# Patient Record
Sex: MALE | Race: WHITE | NOT HISPANIC OR LATINO | URBAN - METROPOLITAN AREA
[De-identification: names, ages, dates, MRNs, and addresses within clinical notes are randomized per-mention and may not be internally consistent; named-entity substitution may affect disease eponyms.]

---

## 2018-07-16 ENCOUNTER — EMERGENCY (EMERGENCY)
Facility: HOSPITAL | Age: 73
LOS: 1 days | Discharge: HOME | End: 2018-07-16
Attending: EMERGENCY MEDICINE
Payer: MEDICARE

## 2018-07-16 VITALS
OXYGEN SATURATION: 95 % | HEIGHT: 75 IN | TEMPERATURE: 98 F | DIASTOLIC BLOOD PRESSURE: 84 MMHG | HEART RATE: 88 BPM | SYSTOLIC BLOOD PRESSURE: 144 MMHG | RESPIRATION RATE: 18 BRPM | WEIGHT: 270.07 LBS

## 2018-07-16 VITALS
HEART RATE: 87 BPM | TEMPERATURE: 99 F | RESPIRATION RATE: 18 BRPM | SYSTOLIC BLOOD PRESSURE: 124 MMHG | DIASTOLIC BLOOD PRESSURE: 72 MMHG | OXYGEN SATURATION: 97 %

## 2018-07-16 DIAGNOSIS — Z98.89 OTHER SPECIFIED POSTPROCEDURAL STATES: Chronic | ICD-10-CM

## 2018-07-16 DIAGNOSIS — C49.9 MALIGNANT NEOPLASM OF CONNECTIVE AND SOFT TISSUE, UNSPECIFIED: Chronic | ICD-10-CM

## 2018-07-16 LAB
ANION GAP SERPL CALC-SCNC: 14 MMOL/L — SIGNIFICANT CHANGE UP (ref 5–17)
APPEARANCE UR: CLEAR — SIGNIFICANT CHANGE UP
APTT BLD: 37.2 SEC — SIGNIFICANT CHANGE UP (ref 27.5–37.4)
BILIRUB UR-MCNC: NEGATIVE — SIGNIFICANT CHANGE UP
BUN SERPL-MCNC: 24 MG/DL — HIGH (ref 8–20)
CALCIUM SERPL-MCNC: 9 MG/DL — SIGNIFICANT CHANGE UP (ref 8.6–10.2)
CHLORIDE SERPL-SCNC: 100 MMOL/L — SIGNIFICANT CHANGE UP (ref 98–107)
CO2 SERPL-SCNC: 25 MMOL/L — SIGNIFICANT CHANGE UP (ref 22–29)
COLOR SPEC: YELLOW — SIGNIFICANT CHANGE UP
CREAT SERPL-MCNC: 0.83 MG/DL — SIGNIFICANT CHANGE UP (ref 0.5–1.3)
DIFF PNL FLD: ABNORMAL
EPI CELLS # UR: SIGNIFICANT CHANGE UP
GLUCOSE SERPL-MCNC: 178 MG/DL — HIGH (ref 70–115)
GLUCOSE UR QL: 250 MG/DL
HCT VFR BLD CALC: 37.2 % — LOW (ref 42–52)
HGB BLD-MCNC: 10.9 G/DL — LOW (ref 14–18)
INR BLD: 2.45 RATIO — HIGH (ref 0.88–1.16)
KETONES UR-MCNC: NEGATIVE — SIGNIFICANT CHANGE UP
LEUKOCYTE ESTERASE UR-ACNC: NEGATIVE — SIGNIFICANT CHANGE UP
MCHC RBC-ENTMCNC: 19.9 PG — LOW (ref 27–31)
MCHC RBC-ENTMCNC: 29.3 G/DL — LOW (ref 32–36)
MCV RBC AUTO: 68 FL — LOW (ref 80–94)
NITRITE UR-MCNC: NEGATIVE — SIGNIFICANT CHANGE UP
PH UR: 6 — SIGNIFICANT CHANGE UP (ref 5–8)
PLATELET # BLD AUTO: 228 K/UL — SIGNIFICANT CHANGE UP (ref 150–400)
POTASSIUM SERPL-MCNC: 4 MMOL/L — SIGNIFICANT CHANGE UP (ref 3.5–5.3)
POTASSIUM SERPL-SCNC: 4 MMOL/L — SIGNIFICANT CHANGE UP (ref 3.5–5.3)
PROT UR-MCNC: 30 MG/DL
PROTHROM AB SERPL-ACNC: 27.5 SEC — HIGH (ref 9.8–12.7)
RBC # BLD: 5.47 M/UL — SIGNIFICANT CHANGE UP (ref 4.6–6.2)
RBC # FLD: 17.8 % — HIGH (ref 11–15.6)
RBC CASTS # UR COMP ASSIST: ABNORMAL /HPF (ref 0–4)
SODIUM SERPL-SCNC: 139 MMOL/L — SIGNIFICANT CHANGE UP (ref 135–145)
SP GR SPEC: 1.01 — SIGNIFICANT CHANGE UP (ref 1.01–1.02)
UROBILINOGEN FLD QL: NEGATIVE MG/DL — SIGNIFICANT CHANGE UP
WBC # BLD: 10.8 K/UL — SIGNIFICANT CHANGE UP (ref 4.8–10.8)
WBC # FLD AUTO: 10.8 K/UL — SIGNIFICANT CHANGE UP (ref 4.8–10.8)
WBC UR QL: SIGNIFICANT CHANGE UP

## 2018-07-16 PROCEDURE — 99284 EMERGENCY DEPT VISIT MOD MDM: CPT

## 2018-07-16 PROCEDURE — 99283 EMERGENCY DEPT VISIT LOW MDM: CPT

## 2018-07-16 PROCEDURE — 80048 BASIC METABOLIC PNL TOTAL CA: CPT

## 2018-07-16 PROCEDURE — 85610 PROTHROMBIN TIME: CPT

## 2018-07-16 PROCEDURE — 85730 THROMBOPLASTIN TIME PARTIAL: CPT

## 2018-07-16 PROCEDURE — 36415 COLL VENOUS BLD VENIPUNCTURE: CPT

## 2018-07-16 PROCEDURE — 85027 COMPLETE CBC AUTOMATED: CPT

## 2018-07-16 PROCEDURE — 81001 URINALYSIS AUTO W/SCOPE: CPT

## 2018-07-16 PROCEDURE — 87086 URINE CULTURE/COLONY COUNT: CPT

## 2018-07-16 NOTE — ED PROVIDER NOTE - GENITOURINARY, MLM
Falous in uncircumcised, normal color. Scrotum sack enlarged, no testicular tenderness to palpation. No cord tenderness CINDY, no lesions or rashes.

## 2018-07-16 NOTE — ED PROVIDER NOTE - NEUROLOGICAL, MLM
Alert and oriented, no focal deficits, no motor or sensory deficits. Alert and oriented, no focal deficits, no motor or sensory deficits. No peripheral paresthesia.

## 2018-07-16 NOTE — ED PROVIDER NOTE - OBJECTIVE STATEMENT
73 y/o M pt with hx of Afib, DM, DVT, HLD, HTN, and prostate disease presents to ED c/o urinary retention, lower/suprapubic abdominal pain, and back pain. Pt reports that his last normal urination was yesterday during the day, he states that he did pass dribbles during the day and night. His suprapubic pain, lower abdominal pain has been growing in severity. Pt has had similar episodes of urinary retention, from his Lozide for BPH. Pt is currently taking Coumadin, and other meds. Pt states that he has chronic blood in his urine, and has realized that his penis is f abnormal color for about a year now. He states that he wants his kidney function checked. Denies numbness, and tingling. No further complaints at this time.

## 2018-07-16 NOTE — ED PROVIDER NOTE - PMH
Afib    Anxiety    Anxiety    BPH (benign prostatic hyperplasia)    DM (diabetes mellitus)    DVT (deep venous thrombosis)  left leg  Gallbladder stone without cholecystitis or obstruction    Gastroesophageal reflux disease    HLD (hyperlipidemia)    Hyperlipidemia    Hypertension    Prostate disease

## 2018-07-16 NOTE — ED PROVIDER NOTE - CONSTITUTIONAL, MLM
normal... Anxious demeanor, well nourished, awake, alert, oriented to person, place, time/situation and in no apparent distress. Elderly.

## 2018-07-17 LAB
CULTURE RESULTS: NO GROWTH — SIGNIFICANT CHANGE UP
SPECIMEN SOURCE: SIGNIFICANT CHANGE UP

## 2018-07-22 ENCOUNTER — EMERGENCY (EMERGENCY)
Facility: HOSPITAL | Age: 73
LOS: 1 days | Discharge: HOME | End: 2018-07-22
Attending: EMERGENCY MEDICINE
Payer: MEDICARE

## 2018-07-22 VITALS
RESPIRATION RATE: 18 BRPM | TEMPERATURE: 98 F | SYSTOLIC BLOOD PRESSURE: 124 MMHG | HEART RATE: 86 BPM | OXYGEN SATURATION: 95 % | DIASTOLIC BLOOD PRESSURE: 80 MMHG

## 2018-07-22 VITALS
TEMPERATURE: 98 F | OXYGEN SATURATION: 95 % | SYSTOLIC BLOOD PRESSURE: 154 MMHG | WEIGHT: 315 LBS | DIASTOLIC BLOOD PRESSURE: 92 MMHG | HEART RATE: 90 BPM | RESPIRATION RATE: 18 BRPM | HEIGHT: 74 IN

## 2018-07-22 DIAGNOSIS — Z98.89 OTHER SPECIFIED POSTPROCEDURAL STATES: Chronic | ICD-10-CM

## 2018-07-22 DIAGNOSIS — C49.9 MALIGNANT NEOPLASM OF CONNECTIVE AND SOFT TISSUE, UNSPECIFIED: Chronic | ICD-10-CM

## 2018-07-22 PROCEDURE — 99283 EMERGENCY DEPT VISIT LOW MDM: CPT

## 2018-07-22 PROCEDURE — 99282 EMERGENCY DEPT VISIT SF MDM: CPT

## 2018-07-22 PROCEDURE — 99284 EMERGENCY DEPT VISIT MOD MDM: CPT

## 2018-07-22 NOTE — ED PROVIDER NOTE - CHPI ED SYMPTOMS NEG
no nausea/no fever/no abdominal distension/no dysuria/no vomiting/no burning urination/no chills/no hematuria

## 2018-07-22 NOTE — ED PROVIDER NOTE - OBJECTIVE STATEMENT
73 y/o M pt with hx of Afib, anxiety, BPH, DM, HTN, HLD, and DVT presents to ED c/o suprapubic abdominal pain, penile pain and urinary retention that began this evening. Pt was seen 6 days ago in the ED for urinary retention and a Franz catheter was placed. Pt presented this morning for urinary retention and after his catheter was flushed, he felt relief and as discharged. Pt states approximately 200 cc's of urine have collected in the bag over the last 6-7 hours. Pt states he has an appointment with his urologist in the morning. Denies fever, chills, CP, SOB, nausea and vomiting. No further complaints at this time.   Urology: Dr. Mccollum

## 2018-07-22 NOTE — ED ADULT NURSE NOTE - OBJECTIVE STATEMENT
Patient A&Ox4, golden cath in place, draining clear pinkish urine. Patient stated felt urinary retention, was in ED early today for same concerns. Stated has had golden in place x1.5 weeks. Patient concerned golden is not draining despite urine in golden bag.

## 2018-07-22 NOTE — ED ADULT TRIAGE NOTE - CHIEF COMPLAINT QUOTE
patient biba states that he has urinary retention and pain/ burning to his penis, patient was seen earlier today for the same, he states that he is having no urinary output at this time

## 2018-07-22 NOTE — ED PROVIDER NOTE - ATTENDING CONTRIBUTION TO CARE
I, Cristian Castillo, performed a face to face bedside interview with this patient regarding history of present illness, review of symptoms and relevant past medical, social and family history.  I completed an independent physical examination. I have communicated the patient’s plan of care and disposition with the ACP.  72 year old male with PMh BPH with recent golden insertion presents for golden not draining. States he has had 500 cc over 12 hours in leg bag  Gen: NAD, well appearing  CV: RRR  Pul: CTA b/l  Abd: Soft, non-distended, non-tender  Neuro: no focal deficits  Catheter flushed and now drains well, no discomfort, well appearing, stable for dc

## 2018-07-22 NOTE — ED PROVIDER NOTE - OBJECTIVE STATEMENT
71 yo M, PmHx: BPH, afib, DM, HTN, GERD, anxiety, presents to ED complaining of decreased catheter output. PT states he thinks there is less urinae coming out of golden catheter, with associated urge to urinate. PT states golden placed in ED 4 days ago without complications. PT states he empty bag 12 hrs ago and currently has 500 cc in Ubag. PT has apt with Dr. Blood tomorrow. PT denies fever, chills, genital pain, abdominal pain/ distention, gross hematuria, and any other acute symptoms at this time.

## 2018-07-22 NOTE — ED PROVIDER NOTE - PROGRESS NOTE DETAILS
PT catheter flushed, catheter flowing without difficulty. Franz bag has 600+ cc. PT denies abdominal pain and distension. PT verbalized understanding of diagnosis and importance of follow up at PMD. PT educated on importance of follow up and when to return to the ED.

## 2018-07-22 NOTE — ED PROVIDER NOTE - MEDICAL DECISION MAKING DETAILS
urinary catheter problem, will obtain bladder scan, flush catheter, reassure PT of appropriate urinary outpt, follow up with Urologist on Monday urinary catheter problem, flush catheter, reassure PT of appropriate urinary outpt, follow up with Urologist on Monday

## 2018-07-22 NOTE — ED PROVIDER NOTE - MEDICAL DECISION MAKING DETAILS
Franz replaced, Sx resolved. no fever or leukocytosis and pt is well appearing. Pt currently on cipro and advised to continue. Pt has f/u with Uro today at [address]. Stable for dc

## 2018-07-23 VITALS
RESPIRATION RATE: 18 BRPM | DIASTOLIC BLOOD PRESSURE: 78 MMHG | OXYGEN SATURATION: 94 % | HEART RATE: 84 BPM | SYSTOLIC BLOOD PRESSURE: 146 MMHG | TEMPERATURE: 98 F

## 2018-07-23 LAB
ANION GAP SERPL CALC-SCNC: 11 MMOL/L — SIGNIFICANT CHANGE UP (ref 5–17)
ANISOCYTOSIS BLD QL: SLIGHT — SIGNIFICANT CHANGE UP
APPEARANCE UR: CLEAR — SIGNIFICANT CHANGE UP
BACTERIA # UR AUTO: ABNORMAL
BASOPHILS # BLD AUTO: 0 K/UL — SIGNIFICANT CHANGE UP (ref 0–0.2)
BASOPHILS NFR BLD AUTO: 0.3 % — SIGNIFICANT CHANGE UP (ref 0–2)
BILIRUB UR-MCNC: NEGATIVE — SIGNIFICANT CHANGE UP
BUN SERPL-MCNC: 16 MG/DL — SIGNIFICANT CHANGE UP (ref 8–20)
BURR CELLS BLD QL SMEAR: PRESENT — SIGNIFICANT CHANGE UP
CALCIUM SERPL-MCNC: 8.9 MG/DL — SIGNIFICANT CHANGE UP (ref 8.6–10.2)
CHLORIDE SERPL-SCNC: 102 MMOL/L — SIGNIFICANT CHANGE UP (ref 98–107)
CO2 SERPL-SCNC: 25 MMOL/L — SIGNIFICANT CHANGE UP (ref 22–29)
COLOR SPEC: YELLOW — SIGNIFICANT CHANGE UP
CREAT SERPL-MCNC: 0.65 MG/DL — SIGNIFICANT CHANGE UP (ref 0.5–1.3)
DIFF PNL FLD: ABNORMAL
EOSINOPHIL # BLD AUTO: 0.4 K/UL — SIGNIFICANT CHANGE UP (ref 0–0.5)
EOSINOPHIL NFR BLD AUTO: 6.1 % — HIGH (ref 0–5)
EPI CELLS # UR: NEGATIVE — SIGNIFICANT CHANGE UP
GLUCOSE SERPL-MCNC: 249 MG/DL — HIGH (ref 70–115)
GLUCOSE UR QL: NEGATIVE MG/DL — SIGNIFICANT CHANGE UP
HCT VFR BLD CALC: 38.1 % — LOW (ref 42–52)
HGB BLD-MCNC: 11.3 G/DL — LOW (ref 14–18)
HYPOCHROMIA BLD QL: SLIGHT — SIGNIFICANT CHANGE UP
KETONES UR-MCNC: NEGATIVE — SIGNIFICANT CHANGE UP
LEUKOCYTE ESTERASE UR-ACNC: ABNORMAL
LYMPHOCYTES # BLD AUTO: 0.9 K/UL — LOW (ref 1–4.8)
LYMPHOCYTES # BLD AUTO: 13.2 % — LOW (ref 20–55)
MCHC RBC-ENTMCNC: 20.2 PG — LOW (ref 27–31)
MCHC RBC-ENTMCNC: 29.7 G/DL — LOW (ref 32–36)
MCV RBC AUTO: 68.2 FL — LOW (ref 80–94)
MICROCYTES BLD QL: SLIGHT — SIGNIFICANT CHANGE UP
MONOCYTES # BLD AUTO: 0.8 K/UL — SIGNIFICANT CHANGE UP (ref 0–0.8)
MONOCYTES NFR BLD AUTO: 12.4 % — HIGH (ref 3–10)
NEUTROPHILS # BLD AUTO: 4.6 K/UL — SIGNIFICANT CHANGE UP (ref 1.8–8)
NEUTROPHILS NFR BLD AUTO: 67.9 % — SIGNIFICANT CHANGE UP (ref 37–73)
NITRITE UR-MCNC: NEGATIVE — SIGNIFICANT CHANGE UP
OVALOCYTES BLD QL SMEAR: SLIGHT — SIGNIFICANT CHANGE UP
PH UR: 6 — SIGNIFICANT CHANGE UP (ref 5–8)
PLAT MORPH BLD: NORMAL — SIGNIFICANT CHANGE UP
PLATELET # BLD AUTO: 278 K/UL — SIGNIFICANT CHANGE UP (ref 150–400)
POIKILOCYTOSIS BLD QL AUTO: SLIGHT — SIGNIFICANT CHANGE UP
POTASSIUM SERPL-MCNC: 4.2 MMOL/L — SIGNIFICANT CHANGE UP (ref 3.5–5.3)
POTASSIUM SERPL-SCNC: 4.2 MMOL/L — SIGNIFICANT CHANGE UP (ref 3.5–5.3)
PROT UR-MCNC: 100 MG/DL
RBC # BLD: 5.59 M/UL — SIGNIFICANT CHANGE UP (ref 4.6–6.2)
RBC # FLD: 18.1 % — HIGH (ref 11–15.6)
RBC BLD AUTO: ABNORMAL
RBC CASTS # UR COMP ASSIST: >50 /HPF (ref 0–4)
SODIUM SERPL-SCNC: 138 MMOL/L — SIGNIFICANT CHANGE UP (ref 135–145)
SP GR SPEC: 1.01 — SIGNIFICANT CHANGE UP (ref 1.01–1.02)
UROBILINOGEN FLD QL: NEGATIVE MG/DL — SIGNIFICANT CHANGE UP
WBC # BLD: 6.8 K/UL — SIGNIFICANT CHANGE UP (ref 4.8–10.8)
WBC # FLD AUTO: 6.8 K/UL — SIGNIFICANT CHANGE UP (ref 4.8–10.8)
WBC UR QL: ABNORMAL

## 2018-07-23 PROCEDURE — 99283 EMERGENCY DEPT VISIT LOW MDM: CPT | Mod: 25

## 2018-07-23 PROCEDURE — 85027 COMPLETE CBC AUTOMATED: CPT

## 2018-07-23 PROCEDURE — 87186 SC STD MICRODIL/AGAR DIL: CPT

## 2018-07-23 PROCEDURE — 36415 COLL VENOUS BLD VENIPUNCTURE: CPT

## 2018-07-23 PROCEDURE — 51702 INSERT TEMP BLADDER CATH: CPT

## 2018-07-23 PROCEDURE — 80048 BASIC METABOLIC PNL TOTAL CA: CPT

## 2018-07-23 PROCEDURE — 81001 URINALYSIS AUTO W/SCOPE: CPT

## 2018-07-23 PROCEDURE — 87086 URINE CULTURE/COLONY COUNT: CPT

## 2018-07-23 PROCEDURE — 82962 GLUCOSE BLOOD TEST: CPT

## 2018-07-23 NOTE — ED ADULT NURSE REASSESSMENT NOTE - NS ED NURSE REASSESS COMMENT FT1
Golden cath removed as ordered & new one placed. New golden patent, draining clear yellow urine. Patient reports positive relief of Sx. Dr. Castillo notified of outcome.

## 2018-07-25 LAB
-  AMPICILLIN: SIGNIFICANT CHANGE UP
-  LEVOFLOXACIN: SIGNIFICANT CHANGE UP
-  NITROFURANTOIN: SIGNIFICANT CHANGE UP
-  TETRACYCLINE: SIGNIFICANT CHANGE UP
-  VANCOMYCIN: SIGNIFICANT CHANGE UP
CULTURE RESULTS: SIGNIFICANT CHANGE UP
METHOD TYPE: SIGNIFICANT CHANGE UP
ORGANISM # SPEC MICROSCOPIC CNT: SIGNIFICANT CHANGE UP
ORGANISM # SPEC MICROSCOPIC CNT: SIGNIFICANT CHANGE UP
SPECIMEN SOURCE: SIGNIFICANT CHANGE UP

## 2018-07-30 ENCOUNTER — EMERGENCY (EMERGENCY)
Facility: HOSPITAL | Age: 73
LOS: 1 days | Discharge: HOME | End: 2018-07-30
Attending: EMERGENCY MEDICINE
Payer: MEDICARE

## 2018-07-30 VITALS
RESPIRATION RATE: 18 BRPM | OXYGEN SATURATION: 98 % | TEMPERATURE: 98 F | WEIGHT: 315 LBS | HEART RATE: 86 BPM | HEIGHT: 74 IN | DIASTOLIC BLOOD PRESSURE: 78 MMHG | SYSTOLIC BLOOD PRESSURE: 156 MMHG

## 2018-07-30 DIAGNOSIS — C49.9 MALIGNANT NEOPLASM OF CONNECTIVE AND SOFT TISSUE, UNSPECIFIED: Chronic | ICD-10-CM

## 2018-07-30 DIAGNOSIS — Z98.89 OTHER SPECIFIED POSTPROCEDURAL STATES: Chronic | ICD-10-CM

## 2018-07-30 PROCEDURE — 99283 EMERGENCY DEPT VISIT LOW MDM: CPT | Mod: 25

## 2018-07-30 PROCEDURE — 99283 EMERGENCY DEPT VISIT LOW MDM: CPT

## 2018-07-30 PROCEDURE — 51702 INSERT TEMP BLADDER CATH: CPT

## 2018-07-30 NOTE — ED ADULT NURSE REASSESSMENT NOTE - NS ED NURSE REASSESS COMMENT FT1
Pt BIBA from urology office in obvious pain stating "I'm in agony". Pt requesting golden due to urinary retention, pt had golden removed this morning at urologist office at 0830 and been unable to void. Dr Doss made aware and verbal order received to place golden stat. Using sterile technique and following hospital protocol golden was placed and pt emptied approx 1100mL clear yellow urine into golden bag and sterile sample collected. Pt endorsed to primary RN Laquita for follow up and continuity of care.

## 2018-07-30 NOTE — ED PROVIDER NOTE - PROGRESS NOTE DETAILS
I called Dr. Mccollum's office at patient's request to find out when his f/u appt would be as he was concerned that they would not want to see him in the office after today.  Office moved up appointment o August 30th at 815 AM .

## 2018-07-30 NOTE — ED ADULT TRIAGE NOTE - CHIEF COMPLAINT QUOTE
pt BIBA to have golden placement, states he refused to have golden placed at drs office because they wouldn't give him a big bag.

## 2018-07-30 NOTE — ED PROVIDER NOTE - MEDICAL DECISION MAKING DETAILS
pt here for golden replacement as he refused replacement at urologist's office due to lack of gravity bag.  Plan to replace golden and d/c home

## 2018-07-30 NOTE — ED PROVIDER NOTE - OBJECTIVE STATEMENT
Pt is a 71 y/o M, with PMHx of BPH, afib, HLD, and HTN, presenting to the ED with c/o urinary retention. Pt had his golden cath removed this AM at [facility]. He was advised to return to the clinic at 1400 today if he was unable to void at home. However, he returned to the clinic at 1230 stating he was having lower abd pain and was unable to void. Staff at urology clinic who called to the ED reports they offered pt the option to replace his golden cath with a leg bag but he did not want it, preferring a gravity bag. Pt called EMS from the waiting room of the clinic to be brought to the ED. NO other complaints at this time,.   [facility]- urology Pt is a 73 y/o M, with PMHx of BPH, afib, HLD, and HTN, presenting to the ED with c/o urinary retention. Pt had his golden cath removed this AM at [facility]. He was advised to return to the clinic at 1400 today if he was unable to void at home. However, he returned to the clinic at 1230 stating he was having lower abd pain and was unable to void. Staff at urology clinic who called to the ED (Pat) reports they offered pt the option to replace his golden cath with a leg bag but he did not want it, preferring a gravity bag. Pt called EMS from the waiting room of the clinic to be brought to the ED. NO other complaints at this time,.   [facility]- urology

## 2019-11-30 ENCOUNTER — EMERGENCY (EMERGENCY)
Facility: HOSPITAL | Age: 74
LOS: 1 days | Discharge: HOME | End: 2019-11-30
Attending: EMERGENCY MEDICINE
Payer: MEDICARE

## 2019-11-30 VITALS
TEMPERATURE: 98 F | RESPIRATION RATE: 16 BRPM | SYSTOLIC BLOOD PRESSURE: 138 MMHG | HEART RATE: 89 BPM | DIASTOLIC BLOOD PRESSURE: 80 MMHG | HEIGHT: 70 IN | WEIGHT: 300.05 LBS | OXYGEN SATURATION: 99 %

## 2019-11-30 DIAGNOSIS — Z98.89 OTHER SPECIFIED POSTPROCEDURAL STATES: Chronic | ICD-10-CM

## 2019-11-30 DIAGNOSIS — C49.9 MALIGNANT NEOPLASM OF CONNECTIVE AND SOFT TISSUE, UNSPECIFIED: Chronic | ICD-10-CM

## 2019-11-30 LAB
ALBUMIN SERPL ELPH-MCNC: 4 G/DL — SIGNIFICANT CHANGE UP (ref 3.3–5.2)
ALP SERPL-CCNC: 86 U/L — SIGNIFICANT CHANGE UP (ref 40–120)
ALT FLD-CCNC: 9 U/L — SIGNIFICANT CHANGE UP
ANION GAP SERPL CALC-SCNC: 12 MMOL/L — SIGNIFICANT CHANGE UP (ref 5–17)
APTT BLD: 33.8 SEC — SIGNIFICANT CHANGE UP (ref 27.5–36.3)
AST SERPL-CCNC: 11 U/L — SIGNIFICANT CHANGE UP
BILIRUB SERPL-MCNC: 0.4 MG/DL — SIGNIFICANT CHANGE UP (ref 0.4–2)
BUN SERPL-MCNC: 15 MG/DL — SIGNIFICANT CHANGE UP (ref 8–20)
CALCIUM SERPL-MCNC: 8.5 MG/DL — LOW (ref 8.6–10.2)
CHLORIDE SERPL-SCNC: 103 MMOL/L — SIGNIFICANT CHANGE UP (ref 98–107)
CO2 SERPL-SCNC: 25 MMOL/L — SIGNIFICANT CHANGE UP (ref 22–29)
CREAT SERPL-MCNC: 0.6 MG/DL — SIGNIFICANT CHANGE UP (ref 0.5–1.3)
GLUCOSE SERPL-MCNC: 138 MG/DL — HIGH (ref 70–115)
HCT VFR BLD CALC: 32.2 % — LOW (ref 39–50)
HGB BLD-MCNC: 8.4 G/DL — LOW (ref 13–17)
INR BLD: 2.6 RATIO — HIGH (ref 0.88–1.16)
MCHC RBC-ENTMCNC: 16.4 PG — LOW (ref 27–34)
MCHC RBC-ENTMCNC: 26.1 GM/DL — LOW (ref 32–36)
MCV RBC AUTO: 62.9 FL — LOW (ref 80–100)
PLATELET # BLD AUTO: 273 K/UL — SIGNIFICANT CHANGE UP (ref 150–400)
POTASSIUM SERPL-MCNC: 4.1 MMOL/L — SIGNIFICANT CHANGE UP (ref 3.5–5.3)
POTASSIUM SERPL-SCNC: 4.1 MMOL/L — SIGNIFICANT CHANGE UP (ref 3.5–5.3)
PROT SERPL-MCNC: 7.1 G/DL — SIGNIFICANT CHANGE UP (ref 6.6–8.7)
PROTHROM AB SERPL-ACNC: 30.8 SEC — HIGH (ref 10–12.9)
RBC # BLD: 5.12 M/UL — SIGNIFICANT CHANGE UP (ref 4.2–5.8)
RBC # FLD: 19 % — HIGH (ref 10.3–14.5)
SODIUM SERPL-SCNC: 140 MMOL/L — SIGNIFICANT CHANGE UP (ref 135–145)
WBC # BLD: 6.26 K/UL — SIGNIFICANT CHANGE UP (ref 3.8–10.5)
WBC # FLD AUTO: 6.26 K/UL — SIGNIFICANT CHANGE UP (ref 3.8–10.5)

## 2019-11-30 PROCEDURE — 80053 COMPREHEN METABOLIC PANEL: CPT

## 2019-11-30 PROCEDURE — 85610 PROTHROMBIN TIME: CPT

## 2019-11-30 PROCEDURE — 36415 COLL VENOUS BLD VENIPUNCTURE: CPT

## 2019-11-30 PROCEDURE — 85027 COMPLETE CBC AUTOMATED: CPT

## 2019-11-30 PROCEDURE — 99283 EMERGENCY DEPT VISIT LOW MDM: CPT

## 2019-11-30 PROCEDURE — 85730 THROMBOPLASTIN TIME PARTIAL: CPT

## 2019-11-30 PROCEDURE — 99284 EMERGENCY DEPT VISIT MOD MDM: CPT

## 2019-11-30 NOTE — ED PROVIDER NOTE - ATTENDING CONTRIBUTION TO CARE
75 y/o M with PMH A fib (on Coumadin), DM, HLD, TURP, anemia, presents for anemia that is asymptomatic, outpatient labs found to be 8.8 earlier this week and 8.7 in Sept. He is asymptomatic from anemia, has been extensively worked up in May of this year with colonoscopy. He denies dark or tarry stools, blood in urine. He is satting well on room air, empty rectal vault, therefore cannot check for occult stool - patient has been extensively worked up as outpatient with GI, Hgb is not significantly changed since Sept, no indication for transfusion at this time. I recommended outpatient follow up to hematology and discussed indications to return to the ED, patient verbalizes understanding and is comfortable with discharge.

## 2019-11-30 NOTE — ED PROVIDER NOTE - RESPIRATORY [+], MLM
HPI:  Scottie Chilel is a 64 year old male who presents today  to follow up on his htn and lipids    Discussed goals of therapy and rationale.  He agrees with plan to achieve those with lifestyle and medication    Reviewed latest results and planned f/u    No cp garcia palps dizziness    Allergy and  Meds reviewed today.      PHYSICAL EXAM   height is 5' 10\" (1.778 m) and weight is 117 kg. His blood pressure is 138/74 and his pulse is 68.  body mass index is 37.02 kg/m².    GENERAL:  Alert oriented well groomed patient in no apparent distress  NECK:  carotid  pulses 2+ equal bilaterally, without bruits  supple and nontender, without significant adenopathy,or thyromegaly  LUNG:  CTA anteriorly and posteriorly, without rales, rhonchi or wheezing  Normal chest wall excursion  COR:  Regular rate and rhythm, normal S1 and S2 without murmurs, gallops or rubs  EXT:  without erythema, edema or cords bilaterally    IMPRESSION/PLAN:  Essential hypertension, benign  - amlodipine-valsartan (EXFORGE) 5-320 MG per tablet; Take 1 tablet by mouth daily. New dose  Dispense: 90 tablet; Refill: 3  - BASIC METABOLIC PANEL; Future    Hyperlipidemia, unspecified hyperlipidemia type  - atorvastatin (LIPITOR) 10 MG tablet; Take 1 tablet by mouth daily. Keep on file  Dispense: 90 tablet; Refill: 3  - LIPID PANEL WITH REFLEX; Future      Patient Instructions   Make sure no potassium in either supplement    Nurse visit on bp end of January   Call sooner as needed    Start lipitor when you know exforge dose is agreeing with you     Labs in June and see me           Return in about 6 weeks (around 1/24/2018) for nurse visit  on bp and 6 months with me.    SHORTNESS OF BREATH

## 2019-11-30 NOTE — ED ADULT TRIAGE NOTE - CHIEF COMPLAINT QUOTE
Pt had routine blood work done the other day and [facility] his Hg was low. Pt asymptomatic, denies any dark stools.

## 2019-11-30 NOTE — ED PROVIDER NOTE - CONSTITUTIONAL, MLM
normal... Well appearing, awake, alert, oriented to person, place, time/situation and in no apparent distress. Ambulates with walker.

## 2019-11-30 NOTE — ED PROVIDER NOTE - OBJECTIVE STATEMENT
Howie Rubio is a 75 y/o male with a pmh of Atrial fibrillation on coumadin, diabetes on metformin, Hyperlipidemia, schwannoma s/p surgery 1992, and radiation in 1998 presenting to the ED for abnormal lab. He had two routine bloodwork performed on 9/23 and 4 days ago which revealed Hg of 8.75 and 8.86, respectively. He states he has been having some shortness of breath (at rest and with activity) for the past month, and feels lightheaded for the past year. He states his lightheadedness is brief with positional changes, and he also feels likes the room is spinning when he lays down on his right side. He denies being worked up for Vertigo.     He stated he had an anemic episode when he was 17, he became pale, extremely weak and short of breath, requiring transfusion of 2 units of blood. He states he was worked up, but the doctors were not able to find a cause for his anemia. He has been asymptomatic since.      He admits having chronic diarrhea for a year and takes Imodium.  He denies any trauma, dark stools, hematuria, weakness, numbness/tingling, fevers, chills, chest pain, abdominal pain, bruising, ecchymosis, or constipation.   He denies any bleeding or anemia disorders in his family.  He states he had a colonoscopy performed in May of this year, with several polyps removed, but no signs of GI bleed. His diarrhea was worked up outpatient.    PCP: Dr. Ang Howie Rubio is a 75 y/o male with a pmh of Atrial fibrillation on coumadin, diabetes on metformin, Hyperlipidemia, schwannoma s/p surgery 1992, and radiation in 1998 presenting to the ED for abnormal lab. He had two routine bloodwork performed on 9/23 and 4 days ago which revealed Hg of 8.75 and 8.86, respectively. He states he has been having some intermittent shortness of breath (at rest and with activity,) for the past month, and feels lightheaded for the past year. He states his lightheadedness is brief during positional changes, and he also feels likes the room is spinning when he lays down on his right side. He denies being worked up for vertigo.     He stated he had an anemic episode when he was 17, he became pale, extremely weak and short of breath, requiring transfusion of 2 units of blood. He states he was worked up, but the doctors were not able to find a cause for his anemia. He has been asymptomatic since.      He admits having chronic diarrhea for a year and takes Imodium.  He denies any trauma, dark stools, hematuria, weakness, numbness/tingling, fevers, chills, chest pain, abdominal pain, bruising, ecchymosis, or constipation.   He denies any bleeding or anemia disorders in his family.  He states he had a colonoscopy performed in May of this year, with several polyps removed, but no signs of GI bleed. His diarrhea was worked up outpatient.    PCP: Dr. Ang

## 2019-11-30 NOTE — ED PROVIDER NOTE - CARE PROVIDER_API CALL
Ligia Bella)  Medical Oncology  [facility], [address] [place] [address]  Phone: [phone]  Fax: [phone]  Follow Up Time:

## 2019-11-30 NOTE — ED ADULT NURSE NOTE - OBJECTIVE STATEMENT
pt came in for low hgb results and was sent in by md, denied any s/s of gib he denied chest pain breaths are even and unlabored skin is warm and dry

## 2019-11-30 NOTE — ED ADULT NURSE NOTE - CHIEF COMPLAINT QUOTE
Pt had routine blood work done the other day and PMD called [facility] to let him know that his Hg was low. Pt asymptomatic, denies any dark stools.

## 2019-11-30 NOTE — ED PROVIDER NOTE - CLINICAL SUMMARY MEDICAL DECISION MAKING FREE TEXT BOX
75 y/o M with PMH A fib, DM, HLD, TURP, anemia, presents sent in by PMD for anemia found on routine blood work by his PMD, in Sept 8.7, this week 8.8. He has no symptoms of lightheadedness while standing (occasionally he does, but lasts seconds), denies SOB, dark or bloody stool or urine. He has been extensively worked up by GI earlier this year with no signs of bleeding. He refuse to take iron due to explosive diarrhea. He has one transfusion at 16 y/o. He is asymptomatic on exam, no stool in rectal vault, Hgb 8.4, which is not significantly different than his labs over the past 3 months, and has been extensively worked up as outpatient GI. I recommended follow up to hematology, information given to patient. Results printed and patient can follow up with PMD. I discussed indications to return and patient verbalizes understanding and is comfortable with discharge at this time.

## 2020-01-19 NOTE — ED ADULT TRIAGE NOTE - NS ED NURSE AMBULANCES
Pharmacist Renal Dose Adjustment Note    Ana Bullard is a 57 y.o. male being treated with the medication tamiflu    Patient Data:    Vital Signs (Most Recent):  Temp: (!) 103.4 °F (39.7 °C) (01/19/20 0013)  Pulse: (!) 120 (01/19/20 0021)  Resp: (!) 25 (01/18/20 2340)  BP: (!) 102/58 (01/19/20 0021)  SpO2: 98 % (01/19/20 0021)   Vital Signs (72h Range):  Temp:  [103.4 °F (39.7 °C)]   Pulse:  [120-127]   Resp:  [25-34]   BP: ()/(49-58)   SpO2:  [92 %-98 %]      Recent Labs   Lab 01/18/20 2351   CREATININE 1.8*     Serum creatinine: 1.8 mg/dL (H) 01/18/20 2351  Estimated creatinine clearance: 58.5 mL/min (A)    Medication:oseltamivir 75 mg bid will be changed to 30 mg bid for 5 days per crcl between 30-60 ml/min (58.5 ml/min)    Pharmacist's Name: Flo Garcia  Pharmacist's Extension: [phone]     [facility]

## 2020-01-24 ENCOUNTER — INPATIENT (INPATIENT)
Facility: HOSPITAL | Age: 75
LOS: 4 days | Discharge: HOME | DRG: 194 | End: 2020-01-29
Attending: HOSPITALIST | Admitting: INTERNAL MEDICINE
Payer: MEDICARE

## 2020-01-24 VITALS — TEMPERATURE: 101 F

## 2020-01-24 DIAGNOSIS — Z98.89 OTHER SPECIFIED POSTPROCEDURAL STATES: Chronic | ICD-10-CM

## 2020-01-24 DIAGNOSIS — I48.91 UNSPECIFIED ATRIAL FIBRILLATION: ICD-10-CM

## 2020-01-24 DIAGNOSIS — Z79.01 LONG TERM (CURRENT) USE OF ANTICOAGULANTS: ICD-10-CM

## 2020-01-24 DIAGNOSIS — C49.9 MALIGNANT NEOPLASM OF CONNECTIVE AND SOFT TISSUE, UNSPECIFIED: Chronic | ICD-10-CM

## 2020-01-24 DIAGNOSIS — D50.9 IRON DEFICIENCY ANEMIA, UNSPECIFIED: ICD-10-CM

## 2020-01-24 DIAGNOSIS — J11.1 INFLUENZA DUE TO UNIDENTIFIED INFLUENZA VIRUS WITH OTHER RESPIRATORY MANIFESTATIONS: ICD-10-CM

## 2020-01-24 LAB
ALBUMIN SERPL ELPH-MCNC: 3.9 G/DL — SIGNIFICANT CHANGE UP (ref 3.3–5.2)
ALP SERPL-CCNC: 93 U/L — SIGNIFICANT CHANGE UP (ref 40–120)
ALT FLD-CCNC: 17 U/L — SIGNIFICANT CHANGE UP
ANION GAP SERPL CALC-SCNC: 13 MMOL/L — SIGNIFICANT CHANGE UP (ref 5–17)
ANISOCYTOSIS BLD QL: SIGNIFICANT CHANGE UP
APPEARANCE UR: CLEAR — SIGNIFICANT CHANGE UP
APTT BLD: 40.5 SEC — HIGH (ref 27.5–36.3)
AST SERPL-CCNC: 67 U/L — HIGH
BACTERIA # UR AUTO: ABNORMAL
BASOPHILS # BLD AUTO: 0.07 K/UL — SIGNIFICANT CHANGE UP (ref 0–0.2)
BASOPHILS NFR BLD AUTO: 0.9 % — SIGNIFICANT CHANGE UP (ref 0–2)
BILIRUB SERPL-MCNC: 0.4 MG/DL — SIGNIFICANT CHANGE UP (ref 0.4–2)
BILIRUB UR-MCNC: NEGATIVE — SIGNIFICANT CHANGE UP
BUN SERPL-MCNC: 12 MG/DL — SIGNIFICANT CHANGE UP (ref 8–20)
CALCIUM SERPL-MCNC: 8.7 MG/DL — SIGNIFICANT CHANGE UP (ref 8.6–10.2)
CHLORIDE SERPL-SCNC: 98 MMOL/L — SIGNIFICANT CHANGE UP (ref 98–107)
CO2 SERPL-SCNC: 24 MMOL/L — SIGNIFICANT CHANGE UP (ref 22–29)
COLOR SPEC: YELLOW — SIGNIFICANT CHANGE UP
CREAT SERPL-MCNC: 0.61 MG/DL — SIGNIFICANT CHANGE UP (ref 0.5–1.3)
DIFF PNL FLD: ABNORMAL
ELLIPTOCYTES BLD QL SMEAR: SLIGHT — SIGNIFICANT CHANGE UP
EOSINOPHIL # BLD AUTO: 0.36 K/UL — SIGNIFICANT CHANGE UP (ref 0–0.5)
EOSINOPHIL NFR BLD AUTO: 4.3 % — SIGNIFICANT CHANGE UP (ref 0–6)
EPI CELLS # UR: SIGNIFICANT CHANGE UP
FLU A RESULT: DETECTED
FLU A RESULT: DETECTED
FLUAV AG NPH QL: DETECTED
FLUBV AG NPH QL: SIGNIFICANT CHANGE UP
GIANT PLATELETS BLD QL SMEAR: PRESENT — SIGNIFICANT CHANGE UP
GLUCOSE BLDC GLUCOMTR-MCNC: 130 MG/DL — HIGH (ref 70–99)
GLUCOSE BLDC GLUCOMTR-MCNC: 131 MG/DL — HIGH (ref 70–99)
GLUCOSE SERPL-MCNC: 159 MG/DL — HIGH (ref 70–99)
GLUCOSE UR QL: NEGATIVE MG/DL — SIGNIFICANT CHANGE UP
HCT VFR BLD CALC: 35.5 % — LOW (ref 39–50)
HGB BLD-MCNC: 9.6 G/DL — LOW (ref 13–17)
INR BLD: 3.07 RATIO — HIGH (ref 0.88–1.16)
KETONES UR-MCNC: NEGATIVE — SIGNIFICANT CHANGE UP
LACTATE BLDV-MCNC: 2.7 MMOL/L — HIGH (ref 0.5–2)
LEUKOCYTE ESTERASE UR-ACNC: NEGATIVE — SIGNIFICANT CHANGE UP
LYMPHOCYTES # BLD AUTO: 0.43 K/UL — LOW (ref 1–3.3)
LYMPHOCYTES # BLD AUTO: 5.2 % — LOW (ref 13–44)
MANUAL SMEAR VERIFICATION: SIGNIFICANT CHANGE UP
MCHC RBC-ENTMCNC: 18.2 PG — LOW (ref 27–34)
MCHC RBC-ENTMCNC: 27 GM/DL — LOW (ref 32–36)
MCV RBC AUTO: 67.4 FL — LOW (ref 80–100)
MICROCYTES BLD QL: SIGNIFICANT CHANGE UP
MONOCYTES # BLD AUTO: 0.29 K/UL — SIGNIFICANT CHANGE UP (ref 0–0.9)
MONOCYTES NFR BLD AUTO: 3.5 % — SIGNIFICANT CHANGE UP (ref 2–14)
NEUTROPHILS # BLD AUTO: 7.04 K/UL — SIGNIFICANT CHANGE UP (ref 1.8–7.4)
NEUTROPHILS NFR BLD AUTO: 85.2 % — HIGH (ref 43–77)
NITRITE UR-MCNC: NEGATIVE — SIGNIFICANT CHANGE UP
NT-PROBNP SERPL-SCNC: 401 PG/ML — HIGH (ref 0–300)
OVALOCYTES BLD QL SMEAR: SLIGHT — SIGNIFICANT CHANGE UP
PH UR: 8 — SIGNIFICANT CHANGE UP (ref 5–8)
PLAT MORPH BLD: NORMAL — SIGNIFICANT CHANGE UP
PLATELET # BLD AUTO: 245 K/UL — SIGNIFICANT CHANGE UP (ref 150–400)
POIKILOCYTOSIS BLD QL AUTO: SIGNIFICANT CHANGE UP
POTASSIUM SERPL-MCNC: 5.1 MMOL/L — SIGNIFICANT CHANGE UP (ref 3.5–5.3)
POTASSIUM SERPL-SCNC: 5.1 MMOL/L — SIGNIFICANT CHANGE UP (ref 3.5–5.3)
PROT SERPL-MCNC: 7.1 G/DL — SIGNIFICANT CHANGE UP (ref 6.6–8.7)
PROT UR-MCNC: 30 MG/DL
PROTHROM AB SERPL-ACNC: 36.5 SEC — HIGH (ref 10–12.9)
RBC # BLD: 5.27 M/UL — SIGNIFICANT CHANGE UP (ref 4.2–5.8)
RBC # FLD: 24.2 % — HIGH (ref 10.3–14.5)
RBC BLD AUTO: ABNORMAL
RBC CASTS # UR COMP ASSIST: ABNORMAL /HPF (ref 0–4)
RSV RESULT: SIGNIFICANT CHANGE UP
RSV RNA RESP QL NAA+PROBE: SIGNIFICANT CHANGE UP
SODIUM SERPL-SCNC: 135 MMOL/L — SIGNIFICANT CHANGE UP (ref 135–145)
SP GR SPEC: 1.01 — SIGNIFICANT CHANGE UP (ref 1.01–1.02)
UROBILINOGEN FLD QL: NEGATIVE MG/DL — SIGNIFICANT CHANGE UP
VARIANT LYMPHS # BLD: 0.9 % — SIGNIFICANT CHANGE UP (ref 0–6)
WBC # BLD: 8.26 K/UL — SIGNIFICANT CHANGE UP (ref 3.8–10.5)
WBC # FLD AUTO: 8.26 K/UL — SIGNIFICANT CHANGE UP (ref 3.8–10.5)
WBC UR QL: SIGNIFICANT CHANGE UP

## 2020-01-24 PROCEDURE — 99222 1ST HOSP IP/OBS MODERATE 55: CPT

## 2020-01-24 PROCEDURE — 99233 SBSQ HOSP IP/OBS HIGH 50: CPT

## 2020-01-24 PROCEDURE — 99223 1ST HOSP IP/OBS HIGH 75: CPT

## 2020-01-24 PROCEDURE — 71045 X-RAY EXAM CHEST 1 VIEW: CPT | Mod: 26

## 2020-01-24 PROCEDURE — 99285 EMERGENCY DEPT VISIT HI MDM: CPT

## 2020-01-24 PROCEDURE — 93010 ELECTROCARDIOGRAM REPORT: CPT

## 2020-01-24 PROCEDURE — 71250 CT THORAX DX C-: CPT | Mod: 26

## 2020-01-24 RX ORDER — DEXTROSE 50 % IN WATER 50 %
15 SYRINGE (ML) INTRAVENOUS ONCE
Refills: 0 | Status: DISCONTINUED | OUTPATIENT
Start: 2020-01-24 | End: 2020-01-29

## 2020-01-24 RX ORDER — METOPROLOL TARTRATE 50 MG
5 TABLET ORAL ONCE
Refills: 0 | Status: COMPLETED | OUTPATIENT
Start: 2020-01-24 | End: 2020-01-24

## 2020-01-24 RX ORDER — ACETAMINOPHEN 500 MG
650 TABLET ORAL ONCE
Refills: 0 | Status: COMPLETED | OUTPATIENT
Start: 2020-01-24 | End: 2020-01-24

## 2020-01-24 RX ORDER — SODIUM CHLORIDE 9 MG/ML
1000 INJECTION, SOLUTION INTRAVENOUS
Refills: 0 | Status: DISCONTINUED | OUTPATIENT
Start: 2020-01-24 | End: 2020-01-29

## 2020-01-24 RX ORDER — GLUCAGON INJECTION, SOLUTION 0.5 MG/.1ML
1 INJECTION, SOLUTION SUBCUTANEOUS ONCE
Refills: 0 | Status: DISCONTINUED | OUTPATIENT
Start: 2020-01-24 | End: 2020-01-29

## 2020-01-24 RX ORDER — DEXTROSE 50 % IN WATER 50 %
12.5 SYRINGE (ML) INTRAVENOUS ONCE
Refills: 0 | Status: DISCONTINUED | OUTPATIENT
Start: 2020-01-24 | End: 2020-01-29

## 2020-01-24 RX ORDER — ALPRAZOLAM 0.25 MG
0.25 TABLET ORAL
Refills: 0 | Status: DISCONTINUED | OUTPATIENT
Start: 2020-01-24 | End: 2020-01-29

## 2020-01-24 RX ORDER — SODIUM CHLORIDE 9 MG/ML
1000 INJECTION INTRAMUSCULAR; INTRAVENOUS; SUBCUTANEOUS
Refills: 0 | Status: DISCONTINUED | OUTPATIENT
Start: 2020-01-24 | End: 2020-01-29

## 2020-01-24 RX ORDER — IPRATROPIUM/ALBUTEROL SULFATE 18-103MCG
3 AEROSOL WITH ADAPTER (GRAM) INHALATION EVERY 6 HOURS
Refills: 0 | Status: DISCONTINUED | OUTPATIENT
Start: 2020-01-24 | End: 2020-01-29

## 2020-01-24 RX ORDER — OXYCODONE HYDROCHLORIDE 5 MG/1
5 TABLET ORAL ONCE
Refills: 0 | Status: DISCONTINUED | OUTPATIENT
Start: 2020-01-24 | End: 2020-01-24

## 2020-01-24 RX ORDER — INSULIN LISPRO 100/ML
VIAL (ML) SUBCUTANEOUS
Refills: 0 | Status: DISCONTINUED | OUTPATIENT
Start: 2020-01-24 | End: 2020-01-29

## 2020-01-24 RX ORDER — IPRATROPIUM/ALBUTEROL SULFATE 18-103MCG
3 AEROSOL WITH ADAPTER (GRAM) INHALATION ONCE
Refills: 0 | Status: COMPLETED | OUTPATIENT
Start: 2020-01-24 | End: 2020-01-24

## 2020-01-24 RX ORDER — METOPROLOL TARTRATE 50 MG
75 TABLET ORAL DAILY
Refills: 0 | Status: DISCONTINUED | OUTPATIENT
Start: 2020-01-24 | End: 2020-01-29

## 2020-01-24 RX ORDER — PANTOPRAZOLE SODIUM 20 MG/1
40 TABLET, DELAYED RELEASE ORAL
Refills: 0 | Status: DISCONTINUED | OUTPATIENT
Start: 2020-01-24 | End: 2020-01-29

## 2020-01-24 RX ORDER — VANCOMYCIN HCL 1 G
1000 VIAL (EA) INTRAVENOUS ONCE
Refills: 0 | Status: COMPLETED | OUTPATIENT
Start: 2020-01-24 | End: 2020-01-24

## 2020-01-24 RX ORDER — FINASTERIDE 5 MG/1
5 TABLET, FILM COATED ORAL DAILY
Refills: 0 | Status: DISCONTINUED | OUTPATIENT
Start: 2020-01-24 | End: 2020-01-29

## 2020-01-24 RX ORDER — ATORVASTATIN CALCIUM 80 MG/1
20 TABLET, FILM COATED ORAL AT BEDTIME
Refills: 0 | Status: DISCONTINUED | OUTPATIENT
Start: 2020-01-24 | End: 2020-01-29

## 2020-01-24 RX ORDER — SODIUM CHLORIDE 9 MG/ML
3500 INJECTION INTRAMUSCULAR; INTRAVENOUS; SUBCUTANEOUS ONCE
Refills: 0 | Status: COMPLETED | OUTPATIENT
Start: 2020-01-24 | End: 2020-01-24

## 2020-01-24 RX ORDER — ACETAMINOPHEN 500 MG
650 TABLET ORAL EVERY 6 HOURS
Refills: 0 | Status: DISCONTINUED | OUTPATIENT
Start: 2020-01-24 | End: 2020-01-29

## 2020-01-24 RX ADMIN — Medication 75 MILLIGRAM(S): at 05:41

## 2020-01-24 RX ADMIN — Medication 75 MILLIGRAM(S): at 19:29

## 2020-01-24 RX ADMIN — Medication 0.25 MILLIGRAM(S): at 17:13

## 2020-01-24 RX ADMIN — Medication 5 MILLIGRAM(S): at 11:59

## 2020-01-24 RX ADMIN — SODIUM CHLORIDE 125 MILLILITER(S): 9 INJECTION INTRAMUSCULAR; INTRAVENOUS; SUBCUTANEOUS at 02:50

## 2020-01-24 RX ADMIN — Medication 0: at 13:26

## 2020-01-24 RX ADMIN — SODIUM CHLORIDE 125 MILLILITER(S): 9 INJECTION INTRAMUSCULAR; INTRAVENOUS; SUBCUTANEOUS at 10:14

## 2020-01-24 RX ADMIN — Medication 0.25 MILLIGRAM(S): at 22:44

## 2020-01-24 RX ADMIN — FINASTERIDE 5 MILLIGRAM(S): 5 TABLET, FILM COATED ORAL at 15:59

## 2020-01-24 RX ADMIN — Medication 250 MILLIGRAM(S): at 02:40

## 2020-01-24 RX ADMIN — Medication 650 MILLIGRAM(S): at 22:15

## 2020-01-24 RX ADMIN — Medication 3 MILLILITER(S): at 21:05

## 2020-01-24 RX ADMIN — Medication 3 MILLILITER(S): at 02:50

## 2020-01-24 RX ADMIN — SODIUM CHLORIDE 125 MILLILITER(S): 9 INJECTION INTRAMUSCULAR; INTRAVENOUS; SUBCUTANEOUS at 22:44

## 2020-01-24 RX ADMIN — ATORVASTATIN CALCIUM 20 MILLIGRAM(S): 80 TABLET, FILM COATED ORAL at 22:46

## 2020-01-24 RX ADMIN — Medication 3 MILLILITER(S): at 15:59

## 2020-01-24 RX ADMIN — Medication 650 MILLIGRAM(S): at 10:42

## 2020-01-24 RX ADMIN — Medication 1000 MILLIGRAM(S): at 03:40

## 2020-01-24 RX ADMIN — Medication 650 MILLIGRAM(S): at 22:44

## 2020-01-24 RX ADMIN — Medication 650 MILLIGRAM(S): at 13:12

## 2020-01-24 RX ADMIN — Medication 650 MILLIGRAM(S): at 17:11

## 2020-01-24 RX ADMIN — Medication 3 MILLILITER(S): at 05:42

## 2020-01-24 RX ADMIN — OXYCODONE HYDROCHLORIDE 5 MILLIGRAM(S): 5 TABLET ORAL at 23:58

## 2020-01-24 NOTE — ED PROVIDER NOTE - OBJECTIVE STATEMENT
74 year old male with past medical history significant for Atrial fibrillation; on coumadin, diabetes; on metformin, Hyperlipidemia, schwannoma s/p surgery 1992, and radiation in 1998, spinal surgery 7 years ago presenting to the ED c/o worsening shortness of breath for the past few days.   Pt was seen by his GI doctor recently and was informed that he was anemic, he began to take iron pill; he has not had the chance to follow-up with a hematologist. 3 days ago he began to experience significant nasal congestion and had a productive cough with white "standard mucus colored" phlegm. His shortness of breath has continued to worsen, and pt feels "bloated" in his upper abdomen at this time. Difficulty breathing is aggravated by laying flat. He has had constant diarrhea since his spinal surgery in 2013.  Pt had his flu vaccine this year. Pt with history of endoscopy, colonoscopy (last in May 2019), and stress tests. Denies nausea, vomiting, dysuria, hematuria, chest pain, confusion. Took first 2 of a Z-pack at 1700 today.

## 2020-01-24 NOTE — ED ADULT NURSE REASSESSMENT NOTE - NS ED NURSE REASSESS COMMENT FT1
pt reports his "head is booming"; reports feeling dizzy, and states he feels like he's "going down".  pt very anxious; constantly stating he needs to get to a room with a bathroom pt reports his "head is booming"; reports feeling dizzy, and states he feels like he's "going down".  pt very anxious; constantly stating he needs to get to a room with a bathroom; pt provided bedside commode but does not want to use it

## 2020-01-24 NOTE — CONSULT NOTE ADULT - SUBJECTIVE AND OBJECTIVE BOX
PULMONARY CONSULT NOTE      ANIA CRISTOBAL  MRN-[id number]    Patient is a 74y old  Male who presents with a chief complaint of cough, sob (2020 11:02)      HISTORY OF PRESENT ILLNESS:    74 year old male with past medical history significant for Atrial fibrillation on coumadin, diabetes on metformin, Hyperlipidemia, schwannoma s/p surgery , and radiation in , spinal surgery 7 years ago, h/o DVT presenting to the ED c/o worsening shortness of breath for the past few days. Pt states that he saw his pmd Dr. Ang and was started on z-pack. Pt states that his cough and congestion got worse and came to the er. In the ed found to be flu + and in rapid afib. Pt states that he is feeling mildly better but still sob. He denies any n/v/d/ha/cp shantanu bowel and bladder. He reports being a former smoker of up to 2 ppd x 15 years but quit in . He reports having inhaler therapy in the past but is not maintained on BD therapy chronically. He continues to c/o cough and SOB but is somewhat better.      MEDICATIONS  (STANDING):  atorvastatin 20 milliGRAM(s) Oral at bedtime  dextrose 5%. 1000 milliLiter(s) (50 mL/Hr) IV Continuous <Continuous>  dextrose 50% Injectable 12.5 Gram(s) IV Push once  finasteride 5 milliGRAM(s) Oral daily  insulin lispro (HumaLOG) corrective regimen sliding scale   SubCutaneous three times a day before meals  metoprolol succinate ER 75 milliGRAM(s) Oral daily  oseltamivir 75 milliGRAM(s) Oral two times a day  pantoprazole    Tablet 40 milliGRAM(s) Oral before breakfast  sodium chloride 0.9%. 1000 milliLiter(s) (125 mL/Hr) IV Continuous <Continuous>      MEDICATIONS  (PRN):  acetaminophen   Tablet .. 650 milliGRAM(s) Oral every 6 hours PRN Temp greater or equal to 38C (100.4F), Mild Pain (1 - 3)  ALPRAZolam 0.25 milliGRAM(s) Oral two times a day PRN anxiety  dextrose 40% Gel 15 Gram(s) Oral once PRN Blood Glucose LESS THAN 70 milliGRAM(s)/deciliter  glucagon  Injectable 1 milliGRAM(s) IntraMuscular once PRN Glucose LESS THAN 70 milligrams/deciliter      Allergies    Bactrim (Rash)  Ceftin (Hives)  penicillin (Anaphylaxis)  sulfa drugs (Unknown)    Intolerances        PAST MEDICAL & SURGICAL HISTORY:  HLD (hyperlipidemia)  Anxiety  Afib  DVT (deep venous thrombosis): left leg  DM (diabetes mellitus)  BPH (benign prostatic hyperplasia)  Gallbladder stone without cholecystitis or obstruction  Gastroesophageal reflux disease  Prostate disease  Hyperlipidemia  Hypertension  Anxiety  S/P laminectomy  H/O arthroscopy of knee  Malignant schwannoma      FAMILY HISTORY:  No pertinent family history in first degree relatives      SOCIAL HISTORY  Smoking History: as above    REVIEW OF SYSTEMS:    CONSTITUTIONAL:  No fevers, chills, sweats    HEENT:  Eyes:  No diplopia or blurred vision. ENT:  No earache, sore throat or runny nose.    CARDIOVASCULAR:  No pressure, squeezing, tightness, or heaviness about the chest; no palpitations.    RESPIRATORY:  Per HPI    GASTROINTESTINAL:  No abdominal pain, nausea, vomiting or diarrhea.    GENITOURINARY:  No dysuria, frequency or urgency.    NEUROLOGIC:  No paresthesias, fasciculations, seizures or weakness.    PSYCHIATRIC:  No disorder of thought or mood.    Vital Signs Last 24 Hrs  T(C): 37.7 (2020 11:32), Max: 38.2 (2020 02:07)  T(F): 99.8 (2020 11:32), Max: 100.8 (2020 02:07)  HR: 115 (2020 11:32) (95 - 115)  BP: 115/76 (2020 11:32) (115/76 - 159/80)  BP(mean): --  RR: 24 (2020 11:32) (24 - 25)  SpO2: 95% (2020 11:32) (94% - 95%)    PHYSICAL EXAMINATION:    GENERAL: The patient is a well-developed, well-nourished obese male in no apparent distress.     HEENT: Head is normocephalic and atraumatic. Extraocular muscles are intact. Mucous membranes are moist.     NECK: Supple.     LUNGS: Clear to auscultation with mild wheezing and rhonchi. Respirations unlabored    HEART: Regular rate and rhythm without murmur.    ABDOMEN: Soft, nontender, and nondistended.  No hepatosplenomegaly is noted.    EXTREMITIES: Without any cyanosis, clubbing, rash, lesions or edema.    NEUROLOGIC: Grossly intact.      LABS:                        9.6    8.26  )-----------( 245      ( 2020 02:37 )             35.5         135  |  98  |  12.0  ----------------------------<  159<H>  5.1   |  24.0  |  0.61    Ca    8.7      2020 02:37    TPro  7.1  /  Alb  3.9  /  TBili  0.4  /  DBili  x   /  AST  67<H>  /  ALT  17  /  AlkPhos  93      PT/INR - ( 2020 02:37 )   PT: 36.5 sec;   INR: 3.07 ratio         PTT - ( 2020 02:37 )  PTT:40.5 sec  Urinalysis Basic - ( 2020 05:37 )    Color: Yellow / Appearance: Clear / S.010 / pH: x  Gluc: x / Ketone: Negative  / Bili: Negative / Urobili: Negative mg/dL   Blood: x / Protein: 30 mg/dL / Nitrite: Negative   Leuk Esterase: Negative / RBC: 11-25 /HPF / WBC 0-2   Sq Epi: x / Non Sq Epi: Occasional / Bacteria: Occasional      Serum Pro-Brain Natriuretic Peptide: 401 pg/mL (20 @ 03:52)    MICROBIOLOGY:    FLU A B RSV Detection by PCR (20 @ 02:59)    Flu A Result: Detected: TYPE:(C=Critical, N=Notification, A=Abnormal) C  TESTS: _FLU B  DATE/TIME CALLED: _20 04:18  CALLED TO: _BESSY TELLO  READ BACK (2 Patient Identifiers)(Y/N): Y_  READ BACK VALUES (Y/N): _Y  CALLED BY: _NESTOR HIGGINBOTHAM  The Flu A B RSV assay is a Real-Time PCR test for the qualitative  detection and differentiation of Influenza A, Influenza B, and  Respiratory Syncytial Virus on nasopharyngeal swabs. The results should  be interpreted in the context of all clinical and laboratory findings.    Flu B Result: NotDetec    RSV Result: NotDete        RADIOLOGY & ADDITIONAL STUDIES:    CXR 20 without acute infiltrate but cardiomegaly  Chest CT 20 L pleural thickening vs small effusion but without consolidation  Await radiology interpretations

## 2020-01-24 NOTE — CONSULT NOTE ADULT - ASSESSMENT
74 year old gentleman with atrial fibrillation, on coumadin, admitted with flu complicated by rapid afib w/ RVR.  He was found to be anemic, Hgb 9.6 g/dL.    1)Microcytic anemia - Per review of EMR, anemia dates back to 2016, he has had low MCV since that time  MCV today is 68, please send for iron studies - iron, TIBC, ferritin  Need to consider GI losses as he is on long term coumadin    2)Influenza 74 year old gentleman with atrial fibrillation, on coumadin, admitted with flu complicated by rapid afib w/ RVR.  He was found to be anemic, Hgb 9.6 g/dL.    1)Microcytic anemia - Per review of EMR, anemia dates back to 2016, he has had low MCV since that time  MCV today is 68, please send for iron studies - iron, TIBC, ferritin  Need to consider GI losses as he is on long term coumadin  If Iron deficient -> can give venofer 200 mg every other day x 3 doses  Follow up with me as outpatient    2)Influenza - on tamiflu    3)Afib with RVR - cardiology following

## 2020-01-24 NOTE — ED ADULT NURSE REASSESSMENT NOTE - NS ED NURSE REASSESS COMMENT FT1
Patient educated on droplet precautions for influenza. Verbalized understanding. Medication administered as per MD orders.

## 2020-01-24 NOTE — CONSULT NOTE ADULT - ASSESSMENT
75y/o  Male with a h/o Atrial fibrillation on coumadin, diabetes on metformin, Hyperlipidemia, schwannoma s/p surgery 1992, and radiation in 1998, spinal surgery 7 years ago, h/o DVT. Patient comes to the ER with few days of shortness of breath for the past few days. He reports he saw his PMD Dr. Ang and was started on a z-pack. Symptoms did not improve so he came to the ER. He denies sick contacts. In the ER patient was febrile to 100.8F found to be flu + and in rapid afib.  Started on Tamiflu.      Influenza A  Rapid A fib      - Blood cultures pending  - RVP + Influenza A  - Ct Chest with no pneumonia   - Continue Tamiflu 75mg PO q 12hours x 5 days  - Trend Fever  - Trend Leukocytosis      Will Follow

## 2020-01-24 NOTE — ED ADULT NURSE REASSESSMENT NOTE - NS ED NURSE REASSESS COMMENT FT1
pt sitting at edge of bed, tachypneic and tachycardic with new onset fever and severe head pain radiating to forehead. MD orders noted and initiated, PA called for eval, will come to bedside.

## 2020-01-24 NOTE — ED PROVIDER NOTE - NS ED ROS FT
no weight change, no fever or chills  (+) recent change in medications  no rash, no bruises  no visual changes no eye discharge  (+) cough, cold, and congestion,   (+) sob, no chest pain  follows with cardiology  history of stress test,    (+) upper abdominal discomfort, no n/v/d  history of endoscopy and colonoscopy  no hematuria, no change in urinary habits  no joint pain, no deformity  no headache, no paresthesia

## 2020-01-24 NOTE — ED PROVIDER NOTE - CARE PLAN
Principal Discharge DX:	Atrial fibrillation with RVR  Secondary Diagnosis:	Hypoxia  Secondary Diagnosis:	Influenza

## 2020-01-24 NOTE — CONSULT NOTE ADULT - SUBJECTIVE AND OBJECTIVE BOX
[facility]  INFECTIOUS DISEASES AND INTERNAL MEDICINE at [place]  =======================================================  Son Piña MD  Diplomates American Board of Internal Medicine and Infectious Diseases  Tel: [phone]      Fax: [phone]  =======================================================      N-[id number]  ANIA CRISTOBAL     CC: Patient is a 74y old  Male who presents with a chief complaint of cough, sob (2020 12:08)    73y/o  Male with a h/o Atrial fibrillation on coumadin, diabetes on metformin, Hyperlipidemia, schwannoma s/p surgery , and radiation in , spinal surgery 7 years ago, h/o DVT. Patient comes to the ER with few days of shortness of breath for the past few days. He reports he saw his PMD Dr. Ang and was started on a z-pack. Symptoms did not improve so he came to the ER. He denies sick contacts. In the ER patient was febrile to 100.8F found to be flu + and in rapid afib.  Started on Tamiflu. ID input requested.       Past Medical & Surgical Hx:  HLD (hyperlipidemia)  Anxiety  Afib  DVT (deep venous thrombosis): left leg  DM (diabetes mellitus)  BPH (benign prostatic hyperplasia)  Gallbladder stone without cholecystitis or obstruction  Gastroesophageal reflux disease  Prostate disease  Hyperlipidemia  Hypertension  Anxiety  S/P laminectomy  H/O arthroscopy of knee  Malignant schwannoma      Social Hx:  Denies smoking, ETOH or drug use       FAMILY HISTORY:  CAD - Mother       Allergies  Bactrim (Rash)  Ceftin (Hives)  penicillin (Anaphylaxis)  sulfa drugs (Unknown)      Antibiotics:  oseltamivir 75 milliGRAM(s) Oral two times a day       REVIEW OF SYSTEMS:  CONSTITUTIONAL:  + Fever + chills  HEENT:  No diplopia or blurred vision.  No earache, sore throat or runny nose.  CARDIOVASCULAR:  No pressure, squeezing, strangling, tightness, heaviness or aching about the chest, neck, axilla or epigastrium.  RESPIRATORY:  No cough, shortness of breath  GASTROINTESTINAL:  No nausea, vomiting or diarrhea.  GENITOURINARY:  No dysuria, frequency or urgency.   MUSCULOSKELETAL:  no joint aches, no muscle pain  SKIN:  No change in skin, hair or nails.  NEUROLOGIC:  No Headaches, seizures   PSYCHIATRIC:  No disorder of thought or mood.  ENDOCRINE:  No heat or cold intolerance  HEMATOLOGICAL:  No easy bruising or bleeding.       Physical Exam:  Vital Signs Last 24 Hrs  T(C): 37.7 (2020 11:32), Max: 38.2 (2020 02:07)  T(F): 99.8 (2020 11:32), Max: 100.8 (2020 02:07)  HR: 115 (2020 11:32) (95 - 115)  BP: 115/76 (2020 11:32) (115/76 - 159/80)  RR: 24 (2020 11:32) (24 - 25)  SpO2: 95% (2020 11:32) (94% - 95%)  Height (cm): 187.96 ( @ 02:15)  Weight (kg): 113.4 ( @ 02:15)  BMI (kg/m2): 32.1 ( @ 02:15)  BSA (m2): 2.39 ( @ 02:15)      GEN: NAD, pleasant  HEENT: normocephalic and atraumatic. EOMI. PERRL.  Anicteric  NECK: Supple.   LUNGS: Decreased Basal BS B/L  HEART: Regular rate and rhythm  ABDOMEN: Soft, nontender, and nondistended.  Positive bowel sounds.    : No CVA tenderness  EXTREMITIES: Without any edema.  MSK: No joint swelling  NEUROLOGIC:  No Focal Deficits  PSYCHIATRIC: Appropriate affect .  SKIN: No Rash      Labs:      135  |  98  |  12.0  ----------------------------<  159<H>  5.1   |  24.0  |  0.61    Ca    8.7      2020 02:37    TPro  7.1  /  Alb  3.9  /  TBili  0.4  /  DBili  x   /  AST  67<H>  /  ALT  17  /  AlkPhos  93                            9.6    8.26  )-----------( 245      ( 2020 02:37 )             35.5       PT/INR - ( 2020 02:37 )   PT: 36.5 sec;   INR: 3.07 ratio         PTT - ( 2020 02:37 )  PTT:40.5 sec  Urinalysis Basic - ( 2020 05:37 )    Color: Yellow / Appearance: Clear / S.010 / pH: x  Gluc: x / Ketone: Negative  / Bili: Negative / Urobili: Negative mg/dL   Blood: x / Protein: 30 mg/dL / Nitrite: Negative   Leuk Esterase: Negative / RBC: 11-25 /HPF / WBC 0-2   Sq Epi: x / Non Sq Epi: Occasional / Bacteria: Occasional      LIVER FUNCTIONS - ( 2020 02:37 )  Alb: 3.9 g/dL / Pro: 7.1 g/dL / ALK PHOS: 93 U/L / ALT: 17 U/L / AST: 67 U/L / GGT: x             < from: CT Chest No Cont (20 @ 08:58) >  EXAM:  CT CHEST                          PROCEDURE DATE:  2020      INTERPRETATION:  CLINICAL INFORMATION: Cough, influenza, possible infiltrate    COMPARISON: 2016.    PROCEDURE:   CT of the Chest was performed without intravenouscontrast.  Sagittal and coronal reformats were performed.  MIP reformats were performed.    FINDINGS:    LUNGS AND AIRWAYS: Patent central airways.  6 mm nodule in the right middle lobe.    PLEURA: No pleural effusion.    MEDIASTINUM AND MOLLY: Few calcified mediastinal lymph nodes.    VESSELS: Within normal limits.    HEART: Heart size is normal. No pericardial effusion. Coronary artery calcifications.    CHEST WALL AND LOWER NECK: Within normal limits.    VISUALIZED UPPER ABDOMEN: Gallbladder ismildly distended.    BONES: Multilevel degenerative changes of the spine.    IMPRESSION:     No focal consolidation or pleural effusion.    There is a 6 mm nodule in the right middle lobe. 6-12 month follow-up chest CT is recommended.    < end of copied text >

## 2020-01-24 NOTE — CHART NOTE - NSCHARTNOTEFT_GEN_A_CORE
Called to evaluate pt c/o headache, bitemporal with radiation to back of head.  No visual defects, no N/V but states pain is 10/10.  No alleviating or exacerbating factors.  Pt is admitted for flu.  HPI:  74 year old male with past medical history significant for Atrial fibrillation; on coumadin, diabetes; on metformin, Hyperlipidemia, schwannoma s/p surgery 1992, and radiation in 1998, spinal surgery 7 years ago, h/o DVT presenting to the ED c/o worsening shortness of breath for the past few days. Pt states that he saw his pmd Dr. Ang and was started on z-pack. Pt states that his cough and congestion got worse and came to the er. In the ed ffound to be flu + and in rapid afib. Pt states that he is feeling mildly better but still sob. He denies any n/v/d/ha/cp shantanu bowel and bladder. 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 650 milliGRAM(s) Oral every 6 hours PRN  albuterol/ipratropium for Nebulization 3 milliLiter(s) Nebulizer every 6 hours  ALPRAZolam 0.25 milliGRAM(s) Oral two times a day PRN  atorvastatin 20 milliGRAM(s) Oral at bedtime  dextrose 40% Gel 15 Gram(s) Oral once PRN  dextrose 5%. 1000 milliLiter(s) IV Continuous <Continuous>  dextrose 50% Injectable 12.5 Gram(s) IV Push once  finasteride 5 milliGRAM(s) Oral daily  glucagon  Injectable 1 milliGRAM(s) IntraMuscular once PRN  insulin lispro (HumaLOG) corrective regimen sliding scale   SubCutaneous three times a day before meals  metoprolol succinate ER 75 milliGRAM(s) Oral daily  oseltamivir 75 milliGRAM(s) Oral two times a day  oxyCODONE    IR 5 milliGRAM(s) Oral once  pantoprazole    Tablet 40 milliGRAM(s) Oral before breakfast  sodium chloride 0.9%. 1000 milliLiter(s) IV Continuous <Continuous>    On examination:  Vital Signs Last 24 Hrs  T(C): 38.2 (24 Jan 2020 22:44), Max: 38.2 (24 Jan 2020 02:07)  T(F): 100.7 (24 Jan 2020 22:44), Max: 100.8 (24 Jan 2020 02:07)  HR: 127 (24 Jan 2020 22:44) (95 - 129)  BP: 153/74 (24 Jan 2020 22:44) (115/76 - 163/82)  BP(mean): --  RR: 26 (24 Jan 2020 22:44) (22 - 26)  SpO2: 94% (24 Jan 2020 22:44) (91% - 95%)    Height (cm): 187.96 (01-24 @ 02:15)  Weight (kg): 113.4 (01-24 @ 02:15)  BMI (kg/m2): 32.1 (01-24 @ 02:15)  BSA (m2): 2.39 (01-24 @ 02:15)    Pt is alert, oriented x3 in NAD but extremely anxious; no facies or facial asymmetries noted  Eyes- reactive bilaterally to light  Lungs- clear bilaterally without wheezes/rales/rhonchi  Heart- s1s2 heard, irregularly irregular without appreciable murmur  Extremities full ROM without cyanosis, clubbing or edema  Skin- warm to touch, turgor good;     Impression: 1- febrile illness                    2- headache                    3- anxiety  Plan: 1- tylenol has been given by RN for elevated temperature, will add Oxycontin IR 5mg p.o. once          2- pt has received already xanax for anxiety          3- d/w pt and RN plan of care, all questions answered          4- observe, re-evaluate as necessary

## 2020-01-24 NOTE — ED PROVIDER NOTE - CLINICAL SUMMARY MEDICAL DECISION MAKING FREE TEXT BOX
74 year old M pt obese with history of back surgery and anemia presenting with cough and congestion; found to have fever in ED, took Z-Pack;  plan to do sepsis labs, CXR, Duoneb, and will reassess.

## 2020-01-24 NOTE — ED PROVIDER NOTE - PHYSICAL EXAMINATION
Constitutional : Appears uncomfortable, talking in short sentences, coughing with hoarse voice  Head :NC AT , no swelling, short neck  Eyes :eomi, no swelling  Mouth :mm moist,  Neck : supple, trachea in midline  Chest :Charlie air entry, symm chest expansion, no distress, rear wheezing  Heart :S1 S2 distant  Abdomen :abd soft, obese, non tender  Musc/Skel :ext with bilateral trace edema, no deformity, no spine tenderness, distal pulses present,  Neuro  :AAO 3 no focal deficits

## 2020-01-24 NOTE — CONSULT NOTE ADULT - ASSESSMENT
Flu +  Bronchitis  SOB/cough due to above  Former smoker  ? COPD/asthma - no PFTs available  Obese  ? ALLIE  Anxiety  AF  Mild hypoxia    Rec:    Drug nebs for wheeze - ? asthma or COPD  Consider steroids if wheeze persists  Complete Tamiflu  Anxiolytics  Rate control of AF  Optimize cardiac function  A/c for AF per cardio/medicine  On PPI for GI prophylaxis  Weight loss  Outpt PFTs  PSG as outpt  Wean O2  Pulm f/u after d/c  Call for issues over w/e

## 2020-01-24 NOTE — ED ADULT NURSE REASSESSMENT NOTE - NS ED NURSE REASSESS COMMENT FT1
pt remains in isolation + Flu; pt educated on keeping mask in place.  pt assisted to bathroom via wheelchair multiple times; pt normally uses a cane/walker.  pt reports headache.  awaiting admission orders

## 2020-01-24 NOTE — CONSULT NOTE ADULT - SUBJECTIVE AND OBJECTIVE BOX
REASON FOR CONSULTATION:     HPI:  74 year old male with past medical history significant for Atrial fibrillation; on coumadin, diabetes; on metformin, Hyperlipidemia, schwannoma s/p surgery , and radiation in , spinal surgery 7 years ago, h/o DVT presenting to the ED c/o worsening shortness of breath for the past few days. Pt states that he saw his pmd Dr. Ang and was started on z-pack. Pt states that his cough and congestion got worse and came to the er. In the ed ffound to be flu + and in rapid afib. Pt states that he is feeling mildly better but still sob. He denies any n/v/d/ha/cp shantanu bowel and bladder. (2020 10:04)      REVIEW OF SYSTEMS:  Constitutional, Eyes, ENT, Cardiovascular, Respiratory, Gastrointestinal, Genitourinary, Musculoskeletal, Integumentary, Neurological, Psychiatric, Endocrine, Heme/Lymph, and Allergic/Immunologic review of systems are otherwise negative except as noted in the HPI.    PAST MEDICAL & SURGICAL HISTORY:  HLD (hyperlipidemia)  Anxiety  Afib  DVT (deep venous thrombosis): left leg  DM (diabetes mellitus)  BPH (benign prostatic hyperplasia)  Gallbladder stone without cholecystitis or obstruction  Gastroesophageal reflux disease  Prostate disease  Hyperlipidemia  Hypertension  Anxiety  S/P laminectomy  H/O arthroscopy of knee  Malignant schwannoma      FAMILY HISTORY:  No pertinent family history in first degree relatives      SOCIAL HISTORY:    Allergies    Bactrim (Rash)  Ceftin (Hives)  penicillin (Anaphylaxis)  sulfa drugs (Unknown)    Intolerances        MEDICATIONS  (STANDING):  atorvastatin 20 milliGRAM(s) Oral at bedtime  dextrose 5%. 1000 milliLiter(s) (50 mL/Hr) IV Continuous <Continuous>  dextrose 50% Injectable 12.5 Gram(s) IV Push once  finasteride 5 milliGRAM(s) Oral daily  insulin lispro (HumaLOG) corrective regimen sliding scale   SubCutaneous three times a day before meals  metoprolol succinate ER 75 milliGRAM(s) Oral daily  metoprolol tartrate Injectable 5 milliGRAM(s) IV Push once  oseltamivir 75 milliGRAM(s) Oral two times a day  pantoprazole    Tablet 40 milliGRAM(s) Oral before breakfast  sodium chloride 0.9%. 1000 milliLiter(s) (125 mL/Hr) IV Continuous <Continuous>    MEDICATIONS  (PRN):  acetaminophen   Tablet .. 650 milliGRAM(s) Oral every 6 hours PRN Temp greater or equal to 38C (100.4F), Mild Pain (1 - 3)  ALPRAZolam 0.25 milliGRAM(s) Oral two times a day PRN anxiety  dextrose 40% Gel 15 Gram(s) Oral once PRN Blood Glucose LESS THAN 70 milliGRAM(s)/deciliter  glucagon  Injectable 1 milliGRAM(s) IntraMuscular once PRN Glucose LESS THAN 70 milligrams/deciliter      Vital Signs Last 24 Hrs  T(C): 36.9 (2020 09:09), Max: 38.2 (2020 02:07)  T(F): 98.5 (2020 09:09), Max: 100.8 (2020 02:07)  HR: 109 (2020 09:09) (95 - 109)  BP: 159/80 (2020 09:09) (148/88 - 159/80)  BP(mean): --  RR: 25 (2020 09:09) (25 - 25)  SpO2: 94% (2020 09:09) (94% - 95%)    PHYSICAL EXAM:    GENERAL: NAD, well-groomed, well-developed  HEAD:  Atraumatic, Normocephalic  EYES: EOMI, PERRLA, conjunctiva and sclera clear  ENMT: No tonsillar erythema, exudates, or enlargement;   NECK: Supple, No JVD, Normal thyroid  NERVOUS SYSTEM:  Alert & Oriented X3, Good concentration;  CHEST/LUNG: Clear to auscultation bilaterally; No rales, rhonchi, wheezing, or rubs  HEART: Regular rate and rhythm; No murmurs, rubs, or gallops  ABDOMEN: Soft, Nontender, Nondistended; Bowel sounds present  EXTREMITIES:  2+ Peripheral Pulses, No clubbing, cyanosis, or edema  LYMPH: No lymphadenopathy noted  SKIN: No rashes or lesions      LABS:                        9.6    8.26  )-----------( 245      ( 2020 02:37 )             35.5     -    135  |  98  |  12.0  ----------------------------<  159<H>  5.1   |  24.0  |  0.61    Ca    8.7      2020 02:37    TPro  7.1  /  Alb  3.9  /  TBili  0.4  /  DBili  x   /  AST  67<H>  /  ALT  17  /  AlkPhos  93  01-24    PT/INR - ( 2020 02:37 )   PT: 36.5 sec;   INR: 3.07 ratio         PTT - ( 2020 02:37 )  PTT:40.5 sec  Urinalysis Basic - ( 2020 05:37 )    Color: Yellow / Appearance: Clear / S.010 / pH: x  Gluc: x / Ketone: Negative  / Bili: Negative / Urobili: Negative mg/dL   Blood: x / Protein: 30 mg/dL / Nitrite: Negative   Leuk Esterase: Negative / RBC: 11-25 /HPF / WBC 0-2   Sq Epi: x / Non Sq Epi: Occasional / Bacteria: Occasional          RADIOLOGY & ADDITIONAL STUDIES:    < from: CT Chest No Cont (16 @ 21:29) >  IMPRESSION: No evidence of intrathoracic mass or pneumonia.    Few small calcified mediastinal and right hilar lymph nodes, suggestive   of prior granulomatous infection.    < end of copied text > REASON FOR CONSULTATION:     HPI:  74 year old male with past medical history significant for Atrial fibrillation; on coumadin, diabetes; on metformin, Hyperlipidemia, schwannoma s/p surgery , and radiation in , spinal surgery 7 years ago, h/o DVT presenting to the ED c/o worsening shortness of breath for the past few days. Pt states that he saw his pmd Dr. Ang and was started on z-pack. Pt states that his cough and congestion got worse and came to the er. In the ed ffound to be flu + and in rapid afib. Pt states that he is feeling mildly better but still sob. He denies any n/v/d/ha/cp shantanu bowel and bladder. (2020 10:04)      REVIEW OF SYSTEMS:  Constitutional, Eyes, ENT, Cardiovascular, Respiratory, Gastrointestinal, Genitourinary, Musculoskeletal, Integumentary, Neurological, Psychiatric, Endocrine, Heme/Lymph, and Allergic/Immunologic review of systems are otherwise negative except as noted in the HPI.    PAST MEDICAL & SURGICAL HISTORY:  HLD (hyperlipidemia)  Anxiety  Afib  DVT (deep venous thrombosis): left leg  DM (diabetes mellitus)  BPH (benign prostatic hyperplasia)  Gallbladder stone without cholecystitis or obstruction  Gastroesophageal reflux disease  Prostate disease  Hyperlipidemia  Hypertension  Anxiety  S/P laminectomy  H/O arthroscopy of knee  Malignant schwannoma      FAMILY HISTORY:  No history of cancer      SOCIAL HISTORY:  former smoker    Allergies    Bactrim (Rash)  Ceftin (Hives)  penicillin (Anaphylaxis)  sulfa drugs (Unknown)    Intolerances        MEDICATIONS  (STANDING):  atorvastatin 20 milliGRAM(s) Oral at bedtime  dextrose 5%. 1000 milliLiter(s) (50 mL/Hr) IV Continuous <Continuous>  dextrose 50% Injectable 12.5 Gram(s) IV Push once  finasteride 5 milliGRAM(s) Oral daily  insulin lispro (HumaLOG) corrective regimen sliding scale   SubCutaneous three times a day before meals  metoprolol succinate ER 75 milliGRAM(s) Oral daily  metoprolol tartrate Injectable 5 milliGRAM(s) IV Push once  oseltamivir 75 milliGRAM(s) Oral two times a day  pantoprazole    Tablet 40 milliGRAM(s) Oral before breakfast  sodium chloride 0.9%. 1000 milliLiter(s) (125 mL/Hr) IV Continuous <Continuous>    MEDICATIONS  (PRN):  acetaminophen   Tablet .. 650 milliGRAM(s) Oral every 6 hours PRN Temp greater or equal to 38C (100.4F), Mild Pain (1 - 3)  ALPRAZolam 0.25 milliGRAM(s) Oral two times a day PRN anxiety  dextrose 40% Gel 15 Gram(s) Oral once PRN Blood Glucose LESS THAN 70 milliGRAM(s)/deciliter  glucagon  Injectable 1 milliGRAM(s) IntraMuscular once PRN Glucose LESS THAN 70 milligrams/deciliter      Vital Signs Last 24 Hrs  T(C): 36.9 (2020 09:09), Max: 38.2 (2020 02:07)  T(F): 98.5 (2020 09:09), Max: 100.8 (2020 02:07)  HR: 109 (2020 09:09) (95 - 109)  BP: 159/80 (2020 09:09) (148/88 - 159/80)  BP(mean): --  RR: 25 (2020 09:09) (25 - 25)  SpO2: 94% (2020 09:09) (94% - 95%)    PHYSICAL EXAM:    GENERAL: mild distress due to SOB  HEAD:  Atraumatic, Normocephalic  EYES: EOMI, PERRLA, conjunctiva and sclera clear  ENMT: No tonsillar erythema, exudates, or enlargement;   NECK: Supple,   NERVOUS SYSTEM:  Alert & Oriented X3,   CHEST/LUNG: coarse breath sounds throughout  HEART: S1/S2 tachycardia  ABDOMEN: Soft, obese  EXTREMITIES:  trace edema  LYMPH: No lymphadenopathy noted  SKIN: No rashes or lesions      LABS:                        9.6    8.26  )-----------( 245      ( 2020 02:37 )             35.5     -24    135  |  98  |  12.0  ----------------------------<  159<H>  5.1   |  24.0  |  0.61    Ca    8.7      2020 02:37    TPro  7.1  /  Alb  3.9  /  TBili  0.4  /  DBili  x   /  AST  67<H>  /  ALT  17  /  AlkPhos  93  -24    PT/INR - ( 2020 02:37 )   PT: 36.5 sec;   INR: 3.07 ratio         PTT - ( 2020 02:37 )  PTT:40.5 sec  Urinalysis Basic - ( 2020 05:37 )    Color: Yellow / Appearance: Clear / S.010 / pH: x  Gluc: x / Ketone: Negative  / Bili: Negative / Urobili: Negative mg/dL   Blood: x / Protein: 30 mg/dL / Nitrite: Negative   Leuk Esterase: Negative / RBC: 11-25 /HPF / WBC 0-2   Sq Epi: x / Non Sq Epi: Occasional / Bacteria: Occasional          RADIOLOGY & ADDITIONAL STUDIES:    < from: CT Chest No Cont (16 @ 21:29) >  IMPRESSION: No evidence of intrathoracic mass or pneumonia.    Few small calcified mediastinal and right hilar lymph nodes, suggestive   of prior granulomatous infection.    < end of copied text >

## 2020-01-24 NOTE — H&P ADULT - NSICDXPASTMEDICALHX_GEN_ALL_CORE_FT
PAST MEDICAL HISTORY:  Afib     Anxiety     Anxiety     BPH (benign prostatic hyperplasia)     DM (diabetes mellitus)     DVT (deep venous thrombosis) left leg    Gallbladder stone without cholecystitis or obstruction     Gastroesophageal reflux disease     HLD (hyperlipidemia)     Hyperlipidemia     Hypertension     Prostate disease

## 2020-01-24 NOTE — ED ADULT TRIAGE NOTE - CHIEF COMPLAINT QUOTE
Pt A&Ox4 states "I have been having worsening shortness of breath, I recently was told I have anemia."  BIBA c/o SOB. Patient has history of A.fib, and anemia now having worsening SOB is tachypneic and hot to touch.

## 2020-01-24 NOTE — H&P ADULT - HISTORY OF PRESENT ILLNESS
74 year old male with past medical history significant for Atrial fibrillation; on coumadin, diabetes; on metformin, Hyperlipidemia, schwannoma s/p surgery 1992, and radiation in 1998, spinal surgery 7 years ago, h/o DVT presenting to the ED c/o worsening shortness of breath for the past few days. Pt states that he saw his pmd Dr. Ang and was started on z-pack. Pt states that his cough and congestion got worse and came to the er. In the ed ffound to be flu + and in rapid afib. Pt states that he is feeling mildly better but still sob. He denies any n/v/d/ha/cp shantanu bowel and bladder.

## 2020-01-24 NOTE — H&P ADULT - ASSESSMENT
73 y/o male with h/o afib, htn, hyperlipidemia, dm, h/o dvt presents to [facility] er c/o cough and sob found to be flu + and in rapid afib. Pt will need acute inpatient hospitalization for stabilization    1. FLu A+  c/w oseltamvir   ivf  check culture  id eval appreciated     2. AFIB with RVR  exacerbated by flu  c/w toprol xr  inr supra tx   hold coumadin today  recheck inr in am    3. BPH  c/w finasteride  alfusolin nf at hospital   attempted to order equivalent  which is flomax and patient has   sulf allergy    4. DM  ada diet  insulin sliding scale

## 2020-01-24 NOTE — ED ADULT NURSE REASSESSMENT NOTE - NS ED NURSE REASSESS COMMENT FT1
Received patient from critical care. Patient A&Ox3, states c/o difficulty breathing. Patient states he started to have shortness of breath with difficulty breathing, especially laying flat. Patient placed on cardiac monitor. Respirations even, spontaneous, and unlabored. SPO2 95% RA. Awaiting lab results. Plan of care explained. Verbalized understanding.

## 2020-01-24 NOTE — CONSULT NOTE ADULT - SUBJECTIVE AND OBJECTIVE BOX
[facility], [facility]                                                    [address], [address], [place] [address]                                                         PHONE: [phone]    FAX: [phone] [address], [address], [place] [address]                                                 PHONE: [phone]    FAX: [phone]  *******************************************************************************  cc: SOB    HPI: 74 M who presents with cough and not feeling well with bronchitic symptoms. Pt with fever. Pt did receive the flu shot. Flu positive in the ER. Hx of CAF. No hx of ischemic heart disease. No CP. + SOB. +weakness. No PND or orthopnea. No syncope. Hx of HTN, DM T2 x 7 years, former smoker. Nuclear stress  no ischemia Normal LV function. No hx of cardiac interventions      INTERPRETATION OF TELEMETRY (personally reviewed): -120    PAST MEDICAL & SURGICAL HISTORY:  HLD (hyperlipidemia)  Anxiety  Afib  DVT (deep venous thrombosis): left leg  DM (diabetes mellitus)  BPH (benign prostatic hyperplasia)  Gallbladder stone without cholecystitis or obstruction  Gastroesophageal reflux disease  Prostate disease  Hyperlipidemia  Hypertension  Anxiety  S/P laminectomy  H/O arthroscopy of knee  Malignant schwannoma      Bactrim (Rash)  Ceftin (Hives)  penicillin (Anaphylaxis)  sulfa drugs (Unknown)      MEDICATIONS  (STANDING):  metoprolol tartrate Injectable 5 milliGRAM(s) IV Push once  oseltamivir 75 milliGRAM(s) Oral two times a day  sodium chloride 0.9%. 1000 milliLiter(s) (125 mL/Hr) IV Continuous <Continuous>    MEDICATIONS  (PRN):      Vital Signs Last 24 Hrs  T(C): 36.9 (2020 09:09), Max: 38.2 (2020 02:07)  T(F): 98.5 (2020 09:09), Max: 100.8 (2020 02:07)  HR: 109 (2020 09:09) (95 - 109)  BP: 159/80 (2020 09:09) (148/88 - 159/80)  BP(mean): --  RR: 25 (2020 09:09) (25 - 25)  SpO2: 94% (2020 09:09) (94% - 95%)    I&O's Detail    I&O's Summary          PHYSICAL EXAM:  General: Appears well developed, well nourished, no acute distress. not in acute pain. obese  HEAD: normal cephalic. Atraumatic  PUPILS: equal and reactive to light  EARS: normal hearing  NECK: supple. no JVD or HJR. no carotid bruits. no visible lymphadenopathy  NOSE: no gross abnormalities  CHEST: symmetric chest wall expansion  CARDIOVASCULAR: MIld increased rate. irregular irregular rhythm. Normal S1 and S2, no S3/S4,  no murmur, rub, or gallop  LUNGS: Normal effort. Normal respiratory rate. Breath sounds are clear to auscultation bilaterally. No respiratory distress. No stridor.  no rales, rhonchi or wheeze. no decreased Breath sounds  ABDOMEN: Soft, nontender, non-distended, positive bowel sounds, no mass or bruit. no abdominal tenderness. No rebound. no ascites  EXTREMITIES: No clubbing, cyanosis, minimal LE edema. normal range of motion  PULSES:  distal pulses WNL  SKIN: Warm and dry with normal turgor. no visible rash or cyanosis   NEURO: Alert & oriented x 3, grossly intact with no focal weakness  PSYCH: normal mood and affect. Grossly normal insight and judgement exhibited    FAMILY HISTORY:  Father with a weak heart,  at 9. Mother  of PNA. No other family hx of ischemic heart disease      SOCIAL HISTORY:  former smoking- quit . No ETOH/No IVDA    REVIEW OF SYSTEMS:  Constitutional: no fever, chills or malaise. No weight loss  Head: no trauma  Eyes: no visual deficit. No double vision  Ears: no hearing deficit or ringing in the ears  Nose: no nose bleeds or smell changes or congestion  Throat: no difficult swallowing or painful swallowing  Neck: supple. No lymphadenopathy or swelling  Respiratory: + SOB, no wheeze, asthma, COPD. + cough. No blood in the sputum  Cardiovascular: no CP, palpitations, irregular heart beats. No edema. No PND. No orthopnea. No skin/temperature or color changes  Gastrointestinal: no abdominal pain. No constipation. No diarrhea. No melena. No nausea. No vomiting. No bloating  Genitourinary: no frequency or urgency. No hematuria  Lymphatics: no grossly swollen lymph nodes  Musculoskeletal: no limitation of range of motion. Normal strength. No pain  Integumentary: no visible rash. No itching  Neurologic: no HA. No TIA or stroke symptoms. No seizure. No hx of epilepsy. No tingling or numbness. No weakness. No dizziness  Psychiatric: denied. Reports appropriate mood.        LABS:                        9.6    8.26  )-----------( 245      ( 2020 02:37 )             35.5         135  |  98  |  12.0  ----------------------------<  159<H>  5.1   |  24.0  |  0.61    Ca    8.7      2020 02:37    TPro  7.1  /  Alb  3.9  /  TBili  0.4  /  DBili  x   /  AST  67<H>  /  ALT  17  /  AlkPhos  93          PT/INR - ( 2020 02:37 )   PT: 36.5 sec;   INR: 3.07 ratio         PTT - ( 2020 02:37 )  PTT:40.5 sec  Serum Pro-Brain Natriuretic Peptide: 401 pg/mL ( @ 03:52)  serum  Lipids:         RADIOLOGY & ADDITIONAL STUDIES:    ECG: -110. RBBB LAHB PVC        ASSESSMENT AND PLAN:  In summary, ANIA CRISTOBAL is a 74y Male with past medical history significant for presents with cough and not feeling well with bronchitic symptoms. Pt with fever. Pt did receive the flu shot. Flu positive in the ER. Hx of CAF. No hx of ischemic heart disease. No CP. + SOB. +weakness. No PND or orthopnea. No syncope. Hx of HTN, DM T2 x 7 years, former smoker. Nuclear stress  no ischemia Normal LV function. No hx of cardiac interventions    - INfluenza +. Management as per medical. tamiflu    - CAF with mild increased VR. Likely precipitated by fever, influenza and component of dehydration due to poor po intake. Would maintain outpt cardiac medications. Hydration    - HTN.  BP mildly increased. Will increase metoprolol to 75mg daily for both BP and HR control    - HL. maintain outpt statin dosing    - DM. Glycemic control as per medical    - Anemia. Pt reports outpt preston without specific finding. PRESTON as per medical    - elevated chadsvasc score Maintain INR 2-3 range as per medical    - Telemetry monitoring personally reviewed by me    - ECG personally reviewed by me    - Echocardiogram imaging ordered    - radiologic imaging reviewed    - Laboratory data reviewed.    - I personally spoke with Dr Parra of medical    - I have personally reviewed all obtainable prior records and data    - Please call over the weekend if any new CV issues should arise      Laura Elias MD

## 2020-01-24 NOTE — ED ADULT NURSE REASSESSMENT NOTE - NS ED NURSE REASSESS COMMENT FT1
pt alert oriented remains on monitor. pt c/o headache/neck pain, Lizbeth PA aware, per PA will come down assess pt, pt remains on monitor. vitals updated.

## 2020-01-24 NOTE — ED ADULT NURSE NOTE - OBJECTIVE STATEMENT
pt with cough non productive with raspy voice and noted excessive mucus production in nose. pt with abd pain also and diarrhea. pt states he is having difficulty laying down.

## 2020-01-24 NOTE — ED ADULT NURSE NOTE - NSIMPLEMENTINTERV_GEN_ALL_ED
Implemented All Fall with Harm Risk Interventions:  [place] to call system. Call bell, personal items and telephone within reach. Instruct patient to call for assistance. Room bathroom lighting operational. Non-slip footwear when patient is off stretcher. Physically safe environment: no spills, clutter or unnecessary equipment. Stretcher in lowest position, wheels locked, appropriate side rails in place. Provide visual cue, wrist band, yellow gown, etc. Monitor gait and stability. Monitor for mental status changes and reorient to person, place, and time. Review medications for side effects contributing to fall risk. Reinforce activity limits and safety measures with patient and family. Provide visual clues: red socks.

## 2020-01-25 LAB
ALBUMIN SERPL ELPH-MCNC: 4.2 G/DL — SIGNIFICANT CHANGE UP (ref 3.3–5.2)
ALP SERPL-CCNC: 98 U/L — SIGNIFICANT CHANGE UP (ref 40–120)
ALT FLD-CCNC: 12 U/L — SIGNIFICANT CHANGE UP
ANION GAP SERPL CALC-SCNC: 13 MMOL/L — SIGNIFICANT CHANGE UP (ref 5–17)
APTT BLD: 32.9 SEC — SIGNIFICANT CHANGE UP (ref 27.5–36.3)
AST SERPL-CCNC: 21 U/L — SIGNIFICANT CHANGE UP
BILIRUB SERPL-MCNC: 0.5 MG/DL — SIGNIFICANT CHANGE UP (ref 0.4–2)
BUN SERPL-MCNC: 10 MG/DL — SIGNIFICANT CHANGE UP (ref 8–20)
CALCIUM SERPL-MCNC: 8.3 MG/DL — LOW (ref 8.6–10.2)
CHLORIDE SERPL-SCNC: 97 MMOL/L — LOW (ref 98–107)
CHOLEST SERPL-MCNC: 104 MG/DL — LOW (ref 110–199)
CO2 SERPL-SCNC: 25 MMOL/L — SIGNIFICANT CHANGE UP (ref 22–29)
CREAT SERPL-MCNC: 0.74 MG/DL — SIGNIFICANT CHANGE UP (ref 0.5–1.3)
GLUCOSE BLDC GLUCOMTR-MCNC: 137 MG/DL — HIGH (ref 70–99)
GLUCOSE BLDC GLUCOMTR-MCNC: 151 MG/DL — HIGH (ref 70–99)
GLUCOSE BLDC GLUCOMTR-MCNC: 159 MG/DL — HIGH (ref 70–99)
GLUCOSE BLDC GLUCOMTR-MCNC: 170 MG/DL — HIGH (ref 70–99)
GLUCOSE SERPL-MCNC: 148 MG/DL — HIGH (ref 70–99)
HBA1C BLD-MCNC: 7.3 % — HIGH (ref 4–5.6)
HDLC SERPL-MCNC: 43 MG/DL — SIGNIFICANT CHANGE UP
INR BLD: 1.58 RATIO — HIGH (ref 0.88–1.16)
LACTATE SERPL-SCNC: 1.5 MMOL/L — SIGNIFICANT CHANGE UP (ref 0.5–2)
LACTATE SERPL-SCNC: 1.7 MMOL/L — SIGNIFICANT CHANGE UP (ref 0.5–2)
LIPID PNL WITH DIRECT LDL SERPL: 41 MG/DL — SIGNIFICANT CHANGE UP
POTASSIUM SERPL-MCNC: 3.6 MMOL/L — SIGNIFICANT CHANGE UP (ref 3.5–5.3)
POTASSIUM SERPL-SCNC: 3.6 MMOL/L — SIGNIFICANT CHANGE UP (ref 3.5–5.3)
PROT SERPL-MCNC: 7.1 G/DL — SIGNIFICANT CHANGE UP (ref 6.6–8.7)
PROTHROM AB SERPL-ACNC: 18.4 SEC — HIGH (ref 10–12.9)
SODIUM SERPL-SCNC: 135 MMOL/L — SIGNIFICANT CHANGE UP (ref 135–145)
TOTAL CHOLESTEROL/HDL RATIO MEASUREMENT: 2 RATIO — LOW (ref 3.4–9.6)
TRIGL SERPL-MCNC: 99 MG/DL — SIGNIFICANT CHANGE UP (ref 10–200)

## 2020-01-25 PROCEDURE — 99232 SBSQ HOSP IP/OBS MODERATE 35: CPT

## 2020-01-25 RX ORDER — OXYCODONE AND ACETAMINOPHEN 5; 325 MG/1; MG/1
1 TABLET ORAL EVERY 6 HOURS
Refills: 0 | Status: DISCONTINUED | OUTPATIENT
Start: 2020-01-25 | End: 2020-01-28

## 2020-01-25 RX ORDER — WARFARIN SODIUM 2.5 MG/1
10 TABLET ORAL ONCE
Refills: 0 | Status: COMPLETED | OUTPATIENT
Start: 2020-01-25 | End: 2020-01-25

## 2020-01-25 RX ADMIN — FINASTERIDE 5 MILLIGRAM(S): 5 TABLET, FILM COATED ORAL at 12:13

## 2020-01-25 RX ADMIN — Medication 0.25 MILLIGRAM(S): at 12:14

## 2020-01-25 RX ADMIN — OXYCODONE AND ACETAMINOPHEN 1 TABLET(S): 5; 325 TABLET ORAL at 21:29

## 2020-01-25 RX ADMIN — PANTOPRAZOLE SODIUM 40 MILLIGRAM(S): 20 TABLET, DELAYED RELEASE ORAL at 05:36

## 2020-01-25 RX ADMIN — Medication 650 MILLIGRAM(S): at 17:43

## 2020-01-25 RX ADMIN — Medication 2: at 08:19

## 2020-01-25 RX ADMIN — ATORVASTATIN CALCIUM 20 MILLIGRAM(S): 80 TABLET, FILM COATED ORAL at 21:29

## 2020-01-25 RX ADMIN — Medication 75 MILLIGRAM(S): at 17:46

## 2020-01-25 RX ADMIN — OXYCODONE AND ACETAMINOPHEN 1 TABLET(S): 5; 325 TABLET ORAL at 22:29

## 2020-01-25 RX ADMIN — Medication 0.25 MILLIGRAM(S): at 21:51

## 2020-01-25 RX ADMIN — OXYCODONE HYDROCHLORIDE 5 MILLIGRAM(S): 5 TABLET ORAL at 01:00

## 2020-01-25 RX ADMIN — Medication 75 MILLIGRAM(S): at 05:36

## 2020-01-25 RX ADMIN — Medication 2: at 17:46

## 2020-01-25 RX ADMIN — Medication 3 MILLILITER(S): at 16:01

## 2020-01-25 RX ADMIN — Medication 3 MILLILITER(S): at 09:55

## 2020-01-25 RX ADMIN — WARFARIN SODIUM 10 MILLIGRAM(S): 2.5 TABLET ORAL at 21:29

## 2020-01-25 RX ADMIN — Medication 3 MILLILITER(S): at 21:09

## 2020-01-25 RX ADMIN — Medication 3 MILLILITER(S): at 03:35

## 2020-01-25 RX ADMIN — Medication 650 MILLIGRAM(S): at 12:13

## 2020-01-25 NOTE — PROGRESS NOTE ADULT - ASSESSMENT
73 y/o M PMH afib on coumadin, HTN, HLD, DM, hx of DVT presents with cough, SOB. In ER, found to be Flu+ and in rapid afib.     Flu A  -cont tamiflu x5 days  -IVF  -Febrile last night 100.7  -f/u blood cx  -no PNA on CT chest  -ID recs appreciated    Rapid afib  -likely exacerbated by fever, flu  -resume coumadin, f/u INR  -cardio recs appreciated    SOB  -likely 2/2 Flu with ?asthma or COPD  -Pulm recs appreciated  -cont nebs  -outpt PFTs  -steroids if wheezing continues    HTN  -metoprolol increased to 75mg daily as per cardio    DM  -cont ISS  -HbA1c 7.3    Anemia  -microcytic  -f/u Iron studies  -f/u FOBT  -heme/onc recs appreciated

## 2020-01-25 NOTE — PROGRESS NOTE ADULT - ASSESSMENT
75y/o  Male with a h/o Atrial fibrillation on coumadin, diabetes on metformin, Hyperlipidemia, schwannoma s/p surgery 1992, and radiation in 1998, spinal surgery 7 years ago, h/o DVT. Patient comes to the ER with few days of shortness of breath for the past few days. He reports he saw his PMD Dr. Ang and was started on a z-pack. Symptoms did not improve so he came to the ER. He denies sick contacts. In the ER patient was febrile to 100.8F found to be flu + and in rapid afib.  Started on Tamiflu.      Influenza A  Rapid A fib  Former smoker      - Blood cultures pending  - RVP + Influenza A  - Ct Chest with no pneumonia   - Continue Tamiflu 75mg PO q 12hours x 5 days  - Pulmonary following, probably has underlying COPD  - Trend Fever  - Trend Leukocytosis      Will Follow

## 2020-01-25 NOTE — PROGRESS NOTE ADULT - SUBJECTIVE AND OBJECTIVE BOX
ANIA CRISTOBAL    [id number]    74y      Male    CC: SOB    INTERVAL HPI/OVERNIGHT EVENTS: Pt seen and examined. C/o dry cough, reports some improvement in SOB. Denies CP, abd pain, n/v.     REVIEW OF SYSTEMS:    CONSTITUTIONAL: No fever, weight loss, or fatigue  RESPIRATORY: No hemoptysis  CARDIOVASCULAR: No chest pain, palpitations  GASTROINTESTINAL: No abdominal or epigastric pain. No nausea, vomiting  NEUROLOGICAL: No memory loss, loss of strength.    Vital Signs Last 24 Hrs  T(C): 37.6 (2020 12:04), Max: 38.2 (2020 22:44)  T(F): 99.6 (2020 12:04), Max: 100.7 (2020 22:44)  HR: 107 (2020 12:04) (88 - 127)  BP: 128/65 (2020 12:04) (128/65 - 163/82)  BP(mean): --  RR: 20 (2020 12:04) (20 - 26)  SpO2: 94% (2020 12:04) (93% - 96%)    PHYSICAL EXAM:    GENERAL: NAD  HEENT: PERRL, +EOMI  NECK: soft, supple  CHEST/LUNG: scattered expiratory wheeze, scattered rhonchi, unlabored respirations on room air  HEART: S1S2+, tachycardic  ABDOMEN: Soft, Nontender, Nondistended; Bowel sounds present  SKIN: warm, dry, no rash noted  NEURO: AAOX3, no focal deficits    LABS:                        9.6    8.26  )-----------( 245      ( 2020 02:37 )             35.5     01-    135  |  97<L>  |  10.0  ----------------------------<  148<H>  3.6   |  25.0  |  0.74    Ca    8.3<L>      2020 09:19    TPro  7.1  /  Alb  4.2  /  TBili  0.5  /  DBili  x   /  AST  21  /  ALT  12  /  AlkPhos  98  01-25    PT/INR - ( 2020 09:19 )   PT: 18.4 sec;   INR: 1.58 ratio         PTT - ( 2020 09:19 )  PTT:32.9 sec  Urinalysis Basic - ( 2020 05:37 )    Color: Yellow / Appearance: Clear / S.010 / pH: x  Gluc: x / Ketone: Negative  / Bili: Negative / Urobili: Negative mg/dL   Blood: x / Protein: 30 mg/dL / Nitrite: Negative   Leuk Esterase: Negative / RBC: 11-25 /HPF / WBC 0-2   Sq Epi: x / Non Sq Epi: Occasional / Bacteria: Occasional          MEDICATIONS  (STANDING):  albuterol/ipratropium for Nebulization 3 milliLiter(s) Nebulizer every 6 hours  atorvastatin 20 milliGRAM(s) Oral at bedtime  dextrose 5%. 1000 milliLiter(s) (50 mL/Hr) IV Continuous <Continuous>  dextrose 50% Injectable 12.5 Gram(s) IV Push once  finasteride 5 milliGRAM(s) Oral daily  insulin lispro (HumaLOG) corrective regimen sliding scale   SubCutaneous three times a day before meals  metoprolol succinate ER 75 milliGRAM(s) Oral daily  oseltamivir 75 milliGRAM(s) Oral two times a day  pantoprazole    Tablet 40 milliGRAM(s) Oral before breakfast  sodium chloride 0.9%. 1000 milliLiter(s) (125 mL/Hr) IV Continuous <Continuous>    MEDICATIONS  (PRN):  acetaminophen   Tablet .. 650 milliGRAM(s) Oral every 6 hours PRN Temp greater or equal to 38C (100.4F), Mild Pain (1 - 3)  ALPRAZolam 0.25 milliGRAM(s) Oral two times a day PRN anxiety  dextrose 40% Gel 15 Gram(s) Oral once PRN Blood Glucose LESS THAN 70 milliGRAM(s)/deciliter  glucagon  Injectable 1 milliGRAM(s) IntraMuscular once PRN Glucose LESS THAN 70 milligrams/deciliter      RADIOLOGY & ADDITIONAL TESTS:

## 2020-01-25 NOTE — PROGRESS NOTE ADULT - SUBJECTIVE AND OBJECTIVE BOX
[facility]  INFECTIOUS DISEASES AND INTERNAL MEDICINE at [place]  =======================================================  Son Piña MD  Diplomates American Board of Internal Medicine and Infectious Diseases  Tel: [phone]      Fax: [phone]  =======================================================    ANIA CRISTOBAL [id number]    Follow up: Influenza A    No fever or chills    Has myalgias     Allergies:  Bactrim (Rash)  Ceftin (Hives)  penicillin (Anaphylaxis)  sulfa drugs (Unknown)      Antibiotics:  oseltamivir 75 milliGRAM(s) Oral two times a day        REVIEW OF SYSTEMS:  CONSTITUTIONAL:  No Fever or chills  HEENT:   No diplopia or blurred vision.  No earache, sore throat or runny nose.  CARDIOVASCULAR:  No pressure, squeezing, strangling, tightness, heaviness or aching about the chest, neck, axilla or epigastrium.  RESPIRATORY:  No cough, shortness of breath  GASTROINTESTINAL:  No nausea, vomiting or diarrhea.  GENITOURINARY:  No dysuria, frequency or urgency.   MUSCULOSKELETAL:  no joint aches, + muscle pain  SKIN:  No change in skin, hair or nails.  NEUROLOGIC:  No Headaches, seizures   PSYCHIATRIC:  No disorder of thought or mood.  ENDOCRINE:  No heat or cold intolerance  HEMATOLOGICAL:  No easy bruising or bleeding.       Physical Exam:  Vital Signs Last 24 Hrs  T(C): 37.6 (2020 12:04), Max: 38.2 (2020 22:44)  T(F): 99.6 (2020 12:04), Max: 100.7 (2020 22:44)  HR: 107 (2020 12:04) (88 - 127)  BP: 128/65 (2020 12:04) (128/65 - 163/82)  RR: 20 (2020 12:04) (20 - 26)  SpO2: 94% (2020 12:04) (93% - 96%)      GEN: NAD, pleasant  HEENT: normocephalic and atraumatic. EOMI. PERRL.  Anicteric  NECK: Supple.   LUNGS: Decreased Basal BS B/L  HEART: Regular rate and rhythm  ABDOMEN: Soft, nontender, and nondistended.  Positive bowel sounds.    : No CVA tenderness  EXTREMITIES: Without any edema.  MSK: No joint swelling  NEUROLOGIC:  No Focal Deficits  PSYCHIATRIC: Appropriate affect .  SKIN: No Rash      Labs:      135  |  97<L>  |  10.0  ----------------------------<  148<H>  3.6   |  25.0  |  0.74    Ca    8.3<L>      2020 09:19    TPro  7.1  /  Alb  4.2  /  TBili  0.5  /  DBili  x   /  AST  21  /  ALT  12  /  AlkPhos  98                            9.6    8.26  )-----------( 245      ( 2020 02:37 )             35.5       PT/INR - ( 2020 09:19 )   PT: 18.4 sec;   INR: 1.58 ratio         PTT - ( 2020 09:19 )  PTT:32.9 sec  Urinalysis Basic - ( 2020 05:37 )    Color: Yellow / Appearance: Clear / S.010 / pH: x  Gluc: x / Ketone: Negative  / Bili: Negative / Urobili: Negative mg/dL   Blood: x / Protein: 30 mg/dL / Nitrite: Negative   Leuk Esterase: Negative / RBC: 11-25 /HPF / WBC 0-2   Sq Epi: x / Non Sq Epi: Occasional / Bacteria: Occasional      LIVER FUNCTIONS - ( 2020 09:19 )  Alb: 4.2 g/dL / Pro: 7.1 g/dL / ALK PHOS: 98 U/L / ALT: 12 U/L / AST: 21 U/L / GGT: x             RECENT CULTURES:   @ 08:21 .Urine     50,000 - 99,000 CFU/mL Gram Negative Rods

## 2020-01-26 DIAGNOSIS — E11.9 TYPE 2 DIABETES MELLITUS WITHOUT COMPLICATIONS: ICD-10-CM

## 2020-01-26 DIAGNOSIS — I48.91 UNSPECIFIED ATRIAL FIBRILLATION: ICD-10-CM

## 2020-01-26 LAB
-  AMIKACIN: SIGNIFICANT CHANGE UP
-  AMPICILLIN/SULBACTAM: SIGNIFICANT CHANGE UP
-  AMPICILLIN: SIGNIFICANT CHANGE UP
-  AZTREONAM: SIGNIFICANT CHANGE UP
-  CEFAZOLIN: SIGNIFICANT CHANGE UP
-  CEFEPIME: SIGNIFICANT CHANGE UP
-  CEFOXITIN: SIGNIFICANT CHANGE UP
-  CEFTRIAXONE: SIGNIFICANT CHANGE UP
-  CIPROFLOXACIN: SIGNIFICANT CHANGE UP
-  GENTAMICIN: SIGNIFICANT CHANGE UP
-  IMIPENEM: SIGNIFICANT CHANGE UP
-  LEVOFLOXACIN: SIGNIFICANT CHANGE UP
-  MEROPENEM: SIGNIFICANT CHANGE UP
-  NITROFURANTOIN: SIGNIFICANT CHANGE UP
-  PIPERACILLIN/TAZOBACTAM: SIGNIFICANT CHANGE UP
-  TIGECYCLINE: SIGNIFICANT CHANGE UP
-  TOBRAMYCIN: SIGNIFICANT CHANGE UP
-  TRIMETHOPRIM/SULFAMETHOXAZOLE: SIGNIFICANT CHANGE UP
ANION GAP SERPL CALC-SCNC: 14 MMOL/L — SIGNIFICANT CHANGE UP (ref 5–17)
APTT BLD: 33.1 SEC — SIGNIFICANT CHANGE UP (ref 27.5–36.3)
BUN SERPL-MCNC: 15 MG/DL — SIGNIFICANT CHANGE UP (ref 8–20)
CALCIUM SERPL-MCNC: 8.1 MG/DL — LOW (ref 8.6–10.2)
CHLORIDE SERPL-SCNC: 100 MMOL/L — SIGNIFICANT CHANGE UP (ref 98–107)
CO2 SERPL-SCNC: 23 MMOL/L — SIGNIFICANT CHANGE UP (ref 22–29)
CREAT SERPL-MCNC: 0.62 MG/DL — SIGNIFICANT CHANGE UP (ref 0.5–1.3)
CULTURE RESULTS: SIGNIFICANT CHANGE UP
FERRITIN SERPL-MCNC: 29 NG/ML — LOW (ref 30–400)
GLUCOSE BLDC GLUCOMTR-MCNC: 135 MG/DL — HIGH (ref 70–99)
GLUCOSE BLDC GLUCOMTR-MCNC: 141 MG/DL — HIGH (ref 70–99)
GLUCOSE BLDC GLUCOMTR-MCNC: 148 MG/DL — HIGH (ref 70–99)
GLUCOSE BLDC GLUCOMTR-MCNC: 155 MG/DL — HIGH (ref 70–99)
GLUCOSE SERPL-MCNC: 146 MG/DL — HIGH (ref 70–99)
HCT VFR BLD CALC: 34.1 % — LOW (ref 39–50)
HCV AB S/CO SERPL IA: 0.14 S/CO — SIGNIFICANT CHANGE UP (ref 0–0.99)
HCV AB SERPL-IMP: SIGNIFICANT CHANGE UP
HGB BLD-MCNC: 8.8 G/DL — LOW (ref 13–17)
INR BLD: 1.44 RATIO — HIGH (ref 0.88–1.16)
IRON SATN MFR SERPL: 16 UG/DL — LOW (ref 59–158)
IRON SATN MFR SERPL: 4 % — LOW (ref 16–55)
MAGNESIUM SERPL-MCNC: 1.9 MG/DL — SIGNIFICANT CHANGE UP (ref 1.6–2.6)
MCHC RBC-ENTMCNC: 17.8 PG — LOW (ref 27–34)
MCHC RBC-ENTMCNC: 25.8 GM/DL — LOW (ref 32–36)
MCV RBC AUTO: 68.9 FL — LOW (ref 80–100)
METHOD TYPE: SIGNIFICANT CHANGE UP
ORGANISM # SPEC MICROSCOPIC CNT: SIGNIFICANT CHANGE UP
ORGANISM # SPEC MICROSCOPIC CNT: SIGNIFICANT CHANGE UP
PLATELET # BLD AUTO: 201 K/UL — SIGNIFICANT CHANGE UP (ref 150–400)
POTASSIUM SERPL-MCNC: 3.5 MMOL/L — SIGNIFICANT CHANGE UP (ref 3.5–5.3)
POTASSIUM SERPL-SCNC: 3.5 MMOL/L — SIGNIFICANT CHANGE UP (ref 3.5–5.3)
PROTHROM AB SERPL-ACNC: 16.7 SEC — HIGH (ref 10–12.9)
RBC # BLD: 4.95 M/UL — SIGNIFICANT CHANGE UP (ref 4.2–5.8)
RBC # FLD: 24.7 % — HIGH (ref 10.3–14.5)
SODIUM SERPL-SCNC: 137 MMOL/L — SIGNIFICANT CHANGE UP (ref 135–145)
SPECIMEN SOURCE: SIGNIFICANT CHANGE UP
TIBC SERPL-MCNC: 403 UG/DL — SIGNIFICANT CHANGE UP (ref 220–430)
TRANSFERRIN SERPL-MCNC: 282 MG/DL — SIGNIFICANT CHANGE UP (ref 180–329)
WBC # BLD: 6.22 K/UL — SIGNIFICANT CHANGE UP (ref 3.8–10.5)
WBC # FLD AUTO: 6.22 K/UL — SIGNIFICANT CHANGE UP (ref 3.8–10.5)

## 2020-01-26 PROCEDURE — 99232 SBSQ HOSP IP/OBS MODERATE 35: CPT

## 2020-01-26 RX ORDER — CIPROFLOXACIN LACTATE 400MG/40ML
500 VIAL (ML) INTRAVENOUS EVERY 12 HOURS
Refills: 0 | Status: DISCONTINUED | OUTPATIENT
Start: 2020-01-26 | End: 2020-01-27

## 2020-01-26 RX ORDER — ALPRAZOLAM 0.25 MG
0.25 TABLET ORAL ONCE
Refills: 0 | Status: DISCONTINUED | OUTPATIENT
Start: 2020-01-26 | End: 2020-01-26

## 2020-01-26 RX ORDER — WARFARIN SODIUM 2.5 MG/1
10 TABLET ORAL ONCE
Refills: 0 | Status: COMPLETED | OUTPATIENT
Start: 2020-01-26 | End: 2020-01-26

## 2020-01-26 RX ORDER — LOPERAMIDE HCL 2 MG
2 TABLET ORAL THREE TIMES A DAY
Refills: 0 | Status: DISCONTINUED | OUTPATIENT
Start: 2020-01-26 | End: 2020-01-29

## 2020-01-26 RX ORDER — IRON SUCROSE 20 MG/ML
200 INJECTION, SOLUTION INTRAVENOUS
Refills: 0 | Status: DISCONTINUED | OUTPATIENT
Start: 2020-01-26 | End: 2020-01-29

## 2020-01-26 RX ORDER — SACCHAROMYCES BOULARDII 250 MG
250 POWDER IN PACKET (EA) ORAL
Refills: 0 | Status: DISCONTINUED | OUTPATIENT
Start: 2020-01-26 | End: 2020-01-28

## 2020-01-26 RX ADMIN — FINASTERIDE 5 MILLIGRAM(S): 5 TABLET, FILM COATED ORAL at 12:47

## 2020-01-26 RX ADMIN — Medication 0.25 MILLIGRAM(S): at 22:59

## 2020-01-26 RX ADMIN — Medication 3 MILLILITER(S): at 07:43

## 2020-01-26 RX ADMIN — PANTOPRAZOLE SODIUM 40 MILLIGRAM(S): 20 TABLET, DELAYED RELEASE ORAL at 05:47

## 2020-01-26 RX ADMIN — Medication 0.25 MILLIGRAM(S): at 08:32

## 2020-01-26 RX ADMIN — Medication 0.25 MILLIGRAM(S): at 16:32

## 2020-01-26 RX ADMIN — Medication 3 MILLILITER(S): at 20:45

## 2020-01-26 RX ADMIN — Medication 500 MILLIGRAM(S): at 18:39

## 2020-01-26 RX ADMIN — Medication 250 MILLIGRAM(S): at 18:39

## 2020-01-26 RX ADMIN — Medication 75 MILLIGRAM(S): at 05:47

## 2020-01-26 RX ADMIN — Medication 2 MILLIGRAM(S): at 16:04

## 2020-01-26 RX ADMIN — Medication 75 MILLIGRAM(S): at 18:39

## 2020-01-26 RX ADMIN — Medication 3 MILLILITER(S): at 03:29

## 2020-01-26 RX ADMIN — WARFARIN SODIUM 10 MILLIGRAM(S): 2.5 TABLET ORAL at 21:28

## 2020-01-26 RX ADMIN — OXYCODONE AND ACETAMINOPHEN 1 TABLET(S): 5; 325 TABLET ORAL at 21:27

## 2020-01-26 RX ADMIN — Medication 3 MILLILITER(S): at 14:10

## 2020-01-26 RX ADMIN — IRON SUCROSE 110 MILLIGRAM(S): 20 INJECTION, SOLUTION INTRAVENOUS at 22:50

## 2020-01-26 RX ADMIN — ATORVASTATIN CALCIUM 20 MILLIGRAM(S): 80 TABLET, FILM COATED ORAL at 21:27

## 2020-01-26 NOTE — PROGRESS NOTE ADULT - ASSESSMENT
Flu +  Bronchitis  SOB/cough due to above  Former smoker  ? COPD/asthma - no PFTs available  Obese  ? ALLIE  Anxiety  AF    Definitely feeling better but has episodes of cough and dyspnea  Continue Rx for Flu  Continue bronchodilator regimen

## 2020-01-26 NOTE — PROGRESS NOTE ADULT - ASSESSMENT
73 y/o M PMH afib on coumadin, HTN, HLD, DM, hx of DVT presents with cough, SOB. In ER, found to be Flu+ and in rapid afib.     Flu A  -cont tamiflu x5 days  -IVF: pt refusing IVF   -blood cx neg to date  -no PNA on CT chest  -ID recs appreciated    Rapid afib  -likely exacerbated by fever, flu  -resume coumadin, f/u INR  -cardio recs appreciated    UTI  -urine cx pos for Klebsiella pneumonia, sensitive to cipro  -start abx  SOB  -likely 2/2 Flu with ?asthma or COPD  -Pulm recs appreciated  -cont nebs  -outpt PFTs  -steroids if wheezing continues    HTN  -cont metoprolol increased to 75mg daily as per cardio    DM  -cont ISS  -HbA1c 7.3    Anemia  -microcytic  -f/u FOBT  -heme/onc recs appreciated  -Venofer 200mg every other day x3 doses

## 2020-01-26 NOTE — PROGRESS NOTE ADULT - SUBJECTIVE AND OBJECTIVE BOX
ANIA CRISTOBAL    [id number]    74y      Male    CC: SOB    INTERVAL HPI/OVERNIGHT EVENTS: Pt seen and examined. continues to have cough, intermittent dyspnea. cough mostly non-productive. Denies CP. c/o diarrhea, which he reports he is following with GI for, had recent stool studies, colonoscopy. Overall feeling improvement.     REVIEW OF SYSTEMS:    CONSTITUTIONAL: No  weight loss, or fatigue  RESPIRATORY: No hemoptysis;   CARDIOVASCULAR: No chest pain, palpitations  GASTROINTESTINAL: No abdominal or epigastric pain. No nausea, vomiting  NEUROLOGICAL: No headaches, memory loss, loss of strength.    Vital Signs Last 24 Hrs  T(C): 37.3 (26 Jan 2020 11:39), Max: 38.3 (26 Jan 2020 10:08)  T(F): 99.2 (26 Jan 2020 11:39), Max: 101 (26 Jan 2020 10:08)  HR: 90 (26 Jan 2020 14:10) (83 - 97)  BP: 112/75 (26 Jan 2020 11:39) (109/67 - 126/73)  BP(mean): --  RR: 18 (26 Jan 2020 11:39) (18 - 18)  SpO2: 94% (26 Jan 2020 14:10) (93% - 95%)    PHYSICAL EXAM:    GENERAL: NAD  HEENT: PERRL, +EOMI  NECK: soft, supple  CHEST/LUNG: clear to auscultation with occasional expiratory wheeze, non-labored respirations on room air  HEART: S1S2+, Regular rate and rhythm; No murmurs, rubs, or gallops  ABDOMEN: Soft, Nontender, Nondistended; Bowel sounds present  SKIN: warm, dry  NEURO: AAOX3, no focal deficits    LABS:                        8.8    6.22  )-----------( 201      ( 26 Jan 2020 07:44 )             34.1     01-26    137  |  100  |  15.0  ----------------------------<  146<H>  3.5   |  23.0  |  0.62    Ca    8.1<L>      26 Jan 2020 07:44  Mg     1.9     01-26    TPro  7.1  /  Alb  4.2  /  TBili  0.5  /  DBili  x   /  AST  21  /  ALT  12  /  AlkPhos  98  01-25    PT/INR - ( 26 Jan 2020 07:44 )   PT: 16.7 sec;   INR: 1.44 ratio         PTT - ( 26 Jan 2020 07:44 )  PTT:33.1 sec        MEDICATIONS  (STANDING):  albuterol/ipratropium for Nebulization 3 milliLiter(s) Nebulizer every 6 hours  atorvastatin 20 milliGRAM(s) Oral at bedtime  dextrose 5%. 1000 milliLiter(s) (50 mL/Hr) IV Continuous <Continuous>  dextrose 50% Injectable 12.5 Gram(s) IV Push once  finasteride 5 milliGRAM(s) Oral daily  insulin lispro (HumaLOG) corrective regimen sliding scale   SubCutaneous three times a day before meals  metoprolol succinate ER 75 milliGRAM(s) Oral daily  oseltamivir 75 milliGRAM(s) Oral two times a day  pantoprazole    Tablet 40 milliGRAM(s) Oral before breakfast  sodium chloride 0.9%. 1000 milliLiter(s) (125 mL/Hr) IV Continuous <Continuous>  warfarin 10 milliGRAM(s) Oral once    MEDICATIONS  (PRN):  acetaminophen   Tablet .. 650 milliGRAM(s) Oral every 6 hours PRN Temp greater or equal to 38C (100.4F), Mild Pain (1 - 3)  ALPRAZolam 0.25 milliGRAM(s) Oral two times a day PRN anxiety  dextrose 40% Gel 15 Gram(s) Oral once PRN Blood Glucose LESS THAN 70 milliGRAM(s)/deciliter  glucagon  Injectable 1 milliGRAM(s) IntraMuscular once PRN Glucose LESS THAN 70 milligrams/deciliter  loperamide 2 milliGRAM(s) Oral three times a day PRN Diarrhea  oxycodone    5 mG/acetaminophen 325 mG 1 Tablet(s) Oral every 6 hours PRN Moderate Pain (4 - 6)      RADIOLOGY & ADDITIONAL TESTS:

## 2020-01-27 LAB
ANION GAP SERPL CALC-SCNC: 14 MMOL/L — SIGNIFICANT CHANGE UP (ref 5–17)
BUN SERPL-MCNC: 15 MG/DL — SIGNIFICANT CHANGE UP (ref 8–20)
CALCIUM SERPL-MCNC: 8.2 MG/DL — LOW (ref 8.6–10.2)
CHLORIDE SERPL-SCNC: 100 MMOL/L — SIGNIFICANT CHANGE UP (ref 98–107)
CO2 SERPL-SCNC: 25 MMOL/L — SIGNIFICANT CHANGE UP (ref 22–29)
CREAT SERPL-MCNC: 0.56 MG/DL — SIGNIFICANT CHANGE UP (ref 0.5–1.3)
GLUCOSE BLDC GLUCOMTR-MCNC: 128 MG/DL — HIGH (ref 70–99)
GLUCOSE BLDC GLUCOMTR-MCNC: 156 MG/DL — HIGH (ref 70–99)
GLUCOSE BLDC GLUCOMTR-MCNC: 159 MG/DL — HIGH (ref 70–99)
GLUCOSE SERPL-MCNC: 133 MG/DL — HIGH (ref 70–99)
HCT VFR BLD CALC: 33.6 % — LOW (ref 39–50)
HGB BLD-MCNC: 8.7 G/DL — LOW (ref 13–17)
INR BLD: 1.56 RATIO — HIGH (ref 0.88–1.16)
MAGNESIUM SERPL-MCNC: 1.9 MG/DL — SIGNIFICANT CHANGE UP (ref 1.6–2.6)
MCHC RBC-ENTMCNC: 17.7 PG — LOW (ref 27–34)
MCHC RBC-ENTMCNC: 25.9 GM/DL — LOW (ref 32–36)
MCV RBC AUTO: 68.3 FL — LOW (ref 80–100)
PLATELET # BLD AUTO: 219 K/UL — SIGNIFICANT CHANGE UP (ref 150–400)
POTASSIUM SERPL-MCNC: 3.4 MMOL/L — LOW (ref 3.5–5.3)
POTASSIUM SERPL-SCNC: 3.4 MMOL/L — LOW (ref 3.5–5.3)
PROTHROM AB SERPL-ACNC: 18.2 SEC — HIGH (ref 10–12.9)
RBC # BLD: 4.92 M/UL — SIGNIFICANT CHANGE UP (ref 4.2–5.8)
RBC # FLD: 24.3 % — HIGH (ref 10.3–14.5)
SODIUM SERPL-SCNC: 139 MMOL/L — SIGNIFICANT CHANGE UP (ref 135–145)
WBC # BLD: 3.86 K/UL — SIGNIFICANT CHANGE UP (ref 3.8–10.5)
WBC # FLD AUTO: 3.86 K/UL — SIGNIFICANT CHANGE UP (ref 3.8–10.5)

## 2020-01-27 PROCEDURE — 99232 SBSQ HOSP IP/OBS MODERATE 35: CPT

## 2020-01-27 PROCEDURE — 99233 SBSQ HOSP IP/OBS HIGH 50: CPT

## 2020-01-27 RX ORDER — WARFARIN SODIUM 2.5 MG/1
10 TABLET ORAL ONCE
Refills: 0 | Status: COMPLETED | OUTPATIENT
Start: 2020-01-27 | End: 2020-01-27

## 2020-01-27 RX ORDER — POTASSIUM CHLORIDE 20 MEQ
20 PACKET (EA) ORAL ONCE
Refills: 0 | Status: COMPLETED | OUTPATIENT
Start: 2020-01-27 | End: 2020-01-27

## 2020-01-27 RX ADMIN — Medication 75 MILLIGRAM(S): at 05:34

## 2020-01-27 RX ADMIN — Medication 0.25 MILLIGRAM(S): at 23:04

## 2020-01-27 RX ADMIN — Medication 2 MILLIGRAM(S): at 03:12

## 2020-01-27 RX ADMIN — Medication 3 MILLILITER(S): at 02:27

## 2020-01-27 RX ADMIN — Medication 3 MILLILITER(S): at 21:09

## 2020-01-27 RX ADMIN — Medication 2: at 16:52

## 2020-01-27 RX ADMIN — Medication 250 MILLIGRAM(S): at 16:51

## 2020-01-27 RX ADMIN — ATORVASTATIN CALCIUM 20 MILLIGRAM(S): 80 TABLET, FILM COATED ORAL at 23:04

## 2020-01-27 RX ADMIN — Medication 3 MILLILITER(S): at 15:44

## 2020-01-27 RX ADMIN — Medication 250 MILLIGRAM(S): at 05:34

## 2020-01-27 RX ADMIN — Medication 75 MILLIGRAM(S): at 16:51

## 2020-01-27 RX ADMIN — Medication 0.25 MILLIGRAM(S): at 16:51

## 2020-01-27 RX ADMIN — Medication 20 MILLIEQUIVALENT(S): at 13:38

## 2020-01-27 RX ADMIN — SODIUM CHLORIDE 125 MILLILITER(S): 9 INJECTION INTRAMUSCULAR; INTRAVENOUS; SUBCUTANEOUS at 08:48

## 2020-01-27 RX ADMIN — WARFARIN SODIUM 10 MILLIGRAM(S): 2.5 TABLET ORAL at 23:04

## 2020-01-27 RX ADMIN — FINASTERIDE 5 MILLIGRAM(S): 5 TABLET, FILM COATED ORAL at 13:38

## 2020-01-27 RX ADMIN — Medication 500 MILLIGRAM(S): at 05:34

## 2020-01-27 RX ADMIN — PANTOPRAZOLE SODIUM 40 MILLIGRAM(S): 20 TABLET, DELAYED RELEASE ORAL at 08:48

## 2020-01-27 NOTE — PROGRESS NOTE ADULT - SUBJECTIVE AND OBJECTIVE BOX
ANIA CRISTOBAL    [id number]    74y      Male    CC: SOB    INTERVAL HPI/OVERNIGHT EVENTS: Pt seen and examined. c/o cough, intermittently sob. afebrile    REVIEW OF SYSTEMS:    CONSTITUTIONAL: No fever, weight loss  RESPIRATORY: No hemoptysis  CARDIOVASCULAR: No chest pain, palpitations  GASTROINTESTINAL: No abdominal or epigastric pain. No nausea, vomiting  NEUROLOGICAL: No headaches, memory loss    Vital Signs Last 24 Hrs  T(C): 37.2 (27 Jan 2020 16:15), Max: 37.2 (27 Jan 2020 11:47)  T(F): 98.9 (27 Jan 2020 16:15), Max: 99 (27 Jan 2020 11:47)  HR: 81 (27 Jan 2020 16:15) (80 - 89)  BP: 126/77 (27 Jan 2020 16:15) (108/64 - 146/81)  BP(mean): --  RR: 42 (27 Jan 2020 16:15) (18 - 42)  SpO2: 94% (27 Jan 2020 16:15) (93% - 99%)    PHYSICAL EXAM:    GENERAL: NAD  HEENT: PERRL, +EOMI  NECK: soft, supple  CHEST/LUNG: b/l wheeze, non-labored respirations on room air  HEART: S1S2+, Regular rate and rhythm; No murmurs, rubs, or gallops  ABDOMEN: Soft, Nontender, Nondistended; Bowel sounds present  SKIN: warm, dry  NEURO: AAOX3, no focal deficits    LABS:                        8.7    3.86  )-----------( 219      ( 27 Jan 2020 07:00 )             33.6     01-27    139  |  100  |  15.0  ----------------------------<  133<H>  3.4<L>   |  25.0  |  0.56    Ca    8.2<L>      27 Jan 2020 07:00  Mg     1.9     01-27      PT/INR - ( 27 Jan 2020 07:00 )   PT: 18.2 sec;   INR: 1.56 ratio         PTT - ( 26 Jan 2020 07:44 )  PTT:33.1 sec        MEDICATIONS  (STANDING):  albuterol/ipratropium for Nebulization 3 milliLiter(s) Nebulizer every 6 hours  atorvastatin 20 milliGRAM(s) Oral at bedtime  dextrose 5%. 1000 milliLiter(s) (50 mL/Hr) IV Continuous <Continuous>  dextrose 50% Injectable 12.5 Gram(s) IV Push once  finasteride 5 milliGRAM(s) Oral daily  insulin lispro (HumaLOG) corrective regimen sliding scale   SubCutaneous three times a day before meals  iron sucrose IVPB 200 milliGRAM(s) IV Intermittent every 48 hours  metoprolol succinate ER 75 milliGRAM(s) Oral daily  oseltamivir 75 milliGRAM(s) Oral two times a day  pantoprazole    Tablet 40 milliGRAM(s) Oral before breakfast  predniSONE   Tablet 60 milliGRAM(s) Oral daily  saccharomyces boulardii 250 milliGRAM(s) Oral two times a day  sodium chloride 0.9%. 1000 milliLiter(s) (125 mL/Hr) IV Continuous <Continuous>  warfarin 10 milliGRAM(s) Oral once    MEDICATIONS  (PRN):  acetaminophen   Tablet .. 650 milliGRAM(s) Oral every 6 hours PRN Temp greater or equal to 38C (100.4F), Mild Pain (1 - 3)  ALPRAZolam 0.25 milliGRAM(s) Oral two times a day PRN anxiety  dextrose 40% Gel 15 Gram(s) Oral once PRN Blood Glucose LESS THAN 70 milliGRAM(s)/deciliter  glucagon  Injectable 1 milliGRAM(s) IntraMuscular once PRN Glucose LESS THAN 70 milligrams/deciliter  loperamide 2 milliGRAM(s) Oral three times a day PRN Diarrhea  oxycodone    5 mG/acetaminophen 325 mG 1 Tablet(s) Oral every 6 hours PRN Moderate Pain (4 - 6)      RADIOLOGY & ADDITIONAL TESTS:

## 2020-01-27 NOTE — PROGRESS NOTE ADULT - ASSESSMENT
73 y/o M PMH afib on coumadin, HTN, HLD, DM, hx of DVT presents with cough, SOB. In ER, found to be Flu+ and in rapid afib.     Flu A  -cont tamiflu x5 days  -IVF  -blood cx neg to date  -no PNA on CT chest  -ID recs appreciated    Rapid afib  -likely exacerbated by fever, flu  -resume coumadin, f/u INR  -cardio recs appreciated    UTI  -urine cx pos for Klebsiella pneumonia, UA neg  -stop abx as per ID    SOB  -likely 2/2 Flu with ?asthma or COPD  -Pulm recs appreciated  -cont nebs  -outpt PFTs  -start prednisone    HTN  -cont metoprolol increased to 75mg daily as per cardio    DM  -cont ISS  -HbA1c 7.3    Anemia  -microcytic  -f/u FOBT  -heme/onc recs appreciated  -Venofer 200mg every other day x3 doses

## 2020-01-27 NOTE — PROGRESS NOTE ADULT - SUBJECTIVE AND OBJECTIVE BOX
[facility]  INFECTIOUS DISEASES AND INTERNAL MEDICINE at [place]  =======================================================  Son Piña MD  Diplomates American Board of Internal Medicine and Infectious Diseases  Tel: [phone]      Fax: [phone]  =======================================================    ANIA CRISTOBAL [id number]    Follow up: Influenza A    No fever or chills    Has myalgias     Allergies:  Bactrim (Rash)  Ceftin (Hives)  penicillin (Anaphylaxis)  sulfa drugs (Unknown)      Antibiotics:  oseltamivir 75 milliGRAM(s) Oral two times a day        REVIEW OF SYSTEMS:  CONSTITUTIONAL:  No Fever or chills  HEENT:   No diplopia or blurred vision.  No earache, sore throat or runny nose.  CARDIOVASCULAR:  No pressure, squeezing, strangling, tightness, heaviness or aching about the chest, neck, axilla or epigastrium.  RESPIRATORY:  No cough, shortness of breath  GASTROINTESTINAL:  No nausea, vomiting or diarrhea.  GENITOURINARY:  No dysuria, frequency or urgency.   MUSCULOSKELETAL:  no joint aches, + muscle pain  SKIN:  No change in skin, hair or nails.  NEUROLOGIC:  No Headaches, seizures   PSYCHIATRIC:  No disorder of thought or mood.  ENDOCRINE:  No heat or cold intolerance  HEMATOLOGICAL:  No easy bruising or bleeding.       Physical Exam:  Vital Signs Last 24 Hrs  T(C): 37.2 (27 Jan 2020 11:47), Max: 37.2 (27 Jan 2020 11:47)  T(F): 99 (27 Jan 2020 11:47), Max: 99 (27 Jan 2020 11:47)  HR: 82 (27 Jan 2020 11:47) (80 - 90)  BP: 108/64 (27 Jan 2020 11:47) (108/64 - 146/81)  RR: 18 (27 Jan 2020 11:47) (18 - 18)  SpO2: 97% (27 Jan 2020 11:47) (93% - 99%)      GEN: NAD, pleasant  HEENT: normocephalic and atraumatic. EOMI. PERRL.  Anicteric  NECK: Supple.   LUNGS: Decreased Basal BS B/L  HEART: Regular rate and rhythm  ABDOMEN: Soft, nontender, and nondistended.  Positive bowel sounds.    : No CVA tenderness  EXTREMITIES: Without any edema.  MSK: No joint swelling  NEUROLOGIC:  No Focal Deficits  PSYCHIATRIC: Appropriate affect .  SKIN: No Rash      Labs:  01-27    139  |  100  |  15.0  ----------------------------<  133<H>  3.4<L>   |  25.0  |  0.56    Ca    8.2<L>      27 Jan 2020 07:00  Mg     1.9     01-27               8.7    3.86  )-----------( 219      ( 27 Jan 2020 07:00 )             33.6       PT/INR - ( 27 Jan 2020 07:00 )   PT: 18.2 sec;   INR: 1.56 ratio         PTT - ( 26 Jan 2020 07:44 )  PTT:33.1 sec        RECENT CULTURES:  01-24 @ 08:21 .Urine Klebsiella pneumoniae    50,000 - 99,000 CFU/mL Klebsiella pneumoniae      01-24 @ 03:55 .Blood     No growth at 48 hours

## 2020-01-27 NOTE — PROGRESS NOTE ADULT - ASSESSMENT
-Influenza A infection  -Bronchitis  -Lung nodule  -Wheezing  -Obesity  -Possible ALLIE  -Afib    RECC:  Would start prednisone; follow FS glucose levels. Continue tamiflu. Nebs. O2. Will need f/u ct chest as outpt in 6 months. Outpt pulm f/u with PFTs. Outpt PSG. Weight loss. Will follow.

## 2020-01-27 NOTE — PROGRESS NOTE ADULT - ASSESSMENT
75y/o  Male with a h/o Atrial fibrillation on coumadin, diabetes on metformin, Hyperlipidemia, schwannoma s/p surgery 1992, and radiation in 1998, spinal surgery 7 years ago, h/o DVT. Patient comes to the ER with few days of shortness of breath for the past few days. He reports he saw his PMD Dr. Ang and was started on a z-pack. Symptoms did not improve so he came to the ER. He denies sick contacts. In the ER patient was febrile to 100.8F found to be flu + and in rapid afib.  Started on Tamiflu.      Influenza A  Rapid A fib  Former smoker  Urine culture with klebsiella     - Blood cultures no growth   - RVP + Influenza A  - Ct Chest with no pneumonia   - Continue Tamiflu 75mg PO q 12hours x 5 days  - Pulmonary following, probably has underlying COPD  - Urine culture with Klebsiella   - UA with no UTI  - D/C Cipro  - Trend Fever  - Trend Leukocytosis      Will sign off. Please call PRN.

## 2020-01-28 LAB
ANION GAP SERPL CALC-SCNC: 11 MMOL/L — SIGNIFICANT CHANGE UP (ref 5–17)
BUN SERPL-MCNC: 13 MG/DL — SIGNIFICANT CHANGE UP (ref 8–20)
CALCIUM SERPL-MCNC: 8.5 MG/DL — LOW (ref 8.6–10.2)
CHLORIDE SERPL-SCNC: 102 MMOL/L — SIGNIFICANT CHANGE UP (ref 98–107)
CO2 SERPL-SCNC: 27 MMOL/L — SIGNIFICANT CHANGE UP (ref 22–29)
CREAT SERPL-MCNC: 0.65 MG/DL — SIGNIFICANT CHANGE UP (ref 0.5–1.3)
GLUCOSE BLDC GLUCOMTR-MCNC: 165 MG/DL — HIGH (ref 70–99)
GLUCOSE BLDC GLUCOMTR-MCNC: 173 MG/DL — HIGH (ref 70–99)
GLUCOSE BLDC GLUCOMTR-MCNC: 175 MG/DL — HIGH (ref 70–99)
GLUCOSE BLDC GLUCOMTR-MCNC: 217 MG/DL — HIGH (ref 70–99)
GLUCOSE SERPL-MCNC: 140 MG/DL — HIGH (ref 70–99)
HCT VFR BLD CALC: 35.3 % — LOW (ref 39–50)
HGB BLD-MCNC: 9.3 G/DL — LOW (ref 13–17)
INR BLD: 1.72 RATIO — HIGH (ref 0.88–1.16)
MAGNESIUM SERPL-MCNC: 2 MG/DL — SIGNIFICANT CHANGE UP (ref 1.6–2.6)
MCHC RBC-ENTMCNC: 18.3 PG — LOW (ref 27–34)
MCHC RBC-ENTMCNC: 26.3 GM/DL — LOW (ref 32–36)
MCV RBC AUTO: 69.4 FL — LOW (ref 80–100)
PLATELET # BLD AUTO: 245 K/UL — SIGNIFICANT CHANGE UP (ref 150–400)
POTASSIUM SERPL-MCNC: 4.3 MMOL/L — SIGNIFICANT CHANGE UP (ref 3.5–5.3)
POTASSIUM SERPL-SCNC: 4.3 MMOL/L — SIGNIFICANT CHANGE UP (ref 3.5–5.3)
PROTHROM AB SERPL-ACNC: 20.1 SEC — HIGH (ref 10–12.9)
RBC # BLD: 5.09 M/UL — SIGNIFICANT CHANGE UP (ref 4.2–5.8)
RBC # FLD: 24.3 % — HIGH (ref 10.3–14.5)
SODIUM SERPL-SCNC: 140 MMOL/L — SIGNIFICANT CHANGE UP (ref 135–145)
WBC # BLD: 4.24 K/UL — SIGNIFICANT CHANGE UP (ref 3.8–10.5)
WBC # FLD AUTO: 4.24 K/UL — SIGNIFICANT CHANGE UP (ref 3.8–10.5)

## 2020-01-28 PROCEDURE — 99232 SBSQ HOSP IP/OBS MODERATE 35: CPT

## 2020-01-28 PROCEDURE — 93306 TTE W/DOPPLER COMPLETE: CPT | Mod: 26

## 2020-01-28 RX ORDER — WARFARIN SODIUM 2.5 MG/1
10 TABLET ORAL ONCE
Refills: 0 | Status: COMPLETED | OUTPATIENT
Start: 2020-01-28 | End: 2020-01-28

## 2020-01-28 RX ORDER — OXYCODONE AND ACETAMINOPHEN 5; 325 MG/1; MG/1
1 TABLET ORAL ONCE
Refills: 0 | Status: DISCONTINUED | OUTPATIENT
Start: 2020-01-28 | End: 2020-01-28

## 2020-01-28 RX ADMIN — Medication 3 MILLILITER(S): at 22:14

## 2020-01-28 RX ADMIN — OXYCODONE AND ACETAMINOPHEN 1 TABLET(S): 5; 325 TABLET ORAL at 00:00

## 2020-01-28 RX ADMIN — Medication 2: at 16:55

## 2020-01-28 RX ADMIN — FINASTERIDE 5 MILLIGRAM(S): 5 TABLET, FILM COATED ORAL at 11:33

## 2020-01-28 RX ADMIN — Medication 0.25 MILLIGRAM(S): at 11:10

## 2020-01-28 RX ADMIN — IRON SUCROSE 110 MILLIGRAM(S): 20 INJECTION, SOLUTION INTRAVENOUS at 21:56

## 2020-01-28 RX ADMIN — Medication 2: at 08:00

## 2020-01-28 RX ADMIN — WARFARIN SODIUM 10 MILLIGRAM(S): 2.5 TABLET ORAL at 21:56

## 2020-01-28 RX ADMIN — Medication 0.25 MILLIGRAM(S): at 21:55

## 2020-01-28 RX ADMIN — Medication 75 MILLIGRAM(S): at 17:11

## 2020-01-28 RX ADMIN — Medication 3 MILLILITER(S): at 15:06

## 2020-01-28 RX ADMIN — ATORVASTATIN CALCIUM 20 MILLIGRAM(S): 80 TABLET, FILM COATED ORAL at 21:56

## 2020-01-28 RX ADMIN — Medication 2 MILLIGRAM(S): at 16:50

## 2020-01-28 RX ADMIN — Medication 3 MILLILITER(S): at 03:49

## 2020-01-28 RX ADMIN — PANTOPRAZOLE SODIUM 40 MILLIGRAM(S): 20 TABLET, DELAYED RELEASE ORAL at 05:12

## 2020-01-28 RX ADMIN — Medication 4: at 11:40

## 2020-01-28 RX ADMIN — OXYCODONE AND ACETAMINOPHEN 1 TABLET(S): 5; 325 TABLET ORAL at 21:55

## 2020-01-28 RX ADMIN — Medication 60 MILLIGRAM(S): at 05:12

## 2020-01-28 RX ADMIN — Medication 250 MILLIGRAM(S): at 05:11

## 2020-01-28 RX ADMIN — Medication 3 MILLILITER(S): at 08:57

## 2020-01-28 RX ADMIN — Medication 75 MILLIGRAM(S): at 05:12

## 2020-01-28 RX ADMIN — Medication 2 MILLIGRAM(S): at 11:33

## 2020-01-28 RX ADMIN — Medication 75 MILLIGRAM(S): at 05:11

## 2020-01-28 RX ADMIN — OXYCODONE AND ACETAMINOPHEN 1 TABLET(S): 5; 325 TABLET ORAL at 01:00

## 2020-01-28 NOTE — PROGRESS NOTE ADULT - ASSESSMENT
73 y/o M PMH afib on coumadin, HTN, HLD, DM, hx of DVT presents with cough, SOB. In ER, found to be Flu+ and in rapid afib.     Flu A  -cont tamiflu x5 days  -IVF  -blood cx neg to date  -no PNA on CT chest  -ID recs appreciated    Rapid afib  -likely exacerbated by fever, flu  -resume coumadin, f/u INR  -cardio recs appreciated    UTI  -urine cx pos for Klebsiella pneumonia, UA neg  -stop abx as per ID    SOB  -likely 2/2 Flu with ?asthma or COPD  -Pulm recs appreciated  -cont nebs  -outpt PFTs  -start prednisone    HTN  -cont metoprolol increased to 75mg daily as per cardio    DM  -cont ISS  -HbA1c 7.3    Anemia  -microcytic  -f/u FOBT  -heme/onc recs appreciated  -Venofer 200mg every other day x3 doses    Dispo: home likely 24hr

## 2020-01-28 NOTE — PROGRESS NOTE ADULT - SUBJECTIVE AND OBJECTIVE BOX
ANIA CRISTOBAL    [id number]    74y      Male    CC: SOB    INTERVAL HPI/OVERNIGHT EVENTS: Pt seen and examined. Feeling better than yesterday.     REVIEW OF SYSTEMS:    CONSTITUTIONAL: No fever, weight loss, or fatigue  RESPIRATORY: No wheezing, hemoptysis; No shortness of breath  CARDIOVASCULAR: No chest pain, palpitations  GASTROINTESTINAL: No abdominal or epigastric pain. No nausea, vomiting  NEUROLOGICAL: No headaches, memory loss, loss of strength.    Vital Signs Last 24 Hrs  T(C): 36.7 (28 Jan 2020 17:30), Max: 36.9 (28 Jan 2020 00:54)  T(F): 98.1 (28 Jan 2020 17:30), Max: 98.4 (28 Jan 2020 00:54)  HR: 86 (28 Jan 2020 17:30) (79 - 88)  BP: 163/92 (28 Jan 2020 17:30) (127/85 - 163/92)  BP(mean): --  RR: 18 (28 Jan 2020 17:30) (18 - 78)  SpO2: 98% (28 Jan 2020 17:30) (93% - 98%)    PHYSICAL EXAM:    GENERAL: NAD  HEENT: PERRL, +EOMI  NECK: soft, supple  CHEST/LUNG: improved aeration  HEART: S1S2+, Regular rate and rhythm; No murmurs, rubs, or gallops  ABDOMEN: Soft, Nontender, Nondistended; Bowel sounds present  SKIN: No rashes or lesions  NEURO: AAOX3, non-focal    LABS:                        9.3    4.24  )-----------( 245      ( 28 Jan 2020 06:44 )             35.3     01-28    140  |  102  |  13.0  ----------------------------<  140<H>  4.3   |  27.0  |  0.65    Ca    8.5<L>      28 Jan 2020 06:44  Mg     2.0     01-28      PT/INR - ( 28 Jan 2020 06:44 )   PT: 20.1 sec;   INR: 1.72 ratio           MEDICATIONS  (STANDING):  albuterol/ipratropium for Nebulization 3 milliLiter(s) Nebulizer every 6 hours  atorvastatin 20 milliGRAM(s) Oral at bedtime  dextrose 5%. 1000 milliLiter(s) (50 mL/Hr) IV Continuous <Continuous>  dextrose 50% Injectable 12.5 Gram(s) IV Push once  finasteride 5 milliGRAM(s) Oral daily  insulin lispro (HumaLOG) corrective regimen sliding scale   SubCutaneous three times a day before meals  iron sucrose IVPB 200 milliGRAM(s) IV Intermittent every 48 hours  metoprolol succinate ER 75 milliGRAM(s) Oral daily  pantoprazole    Tablet 40 milliGRAM(s) Oral before breakfast  predniSONE   Tablet 60 milliGRAM(s) Oral daily  sodium chloride 0.9%. 1000 milliLiter(s) (125 mL/Hr) IV Continuous <Continuous>  warfarin 10 milliGRAM(s) Oral once    MEDICATIONS  (PRN):  acetaminophen   Tablet .. 650 milliGRAM(s) Oral every 6 hours PRN Temp greater or equal to 38C (100.4F), Mild Pain (1 - 3)  ALPRAZolam 0.25 milliGRAM(s) Oral two times a day PRN anxiety  dextrose 40% Gel 15 Gram(s) Oral once PRN Blood Glucose LESS THAN 70 milliGRAM(s)/deciliter  glucagon  Injectable 1 milliGRAM(s) IntraMuscular once PRN Glucose LESS THAN 70 milligrams/deciliter  loperamide 2 milliGRAM(s) Oral three times a day PRN Diarrhea      RADIOLOGY & ADDITIONAL TESTS:

## 2020-01-29 ENCOUNTER — TRANSCRIPTION ENCOUNTER (OUTPATIENT)
Age: 75
End: 2020-01-29

## 2020-01-29 VITALS
DIASTOLIC BLOOD PRESSURE: 74 MMHG | OXYGEN SATURATION: 96 % | RESPIRATION RATE: 18 BRPM | TEMPERATURE: 98 F | HEART RATE: 87 BPM | SYSTOLIC BLOOD PRESSURE: 155 MMHG

## 2020-01-29 LAB
ANION GAP SERPL CALC-SCNC: 12 MMOL/L — SIGNIFICANT CHANGE UP (ref 5–17)
BUN SERPL-MCNC: 16 MG/DL — SIGNIFICANT CHANGE UP (ref 8–20)
CALCIUM SERPL-MCNC: 8.8 MG/DL — SIGNIFICANT CHANGE UP (ref 8.6–10.2)
CHLORIDE SERPL-SCNC: 102 MMOL/L — SIGNIFICANT CHANGE UP (ref 98–107)
CO2 SERPL-SCNC: 28 MMOL/L — SIGNIFICANT CHANGE UP (ref 22–29)
CREAT SERPL-MCNC: 0.68 MG/DL — SIGNIFICANT CHANGE UP (ref 0.5–1.3)
CULTURE RESULTS: SIGNIFICANT CHANGE UP
CULTURE RESULTS: SIGNIFICANT CHANGE UP
GLUCOSE BLDC GLUCOMTR-MCNC: 190 MG/DL — HIGH (ref 70–99)
GLUCOSE BLDC GLUCOMTR-MCNC: 263 MG/DL — HIGH (ref 70–99)
GLUCOSE SERPL-MCNC: 149 MG/DL — HIGH (ref 70–99)
HCT VFR BLD CALC: 36.3 % — LOW (ref 39–50)
HGB BLD-MCNC: 9.6 G/DL — LOW (ref 13–17)
INR BLD: 2.18 RATIO — HIGH (ref 0.88–1.16)
MAGNESIUM SERPL-MCNC: 2 MG/DL — SIGNIFICANT CHANGE UP (ref 1.6–2.6)
MCHC RBC-ENTMCNC: 18.4 PG — LOW (ref 27–34)
MCHC RBC-ENTMCNC: 26.4 GM/DL — LOW (ref 32–36)
MCV RBC AUTO: 69.5 FL — LOW (ref 80–100)
PLATELET # BLD AUTO: 277 K/UL — SIGNIFICANT CHANGE UP (ref 150–400)
POTASSIUM SERPL-MCNC: 3.8 MMOL/L — SIGNIFICANT CHANGE UP (ref 3.5–5.3)
POTASSIUM SERPL-SCNC: 3.8 MMOL/L — SIGNIFICANT CHANGE UP (ref 3.5–5.3)
PROTHROM AB SERPL-ACNC: 25.7 SEC — HIGH (ref 10–12.9)
RBC # BLD: 5.22 M/UL — SIGNIFICANT CHANGE UP (ref 4.2–5.8)
RBC # FLD: 24.7 % — HIGH (ref 10.3–14.5)
SODIUM SERPL-SCNC: 142 MMOL/L — SIGNIFICANT CHANGE UP (ref 135–145)
SPECIMEN SOURCE: SIGNIFICANT CHANGE UP
SPECIMEN SOURCE: SIGNIFICANT CHANGE UP
WBC # BLD: 4.5 K/UL — SIGNIFICANT CHANGE UP (ref 3.8–10.5)
WBC # FLD AUTO: 4.5 K/UL — SIGNIFICANT CHANGE UP (ref 3.8–10.5)

## 2020-01-29 PROCEDURE — 80048 BASIC METABOLIC PNL TOTAL CA: CPT

## 2020-01-29 PROCEDURE — 99285 EMERGENCY DEPT VISIT HI MDM: CPT | Mod: 25

## 2020-01-29 PROCEDURE — 83735 ASSAY OF MAGNESIUM: CPT

## 2020-01-29 PROCEDURE — 36415 COLL VENOUS BLD VENIPUNCTURE: CPT

## 2020-01-29 PROCEDURE — 81001 URINALYSIS AUTO W/SCOPE: CPT

## 2020-01-29 PROCEDURE — 83036 HEMOGLOBIN GLYCOSYLATED A1C: CPT

## 2020-01-29 PROCEDURE — 99239 HOSP IP/OBS DSCHRG MGMT >30: CPT

## 2020-01-29 PROCEDURE — 82728 ASSAY OF FERRITIN: CPT

## 2020-01-29 PROCEDURE — 87186 SC STD MICRODIL/AGAR DIL: CPT

## 2020-01-29 PROCEDURE — 94640 AIRWAY INHALATION TREATMENT: CPT

## 2020-01-29 PROCEDURE — 87086 URINE CULTURE/COLONY COUNT: CPT

## 2020-01-29 PROCEDURE — 83880 ASSAY OF NATRIURETIC PEPTIDE: CPT

## 2020-01-29 PROCEDURE — 87040 BLOOD CULTURE FOR BACTERIA: CPT

## 2020-01-29 PROCEDURE — 85027 COMPLETE CBC AUTOMATED: CPT

## 2020-01-29 PROCEDURE — 86803 HEPATITIS C AB TEST: CPT

## 2020-01-29 PROCEDURE — 94760 N-INVAS EAR/PLS OXIMETRY 1: CPT

## 2020-01-29 PROCEDURE — 80053 COMPREHEN METABOLIC PANEL: CPT

## 2020-01-29 PROCEDURE — 87631 RESP VIRUS 3-5 TARGETS: CPT

## 2020-01-29 PROCEDURE — 83540 ASSAY OF IRON: CPT

## 2020-01-29 PROCEDURE — 83550 IRON BINDING TEST: CPT

## 2020-01-29 PROCEDURE — 85610 PROTHROMBIN TIME: CPT

## 2020-01-29 PROCEDURE — 80061 LIPID PANEL: CPT

## 2020-01-29 PROCEDURE — 85730 THROMBOPLASTIN TIME PARTIAL: CPT

## 2020-01-29 PROCEDURE — 83605 ASSAY OF LACTIC ACID: CPT

## 2020-01-29 PROCEDURE — 93005 ELECTROCARDIOGRAM TRACING: CPT

## 2020-01-29 PROCEDURE — 71045 X-RAY EXAM CHEST 1 VIEW: CPT

## 2020-01-29 PROCEDURE — 84466 ASSAY OF TRANSFERRIN: CPT

## 2020-01-29 PROCEDURE — 82962 GLUCOSE BLOOD TEST: CPT

## 2020-01-29 PROCEDURE — 93306 TTE W/DOPPLER COMPLETE: CPT

## 2020-01-29 PROCEDURE — 96365 THER/PROPH/DIAG IV INF INIT: CPT

## 2020-01-29 PROCEDURE — 71250 CT THORAX DX C-: CPT

## 2020-01-29 RX ORDER — METOPROLOL TARTRATE 50 MG
3 TABLET ORAL
Qty: 90 | Refills: 0
Start: 2020-01-29 | End: 2020-02-27

## 2020-01-29 RX ORDER — ALPRAZOLAM 0.25 MG
1 TABLET ORAL
Qty: 0 | Refills: 0 | DISCHARGE
Start: 2020-01-29

## 2020-01-29 RX ORDER — METOPROLOL TARTRATE 50 MG
3 TABLET ORAL
Qty: 0 | Refills: 0 | DISCHARGE
Start: 2020-01-29

## 2020-01-29 RX ORDER — WARFARIN SODIUM 2.5 MG/1
1 TABLET ORAL
Qty: 0 | Refills: 0 | DISCHARGE
Start: 2020-01-29

## 2020-01-29 RX ORDER — FINASTERIDE 5 MG/1
1 TABLET, FILM COATED ORAL
Qty: 0 | Refills: 0 | DISCHARGE
Start: 2020-01-29

## 2020-01-29 RX ORDER — FINASTERIDE 5 MG/1
1 TABLET, FILM COATED ORAL
Qty: 0 | Refills: 0 | DISCHARGE

## 2020-01-29 RX ADMIN — Medication 3 MILLILITER(S): at 04:20

## 2020-01-29 RX ADMIN — Medication 6: at 12:35

## 2020-01-29 RX ADMIN — Medication 0.25 MILLIGRAM(S): at 09:09

## 2020-01-29 RX ADMIN — Medication 60 MILLIGRAM(S): at 06:16

## 2020-01-29 RX ADMIN — Medication 2 MILLIGRAM(S): at 11:23

## 2020-01-29 RX ADMIN — PANTOPRAZOLE SODIUM 40 MILLIGRAM(S): 20 TABLET, DELAYED RELEASE ORAL at 09:10

## 2020-01-29 RX ADMIN — Medication 2: at 09:10

## 2020-01-29 RX ADMIN — Medication 75 MILLIGRAM(S): at 06:16

## 2020-01-29 RX ADMIN — FINASTERIDE 5 MILLIGRAM(S): 5 TABLET, FILM COATED ORAL at 09:10

## 2020-01-29 RX ADMIN — Medication 3 MILLILITER(S): at 08:31

## 2020-01-29 NOTE — DISCHARGE NOTE PROVIDER - CARE PROVIDERS DIRECT ADDRESSES
,DirectAddress_Unknown,[email or web address],DirectAddress_Unknown,[email or web address]

## 2020-01-29 NOTE — DISCHARGE NOTE PROVIDER - PROVIDER TOKENS
PROVIDER:[TOKEN:[[id number]],FOLLOWUP:[1 week]],PROVIDER:[TOKEN:[[id number]]],PROVIDER:[TOKEN:[[id number]]],PROVIDER:[TOKEN:[[id number]]]

## 2020-01-29 NOTE — DISCHARGE NOTE PROVIDER - NSDCFUADDAPPT_GEN_ALL_CORE_FT
Please follow up with primary care doctor within 1 week of discharge. Please bring all discharge paperwork with you to appointment.   Please follow up with pulmonology, cardiology, hematology as discussed.

## 2020-01-29 NOTE — DISCHARGE NOTE PROVIDER - NSDCCPCAREPLAN_GEN_ALL_CORE_FT
PRINCIPAL DISCHARGE DIAGNOSIS  Diagnosis: Atrial fibrillation with RVR  Assessment and Plan of Treatment:       SECONDARY DISCHARGE DIAGNOSES  Diagnosis: Influenza  Assessment and Plan of Treatment:     Diagnosis: Hypoxia  Assessment and Plan of Treatment:

## 2020-01-29 NOTE — DISCHARGE NOTE PROVIDER - NSDCFUADDINST_GEN_ALL_CORE_FT
TTE Summary:   1. Endocardial visualization was enhanced with intravenous echo contrast.   2. There is mild concentric left ventricular hypertrophy.   3. Left ventricular ejection fraction, by visual estimation, 45-50%. Grade II diastolic dysfunction.   4. The right ventricular size is mildly enlarged. RV systolic function is normal.   5. Mild aortic regurgitation.   6. Based on body surface area, moderate to severe aortic valve stenosis.   7. There is no evidence of pericardial effusion.    CT chest: IMPRESSION:   No focal consolidation or pleural effusion.  There is a 6 mm nodule in the right middle lobe. 6-12 month follow-up chest CT is recommended.

## 2020-01-29 NOTE — DISCHARGE NOTE NURSING/CASE MANAGEMENT/SOCIAL WORK - PATIENT PORTAL LINK FT
You can access the FollowMyHealth Patient Portal offered by [facility] by registering at the following website: [email or web address]. By joining [employer]’s FollowMyHealth portal, you will also be able to view your health information using other applications (apps) compatible with our system.

## 2020-01-29 NOTE — DISCHARGE NOTE NURSING/CASE MANAGEMENT/SOCIAL WORK - NSDCFUADDAPPT_GEN_ALL_CORE_FT
Please follow up with primary care doctor within 1 week of discharge. Please bring all discharge paperwork with you to appointment.   Please follow up with pulmonology, cardiology, hematology as discussed. Please follow up with primary care doctor within 1 week of discharge. Please bring all discharge paperwork with you to appointment.   Please follow up with pulmonology, cardiology, hematology as discussed.    [facility] LAB DRAWS TO RESUME AS PER PRIOR TO ADMISSION, WEEKLY ON MONDAYS.

## 2020-01-29 NOTE — DISCHARGE NOTE PROVIDER - HOSPITAL COURSE
74 year old male with past medical history significant for Atrial fibrillation; on coumadin, diabetes; on metformin, Hyperlipidemia, schwannoma s/p surgery 1992, and radiation in 1998, spinal surgery 7 years ago, h/o DVT presenting to the ED c/o worsening shortness of breath for the past few days. Pt states that he saw his pmd Dr. Ang and was started on z-pack. Pt states that his cough and congestion got worse and came to the er. In the ed found to be flu + and in rapid afib. Seen by cardiology, pulmonology and ID. Metoprolol increased to 75mg daily for BP and HR control. Completed course of tamiflu. Blood cx negative. Urine cx with [id number] CFU Klebsiella pneumoniae. As per ID, neg UA, no abx needed. CT chest without pneumonia.         Flu A    -cont tamiflu x5 days    -IVF    -blood cx neg to date    -no PNA on CT chest    -ID recs appreciated        Rapid afib    -likely exacerbated by fever, flu    -resume coumadin, f/u INR    -cardio recs appreciated        UTI    -urine cx pos for Klebsiella pneumonia, UA neg    -no abx needed as per ID        SOB    -likely 2/2 Flu with ?asthma or COPD    -Pulm recs appreciated    -cont nebs    -outpt PFTs    -prednisone taper        HTN    -cont metoprolol increased to 75mg daily as per cardio        DM    -cont ISS    -HbA1c 7.3        Anemia    -microcytic    -f/u FOBT    -heme/onc recs appreciated    -received 2 doses Venofer, to f/u heme/onc outpt        Vital Signs Last 24 Hrs    T(C): 36.8 (29 Jan 2020 06:12), Max: 36.8 (29 Jan 2020 06:12)    T(F): 98.2 (29 Jan 2020 06:12), Max: 98.2 (29 Jan 2020 06:12)    HR: 82 (29 Jan 2020 06:12) (80 - 86)    BP: 152/71 (29 Jan 2020 06:12) (152/71 - 163/92)    BP(mean): --    RR: 18 (29 Jan 2020 06:12) (18 - 18)    SpO2: 98% (29 Jan 2020 06:12) (92% - 98%)        GENERAL: NAD    HEENT: PERRL, +EOMI    NECK: soft, supple    CHEST/LUNG: improved aeration    HEART: S1S2+, Regular rate and rhythm; No murmurs, rubs, or gallops    ABDOMEN: Soft, Nontender, Nondistended; Bowel sounds present    SKIN: No rashes or lesions    NEURO: AAOX3, non-focal        35minutes spent on discharge 74 year old male with past medical history significant for Atrial fibrillation; on coumadin, diabetes; on metformin, Hyperlipidemia, schwannoma s/p surgery 1992, and radiation in 1998, spinal surgery 7 years ago, h/o DVT presenting to the ED c/o worsening shortness of breath for the past few days. Pt states that he saw his pmd Dr. Ang and was started on z-pack. Pt states that his cough and congestion got worse and came to the er. In the ed found to be flu + and in rapid afib. Seen by cardiology, pulmonology and ID. Metoprolol increased to 75mg daily for BP and HR control. Completed course of tamiflu. Blood cx negative. Urine cx with [id number] CFU Klebsiella pneumoniae. As per ID, neg UA, no abx needed. CT chest without pneumonia. Incidental RML 6mm nodule seen on CT. Discussed with pt, need for f/u within 6-12 months.         Flu A    -cont tamiflu x5 days    -IVF    -blood cx neg to date    -no PNA on CT chest    -ID recs appreciated        Rapid afib    -likely exacerbated by fever, flu    -resume coumadin, f/u INR    -cardio recs appreciated        UTI    -urine cx pos for Klebsiella pneumonia, UA neg    -no abx needed as per ID        SOB    -likely 2/2 Flu with ?asthma or COPD    -Pulm recs appreciated    -cont nebs    -outpt PFTs    -prednisone taper        HTN    -cont metoprolol 75mg daily as per cardio        DM    -cont ISS    -HbA1c 7.3        Anemia    -microcytic    -f/u FOBT    -heme/onc recs appreciated    -received 2 doses Venofer, to f/u heme/onc outpt        Vital Signs Last 24 Hrs    T(C): 36.8 (29 Jan 2020 06:12), Max: 36.8 (29 Jan 2020 06:12)    T(F): 98.2 (29 Jan 2020 06:12), Max: 98.2 (29 Jan 2020 06:12)    HR: 82 (29 Jan 2020 06:12) (80 - 86)    BP: 152/71 (29 Jan 2020 06:12) (152/71 - 163/92)    BP(mean): --    RR: 18 (29 Jan 2020 06:12) (18 - 18)    SpO2: 98% (29 Jan 2020 06:12) (92% - 98%)        GENERAL: NAD    HEENT: PERRL, +EOMI    NECK: soft, supple    CHEST/LUNG: improved aeration b/l    HEART: S1S2+, Regular rate and rhythm; No murmurs, rubs, or gallops    ABDOMEN: Soft, Nontender, Nondistended; Bowel sounds present    SKIN: No rashes or lesions    NEURO: AAOX3, non-focal        35minutes spent on discharge

## 2020-01-29 NOTE — DISCHARGE NOTE PROVIDER - NSDCMRMEDTOKEN_GEN_ALL_CORE_FT
alfuzosin 10 mg oral tablet, extended release: 1 tab(s) orally once a day  ALPRAZolam 0.25 mg oral tablet: 1 tab(s) orally 2 times a day, As needed, anxiety  Coumadin 10 mg oral tablet: 1 tab(s) orally once a day  Crestor 5 mg oral tablet: 1 tab(s) orally once a day  finasteride 5 mg oral tablet: 1 tab(s) orally once a day  fluticasone 50 mcg inhalation powder: 1 puff(s) inhaled 2 times a day, As Needed  Lasix:   metFORMIN 500 mg oral tablet:  orally   metoprolol succinate 25 mg oral tablet, extended release: 3 tab(s) orally once a day  NexIUM 40 mg oral delayed release capsule: 1 cap(s) orally once a day  predniSONE 10 mg oral tablet: 5 tab(s) oral - once a day x 3 days  then 4 tabs daily x3 days  then 3 tabs x3 days  then 2 tabs x3 days  then 1 tab x3 days alfuzosin 10 mg oral tablet, extended release: 1 tab(s) orally once a day  ALPRAZolam 0.25 mg oral tablet: 1 tab(s) orally 2 times a day, As needed, anxiety  Coumadin 10 mg oral tablet: 1 tab(s) orally once a day  Crestor 5 mg oral tablet: 1 tab(s) orally once a day  finasteride 5 mg oral tablet: 1 tab(s) orally once a day  fluticasone 50 mcg inhalation powder: 1 puff(s) inhaled 2 times a day, As Needed  guaiFENesin 1200 mg oral tablet, extended release: 1 tab(s) orally every 12 hours, As needed, Cough  Lasix:   metFORMIN 500 mg oral tablet:  orally   metoprolol succinate 25 mg oral tablet, extended release: 3 tab(s) orally once a day  NexIUM 40 mg oral delayed release capsule: 1 cap(s) orally once a day  predniSONE 10 mg oral tablet: 5 tab(s) oral - once a day x 3 days  then 4 tabs daily x3 days  then 3 tabs x3 days  then 2 tabs x3 days  then 1 tab x3 days

## 2020-01-29 NOTE — DISCHARGE NOTE PROVIDER - CARE PROVIDER_API CALL
Brian Ang)  Internal Medicine  [address] [place] [address]  Phone: [phone]  Fax: [phone]  Follow Up Time: 1 week    Vish Wilde)  Critical Care Medicine; HospicePalliative Medicine; Pulmonary Disease; Sleep Medicine  [address], [address]  [place], [place] [id number]  Phone: [phone]  Fax: [phone]  Follow Up Time:     Laura Elias)  Cardiovascular Disease; Internal Medicine  [address], [address]  [place] [address]  Phone: [phone]  Fax: [phone]  Follow Up Time:     Ba Zapien)  HematologyOncology; HospicePalliative Medicine; Internal Medicine  [address] [place] [address]  Phone: [phone]  Fax: [phone]  Follow Up Time:

## 2020-01-30 ENCOUNTER — EMERGENCY (EMERGENCY)
Facility: HOSPITAL | Age: 75
LOS: 1 days | Discharge: HOME | End: 2020-01-30
Attending: EMERGENCY MEDICINE
Payer: MEDICARE

## 2020-01-30 VITALS
RESPIRATION RATE: 18 BRPM | OXYGEN SATURATION: 95 % | DIASTOLIC BLOOD PRESSURE: 79 MMHG | SYSTOLIC BLOOD PRESSURE: 131 MMHG | TEMPERATURE: 99 F | HEART RATE: 98 BPM

## 2020-01-30 VITALS
OXYGEN SATURATION: 92 % | HEART RATE: 79 BPM | WEIGHT: 300.05 LBS | DIASTOLIC BLOOD PRESSURE: 65 MMHG | TEMPERATURE: 99 F | HEIGHT: 74 IN | RESPIRATION RATE: 18 BRPM | SYSTOLIC BLOOD PRESSURE: 112 MMHG

## 2020-01-30 DIAGNOSIS — Z98.89 OTHER SPECIFIED POSTPROCEDURAL STATES: Chronic | ICD-10-CM

## 2020-01-30 DIAGNOSIS — C49.9 MALIGNANT NEOPLASM OF CONNECTIVE AND SOFT TISSUE, UNSPECIFIED: Chronic | ICD-10-CM

## 2020-01-30 LAB
ALBUMIN SERPL ELPH-MCNC: 4.1 G/DL — SIGNIFICANT CHANGE UP (ref 3.3–5.2)
ALP SERPL-CCNC: 118 U/L — SIGNIFICANT CHANGE UP (ref 40–120)
ALT FLD-CCNC: 20 U/L — SIGNIFICANT CHANGE UP
ANION GAP SERPL CALC-SCNC: 13 MMOL/L — SIGNIFICANT CHANGE UP (ref 5–17)
AST SERPL-CCNC: 19 U/L — SIGNIFICANT CHANGE UP
BASOPHILS # BLD AUTO: 0.01 K/UL — SIGNIFICANT CHANGE UP (ref 0–0.2)
BASOPHILS NFR BLD AUTO: 0.2 % — SIGNIFICANT CHANGE UP (ref 0–2)
BILIRUB SERPL-MCNC: 0.5 MG/DL — SIGNIFICANT CHANGE UP (ref 0.4–2)
BUN SERPL-MCNC: 16 MG/DL — SIGNIFICANT CHANGE UP (ref 8–20)
CALCIUM SERPL-MCNC: 9.2 MG/DL — SIGNIFICANT CHANGE UP (ref 8.6–10.2)
CHLORIDE SERPL-SCNC: 100 MMOL/L — SIGNIFICANT CHANGE UP (ref 98–107)
CO2 SERPL-SCNC: 25 MMOL/L — SIGNIFICANT CHANGE UP (ref 22–29)
CREAT SERPL-MCNC: 0.57 MG/DL — SIGNIFICANT CHANGE UP (ref 0.5–1.3)
EOSINOPHIL # BLD AUTO: 0 K/UL — SIGNIFICANT CHANGE UP (ref 0–0.5)
EOSINOPHIL NFR BLD AUTO: 0 % — SIGNIFICANT CHANGE UP (ref 0–6)
GLUCOSE SERPL-MCNC: 207 MG/DL — HIGH (ref 70–99)
HCT VFR BLD CALC: 36.6 % — LOW (ref 39–50)
HGB BLD-MCNC: 9.7 G/DL — LOW (ref 13–17)
IMM GRANULOCYTES NFR BLD AUTO: 0.6 % — SIGNIFICANT CHANGE UP (ref 0–1.5)
LYMPHOCYTES # BLD AUTO: 0.41 K/UL — LOW (ref 1–3.3)
LYMPHOCYTES # BLD AUTO: 8.3 % — LOW (ref 13–44)
MCHC RBC-ENTMCNC: 18.3 PG — LOW (ref 27–34)
MCHC RBC-ENTMCNC: 26.5 GM/DL — LOW (ref 32–36)
MCV RBC AUTO: 69.1 FL — LOW (ref 80–100)
MONOCYTES # BLD AUTO: 0.12 K/UL — SIGNIFICANT CHANGE UP (ref 0–0.9)
MONOCYTES NFR BLD AUTO: 2.4 % — SIGNIFICANT CHANGE UP (ref 2–14)
NEUTROPHILS # BLD AUTO: 4.37 K/UL — SIGNIFICANT CHANGE UP (ref 1.8–7.4)
NEUTROPHILS NFR BLD AUTO: 88.5 % — HIGH (ref 43–77)
NT-PROBNP SERPL-SCNC: 843 PG/ML — HIGH (ref 0–300)
PLATELET # BLD AUTO: 304 K/UL — SIGNIFICANT CHANGE UP (ref 150–400)
POTASSIUM SERPL-MCNC: 4.1 MMOL/L — SIGNIFICANT CHANGE UP (ref 3.5–5.3)
POTASSIUM SERPL-SCNC: 4.1 MMOL/L — SIGNIFICANT CHANGE UP (ref 3.5–5.3)
PROT SERPL-MCNC: 7.3 G/DL — SIGNIFICANT CHANGE UP (ref 6.6–8.7)
RBC # BLD: 5.3 M/UL — SIGNIFICANT CHANGE UP (ref 4.2–5.8)
RBC # FLD: 25.1 % — HIGH (ref 10.3–14.5)
SODIUM SERPL-SCNC: 138 MMOL/L — SIGNIFICANT CHANGE UP (ref 135–145)
TROPONIN T SERPL-MCNC: <0.01 NG/ML — SIGNIFICANT CHANGE UP (ref 0–0.06)
WBC # BLD: 4.94 K/UL — SIGNIFICANT CHANGE UP (ref 3.8–10.5)
WBC # FLD AUTO: 4.94 K/UL — SIGNIFICANT CHANGE UP (ref 3.8–10.5)

## 2020-01-30 PROCEDURE — 84484 ASSAY OF TROPONIN QUANT: CPT

## 2020-01-30 PROCEDURE — 36415 COLL VENOUS BLD VENIPUNCTURE: CPT

## 2020-01-30 PROCEDURE — 80053 COMPREHEN METABOLIC PANEL: CPT

## 2020-01-30 PROCEDURE — 85027 COMPLETE CBC AUTOMATED: CPT

## 2020-01-30 PROCEDURE — 83880 ASSAY OF NATRIURETIC PEPTIDE: CPT

## 2020-01-30 PROCEDURE — 99284 EMERGENCY DEPT VISIT MOD MDM: CPT

## 2020-01-30 PROCEDURE — 71046 X-RAY EXAM CHEST 2 VIEWS: CPT | Mod: 26

## 2020-01-30 PROCEDURE — 99284 EMERGENCY DEPT VISIT MOD MDM: CPT | Mod: 25

## 2020-01-30 PROCEDURE — 71046 X-RAY EXAM CHEST 2 VIEWS: CPT

## 2020-01-30 PROCEDURE — 93010 ELECTROCARDIOGRAM REPORT: CPT

## 2020-01-30 PROCEDURE — 96374 THER/PROPH/DIAG INJ IV PUSH: CPT

## 2020-01-30 PROCEDURE — 93005 ELECTROCARDIOGRAM TRACING: CPT

## 2020-01-30 RX ORDER — FUROSEMIDE 40 MG
1 TABLET ORAL
Qty: 14 | Refills: 0
Start: 2020-01-30 | End: 2020-02-12

## 2020-01-30 RX ORDER — FUROSEMIDE 40 MG
40 TABLET ORAL ONCE
Refills: 0 | Status: COMPLETED | OUTPATIENT
Start: 2020-01-30 | End: 2020-01-30

## 2020-01-30 RX ADMIN — Medication 40 MILLIGRAM(S): at 15:56

## 2020-01-30 NOTE — ED PROVIDER NOTE - PATIENT PORTAL LINK FT
You can access the FollowMyHealth Patient Portal offered by [facility] by registering at the following website: [email or web address]. By joining [employer]’s FollowMyHealth portal, you will also be able to view your health information using other applications (apps) compatible with our system.

## 2020-01-30 NOTE — ED PROVIDER NOTE - NSFOLLOWUPINSTRUCTIONS_ED_ALL_ED_FT
- Follow up with your doctor within 2-3 days.  - Follow up with [facility], call in the morning to make an appointment.   - Bring results with you to the appointment.   - Take Lasix 20mg once per day.   - Return to the ED for any new or worsening symptoms.     Congestive Heart Failure (CHF)    Congestive heart failure is a chronic condition in which the heart has trouble pumping blood. In some cases of heart failure, fluid may back up into your lungs or you may have swelling (edema) in your lower legs. There are many causes of heart failure including high blood pressure, coronary artery disease, abnormal heart valves, heart muscle disease, lung disease, diabetes, etc. Symptoms include shortness of breath with activity or when lying flat, cough, swelling of the legs, fatigue, or increased urination during the night.     Treatment is aimed at managing the symptoms of heart failure and may include lifestyle changes, medications, or surgical procedures. Take medicines only as directed by your health care provider and do not stop unless instructed to do so. Eat heart-healthy foods with low or no trans/saturated fats, cholesterol and salt. Weigh yourself every day for early recognition of fluid accumulation.    SEEK IMMEDIATE MEDICAL CARE IF YOU HAVE ANY OF THE FOLLOWING SYMPTOMS: shortness of breath, change in mental status, chest pain, lightheadedness/dizziness/fainting, or worsening of symptoms including not being able to conduct normal physical activity.

## 2020-01-30 NOTE — ED ADULT NURSE NOTE - OBJECTIVE STATEMENT
pt A&Ox4, pt was discharged yesterday from [facility] went home went to bed laid down and started to feel "bubbling coming up and felt uncomfortable and a little SOB" today pt went to clinic and was sent to ED, pt has bilat pedal edema, resp even and unlabored no distress noted, abd obese soft NTND, pt denies fever/chills, chest pain, ha, dizziness, abd pain n/v

## 2020-01-30 NOTE — ED PROVIDER NOTE - CARE PROVIDER_API CALL
Laura Elias)  Cardiovascular Disease; Internal Medicine  [address], [address]  [place] [address]  Phone: [phone]  Fax: [phone]  Follow Up Time: 4-6 Days

## 2020-01-30 NOTE — ED ADULT NURSE NOTE - NS ED PATIENT SAFETY CONCERN
Therapeutic Phlebotomy, Care After  Refer to this sheet in the next few weeks. These instructions provide you with information about caring for yourself after your procedure. Your health care provider may also give you more specific instructions. Your treatment has been planned according to current medical practices, but problems sometimes occur. Call your health care provider if you have any problems or questions after your procedure.  WHAT TO EXPECT AFTER THE PROCEDURE  After your procedure, it is common to have:  · Light-headedness or dizziness. You may feel faint.  · Nausea.  · Tiredness.  HOME CARE INSTRUCTIONS  Activities  · Return to your normal activities as directed by your health care provider. Most people can go back to their normal activities right away.  · Avoid strenuous physical activity and heavy lifting or pulling for about 5 hours after the procedure. Do not lift anything that is heavier than 10 lb (4.5 kg).  · Athletes should avoid strenuous exercise for at least 12 hours.  · Change positions slowly for the remainder of the day. This will help to prevent light-headedness or fainting.  · If you feel light-headed, lie down until the feeling goes away.  Eating and Drinking  · Be sure to eat well-balanced meals for the next 24 hours.  · Drink enough fluid to keep your urine clear or pale yellow.  · Avoid drinking alcohol on the day that you had the procedure.  Care of the Needle Insertion Site  · Keep your bandage dry. You can remove the bandage after about 5 hours or as directed by your health care provider.  · If you have bleeding from the needle insertion site, elevate your arm and press firmly on the site until the bleeding stops.  · If you have bruising at the site, apply ice to the area:    Put ice in a plastic bag.    Place a towel between your skin and the bag.    Leave the ice on for 20 minutes, 2-3 times a day for the first 24 hours.  · If the swelling does not go away after 24 hours, apply  a warm, moist washcloth to the area for 20 minutes, 2-3 times a day.  General Instructions  · Avoid smoking for at least 30 minutes after the procedure.  · Keep all follow-up visits as directed by your health care provider. It is important to continue with further therapeutic phlebotomy treatments as directed.  SEEK MEDICAL CARE IF:  · You have redness, swelling, or pain at the needle insertion site.  · You have fluid, blood, or pus coming from the needle insertion site.  · You feel light-headed, dizzy, or nauseated, and the feeling does not go away.  · You notice new bruising at the needle insertion site.  · You feel weaker than normal.  · You have a fever or chills.  SEEK IMMEDIATE MEDICAL CARE IF:  · You have severe nausea or vomiting.  · You have chest pain.  · You have trouble breathing.     This information is not intended to replace advice given to you by your health care provider. Make sure you discuss any questions you have with your health care provider.     Document Released: 05/21/2012 Document Revised: 05/03/2016 Document Reviewed: 12/14/2015  [employer] Interactive Patient Education ©2017 [employer]     No

## 2020-01-30 NOTE — ED PROVIDER NOTE - PHYSICAL EXAMINATION
Gen: Well appearing in NAD  Head: NC/AT  Neck: trachea midline  Resp:  CTAB  CV: RRR  GI: soft, NTND  Ext: Bilateral 2+ pitting edema to LE. no calf ttp.   Neuro:  A&O appears non focal  Skin:  Warm and dry as visualized  Psych:  Normal affect and mood

## 2020-01-30 NOTE — ED PROVIDER NOTE - PROGRESS NOTE DETAILS
Butch MENDEZ: pt feeling better s/p lasix, LE edema improving. Discussed with [facility], will restart lasix at 20mg. Will call [facility] in the morning to make an appointment.

## 2020-01-30 NOTE — ED ADULT TRIAGE NOTE - CHIEF COMPLAINT QUOTE
increased sob. pt reports recently admitted to [facility] for 5 days and diagnosed with the flu. a and o x3. breathing even and unlabored on ra. sent from urgent care to r/o pna.

## 2020-01-30 NOTE — ED ADULT NURSE NOTE - CHIEF COMPLAINT QUOTE
increased sob. pt reports recently admitted to [facility] for 5 days and diagnosed with the flu. a and o x3. breathing even and unlabored on ra. sent from urgent care to r/o pna.

## 2020-01-30 NOTE — ED ADULT NURSE NOTE - NSIMPLEMENTINTERV_GEN_ALL_ED
Implemented All Fall Risk Interventions:  [place] to call system. Call bell, personal items and telephone within reach. Instruct patient to call for assistance. Room bathroom lighting operational. Non-slip footwear when patient is off stretcher. Physically safe environment: no spills, clutter or unnecessary equipment. Stretcher in lowest position, wheels locked, appropriate side rails in place. Provide visual cue, wrist band, yellow gown, etc. Monitor gait and stability. Monitor for mental status changes and reorient to person, place, and time. Review medications for side effects contributing to fall risk. Reinforce activity limits and safety measures with patient and family.

## 2020-01-30 NOTE — ED PROVIDER NOTE - OBJECTIVE STATEMENT
74M h/o afib on coumadin, HLD, DM, DVT, admitted to [facility] 1/24-29/2020 for influenza p/w orthopnea and SOB today. Pt was discharged yesterday, was sleeping in his recliner without issue, went to lie down in his bed and began feeling fluid coming up and felt SOB. +LE edema today. Called the hospital and was directed to go to clinic, from clininc was sent to ED. Denies fever, CP, palpitations, vomiting. Was on Lasix a few weeks ago from PMD, since stopped.

## 2020-01-30 NOTE — ED PROVIDER NOTE - CLINICAL SUMMARY MEDICAL DECISION MAKING FREE TEXT BOX
Pt with orthopnea and LE edema s/p discharge from hospital yesterday. No longer on lasix. Well appearing. Concern for mild CHF exacerbation.   - labs, cxr, reass

## 2020-01-31 ENCOUNTER — TRANSCRIPTION ENCOUNTER (OUTPATIENT)
Age: 75
End: 2020-01-31

## 2020-02-04 ENCOUNTER — OUTPATIENT (OUTPATIENT)
Dept: OUTPATIENT SERVICES | Facility: HOSPITAL | Age: 75
LOS: 1 days | Discharge: HOME | End: 2020-02-04

## 2020-02-04 DIAGNOSIS — C49.9 MALIGNANT NEOPLASM OF CONNECTIVE AND SOFT TISSUE, UNSPECIFIED: Chronic | ICD-10-CM

## 2020-02-04 DIAGNOSIS — Z98.89 OTHER SPECIFIED POSTPROCEDURAL STATES: Chronic | ICD-10-CM

## 2020-02-04 DIAGNOSIS — Z00.00 ENCOUNTER FOR GENERAL ADULT MEDICAL EXAMINATION WITHOUT ABNORMAL FINDINGS: ICD-10-CM

## 2020-02-05 NOTE — CDI QUERY NOTE - NSCDIOTHERTXTBX2_GEN_ALL_CORE_FT
Can you please specify the type Afib? Cardio documented CAF but is not a NW acceptable abbreviation or coding.    A.	Chronic Afib  B.	Other, please specify  C.	Not clinically significant    Supporting Documentations:    1/24/20 Consult Note Cardiology;  HPI: 74 M who presents with cough and not feeling well with bronchitic symptoms. Pt with fever. Pt did receive the flu shot. Flu positive in the ER. Hx of CAF.  Assessment and Plan:  CAF with mild increased VR. Likely precipitated by fever, influenza and component of dehydration due to poor po intake. Would maintain outpt cardiac medications. Hydration    Discharge Note provider:  Rapid afib  -likely exacerbated by fever, flu  -resume coumadin, f/u INR  -cardio recs appreciated

## 2020-02-07 ENCOUNTER — RESULT REVIEW (OUTPATIENT)
Age: 75
End: 2020-02-07

## 2020-02-07 ENCOUNTER — APPOINTMENT (OUTPATIENT)
Dept: HEMATOLOGY ONCOLOGY | Facility: CLINIC | Age: 75
End: 2020-02-07
Payer: MEDICARE

## 2020-02-07 VITALS
OXYGEN SATURATION: 95 % | TEMPERATURE: 98.6 F | HEIGHT: 74 IN | BODY MASS INDEX: 38.5 KG/M2 | HEART RATE: 90 BPM | SYSTOLIC BLOOD PRESSURE: 158 MMHG | WEIGHT: 300 LBS | DIASTOLIC BLOOD PRESSURE: 74 MMHG

## 2020-02-07 LAB
ACANTHOCYTES BLD QL SMEAR: SLIGHT — SIGNIFICANT CHANGE UP
ANISOCYTOSIS BLD QL: SIGNIFICANT CHANGE UP
BASOPHILS # BLD AUTO: 0 K/UL — SIGNIFICANT CHANGE UP (ref 0–0.2)
BASOPHILS NFR BLD AUTO: 0.3 % — SIGNIFICANT CHANGE UP (ref 0–2)
BURR CELLS BLD QL SMEAR: PRESENT — SIGNIFICANT CHANGE UP
DACRYOCYTES BLD QL SMEAR: SLIGHT — SIGNIFICANT CHANGE UP
ELLIPTOCYTES BLD QL SMEAR: SLIGHT — SIGNIFICANT CHANGE UP
EOSINOPHIL # BLD AUTO: 0 K/UL — SIGNIFICANT CHANGE UP (ref 0–0.5)
EOSINOPHIL NFR BLD AUTO: 0.4 % — SIGNIFICANT CHANGE UP (ref 0–6)
HCT VFR BLD CALC: 40.3 % — SIGNIFICANT CHANGE UP (ref 39–50)
HGB BLD-MCNC: 11.6 G/DL — LOW (ref 13–17)
LYMPHOCYTES # BLD AUTO: 0.7 K/UL — LOW (ref 1–3.3)
LYMPHOCYTES # BLD AUTO: 8.7 % — LOW (ref 13–44)
MACROCYTES BLD QL: SLIGHT — SIGNIFICANT CHANGE UP
MCHC RBC-ENTMCNC: 19.5 PG — LOW (ref 27–34)
MCHC RBC-ENTMCNC: 28.8 G/DL — LOW (ref 32–36)
MCV RBC AUTO: 67.7 FL — LOW (ref 80–100)
MICROCYTES BLD QL: SIGNIFICANT CHANGE UP
MONOCYTES # BLD AUTO: 0.2 K/UL — SIGNIFICANT CHANGE UP (ref 0–0.9)
MONOCYTES NFR BLD AUTO: 2.9 % — SIGNIFICANT CHANGE UP (ref 2–14)
NEUTROPHILS # BLD AUTO: 7.5 K/UL — HIGH (ref 1.8–7.4)
NEUTROPHILS NFR BLD AUTO: 87.7 % — HIGH (ref 43–77)
OVALOCYTES BLD QL SMEAR: SLIGHT — SIGNIFICANT CHANGE UP
PLAT MORPH BLD: NORMAL — SIGNIFICANT CHANGE UP
PLATELET # BLD AUTO: 369 K/UL — SIGNIFICANT CHANGE UP (ref 150–400)
POIKILOCYTOSIS BLD QL AUTO: SLIGHT — SIGNIFICANT CHANGE UP
POLYCHROMASIA BLD QL SMEAR: SLIGHT — SIGNIFICANT CHANGE UP
RBC # BLD: 5.95 M/UL — HIGH (ref 4.2–5.8)
RBC # FLD: 23 % — HIGH (ref 10.3–14.5)
RBC BLD AUTO: SIGNIFICANT CHANGE UP
SMUDGE CELLS # BLD: PRESENT — SIGNIFICANT CHANGE UP
STOMATOCYTES BLD QL SMEAR: PRESENT — SIGNIFICANT CHANGE UP
WBC # BLD: 8.6 K/UL — SIGNIFICANT CHANGE UP (ref 3.8–10.5)
WBC # FLD AUTO: 8.6 K/UL — SIGNIFICANT CHANGE UP (ref 3.8–10.5)

## 2020-02-07 PROCEDURE — 99213 OFFICE O/P EST LOW 20 MIN: CPT

## 2020-02-07 NOTE — PHYSICAL EXAM
[Normal] : RRR, normal S1S2, no murmurs, rubs, gallops [Obese] : obese [de-identified] : ambulatory walker  [de-identified] : obese abdomen

## 2020-02-07 NOTE — CONSULT LETTER
[Dear  ___] : Dear  [unfilled], [Courtesy Letter:] : I had the pleasure of seeing your patient, [unfilled], in my office today. [Sincerely,] : Sincerely, [Please see my note below.] : Please see my note below. [FreeTextEntry3] : Ba Zapien MD\par Medical Oncology/Hematology\par [facility] [place], [facility]\par \par \par [facility] [employer] [facility]\par

## 2020-02-07 NOTE — ASSESSMENT
[FreeTextEntry1] : 74 year old with A.fib on coumadin, and iron deficiency anemia.\par Labs showed %sat 4 and ferritin 29 ng/ml.  Etiology of iron deficiency anemia is unclear as he is s/p EGD/colonoscopy in 5/2019 which were negative.\par He does report long standing h/o hematuria and follows urology.\par \par CBC today shows hgb 11.6 g/dL which has improved from 9.7 in the hospital. \par \par Plan:\par Venofer 200 mg weekly x 3 weeks to complete 5 doses\par DC oral iron supplements \par Consider capsule endoscopy \par Follow up in 4 months

## 2020-02-07 NOTE — HISTORY OF PRESENT ILLNESS
[de-identified] : 74 year old male with past medical history significant for Atrial fibrillation; on coumadin, diabetes; on metformin, Hyperlipidemia, schwannoma s/p surgery 1992, and radiation in 1998, spinal surgery 7 years ago, h/o DVT was hospitalized in January 2020 at [facility] for the flu.\par He was noted to be anemic with Hgb 9.8 g/dL and MCV 68. \par He received Venofer 200 mg x 2 doses. \par \par GI is Dr. Mika Pereira \par URO is Dr. Jose Blair\par PCP is Dr. Brian Ang\par \par 1/26/2020\par %sat 4\par Ferritin 29 ng/ml [de-identified] : Feels well today, denies SOB or fatigue. \johana Has some dizziness but states he has been dealing with it for a few years. \par EGD and colonoscopy were done with Dr. Mika Pereira on 5/30/19, both normal. \par Fecal occult blood testing on 11/23/19 was negative. \johana Was on oral iron supplements 3 weeks before hospitalization. \par Reports chronic hx of slight hematuria for years. Had TURP surgery with Dr. Jose Blair this past year. \par Recently at [facility] ED on 1/30/2020 for respiratory issues, SOB and "bubbling in lungs." Chest XR showed pulmonary vascular congestion, was put on LASIKs 40 mg.

## 2020-03-04 ENCOUNTER — APPOINTMENT (OUTPATIENT)
Age: 75
End: 2020-03-04

## 2020-03-04 RX ORDER — FUROSEMIDE 40 MG
0 TABLET ORAL
Qty: 0 | Refills: 0 | DISCHARGE

## 2020-03-05 DIAGNOSIS — D50.9 IRON DEFICIENCY ANEMIA, UNSPECIFIED: ICD-10-CM

## 2020-03-07 ENCOUNTER — OUTPATIENT (OUTPATIENT)
Dept: OUTPATIENT SERVICES | Facility: HOSPITAL | Age: 75
LOS: 1 days | Discharge: HOME | End: 2020-03-07

## 2020-03-07 DIAGNOSIS — Z98.89 OTHER SPECIFIED POSTPROCEDURAL STATES: Chronic | ICD-10-CM

## 2020-03-07 DIAGNOSIS — C49.9 MALIGNANT NEOPLASM OF CONNECTIVE AND SOFT TISSUE, UNSPECIFIED: Chronic | ICD-10-CM

## 2020-03-07 DIAGNOSIS — D50.9 IRON DEFICIENCY ANEMIA, UNSPECIFIED: ICD-10-CM

## 2020-03-08 ENCOUNTER — EMERGENCY (EMERGENCY)
Facility: HOSPITAL | Age: 75
LOS: 1 days | End: 2020-03-08
Attending: EMERGENCY MEDICINE
Payer: MEDICARE

## 2020-03-08 VITALS — RESPIRATION RATE: 19 BRPM | OXYGEN SATURATION: 98 % | TEMPERATURE: 98 F | HEART RATE: 86 BPM

## 2020-03-08 VITALS — WEIGHT: 307.1 LBS | HEIGHT: 74 IN

## 2020-03-08 DIAGNOSIS — Z95.5 PRESENCE OF CORONARY ANGIOPLASTY IMPLANT AND GRAFT: Chronic | ICD-10-CM

## 2020-03-08 DIAGNOSIS — C49.9 MALIGNANT NEOPLASM OF CONNECTIVE AND SOFT TISSUE, UNSPECIFIED: Chronic | ICD-10-CM

## 2020-03-08 DIAGNOSIS — Z98.89 OTHER SPECIFIED POSTPROCEDURAL STATES: Chronic | ICD-10-CM

## 2020-03-08 LAB
ALBUMIN SERPL ELPH-MCNC: 3.8 G/DL — SIGNIFICANT CHANGE UP (ref 3.3–5.2)
ALP SERPL-CCNC: 87 U/L — SIGNIFICANT CHANGE UP (ref 40–120)
ALT FLD-CCNC: 11 U/L — SIGNIFICANT CHANGE UP
ANION GAP SERPL CALC-SCNC: 14 MMOL/L — SIGNIFICANT CHANGE UP (ref 5–17)
ANISOCYTOSIS BLD QL: SIGNIFICANT CHANGE UP
APTT BLD: 32.8 SEC — SIGNIFICANT CHANGE UP (ref 27.5–36.3)
AST SERPL-CCNC: 12 U/L — SIGNIFICANT CHANGE UP
BASOPHILS # BLD AUTO: 0.24 K/UL — HIGH (ref 0–0.2)
BASOPHILS NFR BLD AUTO: 3.5 % — HIGH (ref 0–2)
BILIRUB SERPL-MCNC: 0.2 MG/DL — LOW (ref 0.4–2)
BLD GP AB SCN SERPL QL: SIGNIFICANT CHANGE UP
BUN SERPL-MCNC: 18 MG/DL — SIGNIFICANT CHANGE UP (ref 8–20)
CALCIUM SERPL-MCNC: 8.6 MG/DL — SIGNIFICANT CHANGE UP (ref 8.6–10.2)
CHLORIDE SERPL-SCNC: 103 MMOL/L — SIGNIFICANT CHANGE UP (ref 98–107)
CK SERPL-CCNC: 62 U/L — SIGNIFICANT CHANGE UP (ref 30–200)
CO2 SERPL-SCNC: 23 MMOL/L — SIGNIFICANT CHANGE UP (ref 22–29)
CREAT SERPL-MCNC: 1 MG/DL — SIGNIFICANT CHANGE UP (ref 0.5–1.3)
DACRYOCYTES BLD QL SMEAR: SLIGHT — SIGNIFICANT CHANGE UP
ELLIPTOCYTES BLD QL SMEAR: SLIGHT — SIGNIFICANT CHANGE UP
EOSINOPHIL # BLD AUTO: 0.18 K/UL — SIGNIFICANT CHANGE UP (ref 0–0.5)
EOSINOPHIL NFR BLD AUTO: 2.6 % — SIGNIFICANT CHANGE UP (ref 0–6)
GIANT PLATELETS BLD QL SMEAR: PRESENT — SIGNIFICANT CHANGE UP
GLUCOSE SERPL-MCNC: 235 MG/DL — HIGH (ref 70–99)
HCT VFR BLD CALC: 39.2 % — SIGNIFICANT CHANGE UP (ref 39–50)
HGB BLD-MCNC: 11.2 G/DL — LOW (ref 13–17)
INR BLD: 1.44 RATIO — HIGH (ref 0.88–1.16)
LACTATE BLDV-MCNC: 1.8 MMOL/L — SIGNIFICANT CHANGE UP (ref 0.5–2)
LYMPHOCYTES # BLD AUTO: 0.77 K/UL — LOW (ref 1–3.3)
LYMPHOCYTES # BLD AUTO: 11.3 % — LOW (ref 13–44)
MANUAL SMEAR VERIFICATION: SIGNIFICANT CHANGE UP
MCHC RBC-ENTMCNC: 21.1 PG — LOW (ref 27–34)
MCHC RBC-ENTMCNC: 28.6 GM/DL — LOW (ref 32–36)
MCV RBC AUTO: 73.7 FL — LOW (ref 80–100)
MICROCYTES BLD QL: SIGNIFICANT CHANGE UP
MONOCYTES # BLD AUTO: 0.24 K/UL — SIGNIFICANT CHANGE UP (ref 0–0.9)
MONOCYTES NFR BLD AUTO: 3.5 % — SIGNIFICANT CHANGE UP (ref 2–14)
NEUTROPHILS # BLD AUTO: 5.42 K/UL — SIGNIFICANT CHANGE UP (ref 1.8–7.4)
NEUTROPHILS NFR BLD AUTO: 79.1 % — HIGH (ref 43–77)
NT-PROBNP SERPL-SCNC: 94 PG/ML — SIGNIFICANT CHANGE UP (ref 0–300)
OB PNL STL: POSITIVE
OVALOCYTES BLD QL SMEAR: SLIGHT — SIGNIFICANT CHANGE UP
PLAT MORPH BLD: NORMAL — SIGNIFICANT CHANGE UP
PLATELET # BLD AUTO: 261 K/UL — SIGNIFICANT CHANGE UP (ref 150–400)
POIKILOCYTOSIS BLD QL AUTO: SIGNIFICANT CHANGE UP
POTASSIUM SERPL-MCNC: 4.1 MMOL/L — SIGNIFICANT CHANGE UP (ref 3.5–5.3)
POTASSIUM SERPL-SCNC: 4.1 MMOL/L — SIGNIFICANT CHANGE UP (ref 3.5–5.3)
PROT SERPL-MCNC: 6.5 G/DL — LOW (ref 6.6–8.7)
PROTHROM AB SERPL-ACNC: 16.5 SEC — HIGH (ref 10–12.9)
RBC # BLD: 5.32 M/UL — SIGNIFICANT CHANGE UP (ref 4.2–5.8)
RBC # FLD: 22.3 % — HIGH (ref 10.3–14.5)
RBC BLD AUTO: ABNORMAL
SMUDGE CELLS # BLD: PRESENT — SIGNIFICANT CHANGE UP
SODIUM SERPL-SCNC: 140 MMOL/L — SIGNIFICANT CHANGE UP (ref 135–145)
TROPONIN T SERPL-MCNC: <0.01 NG/ML — SIGNIFICANT CHANGE UP (ref 0–0.06)
WBC # BLD: 6.85 K/UL — SIGNIFICANT CHANGE UP (ref 3.8–10.5)
WBC # FLD AUTO: 6.85 K/UL — SIGNIFICANT CHANGE UP (ref 3.8–10.5)

## 2020-03-08 PROCEDURE — 86850 RBC ANTIBODY SCREEN: CPT

## 2020-03-08 PROCEDURE — 71045 X-RAY EXAM CHEST 1 VIEW: CPT

## 2020-03-08 PROCEDURE — 80053 COMPREHEN METABOLIC PANEL: CPT

## 2020-03-08 PROCEDURE — 94640 AIRWAY INHALATION TREATMENT: CPT

## 2020-03-08 PROCEDURE — 82550 ASSAY OF CK (CPK): CPT

## 2020-03-08 PROCEDURE — 83880 ASSAY OF NATRIURETIC PEPTIDE: CPT

## 2020-03-08 PROCEDURE — 82272 OCCULT BLD FECES 1-3 TESTS: CPT

## 2020-03-08 PROCEDURE — 85027 COMPLETE CBC AUTOMATED: CPT

## 2020-03-08 PROCEDURE — 36415 COLL VENOUS BLD VENIPUNCTURE: CPT

## 2020-03-08 PROCEDURE — 86901 BLOOD TYPING SEROLOGIC RH(D): CPT

## 2020-03-08 PROCEDURE — 86900 BLOOD TYPING SEROLOGIC ABO: CPT

## 2020-03-08 PROCEDURE — 93010 ELECTROCARDIOGRAM REPORT: CPT

## 2020-03-08 PROCEDURE — 83605 ASSAY OF LACTIC ACID: CPT

## 2020-03-08 PROCEDURE — 93005 ELECTROCARDIOGRAM TRACING: CPT

## 2020-03-08 PROCEDURE — 99285 EMERGENCY DEPT VISIT HI MDM: CPT

## 2020-03-08 PROCEDURE — 85730 THROMBOPLASTIN TIME PARTIAL: CPT

## 2020-03-08 PROCEDURE — 84484 ASSAY OF TROPONIN QUANT: CPT

## 2020-03-08 PROCEDURE — 85610 PROTHROMBIN TIME: CPT

## 2020-03-08 PROCEDURE — 71045 X-RAY EXAM CHEST 1 VIEW: CPT | Mod: 26

## 2020-03-08 PROCEDURE — 99283 EMERGENCY DEPT VISIT LOW MDM: CPT | Mod: 25

## 2020-03-08 RX ORDER — ALBUTEROL 90 UG/1
2 AEROSOL, METERED ORAL
Qty: 1 | Refills: 0
Start: 2020-03-08 | End: 2020-04-06

## 2020-03-08 RX ORDER — PANTOPRAZOLE SODIUM 20 MG/1
40 TABLET, DELAYED RELEASE ORAL ONCE
Refills: 0 | Status: COMPLETED | OUTPATIENT
Start: 2020-03-08 | End: 2020-03-08

## 2020-03-08 RX ORDER — FLUTICASONE PROPIONATE 220 MCG
2 AEROSOL WITH ADAPTER (GRAM) INHALATION
Qty: 1 | Refills: 0
Start: 2020-03-08 | End: 2020-04-06

## 2020-03-08 RX ORDER — ALBUTEROL 90 UG/1
2.5 AEROSOL, METERED ORAL ONCE
Refills: 0 | Status: COMPLETED | OUTPATIENT
Start: 2020-03-08 | End: 2020-03-08

## 2020-03-08 RX ADMIN — ALBUTEROL 2.5 MILLIGRAM(S): 90 AEROSOL, METERED ORAL at 16:03

## 2020-03-08 NOTE — CONSULT NOTE ADULT - SUBJECTIVE AND OBJECTIVE BOX
[facility], [facility]                                          [address], [address], [place] [address]                                               PHONE: [phone]    FAX: [phone] [address], [address], [place] [address]                                       PHONE: [phone]    FAX: [phone]  *******************************************************************************    Reason for Consult: Rectal bleed with recent stents    HPI:  ANIA CRISTOBAL is a 74y Male with HTN, HLD, paroxysmal AF on coumadin, former smoker, obesity, anxiety, and CAD.  He had an abnormal nuclear stress test in setting of dyspnea and underwent cardiac cath at [facility] on 3/6/20 revealing severe proximal LAD and mid LAD stenosis.  He is s/p orbital atherectomy and PCI of the mid and LAD with MARCO ANTONIO x 2.  Today he had 2 episodes of blood per rectum while wiping after using the toilet.  Denies chest pain, palpitations, dizziness, lightheadedness, and syncope.  Denies SOB, CRUZ, orthopnea, PND, and edema.  He called the service and was very concerned, so I recommended evaluation in the ER.    PAST MEDICAL & SURGICAL HISTORY:  HLD (hyperlipidemia)  Anxiety  Afib  DVT (deep venous thrombosis): left leg  DM (diabetes mellitus)  BPH (benign prostatic hyperplasia)  Gallbladder stone without cholecystitis or obstruction  Gastroesophageal reflux disease  Prostate disease  Hyperlipidemia  Hypertension  Anxiety  S/P laminectomy  H/O arthroscopy of knee  Malignant schwannoma      Bactrim (Rash)  Ceftin (Hives)  penicillin (Anaphylaxis)  sulfa drugs (Unknown)      MEDICATIONS  (STANDING):  pantoprazole  Injectable 40 milliGRAM(s) IV Push Once    MEDICATIONS  (PRN):      Social History: no active (former) tobacco / EtOH / IVDA    Family History: No pertinent family history of CVD in parents/siblings      ROS: As noted above, otherwise unremarkable.    Vital Signs Last 24 Hrs  T(C): 36.7 (08 Mar 2020 15:50), Max: 36.7 (08 Mar 2020 15:50)  T(F): 98.1 (08 Mar 2020 15:50), Max: 98.1 (08 Mar 2020 15:50)  HR: 81 (08 Mar 2020 15:50) (81 - 81)  BP: 123/70 (08 Mar 2020 15:50) (123/70 - 123/70)  BP(mean): --  RR: 17 (08 Mar 2020 15:50) (17 - 17)  SpO2: 96% (08 Mar 2020 15:50) (96% - 96%)    I&O's Detail    I&O's Summary          PHYSICAL EXAM:  General: Appears well developed, well nourished, no acute distress  HEENT: Head: normocephalic, atraumatic  Eyes: Pupils equal and reactive  Neck: Supple, no carotid bruit, no JVD, no HJR  CARDIOVASCULAR: Normal S1 and S2, no murmur, rub, or gallop  LUNGS: Clear to auscultation bilaterally, no rales, rhonchi or wheeze  ABDOMEN: Soft, nontender, non-distended, positive bowel sounds, no mass or bruit  EXTREMITIES: No edema, distal pulses WNL  SKIN: Warm and dry with normal turgor  NEURO: Alert & oriented x 3, grossly intact  PSYCH: normal mood and affect    LABS:                    RADIOLOGY & ADDITIONAL STUDIES:    ECG:    ECHO:    STRESS TEST:    CARDIAC CATHETERIZATION:    Assessment and Plan:  In summary, ANIA CRISTOBAL is a 74y Male with past medical history significant for     - Monitor on telemetry  - Check troponins x3   - Repeat EKG  - Echocardiogram  - No evidence of ischemia or CHF clinically, eventual ischemic evaluation (likely as outpatient)  - Rhythm/hemodynamics stable = continue current doses for now and titrate PRN  - Keep K > 4, Mg > 2    We will follow with you.  Thank you for allowing me to participate in the care of your patient.      Sincerely,    Abraham Mckinney MD [facility], [facility]                                          [address], [address], [place] [address]                                               PHONE: [phone]    FAX: [phone] [address], [address], [place] [address]                                       PHONE: [phone]    FAX: [phone]  *******************************************************************************    Reason for Consult: Rectal bleed with recent stents    HPI:  ANIA CRISTOBAL is a 74y Male with HTN, HLD, mild AS, paroxysmal AF on coumadin, former smoker, obesity, anxiety, and CAD.  He had an abnormal nuclear stress test in setting of dyspnea and underwent cardiac cath at [facility] on 3/6/20 revealing severe proximal LAD and mid LAD stenosis.  He is s/p orbital atherectomy and PCI of the mid and LAD with MARCO ANTONIO x 2.  Today he had 2 episodes of blood per rectum on the paper while wiping after using the toilet.  Then he had a 3rd episode of dark red blood in the bowl without BM.  Denies chest pain, palpitations, dizziness, lightheadedness, and syncope.  Denies SOB, CRUZ, orthopnea, PND, and edema.  He called the service and was very concerned, so I recommended evaluation in the ER.    PAST MEDICAL & SURGICAL HISTORY:  HLD (hyperlipidemia)  Anxiety  Afib  DVT (deep venous thrombosis): left leg  DM (diabetes mellitus)  BPH (benign prostatic hyperplasia)  Gallbladder stone without cholecystitis or obstruction  Gastroesophageal reflux disease  Prostate disease  Hyperlipidemia  Hypertension  Anxiety  S/P laminectomy  H/O arthroscopy of knee  Malignant schwannoma      Bactrim (Rash)  Ceftin (Hives)  penicillin (Anaphylaxis)  sulfa drugs (Unknown)      MEDICATIONS  (STANDING):  pantoprazole  Injectable 40 milliGRAM(s) IV Push Once    MEDICATIONS  (PRN):      Social History: no active (former) tobacco / EtOH / IVDA    Family History: No pertinent family history of CVD in parents/siblings      ROS: As noted above, otherwise unremarkable.    Vital Signs Last 24 Hrs  T(C): 36.7 (08 Mar 2020 15:50), Max: 36.7 (08 Mar 2020 15:50)  T(F): 98.1 (08 Mar 2020 15:50), Max: 98.1 (08 Mar 2020 15:50)  HR: 81 (08 Mar 2020 15:50) (81 - 81)  BP: 123/70 (08 Mar 2020 15:50) (123/70 - 123/70)  BP(mean): --  RR: 17 (08 Mar 2020 15:50) (17 - 17)  SpO2: 96% (08 Mar 2020 15:50) (96% - 96%)    I&O's Detail    I&O's Summary          PHYSICAL EXAM:  General: Appears well developed, well nourished, no acute distress, obese  HEENT: Head: normocephalic, atraumatic  Eyes: Pupils equal and reactive  Neck: Supple, no carotid bruit, no JVD, no HJR  CARDIOVASCULAR: Normal S1 and S2, no murmur, rub, or gallop  LUNGS: expiratory wheezes at the bases  ABDOMEN: Soft, nontender, non-distended, positive bowel sounds, no mass or bruit  EXTREMITIES: Trace edema at best, distal pulses WNL, right radial access site + pulse, + bruising, no hematoma.  Right brachial venous access site + bruising, no hematoma, Right groin access site + pulse, no hematoma, no bruit, no bruising  SKIN: Warm and dry with normal turgor  NEURO: Alert & oriented x 3, grossly intact  PSYCH: normal mood and affect, anxious    LABS: pending  INR 1.44  FOBT +  Hb 11.2  Plt 261  WBC 6                    ECG: SR 99bpm, 1st deg AVB, RBBB, LAFB, PVCs, no acute STT changes      Assessment and Plan:  In summary, ANIA CRISTOBAL is a 74y Male with with HTN, HLD, mild AS, paroxysmal AF on coumadin, former smoker, obesity, anxiety, and CAD s/p cardiac cath at [facility] on 3/6/20 revealing severe proximal LAD and mid LAD stenosis.  He is s/p orbital atherectomy and PCI of the mid and LAD with MARCO ANTONIO x 2.   Presents to the ER for evaluation of rectal bleeding.    - Hemodynamically stable and stable CV status  - S/p cardiac cath and MARCO ANTONIO to the proximal and mid LAD with MARCO ANTONIO 3/6/20.  EF 50% at that time.  He must continue ASA 81 and Plavix 75mg daily unless there is life-threatening bleed.  - Paroxysmal AF, currently in SR.  He held coumadin ahead of procedure.  INR only 1.44 today.  It is ok to hold coumadin for now in setting of bleeding  - Pt [place] [facility]  He also reports history of hemorrhoids.  - GI or surgical evaluation at discretion of ED  - Continue other home CV medications including high-intensity statin  - There are no contraindications to any GI or surgical procedures that may be needed.  - Anxiolytics will likely be needed.  - If determined to be stable, no CV objection to discharge home.    We will follow with you.  Thank you for allowing me to participate in the care of your patient.      Sincerely,    Abraham Mckinney MD [facility], [facility]                                          [address], [address], [place] [address]                                               PHONE: [phone]    FAX: [phone] [address], [address], [place] [address]                                       PHONE: [phone]    FAX: [phone]  *******************************************************************************    Reason for Consult: Rectal bleed with recent stents    HPI:  ANIA CRISTOBLA is a 74y Male with HTN, HLD, mild AS, paroxysmal AF on coumadin, former smoker, obesity, anxiety, and CAD.  He had an abnormal nuclear stress test in setting of dyspnea and underwent cardiac cath at [facility] on 3/6/20 revealing severe proximal LAD and mid LAD stenosis.  He is s/p orbital atherectomy and PCI of the mid and LAD with MARCO ANTONIO x 2.  Today he had 2 episodes of blood per rectum on the paper while wiping after using the toilet.  Then he had a 3rd episode of dark red blood in the bowl without BM.  Denies chest pain, palpitations, dizziness, lightheadedness, and syncope.  Denies SOB, CRUZ, orthopnea, PND, and edema.  He called the service and was very concerned, so I recommended evaluation in the ER.    PAST MEDICAL & SURGICAL HISTORY:  HLD (hyperlipidemia)  Anxiety  Afib  DVT (deep venous thrombosis): left leg  DM (diabetes mellitus)  BPH (benign prostatic hyperplasia)  Gallbladder stone without cholecystitis or obstruction  Gastroesophageal reflux disease  Prostate disease  Hyperlipidemia  Hypertension  Anxiety  S/P laminectomy  H/O arthroscopy of knee  Malignant schwannoma      Bactrim (Rash)  Ceftin (Hives)  penicillin (Anaphylaxis)  sulfa drugs (Unknown)      MEDICATIONS  (STANDING):  pantoprazole  Injectable 40 milliGRAM(s) IV Push Once    MEDICATIONS  (PRN):      Social History: no active (former) tobacco / EtOH / IVDA    Family History: No pertinent family history of CVD in parents/siblings      ROS: As noted above, otherwise unremarkable.    Vital Signs Last 24 Hrs  T(C): 36.7 (08 Mar 2020 15:50), Max: 36.7 (08 Mar 2020 15:50)  T(F): 98.1 (08 Mar 2020 15:50), Max: 98.1 (08 Mar 2020 15:50)  HR: 81 (08 Mar 2020 15:50) (81 - 81)  BP: 123/70 (08 Mar 2020 15:50) (123/70 - 123/70)  BP(mean): --  RR: 17 (08 Mar 2020 15:50) (17 - 17)  SpO2: 96% (08 Mar 2020 15:50) (96% - 96%)    I&O's Detail    I&O's Summary          PHYSICAL EXAM:  General: Appears well developed, well nourished, no acute distress, obese  HEENT: Head: normocephalic, atraumatic  Eyes: Pupils equal and reactive  Neck: Supple, no carotid bruit, no JVD, no HJR  CARDIOVASCULAR: Normal S1 and S2, no murmur, rub, or gallop  LUNGS: expiratory wheezes at the bases  ABDOMEN: Soft, nontender, non-distended, positive bowel sounds, no mass or bruit  EXTREMITIES: Trace edema at best, distal pulses WNL, right radial access site + pulse, + bruising, no hematoma.  Right brachial venous access site + bruising, no hematoma, Right groin access site + pulse, no hematoma, no bruit, no bruising  SKIN: Warm and dry with normal turgor  NEURO: Alert & oriented x 3, grossly intact  PSYCH: normal mood and affect, anxious    LABS: pending  INR 1.44  FOBT +  Hb 11.2  Plt 261  WBC 6                    ECG: SR 99bpm, 1st deg AVB, RBBB, LAFB, PVCs, no acute STT changes      Assessment and Plan:  In summary, ANIA CRISTOBAL is a 74y Male with with HTN, HLD, mild AS, paroxysmal AF on coumadin, former smoker, obesity, anxiety, and CAD s/p cardiac cath at [facility] on 3/6/20 revealing severe proximal LAD and mid LAD stenosis.  He is s/p orbital atherectomy and PCI of the mid and LAD with MARCO ANTONIO x 2.   Presents to the ER for evaluation of rectal bleeding.    - Hemodynamically stable and stable CV status.  No evidence of ischemia or CHF.  - S/p cardiac cath and MARCO ANTONIO to the proximal and mid LAD with MARCO ANTONIO 3/6/20.  EF 50% at that time.  He must continue ASA 81 and Plavix 75mg daily unless there is life-threatening bleed.  - Paroxysmal AF, currently in SR.  He held coumadin ahead of procedure.  INR only 1.44 today.  It is ok to hold coumadin for now in setting of bleeding  - Pt reports [place] and [place] [facility]  He also reports history of hemorrhoids.  - GI or surgical evaluation at discretion of ED  - Continue other home CV medications including high-intensity statin  - There are no contraindications to any GI or surgical procedures that may be needed.  - Anxiolytics will likely be needed.  - If determined to be stable, no CV objection to discharge home.    We will follow with you.  Thank you for allowing me to participate in the care of your patient.      Sincerely,    Abraham Mckinney MD

## 2020-03-08 NOTE — ED PROVIDER NOTE - PATIENT PORTAL LINK FT
You can access the FollowMyHealth Patient Portal offered by [facility] by registering at the following website: [email or web address]. By joining [facility]’s FollowMyHealth portal, you will also be able to view your health information using other applications (apps) compatible with our system.

## 2020-03-08 NOTE — ED ADULT TRIAGE NOTE - CHIEF COMPLAINT QUOTE
pt c/o rectal bleeding that began today. pt had cardiac cath 2 days ago, placed on plavix, pt insisting to contact his cardiologist, Dr. Vance.

## 2020-03-08 NOTE — ED PROVIDER NOTE - PROGRESS NOTE DETAILS
pt remained stable and seen by cardiologist, recommend to hold coumadin for few days and re-eval by cardio and if symptoms worsen, return to ED

## 2020-03-08 NOTE — ED ADULT NURSE NOTE - OBJECTIVE STATEMENT
Pt with 2 cardiac stents placed yesterday at [facility] Pt with 2 cardiac stents placed yesterday at [facility] states he was started on Plavix and noticed BRB in toilet after he had a BM. Pt states blood is common for him due to his hemorrhoids but it was worse than usual. Pt called PMD and told to come to ER. Pt denies abd pain, N/V, fever/chills, dizziness, weakness.

## 2020-03-08 NOTE — ED PROVIDER NOTE - NONTENDER LOCATION
periumbilical/left lower quadrant/right upper quadrant/suprapubic/left upper quadrant/right costovertebral angle/right lower quadrant/umbilical/left costovertebral angle

## 2020-03-08 NOTE — ED PROVIDER NOTE - SKIN, MLM
Skin normal color for race, warm, dry and intact. No evidence of rash. residual contusion from recent procedure on rt forearm, wrist and femoral area Skin normal color for race, warm, dry and intact. No evidence of rash. residual contusion from recent procedure on rt forearm, wrist. the right femoral area of puncture appears well and have no evidence of procedure at that site

## 2020-03-08 NOTE — ED PROVIDER NOTE - CLINICAL SUMMARY MEDICAL DECISION MAKING FREE TEXT BOX
pt  insisted on calling his interventionalist, cardiology called and will see him, recommend to continue plavix and aspirin at minimum, will check labs, r/o pna, flu., orthostasis and significant blood loss

## 2020-03-08 NOTE — ED PROVIDER NOTE - SKIN NEGATIVE STATEMENT, MLM
no abrasions, no jaundice, no lesions, no pruritis, and no rashes. recent contusion and skin discolroation from post procedure changes on rt wrist and femoral area

## 2020-03-08 NOTE — ED ADULT NURSE NOTE - NSIMPLEMENTINTERV_GEN_ALL_ED
Implemented All Fall Risk Interventions:  [place] to call system. Call bell, personal items and telephone within reach. Instruct patient to call for assistance. Room bathroom lighting operational. Non-slip footwear when patient is off stretcher. Physically safe environment: no spills, clutter or unnecessary equipment. Stretcher in lowest position, wheels locked, appropriate side rails in place. Provide visual cue, wrist band, yellow gown, etc. Monitor gait and stability. Monitor for mental status changes and reorient to person, place, and time. Review medications for side effects contributing to fall risk. Reinforce activity limits and safety measures with patient and family.

## 2020-03-08 NOTE — ED PROVIDER NOTE - OBJECTIVE STATEMENT
75 y/o M with  h/o Atrial fibrillation; on coumadin, diabetes; on metformin, Hyperlipidemia, schwannoma s/p surgery 1992, and radiation in 1998, spinal surgery , DVT in the past p/w rectal bleeding today 2 episodes, first with stool and then had gross rectal bleeding and felt bloated .Pt had cardiac cath at [facility] through femoral access and had stent placed and is currently on aspirin, plavix and coumadin. He called his interventional cardiologist Dr. Turner and he states that he need to continue plavix due to stents and advised him to go to ED for rectal bleeding. Pt had prior h/o anemia and lung nodules, anemia improved from hb of 8 to 11. Pt has not f/u with pulm due to continuing health issues. Pt has h/o wheezing last time he was in ED and he felt wheezing at [facility] amd still wheezing

## 2020-03-08 NOTE — ED PROVIDER NOTE - CARE PROVIDER_API CALL
Abraham Mckinney)  Cardiovascular Disease; Internal Medicine; Interventional Cardiology  [address]  [place] [address]  Phone: [phone]  Fax: [phone]  Established Patient  Follow Up Time: 1-3 Days

## 2020-03-08 NOTE — ED ADULT NURSE NOTE - PSH
H/O arthroscopy of knee    Malignant schwannoma    S/P coronary artery stent placement  x2  S/P laminectomy

## 2020-03-09 ENCOUNTER — APPOINTMENT (OUTPATIENT)
Age: 75
End: 2020-03-09

## 2020-03-11 ENCOUNTER — APPOINTMENT (OUTPATIENT)
Age: 75
End: 2020-03-11

## 2020-03-16 ENCOUNTER — APPOINTMENT (OUTPATIENT)
Age: 75
End: 2020-03-16

## 2020-03-18 ENCOUNTER — APPOINTMENT (OUTPATIENT)
Age: 75
End: 2020-03-18

## 2020-04-10 ENCOUNTER — APPOINTMENT (OUTPATIENT)
Dept: PULMONOLOGY | Facility: CLINIC | Age: 75
End: 2020-04-10

## 2020-05-28 ENCOUNTER — OUTPATIENT (OUTPATIENT)
Dept: OUTPATIENT SERVICES | Facility: HOSPITAL | Age: 75
LOS: 1 days | Discharge: HOME | End: 2020-05-28

## 2020-05-28 DIAGNOSIS — Z95.5 PRESENCE OF CORONARY ANGIOPLASTY IMPLANT AND GRAFT: Chronic | ICD-10-CM

## 2020-05-28 DIAGNOSIS — Z98.89 OTHER SPECIFIED POSTPROCEDURAL STATES: Chronic | ICD-10-CM

## 2020-05-28 DIAGNOSIS — D50.9 IRON DEFICIENCY ANEMIA, UNSPECIFIED: ICD-10-CM

## 2020-05-28 DIAGNOSIS — C49.9 MALIGNANT NEOPLASM OF CONNECTIVE AND SOFT TISSUE, UNSPECIFIED: Chronic | ICD-10-CM

## 2020-06-01 ENCOUNTER — APPOINTMENT (OUTPATIENT)
Dept: HEMATOLOGY ONCOLOGY | Facility: CLINIC | Age: 75
End: 2020-06-01
Payer: MEDICARE

## 2020-06-01 PROCEDURE — 99214 OFFICE O/P EST MOD 30 MIN: CPT | Mod: 95

## 2020-06-01 NOTE — CONSULT LETTER
[Dear  ___] : Dear  [unfilled], [Courtesy Letter:] : I had the pleasure of seeing your patient, [unfilled], in my office today. [Please see my note below.] : Please see my note below. [Sincerely,] : Sincerely, [FreeTextEntry3] : Ba Zapien MD\par Medical Oncology/Hematology\par [facility]\par \par \par [employer] [facility]\par

## 2020-06-01 NOTE — ASSESSMENT
[FreeTextEntry1] : 74 year old with A.fib on coumadin, CAD s/p 2 stents, on ASA + PLAVIX with iron deficiency anemia.\par Etiology of iron deficiency anemia is either GI or  losses due to being on coumadin and antiplatelet agents. \par s/p EGD/colonoscopy in 5/2019 which were negative.\par He does report long standing h/o hematuria and follows urology.\par \par S/p Venofer 200 mg x 3 doses. FOBT positive on 3/8/20. \par Feels well overall. \par \par Plan:\par Will check CBC, CMP, iron studies through [facility] at home labs\par Capsule endoscopy if continues to have iron deficiency anemia. \par Tentative follow up in 3 months.

## 2020-06-01 NOTE — REVIEW OF SYSTEMS
Telephone Encounter by Dalia Moreno RN, BSN at 03/10/17 10:32 AM     Author:  Dalia Moreno RN, BSN Service:  (none) Author Type:  Registered Nurse     Filed:  03/10/17 10:33 AM Encounter Date:  3/8/2017 Status:  Signed     :  Dalia Moreno RN, BSN (Registered Nurse)            Patient notified[LB1.1T] and will call Onc to schedule apt. Phone number provided.[LB1.1M] Electronically Signed by:    Dalia Moreno RN, BSN , 3/10/2017[LB1.2T]        Revision History        User Key Date/Time User Provider Type Action    > LB1.2 03/10/17 10:33 AM Dalia Moreno RN, BSN Registered Nurse Sign     LB1.1 03/10/17 10:32 AM Dalia Moreno RN, BSN Registered Nurse     M - Manual, T - Template             [Dizziness] : dizziness [FreeTextEntry2] : negative except as reviewed in HPI

## 2020-06-01 NOTE — HISTORY OF PRESENT ILLNESS
[de-identified] : 74 year old male with past medical history significant for Atrial fibrillation; on coumadin, diabetes; on metformin, Hyperlipidemia, schwannoma s/p surgery 1992, and radiation in 1998, spinal surgery 7 years ago, h/o DVT was hospitalized in January 2020 at [facility] for the flu.\par He was noted to be anemic with Hgb 9.8 g/dL and MCV 68. \par He received Venofer 200 mg x 2 doses. \par EGD and colonoscopy were done with Dr. Mika Pereira on 5/30/19, both normal. \par Fecal occult blood testing on 11/23/19 was negative. \par Was on oral iron supplements 3 weeks before hospitalization. \par Reports chronic hx of slight hematuria for years. Had TURP surgery with Dr. Jose Blair in 2019\par \par \par GI is Dr. Mika Pereira \par URO is Dr. Jose Blair\par PCP is Dr. Brian Ang\par \par 1/26/2020\par %sat 4\par Ferritin 29 ng/ml [de-identified] : Verbal consent given by patient to conduct this telehealth visit via audio-video conferencing.\par \par S/p 3 doses of Venofer in March 2020. \par He feels well overall. \par S/p 2 cardiac stents in February 2020. \par He is on Plavix and baby ASA. \par No SOB or significant fatigue.\par No black stools. No blood with stool. \par No gross hematuria. \par He continues on Coumadin. \par \par

## 2020-07-06 ENCOUNTER — OUTPATIENT (OUTPATIENT)
Dept: OUTPATIENT SERVICES | Facility: HOSPITAL | Age: 75
LOS: 1 days | Discharge: HOME | End: 2020-07-06

## 2020-07-06 DIAGNOSIS — D50.9 IRON DEFICIENCY ANEMIA, UNSPECIFIED: ICD-10-CM

## 2020-07-06 DIAGNOSIS — C49.9 MALIGNANT NEOPLASM OF CONNECTIVE AND SOFT TISSUE, UNSPECIFIED: Chronic | ICD-10-CM

## 2020-07-06 DIAGNOSIS — Z98.89 OTHER SPECIFIED POSTPROCEDURAL STATES: Chronic | ICD-10-CM

## 2020-07-06 DIAGNOSIS — Z95.5 PRESENCE OF CORONARY ANGIOPLASTY IMPLANT AND GRAFT: Chronic | ICD-10-CM

## 2020-07-08 NOTE — PROGRESS NOTE ADULT - PROBLEM SELECTOR PROBLEM 4
DM (diabetes mellitus) Trilobed Flap Text: The defect edges were debeveled with a #15 scalpel blade.  Given the location of the defect and the proximity to free margins a trilobed flap was deemed most appropriate.  Using a sterile surgical marker, an appropriate trilobed flap drawn around the defect.    The area thus outlined was incised deep to adipose tissue with a #15 scalpel blade.  The skin margins were undermined to an appropriate distance in all directions utilizing iris scissors.

## 2020-07-09 ENCOUNTER — APPOINTMENT (OUTPATIENT)
Age: 75
End: 2020-07-09

## 2020-07-16 ENCOUNTER — APPOINTMENT (OUTPATIENT)
Age: 75
End: 2020-07-16

## 2020-07-17 ENCOUNTER — APPOINTMENT (OUTPATIENT)
Dept: GASTROENTEROLOGY | Facility: CLINIC | Age: 75
End: 2020-07-17
Payer: MEDICARE

## 2020-07-17 DIAGNOSIS — R31.9 HEMATURIA, UNSPECIFIED: ICD-10-CM

## 2020-07-17 DIAGNOSIS — Z86.010 PERSONAL HISTORY OF COLONIC POLYPS: ICD-10-CM

## 2020-07-17 DIAGNOSIS — M16.10 UNILATERAL PRIMARY OSTEOARTHRITIS, UNSPECIFIED HIP: ICD-10-CM

## 2020-07-17 DIAGNOSIS — R19.5 OTHER FECAL ABNORMALITIES: ICD-10-CM

## 2020-07-17 DIAGNOSIS — R42 DIZZINESS AND GIDDINESS: ICD-10-CM

## 2020-07-17 PROCEDURE — 99203 OFFICE O/P NEW LOW 30 MIN: CPT | Mod: 95

## 2020-07-17 NOTE — ASSESSMENT
[FreeTextEntry1] : Etiology of the patient's iron deficiency anemia continues to be of no but he may have small bowel AV malformations which intermittently bleed. Nevertheless multiple stools for occult blood negative. He will therefore be scheduled for capsule endoscopy. Will also undergo a repeat CBC, basic metabolic profile, prothrombin time, iron studies, B12, folate and hemoglobin electrophoresis. The loose stools are probably due to her diabetic enteropathy or Crohn's ileitis should be excluded and will be scheduled for Endoscopy as noted above. In the Meantime I Have Recommended That He Obtain the Results of His Prior Endoscopy and Colonoscopy Last Year for My Review.

## 2020-07-17 NOTE — HISTORY OF PRESENT ILLNESS
[Home] : at home, [unfilled] , at the time of the visit. [Verbal consent obtained from patient] : the patient, [unfilled] [Medical Office: ([facility])___] : at the medical office located in  [de-identified] : The patient has a history of atrial fibrillation for which he has been on Coumadin. For the past 2 years he has exhibited an iron deficiency anemia without any rectal bleeding or melena. In fact, according to the patient, multiple stools for occult blood have been negative. Last year he underwent a colonoscopy and upper endoscopy by Dr. Pereira during which is 6 polyps were removed from his colon but there is no evidence of any pathology that could account for the anemia. An upper endoscopy was apparently unremarkable as well. Neither are available for my review at this time. He gets occasional orange effusions by edema and his hemoglobin and hematocrit have been relatively stable and there normal. Nevertheless iron studies appeared to be consistent with iron deficiency anemia. During these past 2 years she will frequently experiencing postprandial loose bowel movement but again, there is no rectal bleeding or melena. The patient experienced some bloating. There is no family history of colon cancer. His appetite is good and his weight has increased anyways over 300 pounds. It was recommended that he undergo a capsule endoscopy which is the reason for this consultation. He does not take any oral iron supplements.

## 2020-07-21 LAB
ANION GAP SERPL CALC-SCNC: 13 MMOL/L
BASOPHILS # BLD AUTO: 0.07 K/UL
BASOPHILS NFR BLD AUTO: 0.9 %
BUN SERPL-MCNC: 20 MG/DL
CALCIUM SERPL-MCNC: 9.1 MG/DL
CHLORIDE SERPL-SCNC: 99 MMOL/L
CO2 SERPL-SCNC: 28 MMOL/L
CREAT SERPL-MCNC: 0.7 MG/DL
EOSINOPHIL # BLD AUTO: 0.27 K/UL
EOSINOPHIL NFR BLD AUTO: 3.5 %
FERRITIN SERPL-MCNC: 22 NG/ML
FOLATE SERPL-MCNC: 16.7 NG/ML
GLUCOSE SERPL-MCNC: 307 MG/DL
HCT VFR BLD CALC: 44.1 %
HGB BLD-MCNC: 13.3 G/DL
IMM GRANULOCYTES NFR BLD AUTO: 0.1 %
INR PPP: 1.66 RATIO
IRON SATN MFR SERPL: 14 %
IRON SERPL-MCNC: 54 UG/DL
LYMPHOCYTES # BLD AUTO: 1.27 K/UL
LYMPHOCYTES NFR BLD AUTO: 16.7 %
MAN DIFF?: NORMAL
MCHC RBC-ENTMCNC: 24.2 PG
MCHC RBC-ENTMCNC: 30.2 GM/DL
MCV RBC AUTO: 80.3 FL
MONOCYTES # BLD AUTO: 0.69 K/UL
MONOCYTES NFR BLD AUTO: 9.1 %
NEUTROPHILS # BLD AUTO: 5.3 K/UL
NEUTROPHILS NFR BLD AUTO: 69.7 %
PLATELET # BLD AUTO: 253 K/UL
POTASSIUM SERPL-SCNC: 4.6 MMOL/L
PT BLD: 19.1 SEC
RBC # BLD: 5.49 M/UL
RBC # FLD: 15.9 %
SODIUM SERPL-SCNC: 140 MMOL/L
TIBC SERPL-MCNC: 381 UG/DL
UIBC SERPL-MCNC: 328 UG/DL
VIT B12 SERPL-MCNC: 177 PG/ML
WBC # FLD AUTO: 7.61 K/UL

## 2020-07-22 LAB
HGB A MFR BLD: 98.2 %
HGB A2 MFR BLD: 2.1 %
HGB FRACT BLD-IMP: NORMAL

## 2020-07-29 DIAGNOSIS — D49.6 NEOPLASM OF UNSPECIFIED BEHAVIOR OF BRAIN: ICD-10-CM

## 2020-08-26 ENCOUNTER — OUTPATIENT (OUTPATIENT)
Dept: OUTPATIENT SERVICES | Facility: HOSPITAL | Age: 75
LOS: 1 days | Discharge: HOME | End: 2020-08-26

## 2020-08-26 DIAGNOSIS — C49.9 MALIGNANT NEOPLASM OF CONNECTIVE AND SOFT TISSUE, UNSPECIFIED: Chronic | ICD-10-CM

## 2020-08-26 DIAGNOSIS — Z98.89 OTHER SPECIFIED POSTPROCEDURAL STATES: Chronic | ICD-10-CM

## 2020-08-26 DIAGNOSIS — Z95.5 PRESENCE OF CORONARY ANGIOPLASTY IMPLANT AND GRAFT: Chronic | ICD-10-CM

## 2020-08-26 DIAGNOSIS — D50.9 IRON DEFICIENCY ANEMIA, UNSPECIFIED: ICD-10-CM

## 2020-09-01 ENCOUNTER — APPOINTMENT (OUTPATIENT)
Age: 75
End: 2020-09-01

## 2020-09-25 ENCOUNTER — OUTPATIENT (OUTPATIENT)
Dept: OUTPATIENT SERVICES | Facility: HOSPITAL | Age: 75
LOS: 1 days | Discharge: HOME | End: 2020-09-25

## 2020-09-25 DIAGNOSIS — D50.9 IRON DEFICIENCY ANEMIA, UNSPECIFIED: ICD-10-CM

## 2020-09-25 DIAGNOSIS — C49.9 MALIGNANT NEOPLASM OF CONNECTIVE AND SOFT TISSUE, UNSPECIFIED: Chronic | ICD-10-CM

## 2020-09-25 DIAGNOSIS — Z95.5 PRESENCE OF CORONARY ANGIOPLASTY IMPLANT AND GRAFT: Chronic | ICD-10-CM

## 2020-09-25 DIAGNOSIS — Z98.89 OTHER SPECIFIED POSTPROCEDURAL STATES: Chronic | ICD-10-CM

## 2020-10-01 ENCOUNTER — APPOINTMENT (OUTPATIENT)
Dept: HEMATOLOGY ONCOLOGY | Facility: CLINIC | Age: 75
End: 2020-10-01
Payer: MEDICARE

## 2020-10-01 PROCEDURE — 99214 OFFICE O/P EST MOD 30 MIN: CPT | Mod: 95

## 2020-10-01 NOTE — CONSULT LETTER
[Dear  ___] : Dear  [unfilled], [Courtesy Letter:] : I had the pleasure of seeing your patient, [unfilled], in my office today. [Please see my note below.] : Please see my note below. [Sincerely,] : Sincerely, [FreeTextEntry3] : Ba Zapien MD\par Medical Oncology/Hematology\par [facility] [place], [facility]\par \par \par [facility]\par

## 2020-10-01 NOTE — ASSESSMENT
[FreeTextEntry1] : 74 year old with A.fib on Coumadin, CAD s/p 2 stents, on ASA + PLAVIX with iron deficiency anemia.\par Etiology of iron deficiency anemia is either GI or  losses due to being on Coumadin and antiplatelet agents. \par s/p EGD/colonoscopy in 5/2019 which were negative.\par He does report long standing h/o hematuria and follows urology.\par S/p Venofer 200 mg x 3 doses in March. FOBT positive on 3/8/20. \par \par Last Hgb 14 on 9/9/20 per patient, checked at PCP's office.\par \par Plan:\par Given improved Hgb, advised that he start ferrous sulfate PO once a day on empty stomach\par May need capsule endoscopy at some point\par Follow up telehealth visit in 2 months, will get [facility] labs to do CBC, and iron studies 1 week prior to visit. \par Continue B12 supplement for B12 deficiency

## 2020-10-01 NOTE — HISTORY OF PRESENT ILLNESS
[de-identified] : 74 year old male with past medical history significant for Atrial fibrillation; on coumadin, diabetes; on metformin, Hyperlipidemia, schwannoma s/p surgery 1992, and radiation in 1998, spinal surgery 7 years ago, h/o DVT was hospitalized in January 2020 at [facility] for the flu.\par He was noted to be anemic with Hgb 9.8 g/dL and MCV 68. \par He received Venofer 200 mg x 2 doses. \par EGD and colonoscopy were done with Dr. Mika Pereira on 5/30/19, both normal. \par Fecal occult blood testing on 11/23/19 was negative. \par Was on oral iron supplements 3 weeks before hospitalization. \par Reports chronic hx of slight hematuria for years. Had TURP surgery with Dr. Jose Blair in 2019\par \par \par GI is Dr. Mika Pereira \par URO is Dr. Jose Blair\par PCP is Dr. Brian Ang\par \par 1/26/2020\par %sat 4\par Ferritin 29 ng/ml [de-identified] : Verbal consent given by patient to conduct this telehealth visit via audio-video conferencing.\par S/p 3 doses of Venofer in March 2020. \par He feels well overall. \par S/p 2 cardiac stents in February 2020. \par He is on Plavix and baby ASA. \par He continues on Coumadin. \par No capsule endoscopy as yet. \par He is taking B12 OTC\par He is also taking Vitamin D.\par \par 9/9/20 Hgb 14.1, HCT 41.7, MCV 76, RDW 18.1%, Hgb A1c 9.7\par 7/17/20 ferritin 22 ng/ml\par \par \par

## 2020-10-06 NOTE — ED ADULT TRIAGE NOTE - CHIEF COMPLAINT QUOTE
Appt made 11/3   patient biba from home states that he had a catheter placed on monday at [facility], states that yesterday afternoon he noticed he was having no drainage from his catheter, and began to have some pain. states that he has the sensation to urinate

## 2020-10-10 NOTE — ED PROVIDER NOTE - CONSTITUTIONAL, MLM
Reason for Disposition  • [1] MILD swelling (puffiness) AND [2] persists > 3 days  (Exception: suspect mosquito or insect bites)    Additional Information  • Negative: Unresponsive, passed out or very weak  • Negative: Difficulty breathing or wheezing  • Negative: [1] Difficulty swallowing, drooling or slurred speech AND [2] sudden onset  • Negative: Sounds like a life-threatening emergency to the triager  • Negative: Recent injury to the eye  • Negative: Entire face is swollen  • Negative: Contact with pollen, other allergic substance or eyedrops  • Negative: Sacs of clear fluid (blisters) on whites of eyes (allergic cysts)  • Negative: Small, red lump present on lid margin  • Negative: Yellow or green discharge (pus) in the eye  • Negative: Redness of sclera (white of eye)  • Negative: [1] SEVERE swelling AND [2] fever  • Negative: Loss of vision or double vision  • Negative: Child sounds very sick or weak to the triager  • Negative: [1] SEVERE swelling (shut or almost) AND [2] involves BOTH eyes  (Exception: itchy eyes, which are probably an allergic reaction)  • Negative: [1] Eyelid (outer) is very red AND [2] fever  • Negative: [1] Eyelid is both very swollen and very red BUT [2] no fever  • Negative: [1] SEVERE swelling (shut or almost) on one side AND [2] painful or tender to touch  • Negative: Cloudy spot or haziness of cornea (clear part of eye)  • Negative: [1] Swelling of ankles or feet AND [2] bilateral  • Negative: Fever  • Negative: [1] SEVERE swelling (shut or almost) AND [2] involves BOTH eyes AND [3] itchy  • Negative: MODERATE swelling on one side (Exception: due to mosquito or insect bite)  • Negative: [1] MODERATE redness on one side (Exception: due to mosquito or insect bite) AND [2] no pain  • Negative: Eyelid is painful or very tender  • Negative: [1] Sinus pain or pressure AND [2] MILD swelling  • Negative: [1] Small lump in eyelid AND [2] chronic problem  • Negative: [1] Eyelid swelling  "is a chronic problem (recurrent or ongoing AND present > 4 weeks) AND [2] cause unknown  • Negative: Eyelid swelling from suspected mosquito or insect bite    Answer Assessment - Initial Assessment Questions  1. APPEARANCE of EYES: \"What does it look like?\"       Puffiness under rt eye per mom  2. LOCATION: \"One or both eyes?\" \"What part of the eye?\"       Rt eye just under eye, eye itself looks fine, no reddness or bloodshot.  3. SEVERITY: \"How swollen is the eye?\"      mildly  4. ITCHING: \"Is there any itching?\" If so, ask: \"How much?\"      Child has not been itching it or touching it  5. ONSET: \"When did the eye swelling start?\"      This morning, eye is NOT swollen shut  6. CAUSE: \"What do you think is causing the swelling?\"      unknown  7. RECURRENT SYMPTOM: \"Has your child had swollen eyes before?\" If so, ask: \"When was the last time?\" \"What happened that time?\"      no    Protocols used: EYE - SWELLING-PEDIATRIC-      " normal... Awake, alert, oriented to person, place, time/situation and in no apparent distress.

## 2020-10-12 NOTE — ED ADULT TRIAGE NOTE - NSWEIGHTCALCTOOLDRUG_GEN_A_CORE
Patient Education    Hand /Upper Extremity ortho    Brett Horowitz MD ;  Ramon Hughes MD ; MD Louis Fritz MD; Isael Azevedo MD : Wayne Savage MD  [phone] GMN  [phone] GMS     JESSE August MD     Hand xr   Labs      TR call  [phone]    Over the counter medications:  -Alternate tylenol 500mg every 8 hours   -Muscle creams:    Bio-freeze  China gel  arnica  epsom salt paste: 1/4 c Epsom + 2 tblsp h20 > mix to gritty paste, apply directly  and cover w/ very warm , moist  wash cloth   -Rest the area   -Ice or heat for maximum of 20 minutes 3xday        anit inflam GEL 3-4 xd         
 used

## 2020-11-25 ENCOUNTER — OUTPATIENT (OUTPATIENT)
Dept: OUTPATIENT SERVICES | Facility: HOSPITAL | Age: 75
LOS: 1 days | Discharge: HOME | End: 2020-11-25

## 2020-11-25 DIAGNOSIS — C49.9 MALIGNANT NEOPLASM OF CONNECTIVE AND SOFT TISSUE, UNSPECIFIED: Chronic | ICD-10-CM

## 2020-11-25 DIAGNOSIS — Z95.5 PRESENCE OF CORONARY ANGIOPLASTY IMPLANT AND GRAFT: Chronic | ICD-10-CM

## 2020-11-25 DIAGNOSIS — Z98.89 OTHER SPECIFIED POSTPROCEDURAL STATES: Chronic | ICD-10-CM

## 2020-11-25 DIAGNOSIS — D50.9 IRON DEFICIENCY ANEMIA, UNSPECIFIED: ICD-10-CM

## 2020-12-01 ENCOUNTER — APPOINTMENT (OUTPATIENT)
Dept: HEMATOLOGY ONCOLOGY | Facility: CLINIC | Age: 75
End: 2020-12-01
Payer: MEDICARE

## 2020-12-01 DIAGNOSIS — E53.8 DEFICIENCY OF OTHER SPECIFIED B GROUP VITAMINS: ICD-10-CM

## 2020-12-01 DIAGNOSIS — D50.9 IRON DEFICIENCY ANEMIA, UNSPECIFIED: ICD-10-CM

## 2020-12-01 DIAGNOSIS — Z79.01 LONG TERM (CURRENT) USE OF ANTICOAGULANTS: ICD-10-CM

## 2020-12-01 PROCEDURE — 99441: CPT | Mod: 95

## 2020-12-01 NOTE — CONSULT LETTER
[Dear  ___] : Dear  [unfilled], [Courtesy Letter:] : I had the pleasure of seeing your patient, [unfilled], in my office today. [Please see my note below.] : Please see my note below. [Sincerely,] : Sincerely, [FreeTextEntry3] : Ba Zapien MD\par Medical Oncology/Hematology\par [facility] [place], [facility]\par \par \par [facility] at [facility]\par

## 2020-12-01 NOTE — ASSESSMENT
[FreeTextEntry1] : 75 year old with A.fib on Coumadin, CAD s/p 2 stents, on ASA + PLAVIX with iron deficiency anemia.\par Etiology of iron deficiency anemia is either GI or  losses due to being on Coumadin and antiplatelet agents. \par s/p EGD/colonoscopy in 5/2019 which were negative.\par He does report long standing h/o hematuria and follows urology.\par S/p Venofer 200 mg x 3 doses in March. FOBT positive on 3/8/20. \par \par Labs from 11/24/20 reviewed with patient - Hgb 15.9, ferritin 34 ng/ml\par \par Plan:\par Given improved Hgb and ferritin levels,  advised that he continue ferrous sulfate PO once a day on empty stomach\par May need capsule endoscopy at some point if develops anemia again. \par Continue B12 supplement for B12 deficiency, B12 levels are improved\par RTO 6 months

## 2020-12-01 NOTE — HISTORY OF PRESENT ILLNESS
[de-identified] : 74 year old male with past medical history significant for Atrial fibrillation; on coumadin, diabetes; on metformin, Hyperlipidemia, schwannoma s/p surgery 1992, and radiation in 1998, spinal surgery 7 years ago, h/o DVT was hospitalized in January 2020 at [facility] for the flu.\par He was noted to be anemic with Hgb 9.8 g/dL and MCV 68. \par He received Venofer 200 mg x 2 doses. \par EGD and colonoscopy were done with Dr. Mika Pereira on 5/30/19, both normal. \par Fecal occult blood testing on 11/23/19 was negative. \par Was on oral iron supplements 3 weeks before hospitalization. \par Reports chronic hx of slight hematuria for years. Had TURP surgery with Dr. Jose Blair in 2019\par \par \par GI is Dr. Mika Pereira \par URO is Dr. Jose Blair\par PCP is Dr. Brian Ang\par \par 1/26/2020\par %sat 4\par Ferritin 29 ng/ml [de-identified] : Verbal consent given by patient to conduct this telephonic visit. He is unable to video conferencing. \par S/p 3 doses of Venofer in March 2020. \par S/p 2 cardiac stents in February 2020. \par He is on Plavix and baby ASA. \par He continues on Coumadin. \par No capsule endoscopy as yet. \par He is taking B12 OTC\par He is also taking Vitamin D.\par he is on clindamycin for tooth infection and needs an extraction. \par He takes oral iron once a day, and reports no constipation or stomach upset. \par \par 9/9/20 Hgb 14.1, HCT 41.7, MCV 76, RDW 18.1%, Hgb A1c 9.7\par 7/17/20 ferritin 22 ng/ml\par \par \par

## 2021-01-01 NOTE — ED PROVIDER NOTE - CROS ED MUSC ALL NEG
VSS, stable glucose at 24 hrs: 60, no wet or stool this shift, Tsb at [facility] needs to be rechecked at 1800 today, infant is very sleepy this shift, assisted with latch and positioning (with shield) very disinterested in latching, had donor milk 6-12 ml with encouragement; highly encouraged mother to pump, assembled for her and pumping now, continue to monitor.   - - -

## 2021-01-29 NOTE — PROGRESS NOTE ADULT - SUBJECTIVE AND OBJECTIVE BOX
[facility]  Pediatric Neurology Consult     Micah Gonzalez MRN# [id number]   YOB: 2006 Age: 15 year old      Date of Admission: 1/26/2021    Primary care provider: Eva Falcon    Requesting physician:  Dr. Luis Pickering         Assessment and Recommendations:   Micah Gonzalez is a 15 year old female with psychiatric history of depression, anxiety, and self-harm, admitted with suicidal ideation and 1 year history of diffuse paresthesias, limb pains and weakness at times requiring use of a cane. Her exam is normal with some isolated hyperreflexia. Reduced vibration is an isolated finding and of unclear clinical significance. The later is common in this clinical context.  Motor tics are relatievely recent onset and likely associated with psychiatric comorbidities.     Recommendations:  1. Defer neurological workup.    La Santamaria, DNP, APRN, FNP-BC    Physician Attestation   I, Aron Cruz, saw and evaluated Micah Gonzalez as part of a shared visit.  I have reviewed and discussed with the advanced practice provider their history, physical and plan.    I personally reviewed the vital signs, medications and labs.    My key history or physical exam findings: Normal, mental status, cranial nerve examination and motor examination. 2-3 beats of clonus. Plantar response flexor bilaterally.   Occasional motor tics.    Key management decisions made by me:  Defer neurological work up.    Aron Cruz  Date of Service (when I saw the patient): 1/30/21              Reason for Consult:   Concern for conversion disorder vs. Multiple sclerosis            History is obtained from the patient and Epic chart         History of Present Illness:   Micah Gonzalez is a 15 year old female with a history of manor depressive disorder, anxiety, and deliberate self cutting who presented two  "days ago to the ED due to suicidal ideation. Due to bed availability she spent the last two days in the ED being transferred to behavioral health today. Upon presentation to the ED she reported a 1 year history of diffuse extremity pain, paresthesias, and intermittent weakness. She often will use a cane to walk as she has fallen in the past. She had been evaluated by her pediatrician and was referred to neurology but has not yet been seen.     Micah tells me that her symptoms began at the beginning of 8th grade, fall of 2019. She does not remember any illness or trigger for her symptoms. She remembers that both of her hands would feel numb, \"like when a blood pressure cuff is being blown up\". She thought that it was from dehydration so she would drink more water. She sometimes attributed it to using her phone too much. She then noticed that sometimes she would have the same feeling n her feet, and sometimes her jaw. At first it was just numbness (\"it was not terrible at first\") tingling type discomfort but it progressed to pain at times. There is no trigger that she can think of. It has remained fairly stable over the past year. She says that she is sometimes dizzy and it is often accompanied by seeing spots. She occasionally get headaches but not too often. She has not trouble swallowing, no SOB.    She denies bowel and bladder dysfunction.                      Past Medical History:    No past medical history on file.       Birth History:   [facility], 39 weeks, tight nuchal cord, slight shoulder dystocia. Apgars 6 and 8. Admitted to the NICU due to tachypnea.  Discharged 2 days later. Breast fed.           Past Surgical History:     Past Surgical History:   Procedure Laterality Date     APPENDECTOMY  03/2018             Family History:   Mother with depression  Paternal aunt with alcoholism  Maternal GM with multiple sclerosis         Social History:   Lives with mother and father.           " PULMONARY PROGRESS NOTE      [id number]    Patient is a 74y old  Male who presents with a chief complaint of cough, sob (25 Jan 2020 16:55)      INTERVAL HPI/OVERNIGHT EVENTS:    MEDICATIONS  (STANDING):  albuterol/ipratropium for Nebulization 3 milliLiter(s) Nebulizer every 6 hours  atorvastatin 20 milliGRAM(s) Oral at bedtime  dextrose 5%. 1000 milliLiter(s) (50 mL/Hr) IV Continuous <Continuous>  dextrose 50% Injectable 12.5 Gram(s) IV Push once  finasteride 5 milliGRAM(s) Oral daily  insulin lispro (HumaLOG) corrective regimen sliding scale   SubCutaneous three times a day before meals  metoprolol succinate ER 75 milliGRAM(s) Oral daily  oseltamivir 75 milliGRAM(s) Oral two times a day  pantoprazole    Tablet 40 milliGRAM(s) Oral before breakfast  sodium chloride 0.9%. 1000 milliLiter(s) (125 mL/Hr) IV Continuous <Continuous>      MEDICATIONS  (PRN):  acetaminophen   Tablet .. 650 milliGRAM(s) Oral every 6 hours PRN Temp greater or equal to 38C (100.4F), Mild Pain (1 - 3)  ALPRAZolam 0.25 milliGRAM(s) Oral two times a day PRN anxiety  dextrose 40% Gel 15 Gram(s) Oral once PRN Blood Glucose LESS THAN 70 milliGRAM(s)/deciliter  glucagon  Injectable 1 milliGRAM(s) IntraMuscular once PRN Glucose LESS THAN 70 milligrams/deciliter  loperamide 2 milliGRAM(s) Oral three times a day PRN Diarrhea  oxycodone    5 mG/acetaminophen 325 mG 1 Tablet(s) Oral every 6 hours PRN Moderate Pain (4 - 6)      Allergies    Bactrim (Rash)  Ceftin (Hives)  penicillin (Anaphylaxis)  sulfa drugs (Unknown)    Intolerances        PAST MEDICAL & SURGICAL HISTORY:  HLD (hyperlipidemia)  Anxiety  Afib  DVT (deep venous thrombosis): left leg  DM (diabetes mellitus)  BPH (benign prostatic hyperplasia)  Gallbladder stone without cholecystitis or obstruction  Gastroesophageal reflux disease  Prostate disease  Hyperlipidemia  Hypertension  Anxiety  S/P laminectomy  H/O arthroscopy of knee  Malignant schwannoma      SOCIAL HISTORY  Smoking History:       REVIEW OF SYSTEMS:    CONSTITUTIONAL:  No distress    HEENT:  Eyes:  No diplopia or blurred vision. ENT:  No earache, sore throat or runny nose.    CARDIOVASCULAR:  No pressure, squeezing, tightness, heaviness or aching about the chest; no palpitations.    RESPIRATORY:  No cough, shortness of breath, PND or orthopnea. Mild SOBOE    GASTROINTESTINAL:  No nausea, vomiting or diarrhea.    GENITOURINARY:  No dysuria, frequency or urgency.    MUSCULOSKELETAL:  No joint pain    SKIN:  No new lesions.    NEUROLOGIC:  No paresthesias, fasciculations, seizures or weakness.    PSYCHIATRIC:  No disorder of thought or mood.    ENDOCRINE:  No heat or cold intolerance, polyuria or polydipsia.    HEMATOLOGICAL:  No easy bruising or bleeding.     Vital Signs Last 24 Hrs  T(C): 37.3 (26 Jan 2020 11:39), Max: 38.3 (26 Jan 2020 10:08)  T(F): 99.2 (26 Jan 2020 11:39), Max: 101 (26 Jan 2020 10:08)  HR: 90 (26 Jan 2020 14:10) (83 - 97)  BP: 112/75 (26 Jan 2020 11:39) (109/67 - 126/73)  BP(mean): --  RR: 18 (26 Jan 2020 11:39) (18 - 18)  SpO2: 94% (26 Jan 2020 14:10) (93% - 95%)    PHYSICAL EXAMINATION:    GENERAL: The patient is awake and alert in no apparent distress.     HEENT: Head is normocephalic and atraumatic. Extraocular muscles are intact. Mucous membranes are moist.    NECK: Supple.    LUNGS: Clear to auscultation without wheezing, rales or rhonchi; respirations unlabored    HEART: Regular rate and rhythm without murmur.    ABDOMEN: Soft, nontender, and nondistended.      EXTREMITIES: Without any cyanosis, clubbing, rash, lesions or edema.    NEUROLOGIC: Grossly intact.    SKIN: No ulceration or induration present.      LABS:                        8.8    6.22  )-----------( 201      ( 26 Jan 2020 07:44 )             34.1     01-26    137  |  100  |  15.0  ----------------------------<  146<H>  3.5   |  23.0  |  0.62    Ca    8.1<L>      26 Jan 2020 07:44  Mg     1.9     01-26    TPro  7.1  /  Alb  4.2  /  TBili  0.5  /  DBili  x   /  AST  21  /  ALT  12  /  AlkPhos  98  01-25    PT/INR - ( 26 Jan 2020 07:44 )   PT: 16.7 sec;   INR: 1.44 ratio         PTT - ( 26 Jan 2020 07:44 )  PTT:33.1 sec            Serum Pro-Brain Natriuretic Peptide: 401 pg/mL (01-24-20 @ 03:52)          MICROBIOLOGY:    RADIOLOGY & ADDITIONAL STUDIES:< from: CT Chest No Cont (01.24.20 @ 08:58) >    LUNGS AND AIRWAYS: Patent central airways.  6 mm nodule in the right middle lobe.    PLEURA: No pleural effusion.    MEDIASTINUM AND OMLLY: Few calcified mediastinal lymph nodes.    VESSELS: Within normal limits.    HEART: Heart size is normal. No pericardial effusion. Coronary artery calcifications.    CHEST WALL AND LOWER NECK: Within normal limits.    VISUALIZED UPPER ABDOMEN: Gallbladder ismildly distended.    BONES: Multilevel degenerative changes of the spine.    IMPRESSION:     No focal consolidation or pleural effusion.    There is a 6 mm nodule in the right middle lobe. 6-12 month follow-up chest CT is recommended.    < end of copied text > Immunizations:   Up to date         Allergies:      Allergies   Allergen Reactions     Rocephin [Ceftriaxone] Anaphylaxis             Medications:     Current Facility-Administered Medications   Medication     cyanocobalamin (VITAMIN B-12) tablet 2,000 mcg     diphenhydrAMINE (BENADRYL) capsule 25 mg    Or     diphenhydrAMINE (BENADRYL) injection 25 mg     hydrOXYzine (ATARAX) tablet 10 mg     ibuprofen (ADVIL/MOTRIN) tablet 200 mg     lidocaine (LMX4) cream     melatonin tablet 3 mg     OLANZapine zydis (zyPREXA) ODT tab 5 mg    Or     OLANZapine (zyPREXA) injection 5 mg     traZODone (DESYREL) tablet 50 mg     venlafaxine (EFFEXOR-XR) 24 hr capsule 75 mg             Review of Systems:   Pertinent items are noted in HPI.         Physical Exam:   /66 (BP Location: Left arm)   Pulse 79   Temp 97.5  F (36.4  C) (Temporal)   Resp 16   SpO2 99%    0 lbs 0 oz  Physical Exam:   General: young woman sitting on bed and playing solitaire and in NAD  HEENT: Unremarkable head  Eyes -sclera clear; conjunctiva pink; pupils equal round and reactive to light  Nose - unremarkable;   Ears normally formed, position and rotation  Mouth and tongue unremarkable  Neck: supple  Respiratory: no increased WOB    Neurologic:             Mental Status: Awake, alert, attentive. Oriented to person, place, and situation. Able to follow two step commands. Speech is fluent. Knows right from left.             CNs: II-XII intact. PEARLA, EOMI with no nystagmus or diplopia. Visual field is intact to confrontation. Face is symmetric. Palate and uvula rise, are symmetric. Tongue protrudes to midline. No pronator drift.            Motor: Normal bulk and tone. Strength 5/5 throughout in bilateral shoulder abduction, elbow flexion and extension, , hip flexion, knee extension and flexion, and ankle dorsiflexion.            Sensation: Vibratory sense, sharp and dull intact bilateral hands, intact sharp and dull intact bilateral feet, vibratory  sense bilateral feet - L felt vibration 5 sec/15 sec, R felt vibration 3/15 sec; Romberg negative.            Coordination: No dysmetria on FTN, or heel-shin testing. Delonte intact.            Reflexes: 2+ with toes downgoing. Bilateral clonus L - 2 beats, R - 3 beats            Gait: Normal. Normal tandem. Able to walk on toes and heels.             Cerebellar: No dysmetria                Data:

## 2021-01-30 NOTE — ED ADULT NURSE NOTE - CCCP TRG CHIEF CMPLNT
shortness of breath I have personally evaluated and examined the patient. The Attending was available to me as a supervising provider if needed.

## 2021-05-28 ENCOUNTER — OUTPATIENT (OUTPATIENT)
Dept: OUTPATIENT SERVICES | Facility: HOSPITAL | Age: 76
LOS: 1 days | Discharge: HOME | End: 2021-05-28

## 2021-05-28 DIAGNOSIS — Z98.89 OTHER SPECIFIED POSTPROCEDURAL STATES: Chronic | ICD-10-CM

## 2021-05-28 DIAGNOSIS — C49.9 MALIGNANT NEOPLASM OF CONNECTIVE AND SOFT TISSUE, UNSPECIFIED: Chronic | ICD-10-CM

## 2021-05-28 DIAGNOSIS — Z95.5 PRESENCE OF CORONARY ANGIOPLASTY IMPLANT AND GRAFT: Chronic | ICD-10-CM

## 2021-05-28 DIAGNOSIS — D50.9 IRON DEFICIENCY ANEMIA, UNSPECIFIED: ICD-10-CM

## 2021-06-01 ENCOUNTER — APPOINTMENT (OUTPATIENT)
Dept: HEMATOLOGY ONCOLOGY | Facility: CLINIC | Age: 76
End: 2021-06-01

## 2021-09-17 ENCOUNTER — INPATIENT (INPATIENT)
Facility: HOSPITAL | Age: 76
LOS: 6 days | Discharge: HOME | DRG: 292 | End: 2021-09-24
Attending: HOSPITALIST | Admitting: FAMILY MEDICINE
Payer: MEDICARE

## 2021-09-17 VITALS
HEART RATE: 109 BPM | OXYGEN SATURATION: 93 % | DIASTOLIC BLOOD PRESSURE: 68 MMHG | RESPIRATION RATE: 16 BRPM | HEIGHT: 74 IN | SYSTOLIC BLOOD PRESSURE: 113 MMHG | TEMPERATURE: 98 F

## 2021-09-17 DIAGNOSIS — Z98.89 OTHER SPECIFIED POSTPROCEDURAL STATES: Chronic | ICD-10-CM

## 2021-09-17 DIAGNOSIS — C49.9 MALIGNANT NEOPLASM OF CONNECTIVE AND SOFT TISSUE, UNSPECIFIED: Chronic | ICD-10-CM

## 2021-09-17 DIAGNOSIS — U07.1 COVID-19: ICD-10-CM

## 2021-09-17 DIAGNOSIS — Z95.5 PRESENCE OF CORONARY ANGIOPLASTY IMPLANT AND GRAFT: Chronic | ICD-10-CM

## 2021-09-17 LAB
ALBUMIN SERPL ELPH-MCNC: 4.2 G/DL — SIGNIFICANT CHANGE UP (ref 3.3–5.2)
ALP SERPL-CCNC: 105 U/L — SIGNIFICANT CHANGE UP (ref 40–120)
ALT FLD-CCNC: 10 U/L — SIGNIFICANT CHANGE UP
ANION GAP SERPL CALC-SCNC: 15 MMOL/L — SIGNIFICANT CHANGE UP (ref 5–17)
APTT BLD: 38.3 SEC — HIGH (ref 27.5–35.5)
AST SERPL-CCNC: 17 U/L — SIGNIFICANT CHANGE UP
BASOPHILS # BLD AUTO: 0.03 K/UL — SIGNIFICANT CHANGE UP (ref 0–0.2)
BASOPHILS NFR BLD AUTO: 0.4 % — SIGNIFICANT CHANGE UP (ref 0–2)
BILIRUB SERPL-MCNC: 0.8 MG/DL — SIGNIFICANT CHANGE UP (ref 0.4–2)
BUN SERPL-MCNC: 11.8 MG/DL — SIGNIFICANT CHANGE UP (ref 8–20)
CALCIUM SERPL-MCNC: 8.7 MG/DL — SIGNIFICANT CHANGE UP (ref 8.6–10.2)
CHLORIDE SERPL-SCNC: 100 MMOL/L — SIGNIFICANT CHANGE UP (ref 98–107)
CK MB CFR SERPL CALC: 1.9 NG/ML — SIGNIFICANT CHANGE UP (ref 0–6.7)
CK SERPL-CCNC: 173 U/L — SIGNIFICANT CHANGE UP (ref 30–200)
CO2 SERPL-SCNC: 23 MMOL/L — SIGNIFICANT CHANGE UP (ref 22–29)
CREAT SERPL-MCNC: 0.63 MG/DL — SIGNIFICANT CHANGE UP (ref 0.5–1.3)
EOSINOPHIL # BLD AUTO: 0.14 K/UL — SIGNIFICANT CHANGE UP (ref 0–0.5)
EOSINOPHIL NFR BLD AUTO: 1.8 % — SIGNIFICANT CHANGE UP (ref 0–6)
GLUCOSE BLDC GLUCOMTR-MCNC: 256 MG/DL — HIGH (ref 70–99)
GLUCOSE SERPL-MCNC: 194 MG/DL — HIGH (ref 70–99)
HCT VFR BLD CALC: 45.8 % — SIGNIFICANT CHANGE UP (ref 39–50)
HGB BLD-MCNC: 15.4 G/DL — SIGNIFICANT CHANGE UP (ref 13–17)
IMM GRANULOCYTES NFR BLD AUTO: 0.4 % — SIGNIFICANT CHANGE UP (ref 0–1.5)
INR BLD: 1.5 RATIO — HIGH (ref 0.88–1.16)
LACTATE BLDV-MCNC: 1.9 MMOL/L — SIGNIFICANT CHANGE UP (ref 0.5–2)
LYMPHOCYTES # BLD AUTO: 0.82 K/UL — LOW (ref 1–3.3)
LYMPHOCYTES # BLD AUTO: 10.8 % — LOW (ref 13–44)
MCHC RBC-ENTMCNC: 29.5 PG — SIGNIFICANT CHANGE UP (ref 27–34)
MCHC RBC-ENTMCNC: 33.6 GM/DL — SIGNIFICANT CHANGE UP (ref 32–36)
MCV RBC AUTO: 87.7 FL — SIGNIFICANT CHANGE UP (ref 80–100)
MONOCYTES # BLD AUTO: 0.77 K/UL — SIGNIFICANT CHANGE UP (ref 0–0.9)
MONOCYTES NFR BLD AUTO: 10.1 % — SIGNIFICANT CHANGE UP (ref 2–14)
NEUTROPHILS # BLD AUTO: 5.8 K/UL — SIGNIFICANT CHANGE UP (ref 1.8–7.4)
NEUTROPHILS NFR BLD AUTO: 76.5 % — SIGNIFICANT CHANGE UP (ref 43–77)
NT-PROBNP SERPL-SCNC: 809 PG/ML — HIGH (ref 0–300)
PLATELET # BLD AUTO: 203 K/UL — SIGNIFICANT CHANGE UP (ref 150–400)
POTASSIUM SERPL-MCNC: 3.9 MMOL/L — SIGNIFICANT CHANGE UP (ref 3.5–5.3)
POTASSIUM SERPL-SCNC: 3.9 MMOL/L — SIGNIFICANT CHANGE UP (ref 3.5–5.3)
PROT SERPL-MCNC: 7.1 G/DL — SIGNIFICANT CHANGE UP (ref 6.6–8.7)
PROTHROM AB SERPL-ACNC: 17.1 SEC — HIGH (ref 10.6–13.6)
RAPID RVP RESULT: DETECTED
RBC # BLD: 5.22 M/UL — SIGNIFICANT CHANGE UP (ref 4.2–5.8)
RBC # FLD: 13.1 % — SIGNIFICANT CHANGE UP (ref 10.3–14.5)
RV+EV RNA SPEC QL NAA+PROBE: DETECTED
SARS-COV-2 RNA SPEC QL NAA+PROBE: SIGNIFICANT CHANGE UP
SODIUM SERPL-SCNC: 138 MMOL/L — SIGNIFICANT CHANGE UP (ref 135–145)
TROPONIN T SERPL-MCNC: <0.01 NG/ML — SIGNIFICANT CHANGE UP (ref 0–0.06)
WBC # BLD: 7.59 K/UL — SIGNIFICANT CHANGE UP (ref 3.8–10.5)
WBC # FLD AUTO: 7.59 K/UL — SIGNIFICANT CHANGE UP (ref 3.8–10.5)

## 2021-09-17 PROCEDURE — 99285 EMERGENCY DEPT VISIT HI MDM: CPT | Mod: CS,GC

## 2021-09-17 PROCEDURE — 71275 CT ANGIOGRAPHY CHEST: CPT | Mod: 26

## 2021-09-17 PROCEDURE — 71045 X-RAY EXAM CHEST 1 VIEW: CPT | Mod: 26

## 2021-09-17 PROCEDURE — 99497 ADVNCD CARE PLAN 30 MIN: CPT | Mod: 25

## 2021-09-17 PROCEDURE — 93010 ELECTROCARDIOGRAM REPORT: CPT

## 2021-09-17 PROCEDURE — 99223 1ST HOSP IP/OBS HIGH 75: CPT

## 2021-09-17 RX ORDER — FUROSEMIDE 40 MG
40 TABLET ORAL DAILY
Refills: 0 | Status: DISCONTINUED | OUTPATIENT
Start: 2021-09-18 | End: 2021-09-22

## 2021-09-17 RX ORDER — GLUCAGON INJECTION, SOLUTION 0.5 MG/.1ML
1 INJECTION, SOLUTION SUBCUTANEOUS ONCE
Refills: 0 | Status: DISCONTINUED | OUTPATIENT
Start: 2021-09-17 | End: 2021-09-24

## 2021-09-17 RX ORDER — DEXTROSE 50 % IN WATER 50 %
25 SYRINGE (ML) INTRAVENOUS ONCE
Refills: 0 | Status: DISCONTINUED | OUTPATIENT
Start: 2021-09-17 | End: 2021-09-24

## 2021-09-17 RX ORDER — DEXTROSE 50 % IN WATER 50 %
12.5 SYRINGE (ML) INTRAVENOUS ONCE
Refills: 0 | Status: DISCONTINUED | OUTPATIENT
Start: 2021-09-17 | End: 2021-09-24

## 2021-09-17 RX ORDER — ALBUTEROL 90 UG/1
1 AEROSOL, METERED ORAL EVERY 4 HOURS
Refills: 0 | Status: DISCONTINUED | OUTPATIENT
Start: 2021-09-17 | End: 2021-09-17

## 2021-09-17 RX ORDER — AMLODIPINE BESYLATE 2.5 MG/1
10 TABLET ORAL DAILY
Refills: 0 | Status: DISCONTINUED | OUTPATIENT
Start: 2021-09-17 | End: 2021-09-23

## 2021-09-17 RX ORDER — PANTOPRAZOLE SODIUM 20 MG/1
40 TABLET, DELAYED RELEASE ORAL
Refills: 0 | Status: DISCONTINUED | OUTPATIENT
Start: 2021-09-17 | End: 2021-09-24

## 2021-09-17 RX ORDER — FUROSEMIDE 40 MG
20 TABLET ORAL ONCE
Refills: 0 | Status: COMPLETED | OUTPATIENT
Start: 2021-09-17 | End: 2021-09-17

## 2021-09-17 RX ORDER — ALBUTEROL 90 UG/1
1 AEROSOL, METERED ORAL ONCE
Refills: 0 | Status: COMPLETED | OUTPATIENT
Start: 2021-09-17 | End: 2021-09-17

## 2021-09-17 RX ORDER — INSULIN GLARGINE 100 [IU]/ML
5 INJECTION, SOLUTION SUBCUTANEOUS AT BEDTIME
Refills: 0 | Status: DISCONTINUED | OUTPATIENT
Start: 2021-09-17 | End: 2021-09-18

## 2021-09-17 RX ORDER — INSULIN LISPRO 100/ML
2 VIAL (ML) SUBCUTANEOUS
Refills: 0 | Status: DISCONTINUED | OUTPATIENT
Start: 2021-09-17 | End: 2021-09-20

## 2021-09-17 RX ORDER — FINASTERIDE 5 MG/1
5 TABLET, FILM COATED ORAL DAILY
Refills: 0 | Status: DISCONTINUED | OUTPATIENT
Start: 2021-09-17 | End: 2021-09-24

## 2021-09-17 RX ORDER — DEXTROSE 50 % IN WATER 50 %
15 SYRINGE (ML) INTRAVENOUS ONCE
Refills: 0 | Status: DISCONTINUED | OUTPATIENT
Start: 2021-09-17 | End: 2021-09-24

## 2021-09-17 RX ORDER — APIXABAN 2.5 MG/1
5 TABLET, FILM COATED ORAL EVERY 12 HOURS
Refills: 0 | Status: DISCONTINUED | OUTPATIENT
Start: 2021-09-17 | End: 2021-09-24

## 2021-09-17 RX ORDER — SODIUM CHLORIDE 9 MG/ML
1000 INJECTION, SOLUTION INTRAVENOUS
Refills: 0 | Status: DISCONTINUED | OUTPATIENT
Start: 2021-09-17 | End: 2021-09-24

## 2021-09-17 RX ORDER — ALPRAZOLAM 0.25 MG
0.25 TABLET ORAL
Refills: 0 | Status: DISCONTINUED | OUTPATIENT
Start: 2021-09-17 | End: 2021-09-24

## 2021-09-17 RX ORDER — CLOPIDOGREL BISULFATE 75 MG/1
75 TABLET, FILM COATED ORAL DAILY
Refills: 0 | Status: DISCONTINUED | OUTPATIENT
Start: 2021-09-17 | End: 2021-09-20

## 2021-09-17 RX ORDER — ACETAMINOPHEN 500 MG
650 TABLET ORAL ONCE
Refills: 0 | Status: COMPLETED | OUTPATIENT
Start: 2021-09-17 | End: 2021-09-17

## 2021-09-17 RX ORDER — ALBUTEROL 90 UG/1
2.5 AEROSOL, METERED ORAL EVERY 6 HOURS
Refills: 0 | Status: DISCONTINUED | OUTPATIENT
Start: 2021-09-17 | End: 2021-09-19

## 2021-09-17 RX ORDER — ATORVASTATIN CALCIUM 80 MG/1
20 TABLET, FILM COATED ORAL AT BEDTIME
Refills: 0 | Status: DISCONTINUED | OUTPATIENT
Start: 2021-09-17 | End: 2021-09-24

## 2021-09-17 RX ORDER — METOPROLOL TARTRATE 50 MG
25 TABLET ORAL THREE TIMES A DAY
Refills: 0 | Status: DISCONTINUED | OUTPATIENT
Start: 2021-09-17 | End: 2021-09-20

## 2021-09-17 RX ORDER — INSULIN LISPRO 100/ML
VIAL (ML) SUBCUTANEOUS
Refills: 0 | Status: DISCONTINUED | OUTPATIENT
Start: 2021-09-17 | End: 2021-09-24

## 2021-09-17 RX ORDER — DEXAMETHASONE 0.5 MG/5ML
10 ELIXIR ORAL ONCE
Refills: 0 | Status: COMPLETED | OUTPATIENT
Start: 2021-09-17 | End: 2021-09-17

## 2021-09-17 RX ADMIN — Medication 102 MILLIGRAM(S): at 15:07

## 2021-09-17 RX ADMIN — Medication 0.25 MILLIGRAM(S): at 19:47

## 2021-09-17 RX ADMIN — ALBUTEROL 2.5 MILLIGRAM(S): 90 AEROSOL, METERED ORAL at 21:57

## 2021-09-17 RX ADMIN — Medication 40 MILLIGRAM(S): at 22:02

## 2021-09-17 RX ADMIN — INSULIN GLARGINE 5 UNIT(S): 100 INJECTION, SOLUTION SUBCUTANEOUS at 23:46

## 2021-09-17 RX ADMIN — CLOPIDOGREL BISULFATE 75 MILLIGRAM(S): 75 TABLET, FILM COATED ORAL at 22:02

## 2021-09-17 RX ADMIN — Medication 650 MILLIGRAM(S): at 15:07

## 2021-09-17 RX ADMIN — Medication 3: at 22:38

## 2021-09-17 RX ADMIN — Medication 20 MILLIGRAM(S): at 19:47

## 2021-09-17 RX ADMIN — FINASTERIDE 5 MILLIGRAM(S): 5 TABLET, FILM COATED ORAL at 22:02

## 2021-09-17 RX ADMIN — Medication 25 MILLIGRAM(S): at 22:02

## 2021-09-17 RX ADMIN — APIXABAN 5 MILLIGRAM(S): 2.5 TABLET, FILM COATED ORAL at 19:47

## 2021-09-17 RX ADMIN — ATORVASTATIN CALCIUM 20 MILLIGRAM(S): 80 TABLET, FILM COATED ORAL at 22:05

## 2021-09-17 RX ADMIN — Medication 25 MILLIGRAM(S): at 19:47

## 2021-09-17 RX ADMIN — ALBUTEROL 1 PUFF(S): 90 AEROSOL, METERED ORAL at 15:07

## 2021-09-17 NOTE — H&P ADULT - HISTORY OF PRESENT ILLNESS
Pt is a 75y Male with PMHx of Atrial fibrillation,dvt on eliquis, HTN, DM, BPH s/p TURP, and arthritis,diabetes; on metformin, Hyperlipidemia, schwannoma s/p surgery 1992, and radiation in 1998, spinal surgery 7 years ago,hx cad stent on plavix,off asa by cardiology , presents to ED today c/o 2 days of SOB, wheezing, productive cough, and rhinorrhea. Pt reports SOB becoming worse over past 2 days. Pt also reports mild abdominal bloating for past 2 days. Pt states only sick contact is 3 yo grandson who tested positive for COVID-19 4 days ago. Denies other sick contacts and wears mask when outdoors. Pt vaccinated for COVID-19 8 days ago [employer].    PAST MEDICAL & SURGICAL HISTORY:  HLD (hyperlipidemia)  Anxiety  Afib  DVT (deep venous thrombosis): left leg  DM (diabetes mellitus)  BPH (benign prostatic hyperplasia)  Gallbladder stone without cholecystitis or obstruction  Gastroesophageal reflux disease  Prostate disease  Hyperlipidemia  Hypertension  Anxiety  S/P laminectomy  H/O arthroscopy of knee  Malignant schwannoma  H/O arthroscopy of knee     Malignant schwannoma     S/P laminectomy.     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 Pt reports SOB becoming worse while coughing and ambulating. Pt oliver  abdominal pain,n/v/d .Pt denies fever,chill,legs swelling. Pt states only sick contact is 5 yo grandson who tested positive for COVID-19 4 days ago.Pt vaccinated for COVID-19 09/08/21 [employer]. pT STATES HE WAS EATING/DRINKING OK. Missed bb dose. HR high. Denies chest pain. ekg nst. Tropon x1 stable.High bnp.    PAST MEDICAL & SURGICAL HISTORY:  HLD (hyperlipidemia)  Anxiety  Afib  DVT (deep venous thrombosis): left leg  DM (diabetes mellitus)  BPH (benign prostatic hyperplasia)  Gallbladder stone without cholecystitis or obstruction  Gastroesophageal reflux disease  Prostate disease  Hyperlipidemia  Hypertension  Anxiety  S/P laminectomy  H/O arthroscopy of knee  Malignant schwannoma  H/O arthroscopy of knee     Malignant schwannoma     S/P laminectomy.     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

## 2021-09-17 NOTE — ED PROVIDER NOTE - OBJECTIVE STATEMENT
Pt is a 75y Male with PMHx of Atrial fibrillation, HTN, DM, BPH s/p TURP, and arthritis presents to ED today c/o 2 days of SOB, wheezing, productive cough, and rhinorrhea. Pt reports SOB becoming worse over past 2 days. Pt also reports mild abdominal bloating for past 2 days. Pt states only sick contact is 5 yo grandson who tested positive for COVID-19 4 days ago. Denies other sick contacts and wears mask when outdoors. Pt vaccinated for COVID-19 8 days ago [employer]. Denies fever, chills, hemoptysis, abdominal pain, nausea, vomiting, recent travel.

## 2021-09-17 NOTE — ED PROVIDER NOTE - ENMT NEGATIVE STATEMENT, MLM
Ears: no ear pain and no hearing problems. Nose: +nasal discharge. Mouth/Throat: +hoarseness, no dysphagia, and no throat pain. Neck: no lumps, no pain, no stiffness and no swollen glands.

## 2021-09-17 NOTE — ED ADULT NURSE NOTE - OBJECTIVE STATEMENT
Pt A&OX3, amb with rolling walker, c/o sob and increased cough X 2 days.  O2 sat of 95% on RA.  Pt states grandson has COVID at home.  Abd soft nondistended, nontender, moving all ext well.

## 2021-09-17 NOTE — H&P ADULT - ASSESSMENT
Pt is a 75y Male with PMHx of Atrial fibrillation,dvt on eliquis, HTN, DM, BPH s/p TURP, and arthritis,diabetes; on metformin, Hyperlipidemia, schwannoma s/p surgery 1992, and radiation in 1998, spinal surgery 7 years ago,hx cad stent on plavix,off asa by cardiology , presents to ED today c/o 2 days of SOB, wheezing, dry  cough, and rhinorrhea. Pt reports SOB becoming worse while coughing and ambulating.    CRUZ,cough likely from Viral pna, CHF exacerbation  - RVP positive,high bnp. Covid neg  -oxygen to keep so2>91%  - albuterol , will give small dose of steroid for now.  - pt got one dose of iv lasix. will continue iv lasix. if breathing improve will change to po   -I and o  -last echo 01/20 ef 45-50% grade 2 diastolic HF, SEVERE TO MOD AS  -ED called cardiology group. will f/u rec  - Monitor renal function  -Tele monitor    NST  -pt missed dose of bb, will restart bb and other home meds  - monitor tele  -CTA R/O PE  -Pt is afebrile,normal renal function. does not look dehydrate. hold of fluid given hx chf.    Hx AFIB ON ELIQUIS,CAD STENT,AS,HLP,HTN  -Continue plvis,eliquis,bb,amlodipine.Statin  -f/u cardiology rec    DM  -issc, small dose of lantus    GOC:FULL CODE  Above care of plan dw pt  Pt agreed with above.

## 2021-09-17 NOTE — ED PROVIDER NOTE - PROGRESS NOTE DETAILS
pt has concerning signs for chf and left sided pna, started on levaquin, given lasix, called [facility],  admitted to tele to DR. Bennie Dickey

## 2021-09-17 NOTE — ED PROVIDER NOTE - CLINICAL SUMMARY MEDICAL DECISION MAKING FREE TEXT BOX
plan to r/o pna, covid, chf, will treat symptomatically, pt not hypoxic on ambulation, ekg unchanged afib with rbb and lafb

## 2021-09-17 NOTE — ED PROVIDER NOTE - GASTROINTESTINAL, MLM
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  We need to schedule her in for a colposcopy, I would prefer this sooner than later since it has been 2 months since we did her Pap smear.   Abdomen soft, non-tender, no guarding.

## 2021-09-17 NOTE — ED ADULT NURSE NOTE - CHIEF COMPLAINT QUOTE
Pt with hx of AFIB reports SOB, wheezing x2 days and reports having received the J&J vaccine 8 days ago. Pt also has family with close contact who are known COVID +.

## 2021-09-17 NOTE — ED ADULT NURSE NOTE - NSIMPLEMENTINTERV_GEN_ALL_ED
Implemented All Fall with Harm Risk Interventions:  [place] to call system. Call bell, personal items and telephone within reach. Instruct patient to call for assistance. Room bathroom lighting operational. Non-slip footwear when patient is off stretcher. Physically safe environment: no spills, clutter or unnecessary equipment. Stretcher in lowest position, wheels locked, appropriate side rails in place. Provide visual cue, wrist band, yellow gown, etc. Monitor gait and stability. Monitor for mental status changes and reorient to person, place, and time. Review medications for side effects contributing to fall risk. Reinforce activity limits and safety measures with patient and family. Provide visual clues: red socks.

## 2021-09-17 NOTE — ED PROVIDER NOTE - PHYSICAL EXAMINATION
Respiratory: normal AP-lateral diameter ratio, symmetric respiratory expansion, adventitious sounds on auscultation, no active wheezing. Pt O2 sat 95% on RA, sitting. O2 sat 93% on RA, ambulating.

## 2021-09-17 NOTE — ED PROVIDER NOTE - ATTENDING CONTRIBUTION TO CARE
Anais MENDEZ- 76 y/o M HTN, HLD, mild AS, paroxysmal AF on eliquis and plavix, former smoker, obesity, anxiety, and CAD p/w feeling sore throat, sob and wheezing for 2 days. no fever, chills. Pt has known contact with grandson who is covid +, no syncope, nausea, vomiting, leg swelling or chest pain. Pt had covid vaccine on 9/8.Pt has prior cardiac cath at [facility] on 3/6/20 revealing severe proximal LAD and mid LAD stenosis.  He is s/p orbital atherectomy and PCI of the mid and LAD with MARCO ANTONIO x 2.     Pt is alert, well appearing male, s1s2 irreg, tachy from 100-118, b/l clear breath sounds, some adventitious sounds noted, appears ot have nasal congestion. pharynx normal , uvula midline, abd soft, nt, nd, normal bowel sounds, obese abd limited exam, neuro exam aox3, cn 2-12 intact, no focal deficits, skin warm, dry, good turgor    plan to r/o pna, covid, chf, will treat symptomatically, pt not hypoxic on ambulation, ekg unchanged afib with rbb and lafb

## 2021-09-17 NOTE — H&P ADULT - CONVERSATION DETAILS
Pt is a 75y Male with PMHx of Atrial fibrillation,dvt on eliquis, HTN, DM, BPH s/p TURP, and arthritis,diabetes; on metformin, Hyperlipidemia, schwannoma s/p surgery 1992, and radiation in 1998, spinal surgery 7 years ago,hx cad stent on plavix,off asa by cardiology,AS,CHF  came with sob suspected chf excerbation,vital pna.  Given multiple comobidites pt has high risk of mortalities. Pt wants to be full code including intubation, resuscitation.

## 2021-09-17 NOTE — ED PROVIDER NOTE - NSCAREINITIATED _GEN_ER
Reason for Admission:   Stroke                  RRAT Score:     15             Do you (patient/family) have any concerns for transition/discharge? Take Eliquis as prescribed              Plan for utilizing home health:   Yes, [facility]    Current Advanced Directive/Advance Care Plan:  Not on file            Transition of Care Plan:   Pt lives with his wife in a 1-level private residence. Pt has a cane and a walker available to use at home. Plan is to be more regimented about taking his Eliquis as prescribed. Pt will need home PT, Speech, and Nursing for Medication Management. CM sent referral through [facility] [address] [facility] and they accepted (added to AVS). Pt's family will transport home. Care Management Interventions  PCP Verified by CM: Yes  Mode of Transport at Discharge:  Other (see comment)(private vehicle)  Transition of Care Consult (CM Consult): [address]: Yes  Discharge Durable Medical Equipment: No(has a cane and a walker at home)  Physical Therapy Consult: Yes  Occupational Therapy Consult: No  Speech Therapy Consult: Yes  Current Support Network: Lives with Spouse  Confirm Follow Up Transport: Family  Plan discussed with Pt/Family/Caregiver: Yes  Freedom of Choice Offered: Yes  [address] Provided?: No  Discharge Location  Discharge Placement: Home with home health Christelle Manzo(Attending)

## 2021-09-17 NOTE — H&P ADULT - NSHPPHYSICALEXAM_GEN_ALL_CORE
T(C): 37.4 (09-17-21 @ 16:26), Max: 37.4 (09-17-21 @ 16:26)  HR: 120's (09-17-21 @ 16:26) (109 - 122)  BP: 112/70 (09-17-21 @ 16:26) (112/70 - 113/68)  RR: 20 (09-17-21 @ 16:26) (16 - 20)  SpO2: 94% (09-17-21 @ 16:26) (93% - 94%)    GEN - NAD,OBESE  HEENT - NCAT, EOMI, [place], MOIST MUCOUS MEMBRANE  RESP - no wheeze, mild crackles bases . not on supplemental O2.so2 95% RA  CARDIO - NS1S2, RRR. No murmurs/rubs/gallops.  ABD - Soft/Non tender/Non distended. Normal BS x4 quadrants.   Ext - + CY. no signs of venous/arterial stasis ulcers  MSK - full ROM of BL upper and lower extremities without pain or restriction. BL 5/5 strength on upper and lower extremities.   Neuro - cn 2-12 grossly intact.  no visible seizure activity appreciated. no tremor. gait not observed.   Skin - clean, dry, intact. no rashes or lesions. normal tone   Psych- AAOx3.  appropriate behaviour. attentive. normal affect.

## 2021-09-18 LAB
A1C WITH ESTIMATED AVERAGE GLUCOSE RESULT: 9.3 % — HIGH (ref 4–5.6)
ALBUMIN SERPL ELPH-MCNC: 4 G/DL — SIGNIFICANT CHANGE UP (ref 3.3–5.2)
ALP SERPL-CCNC: 101 U/L — SIGNIFICANT CHANGE UP (ref 40–120)
ALT FLD-CCNC: 10 U/L — SIGNIFICANT CHANGE UP
ANION GAP SERPL CALC-SCNC: 18 MMOL/L — HIGH (ref 5–17)
APPEARANCE UR: CLEAR — SIGNIFICANT CHANGE UP
AST SERPL-CCNC: 13 U/L — SIGNIFICANT CHANGE UP
BACTERIA # UR AUTO: ABNORMAL
BASOPHILS # BLD AUTO: 0.01 K/UL — SIGNIFICANT CHANGE UP (ref 0–0.2)
BASOPHILS NFR BLD AUTO: 0.2 % — SIGNIFICANT CHANGE UP (ref 0–2)
BILIRUB SERPL-MCNC: 0.5 MG/DL — SIGNIFICANT CHANGE UP (ref 0.4–2)
BILIRUB UR-MCNC: NEGATIVE — SIGNIFICANT CHANGE UP
BUN SERPL-MCNC: 15.9 MG/DL — SIGNIFICANT CHANGE UP (ref 8–20)
CALCIUM SERPL-MCNC: 8.5 MG/DL — LOW (ref 8.6–10.2)
CHLORIDE SERPL-SCNC: 96 MMOL/L — LOW (ref 98–107)
CO2 SERPL-SCNC: 22 MMOL/L — SIGNIFICANT CHANGE UP (ref 22–29)
COLOR SPEC: YELLOW — SIGNIFICANT CHANGE UP
COVID-19 SPIKE DOMAIN AB INTERP: NEGATIVE — SIGNIFICANT CHANGE UP
COVID-19 SPIKE DOMAIN ANTIBODY RESULT: 0.4 U/ML — SIGNIFICANT CHANGE UP
CREAT SERPL-MCNC: 0.68 MG/DL — SIGNIFICANT CHANGE UP (ref 0.5–1.3)
DIFF PNL FLD: ABNORMAL
EOSINOPHIL # BLD AUTO: 0 K/UL — SIGNIFICANT CHANGE UP (ref 0–0.5)
EOSINOPHIL NFR BLD AUTO: 0 % — SIGNIFICANT CHANGE UP (ref 0–6)
EPI CELLS # UR: SIGNIFICANT CHANGE UP
ESTIMATED AVERAGE GLUCOSE: 220 MG/DL — HIGH (ref 68–114)
GLUCOSE BLDC GLUCOMTR-MCNC: 239 MG/DL — HIGH (ref 70–99)
GLUCOSE BLDC GLUCOMTR-MCNC: 268 MG/DL — HIGH (ref 70–99)
GLUCOSE BLDC GLUCOMTR-MCNC: 377 MG/DL — HIGH (ref 70–99)
GLUCOSE BLDC GLUCOMTR-MCNC: 392 MG/DL — HIGH (ref 70–99)
GLUCOSE SERPL-MCNC: 254 MG/DL — HIGH (ref 70–99)
GLUCOSE UR QL: 1000 MG/DL
HCT VFR BLD CALC: 45.9 % — SIGNIFICANT CHANGE UP (ref 39–50)
HGB BLD-MCNC: 15.1 G/DL — SIGNIFICANT CHANGE UP (ref 13–17)
IMM GRANULOCYTES NFR BLD AUTO: 0.5 % — SIGNIFICANT CHANGE UP (ref 0–1.5)
KETONES UR-MCNC: ABNORMAL
LEUKOCYTE ESTERASE UR-ACNC: NEGATIVE — SIGNIFICANT CHANGE UP
LYMPHOCYTES # BLD AUTO: 0.4 K/UL — LOW (ref 1–3.3)
LYMPHOCYTES # BLD AUTO: 6.3 % — LOW (ref 13–44)
MCHC RBC-ENTMCNC: 29.7 PG — SIGNIFICANT CHANGE UP (ref 27–34)
MCHC RBC-ENTMCNC: 32.9 GM/DL — SIGNIFICANT CHANGE UP (ref 32–36)
MCV RBC AUTO: 90.2 FL — SIGNIFICANT CHANGE UP (ref 80–100)
MONOCYTES # BLD AUTO: 0.14 K/UL — SIGNIFICANT CHANGE UP (ref 0–0.9)
MONOCYTES NFR BLD AUTO: 2.2 % — SIGNIFICANT CHANGE UP (ref 2–14)
NEUTROPHILS # BLD AUTO: 5.74 K/UL — SIGNIFICANT CHANGE UP (ref 1.8–7.4)
NEUTROPHILS NFR BLD AUTO: 90.8 % — HIGH (ref 43–77)
NITRITE UR-MCNC: NEGATIVE — SIGNIFICANT CHANGE UP
PH UR: 5 — SIGNIFICANT CHANGE UP (ref 5–8)
PLATELET # BLD AUTO: 193 K/UL — SIGNIFICANT CHANGE UP (ref 150–400)
POTASSIUM SERPL-MCNC: 4 MMOL/L — SIGNIFICANT CHANGE UP (ref 3.5–5.3)
POTASSIUM SERPL-SCNC: 4 MMOL/L — SIGNIFICANT CHANGE UP (ref 3.5–5.3)
PROCALCITONIN SERPL-MCNC: 0.14 NG/ML — HIGH (ref 0.02–0.1)
PROT SERPL-MCNC: 7 G/DL — SIGNIFICANT CHANGE UP (ref 6.6–8.7)
PROT UR-MCNC: 15 MG/DL
RBC # BLD: 5.09 M/UL — SIGNIFICANT CHANGE UP (ref 4.2–5.8)
RBC # FLD: 13 % — SIGNIFICANT CHANGE UP (ref 10.3–14.5)
RBC CASTS # UR COMP ASSIST: SIGNIFICANT CHANGE UP /HPF (ref 0–4)
SARS-COV-2 IGG+IGM SERPL QL IA: 0.4 U/ML — SIGNIFICANT CHANGE UP
SARS-COV-2 IGG+IGM SERPL QL IA: NEGATIVE — SIGNIFICANT CHANGE UP
SODIUM SERPL-SCNC: 136 MMOL/L — SIGNIFICANT CHANGE UP (ref 135–145)
SP GR SPEC: 1.02 — SIGNIFICANT CHANGE UP (ref 1.01–1.02)
UROBILINOGEN FLD QL: NEGATIVE MG/DL — SIGNIFICANT CHANGE UP
WBC # BLD: 6.32 K/UL — SIGNIFICANT CHANGE UP (ref 3.8–10.5)
WBC # FLD AUTO: 6.32 K/UL — SIGNIFICANT CHANGE UP (ref 3.8–10.5)
WBC UR QL: SIGNIFICANT CHANGE UP

## 2021-09-18 PROCEDURE — 99233 SBSQ HOSP IP/OBS HIGH 50: CPT

## 2021-09-18 PROCEDURE — 93970 EXTREMITY STUDY: CPT | Mod: 26

## 2021-09-18 RX ORDER — INSULIN LISPRO 100/ML
6 VIAL (ML) SUBCUTANEOUS ONCE
Refills: 0 | Status: COMPLETED | OUTPATIENT
Start: 2021-09-18 | End: 2021-09-18

## 2021-09-18 RX ORDER — INSULIN GLARGINE 100 [IU]/ML
8 INJECTION, SOLUTION SUBCUTANEOUS AT BEDTIME
Refills: 0 | Status: DISCONTINUED | OUTPATIENT
Start: 2021-09-18 | End: 2021-09-19

## 2021-09-18 RX ADMIN — Medication 4: at 18:32

## 2021-09-18 RX ADMIN — ALBUTEROL 2.5 MILLIGRAM(S): 90 AEROSOL, METERED ORAL at 08:12

## 2021-09-18 RX ADMIN — Medication 2 UNIT(S): at 08:13

## 2021-09-18 RX ADMIN — Medication 25 MILLIGRAM(S): at 05:53

## 2021-09-18 RX ADMIN — Medication 6 UNIT(S): at 22:17

## 2021-09-18 RX ADMIN — ALBUTEROL 2.5 MILLIGRAM(S): 90 AEROSOL, METERED ORAL at 20:40

## 2021-09-18 RX ADMIN — CLOPIDOGREL BISULFATE 75 MILLIGRAM(S): 75 TABLET, FILM COATED ORAL at 12:05

## 2021-09-18 RX ADMIN — Medication 25 MILLIGRAM(S): at 17:03

## 2021-09-18 RX ADMIN — Medication 40 MILLIGRAM(S): at 05:53

## 2021-09-18 RX ADMIN — Medication 3: at 12:03

## 2021-09-18 RX ADMIN — Medication 2: at 08:13

## 2021-09-18 RX ADMIN — Medication 2 UNIT(S): at 18:32

## 2021-09-18 RX ADMIN — ALBUTEROL 2.5 MILLIGRAM(S): 90 AEROSOL, METERED ORAL at 15:33

## 2021-09-18 RX ADMIN — Medication 25 MILLIGRAM(S): at 21:38

## 2021-09-18 RX ADMIN — FINASTERIDE 5 MILLIGRAM(S): 5 TABLET, FILM COATED ORAL at 12:05

## 2021-09-18 RX ADMIN — APIXABAN 5 MILLIGRAM(S): 2.5 TABLET, FILM COATED ORAL at 05:53

## 2021-09-18 RX ADMIN — AMLODIPINE BESYLATE 10 MILLIGRAM(S): 2.5 TABLET ORAL at 05:53

## 2021-09-18 RX ADMIN — Medication 0.25 MILLIGRAM(S): at 02:09

## 2021-09-18 RX ADMIN — PANTOPRAZOLE SODIUM 40 MILLIGRAM(S): 20 TABLET, DELAYED RELEASE ORAL at 05:53

## 2021-09-18 RX ADMIN — Medication 0.25 MILLIGRAM(S): at 12:10

## 2021-09-18 RX ADMIN — APIXABAN 5 MILLIGRAM(S): 2.5 TABLET, FILM COATED ORAL at 18:31

## 2021-09-18 RX ADMIN — Medication 2 UNIT(S): at 12:02

## 2021-09-18 RX ADMIN — INSULIN GLARGINE 8 UNIT(S): 100 INJECTION, SOLUTION SUBCUTANEOUS at 21:38

## 2021-09-18 RX ADMIN — ATORVASTATIN CALCIUM 20 MILLIGRAM(S): 80 TABLET, FILM COATED ORAL at 21:38

## 2021-09-18 RX ADMIN — Medication 40 MILLIGRAM(S): at 21:38

## 2021-09-18 NOTE — PROGRESS NOTE ADULT - ASSESSMENT
75y/oM PMH afib on Eliquis, hx DVT, HTN, DM, BPH s/p TURP, HLD, CAD on plavix, presenting to ER with 2 days SOB/ CRUZ, cough, rhinorrhea admitted with acute on chronic diastolic CHF     Dyspnea likely multifactorial due to   Acute on chronic diastolic HF   Entero/rhinovirus   -cont IV lasix   -f/u cardio, ([facility])   -CXR: mild pulmonary venous congestion   -CTA: prelim report with pulmonary edema, no PE  -f/u TTE   -strict I/Os  -daily weights   -cont IV steroids  -cont nebs     paroxysmal afib   CAD s/p stent   AS  HTN, HLD   -cont eliquis   -cont home meds: plavix, BB, amlodipine, statin     DM  -hgba1c 9.3  -increase lantus, cont iss  -diabetic specialist consult    BPH   -cont home meds     dvt ppx: on eliquis

## 2021-09-18 NOTE — PROGRESS NOTE ADULT - SUBJECTIVE AND OBJECTIVE BOX
ANIA CRISTOBAL    [id number]    75y      Male    CC: sob    INTERVAL HPI/OVERNIGHT EVENTS: pt seen and examined. +cough, sob worse with exertion. denies cp, abd pain, n/v    REVIEW OF SYSTEMS:    CONSTITUTIONAL: No fever  RESPIRATORY: No wheezing, hemoptysis  CARDIOVASCULAR: No chest pain, palpitations  GASTROINTESTINAL: No abdominal or epigastric pain. No nausea, vomiting  NEUROLOGICAL: No headaches    Vital Signs Last 24 Hrs  T(C): 36.4 (18 Sep 2021 09:07), Max: 37.4 (17 Sep 2021 16:26)  T(F): 97.6 (18 Sep 2021 09:07), Max: 99.4 (17 Sep 2021 16:26)  HR: 92 (18 Sep 2021 09:07) (85 - 122)  BP: 122/83 (18 Sep 2021 09:07) (102/63 - 129/81)  BP(mean): --  RR: 20 (18 Sep 2021 09:07) (19 - 20)  SpO2: 97% (18 Sep 2021 09:07) (90% - 97%)    PHYSICAL EXAM:    GENERAL: NAD  HEENT: +EOMI; +[place]  NECK: soft, supple  CHEST/LUNG: Course sounds b/l; respirations unlabored on RA   HEART: S1S2+, Regular rate and rhythm;   ABDOMEN: Soft, Nontender, Nondistended; Bowel sounds present  SKIN: warm, dry  NEURO: AAOX3, no focal deficits    LABS:                        15.1   6.32  )-----------( 193      ( 18 Sep 2021 03:31 )             45.9     09-18    136  |  96<L>  |  15.9  ----------------------------<  254<H>  4.0   |  22.0  |  0.68    Ca    8.5<L>      18 Sep 2021 03:31    TPro  7.0  /  Alb  4.0  /  TBili  0.5  /  DBili  x   /  AST  13  /  ALT  10  /  AlkPhos  101  09-18    PT/INR - ( 17 Sep 2021 15:08 )   PT: 17.1 sec;   INR: 1.50 ratio         PTT - ( 17 Sep 2021 15:08 )  PTT:38.3 sec        MEDICATIONS  (STANDING):  ALBUTerol    0.083% 2.5 milliGRAM(s) Nebulizer every 6 hours  amLODIPine   Tablet 10 milliGRAM(s) Oral daily  apixaban 5 milliGRAM(s) Oral every 12 hours  atorvastatin 20 milliGRAM(s) Oral at bedtime  clopidogrel Tablet 75 milliGRAM(s) Oral daily  dextrose 40% Gel 15 Gram(s) Oral once  dextrose 5%. 1000 milliLiter(s) (50 mL/Hr) IV Continuous <Continuous>  dextrose 5%. 1000 milliLiter(s) (100 mL/Hr) IV Continuous <Continuous>  dextrose 50% Injectable 25 Gram(s) IV Push once  dextrose 50% Injectable 12.5 Gram(s) IV Push once  dextrose 50% Injectable 25 Gram(s) IV Push once  finasteride 5 milliGRAM(s) Oral daily  furosemide   Injectable 40 milliGRAM(s) IV Push daily  glucagon  Injectable 1 milliGRAM(s) IntraMuscular once  insulin glargine Injectable (LANTUS) 8 Unit(s) SubCutaneous at bedtime  insulin lispro (ADMELOG) corrective regimen sliding scale   SubCutaneous three times a day before meals  insulin lispro Injectable (ADMELOG) 2 Unit(s) SubCutaneous three times a day before meals  levoFLOXacin IVPB 750 milliGRAM(s) IV Intermittent every 24 hours  levoFLOXacin IVPB      methylPREDNISolone sodium succinate Injectable 40 milliGRAM(s) IV Push every 8 hours  metoprolol tartrate 25 milliGRAM(s) Oral three times a day  pantoprazole    Tablet 40 milliGRAM(s) Oral before breakfast    MEDICATIONS  (PRN):  ALPRAZolam 0.25 milliGRAM(s) Oral two times a day PRN anxiety      RADIOLOGY & ADDITIONAL TESTS:  < from: CT Angio Chest PE Protocol w/ IV Cont (09.17.21 @ 22:24) >  IMPRESSION:  1. Mild pulmonary edema.  2. Cardiac disease.  3. No pulmonary embolism.  4. 5 cm ascending aortic aneurysm.        ******PRELIMINARY REPORT******    ******PRELIMINARY REPORT******          < end of copied text >    < from: Xray Chest 1 View-PORTABLE IMMEDIATE (Xray Chest 1 View-PORTABLE IMMEDIATE .) (09.17.21 @ 15:49) >  IMPRESSION:  Mild central pulmonary venous congestion, new    < end of copied text >

## 2021-09-19 LAB
ANION GAP SERPL CALC-SCNC: 15 MMOL/L — SIGNIFICANT CHANGE UP (ref 5–17)
BASOPHILS # BLD AUTO: 0.01 K/UL — SIGNIFICANT CHANGE UP (ref 0–0.2)
BASOPHILS NFR BLD AUTO: 0.1 % — SIGNIFICANT CHANGE UP (ref 0–2)
BUN SERPL-MCNC: 30.8 MG/DL — HIGH (ref 8–20)
CALCIUM SERPL-MCNC: 8.9 MG/DL — SIGNIFICANT CHANGE UP (ref 8.6–10.2)
CHLORIDE SERPL-SCNC: 98 MMOL/L — SIGNIFICANT CHANGE UP (ref 98–107)
CO2 SERPL-SCNC: 26 MMOL/L — SIGNIFICANT CHANGE UP (ref 22–29)
CREAT SERPL-MCNC: 0.75 MG/DL — SIGNIFICANT CHANGE UP (ref 0.5–1.3)
EOSINOPHIL # BLD AUTO: 0 K/UL — SIGNIFICANT CHANGE UP (ref 0–0.5)
EOSINOPHIL NFR BLD AUTO: 0 % — SIGNIFICANT CHANGE UP (ref 0–6)
GLUCOSE BLDC GLUCOMTR-MCNC: 318 MG/DL — HIGH (ref 70–99)
GLUCOSE BLDC GLUCOMTR-MCNC: 336 MG/DL — HIGH (ref 70–99)
GLUCOSE BLDC GLUCOMTR-MCNC: 351 MG/DL — HIGH (ref 70–99)
GLUCOSE BLDC GLUCOMTR-MCNC: 404 MG/DL — HIGH (ref 70–99)
GLUCOSE SERPL-MCNC: 350 MG/DL — HIGH (ref 70–99)
HCT VFR BLD CALC: 45.5 % — SIGNIFICANT CHANGE UP (ref 39–50)
HGB BLD-MCNC: 15.2 G/DL — SIGNIFICANT CHANGE UP (ref 13–17)
IMM GRANULOCYTES NFR BLD AUTO: 0.3 % — SIGNIFICANT CHANGE UP (ref 0–1.5)
LYMPHOCYTES # BLD AUTO: 0.3 K/UL — LOW (ref 1–3.3)
LYMPHOCYTES # BLD AUTO: 3.2 % — LOW (ref 13–44)
MAGNESIUM SERPL-MCNC: 2 MG/DL — SIGNIFICANT CHANGE UP (ref 1.6–2.6)
MCHC RBC-ENTMCNC: 30.2 PG — SIGNIFICANT CHANGE UP (ref 27–34)
MCHC RBC-ENTMCNC: 33.4 GM/DL — SIGNIFICANT CHANGE UP (ref 32–36)
MCV RBC AUTO: 90.3 FL — SIGNIFICANT CHANGE UP (ref 80–100)
MONOCYTES # BLD AUTO: 0.42 K/UL — SIGNIFICANT CHANGE UP (ref 0–0.9)
MONOCYTES NFR BLD AUTO: 4.5 % — SIGNIFICANT CHANGE UP (ref 2–14)
NEUTROPHILS # BLD AUTO: 8.65 K/UL — HIGH (ref 1.8–7.4)
NEUTROPHILS NFR BLD AUTO: 91.9 % — HIGH (ref 43–77)
NT-PROBNP SERPL-SCNC: 819 PG/ML — HIGH (ref 0–300)
PLATELET # BLD AUTO: 218 K/UL — SIGNIFICANT CHANGE UP (ref 150–400)
POTASSIUM SERPL-MCNC: 4.2 MMOL/L — SIGNIFICANT CHANGE UP (ref 3.5–5.3)
POTASSIUM SERPL-SCNC: 4.2 MMOL/L — SIGNIFICANT CHANGE UP (ref 3.5–5.3)
RBC # BLD: 5.04 M/UL — SIGNIFICANT CHANGE UP (ref 4.2–5.8)
RBC # FLD: 13 % — SIGNIFICANT CHANGE UP (ref 10.3–14.5)
SODIUM SERPL-SCNC: 138 MMOL/L — SIGNIFICANT CHANGE UP (ref 135–145)
WBC # BLD: 9.41 K/UL — SIGNIFICANT CHANGE UP (ref 3.8–10.5)
WBC # FLD AUTO: 9.41 K/UL — SIGNIFICANT CHANGE UP (ref 3.8–10.5)

## 2021-09-19 PROCEDURE — 71045 X-RAY EXAM CHEST 1 VIEW: CPT | Mod: 26

## 2021-09-19 PROCEDURE — 99233 SBSQ HOSP IP/OBS HIGH 50: CPT

## 2021-09-19 RX ORDER — HYDROCORTISONE 1 %
1 OINTMENT (GRAM) TOPICAL
Refills: 0 | Status: DISCONTINUED | OUTPATIENT
Start: 2021-09-19 | End: 2021-09-24

## 2021-09-19 RX ORDER — INSULIN GLARGINE 100 [IU]/ML
10 INJECTION, SOLUTION SUBCUTANEOUS AT BEDTIME
Refills: 0 | Status: DISCONTINUED | OUTPATIENT
Start: 2021-09-19 | End: 2021-09-20

## 2021-09-19 RX ORDER — ACETAMINOPHEN 500 MG
650 TABLET ORAL ONCE
Refills: 0 | Status: COMPLETED | OUTPATIENT
Start: 2021-09-19 | End: 2021-09-19

## 2021-09-19 RX ORDER — LEVALBUTEROL 1.25 MG/.5ML
1.25 SOLUTION, CONCENTRATE RESPIRATORY (INHALATION) EVERY 6 HOURS
Refills: 0 | Status: DISCONTINUED | OUTPATIENT
Start: 2021-09-19 | End: 2021-09-24

## 2021-09-19 RX ADMIN — Medication 25 MILLIGRAM(S): at 22:32

## 2021-09-19 RX ADMIN — Medication 2 UNIT(S): at 17:59

## 2021-09-19 RX ADMIN — Medication 0.25 MILLIGRAM(S): at 00:04

## 2021-09-19 RX ADMIN — LEVALBUTEROL 1.25 MILLIGRAM(S): 1.25 SOLUTION, CONCENTRATE RESPIRATORY (INHALATION) at 20:29

## 2021-09-19 RX ADMIN — Medication 4: at 08:58

## 2021-09-19 RX ADMIN — Medication 2 UNIT(S): at 12:51

## 2021-09-19 RX ADMIN — ALBUTEROL 2.5 MILLIGRAM(S): 90 AEROSOL, METERED ORAL at 04:46

## 2021-09-19 RX ADMIN — APIXABAN 5 MILLIGRAM(S): 2.5 TABLET, FILM COATED ORAL at 05:22

## 2021-09-19 RX ADMIN — Medication 5: at 17:59

## 2021-09-19 RX ADMIN — Medication 650 MILLIGRAM(S): at 01:04

## 2021-09-19 RX ADMIN — Medication 4: at 12:51

## 2021-09-19 RX ADMIN — LEVALBUTEROL 1.25 MILLIGRAM(S): 1.25 SOLUTION, CONCENTRATE RESPIRATORY (INHALATION) at 16:22

## 2021-09-19 RX ADMIN — Medication 0.25 MILLIGRAM(S): at 12:52

## 2021-09-19 RX ADMIN — PANTOPRAZOLE SODIUM 40 MILLIGRAM(S): 20 TABLET, DELAYED RELEASE ORAL at 05:22

## 2021-09-19 RX ADMIN — APIXABAN 5 MILLIGRAM(S): 2.5 TABLET, FILM COATED ORAL at 17:59

## 2021-09-19 RX ADMIN — Medication 2 UNIT(S): at 08:58

## 2021-09-19 RX ADMIN — Medication 40 MILLIGRAM(S): at 05:23

## 2021-09-19 RX ADMIN — ATORVASTATIN CALCIUM 20 MILLIGRAM(S): 80 TABLET, FILM COATED ORAL at 22:32

## 2021-09-19 RX ADMIN — FINASTERIDE 5 MILLIGRAM(S): 5 TABLET, FILM COATED ORAL at 11:51

## 2021-09-19 RX ADMIN — INSULIN GLARGINE 10 UNIT(S): 100 INJECTION, SOLUTION SUBCUTANEOUS at 22:40

## 2021-09-19 RX ADMIN — Medication 40 MILLIGRAM(S): at 12:51

## 2021-09-19 RX ADMIN — Medication 25 MILLIGRAM(S): at 05:24

## 2021-09-19 RX ADMIN — Medication 25 MILLIGRAM(S): at 12:52

## 2021-09-19 RX ADMIN — Medication 40 MILLIGRAM(S): at 05:22

## 2021-09-19 RX ADMIN — AMLODIPINE BESYLATE 10 MILLIGRAM(S): 2.5 TABLET ORAL at 05:22

## 2021-09-19 RX ADMIN — ALBUTEROL 2.5 MILLIGRAM(S): 90 AEROSOL, METERED ORAL at 08:39

## 2021-09-19 RX ADMIN — CLOPIDOGREL BISULFATE 75 MILLIGRAM(S): 75 TABLET, FILM COATED ORAL at 11:51

## 2021-09-19 NOTE — PROGRESS NOTE ADULT - SUBJECTIVE AND OBJECTIVE BOX
ANIA CRISTOBAL    [id number]    75y      Male    CC: sob    INTERVAL HPI/OVERNIGHT EVENTS: pt seen and examined.     REVIEW OF SYSTEMS:    CONSTITUTIONAL: No fever  RESPIRATORY: No wheezing, hemoptysis  CARDIOVASCULAR: No chest pain, palpitations  GASTROINTESTINAL: No abdominal or epigastric pain. No nausea, vomiting  NEUROLOGICAL: No headaches    Vital Signs Last 24 Hrs  T(C): 36.7 (19 Sep 2021 09:57), Max: 37.1 (18 Sep 2021 21:48)  T(F): 98.1 (19 Sep 2021 09:57), Max: 98.8 (18 Sep 2021 21:48)  HR: 121 (19 Sep 2021 09:57) (95 - 121)  BP: 128/83 (19 Sep 2021 09:57) (99/67 - 136/68)  BP(mean): --  RR: 18 (19 Sep 2021 09:57) (18 - 18)  SpO2: 95% (19 Sep 2021 09:57) (93% - 97%)    PHYSICAL EXAM:    GENERAL: NAD  HEENT: +EOMI; +[place]  NECK: soft, supple  CHEST/LUNG: Course sounds b/l; respirations unlabored on RA   HEART: S1S2+, Regular rate and rhythm;   ABDOMEN: Soft, Nontender, Nondistended; Bowel sounds present  SKIN: warm, dry  NEURO: AAOX3, no focal deficits    LABS:                        15.2   9.41  )-----------( 218      ( 19 Sep 2021 09:15 )             45.5     09-    138  |  98  |  30.8<H>  ----------------------------<  350<H>  4.2   |  26.0  |  0.75    Ca    8.9      19 Sep 2021 09:12  Mg     2.0     -    TPro  7.0  /  Alb  4.0  /  TBili  0.5  /  DBili  x   /  AST  13  /  ALT  10  /  AlkPhos  101  09-18    PT/INR - ( 17 Sep 2021 15:08 )   PT: 17.1 sec;   INR: 1.50 ratio         PTT - ( 17 Sep 2021 15:08 )  PTT:38.3 sec  Urinalysis Basic - ( 18 Sep 2021 18:38 )    Color: Yellow / Appearance: Clear / S.020 / pH: x  Gluc: x / Ketone: Moderate  / Bili: Negative / Urobili: Negative mg/dL   Blood: x / Protein: 15 mg/dL / Nitrite: Negative   Leuk Esterase: Negative / RBC: 0-2 /HPF / WBC 0-2   Sq Epi: x / Non Sq Epi: Occasional / Bacteria: Moderate          MEDICATIONS  (STANDING):  amLODIPine   Tablet 10 milliGRAM(s) Oral daily  apixaban 5 milliGRAM(s) Oral every 12 hours  atorvastatin 20 milliGRAM(s) Oral at bedtime  clopidogrel Tablet 75 milliGRAM(s) Oral daily  dextrose 40% Gel 15 Gram(s) Oral once  dextrose 5%. 1000 milliLiter(s) (50 mL/Hr) IV Continuous <Continuous>  dextrose 5%. 1000 milliLiter(s) (100 mL/Hr) IV Continuous <Continuous>  dextrose 50% Injectable 25 Gram(s) IV Push once  dextrose 50% Injectable 12.5 Gram(s) IV Push once  dextrose 50% Injectable 25 Gram(s) IV Push once  finasteride 5 milliGRAM(s) Oral daily  furosemide   Injectable 40 milliGRAM(s) IV Push daily  glucagon  Injectable 1 milliGRAM(s) IntraMuscular once  insulin glargine Injectable (LANTUS) 10 Unit(s) SubCutaneous at bedtime  insulin lispro (ADMELOG) corrective regimen sliding scale   SubCutaneous three times a day before meals  insulin lispro Injectable (ADMELOG) 2 Unit(s) SubCutaneous three times a day before meals  levalbuterol Inhalation 1.25 milliGRAM(s) Inhalation every 6 hours  levoFLOXacin IVPB 750 milliGRAM(s) IV Intermittent every 24 hours  levoFLOXacin IVPB      methylPREDNISolone sodium succinate Injectable 40 milliGRAM(s) IV Push every 8 hours  metoprolol tartrate 25 milliGRAM(s) Oral three times a day  pantoprazole    Tablet 40 milliGRAM(s) Oral before breakfast    MEDICATIONS  (PRN):  ALPRAZolam 0.25 milliGRAM(s) Oral two times a day PRN anxiety  hydrocortisone hemorrhoidal Suppository 1 Suppository(s) Rectal two times a day PRN hemorroids      RADIOLOGY & ADDITIONAL TESTS:

## 2021-09-19 NOTE — PROGRESS NOTE ADULT - ASSESSMENT
75y/oM PMH afib on Eliquis, hx DVT, HTN, DM, BPH s/p TURP, HLD, CAD on plavix, presenting to ER with 2 days SOB/ CRUZ, cough, rhinorrhea admitted with acute on chronic diastolic CHF     Dyspnea likely multifactorial due to   Acute on chronic diastolic HF   Entero/rhinovirus   -cont IV lasix   -f/u cardio, ([facility])   -CXR: mild pulmonary venous congestion   -CTA: prelim report with pulmonary edema, no PE   -f/u TTE   -strict I/Os   -daily weights   -titrating IV steroids   -cont nebs --> changed to xopenex 9/19    paroxysmal afib   CAD s/p stent   AS  HTN, HLD   -cont eliquis   -cont home meds: plavix, BB, amlodipine, statin     DM  -hgba1c 9.3  -increase lantus again today 9/19, ISS   -diabetic specialist consult    BPH   -cont home meds     dvt ppx: on eliquis

## 2021-09-20 LAB
ANION GAP SERPL CALC-SCNC: 16 MMOL/L — SIGNIFICANT CHANGE UP (ref 5–17)
BASOPHILS # BLD AUTO: 0.01 K/UL — SIGNIFICANT CHANGE UP (ref 0–0.2)
BASOPHILS NFR BLD AUTO: 0.1 % — SIGNIFICANT CHANGE UP (ref 0–2)
BUN SERPL-MCNC: 28.9 MG/DL — HIGH (ref 8–20)
CALCIUM SERPL-MCNC: 9 MG/DL — SIGNIFICANT CHANGE UP (ref 8.6–10.2)
CHLORIDE SERPL-SCNC: 98 MMOL/L — SIGNIFICANT CHANGE UP (ref 98–107)
CO2 SERPL-SCNC: 28 MMOL/L — SIGNIFICANT CHANGE UP (ref 22–29)
CREAT SERPL-MCNC: 0.84 MG/DL — SIGNIFICANT CHANGE UP (ref 0.5–1.3)
EOSINOPHIL # BLD AUTO: 0.02 K/UL — SIGNIFICANT CHANGE UP (ref 0–0.5)
EOSINOPHIL NFR BLD AUTO: 0.2 % — SIGNIFICANT CHANGE UP (ref 0–6)
GLUCOSE BLDC GLUCOMTR-MCNC: 273 MG/DL — HIGH (ref 70–99)
GLUCOSE BLDC GLUCOMTR-MCNC: 281 MG/DL — HIGH (ref 70–99)
GLUCOSE BLDC GLUCOMTR-MCNC: 292 MG/DL — HIGH (ref 70–99)
GLUCOSE BLDC GLUCOMTR-MCNC: 313 MG/DL — HIGH (ref 70–99)
GLUCOSE BLDC GLUCOMTR-MCNC: 330 MG/DL — HIGH (ref 70–99)
GLUCOSE SERPL-MCNC: 298 MG/DL — HIGH (ref 70–99)
HCT VFR BLD CALC: 50.1 % — HIGH (ref 39–50)
HGB BLD-MCNC: 15.8 G/DL — SIGNIFICANT CHANGE UP (ref 13–17)
IMM GRANULOCYTES NFR BLD AUTO: 0.3 % — SIGNIFICANT CHANGE UP (ref 0–1.5)
LYMPHOCYTES # BLD AUTO: 0.76 K/UL — LOW (ref 1–3.3)
LYMPHOCYTES # BLD AUTO: 7.8 % — LOW (ref 13–44)
MAGNESIUM SERPL-MCNC: 2.1 MG/DL — SIGNIFICANT CHANGE UP (ref 1.6–2.6)
MCHC RBC-ENTMCNC: 28.8 PG — SIGNIFICANT CHANGE UP (ref 27–34)
MCHC RBC-ENTMCNC: 31.5 GM/DL — LOW (ref 32–36)
MCV RBC AUTO: 91.3 FL — SIGNIFICANT CHANGE UP (ref 80–100)
MONOCYTES # BLD AUTO: 1 K/UL — HIGH (ref 0–0.9)
MONOCYTES NFR BLD AUTO: 10.2 % — SIGNIFICANT CHANGE UP (ref 2–14)
NEUTROPHILS # BLD AUTO: 7.96 K/UL — HIGH (ref 1.8–7.4)
NEUTROPHILS NFR BLD AUTO: 81.4 % — HIGH (ref 43–77)
NT-PROBNP SERPL-SCNC: 1054 PG/ML — HIGH (ref 0–300)
PLATELET # BLD AUTO: 293 K/UL — SIGNIFICANT CHANGE UP (ref 150–400)
POTASSIUM SERPL-MCNC: 4 MMOL/L — SIGNIFICANT CHANGE UP (ref 3.5–5.3)
POTASSIUM SERPL-SCNC: 4 MMOL/L — SIGNIFICANT CHANGE UP (ref 3.5–5.3)
RBC # BLD: 5.49 M/UL — SIGNIFICANT CHANGE UP (ref 4.2–5.8)
RBC # FLD: 13 % — SIGNIFICANT CHANGE UP (ref 10.3–14.5)
SODIUM SERPL-SCNC: 142 MMOL/L — SIGNIFICANT CHANGE UP (ref 135–145)
WBC # BLD: 9.78 K/UL — SIGNIFICANT CHANGE UP (ref 3.8–10.5)
WBC # FLD AUTO: 9.78 K/UL — SIGNIFICANT CHANGE UP (ref 3.8–10.5)

## 2021-09-20 PROCEDURE — 99232 SBSQ HOSP IP/OBS MODERATE 35: CPT

## 2021-09-20 PROCEDURE — 93306 TTE W/DOPPLER COMPLETE: CPT | Mod: 26

## 2021-09-20 RX ORDER — INSULIN GLARGINE 100 [IU]/ML
13 INJECTION, SOLUTION SUBCUTANEOUS AT BEDTIME
Refills: 0 | Status: DISCONTINUED | OUTPATIENT
Start: 2021-09-20 | End: 2021-09-21

## 2021-09-20 RX ORDER — TAMSULOSIN HYDROCHLORIDE 0.4 MG/1
0.8 CAPSULE ORAL AT BEDTIME
Refills: 0 | Status: DISCONTINUED | OUTPATIENT
Start: 2021-09-20 | End: 2021-09-24

## 2021-09-20 RX ORDER — FLUTICASONE PROPIONATE 50 MCG
1 SPRAY, SUSPENSION NASAL
Refills: 0 | Status: DISCONTINUED | OUTPATIENT
Start: 2021-09-20 | End: 2021-09-24

## 2021-09-20 RX ORDER — INFLUENZA VIRUS VACCINE 15; 15; 15; 15 UG/.5ML; UG/.5ML; UG/.5ML; UG/.5ML
0.5 SUSPENSION INTRAMUSCULAR ONCE
Refills: 0 | Status: DISCONTINUED | OUTPATIENT
Start: 2021-09-20 | End: 2021-09-24

## 2021-09-20 RX ORDER — INSULIN LISPRO 100/ML
5 VIAL (ML) SUBCUTANEOUS
Refills: 0 | Status: DISCONTINUED | OUTPATIENT
Start: 2021-09-20 | End: 2021-09-21

## 2021-09-20 RX ORDER — ACETAMINOPHEN 500 MG
650 TABLET ORAL ONCE
Refills: 0 | Status: COMPLETED | OUTPATIENT
Start: 2021-09-20 | End: 2021-09-20

## 2021-09-20 RX ORDER — ASPIRIN/CALCIUM CARB/MAGNESIUM 324 MG
81 TABLET ORAL DAILY
Refills: 0 | Status: DISCONTINUED | OUTPATIENT
Start: 2021-09-20 | End: 2021-09-24

## 2021-09-20 RX ORDER — METOPROLOL TARTRATE 50 MG
25 TABLET ORAL EVERY 6 HOURS
Refills: 0 | Status: DISCONTINUED | OUTPATIENT
Start: 2021-09-20 | End: 2021-09-21

## 2021-09-20 RX ADMIN — Medication 40 MILLIGRAM(S): at 13:11

## 2021-09-20 RX ADMIN — Medication 4: at 17:32

## 2021-09-20 RX ADMIN — PANTOPRAZOLE SODIUM 40 MILLIGRAM(S): 20 TABLET, DELAYED RELEASE ORAL at 05:52

## 2021-09-20 RX ADMIN — LEVALBUTEROL 1.25 MILLIGRAM(S): 1.25 SOLUTION, CONCENTRATE RESPIRATORY (INHALATION) at 22:33

## 2021-09-20 RX ADMIN — Medication 25 MILLIGRAM(S): at 17:32

## 2021-09-20 RX ADMIN — Medication 0.25 MILLIGRAM(S): at 22:45

## 2021-09-20 RX ADMIN — Medication 4: at 13:13

## 2021-09-20 RX ADMIN — APIXABAN 5 MILLIGRAM(S): 2.5 TABLET, FILM COATED ORAL at 17:32

## 2021-09-20 RX ADMIN — LEVALBUTEROL 1.25 MILLIGRAM(S): 1.25 SOLUTION, CONCENTRATE RESPIRATORY (INHALATION) at 03:18

## 2021-09-20 RX ADMIN — Medication 25 MILLIGRAM(S): at 23:54

## 2021-09-20 RX ADMIN — Medication 40 MILLIGRAM(S): at 05:54

## 2021-09-20 RX ADMIN — Medication 650 MILLIGRAM(S): at 22:45

## 2021-09-20 RX ADMIN — Medication 81 MILLIGRAM(S): at 17:31

## 2021-09-20 RX ADMIN — Medication 3: at 09:47

## 2021-09-20 RX ADMIN — CLOPIDOGREL BISULFATE 75 MILLIGRAM(S): 75 TABLET, FILM COATED ORAL at 13:11

## 2021-09-20 RX ADMIN — APIXABAN 5 MILLIGRAM(S): 2.5 TABLET, FILM COATED ORAL at 05:51

## 2021-09-20 RX ADMIN — LEVALBUTEROL 1.25 MILLIGRAM(S): 1.25 SOLUTION, CONCENTRATE RESPIRATORY (INHALATION) at 15:48

## 2021-09-20 RX ADMIN — Medication 2 UNIT(S): at 09:46

## 2021-09-20 RX ADMIN — Medication 25 MILLIGRAM(S): at 05:51

## 2021-09-20 RX ADMIN — ATORVASTATIN CALCIUM 20 MILLIGRAM(S): 80 TABLET, FILM COATED ORAL at 22:46

## 2021-09-20 RX ADMIN — AMLODIPINE BESYLATE 10 MILLIGRAM(S): 2.5 TABLET ORAL at 05:54

## 2021-09-20 RX ADMIN — Medication 40 MILLIGRAM(S): at 05:52

## 2021-09-20 RX ADMIN — Medication 25 MILLIGRAM(S): at 13:11

## 2021-09-20 RX ADMIN — INSULIN GLARGINE 13 UNIT(S): 100 INJECTION, SOLUTION SUBCUTANEOUS at 23:42

## 2021-09-20 RX ADMIN — Medication 0.25 MILLIGRAM(S): at 00:10

## 2021-09-20 RX ADMIN — Medication 5 UNIT(S): at 17:34

## 2021-09-20 RX ADMIN — Medication 1 SPRAY(S): at 22:55

## 2021-09-20 RX ADMIN — FINASTERIDE 5 MILLIGRAM(S): 5 TABLET, FILM COATED ORAL at 13:11

## 2021-09-20 RX ADMIN — TAMSULOSIN HYDROCHLORIDE 0.8 MILLIGRAM(S): 0.4 CAPSULE ORAL at 22:46

## 2021-09-20 RX ADMIN — LEVALBUTEROL 1.25 MILLIGRAM(S): 1.25 SOLUTION, CONCENTRATE RESPIRATORY (INHALATION) at 08:39

## 2021-09-20 RX ADMIN — Medication 2 UNIT(S): at 13:13

## 2021-09-20 NOTE — PROGRESS NOTE ADULT - SUBJECTIVE AND OBJECTIVE BOX
ANIA CRISTOBAL    [id number]    75y      Male    CC: sob    INTERVAL HPI/OVERNIGHT EVENTS: pt seen and examined. no acute events o/n     REVIEW OF SYSTEMS:    CONSTITUTIONAL: No fever  RESPIRATORY: No wheezing, hemoptysis  CARDIOVASCULAR: No chest pain, palpitations  GASTROINTESTINAL: No abdominal or epigastric pain. No nausea, vomiting  NEUROLOGICAL: No headaches    Vital Signs Last 24 Hrs  T(C): 36.4 (20 Sep 2021 10:57), Max: 36.6 (20 Sep 2021 05:00)  T(F): 97.6 (20 Sep 2021 10:57), Max: 97.9 (20 Sep 2021 05:00)  HR: 104 (20 Sep 2021 15:48) (98 - 129)  BP: 156/93 (20 Sep 2021 13:03) (145/92 - 162/92)  BP(mean): --  RR: 22 (20 Sep 2021 13:03) (18 - 22)  SpO2: 94% (20 Sep 2021 15:48) (91% - 98%)    PHYSICAL EXAM:    GENERAL: NAD  HEENT: +EOMI; +[place]  NECK: soft, supple  CHEST/LUNG: Course sounds b/l; respirations unlabored on RA   HEART: S1S2+, Regular rate and rhythm;   ABDOMEN: Soft, Nontender, Nondistended; Bowel sounds present  SKIN: warm, dry  NEURO: AAOX3, no focal deficits    LABS:                        15.8   9.78  )-----------( 293      ( 20 Sep 2021 08:27 )             50.1     09-20    142  |  98  |  28.9<H>  ----------------------------<  298<H>  4.0   |  28.0  |  0.84    Ca    9.0      20 Sep 2021 08:27  Mg     2.1     09-20        Urinalysis Basic - ( 18 Sep 2021 18:38 )    Color: Yellow / Appearance: Clear / S.020 / pH: x  Gluc: x / Ketone: Moderate  / Bili: Negative / Urobili: Negative mg/dL   Blood: x / Protein: 15 mg/dL / Nitrite: Negative   Leuk Esterase: Negative / RBC: 0-2 /HPF / WBC 0-2   Sq Epi: x / Non Sq Epi: Occasional / Bacteria: Moderate          MEDICATIONS  (STANDING):  amLODIPine   Tablet 10 milliGRAM(s) Oral daily  apixaban 5 milliGRAM(s) Oral every 12 hours  aspirin enteric coated 81 milliGRAM(s) Oral daily  atorvastatin 20 milliGRAM(s) Oral at bedtime  dextrose 40% Gel 15 Gram(s) Oral once  dextrose 5%. 1000 milliLiter(s) (50 mL/Hr) IV Continuous <Continuous>  dextrose 5%. 1000 milliLiter(s) (100 mL/Hr) IV Continuous <Continuous>  dextrose 50% Injectable 25 Gram(s) IV Push once  dextrose 50% Injectable 12.5 Gram(s) IV Push once  dextrose 50% Injectable 25 Gram(s) IV Push once  finasteride 5 milliGRAM(s) Oral daily  fluticasone propionate 50 MICROgram(s)/spray Nasal Spray 1 Spray(s) Both Nostrils two times a day  furosemide   Injectable 40 milliGRAM(s) IV Push daily  glucagon  Injectable 1 milliGRAM(s) IntraMuscular once  influenza   Vaccine 0.5 milliLiter(s) IntraMuscular once  insulin glargine Injectable (LANTUS) 10 Unit(s) SubCutaneous at bedtime  insulin lispro (ADMELOG) corrective regimen sliding scale   SubCutaneous three times a day before meals  insulin lispro Injectable (ADMELOG) 2 Unit(s) SubCutaneous three times a day before meals  levalbuterol Inhalation 1.25 milliGRAM(s) Inhalation every 6 hours  metoprolol tartrate 25 milliGRAM(s) Oral every 6 hours  pantoprazole    Tablet 40 milliGRAM(s) Oral before breakfast  predniSONE   Tablet   Oral   predniSONE   Tablet 40 milliGRAM(s) Oral daily  tamsulosin 0.8 milliGRAM(s) Oral at bedtime    MEDICATIONS  (PRN):  ALPRAZolam 0.25 milliGRAM(s) Oral two times a day PRN anxiety  hydrocortisone hemorrhoidal Suppository 1 Suppository(s) Rectal two times a day PRN hemorroids      RADIOLOGY & ADDITIONAL TESTS:

## 2021-09-20 NOTE — PROGRESS NOTE ADULT - ASSESSMENT
75y/oM PMH afib on Eliquis, hx DVT, HTN, DM, BPH s/p TURP, HLD, CAD on plavix, presenting to ER with 2 days SOB/ CRUZ, cough, rhinorrhea admitted with acute on chronic diastolic CHF     Dyspnea likely multifactorial due to   Acute on chronic diastolic HF   Entero/rhinovirus   -cont IV lasix   -f/u cardio, ([facility])   -CXR: mild pulmonary venous congestion   -CTA: prelim report with pulmonary edema, no PE   -f/u TTE   -strict I/Os   -daily weights   -titrating steroids   -cont nebs --> changed to xopenex 9/19     paroxysmal afib   CAD s/p stent   AS  HTN, HLD   -cont eliquis   -cont home meds: plavix, BB, amlodipine, statin     DM  -hgba1c 9.3   -increase lantus again today 9/20, ISS   -diabetic specialist consult     BPH   -cont home meds     dvt ppx: on eliquis    75y/oM PMH afib on Eliquis, hx DVT, HTN, DM, BPH s/p TURP, HLD, CAD on plavix, presenting to ER with 2 days SOB/ CRUZ, cough, rhinorrhea admitted with acute on chronic diastolic CHF     Dyspnea likely multifactorial due to   Acute on chronic diastolic HF   Entero/rhinovirus   -cont IV lasix   -cardio recs appreciated   -CXR: mild pulmonary venous congestion   -CTA: prelim report with pulmonary edema, no PE   -f/u TTE   -strict I/Os   -daily weights   -titrating steroids   -cont nebs --> changed to xopenex 9/19     paroxysmal afib   CAD s/p stent   AS  HTN, HLD   -cont eliquis   -cont home meds: plavix, BB, amlodipine, statin     DM  -hgba1c 9.3   -increase lantus again today 9/20, ISS   -diabetic specialist consult     BPH   -cont home meds     dvt ppx: on eliquis

## 2021-09-20 NOTE — CONSULT NOTE ADULT - SUBJECTIVE AND OBJECTIVE BOX
[facility]                                          [address], [address], [place] [address]                                               PHONE: [phone]    FAX: [phone] [address], [address], [place] [address]                                       PHONE: [phone]    FAX: [phone]  *******************************************************************************    Reason for Consult: Congestive Heart Failure    HPI:  ANIA CRISTOBAL is a 75y Male with HTN, HLD, mild AS, chronic combined systolic/diastolic CHF (EF 45-50%), h/o DVT, paroxysmal AF on Eliquis, former smoker, obesity, anxiety, and CAD s/p orbital atherectomy and PCI of the mid and LAD with MARCO ANTONIO x 2 on 3/6/2020.  He presents with 2-3 days of dyspnea at rest and with exertion.  He denies any fever/chills.  He has a wet, but non-productive cough.  He reports LE edema that has been present for months and is unchanged.  He also reports presence of wheezing.  Denies chest pain, palpitations, dizziness, lightheadedness, and syncope.   He has been treated with IV Abx, steroids, and IV lasix with improvement of his symptoms.    PAST MEDICAL & SURGICAL HISTORY:  Anxiety    Hypertension    Hyperlipidemia    Prostate disease    Gastroesophageal reflux disease    Gallbladder stone without cholecystitis or obstruction    BPH (benign prostatic hyperplasia)    DM (diabetes mellitus)    DVT (deep venous thrombosis)  left leg    Afib    Anxiety    HLD (hyperlipidemia)    Malignant schwannoma    H/O arthroscopy of knee    S/P laminectomy    S/P coronary artery stent placement  x2        Bactrim (Rash)  Ceftin (Hives)  penicillin (Anaphylaxis)  sulfa drugs (Unknown)      MEDICATIONS  (STANDING):  amLODIPine   Tablet 10 milliGRAM(s) Oral daily  apixaban 5 milliGRAM(s) Oral every 12 hours  aspirin enteric coated 81 milliGRAM(s) Oral daily  atorvastatin 20 milliGRAM(s) Oral at bedtime  dextrose 40% Gel 15 Gram(s) Oral once  dextrose 5%. 1000 milliLiter(s) (50 mL/Hr) IV Continuous <Continuous>  dextrose 5%. 1000 milliLiter(s) (100 mL/Hr) IV Continuous <Continuous>  dextrose 50% Injectable 25 Gram(s) IV Push once  dextrose 50% Injectable 12.5 Gram(s) IV Push once  dextrose 50% Injectable 25 Gram(s) IV Push once  finasteride 5 milliGRAM(s) Oral daily  furosemide   Injectable 40 milliGRAM(s) IV Push daily  glucagon  Injectable 1 milliGRAM(s) IntraMuscular once  influenza   Vaccine 0.5 milliLiter(s) IntraMuscular once  insulin glargine Injectable (LANTUS) 10 Unit(s) SubCutaneous at bedtime  insulin lispro (ADMELOG) corrective regimen sliding scale   SubCutaneous three times a day before meals  insulin lispro Injectable (ADMELOG) 2 Unit(s) SubCutaneous three times a day before meals  levalbuterol Inhalation 1.25 milliGRAM(s) Inhalation every 6 hours  metoprolol tartrate 25 milliGRAM(s) Oral three times a day  pantoprazole    Tablet 40 milliGRAM(s) Oral before breakfast  predniSONE   Tablet   Oral   predniSONE   Tablet 40 milliGRAM(s) Oral daily    MEDICATIONS  (PRN):  ALPRAZolam 0.25 milliGRAM(s) Oral two times a day PRN anxiety  hydrocortisone hemorrhoidal Suppository 1 Suppository(s) Rectal two times a day PRN hemorroids      Social History: no active tobacco / EtOH / IVDA    Family History: No pertinent family history in first degree relatives        ROS: As noted above.  All other systems were reviewed and are negative.    Vital Signs Last 24 Hrs  T(C): 36.4 (20 Sep 2021 10:57), Max: 36.6 (20 Sep 2021 05:00)  T(F): 97.6 (20 Sep 2021 10:57), Max: 97.9 (20 Sep 2021 05:00)  HR: 129 (20 Sep 2021 13:03) (98 - 129)  BP: 156/93 (20 Sep 2021 13:03) (122/78 - 162/92)  BP(mean): --  RR: 22 (20 Sep 2021 13:03) (18 - 22)  SpO2: 91% (20 Sep 2021 13:03) (91% - 98%)    I&O's Detail    I&O's Summary          PHYSICAL EXAM:  General: Appears well developed, well nourished, no acute distress, [place]  HEENT: Head: normocephalic, atraumatic  Eyes: Pupils equal and reactive  Neck: Supple, no carotid bruit, no JVD, + HJR  CARDIOVASCULAR: Normal S1 and S2, soft systolic murmur LSB  LUNGS: Diminished at bases, adventitious bilat, rale Rt base, + rhonchi and scattered wheeze  ABDOMEN: Obese, Soft, nontender, non-distended, positive bowel sounds, no mass or bruit  EXTREMITIES: 1+ edema to knees with chronic skin changes, distal pulses WNL  SKIN: Warm and dry with normal turgor  NEURO: Alert & oriented x 3, grossly intact  PSYCH: appropriate mood and affect    LABS:                        15.8   9.78  )-----------( 293      ( 20 Sep 2021 08:27 )             50.1     09-20    142  |  98  |  28.9<H>  ----------------------------<  298<H>  4.0   |  28.0  |  0.84    Ca    9.0      20 Sep 2021 08:27  Mg     2.1     09-20            Serum Pro-Brain Natriuretic Peptide: 1054 pg/mL (09-20 @ 08:27)  Serum Pro-Brain Natriuretic Peptide: 819 pg/mL (09-19 @ 09:12)  Serum Pro-Brain Natriuretic Peptide: 809 pg/mL (09-17 @ 15:08)      RADIOLOGY & ADDITIONAL STUDIES:  `< from: CT Angio Chest PE Protocol w/ IV Cont (09.17.21 @ 22:24) >  FINDINGS:    PULMONARY EMBOLISM: No pulmonary embolism..    AIRWAYS AND LUNGS: Trachealbronchial narrowing suspicious for tracheobronchomalacia.  Mosaic attenuation. No lung consolidation.    MEDIASTINUM AND PLEURA: There are no enlarged mediastinal, hilar or axillary lymph nodes. The visualized portion of the thyroid gland is unremarkable. There is no pleural effusion. There is no pneumothorax.    HEART AND VESSELS: The heart is normal in size.   There are atherosclerotic calcifications of the aorta and coronary arteries.  There is no pericardial effusion.  Coronary artery stent. Ascending aorta measures up to 4.7 cm, unchanged from the prior study. Aortic valve calcification.    UPPER ABDOMEN: Images of the upper abdomen demonstrate no abnormality.    BONES AND SOFT TISSUES: There are mild degenerative changes of the spine.  The soft tissues are unremarkable.    TUBES/LINES: None.    IMPRESSION:  No pulmonary embolism.    < end of copied text >      < from: US Duplex Venous Lower Ext Complete, Bilateral (09.18.21 @ 23:11) >  IMPRESSION:  No evidence of deep venous thrombosis in either lower extremity.    < end of copied text >    ECG: ST 121bpm, RBBB, LAFB, no acute STT changes    ECHO:  < from: TTE Echo Complete w/Doppler (01.28.20 @ 12:09) >  PHYSICIAN INTERPRETATION:  Left Ventricle: Endocardial visualization was enhanced with intravenous echo contrast. The left ventricular internal cavity size is normal. Left ventricular wall thickness is mildly increased.  Global LV systolic function was mildly decreased. Left ventricular ejection fraction, by visual estimation, is 45 to 50%. Spectral Doppler shows pseudonormal pattern of left ventricular myocardial filling (Grade II diastolic dysfunction).  Right Ventricle: The right ventricular size is mildly enlarged. RV systolic function is normal.  Left Atrium: Moderately enlarged left atrium.  Right Atrium: Severely enlarged right atrium.  Pericardium: There is no evidence of pericardial effusion. There is a significant pericardial fat pad present.  Mitral Valve: Mild thickening of the anterior and posterior mitral valve leaflets. There is mild mitral annular calcification. Mild mitral valve regurgitation is seen.  Tricuspid Valve: The tricuspid valve is normal in structure. Mild tricuspid regurgitation is visualized. Estimated pulmonary artery systolic pressure is 36.3 mmHg assuming a right atrial pressure of 8 mmHg, which is consistent with borderline pulmonary hypertension.  Aortic Valve: The aortic valve is trileaflet. Moderate to severe aortic stenosis is present. Mild aortic valve regurgitation is seen. The Dimesionless Index value is .31.  Pulmonic Valve: The pulmonic valve is normal. No indication of pulmonic valve regurgitation.  Aorta: The aortic root and ascending aorta are structurally normal, with no evidence of dilitation.  Pulmonary Artery: The main pulmonary artery is normal in size.  Venous: The inferior vena cava was dilated, with respiratory size variation greater than 50%.       Summary:   1. Endocardial visualization was enhanced with intravenous echo contrast.   2. There is mild concentric left ventricular hypertrophy.   3. Left ventricular ejection fraction, by visual estimation, 45-50%. Grade II diastolic dysfunction.   4. The right ventricular size is mildly enlarged. RV systolic function is normal.   5. Mild aortic regurgitation.   6. Based on body surface area, moderate to severe aortic valve stenosis.   7. There is no evidence of pericardial effusion.    < end of copied text >      Assessment and Plan:  In summary, ANIA CRISTOBAL is a 75y Male with HTN, HLD, mild AS, chronic combined systolic/diastolic CHF (EF 45-50%), h/o DVT, paroxysmal AF on Eliquis, former smoker, obesity, anxiety, and CAD s/p orbital atherectomy and PCI of the mid and LAD with MARCO ANTONIO x 2 on 3/6/2020.  He presents with 2-3 days of dyspnea at rest and with exertion.  Being treated for PNA, acute on chronic combined CHF and COPD exacerbation with improvement of his symptoms.    - Monitor on telemetry.  Has been AF/SR/likely atrial tachycardia 2:1 ~ 128bpm.  Currently 128bpm.  Will increase Lopressor to 25mg q6h.  Convert to long-acting prior to discharge.  Additional rate control agents as needed (pending repeat echo)  - Initial troponin negative.  Nothing to suggest ACS.  - CTA negative for PE, LE venous duplex negative for DVT.  - Echocardiogram 1/28/20 EF 45-50%, mild LVH, grade 2 diastolic dysfunction, RVE with normal RV systolic function, mod-severe AS based on BSA; repeat is pending  - Mild acute on chronic combined systolic/diastolic CHF.  Improving with IV diuresis with Lasix 40 mg IV daily for now.  Monitor repeat CXRs, strict I/Os, daily weights, & BUN/Cr closely and titrate PRN.  - Abx in place along with steroids.  ~54pkyear smoking history, quit a long time ago.  Suspect a degree of COPD.  - No evidence of ischemia clinically.  Continue medical management for now pending the results of the above.  Plan for eventual ischemic evaluation (likely as an outpatient) once patient is clinically stable.  - CAD s/p PCI 3/2020.  Has been maintained on Plavix and Eliquis.  As it has been > 1 year since last coronary intervention will DC Plavix and start ASA 81mg daily to be taken with Eliquis 5mg BID given his PAF with elevated CHADSVASc score.  - Continue statin therapy  - AS. If severe, will be a good candidate for TAVR and he should follow up with Dr. Mckinney as outpatient for further evaluation and work up.  - Ascending aortic aneurysm.  4.7cm on CTA.  This is stable in size. Continue BB.  - Keep K > 4, Mg > 2    We will follow with you.  Thank you for allowing me to participate in the care of your patient.      Sincerely,    Abraham Mckinney MD                                                      [facility]                                          [address], [address], [place] [address]                                               PHONE: [phone]    FAX: [phone] [address], [address], [place] [address]                                       PHONE: [phone]    FAX: [phone]  *******************************************************************************    Reason for Consult: Congestive Heart Failure    HPI:  ANIA CRISTOBAL is a 75y Male with HTN, HLD, moderate AS, chronic combined systolic/diastolic CHF (EF 45-50%), h/o DVT, paroxysmal AF on Eliquis, former smoker, obesity, anxiety, and CAD s/p orbital atherectomy and PCI of the mid and LAD with MARCO ANTONIO x 2 on 3/6/2020.  He presents with 2-3 days of dyspnea at rest and with exertion.  He denies any fever/chills.  He has a wet, but non-productive cough.  He reports LE edema that has been present for months and is unchanged.  He also reports presence of wheezing.  Denies chest pain, palpitations, dizziness, lightheadedness, and syncope.   He has been treated with IV Abx, steroids, and IV lasix with improvement of his symptoms.    PAST MEDICAL & SURGICAL HISTORY:  Anxiety    Hypertension    Hyperlipidemia    Prostate disease    Gastroesophageal reflux disease    Gallbladder stone without cholecystitis or obstruction    BPH (benign prostatic hyperplasia)    DM (diabetes mellitus)    DVT (deep venous thrombosis)  left leg    Afib    Anxiety    HLD (hyperlipidemia)    Malignant schwannoma    H/O arthroscopy of knee    S/P laminectomy    S/P coronary artery stent placement  x2        Bactrim (Rash)  Ceftin (Hives)  penicillin (Anaphylaxis)  sulfa drugs (Unknown)      MEDICATIONS  (STANDING):  amLODIPine   Tablet 10 milliGRAM(s) Oral daily  apixaban 5 milliGRAM(s) Oral every 12 hours  aspirin enteric coated 81 milliGRAM(s) Oral daily  atorvastatin 20 milliGRAM(s) Oral at bedtime  dextrose 40% Gel 15 Gram(s) Oral once  dextrose 5%. 1000 milliLiter(s) (50 mL/Hr) IV Continuous <Continuous>  dextrose 5%. 1000 milliLiter(s) (100 mL/Hr) IV Continuous <Continuous>  dextrose 50% Injectable 25 Gram(s) IV Push once  dextrose 50% Injectable 12.5 Gram(s) IV Push once  dextrose 50% Injectable 25 Gram(s) IV Push once  finasteride 5 milliGRAM(s) Oral daily  furosemide   Injectable 40 milliGRAM(s) IV Push daily  glucagon  Injectable 1 milliGRAM(s) IntraMuscular once  influenza   Vaccine 0.5 milliLiter(s) IntraMuscular once  insulin glargine Injectable (LANTUS) 10 Unit(s) SubCutaneous at bedtime  insulin lispro (ADMELOG) corrective regimen sliding scale   SubCutaneous three times a day before meals  insulin lispro Injectable (ADMELOG) 2 Unit(s) SubCutaneous three times a day before meals  levalbuterol Inhalation 1.25 milliGRAM(s) Inhalation every 6 hours  metoprolol tartrate 25 milliGRAM(s) Oral three times a day  pantoprazole    Tablet 40 milliGRAM(s) Oral before breakfast  predniSONE   Tablet   Oral   predniSONE   Tablet 40 milliGRAM(s) Oral daily    MEDICATIONS  (PRN):  ALPRAZolam 0.25 milliGRAM(s) Oral two times a day PRN anxiety  hydrocortisone hemorrhoidal Suppository 1 Suppository(s) Rectal two times a day PRN hemorroids      Social History: no active tobacco / EtOH / IVDA    Family History: No pertinent family history in first degree relatives        ROS: As noted above.  All other systems were reviewed and are negative.    Vital Signs Last 24 Hrs  T(C): 36.4 (20 Sep 2021 10:57), Max: 36.6 (20 Sep 2021 05:00)  T(F): 97.6 (20 Sep 2021 10:57), Max: 97.9 (20 Sep 2021 05:00)  HR: 129 (20 Sep 2021 13:03) (98 - 129)  BP: 156/93 (20 Sep 2021 13:03) (122/78 - 162/92)  BP(mean): --  RR: 22 (20 Sep 2021 13:03) (18 - 22)  SpO2: 91% (20 Sep 2021 13:03) (91% - 98%)    I&O's Detail    I&O's Summary          PHYSICAL EXAM:  General: Appears well developed, well nourished, no acute distress, [place]  HEENT: Head: normocephalic, atraumatic  Eyes: Pupils equal and reactive  Neck: Supple, no carotid bruit, no JVD, + HJR  CARDIOVASCULAR: Normal S1 and S2, soft systolic murmur LSB  LUNGS: Diminished at bases, adventitious bilat, rale Rt base, + rhonchi and scattered wheeze  ABDOMEN: Obese, Soft, nontender, non-distended, positive bowel sounds, no mass or bruit  EXTREMITIES: 1+ edema to knees with chronic skin changes, distal pulses WNL  SKIN: Warm and dry with normal turgor  NEURO: Alert & oriented x 3, grossly intact  PSYCH: appropriate mood and affect    LABS:                        15.8   9.78  )-----------( 293      ( 20 Sep 2021 08:27 )             50.1     09-20    142  |  98  |  28.9<H>  ----------------------------<  298<H>  4.0   |  28.0  |  0.84    Ca    9.0      20 Sep 2021 08:27  Mg     2.1     09-20            Serum Pro-Brain Natriuretic Peptide: 1054 pg/mL (09-20 @ 08:27)  Serum Pro-Brain Natriuretic Peptide: 819 pg/mL (09-19 @ 09:12)  Serum Pro-Brain Natriuretic Peptide: 809 pg/mL (09-17 @ 15:08)      RADIOLOGY & ADDITIONAL STUDIES:  `< from: CT Angio Chest PE Protocol w/ IV Cont (09.17.21 @ 22:24) >  FINDINGS:    PULMONARY EMBOLISM: No pulmonary embolism..    AIRWAYS AND LUNGS: Trachealbronchial narrowing suspicious for tracheobronchomalacia.  Mosaic attenuation. No lung consolidation.    MEDIASTINUM AND PLEURA: There are no enlarged mediastinal, hilar or axillary lymph nodes. The visualized portion of the thyroid gland is unremarkable. There is no pleural effusion. There is no pneumothorax.    HEART AND VESSELS: The heart is normal in size.   There are atherosclerotic calcifications of the aorta and coronary arteries.  There is no pericardial effusion.  Coronary artery stent. Ascending aorta measures up to 4.7 cm, unchanged from the prior study. Aortic valve calcification.    UPPER ABDOMEN: Images of the upper abdomen demonstrate no abnormality.    BONES AND SOFT TISSUES: There are mild degenerative changes of the spine.  The soft tissues are unremarkable.    TUBES/LINES: None.    IMPRESSION:  No pulmonary embolism.    < end of copied text >      < from: US Duplex Venous Lower Ext Complete, Bilateral (09.18.21 @ 23:11) >  IMPRESSION:  No evidence of deep venous thrombosis in either lower extremity.    < end of copied text >    ECG: ST 121bpm, RBBB, LAFB, no acute STT changes    ECHO:  < from: TTE Echo Complete w/Doppler (01.28.20 @ 12:09) >  PHYSICIAN INTERPRETATION:  Left Ventricle: Endocardial visualization was enhanced with intravenous echo contrast. The left ventricular internal cavity size is normal. Left ventricular wall thickness is mildly increased.  Global LV systolic function was mildly decreased. Left ventricular ejection fraction, by visual estimation, is 45 to 50%. Spectral Doppler shows pseudonormal pattern of left ventricular myocardial filling (Grade II diastolic dysfunction).  Right Ventricle: The right ventricular size is mildly enlarged. RV systolic function is normal.  Left Atrium: Moderately enlarged left atrium.  Right Atrium: Severely enlarged right atrium.  Pericardium: There is no evidence of pericardial effusion. There is a significant pericardial fat pad present.  Mitral Valve: Mild thickening of the anterior and posterior mitral valve leaflets. There is mild mitral annular calcification. Mild mitral valve regurgitation is seen.  Tricuspid Valve: The tricuspid valve is normal in structure. Mild tricuspid regurgitation is visualized. Estimated pulmonary artery systolic pressure is 36.3 mmHg assuming a right atrial pressure of 8 mmHg, which is consistent with borderline pulmonary hypertension.  Aortic Valve: The aortic valve is trileaflet. Moderate to severe aortic stenosis is present. Mild aortic valve regurgitation is seen. The Dimesionless Index value is .31.  Pulmonic Valve: The pulmonic valve is normal. No indication of pulmonic valve regurgitation.  Aorta: The aortic root and ascending aorta are structurally normal, with no evidence of dilitation.  Pulmonary Artery: The main pulmonary artery is normal in size.  Venous: The inferior vena cava was dilated, with respiratory size variation greater than 50%.       Summary:   1. Endocardial visualization was enhanced with intravenous echo contrast.   2. There is mild concentric left ventricular hypertrophy.   3. Left ventricular ejection fraction, by visual estimation, 45-50%. Grade II diastolic dysfunction.   4. The right ventricular size is mildly enlarged. RV systolic function is normal.   5. Mild aortic regurgitation.   6. Based on body surface area, moderate to severe aortic valve stenosis.   7. There is no evidence of pericardial effusion.    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  He presents with 2-3 days of dyspnea at rest and with exertion.  Being treated for PNA, acute on chronic combined CHF and COPD exacerbation with improvement of his symptoms.    - Monitor on telemetry.  Has been AF/SR/likely atrial tachycardia 2:1 ~ 128bpm.  Currently 128bpm.  Will increase Lopressor to 25mg q6h.  Convert to long-acting prior to discharge.  Additional rate control agents as needed (pending repeat echo)  - Initial troponin negative.  Nothing to suggest ACS.  - CTA negative for PE, LE venous duplex negative for DVT.  - Echocardiogram 1/28/20 EF 45-50%, mild LVH, grade 2 diastolic dysfunction, RVE with normal RV systolic function, mod-severe AS based on BSA; repeat is pending  - Mild acute on chronic combined systolic/diastolic CHF.  Improving with IV diuresis with Lasix 40 mg IV daily for now.  Monitor repeat CXRs, strict I/Os, daily weights, & BUN/Cr closely and titrate PRN.  - Abx in place along with steroids.  ~54pkyear smoking history, quit a long time ago.  Suspect a degree of COPD.  - No evidence of ischemia clinically.  Continue medical management for now pending the results of the above.  Plan for eventual ischemic evaluation (likely as an outpatient) once patient is clinically stable.  - CAD s/p PCI 3/2020.  Has been maintained on Plavix and Eliquis.  As it has been > 1 year since last coronary intervention will DC Plavix and start ASA 81mg daily to be taken with Eliquis 5mg BID given his PAF with elevated CHADSVASc score.  - Continue statin therapy  - AS. If severe, will be a good candidate for TAVR and he should follow up with Dr. Mckinney as outpatient for further evaluation and work up.  - Ascending aortic aneurysm.  4.7cm on CTA.  This is stable in size. Continue BB.  - Keep K > 4, Mg > 2    We will follow with you.  Thank you for allowing me to participate in the care of your patient.      Sincerely,    Abraham Mckinney MD                                                      [facility]                                          [address], [address], [place] [address]                                               PHONE: [phone]    FAX: [phone] [address], [address], [place] [address]                                       PHONE: [phone]    FAX: [phone]  *******************************************************************************    Reason for Consult: Congestive Heart Failure    HPI:  ANIA CRISTOBAL is a 75y Male with HTN, HLD, moderate AS, chronic combined systolic/diastolic CHF (EF 45-50%), h/o DVT, paroxysmal AF on Eliquis, former smoker, obesity, anxiety, and CAD s/p orbital atherectomy and PCI of the mid and LAD with MARCO ANTNOIO x 2 on 3/6/2020.  He presents with 2-3 days of dyspnea at rest and with exertion.  He denies any fever/chills.  He has a wet, but non-productive cough.  He reports LE edema that has been present for months and is unchanged.  He also reports presence of wheezing.  Denies chest pain, palpitations, dizziness, lightheadedness, and syncope.   He has been treated with IV Abx, steroids, and IV lasix with improvement of his symptoms.    PAST MEDICAL & SURGICAL HISTORY:  Anxiety    Hypertension    Hyperlipidemia    Prostate disease    Gastroesophageal reflux disease    Gallbladder stone without cholecystitis or obstruction    BPH (benign prostatic hyperplasia)    DM (diabetes mellitus)    DVT (deep venous thrombosis)  left leg    Afib    Anxiety    HLD (hyperlipidemia)    Malignant schwannoma    H/O arthroscopy of knee    S/P laminectomy    S/P coronary artery stent placement  x2        Bactrim (Rash)  Ceftin (Hives)  penicillin (Anaphylaxis)  sulfa drugs (Unknown)      MEDICATIONS  (STANDING):  amLODIPine   Tablet 10 milliGRAM(s) Oral daily  apixaban 5 milliGRAM(s) Oral every 12 hours  aspirin enteric coated 81 milliGRAM(s) Oral daily  atorvastatin 20 milliGRAM(s) Oral at bedtime  dextrose 40% Gel 15 Gram(s) Oral once  dextrose 5%. 1000 milliLiter(s) (50 mL/Hr) IV Continuous <Continuous>  dextrose 5%. 1000 milliLiter(s) (100 mL/Hr) IV Continuous <Continuous>  dextrose 50% Injectable 25 Gram(s) IV Push once  dextrose 50% Injectable 12.5 Gram(s) IV Push once  dextrose 50% Injectable 25 Gram(s) IV Push once  finasteride 5 milliGRAM(s) Oral daily  furosemide   Injectable 40 milliGRAM(s) IV Push daily  glucagon  Injectable 1 milliGRAM(s) IntraMuscular once  influenza   Vaccine 0.5 milliLiter(s) IntraMuscular once  insulin glargine Injectable (LANTUS) 10 Unit(s) SubCutaneous at bedtime  insulin lispro (ADMELOG) corrective regimen sliding scale   SubCutaneous three times a day before meals  insulin lispro Injectable (ADMELOG) 2 Unit(s) SubCutaneous three times a day before meals  levalbuterol Inhalation 1.25 milliGRAM(s) Inhalation every 6 hours  metoprolol tartrate 25 milliGRAM(s) Oral three times a day  pantoprazole    Tablet 40 milliGRAM(s) Oral before breakfast  predniSONE   Tablet   Oral   predniSONE   Tablet 40 milliGRAM(s) Oral daily    MEDICATIONS  (PRN):  ALPRAZolam 0.25 milliGRAM(s) Oral two times a day PRN anxiety  hydrocortisone hemorrhoidal Suppository 1 Suppository(s) Rectal two times a day PRN hemorroids      Social History: no active tobacco / EtOH / IVDA    Family History: No pertinent family history in first degree relatives        ROS: As noted above.  All other systems were reviewed and are negative.    Vital Signs Last 24 Hrs  T(C): 36.4 (20 Sep 2021 10:57), Max: 36.6 (20 Sep 2021 05:00)  T(F): 97.6 (20 Sep 2021 10:57), Max: 97.9 (20 Sep 2021 05:00)  HR: 129 (20 Sep 2021 13:03) (98 - 129)  BP: 156/93 (20 Sep 2021 13:03) (122/78 - 162/92)  BP(mean): --  RR: 22 (20 Sep 2021 13:03) (18 - 22)  SpO2: 91% (20 Sep 2021 13:03) (91% - 98%)    I&O's Detail    I&O's Summary          PHYSICAL EXAM:  General: Appears well developed, well nourished, no acute distress, [place]  HEENT: Head: normocephalic, atraumatic  Eyes: Pupils equal and reactive  Neck: Supple, no carotid bruit, no JVD, + HJR  CARDIOVASCULAR: Normal S1 and S2, soft systolic murmur LSB  LUNGS: Diminished at bases, adventitious bilat, rale Rt base, + rhonchi and scattered wheeze  ABDOMEN: Obese, Soft, nontender, non-distended, positive bowel sounds, no mass or bruit  EXTREMITIES: 1+ edema to knees with chronic skin changes, distal pulses WNL  SKIN: Warm and dry with normal turgor  NEURO: Alert & oriented x 3, grossly intact  PSYCH: appropriate mood and affect    LABS:                        15.8   9.78  )-----------( 293      ( 20 Sep 2021 08:27 )             50.1     09-20    142  |  98  |  28.9<H>  ----------------------------<  298<H>  4.0   |  28.0  |  0.84    Ca    9.0      20 Sep 2021 08:27  Mg     2.1     09-20            Serum Pro-Brain Natriuretic Peptide: 1054 pg/mL (09-20 @ 08:27)  Serum Pro-Brain Natriuretic Peptide: 819 pg/mL (09-19 @ 09:12)  Serum Pro-Brain Natriuretic Peptide: 809 pg/mL (09-17 @ 15:08)      RADIOLOGY & ADDITIONAL STUDIES:  `< from: CT Angio Chest PE Protocol w/ IV Cont (09.17.21 @ 22:24) >  FINDINGS:    PULMONARY EMBOLISM: No pulmonary embolism..    AIRWAYS AND LUNGS: Trachealbronchial narrowing suspicious for tracheobronchomalacia.  Mosaic attenuation. No lung consolidation.    MEDIASTINUM AND PLEURA: There are no enlarged mediastinal, hilar or axillary lymph nodes. The visualized portion of the thyroid gland is unremarkable. There is no pleural effusion. There is no pneumothorax.    HEART AND VESSELS: The heart is normal in size.   There are atherosclerotic calcifications of the aorta and coronary arteries.  There is no pericardial effusion.  Coronary artery stent. Ascending aorta measures up to 4.7 cm, unchanged from the prior study. Aortic valve calcification.    UPPER ABDOMEN: Images of the upper abdomen demonstrate no abnormality.    BONES AND SOFT TISSUES: There are mild degenerative changes of the spine.  The soft tissues are unremarkable.    TUBES/LINES: None.    IMPRESSION:  No pulmonary embolism.    < end of copied text >      < from: US Duplex Venous Lower Ext Complete, Bilateral (09.18.21 @ 23:11) >  IMPRESSION:  No evidence of deep venous thrombosis in either lower extremity.    < end of copied text >    ECG: ST 121bpm, RBBB, LAFB, no acute STT changes    ECHO:  < from: TTE Echo Complete w/Doppler (01.28.20 @ 12:09) >  PHYSICIAN INTERPRETATION:  Left Ventricle: Endocardial visualization was enhanced with intravenous echo contrast. The left ventricular internal cavity size is normal. Left ventricular wall thickness is mildly increased.  Global LV systolic function was mildly decreased. Left ventricular ejection fraction, by visual estimation, is 45 to 50%. Spectral Doppler shows pseudonormal pattern of left ventricular myocardial filling (Grade II diastolic dysfunction).  Right Ventricle: The right ventricular size is mildly enlarged. RV systolic function is normal.  Left Atrium: Moderately enlarged left atrium.  Right Atrium: Severely enlarged right atrium.  Pericardium: There is no evidence of pericardial effusion. There is a significant pericardial fat pad present.  Mitral Valve: Mild thickening of the anterior and posterior mitral valve leaflets. There is mild mitral annular calcification. Mild mitral valve regurgitation is seen.  Tricuspid Valve: The tricuspid valve is normal in structure. Mild tricuspid regurgitation is visualized. Estimated pulmonary artery systolic pressure is 36.3 mmHg assuming a right atrial pressure of 8 mmHg, which is consistent with borderline pulmonary hypertension.  Aortic Valve: The aortic valve is trileaflet. Moderate to severe aortic stenosis is present. Mild aortic valve regurgitation is seen. The Dimesionless Index value is .31.  Pulmonic Valve: The pulmonic valve is normal. No indication of pulmonic valve regurgitation.  Aorta: The aortic root and ascending aorta are structurally normal, with no evidence of dilitation.  Pulmonary Artery: The main pulmonary artery is normal in size.  Venous: The inferior vena cava was dilated, with respiratory size variation greater than 50%.       Summary:   1. Endocardial visualization was enhanced with intravenous echo contrast.   2. There is mild concentric left ventricular hypertrophy.   3. Left ventricular ejection fraction, by visual estimation, 45-50%. Grade II diastolic dysfunction.   4. The right ventricular size is mildly enlarged. RV systolic function is normal.   5. Mild aortic regurgitation.   6. Based on body surface area, moderate to severe aortic valve stenosis.   7. There is no evidence of pericardial effusion.    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  He presents with 2-3 days of dyspnea at rest and with exertion.  Being treated for PNA, acute on chronic combined CHF and COPD exacerbation with improvement of his symptoms.    - Monitor on telemetry.  Has been AF/SR/likely atrial tachycardia 2:1 ~ 128bpm.  Currently 128bpm.  Will increase Lopressor to 25mg q6h.  Convert to long-acting prior to discharge.  Additional rate control agents as needed (pending repeat echo)  - Initial troponin negative.  Nothing to suggest ACS.  - CTA negative for PE, LE venous duplex negative for DVT.  - Echocardiogram 1/28/20 EF 45-50%, mild LVH, grade 2 diastolic dysfunction, RVE with normal RV systolic function, mod-severe AS based on BSA; repeat is pending  - Mild acute on chronic combined systolic/diastolic CHF.  Improving with IV diuresis with Lasix 40 mg IV daily for now.  Monitor repeat CXRs, strict I/Os, daily weights, & BUN/Cr closely and titrate PRN.  - Abx in place along with steroids.  ~54pkyear smoking history, quit a long time ago.  Suspect a degree of COPD.  - No evidence of ischemia clinically.  Continue medical management for now pending the results of the above.  Plan for eventual ischemic evaluation (likely as an outpatient) once patient is clinically stable.  - CAD s/p PCI 3/2020.  Has been maintained on Plavix and Eliquis.  As it has been > 1 year since last coronary intervention will DC Plavix and start ASA 81mg daily to be taken with Eliquis 5mg BID given his PAF with elevated CHADSVASc score.  - Continue statin therapy  - Continue Norvasc 10 for now.  Pending EF, may choose to convert to ACE/ARB.  - AS. If severe, will be a good candidate for TAVR and he should follow up with Dr. Mckinney as outpatient for further evaluation and work up.  - Ascending aortic aneurysm.  4.7cm on CTA.  This is stable in size. Continue BB.  - Keep K > 4, Mg > 2    We will follow with you.  Thank you for allowing me to participate in the care of your patient.      Sincerely,    Abraham Mckinney MD

## 2021-09-21 LAB
ANION GAP SERPL CALC-SCNC: 13 MMOL/L — SIGNIFICANT CHANGE UP (ref 5–17)
BASOPHILS # BLD AUTO: 0.02 K/UL — SIGNIFICANT CHANGE UP (ref 0–0.2)
BASOPHILS NFR BLD AUTO: 0.2 % — SIGNIFICANT CHANGE UP (ref 0–2)
BUN SERPL-MCNC: 32.8 MG/DL — HIGH (ref 8–20)
CALCIUM SERPL-MCNC: 9.3 MG/DL — SIGNIFICANT CHANGE UP (ref 8.6–10.2)
CHLORIDE SERPL-SCNC: 98 MMOL/L — SIGNIFICANT CHANGE UP (ref 98–107)
CO2 SERPL-SCNC: 28 MMOL/L — SIGNIFICANT CHANGE UP (ref 22–29)
CREAT SERPL-MCNC: 0.66 MG/DL — SIGNIFICANT CHANGE UP (ref 0.5–1.3)
EOSINOPHIL # BLD AUTO: 0.04 K/UL — SIGNIFICANT CHANGE UP (ref 0–0.5)
EOSINOPHIL NFR BLD AUTO: 0.4 % — SIGNIFICANT CHANGE UP (ref 0–6)
GLUCOSE BLDC GLUCOMTR-MCNC: 257 MG/DL — HIGH (ref 70–99)
GLUCOSE BLDC GLUCOMTR-MCNC: 263 MG/DL — HIGH (ref 70–99)
GLUCOSE BLDC GLUCOMTR-MCNC: 286 MG/DL — HIGH (ref 70–99)
GLUCOSE BLDC GLUCOMTR-MCNC: 306 MG/DL — HIGH (ref 70–99)
GLUCOSE BLDC GLUCOMTR-MCNC: 339 MG/DL — HIGH (ref 70–99)
GLUCOSE SERPL-MCNC: 237 MG/DL — HIGH (ref 70–99)
HCT VFR BLD CALC: 50.6 % — HIGH (ref 39–50)
HGB BLD-MCNC: 16.7 G/DL — SIGNIFICANT CHANGE UP (ref 13–17)
IMM GRANULOCYTES NFR BLD AUTO: 0.3 % — SIGNIFICANT CHANGE UP (ref 0–1.5)
LYMPHOCYTES # BLD AUTO: 1.36 K/UL — SIGNIFICANT CHANGE UP (ref 1–3.3)
LYMPHOCYTES # BLD AUTO: 13.1 % — SIGNIFICANT CHANGE UP (ref 13–44)
MAGNESIUM SERPL-MCNC: 2 MG/DL — SIGNIFICANT CHANGE UP (ref 1.8–2.6)
MCHC RBC-ENTMCNC: 29.7 PG — SIGNIFICANT CHANGE UP (ref 27–34)
MCHC RBC-ENTMCNC: 33 GM/DL — SIGNIFICANT CHANGE UP (ref 32–36)
MCV RBC AUTO: 89.9 FL — SIGNIFICANT CHANGE UP (ref 80–100)
MONOCYTES # BLD AUTO: 1.19 K/UL — HIGH (ref 0–0.9)
MONOCYTES NFR BLD AUTO: 11.4 % — SIGNIFICANT CHANGE UP (ref 2–14)
NEUTROPHILS # BLD AUTO: 7.77 K/UL — HIGH (ref 1.8–7.4)
NEUTROPHILS NFR BLD AUTO: 74.6 % — SIGNIFICANT CHANGE UP (ref 43–77)
PLATELET # BLD AUTO: 278 K/UL — SIGNIFICANT CHANGE UP (ref 150–400)
POTASSIUM SERPL-MCNC: 3.7 MMOL/L — SIGNIFICANT CHANGE UP (ref 3.5–5.3)
POTASSIUM SERPL-SCNC: 3.7 MMOL/L — SIGNIFICANT CHANGE UP (ref 3.5–5.3)
RBC # BLD: 5.63 M/UL — SIGNIFICANT CHANGE UP (ref 4.2–5.8)
RBC # FLD: 12.9 % — SIGNIFICANT CHANGE UP (ref 10.3–14.5)
SODIUM SERPL-SCNC: 139 MMOL/L — SIGNIFICANT CHANGE UP (ref 135–145)
WBC # BLD: 10.41 K/UL — SIGNIFICANT CHANGE UP (ref 3.8–10.5)
WBC # FLD AUTO: 10.41 K/UL — SIGNIFICANT CHANGE UP (ref 3.8–10.5)

## 2021-09-21 PROCEDURE — 99232 SBSQ HOSP IP/OBS MODERATE 35: CPT

## 2021-09-21 RX ORDER — METOPROLOL TARTRATE 50 MG
50 TABLET ORAL EVERY 8 HOURS
Refills: 0 | Status: DISCONTINUED | OUTPATIENT
Start: 2021-09-21 | End: 2021-09-22

## 2021-09-21 RX ORDER — INSULIN GLARGINE 100 [IU]/ML
18 INJECTION, SOLUTION SUBCUTANEOUS AT BEDTIME
Refills: 0 | Status: DISCONTINUED | OUTPATIENT
Start: 2021-09-21 | End: 2021-09-22

## 2021-09-21 RX ORDER — INSULIN LISPRO 100/ML
7 VIAL (ML) SUBCUTANEOUS
Refills: 0 | Status: DISCONTINUED | OUTPATIENT
Start: 2021-09-21 | End: 2021-09-22

## 2021-09-21 RX ORDER — METOPROLOL TARTRATE 50 MG
25 TABLET ORAL ONCE
Refills: 0 | Status: COMPLETED | OUTPATIENT
Start: 2021-09-21 | End: 2021-09-21

## 2021-09-21 RX ADMIN — Medication 5 UNIT(S): at 13:38

## 2021-09-21 RX ADMIN — PANTOPRAZOLE SODIUM 40 MILLIGRAM(S): 20 TABLET, DELAYED RELEASE ORAL at 06:34

## 2021-09-21 RX ADMIN — Medication 40 MILLIGRAM(S): at 06:33

## 2021-09-21 RX ADMIN — APIXABAN 5 MILLIGRAM(S): 2.5 TABLET, FILM COATED ORAL at 19:23

## 2021-09-21 RX ADMIN — Medication 25 MILLIGRAM(S): at 15:53

## 2021-09-21 RX ADMIN — Medication 81 MILLIGRAM(S): at 13:43

## 2021-09-21 RX ADMIN — FINASTERIDE 5 MILLIGRAM(S): 5 TABLET, FILM COATED ORAL at 13:43

## 2021-09-21 RX ADMIN — Medication 5 UNIT(S): at 09:15

## 2021-09-21 RX ADMIN — Medication 7 UNIT(S): at 17:31

## 2021-09-21 RX ADMIN — APIXABAN 5 MILLIGRAM(S): 2.5 TABLET, FILM COATED ORAL at 06:33

## 2021-09-21 RX ADMIN — Medication 1 SPRAY(S): at 17:35

## 2021-09-21 RX ADMIN — Medication 3: at 09:13

## 2021-09-21 RX ADMIN — Medication 4: at 17:31

## 2021-09-21 RX ADMIN — LEVALBUTEROL 1.25 MILLIGRAM(S): 1.25 SOLUTION, CONCENTRATE RESPIRATORY (INHALATION) at 20:27

## 2021-09-21 RX ADMIN — Medication 4: at 13:37

## 2021-09-21 RX ADMIN — Medication 0.25 MILLIGRAM(S): at 13:52

## 2021-09-21 RX ADMIN — INSULIN GLARGINE 18 UNIT(S): 100 INJECTION, SOLUTION SUBCUTANEOUS at 22:35

## 2021-09-21 RX ADMIN — Medication 25 MILLIGRAM(S): at 06:34

## 2021-09-21 RX ADMIN — LEVALBUTEROL 1.25 MILLIGRAM(S): 1.25 SOLUTION, CONCENTRATE RESPIRATORY (INHALATION) at 14:51

## 2021-09-21 RX ADMIN — LEVALBUTEROL 1.25 MILLIGRAM(S): 1.25 SOLUTION, CONCENTRATE RESPIRATORY (INHALATION) at 08:42

## 2021-09-21 RX ADMIN — TAMSULOSIN HYDROCHLORIDE 0.8 MILLIGRAM(S): 0.4 CAPSULE ORAL at 22:37

## 2021-09-21 RX ADMIN — AMLODIPINE BESYLATE 10 MILLIGRAM(S): 2.5 TABLET ORAL at 06:34

## 2021-09-21 RX ADMIN — Medication 1 SPRAY(S): at 06:37

## 2021-09-21 RX ADMIN — Medication 25 MILLIGRAM(S): at 13:43

## 2021-09-21 RX ADMIN — Medication 50 MILLIGRAM(S): at 22:36

## 2021-09-21 RX ADMIN — Medication 0.25 MILLIGRAM(S): at 22:35

## 2021-09-21 RX ADMIN — ATORVASTATIN CALCIUM 20 MILLIGRAM(S): 80 TABLET, FILM COATED ORAL at 22:36

## 2021-09-21 NOTE — PROGRESS NOTE ADULT - ATTENDING COMMENTS
I have personally seen, examined and participated in the care of this patient. I have reviewed all pertinent clinical information, including history, physical exam, plan and agree with the above.   cont iv lasix. d/w diabetic specialist, will plan to dc on sglt2 inhibitor.

## 2021-09-21 NOTE — PROGRESS NOTE ADULT - SUBJECTIVE AND OBJECTIVE BOX
CC: sob (21 Sep 2021 08:19)    HPI:  75y Male with PMHx of Atrial fibrillation,dvt on eliquis, HTN, DM, BPH s/p TURP, and arthritis,diabetes; on metformin, Hyperlipidemia, schwannoma s/p surgery 1992, and radiation in 1998, spinal surgery 7 years ago,hx cad stent on plavix,off asa by cardiology , presents to ED c/o 2 days of SOB, wheezing, dry  cough, and rhinorrhea.     INTERVAL HPI/OVERNIGHT EVENTS: Patient seen and examined sitting up in bed.  Patient reports still with congestion.  Patient denies any headache, dizziness, CP, abdominal pain, nausea, vomiting, dysuria.  Other ROS reviewed and are negative.    Vital Signs Last 24 Hrs  T(C): 37.1 (21 Sep 2021 11:45), Max: 37.1 (21 Sep 2021 11:45)  T(F): 98.8 (21 Sep 2021 11:45), Max: 98.8 (21 Sep 2021 11:45)  HR: 102 (21 Sep 2021 14:57) (95 - 127)  BP: 114/74 (21 Sep 2021 13:40) (100/58 - 145/89)  BP(mean): --  RR: 20 (21 Sep 2021 11:45) (18 - 20)  SpO2: 92% (21 Sep 2021 14:57) (92% - 98%)  I&O's Detail    21 Sep 2021 07:01  -  21 Sep 2021 15:32  --------------------------------------------------------  IN:  Total IN: 0 mL    OUT:    Blood Loss (mL): 1 mL  Total OUT: 1 mL    Total NET: -1 mL    PHYSICAL EXAM:  GENERAL: NAD  HEAD:  Atraumatic, Normocephalic  NECK: Supple, No JVD, Normal thyroid  NERVOUS SYSTEM:  Alert & Oriented X3, Good concentration; Motor Strength 5/5 B/L upper and lower extremities  CHEST/LUNG: Coarse BS bilaterally  HEART: Regular rate and rhythm; No murmurs, rubs, or gallops  ABDOMEN: Soft, Nontender, obese; Bowel sounds present  EXTREMITIES:  2+ Peripheral Pulses                            16.7   10.41 )-----------( 278      ( 21 Sep 2021 08:10 )             50.6     21 Sep 2021 08:10    139    |  98     |  32.8   ----------------------------<  237    3.7     |  28.0   |  0.66     Ca    9.3        21 Sep 2021 08:10  Mg     2.0       21 Sep 2021 08:10        CAPILLARY BLOOD GLUCOSE  POCT Blood Glucose.: 339 mg/dL (21 Sep 2021 12:54)  POCT Blood Glucose.: 263 mg/dL (21 Sep 2021 09:08)  POCT Blood Glucose.: 281 mg/dL (20 Sep 2021 23:46)  POCT Blood Glucose.: 286 mg/dL (20 Sep 2021 22:53)  POCT Blood Glucose.: 313 mg/dL (20 Sep 2021 17:21)          MEDICATIONS  (STANDING):  amLODIPine   Tablet 10 milliGRAM(s) Oral daily  apixaban 5 milliGRAM(s) Oral every 12 hours  aspirin enteric coated 81 milliGRAM(s) Oral daily  atorvastatin 20 milliGRAM(s) Oral at bedtime  dextrose 40% Gel 15 Gram(s) Oral once  dextrose 5%. 1000 milliLiter(s) (50 mL/Hr) IV Continuous <Continuous>  dextrose 5%. 1000 milliLiter(s) (100 mL/Hr) IV Continuous <Continuous>  dextrose 50% Injectable 25 Gram(s) IV Push once  dextrose 50% Injectable 12.5 Gram(s) IV Push once  dextrose 50% Injectable 25 Gram(s) IV Push once  finasteride 5 milliGRAM(s) Oral daily  fluticasone propionate 50 MICROgram(s)/spray Nasal Spray 1 Spray(s) Both Nostrils two times a day  furosemide   Injectable 40 milliGRAM(s) IV Push daily  glucagon  Injectable 1 milliGRAM(s) IntraMuscular once  influenza   Vaccine 0.5 milliLiter(s) IntraMuscular once  insulin glargine Injectable (LANTUS) 13 Unit(s) SubCutaneous at bedtime  insulin lispro (ADMELOG) corrective regimen sliding scale   SubCutaneous three times a day before meals  insulin lispro Injectable (ADMELOG) 5 Unit(s) SubCutaneous three times a day before meals  levalbuterol Inhalation 1.25 milliGRAM(s) Inhalation every 6 hours  metoprolol tartrate 25 milliGRAM(s) Oral every 6 hours  pantoprazole    Tablet 40 milliGRAM(s) Oral before breakfast  predniSONE   Tablet   Oral   tamsulosin 0.8 milliGRAM(s) Oral at bedtime    MEDICATIONS  (PRN):  ALPRAZolam 0.25 milliGRAM(s) Oral two times a day PRN anxiety  hydrocortisone hemorrhoidal Suppository 1 Suppository(s) Rectal two times a day PRN hemorroids      RADIOLOGY & ADDITIONAL TESTS:  < from: Xray Chest 1 View- PORTABLE-Routine (Xray Chest 1 View- PORTABLE-Routine in AM.) (09.19.21 @ 02:23) >   EXAM:  XR CHEST PORTABLE ROUTINE 1V                          PROCEDURE DATE:  09/19/2021          INTERPRETATION:  HISTORY: Admitting Dxs: U07.1 RESPIRATORY DISTRESS; chf, enterovirus, dm; Shortness of Breath;  TECHNIQUE: Portable frontal view of the chest, 1 view.  COMPARISON: 9/17/2021.  FINDINGS:    HEART:  Enlarged  LUNGS: Mild congestion. No effusion or infiltrate. No pneumothorax  BONES: within normal limits        IMPRESSION:    Mild congestion    --- End of Report ---            TRI KRISHNA MD; Attending Interventional Radiologist  This document has been electronically signed. Sep 19 2021 10:44AM    < end of copied text >

## 2021-09-21 NOTE — PROGRESS NOTE ADULT - ASSESSMENT
75y/oM PMH afib on Eliquis, hx DVT, HTN, DM, BPH s/p TURP, HLD, CAD on plavix, presenting to ER with 2 days SOB/ CRUZ, cough, rhinorrhea admitted with acute on chronic diastolic CHF     Dyspnea likely multifactorial due to   Acute on chronic diastolic HF   Entero/rhinovirus   -cont IV lasix   -cardio recs appreciated   -CXR: mild pulmonary venous congestion   -CTA: no PE   -TTE with EF 60-65%, mod-sev AS  -strict I/Os   -daily weights   -titrating steroids   -cont nebs --> changed to Xopenex 9/19     paroxysmal afib   CAD s/p stent   AS  HTN, HLD   -cont Eliquis   -cont home meds: Plavix discontinued, ASA restarted, BB, amlodipine, statin     DM  -hgba1c 9.3   -increase lantus again today 9/21, ISS   -diabetic specialist consult     BPH   -cont home meds     dvt ppx: on Eliquis    75y/oM PMH afib on Eliquis, hx DVT, HTN, DM, BPH s/p TURP, HLD, CAD on plavix, presenting to ER with 2 days SOB/ CRUZ, cough, rhinorrhea admitted with acute on chronic diastolic CHF     Dyspnea likely multifactorial due to   Acute on chronic diastolic HF   Entero/rhinovirus   -cont IV lasix   -cardio recs appreciated   -CXR: mild pulmonary venous congestion   -CTA: no PE   -TTE with EF 60-65%, mod-sev AS  -strict I/Os   -daily weights   -titrating steroids   -cont nebs --> changed to Xopenex 9/19     paroxysmal afib   CAD s/p stent   AS  HTN, HLD   -cont Eliquis   -cont home meds: Plavix discontinued, ASA restarted, BB - increased to 50mg TID, amlodipine, statin     DM  -hgba1c 9.3   -increase lantus again today 9/21, ISS   -diabetic specialist consult     BPH   -cont home meds     dvt ppx: on Eliquis

## 2021-09-21 NOTE — PROGRESS NOTE ADULT - SUBJECTIVE AND OBJECTIVE BOX
[facility]                                                    [address], [address], [place] [address]                                                         PHONE: [phone]    FAX: [phone] [address], [address], [place] [address]                                                 PHONE: [phone]    FAX: [phone]  *******************************************************************************  cc: unstable angina    HPI:ANIA CRISTOBAL is a 75y Male with HTN, HLD, moderate AS, chronic combined systolic/diastolic CHF (EF 45-50%), h/o DVT, paroxysmal AF on Eliquis, former smoker, obesity, anxiety, and CAD s/p orbital atherectomy and PCI of the mid and LAD with MARCO ANTONIO x 2 on 3/6/2020.  He presents with 2-3 days of dyspnea at rest and with exertion.  He denies any fever/chills.  He has a wet, but non-productive cough.  He reports LE edema that has been present for months and is unchanged.  He also reports presence of wheezing.  Denies chest pain, palpitations, dizziness, lightheadedness, and syncope.   He has been treated with IV Abx, steroids, and IV lasix with improvement of his symptoms.      Overnight events/Subjective Assessment: mild light headedness. No CP or SOB. No fever, chills or constitutional sx    INTERPRETATION OF TELEMETRY (personally reviewed): ST RBBB. PAF  PAST MEDICAL & SURGICAL HISTORY:  Anxiety    Hypertension    Hyperlipidemia    Prostate disease    Gastroesophageal reflux disease    Gallbladder stone without cholecystitis or obstruction    BPH (benign prostatic hyperplasia)    DM (diabetes mellitus)    DVT (deep venous thrombosis)  left leg    Afib    Anxiety    HLD (hyperlipidemia)    Malignant schwannoma    H/O arthroscopy of knee    S/P laminectomy    S/P coronary artery stent placement  x2        Bactrim (Rash)  Ceftin (Hives)  penicillin (Anaphylaxis)  sulfa drugs (Unknown)      MEDICATIONS  (STANDING):  amLODIPine   Tablet 10 milliGRAM(s) Oral daily  apixaban 5 milliGRAM(s) Oral every 12 hours  aspirin enteric coated 81 milliGRAM(s) Oral daily  atorvastatin 20 milliGRAM(s) Oral at bedtime  dextrose 40% Gel 15 Gram(s) Oral once  dextrose 5%. 1000 milliLiter(s) (100 mL/Hr) IV Continuous <Continuous>  dextrose 5%. 1000 milliLiter(s) (50 mL/Hr) IV Continuous <Continuous>  dextrose 50% Injectable 25 Gram(s) IV Push once  dextrose 50% Injectable 12.5 Gram(s) IV Push once  dextrose 50% Injectable 25 Gram(s) IV Push once  finasteride 5 milliGRAM(s) Oral daily  fluticasone propionate 50 MICROgram(s)/spray Nasal Spray 1 Spray(s) Both Nostrils two times a day  furosemide   Injectable 40 milliGRAM(s) IV Push daily  glucagon  Injectable 1 milliGRAM(s) IntraMuscular once  influenza   Vaccine 0.5 milliLiter(s) IntraMuscular once  insulin glargine Injectable (LANTUS) 13 Unit(s) SubCutaneous at bedtime  insulin lispro (ADMELOG) corrective regimen sliding scale   SubCutaneous three times a day before meals  insulin lispro Injectable (ADMELOG) 5 Unit(s) SubCutaneous three times a day before meals  levalbuterol Inhalation 1.25 milliGRAM(s) Inhalation every 6 hours  metoprolol tartrate 25 milliGRAM(s) Oral every 6 hours  pantoprazole    Tablet 40 milliGRAM(s) Oral before breakfast  predniSONE   Tablet   Oral   tamsulosin 0.8 milliGRAM(s) Oral at bedtime    MEDICATIONS  (PRN):  ALPRAZolam 0.25 milliGRAM(s) Oral two times a day PRN anxiety  hydrocortisone hemorrhoidal Suppository 1 Suppository(s) Rectal two times a day PRN hemorroids      Vital Signs Last 24 Hrs  T(C): 36.9 (21 Sep 2021 04:45), Max: 36.9 (21 Sep 2021 04:45)  T(F): 98.4 (21 Sep 2021 04:45), Max: 98.4 (21 Sep 2021 04:45)  HR: 95 (21 Sep 2021 04:45) (95 - 129)  BP: 135/82 (21 Sep 2021 04:45) (119/70 - 162/92)  BP(mean): --  RR: 19 (21 Sep 2021 04:45) (18 - 22)  SpO2: 95% (21 Sep 2021 04:45) (91% - 96%)    I&O's Detail    I&O's Summary          PHYSICAL EXAM:  General: Appears well developed, well nourished, no acute distress. not in acute pain  HEAD: normal cephalic. Atraumatic. obese  PUPILS: equal and reactive to light  EARS: normal hearing  NECK: supple. no JVD or HJR. no carotid bruits. no visible lymphadenopathy  NOSE: no gross abnormalities  CHEST: symmetric chest wall expansion  CARDIOVASCULAR: Normal rate. Regular rhythm. Normal S1 and S2, no S3/S4,  no murmur, rub, or gallop  LUNGS: Normal effort. Normal respiratory rate. Breath sounds with diffuse expiratory wheeze. No respiratory distress. No stridor.  no rales, rhonchi  ABDOMEN: Soft, nontender, non-distended, positive bowel sounds, no mass or bruit. no abdominal tenderness. No rebound. no ascites  EXTREMITIES: No clubbing, cyanosis. 1+ bilat LE edema. normal range of motion  PULSES:  distal pulses WNL  SKIN: Warm and dry with normal turgor. no visible rash or cyanosis   NEURO: Alert & oriented x 3, grossly intact with no focal weakness  PSYCH: normal mood and affect. Grossly normal insight and judgement exhibited    FAMILY HISTORY:  No pertinent family history in first degree relatives        SOCIAL HISTORY:  no active smoking. No ETOH/No IVDA    REVIEW OF SYSTEMS:  All pertinent positive and negative ROS as above. All other ROS are negative        LABS:                        15.8   9.78  )-----------( 293      ( 20 Sep 2021 08:27 )             50.1     09-20    142  |  98  |  28.9<H>  ----------------------------<  298<H>  4.0   |  28.0  |  0.84    Ca    9.0      20 Sep 2021 08:27  Mg     2.1     09-20            Serum Pro-Brain Natriuretic Peptide: 1054 pg/mL (09-20 @ 08:27)  Serum Pro-Brain Natriuretic Peptide: 819 pg/mL (09-19 @ 09:12)  Serum Pro-Brain Natriuretic Peptide: 809 pg/mL (09-17 @ 15:08)  serum  Lipids:         RADIOLOGY & ADDITIONAL STUDIES:    ECG:  RBBB    ECHO: < from: TTE Echo Complete w/o Contrast w/ Doppler (09.20.21 @ 12:24) >  Summary:   1. Technically difficult study.   2. Left ventricular ejection fraction, by visual estimation, is 60 to 65%.   3. Normal global left ventricular systolic function.   4. There is mild concentric left ventricular hypertrophy.   5. The mitral in-flow pattern reveals no discernable A-wave, therefore no comment on diastolic function can be made.   6. Normal right ventricular size and function.   7. Estimated pulmonary artery systolic pressure is 40.4 mmHg assuming a right atrial pressure of 15 mmHg, which is consistent with mild pulmonary hypertension.   8. Right atrial enlargement.   9. Moderately enlarged left atrium.  10. Mild mitral annular calcification.  11. Mild mitral valve regurgitation.  12. Mild tricuspid regurgitation.  13. Moderate to severe aortic valve stenosis.  14. Endocardial visualization was enhanced with intravenous echo contrast.  15. Peak transaortic gradient equals 38.6 mmHg, mean transaortic gradient equals 16.8 mmHg, the calculated aortic valve area equals 1.05 cm² by the continuity equation consistent with moderate aortic stenosis.  16. Aortic root measured at Sinus of Valsalva is dilated 4.3 cm.  17. There is no evidence of pericardial effusion.  18. Compared to the prior TTE study from 1/28/20, LV systolic funnction/EF have improved, rhythm replaced with atrial fibrillation.    < end of copied text >      < from: Xray Chest 1 View- PORTABLE-Routine (Xray Chest 1 View- PORTABLE-Routine in AM.) (09.19.21 @ 02:23) >  IMPRESSION:    Mild congestion    < end of copied text >      ASSESSMENT AND PLAN:  In summary, ANIA CRISTOBAL is a 75y Male with past medical history significant for HTN, HLD, moderate AS, chronic combined systolic/diastolic CHF (EF 45-50%), h/o DVT, paroxysmal AF on Eliquis, former smoker, obesity, anxiety, and CAD s/p orbital atherectomy and PCI of the mid and LAD with MARCO ANTONIO x 2 on 3/6/2020.  He presents with 2-3 days of dyspnea at rest and with exertion.  Being treated for PNA, acute on chronic combined CHF and COPD exacerbation with improvement of his symptoms.    - Monitor on telemetry.  Has been PAF/SR/likely atrial tachycardia 2:1 ~ 130bpm.  Currently 128bpm.  Will increase Lopressor to 50mg q8h.  Convert to long-acting prior to discharge.   - Initial troponin negative.  Nothing to suggest ACS.  - CTA negative for PE, LE venous duplex negative for DVT.  - Echocardiogram 1/28/20 EF 45-50%, mild LVH, grade 2 diastolic dysfunction, RVE with normal RV systolic function, mod-severe AS based on BSA; repeat echo normal EF with mod to severe aortic stenosis. CIELO 1. Will address as an outpt  - Mild acute on chronic combined systolic/diastolic CHF.  Improving with IV diuresis with Lasix 40 mg IV daily for now.  Monitor repeat CXRs, strict I/Os, daily weights, & BUN/Cr closely and titrate PRN.. Continue diuresis for now  - Abx in place along with steroids.  ~54pkyear smoking history, quit a long time ago.  Suspect a degree of COPD. expiratory wheeze  - No evidence of ischemia clinically.  Continue medical management for now pending the results of the above.  Plan for eventual ischemic evaluation (likely as an outpatient) once patient is clinically stable.  - CAD s/p PCI 3/2020.  Has been maintained on Plavix and Eliquis.  As it has been > 1 year since last coronary intervention will DC Plavix and start ASA 81mg daily to be taken with Eliquis 5mg BID given his PAF with elevated CHADSVASc score.  - HL. Continue atorvastatin 20mg daily   - HTN. Continue metoprolol and  Norvasc 10 for now.    - non rheumatic AS. Moderate to severe. will consider TAVR in the outpt setting and he should follow up with Dr. Mckinney as outpatient for further evaluation and work up.  - Ascending aortic aneurysm.  4.7cm on CTA.  This is stable in size. Continue BB.  - T2DM. as per medical  - COPD. asper medical  - Keep K > 4, Mg > 2  - Telemetry monitoring personally reviewed by me. as above  - ECG personally reviewed by me  - Echocardiogram imaging personally reviewed by me  - radiologic imaging reviewed  - Laboratory data reviewed.  - I have personally reviewed all obtainable prior records and data        Laura Elias MD

## 2021-09-22 LAB
ANION GAP SERPL CALC-SCNC: 16 MMOL/L — SIGNIFICANT CHANGE UP (ref 5–17)
BASOPHILS # BLD AUTO: 0.02 K/UL — SIGNIFICANT CHANGE UP (ref 0–0.2)
BASOPHILS NFR BLD AUTO: 0.2 % — SIGNIFICANT CHANGE UP (ref 0–2)
BUN SERPL-MCNC: 32.1 MG/DL — HIGH (ref 8–20)
CALCIUM SERPL-MCNC: 8.9 MG/DL — SIGNIFICANT CHANGE UP (ref 8.6–10.2)
CHLORIDE SERPL-SCNC: 97 MMOL/L — LOW (ref 98–107)
CO2 SERPL-SCNC: 27 MMOL/L — SIGNIFICANT CHANGE UP (ref 22–29)
CREAT SERPL-MCNC: 0.84 MG/DL — SIGNIFICANT CHANGE UP (ref 0.5–1.3)
CULTURE RESULTS: SIGNIFICANT CHANGE UP
CULTURE RESULTS: SIGNIFICANT CHANGE UP
EOSINOPHIL # BLD AUTO: 0.21 K/UL — SIGNIFICANT CHANGE UP (ref 0–0.5)
EOSINOPHIL NFR BLD AUTO: 2.1 % — SIGNIFICANT CHANGE UP (ref 0–6)
GLUCOSE BLDC GLUCOMTR-MCNC: 249 MG/DL — HIGH (ref 70–99)
GLUCOSE BLDC GLUCOMTR-MCNC: 293 MG/DL — HIGH (ref 70–99)
GLUCOSE BLDC GLUCOMTR-MCNC: 306 MG/DL — HIGH (ref 70–99)
GLUCOSE BLDC GLUCOMTR-MCNC: 363 MG/DL — HIGH (ref 70–99)
GLUCOSE SERPL-MCNC: 232 MG/DL — HIGH (ref 70–99)
HCT VFR BLD CALC: 49.2 % — SIGNIFICANT CHANGE UP (ref 39–50)
HCT VFR BLD CALC: 50.2 % — HIGH (ref 39–50)
HGB BLD-MCNC: 16.4 G/DL — SIGNIFICANT CHANGE UP (ref 13–17)
HGB BLD-MCNC: 16.6 G/DL — SIGNIFICANT CHANGE UP (ref 13–17)
IMM GRANULOCYTES NFR BLD AUTO: 0.6 % — SIGNIFICANT CHANGE UP (ref 0–1.5)
LYMPHOCYTES # BLD AUTO: 1.21 K/UL — SIGNIFICANT CHANGE UP (ref 1–3.3)
LYMPHOCYTES # BLD AUTO: 11.8 % — LOW (ref 13–44)
MAGNESIUM SERPL-MCNC: 1.9 MG/DL — SIGNIFICANT CHANGE UP (ref 1.6–2.6)
MCHC RBC-ENTMCNC: 28.8 PG — SIGNIFICANT CHANGE UP (ref 27–34)
MCHC RBC-ENTMCNC: 30 PG — SIGNIFICANT CHANGE UP (ref 27–34)
MCHC RBC-ENTMCNC: 32.7 GM/DL — SIGNIFICANT CHANGE UP (ref 32–36)
MCHC RBC-ENTMCNC: 33.7 GM/DL — SIGNIFICANT CHANGE UP (ref 32–36)
MCV RBC AUTO: 88.2 FL — SIGNIFICANT CHANGE UP (ref 80–100)
MCV RBC AUTO: 89 FL — SIGNIFICANT CHANGE UP (ref 80–100)
MONOCYTES # BLD AUTO: 1.17 K/UL — HIGH (ref 0–0.9)
MONOCYTES NFR BLD AUTO: 11.4 % — SIGNIFICANT CHANGE UP (ref 2–14)
NEUTROPHILS # BLD AUTO: 7.57 K/UL — HIGH (ref 1.8–7.4)
NEUTROPHILS NFR BLD AUTO: 73.9 % — SIGNIFICANT CHANGE UP (ref 43–77)
NT-PROBNP SERPL-SCNC: 572 PG/ML — HIGH (ref 0–300)
PLATELET # BLD AUTO: 259 K/UL — SIGNIFICANT CHANGE UP (ref 150–400)
PLATELET # BLD AUTO: 267 K/UL — SIGNIFICANT CHANGE UP (ref 150–400)
POTASSIUM SERPL-MCNC: 3.6 MMOL/L — SIGNIFICANT CHANGE UP (ref 3.5–5.3)
POTASSIUM SERPL-SCNC: 3.6 MMOL/L — SIGNIFICANT CHANGE UP (ref 3.5–5.3)
RBC # BLD: 5.53 M/UL — SIGNIFICANT CHANGE UP (ref 4.2–5.8)
RBC # BLD: 5.69 M/UL — SIGNIFICANT CHANGE UP (ref 4.2–5.8)
RBC # FLD: 12.8 % — SIGNIFICANT CHANGE UP (ref 10.3–14.5)
RBC # FLD: 12.9 % — SIGNIFICANT CHANGE UP (ref 10.3–14.5)
SODIUM SERPL-SCNC: 140 MMOL/L — SIGNIFICANT CHANGE UP (ref 135–145)
SPECIMEN SOURCE: SIGNIFICANT CHANGE UP
SPECIMEN SOURCE: SIGNIFICANT CHANGE UP
WBC # BLD: 10.24 K/UL — SIGNIFICANT CHANGE UP (ref 3.8–10.5)
WBC # BLD: 10.34 K/UL — SIGNIFICANT CHANGE UP (ref 3.8–10.5)
WBC # FLD AUTO: 10.24 K/UL — SIGNIFICANT CHANGE UP (ref 3.8–10.5)
WBC # FLD AUTO: 10.34 K/UL — SIGNIFICANT CHANGE UP (ref 3.8–10.5)

## 2021-09-22 PROCEDURE — 99232 SBSQ HOSP IP/OBS MODERATE 35: CPT

## 2021-09-22 PROCEDURE — 71045 X-RAY EXAM CHEST 1 VIEW: CPT | Mod: 26

## 2021-09-22 RX ORDER — INSULIN GLARGINE 100 [IU]/ML
22 INJECTION, SOLUTION SUBCUTANEOUS AT BEDTIME
Refills: 0 | Status: DISCONTINUED | OUTPATIENT
Start: 2021-09-22 | End: 2021-09-24

## 2021-09-22 RX ORDER — INSULIN LISPRO 100/ML
4 VIAL (ML) SUBCUTANEOUS ONCE
Refills: 0 | Status: COMPLETED | OUTPATIENT
Start: 2021-09-22 | End: 2021-09-22

## 2021-09-22 RX ORDER — INSULIN LISPRO 100/ML
10 VIAL (ML) SUBCUTANEOUS
Refills: 0 | Status: DISCONTINUED | OUTPATIENT
Start: 2021-09-22 | End: 2021-09-24

## 2021-09-22 RX ORDER — FUROSEMIDE 40 MG
40 TABLET ORAL DAILY
Refills: 0 | Status: DISCONTINUED | OUTPATIENT
Start: 2021-09-22 | End: 2021-09-23

## 2021-09-22 RX ORDER — METOPROLOL TARTRATE 50 MG
50 TABLET ORAL EVERY 6 HOURS
Refills: 0 | Status: DISCONTINUED | OUTPATIENT
Start: 2021-09-22 | End: 2021-09-24

## 2021-09-22 RX ADMIN — Medication 50 MILLIGRAM(S): at 06:50

## 2021-09-22 RX ADMIN — ATORVASTATIN CALCIUM 20 MILLIGRAM(S): 80 TABLET, FILM COATED ORAL at 22:02

## 2021-09-22 RX ADMIN — PANTOPRAZOLE SODIUM 40 MILLIGRAM(S): 20 TABLET, DELAYED RELEASE ORAL at 06:49

## 2021-09-22 RX ADMIN — Medication 30 MILLIGRAM(S): at 06:50

## 2021-09-22 RX ADMIN — Medication 10 UNIT(S): at 17:46

## 2021-09-22 RX ADMIN — APIXABAN 5 MILLIGRAM(S): 2.5 TABLET, FILM COATED ORAL at 06:50

## 2021-09-22 RX ADMIN — LEVALBUTEROL 1.25 MILLIGRAM(S): 1.25 SOLUTION, CONCENTRATE RESPIRATORY (INHALATION) at 14:43

## 2021-09-22 RX ADMIN — Medication 50 MILLIGRAM(S): at 15:16

## 2021-09-22 RX ADMIN — Medication 3: at 12:29

## 2021-09-22 RX ADMIN — LEVALBUTEROL 1.25 MILLIGRAM(S): 1.25 SOLUTION, CONCENTRATE RESPIRATORY (INHALATION) at 08:36

## 2021-09-22 RX ADMIN — Medication 1 SPRAY(S): at 17:45

## 2021-09-22 RX ADMIN — AMLODIPINE BESYLATE 10 MILLIGRAM(S): 2.5 TABLET ORAL at 06:49

## 2021-09-22 RX ADMIN — Medication 1 SUPPOSITORY(S): at 22:10

## 2021-09-22 RX ADMIN — Medication 40 MILLIGRAM(S): at 06:51

## 2021-09-22 RX ADMIN — Medication 50 MILLIGRAM(S): at 22:02

## 2021-09-22 RX ADMIN — INSULIN GLARGINE 22 UNIT(S): 100 INJECTION, SOLUTION SUBCUTANEOUS at 22:07

## 2021-09-22 RX ADMIN — Medication 81 MILLIGRAM(S): at 11:32

## 2021-09-22 RX ADMIN — Medication 4 UNIT(S): at 22:48

## 2021-09-22 RX ADMIN — Medication 1 SUPPOSITORY(S): at 10:23

## 2021-09-22 RX ADMIN — Medication 7 UNIT(S): at 08:46

## 2021-09-22 RX ADMIN — FINASTERIDE 5 MILLIGRAM(S): 5 TABLET, FILM COATED ORAL at 11:32

## 2021-09-22 RX ADMIN — Medication 7 UNIT(S): at 12:30

## 2021-09-22 RX ADMIN — Medication 0.25 MILLIGRAM(S): at 22:07

## 2021-09-22 RX ADMIN — Medication 1 SPRAY(S): at 06:56

## 2021-09-22 RX ADMIN — TAMSULOSIN HYDROCHLORIDE 0.8 MILLIGRAM(S): 0.4 CAPSULE ORAL at 22:06

## 2021-09-22 RX ADMIN — Medication 4: at 17:46

## 2021-09-22 RX ADMIN — Medication 2: at 08:45

## 2021-09-22 RX ADMIN — APIXABAN 5 MILLIGRAM(S): 2.5 TABLET, FILM COATED ORAL at 17:45

## 2021-09-22 NOTE — PROGRESS NOTE ADULT - SUBJECTIVE AND OBJECTIVE BOX
CC: sob (22 Sep 2021 13:40)    HPI:  75y Male with PMHx of Atrial fibrillation, DVT on Eliquis HTN, DM, BPH s/p TURP, and arthritis, diabetes; on metformin, Hyperlipidemia, schwannoma s/p surgery 1992, and radiation in 1998, spinal surgery 7 years ago, hx cad stent on plavix, off asa by cardiology , presents to ED c/o 2 days of SOB, wheezing, dry  cough, and rhinorrhea.       INTERVAL HPI/OVERNIGHT EVENTS: Patient seen and examined sitting up in the bed.  Patient complaining of cough and BRBPR.  Patient reports h/o hemorrhoids.  Patient denies any headache, dizziness, SOB, CP, abdominal pain, nausea, vomiting, dysuria.  Other ROS reviewed and are negative.    Vital Signs Last 24 Hrs  T(C): 37.8 (22 Sep 2021 10:26), Max: 37.8 (22 Sep 2021 10:26)  T(F): 100.1 (22 Sep 2021 10:26), Max: 100.1 (22 Sep 2021 10:26)  HR: 120 (22 Sep 2021 09:49) (96 - 128)  BP: 103/68 (22 Sep 2021 09:49) (103/68 - 135/87)  BP(mean): --  RR: 20 (22 Sep 2021 09:49) (18 - 20)  SpO2: 91% (22 Sep 2021 09:49) (91% - 96%)  I&O's Detail    21 Sep 2021 07:01  -  22 Sep 2021 07:00  --------------------------------------------------------  IN:  Total IN: 0 mL    OUT:    Blood Loss (mL): 1 mL  Total OUT: 1 mL    Total NET: -1 mL    PHYSICAL EXAM:  GENERAL: NAD  HEAD:  Atraumatic, Normocephalic  NECK: Supple, No JVD, Normal thyroid  NERVOUS SYSTEM:  Alert & Oriented X3, Good concentration; Motor Strength 5/5 B/L upper and lower extremities  CHEST/LUNG: Coarse BS bilaterally  HEART: Regular rate and rhythm; No murmurs, rubs, or gallops  ABDOMEN: Soft, Nontender, Nondistended; Bowel sounds present  EXTREMITIES:  2+ Peripheral Pulses, No clubbing, cyanosis, or edema                              16.4   10.24 )-----------( 259      ( 22 Sep 2021 08:10 )             50.2     22 Sep 2021 08:10    140    |  97     |  32.1   ----------------------------<  232    3.6     |  27.0   |  0.84     Ca    8.9        22 Sep 2021 08:10  Mg     1.9       22 Sep 2021 08:10        CAPILLARY BLOOD GLUCOSE  POCT Blood Glucose.: 293 mg/dL (22 Sep 2021 12:28)  POCT Blood Glucose.: 249 mg/dL (22 Sep 2021 08:43)  POCT Blood Glucose.: 257 mg/dL (21 Sep 2021 22:32)  POCT Blood Glucose.: 306 mg/dL (21 Sep 2021 17:25)          MEDICATIONS  (STANDING):  amLODIPine   Tablet 10 milliGRAM(s) Oral daily  apixaban 5 milliGRAM(s) Oral every 12 hours  aspirin enteric coated 81 milliGRAM(s) Oral daily  atorvastatin 20 milliGRAM(s) Oral at bedtime  dextrose 40% Gel 15 Gram(s) Oral once  dextrose 5%. 1000 milliLiter(s) (50 mL/Hr) IV Continuous <Continuous>  dextrose 5%. 1000 milliLiter(s) (100 mL/Hr) IV Continuous <Continuous>  dextrose 50% Injectable 25 Gram(s) IV Push once  dextrose 50% Injectable 12.5 Gram(s) IV Push once  dextrose 50% Injectable 25 Gram(s) IV Push once  finasteride 5 milliGRAM(s) Oral daily  fluticasone propionate 50 MICROgram(s)/spray Nasal Spray 1 Spray(s) Both Nostrils two times a day  furosemide    Tablet 40 milliGRAM(s) Oral daily  glucagon  Injectable 1 milliGRAM(s) IntraMuscular once  influenza   Vaccine 0.5 milliLiter(s) IntraMuscular once  insulin glargine Injectable (LANTUS) 18 Unit(s) SubCutaneous at bedtime  insulin lispro (ADMELOG) corrective regimen sliding scale   SubCutaneous three times a day before meals  insulin lispro Injectable (ADMELOG) 7 Unit(s) SubCutaneous three times a day before meals  levalbuterol Inhalation 1.25 milliGRAM(s) Inhalation every 6 hours  metoprolol tartrate 50 milliGRAM(s) Oral every 6 hours  pantoprazole    Tablet 40 milliGRAM(s) Oral before breakfast  predniSONE   Tablet   Oral   tamsulosin 0.8 milliGRAM(s) Oral at bedtime    MEDICATIONS  (PRN):  ALPRAZolam 0.25 milliGRAM(s) Oral two times a day PRN anxiety  hydrocortisone hemorrhoidal Suppository 1 Suppository(s) Rectal two times a day PRN hemorroids

## 2021-09-22 NOTE — DIETITIAN INITIAL EVALUATION ADULT. - ADD RECOMMEND
Encouragement provide to be compliant with diet. Information on healthy food delivery options provided. Provide Banatrol TID

## 2021-09-22 NOTE — DIETITIAN INITIAL EVALUATION ADULT. - PERTINENT LABORATORY DATA
09-22 Na140 mmol/L Glu 232 mg/dL<H> K+ 3.6 mmol/L Cr  0.84 mg/dL BUN 32.1 mg/dL<H> Phos n/a   Alb n/a   PAB n/a

## 2021-09-22 NOTE — PROGRESS NOTE ADULT - ASSESSMENT
75y/oM PMH afib on Eliquis, hx DVT, HTN, DM, BPH s/p TURP, HLD, CAD on plavix, presenting to ER with 2 days SOB/ CRUZ, cough, rhinorrhea admitted with acute on chronic diastolic CHF     Dyspnea likely multifactorial due to   Acute on chronic diastolic HF   Entero/rhinovirus   -IV lasix transitioned to PO   -cardio recs appreciated   -CXR: mild pulmonary venous congestion   -CTA: no PE   -TTE with EF 60-65%, mod-sev AS  -strict I/Os   -daily weights   -titrating steroids   -cont nebs --> changed to Xopenex 9/19     paroxysmal afib   CAD s/p stent   AS  HTN, HLD   -cont Eliquis   -cont home meds: Plavix discontinued, ASA restarted, BB - increased to 50mg QID, amlodipine, statin     DM  -hgba1c 9.3   -increase lantus again today 9/22, ISS   -diabetic specialist consult     BPH   -cont home meds     dvt ppx: on Eliquis

## 2021-09-22 NOTE — DIETITIAN INITIAL EVALUATION ADULT. - ORAL INTAKE PTA/DIET HISTORY
Met with pt. Pt reports no weight changes. Pt reports he is not compliant with diet however wants to make changes. Pt requests extra portions however pt was told he cannot order extra according to prescribed diet. Provided pt with Dash diet and meal planning with plate model education packets.

## 2021-09-22 NOTE — PROGRESS NOTE ADULT - SUBJECTIVE AND OBJECTIVE BOX
[facility], [facility]                                          [address], [address], [place] [address]                                               PHONE: [phone]    FAX: [phone] [address], [address], [place] [address]                                       PHONE: [phone]    FAX: [phone]  *******************************************************************************    Overnight events/Subjective Assessment: feeling better but still feels "mildy congested" and is "nervous about going home too soon."  No CP or palpitations.    INTERPRETATION OF TELEMETRY (personally reviewed): all afib with reasonably controlled ventricular rates    Bactrim (Rash)  Ceftin (Hives)  penicillin (Anaphylaxis)  sulfa drugs (Unknown)    MEDICATIONS  (STANDING):  amLODIPine   Tablet 10 milliGRAM(s) Oral daily  apixaban 5 milliGRAM(s) Oral every 12 hours  aspirin enteric coated 81 milliGRAM(s) Oral daily  atorvastatin 20 milliGRAM(s) Oral at bedtime  dextrose 40% Gel 15 Gram(s) Oral once  dextrose 5%. 1000 milliLiter(s) (50 mL/Hr) IV Continuous <Continuous>  dextrose 5%. 1000 milliLiter(s) (100 mL/Hr) IV Continuous <Continuous>  dextrose 50% Injectable 25 Gram(s) IV Push once  dextrose 50% Injectable 12.5 Gram(s) IV Push once  dextrose 50% Injectable 25 Gram(s) IV Push once  finasteride 5 milliGRAM(s) Oral daily  fluticasone propionate 50 MICROgram(s)/spray Nasal Spray 1 Spray(s) Both Nostrils two times a day  furosemide   Injectable 40 milliGRAM(s) IV Push daily  glucagon  Injectable 1 milliGRAM(s) IntraMuscular once  influenza   Vaccine 0.5 milliLiter(s) IntraMuscular once  insulin glargine Injectable (LANTUS) 18 Unit(s) SubCutaneous at bedtime  insulin lispro (ADMELOG) corrective regimen sliding scale   SubCutaneous three times a day before meals  insulin lispro Injectable (ADMELOG) 7 Unit(s) SubCutaneous three times a day before meals  levalbuterol Inhalation 1.25 milliGRAM(s) Inhalation every 6 hours  metoprolol tartrate 50 milliGRAM(s) Oral every 8 hours  pantoprazole    Tablet 40 milliGRAM(s) Oral before breakfast  predniSONE   Tablet   Oral   tamsulosin 0.8 milliGRAM(s) Oral at bedtime    MEDICATIONS  (PRN):  ALPRAZolam 0.25 milliGRAM(s) Oral two times a day PRN anxiety  hydrocortisone hemorrhoidal Suppository 1 Suppository(s) Rectal two times a day PRN hemorroids      Vital Signs Last 24 Hrs  T(C): 37.6 (22 Sep 2021 09:49), Max: 37.6 (22 Sep 2021 09:49)  T(F): 99.7 (22 Sep 2021 09:49), Max: 99.7 (22 Sep 2021 09:49)  HR: 120 (22 Sep 2021 09:49) (96 - 127)  BP: 103/68 (22 Sep 2021 09:49) (100/58 - 135/87)  BP(mean): --  RR: 20 (22 Sep 2021 09:49) (18 - 20)  SpO2: 91% (22 Sep 2021 09:49) (91% - 96%)    I&O's Detail    21 Sep 2021 07:01  -  22 Sep 2021 07:00  --------------------------------------------------------  IN:  Total IN: 0 mL    OUT:    Blood Loss (mL): 1 mL  Total OUT: 1 mL    Total NET: -1 mL        I&O's Summary    21 Sep 2021 07:01  -  22 Sep 2021 07:00  --------------------------------------------------------  IN: 0 mL / OUT: 1 mL / NET: -1 mL      PHYSICAL EXAM:  General: Appears well developed, well nourished, no acute distress  HEENT: Head: normocephalic, atraumatic  Eyes: Pupils equal and reactive  Neck: Supple, no carotid bruit, no JVD, no HJR  CARDIOVASCULAR: irregularly irregular; 3/6 systikuc Normal S1 and S2, no murmur, rub, or gallop  LUNGS: Clear to auscultation bilaterally, no rales, rhonchi or wheeze  ABDOMEN: Soft, nontender, non-distended, positive bowel sounds, no mass or bruit  EXTREMITIES: improving b/l LE pitting edema  SKIN: Warm and dry with normal turgor  NEURO: Alert & oriented x 3, grossly intact  PSYCH: normal mood and affect        LABS:                        16.4   10.24 )-----------( 259      ( 22 Sep 2021 08:10 )             50.2     09-22    140  |  97<L>  |  32.1<H>  ----------------------------<  232<H>  3.6   |  27.0  |  0.84    Ca    8.9      22 Sep 2021 08:10  Mg     1.9     09-22      Serum Pro-Brain Natriuretic Peptide: 572 pg/mL (09-22 @ 08:10)  Serum Pro-Brain Natriuretic Peptide: 1054 pg/mL (09-20 @ 08:27)  Serum Pro-Brain Natriuretic Peptide: 819 pg/mL (09-19 @ 09:12)  Serum Pro-Brain Natriuretic Peptide: 809 pg/mL (09-17 @ 15:08)  serum  Lipids:         RADIOLOGY & ADDITIONAL STUDIES REVIEWED     ASSESSMENT AND PLAN:  In summary, ANIA CRISTOBAL is a 75y Male with HTN, HLD, moderate AS, chronic diastolic CHF (EF 45-50%), h/o DVT, paroxysmal AF on Eliquis, former smoker, obesity, anxiety, and CAD s/p orbital atherectomy and PCI of the mid and LAD with MARCO ANTONIO x 2 on 3/6/2020.  He presents with 2-3 days of dyspnea at rest and with exertion.  Being treated for PNA, acute on chronic diastolic HF and COPD exacerbation with improvement of his symptoms.    - Monitor on telemetry.  Has been AF/SR/likely atrial tachycardia 2:1 ~ 128bpm.  Currently 128bpm.  Will increase Lopressor to 25mg q6h.  Convert to long-acting prior to discharge.  Additional rate control agents as needed (pending repeat echo)  - Initial troponin negative.  Nothing to suggest ACS.  - CTA negative for PE, LE venous duplex negative for DVT.  - Echocardiogram 1/28/20 EF 45-50%, mild LVH, grade 2 diastolic dysfunction, RVE with normal RV systolic function, mod-severe AS based on BSA; repeat with normal LV systolic function; diastolic dysfunction; mod-severe AS.  - Mild acute on chronic diastolic CHF.  Improving with IV diuresis with Lasix 40 mg IV daily for now.  Monitor repeat CXRs, strict I/Os, daily weights, & BUN/Cr closely and titrate PRN.  - Abx in place along with steroids.  ~54pkyear smoking history, quit a long time ago.  Suspect a degree of COPD.  - No evidence of ischemia clinically.  Continue medical management for now pending the results of the above.  Plan for eventual ischemic evaluation (likely as an outpatient) once patient is clinically stable.  - CAD s/p PCI 3/2020.  Has been maintained on Plavix and Eliquis.  As it has been > 1 year since last coronary intervention will DC Plavix and start ASA 81mg daily to be taken with Eliquis 5mg BID given his PAF with elevated CHADSVASc score.  - Continue statin therapy  - Continue Norvasc 10 for now.  Pending EF, may choose to convert to ACE/ARB.  - AS: mod-severe: continue close outpatient followup.  Discussed the meaning and natural history of AS in detail with the patient.  Anticipate TAVR workup within the next 6-12 months.  - Ascending aortic aneurysm.  4.7cm on CTA.  This is stable in size. Continue BB.  - Keep K > 4, Mg > 2      Houston Arias M.D.

## 2021-09-22 NOTE — DIETITIAN INITIAL EVALUATION ADULT. - OTHER INFO
75y Male with PMHx of Atrial fibrillation, dvt on eliquis, HTN, DM, BPH s/p TURP, and arthritis, diabetes; on metformin, Hyperlipidemia, schwannoma s/p surgery 1992, and radiation in 1998, spinal surgery 7 years ago, hx cad stent on plavix, off asa by cardiology , presents to ED c/o 2 days of SOB, wheezing, dry  cough, and rhinorrhea.

## 2021-09-23 LAB
ANION GAP SERPL CALC-SCNC: 16 MMOL/L — SIGNIFICANT CHANGE UP (ref 5–17)
BUN SERPL-MCNC: 30.7 MG/DL — HIGH (ref 8–20)
CALCIUM SERPL-MCNC: 9.1 MG/DL — SIGNIFICANT CHANGE UP (ref 8.6–10.2)
CHLORIDE SERPL-SCNC: 98 MMOL/L — SIGNIFICANT CHANGE UP (ref 98–107)
CO2 SERPL-SCNC: 27 MMOL/L — SIGNIFICANT CHANGE UP (ref 22–29)
CREAT SERPL-MCNC: 0.74 MG/DL — SIGNIFICANT CHANGE UP (ref 0.5–1.3)
GLUCOSE BLDC GLUCOMTR-MCNC: 160 MG/DL — HIGH (ref 70–99)
GLUCOSE BLDC GLUCOMTR-MCNC: 231 MG/DL — HIGH (ref 70–99)
GLUCOSE BLDC GLUCOMTR-MCNC: 248 MG/DL — HIGH (ref 70–99)
GLUCOSE BLDC GLUCOMTR-MCNC: 351 MG/DL — HIGH (ref 70–99)
GLUCOSE BLDC GLUCOMTR-MCNC: 373 MG/DL — HIGH (ref 70–99)
GLUCOSE SERPL-MCNC: 243 MG/DL — HIGH (ref 70–99)
HCT VFR BLD CALC: 50.4 % — HIGH (ref 39–50)
HGB BLD-MCNC: 16.9 G/DL — SIGNIFICANT CHANGE UP (ref 13–17)
MAGNESIUM SERPL-MCNC: 1.8 MG/DL — SIGNIFICANT CHANGE UP (ref 1.6–2.6)
MCHC RBC-ENTMCNC: 29.5 PG — SIGNIFICANT CHANGE UP (ref 27–34)
MCHC RBC-ENTMCNC: 33.5 GM/DL — SIGNIFICANT CHANGE UP (ref 32–36)
MCV RBC AUTO: 88.1 FL — SIGNIFICANT CHANGE UP (ref 80–100)
PLATELET # BLD AUTO: 251 K/UL — SIGNIFICANT CHANGE UP (ref 150–400)
POTASSIUM SERPL-MCNC: 3.9 MMOL/L — SIGNIFICANT CHANGE UP (ref 3.5–5.3)
POTASSIUM SERPL-SCNC: 3.9 MMOL/L — SIGNIFICANT CHANGE UP (ref 3.5–5.3)
RBC # BLD: 5.72 M/UL — SIGNIFICANT CHANGE UP (ref 4.2–5.8)
RBC # FLD: 12.8 % — SIGNIFICANT CHANGE UP (ref 10.3–14.5)
SODIUM SERPL-SCNC: 141 MMOL/L — SIGNIFICANT CHANGE UP (ref 135–145)
WBC # BLD: 11.35 K/UL — HIGH (ref 3.8–10.5)
WBC # FLD AUTO: 11.35 K/UL — HIGH (ref 3.8–10.5)

## 2021-09-23 PROCEDURE — 99232 SBSQ HOSP IP/OBS MODERATE 35: CPT

## 2021-09-23 RX ORDER — AMIODARONE HYDROCHLORIDE 400 MG/1
TABLET ORAL
Refills: 0 | Status: DISCONTINUED | OUTPATIENT
Start: 2021-09-23 | End: 2021-09-24

## 2021-09-23 RX ORDER — AMIODARONE HYDROCHLORIDE 400 MG/1
200 TABLET ORAL DAILY
Refills: 0 | Status: DISCONTINUED | OUTPATIENT
Start: 2021-09-25 | End: 2021-09-24

## 2021-09-23 RX ORDER — AMIODARONE HYDROCHLORIDE 400 MG/1
200 TABLET ORAL
Refills: 0 | Status: DISCONTINUED | OUTPATIENT
Start: 2021-09-23 | End: 2021-09-24

## 2021-09-23 RX ORDER — FUROSEMIDE 40 MG
40 TABLET ORAL
Refills: 0 | Status: DISCONTINUED | OUTPATIENT
Start: 2021-09-23 | End: 2021-09-24

## 2021-09-23 RX ADMIN — Medication 50 MILLIGRAM(S): at 17:04

## 2021-09-23 RX ADMIN — APIXABAN 5 MILLIGRAM(S): 2.5 TABLET, FILM COATED ORAL at 17:04

## 2021-09-23 RX ADMIN — FINASTERIDE 5 MILLIGRAM(S): 5 TABLET, FILM COATED ORAL at 11:44

## 2021-09-23 RX ADMIN — Medication 40 MILLIGRAM(S): at 06:08

## 2021-09-23 RX ADMIN — Medication 1 SUPPOSITORY(S): at 11:43

## 2021-09-23 RX ADMIN — Medication 1 SUPPOSITORY(S): at 21:31

## 2021-09-23 RX ADMIN — Medication 10 UNIT(S): at 18:10

## 2021-09-23 RX ADMIN — LEVALBUTEROL 1.25 MILLIGRAM(S): 1.25 SOLUTION, CONCENTRATE RESPIRATORY (INHALATION) at 08:49

## 2021-09-23 RX ADMIN — LEVALBUTEROL 1.25 MILLIGRAM(S): 1.25 SOLUTION, CONCENTRATE RESPIRATORY (INHALATION) at 14:38

## 2021-09-23 RX ADMIN — Medication 50 MILLIGRAM(S): at 21:31

## 2021-09-23 RX ADMIN — Medication 81 MILLIGRAM(S): at 11:43

## 2021-09-23 RX ADMIN — Medication 2: at 09:27

## 2021-09-23 RX ADMIN — LEVALBUTEROL 1.25 MILLIGRAM(S): 1.25 SOLUTION, CONCENTRATE RESPIRATORY (INHALATION) at 03:46

## 2021-09-23 RX ADMIN — Medication 1 SPRAY(S): at 06:06

## 2021-09-23 RX ADMIN — Medication 5: at 12:41

## 2021-09-23 RX ADMIN — ATORVASTATIN CALCIUM 20 MILLIGRAM(S): 80 TABLET, FILM COATED ORAL at 21:34

## 2021-09-23 RX ADMIN — Medication 2: at 18:09

## 2021-09-23 RX ADMIN — Medication 50 MILLIGRAM(S): at 10:18

## 2021-09-23 RX ADMIN — Medication 10 UNIT(S): at 09:26

## 2021-09-23 RX ADMIN — AMIODARONE HYDROCHLORIDE 200 MILLIGRAM(S): 400 TABLET ORAL at 17:06

## 2021-09-23 RX ADMIN — INSULIN GLARGINE 22 UNIT(S): 100 INJECTION, SOLUTION SUBCUTANEOUS at 21:32

## 2021-09-23 RX ADMIN — Medication 20 MILLIGRAM(S): at 06:08

## 2021-09-23 RX ADMIN — Medication 40 MILLIGRAM(S): at 17:04

## 2021-09-23 RX ADMIN — APIXABAN 5 MILLIGRAM(S): 2.5 TABLET, FILM COATED ORAL at 06:08

## 2021-09-23 RX ADMIN — Medication 10 UNIT(S): at 12:40

## 2021-09-23 RX ADMIN — PANTOPRAZOLE SODIUM 40 MILLIGRAM(S): 20 TABLET, DELAYED RELEASE ORAL at 06:08

## 2021-09-23 RX ADMIN — Medication 0.25 MILLIGRAM(S): at 11:53

## 2021-09-23 RX ADMIN — LEVALBUTEROL 1.25 MILLIGRAM(S): 1.25 SOLUTION, CONCENTRATE RESPIRATORY (INHALATION) at 20:57

## 2021-09-23 RX ADMIN — Medication 1 SPRAY(S): at 17:05

## 2021-09-23 RX ADMIN — TAMSULOSIN HYDROCHLORIDE 0.8 MILLIGRAM(S): 0.4 CAPSULE ORAL at 21:31

## 2021-09-23 NOTE — PROGRESS NOTE ADULT - SUBJECTIVE AND OBJECTIVE BOX
[facility], [facility]                                          [address], [address], [place] [address]                                               PHONE: [phone]    FAX: [phone] [address], [address], [place] [address]                                       PHONE: [phone]    FAX: [phone]  *******************************************************************************    Overnight events/Subjective Assessment:  No overnight cardiac events.  Pt reports his breathing is improved and near baseline.  He is reluctant to be discharged.  No CP, palpitations.    INTERPRETATION OF TELEMETRY (personally reviewed):  AF/atrial tachycardia 90-120s    Bactrim (Rash)  Ceftin (Hives)  penicillin (Anaphylaxis)  sulfa drugs (Unknown)    MEDICATIONS  (STANDING):  amLODIPine   Tablet 10 milliGRAM(s) Oral daily  apixaban 5 milliGRAM(s) Oral every 12 hours  aspirin enteric coated 81 milliGRAM(s) Oral daily  atorvastatin 20 milliGRAM(s) Oral at bedtime  dextrose 40% Gel 15 Gram(s) Oral once  dextrose 5%. 1000 milliLiter(s) (50 mL/Hr) IV Continuous <Continuous>  dextrose 5%. 1000 milliLiter(s) (100 mL/Hr) IV Continuous <Continuous>  dextrose 50% Injectable 25 Gram(s) IV Push once  dextrose 50% Injectable 12.5 Gram(s) IV Push once  dextrose 50% Injectable 25 Gram(s) IV Push once  finasteride 5 milliGRAM(s) Oral daily  fluticasone propionate 50 MICROgram(s)/spray Nasal Spray 1 Spray(s) Both Nostrils two times a day  furosemide    Tablet 40 milliGRAM(s) Oral daily  glucagon  Injectable 1 milliGRAM(s) IntraMuscular once  influenza   Vaccine 0.5 milliLiter(s) IntraMuscular once  insulin glargine Injectable (LANTUS) 22 Unit(s) SubCutaneous at bedtime  insulin lispro (ADMELOG) corrective regimen sliding scale   SubCutaneous three times a day before meals  insulin lispro Injectable (ADMELOG) 10 Unit(s) SubCutaneous three times a day before meals  levalbuterol Inhalation 1.25 milliGRAM(s) Inhalation every 6 hours  metoprolol tartrate 50 milliGRAM(s) Oral every 6 hours  pantoprazole    Tablet 40 milliGRAM(s) Oral before breakfast  predniSONE   Tablet   Oral   tamsulosin 0.8 milliGRAM(s) Oral at bedtime    MEDICATIONS  (PRN):  ALPRAZolam 0.25 milliGRAM(s) Oral two times a day PRN anxiety  hydrocortisone hemorrhoidal Suppository 1 Suppository(s) Rectal two times a day PRN hemorroids      Vital Signs Last 24 Hrs  T(C): 36.4 (23 Sep 2021 04:56), Max: 37.8 (22 Sep 2021 10:26)  T(F): 97.5 (23 Sep 2021 04:56), Max: 100.1 (22 Sep 2021 10:26)  HR: 111 (23 Sep 2021 04:56) (111 - 123)  BP: 96/60 (23 Sep 2021 04:56) (96/60 - 136/89)  BP(mean): --  RR: 18 (23 Sep 2021 04:56) (18 - 19)  SpO2: 96% (23 Sep 2021 04:56) (91% - 96%)    I&O's Detail    22 Sep 2021 07:01  -  23 Sep 2021 07:00  --------------------------------------------------------  IN:    Oral Fluid: 300 mL  Total IN: 300 mL    OUT:  Total OUT: 0 mL    Total NET: 300 mL        I&O's Summary    22 Sep 2021 07:01  -  23 Sep 2021 07:00  --------------------------------------------------------  IN: 300 mL / OUT: 0 mL / NET: 300 mL            PHYSICAL EXAM:  General: Appears well developed, well nourished, no acute distress, [place]  HEENT: Head: normocephalic, atraumatic  Eyes: Pupils equal and reactive  Neck: Supple, no carotid bruit, no JVD, + HJR  CARDIOVASCULAR: Normal S1 and S2, 2/6 DORINDA  LUNGS: Diminished at right base, adventitious bilat, no rales or wheeze  ABDOMEN: Obese, Soft, nontender, non-distended, positive bowel sounds, no mass or bruit  EXTREMITIES: 1+ edema to shins R>L with chronic skin changes, distal pulses WNL  SKIN: Warm and dry with normal turgor  NEURO: Alert & oriented x 3, grossly intact  PSYCH: appropriate mood and affect    LABS:                        16.9   11.35 )-----------( 251      ( 23 Sep 2021 08:34 )             50.4     09-23    141  |  98  |  30.7<H>  ----------------------------<  243<H>  3.9   |  27.0  |  0.74    Ca    9.1      23 Sep 2021 08:34  Mg     1.8     09-23            Serum Pro-Brain Natriuretic Peptide: 572 pg/mL (09-22 @ 08:10)  Serum Pro-Brain Natriuretic Peptide: 1054 pg/mL (09-20 @ 08:27)  Serum Pro-Brain Natriuretic Peptide: 819 pg/mL (09-19 @ 09:12)  Serum Pro-Brain Natriuretic Peptide: 809 pg/mL (09-17 @ 15:08)  serum  Lipids:         RADIOLOGY & ADDITIONAL STUDIES:    ECHO:  < from: TTE Echo Complete w/o Contrast w/ Doppler (09.20.21 @ 12:24) >  PHYSICIAN INTERPRETATION:  Left Ventricle: Endocardial visualization was enhanced with intravenous echo contrast. The left ventricular internal cavity size is normal.  Global LV systolic function was normal. Left ventricular ejection fraction, by visual estimation, is 60 to 65%. The mitral in-flow pattern reveals no discernable A-wave, therefore no comment on diastolic function can be made.  RightVentricle: Normal right ventricular size and function. RV systolic function is normal. TV S' 0.1 m/s.  Left Atrium: Moderately enlarged left atrium.  Right Atrium: Right atrial enlargement.  Pericardium: There is no evidence of pericardial effusion. There is a significant pericardial fat pad present.  Mitral Valve: There is mild mitral annular calcification. Mild mitral valve regurgitation is seen.  Tricuspid Valve: The tricuspid valve is normal in structure. Mild tricuspid regurgitation is visualized. Estimated pulmonary artery systolic pressure is 40.4 mmHg assuming a right atrial pressure of 15 mmHg, which is consistent with mild pulmonary hypertension.  Aortic Valve: The aortic valve is trileaflet. Moderate to severe aortic stenosis is present. Peak transaortic gradient equals 38.6 mmHg, mean transaortic gradient equals 16.8 mmHg, the calculated aortic valve area equals 1.05 cm² by the continuity equation consistent with moderate aortic stenosis. The Dimesionless Index value is .31.  Pulmonic Valve: The pulmonic valve was not well visualized.  Aorta: Aortic root measured at Sinus of Valsalva is dilated. There is dilatation of the ascending aorta.  Pulmonary Artery: The main pulmonary artery is normal in size.  Venous: The pulmonary veins appear normal. The inferior vena cava is 2.3 cm. The inferior vena cava was dilated, with respiratory size variation less than 50%.  In comparison to the previous echocardiogram(s): Prior examinations are available and were reviewed for comparison purposes. Compared to the prior TTE study from 1/28/20, LV systolic funnction/EF have improved, rhythm replaced with atrial fibrillation.      Summary:   1. Technically difficult study.   2. Left ventricular ejection fraction, by visual estimation, is 60 to 65%.   3. Normal global left ventricular systolic function.   4. There is mild concentric left ventricular hypertrophy.   5. The mitral in-flow pattern reveals no discernable A-wave, therefore no comment on diastolic function can be made.   6. Normal right ventricular size and function.   7. Estimated pulmonary artery systolic pressure is 40.4 mmHg assuming a right atrial pressure of 15 mmHg, which is consistent with mild pulmonary hypertension.   8. Right atrial enlargement.   9. Moderately enlarged left atrium.  10. Mild mitral annular calcification.  11. Mild mitral valve regurgitation.  12. Mild tricuspid regurgitation.  13. Moderate to severe aortic valve stenosis.  14. Endocardial visualization was enhanced with intravenous echo contrast.  15. Peak transaortic gradient equals 38.6 mmHg, mean transaortic gradient equals 16.8 mmHg, the calculated aortic valve area equals 1.05 cm² by the continuity equation consistent with moderate aortic stenosis.  16. Aortic root measured at Sinus of Valsalva is dilated 4.3 cm.  17. There is no evidence of pericardial effusion.  18. Compared to the prior TTE study from 1/28/20, LV systolic funnction/EF have improved, rhythm replaced with atrial fibrillation.    < end of copied text >      ASSESSMENT AND PLAN:  In summary, ANIA CRISTOBAL is a 75y Male with HTN, HLD, moderate AS, chronic combined systolic/diastolic CHF (EF 45-50%), h/o DVT, paroxysmal AF on Eliquis, former smoker, obesity, anxiety, and CAD s/p orbital atherectomy and PCI of the mid and LAD with MARCO ANTONIO x 2 on 3/6/2020.  He presents with 2-3 days of dyspnea at rest and with exertion.  Being treated for PNA, acute on chronic combined CHF and COPD exacerbation with improvement of his symptoms.    - Monitor on telemetry.  Has been having prolonged episodes of atrial tachycardia 2:1 ~ 125bpm.  Currently 125bpm.  Continue with Lopressor to 25mg q6h.  Convert to long-acting prior to discharge.  Will add Amiodarone 200mg BID for 2 days, then 200mg daily. Will d/c Norvasc to allow for additional AV simone agents (i.e cardizem) if BP allows.  - Initial troponin negative.  Nothing to suggest ACS.  - CTA negative for PE, LE venous duplex negative for DVT.  - Echocardiogram 1/28/20 EF 45-50%, mild LVH, grade 2 diastolic dysfunction, RVE with normal RV systolic function, mod-severe AS based on BSA; repeat with normal LV systolic function; diastolic dysfunction; mod-severe AS.  - Repeat echo 9/20/21: EF 60-65%, mild LVH, unable to assess diastolic dysfunction, mild pulm HTN (41mmHg), BRUCE, mild MR/TR, mod-severe AS (CIELO 1.05cm2), AoR 4.3cm  - Mild acute on chronic diastolic CHF.  Improved with IV diuresis.  Continue with Lasix 40mg PO BID for now.  Monitor repeat CXRs, strict I/Os, daily weights, & BUN/Cr closely and titrate PRN.  - Abx in place along with steroids.  ~54pkyear smoking history, quit a long time ago.  Suspect a degree of COPD.  - No evidence of ischemia clinically.  Continue medical management for now pending the results of the above.  Plan for eventual ischemic evaluation (likely as an outpatient) once patient is clinically stable.  - CAD s/p PCI 3/2020.  Has been maintained on Plavix and Eliquis.  As it has been > 1 year since last coronary intervention, DC'd Plavix and started ASA 81mg daily to be taken with Eliquis 5mg BID given his PAF with elevated CHADSVASc score.  - Continue statin therapy  - AS: mod-severe: continue close outpatient followup.  Discussed the meaning and natural history of AS in detail with the patient.  Anticipate TAVR workup within the next 6-12 months.  - Ascending aortic aneurysm.  4.7cm on CTA.  This is stable in size. Continue BB.  - Keep K > 4, Mg > 2  - d/w Dr. Oates.  DC planning when rate better controlled      Abraham Mckinney MD                                                      [facility], [facility]                                          [address], [address], [place] [address]                                               PHONE: [phone]    FAX: [phone] [address], [address], [place] [address]                                       PHONE: [phone]    FAX: [phone]  *******************************************************************************    Overnight events/Subjective Assessment:  No overnight cardiac events.  Pt reports his breathing is improved and near baseline.  He is reluctant to be discharged.  No CP, palpitations.    INTERPRETATION OF TELEMETRY (personally reviewed):  AF/atrial tachycardia 90-120s    Bactrim (Rash)  Ceftin (Hives)  penicillin (Anaphylaxis)  sulfa drugs (Unknown)    MEDICATIONS  (STANDING):  amLODIPine   Tablet 10 milliGRAM(s) Oral daily  apixaban 5 milliGRAM(s) Oral every 12 hours  aspirin enteric coated 81 milliGRAM(s) Oral daily  atorvastatin 20 milliGRAM(s) Oral at bedtime  dextrose 40% Gel 15 Gram(s) Oral once  dextrose 5%. 1000 milliLiter(s) (50 mL/Hr) IV Continuous <Continuous>  dextrose 5%. 1000 milliLiter(s) (100 mL/Hr) IV Continuous <Continuous>  dextrose 50% Injectable 25 Gram(s) IV Push once  dextrose 50% Injectable 12.5 Gram(s) IV Push once  dextrose 50% Injectable 25 Gram(s) IV Push once  finasteride 5 milliGRAM(s) Oral daily  fluticasone propionate 50 MICROgram(s)/spray Nasal Spray 1 Spray(s) Both Nostrils two times a day  furosemide    Tablet 40 milliGRAM(s) Oral daily  glucagon  Injectable 1 milliGRAM(s) IntraMuscular once  influenza   Vaccine 0.5 milliLiter(s) IntraMuscular once  insulin glargine Injectable (LANTUS) 22 Unit(s) SubCutaneous at bedtime  insulin lispro (ADMELOG) corrective regimen sliding scale   SubCutaneous three times a day before meals  insulin lispro Injectable (ADMELOG) 10 Unit(s) SubCutaneous three times a day before meals  levalbuterol Inhalation 1.25 milliGRAM(s) Inhalation every 6 hours  metoprolol tartrate 50 milliGRAM(s) Oral every 6 hours  pantoprazole    Tablet 40 milliGRAM(s) Oral before breakfast  predniSONE   Tablet   Oral   tamsulosin 0.8 milliGRAM(s) Oral at bedtime    MEDICATIONS  (PRN):  ALPRAZolam 0.25 milliGRAM(s) Oral two times a day PRN anxiety  hydrocortisone hemorrhoidal Suppository 1 Suppository(s) Rectal two times a day PRN hemorroids      Vital Signs Last 24 Hrs  T(C): 36.4 (23 Sep 2021 04:56), Max: 37.8 (22 Sep 2021 10:26)  T(F): 97.5 (23 Sep 2021 04:56), Max: 100.1 (22 Sep 2021 10:26)  HR: 111 (23 Sep 2021 04:56) (111 - 123)  BP: 96/60 (23 Sep 2021 04:56) (96/60 - 136/89)  BP(mean): --  RR: 18 (23 Sep 2021 04:56) (18 - 19)  SpO2: 96% (23 Sep 2021 04:56) (91% - 96%)    I&O's Detail    22 Sep 2021 07:01  -  23 Sep 2021 07:00  --------------------------------------------------------  IN:    Oral Fluid: 300 mL  Total IN: 300 mL    OUT:  Total OUT: 0 mL    Total NET: 300 mL        I&O's Summary    22 Sep 2021 07:01  -  23 Sep 2021 07:00  --------------------------------------------------------  IN: 300 mL / OUT: 0 mL / NET: 300 mL            PHYSICAL EXAM:  General: Appears well developed, well nourished, no acute distress, [place]  HEENT: Head: normocephalic, atraumatic  Eyes: Pupils equal and reactive  Neck: Supple, no carotid bruit, no JVD, + HJR  CARDIOVASCULAR: Normal S1 and S2, 2/6 DORINDA  LUNGS: Diminished at right base, adventitious bilat, no rales or wheeze  ABDOMEN: Obese, Soft, nontender, non-distended, positive bowel sounds, no mass or bruit  EXTREMITIES: 1+ edema to shins R>L with chronic skin changes, distal pulses WNL  SKIN: Warm and dry with normal turgor  NEURO: Alert & oriented x 3, grossly intact  PSYCH: appropriate mood and affect    LABS:                        16.9   11.35 )-----------( 251      ( 23 Sep 2021 08:34 )             50.4     09-23    141  |  98  |  30.7<H>  ----------------------------<  243<H>  3.9   |  27.0  |  0.74    Ca    9.1      23 Sep 2021 08:34  Mg     1.8     09-23            Serum Pro-Brain Natriuretic Peptide: 572 pg/mL (09-22 @ 08:10)  Serum Pro-Brain Natriuretic Peptide: 1054 pg/mL (09-20 @ 08:27)  Serum Pro-Brain Natriuretic Peptide: 819 pg/mL (09-19 @ 09:12)  Serum Pro-Brain Natriuretic Peptide: 809 pg/mL (09-17 @ 15:08)  serum  Lipids:         RADIOLOGY & ADDITIONAL STUDIES:    ECHO:  < from: TTE Echo Complete w/o Contrast w/ Doppler (09.20.21 @ 12:24) >  PHYSICIAN INTERPRETATION:  Left Ventricle: Endocardial visualization was enhanced with intravenous echo contrast. The left ventricular internal cavity size is normal.  Global LV systolic function was normal. Left ventricular ejection fraction, by visual estimation, is 60 to 65%. The mitral in-flow pattern reveals no discernable A-wave, therefore no comment on diastolic function can be made.  RightVentricle: Normal right ventricular size and function. RV systolic function is normal. TV S' 0.1 m/s.  Left Atrium: Moderately enlarged left atrium.  Right Atrium: Right atrial enlargement.  Pericardium: There is no evidence of pericardial effusion. There is a significant pericardial fat pad present.  Mitral Valve: There is mild mitral annular calcification. Mild mitral valve regurgitation is seen.  Tricuspid Valve: The tricuspid valve is normal in structure. Mild tricuspid regurgitation is visualized. Estimated pulmonary artery systolic pressure is 40.4 mmHg assuming a right atrial pressure of 15 mmHg, which is consistent with mild pulmonary hypertension.  Aortic Valve: The aortic valve is trileaflet. Moderate to severe aortic stenosis is present. Peak transaortic gradient equals 38.6 mmHg, mean transaortic gradient equals 16.8 mmHg, the calculated aortic valve area equals 1.05 cm² by the continuity equation consistent with moderate aortic stenosis. The Dimesionless Index value is .31.  Pulmonic Valve: The pulmonic valve was not well visualized.  Aorta: Aortic root measured at Sinus of Valsalva is dilated. There is dilatation of the ascending aorta.  Pulmonary Artery: The main pulmonary artery is normal in size.  Venous: The pulmonary veins appear normal. The inferior vena cava is 2.3 cm. The inferior vena cava was dilated, with respiratory size variation less than 50%.  In comparison to the previous echocardiogram(s): Prior examinations are available and were reviewed for comparison purposes. Compared to the prior TTE study from 1/28/20, LV systolic funnction/EF have improved, rhythm replaced with atrial fibrillation.      Summary:   1. Technically difficult study.   2. Left ventricular ejection fraction, by visual estimation, is 60 to 65%.   3. Normal global left ventricular systolic function.   4. There is mild concentric left ventricular hypertrophy.   5. The mitral in-flow pattern reveals no discernable A-wave, therefore no comment on diastolic function can be made.   6. Normal right ventricular size and function.   7. Estimated pulmonary artery systolic pressure is 40.4 mmHg assuming a right atrial pressure of 15 mmHg, which is consistent with mild pulmonary hypertension.   8. Right atrial enlargement.   9. Moderately enlarged left atrium.  10. Mild mitral annular calcification.  11. Mild mitral valve regurgitation.  12. Mild tricuspid regurgitation.  13. Moderate to severe aortic valve stenosis.  14. Endocardial visualization was enhanced with intravenous echo contrast.  15. Peak transaortic gradient equals 38.6 mmHg, mean transaortic gradient equals 16.8 mmHg, the calculated aortic valve area equals 1.05 cm² by the continuity equation consistent with moderate aortic stenosis.  16. Aortic root measured at Sinus of Valsalva is dilated 4.3 cm.  17. There is no evidence of pericardial effusion.  18. Compared to the prior TTE study from 1/28/20, LV systolic funnction/EF have improved, rhythm replaced with atrial fibrillation.    < end of copied text >      ASSESSMENT AND PLAN:  In summary, ANIA CRISTOBAL is a 75y Male with HTN, HLD, moderate AS, chronic combined systolic/diastolic CHF (EF 45-50%, now normalized), h/o DVT, paroxysmal AF on Eliquis, former smoker, obesity, anxiety, and CAD s/p orbital atherectomy and PCI of the mid and LAD with MARCO ANTONIO x 2 on 3/6/2020.  He presents with 2-3 days of dyspnea at rest and with exertion.  Being treated for PNA, acute on chronic combined CHF and COPD exacerbation with improvement of his symptoms.    - Monitor on telemetry.  Has been having prolonged episodes of atrial tachycardia 2:1 ~ 125bpm.  Currently 125bpm.  Continue with Lopressor to 25mg q6h.  Convert to long-acting prior to discharge.  Will add Amiodarone 200mg BID for 2 days, then 200mg daily. Will d/c Norvasc to allow for additional AV simone agents (i.e cardizem) if BP allows.  - Initial troponin negative.  Nothing to suggest ACS.  - CTA negative for PE, LE venous duplex negative for DVT.  - Echocardiogram 1/28/20 EF 45-50%, mild LVH, grade 2 diastolic dysfunction, RVE with normal RV systolic function, mod-severe AS based on BSA; repeat with normal LV systolic function; diastolic dysfunction; mod-severe AS.  - Repeat echo 9/20/21: EF 60-65%, mild LVH, unable to assess diastolic dysfunction, mild pulm HTN (41mmHg), BRUCE, mild MR/TR, mod-severe AS (CIELO 1.05cm2), AoR 4.3cm  - Mild acute on chronic diastolic CHF.  Improved with IV diuresis.  Continue with Lasix 40mg PO BID for now.  Monitor repeat CXRs, strict I/Os, daily weights, & BUN/Cr closely and titrate PRN.  - Abx in place along with steroids.  ~54pkyear smoking history, quit a long time ago.  Suspect a degree of COPD.  - No evidence of ischemia clinically.  Continue medical management for now pending the results of the above.  Plan for eventual ischemic evaluation (likely as an outpatient) once patient is clinically stable.  - CAD s/p PCI 3/2020.  Has been maintained on Plavix and Eliquis.  As it has been > 1 year since last coronary intervention, DC'd Plavix and started ASA 81mg daily to be taken with Eliquis 5mg BID given his PAF with elevated CHADSVASc score.  - Continue statin therapy  - AS: mod-severe: continue close outpatient followup.  Discussed the meaning and natural history of AS in detail with the patient.  Anticipate TAVR workup within the next 6-12 months.  - Ascending aortic aneurysm.  4.7cm on CTA.  This is stable in size. Continue BB.  - Keep K > 4, Mg > 2  - d/w Dr. Oates.  DC planning when rate better controlled      Abraham Mckinney MD

## 2021-09-23 NOTE — PROGRESS NOTE ADULT - ASSESSMENT
75y/oM PMH afib on Eliquis, hx DVT, HTN, DM, BPH s/p TURP, HLD, CAD on plavix, presenting to ER with 2 days SOB/ CRUZ, cough, rhinorrhea admitted with acute on chronic diastolic CHF     Dyspnea likely multifactorial due to   Acute on chronic diastolic HF   Entero/rhinovirus   -IV lasix transitioned to PO BID  -cardio recs appreciated   -CXR: mild pulmonary venous congestion   -CTA: no PE   -TTE with EF 60-65%, mod-sev AS  -strict I/Os   -daily weights   -titrating steroids   -cont nebs --> changed to Xopenex 9/19     paroxysmal afib, uncontrolled  CAD s/p stent   AS  HTN, HLD   -cont Eliquis   -cont home meds: Plavix discontinued, ASA restarted, BB - increased to 50mg QID, statin   -d/w Dr. Mckinney; dc amlodipine, start amio 200mg bid x2 days, then 200mg daily     DM  -hgba1c 9.3   -increase lantus again today 9/22, ISS   -diabetic specialist consult     BPH   -cont home meds     dvt ppx: on Eliquis     dispo: home pending hr control

## 2021-09-23 NOTE — PROGRESS NOTE ADULT - SUBJECTIVE AND OBJECTIVE BOX
ANIA CRISTOBAL    [id number]    75y      Male    CC: sob    INTERVAL HPI/OVERNIGHT EVENTS: pt seen and examined.     REVIEW OF SYSTEMS:    CONSTITUTIONAL: No fever, weight loss  RESPIRATORY: No wheezing, hemoptysis; No shortness of breath  CARDIOVASCULAR: No chest pain, palpitations  GASTROINTESTINAL: No abdominal or epigastric pain. No nausea, vomiting  NEUROLOGICAL: No headaches    Vital Signs Last 24 Hrs  T(C): 36.5 (23 Sep 2021 17:07), Max: 36.7 (22 Sep 2021 21:00)  T(F): 97.7 (23 Sep 2021 17:07), Max: 98 (22 Sep 2021 21:00)  HR: 123 (23 Sep 2021 17:07) (111 - 123)  BP: 131/82 (23 Sep 2021 17:07) (96/60 - 136/89)  BP(mean): --  RR: 18 (23 Sep 2021 17:07) (18 - 19)  SpO2: 94% (23 Sep 2021 17:07) (94% - 96%)    PHYSICAL EXAM:    GENERAL: NAD  HEENT: PERRL, +EOMI  NECK: soft, supple  CHEST/LUNG: Clear to auscultation bilaterally; No wheezing  HEART: S1S2+, irregularly irregular   ABDOMEN: Soft, Nontender, Nondistended; Bowel sounds present  SKIN: warm, dry  NEURO: AAOX3, no focal deficits    LABS:                        16.9   11.35 )-----------( 251      ( 23 Sep 2021 08:34 )             50.4     09-23    141  |  98  |  30.7<H>  ----------------------------<  243<H>  3.9   |  27.0  |  0.74    Ca    9.1      23 Sep 2021 08:34  Mg     1.8     09-23              MEDICATIONS  (STANDING):  aMIOdarone    Tablet   Oral   aMIOdarone    Tablet 200 milliGRAM(s) Oral two times a day  apixaban 5 milliGRAM(s) Oral every 12 hours  aspirin enteric coated 81 milliGRAM(s) Oral daily  atorvastatin 20 milliGRAM(s) Oral at bedtime  dextrose 40% Gel 15 Gram(s) Oral once  dextrose 5%. 1000 milliLiter(s) (50 mL/Hr) IV Continuous <Continuous>  dextrose 5%. 1000 milliLiter(s) (100 mL/Hr) IV Continuous <Continuous>  dextrose 50% Injectable 25 Gram(s) IV Push once  dextrose 50% Injectable 12.5 Gram(s) IV Push once  dextrose 50% Injectable 25 Gram(s) IV Push once  finasteride 5 milliGRAM(s) Oral daily  fluticasone propionate 50 MICROgram(s)/spray Nasal Spray 1 Spray(s) Both Nostrils two times a day  furosemide    Tablet 40 milliGRAM(s) Oral two times a day  glucagon  Injectable 1 milliGRAM(s) IntraMuscular once  influenza   Vaccine 0.5 milliLiter(s) IntraMuscular once  insulin glargine Injectable (LANTUS) 22 Unit(s) SubCutaneous at bedtime  insulin lispro (ADMELOG) corrective regimen sliding scale   SubCutaneous three times a day before meals  insulin lispro Injectable (ADMELOG) 10 Unit(s) SubCutaneous three times a day before meals  levalbuterol Inhalation 1.25 milliGRAM(s) Inhalation every 6 hours  metoprolol tartrate 50 milliGRAM(s) Oral every 6 hours  pantoprazole    Tablet 40 milliGRAM(s) Oral before breakfast  predniSONE   Tablet   Oral   tamsulosin 0.8 milliGRAM(s) Oral at bedtime    MEDICATIONS  (PRN):  ALPRAZolam 0.25 milliGRAM(s) Oral two times a day PRN anxiety  hydrocortisone hemorrhoidal Suppository 1 Suppository(s) Rectal two times a day PRN hemorroids      RADIOLOGY & ADDITIONAL TESTS:

## 2021-09-24 ENCOUNTER — TRANSCRIPTION ENCOUNTER (OUTPATIENT)
Age: 76
End: 2021-09-24

## 2021-09-24 VITALS
OXYGEN SATURATION: 92 % | SYSTOLIC BLOOD PRESSURE: 125 MMHG | HEART RATE: 110 BPM | TEMPERATURE: 98 F | DIASTOLIC BLOOD PRESSURE: 91 MMHG | RESPIRATION RATE: 18 BRPM

## 2021-09-24 LAB
GLUCOSE BLDC GLUCOMTR-MCNC: 200 MG/DL — HIGH (ref 70–99)
GLUCOSE BLDC GLUCOMTR-MCNC: 216 MG/DL — HIGH (ref 70–99)
GLUCOSE BLDC GLUCOMTR-MCNC: 242 MG/DL — HIGH (ref 70–99)

## 2021-09-24 PROCEDURE — 80053 COMPREHEN METABOLIC PANEL: CPT

## 2021-09-24 PROCEDURE — 82553 CREATINE MB FRACTION: CPT

## 2021-09-24 PROCEDURE — 94640 AIRWAY INHALATION TREATMENT: CPT

## 2021-09-24 PROCEDURE — 71045 X-RAY EXAM CHEST 1 VIEW: CPT

## 2021-09-24 PROCEDURE — 84145 PROCALCITONIN (PCT): CPT

## 2021-09-24 PROCEDURE — 71275 CT ANGIOGRAPHY CHEST: CPT

## 2021-09-24 PROCEDURE — 36415 COLL VENOUS BLD VENIPUNCTURE: CPT

## 2021-09-24 PROCEDURE — 84484 ASSAY OF TROPONIN QUANT: CPT

## 2021-09-24 PROCEDURE — 87040 BLOOD CULTURE FOR BACTERIA: CPT

## 2021-09-24 PROCEDURE — 93970 EXTREMITY STUDY: CPT

## 2021-09-24 PROCEDURE — 83605 ASSAY OF LACTIC ACID: CPT

## 2021-09-24 PROCEDURE — 83036 HEMOGLOBIN GLYCOSYLATED A1C: CPT

## 2021-09-24 PROCEDURE — 86769 SARS-COV-2 COVID-19 ANTIBODY: CPT

## 2021-09-24 PROCEDURE — 82550 ASSAY OF CK (CPK): CPT

## 2021-09-24 PROCEDURE — 99239 HOSP IP/OBS DSCHRG MGMT >30: CPT

## 2021-09-24 PROCEDURE — 99285 EMERGENCY DEPT VISIT HI MDM: CPT

## 2021-09-24 PROCEDURE — 83735 ASSAY OF MAGNESIUM: CPT

## 2021-09-24 PROCEDURE — 83880 ASSAY OF NATRIURETIC PEPTIDE: CPT

## 2021-09-24 PROCEDURE — 0225U NFCT DS DNA&RNA 21 SARSCOV2: CPT

## 2021-09-24 PROCEDURE — 85025 COMPLETE CBC W/AUTO DIFF WBC: CPT

## 2021-09-24 PROCEDURE — 85730 THROMBOPLASTIN TIME PARTIAL: CPT

## 2021-09-24 PROCEDURE — 96374 THER/PROPH/DIAG INJ IV PUSH: CPT

## 2021-09-24 PROCEDURE — 80048 BASIC METABOLIC PNL TOTAL CA: CPT

## 2021-09-24 PROCEDURE — 93005 ELECTROCARDIOGRAM TRACING: CPT

## 2021-09-24 PROCEDURE — 85610 PROTHROMBIN TIME: CPT

## 2021-09-24 PROCEDURE — 96375 TX/PRO/DX INJ NEW DRUG ADDON: CPT

## 2021-09-24 PROCEDURE — 81001 URINALYSIS AUTO W/SCOPE: CPT

## 2021-09-24 PROCEDURE — 82962 GLUCOSE BLOOD TEST: CPT

## 2021-09-24 PROCEDURE — 93306 TTE W/DOPPLER COMPLETE: CPT

## 2021-09-24 PROCEDURE — 85027 COMPLETE CBC AUTOMATED: CPT

## 2021-09-24 RX ORDER — FINASTERIDE 5 MG/1
1 TABLET, FILM COATED ORAL
Qty: 0 | Refills: 0 | DISCHARGE
Start: 2021-09-24

## 2021-09-24 RX ORDER — FUROSEMIDE 40 MG
1 TABLET ORAL
Qty: 60 | Refills: 0
Start: 2021-09-24 | End: 2021-10-23

## 2021-09-24 RX ORDER — ASPIRIN/CALCIUM CARB/MAGNESIUM 324 MG
1 TABLET ORAL
Qty: 0 | Refills: 0 | DISCHARGE
Start: 2021-09-24

## 2021-09-24 RX ORDER — FLUTICASONE PROPIONATE 50 MCG
1 SPRAY, SUSPENSION NASAL
Qty: 0 | Refills: 0 | DISCHARGE
Start: 2021-09-24

## 2021-09-24 RX ORDER — DAPAGLIFLOZIN 10 MG/1
1 TABLET, FILM COATED ORAL
Qty: 30 | Refills: 0
Start: 2021-09-24 | End: 2021-10-23

## 2021-09-24 RX ORDER — LEVALBUTEROL 1.25 MG/.5ML
2 SOLUTION, CONCENTRATE RESPIRATORY (INHALATION)
Qty: 1 | Refills: 0
Start: 2021-09-24 | End: 2021-10-23

## 2021-09-24 RX ORDER — AMIODARONE HYDROCHLORIDE 400 MG/1
1 TABLET ORAL
Qty: 30 | Refills: 0
Start: 2021-09-24 | End: 2021-10-23

## 2021-09-24 RX ORDER — HYDROCORTISONE 1 %
1 OINTMENT (GRAM) TOPICAL
Qty: 6 | Refills: 0
Start: 2021-09-24 | End: 2021-09-26

## 2021-09-24 RX ORDER — METOPROLOL TARTRATE 50 MG
1 TABLET ORAL
Qty: 30 | Refills: 0
Start: 2021-09-24 | End: 2021-10-23

## 2021-09-24 RX ORDER — CLOPIDOGREL BISULFATE 75 MG/1
1 TABLET, FILM COATED ORAL
Qty: 0 | Refills: 0 | DISCHARGE

## 2021-09-24 RX ADMIN — Medication 50 MILLIGRAM(S): at 06:23

## 2021-09-24 RX ADMIN — Medication 1 SPRAY(S): at 06:22

## 2021-09-24 RX ADMIN — Medication 1 SPRAY(S): at 19:24

## 2021-09-24 RX ADMIN — Medication 0.25 MILLIGRAM(S): at 19:30

## 2021-09-24 RX ADMIN — PANTOPRAZOLE SODIUM 40 MILLIGRAM(S): 20 TABLET, DELAYED RELEASE ORAL at 06:27

## 2021-09-24 RX ADMIN — Medication 50 MILLIGRAM(S): at 19:26

## 2021-09-24 RX ADMIN — FINASTERIDE 5 MILLIGRAM(S): 5 TABLET, FILM COATED ORAL at 13:40

## 2021-09-24 RX ADMIN — Medication 2: at 18:31

## 2021-09-24 RX ADMIN — Medication 1: at 09:21

## 2021-09-24 RX ADMIN — LEVALBUTEROL 1.25 MILLIGRAM(S): 1.25 SOLUTION, CONCENTRATE RESPIRATORY (INHALATION) at 02:54

## 2021-09-24 RX ADMIN — Medication 2: at 13:43

## 2021-09-24 RX ADMIN — Medication 10 UNIT(S): at 09:22

## 2021-09-24 RX ADMIN — Medication 40 MILLIGRAM(S): at 06:27

## 2021-09-24 RX ADMIN — Medication 0.25 MILLIGRAM(S): at 06:34

## 2021-09-24 RX ADMIN — Medication 50 MILLIGRAM(S): at 13:40

## 2021-09-24 RX ADMIN — Medication 10 UNIT(S): at 18:31

## 2021-09-24 RX ADMIN — AMIODARONE HYDROCHLORIDE 200 MILLIGRAM(S): 400 TABLET ORAL at 06:27

## 2021-09-24 RX ADMIN — APIXABAN 5 MILLIGRAM(S): 2.5 TABLET, FILM COATED ORAL at 06:24

## 2021-09-24 RX ADMIN — Medication 10 UNIT(S): at 13:45

## 2021-09-24 RX ADMIN — Medication 10 MILLIGRAM(S): at 06:27

## 2021-09-24 RX ADMIN — AMIODARONE HYDROCHLORIDE 200 MILLIGRAM(S): 400 TABLET ORAL at 19:25

## 2021-09-24 RX ADMIN — LEVALBUTEROL 1.25 MILLIGRAM(S): 1.25 SOLUTION, CONCENTRATE RESPIRATORY (INHALATION) at 15:02

## 2021-09-24 RX ADMIN — LEVALBUTEROL 1.25 MILLIGRAM(S): 1.25 SOLUTION, CONCENTRATE RESPIRATORY (INHALATION) at 08:54

## 2021-09-24 RX ADMIN — APIXABAN 5 MILLIGRAM(S): 2.5 TABLET, FILM COATED ORAL at 19:26

## 2021-09-24 RX ADMIN — Medication 81 MILLIGRAM(S): at 13:39

## 2021-09-24 NOTE — DISCHARGE NOTE PROVIDER - NSDCCPCAREPLAN_GEN_ALL_CORE_FT
PRINCIPAL DISCHARGE DIAGNOSIS  Diagnosis: Acute CHF  Assessment and Plan of Treatment:       SECONDARY DISCHARGE DIAGNOSES  Diagnosis: Acute CHF  Assessment and Plan of Treatment:     Diagnosis: Afib  Assessment and Plan of Treatment:     Diagnosis: HTN (hypertension)  Assessment and Plan of Treatment:

## 2021-09-24 NOTE — DISCHARGE NOTE PROVIDER - HOSPITAL COURSE
75y/oM PMH afib on Eliquis, hx DVT, HTN, DM, BPH s/p TURP, HLD, CAD on plavix, presenting to ER with 2 days SOB/ CRUZ, cough, rhinorrhea admitted with acute on chronic diastolic CHF. Pt additionally positive for entero/rhinovirus. treated with iv lasix. course complicated with afib, rvr. d/w cardio, d/c'ed amlodipine, started on amiodarone. metoprolol increased. hgba1c 9.3. d/w diabetic specialist and pt, will dc on home dose metformin with farxiga, given comorbidity of chf. pt improving, hr better controlled. to dc home. pt declined home care.     Vital Signs Last 24 Hrs  T(C): 36.5 (24 Sep 2021 15:31), Max: 36.8 (24 Sep 2021 05:00)  T(F): 97.7 (24 Sep 2021 15:31), Max: 98.3 (24 Sep 2021 05:00)  HR: 109 (24 Sep 2021 15:31) (88 - 123)  BP: 113/75 (24 Sep 2021 15:31) (100/62 - 131/82)  BP(mean): --  RR: 18 (24 Sep 2021 15:31) (18 - 20)  SpO2: 94% (24 Sep 2021 15:31) (94% - 100%)    GENERAL: NAD  HEENT: PERRL, +EOMI  NECK: soft, supple  CHEST/LUNG: Clear to auscultation bilaterally; No wheezing  HEART: S1S2+, irregularly irregular   ABDOMEN: Soft, Nontender, Nondistended; Bowel sounds present  SKIN: warm, dry  NEURO: AAOX3, no focal deficits     75y/oM PMH afib on Eliquis, hx DVT, HTN, DM, BPH s/p TURP, HLD, CAD on plavix, presenting to ER with 2 days SOB/ CRUZ, cough, rhinorrhea admitted with acute on chronic diastolic CHF. Pt additionally positive for entero/rhinovirus. treated with iv lasix. course complicated with afib, rvr. d/w cardio, d/c'ed amlodipine, started on amiodarone. metoprolol increased. hgba1c 9.3. d/w diabetic specialist and pt, will dc on home dose metformin with farxiga, given comorbidity of chf. pt improving, hr better controlled. to dc home. pt declined home care.     Vital Signs Last 24 Hrs  T(C): 36.5 (24 Sep 2021 15:31), Max: 36.8 (24 Sep 2021 05:00)  T(F): 97.7 (24 Sep 2021 15:31), Max: 98.3 (24 Sep 2021 05:00)  HR: 109 (24 Sep 2021 15:31) (88 - 123)  BP: 113/75 (24 Sep 2021 15:31) (100/62 - 131/82)  BP(mean): --  RR: 18 (24 Sep 2021 15:31) (18 - 20)  SpO2: 94% (24 Sep 2021 15:31) (94% - 100%)    GENERAL: NAD  HEENT: PERRL, +EOMI  NECK: soft, supple  CHEST/LUNG: Clear to auscultation bilaterally; No wheezing  HEART: S1S2+, irregularly irregular   ABDOMEN: Soft, Nontender, Nondistended; Bowel sounds present  SKIN: warm, dry  NEURO: AAOX3, no focal deficits    37minutes spent on discharge

## 2021-09-24 NOTE — PROGRESS NOTE ADULT - PROVIDER SPECIALTY LIST ADULT
Cardiology
Cardiology
Hospitalist
Hospitalist
Cardiology
Hospitalist
Hospitalist
Cardiology
Hospitalist
Hospitalist

## 2021-09-24 NOTE — DISCHARGE NOTE NURSING/CASE MANAGEMENT/SOCIAL WORK - PATIENT PORTAL LINK FT
You can access the FollowMyHealth Patient Portal offered by [facility] by registering at the following website: [email or web address]. By joining [employer]’s FollowMyHealth portal, you will also be able to view your health information using other applications (apps) compatible with our system.

## 2021-09-24 NOTE — PROGRESS NOTE ADULT - SUBJECTIVE AND OBJECTIVE BOX
[facility], [facility]                                                    [address], [address], [place] [address]                                                         PHONE: [phone]    FAX: [phone] [address], [address], [place] [address]                                                 PHONE: [phone]    FAX: [phone]  *******************************************************************************  cc: SOB    HPI: ANIA CRISTOBAL is a 75y Male with HTN, HLD, moderate AS, chronic combined systolic/diastolic CHF (EF 45-50%, now normalized), h/o DVT, paroxysmal AF on Eliquis, former smoker, obesity, anxiety, and CAD s/p orbital atherectomy and PCI of the mid and LAD with MARCO ANTONIO x 2 on 3/6/2020.  He presents with 2-3 days of dyspnea at rest and with exertion.  Being treated for PNA, acute on chronic combined CHF and COPD exacerbation with improvement of his symptoms.      Overnight events/Subjective Assessment: no CP. chronic SOB. no palpitations, PND or orthopnea, dizziness or syncope    INTERPRETATION OF TELEMETRY (personally reviewed): AF 90's    PAST MEDICAL & SURGICAL HISTORY:  Anxiety    Hypertension    Hyperlipidemia    Prostate disease    Gastroesophageal reflux disease    Gallbladder stone without cholecystitis or obstruction    BPH (benign prostatic hyperplasia)    DM (diabetes mellitus)    DVT (deep venous thrombosis)  left leg    Afib    Anxiety    HLD (hyperlipidemia)    Malignant schwannoma    H/O arthroscopy of knee    S/P laminectomy    S/P coronary artery stent placement  x2        Bactrim (Rash)  Ceftin (Hives)  penicillin (Anaphylaxis)  sulfa drugs (Unknown)      MEDICATIONS  (STANDING):  aMIOdarone    Tablet   Oral   aMIOdarone    Tablet 200 milliGRAM(s) Oral two times a day  apixaban 5 milliGRAM(s) Oral every 12 hours  aspirin enteric coated 81 milliGRAM(s) Oral daily  atorvastatin 20 milliGRAM(s) Oral at bedtime  dextrose 40% Gel 15 Gram(s) Oral once  dextrose 5%. 1000 milliLiter(s) (50 mL/Hr) IV Continuous <Continuous>  dextrose 5%. 1000 milliLiter(s) (100 mL/Hr) IV Continuous <Continuous>  dextrose 50% Injectable 25 Gram(s) IV Push once  dextrose 50% Injectable 12.5 Gram(s) IV Push once  dextrose 50% Injectable 25 Gram(s) IV Push once  finasteride 5 milliGRAM(s) Oral daily  fluticasone propionate 50 MICROgram(s)/spray Nasal Spray 1 Spray(s) Both Nostrils two times a day  furosemide    Tablet 40 milliGRAM(s) Oral two times a day  glucagon  Injectable 1 milliGRAM(s) IntraMuscular once  influenza   Vaccine 0.5 milliLiter(s) IntraMuscular once  insulin glargine Injectable (LANTUS) 22 Unit(s) SubCutaneous at bedtime  insulin lispro (ADMELOG) corrective regimen sliding scale   SubCutaneous three times a day before meals  insulin lispro Injectable (ADMELOG) 10 Unit(s) SubCutaneous three times a day before meals  levalbuterol Inhalation 1.25 milliGRAM(s) Inhalation every 6 hours  metoprolol tartrate 50 milliGRAM(s) Oral every 6 hours  pantoprazole    Tablet 40 milliGRAM(s) Oral before breakfast  tamsulosin 0.8 milliGRAM(s) Oral at bedtime    MEDICATIONS  (PRN):  ALPRAZolam 0.25 milliGRAM(s) Oral two times a day PRN anxiety  hydrocortisone hemorrhoidal Suppository 1 Suppository(s) Rectal two times a day PRN hemorroids      Vital Signs Last 24 Hrs  T(C): 36.8 (24 Sep 2021 05:00), Max: 36.8 (24 Sep 2021 05:00)  T(F): 98.3 (24 Sep 2021 05:00), Max: 98.3 (24 Sep 2021 05:00)  HR: 98 (24 Sep 2021 06:28) (90 - 123)  BP: 123/81 (24 Sep 2021 06:28) (102/72 - 131/82)  BP(mean): --  RR: 19 (24 Sep 2021 05:00) (18 - 20)  SpO2: 95% (24 Sep 2021 05:00) (94% - 97%)    I&O's Detail    23 Sep 2021 07:01  -  24 Sep 2021 07:00  --------------------------------------------------------  IN:    Oral Fluid: 300 mL  Total IN: 300 mL    OUT:  Total OUT: 0 mL    Total NET: 300 mL        I&O's Summary    23 Sep 2021 07:01  -  24 Sep 2021 07:00  --------------------------------------------------------  IN: 300 mL / OUT: 0 mL / NET: 300 mL            PHYSICAL EXAM:  General: Appears well developed, well nourished, no acute distress. not in acute pain  HEAD: normal cephalic. Atraumatic. obese  PUPILS: equal and reactive to light  EARS: normal hearing  NECK: supple. no JVD or HJR. no carotid bruits. no visible lymphadenopathy  NOSE: no gross abnormalities  CHEST: symmetric chest wall expansion  CARDIOVASCULAR: Normal rate. irregular rhythm. Normal S1 and S2, no S3/S4,  II/VI systolic murmur, no rub, or gallop  LUNGS: Normal effort. Normal respiratory rate. Breath sounds are clear to auscultation bilaterally. No respiratory distress. No stridor.  no rales, rhonchi. + expiratory wheeze. no decreased Breath sounds  ABDOMEN: Soft, nontender, non-distended, positive bowel sounds, no mass or bruit. no abdominal tenderness. No rebound. no ascites  EXTREMITIES: No clubbing, cyanosis. mild bilat LE edema. normal range of motion  PULSES:  distal pulses WNL  SKIN: Warm and dry with normal turgor. no visible rash or cyanosis   NEURO: Alert & oriented x 3, grossly intact with no focal weakness  PSYCH: normal mood and affect. Grossly normal insight and judgement exhibited    FAMILY HISTORY:  No pertinent family history of ischemic heart disease in first degree relatives        SOCIAL HISTORY:   former smoking. No ETOH/No IVDA    REVIEW OF SYSTEMS:  All pertinent positive and negative ROS as above. all other ROS are negative        LABS:                        16.9   11.35 )-----------( 251      ( 23 Sep 2021 08:34 )             50.4     09-23    141  |  98  |  30.7<H>  ----------------------------<  243<H>  3.9   |  27.0  |  0.74    Ca    9.1      23 Sep 2021 08:34  Mg     1.8     09-23            Serum Pro-Brain Natriuretic Peptide: 572 pg/mL (09-22 @ 08:10)  Serum Pro-Brain Natriuretic Peptide: 1054 pg/mL (09-20 @ 08:27)  Serum Pro-Brain Natriuretic Peptide: 819 pg/mL (09-19 @ 09:12)  Serum Pro-Brain Natriuretic Peptide: 809 pg/mL (09-17 @ 15:08)  serum  Lipids:         RADIOLOGY & ADDITIONAL STUDIES:    < from: Xray Chest 1 View- PORTABLE-Routine (Xray Chest 1 View- PORTABLE-Routine in AM.) (09.22.21 @ 05:54) >  IMPRESSION:  No acute radiographic findings and no change    < end of copied text >    ECHO:  < from: TTE Echo Complete w/o Contrast w/ Doppler (09.20.21 @ 12:24) >  PHYSICIAN INTERPRETATION:  Left Ventricle: Endocardial visualization was enhanced with intravenous echo contrast. The left ventricular internal cavity size is normal.  Global LV systolic function was normal. Left ventricular ejection fraction, by visual estimation, is 60 to 65%. The mitral in-flow pattern reveals no discernable A-wave, therefore no comment on diastolic function can be made.  RightVentricle: Normal right ventricular size and function. RV systolic function is normal. TV S' 0.1 m/s.  Left Atrium: Moderately enlarged left atrium.  Right Atrium: Right atrial enlargement.  Pericardium: There is no evidence of pericardial effusion. There is a significant pericardial fat pad present.  Mitral Valve: There is mild mitral annular calcification. Mild mitral valve regurgitation is seen.  Tricuspid Valve: The tricuspid valve is normal in structure. Mild tricuspid regurgitation is visualized. Estimated pulmonary artery systolic pressure is 40.4 mmHg assuming a right atrial pressure of 15 mmHg, which is consistent with mild pulmonary hypertension.  Aortic Valve: The aortic valve is trileaflet. Moderate to severe aortic stenosis is present. Peak transaortic gradient equals 38.6 mmHg, mean transaortic gradient equals 16.8 mmHg, the calculated aortic valve area equals 1.05 cm² by the continuity equation consistent with moderate aortic stenosis. The Dimesionless Index value is .31.  Pulmonic Valve: The pulmonic valve was not well visualized.  Aorta: Aortic root measured at Sinus of Valsalva is dilated. There is dilatation of the ascending aorta.  Pulmonary Artery: The main pulmonary artery is normal in size.  Venous: The pulmonary veins appear normal. The inferior vena cava is 2.3 cm. The inferior vena cava was dilated, with respiratory size variation less than 50%.  In comparison to the previous echocardiogram(s): Prior examinations are available and were reviewed for comparison purposes. Compared to the prior TTE study from 1/28/20, LV systolic funnction/EF have improved, rhythm replaced with atrial fibrillation.      Summary:   1. Technically difficult study.   2. Left ventricular ejection fraction, by visual estimation, is 60 to 65%.   3. Normal global left ventricular systolic function.   4. There is mild concentric left ventricular hypertrophy.   5. The mitral in-flow pattern reveals no discernable A-wave, therefore no comment on diastolic function can be made.   6. Normal right ventricular size and function.   7. Estimated pulmonary artery systolic pressure is 40.4 mmHg assuming a right atrial pressure of 15 mmHg, which is consistent with mild pulmonary hypertension.   8. Right atrial enlargement.   9. Moderately enlarged left atrium.  10. Mild mitral annular calcification.  11. Mild mitral valve regurgitation.  12. Mild tricuspid regurgitation.  13. Moderate to severe aortic valve stenosis.  14. Endocardial visualization was enhanced with intravenous echo contrast.  15. Peak transaortic gradient equals 38.6 mmHg, mean transaortic gradient equals 16.8 mmHg, the calculated aortic valve area equals 1.05 cm² by the continuity equation consistent with moderate aortic stenosis.  16. Aortic root measured at Sinus of Valsalva is dilated 4.3 cm.  17. There is no evidence of pericardial effusion.  18. Compared to the prior TTE study from 1/28/20, LV systolic funnction/EF have improved, rhythm replaced with atrial fibrillation.    < end of copied text >      ASSESSMENT AND PLAN:  In summary, ANIA CRISTOBAL is a 75y Male with past medical history significant for  HTN, HLD, moderate AS, chronic combined systolic/diastolic CHF (EF 45-50%, now normalized), h/o DVT, paroxysmal AF on Eliquis, former smoker, obesity, anxiety, and CAD s/p orbital atherectomy and PCI of the mid and LAD with MARCO ANTONIO x 2 on 3/6/2020.  He presents with 2-3 days of dyspnea at rest and with exertion.  Being treated for PNA, acute on chronic combined CHF and COPD exacerbation with improvement of his symptoms.    - PAF. Suspect will be peristent. Rate is improved.  Continue with Lopressor to 50 mg q6h.  Convert to long-acting prior to discharge.   Amiodarone 200mg BID for 2 days, then 200mg daily. Off of Norvasc to allow for additional AV simone agents (i.e cardizem) if BP allows.    - Initial troponin negative.  Nothing to suggest ACS. Outpt ischemic eval on Dc    - CTA negative for PE, LE venous duplex negative for DVT.    - Echocardiogram 1/28/20 EF 45-50%, mild LVH, grade 2 diastolic dysfunction, RVE with normal RV systolic function, mod-severe AS based on BSA; repeat with normal LV systolic function; diastolic dysfunction; mod-severe AS.    - Repeat echo 9/20/21: EF 60-65%, mild LVH, unable to assess diastolic dysfunction, mild pulm HTN (41mmHg), BRUCE, mild MR/TR, mod-severe AS (CIELO 1.05cm2), AoR 4.3cm    - Mild acute on chronic diastolic CHF.  Improved with IV diuresis.  Continue with Lasix 40mg PO BID for now.  Monitor repeat CXRs, strict I/Os, daily weights, & BUN/Cr closely and titrate PRN.    - Abx in place along with steroids.  ~54pkyear smoking history, quit a long time ago.  Suspect a degree of COPD. Expiratory wheeze persists    - No evidence of ischemia clinically.  Continue medical management for now pending the results of the above.  Plan for eventual ischemic evaluation (likely as an outpatient) once patient is clinically stable.    - CAD s/p PCI 3/2020.  Has been maintained on Plavix and Eliquis.  As it has been > 1 year since last coronary intervention, DC'd Plavix and started ASA 81mg daily to be taken with Eliquis 5mg BID given his PAF with elevated CHADSVASc score.    - HL. atorvastatin 20mg daily    - non rheumatic AS: mod-severe: continue close outpatient followup.  Anticipate TAVR workup within the next 6-12 months.    - Ascending aortic aneurysm.  4.7cm on CTA.  This is stable in size. Continue BB.    - T2DM. as per medical    - COPD as per medical    - Obesity. dietary modification and counseling     - Keep K > 4, Mg > 2    - Telemetry monitoring personally reviewed by me. AF better rate contorl    - radiologic imaging reviewed    - Laboratory data reviewed.    - I have personally reviewed all obtainable prior records and data    - If HR is improved with ambulation, then home with outpt fu via our office.      - Please reconsult PRN over the weekend if any new CV issues should arise    Thank you for allowing me to participate in the care of your patient      Laura Elias MD

## 2021-09-24 NOTE — DISCHARGE NOTE PROVIDER - CARE PROVIDERS DIRECT ADDRESSES
,DirectAddress_Unknown,DirectAddress_Unknown,[email or web address]

## 2021-09-24 NOTE — DISCHARGE NOTE PROVIDER - NSDCMRMEDTOKEN_GEN_ALL_CORE_FT
alfuzosin 10 mg oral tablet, extended release: 1 tab(s) orally once a day  ALPRAZolam 0.25 mg oral tablet: 1 tab(s) orally 2 times a day, As needed, anxiety  Crestor 5 mg oral tablet: 1 tab(s) orally once a day  Eliquis 5 mg oral tablet: 1 tab(s) orally 2 times a day  Farxiga 5 mg oral tablet: 1 tab(s) orally once a day   finasteride 5 mg oral tablet: 1 tab(s) orally once a day  fluticasone 50 mcg inhalation powder: 1 puff(s) inhaled 2 times a day, As Needed  Lasix 40 mg oral tablet: 1 tab(s) orally once a day  metFORMIN 500 mg oral tablet: 1  orally 3 times a day  Metoprolol Tartrate 25 mg oral tablet: 1 tab(s) orally 3 times a day  NexIUM 40 mg oral delayed release capsule: 1 cap(s) orally once a day  Norvasc 10 mg oral tablet: 1 tab(s) orally once a day  Plavix 75 mg oral tablet: 1 tab(s) orally once a day  Ventolin HFA 90 mcg/inh inhalation aerosol: 2 puff(s) inhaled every 6 hours    alfuzosin 10 mg oral tablet, extended release: 1 tab(s) orally once a day  ALPRAZolam 0.25 mg oral tablet: 1 tab(s) orally 2 times a day, As needed, anxiety  amiodarone 200 mg oral tablet: 1 tab(s) orally once a day   aspirin 81 mg oral delayed release tablet: 1 tab(s) orally once a day  Crestor 5 mg oral tablet: 1 tab(s) orally once a day  Eliquis 5 mg oral tablet: 1 tab(s) orally 2 times a day  Farxiga 5 mg oral tablet: 1 tab(s) orally once a day   finasteride 5 mg oral tablet: 1 tab(s) orally once a day  fluticasone 50 mcg inhalation powder: 1 puff(s) inhaled 2 times a day, As Needed  fluticasone 50 mcg/inh nasal spray: 1 spray(s) nasal 2 times a day  furosemide 40 mg oral tablet: 1 tab(s) orally 2 times a day  hydrocortisone 25 mg rectal suppository: 1 suppository(ies) rectal 2 times a day, As needed, hemorroids  metFORMIN 500 mg oral tablet: 1  orally 3 times a day  Metoprolol Succinate  mg oral tablet, extended release: 1 tab(s) orally once a day   NexIUM 40 mg oral delayed release capsule: 1 cap(s) orally once a day  Xopenex HFA 45 mcg/inh inhalation aerosol: 2 puff(s) inhaled every 6 hours, As Needed -for shortness of breath and/or wheezing

## 2021-09-24 NOTE — DISCHARGE NOTE PROVIDER - PROVIDER TOKENS
FREE:[LAST:[PMD],PHONE:[(   )    -],FAX:[(   )    -]],PROVIDER:[TOKEN:[[id number]],FOLLOWUP:[1-3 days]],PROVIDER:[TOKEN:[[id number]]],PROVIDER:[TOKEN:[[id number]]]

## 2021-09-24 NOTE — DISCHARGE NOTE PROVIDER - CARE PROVIDER_API CALL
PMD,   Phone: (   )    -  Fax: (   )    -  Follow Up Time:     Abraham Mckinney)  Cardiovascular Disease; Internal Medicine; Interventional Cardiology  [address], [address]  [place] [address]  Phone: [phone]  Fax: [phone]  Follow Up Time: 1-3 days    Hipolito Faustin)  Gastroenterology; Internal Medicine  [address], [address]  [place] [address]  Phone: [phone]  Fax: [phone]  Follow Up Time:     Johnny Soni)  Critical Care Medicine; Pulmonary Disease; Sleep Medicine  [address], [address]  [place] [address]  Phone: [phone]  Fax: [phone]  Follow Up Time:

## 2021-09-25 ENCOUNTER — TRANSCRIPTION ENCOUNTER (OUTPATIENT)
Age: 76
End: 2021-09-25

## 2021-09-27 ENCOUNTER — TRANSCRIPTION ENCOUNTER (OUTPATIENT)
Age: 76
End: 2021-09-27

## 2021-09-29 ENCOUNTER — APPOINTMENT (OUTPATIENT)
Dept: CARE COORDINATION | Facility: HOME HEALTH | Age: 76
End: 2021-09-29

## 2021-09-29 DIAGNOSIS — I10 ESSENTIAL (PRIMARY) HYPERTENSION: ICD-10-CM

## 2021-09-29 DIAGNOSIS — F32.9 ANXIETY DISORDER, UNSPECIFIED: ICD-10-CM

## 2021-09-29 DIAGNOSIS — J30.9 ALLERGIC RHINITIS, UNSPECIFIED: ICD-10-CM

## 2021-09-29 DIAGNOSIS — F41.9 ANXIETY DISORDER, UNSPECIFIED: ICD-10-CM

## 2021-09-29 DIAGNOSIS — E11.9 TYPE 2 DIABETES MELLITUS W/OUT COMPLICATIONS: ICD-10-CM

## 2021-09-29 DIAGNOSIS — N40.0 BENIGN PROSTATIC HYPERPLASIA WITHOUT LOWER URINARY TRACT SYMPMS: ICD-10-CM

## 2021-09-29 DIAGNOSIS — R33.9 RETENTION OF URINE, UNSPECIFIED: ICD-10-CM

## 2021-09-29 DIAGNOSIS — I25.10 ATHEROSCLEROTIC HEART DISEASE OF NATIVE CORONARY ARTERY W/OUT ANGINA PECTORIS: ICD-10-CM

## 2021-09-29 DIAGNOSIS — E78.5 HYPERLIPIDEMIA, UNSPECIFIED: ICD-10-CM

## 2021-09-30 PROBLEM — R33.9 URINARY RETENTION: Status: ACTIVE | Noted: 2021-09-30

## 2021-09-30 PROBLEM — E11.9 TYPE 2 DIABETES MELLITUS: Status: ACTIVE | Noted: 2021-09-30

## 2021-09-30 PROBLEM — N40.0 BPH (BENIGN PROSTATIC HYPERPLASIA): Status: ACTIVE | Noted: 2021-09-30

## 2021-09-30 PROBLEM — F41.9 ANXIETY AND DEPRESSION: Status: ACTIVE | Noted: 2021-09-30

## 2021-09-30 PROBLEM — I25.10 CAD (CORONARY ARTERY DISEASE): Status: ACTIVE | Noted: 2021-09-30

## 2021-09-30 PROBLEM — J30.9 RHINITIS, ALLERGIC: Status: ACTIVE | Noted: 2021-09-30

## 2021-09-30 RX ORDER — CYANOCOBALAMIN (VITAMIN B-12) 1000 MCG
500 TABLET, SUBLINGUAL SUBLINGUAL
Qty: 90 | Refills: 1 | Status: DISCONTINUED | COMMUNITY
Start: 2020-07-29 | End: 2021-09-30

## 2021-09-30 RX ORDER — PREDNISONE 10 MG/1
10 TABLET ORAL
Refills: 0 | Status: DISCONTINUED | COMMUNITY
Start: 2020-02-07 | End: 2021-09-30

## 2021-09-30 RX ORDER — FLUTICASONE PROPIONATE 50 UG/1
50 SPRAY, METERED NASAL
Qty: 3 | Refills: 3 | Status: ACTIVE | COMMUNITY
Start: 2021-05-28

## 2021-09-30 RX ORDER — FUROSEMIDE 40 MG/1
40 TABLET ORAL TWICE DAILY
Qty: 60 | Refills: 0 | Status: ACTIVE | COMMUNITY
Start: 2020-02-07

## 2021-09-30 RX ORDER — ASPIRIN 81 MG
81 TABLET,CHEWABLE ORAL DAILY
Refills: 0 | Status: ACTIVE | COMMUNITY
Start: 2021-04-27

## 2021-09-30 RX ORDER — ALPRAZOLAM 0.25 MG/1
0.25 TABLET ORAL TWICE DAILY
Refills: 0 | Status: ACTIVE | COMMUNITY
Start: 2020-02-07

## 2021-09-30 RX ORDER — APIXABAN 5 MG/1
5 TABLET, FILM COATED ORAL
Qty: 180 | Refills: 1 | Status: ACTIVE | COMMUNITY
Start: 2021-03-11

## 2021-09-30 RX ORDER — FINASTERIDE 5 MG/1
5 TABLET, FILM COATED ORAL DAILY
Qty: 30 | Refills: 0 | Status: ACTIVE | COMMUNITY
Start: 2021-07-13

## 2021-10-06 PROBLEM — I10 ESSENTIAL HYPERTENSION: Status: ACTIVE | Noted: 2021-09-30

## 2021-10-07 NOTE — CDI QUERY NOTE - NSCDIOTHERTXTBX_GEN_ALL_CORE_HH
Clarification is need on the status of pneumonia. Documentation noted of viral pneumonia on attending H/P and Cardiology notes, no further documentation from attending of the status of pneumonia in progress notes and/or discharge note noted.     Please clarify the status of pneumonia  A) Viral pneumonia ruled in and present on admission  B) Pneumonia ruled out  C) Not clinically significant   D) Not clinically significant     Supporting Documentation:    H&P Adult-GOC- Attending [Charted Location: [facility] ERHR [id number]] [Authored: 17-Sep-2021 16:57]  CRUZ,cough likely from Viral pna, CHF exacerbation  - RVP positive,high bnp. Covid neg  -oxygen to keep so2>91%  - albuterol , will give small dose of steroid for now.  - pt got one dose of iv lasix. will continue iv lasix. if breathing improve will change to po   -I and o  -last echo 01/20 ef 45-50% grade 2 diastolic HF, SEVERE TO MOD AS  -ED called cardiology group. will f/u rec  - Monitor renal function  -Tele monitor    Consult Note Adult-Cardiology Attending [Charted Location: [facility] [id number]] [Authored: 20-Sep-2021 14:16]-[Authored: 24-Sep-2021 08:09]  Being treated for PNA, acute on chronic combined CHF and COPD exacerbation with improvement of his symptoms.    < from: Xray Chest 1 View- PORTABLE-Routine (Xray Chest 1 View- PORTABLE-Routine in AM.) (09.19.21 @ 02:23) >  HEART:  Enlarged  LUNGS: Mild congestion. No effusion or infiltrate. No pneumothorax  BONES: within normal limits  IMPRESSION:  Mild congestion  < end of copied text >    Antibiotics

## 2021-11-16 ENCOUNTER — APPOINTMENT (OUTPATIENT)
Dept: ENDOCRINOLOGY | Facility: CLINIC | Age: 76
End: 2021-11-16

## 2022-06-09 ENCOUNTER — INPATIENT (INPATIENT)
Facility: HOSPITAL | Age: 77
LOS: 0 days | Discharge: HOME | DRG: 696 | End: 2022-06-10
Attending: STUDENT IN AN ORGANIZED HEALTH CARE EDUCATION/TRAINING PROGRAM | Admitting: STUDENT IN AN ORGANIZED HEALTH CARE EDUCATION/TRAINING PROGRAM
Payer: MEDICARE

## 2022-06-09 VITALS
DIASTOLIC BLOOD PRESSURE: 59 MMHG | TEMPERATURE: 99 F | HEIGHT: 74 IN | SYSTOLIC BLOOD PRESSURE: 100 MMHG | RESPIRATION RATE: 18 BRPM | OXYGEN SATURATION: 95 % | HEART RATE: 81 BPM

## 2022-06-09 DIAGNOSIS — R31.9 HEMATURIA, UNSPECIFIED: ICD-10-CM

## 2022-06-09 DIAGNOSIS — Z98.89 OTHER SPECIFIED POSTPROCEDURAL STATES: Chronic | ICD-10-CM

## 2022-06-09 DIAGNOSIS — Z95.5 PRESENCE OF CORONARY ANGIOPLASTY IMPLANT AND GRAFT: Chronic | ICD-10-CM

## 2022-06-09 DIAGNOSIS — C49.9 MALIGNANT NEOPLASM OF CONNECTIVE AND SOFT TISSUE, UNSPECIFIED: Chronic | ICD-10-CM

## 2022-06-09 LAB
ALBUMIN SERPL ELPH-MCNC: 3.9 G/DL — SIGNIFICANT CHANGE UP (ref 3.3–5.2)
ALP SERPL-CCNC: 116 U/L — SIGNIFICANT CHANGE UP (ref 40–120)
ALT FLD-CCNC: 17 U/L — SIGNIFICANT CHANGE UP
ANION GAP SERPL CALC-SCNC: 15 MMOL/L — SIGNIFICANT CHANGE UP (ref 5–17)
APPEARANCE UR: SIGNIFICANT CHANGE UP
AST SERPL-CCNC: 17 U/L — SIGNIFICANT CHANGE UP
BACTERIA # UR AUTO: ABNORMAL
BASOPHILS # BLD AUTO: 0.05 K/UL — SIGNIFICANT CHANGE UP (ref 0–0.2)
BASOPHILS NFR BLD AUTO: 0.7 % — SIGNIFICANT CHANGE UP (ref 0–2)
BILIRUB SERPL-MCNC: 0.4 MG/DL — SIGNIFICANT CHANGE UP (ref 0.4–2)
BILIRUB UR-MCNC: NEGATIVE — SIGNIFICANT CHANGE UP
BUN SERPL-MCNC: 19.8 MG/DL — SIGNIFICANT CHANGE UP (ref 8–20)
CALCIUM SERPL-MCNC: 8.6 MG/DL — SIGNIFICANT CHANGE UP (ref 8.6–10.2)
CHLORIDE SERPL-SCNC: 97 MMOL/L — LOW (ref 98–107)
CO2 SERPL-SCNC: 26 MMOL/L — SIGNIFICANT CHANGE UP (ref 22–29)
COLOR SPEC: ABNORMAL
CREAT SERPL-MCNC: 1.03 MG/DL — SIGNIFICANT CHANGE UP (ref 0.5–1.3)
DIFF PNL FLD: ABNORMAL
EGFR: 75 ML/MIN/1.73M2 — SIGNIFICANT CHANGE UP
EOSINOPHIL # BLD AUTO: 0.27 K/UL — SIGNIFICANT CHANGE UP (ref 0–0.5)
EOSINOPHIL NFR BLD AUTO: 3.7 % — SIGNIFICANT CHANGE UP (ref 0–6)
EPI CELLS # UR: SIGNIFICANT CHANGE UP
GLUCOSE SERPL-MCNC: 326 MG/DL — HIGH (ref 70–99)
GLUCOSE UR QL: 1000 MG/DL
HCT VFR BLD CALC: 49.2 % — SIGNIFICANT CHANGE UP (ref 39–50)
HGB BLD-MCNC: 16.4 G/DL — SIGNIFICANT CHANGE UP (ref 13–17)
IMM GRANULOCYTES NFR BLD AUTO: 0.3 % — SIGNIFICANT CHANGE UP (ref 0–1.5)
INR BLD: 1.19 RATIO — HIGH (ref 0.88–1.16)
KETONES UR-MCNC: ABNORMAL
LEUKOCYTE ESTERASE UR-ACNC: NEGATIVE — SIGNIFICANT CHANGE UP
LYMPHOCYTES # BLD AUTO: 1.07 K/UL — SIGNIFICANT CHANGE UP (ref 1–3.3)
LYMPHOCYTES # BLD AUTO: 14.5 % — SIGNIFICANT CHANGE UP (ref 13–44)
MCHC RBC-ENTMCNC: 31.2 PG — SIGNIFICANT CHANGE UP (ref 27–34)
MCHC RBC-ENTMCNC: 33.3 GM/DL — SIGNIFICANT CHANGE UP (ref 32–36)
MCV RBC AUTO: 93.5 FL — SIGNIFICANT CHANGE UP (ref 80–100)
MONOCYTES # BLD AUTO: 0.72 K/UL — SIGNIFICANT CHANGE UP (ref 0–0.9)
MONOCYTES NFR BLD AUTO: 9.8 % — SIGNIFICANT CHANGE UP (ref 2–14)
NEUTROPHILS # BLD AUTO: 5.24 K/UL — SIGNIFICANT CHANGE UP (ref 1.8–7.4)
NEUTROPHILS NFR BLD AUTO: 71 % — SIGNIFICANT CHANGE UP (ref 43–77)
NITRITE UR-MCNC: NEGATIVE — SIGNIFICANT CHANGE UP
PH UR: 7 — SIGNIFICANT CHANGE UP (ref 5–8)
PLATELET # BLD AUTO: 214 K/UL — SIGNIFICANT CHANGE UP (ref 150–400)
POTASSIUM SERPL-MCNC: 3.6 MMOL/L — SIGNIFICANT CHANGE UP (ref 3.5–5.3)
POTASSIUM SERPL-SCNC: 3.6 MMOL/L — SIGNIFICANT CHANGE UP (ref 3.5–5.3)
PROT SERPL-MCNC: 6.9 G/DL — SIGNIFICANT CHANGE UP (ref 6.6–8.7)
PROT UR-MCNC: 500 MG/DL
PROTHROM AB SERPL-ACNC: 13.8 SEC — HIGH (ref 10.5–13.4)
RBC # BLD: 5.26 M/UL — SIGNIFICANT CHANGE UP (ref 4.2–5.8)
RBC # FLD: 14.1 % — SIGNIFICANT CHANGE UP (ref 10.3–14.5)
RBC CASTS # UR COMP ASSIST: >50 /HPF (ref 0–4)
SODIUM SERPL-SCNC: 138 MMOL/L — SIGNIFICANT CHANGE UP (ref 135–145)
SP GR SPEC: 1.01 — SIGNIFICANT CHANGE UP (ref 1.01–1.02)
UROBILINOGEN FLD QL: NEGATIVE — SIGNIFICANT CHANGE UP
WBC # BLD: 7.37 K/UL — SIGNIFICANT CHANGE UP (ref 3.8–10.5)
WBC # FLD AUTO: 7.37 K/UL — SIGNIFICANT CHANGE UP (ref 3.8–10.5)
WBC UR QL: SIGNIFICANT CHANGE UP /HPF (ref 0–5)

## 2022-06-09 PROCEDURE — 99285 EMERGENCY DEPT VISIT HI MDM: CPT | Mod: GC

## 2022-06-09 PROCEDURE — 99223 1ST HOSP IP/OBS HIGH 75: CPT

## 2022-06-09 PROCEDURE — 99221 1ST HOSP IP/OBS SF/LOW 40: CPT

## 2022-06-09 PROCEDURE — 51703 INSERT BLADDER CATH COMPLEX: CPT

## 2022-06-09 RX ORDER — DEXTROSE 50 % IN WATER 50 %
25 SYRINGE (ML) INTRAVENOUS ONCE
Refills: 0 | Status: DISCONTINUED | OUTPATIENT
Start: 2022-06-09 | End: 2022-06-10

## 2022-06-09 RX ORDER — GLUCAGON INJECTION, SOLUTION 0.5 MG/.1ML
1 INJECTION, SOLUTION SUBCUTANEOUS ONCE
Refills: 0 | Status: DISCONTINUED | OUTPATIENT
Start: 2022-06-09 | End: 2022-06-10

## 2022-06-09 RX ORDER — ACETAMINOPHEN 500 MG
975 TABLET ORAL EVERY 6 HOURS
Refills: 0 | Status: DISCONTINUED | OUTPATIENT
Start: 2022-06-09 | End: 2022-06-10

## 2022-06-09 RX ORDER — METFORMIN HYDROCHLORIDE 850 MG/1
500 TABLET ORAL THREE TIMES A DAY
Refills: 0 | Status: DISCONTINUED | OUTPATIENT
Start: 2022-06-09 | End: 2022-06-10

## 2022-06-09 RX ORDER — SENNA PLUS 8.6 MG/1
2 TABLET ORAL AT BEDTIME
Refills: 0 | Status: DISCONTINUED | OUTPATIENT
Start: 2022-06-09 | End: 2022-06-10

## 2022-06-09 RX ORDER — AMIODARONE HYDROCHLORIDE 400 MG/1
200 TABLET ORAL DAILY
Refills: 0 | Status: DISCONTINUED | OUTPATIENT
Start: 2022-06-09 | End: 2022-06-10

## 2022-06-09 RX ORDER — SODIUM CHLORIDE 9 MG/ML
1000 INJECTION, SOLUTION INTRAVENOUS
Refills: 0 | Status: DISCONTINUED | OUTPATIENT
Start: 2022-06-09 | End: 2022-06-10

## 2022-06-09 RX ORDER — PANTOPRAZOLE SODIUM 20 MG/1
40 TABLET, DELAYED RELEASE ORAL
Refills: 0 | Status: DISCONTINUED | OUTPATIENT
Start: 2022-06-09 | End: 2022-06-10

## 2022-06-09 RX ORDER — ONDANSETRON 8 MG/1
4 TABLET, FILM COATED ORAL EVERY 6 HOURS
Refills: 0 | Status: DISCONTINUED | OUTPATIENT
Start: 2022-06-09 | End: 2022-06-10

## 2022-06-09 RX ORDER — DEXTROSE 50 % IN WATER 50 %
12.5 SYRINGE (ML) INTRAVENOUS ONCE
Refills: 0 | Status: DISCONTINUED | OUTPATIENT
Start: 2022-06-09 | End: 2022-06-10

## 2022-06-09 RX ORDER — DEXTROSE 50 % IN WATER 50 %
15 SYRINGE (ML) INTRAVENOUS ONCE
Refills: 0 | Status: DISCONTINUED | OUTPATIENT
Start: 2022-06-09 | End: 2022-06-10

## 2022-06-09 RX ORDER — FUROSEMIDE 40 MG
40 TABLET ORAL
Refills: 0 | Status: DISCONTINUED | OUTPATIENT
Start: 2022-06-09 | End: 2022-06-10

## 2022-06-09 RX ORDER — ATORVASTATIN CALCIUM 80 MG/1
20 TABLET, FILM COATED ORAL AT BEDTIME
Refills: 0 | Status: DISCONTINUED | OUTPATIENT
Start: 2022-06-09 | End: 2022-06-10

## 2022-06-09 RX ORDER — FINASTERIDE 5 MG/1
5 TABLET, FILM COATED ORAL DAILY
Refills: 0 | Status: DISCONTINUED | OUTPATIENT
Start: 2022-06-09 | End: 2022-06-10

## 2022-06-09 RX ORDER — METOPROLOL TARTRATE 50 MG
200 TABLET ORAL DAILY
Refills: 0 | Status: DISCONTINUED | OUTPATIENT
Start: 2022-06-09 | End: 2022-06-10

## 2022-06-09 RX ORDER — INSULIN LISPRO 100/ML
VIAL (ML) SUBCUTANEOUS
Refills: 0 | Status: DISCONTINUED | OUTPATIENT
Start: 2022-06-09 | End: 2022-06-10

## 2022-06-09 RX ORDER — MAGNESIUM HYDROXIDE 400 MG/1
30 TABLET, CHEWABLE ORAL EVERY 6 HOURS
Refills: 0 | Status: DISCONTINUED | OUTPATIENT
Start: 2022-06-09 | End: 2022-06-10

## 2022-06-09 RX ADMIN — FINASTERIDE 5 MILLIGRAM(S): 5 TABLET, FILM COATED ORAL at 22:08

## 2022-06-09 RX ADMIN — METFORMIN HYDROCHLORIDE 500 MILLIGRAM(S): 850 TABLET ORAL at 22:08

## 2022-06-09 RX ADMIN — AMIODARONE HYDROCHLORIDE 200 MILLIGRAM(S): 400 TABLET ORAL at 22:07

## 2022-06-09 NOTE — ED ADULT NURSE NOTE - NSICDXPASTSURGICALHX_GEN_ALL_CORE_FT
PAST SURGICAL HISTORY:  H/O arthroscopy of knee     Malignant schwannoma     S/P coronary artery stent placement x2    S/P laminectomy

## 2022-06-09 NOTE — H&P ADULT - ASSESSMENT
75 y/o male with hematuria, clot retention.  Admit to Urology, Dr. HAWK  -initiate CBI, titrate flow to clear usine  -flush golden PRN  -serial labs  -prophylactic clindamycin  -medicine consult to co-manage medical issues  -once CBI clears, discontinue three way and follow with private Urologist Dr. Jose Blair  -D/W Dr. Hawk

## 2022-06-09 NOTE — H&P ADULT - NSHPPHYSICALEXAM_GEN_ALL_CORE
Gen: Well appearing, sitting in bed, walker at bedside  Chest: CTA bilaterally  Abd: obese, soft, NT/ND  Bladder: no tenderness to palpation  Genitalia: normal external male genitalia, uncircumcised.

## 2022-06-09 NOTE — ED PROVIDER NOTE - NS ED ROS FT
Constitutional: no fever, no chills  Head: NC, AT   Eyes: no redness   ENMT: no nasal congestion/drainage, no sore throat   CV: no chest pain, no edema  Resp: no cough, no dyspnea  GI: no abdominal pain, no nausea, no vomiting, no diarrhea  : no dysuria, + hematuria   Skin: no lesions, no rashes   Neuro: no LOC, no headache, no sensory deficits, no weakness

## 2022-06-09 NOTE — ED PROVIDER NOTE - OBJECTIVE STATEMENT
77 y/o M with PMHx afib on Eliquis, hx DVT, HTN, DM, BPH s/p TURP, HLD, CAD on plavix, who presents to the ED c/o hematuria. Patient states that for the past few days his urine has been more cloudy and today when he woke up he urinated blood. States that this has never happened to him before. Denies pain with urination, but states that he feels like he has to push more. States that his Urologist is Dr. Esparza and that he last saw them over a year ago. Denies any recent illnesses, fevers, chills, chest pain, shortness of breath, NVD, or abdominal pain. 76 year old M with PMHx afib on Eliquis, hx DVT, HTN, DM, BPH s/p TURP, HLD, CAD on plavix, who presents to the ED c/o hematuria. Patient states that for the past few days his urine has been more cloudy and today when he woke up he urinated blood. States that this has never happened to him before. Denies pain with urination, but states that he feels like he has to push more. States that his Urologist is Dr. Esparza and that he last saw them over a year ago. Denies any recent illnesses, fevers, chills, chest pain, shortness of breath, NVD, or abdominal pain.

## 2022-06-09 NOTE — CONSULT NOTE ADULT - ASSESSMENT
ASSESSMENT:  76M with PMHX AFib on Eliquis, CAD s/p PCI x2 on ASA, HTN, HLD, CHFpEF, BPH s/p TURP c/b Chronic Franz x9 mos (now removed) presented to [facility] ER c/o Hematuria admitted to Urology Service for Franz/CBI with Hospitalist Medicine consulted for co-management.     PLAN:  Hematuria  -s/p Franz with CBI  -Repeat Labs AM  -Trend H&H  -VTE PPX SCDs   -Allergies Reviewed (PCN, Cephalosporin, Sulfa/Bactrim)  -Clindamycin 150mg q6 for UTI PPX  -If develops fever/UTI on Clinda change to Ciprofloxacin or Vanco+Aztreonam and check EKG for QTc  -Discussed holding antiplatelet and anticoagulation with patient  -Pt adamantly refusing to hold ASA 81mg PO q24 due to risk of in-stent thrombosis despite PCI x2 performed 2 years ago  -Hold Eliquis 5mg PO BID  -Continue ASA 81mg PO q24  -If Hematuria doesn't resolve on ASA consult Cardiology to discuss risks/benefits (Dr Alexander)  -Hematuria/Franz mgmt per Urology Service  -Medicine will follow    CAD s/p PCI x2, HTN, HLD, CHFpEF  -See ASA and Eliquis note above  -Lasix 40mg PO BID  -Continue Statin    AFIB  -Amiodarone 200mg PO q24  -Metoprolol Succinate 200mg PO q24  -Hold Eliquis as above    BPH  -Finasteride 5mg PO q24  -Sulfa Allergy - Avoid Flomax and sub Doxazosin while inpt. Restart Alfuzosin on discharge.     DM2  -Hold Farxiga and Metformin  -ISS/Accuchecks/A1C  -Monitor BS Goal 140-180    COPD  -Duoneb q6 ATC  -Continue Inhalers at home. Pulm F/u Outpt for formal diagnosis of COPD.

## 2022-06-09 NOTE — ED ADULT TRIAGE NOTE - CHIEF COMPLAINT QUOTE
pt biba for hematuria since this AM, urine noted to be bright red, pt also c/o cramping, pt has pmhx of TURP sx 3 years ago.

## 2022-06-09 NOTE — ED PROVIDER NOTE - NSICDXPASTMEDICALHX_GEN_ALL_CORE_FT
Anxiety    Depression    GERD (gastroesophageal reflux disease)    HLD (hyperlipidemia)    Hypothyroidism
PAST MEDICAL HISTORY:  Afib     Anxiety     Anxiety     BPH (benign prostatic hyperplasia)     DM (diabetes mellitus)     DVT (deep venous thrombosis) left leg    Gallbladder stone without cholecystitis or obstruction     Gastroesophageal reflux disease     HLD (hyperlipidemia)     Hyperlipidemia     Hypertension     Prostate disease

## 2022-06-09 NOTE — ED PROVIDER NOTE - PHYSICAL EXAMINATION
General: well appearing, NAD  Head: NC, AT  EENT: no scleral icterus  Cardiac: RRR, no apparent murmurs, no lower extremity edema  Respiratory: CTABL, no respiratory distress   Abdomen: soft, ND, NT, nonperitonitic  MSK/Vascular: soft compartments, warm extremities  Neuro: AAOx3, sensation to light touch intact  Psych: anxious, cooperative

## 2022-06-09 NOTE — H&P ADULT - NSHPLABSRESULTS_GEN_ALL_CORE
CBC Full  -  ( 2022 16:28 )  WBC Count : 7.37 K/uL  RBC Count : 5.26 M/uL  Hemoglobin : 16.4 g/dL  Hematocrit : 49.2 %  Platelet Count - Automated : 214 K/uL  Mean Cell Volume : 93.5 fl  Mean Cell Hemoglobin : 31.2 pg  Mean Cell Hemoglobin Concentration : 33.3 gm/dL  Auto Neutrophil # : 5.24 K/uL  Auto Lymphocyte # : 1.07 K/uL  Auto Monocyte # : 0.72 K/uL  Auto Eosinophil # : 0.27 K/uL  Auto Basophil # : 0.05 K/uL  Auto Neutrophil % : 71.0 %  Auto Lymphocyte % : 14.5 %  Auto Monocyte % : 9.8 %  Auto Eosinophil % : 3.7 %  Auto Basophil % : 0.7 %      138  |  97<L>  |  19.8  ----------------------------<  326<H>  3.6   |  26.0  |  1.03    Ca    8.6      2022 16:28    TPro  6.9  /  Alb  3.9  /  TBili  0.4  /  DBili  x   /  AST  17  /  ALT  17  /  AlkPhos  116  06-09    Urinalysis Basic - ( 2022 16:45 )    Color: Red / Appearance: bloody / S.010 / pH: x  Gluc: x / Ketone: Trace  / Bili: Negative / Urobili: Negative   Blood: x / Protein: 500 mg/dL / Nitrite: Negative   Leuk Esterase: Negative / RBC: >50 /HPF / WBC 3-5 /HPF   Sq Epi: x / Non Sq Epi: Occasional / Bacteria: Few

## 2022-06-09 NOTE — CONSULT NOTE ADULT - SUBJECTIVE AND OBJECTIVE BOX
Hospitalist Medicine - Consult Note    CC: Hematuria  HPI:   76M with PMHX AFib on Eliquis, CAD s/p PCI x2 on ASA, HTN, HLD, CHFpEF, BPH s/p TURP c/b Chronic Franz x9 mos (now removed), GERD, Anxiety presented to [facility] ER c/o Hematuria. Bladder scan +500cc retention. +Berhane Clots. Patient required 3way Franz Catheter placement with CBI. Admitted to Urology Service. Hospitalist Medicine consulted for co-management. Discussed risks/benefits of holding anticoagulation with Eliquis and antiplatelet with low dose ASA with patient. Patient admanatly refusing to hold ASA in setting of hematuria and PCI x2 placed 2 years ago because he was instructed by his Cardiologist (Dr Alexander) not to. Agreed to continue ASA 81mg and hold Eliquis and review further management and possible need for Cardiology pending clinical resolution of Hematuria.     ROS negative unless mentioned.     PMHX: AFib on Eliquis, CAD s/p PCI x2 on ASA, LLE DVT, HTN, HLD, CHFpEF, DM2, BPH s/p TURP c/b Chronic Franz x9 mos (now removed), GERD, Anxiety  PSHX: PCI x2, TURP, Cervical Laminectomy  FamHx: +family hx CAD  Social Hx: +Former Smoker 18 pack year  Allergies: PCN/Sulfa/Cephalosporins    Vital Signs Last 24 Hrs  T(C): 36.8 (10 Matt 2022 00:24), Max: 37 (09 Jun 2022 14:54)  T(F): 98.2 (10 Matt 2022 00:24), Max: 98.6 (09 Jun 2022 14:54)  HR: 70 (10 Matt 2022 00:24) (70 - 81)  BP: 120/71 (10 Matt 2022 00:24) (100/59 - 120/71)  BP(mean): --  RR: 18 (10 Matt 2022 00:24) (18 - 18)  SpO2: 93% (10 Matt 2022 00:24) (93% - 95%)    Constitutional: Well-appearing, NAD, VSS  Head: NC/AT  Eyes: PERRL, EOMI, anicteric sclera, conjunctiva WNL  ENT: Normal Pharynx, MMM, No tonsillar exudate/erythema  Neck: Supple, Non-tender  Chest: Non-tender, no rashes  Cardio: RRR, s1/s2, no appreciable murmurs/rubs/gallops  Resp: BS CTA bilaterally, no wheezing/rhonchi/rales  Abd: Soft, Non-tender, Non-distended, no rebound/guarding/rigidity  : Franz/CBI to clear  Rectal: not examined  MSK: moving all extremities, no motor weakness, full ROM x4  Ext: palpable distal pulses, good capillary refill, no clubbing/cyanosis/edema  Psych: appropriate, cooperative  Neuro: CN II-XII grossly intact, no focal deficits  Skin: Warm/Dry. No rashes.

## 2022-06-09 NOTE — H&P ADULT - HISTORY OF PRESENT ILLNESS
75 y/o male present to [facility] ED with c/o bloody urine since 12PM today.  Pt. denies any associated pain, N/V, dizziness or lightheadedness.  Pt. denies any dysuria, increase in frequency or inability to urinate.  He does feel that he is pushing some clots through at times, but feels that he is emptying well.  He admits to some bladder pressure at times and to an intermittent color change in his urine over the past two weeks, he denies foul smell.  Bladder scan was performed at the bedside and showed 500ml.  A 20Fr three way golden catheter was placed and CBI started.  He has a significant PmHx of BPH that required a chronic in dwelling golden for nine months in 2018.  In April of 2019 he underwent a Cysto/TURP by Dr. Jose Blair ([facility]).  He has been voiding spontaneously since the TURP and has not required a golden catheter.    I personally spoke with Urologist Dr. Blair who suggest CBI and routine management for hematuria with f/u with him in his office once discharged.

## 2022-06-09 NOTE — ED ADULT NURSE NOTE - OBJECTIVE STATEMENT
Pt to ED from home c/o hematuria since this AM. Presents with thick dark red urine in the urinal. Denies pain but states at times he will feel pressure in the suprapubic area at times. Reports hx of foleys and TURP. States intermittently recently he has had discolored urine but never this dark. Denies burning. On Eliquis but states he never had issues like this

## 2022-06-09 NOTE — ED ADULT NURSE NOTE - NURSING GU BLADDER
Subjective:   JUAN MIGUEL PEREZ  Is a 55year old female with morbid obesity, chronic perforated marginal ulcer s/p laparoscopic revision of ivanna-en-y gastric bypass, robotically assisted POD5  Patient stated that her abdominal pain has improved over the weekend and is currently a 4/10 around her incision  She has been walking without difficulty and has been using her incentive spirometer  Her last bowel movement was on 1/26 and was soft without straining  She denied any bloating or gas pains  She denied any problems with urination  She has been having her dressing changed Q4 hours around her MADONNA drain over the weekend  She stated that she is ready to eat and is ready to be discharged  Objective:  Vitals:    01/29/18 0053   BP: 114/54   Pulse: 81   Resp: 18   Temp: 98 °F (36 7 °C)   SpO2: 94%     General: Well-developed, well-nourished female sitting comfortably in her chair, in no acute distress  Cardiac: Regular rate and rhythm, no murmurs, rubs, or gallops  No edema to bilateral lower extremities  Pulmonary: Clear to auscultation bilaterally  Abdomen: Soft, non-distended  Mild diffuse tenderness to palpation  Incisions are clean, dry, and intact  Mild ecchymosis around incisions  Drain: Dressing was saturated with serous fluid, dressing changed  MADONNA drain had minimal drainage of serous fluid  Assessment/Plan:  1  Morbid obesity, chronic perforated marginal ulcer s/p laparoscopic revision of ivanna-en-y gastric bypass robotically assisted POD5: trial bariatric clear liquid diet, pending discharge home  Continue walking and incentive spirometer  Discussed with patient reason for keeping MADONNA drain upon discharge until next appointment as an outpatient  Educated patient on proper management of drain  Patient stated she has had MADONNA drains in the past and that her  is able to assist her  non-distended/non-tender

## 2022-06-09 NOTE — ED PROVIDER NOTE - PROGRESS NOTE DETAILS
UA showing large blood, seen by Urology here, started on CBI, recommend admitting patient to Dr. Mandujano's service for further management. - Priscilla

## 2022-06-10 ENCOUNTER — TRANSCRIPTION ENCOUNTER (OUTPATIENT)
Age: 77
End: 2022-06-10

## 2022-06-10 VITALS
OXYGEN SATURATION: 95 % | SYSTOLIC BLOOD PRESSURE: 123 MMHG | DIASTOLIC BLOOD PRESSURE: 85 MMHG | HEART RATE: 83 BPM | TEMPERATURE: 98 F | RESPIRATION RATE: 17 BRPM

## 2022-06-10 LAB
A1C WITH ESTIMATED AVERAGE GLUCOSE RESULT: 10.9 % — HIGH (ref 4–5.6)
ANION GAP SERPL CALC-SCNC: 13 MMOL/L — SIGNIFICANT CHANGE UP (ref 5–17)
BUN SERPL-MCNC: 17.9 MG/DL — SIGNIFICANT CHANGE UP (ref 8–20)
CALCIUM SERPL-MCNC: 8.2 MG/DL — LOW (ref 8.6–10.2)
CHLORIDE SERPL-SCNC: 101 MMOL/L — SIGNIFICANT CHANGE UP (ref 98–107)
CO2 SERPL-SCNC: 28 MMOL/L — SIGNIFICANT CHANGE UP (ref 22–29)
CREAT SERPL-MCNC: 0.72 MG/DL — SIGNIFICANT CHANGE UP (ref 0.5–1.3)
CULTURE RESULTS: SIGNIFICANT CHANGE UP
EGFR: 95 ML/MIN/1.73M2 — SIGNIFICANT CHANGE UP
ESTIMATED AVERAGE GLUCOSE: 266 MG/DL — HIGH (ref 68–114)
FLUAV AG NPH QL: SIGNIFICANT CHANGE UP
FLUBV AG NPH QL: SIGNIFICANT CHANGE UP
GLUCOSE BLDC GLUCOMTR-MCNC: 193 MG/DL — HIGH (ref 70–99)
GLUCOSE BLDC GLUCOMTR-MCNC: 231 MG/DL — HIGH (ref 70–99)
GLUCOSE SERPL-MCNC: 174 MG/DL — HIGH (ref 70–99)
HCT VFR BLD CALC: 45.5 % — SIGNIFICANT CHANGE UP (ref 39–50)
HGB BLD-MCNC: 15 G/DL — SIGNIFICANT CHANGE UP (ref 13–17)
MAGNESIUM SERPL-MCNC: 1.7 MG/DL — SIGNIFICANT CHANGE UP (ref 1.6–2.6)
MCHC RBC-ENTMCNC: 30.9 PG — SIGNIFICANT CHANGE UP (ref 27–34)
MCHC RBC-ENTMCNC: 33 GM/DL — SIGNIFICANT CHANGE UP (ref 32–36)
MCV RBC AUTO: 93.6 FL — SIGNIFICANT CHANGE UP (ref 80–100)
PHOSPHATE SERPL-MCNC: 3.1 MG/DL — SIGNIFICANT CHANGE UP (ref 2.4–4.7)
PLATELET # BLD AUTO: 181 K/UL — SIGNIFICANT CHANGE UP (ref 150–400)
POTASSIUM SERPL-MCNC: 3.3 MMOL/L — LOW (ref 3.5–5.3)
POTASSIUM SERPL-SCNC: 3.3 MMOL/L — LOW (ref 3.5–5.3)
RBC # BLD: 4.86 M/UL — SIGNIFICANT CHANGE UP (ref 4.2–5.8)
RBC # FLD: 14.3 % — SIGNIFICANT CHANGE UP (ref 10.3–14.5)
RSV RNA NPH QL NAA+NON-PROBE: SIGNIFICANT CHANGE UP
SARS-COV-2 RNA SPEC QL NAA+PROBE: SIGNIFICANT CHANGE UP
SODIUM SERPL-SCNC: 142 MMOL/L — SIGNIFICANT CHANGE UP (ref 135–145)
SPECIMEN SOURCE: SIGNIFICANT CHANGE UP
WBC # BLD: 7.16 K/UL — SIGNIFICANT CHANGE UP (ref 3.8–10.5)
WBC # FLD AUTO: 7.16 K/UL — SIGNIFICANT CHANGE UP (ref 3.8–10.5)

## 2022-06-10 PROCEDURE — 84100 ASSAY OF PHOSPHORUS: CPT

## 2022-06-10 PROCEDURE — 99232 SBSQ HOSP IP/OBS MODERATE 35: CPT

## 2022-06-10 PROCEDURE — 82962 GLUCOSE BLOOD TEST: CPT

## 2022-06-10 PROCEDURE — 99233 SBSQ HOSP IP/OBS HIGH 50: CPT

## 2022-06-10 PROCEDURE — 87637 SARSCOV2&INF A&B&RSV AMP PRB: CPT

## 2022-06-10 PROCEDURE — 83036 HEMOGLOBIN GLYCOSYLATED A1C: CPT

## 2022-06-10 PROCEDURE — 85610 PROTHROMBIN TIME: CPT

## 2022-06-10 PROCEDURE — 99285 EMERGENCY DEPT VISIT HI MDM: CPT

## 2022-06-10 PROCEDURE — 80053 COMPREHEN METABOLIC PANEL: CPT

## 2022-06-10 PROCEDURE — 94640 AIRWAY INHALATION TREATMENT: CPT

## 2022-06-10 PROCEDURE — 83735 ASSAY OF MAGNESIUM: CPT

## 2022-06-10 PROCEDURE — 36415 COLL VENOUS BLD VENIPUNCTURE: CPT

## 2022-06-10 PROCEDURE — 85730 THROMBOPLASTIN TIME PARTIAL: CPT

## 2022-06-10 PROCEDURE — 81001 URINALYSIS AUTO W/SCOPE: CPT

## 2022-06-10 PROCEDURE — 85027 COMPLETE CBC AUTOMATED: CPT

## 2022-06-10 PROCEDURE — 85025 COMPLETE CBC W/AUTO DIFF WBC: CPT

## 2022-06-10 PROCEDURE — 80048 BASIC METABOLIC PNL TOTAL CA: CPT

## 2022-06-10 PROCEDURE — 87086 URINE CULTURE/COLONY COUNT: CPT

## 2022-06-10 RX ORDER — DOXAZOSIN MESYLATE 4 MG
4 TABLET ORAL AT BEDTIME
Refills: 0 | Status: DISCONTINUED | OUTPATIENT
Start: 2022-06-10 | End: 2022-06-10

## 2022-06-10 RX ORDER — ALPRAZOLAM 0.25 MG
0.25 TABLET ORAL ONCE
Refills: 0 | Status: COMPLETED | OUTPATIENT
Start: 2022-06-10 | End: 2022-06-10

## 2022-06-10 RX ORDER — ALBUTEROL 90 UG/1
2 AEROSOL, METERED ORAL EVERY 6 HOURS
Refills: 0 | Status: DISCONTINUED | OUTPATIENT
Start: 2022-06-10 | End: 2022-06-10

## 2022-06-10 RX ORDER — POTASSIUM CHLORIDE 20 MEQ
40 PACKET (EA) ORAL EVERY 4 HOURS
Refills: 0 | Status: DISCONTINUED | OUTPATIENT
Start: 2022-06-10 | End: 2022-06-10

## 2022-06-10 RX ORDER — ASPIRIN/CALCIUM CARB/MAGNESIUM 324 MG
81 TABLET ORAL DAILY
Refills: 0 | Status: DISCONTINUED | OUTPATIENT
Start: 2022-06-10 | End: 2022-06-10

## 2022-06-10 RX ORDER — IPRATROPIUM/ALBUTEROL SULFATE 18-103MCG
3 AEROSOL WITH ADAPTER (GRAM) INHALATION EVERY 6 HOURS
Refills: 0 | Status: DISCONTINUED | OUTPATIENT
Start: 2022-06-10 | End: 2022-06-10

## 2022-06-10 RX ORDER — INFLUENZA VIRUS VACCINE 15; 15; 15; 15 UG/.5ML; UG/.5ML; UG/.5ML; UG/.5ML
0.7 SUSPENSION INTRAMUSCULAR ONCE
Refills: 0 | Status: DISCONTINUED | OUTPATIENT
Start: 2022-06-10 | End: 2022-06-10

## 2022-06-10 RX ADMIN — Medication 150 MILLIGRAM(S): at 01:16

## 2022-06-10 RX ADMIN — Medication 3 MILLILITER(S): at 02:20

## 2022-06-10 RX ADMIN — Medication 150 MILLIGRAM(S): at 12:25

## 2022-06-10 RX ADMIN — Medication 81 MILLIGRAM(S): at 12:25

## 2022-06-10 RX ADMIN — Medication 4: at 12:24

## 2022-06-10 RX ADMIN — FINASTERIDE 5 MILLIGRAM(S): 5 TABLET, FILM COATED ORAL at 12:25

## 2022-06-10 RX ADMIN — Medication 150 MILLIGRAM(S): at 05:19

## 2022-06-10 RX ADMIN — PANTOPRAZOLE SODIUM 40 MILLIGRAM(S): 20 TABLET, DELAYED RELEASE ORAL at 05:20

## 2022-06-10 RX ADMIN — Medication 40 MILLIGRAM(S): at 05:19

## 2022-06-10 RX ADMIN — Medication 2: at 07:58

## 2022-06-10 NOTE — DISCHARGE NOTE PROVIDER - NSDCFUADDAPPT_GEN_ALL_CORE_FT
If not following up with your private Urologist - and like to follow up with Dr. Mandujano call his office to schedule follow for 1 week after discharge.

## 2022-06-10 NOTE — DISCHARGE NOTE PROVIDER - CARE PROVIDER_API CALL
Rusty Mandujano)  Urology  [address], [place] [address]  Phone: [phone]  Fax: [phone]  Follow Up Time:

## 2022-06-10 NOTE — PROGRESS NOTE ADULT - SUBJECTIVE AND OBJECTIVE BOX
ANIA CRISTOBAL    [id number]    76y      Male    Patient is a 76y old  Male who presents with a chief complaint of Hematuria with clot retention (10 Matt 2022 13:22)      INTERVAL HPI/OVERNIGHT EVENTS:    patient is having no more hematuria, he has been getting CBI, denies pain, fever, chills, chest pain, sob.     REVIEW OF SYSTEMS:    CONSTITUTIONAL: No fever, fatigue  RESPIRATORY: No cough, No shortness of breath  CARDIOVASCULAR: No chest pain, palpitations  GASTROINTESTINAL: No abdominal, No nausea, vomiting  NEUROLOGICAL: No headaches,  loss of strength.  MISCELLANEOUS: No joint swelling or pain       Vital Signs Last 24 Hrs  T(C): 36.6 (10 Matt 2022 14:20), Max: 37 (2022 14:54)  T(F): 97.8 (10 Matt 2022 14:20), Max: 98.6 (2022 14:54)  HR: 83 (10 Matt 2022 14:20) (67 - 83)  BP: 123/85 (10 Matt 2022 14:20) (100/59 - 143/71)  RR: 17 (10 Matt 2022 14:20) (17 - 18)  SpO2: 95% (10 Matt 2022 14:20) (91% - 97%)    PHYSICAL EXAM:    GENERAL: Elderly male looking comfortable   HEENT: PERRL, +EOMI  NECK: soft, Supple, No JVD,   CHEST/LUNG: Clear to auscultate bilaterally; No wheezing  HEART: S1S2+, Regular rate and rhythm; No murmurs  ABDOMEN: Soft, Nontender, Nondistended; Bowel sounds present  EXTREMITIES:  1+ Peripheral Pulses, No edema  SKIN: No rashes or lesions  NEURO: AAOX3, no focal deficits, no motor r sensory loss  PSYCH: normal mood  Has golden's cathter       LABS:                        15.0   7.16  )-----------( 181      ( 10 Matt 2022 03:42 )             45.5     06-10    142  |  101  |  17.9  ----------------------------<  174<H>  3.3<L>   |  28.0  |  0.72    Ca    8.2<L>      10 Matt 2022 03:42  Phos  3.1     06-10  Mg     1.7     06-10    TPro  6.9  /  Alb  3.9  /  TBili  0.4  /  DBili  x   /  AST  17  /  ALT  17  /  AlkPhos  116  -    PT/INR - ( 2022 16:28 )   PT: 13.8 sec;   INR: 1.19 ratio         PTT - ( 2022 16:28 )  PTT:33.6 sec  Urinalysis Basic - ( 2022 16:45 )    Color: Red / Appearance: bloody / S.010 / pH: x  Gluc: x / Ketone: Trace  / Bili: Negative / Urobili: Negative   Blood: x / Protein: 500 mg/dL / Nitrite: Negative   Leuk Esterase: Negative / RBC: >50 /HPF / WBC 3-5 /HPF   Sq Epi: x / Non Sq Epi: Occasional / Bacteria: Few          I&O's Summary    2022 07:01  -  10 Matt 2022 07:00  --------------------------------------------------------  IN: 0 mL / OUT: 55867 mL / NET: -69265 mL    10 Matt 2022 07:01  -  10 Matt 2022 14:52  --------------------------------------------------------  IN: 0 mL / OUT: 4000 mL / NET: -4000 mL        MEDICATIONS  (STANDING):  ALPRAZolam 0.25 milliGRAM(s) Oral once  aMIOdarone    Tablet 200 milliGRAM(s) Oral daily  aspirin  chewable 81 milliGRAM(s) Oral daily  atorvastatin 20 milliGRAM(s) Oral at bedtime  clindamycin   Capsule 150 milliGRAM(s) Oral every 6 hours  dextrose 5%. 1000 milliLiter(s) (100 mL/Hr) IV Continuous <Continuous>  dextrose 5%. 1000 milliLiter(s) (50 mL/Hr) IV Continuous <Continuous>  dextrose 50% Injectable 25 Gram(s) IV Push once  dextrose 50% Injectable 12.5 Gram(s) IV Push once  dextrose 50% Injectable 25 Gram(s) IV Push once  doxazosin 4 milliGRAM(s) Oral at bedtime  finasteride 5 milliGRAM(s) Oral daily  furosemide    Tablet 40 milliGRAM(s) Oral two times a day  glucagon  Injectable 1 milliGRAM(s) IntraMuscular once  insulin lispro (ADMELOG) corrective regimen sliding scale   SubCutaneous three times a day before meals  metoprolol succinate  milliGRAM(s) Oral daily  pantoprazole    Tablet 40 milliGRAM(s) Oral before breakfast    MEDICATIONS  (PRN):  acetaminophen     Tablet .. 975 milliGRAM(s) Oral every 6 hours PRN Temp greater or equal to 38.5C (101.3F), Mild Pain (1 - 3)  ALBUTerol    90 MICROgram(s) HFA Inhaler 2 Puff(s) Inhalation every 6 hours PRN Shortness of Breath and/or Wheezing  ALBUTerol    90 MICROgram(s) HFA Inhaler 2 Puff(s) Inhalation every 6 hours PRN Shortness of Breath and/or Wheezing  dextrose Oral Gel 15 Gram(s) Oral once PRN Blood Glucose LESS THAN 70 milliGRAM(s)/deciliter  magnesium hydroxide Suspension 30 milliLiter(s) Oral every 6 hours PRN Constipation  ondansetron Injectable 4 milliGRAM(s) IV Push every 6 hours PRN Nausea and/or Vomiting  senna 2 Tablet(s) Oral at bedtime PRN Constipation        
UROLOGY F/U:  Hosp. day # 2 hematuria, retention    Seen and examined with Dr. Mandujano  Patient awake and alert resting in bed no acute distress,   Afebrile, VSS /68, P 67, R 18    : golden catheter in place ( 3-way with cbi in progress slow rate. ) urine clear, surgeon discussed present situation with patient explaining that he arrived yesterday with hematuria and retention, following placement 3-way catheter was irrigated with significant clot evacuated and that optimally catheter should remain few days and attempt TOV as outpatient in office - Patient very adament about ability to void and like catheter out now.  Surgeon then explained will turn off CBI completely and staff to re-evaluate 30-60 minutes and if still clear urine in golden will d/c and attempt TOV explaining to patient will remain in observation several hours to confirm ablitiy to void and possible and goes into retention again catheter will need to be replaced. he understand what Surgeon has explained to him and excepts risk.     Labs: 6/10/22  CBC: wbc 7.1/ hg 15.0/ hct 45.5/ plats 181  Lytes: 142/3.3/101/28  Bun: 17.9  Creat: 0.72    Impression : stable urine clear CBI on slow rate, this am  Arcelia:  CBI off follow 30-60 minutes re-evaluate if clear TOV. and folow for void.

## 2022-06-10 NOTE — PATIENT PROFILE ADULT - PRO INTERPRETER NEED 2
Pre-application: Motor, sensory, and vascular responses intact in the injured extremity./Post-application: Motor, sensory, and vascular responses intact in the injured extremity./The patient/caregiver verbalized understanding of how to care for the injured extremity with splint English

## 2022-06-10 NOTE — DISCHARGE NOTE NURSING/CASE MANAGEMENT/SOCIAL WORK - NSDCPEFALRISK_GEN_ALL_CORE
For information on Fall & Injury Prevention, visit: [email or web address] OR  [email or web address] OR  https://www.cdc.gov/steadi/patient.html

## 2022-06-10 NOTE — DISCHARGE NOTE PROVIDER - HOSPITAL COURSE
75 y/o male present to [facility] ED with c/o bloody urine since 12PM today.  Pt. denies any associated pain, N/V, dizziness or lightheadedness.  Pt. denies any dysuria, increase in frequency or inability to urinate.  He does feel that he is pushing some clots through at times, but feels that he is emptying well.  He admits to some bladder pressure at times and to an intermittent color change in his urine over the past two weeks, he denies foul smell.  Bladder scan was performed at the bedside and showed 500ml.  A 20Fr three way golden catheter was placed and CBI started.  Patient was seen on morning rounds 6/10 with CBI trickling and urine light pinkish, CBI was discontinued and followed several hours remained light pinkish no clots appreciated and golden was discontinued and under went TOV.  Patient after few hours has voided stating voiding ok several times, noted at bedside in urinal urine about 100cc  which was still pinkish ( not bright red, nor was ant clots or sediment seen.  Patient says like to be discharged. Discussed with patient present situation for follow up eitherwith his private urologist or with Dr. Mandujano  but that you do need to follow up with someone do not let it just slide by and not see anyone.   Patient to be discharged .

## 2022-06-10 NOTE — PATIENT PROFILE ADULT - SURGICAL SITE INCISION
----- Message from Lucina Servin sent at 1/5/2022  3:21 PM CST -----  Contact: [phone]  Patient called requesting a doctor's excuse for work fat to her employer at fax #[phone] Attention Polina Randall. Please call and advise.    
Patient  Picked up her work excuse.  
no

## 2022-06-10 NOTE — PROGRESS NOTE ADULT - ASSESSMENT
ASSESSMENT:  76M with PMHX AFib on Eliquis, CAD s/p PCI x2 on ASA, HTN, HLD, CHFpEF, BPH s/p TURP c/b Chronic Franz x9 mos (now removed) presented to [facility] ER c/o Hematuria admitted to Urology Service for Franz/CBI with Hospitalist Medicine consulted for co-management.     PLAN:  Hematuria  -s/p Franz with CBI  -Trend H&H  -VTE PPX SCDs   -Allergies Reviewed (PCN, Cephalosporin, Sulfa/Bactrim)  -Clindamycin 150mg q6 for UTI PPX  -If develops fever/UTI on Clinda, call ID consult and send cultures  -Discussed holding antiplatelet and anticoagulation with patient  -Pt adamantly refusing to hold ASA 81mg PO q24 due to risk of in-stent thrombosis despite PCI x2 performed 2 years ago  -Hold Eliquis 5mg PO BID  -Continue ASA 81mg PO q24  -If Hematuria doesn't resolve on ASA consult Cardiology to discuss risks/benefits (Dr Alexander)  -Hematuria/Franz mgmt per Urology Service  -Medicine will follow    CAD s/p PCI x2, HTN, HLD, CHFpEF  -See ASA and Eliquis note above  -Lasix 40mg PO BID  -Continue Statin    AFIB  -Amiodarone 200mg PO q24  -Metoprolol Succinate 200mg PO q24  -Hold Eliquis as above    BPH  -Finasteride 5mg PO q24  -Sulfa Allergy - Avoid Flomax and sub Doxazosin while inpt. Restart Alfuzosin on discharge.     DM2  -Hold Farxiga and Metformin  -ISS/Accuchecks/A1C is 10.9  -Monitor BS Goal 140-180    COPD  -Duoneb q6 ATC  -Continue Inhalers at home. Pulm F/u Outpt for formal diagnosis of COPD.     DVT prophylaxis: SCDs    Hypokalemia: being replaced     Will follow along

## 2022-06-10 NOTE — PATIENT PROFILE ADULT - FALL HARM RISK - RISK INTERVENTIONS

## 2022-06-10 NOTE — DISCHARGE NOTE PROVIDER - NSDCMRMEDTOKEN_GEN_ALL_CORE_FT
alfuzosin 10 mg oral tablet, extended release: 1 tab(s) orally once a day  ALPRAZolam 0.25 mg oral tablet: 1 tab(s) orally 2 times a day, As needed, anxiety  amiodarone 200 mg oral tablet: 1 tab(s) orally once a day   aspirin 81 mg oral delayed release tablet: 1 tab(s) orally once a day  Crestor 5 mg oral tablet: 1 tab(s) orally once a day  Eliquis 5 mg oral tablet: 1 tab(s) orally 2 times a day  Farxiga 5 mg oral tablet: 1 tab(s) orally once a day   finasteride 5 mg oral tablet: 1 tab(s) orally once a day  fluticasone 50 mcg inhalation powder: 1 puff(s) inhaled 2 times a day, As Needed  fluticasone 50 mcg/inh nasal spray: 1 spray(s) nasal 2 times a day  furosemide 40 mg oral tablet: 1 tab(s) orally 2 times a day  hydrocortisone 25 mg rectal suppository: 1 suppository(ies) rectal 2 times a day, As needed, hemorroids  metFORMIN 500 mg oral tablet: 1  orally 3 times a day  Metoprolol Succinate  mg oral tablet, extended release: 1 tab(s) orally once a day   NexIUM 40 mg oral delayed release capsule: 1 cap(s) orally once a day  Xopenex HFA 45 mcg/inh inhalation aerosol: 2 puff(s) inhaled every 6 hours, As Needed -for shortness of breath and/or wheezing

## 2022-06-10 NOTE — DISCHARGE NOTE NURSING/CASE MANAGEMENT/SOCIAL WORK - PATIENT PORTAL LINK FT
You can access the FollowMyHealth Patient Portal offered by [facility] by registering at the following website: [email or web address]. By joining [facility]’s FollowMyHealth portal, you will also be able to view your health information using other applications (apps) compatible with our system.

## 2022-06-22 NOTE — ED PROVIDER NOTE - GASTROINTESTINAL, MLM
PARS PLANA VITRECTOMY, MEMBRANE PEELING,  LASER PHOTOCOAGULATION, 25 GAUGE, LEFT EYE     To be head elevated today.  Leave patch in place until exam tomorrow.   Bring drops to office for exam.   May resume Asprin tonight 6/22/22     Call [phone] for any questions.   
Abdomen soft, suprapubic tenderness to palpation

## 2022-07-03 ENCOUNTER — INPATIENT (INPATIENT)
Facility: HOSPITAL | Age: 77
LOS: 5 days | Discharge: HOME | DRG: 872 | End: 2022-07-09
Attending: STUDENT IN AN ORGANIZED HEALTH CARE EDUCATION/TRAINING PROGRAM | Admitting: STUDENT IN AN ORGANIZED HEALTH CARE EDUCATION/TRAINING PROGRAM
Payer: MEDICARE

## 2022-07-03 VITALS
OXYGEN SATURATION: 95 % | TEMPERATURE: 101 F | SYSTOLIC BLOOD PRESSURE: 125 MMHG | DIASTOLIC BLOOD PRESSURE: 68 MMHG | HEART RATE: 89 BPM | HEIGHT: 74 IN | RESPIRATION RATE: 20 BRPM

## 2022-07-03 DIAGNOSIS — C49.9 MALIGNANT NEOPLASM OF CONNECTIVE AND SOFT TISSUE, UNSPECIFIED: Chronic | ICD-10-CM

## 2022-07-03 DIAGNOSIS — Z98.89 OTHER SPECIFIED POSTPROCEDURAL STATES: Chronic | ICD-10-CM

## 2022-07-03 DIAGNOSIS — Z95.5 PRESENCE OF CORONARY ANGIOPLASTY IMPLANT AND GRAFT: Chronic | ICD-10-CM

## 2022-07-03 PROCEDURE — 71045 X-RAY EXAM CHEST 1 VIEW: CPT | Mod: 26

## 2022-07-03 PROCEDURE — 99285 EMERGENCY DEPT VISIT HI MDM: CPT | Mod: CS

## 2022-07-03 RX ORDER — ACETAMINOPHEN 500 MG
650 TABLET ORAL ONCE
Refills: 0 | Status: COMPLETED | OUTPATIENT
Start: 2022-07-03 | End: 2022-07-03

## 2022-07-03 RX ORDER — SODIUM CHLORIDE 9 MG/ML
1000 INJECTION, SOLUTION INTRAVENOUS ONCE
Refills: 0 | Status: COMPLETED | OUTPATIENT
Start: 2022-07-03 | End: 2022-07-03

## 2022-07-03 RX ORDER — CEFTRIAXONE 500 MG/1
1000 INJECTION, POWDER, FOR SOLUTION INTRAMUSCULAR; INTRAVENOUS ONCE
Refills: 0 | Status: COMPLETED | OUTPATIENT
Start: 2022-07-03 | End: 2022-07-03

## 2022-07-03 NOTE — ED PROVIDER NOTE - OBJECTIVE STATEMENT
75 y/o male with PMHx of HTN, HLD, DM, a-fib on eliquis, CAD s/p stents presents to the ED c/o fevers, hematuria, and right flank pain for the past 2 days. Pt also notes feeling weak to the point of almost falling over. Pt states he took Tylenol for his symptoms. Pt also notes he had cystoscopy 2 weeks ago.     denies HA or neck pain. no chest pain or sob. no abd pain. no n/v/d. no urinary f/u/d. no back pain. no motor or sensory deficits. denies illicit drug use. no recent travel. no rash. no other acute issues symptoms or concerns

## 2022-07-03 NOTE — ED PROVIDER NOTE - PROGRESS NOTE DETAILS
Fernando Wiggins for ED attending, Dr. Metz: pt likely to require admission due to likelihood of deterioration if sent home. Pt with concern for bacteremia given symptoms, tachycardia and fever, pt ask risk due to recent procedure. Pt weak at home to the point of almost falling over Fernando Wiggnis for ED attending, Dr. Metz: due to pt hx of aortic stenosis and murmur, I do not feel that 30cc fluid bolus is clinically appropriate, will hydrate appropriately, check septic work-up, admit.

## 2022-07-03 NOTE — ED ADULT TRIAGE NOTE - CHIEF COMPLAINT QUOTE
patient BIBA from home for right sided flank pain, now radiating to left flank. per patient was seen here for hematuria 2 weeks ago but denies hematuria today. no n/v/d. subjective fever at home, took tylenol PTA.

## 2022-07-04 DIAGNOSIS — A41.9 SEPSIS, UNSPECIFIED ORGANISM: ICD-10-CM

## 2022-07-04 LAB
ALBUMIN SERPL ELPH-MCNC: 3.6 G/DL — SIGNIFICANT CHANGE UP (ref 3.3–5.2)
ALP SERPL-CCNC: 192 U/L — HIGH (ref 40–120)
ALT FLD-CCNC: 35 U/L — SIGNIFICANT CHANGE UP
ANION GAP SERPL CALC-SCNC: 11 MMOL/L — SIGNIFICANT CHANGE UP (ref 5–17)
APPEARANCE UR: ABNORMAL
APTT BLD: 30.5 SEC — SIGNIFICANT CHANGE UP (ref 27.5–35.5)
AST SERPL-CCNC: 28 U/L — SIGNIFICANT CHANGE UP
BACTERIA # UR AUTO: ABNORMAL
BASOPHILS # BLD AUTO: 0.05 K/UL — SIGNIFICANT CHANGE UP (ref 0–0.2)
BASOPHILS NFR BLD AUTO: 0.4 % — SIGNIFICANT CHANGE UP (ref 0–2)
BILIRUB SERPL-MCNC: 0.7 MG/DL — SIGNIFICANT CHANGE UP (ref 0.4–2)
BILIRUB UR-MCNC: NEGATIVE — SIGNIFICANT CHANGE UP
BUN SERPL-MCNC: 17.3 MG/DL — SIGNIFICANT CHANGE UP (ref 8–20)
CALCIUM SERPL-MCNC: 8.6 MG/DL — SIGNIFICANT CHANGE UP (ref 8.6–10.2)
CHLORIDE SERPL-SCNC: 94 MMOL/L — LOW (ref 98–107)
CK SERPL-CCNC: 52 U/L — SIGNIFICANT CHANGE UP (ref 30–200)
CO2 SERPL-SCNC: 30 MMOL/L — HIGH (ref 22–29)
COLOR SPEC: YELLOW — SIGNIFICANT CHANGE UP
CREAT SERPL-MCNC: 0.85 MG/DL — SIGNIFICANT CHANGE UP (ref 0.5–1.3)
DIFF PNL FLD: ABNORMAL
EGFR: 90 ML/MIN/1.73M2 — SIGNIFICANT CHANGE UP
EOSINOPHIL # BLD AUTO: 0.02 K/UL — SIGNIFICANT CHANGE UP (ref 0–0.5)
EOSINOPHIL NFR BLD AUTO: 0.2 % — SIGNIFICANT CHANGE UP (ref 0–6)
EPI CELLS # UR: SIGNIFICANT CHANGE UP
FLUAV AG NPH QL: SIGNIFICANT CHANGE UP
FLUBV AG NPH QL: SIGNIFICANT CHANGE UP
GLUCOSE BLDC GLUCOMTR-MCNC: 211 MG/DL — HIGH (ref 70–99)
GLUCOSE BLDC GLUCOMTR-MCNC: 253 MG/DL — HIGH (ref 70–99)
GLUCOSE BLDC GLUCOMTR-MCNC: 280 MG/DL — HIGH (ref 70–99)
GLUCOSE SERPL-MCNC: 246 MG/DL — HIGH (ref 70–99)
GLUCOSE UR QL: 1000 MG/DL
HCT VFR BLD CALC: 44.6 % — SIGNIFICANT CHANGE UP (ref 39–50)
HGB BLD-MCNC: 15.1 G/DL — SIGNIFICANT CHANGE UP (ref 13–17)
HIV 1 & 2 AB SERPL IA.RAPID: SIGNIFICANT CHANGE UP
IMM GRANULOCYTES NFR BLD AUTO: 0.4 % — SIGNIFICANT CHANGE UP (ref 0–1.5)
INR BLD: 1.58 RATIO — HIGH (ref 0.88–1.16)
KETONES UR-MCNC: ABNORMAL
LACTATE BLDV-MCNC: 1.3 MMOL/L — SIGNIFICANT CHANGE UP (ref 0.5–2)
LEUKOCYTE ESTERASE UR-ACNC: ABNORMAL
LYMPHOCYTES # BLD AUTO: 1.17 K/UL — SIGNIFICANT CHANGE UP (ref 1–3.3)
LYMPHOCYTES # BLD AUTO: 10.4 % — LOW (ref 13–44)
MCHC RBC-ENTMCNC: 31.2 PG — SIGNIFICANT CHANGE UP (ref 27–34)
MCHC RBC-ENTMCNC: 33.9 GM/DL — SIGNIFICANT CHANGE UP (ref 32–36)
MCV RBC AUTO: 92.1 FL — SIGNIFICANT CHANGE UP (ref 80–100)
MONOCYTES # BLD AUTO: 1.33 K/UL — HIGH (ref 0–0.9)
MONOCYTES NFR BLD AUTO: 11.9 % — SIGNIFICANT CHANGE UP (ref 2–14)
NEUTROPHILS # BLD AUTO: 8.6 K/UL — HIGH (ref 1.8–7.4)
NEUTROPHILS NFR BLD AUTO: 76.7 % — SIGNIFICANT CHANGE UP (ref 43–77)
NITRITE UR-MCNC: NEGATIVE — SIGNIFICANT CHANGE UP
PH UR: 6 — SIGNIFICANT CHANGE UP (ref 5–8)
PLATELET # BLD AUTO: 181 K/UL — SIGNIFICANT CHANGE UP (ref 150–400)
POTASSIUM SERPL-MCNC: 3.5 MMOL/L — SIGNIFICANT CHANGE UP (ref 3.5–5.3)
POTASSIUM SERPL-SCNC: 3.5 MMOL/L — SIGNIFICANT CHANGE UP (ref 3.5–5.3)
PROT SERPL-MCNC: 6.8 G/DL — SIGNIFICANT CHANGE UP (ref 6.6–8.7)
PROT UR-MCNC: 30 MG/DL
PROTHROM AB SERPL-ACNC: 18.4 SEC — HIGH (ref 10.5–13.4)
RBC # BLD: 4.84 M/UL — SIGNIFICANT CHANGE UP (ref 4.2–5.8)
RBC # FLD: 13.6 % — SIGNIFICANT CHANGE UP (ref 10.3–14.5)
RBC CASTS # UR COMP ASSIST: ABNORMAL /HPF (ref 0–4)
RSV RNA NPH QL NAA+NON-PROBE: SIGNIFICANT CHANGE UP
SARS-COV-2 RNA SPEC QL NAA+PROBE: SIGNIFICANT CHANGE UP
SODIUM SERPL-SCNC: 135 MMOL/L — SIGNIFICANT CHANGE UP (ref 135–145)
SP GR SPEC: 1.01 — SIGNIFICANT CHANGE UP (ref 1.01–1.02)
UROBILINOGEN FLD QL: NEGATIVE MG/DL — SIGNIFICANT CHANGE UP
WBC # BLD: 11.21 K/UL — HIGH (ref 3.8–10.5)
WBC # FLD AUTO: 11.21 K/UL — HIGH (ref 3.8–10.5)
WBC UR QL: >50 /HPF (ref 0–5)

## 2022-07-04 PROCEDURE — 93010 ELECTROCARDIOGRAM REPORT: CPT

## 2022-07-04 PROCEDURE — 74176 CT ABD & PELVIS W/O CONTRAST: CPT | Mod: 26

## 2022-07-04 PROCEDURE — 99223 1ST HOSP IP/OBS HIGH 75: CPT

## 2022-07-04 RX ORDER — METOPROLOL TARTRATE 50 MG
200 TABLET ORAL DAILY
Refills: 0 | Status: DISCONTINUED | OUTPATIENT
Start: 2022-07-04 | End: 2022-07-09

## 2022-07-04 RX ORDER — ASPIRIN/CALCIUM CARB/MAGNESIUM 324 MG
81 TABLET ORAL DAILY
Refills: 0 | Status: DISCONTINUED | OUTPATIENT
Start: 2022-07-04 | End: 2022-07-09

## 2022-07-04 RX ORDER — FLUTICASONE PROPIONATE 50 MCG
1 SPRAY, SUSPENSION NASAL
Refills: 0 | Status: DISCONTINUED | OUTPATIENT
Start: 2022-07-04 | End: 2022-07-09

## 2022-07-04 RX ORDER — FINASTERIDE 5 MG/1
5 TABLET, FILM COATED ORAL DAILY
Refills: 0 | Status: DISCONTINUED | OUTPATIENT
Start: 2022-07-04 | End: 2022-07-09

## 2022-07-04 RX ORDER — SODIUM CHLORIDE 9 MG/ML
1000 INJECTION, SOLUTION INTRAVENOUS
Refills: 0 | Status: DISCONTINUED | OUTPATIENT
Start: 2022-07-04 | End: 2022-07-05

## 2022-07-04 RX ORDER — DEXTROSE 50 % IN WATER 50 %
12.5 SYRINGE (ML) INTRAVENOUS ONCE
Refills: 0 | Status: DISCONTINUED | OUTPATIENT
Start: 2022-07-04 | End: 2022-07-05

## 2022-07-04 RX ORDER — DEXTROSE 50 % IN WATER 50 %
25 SYRINGE (ML) INTRAVENOUS ONCE
Refills: 0 | Status: DISCONTINUED | OUTPATIENT
Start: 2022-07-04 | End: 2022-07-05

## 2022-07-04 RX ORDER — SENNA PLUS 8.6 MG/1
2 TABLET ORAL AT BEDTIME
Refills: 0 | Status: DISCONTINUED | OUTPATIENT
Start: 2022-07-04 | End: 2022-07-09

## 2022-07-04 RX ORDER — ALBUTEROL 90 UG/1
1 AEROSOL, METERED ORAL EVERY 4 HOURS
Refills: 0 | Status: DISCONTINUED | OUTPATIENT
Start: 2022-07-04 | End: 2022-07-09

## 2022-07-04 RX ORDER — INSULIN LISPRO 100/ML
VIAL (ML) SUBCUTANEOUS
Refills: 0 | Status: DISCONTINUED | OUTPATIENT
Start: 2022-07-04 | End: 2022-07-05

## 2022-07-04 RX ORDER — LANOLIN ALCOHOL/MO/W.PET/CERES
3 CREAM (GRAM) TOPICAL AT BEDTIME
Refills: 0 | Status: DISCONTINUED | OUTPATIENT
Start: 2022-07-04 | End: 2022-07-09

## 2022-07-04 RX ORDER — INSULIN LISPRO 100/ML
VIAL (ML) SUBCUTANEOUS AT BEDTIME
Refills: 0 | Status: DISCONTINUED | OUTPATIENT
Start: 2022-07-04 | End: 2022-07-05

## 2022-07-04 RX ORDER — DEXTROSE 50 % IN WATER 50 %
15 SYRINGE (ML) INTRAVENOUS ONCE
Refills: 0 | Status: DISCONTINUED | OUTPATIENT
Start: 2022-07-04 | End: 2022-07-05

## 2022-07-04 RX ORDER — ALPRAZOLAM 0.25 MG
0.25 TABLET ORAL THREE TIMES A DAY
Refills: 0 | Status: DISCONTINUED | OUTPATIENT
Start: 2022-07-04 | End: 2022-07-09

## 2022-07-04 RX ORDER — ATORVASTATIN CALCIUM 80 MG/1
20 TABLET, FILM COATED ORAL AT BEDTIME
Refills: 0 | Status: DISCONTINUED | OUTPATIENT
Start: 2022-07-04 | End: 2022-07-09

## 2022-07-04 RX ORDER — ONDANSETRON 8 MG/1
4 TABLET, FILM COATED ORAL EVERY 8 HOURS
Refills: 0 | Status: DISCONTINUED | OUTPATIENT
Start: 2022-07-04 | End: 2022-07-09

## 2022-07-04 RX ORDER — AMIODARONE HYDROCHLORIDE 400 MG/1
200 TABLET ORAL DAILY
Refills: 0 | Status: DISCONTINUED | OUTPATIENT
Start: 2022-07-04 | End: 2022-07-09

## 2022-07-04 RX ORDER — SODIUM CHLORIDE 9 MG/ML
1000 INJECTION INTRAMUSCULAR; INTRAVENOUS; SUBCUTANEOUS
Refills: 0 | Status: DISCONTINUED | OUTPATIENT
Start: 2022-07-04 | End: 2022-07-07

## 2022-07-04 RX ORDER — BUDESONIDE AND FORMOTEROL FUMARATE DIHYDRATE 160; 4.5 UG/1; UG/1
2 AEROSOL RESPIRATORY (INHALATION)
Refills: 0 | Status: DISCONTINUED | OUTPATIENT
Start: 2022-07-04 | End: 2022-07-09

## 2022-07-04 RX ORDER — TIOTROPIUM BROMIDE 18 UG/1
1 CAPSULE ORAL; RESPIRATORY (INHALATION) DAILY
Refills: 0 | Status: DISCONTINUED | OUTPATIENT
Start: 2022-07-04 | End: 2022-07-09

## 2022-07-04 RX ORDER — APIXABAN 2.5 MG/1
5 TABLET, FILM COATED ORAL
Refills: 0 | Status: DISCONTINUED | OUTPATIENT
Start: 2022-07-04 | End: 2022-07-09

## 2022-07-04 RX ORDER — CEFTRIAXONE 500 MG/1
1000 INJECTION, POWDER, FOR SOLUTION INTRAMUSCULAR; INTRAVENOUS EVERY 24 HOURS
Refills: 0 | Status: DISCONTINUED | OUTPATIENT
Start: 2022-07-04 | End: 2022-07-09

## 2022-07-04 RX ORDER — PANTOPRAZOLE SODIUM 20 MG/1
40 TABLET, DELAYED RELEASE ORAL
Refills: 0 | Status: DISCONTINUED | OUTPATIENT
Start: 2022-07-04 | End: 2022-07-09

## 2022-07-04 RX ORDER — POLYETHYLENE GLYCOL 3350 17 G/17G
17 POWDER, FOR SOLUTION ORAL DAILY
Refills: 0 | Status: DISCONTINUED | OUTPATIENT
Start: 2022-07-04 | End: 2022-07-09

## 2022-07-04 RX ORDER — ALBUTEROL 90 UG/1
2.5 AEROSOL, METERED ORAL EVERY 6 HOURS
Refills: 0 | Status: DISCONTINUED | OUTPATIENT
Start: 2022-07-04 | End: 2022-07-09

## 2022-07-04 RX ORDER — GLUCAGON INJECTION, SOLUTION 0.5 MG/.1ML
1 INJECTION, SOLUTION SUBCUTANEOUS ONCE
Refills: 0 | Status: DISCONTINUED | OUTPATIENT
Start: 2022-07-04 | End: 2022-07-09

## 2022-07-04 RX ORDER — ACETAMINOPHEN 500 MG
650 TABLET ORAL EVERY 6 HOURS
Refills: 0 | Status: DISCONTINUED | OUTPATIENT
Start: 2022-07-04 | End: 2022-07-09

## 2022-07-04 RX ADMIN — AMIODARONE HYDROCHLORIDE 200 MILLIGRAM(S): 400 TABLET ORAL at 10:42

## 2022-07-04 RX ADMIN — Medication 1: at 21:54

## 2022-07-04 RX ADMIN — Medication 1 SPRAY(S): at 22:41

## 2022-07-04 RX ADMIN — Medication 650 MILLIGRAM(S): at 17:02

## 2022-07-04 RX ADMIN — SENNA PLUS 2 TABLET(S): 8.6 TABLET ORAL at 21:56

## 2022-07-04 RX ADMIN — ATORVASTATIN CALCIUM 20 MILLIGRAM(S): 80 TABLET, FILM COATED ORAL at 21:54

## 2022-07-04 RX ADMIN — Medication 200 MILLIGRAM(S): at 17:02

## 2022-07-04 RX ADMIN — CEFTRIAXONE 100 MILLIGRAM(S): 500 INJECTION, POWDER, FOR SOLUTION INTRAMUSCULAR; INTRAVENOUS at 23:32

## 2022-07-04 RX ADMIN — CEFTRIAXONE 100 MILLIGRAM(S): 500 INJECTION, POWDER, FOR SOLUTION INTRAMUSCULAR; INTRAVENOUS at 00:47

## 2022-07-04 RX ADMIN — PANTOPRAZOLE SODIUM 40 MILLIGRAM(S): 20 TABLET, DELAYED RELEASE ORAL at 17:02

## 2022-07-04 RX ADMIN — Medication 0.25 MILLIGRAM(S): at 23:32

## 2022-07-04 RX ADMIN — APIXABAN 5 MILLIGRAM(S): 2.5 TABLET, FILM COATED ORAL at 10:44

## 2022-07-04 RX ADMIN — Medication 81 MILLIGRAM(S): at 10:44

## 2022-07-04 RX ADMIN — Medication 0.25 MILLIGRAM(S): at 17:00

## 2022-07-04 RX ADMIN — Medication 650 MILLIGRAM(S): at 02:47

## 2022-07-04 RX ADMIN — Medication 3 MILLIGRAM(S): at 23:44

## 2022-07-04 RX ADMIN — Medication 2: at 17:02

## 2022-07-04 RX ADMIN — SODIUM CHLORIDE 75 MILLILITER(S): 9 INJECTION INTRAMUSCULAR; INTRAVENOUS; SUBCUTANEOUS at 10:57

## 2022-07-04 RX ADMIN — Medication 0.25 MILLIGRAM(S): at 13:16

## 2022-07-04 RX ADMIN — FINASTERIDE 5 MILLIGRAM(S): 5 TABLET, FILM COATED ORAL at 10:44

## 2022-07-04 RX ADMIN — SODIUM CHLORIDE 1000 MILLILITER(S): 9 INJECTION, SOLUTION INTRAVENOUS at 00:47

## 2022-07-04 RX ADMIN — Medication 3: at 11:26

## 2022-07-04 NOTE — PATIENT PROFILE ADULT - FALL HARM RISK - RISK INTERVENTIONS

## 2022-07-04 NOTE — H&P ADULT - ASSESSMENT
75yo male with PMHX AFib on Eliquis, CAD s/p PCI x2 on ASA, HTN, HLD, CHFpEF, BPH s/p TURP c/b Chronic Golden x9 mos (now removed) presented with complaints of right flank pain and feeling weak. Patient states that he has been having urinary issues for a long time and previously had a golden secondary to urinary retention which required a TURP. Also states that he noticed his urine had changed colors from clear to brownish/reddish and had presented to [facility] about 3 weeks ago and was treated with clindamycin and discharged to follow up with his urologist. He reports that since then, his symptoms had initially improved. He was evaluated by his urologist who performed a cystoscopy and was told that is was normal and had a CT scan performed which was found to be unremarkable as per the patient. His symptoms reappeared two days ago and he feels like he was having subjective fevers with generalized malaise and weakness. Patient was noted to febrile in the ED and found to have a positive UA and started on Ceftriaxone and admission to medicine was requested for sepsis secondary to UTI.    #Sepsis secondary to UTI  Febrile, Leukocytosis, mild hypotension  UA positive  Previous UCX was normal urogenitial ganga   UCX was sent   Will order a CT abdomen to assess for any pyelo/abscess  Patient was given ceftriaxone in ED and tolerated it well  Therefore will continue Ceftriaxone  F/u Blood cx  IV fluids for 24 hours and then reassess - Avoid excess fluids given AS, PH       #A. fib   C/w amiodarone   Metoprolol if B/P >100/60  C/w eliquis    #DM  Hold metformin/farxiga  ISS  A1C    #CAD  C/w asa/statin  BB if BP allows    #BPH  c/W FINASTERIDE    #anxiety  ISTOP:[id number]   Xanax prn    #HFpEF  Will hold lasix given that patient appears dehydrated   Would consider adding an ACE/ARB prior to DC if BP improves  C/w BB    #GERD  Protonix     #HLD  Atorvastatin for rosuvastatin    DVT prophylaxis: Currently on Eliquis

## 2022-07-04 NOTE — H&P ADULT - HISTORY OF PRESENT ILLNESS
75yo male with PMHX AFib on Eliquis, CAD s/p PCI x2 on ASA, HTN, HLD, CHFpEF, BPH s/p TURP c/b Chronic Golden x9 mos (now removed) presented with complaints of right flank pain and feeling weak. Patient states that he has been having urinary issues for a long time and previously had a golden secondary to urinary retention which required a TURP. Also states that he noticed his urine had changed colors from clear to brownish/reddish and had presented to [facility] about 3 weeks ago and was treated with clindamycin and discharged to follow up with his urologist. He reports that since then, his symptoms had initially improved. He was evaluated by his urologist who performed a cystoscopy and was told that is was normal and had a CT scan performed which was found to be unremarkable as per the patient. His symptoms reappeared two days ago and he feels like he was having subjective fevers with generalized malaise and weakness. Patient was noted to febrile in the ED and found to have a positive UA and started on Ceftriaxone and admission to medicine was requested for sepsis secondary to UTI.

## 2022-07-04 NOTE — PATIENT PROFILE ADULT - FUNCTIONAL ASSESSMENT - BASIC MOBILITY 6.
3-calculated by average/Not able to assess (calculate score using [facility] averaging method)

## 2022-07-04 NOTE — H&P ADULT - NSHPPHYSICALEXAM_GEN_ALL_CORE
PHYSICAL EXAM:  Vital Signs Last 24 Hrs  T(F): 98.6 (04 Jul 2022 05:21), Max: 100.6 (03 Jul 2022 20:28)  HR: 86 (04 Jul 2022 07:49) (86 - 89)  BP: 93/50 (04 Jul 2022 07:49) (93/50 - 145/83)  RR: 20 (04 Jul 2022 07:49) (20 - 20)  SpO2: 93% (04 Jul 2022 07:49) (93% - 95%)    GENERAL: NAD, Resting in bed, obese male   Eyes: EOMI, PERRLA  ENMT: Conjunctiva and sclera clear; supple neck, No JVD  Cardiovascular: S1,S2, RRR, No murmur  Respiratory: CTA B/L, Non-labored breathing  GI: Bowel sounds present; Soft, Nontender, Nondistended. No hepatomegaly  Genitourinary: Deferred  Skin:  no breakdowns, ulcers or discharge, No rashes or lesions  Neurology: Alert & Oriented X3, non-focal and spontaneous movements of all extremities, CN 2 to 12 grossly intact   Psych: Appropriate mood and affect, calm, pleasant, Responds appropriately to questions

## 2022-07-04 NOTE — PATIENT PROFILE ADULT - FUNCTIONAL ASSESSMENT - DAILY ACTIVITY 1.
Problem: Pain:  Goal: Pain level will decrease  Description: Pain level will decrease  11/7/2020 0009 by Shubham Montanez RN  Outcome: Ongoing     Problem: Pain:  Goal: Control of acute pain  Description: Control of acute pain  11/7/2020 0009 by Shubham Montanez RN  Outcome: Ongoing     Problem: Pain:  Goal: Control of chronic pain  Description: Control of chronic pain  11/7/2020 0009 by Shubham Montanez RN  Outcome: Ongoing     Problem: Skin Integrity:  Goal: Will show no infection signs and symptoms  Description: Will show no infection signs and symptoms  11/7/2020 0009 by Shubham Montanez RN  Outcome: Ongoing     Problem: Skin Integrity:  Goal: Absence of new skin breakdown  Description: Absence of new skin breakdown  11/7/2020 0009 by Shubham Montanez RN  Outcome: Ongoing     Problem: Skin Integrity:  Goal: Risk for impaired skin integrity will decrease  Description: Risk for impaired skin integrity will decrease  11/7/2020 0009 by Shubham Montanez RN  Outcome: Ongoing     Problem: Falls - Risk of:  Goal: Will remain free from falls  Description: Will remain free from falls  11/7/2020 0009 by Shubham Montanez RN  Outcome: Ongoing     Problem: Falls - Risk of:  Goal: Absence of physical injury  Description: Absence of physical injury  11/7/2020 0009 by Shubham Montanez RN  Outcome: Ongoing     Problem: Nutrition  Goal: Optimal nutrition therapy  11/7/2020 0009 by Shubham Montanez RN  Outcome: Ongoing     Problem:  Activity:  Goal: Risk for activity intolerance will decrease  Description: Risk for activity intolerance will decrease  11/7/2020 0009 by Shubham Montanez RN  Outcome: Ongoing     Problem: Coping:  Goal: Ability to adjust to condition or change in health will improve  Description: Ability to adjust to condition or change in health will improve  11/7/2020 0009 by Shubham Montanez RN  Outcome: Ongoing     Problem: Fluid Volume:  Goal: Ability to maintain a balanced intake and output will improve  Description: Procedure Date:  2019    Patient: Cecilia Arellano  : 1972    Sedation: MAC    Outpatient History and Physical Review for EGD    Indications: Patient with history of Vallejo syndrome and adenomatous colon polyps here for surveillance as noted    Family History of Colon Cancer or Colon Polyps: Yes    Current Facility-Administered Medications   Medication Dose Route Frequency Provider Last Rate Last Dose   • lidocaine-prilocaine (EMLA) cream 1 application  1 application Topical PRN Lars Haro MD       • lidocaine-sodium bicarbonate 0.9-8.4% injection 0.5-1 mL  0.5-1 mL Subcutaneous PRN Lars Haro MD        Or   • lidocaine 1 % injection 5-10 mg  5-10 mg Subcutaneous PRN Lars Haro MD       • metoPROLOL tartrate (LOPRESSOR) tablet 25 mg  25 mg Oral Once PRN Lars Haro MD       • dextrose (GLUTOSE) 40 % gel 30 g  30 g Oral Once PRN Lars Haro MD       • dextrose 50 % injection 25 g  25 g Intravenous PRN Lars Haro MD       • insulin regular (human) (HumuLIN R, NovoLIN R) sliding scale injection   Subcutaneous Once PRN Lars Haro MD       • sodium chloride (PF) 0.9 % injection 2 mL  2 mL Injection 2 times per day Lars Haro MD       • sodium chloride (PF) 0.9 % injection 2 mL  2 mL Injection PRN Lars Haro MD       • lactated ringers infusion   Intravenous Continuous Lars Haro MD       • sodium chloride 0.9% infusion   Intravenous Continuous Lars Haro MD       • dextrose 5 % infusion   Intravenous Continuous PRN Lars Haro MD       • sodium chloride 0.9% infusion   Intravenous Continuous Jean Perez MD       • electrolyte/PEG 3350 (COLYTE) solution 4,000 mL  4,000 mL Oral Once Jean Perez MD       • ondansetron (ZOFRAN) injection 4 mg  4 mg Intravenous Q6H PRN Jean Perez MD           ALLERGIES:  Ampicillin; Bee stings [other]; Benzodiazepines; Dairy-incl. eggs [other]; Latex   (environmental); Oat   (food or med); Penicillins; Rye   (food); Strawberry;  Ability to maintain a balanced intake and output will improve  11/7/2020 0009 by Rosy Guzman RN  Outcome: Ongoing     Problem: Health Behavior:  Goal: Ability to identify and utilize available resources and services will improve  Description: Ability to identify and utilize available resources and services will improve  11/7/2020 0009 by Rosy Guzman RN  Outcome: Ongoing     Problem: Health Behavior:  Goal: Ability to manage health-related needs will improve  Description: Ability to manage health-related needs will improve  11/7/2020 0009 by Rosy Guzman RN  Outcome: Ongoing     Problem: Metabolic:  Goal: Ability to maintain appropriate glucose levels will improve  Description: Ability to maintain appropriate glucose levels will improve  11/7/2020 0009 by Rosy Guzman RN  Outcome: Ongoing     Problem: Nutritional:  Goal: Maintenance of adequate nutrition will improve  Description: Maintenance of adequate nutrition will improve  11/7/2020 0009 by Rosy Guzman RN  Outcome: Ongoing     Problem: Nutritional:  Goal: Progress toward achieving an optimal weight will improve  Description: Progress toward achieving an optimal weight will improve  11/7/2020 0009 by Rosy Guzman RN  Outcome: Ongoing     Problem: Physical Regulation:  Goal: Complications related to the disease process, condition or treatment will be avoided or minimized  Description: Complications related to the disease process, condition or treatment will be avoided or minimized  11/7/2020 0009 by Rosy Guzman RN  Outcome: Ongoing     Problem: Physical Regulation:  Goal: Diagnostic test results will improve  Description: Diagnostic test results will improve  11/7/2020 0009 by Rosy Guzman RN  Outcome: Ongoing     Problem: Tissue Perfusion:  Goal: Adequacy of tissue perfusion will improve  Description: Adequacy of tissue perfusion will improve  11/7/2020 0009 by Rosy Guzman RN  Outcome: Ongoing Tomato; and Adhesive   (environmental)            Past Medical History:   Diagnosis Date   • Bowel incontinence     Wears diapers   • Cognitive impairment     Functions at 2 year old level   • Dermatitis    • Developmental non-verbal disorder    • Flexion contractures     Lower extremities   • History of MRSA infection 07/26/2009    Right foot   • History of seizures     None since ~1997 per patient's mother   • Infantile cerebral palsy (CMS/HCC)     Gets sedation/anesthesia for all dental work, etc., due to Cerebral Palsy   • 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 Tavarez, Vallejo syndrome, [facility]   • Colonoscopy  01/19/2018    tubular polyp 1 yr recall    • Colonoscopy w/ polypectomy  06/13/2017    Tubullovillous adenoma, large colon polyp removed from right colon, long and redundant colon, another small polyp removed, recall in 6 months Dr. Jean Perez   • Colonoscopy w/ polypectomy  01/19/2017    Colon polyps removed, recall colonoscopy in 1 year Dr. Jean Perez   • Esophagogastroduodenoscopy  06/13/2017    Gastric polyps removed, lax GE junction, mild antral erythema   • Hysterectomy  07/26/2017    with BSO   Dr Redmond [facility]         PHYSICAL EXAM:  HEENT: Within normal limits  LUNGS: Lungs clear to auscultation. No cold symptoms present.  HEART: S1,S2, regular rate and rhythm, no murmer.  NEUROLOGICAL: Developmentally disabled  MENTAL STATUS: Nonverbal and developmentally disabled  SKIN: Warm, dry and intact, no lesions or rashes.   GENITOURINARY: N\A    The risks of the procedure including the possibility of bleeding, perforation (possibly resulting in laparotomy/colostomy)  aspiration, and the risk of a polypectomy were discussed with the patient who accepts these risks.                 3 = A little assistance

## 2022-07-04 NOTE — H&P ADULT - NSHPREVIEWOFSYSTEMS_GEN_ALL_CORE
Review of Systems:  CONSTITUTIONAL: +Weakness, malaise, fevers and chills   EYES/ENT: No visual changes;  No vertigo or throat pain   NECK: No pain or stiffness  RESPIRATORY: No cough, wheezing, hemoptysis; No shortness of breath,   CARDIOVASCULAR: No chest pain or palpitations  GASTROINTESTINAL: No abdominal or epigastric pain. No nausea, vomiting, or hematemesis; No diarrhea or constipation.   GENITOURINARY: +Frequency and change in urinary color   NEUROLOGICAL: No numbness or weakness  SKIN: No itching, burning, rashes, or lesions   All other review of systems is negative unless indicated above

## 2022-07-04 NOTE — H&P ADULT - REASON FOR ADMISSION
FUROSEMIDE 40MG TABS  Take 1 tablet (40 mg) by mouth daily In AM     FUROSEMIDE 20MG TABS Take 1 tablet (20 mg) by mouth daily In PM   Last Written Prescription Date:  7/28/20  Last Fill Quantity: 90,   # refills: 3 ( same date /QTY/RF for both strengths)  Last Office Visit : 4/9/21  Future Office visit:  None    Routing refill request to provider for review/approval because:  Abnormal Creatinine, ? Move to [place] per 4/9/21 note.    06/29/21 (!) 147/82   06/11/21 128/81   04/09/21 125/73     06/11/21  0925    CR 1.29*         
Sepsis secondary to UTI

## 2022-07-04 NOTE — ED ADULT NURSE REASSESSMENT NOTE - NS ED NURSE REASSESS COMMENT FT1
Patient c/o SOB and presents anxious. MD Peres made aware. Given Xanax as per MD order. Patient presents well afterwards. SPO2 at 94%.
Patient given medications "prior to administration time" as per MD Peres because patient did not have morning dosage. Will CTM. Patient resting. Maintenance fluids ongoing.
Patient received from prior team AAOx4. Patient ambulatory with walker. Labs sent. Given ice chips. Will CTM. Awaiting MD white.
152.4

## 2022-07-05 ENCOUNTER — TRANSCRIPTION ENCOUNTER (OUTPATIENT)
Age: 77
End: 2022-07-05

## 2022-07-05 LAB
A1C WITH ESTIMATED AVERAGE GLUCOSE RESULT: 9.9 % — HIGH (ref 4–5.6)
ALBUMIN SERPL ELPH-MCNC: 3.1 G/DL — LOW (ref 3.3–5.2)
ALP SERPL-CCNC: 213 U/L — HIGH (ref 40–120)
ALT FLD-CCNC: 24 U/L — SIGNIFICANT CHANGE UP
ANION GAP SERPL CALC-SCNC: 9 MMOL/L — SIGNIFICANT CHANGE UP (ref 5–17)
AST SERPL-CCNC: 13 U/L — SIGNIFICANT CHANGE UP
BASOPHILS # BLD AUTO: 0.03 K/UL — SIGNIFICANT CHANGE UP (ref 0–0.2)
BASOPHILS NFR BLD AUTO: 0.3 % — SIGNIFICANT CHANGE UP (ref 0–2)
BILIRUB SERPL-MCNC: 0.4 MG/DL — SIGNIFICANT CHANGE UP (ref 0.4–2)
BUN SERPL-MCNC: 21 MG/DL — HIGH (ref 8–20)
CALCIUM SERPL-MCNC: 8.4 MG/DL — LOW (ref 8.6–10.2)
CHLORIDE SERPL-SCNC: 96 MMOL/L — LOW (ref 98–107)
CHOLEST SERPL-MCNC: 114 MG/DL — SIGNIFICANT CHANGE UP
CO2 SERPL-SCNC: 31 MMOL/L — HIGH (ref 22–29)
CREAT SERPL-MCNC: 0.96 MG/DL — SIGNIFICANT CHANGE UP (ref 0.5–1.3)
EGFR: 82 ML/MIN/1.73M2 — SIGNIFICANT CHANGE UP
EOSINOPHIL # BLD AUTO: 0.15 K/UL — SIGNIFICANT CHANGE UP (ref 0–0.5)
EOSINOPHIL NFR BLD AUTO: 1.4 % — SIGNIFICANT CHANGE UP (ref 0–6)
ESTIMATED AVERAGE GLUCOSE: 237 MG/DL — HIGH (ref 68–114)
GLUCOSE BLDC GLUCOMTR-MCNC: 197 MG/DL — HIGH (ref 70–99)
GLUCOSE BLDC GLUCOMTR-MCNC: 272 MG/DL — HIGH (ref 70–99)
GLUCOSE BLDC GLUCOMTR-MCNC: 282 MG/DL — HIGH (ref 70–99)
GLUCOSE BLDC GLUCOMTR-MCNC: 325 MG/DL — HIGH (ref 70–99)
GLUCOSE SERPL-MCNC: 237 MG/DL — HIGH (ref 70–99)
HCT VFR BLD CALC: 41.9 % — SIGNIFICANT CHANGE UP (ref 39–50)
HDLC SERPL-MCNC: 27 MG/DL — LOW
HGB BLD-MCNC: 13.4 G/DL — SIGNIFICANT CHANGE UP (ref 13–17)
IMM GRANULOCYTES NFR BLD AUTO: 0.6 % — SIGNIFICANT CHANGE UP (ref 0–1.5)
LIPID PNL WITH DIRECT LDL SERPL: 39 MG/DL — SIGNIFICANT CHANGE UP
LYMPHOCYTES # BLD AUTO: 0.8 K/UL — LOW (ref 1–3.3)
LYMPHOCYTES # BLD AUTO: 7.5 % — LOW (ref 13–44)
MCHC RBC-ENTMCNC: 30.2 PG — SIGNIFICANT CHANGE UP (ref 27–34)
MCHC RBC-ENTMCNC: 32 GM/DL — SIGNIFICANT CHANGE UP (ref 32–36)
MCV RBC AUTO: 94.6 FL — SIGNIFICANT CHANGE UP (ref 80–100)
MONOCYTES # BLD AUTO: 1.15 K/UL — HIGH (ref 0–0.9)
MONOCYTES NFR BLD AUTO: 10.8 % — SIGNIFICANT CHANGE UP (ref 2–14)
NEUTROPHILS # BLD AUTO: 8.48 K/UL — HIGH (ref 1.8–7.4)
NEUTROPHILS NFR BLD AUTO: 79.4 % — HIGH (ref 43–77)
NON HDL CHOLESTEROL: 87 MG/DL — SIGNIFICANT CHANGE UP
PLATELET # BLD AUTO: 204 K/UL — SIGNIFICANT CHANGE UP (ref 150–400)
POTASSIUM SERPL-MCNC: 3.5 MMOL/L — SIGNIFICANT CHANGE UP (ref 3.5–5.3)
POTASSIUM SERPL-SCNC: 3.5 MMOL/L — SIGNIFICANT CHANGE UP (ref 3.5–5.3)
PROT SERPL-MCNC: 6.5 G/DL — LOW (ref 6.6–8.7)
RBC # BLD: 4.43 M/UL — SIGNIFICANT CHANGE UP (ref 4.2–5.8)
RBC # FLD: 13.7 % — SIGNIFICANT CHANGE UP (ref 10.3–14.5)
SODIUM SERPL-SCNC: 136 MMOL/L — SIGNIFICANT CHANGE UP (ref 135–145)
TRIGL SERPL-MCNC: 242 MG/DL — HIGH
WBC # BLD: 10.67 K/UL — HIGH (ref 3.8–10.5)
WBC # FLD AUTO: 10.67 K/UL — HIGH (ref 3.8–10.5)

## 2022-07-05 PROCEDURE — 99233 SBSQ HOSP IP/OBS HIGH 50: CPT

## 2022-07-05 RX ORDER — SODIUM CHLORIDE 9 MG/ML
1000 INJECTION, SOLUTION INTRAVENOUS
Refills: 0 | Status: DISCONTINUED | OUTPATIENT
Start: 2022-07-05 | End: 2022-07-07

## 2022-07-05 RX ORDER — INSULIN GLARGINE 100 [IU]/ML
15 INJECTION, SOLUTION SUBCUTANEOUS AT BEDTIME
Refills: 0 | Status: DISCONTINUED | OUTPATIENT
Start: 2022-07-05 | End: 2022-07-07

## 2022-07-05 RX ORDER — DEXTROSE 50 % IN WATER 50 %
25 SYRINGE (ML) INTRAVENOUS ONCE
Refills: 0 | Status: DISCONTINUED | OUTPATIENT
Start: 2022-07-05 | End: 2022-07-07

## 2022-07-05 RX ORDER — INSULIN LISPRO 100/ML
VIAL (ML) SUBCUTANEOUS
Refills: 0 | Status: DISCONTINUED | OUTPATIENT
Start: 2022-07-05 | End: 2022-07-07

## 2022-07-05 RX ORDER — DEXTROSE 50 % IN WATER 50 %
12.5 SYRINGE (ML) INTRAVENOUS ONCE
Refills: 0 | Status: DISCONTINUED | OUTPATIENT
Start: 2022-07-05 | End: 2022-07-07

## 2022-07-05 RX ORDER — DEXTROSE 50 % IN WATER 50 %
15 SYRINGE (ML) INTRAVENOUS ONCE
Refills: 0 | Status: DISCONTINUED | OUTPATIENT
Start: 2022-07-05 | End: 2022-07-07

## 2022-07-05 RX ORDER — INSULIN LISPRO 100/ML
5 VIAL (ML) SUBCUTANEOUS
Refills: 0 | Status: DISCONTINUED | OUTPATIENT
Start: 2022-07-05 | End: 2022-07-07

## 2022-07-05 RX ADMIN — ALBUTEROL 2.5 MILLIGRAM(S): 90 AEROSOL, METERED ORAL at 03:06

## 2022-07-05 RX ADMIN — ALBUTEROL 2.5 MILLIGRAM(S): 90 AEROSOL, METERED ORAL at 07:33

## 2022-07-05 RX ADMIN — ALBUTEROL 2.5 MILLIGRAM(S): 90 AEROSOL, METERED ORAL at 20:25

## 2022-07-05 RX ADMIN — Medication 3: at 16:38

## 2022-07-05 RX ADMIN — BUDESONIDE AND FORMOTEROL FUMARATE DIHYDRATE 2 PUFF(S): 160; 4.5 AEROSOL RESPIRATORY (INHALATION) at 09:36

## 2022-07-05 RX ADMIN — Medication 1: at 08:00

## 2022-07-05 RX ADMIN — TIOTROPIUM BROMIDE 1 CAPSULE(S): 18 CAPSULE ORAL; RESPIRATORY (INHALATION) at 09:37

## 2022-07-05 RX ADMIN — AMIODARONE HYDROCHLORIDE 200 MILLIGRAM(S): 400 TABLET ORAL at 06:17

## 2022-07-05 RX ADMIN — PANTOPRAZOLE SODIUM 40 MILLIGRAM(S): 20 TABLET, DELAYED RELEASE ORAL at 06:16

## 2022-07-05 RX ADMIN — ALBUTEROL 2.5 MILLIGRAM(S): 90 AEROSOL, METERED ORAL at 14:01

## 2022-07-05 RX ADMIN — APIXABAN 5 MILLIGRAM(S): 2.5 TABLET, FILM COATED ORAL at 11:50

## 2022-07-05 RX ADMIN — Medication 3 MILLIGRAM(S): at 22:45

## 2022-07-05 RX ADMIN — Medication 0.25 MILLIGRAM(S): at 22:45

## 2022-07-05 RX ADMIN — Medication 81 MILLIGRAM(S): at 08:00

## 2022-07-05 RX ADMIN — Medication 1 SPRAY(S): at 11:50

## 2022-07-05 RX ADMIN — APIXABAN 5 MILLIGRAM(S): 2.5 TABLET, FILM COATED ORAL at 06:17

## 2022-07-05 RX ADMIN — SODIUM CHLORIDE 75 MILLILITER(S): 9 INJECTION INTRAMUSCULAR; INTRAVENOUS; SUBCUTANEOUS at 18:13

## 2022-07-05 RX ADMIN — Medication 1 SPRAY(S): at 06:17

## 2022-07-05 RX ADMIN — Medication 3: at 22:44

## 2022-07-05 RX ADMIN — Medication 4: at 11:49

## 2022-07-05 RX ADMIN — BUDESONIDE AND FORMOTEROL FUMARATE DIHYDRATE 2 PUFF(S): 160; 4.5 AEROSOL RESPIRATORY (INHALATION) at 20:24

## 2022-07-05 RX ADMIN — INSULIN GLARGINE 15 UNIT(S): 100 INJECTION, SOLUTION SUBCUTANEOUS at 22:47

## 2022-07-05 RX ADMIN — Medication 200 MILLIGRAM(S): at 06:16

## 2022-07-05 RX ADMIN — FINASTERIDE 5 MILLIGRAM(S): 5 TABLET, FILM COATED ORAL at 08:00

## 2022-07-05 RX ADMIN — ATORVASTATIN CALCIUM 20 MILLIGRAM(S): 80 TABLET, FILM COATED ORAL at 22:44

## 2022-07-05 RX ADMIN — CEFTRIAXONE 100 MILLIGRAM(S): 500 INJECTION, POWDER, FOR SOLUTION INTRAMUSCULAR; INTRAVENOUS at 22:46

## 2022-07-05 NOTE — PHYSICAL THERAPY INITIAL EVALUATION ADULT - PERTINENT HX OF CURRENT PROBLEM, REHAB EVAL
presented with complaints of right flank pain and feeling weak. Patient states that he has been having urinary issues for a long time and previously had a golden secondary to urinary retention which required a TURP. Admission to medicine was requested for sepsis secondary to UTI.

## 2022-07-05 NOTE — DISCHARGE NOTE NURSING/CASE MANAGEMENT/SOCIAL WORK - NSDCPEFALRISK_GEN_ALL_CORE
For information on Fall & Injury Prevention, visit: [email or web address] OR  [email or web address] OR  https://www.cdc.gov/steadi/patient.html

## 2022-07-05 NOTE — PHYSICAL THERAPY INITIAL EVALUATION ADULT - ADDITIONAL COMMENTS
Pt reports living in apartment within son's house, alone, son daughter in law and 2 grandchildren live there as well but are not home 24/7. Pt reports having no steps to enter/inside. Pt owns tri-wheel RW and cane, prefers walker for mobility. Independent prior to admission. Pt drives.

## 2022-07-05 NOTE — DISCHARGE NOTE NURSING/CASE MANAGEMENT/SOCIAL WORK - PATIENT PORTAL LINK FT
You can access the FollowMyHealth Patient Portal offered by [facility] by registering at the following website: [email or web address]. By joining [facility]’s FollowMyHealth portal, you will also be able to view your health information using other applications (apps) compatible with our system.

## 2022-07-06 LAB
ANION GAP SERPL CALC-SCNC: 10 MMOL/L — SIGNIFICANT CHANGE UP (ref 5–17)
BASOPHILS # BLD AUTO: 0.04 K/UL — SIGNIFICANT CHANGE UP (ref 0–0.2)
BASOPHILS NFR BLD AUTO: 0.4 % — SIGNIFICANT CHANGE UP (ref 0–2)
BUN SERPL-MCNC: 21.9 MG/DL — HIGH (ref 8–20)
CALCIUM SERPL-MCNC: 8.3 MG/DL — LOW (ref 8.6–10.2)
CHLORIDE SERPL-SCNC: 100 MMOL/L — SIGNIFICANT CHANGE UP (ref 98–107)
CO2 SERPL-SCNC: 29 MMOL/L — SIGNIFICANT CHANGE UP (ref 22–29)
CREAT SERPL-MCNC: 0.72 MG/DL — SIGNIFICANT CHANGE UP (ref 0.5–1.3)
CULTURE RESULTS: SIGNIFICANT CHANGE UP
EGFR: 95 ML/MIN/1.73M2 — SIGNIFICANT CHANGE UP
EOSINOPHIL # BLD AUTO: 0.29 K/UL — SIGNIFICANT CHANGE UP (ref 0–0.5)
EOSINOPHIL NFR BLD AUTO: 2.8 % — SIGNIFICANT CHANGE UP (ref 0–6)
GLUCOSE BLDC GLUCOMTR-MCNC: 217 MG/DL — HIGH (ref 70–99)
GLUCOSE BLDC GLUCOMTR-MCNC: 222 MG/DL — HIGH (ref 70–99)
GLUCOSE BLDC GLUCOMTR-MCNC: 256 MG/DL — HIGH (ref 70–99)
GLUCOSE BLDC GLUCOMTR-MCNC: 259 MG/DL — HIGH (ref 70–99)
GLUCOSE SERPL-MCNC: 233 MG/DL — HIGH (ref 70–99)
HCT VFR BLD CALC: 40.5 % — SIGNIFICANT CHANGE UP (ref 39–50)
HGB BLD-MCNC: 13.2 G/DL — SIGNIFICANT CHANGE UP (ref 13–17)
IMM GRANULOCYTES NFR BLD AUTO: 0.6 % — SIGNIFICANT CHANGE UP (ref 0–1.5)
LYMPHOCYTES # BLD AUTO: 0.9 K/UL — LOW (ref 1–3.3)
LYMPHOCYTES # BLD AUTO: 8.6 % — LOW (ref 13–44)
MAGNESIUM SERPL-MCNC: 2.3 MG/DL — SIGNIFICANT CHANGE UP (ref 1.6–2.6)
MCHC RBC-ENTMCNC: 31.1 PG — SIGNIFICANT CHANGE UP (ref 27–34)
MCHC RBC-ENTMCNC: 32.6 GM/DL — SIGNIFICANT CHANGE UP (ref 32–36)
MCV RBC AUTO: 95.5 FL — SIGNIFICANT CHANGE UP (ref 80–100)
MONOCYTES # BLD AUTO: 1.17 K/UL — HIGH (ref 0–0.9)
MONOCYTES NFR BLD AUTO: 11.2 % — SIGNIFICANT CHANGE UP (ref 2–14)
NEUTROPHILS # BLD AUTO: 7.99 K/UL — HIGH (ref 1.8–7.4)
NEUTROPHILS NFR BLD AUTO: 76.4 % — SIGNIFICANT CHANGE UP (ref 43–77)
PLATELET # BLD AUTO: 206 K/UL — SIGNIFICANT CHANGE UP (ref 150–400)
POTASSIUM SERPL-MCNC: 3.4 MMOL/L — LOW (ref 3.5–5.3)
POTASSIUM SERPL-SCNC: 3.4 MMOL/L — LOW (ref 3.5–5.3)
RBC # BLD: 4.24 M/UL — SIGNIFICANT CHANGE UP (ref 4.2–5.8)
RBC # FLD: 13.7 % — SIGNIFICANT CHANGE UP (ref 10.3–14.5)
SODIUM SERPL-SCNC: 139 MMOL/L — SIGNIFICANT CHANGE UP (ref 135–145)
SPECIMEN SOURCE: SIGNIFICANT CHANGE UP
WBC # BLD: 10.45 K/UL — SIGNIFICANT CHANGE UP (ref 3.8–10.5)
WBC # FLD AUTO: 10.45 K/UL — SIGNIFICANT CHANGE UP (ref 3.8–10.5)

## 2022-07-06 PROCEDURE — 99233 SBSQ HOSP IP/OBS HIGH 50: CPT

## 2022-07-06 RX ORDER — FUROSEMIDE 40 MG
40 TABLET ORAL
Refills: 0 | Status: DISCONTINUED | OUTPATIENT
Start: 2022-07-06 | End: 2022-07-09

## 2022-07-06 RX ORDER — INSULIN LISPRO 100/ML
1 VIAL (ML) SUBCUTANEOUS ONCE
Refills: 0 | Status: COMPLETED | OUTPATIENT
Start: 2022-07-06 | End: 2022-07-06

## 2022-07-06 RX ADMIN — INSULIN GLARGINE 15 UNIT(S): 100 INJECTION, SOLUTION SUBCUTANEOUS at 22:16

## 2022-07-06 RX ADMIN — Medication 1 UNIT(S): at 22:53

## 2022-07-06 RX ADMIN — Medication 5 UNIT(S): at 08:28

## 2022-07-06 RX ADMIN — ALBUTEROL 2.5 MILLIGRAM(S): 90 AEROSOL, METERED ORAL at 09:32

## 2022-07-06 RX ADMIN — Medication 1 SPRAY(S): at 12:03

## 2022-07-06 RX ADMIN — ALBUTEROL 2.5 MILLIGRAM(S): 90 AEROSOL, METERED ORAL at 16:57

## 2022-07-06 RX ADMIN — BUDESONIDE AND FORMOTEROL FUMARATE DIHYDRATE 2 PUFF(S): 160; 4.5 AEROSOL RESPIRATORY (INHALATION) at 21:02

## 2022-07-06 RX ADMIN — TIOTROPIUM BROMIDE 1 CAPSULE(S): 18 CAPSULE ORAL; RESPIRATORY (INHALATION) at 09:32

## 2022-07-06 RX ADMIN — Medication 650 MILLIGRAM(S): at 01:16

## 2022-07-06 RX ADMIN — Medication 1 SPRAY(S): at 17:14

## 2022-07-06 RX ADMIN — Medication 81 MILLIGRAM(S): at 12:03

## 2022-07-06 RX ADMIN — ALBUTEROL 2.5 MILLIGRAM(S): 90 AEROSOL, METERED ORAL at 04:36

## 2022-07-06 RX ADMIN — Medication 40 MILLIGRAM(S): at 13:06

## 2022-07-06 RX ADMIN — Medication 200 MILLIGRAM(S): at 07:12

## 2022-07-06 RX ADMIN — Medication 3: at 12:04

## 2022-07-06 RX ADMIN — APIXABAN 5 MILLIGRAM(S): 2.5 TABLET, FILM COATED ORAL at 07:11

## 2022-07-06 RX ADMIN — Medication 2: at 16:13

## 2022-07-06 RX ADMIN — SODIUM CHLORIDE 75 MILLILITER(S): 9 INJECTION INTRAMUSCULAR; INTRAVENOUS; SUBCUTANEOUS at 13:06

## 2022-07-06 RX ADMIN — Medication 0.25 MILLIGRAM(S): at 22:53

## 2022-07-06 RX ADMIN — Medication 5 UNIT(S): at 16:14

## 2022-07-06 RX ADMIN — ALBUTEROL 2.5 MILLIGRAM(S): 90 AEROSOL, METERED ORAL at 21:02

## 2022-07-06 RX ADMIN — POLYETHYLENE GLYCOL 3350 17 GRAM(S): 17 POWDER, FOR SOLUTION ORAL at 12:05

## 2022-07-06 RX ADMIN — CEFTRIAXONE 100 MILLIGRAM(S): 500 INJECTION, POWDER, FOR SOLUTION INTRAMUSCULAR; INTRAVENOUS at 22:16

## 2022-07-06 RX ADMIN — Medication 650 MILLIGRAM(S): at 00:46

## 2022-07-06 RX ADMIN — AMIODARONE HYDROCHLORIDE 200 MILLIGRAM(S): 400 TABLET ORAL at 07:11

## 2022-07-06 RX ADMIN — Medication 5 UNIT(S): at 12:03

## 2022-07-06 RX ADMIN — APIXABAN 5 MILLIGRAM(S): 2.5 TABLET, FILM COATED ORAL at 17:13

## 2022-07-06 RX ADMIN — ATORVASTATIN CALCIUM 20 MILLIGRAM(S): 80 TABLET, FILM COATED ORAL at 22:16

## 2022-07-06 RX ADMIN — FINASTERIDE 5 MILLIGRAM(S): 5 TABLET, FILM COATED ORAL at 12:03

## 2022-07-06 RX ADMIN — Medication 2: at 08:29

## 2022-07-06 RX ADMIN — BUDESONIDE AND FORMOTEROL FUMARATE DIHYDRATE 2 PUFF(S): 160; 4.5 AEROSOL RESPIRATORY (INHALATION) at 09:34

## 2022-07-06 RX ADMIN — PANTOPRAZOLE SODIUM 40 MILLIGRAM(S): 20 TABLET, DELAYED RELEASE ORAL at 07:12

## 2022-07-06 NOTE — CHART NOTE - NSCHARTNOTEFT_GEN_A_CORE
Pt admitted with pyelonephritis  noted to have left renal mass, previously seen on prior studies, possible cyst, poss hemorrhage  Pt was recently admitted to  service for hematuria  pt will need work up for left renal lesion as OP once pyelonephritis treated  Pt may f/u with Dr. Mandujano in 3-4 weeks

## 2022-07-07 ENCOUNTER — TRANSCRIPTION ENCOUNTER (OUTPATIENT)
Age: 77
End: 2022-07-07

## 2022-07-07 LAB
GLUCOSE BLDC GLUCOMTR-MCNC: 255 MG/DL — HIGH (ref 70–99)
GLUCOSE BLDC GLUCOMTR-MCNC: 308 MG/DL — HIGH (ref 70–99)
GLUCOSE BLDC GLUCOMTR-MCNC: 322 MG/DL — HIGH (ref 70–99)
GLUCOSE BLDC GLUCOMTR-MCNC: 328 MG/DL — HIGH (ref 70–99)

## 2022-07-07 PROCEDURE — 99233 SBSQ HOSP IP/OBS HIGH 50: CPT

## 2022-07-07 RX ORDER — INSULIN LISPRO 100/ML
VIAL (ML) SUBCUTANEOUS
Refills: 0 | Status: DISCONTINUED | OUTPATIENT
Start: 2022-07-07 | End: 2022-07-07

## 2022-07-07 RX ORDER — SODIUM CHLORIDE 9 MG/ML
1000 INJECTION, SOLUTION INTRAVENOUS
Refills: 0 | Status: DISCONTINUED | OUTPATIENT
Start: 2022-07-07 | End: 2022-07-09

## 2022-07-07 RX ORDER — DEXTROSE 50 % IN WATER 50 %
25 SYRINGE (ML) INTRAVENOUS ONCE
Refills: 0 | Status: DISCONTINUED | OUTPATIENT
Start: 2022-07-07 | End: 2022-07-09

## 2022-07-07 RX ORDER — INSULIN LISPRO 100/ML
3 VIAL (ML) SUBCUTANEOUS ONCE
Refills: 0 | Status: COMPLETED | OUTPATIENT
Start: 2022-07-07 | End: 2022-07-07

## 2022-07-07 RX ORDER — INSULIN LISPRO 100/ML
VIAL (ML) SUBCUTANEOUS
Refills: 0 | Status: DISCONTINUED | OUTPATIENT
Start: 2022-07-07 | End: 2022-07-09

## 2022-07-07 RX ORDER — OXYCODONE AND ACETAMINOPHEN 5; 325 MG/1; MG/1
1 TABLET ORAL ONCE
Refills: 0 | Status: DISCONTINUED | OUTPATIENT
Start: 2022-07-07 | End: 2022-07-07

## 2022-07-07 RX ORDER — INSULIN LISPRO 100/ML
10 VIAL (ML) SUBCUTANEOUS
Refills: 0 | Status: DISCONTINUED | OUTPATIENT
Start: 2022-07-07 | End: 2022-07-09

## 2022-07-07 RX ORDER — DEXTROSE 50 % IN WATER 50 %
12.5 SYRINGE (ML) INTRAVENOUS ONCE
Refills: 0 | Status: DISCONTINUED | OUTPATIENT
Start: 2022-07-07 | End: 2022-07-09

## 2022-07-07 RX ORDER — INSULIN GLARGINE 100 [IU]/ML
30 INJECTION, SOLUTION SUBCUTANEOUS AT BEDTIME
Refills: 0 | Status: DISCONTINUED | OUTPATIENT
Start: 2022-07-07 | End: 2022-07-09

## 2022-07-07 RX ORDER — DEXTROSE 50 % IN WATER 50 %
15 SYRINGE (ML) INTRAVENOUS ONCE
Refills: 0 | Status: DISCONTINUED | OUTPATIENT
Start: 2022-07-07 | End: 2022-07-09

## 2022-07-07 RX ADMIN — Medication 4: at 11:56

## 2022-07-07 RX ADMIN — BUDESONIDE AND FORMOTEROL FUMARATE DIHYDRATE 2 PUFF(S): 160; 4.5 AEROSOL RESPIRATORY (INHALATION) at 21:06

## 2022-07-07 RX ADMIN — Medication 200 MILLIGRAM(S): at 05:40

## 2022-07-07 RX ADMIN — PANTOPRAZOLE SODIUM 40 MILLIGRAM(S): 20 TABLET, DELAYED RELEASE ORAL at 05:39

## 2022-07-07 RX ADMIN — Medication 5 UNIT(S): at 08:38

## 2022-07-07 RX ADMIN — Medication 4: at 17:06

## 2022-07-07 RX ADMIN — SODIUM CHLORIDE 75 MILLILITER(S): 9 INJECTION INTRAMUSCULAR; INTRAVENOUS; SUBCUTANEOUS at 05:40

## 2022-07-07 RX ADMIN — Medication 40 MILLIGRAM(S): at 12:00

## 2022-07-07 RX ADMIN — OXYCODONE AND ACETAMINOPHEN 1 TABLET(S): 5; 325 TABLET ORAL at 01:58

## 2022-07-07 RX ADMIN — Medication 40 MILLIGRAM(S): at 05:40

## 2022-07-07 RX ADMIN — FINASTERIDE 5 MILLIGRAM(S): 5 TABLET, FILM COATED ORAL at 11:56

## 2022-07-07 RX ADMIN — ALBUTEROL 2.5 MILLIGRAM(S): 90 AEROSOL, METERED ORAL at 15:42

## 2022-07-07 RX ADMIN — INSULIN GLARGINE 30 UNIT(S): 100 INJECTION, SOLUTION SUBCUTANEOUS at 21:40

## 2022-07-07 RX ADMIN — APIXABAN 5 MILLIGRAM(S): 2.5 TABLET, FILM COATED ORAL at 05:40

## 2022-07-07 RX ADMIN — CEFTRIAXONE 100 MILLIGRAM(S): 500 INJECTION, POWDER, FOR SOLUTION INTRAMUSCULAR; INTRAVENOUS at 22:57

## 2022-07-07 RX ADMIN — Medication 3: at 08:37

## 2022-07-07 RX ADMIN — Medication 1 SPRAY(S): at 05:40

## 2022-07-07 RX ADMIN — ATORVASTATIN CALCIUM 20 MILLIGRAM(S): 80 TABLET, FILM COATED ORAL at 21:40

## 2022-07-07 RX ADMIN — Medication 5 UNIT(S): at 11:56

## 2022-07-07 RX ADMIN — TIOTROPIUM BROMIDE 1 CAPSULE(S): 18 CAPSULE ORAL; RESPIRATORY (INHALATION) at 09:21

## 2022-07-07 RX ADMIN — Medication 81 MILLIGRAM(S): at 11:55

## 2022-07-07 RX ADMIN — Medication 10 UNIT(S): at 17:06

## 2022-07-07 RX ADMIN — Medication 3 UNIT(S): at 22:58

## 2022-07-07 RX ADMIN — Medication 1 SPRAY(S): at 17:05

## 2022-07-07 RX ADMIN — AMIODARONE HYDROCHLORIDE 200 MILLIGRAM(S): 400 TABLET ORAL at 05:41

## 2022-07-07 RX ADMIN — BUDESONIDE AND FORMOTEROL FUMARATE DIHYDRATE 2 PUFF(S): 160; 4.5 AEROSOL RESPIRATORY (INHALATION) at 09:16

## 2022-07-07 RX ADMIN — OXYCODONE AND ACETAMINOPHEN 1 TABLET(S): 5; 325 TABLET ORAL at 23:25

## 2022-07-07 RX ADMIN — OXYCODONE AND ACETAMINOPHEN 1 TABLET(S): 5; 325 TABLET ORAL at 01:28

## 2022-07-07 RX ADMIN — ALBUTEROL 2.5 MILLIGRAM(S): 90 AEROSOL, METERED ORAL at 21:05

## 2022-07-07 RX ADMIN — APIXABAN 5 MILLIGRAM(S): 2.5 TABLET, FILM COATED ORAL at 17:05

## 2022-07-07 RX ADMIN — ALBUTEROL 2.5 MILLIGRAM(S): 90 AEROSOL, METERED ORAL at 09:14

## 2022-07-07 NOTE — DISCHARGE NOTE PROVIDER - HOSPITAL COURSE
77yo male with PMHX AFib on Eliquis, CAD s/p PCI x2 on ASA, HTN, HLD, CHFpEF, BPH s/p TURP c/b Chronic Golden x9 mos (now removed) presented with complaints of right flank pain and feeling weak. Patient states that he has been having urinary issues for a long time and previously had a golden secondary to urinary retention which required a TURP. Also states that he noticed his urine had changed colors from clear to brownish/reddish and had presented to [facility] about 3 weeks ago and was treated with clindamycin and discharged to follow up with his urologist. He reports that since then, his symptoms had initially improved. He was evaluated by his urologist who performed a cystoscopy and was told that is was normal and had a CT scan performed which was found to be unremarkable as per the patient. His symptoms reappeared two days ago and he feels like he was having subjective fevers with generalized malaise and weakness. Patient was noted to febrile in the ED and found to have a positive UA and started on Ceftriaxone and admission to medicine was requested for sepsis secondary to UTI. Blood cultures resulted negative, remainder of work up insignificant. CT abdomen done and results noted: noted to have perinephric stranding and 3.4 x 3.3 cm hyperdense lesion on the left. Seen by urology, will need o/p follow up. Completed 4 days of IV abx, to finish course on discharge. Now stable for DC HOME. 77yo male with PMHX AFib on Eliquis, CAD s/p PCI x2 on ASA, HTN, HLD, CHFpEF, BPH s/p TURP c/b Chronic Golden x9 mos (now removed) presented with complaints of right flank pain and feeling weak. Patient states that he has been having urinary issues for a long time and previously had a golden secondary to urinary retention which required a TURP. Also states that he noticed his urine had changed colors from clear to brownish/reddish and had presented to [facility] about 3 weeks ago and was treated with clindamycin and discharged to follow up with his urologist. He reports that since then, his symptoms had initially improved. He was evaluated by his urologist who performed a cystoscopy and was told that is was normal and had a CT scan performed which was found to be unremarkable as per the patient. His symptoms reappeared two days ago and he feels like he was having subjective fevers with generalized malaise and weakness. Patient was noted to febrile in the ED and found to have a positive UA and started on Ceftriaxone and admission to medicine was requested for sepsis secondary to UTI. Blood cultures resulted negative, remainder of work up insignificant. CT abdomen done and results noted: noted to have perinephric stranding and 3.4 x 3.3 cm hyperdense lesion on the left. Seen by urology, will need o/p follow up. S/p 5 days of Ceftriaxone. Stable for discharge home.     Of note, patient had poorly controlled diabetes and was supposed to be started on insulin. Patient refused to be discharged on Insulin, had extensive discussion with patient regarding DM and being compliant with meds and diet, will need close outpatient endocrine followup. 77yo male with PMHX AFib on Eliquis, CAD s/p PCI x2 on ASA, HTN, HLD, CHFpEF, BPH s/p TURP c/b Chronic Golden x9 mos (now removed) presented with complaints of right flank pain and feeling weak. Patient states that he has been having urinary issues for a long time and previously had a golden secondary to urinary retention which required a TURP. Also states that he noticed his urine had changed colors from clear to brownish/reddish and had presented to [facility] about 3 weeks ago and was treated with clindamycin and discharged to follow up with his urologist. He reports that since then, his symptoms had initially improved. He was evaluated by his urologist who performed a cystoscopy and was told that is was normal and had a CT scan performed which was found to be unremarkable as per the patient. His symptoms reappeared two days ago and he feels like he was having subjective fevers with generalized malaise and weakness. Patient was noted to febrile in the ED and found to have a positive UA and started on Ceftriaxone and admission to medicine was requested for sepsis secondary to UTI. Blood cultures resulted negative, remainder of work up insignificant. CT abdomen done and results noted: noted to have perinephric stranding and 3.4 x 3.3 cm hyperdense lesion on the left. Seen by urology, will need o/p follow up. S/p 5 days of Ceftriaxone. Stable for discharge home.     Of note, patient had poorly controlled diabetes and was supposed to be started on insulin. Patient refused to be discharged on Insulin, had extensive discussion with patient regarding DM and being compliant with meds and diet, will need close outpatient PMD followup.

## 2022-07-07 NOTE — PROGRESS NOTE ADULT - ASSESSMENT
77yo male with PMHX AFib on Eliquis, CAD s/p PCI x2 on ASA, HTN, HLD, CHFpEF, BPH s/p TURP c/b Chronic Golden x9 mos (now removed) presented with complaints of right flank pain and feeling weak. Patient states that he has been having urinary issues for a long time and previously had a golden secondary to urinary retention which required a TURP. Also states that he noticed his urine had changed colors from clear to brownish/reddish and had presented to [facility] about 3 weeks ago and was treated with clindamycin and discharged to follow up with his urologist. He reports that since then, his symptoms had initially improved. He was evaluated by his urologist who performed a cystoscopy and was told that is was normal and had a CT scan performed which was found to be unremarkable as per the patient. His symptoms reappeared two days ago and he feels like he was having subjective fevers with generalized malaise and weakness. Patient was noted to febrile in the ED and found to have a positive UA and started on Ceftriaxone and admission to medicine was requested for sepsis secondary to UTI.    1. Sepsis secondary to UTI  afebrile, Leukocytosis improved, BP improved   UA positive, UC (+) contamination ???   BC no growth   CT abdomen done and results noted: noted to have perinephric stranding and 3.4 x 3.3 cm hyperdense lesion on the left   c/w Ceftriaxone for total of 5 days , last day tomorrow   Tylenol for pain and fever   Discussed CT abdomen findings with urology BIJAL Moreno, she recommended to finish treatment for UTI and to follow as outpatient for further workup       2. Chronic persistent A. fib   C/w amiodarone   Metoprolol if B/P >100/60  C/w eliquis    3. Uncontrolled type 2 DM   HBA1C 9.9, BG not controlled, pt non compliant with food   Hold metformin/farxiga  c/w  Lantus 30 and Admelog 10 TID along with SS  pt does not want to be discharged on Insulin   Had extensive discussion with patient regarding DM and being compliant with meds and diet       4. CAD  C/w asa/statin      5. BPH  c/W FINASTERIDE    6. anxiety  ISTOP:[id number]   Xanax prn    7. HFpEF  c/w Furosemide   Add Lisinopril 10 mg po daily   C/w Metoprolol   TTE done and results reviewed     8. GERD  Protonix     9. HLD  Atorvastatin for rosuvastatin    DVT prophylaxis: Currently on Eliquis   Discharge tomorrow after last dose of antibiotics 
77yo male with PMHX AFib on Eliquis, CAD s/p PCI x2 on ASA, HTN, HLD, CHFpEF, BPH s/p TURP c/b Chronic Golden x9 mos (now removed) presented with complaints of right flank pain and feeling weak. Patient states that he has been having urinary issues for a long time and previously had a golden secondary to urinary retention which required a TURP. Also states that he noticed his urine had changed colors from clear to brownish/reddish and had presented to [facility] about 3 weeks ago and was treated with clindamycin and discharged to follow up with his urologist. He reports that since then, his symptoms had initially improved. He was evaluated by his urologist who performed a cystoscopy and was told that is was normal and had a CT scan performed which was found to be unremarkable as per the patient. His symptoms reappeared two days ago and he feels like he was having subjective fevers with generalized malaise and weakness. Patient was noted to febrile in the ED and found to have a positive UA and started on Ceftriaxone and admission to medicine was requested for sepsis secondary to UTI.    1. Sepsis secondary to UTI  Febrile, Leukocytosis, mild hypotension  UA positive, UC pending results   BC no growth   CT abdomen done and results noted: noted to have perinephric stranding and 3.4 x 3.3 cm hyperdense lesion  c/w Ceftriaxone   Tylenol for pain and fever   Will discuss CT abdomen findings with urology       2. Chronic persistent A. fib   C/w amiodarone   Metoprolol if B/P >100/60  C/w eliquis    3. Uncontrolled type 2 DM   HBA1C 9.9, BG not controlled   Hold metformin/farxiga  Start Lantus 15 and Admelog 5 TID       4. CAD  C/w asa/statin      5. BPH  c/W FINASTERIDE    6. anxiety  ISTOP:[id number]   Xanax prn    7. HFpEF  Will hold lasix given that patient appears dehydrated   Would consider adding an ACE/ARB prior to DC if BP improves  C/w BB    8. GERD  Protonix     9. HLD  Atorvastatin for rosuvastatin    DVT prophylaxis: Currently on Eliquis 
75yo male with PMHX AFib on Eliquis, CAD s/p PCI x2 on ASA, HTN, HLD, CHFpEF, BPH s/p TURP c/b Chronic Golden x9 mos (now removed) presented with complaints of right flank pain and feeling weak. Patient states that he has been having urinary issues for a long time and previously had a golden secondary to urinary retention which required a TURP. Also states that he noticed his urine had changed colors from clear to brownish/reddish and had presented to [facility] about 3 weeks ago and was treated with clindamycin and discharged to follow up with his urologist. He reports that since then, his symptoms had initially improved. He was evaluated by his urologist who performed a cystoscopy and was told that is was normal and had a CT scan performed which was found to be unremarkable as per the patient. His symptoms reappeared two days ago and he feels like he was having subjective fevers with generalized malaise and weakness. Patient was noted to febrile in the ED and found to have a positive UA and started on Ceftriaxone and admission to medicine was requested for sepsis secondary to UTI.    1. Sepsis secondary to UTI  afebrile, Leukocytosis improved, BP improved   UA positive, UC (+) contamination ???   BC no growth   CT abdomen done and results noted: noted to have perinephric stranding and 3.4 x 3.3 cm hyperdense lesion on the left   c/w Ceftriaxone for total of 5 days   Tylenol for pain and fever   Discussed CT abdomen findings with urology BIJAL Moreno, she recommended to finish treatment for UTI and to follow as outpatient for further workup       2. Chronic persistent A. fib   C/w amiodarone   Metoprolol if B/P >100/60  C/w eliquis    3. Uncontrolled type 2 DM   HBA1C 9.9, BG not controlled   Hold metformin/farxiga  c/w  Lantus 15 and Admelog 5 TID       4. CAD  C/w asa/statin      5. BPH  c/W FINASTERIDE    6. anxiety  ISTOP:[id number]   Xanax prn    7. HFpEF  Resume Furosemide   Would consider adding an ACE/ARB prior to DC if BP improves  C/w BB  TTE pending     8. GERD  Protonix     9. HLD  Atorvastatin for rosuvastatin    DVT prophylaxis: Currently on Eliquis

## 2022-07-07 NOTE — CHART NOTE - NSCHARTNOTEFT_GEN_A_CORE
Called by RN that pt is complaining of headache and back pain.   Evaluated pt at bedside. Headache started 2 hours ago and is posterior radiating down posterior neck, 7/10 in severity. Has had a similar headache and upper back pain not quite as severe for the last three nights. Minimal relief with tylenol. Denies any recent trauma. Denies any spinal numbness/tingling, chest pain, SOB, dizziness, or fevers.    Pt in NAD and VSS.   Exam benign. A&Ox4, PERRL b/l, EOM's intact, no facial drooping, speech clear, moving all extremities, equal and full strength upper & lower extremities, unlabored respirations, RRR    No neuro deficits.  Will give 1 tab percocet.   RN to contact PA if any worsening S/S.

## 2022-07-07 NOTE — DISCHARGE NOTE PROVIDER - NSDCMRMEDTOKEN_GEN_ALL_CORE_FT
alfuzosin 10 mg oral tablet, extended release: 1 tab(s) orally once a day  ALPRAZolam 0.25 mg oral tablet: 1 tab(s) orally 3 times a day, As Needed  amiodarone 200 mg oral tablet: 1 tab(s) orally once a day   aspirin 81 mg oral delayed release tablet: 1 tab(s) orally once a day  Crestor 5 mg oral tablet: 1 tab(s) orally once a day  Eliquis 5 mg oral tablet: 1 tab(s) orally 2 times a day  Farxiga 5 mg oral tablet: 1 tab(s) orally once a day   finasteride 5 mg oral tablet: 1 tab(s) orally once a day  furosemide 40 mg oral tablet: 1 tab(s) orally 2 times a day  metFORMIN 500 mg oral tablet: 1  orally 3 times a day  Metoprolol Succinate  mg oral tablet, extended release: 1 tab(s) orally once a day   NexIUM 40 mg oral delayed release capsule: 1 cap(s) orally once a day

## 2022-07-07 NOTE — DISCHARGE NOTE PROVIDER - NSDCCPCAREPLAN_GEN_ALL_CORE_FT
PRINCIPAL DISCHARGE DIAGNOSIS  Diagnosis: Sepsis due to urinary tract infection  Assessment and Plan of Treatment: -Complete cours of antibiotics as prescribed      SECONDARY DISCHARGE DIAGNOSES  Diagnosis: Chronic atrial fibrillation  Assessment and Plan of Treatment: - It is important to Continue your medication for rate/rhythm control. Continue your eliquis medication for stroke prevention. Outpatient cardiology follow up on discharge.    Diagnosis: CAD (coronary artery disease)  Assessment and Plan of Treatment: -Continue following a healthy lifestyle, this includes exercising 150minutes a week or as much as tolerated, and eating a healthy balanced diet consisting of fresh fruits and veggies. Be sure to take all medications as prescribed, do not miss them! Follow up with your primary care doctor and/or Cardiologist. If you have chest pain, be sure to come to the ER immediately for an evaluation.    Diagnosis: History of BPH  Assessment and Plan of Treatment: -Continue flomax    Diagnosis: Diabetes mellitus  Assessment and Plan of Treatment: -Resume home meds. Outpatient follow up [facility] in 1 week for further management as your diabetes is uncontrolled based on your a1c level of 9    Diagnosis: Left renal mass  Assessment and Plan of Treatment: -Incidentally found on CT of the abdomen (previously identified on prior studies during past admissions).   -Will need to follow up [facility] Dr. Mandujano urology for outpatient work up, contact info provided.

## 2022-07-07 NOTE — DISCHARGE NOTE PROVIDER - ATTENDING DISCHARGE PHYSICAL EXAMINATION:
VITALS:   T(C): 36.7 (07-09-22 @ 04:45), Max: 36.8 (07-08-22 @ 11:23)  HR: 85 (07-09-22 @ 09:29) (80 - 95)  BP: 128/71 (07-09-22 @ 04:45) (124/78 - 131/83)  RR: 18 (07-09-22 @ 04:45) (18 - 19)  SpO2: 95% (07-09-22 @ 09:29) (93% - 96%)    GENERAL: NAD, in bed chatting on the phone  HEAD:  Atraumatic, Normocephalic  EYES: EOMI, PERRLA, conjunctiva and sclera clear  NECK: Supple, No JVD, Normal thyroid  NERVOUS SYSTEM:  Alert & Oriented X3, No gross focal deficits  CHEST/LUNG: Clear to percussion bilaterally; No rales, rhonchi, wheezing, or rubs  HEART: Regular rate and rhythm; No murmurs, rubs, or gallops  ABDOMEN: obese abdomen, Soft, Nontender, Nondistended; Bowel sounds present  EXTREMITIES:  No clubbing, cyanosis, or edema  SKIN: No rashes or lesions

## 2022-07-07 NOTE — PROGRESS NOTE ADULT - SUBJECTIVE AND OBJECTIVE BOX
Patient is a 76y old  Male who presents with a chief complaint of Sepsis secondary to UTI (05 Jul 2022 17:08)      INTERVAL HPI/OVERNIGHT EVENTS: seen and examined. Reports feeling better, but has concern regarding SOB on exertion   NO other complains     MEDICATIONS  (STANDING):  ALBUTerol    0.083% 2.5 milliGRAM(s) Nebulizer every 6 hours  ALBUTerol    90 MICROgram(s) HFA Inhaler 1 Puff(s) Inhalation every 4 hours  aMIOdarone    Tablet 200 milliGRAM(s) Oral daily  apixaban 5 milliGRAM(s) Oral two times a day  aspirin enteric coated 81 milliGRAM(s) Oral daily  atorvastatin 20 milliGRAM(s) Oral at bedtime  budesonide  80 MICROgram(s)/formoterol 4.5 MICROgram(s) Inhaler 2 Puff(s) Inhalation two times a day  cefTRIAXone   IVPB 1000 milliGRAM(s) IV Intermittent every 24 hours  dextrose 5%. 1000 milliLiter(s) (50 mL/Hr) IV Continuous <Continuous>  dextrose 5%. 1000 milliLiter(s) (100 mL/Hr) IV Continuous <Continuous>  dextrose 50% Injectable 25 Gram(s) IV Push once  dextrose 50% Injectable 12.5 Gram(s) IV Push once  dextrose 50% Injectable 25 Gram(s) IV Push once  finasteride 5 milliGRAM(s) Oral daily  fluticasone propionate 50 MICROgram(s)/spray Nasal Spray 1 Spray(s) Both Nostrils two times a day  glucagon  Injectable 1 milliGRAM(s) IntraMuscular once  insulin glargine Injectable (LANTUS) 15 Unit(s) SubCutaneous at bedtime  insulin lispro (ADMELOG) corrective regimen sliding scale   SubCutaneous three times a day before meals  insulin lispro Injectable (ADMELOG) 5 Unit(s) SubCutaneous three times a day before meals  metoprolol succinate  milliGRAM(s) Oral daily  pantoprazole    Tablet 40 milliGRAM(s) Oral before breakfast  polyethylene glycol 3350 17 Gram(s) Oral daily  senna 2 Tablet(s) Oral at bedtime  sodium chloride 0.9%. 1000 milliLiter(s) (75 mL/Hr) IV Continuous <Continuous>  tiotropium 18 MICROgram(s) Capsule 1 Capsule(s) Inhalation daily    MEDICATIONS  (PRN):  acetaminophen     Tablet .. 650 milliGRAM(s) Oral every 6 hours PRN Temp greater or equal to 38C (100.4F), Mild Pain (1 - 3)  ALPRAZolam 0.25 milliGRAM(s) Oral three times a day PRN Anxiety  aluminum hydroxide/magnesium hydroxide/simethicone Suspension 30 milliLiter(s) Oral every 4 hours PRN Dyspepsia  dextrose Oral Gel 15 Gram(s) Oral once PRN Blood Glucose LESS THAN 70 milliGRAM(s)/deciliter  melatonin 3 milliGRAM(s) Oral at bedtime PRN Insomnia  ondansetron Injectable 4 milliGRAM(s) IV Push every 8 hours PRN Nausea and/or Vomiting      Allergies    Bactrim (Rash)  Ceftin (Hives)  penicillin (Anaphylaxis)  sulfa drugs (Unknown)    Intolerances        REVIEW OF SYSTEMS:  CONSTITUTIONAL: No fever, weight loss, or fatigue  RESPIRATORY: No cough, wheezing, chills or hemoptysis; No shortness of breath  CARDIOVASCULAR: No chest pain, palpitations, dizziness, or leg swelling  GASTROINTESTINAL: No abdominal or epigastric pain. No nausea, vomiting, or hematemesis; No diarrhea or constipation. No melena or hematochezia.  NEUROLOGICAL: No headaches, memory loss, loss of strength, numbness, or tremors  MUSCULOSKELETAL: No joint pain or swelling; No muscle, back, or extremity pain      Vital Signs Last 24 Hrs  T(C): 36.8 (06 Jul 2022 10:55), Max: 37.7 (05 Jul 2022 14:30)  T(F): 98.2 (06 Jul 2022 10:55), Max: 99.9 (05 Jul 2022 14:30)  HR: 86 (06 Jul 2022 10:55) (75 - 86)  BP: 117/72 (06 Jul 2022 10:55) (112/70 - 150/105)  BP(mean): --  RR: 18 (06 Jul 2022 10:55) (18 - 18)  SpO2: 93% (06 Jul 2022 10:55) (91% - 94%)    PHYSICAL EXAM:  GENERAL: NAD, obese sitting on the side of the bed and eating   HEAD:  Atraumatic, Normocephalic  EYES: EOMI, PERRLA, conjunctiva and sclera clear  NECK: Supple, No JVD, Normal thyroid  NERVOUS SYSTEM:  Alert & Oriented X3, No gross focal deficits  CHEST/LUNG: Clear to percussion bilaterally; No rales, rhonchi, wheezing, or rubs  HEART: Regular rate and rhythm; No murmurs, rubs, or gallops  ABDOMEN: obese abdomen, Soft, Nontender, Nondistended; Bowel sounds present  EXTREMITIES:  No clubbing, cyanosis, or edema  SKIN: No rashes or lesions    LABS:                        13.2   10.45 )-----------( 206      ( 06 Jul 2022 05:46 )             40.5     07-06    139  |  100  |  21.9<H>  ----------------------------<  233<H>  3.4<L>   |  29.0  |  0.72    Ca    8.3<L>      06 Jul 2022 05:46  Mg     2.3     07-06    TPro  6.5<L>  /  Alb  3.1<L>  /  TBili  0.4  /  DBili  x   /  AST  13  /  ALT  24  /  AlkPhos  213<H>  07-05        CAPILLARY BLOOD GLUCOSE      POCT Blood Glucose.: 217 mg/dL (06 Jul 2022 08:26)  POCT Blood Glucose.: 272 mg/dL (05 Jul 2022 22:40)  POCT Blood Glucose.: 282 mg/dL (05 Jul 2022 16:27)      RADIOLOGY & ADDITIONAL TESTS:    Imaging Personally Reviewed:  [x ] YES  [ ] NO    Consultant(s) Notes Reviewed:  [x ] YES  [ ] NO    Care Discussed with Consultants/Other Providers [ ] YES  [ ] NO    Plan of Care discussed with Housestaff [x ]YES [ ] NO
Patient is a 76y old  Male who presents with a chief complaint of Sepsis secondary to UTI (05 Jul 2022 17:08)      INTERVAL HPI/OVERNIGHT EVENTS: seen and examined. Reports feeling better, no new complains.   BG not controlled. Pt non compliant with food.       MEDICATIONS  (STANDING):  ALBUTerol    0.083% 2.5 milliGRAM(s) Nebulizer every 6 hours  ALBUTerol    90 MICROgram(s) HFA Inhaler 1 Puff(s) Inhalation every 4 hours  aMIOdarone    Tablet 200 milliGRAM(s) Oral daily  apixaban 5 milliGRAM(s) Oral two times a day  aspirin enteric coated 81 milliGRAM(s) Oral daily  atorvastatin 20 milliGRAM(s) Oral at bedtime  budesonide  80 MICROgram(s)/formoterol 4.5 MICROgram(s) Inhaler 2 Puff(s) Inhalation two times a day  cefTRIAXone   IVPB 1000 milliGRAM(s) IV Intermittent every 24 hours  dextrose 5%. 1000 milliLiter(s) (50 mL/Hr) IV Continuous <Continuous>  dextrose 5%. 1000 milliLiter(s) (100 mL/Hr) IV Continuous <Continuous>  dextrose 50% Injectable 25 Gram(s) IV Push once  dextrose 50% Injectable 12.5 Gram(s) IV Push once  dextrose 50% Injectable 25 Gram(s) IV Push once  finasteride 5 milliGRAM(s) Oral daily  fluticasone propionate 50 MICROgram(s)/spray Nasal Spray 1 Spray(s) Both Nostrils two times a day  glucagon  Injectable 1 milliGRAM(s) IntraMuscular once  insulin glargine Injectable (LANTUS) 15 Unit(s) SubCutaneous at bedtime  insulin lispro (ADMELOG) corrective regimen sliding scale   SubCutaneous three times a day before meals  insulin lispro Injectable (ADMELOG) 5 Unit(s) SubCutaneous three times a day before meals  metoprolol succinate  milliGRAM(s) Oral daily  pantoprazole    Tablet 40 milliGRAM(s) Oral before breakfast  polyethylene glycol 3350 17 Gram(s) Oral daily  senna 2 Tablet(s) Oral at bedtime  sodium chloride 0.9%. 1000 milliLiter(s) (75 mL/Hr) IV Continuous <Continuous>  tiotropium 18 MICROgram(s) Capsule 1 Capsule(s) Inhalation daily    MEDICATIONS  (PRN):  acetaminophen     Tablet .. 650 milliGRAM(s) Oral every 6 hours PRN Temp greater or equal to 38C (100.4F), Mild Pain (1 - 3)  ALPRAZolam 0.25 milliGRAM(s) Oral three times a day PRN Anxiety  aluminum hydroxide/magnesium hydroxide/simethicone Suspension 30 milliLiter(s) Oral every 4 hours PRN Dyspepsia  dextrose Oral Gel 15 Gram(s) Oral once PRN Blood Glucose LESS THAN 70 milliGRAM(s)/deciliter  melatonin 3 milliGRAM(s) Oral at bedtime PRN Insomnia  ondansetron Injectable 4 milliGRAM(s) IV Push every 8 hours PRN Nausea and/or Vomiting      Allergies    Bactrim (Rash)  Ceftin (Hives)  penicillin (Anaphylaxis)  sulfa drugs (Unknown)    Intolerances        REVIEW OF SYSTEMS:  CONSTITUTIONAL: No fever, weight loss, or fatigue  RESPIRATORY: No cough, wheezing, chills or hemoptysis; No shortness of breath  CARDIOVASCULAR: No chest pain, palpitations, dizziness, or leg swelling  GASTROINTESTINAL: No abdominal or epigastric pain. No nausea, vomiting, or hematemesis; No diarrhea or constipation. No melena or hematochezia.  NEUROLOGICAL: No headaches, memory loss, loss of strength, numbness, or tremors  MUSCULOSKELETAL: No joint pain or swelling; No muscle, back, or extremity pain      Vital Signs Last 24 Hrs  T(C): 36.8 (06 Jul 2022 10:55), Max: 37.7 (05 Jul 2022 14:30)  T(F): 98.2 (06 Jul 2022 10:55), Max: 99.9 (05 Jul 2022 14:30)  HR: 86 (06 Jul 2022 10:55) (75 - 86)  BP: 117/72 (06 Jul 2022 10:55) (112/70 - 150/105)  BP(mean): --  RR: 18 (06 Jul 2022 10:55) (18 - 18)  SpO2: 93% (06 Jul 2022 10:55) (91% - 94%)    PHYSICAL EXAM:  GENERAL: NAD, obese sitting on the side of the bed and eating   HEAD:  Atraumatic, Normocephalic  EYES: EOMI, PERRLA, conjunctiva and sclera clear  NECK: Supple, No JVD, Normal thyroid  NERVOUS SYSTEM:  Alert & Oriented X3, No gross focal deficits  CHEST/LUNG: Clear to percussion bilaterally; No rales, rhonchi, wheezing, or rubs  HEART: Regular rate and rhythm; No murmurs, rubs, or gallops  ABDOMEN: obese abdomen, Soft, Nontender, Nondistended; Bowel sounds present  EXTREMITIES:  No clubbing, cyanosis, or edema  SKIN: No rashes or lesions    LABS:                        13.2   10.45 )-----------( 206      ( 06 Jul 2022 05:46 )             40.5     07-06    139  |  100  |  21.9<H>  ----------------------------<  233<H>  3.4<L>   |  29.0  |  0.72    Ca    8.3<L>      06 Jul 2022 05:46  Mg     2.3     07-06    TPro  6.5<L>  /  Alb  3.1<L>  /  TBili  0.4  /  DBili  x   /  AST  13  /  ALT  24  /  AlkPhos  213<H>  07-05        CAPILLARY BLOOD GLUCOSE      POCT Blood Glucose.: 217 mg/dL (06 Jul 2022 08:26)  POCT Blood Glucose.: 272 mg/dL (05 Jul 2022 22:40)  POCT Blood Glucose.: 282 mg/dL (05 Jul 2022 16:27)      RADIOLOGY & ADDITIONAL TESTS:    Imaging Personally Reviewed:  [x ] YES  [ ] NO    Consultant(s) Notes Reviewed:  [x ] YES  [ ] NO    Care Discussed with Consultants/Other Providers [ ] YES  [ ] NO    Plan of Care discussed with Housestaff [x ]YES [ ] NO
Patient is a 76y old  Male who presents with a chief complaint of Sepsis secondary to UTI (2022 10:01)      INTERVAL HPI/OVERNIGHT EVENTS: pt seen and examined in ED. c/o fatigue, subjective fever at home and lower abdominal pain.   Reports feeling slight better since admission   BG not controlled     MEDICATIONS  (STANDING):  ALBUTerol    0.083% 2.5 milliGRAM(s) Nebulizer every 6 hours  ALBUTerol    90 MICROgram(s) HFA Inhaler 1 Puff(s) Inhalation every 4 hours  aMIOdarone    Tablet 200 milliGRAM(s) Oral daily  apixaban 5 milliGRAM(s) Oral two times a day  aspirin enteric coated 81 milliGRAM(s) Oral daily  atorvastatin 20 milliGRAM(s) Oral at bedtime  budesonide  80 MICROgram(s)/formoterol 4.5 MICROgram(s) Inhaler 2 Puff(s) Inhalation two times a day  cefTRIAXone   IVPB 1000 milliGRAM(s) IV Intermittent every 24 hours  dextrose 5%. 1000 milliLiter(s) (50 mL/Hr) IV Continuous <Continuous>  dextrose 5%. 1000 milliLiter(s) (100 mL/Hr) IV Continuous <Continuous>  dextrose 50% Injectable 25 Gram(s) IV Push once  dextrose 50% Injectable 12.5 Gram(s) IV Push once  dextrose 50% Injectable 25 Gram(s) IV Push once  finasteride 5 milliGRAM(s) Oral daily  fluticasone propionate 50 MICROgram(s)/spray Nasal Spray 1 Spray(s) Both Nostrils two times a day  glucagon  Injectable 1 milliGRAM(s) IntraMuscular once  insulin lispro (ADMELOG) corrective regimen sliding scale   SubCutaneous three times a day before meals  insulin lispro (ADMELOG) corrective regimen sliding scale   SubCutaneous at bedtime  metoprolol succinate  milliGRAM(s) Oral daily  pantoprazole    Tablet 40 milliGRAM(s) Oral before breakfast  polyethylene glycol 3350 17 Gram(s) Oral daily  senna 2 Tablet(s) Oral at bedtime  sodium chloride 0.9%. 1000 milliLiter(s) (75 mL/Hr) IV Continuous <Continuous>  tiotropium 18 MICROgram(s) Capsule 1 Capsule(s) Inhalation daily    MEDICATIONS  (PRN):  acetaminophen     Tablet .. 650 milliGRAM(s) Oral every 6 hours PRN Temp greater or equal to 38C (100.4F), Mild Pain (1 - 3)  ALPRAZolam 0.25 milliGRAM(s) Oral three times a day PRN Anxiety  aluminum hydroxide/magnesium hydroxide/simethicone Suspension 30 milliLiter(s) Oral every 4 hours PRN Dyspepsia  dextrose Oral Gel 15 Gram(s) Oral once PRN Blood Glucose LESS THAN 70 milliGRAM(s)/deciliter  melatonin 3 milliGRAM(s) Oral at bedtime PRN Insomnia  ondansetron Injectable 4 milliGRAM(s) IV Push every 8 hours PRN Nausea and/or Vomiting      Allergies    Bactrim (Rash)  Ceftin (Hives)  penicillin (Anaphylaxis)  sulfa drugs (Unknown)    Intolerances        REVIEW OF SYSTEMS:  CONSTITUTIONAL: No fever, weight loss, or fatigue  RESPIRATORY: No cough, wheezing, chills or hemoptysis; No shortness of breath  CARDIOVASCULAR: No chest pain, palpitations, dizziness, or leg swelling  GASTROINTESTINAL: No abdominal or epigastric pain. No nausea, vomiting, or hematemesis; No diarrhea or constipation. No melena or hematochezia.  NEUROLOGICAL: No headaches, memory loss, loss of strength, numbness, or tremors  MUSCULOSKELETAL: No joint pain or swelling; No muscle, back, or extremity pain      Vital Signs Last 24 Hrs  T(C): 37.7 (2022 14:30), Max: 37.7 (2022 14:30)  T(F): 99.9 (2022 14:30), Max: 99.9 (2022 14:30)  HR: 76 (2022 14:30) (76 - 97)  BP: 116/61 (2022 14:30) (98/63 - 134/77)  BP(mean): --  RR: 18 (2022 14:30) (16 - 20)  SpO2: 91% (2022 14:30) (90% - 97%)    PHYSICAL EXAM:  GENERAL: NAD, obese, [place]   HEAD:  Atraumatic, Normocephalic  EYES: EOMI, PERRLA, conjunctiva and sclera clear  NECK: Supple, No JVD, Normal thyroid  NERVOUS SYSTEM:  Alert & Oriented X3, No gross focal deficits  CHEST/LUNG: Clear to percussion bilaterally; No rales, rhonchi, wheezing, or rubs  HEART: Regular rate and rhythm; No murmurs, rubs, or gallops  ABDOMEN: obese abdomen, Soft, Nontender, Nondistended; Bowel sounds present  EXTREMITIES:  No clubbing, cyanosis, or edema  SKIN: No rashes or lesions    LABS:                        13.4   10.67 )-----------( 204      ( 2022 04:23 )             41.9     07-05    136  |  96<L>  |  21.0<H>  ----------------------------<  237<H>  3.5   |  31.0<H>  |  0.96    Ca    8.4<L>      2022 04:23    TPro  6.5<L>  /  Alb  3.1<L>  /  TBili  0.4  /  DBili  x   /  AST  13  /  ALT  24  /  AlkPhos  213<H>  07-05    PT/INR - ( 2022 07:49 )   PT: 18.4 sec;   INR: 1.58 ratio         PTT - ( 2022 07:49 )  PTT:30.5 sec  Urinalysis Basic - ( 2022 02:15 )    Color: Yellow / Appearance: very cloudy / S.015 / pH: x  Gluc: x / Ketone: Trace  / Bili: Negative / Urobili: Negative mg/dL   Blood: x / Protein: 30 mg/dL / Nitrite: Negative   Leuk Esterase: Moderate / RBC: 25-50 /HPF / WBC >50 /HPF   Sq Epi: x / Non Sq Epi: Occasional / Bacteria: Many      CAPILLARY BLOOD GLUCOSE      POCT Blood Glucose.: 282 mg/dL (2022 16:27)  POCT Blood Glucose.: 325 mg/dL (2022 11:40)  POCT Blood Glucose.: 197 mg/dL (2022 07:44)  POCT Blood Glucose.: 253 mg/dL (2022 21:53)      RADIOLOGY & ADDITIONAL TESTS:    Imaging Personally Reviewed:  [ ] YES  [ ] NO    Consultant(s) Notes Reviewed:  [x ] YES  [ ] NO    Care Discussed with Consultants/Other Providers [ x] YES  [ ] NO    Plan of Care discussed with Housestaff [ x]YES [ ] NO

## 2022-07-07 NOTE — DISCHARGE NOTE PROVIDER - PROVIDER TOKENS
FREE:[LAST:[PMD],PHONE:[(   )    -],FAX:[(   )    -]],PROVIDER:[TOKEN:[[id number]],FOLLOWUP:[2 weeks]]

## 2022-07-07 NOTE — DISCHARGE NOTE PROVIDER - CARE PROVIDER_API CALL
PMD,   Phone: (   )    -  Fax: (   )    -  Follow Up Time:     Rusty Mandujano)  Urology  [address], [place] [address]  Phone: [phone]  Fax: [phone]  Follow Up Time: 2 weeks

## 2022-07-08 LAB
ANION GAP SERPL CALC-SCNC: 13 MMOL/L — SIGNIFICANT CHANGE UP (ref 5–17)
BUN SERPL-MCNC: 20.4 MG/DL — HIGH (ref 8–20)
CALCIUM SERPL-MCNC: 8.6 MG/DL — SIGNIFICANT CHANGE UP (ref 8.6–10.2)
CHLORIDE SERPL-SCNC: 95 MMOL/L — LOW (ref 98–107)
CO2 SERPL-SCNC: 29 MMOL/L — SIGNIFICANT CHANGE UP (ref 22–29)
CREAT SERPL-MCNC: 0.76 MG/DL — SIGNIFICANT CHANGE UP (ref 0.5–1.3)
EGFR: 93 ML/MIN/1.73M2 — SIGNIFICANT CHANGE UP
GLUCOSE BLDC GLUCOMTR-MCNC: 255 MG/DL — HIGH (ref 70–99)
GLUCOSE BLDC GLUCOMTR-MCNC: 276 MG/DL — HIGH (ref 70–99)
GLUCOSE BLDC GLUCOMTR-MCNC: 278 MG/DL — HIGH (ref 70–99)
GLUCOSE BLDC GLUCOMTR-MCNC: 439 MG/DL — HIGH (ref 70–99)
GLUCOSE SERPL-MCNC: 269 MG/DL — HIGH (ref 70–99)
HCT VFR BLD CALC: 47 % — SIGNIFICANT CHANGE UP (ref 39–50)
HGB BLD-MCNC: 14.9 G/DL — SIGNIFICANT CHANGE UP (ref 13–17)
MCHC RBC-ENTMCNC: 30.3 PG — SIGNIFICANT CHANGE UP (ref 27–34)
MCHC RBC-ENTMCNC: 31.7 GM/DL — LOW (ref 32–36)
MCV RBC AUTO: 95.5 FL — SIGNIFICANT CHANGE UP (ref 80–100)
PLATELET # BLD AUTO: 267 K/UL — SIGNIFICANT CHANGE UP (ref 150–400)
POTASSIUM SERPL-MCNC: 3.5 MMOL/L — SIGNIFICANT CHANGE UP (ref 3.5–5.3)
POTASSIUM SERPL-SCNC: 3.5 MMOL/L — SIGNIFICANT CHANGE UP (ref 3.5–5.3)
RBC # BLD: 4.92 M/UL — SIGNIFICANT CHANGE UP (ref 4.2–5.8)
RBC # FLD: 13.9 % — SIGNIFICANT CHANGE UP (ref 10.3–14.5)
SODIUM SERPL-SCNC: 137 MMOL/L — SIGNIFICANT CHANGE UP (ref 135–145)
WBC # BLD: 8.83 K/UL — SIGNIFICANT CHANGE UP (ref 3.8–10.5)
WBC # FLD AUTO: 8.83 K/UL — SIGNIFICANT CHANGE UP (ref 3.8–10.5)

## 2022-07-08 PROCEDURE — 99232 SBSQ HOSP IP/OBS MODERATE 35: CPT

## 2022-07-08 RX ADMIN — Medication 650 MILLIGRAM(S): at 21:17

## 2022-07-08 RX ADMIN — TIOTROPIUM BROMIDE 1 CAPSULE(S): 18 CAPSULE ORAL; RESPIRATORY (INHALATION) at 09:10

## 2022-07-08 RX ADMIN — PANTOPRAZOLE SODIUM 40 MILLIGRAM(S): 20 TABLET, DELAYED RELEASE ORAL at 06:00

## 2022-07-08 RX ADMIN — CEFTRIAXONE 100 MILLIGRAM(S): 500 INJECTION, POWDER, FOR SOLUTION INTRAMUSCULAR; INTRAVENOUS at 22:21

## 2022-07-08 RX ADMIN — AMIODARONE HYDROCHLORIDE 200 MILLIGRAM(S): 400 TABLET ORAL at 05:10

## 2022-07-08 RX ADMIN — Medication 10 UNIT(S): at 12:08

## 2022-07-08 RX ADMIN — BUDESONIDE AND FORMOTEROL FUMARATE DIHYDRATE 2 PUFF(S): 160; 4.5 AEROSOL RESPIRATORY (INHALATION) at 09:10

## 2022-07-08 RX ADMIN — Medication 3: at 12:09

## 2022-07-08 RX ADMIN — Medication 3: at 16:33

## 2022-07-08 RX ADMIN — Medication 10 UNIT(S): at 08:00

## 2022-07-08 RX ADMIN — Medication 3: at 08:01

## 2022-07-08 RX ADMIN — Medication 0.25 MILLIGRAM(S): at 15:37

## 2022-07-08 RX ADMIN — Medication 1 SPRAY(S): at 17:48

## 2022-07-08 RX ADMIN — ALBUTEROL 2.5 MILLIGRAM(S): 90 AEROSOL, METERED ORAL at 20:43

## 2022-07-08 RX ADMIN — Medication 1 SPRAY(S): at 05:11

## 2022-07-08 RX ADMIN — Medication 6: at 22:20

## 2022-07-08 RX ADMIN — ALBUTEROL 2.5 MILLIGRAM(S): 90 AEROSOL, METERED ORAL at 09:09

## 2022-07-08 RX ADMIN — FINASTERIDE 5 MILLIGRAM(S): 5 TABLET, FILM COATED ORAL at 12:13

## 2022-07-08 RX ADMIN — Medication 10 UNIT(S): at 16:32

## 2022-07-08 RX ADMIN — BUDESONIDE AND FORMOTEROL FUMARATE DIHYDRATE 2 PUFF(S): 160; 4.5 AEROSOL RESPIRATORY (INHALATION) at 20:43

## 2022-07-08 RX ADMIN — Medication 40 MILLIGRAM(S): at 05:11

## 2022-07-08 RX ADMIN — Medication 81 MILLIGRAM(S): at 12:13

## 2022-07-08 RX ADMIN — Medication 650 MILLIGRAM(S): at 20:27

## 2022-07-08 RX ADMIN — Medication 0.25 MILLIGRAM(S): at 22:40

## 2022-07-08 RX ADMIN — ATORVASTATIN CALCIUM 20 MILLIGRAM(S): 80 TABLET, FILM COATED ORAL at 22:19

## 2022-07-08 RX ADMIN — APIXABAN 5 MILLIGRAM(S): 2.5 TABLET, FILM COATED ORAL at 05:10

## 2022-07-08 RX ADMIN — APIXABAN 5 MILLIGRAM(S): 2.5 TABLET, FILM COATED ORAL at 17:49

## 2022-07-08 RX ADMIN — ALBUTEROL 2.5 MILLIGRAM(S): 90 AEROSOL, METERED ORAL at 15:30

## 2022-07-08 RX ADMIN — Medication 40 MILLIGRAM(S): at 14:09

## 2022-07-08 RX ADMIN — INSULIN GLARGINE 30 UNIT(S): 100 INJECTION, SOLUTION SUBCUTANEOUS at 22:20

## 2022-07-09 VITALS
TEMPERATURE: 99 F | HEART RATE: 87 BPM | DIASTOLIC BLOOD PRESSURE: 66 MMHG | OXYGEN SATURATION: 98 % | RESPIRATION RATE: 18 BRPM | SYSTOLIC BLOOD PRESSURE: 102 MMHG

## 2022-07-09 LAB
CULTURE RESULTS: SIGNIFICANT CHANGE UP
CULTURE RESULTS: SIGNIFICANT CHANGE UP
GLUCOSE BLDC GLUCOMTR-MCNC: 226 MG/DL — HIGH (ref 70–99)
GLUCOSE BLDC GLUCOMTR-MCNC: 257 MG/DL — HIGH (ref 70–99)
SPECIMEN SOURCE: SIGNIFICANT CHANGE UP
SPECIMEN SOURCE: SIGNIFICANT CHANGE UP

## 2022-07-09 PROCEDURE — 85025 COMPLETE CBC W/AUTO DIFF WBC: CPT

## 2022-07-09 PROCEDURE — 94760 N-INVAS EAR/PLS OXIMETRY 1: CPT

## 2022-07-09 PROCEDURE — 74176 CT ABD & PELVIS W/O CONTRAST: CPT

## 2022-07-09 PROCEDURE — 83735 ASSAY OF MAGNESIUM: CPT

## 2022-07-09 PROCEDURE — 82962 GLUCOSE BLOOD TEST: CPT

## 2022-07-09 PROCEDURE — 83605 ASSAY OF LACTIC ACID: CPT

## 2022-07-09 PROCEDURE — 87086 URINE CULTURE/COLONY COUNT: CPT

## 2022-07-09 PROCEDURE — 94640 AIRWAY INHALATION TREATMENT: CPT

## 2022-07-09 PROCEDURE — 71045 X-RAY EXAM CHEST 1 VIEW: CPT

## 2022-07-09 PROCEDURE — 80048 BASIC METABOLIC PNL TOTAL CA: CPT

## 2022-07-09 PROCEDURE — 87040 BLOOD CULTURE FOR BACTERIA: CPT

## 2022-07-09 PROCEDURE — 96374 THER/PROPH/DIAG INJ IV PUSH: CPT

## 2022-07-09 PROCEDURE — 81001 URINALYSIS AUTO W/SCOPE: CPT

## 2022-07-09 PROCEDURE — 93005 ELECTROCARDIOGRAM TRACING: CPT

## 2022-07-09 PROCEDURE — 80053 COMPREHEN METABOLIC PANEL: CPT

## 2022-07-09 PROCEDURE — 36415 COLL VENOUS BLD VENIPUNCTURE: CPT

## 2022-07-09 PROCEDURE — 99239 HOSP IP/OBS DSCHRG MGMT >30: CPT

## 2022-07-09 PROCEDURE — 86703 HIV-1/HIV-2 1 RESULT ANTBDY: CPT

## 2022-07-09 PROCEDURE — 87637 SARSCOV2&INF A&B&RSV AMP PRB: CPT

## 2022-07-09 PROCEDURE — 85610 PROTHROMBIN TIME: CPT

## 2022-07-09 PROCEDURE — 99285 EMERGENCY DEPT VISIT HI MDM: CPT | Mod: 25

## 2022-07-09 PROCEDURE — 93306 TTE W/DOPPLER COMPLETE: CPT

## 2022-07-09 PROCEDURE — 83036 HEMOGLOBIN GLYCOSYLATED A1C: CPT

## 2022-07-09 PROCEDURE — 82550 ASSAY OF CK (CPK): CPT

## 2022-07-09 PROCEDURE — 85730 THROMBOPLASTIN TIME PARTIAL: CPT

## 2022-07-09 PROCEDURE — 85027 COMPLETE CBC AUTOMATED: CPT

## 2022-07-09 PROCEDURE — 80061 LIPID PANEL: CPT

## 2022-07-09 RX ADMIN — Medication 3: at 07:49

## 2022-07-09 RX ADMIN — Medication 10 UNIT(S): at 07:49

## 2022-07-09 RX ADMIN — Medication 2: at 12:12

## 2022-07-09 RX ADMIN — ALBUTEROL 2.5 MILLIGRAM(S): 90 AEROSOL, METERED ORAL at 09:22

## 2022-07-09 RX ADMIN — Medication 10 UNIT(S): at 12:12

## 2022-07-09 RX ADMIN — Medication 40 MILLIGRAM(S): at 05:37

## 2022-07-09 RX ADMIN — APIXABAN 5 MILLIGRAM(S): 2.5 TABLET, FILM COATED ORAL at 05:36

## 2022-07-09 RX ADMIN — Medication 81 MILLIGRAM(S): at 13:02

## 2022-07-09 RX ADMIN — Medication 40 MILLIGRAM(S): at 13:02

## 2022-07-09 RX ADMIN — PANTOPRAZOLE SODIUM 40 MILLIGRAM(S): 20 TABLET, DELAYED RELEASE ORAL at 05:37

## 2022-07-09 RX ADMIN — CEFTRIAXONE 100 MILLIGRAM(S): 500 INJECTION, POWDER, FOR SOLUTION INTRAMUSCULAR; INTRAVENOUS at 13:07

## 2022-07-09 RX ADMIN — AMIODARONE HYDROCHLORIDE 200 MILLIGRAM(S): 400 TABLET ORAL at 05:36

## 2022-07-09 RX ADMIN — TIOTROPIUM BROMIDE 1 CAPSULE(S): 18 CAPSULE ORAL; RESPIRATORY (INHALATION) at 09:22

## 2022-07-09 RX ADMIN — Medication 1 SPRAY(S): at 05:38

## 2022-07-09 RX ADMIN — FINASTERIDE 5 MILLIGRAM(S): 5 TABLET, FILM COATED ORAL at 13:02

## 2022-07-09 RX ADMIN — Medication 200 MILLIGRAM(S): at 05:36

## 2022-07-09 RX ADMIN — BUDESONIDE AND FORMOTEROL FUMARATE DIHYDRATE 2 PUFF(S): 160; 4.5 AEROSOL RESPIRATORY (INHALATION) at 09:23

## 2022-07-25 NOTE — ED ADULT NURSE NOTE - CHPI ED NUR SYMPTOMS NEG
[de-identified] :   Patient has good range of motion left elbow.  It she has no instability.  Id there is still some staining of the skin medially.  Id there is normal sensation normal capillary refill. no fever

## 2022-09-08 NOTE — ED PROVIDER NOTE - PATIENT PORTAL LINK FT
You can access the FollowMyHealth Patient Portal offered by [facility] by registering at the following website: [email or web address]. By joining [facility]’s FollowMyHealth portal, you will also be able to view your health information using other applications (apps) compatible with our system. Niacinamide Pregnancy And Lactation Text: These medications are considered safe during pregnancy.

## 2022-10-13 ENCOUNTER — NON-APPOINTMENT (OUTPATIENT)
Age: 77
End: 2022-10-13

## 2022-11-12 ENCOUNTER — NON-APPOINTMENT (OUTPATIENT)
Age: 77
End: 2022-11-12

## 2022-11-13 ENCOUNTER — TRANSCRIPTION ENCOUNTER (OUTPATIENT)
Age: 77
End: 2022-11-13

## 2022-11-14 ENCOUNTER — OUTPATIENT (OUTPATIENT)
Dept: OUTPATIENT SERVICES | Facility: HOSPITAL | Age: 77
LOS: 1 days | End: 2022-11-14

## 2022-11-14 ENCOUNTER — APPOINTMENT (OUTPATIENT)
Dept: DISASTER EMERGENCY | Facility: HOSPITAL | Age: 77
End: 2022-11-14

## 2022-11-14 DIAGNOSIS — U07.1 COVID-19: ICD-10-CM

## 2022-11-14 DIAGNOSIS — Z98.89 OTHER SPECIFIED POSTPROCEDURAL STATES: Chronic | ICD-10-CM

## 2022-11-14 DIAGNOSIS — C49.9 MALIGNANT NEOPLASM OF CONNECTIVE AND SOFT TISSUE, UNSPECIFIED: Chronic | ICD-10-CM

## 2022-11-14 DIAGNOSIS — Z95.5 PRESENCE OF CORONARY ANGIOPLASTY IMPLANT AND GRAFT: Chronic | ICD-10-CM

## 2022-11-24 ENCOUNTER — NON-APPOINTMENT (OUTPATIENT)
Age: 77
End: 2022-11-24

## 2022-11-27 ENCOUNTER — TRANSCRIPTION ENCOUNTER (OUTPATIENT)
Age: 77
End: 2022-11-27

## 2023-01-26 ENCOUNTER — INPATIENT (INPATIENT)
Facility: HOSPITAL | Age: 78
LOS: 6 days | Discharge: HOME HEALTH | DRG: 286 | End: 2023-02-02
Attending: INTERNAL MEDICINE | Admitting: INTERNAL MEDICINE
Payer: MEDICARE

## 2023-01-26 VITALS
OXYGEN SATURATION: 91 % | HEART RATE: 136 BPM | DIASTOLIC BLOOD PRESSURE: 34 MMHG | RESPIRATION RATE: 24 BRPM | WEIGHT: 297.62 LBS | SYSTOLIC BLOOD PRESSURE: 70 MMHG | TEMPERATURE: 97 F

## 2023-01-26 DIAGNOSIS — U07.1 COVID-19: ICD-10-CM

## 2023-01-26 DIAGNOSIS — Z98.89 OTHER SPECIFIED POSTPROCEDURAL STATES: Chronic | ICD-10-CM

## 2023-01-26 DIAGNOSIS — Z95.5 PRESENCE OF CORONARY ANGIOPLASTY IMPLANT AND GRAFT: Chronic | ICD-10-CM

## 2023-01-26 DIAGNOSIS — C49.9 MALIGNANT NEOPLASM OF CONNECTIVE AND SOFT TISSUE, UNSPECIFIED: Chronic | ICD-10-CM

## 2023-01-26 LAB
ALBUMIN SERPL ELPH-MCNC: 3.8 G/DL — SIGNIFICANT CHANGE UP (ref 3.3–5.2)
ALP SERPL-CCNC: 121 U/L — HIGH (ref 40–120)
ALT FLD-CCNC: 10 U/L — SIGNIFICANT CHANGE UP
ANION GAP SERPL CALC-SCNC: 15 MMOL/L — SIGNIFICANT CHANGE UP (ref 5–17)
APPEARANCE UR: CLEAR — SIGNIFICANT CHANGE UP
APTT BLD: 32.4 SEC — SIGNIFICANT CHANGE UP (ref 27.5–35.5)
AST SERPL-CCNC: 19 U/L — SIGNIFICANT CHANGE UP
BACTERIA # UR AUTO: NEGATIVE — SIGNIFICANT CHANGE UP
BASOPHILS # BLD AUTO: 0.07 K/UL — SIGNIFICANT CHANGE UP (ref 0–0.2)
BASOPHILS NFR BLD AUTO: 0.9 % — SIGNIFICANT CHANGE UP (ref 0–2)
BILIRUB SERPL-MCNC: 0.6 MG/DL — SIGNIFICANT CHANGE UP (ref 0.4–2)
BILIRUB UR-MCNC: NEGATIVE — SIGNIFICANT CHANGE UP
BUN SERPL-MCNC: 19.4 MG/DL — SIGNIFICANT CHANGE UP (ref 8–20)
CALCIUM SERPL-MCNC: 8.5 MG/DL — SIGNIFICANT CHANGE UP (ref 8.4–10.5)
CHLORIDE SERPL-SCNC: 99 MMOL/L — SIGNIFICANT CHANGE UP (ref 96–108)
CO2 SERPL-SCNC: 23 MMOL/L — SIGNIFICANT CHANGE UP (ref 22–29)
COLOR SPEC: YELLOW — SIGNIFICANT CHANGE UP
CREAT SERPL-MCNC: 0.8 MG/DL — SIGNIFICANT CHANGE UP (ref 0.5–1.3)
DIFF PNL FLD: ABNORMAL
EGFR: 91 ML/MIN/1.73M2 — SIGNIFICANT CHANGE UP
EOSINOPHIL # BLD AUTO: 0.26 K/UL — SIGNIFICANT CHANGE UP (ref 0–0.5)
EOSINOPHIL NFR BLD AUTO: 3.4 % — SIGNIFICANT CHANGE UP (ref 0–6)
EPI CELLS # UR: SIGNIFICANT CHANGE UP
FLUAV AG NPH QL: SIGNIFICANT CHANGE UP
FLUBV AG NPH QL: SIGNIFICANT CHANGE UP
GLUCOSE BLDC GLUCOMTR-MCNC: 147 MG/DL — HIGH (ref 70–99)
GLUCOSE SERPL-MCNC: 311 MG/DL — HIGH (ref 70–99)
GLUCOSE UR QL: 1000 MG/DL
HCT VFR BLD CALC: 51 % — HIGH (ref 39–50)
HGB BLD-MCNC: 17 G/DL — SIGNIFICANT CHANGE UP (ref 13–17)
IMM GRANULOCYTES NFR BLD AUTO: 0.5 % — SIGNIFICANT CHANGE UP (ref 0–0.9)
INR BLD: 1.18 RATIO — HIGH (ref 0.88–1.16)
KETONES UR-MCNC: NEGATIVE — SIGNIFICANT CHANGE UP
LACTATE BLDV-MCNC: 2.1 MMOL/L — HIGH (ref 0.5–2)
LACTATE BLDV-MCNC: 3.4 MMOL/L — HIGH (ref 0.5–2)
LACTATE SERPL-SCNC: 1.6 MMOL/L — SIGNIFICANT CHANGE UP (ref 0.5–2)
LEUKOCYTE ESTERASE UR-ACNC: NEGATIVE — SIGNIFICANT CHANGE UP
LYMPHOCYTES # BLD AUTO: 1.45 K/UL — SIGNIFICANT CHANGE UP (ref 1–3.3)
LYMPHOCYTES # BLD AUTO: 18.7 % — SIGNIFICANT CHANGE UP (ref 13–44)
MCHC RBC-ENTMCNC: 30.3 PG — SIGNIFICANT CHANGE UP (ref 27–34)
MCHC RBC-ENTMCNC: 33.3 GM/DL — SIGNIFICANT CHANGE UP (ref 32–36)
MCV RBC AUTO: 90.9 FL — SIGNIFICANT CHANGE UP (ref 80–100)
MONOCYTES # BLD AUTO: 0.76 K/UL — SIGNIFICANT CHANGE UP (ref 0–0.9)
MONOCYTES NFR BLD AUTO: 9.8 % — SIGNIFICANT CHANGE UP (ref 2–14)
NEUTROPHILS # BLD AUTO: 5.16 K/UL — SIGNIFICANT CHANGE UP (ref 1.8–7.4)
NEUTROPHILS NFR BLD AUTO: 66.7 % — SIGNIFICANT CHANGE UP (ref 43–77)
NITRITE UR-MCNC: NEGATIVE — SIGNIFICANT CHANGE UP
PH UR: 6 — SIGNIFICANT CHANGE UP (ref 5–8)
PLATELET # BLD AUTO: 207 K/UL — SIGNIFICANT CHANGE UP (ref 150–400)
POTASSIUM SERPL-MCNC: 4.2 MMOL/L — SIGNIFICANT CHANGE UP (ref 3.5–5.3)
POTASSIUM SERPL-SCNC: 4.2 MMOL/L — SIGNIFICANT CHANGE UP (ref 3.5–5.3)
PROT SERPL-MCNC: 6.8 G/DL — SIGNIFICANT CHANGE UP (ref 6.6–8.7)
PROT UR-MCNC: NEGATIVE — SIGNIFICANT CHANGE UP
PROTHROM AB SERPL-ACNC: 13.7 SEC — HIGH (ref 10.5–13.4)
RBC # BLD: 5.61 M/UL — SIGNIFICANT CHANGE UP (ref 4.2–5.8)
RBC # FLD: 14.1 % — SIGNIFICANT CHANGE UP (ref 10.3–14.5)
RBC CASTS # UR COMP ASSIST: ABNORMAL /HPF (ref 0–4)
RSV RNA NPH QL NAA+NON-PROBE: SIGNIFICANT CHANGE UP
SARS-COV-2 RNA SPEC QL NAA+PROBE: DETECTED
SODIUM SERPL-SCNC: 137 MMOL/L — SIGNIFICANT CHANGE UP (ref 135–145)
SP GR SPEC: 1.01 — SIGNIFICANT CHANGE UP (ref 1.01–1.02)
TROPONIN T SERPL-MCNC: 0.01 NG/ML — SIGNIFICANT CHANGE UP (ref 0–0.06)
UROBILINOGEN FLD QL: NEGATIVE MG/DL — SIGNIFICANT CHANGE UP
WBC # BLD: 7.74 K/UL — SIGNIFICANT CHANGE UP (ref 3.8–10.5)
WBC # FLD AUTO: 7.74 K/UL — SIGNIFICANT CHANGE UP (ref 3.8–10.5)
WBC UR QL: SIGNIFICANT CHANGE UP /HPF (ref 0–5)

## 2023-01-26 PROCEDURE — 99291 CRITICAL CARE FIRST HOUR: CPT | Mod: CS

## 2023-01-26 PROCEDURE — 99233 SBSQ HOSP IP/OBS HIGH 50: CPT

## 2023-01-26 PROCEDURE — 71045 X-RAY EXAM CHEST 1 VIEW: CPT | Mod: 26

## 2023-01-26 PROCEDURE — 71275 CT ANGIOGRAPHY CHEST: CPT | Mod: 26,MA

## 2023-01-26 PROCEDURE — 99223 1ST HOSP IP/OBS HIGH 75: CPT

## 2023-01-26 RX ORDER — INSULIN LISPRO 100/ML
3 VIAL (ML) SUBCUTANEOUS
Refills: 0 | Status: DISCONTINUED | OUTPATIENT
Start: 2023-01-26 | End: 2023-01-28

## 2023-01-26 RX ORDER — CEFEPIME 1 G/1
1000 INJECTION, POWDER, FOR SOLUTION INTRAMUSCULAR; INTRAVENOUS ONCE
Refills: 0 | Status: DISCONTINUED | OUTPATIENT
Start: 2023-01-26 | End: 2023-01-26

## 2023-01-26 RX ORDER — AMIODARONE HYDROCHLORIDE 400 MG/1
200 TABLET ORAL DAILY
Refills: 0 | Status: DISCONTINUED | OUTPATIENT
Start: 2023-01-27 | End: 2023-01-28

## 2023-01-26 RX ORDER — ASPIRIN/CALCIUM CARB/MAGNESIUM 324 MG
81 TABLET ORAL DAILY
Refills: 0 | Status: DISCONTINUED | OUTPATIENT
Start: 2023-01-26 | End: 2023-02-02

## 2023-01-26 RX ORDER — ACETAMINOPHEN 500 MG
650 TABLET ORAL ONCE
Refills: 0 | Status: COMPLETED | OUTPATIENT
Start: 2023-01-26 | End: 2023-01-26

## 2023-01-26 RX ORDER — DEXTROSE 50 % IN WATER 50 %
12.5 SYRINGE (ML) INTRAVENOUS ONCE
Refills: 0 | Status: DISCONTINUED | OUTPATIENT
Start: 2023-01-26 | End: 2023-02-02

## 2023-01-26 RX ORDER — SODIUM CHLORIDE 9 MG/ML
500 INJECTION INTRAMUSCULAR; INTRAVENOUS; SUBCUTANEOUS ONCE
Refills: 0 | Status: COMPLETED | OUTPATIENT
Start: 2023-01-26 | End: 2023-01-26

## 2023-01-26 RX ORDER — VANCOMYCIN HCL 1 G
1000 VIAL (EA) INTRAVENOUS ONCE
Refills: 0 | Status: COMPLETED | OUTPATIENT
Start: 2023-01-26 | End: 2023-01-26

## 2023-01-26 RX ORDER — METOPROLOL TARTRATE 50 MG
5 TABLET ORAL ONCE
Refills: 0 | Status: COMPLETED | OUTPATIENT
Start: 2023-01-26 | End: 2023-01-26

## 2023-01-26 RX ORDER — FINASTERIDE 5 MG/1
5 TABLET, FILM COATED ORAL DAILY
Refills: 0 | Status: DISCONTINUED | OUTPATIENT
Start: 2023-01-26 | End: 2023-02-02

## 2023-01-26 RX ORDER — INSULIN LISPRO 100/ML
VIAL (ML) SUBCUTANEOUS
Refills: 0 | Status: DISCONTINUED | OUTPATIENT
Start: 2023-01-26 | End: 2023-02-02

## 2023-01-26 RX ORDER — FUROSEMIDE 40 MG
40 TABLET ORAL DAILY
Refills: 0 | Status: DISCONTINUED | OUTPATIENT
Start: 2023-01-26 | End: 2023-02-02

## 2023-01-26 RX ORDER — SODIUM CHLORIDE 9 MG/ML
1000 INJECTION, SOLUTION INTRAVENOUS
Refills: 0 | Status: DISCONTINUED | OUTPATIENT
Start: 2023-01-26 | End: 2023-02-02

## 2023-01-26 RX ORDER — DEXTROSE 50 % IN WATER 50 %
25 SYRINGE (ML) INTRAVENOUS ONCE
Refills: 0 | Status: DISCONTINUED | OUTPATIENT
Start: 2023-01-26 | End: 2023-02-02

## 2023-01-26 RX ORDER — INFLUENZA VIRUS VACCINE 15; 15; 15; 15 UG/.5ML; UG/.5ML; UG/.5ML; UG/.5ML
0.7 SUSPENSION INTRAMUSCULAR ONCE
Refills: 0 | Status: DISCONTINUED | OUTPATIENT
Start: 2023-01-26 | End: 2023-02-02

## 2023-01-26 RX ORDER — ALPRAZOLAM 0.25 MG
0.25 TABLET ORAL THREE TIMES A DAY
Refills: 0 | Status: DISCONTINUED | OUTPATIENT
Start: 2023-01-26 | End: 2023-02-02

## 2023-01-26 RX ORDER — APIXABAN 2.5 MG/1
5 TABLET, FILM COATED ORAL EVERY 12 HOURS
Refills: 0 | Status: DISCONTINUED | OUTPATIENT
Start: 2023-01-26 | End: 2023-01-28

## 2023-01-26 RX ORDER — INSULIN LISPRO 100/ML
33 VIAL (ML) SUBCUTANEOUS
Refills: 0 | Status: DISCONTINUED | OUTPATIENT
Start: 2023-01-26 | End: 2023-01-26

## 2023-01-26 RX ORDER — PANTOPRAZOLE SODIUM 20 MG/1
40 TABLET, DELAYED RELEASE ORAL
Refills: 0 | Status: DISCONTINUED | OUTPATIENT
Start: 2023-01-26 | End: 2023-02-02

## 2023-01-26 RX ORDER — GLUCAGON INJECTION, SOLUTION 0.5 MG/.1ML
1 INJECTION, SOLUTION SUBCUTANEOUS ONCE
Refills: 0 | Status: DISCONTINUED | OUTPATIENT
Start: 2023-01-26 | End: 2023-02-02

## 2023-01-26 RX ORDER — DEXTROSE 50 % IN WATER 50 %
15 SYRINGE (ML) INTRAVENOUS ONCE
Refills: 0 | Status: DISCONTINUED | OUTPATIENT
Start: 2023-01-26 | End: 2023-02-02

## 2023-01-26 RX ORDER — CEFEPIME 1 G/1
2000 INJECTION, POWDER, FOR SOLUTION INTRAMUSCULAR; INTRAVENOUS ONCE
Refills: 0 | Status: COMPLETED | OUTPATIENT
Start: 2023-01-26 | End: 2023-01-26

## 2023-01-26 RX ORDER — METOPROLOL TARTRATE 50 MG
200 TABLET ORAL DAILY
Refills: 0 | Status: DISCONTINUED | OUTPATIENT
Start: 2023-01-26 | End: 2023-01-28

## 2023-01-26 RX ORDER — ATORVASTATIN CALCIUM 80 MG/1
20 TABLET, FILM COATED ORAL AT BEDTIME
Refills: 0 | Status: DISCONTINUED | OUTPATIENT
Start: 2023-01-26 | End: 2023-02-02

## 2023-01-26 RX ADMIN — SODIUM CHLORIDE 500 MILLILITER(S): 9 INJECTION INTRAMUSCULAR; INTRAVENOUS; SUBCUTANEOUS at 15:45

## 2023-01-26 RX ADMIN — Medication 0.25 MILLIGRAM(S): at 23:26

## 2023-01-26 RX ADMIN — CEFEPIME 2000 MILLIGRAM(S): 1 INJECTION, POWDER, FOR SOLUTION INTRAMUSCULAR; INTRAVENOUS at 12:33

## 2023-01-26 RX ADMIN — SODIUM CHLORIDE 500 MILLILITER(S): 9 INJECTION INTRAMUSCULAR; INTRAVENOUS; SUBCUTANEOUS at 12:47

## 2023-01-26 RX ADMIN — Medication 250 MILLIGRAM(S): at 12:46

## 2023-01-26 RX ADMIN — Medication 650 MILLIGRAM(S): at 23:26

## 2023-01-26 RX ADMIN — SODIUM CHLORIDE 500 MILLILITER(S): 9 INJECTION INTRAMUSCULAR; INTRAVENOUS; SUBCUTANEOUS at 13:38

## 2023-01-26 RX ADMIN — SODIUM CHLORIDE 500 MILLILITER(S): 9 INJECTION INTRAMUSCULAR; INTRAVENOUS; SUBCUTANEOUS at 11:47

## 2023-01-26 RX ADMIN — APIXABAN 5 MILLIGRAM(S): 2.5 TABLET, FILM COATED ORAL at 17:43

## 2023-01-26 RX ADMIN — Medication 5 MILLIGRAM(S): at 11:30

## 2023-01-26 RX ADMIN — ATORVASTATIN CALCIUM 20 MILLIGRAM(S): 80 TABLET, FILM COATED ORAL at 21:50

## 2023-01-26 RX ADMIN — SODIUM CHLORIDE 500 MILLILITER(S): 9 INJECTION INTRAMUSCULAR; INTRAVENOUS; SUBCUTANEOUS at 14:20

## 2023-01-26 NOTE — H&P ADULT - ASSESSMENT
75yo male with PMHX CAD s/p PCI (patent cath 2021), AF on Eliquis, moderate AS, morbid obesity, OA, HTN, HLD, DM, TAA (4.4cm), BPH s/p TURP c/b Chronic Franz x9 mos (now removed), Left renal mass was now sent from cards office Was in usual state of health until last night. Had fairly sudden onset of shortness of breath and felt carotid pulsating. Has chest congestion but that has been chronic. No increasing dyspnea or CP recently, legs swollen today but have not been lately. No fevers/chills/sweats, states had COVID in the fall and was in [facility]  In ED- Pt hypotensive 70/30 and RVR 130s. Responded to fluid boluses and metoprolol. Is also COVID +    # Hypotension  suspect compensatory to rapid A fib  However empirically treated for possible sepsis  with fluid bolus  responded well to fluid    # A fib RVR  Once BP stabilized, Metoprolol given in ED with good response  Etio unclear  PE ruled out  no foci of infection noted except COVID  Cont A/C    # COVID +  since fall 2022  vaccinated  mild hypoxia at 91% on room air when he was hypotensive  2 L NC started and saturating 96%  D/C NC O2 and monitor  when hemodynamically stable if desaturating will initiate Dexa/ Remde    # CAD, HTN,   Cont rest of Home cardiac meds    # Elevated BNP in Obese patient  however does not appear to be in AE  Cont home CHF meds    # DM2   Insulin in house    Eliquis

## 2023-01-26 NOTE — ED ADULT TRIAGE NOTE - CHIEF COMPLAINT QUOTE
dr strauss's office here for chest pain and shortness of breath. patient is in rapid afib and is hypotensive, mottled.

## 2023-01-26 NOTE — ED ADULT NURSE NOTE - NSIMPLEMENTINTERV_GEN_ALL_ED
July 9, 2021     Patient: Dillon Hay   YOB: 1935   Date of Visit: 7/9/2021     To Whom it May Concern:  Dillon Hay was seen in my clinic on 7/9/2021 at 2:20 pm.  He take following medications-  Current Outpatient Medications   Medication Sig   • lisinopril (ZESTRIL) 20 MG tablet Take 1 tablet by mouth daily.   • carvedilol (COREG) 3.125 MG tablet Take 1 tablet by mouth 2 times daily (with meals).   • metFORMIN (GLUCOPHAGE) 500 MG tablet Take 1 tablet by mouth 3 times daily.   • tamsulosin (FLOMAX) 0.4 MG Cap Take 1 capsule by mouth daily after a meal.   • levothyroxine 50 MCG tablet Take 1 tablet by mouth daily.   • hydrochlorothiazide (HYDRODIURIL) 25 MG tablet Take 1 tablet by mouth daily.   • clopidogrel (PLAVIX) 75 MG tablet Take 1 tablet by mouth daily.   • empagliflozin (Jardiance) 10 MG tablet Take 1 tablet by mouth daily (before breakfast).   • atorvastatin (LIPITOR) 10 MG tablet Take 1 tablet by mouth daily.   • Cyanocobalamin (B-12) 1000 MCG SL Tab Place 1,000 mcg under the tongue daily after breakfast.   • glimepiride (AMARYL) 4 MG tablet TAKE 2 TABLETS EVERY DAY   • SOFTCLIX LANCETS Misc USE TO TEST BLOOD SUGAR 2 TIMES A DAY AS DIRECTED   • blood glucose (Accu-Chek Casey Plus) test strip Test blood sugar 2 times daily as directed. Diagnosis: diabetes mellitus. Meter: accu chek casey plus test strips   • Multiple Vitamins-Minerals (MULTIVITAMIN ADULT PO) Take 1 tablet by mouth daily.   • Omega-3 Fatty Acids (FISH OIL PO)    • sodium chloride 1 g tablet Take 1 g by mouth 2 times daily.         Sincerely,         Yevgeniy Pena MD    Medical information is confidential and cannot be disclosed without the written consent of the patient or his representative.      
Specific interventions were implemented:

## 2023-01-26 NOTE — ED PROVIDER NOTE - PROGRESS NOTE DETAILS
Garson: Lactate elevated to 3.4. Patient fluid responsive, bp improved from 74/51 to systolic >100. Will cover with Vancomycin/Cefepime for possible infection. Continue to monitor and reassess.

## 2023-01-26 NOTE — ED PROVIDER NOTE - ATTENDING CONTRIBUTION TO CARE
I, Andrew Zheng, personally saw the patient with the resident, and completed the key components of the history and physical exam. I then discussed the management plan with the resident.    Upon my evaluation, this patient had a high probability of imminent or life-threatening deterioration due to Afib, covid, hypotension , which required my direct attention, intervention, and personal management.    76 yo M hx of AFib on Eliquis, CAD s/p PCI x2 on ASA, HTN, HLD, CHFpEF, BPH presents with SOB. He was found Afib rvr, . patient given lopressor 5 mg IV with improvement of HR. Afebril. Patient found hypotensive. He received total 2 L NS with improvement of hypotension. Lactate 3.4> 2.1. wbc 7. UA negative. Covid +. 91% on RA, improved with O2.  CTA chest showed no PE. Patient admitted for further treatment.    I have personally provided _50 minutes of critical care time exclusive of time spent on separately billable procedures.  Time includes review of laboratory data, radiology results, discussion with consultants, and monitoring for potential decompensation.  Interventions were performed as documented.

## 2023-01-26 NOTE — PATIENT PROFILE ADULT - FALL HARM RISK - HARM RISK INTERVENTIONS
Assistance with ambulation/Assistance OOB with selected safe patient handling equipment/Communicate Risk of Fall with Harm to all staff/Discuss with provider need for PT consult/Monitor gait and stability/Provide patient with walking aids - walker, cane, crutches/Reinforce activity limits and safety measures with patient and family/Tailored Fall Risk Interventions/Use of alarms - bed, chair and/or voice tab/Visual Cue: Yellow wristband and red socks/Bed in lowest position, wheels locked, appropriate side rails in place/Call bell, personal items and telephone in reach/Instruct patient to call for assistance before getting out of bed or chair/Non-slip footwear when patient is out of bed/[place] to call system/Physically safe environment - no spills, clutter or unnecessary equipment/Purposeful Proactive Rounding/Room/bathroom lighting operational, light cord in reach

## 2023-01-26 NOTE — PHARMACOTHERAPY INTERVENTION NOTE - COMMENTS
Patient : Sol Cosme Age: 28 year old Sex: female   MRN: [id number] Encounter Date: 2/7/2019  E16/16      History     Chief Complaint   Patient presents with   • Abdominal Pain   • Vomiting     HPI    2/7/2019  7:18 PM Sol Cosme is a 28 year old female with a history of psoriasis and anxiety who presents to the ED for evaluation of lower right sided abdominal pain, with associated nausea and vomiting, that began around 12:00 PM while at work. The patient states that the pain started as a \"stomach ache\" this morning when the she woke up. The patient states that she has never had this type of pain before. The patient denies fever, diarrhea, cough besides at baseline, rhinorrhea, sore throat or any urinary complaints. The patient denies any history of abdominal surgeries. The patient states that there is not a possibility of pregnancy as she has the Nexplanon implant and has not been sexually active since October. There are no further complaints or modifying factors at this time.    PCP: Delmis Roper MD    Allergies   Allergen Reactions   • Azithromycin RASH and SHORTNESS OF BREATH   • Chlorine RASH     \"BLEACH\"   • Clarithromycin RASH and SHORTNESS OF BREATH   • Ceclor [Cefaclor] RASH   • Metal Topical   (Environmental) RASH     \"ALL NON SURGICAL METAL\"   • Topamax DEPRESSION     Suicidal thoughts, mood swings       Current Discharge Medication List      Prior to Admission Medications    Details   ADALimumab (HUMIRA PEN) 40 MG/0.4ML citrate free Inject 1 pen into skin every two weeks.  Qty: 2 each, Refills: 11      HYDROcodone-acetaminophen (NORCO) 5-325 MG per tablet Take 1 tablet by mouth every 4 hours as needed.  Qty: 6 tablet, Refills: 0      ondansetron (ZOFRAN ODT) 4 MG disintegrating tablet Place 1 tablet onto the tongue every 8 hours as needed for Nausea.  Qty: 20 tablet, Refills: 0      pantoprazole (PROTONIX) 40 MG tablet Take 1 tablet by mouth daily.  Qty: 90 tablet, Refills: 1       albuterol 108 (90 Base) MCG/ACT inhaler Inhale 2 puffs into the lungs every 4 hours as needed for Shortness of Breath or Wheezing.  Qty: 8.5 g, Refills: 2      loratadine (CLARITIN) 10 MG tablet Take 1 tablet by mouth daily.  Qty: 90 tablet, Refills: 3      Prenatal Vit-Fe Fumarate-FA (PRENATAL 19) Chew Tab Chew 1 tablet by mouth daily.      sumatriptan (IMITREX) 50 MG tablet Take 1 tablet by mouth at onset of migraine. May repeat after 2 hours if needed.  Qty: 9 tablet, Refills: 2      cyclobenzaprine (FLEXERIL) 10 MG tablet Take 1 tablet by mouth 3 times daily as needed for Muscle spasms.  Qty: 30 tablet, Refills: 0      beclomethasone diprop (QVAR) 40 MCG/ACT inhaler Inhale 2 puffs into the lungs 2 times daily.      triamcinolone (ARISTOCORT) 0.1 % cream Apply topically 2 times daily. For up to two weeks to clear rash  Qty: 80 g, Refills: 1             Past Medical History:   Diagnosis Date   • ADHD (attention deficit hyperactivity disorder)    • Anxiety    • Bipolar 1 disorder (CMS/HCC)     Uses a support dog as her treatment   • Moderate persistent asthma    • Myofascial pain syndrome    • OCD (obsessive compulsive disorder)    • Plaque psoriasis    • Vitamin D deficiency        Past Surgical History:   Procedure Laterality Date   • TONSILLECTOMY         Family History   Problem Relation Age of Onset   • Other Mother         endomtetrosis   • Bipolar disorder Mother    • Arrhythmia Mother    • Cancer, Breast Mother 47   • Cancer Father         throat   • Hypertension Maternal Grandmother    • Diabetes Maternal Grandmother    • Cancer Paternal Grandfather         throat   • Aneurysm Paternal Aunt    • Aneurysm Paternal Aunt        Social History     Tobacco Use   • Smoking status: Current Every Day Smoker     Packs/day: 0.50     Types: Cigarettes   • Smokeless tobacco: Never Used   • Tobacco comment: cutting down/ less then half a pack per day    Substance Use Topics   • Alcohol use: Yes     Comment: occ   •  Dose optimization for empiric therapy, d/w MD Drug use: No       Review of Systems   Constitutional: Negative for chills and fever.   HENT: Negative for congestion, rhinorrhea and sore throat.    Eyes: Negative for photophobia and visual disturbance.   Respiratory: Negative for cough, chest tightness and shortness of breath.    Cardiovascular: Negative for chest pain.   Gastrointestinal: Positive for abdominal pain (lower right), nausea and vomiting. Negative for diarrhea.   Genitourinary: Negative for decreased urine volume, difficulty urinating and dysuria.   Musculoskeletal: Negative for arthralgias and myalgias.   Skin: Negative for rash.   Neurological: Negative for weakness and headaches.   Hematological: Does not bruise/bleed easily.       Physical Exam     ED Triage Vitals [02/07/19 1919]   ED Triage Vitals Group      Temp 97.9 °F (36.6 °C)      Pulse 87      Resp 16      BP (!) 166/93      SpO2 98 %      EtCO2 mmHg       Height 5' 5\" (1.651 m)      Weight 249 lb 6.4 oz (113.1 kg)      Weight Scale Used ED Actual       Physical Exam   Constitutional: She is oriented to person, place, and time. She appears well-developed. No distress.   HENT:   Head: Normocephalic.   Mouth/Throat: Oropharynx is clear and moist.   Eyes: Conjunctivae and EOM are normal. Pupils are equal, round, and reactive to light.   Neck: Normal range of motion. Neck supple.   Cardiovascular: Normal rate, regular rhythm and normal heart sounds.   Pulmonary/Chest: Effort normal and breath sounds normal. No respiratory distress. She has no wheezes.   Abdominal: Soft. Bowel sounds are normal. She exhibits no mass. There is tenderness (right mid). There is no rebound and no guarding.   Genitourinary:   Genitourinary Comments: No external pelvis tenderness   Musculoskeletal: Normal range of motion. She exhibits no edema.   Neurological: She is alert and oriented to person, place, and time. She has normal strength. No cranial nerve deficit or sensory deficit. GCS eye subscore is 4. GCS verbal  subscore is 5. GCS motor subscore is 6.   Skin: Skin is warm and dry. No rash noted.   Psychiatric: She has a normal mood and affect. Thought content normal.   Nursing note and vitals reviewed.    ED Course     Procedures    Lab Results     Results for orders placed or performed during the hospital encounter of 02/07/19   CBC & Auto Differential   Result Value Ref Range    WBC 12.1 (H) 4.2 - 11.0 K/mcL    RBC 4.69 4.00 - 5.20 mil/mcL    HGB 13.5 12.0 - 15.5 g/dL    HCT 40.9 36.0 - 46.5 %    MCV 87.2 78.0 - 100.0 fl    MCH 28.8 26.0 - 34.0 pg    MCHC 33.0 32.0 - 36.5 g/dL    RDW-CV 12.7 11.0 - 15.0 %     140 - 450 K/mcL    NRBC 0 0 /100 WBC    DIFF TYPE AUTOMATED DIFFERENTIAL     Neutrophil 52 %    LYMPH 41 %    MONO 4 %    EOSIN 2 %    BASO 1 %    Percent Immature Granuloctyes 0 %    Absolute Neutrophil 6.3 1.8 - 7.7 K/mcL    Absolute Lymph 4.9 (H) 1.0 - 4.8 K/mcL    Absolute Mono 0.5 0.3 - 0.9 K/mcL    Absolute Eos 0.3 0.1 - 0.5 K/mcL    Absolute Baso 0.1 0.0 - 0.3 K/mcL    Absolute Immature Granulocytes 0.1 0 - 0.2 K/mcl   Comprehensive Metabolic Panel   Result Value Ref Range    Sodium 141 135 - 145 mmol/L    Potassium 3.6 3.4 - 5.1 mmol/L    Chloride 104 98 - 107 mmol/L    Carbon Dioxide 26 21 - 32 mmol/L    Anion Gap 15 10 - 20 mmol/L    Glucose 87 65 - 99 mg/dL    BUN 7 6 - 20 mg/dL    Creatinine 0.61 0.51 - 0.95 mg/dL    GFR Estimate,  >90     GFR Estimate, Non African American >90     BUN/Creatinine Ratio 11 7 - 25    CALCIUM 9.4 8.4 - 10.2 mg/dL    TOTAL BILIRUBIN 0.4 0.2 - 1.0 mg/dL    AST/SGOT 19 <38 Units/L    ALT/SGPT 28 <79 Units/L    ALK PHOSPHATASE 93 45 - 117 Units/L    TOTAL PROTEIN 7.9 6.4 - 8.2 g/dL    Albumin 4.1 3.6 - 5.1 g/dL    GLOBULIN 3.8 2.0 - 4.0 g/dL    A/G Ratio, Serum 1.1 1.0 - 2.4   Lipase Level   Result Value Ref Range    Lipase 126 73 - 393 Units/L   Chem 8 Panel - Point of Care   Result Value Ref Range    Sodium  135 - 145 mmol/L    Potassium POC 3.9  3.4 - 5.1 mmol/L    Chloride  98 - 107 mmol/L    CALCIUM IONIZED-POC 1.16 1.15 - 1.29 mmol/L    CO2 Total 25 (H) 19 - 24 mmol/L    GLUCOSE POC 83 65 - 99 mg/dL    BUN POC 8 6 - 20 mg/dL    HEMATOCRIT POC 43.0 36.0 - 46.5 %    Hemoglobin POC 14.6 12.0 - 15.5 g/dL    ANION GAP POC 16 mmol/L    Creatinine POC 0.60 0.51 - 0.95 mg/dL    Estimated GFR  (POC) >90     Estimated GFR Non- (POC) >90    HCG Quantitative-point of care   Result Value Ref Range    HCG POC Quantitative <5.0 <5.0 IU/L       EKG Results     None    Radiology Results     Imaging Results          CT Abdomen Pelvis w/ IV contrast only (Final result)  Result time 02/07/19 20:24:26    Final result                 Impression:    IMPRESSION:   1.   There is no evidence of bowel surgery. Portions of a normal caliber  appendix are identified.  2. Additional findings as outlined above.               Narrative:      CT ABDOMEN AND PELVIS WITH CONTRAST    HISTORY:  Abdominal pain    COMPARISON:  3/17/2017.    PROCEDURE:  Multiple helical axial scans of the abdomen and pelvis were  obtained with 100 mL Isovue-300 intravenously.     FINDINGS:    The liver, spleen, pancreas, gallbladder, and adrenal glands are stable.    There is symmetric uptake of contrast through the kidneys. There is no  evidence of hydronephrosis. The previously noted subcentimeter  low-attenuation lesion in the right kidney is not well seen on current  exam.    There is no evidence of bowel obstruction. Portions of a normal caliber  appendix are visualized.                                        ED Medication Orders (From admission, onward)    Start Ordered     Status Ordering Provider    02/07/19 2020 02/07/19 2020  morphine injection 4 mg  EVERY 20 MIN PRN      Last MAR action:  Given KAVITA TANNER    02/07/19 1928 02/07/19 1927  sodium chloride (PF) 0.9 % injection 2 mL  (Capped IV)  ONCE      Acknowledged KAVITA TANNER    02/07/19 1926  02/07/19 1927  sodium chloride (PF) 0.9 % injection 2 mL  (Capped IV)  PRN      Acknowledged TANNERKAVITA ZACK               [facility]     Vitals  Vitals:    02/07/19 2005 02/07/19 2009 02/07/19 2024 02/07/19 2031   BP: 122/64  141/77 137/59   Pulse: 70 71 77 75   Resp:       Temp:       TempSrc:       SpO2: 98% 99% 99% 99%   Weight:       Height:           ED Course  7:19 PM Initial Impression: The patient is a 28 year old female with a history of psoriasis and anxiety who presents to the ED for evaluation of lower right sided abdominal pain that began around 12:00 PM while at work. I performed the initial assessment and evaluation of the patient. The patient arrives in no acute distress and labs are WNL. We discussed the plan to obtain and abdominal CT to rule out appendicitis as well as labs and a UA to assess for acute UTI, infection, anemia, electrolyte imbalance, acute kidney injury, pancreatitis, gall bladder/liver disease or any other abnormalities. The patient is declining any pain medication for symptom relief at this time. The patient expresses understanding and agrees with this. All questions have been addressed at this time.      8:20 PM RN Update: The nurse reports that the patient is now requesting medication for pain relief. I will administer 4 mg of morphine.     8:53 PM Patient Recheck: I rechecked the patient who is resting comfortably, with family at beside. The patient states that her symptoms have improved.  I updated the patient on the unremarkable abdominal CT, UA and lab results showing no evidence of appendicitis. She states she has had intermittent abdominal pain for several months on and off. I discussed possibilities of IBS. We discussed the plan to administer the patient antinausea medication to take at home for further symptom relief. The patient was instructed to return to the ED if there are any new or worsening symptoms, such as developing a fever, and to follow up with her PCP and a GI  Dose optimization for empiric therapy, d/w MD; allergy reviewed, tolerated ceftriaxone previously in 2022 specialist. The patient indicates understanding of these issues and agrees with the plan.    RICHARD Horton has RLQ abdominal pain. No evidence of appendicitis on ct scan. No UTI and she is not pregnant. She denies pelvic pain or symptoms of infection. She will return for fever, worse pain or other concerns. I discussed symptoms of appendicitis and that she should return for these.    Critical Care time spent on this patient outside of billable procedures: None    Clinical Impression  ED Diagnosis        Final diagnosis    Abdominal pain, RLQ                The patient was provided with a recommendation to follow up with a primary care provider and obtain reassessment of his/her blood pressure within three months.    Follow Up:  Delmis Roper MD  [address]  [facility] [id number] [phone]    Call in 3 days      Polo Nugent MD  [address] [place] [id number] [phone]    Call in 1 week  Return to the ER for worse symptoms or concerns          Summary of your Discharge Medications      Take these Medications      Details   metoCLOPramide 10 MG tablet  Commonly known as:  REGLAN   Take 1 tablet by mouth every 8 hours as needed for Nausea.            Patient is discharged to home/self care in stable condition.       I have reviewed the information recorded by the scribe for accuracy and agree with its contents.    ____________________________________________________________________    Jelly Soto acting as a scribe for Dr. Jacoby Burciaga  Dictation # [id number]  Scribe: Jelly Burciaga MD  02/07/19 [id number]

## 2023-01-26 NOTE — CONSULT NOTE ADULT - SUBJECTIVE AND OBJECTIVE BOX
CHIEF COMPLAINT:     HPI: Patient is a  77y Male with CAD s/p PCI (patent cath 2021), AF, moderate AS, morbid obesity, OA, HTN, HLD, DM, TAA (4.4cm) here with SOB. Noted to be in rapid AF and low BP 70s systolic, responded to fluid.     PAST MEDICAL & SURGICAL HISTORY:  Anxiety      Hypertension      Hyperlipidemia      Prostate disease      Gastroesophageal reflux disease      Gallbladder stone without cholecystitis or obstruction      BPH (benign prostatic hyperplasia)      DM (diabetes mellitus)      DVT (deep venous thrombosis)  left leg      Afib      Anxiety      HLD (hyperlipidemia)      Malignant schwannoma      H/O arthroscopy of knee      S/P laminectomy      S/P coronary artery stent placement  x2          MEDICATIONS:  MEDICATIONS  (STANDING):  sodium chloride 0.9% Bolus 500 milliLiter(s) IV Bolus once    MEDICATIONS  (PRN):    sodium chloride 0.9% Bolus 500 milliLiter(s) IV Bolus once      FAMILY HISTORY:  FAMILY HISTORY:  No pertinent family history in first degree relatives        SOCIAL HISTORY: no EtOH, drugs or tobacco    ROS: All others negative    PHYSCIAL EXAM:  Vital Signs Last 24 Hrs  T(C): 37.3 (26 Jan 2023 11:43), Max: 37.3 (26 Jan 2023 11:43)  T(F): 99.1 (26 Jan 2023 11:43), Max: 99.1 (26 Jan 2023 11:43)  HR: 95 (26 Jan 2023 13:57) (95 - 136)  BP: 85/53 (26 Jan 2023 13:57) (70/34 - 115/76)  BP(mean): --  RR: 24 (26 Jan 2023 13:57) (24 - 28)  SpO2: 96% (26 Jan 2023 13:57) (91% - 96%)    Parameters below as of 26 Jan 2023 13:57  Patient On (Oxygen Delivery Method): nasal cannula  O2 Flow (L/min): 2    I&O's Summary    GEN: NAD  HEENT: MMM, sclera anicteric  RESP: CTA bilaterally  CVS: S1S2, RRR, no JVD, no M/R/G  GI: Soft, NT, ND, BS+  EXT: no C/C/E  NEURO: AAOX3  PSYCH: Normal affect      LABS:                        17.0   7.74  )-----------( 207      ( 26 Jan 2023 11:28 )             51.0     01-26    137  |  99  |  19.4  ----------------------------<  311<H>  4.2   |  23.0  |  0.80    Ca    8.5      26 Jan 2023 11:28  Mg     1.8     01-26    TPro  6.8  /  Alb  3.8  /  TBili  0.6  /  DBili  x   /  AST  19  /  ALT  10  /  AlkPhos  121<H>  01-26    CARDIAC MARKERS ( 26 Jan 2023 11:28 )  x     / 0.01 ng/mL / x     / x     / x          PT/INR - ( 26 Jan 2023 11:28 )   PT: 13.7 sec;   INR: 1.18 ratio         PTT - ( 26 Jan 2023 11:28 )  PTT:32.4 sec    [facility] 10/2021: No obstructive disease  LIBBY 11/2021: Normal EF, moderate AS (planimetered CIELO 1.46cm2) mild MR/TR  CT CHEST: ascending aortic aneurym 4.4cm    ECHO 1/2023:  1. Moderate LVH, EF 55-60%  2. Normal RV function  3. Moderate AS visually but limited study, patient in rapid AF    CXR:  INTERPRETATION:   1. Heart magnified by technique. Somewhat prominent aorta again noted.  2.There may be a minimal infiltrate developing off the lower left hilum compared to July 3, 2022.    IMPRESSION: As above.  Assessment:    Patient is a  77y Male with a PMH of moderate AS, CAD s/p PCI (patent cath 2021), morbid obesity, OA,  HTN, HLD, DM here with SOB, rapid AF  -Positive COVID, ? lower left hilar infiltrate  -    Plan: (TO BE SEEN)   1.       Chong Carranza MD CHIEF COMPLAINT: dyspnea    HPI: Patient is a  77y Male with CAD s/p PCI (patent cath 2021), AF, moderate AS, morbid obesity, OA, HTN, HLD, DM, TAA (4.4cm) here with SOB. Noted to be in rapid AF and low BP 70s systolic, responded to fluid. Was in usual state of health until last night. Had fairly sudden onset of shortness of breath and felt carotid pulsating. Has chest congestion but that has been chronic. No increasing dyspnea or CP recently, legs swollen today but have not been lately. No fevers/chills/sweats, states had COVID in the fall and was in [facility]. Currently comfortable.     PAST MEDICAL & SURGICAL HISTORY:  Anxiety      Hypertension      Hyperlipidemia      Prostate disease      Gastroesophageal reflux disease      Gallbladder stone without cholecystitis or obstruction      BPH (benign prostatic hyperplasia)      DM (diabetes mellitus)      DVT (deep venous thrombosis)  left leg      Afib      Anxiety      HLD (hyperlipidemia)      Malignant schwannoma      H/O arthroscopy of knee      S/P laminectomy      S/P coronary artery stent placement  x2          MEDICATIONS:  MEDICATIONS  (STANDING):  sodium chloride 0.9% Bolus 500 milliLiter(s) IV Bolus once    MEDICATIONS (HOME)  1. Alfluzosin  2. Amio 200mg daily  3. eliquis 5 bid  4. Vitamin D3  5. Ferrous Sulfate  6. Finasteride  7. Flonase  8. Metformin 500mg TID  9. Metop succinate 200mg daily  10. Rosuvastatin 5mg qhs  11. Lasix 40mg daily      FAMILY HISTORY:  FAMILY HISTORY:  No pertinent family history in first degree relatives        SOCIAL HISTORY: no EtOH, drugs or tobacco    ROS: GI negative, All others negative    PHYSCIAL EXAM:  Vital Signs Last 24 Hrs  T(C): 37.3 (26 Jan 2023 11:43), Max: 37.3 (26 Jan 2023 11:43)  T(F): 99.1 (26 Jan 2023 11:43), Max: 99.1 (26 Jan 2023 11:43)  HR: 95 (26 Jan 2023 13:57) (95 - 136)  BP: 85/53 (26 Jan 2023 13:57) (70/34 - 115/76)  BP(mean): --  RR: 24 (26 Jan 2023 13:57) (24 - 28)  SpO2: 96% (26 Jan 2023 13:57) (91% - 96%)    Parameters below as of 26 Jan 2023 13:57  Patient On (Oxygen Delivery Method): nasal cannula  O2 Flow (L/min): 2    I&O's Summary    GEN: NAD  HEENT: MMM, sclera anicteric  RESP: CTA bilaterally  CVS: distant S1S2, irregular, ? JVD, II/VI harsh systolic murmur  GI: Soft, NT, ND, obese  EXT: 1+ edema LE bilaterally   NEURO: AAOX3  PSYCH: Normal affect      LABS:                        17.0   7.74  )-----------( 207      ( 26 Jan 2023 11:28 )             51.0     01-26    137  |  99  |  19.4  ----------------------------<  311<H>  4.2   |  23.0  |  0.80    Ca    8.5      26 Jan 2023 11:28  Mg     1.8     01-26    TPro  6.8  /  Alb  3.8  /  TBili  0.6  /  DBili  x   /  AST  19  /  ALT  10  /  AlkPhos  121<H>  01-26    CARDIAC MARKERS ( 26 Jan 2023 11:28 )  x     / 0.01 ng/mL / x     / x     / x          PT/INR - ( 26 Jan 2023 11:28 )   PT: 13.7 sec;   INR: 1.18 ratio         PTT - ( 26 Jan 2023 11:28 )  PTT:32.4 sec    [facility] 10/2021: No obstructive disease  [facility] 11/2021: Normal EF, moderate AS (planimetered CIELO 1.46cm2) mild MR/TR  CT CHEST: ascending aortic aneurym 4.4cm    ECHO 1/2023:  1. Moderate LVH, EF 55-60%  2. Normal RV function  3. Moderate AS visually but limited study, patient in rapid AF    CXR:  INTERPRETATION:   1. Heart magnified by technique. Somewhat prominent aorta again noted.  2.There may be a minimal infiltrate developing off the lower left hilum compared to July 3, 2022.    IMPRESSION: As above.  Assessment:    Patient is a  77y Male with a PMH of moderate AS, CAD s/p PCI (patent cath 2021), morbid obesity, OA,  HTN, HLD, DM here with SOB, rapid AF  -Positive COVID, ? lower left hilar infiltrate  -Currently hemodynamically stable, AF rate controlled and BP b federico. Appears to have PAF and has been on amio, will continue for now and A/C  -Suspect rapid AF in response to COVID and rapid from l ow BP that has responded to rescucitation   -Apeared volume down initially, BNP only mildly elevated. Would get back on lasix     Plan:   1. Admit to monitored bed  2. Follow up Chest CT  3. Management COVID per primary team  4. Rate control AF, continue  home dose metoprolol and amio. Last office note was in sinus rhythm and  has been on amio chronically. Will hold off on DCCV at this time  5. Continue A/C  6. MEd management of CAD, no active ischemia  7. Would repeat echo, report notes limited study and eval of AS  8. Will follow, thanks!     Chong Carranza MD

## 2023-01-26 NOTE — PATIENT PROFILE ADULT - HISTORY OF COVID-19 VACCINATION
Physical Therapy Screening:    An [facility] screening referral was triggered for physical therapy based on results obtained during the nursing admission assessment. The patients chart was reviewed and the patient is appropriate for a skilled therapy evaluation if there is a decline in functional mobility from baseline. Please order a consult for physical therapy if you are in agreement and would like an evaluation to be completed. Thank you. Suzanne Area, PT    8/25/22  FUNCTIONAL STATUS PRIOR TO ADMISSION: Patient was modified independent using a single point cane for functional mobility. Attends outpatient HD 3x/week. HOME SUPPORT PRIOR TO ADMISSION: The patient lived alone and has children local to provide assistance PRN.    Home Situation  Home Environment: Independent living  # Steps to Enter: 0  One/Two Story Residence: One story  Living Alone: Yes  Support Systems: Child(yair)  Patient Expects to be Discharged to[de-identified] Skilled nursing facility  Current DME Used/Available at Home: luc Lauren, Wheelchair Vaccine status unknown

## 2023-01-26 NOTE — H&P ADULT - HISTORY OF PRESENT ILLNESS
77yo male with PMHX CAD s/p PCI (patent cath 2021), AF, moderate AS, morbid obesity, OA, HTN, HLD, DM, TAA (4.4cm), BPH s/p TURP c/b Chronic Franz x9 mos (now removed), Left renal mass was now sent from cards office Was in usual state of health until last night. Had fairly sudden onset of shortness of breath and felt carotid pulsating. Has chest congestion but that has been chronic. No increasing dyspnea or CP recently, legs swollen today but have not been lately. No fevers/chills/sweats, states had COVID in the fall and was in [facility]  In ED- Pt hypotensive 70/30 and RVR 130s. Responded to fluid boluses and metoprolol. Is also COVID +    SH- Previous smoker but has stopped; Occasional ETOH, no illicit substances  FH- Asthma in family

## 2023-01-26 NOTE — H&P ADULT - NSHPPHYSICALEXAM_GEN_ALL_CORE
Vital Signs Last 24 Hrs  T(C): 37.3 (01-26-23 @ 11:43), Max: 37.3 (01-26-23 @ 11:43)  T(F): 99.1 (01-26-23 @ 11:43), Max: 99.1 (01-26-23 @ 11:43)  HR: 88 (01-26-23 @ 15:35) (86 - 136)  BP: 127/65 (01-26-23 @ 15:43) (70/34 - 127/65)  BP(mean): --  RR: 24 (01-26-23 @ 15:35) (22 - 28)  SpO2: 96% (01-26-23 @ 15:35) (91% - 96%)    GENERAL: Obese,   HEAD:  Atraumatic, Normocephalic  EYES: EOMI, PERRLA, conjunctiva and sclera clear  NECK: Supple, JVD fullness +, Normal thyroid  NERVOUS SYSTEM:  Alert & Oriented X 3, Motor Strength 5/5 B/L upper and lower extremities  CHEST/LUNG: CTA bilaterally; No rales, rhonchi, wheezing, or rubs  HEART: IRRegular rate and rhythm; distant S1S2, II/VI harsh systolic murmur  ABDOMEN: Soft, Nontender, Nondistended; Bowel sounds present  EXTREMITIES:  2+ Peripheral Pulses, No clubbing, cyanosis; 1+ edema  SKIN: No rashes or lesions

## 2023-01-26 NOTE — ED PROVIDER NOTE - OBJECTIVE STATEMENT
76 yo male history of AFib on Eliquis, CAD s/p PCI x2 on ASA, HTN, HLD, CHFpEF, BPH presents with SOB beginning yesterday PM. He states that he did experience some sob with his chest pain last PM as well. Per EMS, patient was found to be in AFIB with RVR and was reportedly hypotensive to 70/30. Patient states that he has not experienced any recent fever at home. Denies cough, sore throat, recent increased le swelling, abd pain, n/v/d, urinary complaints.

## 2023-01-27 LAB
A1C WITH ESTIMATED AVERAGE GLUCOSE RESULT: 9.8 % — HIGH (ref 4–5.6)
ALBUMIN SERPL ELPH-MCNC: 3.4 G/DL — SIGNIFICANT CHANGE UP (ref 3.3–5.2)
ALP SERPL-CCNC: 106 U/L — SIGNIFICANT CHANGE UP (ref 40–120)
ALT FLD-CCNC: 11 U/L — SIGNIFICANT CHANGE UP
ANION GAP SERPL CALC-SCNC: 11 MMOL/L — SIGNIFICANT CHANGE UP (ref 5–17)
AST SERPL-CCNC: 13 U/L — SIGNIFICANT CHANGE UP
BILIRUB SERPL-MCNC: 0.4 MG/DL — SIGNIFICANT CHANGE UP (ref 0.4–2)
BUN SERPL-MCNC: 20 MG/DL — SIGNIFICANT CHANGE UP (ref 8–20)
CALCIUM SERPL-MCNC: 8.4 MG/DL — SIGNIFICANT CHANGE UP (ref 8.4–10.5)
CHLORIDE SERPL-SCNC: 104 MMOL/L — SIGNIFICANT CHANGE UP (ref 96–108)
CO2 SERPL-SCNC: 27 MMOL/L — SIGNIFICANT CHANGE UP (ref 22–29)
CREAT SERPL-MCNC: 0.59 MG/DL — SIGNIFICANT CHANGE UP (ref 0.5–1.3)
CULTURE RESULTS: SIGNIFICANT CHANGE UP
EGFR: 100 ML/MIN/1.73M2 — SIGNIFICANT CHANGE UP
ESTIMATED AVERAGE GLUCOSE: 235 MG/DL — HIGH (ref 68–114)
GLUCOSE BLDC GLUCOMTR-MCNC: 161 MG/DL — HIGH (ref 70–99)
GLUCOSE BLDC GLUCOMTR-MCNC: 178 MG/DL — HIGH (ref 70–99)
GLUCOSE BLDC GLUCOMTR-MCNC: 202 MG/DL — HIGH (ref 70–99)
GLUCOSE SERPL-MCNC: 156 MG/DL — HIGH (ref 70–99)
LACTATE SERPL-SCNC: 1 MMOL/L — SIGNIFICANT CHANGE UP (ref 0.5–2)
MAGNESIUM SERPL-MCNC: 1.9 MG/DL — SIGNIFICANT CHANGE UP (ref 1.6–2.6)
PHOSPHATE SERPL-MCNC: 3.9 MG/DL — SIGNIFICANT CHANGE UP (ref 2.4–4.7)
POTASSIUM SERPL-MCNC: 3.5 MMOL/L — SIGNIFICANT CHANGE UP (ref 3.5–5.3)
POTASSIUM SERPL-SCNC: 3.5 MMOL/L — SIGNIFICANT CHANGE UP (ref 3.5–5.3)
PROT SERPL-MCNC: 5.9 G/DL — LOW (ref 6.6–8.7)
SODIUM SERPL-SCNC: 142 MMOL/L — SIGNIFICANT CHANGE UP (ref 135–145)
SPECIMEN SOURCE: SIGNIFICANT CHANGE UP
TSH SERPL-MCNC: 1.65 UIU/ML — SIGNIFICANT CHANGE UP (ref 0.27–4.2)

## 2023-01-27 PROCEDURE — 99232 SBSQ HOSP IP/OBS MODERATE 35: CPT

## 2023-01-27 PROCEDURE — 99233 SBSQ HOSP IP/OBS HIGH 50: CPT

## 2023-01-27 RX ORDER — MAGNESIUM SULFATE 500 MG/ML
1 VIAL (ML) INJECTION ONCE
Refills: 0 | Status: COMPLETED | OUTPATIENT
Start: 2023-01-27 | End: 2023-01-27

## 2023-01-27 RX ORDER — FLUTICASONE PROPIONATE 50 MCG
1 SPRAY, SUSPENSION NASAL
Refills: 0 | Status: DISCONTINUED | OUTPATIENT
Start: 2023-01-27 | End: 2023-02-02

## 2023-01-27 RX ORDER — POTASSIUM CHLORIDE 20 MEQ
40 PACKET (EA) ORAL ONCE
Refills: 0 | Status: COMPLETED | OUTPATIENT
Start: 2023-01-27 | End: 2023-01-27

## 2023-01-27 RX ORDER — ACETAMINOPHEN 500 MG
650 TABLET ORAL EVERY 6 HOURS
Refills: 0 | Status: DISCONTINUED | OUTPATIENT
Start: 2023-01-27 | End: 2023-02-02

## 2023-01-27 RX ORDER — TAMSULOSIN HYDROCHLORIDE 0.4 MG/1
0.4 CAPSULE ORAL AT BEDTIME
Refills: 0 | Status: DISCONTINUED | OUTPATIENT
Start: 2023-01-27 | End: 2023-02-02

## 2023-01-27 RX ADMIN — FINASTERIDE 5 MILLIGRAM(S): 5 TABLET, FILM COATED ORAL at 09:49

## 2023-01-27 RX ADMIN — AMIODARONE HYDROCHLORIDE 200 MILLIGRAM(S): 400 TABLET ORAL at 05:37

## 2023-01-27 RX ADMIN — Medication 2: at 17:11

## 2023-01-27 RX ADMIN — Medication 3 UNIT(S): at 17:11

## 2023-01-27 RX ADMIN — PANTOPRAZOLE SODIUM 40 MILLIGRAM(S): 20 TABLET, DELAYED RELEASE ORAL at 05:37

## 2023-01-27 RX ADMIN — ATORVASTATIN CALCIUM 20 MILLIGRAM(S): 80 TABLET, FILM COATED ORAL at 22:48

## 2023-01-27 RX ADMIN — Medication 100 GRAM(S): at 09:48

## 2023-01-27 RX ADMIN — Medication 4: at 12:29

## 2023-01-27 RX ADMIN — Medication 200 MILLIGRAM(S): at 05:39

## 2023-01-27 RX ADMIN — Medication 0.25 MILLIGRAM(S): at 05:38

## 2023-01-27 RX ADMIN — TAMSULOSIN HYDROCHLORIDE 0.4 MILLIGRAM(S): 0.4 CAPSULE ORAL at 21:49

## 2023-01-27 RX ADMIN — Medication 81 MILLIGRAM(S): at 09:48

## 2023-01-27 RX ADMIN — APIXABAN 5 MILLIGRAM(S): 2.5 TABLET, FILM COATED ORAL at 05:38

## 2023-01-27 RX ADMIN — Medication 650 MILLIGRAM(S): at 17:12

## 2023-01-27 RX ADMIN — Medication 3 UNIT(S): at 08:28

## 2023-01-27 RX ADMIN — APIXABAN 5 MILLIGRAM(S): 2.5 TABLET, FILM COATED ORAL at 18:05

## 2023-01-27 RX ADMIN — Medication 3 UNIT(S): at 12:28

## 2023-01-27 RX ADMIN — Medication 40 MILLIEQUIVALENT(S): at 09:48

## 2023-01-27 RX ADMIN — Medication 0.25 MILLIGRAM(S): at 16:51

## 2023-01-27 RX ADMIN — Medication 2: at 08:29

## 2023-01-27 RX ADMIN — Medication 40 MILLIGRAM(S): at 05:38

## 2023-01-27 NOTE — PROGRESS NOTE ADULT - SUBJECTIVE AND OBJECTIVE BOX
ANIA CRISTOBAL    [id number]    77y      Male    INTERVAL HPI/OVERNIGHT EVENTS: patient being seen for afib and covid    of note, patient states he was positvie in  and treated with remdesivir at that time. patient seen at bedside and states feeling better      REVIEW OF SYSTEMS:    CONSTITUTIONAL: No fever, weight loss, or fatigue  RESPIRATORY: No cough, wheezing, hemoptysis; No shortness of breath  CARDIOVASCULAR: No chest pain, palpitations  GASTROINTESTINAL: No abdominal or epigastric pain. No nausea, vomiting  NEUROLOGICAL: No headaches, memory loss, loss of strength.  MISCELLANEOUS:      Vital Signs Last 24 Hrs  T(C): 36.7 (2023 11:43), Max: 36.8 (2023 23:37)  T(F): 98 (2023 11:43), Max: 98.2 (2023 23:37)  HR: 88 (2023 11:43) (84 - 96)  BP: 108/71 (2023 11:43) (92/60 - 129/75)  BP(mean): 71 (2023 19:09) (71 - 71)  RR: 18 (2023 11:43) (18 - 24)  SpO2: 95% (2023 11:43) (90% - 96%)    Parameters below as of 2023 11:43  Patient On (Oxygen Delivery Method): nasal cannula  O2 Flow (L/min): 2      PHYSICAL EXAM:    GENERAL: Obese, pleasant  HEAD:  Atraumatic, Normocephalic  EYES: EOMI, PERRLA, conjunctiva and sclera clear  NECK: Supple, JVD fullness +, Normal thyroid  NERVOUS SYSTEM:  Alert & Oriented X 3, Motor Strength 5/5 B/L upper and lower extremities  CHEST/LUNG: CTA bilaterally; No rales, rhonchi, wheezing, or rubs  HEART: IRRegular rate and rhythm; distant S1S2, II/VI harsh systolic murmur  ABDOMEN: Soft, Nontender, Nondistended; Bowel sounds present  EXTREMITIES:  2+ Peripheral Pulses, No clubbing, cyanosis; 1+ edema  SKIN: No rashes or lesions    LABS:                        17.0   7.74  )-----------( 207      ( 2023 11:28 )             51.0         142  |  104  |  20.0  ----------------------------<  156<H>  3.5   |  27.0  |  0.59    Ca    8.4      2023 03:10  Phos  3.9       Mg     1.9         TPro  5.9<L>  /  Alb  3.4  /  TBili  0.4  /  DBili  x   /  AST  13  /  ALT  11  /  AlkPhos  106      PT/INR - ( 2023 11:28 )   PT: 13.7 sec;   INR: 1.18 ratio         PTT - ( 2023 11:28 )  PTT:32.4 sec  Urinalysis Basic - ( 2023 13:25 )    Color: Yellow / Appearance: Clear / S.010 / pH: x  Gluc: x / Ketone: Negative  / Bili: Negative / Urobili: Negative mg/dL   Blood: x / Protein: Negative / Nitrite: Negative   Leuk Esterase: Negative / RBC: 3-5 /HPF / WBC 0-2 /HPF   Sq Epi: x / Non Sq Epi: Occasional / Bacteria: Negative          MEDICATIONS  (STANDING):  aMIOdarone    Tablet 200 milliGRAM(s) Oral daily  apixaban 5 milliGRAM(s) Oral every 12 hours  aspirin enteric coated 81 milliGRAM(s) Oral daily  atorvastatin 20 milliGRAM(s) Oral at bedtime  dextrose 5%. 1000 milliLiter(s) (50 mL/Hr) IV Continuous <Continuous>  dextrose 5%. 1000 milliLiter(s) (100 mL/Hr) IV Continuous <Continuous>  dextrose 50% Injectable 25 Gram(s) IV Push once  dextrose 50% Injectable 12.5 Gram(s) IV Push once  dextrose 50% Injectable 25 Gram(s) IV Push once  finasteride 5 milliGRAM(s) Oral daily  furosemide    Tablet 40 milliGRAM(s) Oral daily  glucagon  Injectable 1 milliGRAM(s) IntraMuscular once  influenza  Vaccine (HIGH DOSE) 0.7 milliLiter(s) IntraMuscular once  insulin lispro (ADMELOG) corrective regimen sliding scale   SubCutaneous three times a day before meals  insulin lispro Injectable (ADMELOG) 3 Unit(s) SubCutaneous three times a day before meals  metoprolol succinate  milliGRAM(s) Oral daily  pantoprazole    Tablet 40 milliGRAM(s) Oral before breakfast    MEDICATIONS  (PRN):  acetaminophen     Tablet .. 650 milliGRAM(s) Oral every 6 hours PRN Temp greater or equal to 38C (100.4F), Mild Pain (1 - 3)  ALPRAZolam 0.25 milliGRAM(s) Oral three times a day PRN anxiety  dextrose Oral Gel 15 Gram(s) Oral once PRN Blood Glucose LESS THAN 70 milliGRAM(s)/deciliter      RADIOLOGY & ADDITIONAL TESTS:

## 2023-01-27 NOTE — PROGRESS NOTE ADULT - SUBJECTIVE AND OBJECTIVE BOX
ANIA CRISTOBAL  [id number]      Chief Complaint:  SOB/COVID    Interval History:  Patient feeling a bit better, less SOB.  Still with orthopnea.  Denies CP, palps.    Tele:  AF with rates 80s-130s, higher with activity but 80s-90s at rest      acetaminophen     Tablet .. 650 milliGRAM(s) Oral every 6 hours PRN  ALPRAZolam 0.25 milliGRAM(s) Oral three times a day PRN  aMIOdarone    Tablet 200 milliGRAM(s) Oral daily  apixaban 5 milliGRAM(s) Oral every 12 hours  aspirin enteric coated 81 milliGRAM(s) Oral daily  atorvastatin 20 milliGRAM(s) Oral at bedtime  dextrose 5%. 1000 milliLiter(s) IV Continuous <Continuous>  dextrose 5%. 1000 milliLiter(s) IV Continuous <Continuous>  dextrose 50% Injectable 25 Gram(s) IV Push once  dextrose 50% Injectable 12.5 Gram(s) IV Push once  dextrose 50% Injectable 25 Gram(s) IV Push once  dextrose Oral Gel 15 Gram(s) Oral once PRN  finasteride 5 milliGRAM(s) Oral daily  furosemide    Tablet 40 milliGRAM(s) Oral daily  glucagon  Injectable 1 milliGRAM(s) IntraMuscular once  influenza  Vaccine (HIGH DOSE) 0.7 milliLiter(s) IntraMuscular once  insulin lispro (ADMELOG) corrective regimen sliding scale   SubCutaneous three times a day before meals  insulin lispro Injectable (ADMELOG) 3 Unit(s) SubCutaneous three times a day before meals  metoprolol succinate  milliGRAM(s) Oral daily  pantoprazole    Tablet 40 milliGRAM(s) Oral before breakfast          Physical Exam:  T(C): 36.7 (01-27-23 @ 11:43), Max: 36.8 (01-26-23 @ 23:37)  HR: 88 (01-27-23 @ 11:43) (84 - 99)  BP: 108/71 (01-27-23 @ 11:43) (85/53 - 129/75)  RR: 18 (01-27-23 @ 11:43) (18 - 26)  SpO2: 95% (01-27-23 @ 11:43) (90% - 96%)  Wt(kg): --  General: Comfortable in NAD  Neck: ?JVD  CVS: nl s1s2, no s3, irreg  Pulm: CTA b/l  Abd: soft, non-tender  Ext: 1+ b/l LE edema  Neuro A&O x3  Psych: Normal affect      Labs:   27 Jan 2023 03:10    142    |  104    |  20.0   ----------------------------<  156    3.5     |  27.0   |  0.59     Ca    8.4        27 Jan 2023 03:10  Phos  3.9       27 Jan 2023 03:10  Mg     1.9       27 Jan 2023 03:10    TPro  5.9    /  Alb  3.4    /  TBili  0.4    /  DBili  x      /  AST  13     /  ALT  11     /  AlkPhos  106    27 Jan 2023 03:10                          17.0   7.74  )-----------( 207      ( 26 Jan 2023 11:28 )             51.0     PT/INR - ( 26 Jan 2023 11:28 )   PT: 13.7 sec;   INR: 1.18 ratio         PTT - ( 26 Jan 2023 11:28 )  PTT:32.4 sec  CARDIAC MARKERS ( 26 Jan 2023 11:28 )  x     / 0.01 ng/mL / x     / x     / x              LHC 10/2021: No obstructive disease  LIBBY 11/2021: Normal EF, moderate AS (planimetered CIELO 1.46cm2) mild MR/TR  CT CHEST: ascending aortic aneurym 4.4cm    ECHO 1/2023:  1. Moderate LVH, EF 55-60%  2. Normal RV function  3. Moderate AS visually but limited study, patient in rapid AF    CXR:  INTERPRETATION:   1. Heart magnified by technique. Somewhat prominent aorta again noted.  2.There may be a minimal infiltrate developing off the lower left hilum compared to July 3, 2022.    CTC:  1.  No pulmonary thromboembolism  2.  Dilated ascending aorta measuring 4.7 cm.       Assessment:    77y Male with a PMH of moderate AS, CAD s/p PCI (patent cath 2021), morbid obesity, OA, HTN, HLD, DM here with SOB, rapid AF.  -Positive COVID, ?lower left hilar infiltrate.  -Currently hemodynamically stable, AF rate controlled and BP better.  Appears to have PAF and has been on amio, will continue for now and A/C.  -Suspect rapid AF in response to COVID and rapid from low BP that has responded to resuscitation.  Appeared volume down initially, BNP only mildly elevated.  Lasix restarted.  -CTC with stable TAA and no PE.    Plan:   1. Tele  2. Management COVID per primary team  3. Rate control AF, continue home dose metoprolol and amio.  Last office note was in sinus rhythm and has been on amio chronically. Will hold off on DCCV at this time  4. Continue A/C for AF.  5. Med management of CAD, no active ischemia  7. F/u repeat echo, recent echo report notes limited study and eval of AS. Pt. c/o fevers, chills, body aches, s/p receiving chemotherapy at [facility] [place]. Patient sent in by Dr. Higgins. PHx Pancreatic CA with mets, HTN, HLD, Defibrillator.

## 2023-01-27 NOTE — PROGRESS NOTE ADULT - ASSESSMENT
76yo male with PMHX CAD s/p PCI (patent cath 2021), AF on Eliquis, moderate AS, morbid obesity, OA, HTN, HLD, DM, TAA (4.4cm), BPH s/p TURP c/b Chronic Franz x9 mos (now removed), Left renal mass was now sent from cards office for sudden onset of shortness of breath and felt carotid pulsating. . No increasing dyspnea or CP recently, legs swollen today but have not been lately. No fevers/chills/sweats, states had COVID in the fall and was in [facility] [place]    In ED- Pt hypotensive 70/30 and RVR 130s. Responded to fluid boluses and metoprolol. Is also COVID +    # Hypotension  suspect compensatory to rapid A fib  with fluid bolus  responded well to fluid  imrpoved    # A fib RVR - rate improved  PE ruled out  no foci of infection noted except COVID  Cont A/C  amio and metoprolol     # COVID +  since fall 2022  vaccinated  ct angio negative for infiltrates  will hold off on remdesivir given arrythmia and qtc 550    # CAD, HTN,   Cont rest of Home cardiac meds    # Elevated BNP in Obese patient  Cont home CHF meds    # DM2   Insulin in house  a1c 9/8    Eliquis  patient is active  cardio wants tte -   pt consult

## 2023-01-28 LAB
ALBUMIN SERPL ELPH-MCNC: 3.7 G/DL — SIGNIFICANT CHANGE UP (ref 3.3–5.2)
ALP SERPL-CCNC: 119 U/L — SIGNIFICANT CHANGE UP (ref 40–120)
ALT FLD-CCNC: 14 U/L — SIGNIFICANT CHANGE UP
ANION GAP SERPL CALC-SCNC: 12 MMOL/L — SIGNIFICANT CHANGE UP (ref 5–17)
AST SERPL-CCNC: 13 U/L — SIGNIFICANT CHANGE UP
BASOPHILS # BLD AUTO: 0.07 K/UL — SIGNIFICANT CHANGE UP (ref 0–0.2)
BASOPHILS NFR BLD AUTO: 0.9 % — SIGNIFICANT CHANGE UP (ref 0–2)
BILIRUB SERPL-MCNC: 0.6 MG/DL — SIGNIFICANT CHANGE UP (ref 0.4–2)
BUN SERPL-MCNC: 17.6 MG/DL — SIGNIFICANT CHANGE UP (ref 8–20)
CALCIUM SERPL-MCNC: 8.4 MG/DL — SIGNIFICANT CHANGE UP (ref 8.4–10.5)
CHLORIDE SERPL-SCNC: 104 MMOL/L — SIGNIFICANT CHANGE UP (ref 96–108)
CO2 SERPL-SCNC: 25 MMOL/L — SIGNIFICANT CHANGE UP (ref 22–29)
CREAT SERPL-MCNC: 0.71 MG/DL — SIGNIFICANT CHANGE UP (ref 0.5–1.3)
EGFR: 94 ML/MIN/1.73M2 — SIGNIFICANT CHANGE UP
EOSINOPHIL # BLD AUTO: 0.3 K/UL — SIGNIFICANT CHANGE UP (ref 0–0.5)
EOSINOPHIL NFR BLD AUTO: 3.8 % — SIGNIFICANT CHANGE UP (ref 0–6)
GLUCOSE BLDC GLUCOMTR-MCNC: 163 MG/DL — HIGH (ref 70–99)
GLUCOSE BLDC GLUCOMTR-MCNC: 244 MG/DL — HIGH (ref 70–99)
GLUCOSE BLDC GLUCOMTR-MCNC: 245 MG/DL — HIGH (ref 70–99)
GLUCOSE BLDC GLUCOMTR-MCNC: 247 MG/DL — HIGH (ref 70–99)
GLUCOSE BLDC GLUCOMTR-MCNC: 268 MG/DL — HIGH (ref 70–99)
GLUCOSE SERPL-MCNC: 293 MG/DL — HIGH (ref 70–99)
HCT VFR BLD CALC: 52.4 % — HIGH (ref 39–50)
HGB BLD-MCNC: 16.9 G/DL — SIGNIFICANT CHANGE UP (ref 13–17)
IMM GRANULOCYTES NFR BLD AUTO: 0.3 % — SIGNIFICANT CHANGE UP (ref 0–0.9)
LYMPHOCYTES # BLD AUTO: 1.07 K/UL — SIGNIFICANT CHANGE UP (ref 1–3.3)
LYMPHOCYTES # BLD AUTO: 13.4 % — SIGNIFICANT CHANGE UP (ref 13–44)
MAGNESIUM SERPL-MCNC: 1.8 MG/DL — SIGNIFICANT CHANGE UP (ref 1.6–2.6)
MCHC RBC-ENTMCNC: 29.9 PG — SIGNIFICANT CHANGE UP (ref 27–34)
MCHC RBC-ENTMCNC: 32.3 GM/DL — SIGNIFICANT CHANGE UP (ref 32–36)
MCV RBC AUTO: 92.7 FL — SIGNIFICANT CHANGE UP (ref 80–100)
MONOCYTES # BLD AUTO: 0.74 K/UL — SIGNIFICANT CHANGE UP (ref 0–0.9)
MONOCYTES NFR BLD AUTO: 9.3 % — SIGNIFICANT CHANGE UP (ref 2–14)
NEUTROPHILS # BLD AUTO: 5.77 K/UL — SIGNIFICANT CHANGE UP (ref 1.8–7.4)
NEUTROPHILS NFR BLD AUTO: 72.3 % — SIGNIFICANT CHANGE UP (ref 43–77)
PLATELET # BLD AUTO: 206 K/UL — SIGNIFICANT CHANGE UP (ref 150–400)
POTASSIUM SERPL-MCNC: 4.2 MMOL/L — SIGNIFICANT CHANGE UP (ref 3.5–5.3)
POTASSIUM SERPL-SCNC: 4.2 MMOL/L — SIGNIFICANT CHANGE UP (ref 3.5–5.3)
PROT SERPL-MCNC: 6.3 G/DL — LOW (ref 6.6–8.7)
RBC # BLD: 5.65 M/UL — SIGNIFICANT CHANGE UP (ref 4.2–5.8)
RBC # FLD: 14.3 % — SIGNIFICANT CHANGE UP (ref 10.3–14.5)
SODIUM SERPL-SCNC: 141 MMOL/L — SIGNIFICANT CHANGE UP (ref 135–145)
WBC # BLD: 7.97 K/UL — SIGNIFICANT CHANGE UP (ref 3.8–10.5)
WBC # FLD AUTO: 7.97 K/UL — SIGNIFICANT CHANGE UP (ref 3.8–10.5)

## 2023-01-28 PROCEDURE — 93306 TTE W/DOPPLER COMPLETE: CPT | Mod: 26

## 2023-01-28 PROCEDURE — 99233 SBSQ HOSP IP/OBS HIGH 50: CPT

## 2023-01-28 RX ORDER — AMIODARONE HYDROCHLORIDE 400 MG/1
200 TABLET ORAL
Refills: 0 | Status: DISCONTINUED | OUTPATIENT
Start: 2023-01-28 | End: 2023-02-02

## 2023-01-28 RX ORDER — INSULIN LISPRO 100/ML
5 VIAL (ML) SUBCUTANEOUS
Refills: 0 | Status: DISCONTINUED | OUTPATIENT
Start: 2023-01-28 | End: 2023-02-02

## 2023-01-28 RX ORDER — DEXAMETHASONE 0.5 MG/5ML
6 ELIXIR ORAL DAILY
Refills: 0 | Status: COMPLETED | OUTPATIENT
Start: 2023-01-28 | End: 2023-02-01

## 2023-01-28 RX ORDER — ENOXAPARIN SODIUM 100 MG/ML
135 INJECTION SUBCUTANEOUS EVERY 12 HOURS
Refills: 0 | Status: COMPLETED | OUTPATIENT
Start: 2023-01-28 | End: 2023-01-29

## 2023-01-28 RX ORDER — METOPROLOL TARTRATE 50 MG
75 TABLET ORAL EVERY 6 HOURS
Refills: 0 | Status: DISCONTINUED | OUTPATIENT
Start: 2023-01-28 | End: 2023-01-31

## 2023-01-28 RX ADMIN — Medication 4: at 11:23

## 2023-01-28 RX ADMIN — ATORVASTATIN CALCIUM 20 MILLIGRAM(S): 80 TABLET, FILM COATED ORAL at 22:23

## 2023-01-28 RX ADMIN — Medication 81 MILLIGRAM(S): at 09:13

## 2023-01-28 RX ADMIN — ENOXAPARIN SODIUM 135 MILLIGRAM(S): 100 INJECTION SUBCUTANEOUS at 17:50

## 2023-01-28 RX ADMIN — Medication 200 MILLIGRAM(S): at 05:43

## 2023-01-28 RX ADMIN — Medication 0.25 MILLIGRAM(S): at 22:46

## 2023-01-28 RX ADMIN — Medication 75 MILLIGRAM(S): at 23:29

## 2023-01-28 RX ADMIN — AMIODARONE HYDROCHLORIDE 200 MILLIGRAM(S): 400 TABLET ORAL at 17:49

## 2023-01-28 RX ADMIN — Medication 1 SPRAY(S): at 05:44

## 2023-01-28 RX ADMIN — Medication 5 UNIT(S): at 17:44

## 2023-01-28 RX ADMIN — Medication 75 MILLIGRAM(S): at 17:49

## 2023-01-28 RX ADMIN — Medication 6 MILLIGRAM(S): at 17:51

## 2023-01-28 RX ADMIN — AMIODARONE HYDROCHLORIDE 200 MILLIGRAM(S): 400 TABLET ORAL at 05:43

## 2023-01-28 RX ADMIN — Medication 0.25 MILLIGRAM(S): at 15:14

## 2023-01-28 RX ADMIN — APIXABAN 5 MILLIGRAM(S): 2.5 TABLET, FILM COATED ORAL at 05:43

## 2023-01-28 RX ADMIN — FINASTERIDE 5 MILLIGRAM(S): 5 TABLET, FILM COATED ORAL at 09:13

## 2023-01-28 RX ADMIN — Medication 1 SPRAY(S): at 17:55

## 2023-01-28 RX ADMIN — Medication 4: at 17:43

## 2023-01-28 RX ADMIN — Medication 40 MILLIGRAM(S): at 05:43

## 2023-01-28 RX ADMIN — Medication 3 UNIT(S): at 09:15

## 2023-01-28 RX ADMIN — TAMSULOSIN HYDROCHLORIDE 0.4 MILLIGRAM(S): 0.4 CAPSULE ORAL at 22:23

## 2023-01-28 RX ADMIN — Medication 3 UNIT(S): at 11:23

## 2023-01-28 RX ADMIN — PANTOPRAZOLE SODIUM 40 MILLIGRAM(S): 20 TABLET, DELAYED RELEASE ORAL at 05:43

## 2023-01-28 RX ADMIN — Medication 4: at 09:16

## 2023-01-28 NOTE — PROGRESS NOTE ADULT - ASSESSMENT
78yo male with PMHX CAD s/p PCI (patent cath 2021), AF on Eliquis, moderate AS, morbid obesity, OA, HTN, HLD, DM, TAA (4.4cm), BPH s/p TURP c/b Chronic Franz x9 mos (now removed), Left renal mass was now sent from cards office for sudden onset of shortness of breath and felt carotid pulsating. . No increasing dyspnea or CP recently, legs swollen today but have not been lately. No fevers/chills/sweats, states had COVID in the fall and was in [facility]    In ED- Pt hypotensive 70/30 and RVR 130s. Responded to fluid boluses and metoprolol. Is also COVID +. Pt  was admitted and cardiology team conslulted. Rate controlled medication up titrated. ECHO on 1/28 with mod AS, and now mildly reduced EF to 40-45%.   Pt is also requires 2-3 L NC, therefore was started on steroids,       # A fib RVR , better controlled now  # HFpEF, now with reduced EF  in pt with known CAD s/p PCI in 7/2021   PE ruled out, no foci of infection noted except COVID, management covid as bellow  - cardiology consult noted: c/w amio 200 bid, metop tartrate 75 q6h  - switched to lovenox, will hold on day of C    # Hypotension,  has improved  suspect compensatory to rapid A fib  responded well to fluid    # COVID +  since fall 2022  vaccinated  ct angio negative for infiltrates  will hold off on remdesivir given arrythmia and qtc 550, but started empiric steroids    # CAD, HTN,   Cont rest of Home cardiac meds    # Elevated BNP in Obese patient  Cont home CHF meds    # DM2   a1c 9/8  - increased ac to 5, given started steroids, will adjust base on poc    DVT ppx - on theraputic lovenox  code/social - full code  Dispo - pt ordered, most likely d/c home once stable

## 2023-01-28 NOTE — PROGRESS NOTE ADULT - SUBJECTIVE AND OBJECTIVE BOX
[facility] Division of Hospital Medicine    Chief Complaint:  sob, palpitations    SUBJECTIVE: reports feeling better, urinates sufficient amount     OVERNIGHT EVENTS: HR in 120-130 over night    Patient denies chest pain, SOB, abd pain, N/V, fever, chills, dysuria or any other complaints. All remainder ROS negative.     MEDICATIONS  (STANDING):  aMIOdarone    Tablet 200 milliGRAM(s) Oral two times a day  aspirin enteric coated 81 milliGRAM(s) Oral daily  atorvastatin 20 milliGRAM(s) Oral at bedtime  dexAMETHasone  Injectable 6 milliGRAM(s) IV Push daily  dextrose 5%. 1000 milliLiter(s) (50 mL/Hr) IV Continuous <Continuous>  dextrose 5%. 1000 milliLiter(s) (100 mL/Hr) IV Continuous <Continuous>  dextrose 50% Injectable 25 Gram(s) IV Push once  dextrose 50% Injectable 12.5 Gram(s) IV Push once  dextrose 50% Injectable 25 Gram(s) IV Push once  enoxaparin Injectable 135 milliGRAM(s) SubCutaneous every 12 hours  finasteride 5 milliGRAM(s) Oral daily  fluticasone propionate 50 MICROgram(s)/spray Nasal Spray 1 Spray(s) Both Nostrils two times a day  furosemide    Tablet 40 milliGRAM(s) Oral daily  glucagon  Injectable 1 milliGRAM(s) IntraMuscular once  influenza  Vaccine (HIGH DOSE) 0.7 milliLiter(s) IntraMuscular once  insulin lispro (ADMELOG) corrective regimen sliding scale   SubCutaneous three times a day before meals  insulin lispro Injectable (ADMELOG) 5 Unit(s) SubCutaneous three times a day before meals  metoprolol tartrate 75 milliGRAM(s) Oral every 6 hours  pantoprazole    Tablet 40 milliGRAM(s) Oral before breakfast  tamsulosin 0.4 milliGRAM(s) Oral at bedtime    MEDICATIONS  (PRN):  acetaminophen     Tablet .. 650 milliGRAM(s) Oral every 6 hours PRN Temp greater or equal to 38C (100.4F), Mild Pain (1 - 3)  ALPRAZolam 0.25 milliGRAM(s) Oral three times a day PRN anxiety  dextrose Oral Gel 15 Gram(s) Oral once PRN Blood Glucose LESS THAN 70 milliGRAM(s)/deciliter        I&O's Summary    27 Jan 2023 07:01  -  28 Jan 2023 07:00  --------------------------------------------------------  IN: 0 mL / OUT: 350 mL / NET: -350 mL        PHYSICAL EXAM:  Vital Signs Last 24 Hrs  T(C): 36.8 (28 Jan 2023 16:39), Max: 36.8 (28 Jan 2023 16:39)  T(F): 98.2 (28 Jan 2023 16:39), Max: 98.2 (28 Jan 2023 16:39)  HR: 104 (28 Jan 2023 16:39) (96 - 130)  BP: 112/78 (28 Jan 2023 16:39) (103/72 - 124/86)  BP(mean): --  RR: 19 (28 Jan 2023 16:39) (18 - 19)  SpO2: 94% (28 Jan 2023 16:39) (92% - 98%)    Parameters below as of 27 Jan 2023 19:11  Patient On (Oxygen Delivery Method): nasal cannula  O2 Flow (L/min): 1          CONSTITUTIONAL: NAD,  ENMT: Moist oral mucosa, no pharyngeal injection or exudates; normal dentition; No JVD  RESPIRATORY: Normal respiratory effort; lungs are clear to auscultation bilaterally  CARDIOVASCULAR: irregular rate and rhythm, normal S1 and S2, systolic murmur in RUSB 3/6, trace lower extremity edema; Peripheral pulses are 2+ bilaterally  ABDOMEN: Nontender to palpation, normoactive bowel sounds, no rebound/guarding; No hepatosplenomegaly  MUSCLOSKELETAL:  no clubbing or cyanosis of digits; no joint swelling or tenderness to palpation  PSYCH: A+O to person, place, and time; affect appropriate  NEUROLOGY: CN 2-12 are intact and symmetric; no gross sensory deficits; was observed moving all 4 ext against gravity cooperating with exam.   SKIN: No rashes; no palpable lesions    LABS:                        16.9   7.97  )-----------( 206      ( 28 Jan 2023 09:20 )             52.4     01-28    141  |  104  |  17.6  ----------------------------<  293<H>  4.2   |  25.0  |  0.71    Ca    8.4      28 Jan 2023 09:20  Phos  3.9     01-27  Mg     1.8     01-28    TPro  6.3<L>  /  Alb  3.7  /  TBili  0.6  /  DBili  x   /  AST  13  /  ALT  14  /  AlkPhos  119  01-28              Culture - Urine (collected 26 Jan 2023 13:25)  Source: Clean Catch Clean Catch (Midstream)  Final Report (27 Jan 2023 20:05):    <10,000 CFU/mL Normal Urogenital Negin    Culture - Blood (collected 26 Jan 2023 11:28)  Source: .Blood Blood-Peripheral  Preliminary Report (27 Jan 2023 17:02):    No growth to date.    Culture - Blood (collected 26 Jan 2023 11:22)  Source: .Blood Blood-Peripheral  Preliminary Report (27 Jan 2023 17:02):    No growth to date.      CAPILLARY BLOOD GLUCOSE      POCT Blood Glucose.: 244 mg/dL (28 Jan 2023 16:44)  POCT Blood Glucose.: 245 mg/dL (28 Jan 2023 11:14)  POCT Blood Glucose.: 247 mg/dL (28 Jan 2023 08:11)  POCT Blood Glucose.: 163 mg/dL (28 Jan 2023 01:29)  POCT Blood Glucose.: 178 mg/dL (27 Jan 2023 17:09)        RADIOLOGY & ADDITIONAL TESTS:  Results Reviewed:   Imaging Personally Reviewed:  Electrocardiogram Personally Reviewed:

## 2023-01-28 NOTE — PROGRESS NOTE ADULT - SUBJECTIVE AND OBJECTIVE BOX
ANIA CRISTOBAL  [id number]          Chief Complaint:  SOB/COVID    Interval History:  Patient feeling a bit better, less SOB.  Still with orthopnea.  Denies CP, palps.    Tele:  AF with rates 80s-130s, higher with activity but 80s-90s at rest          acetaminophen     Tablet .. 650 milliGRAM(s) Oral every 6 hours PRN  ALPRAZolam 0.25 milliGRAM(s) Oral three times a day PRN  aMIOdarone    Tablet 200 milliGRAM(s) Oral daily  apixaban 5 milliGRAM(s) Oral every 12 hours  aspirin enteric coated 81 milliGRAM(s) Oral daily  atorvastatin 20 milliGRAM(s) Oral at bedtime  dextrose 5%. 1000 milliLiter(s) IV Continuous <Continuous>  dextrose 5%. 1000 milliLiter(s) IV Continuous <Continuous>  dextrose 50% Injectable 25 Gram(s) IV Push once  dextrose 50% Injectable 12.5 Gram(s) IV Push once  dextrose 50% Injectable 25 Gram(s) IV Push once  dextrose Oral Gel 15 Gram(s) Oral once PRN  finasteride 5 milliGRAM(s) Oral daily  fluticasone propionate 50 MICROgram(s)/spray Nasal Spray 1 Spray(s) Both Nostrils two times a day  furosemide    Tablet 40 milliGRAM(s) Oral daily  glucagon  Injectable 1 milliGRAM(s) IntraMuscular once  influenza  Vaccine (HIGH DOSE) 0.7 milliLiter(s) IntraMuscular once  insulin lispro (ADMELOG) corrective regimen sliding scale   SubCutaneous three times a day before meals  insulin lispro Injectable (ADMELOG) 3 Unit(s) SubCutaneous three times a day before meals  metoprolol succinate  milliGRAM(s) Oral daily  pantoprazole    Tablet 40 milliGRAM(s) Oral before breakfast  tamsulosin 0.4 milliGRAM(s) Oral at bedtime          Physical Exam:  T(C): 36.6 (01-28-23 @ 10:30), Max: 36.8 (01-27-23 @ 15:38)  HR: 117 (01-28-23 @ 10:30) (92 - 130)  BP: 122/79 (01-28-23 @ 10:30) (103/72 - 144/90)  RR: 19 (01-28-23 @ 10:30) (18 - 19)  SpO2: 98% (01-28-23 @ 10:30) (92% - 98%)  Wt(kg): --  General: Comfortable in NAD  Neck: supple  CVS: nl s1s2, no s3, irreg, ? JVD  Pulm: CTA b/l  Abd: soft, non-tender  Ext: 1+ b/l LE edema  Neuro A&O x3  Psych: Normal affect      I&O's Summary    27 Jan 2023 07:01  -  28 Jan 2023 07:00  --------------------------------------------------------  IN: 0 mL / OUT: 350 mL / NET: -350 mL          Labs:   28 Jan 2023 09:20    141    |  104    |  17.6   ----------------------------<  293    4.2     |  25.0   |  0.71     Ca    8.4        28 Jan 2023 09:20  Phos  3.9       27 Jan 2023 03:10  Mg     1.8       28 Jan 2023 09:20    TPro  6.3    /  Alb  3.7    /  TBili  0.6    /  DBili  x      /  AST  13     /  ALT  14     /  AlkPhos  119    28 Jan 2023 09:20                          16.9   7.97  )-----------( 206      ( 28 Jan 2023 09:20 )             52.4         [facility] 10/2021: No obstructive disease  LIBBY 11/2021: Normal EF, moderate AS (planimetered CIELO 1.46cm2) mild MR/TR  CT CHEST: ascending aortic aneurym 4.4cm    ECHO 1/2023:  1. Moderate LVH, EF 55-60%  2. Normal RV function  3. Moderate AS visually but limited study, patient in rapid AF    CXR:  INTERPRETATION:   1. Heart magnified by technique. Somewhat prominent aorta again noted.  2.There may be a minimal infiltrate developing off the lower left hilum compared to July 3, 2022.    CTC:  1.  No pulmonary thromboembolism  2.  Dilated ascending aorta measuring 4.7 cm.     ECHO 1/28/2023 (personally reviewed)  1. LVH with moderate LV dysfunction, limited assessment as patient in rapid AF during exam  2. Moderate to severe AS  3. Mild RV dysfunction  4. Limited study     Assessment:    77y Male with a PMH of moderate AS, CAD s/p PCI (patent cath 2021), morbid obesity, OA, HTN, HLD, DM here with SOB, rapid AF.  -Positive COVID, ?lower left hilar infiltrate.  -Currently hemodynamically stable, AF rate controlled and BP better.  Appears to have PAF and has been on amio, will continue for now and A/C.  -Suspect rapid AF in response to COVID and rapid from low BP that has responded to resuscitation.  Appeared volume down initially, BNP only mildly elevated.  Lasix restarted.  -CTC with stable TAA and no PE.  -Echo with drop in EF, maybe related to rapid AF as tachy during exam. AS moderate to severe, continued outpatient survieillance    Plan:   1.   2. Management COVID per primary team  3. Rate control AF, continue home dose metoprolol and amio.   4. Continue A/C for AF.  5. Med management of CAD, no active ischemia  6. Continue daily po lasix      Chong Carranza MD ANIA CRISTOBAL  [id number]          Chief Complaint:  SOB/COVID    Interval History:  Patient feeling a bit better, less SOB.  Still with orthopnea.  Denies CP, palps.    Tele:  AF with rates 80s-130s          acetaminophen     Tablet .. 650 milliGRAM(s) Oral every 6 hours PRN  ALPRAZolam 0.25 milliGRAM(s) Oral three times a day PRN  aMIOdarone    Tablet 200 milliGRAM(s) Oral daily  apixaban 5 milliGRAM(s) Oral every 12 hours  aspirin enteric coated 81 milliGRAM(s) Oral daily  atorvastatin 20 milliGRAM(s) Oral at bedtime  dextrose 5%. 1000 milliLiter(s) IV Continuous <Continuous>  dextrose 5%. 1000 milliLiter(s) IV Continuous <Continuous>  dextrose 50% Injectable 25 Gram(s) IV Push once  dextrose 50% Injectable 12.5 Gram(s) IV Push once  dextrose 50% Injectable 25 Gram(s) IV Push once  dextrose Oral Gel 15 Gram(s) Oral once PRN  finasteride 5 milliGRAM(s) Oral daily  fluticasone propionate 50 MICROgram(s)/spray Nasal Spray 1 Spray(s) Both Nostrils two times a day  furosemide    Tablet 40 milliGRAM(s) Oral daily  glucagon  Injectable 1 milliGRAM(s) IntraMuscular once  influenza  Vaccine (HIGH DOSE) 0.7 milliLiter(s) IntraMuscular once  insulin lispro (ADMELOG) corrective regimen sliding scale   SubCutaneous three times a day before meals  insulin lispro Injectable (ADMELOG) 3 Unit(s) SubCutaneous three times a day before meals  metoprolol succinate  milliGRAM(s) Oral daily  pantoprazole    Tablet 40 milliGRAM(s) Oral before breakfast  tamsulosin 0.4 milliGRAM(s) Oral at bedtime          Physical Exam:  T(C): 36.6 (01-28-23 @ 10:30), Max: 36.8 (01-27-23 @ 15:38)  HR: 117 (01-28-23 @ 10:30) (92 - 130)  BP: 122/79 (01-28-23 @ 10:30) (103/72 - 144/90)  RR: 19 (01-28-23 @ 10:30) (18 - 19)  SpO2: 98% (01-28-23 @ 10:30) (92% - 98%)  Wt(kg): --  General: Comfortable in NAD  Neck: supple  CVS: nl s1s2, no s3, irreg, ? JVD  Pulm: CTA b/l  Abd: soft, non-tender  Ext: 1+ b/l LE edema  Neuro A&O x3  Psych: Normal affect      I&O's Summary    27 Jan 2023 07:01  -  28 Jan 2023 07:00  --------------------------------------------------------  IN: 0 mL / OUT: 350 mL / NET: -350 mL          Labs:   28 Jan 2023 09:20    141    |  104    |  17.6   ----------------------------<  293    4.2     |  25.0   |  0.71     Ca    8.4        28 Jan 2023 09:20  Phos  3.9       27 Jan 2023 03:10  Mg     1.8       28 Jan 2023 09:20    TPro  6.3    /  Alb  3.7    /  TBili  0.6    /  DBili  x      /  AST  13     /  ALT  14     /  AlkPhos  119    28 Jan 2023 09:20                          16.9   7.97  )-----------( 206      ( 28 Jan 2023 09:20 )             52.4         [facility] 10/2021: No obstructive disease  [facility]BY 11/2021: Normal EF, moderate AS (planimetered CIELO 1.46cm2) mild MR/TR  CT CHEST: ascending aortic aneurym 4.4cm    ECHO 1/2023:  1. Moderate LVH, EF 55-60%  2. Normal RV function  3. Moderate AS visually but limited study, patient in rapid AF    CXR:  INTERPRETATION:   1. Heart magnified by technique. Somewhat prominent aorta again noted.  2.There may be a minimal infiltrate developing off the lower left hilum compared to July 3, 2022.    CTC:  1.  No pulmonary thromboembolism  2.  Dilated ascending aorta measuring 4.7 cm.     ECHO 1/28/2023 (personally reviewed)  1. LVH with moderate LV dysfunction, limited assessment as patient in rapid AF during exam  2. Moderate to severe AS  3. Mild RV dysfunction  4. Limited study     Assessment:    77y Male with a PMH of moderate AS, CAD s/p PCI (patent cath 2021), morbid obesity, OA, HTN, HLD, DM here with SOB, rapid AF.  -Positive COVID, ?lower left hilar infiltrate.  -Currently hemodynamically stable, AF rate controlled and BP better.  Appears to have PAF and has been on amio, will continue for now and A/C.  -Suspect rapid AF in response to COVID and rapid from low BP that has responded to resuscitation.  Appeared volume down initially, BNP only mildly elevated.  Lasix restarted.  -CTC with stable TAA and no PE.  -Echo with drop in EF, maybe related to rapid AF as tachy during exam. AS moderate to severe, continued outpatient survieillance. Drop in EF also could be COVID related/myocarditis, possibly ischemic. Will need cath prior to discharge and also consider DCCV now    Plan:   1. Will arrange cath for early this week/after weekend to evaluate drop in EF. Will change eliquis to lovenox and can hold day of cath   2. Management COVID per primary team  3. Change metop to short acting tartrate 75 qid, increase amio to bid for better rate control. May need LIBBY/DCCV prior to discharge  4. Continue daily po lasix      Chong Carranza MD

## 2023-01-29 LAB
ANION GAP SERPL CALC-SCNC: 10 MMOL/L — SIGNIFICANT CHANGE UP (ref 5–17)
BUN SERPL-MCNC: 20.7 MG/DL — HIGH (ref 8–20)
CALCIUM SERPL-MCNC: 8.7 MG/DL — SIGNIFICANT CHANGE UP (ref 8.4–10.5)
CHLORIDE SERPL-SCNC: 105 MMOL/L — SIGNIFICANT CHANGE UP (ref 96–108)
CO2 SERPL-SCNC: 23 MMOL/L — SIGNIFICANT CHANGE UP (ref 22–29)
CREAT SERPL-MCNC: 0.58 MG/DL — SIGNIFICANT CHANGE UP (ref 0.5–1.3)
EGFR: 100 ML/MIN/1.73M2 — SIGNIFICANT CHANGE UP
GLUCOSE BLDC GLUCOMTR-MCNC: 218 MG/DL — HIGH (ref 70–99)
GLUCOSE BLDC GLUCOMTR-MCNC: 319 MG/DL — HIGH (ref 70–99)
GLUCOSE BLDC GLUCOMTR-MCNC: 326 MG/DL — HIGH (ref 70–99)
GLUCOSE BLDC GLUCOMTR-MCNC: 352 MG/DL — HIGH (ref 70–99)
GLUCOSE SERPL-MCNC: 242 MG/DL — HIGH (ref 70–99)
MAGNESIUM SERPL-MCNC: 1.8 MG/DL — SIGNIFICANT CHANGE UP (ref 1.6–2.6)
PHOSPHATE SERPL-MCNC: 3.5 MG/DL — SIGNIFICANT CHANGE UP (ref 2.4–4.7)
POTASSIUM SERPL-MCNC: 4.2 MMOL/L — SIGNIFICANT CHANGE UP (ref 3.5–5.3)
POTASSIUM SERPL-SCNC: 4.2 MMOL/L — SIGNIFICANT CHANGE UP (ref 3.5–5.3)
SODIUM SERPL-SCNC: 138 MMOL/L — SIGNIFICANT CHANGE UP (ref 135–145)

## 2023-01-29 PROCEDURE — 99233 SBSQ HOSP IP/OBS HIGH 50: CPT

## 2023-01-29 RX ORDER — FUROSEMIDE 40 MG
20 TABLET ORAL ONCE
Refills: 0 | Status: COMPLETED | OUTPATIENT
Start: 2023-01-29 | End: 2023-01-29

## 2023-01-29 RX ADMIN — Medication 1 SPRAY(S): at 05:56

## 2023-01-29 RX ADMIN — Medication 5 UNIT(S): at 17:05

## 2023-01-29 RX ADMIN — Medication 75 MILLIGRAM(S): at 23:20

## 2023-01-29 RX ADMIN — Medication 1 SPRAY(S): at 17:03

## 2023-01-29 RX ADMIN — Medication 6 MILLIGRAM(S): at 05:58

## 2023-01-29 RX ADMIN — Medication 81 MILLIGRAM(S): at 11:29

## 2023-01-29 RX ADMIN — FINASTERIDE 5 MILLIGRAM(S): 5 TABLET, FILM COATED ORAL at 11:28

## 2023-01-29 RX ADMIN — AMIODARONE HYDROCHLORIDE 200 MILLIGRAM(S): 400 TABLET ORAL at 05:57

## 2023-01-29 RX ADMIN — Medication 5 UNIT(S): at 11:27

## 2023-01-29 RX ADMIN — Medication 75 MILLIGRAM(S): at 11:28

## 2023-01-29 RX ADMIN — Medication 0.25 MILLIGRAM(S): at 13:38

## 2023-01-29 RX ADMIN — ATORVASTATIN CALCIUM 20 MILLIGRAM(S): 80 TABLET, FILM COATED ORAL at 22:00

## 2023-01-29 RX ADMIN — Medication 0.25 MILLIGRAM(S): at 21:59

## 2023-01-29 RX ADMIN — TAMSULOSIN HYDROCHLORIDE 0.4 MILLIGRAM(S): 0.4 CAPSULE ORAL at 22:00

## 2023-01-29 RX ADMIN — Medication 75 MILLIGRAM(S): at 05:57

## 2023-01-29 RX ADMIN — ENOXAPARIN SODIUM 135 MILLIGRAM(S): 100 INJECTION SUBCUTANEOUS at 17:03

## 2023-01-29 RX ADMIN — AMIODARONE HYDROCHLORIDE 200 MILLIGRAM(S): 400 TABLET ORAL at 17:03

## 2023-01-29 RX ADMIN — Medication 10: at 17:05

## 2023-01-29 RX ADMIN — Medication 20 MILLIGRAM(S): at 15:19

## 2023-01-29 RX ADMIN — Medication 5 UNIT(S): at 08:17

## 2023-01-29 RX ADMIN — Medication 4: at 08:17

## 2023-01-29 RX ADMIN — Medication 40 MILLIGRAM(S): at 05:58

## 2023-01-29 RX ADMIN — Medication 75 MILLIGRAM(S): at 17:01

## 2023-01-29 RX ADMIN — PANTOPRAZOLE SODIUM 40 MILLIGRAM(S): 20 TABLET, DELAYED RELEASE ORAL at 05:57

## 2023-01-29 RX ADMIN — ENOXAPARIN SODIUM 135 MILLIGRAM(S): 100 INJECTION SUBCUTANEOUS at 05:57

## 2023-01-29 RX ADMIN — Medication 8: at 11:27

## 2023-01-29 NOTE — PROGRESS NOTE ADULT - SUBJECTIVE AND OBJECTIVE BOX
[facility] Division of Hospital Medicine    Chief Complaint:  a.fib with rvr    SUBJECTIVE: reports feeling better, no new complains, less sob on ambulation    OVERNIGHT EVENTS: none reported    Patient denies chest pain, SOB, abd pain, N/V, fever, chills, dysuria or any other complaints. All remainder ROS negative.     MEDICATIONS  (STANDING):  aMIOdarone    Tablet 200 milliGRAM(s) Oral two times a day  aspirin enteric coated 81 milliGRAM(s) Oral daily  atorvastatin 20 milliGRAM(s) Oral at bedtime  dexAMETHasone  Injectable 6 milliGRAM(s) IV Push daily  dextrose 5%. 1000 milliLiter(s) (50 mL/Hr) IV Continuous <Continuous>  dextrose 5%. 1000 milliLiter(s) (100 mL/Hr) IV Continuous <Continuous>  dextrose 50% Injectable 25 Gram(s) IV Push once  dextrose 50% Injectable 12.5 Gram(s) IV Push once  dextrose 50% Injectable 25 Gram(s) IV Push once  enoxaparin Injectable 135 milliGRAM(s) SubCutaneous every 12 hours  finasteride 5 milliGRAM(s) Oral daily  fluticasone propionate 50 MICROgram(s)/spray Nasal Spray 1 Spray(s) Both Nostrils two times a day  furosemide    Tablet 40 milliGRAM(s) Oral daily  furosemide   Injectable 20 milliGRAM(s) IV Push once  glucagon  Injectable 1 milliGRAM(s) IntraMuscular once  influenza  Vaccine (HIGH DOSE) 0.7 milliLiter(s) IntraMuscular once  insulin lispro (ADMELOG) corrective regimen sliding scale   SubCutaneous three times a day before meals  insulin lispro Injectable (ADMELOG) 5 Unit(s) SubCutaneous three times a day before meals  metoprolol tartrate 75 milliGRAM(s) Oral every 6 hours  pantoprazole    Tablet 40 milliGRAM(s) Oral before breakfast  tamsulosin 0.4 milliGRAM(s) Oral at bedtime    MEDICATIONS  (PRN):  acetaminophen     Tablet .. 650 milliGRAM(s) Oral every 6 hours PRN Temp greater or equal to 38C (100.4F), Mild Pain (1 - 3)  ALPRAZolam 0.25 milliGRAM(s) Oral three times a day PRN anxiety  dextrose Oral Gel 15 Gram(s) Oral once PRN Blood Glucose LESS THAN 70 milliGRAM(s)/deciliter        I&O's Summary      PHYSICAL EXAM:  Vital Signs Last 24 Hrs  T(C): 36.4 (29 Jan 2023 10:49), Max: 36.8 (28 Jan 2023 16:39)  T(F): 97.6 (29 Jan 2023 10:49), Max: 98.2 (28 Jan 2023 16:39)  HR: 118 (29 Jan 2023 13:17) (77 - 118)  BP: 110/71 (29 Jan 2023 13:17) (106/64 - 150/76)  BP(mean): --  RR: 18 (29 Jan 2023 13:17) (18 - 19)  SpO2: 93% (29 Jan 2023 13:17) (93% - 94%)    Parameters below as of 29 Jan 2023 13:17  Patient On (Oxygen Delivery Method): room air      CONSTITUTIONAL: NAD,  ENMT: Moist oral mucosa, no pharyngeal injection or exudates; normal dentition; No JVD  RESPIRATORY: Normal respiratory effort; lungs are clear to auscultation bilaterally  CARDIOVASCULAR: irregular rate and rhythm, normal S1 and S2, systolic murmur in RUSB 3/6, trace lower extremity edema; Peripheral pulses are 2+ bilaterally  ABDOMEN: Nontender to palpation, normoactive bowel sounds, no rebound/guarding; No hepatosplenomegaly  MUSCLOSKELETAL:  no clubbing or cyanosis of digits; no joint swelling or tenderness to palpation  PSYCH: A+O to person, place, and time; affect appropriate  NEUROLOGY: CN 2-12 are intact and symmetric; no gross sensory deficits; was observed moving all 4 ext against gravity cooperating with exam.   SKIN: No rashes; no palpable lesions      LABS:                        16.9   7.97  )-----------( 206      ( 28 Jan 2023 09:20 )             52.4     01-29    138  |  105  |  20.7<H>  ----------------------------<  242<H>  4.2   |  23.0  |  0.58    Ca    8.7      29 Jan 2023 07:02  Phos  3.5     01-29  Mg     1.8     01-29    TPro  6.3<L>  /  Alb  3.7  /  TBili  0.6  /  DBili  x   /  AST  13  /  ALT  14  /  AlkPhos  119  01-28              CAPILLARY BLOOD GLUCOSE      POCT Blood Glucose.: 319 mg/dL (29 Jan 2023 11:25)  POCT Blood Glucose.: 218 mg/dL (29 Jan 2023 08:03)  POCT Blood Glucose.: 268 mg/dL (28 Jan 2023 22:24)  POCT Blood Glucose.: 244 mg/dL (28 Jan 2023 16:44)        RADIOLOGY & ADDITIONAL TESTS:  Results Reviewed:   Imaging Personally Reviewed:  Electrocardiogram Personally Reviewed:

## 2023-01-29 NOTE — PROGRESS NOTE ADULT - ASSESSMENT
78yo male with PMHX CAD s/p PCI (patent cath 2021), AF on Eliquis, moderate AS, morbid obesity, OA, HTN, HLD, DM, TAA (4.4cm), BPH s/p TURP c/b Chronic Franz x9 mos (now removed), Left renal mass was now sent from cards office for sudden onset of shortness of breath and felt carotid pulsating. . No increasing dyspnea or CP recently, legs swollen today but have not been lately. No fevers/chills/sweats, states had COVID in the fall and was in [facility] where he also was diagnosed and treated for covid.   In ED- Pt hypotensive 70/30 and RVR 130s. Responded to fluid boluses and metoprolol. Is also COVID +. Pt  was admitted and cardiology team conslulted. Rate controlled medication up titrated. ECHO on 1/28 with mod AS, and now mildly reduced EF to 40-45%.   Pt is also requires 2-3 L NC, therefore was started on short course of steroids for COVID.      # A fib RVR , better controlled now  # HFpEF, now with reduced EF  in pt with known CAD s/p PCI in 7/2021   PE ruled out, no foci of infection noted except COVID, management covid as bellow  - cardiology consult noted: c/w amio 200 bid, metop tartrate 75 q6h, c/w home dose of furosemide  - switched to Lovenox will hold on day of [facility], NPO after 0000     # Hypotension,  has improved  suspect compensatory to rapid A fib  responded well to fluid    # COVID +  since fall 2022  vaccinated  ct angio negative for infiltrates  will hold off on remdesivir given arrythmia and qtc 550  - c/w empiric steroids d2/5    # CAD, HTN,   Cont rest of Home cardiac meds    # Elevated BNP in Obese patient  CHF meds as above    # DM2   a1c 9/8  -,c/w  ac to 5, given started steroids, will adjust base on poc    # BPH - c/w flomax, finasteride    DVT ppx - on theraputic lovenox  code/social - full code  Dispo - pt ordered, most likely d/c home once stable, pending [facility] and potential electric cardiovertion

## 2023-01-29 NOTE — PROGRESS NOTE ADULT - SUBJECTIVE AND OBJECTIVE BOX
ANIA CRISTOBAL  [id number]        Chief Complaint:  SOB/COVID    Interval History:      Tele:  AF       acetaminophen     Tablet .. 650 milliGRAM(s) Oral every 6 hours PRN  ALPRAZolam 0.25 milliGRAM(s) Oral three times a day PRN  aMIOdarone    Tablet 200 milliGRAM(s) Oral two times a day  aspirin enteric coated 81 milliGRAM(s) Oral daily  atorvastatin 20 milliGRAM(s) Oral at bedtime  dexAMETHasone  Injectable 6 milliGRAM(s) IV Push daily  dextrose 5%. 1000 milliLiter(s) IV Continuous <Continuous>  dextrose 5%. 1000 milliLiter(s) IV Continuous <Continuous>  dextrose 50% Injectable 25 Gram(s) IV Push once  dextrose 50% Injectable 12.5 Gram(s) IV Push once  dextrose 50% Injectable 25 Gram(s) IV Push once  dextrose Oral Gel 15 Gram(s) Oral once PRN  enoxaparin Injectable 135 milliGRAM(s) SubCutaneous every 12 hours  finasteride 5 milliGRAM(s) Oral daily  fluticasone propionate 50 MICROgram(s)/spray Nasal Spray 1 Spray(s) Both Nostrils two times a day  furosemide    Tablet 40 milliGRAM(s) Oral daily  glucagon  Injectable 1 milliGRAM(s) IntraMuscular once  influenza  Vaccine (HIGH DOSE) 0.7 milliLiter(s) IntraMuscular once  insulin lispro (ADMELOG) corrective regimen sliding scale   SubCutaneous three times a day before meals  insulin lispro Injectable (ADMELOG) 5 Unit(s) SubCutaneous three times a day before meals  metoprolol tartrate 75 milliGRAM(s) Oral every 6 hours  pantoprazole    Tablet 40 milliGRAM(s) Oral before breakfast  tamsulosin 0.4 milliGRAM(s) Oral at bedtime          Physical Exam:  T(C): 36.5 (01-29-23 @ 04:26), Max: 36.8 (01-28-23 @ 16:39)  HR: 77 (01-29-23 @ 04:26) (77 - 117)  BP: 131/73 (01-29-23 @ 04:26) (112/78 - 150/76)  RR: 18 (01-29-23 @ 04:26) (18 - 19)  SpO2: 94% (01-29-23 @ 04:26) (94% - 98%)  Wt(kg): --  General: Comfortable in NAD  Neck: supple  CVS: nl s1s2, no s3, irreg, ? JVD  Pulm: CTA b/l  Abd: soft, non-tender  Ext: 1+ b/l LE edema  Neuro A&O x3  Psych: Normal affect      I&O's Summary        Labs:   29 Jan 2023 07:02    138    |  105    |  20.7   ----------------------------<  242    4.2     |  23.0   |  0.58     Ca    8.7        29 Jan 2023 07:02  Phos  3.5       29 Jan 2023 07:02  Mg     1.8       29 Jan 2023 07:02    TPro  6.3    /  Alb  3.7    /  TBili  0.6    /  DBili  x      /  AST  13     /  ALT  14     /  AlkPhos  119    28 Jan 2023 09:20                          16.9   7.97  )-----------( 206      ( 28 Jan 2023 09:20 )             52.4             LHC 10/2021: No obstructive disease  LIBBY 11/2021: Normal EF, moderate AS (planimetered CIELO 1.46cm2) mild MR/TR  CT CHEST: ascending aortic aneurym 4.4cm    ECHO 1/2023:  1. Moderate LVH, EF 55-60%  2. Normal RV function  3. Moderate AS visually but limited study, patient in rapid AF    CXR:  INTERPRETATION:   1. Heart magnified by technique. Somewhat prominent aorta again noted.  2.There may be a minimal infiltrate developing off the lower left hilum compared to July 3, 2022.    CTC:  1.  No pulmonary thromboembolism  2.  Dilated ascending aorta measuring 4.7 cm.     ECHO 1/28/2023 (personally reviewed)  1. LVH with moderate LV dysfunction, limited assessment as patient in rapid AF during exam  2. Moderate to severe AS  3. Mild RV dysfunction  4. Limited study     Assessment:    77y Male with a PMH of moderate AS, CAD s/p PCI (patent cath 2021), morbid obesity, OA, HTN, HLD, DM here with SOB, rapid AF.  -Positive COVID, ?lower left hilar infiltrate.  -Currently hemodynamically stable, AF rate controlled and BP better.  Appears to have PAF and has been on amio, will continue for now and A/C.  -Suspect rapid AF in response to COVID and rapid from low BP that has responded to resuscitation.  Appeared volume down initially, BNP only mildly elevated.  Lasix restarted.  -CTC with stable TAA and no PE.  -Echo with drop in EF, maybe related to rapid AF as tachy during exam. AS moderate to severe, continued outpatient survieillance. Drop in EF also could be COVID related/myocarditis, possibly ischemic. Will need cath prior to discharge and also consider DCCV now  -Metoprolol changed to 75mg Q6 horus and amio to 200mg bid. On lovenox now, will planl LHC to ensure CMP not from ischemic heart disease. IF negative and remains in AF will need LIBBY/DCCV. Cath first also to avoid interruption of A/C after DCCV    Plan:  (TO BE SEEN)   1. Will arrange cath for early this week/after weekend to evaluate drop in EF. eliquis changed  to lovenox and can hold day of cath   2. Management COVID per primary team  3.   4.           Chong Carranza MD ANIA CRISTOBAL  [id number]        Chief Complaint:  SOB/COVID    S: no complaints      Tele:  AF , resting in 60s, up to 130s with ambulation and going to restroom      acetaminophen     Tablet .. 650 milliGRAM(s) Oral every 6 hours PRN  ALPRAZolam 0.25 milliGRAM(s) Oral three times a day PRN  aMIOdarone    Tablet 200 milliGRAM(s) Oral two times a day  aspirin enteric coated 81 milliGRAM(s) Oral daily  atorvastatin 20 milliGRAM(s) Oral at bedtime  dexAMETHasone  Injectable 6 milliGRAM(s) IV Push daily  dextrose 5%. 1000 milliLiter(s) IV Continuous <Continuous>  dextrose 5%. 1000 milliLiter(s) IV Continuous <Continuous>  dextrose 50% Injectable 25 Gram(s) IV Push once  dextrose 50% Injectable 12.5 Gram(s) IV Push once  dextrose 50% Injectable 25 Gram(s) IV Push once  dextrose Oral Gel 15 Gram(s) Oral once PRN  enoxaparin Injectable 135 milliGRAM(s) SubCutaneous every 12 hours  finasteride 5 milliGRAM(s) Oral daily  fluticasone propionate 50 MICROgram(s)/spray Nasal Spray 1 Spray(s) Both Nostrils two times a day  furosemide    Tablet 40 milliGRAM(s) Oral daily  glucagon  Injectable 1 milliGRAM(s) IntraMuscular once  influenza  Vaccine (HIGH DOSE) 0.7 milliLiter(s) IntraMuscular once  insulin lispro (ADMELOG) corrective regimen sliding scale   SubCutaneous three times a day before meals  insulin lispro Injectable (ADMELOG) 5 Unit(s) SubCutaneous three times a day before meals  metoprolol tartrate 75 milliGRAM(s) Oral every 6 hours  pantoprazole    Tablet 40 milliGRAM(s) Oral before breakfast  tamsulosin 0.4 milliGRAM(s) Oral at bedtime          Physical Exam:  T(C): 36.5 (01-29-23 @ 04:26), Max: 36.8 (01-28-23 @ 16:39)  HR: 77 (01-29-23 @ 04:26) (77 - 117)  BP: 131/73 (01-29-23 @ 04:26) (112/78 - 150/76)  RR: 18 (01-29-23 @ 04:26) (18 - 19)  SpO2: 94% (01-29-23 @ 04:26) (94% - 98%)  Wt(kg): --  General: Comfortable in NAD  Neck: supple  CVS: nl s1s2, no s3, irreg, ? JVD  Pulm: CTA b/l  Abd: soft, non-tender  Ext: 1+ b/l LE edema  Neuro A&O x3  Psych: Normal affect      I&O's Summary        Labs:   29 Jan 2023 07:02    138    |  105    |  20.7   ----------------------------<  242    4.2     |  23.0   |  0.58     Ca    8.7        29 Jan 2023 07:02  Phos  3.5       29 Jan 2023 07:02  Mg     1.8       29 Jan 2023 07:02    TPro  6.3    /  Alb  3.7    /  TBili  0.6    /  DBili  x      /  AST  13     /  ALT  14     /  AlkPhos  119    28 Jan 2023 09:20                          16.9   7.97  )-----------( 206      ( 28 Jan 2023 09:20 )             52.4             LHC 10/2021: No obstructive disease  LIBBY 11/2021: Normal EF, moderate AS (planimetered CIELO 1.46cm2) mild MR/TR  CT CHEST: ascending aortic aneurym 4.4cm    ECHO 1/2023:  1. Moderate LVH, EF 55-60%  2. Normal RV function  3. Moderate AS visually but limited study, patient in rapid AF    CXR:  INTERPRETATION:   1. Heart magnified by technique. Somewhat prominent aorta again noted.  2.There may be a minimal infiltrate developing off the lower left hilum compared to July 3, 2022.    CTC:  1.  No pulmonary thromboembolism  2.  Dilated ascending aorta measuring 4.7 cm.     ECHO 1/28/2023 (personally reviewed)  1. LVH with moderate LV dysfunction, limited assessment as patient in rapid AF during exam  2. Moderate to severe AS  3. Mild RV dysfunction  4. Limited study     Assessment:    77y Male with a PMH of moderate AS, CAD s/p PCI (patent cath 2021), morbid obesity, OA, HTN, HLD, DM here with SOB, rapid AF.  -Positive COVID, ?lower left hilar infiltrate.  -Currently hemodynamically stable, AF rate controlled and BP better.  Appears to have PAF and has been on amio, will continue for now and A/C.  -Suspect rapid AF in response to COVID and rapid from low BP that has responded to resuscitation.  Appeared volume down initially, BNP only mildly elevated.  Lasix restarted.  -CTC with stable TAA and no PE.  -Echo with drop in EF, maybe related to rapid AF as tachy during exam. AS moderate to severe, continued outpatient survieillance. Drop in EF also could be COVID related/myocarditis, possibly ischemic. Will need cath prior to discharge and also consider DCCV now  -Metoprolol changed to 75mg Q6 horus and amio to 200mg bid. On lovenox now, will planl LHC to ensure CMP not from ischemic heart disease. IF negative and remains in AF will need LIBBY/DCCV. Cath first also to avoid interruption of A/C after DCCV    Plan:    1. Will arrange cath for tomorrow to evaluate drop in EF. eliquis changed  to lovenox and can hold day of cath then restart post  2. No major symptoms from COVID  3. Continue po lasix  4. Continue current rate control AF, well controlled at rest but still elevated with activity. Plan for LIBBY/DCCV likely tuesday   5. Will get back on long acting beta blocker at discharge          Chong Carranza MD ANIA CRISTOBAL  [id number]        Chief Complaint:  SOB/COVID    S: no complaints      Tele:  AF , resting in 60s, up to 130s with ambulation and going to restroom      acetaminophen     Tablet .. 650 milliGRAM(s) Oral every 6 hours PRN  ALPRAZolam 0.25 milliGRAM(s) Oral three times a day PRN  aMIOdarone    Tablet 200 milliGRAM(s) Oral two times a day  aspirin enteric coated 81 milliGRAM(s) Oral daily  atorvastatin 20 milliGRAM(s) Oral at bedtime  dexAMETHasone  Injectable 6 milliGRAM(s) IV Push daily  dextrose 5%. 1000 milliLiter(s) IV Continuous <Continuous>  dextrose 5%. 1000 milliLiter(s) IV Continuous <Continuous>  dextrose 50% Injectable 25 Gram(s) IV Push once  dextrose 50% Injectable 12.5 Gram(s) IV Push once  dextrose 50% Injectable 25 Gram(s) IV Push once  dextrose Oral Gel 15 Gram(s) Oral once PRN  enoxaparin Injectable 135 milliGRAM(s) SubCutaneous every 12 hours  finasteride 5 milliGRAM(s) Oral daily  fluticasone propionate 50 MICROgram(s)/spray Nasal Spray 1 Spray(s) Both Nostrils two times a day  furosemide    Tablet 40 milliGRAM(s) Oral daily  glucagon  Injectable 1 milliGRAM(s) IntraMuscular once  influenza  Vaccine (HIGH DOSE) 0.7 milliLiter(s) IntraMuscular once  insulin lispro (ADMELOG) corrective regimen sliding scale   SubCutaneous three times a day before meals  insulin lispro Injectable (ADMELOG) 5 Unit(s) SubCutaneous three times a day before meals  metoprolol tartrate 75 milliGRAM(s) Oral every 6 hours  pantoprazole    Tablet 40 milliGRAM(s) Oral before breakfast  tamsulosin 0.4 milliGRAM(s) Oral at bedtime          Physical Exam:  T(C): 36.5 (01-29-23 @ 04:26), Max: 36.8 (01-28-23 @ 16:39)  HR: 77 (01-29-23 @ 04:26) (77 - 117)  BP: 131/73 (01-29-23 @ 04:26) (112/78 - 150/76)  RR: 18 (01-29-23 @ 04:26) (18 - 19)  SpO2: 94% (01-29-23 @ 04:26) (94% - 98%)  Wt(kg): --  General: Comfortable in NAD  Neck: supple  CVS: nl s1s2, no s3, irreg, ? JVD  Pulm: CTA b/l  Abd: soft, non-tender  Ext: 1+ b/l LE edema  Neuro A&O x3  Psych: Normal affect      I&O's Summary        Labs:   29 Jan 2023 07:02    138    |  105    |  20.7   ----------------------------<  242    4.2     |  23.0   |  0.58     Ca    8.7        29 Jan 2023 07:02  Phos  3.5       29 Jan 2023 07:02  Mg     1.8       29 Jan 2023 07:02    TPro  6.3    /  Alb  3.7    /  TBili  0.6    /  DBili  x      /  AST  13     /  ALT  14     /  AlkPhos  119    28 Jan 2023 09:20                          16.9   7.97  )-----------( 206      ( 28 Jan 2023 09:20 )             52.4             LHC 10/2021: No obstructive disease  LIBBY 11/2021: Normal EF, moderate AS (planimetered CIELO 1.46cm2) mild MR/TR  CT CHEST: ascending aortic aneurym 4.4cm    ECHO 1/2023:  1. Moderate LVH, EF 55-60%  2. Normal RV function  3. Moderate AS visually but limited study, patient in rapid AF    CXR:  INTERPRETATION:   1. Heart magnified by technique. Somewhat prominent aorta again noted.  2.There may be a minimal infiltrate developing off the lower left hilum compared to July 3, 2022.    CTC:  1.  No pulmonary thromboembolism  2.  Dilated ascending aorta measuring 4.7 cm.     ECHO 1/28/2023 (personally reviewed)  1. LVH with moderate LV dysfunction, limited assessment as patient in rapid AF during exam  2. Moderate to severe AS  3. Mild RV dysfunction  4. Limited study     Assessment:    77y Male with a PMH of moderate AS, CAD s/p PCI (patent cath 2021), morbid obesity, OA, HTN, HLD, DM here with SOB, rapid AF.  -Positive COVID, ?lower left hilar infiltrate.  -Currently hemodynamically stable, AF rate controlled and BP better.  Appears to have PAF and has been on amio, will continue for now and A/C.  -Suspect rapid AF in response to COVID and rapid from low BP that has responded to resuscitation.  Appeared volume down initially, BNP only mildly elevated.  Lasix restarted.  -CTC with stable TAA and no PE.  -Echo with drop in EF, maybe related to rapid AF as tachy during exam. AS moderate to severe, continued outpatient survieillance. Drop in EF also could be COVID related/myocarditis, possibly ischemic. Will need cath prior to discharge and also consider DCCV now  -Metoprolol changed to 75mg Q6 horus and amio to 200mg bid. On lovenox now, will planl LHC to ensure CMP not from ischemic heart disease. IF negative and remains in AF will need ILBBY/DCCV. Cath first also to avoid interruption of A/C after DCCV    Plan:    1. Will arrange cath for tomorrow to evaluate drop in EF. eliquis changed  to lovenox and can hold day of cath then restart post  2. No major symptoms from COVID  3. Continue po lasix, will give one addition IV dose lasix this afternoon to help with dyspnea that in addition to AF may be mild volume overload, got fluids in ED for low BP and getting IV steroids   4. Continue current rate control AF, well controlled at rest but still elevated with activity. Plan for LIBBY/DCCV likely tuesday   5. Will get back on long acting beta blocker at discharge          Chong Carranza MD

## 2023-01-30 ENCOUNTER — TRANSCRIPTION ENCOUNTER (OUTPATIENT)
Age: 78
End: 2023-01-30

## 2023-01-30 LAB
ANION GAP SERPL CALC-SCNC: 10 MMOL/L — SIGNIFICANT CHANGE UP (ref 5–17)
BUN SERPL-MCNC: 27.8 MG/DL — HIGH (ref 8–20)
CALCIUM SERPL-MCNC: 9 MG/DL — SIGNIFICANT CHANGE UP (ref 8.4–10.5)
CHLORIDE SERPL-SCNC: 100 MMOL/L — SIGNIFICANT CHANGE UP (ref 96–108)
CO2 SERPL-SCNC: 26 MMOL/L — SIGNIFICANT CHANGE UP (ref 22–29)
CREAT SERPL-MCNC: 0.87 MG/DL — SIGNIFICANT CHANGE UP (ref 0.5–1.3)
EGFR: 89 ML/MIN/1.73M2 — SIGNIFICANT CHANGE UP
GLUCOSE BLDC GLUCOMTR-MCNC: 257 MG/DL — HIGH (ref 70–99)
GLUCOSE BLDC GLUCOMTR-MCNC: 272 MG/DL — HIGH (ref 70–99)
GLUCOSE BLDC GLUCOMTR-MCNC: 283 MG/DL — HIGH (ref 70–99)
GLUCOSE SERPL-MCNC: 295 MG/DL — HIGH (ref 70–99)
HCT VFR BLD CALC: 46.4 % — SIGNIFICANT CHANGE UP (ref 39–50)
HGB BLD-MCNC: 15.2 G/DL — SIGNIFICANT CHANGE UP (ref 13–17)
MAGNESIUM SERPL-MCNC: 1.8 MG/DL — SIGNIFICANT CHANGE UP (ref 1.8–2.6)
MCHC RBC-ENTMCNC: 30.7 PG — SIGNIFICANT CHANGE UP (ref 27–34)
MCHC RBC-ENTMCNC: 32.8 GM/DL — SIGNIFICANT CHANGE UP (ref 32–36)
MCV RBC AUTO: 93.7 FL — SIGNIFICANT CHANGE UP (ref 80–100)
PHOSPHATE SERPL-MCNC: 3.8 MG/DL — SIGNIFICANT CHANGE UP (ref 2.4–4.7)
PLATELET # BLD AUTO: 210 K/UL — SIGNIFICANT CHANGE UP (ref 150–400)
POTASSIUM SERPL-MCNC: 4 MMOL/L — SIGNIFICANT CHANGE UP (ref 3.5–5.3)
POTASSIUM SERPL-SCNC: 4 MMOL/L — SIGNIFICANT CHANGE UP (ref 3.5–5.3)
RBC # BLD: 4.95 M/UL — SIGNIFICANT CHANGE UP (ref 4.2–5.8)
RBC # FLD: 14.1 % — SIGNIFICANT CHANGE UP (ref 10.3–14.5)
SODIUM SERPL-SCNC: 136 MMOL/L — SIGNIFICANT CHANGE UP (ref 135–145)
WBC # BLD: 9.22 K/UL — SIGNIFICANT CHANGE UP (ref 3.8–10.5)
WBC # FLD AUTO: 9.22 K/UL — SIGNIFICANT CHANGE UP (ref 3.8–10.5)

## 2023-01-30 PROCEDURE — 99233 SBSQ HOSP IP/OBS HIGH 50: CPT

## 2023-01-30 PROCEDURE — 99232 SBSQ HOSP IP/OBS MODERATE 35: CPT

## 2023-01-30 RX ORDER — ENOXAPARIN SODIUM 100 MG/ML
135 INJECTION SUBCUTANEOUS EVERY 12 HOURS
Refills: 0 | Status: DISCONTINUED | OUTPATIENT
Start: 2023-01-30 | End: 2023-01-31

## 2023-01-30 RX ORDER — SODIUM CHLORIDE 9 MG/ML
250 INJECTION INTRAMUSCULAR; INTRAVENOUS; SUBCUTANEOUS ONCE
Refills: 0 | Status: COMPLETED | OUTPATIENT
Start: 2023-01-30 | End: 2023-01-30

## 2023-01-30 RX ADMIN — ENOXAPARIN SODIUM 135 MILLIGRAM(S): 100 INJECTION SUBCUTANEOUS at 23:23

## 2023-01-30 RX ADMIN — Medication 5 UNIT(S): at 17:13

## 2023-01-30 RX ADMIN — Medication 0.25 MILLIGRAM(S): at 23:23

## 2023-01-30 RX ADMIN — Medication 1 SPRAY(S): at 06:10

## 2023-01-30 RX ADMIN — TAMSULOSIN HYDROCHLORIDE 0.4 MILLIGRAM(S): 0.4 CAPSULE ORAL at 23:18

## 2023-01-30 RX ADMIN — AMIODARONE HYDROCHLORIDE 200 MILLIGRAM(S): 400 TABLET ORAL at 17:13

## 2023-01-30 RX ADMIN — Medication 1 SPRAY(S): at 17:13

## 2023-01-30 RX ADMIN — AMIODARONE HYDROCHLORIDE 200 MILLIGRAM(S): 400 TABLET ORAL at 09:24

## 2023-01-30 RX ADMIN — Medication 6 MILLIGRAM(S): at 06:07

## 2023-01-30 RX ADMIN — Medication 650 MILLIGRAM(S): at 23:28

## 2023-01-30 RX ADMIN — ATORVASTATIN CALCIUM 20 MILLIGRAM(S): 80 TABLET, FILM COATED ORAL at 23:19

## 2023-01-30 RX ADMIN — Medication 75 MILLIGRAM(S): at 23:18

## 2023-01-30 RX ADMIN — Medication 6: at 07:39

## 2023-01-30 RX ADMIN — Medication 75 MILLIGRAM(S): at 17:13

## 2023-01-30 RX ADMIN — Medication 40 MILLIGRAM(S): at 09:24

## 2023-01-30 RX ADMIN — Medication 6: at 17:14

## 2023-01-30 RX ADMIN — SODIUM CHLORIDE 250 MILLILITER(S): 9 INJECTION INTRAMUSCULAR; INTRAVENOUS; SUBCUTANEOUS at 12:53

## 2023-01-30 NOTE — PROGRESS NOTE ADULT - ASSESSMENT
Risk Assessments:  ASA: 3  Mallampati: 2  BRA: 1.0%      Indication: 77 year old male admitted with SOB, COVID, Afib RVR found to have newly reduced EF, now for LHC and evaluation of possible CAD progression        Plan:    -plan for LHC   -preferred access: RFA   -confirmed appropriate NPO duration  -ECG and Labs reviewed  -Aspirin 81mg po pre-cath  -NS 0.9% 250ml/hr x 1 bolus; pre procedure IGLESIA ppx   -procedure discussed with patient; risks and benefits explained, questions answered  -consent obtained by attending IC Risk Assessments:  ASA: 3  Mallampati: 2  BRA: 1.0%      Indication: 77 year old male admitted with SOB, COVID, Afib RVR found to have newly reduced EF, now for LHC and evaluation of possible CAD progression        Plan:    -plan for LHC   -preferred access: RFA   -confirmed appropriate NPO duration  -ECG and Labs reviewed  -Aspirin 81mg po pre-cath  -NS 0.9% 250ml/hr x 1 bolus; pre procedure IGLESIA ppx   -procedure discussed with patient; risks and benefits explained, questions answered  -consent obtained by attending IC    POST CATH ADDENDUM                                                                             Department of Cardiology                                                                  [facility]/[address]                                                            Telephone: [phone]. Fax:[phone]                                                    Post- Procedure Note: Left Heart Cardiac Catheterization       Narrative:   77y  Male is now s/p left heart catheterization via (right or left radial or groin) approach with  ___ :, tolerated procedure well without complications. Arrived to recovery room NAD and hemodynamically stable, distal pulse +, neurovascular intact          PAST MEDICAL & SURGICAL HISTORY:  Anxiety      Hypertension      Hyperlipidemia      Prostate disease      Gastroesophageal reflux disease      Gallbladder stone without cholecystitis or obstruction      BPH (benign prostatic hyperplasia)      DM (diabetes mellitus)      DVT (deep venous thrombosis)  left leg      Afib      Anxiety      HLD (hyperlipidemia)      Malignant schwannoma      H/O arthroscopy of knee      S/P laminectomy      S/P coronary artery stent placement  x2        Home Medications:  alfuzosin 10 mg oral tablet, extended release: 1 tab(s) orally once a day (04 Jul 2022 09:32)  ALPRAZolam 0.25 mg oral tablet: 1 tab(s) orally 3 times a day, As Needed (04 Jul 2022 09:32)  aspirin 81 mg oral delayed release tablet: 1 tab(s) orally once a day (04 Jul 2022 09:32)  Crestor 5 mg oral tablet: 1 tab(s) orally once a day (04 Jul 2022 09:32)  Eliquis 5 mg oral tablet: 1 tab(s) orally 2 times a day (04 Jul 2022 09:32)  finasteride 5 mg oral tablet: 1 tab(s) orally once a day (04 Jul 2022 09:32)  metFORMIN 500 mg oral tablet: 1  orally 3 times a day (04 Jul 2022 09:32)  NexIUM 40 mg oral delayed release capsule: 1 cap(s) orally once a day (04 Jul 2022 09:32)        Bactrim (Rash)  Ceftin (Hives)  penicillin (Anaphylaxis)  sulfa drugs (Unknown)      Objective:  Vital Signs Last 24 Hrs  T(C): 37.2 (30 Jan 2023 12:44), Max: 37.2 (30 Jan 2023 12:44)  T(F): 98.9 (30 Jan 2023 12:44), Max: 98.9 (30 Jan 2023 12:44)  HR: 102 (30 Jan 2023 14:45) (78 - 102)  BP: 102/58 (30 Jan 2023 14:30) (98/58 - 119/69)  BP(mean): --  RR: 17 (30 Jan 2023 14:45) (17 - 19)  SpO2: 94% (30 Jan 2023 14:45) (91% - 94%)    Parameters below as of 30 Jan 2023 14:30  Patient On (Oxygen Delivery Method): room air        GENERAL: Pt lying comfortably, NAD.  ENMT: PERRL, +EOMI.  NECK: soft, Supple, No JVD,   CHEST/LUNG: Clear to auscultatation bilaterally; No wheezing.  HEART: S1S2+, Regular rate and rhythm; No murmurs.  ABDOMEN: Soft, Nontender, Nondistended; Bowel sounds present.  MUSCULOSKELETAL: Normal range of motion.  SKIN: No rashes or lesions.  NEURO: AAOX3, no focal deficits, no motor r sensory loss.  PSYCH: normal mood.  Procedure site: .........., no signs of bleeding or hematoma, neurovascular intact   VASCULAR:   Radial +2 R/+2 L  Femoral +2 R/+2 L  PT +2 R/+2 L  DP +2 R/+2 L                          15.2   9.22  )-----------( 210      ( 30 Jan 2023 04:44 )             46.4     01-30    136  |  100  |  27.8<H>  ----------------------------<  295<H>  4.0   |  26.0  |  0.87    Ca    9.0      30 Jan 2023 04:44  Phos  3.8     01-30  Mg     1.8     01-30          Patient is a 77y old  Male who presents with a chief complaint of SOB/Afib RVR/COVID (30 Jan 2023 12:16)    77y  Male is now s/p left heart catheterization via RRA approach with Dr. Mckinney  Intraprocedurally: Pt rec'd IV sedation, Heparin 3,500 units, and omnipaque 89ml  Findings: No obstructive CAD, mid to distal RCA 50%, LVEDP 21, peak gradient 28 consistent with moderate AS     -Pt is already in-patient  -post cardiac cath orders  -radial precautions  -remove right radial band at 1545, then OOB 1630 if remains HDS and no bleeding complications   -EKG post cath  -labs and EKG in am  -continue current medical therapy  -NPO p MN for LIBBY/DCCV   -Lifestyle modifications discussed to reduce cardiovascular risk factors including weight reduction, smoking cessation, medication compliance, and routine follow up with Cardiologist to track your BMI, cholesterol, and glucose levels.   -Management per [facility]      Risk Assessments:  ASA: 3  Mallampati: 2  BRA: 1.0%      Indication: 77 year old male admitted with SOB, COVID, Afib RVR found to have newly reduced EF, now for LHC and evaluation of possible CAD progression        Plan:    -plan for LHC   -preferred access: RFA   -confirmed appropriate NPO duration  -ECG and Labs reviewed  -Aspirin 81mg po pre-cath  -NS 0.9% 250ml/hr x 1 bolus; pre procedure IGLESIA ppx   -procedure discussed with patient; risks and benefits explained, questions answered  -consent obtained by attending IC    POST CATH ADDENDUM      77y  Male is now s/p left heart catheterization via RRA approach with Dr. Mckinney  Intraprocedurally: Pt rec'd IV sedation, Heparin 3,500 units, and omnipaque 89ml  Findings: No obstructive CAD, mid to distal circ  50%, LVEDP 21, peak gradient 28 consistent with moderate AS     -Pt is already in-patient  -post cardiac cath orders  -radial precautions  -remove right radial band at 1545, then OOB 1630 if remains HDS and no bleeding complications   -EKG post cath  -labs and EKG in am  -continue current medical therapy  -NPO p MN for LIBBY/DCCV   -Lifestyle modifications discussed to reduce cardiovascular risk factors including weight reduction, smoking cessation, medication compliance, and routine follow up with Cardiologist to track your BMI, cholesterol, and glucose levels.   -Management per [facility]      Risk Assessments:  ASA: 3  Mallampati: 2  BRA: 1.0%      Indication: 77 year old male admitted with SOB, COVID, Afib RVR found to have newly reduced EF, now for LHC and evaluation of possible CAD progression        Plan:    -plan for LHC   -preferred access: RFA   -confirmed appropriate NPO duration  -ECG and Labs reviewed  -Aspirin 81mg po pre-cath  -NS 0.9% 250ml/hr x 1 bolus; pre procedure IGLESIA ppx   -procedure discussed with patient; risks and benefits explained, questions answered  -consent obtained by attending IC    POST CATH ADDENDUM      77y  Male is now s/p left heart catheterization via RRA approach with Dr. Mckinney  Intraprocedurally: Pt rec'd IV sedation, Heparin 3,500 units, and omnipaque 89ml  Findings: No obstructive CAD, mid to distal circ  50%, LVEDP 21, peak gradient 25 consistent with moderate AS     -Pt is already in-patient  -post cardiac cath orders  -radial precautions  -remove right radial band at 1545, then OOB 1630 if remains HDS and no bleeding complications   -EKG post cath  -labs and EKG in am  -continue current medical therapy  -NPO p MN for LIBBY/DCCV   -Lifestyle modifications discussed to reduce cardiovascular risk factors including weight reduction, smoking cessation, medication compliance, and routine follow up with Cardiologist to track your BMI, cholesterol, and glucose levels.   -Management per [facility]

## 2023-01-30 NOTE — PROGRESS NOTE ADULT - ASSESSMENT
76yo male with PMHX CAD s/p PCI (patent cath 2021), AF on Eliquis, moderate AS, morbid obesity, OA, HTN, HLD, DM, TAA (4.4cm), BPH s/p TURP c/b Chronic Franz x9 mos (now removed), Left renal mass was now sent from cards office for sudden onset of shortness of breath and felt carotid pulsating. . No increasing dyspnea or CP recently, legs swollen today but have not been lately. No fevers/chills/sweats, states had COVID in the fall and [place] [facility] [place] and [place] for [place].   In ED- Pt hypotensive 70/30 and RVR 130s. Responded to fluid boluses and metoprolol. Is also COVID +. Pt  was admitted and cardiology team conslulted. Rate controlled medication up titrated. ECHO on 1/28 with mod AS, and now mildly reduced EF to 40-45%.  Pt is also requires 2-3 L NC, therefore was started on short course of steroids for COVID.      # A fib RVR , better controlled now  # HFpEF, now with reduced EF  in pt with known CAD s/p PCI in 7/2021   PE ruled out, no foci of infection noted except COVID, management covid as bellow  - c/w amio 200 bid, metop tartrate 75 q6h, c/w home dose of furosemide  -  [facility] today - with not oclusive CAD, no new MARCO ANTONIO this time  - plan for DCCV in am  - restarted Lovenox theraputic    # Hypotension,  has improved  suspect compensatory to rapid A fib  responded well to fluid    # COVID +  since fall 2022  vaccinated  ct angio negative for infiltrates  will hold off on remdesivir given arrythmia and qtc 550  - c/w empiric steroids d3/5    # CAD, HTN,   Cont rest of Home cardiac meds    # Elevated BNP in Obese patient  CHF meds as above    # DM2   a1c 9/8  -,c/w  ac to 5, given started steroids, will adjust base on poc    # BPH - c/w flomax, finasteride    DVT ppx - on theraputic lovenox  code/social - full code  Dispo -  potential electric cardiovertion in am

## 2023-01-30 NOTE — PROGRESS NOTE ADULT - SUBJECTIVE AND OBJECTIVE BOX
[facility] Division of Hospital Medicine    Chief Complaint:  a.fib with rvr    SUBJECTIVE: reports feeling better, no new complains    OVERNIGHT EVENTS: none reported    Patient denies chest pain, SOB, abd pain, N/V, fever, chills, dysuria or any other complaints. All remainder ROS negative.     MEDICATIONS  (STANDING):  aMIOdarone    Tablet 200 milliGRAM(s) Oral two times a day  aspirin enteric coated 81 milliGRAM(s) Oral daily  atorvastatin 20 milliGRAM(s) Oral at bedtime  dexAMETHasone  Injectable 6 milliGRAM(s) IV Push daily  dextrose 5%. 1000 milliLiter(s) (50 mL/Hr) IV Continuous <Continuous>  dextrose 5%. 1000 milliLiter(s) (100 mL/Hr) IV Continuous <Continuous>  dextrose 50% Injectable 25 Gram(s) IV Push once  dextrose 50% Injectable 12.5 Gram(s) IV Push once  dextrose 50% Injectable 25 Gram(s) IV Push once  enoxaparin Injectable 135 milliGRAM(s) SubCutaneous every 12 hours  finasteride 5 milliGRAM(s) Oral daily  fluticasone propionate 50 MICROgram(s)/spray Nasal Spray 1 Spray(s) Both Nostrils two times a day  furosemide    Tablet 40 milliGRAM(s) Oral daily  furosemide   Injectable 20 milliGRAM(s) IV Push once  glucagon  Injectable 1 milliGRAM(s) IntraMuscular once  influenza  Vaccine (HIGH DOSE) 0.7 milliLiter(s) IntraMuscular once  insulin lispro (ADMELOG) corrective regimen sliding scale   SubCutaneous three times a day before meals  insulin lispro Injectable (ADMELOG) 5 Unit(s) SubCutaneous three times a day before meals  metoprolol tartrate 75 milliGRAM(s) Oral every 6 hours  pantoprazole    Tablet 40 milliGRAM(s) Oral before breakfast  tamsulosin 0.4 milliGRAM(s) Oral at bedtime    MEDICATIONS  (PRN):  acetaminophen     Tablet .. 650 milliGRAM(s) Oral every 6 hours PRN Temp greater or equal to 38C (100.4F), Mild Pain (1 - 3)  ALPRAZolam 0.25 milliGRAM(s) Oral three times a day PRN anxiety  dextrose Oral Gel 15 Gram(s) Oral once PRN Blood Glucose LESS THAN 70 milliGRAM(s)/deciliter        I&O's Summary      PHYSICAL EXAM:  Vital Signs Last 24 Hrs  T(C): 36.4 (29 Jan 2023 10:49), Max: 36.8 (28 Jan 2023 16:39)  T(F): 97.6 (29 Jan 2023 10:49), Max: 98.2 (28 Jan 2023 16:39)  HR: 118 (29 Jan 2023 13:17) (77 - 118)  BP: 110/71 (29 Jan 2023 13:17) (106/64 - 150/76)  BP(mean): --  RR: 18 (29 Jan 2023 13:17) (18 - 19)  SpO2: 93% (29 Jan 2023 13:17) (93% - 94%)    Parameters below as of 29 Jan 2023 13:17  Patient On (Oxygen Delivery Method): room air      CONSTITUTIONAL: NAD,  ENMT: Moist oral mucosa, no pharyngeal injection or exudates; normal dentition; No JVD  RESPIRATORY: Normal respiratory effort; lungs are clear to auscultation bilaterally  CARDIOVASCULAR: irregular rate and rhythm, normal S1 and S2, systolic murmur in RUSB 3/6, trace lower extremity edema; Peripheral pulses are 2+ bilaterally  ABDOMEN: Nontender to palpation, normoactive bowel sounds, no rebound/guarding; No hepatosplenomegaly  MUSCLOSKELETAL:  no clubbing or cyanosis of digits; no joint swelling or tenderness to palpation  PSYCH: A+O to person, place, and time; affect appropriate  NEUROLOGY: CN 2-12 are intact and symmetric; no gross sensory deficits; was observed moving all 4 ext against gravity cooperating with exam.   SKIN: No rashes; no palpable lesions      MEDICATIONS  (STANDING):  aMIOdarone    Tablet 200 milliGRAM(s) Oral two times a day  aspirin enteric coated 81 milliGRAM(s) Oral daily  atorvastatin 20 milliGRAM(s) Oral at bedtime  dexAMETHasone  Injectable 6 milliGRAM(s) IV Push daily  dextrose 5%. 1000 milliLiter(s) (50 mL/Hr) IV Continuous <Continuous>  dextrose 5%. 1000 milliLiter(s) (100 mL/Hr) IV Continuous <Continuous>  dextrose 50% Injectable 25 Gram(s) IV Push once  dextrose 50% Injectable 12.5 Gram(s) IV Push once  dextrose 50% Injectable 25 Gram(s) IV Push once  finasteride 5 milliGRAM(s) Oral daily  fluticasone propionate 50 MICROgram(s)/spray Nasal Spray 1 Spray(s) Both Nostrils two times a day  furosemide    Tablet 40 milliGRAM(s) Oral daily  glucagon  Injectable 1 milliGRAM(s) IntraMuscular once  influenza  Vaccine (HIGH DOSE) 0.7 milliLiter(s) IntraMuscular once  insulin lispro (ADMELOG) corrective regimen sliding scale   SubCutaneous three times a day before meals  insulin lispro Injectable (ADMELOG) 5 Unit(s) SubCutaneous three times a day before meals  metoprolol tartrate 75 milliGRAM(s) Oral every 6 hours  pantoprazole    Tablet 40 milliGRAM(s) Oral before breakfast  tamsulosin 0.4 milliGRAM(s) Oral at bedtime    MEDICATIONS  (PRN):  acetaminophen     Tablet .. 650 milliGRAM(s) Oral every 6 hours PRN Temp greater or equal to 38C (100.4F), Mild Pain (1 - 3)  ALPRAZolam 0.25 milliGRAM(s) Oral three times a day PRN anxiety  dextrose Oral Gel 15 Gram(s) Oral once PRN Blood Glucose LESS THAN 70 milliGRAM(s)/deciliter

## 2023-01-30 NOTE — PROGRESS NOTE ADULT - SUBJECTIVE AND OBJECTIVE BOX
ANIA CRISTOBAL  [id number]      Chief Complaint:  SOB/COVID    S: no complaints      Tele:  AF , resting in 60s, up to 130s with ambulation     acetaminophen     Tablet .. 650 milliGRAM(s) Oral every 6 hours PRN  ALPRAZolam 0.25 milliGRAM(s) Oral three times a day PRN  aMIOdarone    Tablet 200 milliGRAM(s) Oral two times a day  aspirin enteric coated 81 milliGRAM(s) Oral daily  atorvastatin 20 milliGRAM(s) Oral at bedtime  dexAMETHasone  Injectable 6 milliGRAM(s) IV Push daily  dextrose 5%. 1000 milliLiter(s) IV Continuous <Continuous>  dextrose 5%. 1000 milliLiter(s) IV Continuous <Continuous>  dextrose 50% Injectable 25 Gram(s) IV Push once  dextrose 50% Injectable 12.5 Gram(s) IV Push once  dextrose 50% Injectable 25 Gram(s) IV Push once  dextrose Oral Gel 15 Gram(s) Oral once PRN  finasteride 5 milliGRAM(s) Oral daily  fluticasone propionate 50 MICROgram(s)/spray Nasal Spray 1 Spray(s) Both Nostrils two times a day  furosemide    Tablet 40 milliGRAM(s) Oral daily  glucagon  Injectable 1 milliGRAM(s) IntraMuscular once  influenza  Vaccine (HIGH DOSE) 0.7 milliLiter(s) IntraMuscular once  insulin lispro (ADMELOG) corrective regimen sliding scale   SubCutaneous three times a day before meals  insulin lispro Injectable (ADMELOG) 5 Unit(s) SubCutaneous three times a day before meals  metoprolol tartrate 75 milliGRAM(s) Oral every 6 hours  pantoprazole    Tablet 40 milliGRAM(s) Oral before breakfast  tamsulosin 0.4 milliGRAM(s) Oral at bedtime          Physical Exam:  T(C): 36.7 (01-30-23 @ 10:56), Max: 36.7 (01-30-23 @ 10:56)  HR: 98 (01-30-23 @ 12:44) (78 - 118)  BP: 113/85 (01-30-23 @ 12:44) (98/58 - 119/69)  RR: 17 (01-30-23 @ 12:44) (17 - 19)  SpO2: 92% (01-30-23 @ 12:44) (91% - 94%)  Wt(kg): --  General: Comfortable in NAD  Neck: No JVD  CVS: nl s1s2, no s3  Pulm: CTA b/l  Abd: soft, non-tender  Ext: No c/c/e  Neuro A&O x3  Psych: Normal affect      Labs:   30 Jan 2023 04:44    136    |  100    |  27.8   ----------------------------<  295    4.0     |  26.0   |  0.87     Ca    9.0        30 Jan 2023 04:44  Phos  3.8       30 Jan 2023 04:44  Mg     1.8       30 Jan 2023 04:44                            15.2   9.22  )-----------( 210      ( 30 Jan 2023 04:44 )             46.4           LHC 10/2021: No obstructive disease  LIBBY 11/2021: Normal EF, moderate AS (planimetered CIELO 1.46cm2) mild MR/TR  CT CHEST: ascending aortic aneurym 4.4cm    ECHO 1/2023:  1. Moderate LVH, EF 55-60%  2. Normal RV function  3. Moderate AS visually but limited study, patient in rapid AF    CXR:  INTERPRETATION:   1. Heart magnified by technique. Somewhat prominent aorta again noted.  2.There may be a minimal infiltrate developing off the lower left hilum compared to July 3, 2022.    CTC:  1.  No pulmonary thromboembolism  2.  Dilated ascending aorta measuring 4.7 cm.     ECHO 1/28/2023 (personally reviewed)  1. LVH with moderate LV dysfunction, limited assessment as patient in rapid AF during exam  2. Moderate to severe AS  3. Mild RV dysfunction  4. Limited study     Assessment:    77y Male with a PMH of moderate AS, CAD s/p PCI (patent cath 2021), morbid obesity, OA, HTN, HLD, DM here with SOB, rapid AF.  -Positive COVID, ?lower left hilar infiltrate.  -Currently hemodynamically stable, AF rate controlled and BP better.  Appears to have PAF and has been on amio, will continue for now and A/C.  -Suspect rapid AF in response to COVID and rapid from low BP that has responded to resuscitation.  Appeared volume down initially, BNP only mildly elevated.  Lasix restarted.  -CTC with stable TAA and no PE.  -Echo with drop in EF, maybe related to rapid AF as tachy during exam. AS moderate to severe, continued outpatient survieillance. Drop in EF also could be COVID related/myocarditis, possibly ischemic. Will need cath prior to discharge and also consider DCCV now  -Metoprolol changed to 75mg Q6 horus and amio to 200mg bid. On lovenox now, will planl [facility] to ensure CMP not from ischemic heart disease. IF negative and remains in AF will need LIBBY/DCCV. Cath first also to avoid interruption of A/C after DCCV  -Euvolemic     Plan:    1. cath today to evaluate drop in EF. Resume anticoagulation post intervention.   2. No major symptoms from COVID  3. Continue po lasix,.   4. Continue current rate control AF, well controlled at rest but still elevated with activity. Plan for LIBBY/DCCV likely tomorrow, pending cath findings.    5. Will get back on long acting beta blocker at discharge

## 2023-01-30 NOTE — DISCHARGE NOTE NURSING/CASE MANAGEMENT/SOCIAL WORK - PATIENT PORTAL LINK FT
You can access the FollowMyHealth Patient Portal offered by [facility] by registering at the following website: [email or web address]. By joining [employer]’s FollowMyHealth portal, you will also be able to view your health information using other applications (apps) compatible with our system.

## 2023-01-30 NOTE — PROGRESS NOTE ADULT - SUBJECTIVE AND OBJECTIVE BOX
Department of Cardiology                                                                  [facility]/[place] [address] [address]                                                            Telephone: [phone]. Fax:[phone]                                                                                      Cardiac Cath NP Note           Patient is a 77y old  Male who presents with a chief complaint of afib (27 Jan 2023 14:02)    Plan: 77 year old male admitted with SOB, COVID, Afib RVR found to have newly reduced EF, now for LHC and evaluation of possible CAD progression    HPI:  77yo male with PMHX CAD s/p PCI (patent cath 2021), AF, moderate AS, morbid obesity, OA, HTN, HLD, DM, TAA (4.4cm), BPH s/p TURP c/b Chronic Franz x9 mos (now removed), Left renal mass was now sent from cards office Was in usual state of health until last night. Had fairly sudden onset of shortness of breath and felt carotid pulsating. Has chest congestion but that has been chronic. No increasing dyspnea or CP recently, legs swollen today but have not been lately. No fevers/chills/sweats, states had COVID in the fall and [place] [facility] [place]  [place]- Pt hypotensive 70/30 and RVR 130s. Responded to fluid boluses and metoprolol. Is also COVID +    SH- Previous smoker but has stopped; Occasional ETOH, no illicit substances  FH- Asthma in family (26 Jan 2023 16:11)      PAST MEDICAL & SURGICAL HISTORY:  Anxiety  Hypertension  Hyperlipidemia  Prostate disease  Gastroesophageal reflux disease  Gallbladder stone without cholecystitis or obstruction  BPH (benign prostatic hyperplasia)  DM (diabetes mellitus)  DVT (deep venous thrombosis)  left leg  Afib  Anxiety  HLD (hyperlipidemia)  Malignant schwannoma      H/O arthroscopy of knee      S/P laminectomy      S/P coronary artery stent placement  x2          RADIOLOGY & ADDITIONAL STUDIES/DIAGNOSTIC TESTING:      ECHO  FINDINGS:  < from: TTE Echo Complete w/ Contrast w/ Doppler (01.28.23 @ 11:37) >      Summary:   1. Technically difficult study.   2. Tachycardic throughout the study and this may preclude true   assessment of LVEF.   3. Left ventricular ejection fraction, by visual estimation, is 40 to   45%.   4. Moderately decreased global left ventricular systolic function.   5. The mitral in-flow pattern reveals no discernable A-wave, therefore   no comment on diastolic function can be made.   6. Mildly enlarged left atrium.   7. Normal right atrial size.   8. Mild tricuspid regurgitation.   9. Mild thickening and calcification of the anterior mitral valve   leaflet.  10. Mild to moderate mitral annular calcification.  11. Trace mitral valve regurgitation.  12. Moderate aortic valve stenosis, with peak velocity of 2.87 m/s, mean   gradient of 18 mmHg, dimensionless index of 0.29, and CIELO via VTI of 1.42   sqcm.  13. Dilatation of the sinuses of Valsalva, measures 4.15 cm but based on   BSA 1.6cm/sqm is within normal limits.  14. There is a significant pericardial fat pad present.  15. There isno evidence of pericardial effusion.  16. Endocardial visualization was enhanced with intravenous echo contrast.  17. Compared to prior TTE done on 7/6/2022, the left ventricular function   is now reduced to 40-45%, in the setting of Afib wth RVR (prior LVEF   60-65%).    CATHETERIZATION  FINDINGS:        Allergies    Bactrim (Rash)  Ceftin (Hives)  penicillin (Anaphylaxis)  sulfa drugs (Unknown)    Intolerances        MEDICATIONS  (STANDING):  aMIOdarone    Tablet 200 milliGRAM(s) Oral two times a day  aspirin enteric coated 81 milliGRAM(s) Oral daily  atorvastatin 20 milliGRAM(s) Oral at bedtime  dexAMETHasone  Injectable 6 milliGRAM(s) IV Push daily  dextrose 5%. 1000 milliLiter(s) (50 mL/Hr) IV Continuous <Continuous>  dextrose 5%. 1000 milliLiter(s) (100 mL/Hr) IV Continuous <Continuous>  dextrose 50% Injectable 25 Gram(s) IV Push once  dextrose 50% Injectable 12.5 Gram(s) IV Push once  dextrose 50% Injectable 25 Gram(s) IV Push once  finasteride 5 milliGRAM(s) Oral daily  fluticasone propionate 50 MICROgram(s)/spray Nasal Spray 1 Spray(s) Both Nostrils two times a day  furosemide    Tablet 40 milliGRAM(s) Oral daily  glucagon  Injectable 1 milliGRAM(s) IntraMuscular once  influenza  Vaccine (HIGH DOSE) 0.7 milliLiter(s) IntraMuscular once  insulin lispro (ADMELOG) corrective regimen sliding scale   SubCutaneous three times a day before meals  insulin lispro Injectable (ADMELOG) 5 Unit(s) SubCutaneous three times a day before meals  metoprolol tartrate 75 milliGRAM(s) Oral every 6 hours  pantoprazole    Tablet 40 milliGRAM(s) Oral before breakfast  tamsulosin 0.4 milliGRAM(s) Oral at bedtime    MEDICATIONS  (PRN):  acetaminophen     Tablet .. 650 milliGRAM(s) Oral every 6 hours PRN Temp greater or equal to 38C (100.4F), Mild Pain (1 - 3)  ALPRAZolam 0.25 milliGRAM(s) Oral three times a day PRN anxiety  dextrose Oral Gel 15 Gram(s) Oral once PRN Blood Glucose LESS THAN 70 milliGRAM(s)/deciliter      FAMILY HISTORY:  No pertinent family history in first degree relatives    REVIEW OF SYSTEMS:  General: No fevers/chills, ++ fatigue  HEENT: No visual disturbances, no hearing loss, no headaches, no epistaxis.  CV: No chest pain, endorses intermittent CRUZ, palpitations   Respiratory: no wheeze, no cough.  GI: no nausea/vomiting, no black/bloody stools.  : No hematuria  Musculoskeletal: No myalgias, no arthralgias, no back pain.    Vital Signs Last 24 Hrs  T(C): 36.7 (30 Jan 2023 10:56), Max: 36.7 (30 Jan 2023 10:56)  T(F): 98.1 (30 Jan 2023 10:56), Max: 98.1 (30 Jan 2023 10:56)  HR: 97 (30 Jan 2023 10:56) (78 - 118)  BP: 110/71 (30 Jan 2023 10:56) (98/58 - 119/69)  RR: 19 (30 Jan 2023 10:56) (18 - 19)  SpO2: 91% (30 Jan 2023 10:56) (91% - 94%)    Parameters below as of 29 Jan 2023 16:54  Patient On (Oxygen Delivery Method): room air        Daily     Daily     I&O's Detail      PHYSICAL EXAM:   GENERAL: Pt lying comfortably, NAD.  ENMT: PERRL, +EOMI.  NECK: soft, Supple, No JVD,   CHEST/LUNG: Clear to auscultatation bilaterally; No wheezing.  HEART: S1S2+, Regular rate and rhythm; No murmurs.  ABDOMEN: Soft, Nontender, Nondistended; Bowel sounds present.  MUSCULOSKELETAL: Normal range of motion.  SKIN: No rashes or lesions.  NEURO: AAOX3, no focal deficits, no motor r sensory loss.  PSYCH: normal mood.  VASCULAR:   Radial +2 R/+2 L/ ulnar negative   Femoral +2 R/+2 L  DP +1 R/+1 L      INTERPRETATION OF TELEMETRY: Atrial Fibrillation RVR 's     ECG: Atrial fib 123 bpm, RBBB/LAFB, bifasicular     LABS:                        15.2   9.22  )-----------( 210      ( 30 Jan 2023 04:44 )             46.4     01-30    136  |  100  |  27.8<H>  ----------------------------<  295<H>  4.0   |  26.0  |  0.87    Ca    9.0      30 Jan 2023 04:44  Phos  3.8     01-30  Mg     1.8     01-30

## 2023-01-30 NOTE — DISCHARGE NOTE NURSING/CASE MANAGEMENT/SOCIAL WORK - NSDCPEFALRISK_GEN_ALL_CORE
For information on Fall & Injury Prevention, visit: [email or web address] OR  [email or web address] OR  https://www.cdc.gov/steadi/patient.html

## 2023-01-31 LAB
ANION GAP SERPL CALC-SCNC: 12 MMOL/L — SIGNIFICANT CHANGE UP (ref 5–17)
BUN SERPL-MCNC: 27 MG/DL — HIGH (ref 8–20)
CALCIUM SERPL-MCNC: 8.7 MG/DL — SIGNIFICANT CHANGE UP (ref 8.4–10.5)
CHLORIDE SERPL-SCNC: 100 MMOL/L — SIGNIFICANT CHANGE UP (ref 96–108)
CO2 SERPL-SCNC: 25 MMOL/L — SIGNIFICANT CHANGE UP (ref 22–29)
CREAT SERPL-MCNC: 0.73 MG/DL — SIGNIFICANT CHANGE UP (ref 0.5–1.3)
CULTURE RESULTS: SIGNIFICANT CHANGE UP
CULTURE RESULTS: SIGNIFICANT CHANGE UP
EGFR: 94 ML/MIN/1.73M2 — SIGNIFICANT CHANGE UP
GLUCOSE BLDC GLUCOMTR-MCNC: 245 MG/DL — HIGH (ref 70–99)
GLUCOSE BLDC GLUCOMTR-MCNC: 266 MG/DL — HIGH (ref 70–99)
GLUCOSE BLDC GLUCOMTR-MCNC: 294 MG/DL — HIGH (ref 70–99)
GLUCOSE SERPL-MCNC: 240 MG/DL — HIGH (ref 70–99)
HCT VFR BLD CALC: 47.1 % — SIGNIFICANT CHANGE UP (ref 39–50)
HGB BLD-MCNC: 15.3 G/DL — SIGNIFICANT CHANGE UP (ref 13–17)
MCHC RBC-ENTMCNC: 30.2 PG — SIGNIFICANT CHANGE UP (ref 27–34)
MCHC RBC-ENTMCNC: 32.5 GM/DL — SIGNIFICANT CHANGE UP (ref 32–36)
MCV RBC AUTO: 93.1 FL — SIGNIFICANT CHANGE UP (ref 80–100)
PLATELET # BLD AUTO: 222 K/UL — SIGNIFICANT CHANGE UP (ref 150–400)
POTASSIUM SERPL-MCNC: 4.1 MMOL/L — SIGNIFICANT CHANGE UP (ref 3.5–5.3)
POTASSIUM SERPL-SCNC: 4.1 MMOL/L — SIGNIFICANT CHANGE UP (ref 3.5–5.3)
RBC # BLD: 5.06 M/UL — SIGNIFICANT CHANGE UP (ref 4.2–5.8)
RBC # FLD: 14 % — SIGNIFICANT CHANGE UP (ref 10.3–14.5)
SODIUM SERPL-SCNC: 137 MMOL/L — SIGNIFICANT CHANGE UP (ref 135–145)
SPECIMEN SOURCE: SIGNIFICANT CHANGE UP
SPECIMEN SOURCE: SIGNIFICANT CHANGE UP
WBC # BLD: 8.05 K/UL — SIGNIFICANT CHANGE UP (ref 3.8–10.5)
WBC # FLD AUTO: 8.05 K/UL — SIGNIFICANT CHANGE UP (ref 3.8–10.5)

## 2023-01-31 PROCEDURE — ZZZZZ: CPT

## 2023-01-31 PROCEDURE — 99233 SBSQ HOSP IP/OBS HIGH 50: CPT

## 2023-01-31 PROCEDURE — 93320 DOPPLER ECHO COMPLETE: CPT | Mod: 26

## 2023-01-31 PROCEDURE — 93312 ECHO TRANSESOPHAGEAL: CPT | Mod: 26

## 2023-01-31 PROCEDURE — 92960 CARDIOVERSION ELECTRIC EXT: CPT

## 2023-01-31 PROCEDURE — 93010 ELECTROCARDIOGRAM REPORT: CPT

## 2023-01-31 PROCEDURE — 93325 DOPPLER ECHO COLOR FLOW MAPG: CPT | Mod: 26

## 2023-01-31 RX ORDER — LOSARTAN POTASSIUM 100 MG/1
25 TABLET, FILM COATED ORAL DAILY
Refills: 0 | Status: DISCONTINUED | OUTPATIENT
Start: 2023-01-31 | End: 2023-02-02

## 2023-01-31 RX ORDER — APIXABAN 2.5 MG/1
5 TABLET, FILM COATED ORAL EVERY 12 HOURS
Refills: 0 | Status: DISCONTINUED | OUTPATIENT
Start: 2023-01-31 | End: 2023-02-02

## 2023-01-31 RX ORDER — METOPROLOL TARTRATE 50 MG
200 TABLET ORAL DAILY
Refills: 0 | Status: DISCONTINUED | OUTPATIENT
Start: 2023-01-31 | End: 2023-02-02

## 2023-01-31 RX ADMIN — AMIODARONE HYDROCHLORIDE 200 MILLIGRAM(S): 400 TABLET ORAL at 06:02

## 2023-01-31 RX ADMIN — Medication 81 MILLIGRAM(S): at 17:13

## 2023-01-31 RX ADMIN — Medication 6 MILLIGRAM(S): at 06:01

## 2023-01-31 RX ADMIN — ENOXAPARIN SODIUM 135 MILLIGRAM(S): 100 INJECTION SUBCUTANEOUS at 06:02

## 2023-01-31 RX ADMIN — APIXABAN 5 MILLIGRAM(S): 2.5 TABLET, FILM COATED ORAL at 17:14

## 2023-01-31 RX ADMIN — FINASTERIDE 5 MILLIGRAM(S): 5 TABLET, FILM COATED ORAL at 17:14

## 2023-01-31 RX ADMIN — Medication 1 SPRAY(S): at 17:18

## 2023-01-31 RX ADMIN — Medication 0.25 MILLIGRAM(S): at 21:26

## 2023-01-31 RX ADMIN — Medication 75 MILLIGRAM(S): at 06:05

## 2023-01-31 RX ADMIN — PANTOPRAZOLE SODIUM 40 MILLIGRAM(S): 20 TABLET, DELAYED RELEASE ORAL at 06:01

## 2023-01-31 RX ADMIN — Medication 6: at 07:54

## 2023-01-31 RX ADMIN — AMIODARONE HYDROCHLORIDE 200 MILLIGRAM(S): 400 TABLET ORAL at 17:14

## 2023-01-31 RX ADMIN — Medication 6: at 16:54

## 2023-01-31 RX ADMIN — Medication 1 SPRAY(S): at 06:01

## 2023-01-31 RX ADMIN — ATORVASTATIN CALCIUM 20 MILLIGRAM(S): 80 TABLET, FILM COATED ORAL at 21:20

## 2023-01-31 RX ADMIN — Medication 40 MILLIGRAM(S): at 06:03

## 2023-01-31 RX ADMIN — Medication 5 UNIT(S): at 16:54

## 2023-01-31 RX ADMIN — TAMSULOSIN HYDROCHLORIDE 0.4 MILLIGRAM(S): 0.4 CAPSULE ORAL at 21:20

## 2023-01-31 RX ADMIN — Medication 200 MILLIGRAM(S): at 17:14

## 2023-01-31 NOTE — PROGRESS NOTE ADULT - ASSESSMENT
76yo male with PMHX CAD s/p PCI (patent cath 2021), AF on Eliquis, moderate AS, morbid obesity, OA, HTN, HLD, DM, TAA (4.4cm), BPH s/p TURP c/b Chronic Franz x9 mos (now removed), Left renal mass was now sent from cards office for sudden onset of shortness of breath and felt carotid pulsating. . No increasing dyspnea or CP recently, legs swollen today but have not been lately. No fevers/chills/sweats, states had COVID in the fall and was in [facility] [place] where he also was diagnosed and treated for covid.   In ED- Pt hypotensive 70/30 and RVR 130s. Responded to fluid boluses and metoprolol. Is also COVID +. Pt  was admitted and cardiology team conslulted. Rate controlled medication up titrated. ECHO on 1/28 with mod AS, and now mildly reduced EF to 40-45%.  Pt is also requires 2-3 L NC, therefore was started on short course of steroids for COVID. On 1/30 went for [facility] with none occlusive CAD, no new MARCO ANTONIO at this time. On 1/31 underwent succesful DCCV and resumed back on DOAC.       # A fib RVR , better controlled now  # HFpEF, now with reduced EF  in pt with known CAD s/p PCI in 7/2021   PE ruled out, no foci of infection noted except COVID, management covid as bellow  - c/w amio 200 bid,  switched to metop  mg as per Dr. Rudolph recommendation, c/w home dose of furosemide 40  -  LHC today - with not oclusive CAD, no new MARCO ANTONIO this time  - s/p  DCCV today  - restarted doac    # Hypotension,  has improved  suspect compensatory to rapid A fib  responded well to fluid    # COVID +  since fall 2022  vaccinated  ct angio negative for infiltrates  will hold off on remdesivir given arrythmia and qtc 550  - c/w empiric steroids d4/5    # CAD, HTN,   Cont rest of Home cardiac meds    # Elevated BNP in Obese patient  CHF meds as above    # DM2   a1c 9/8  -,c/w  ac to 5, given started steroids, will adjust base on poc    # BPH - c/w flomax, finasteride    DVT ppx - on theraputic lovenox  code/social - full code  Dispo -  potential d/c w/in next 24-48 hr

## 2023-01-31 NOTE — PROGRESS NOTE ADULT - SUBJECTIVE AND OBJECTIVE BOX
Interventional Cardiology  Post  Diagnostic Catheterization       Patient without complaints. Ambulated and voided without difficulties    Afebrile, VSS    Ext:    Right radial site benign 		      Pulses:  right radial pulses intact.     A/P:  s/p LHC Non- obstructive CAD - med management     1.	Follow-up today for LIBBY CV   2.          Pt given instructions on importance of post radial access site care

## 2023-01-31 NOTE — PROGRESS NOTE ADULT - SUBJECTIVE AND OBJECTIVE BOX
ELECTROPHYSIOLOGY BRIEF POST-OP NOTE    I have personally seen and examined the patient today in cath holding area spot 17 s/p LIBBY/DCCV. No acute complaints    PRE-OP DIAGNOSIS: Atrial fibrillation     POST-OP DIAGNOSIS: SR    PROCEDURE: LIBBY followed by DCCV x 1 at 200J    Physician: Nam Huynh MD  Assistant: None    ESTIMATED BLOOD LOSS: None    ANESTHESIA TYPE:  [  ]General Anesthesia  [ x ]Sedation  [  ]Local/Regional    CONDITION:  [  ]Critical  [  ]Serious  [ x ]Stable  [  ]Good    FINDINGS: LIBBY revealed EF 40-45%; moderate AS     EKG: SR at 61bpm; QRSD 172ms; CO 254ms; RBBB + LAHB + First degree AV block    Vital Signs   HR: 60   BP: 100/63  RR: 20   SpO2: 95%    Physical Exam:  Constitutional: NAD, AAOx3  Cardiovascular: +S1S2 RRR; 3/6 DORINDA at LSB  Pulmonary: Coarse but unlabored breath sounds  GI: soft NTND +BS  Extremities: no pedal edema,   Neuro: non focal, GUERRA x4    A/P  77 year old male patient with a history of moderate AS, CAD s/p PCI (patent cath 2021), morbid obesity, OA, HTN, HLD, DM who is admitted with LLL infiltrate, COVID +, and rapid atrial fibrillation with depressed EF 40-45% who is now s/p successful LIBBY/DCCV x 1 at 200J. Currently in SR.     -Bedrest x 2 hours  -start Eliquis 5mg po Q12hr, starting tonight   -Start Losartan 25mg as part of GDMT for HF  -Transition to Toprol XL upon discharge.   -continue Amiodarone. Outpatient TFTs, LFTs, pulmonary function tests.   -Outpatient follow up with [facility] to discuss downstream therapies with regards to AFib and Bifascicular block.

## 2023-01-31 NOTE — CHART NOTE - NSCHARTNOTEFT_GEN_A_CORE
Patient of [facility].  aware
Briefly: 77 year old male patient with a history of moderate AS, CAD s/p PCI (patent cath 2021), morbid obesity, OA, HTN, HLD, DM who is admitted with LLL infiltrate, COVID +, and rapid atrial fibrillation. Plan for LIBBY/DCCV    - Arrived in fasting state  - On Lovenox now, received AM dose and plan for Eliquis to follow

## 2023-01-31 NOTE — PROGRESS NOTE ADULT - SUBJECTIVE AND OBJECTIVE BOX
ANIA CRISTOBAL  [id number]      Chief Complaint:  SOB/COVID    S: no complaints      Tele:  AF , resting in 60s, up to 130s with ambulation       acetaminophen     Tablet .. 650 milliGRAM(s) Oral every 6 hours PRN  ALPRAZolam 0.25 milliGRAM(s) Oral three times a day PRN  aMIOdarone    Tablet 200 milliGRAM(s) Oral two times a day  apixaban 5 milliGRAM(s) Oral every 12 hours  aspirin enteric coated 81 milliGRAM(s) Oral daily  atorvastatin 20 milliGRAM(s) Oral at bedtime  dexAMETHasone  Injectable 6 milliGRAM(s) IV Push daily  dextrose 5%. 1000 milliLiter(s) IV Continuous <Continuous>  dextrose 5%. 1000 milliLiter(s) IV Continuous <Continuous>  dextrose 50% Injectable 25 Gram(s) IV Push once  dextrose 50% Injectable 12.5 Gram(s) IV Push once  dextrose 50% Injectable 25 Gram(s) IV Push once  dextrose Oral Gel 15 Gram(s) Oral once PRN  finasteride 5 milliGRAM(s) Oral daily  fluticasone propionate 50 MICROgram(s)/spray Nasal Spray 1 Spray(s) Both Nostrils two times a day  furosemide    Tablet 40 milliGRAM(s) Oral daily  glucagon  Injectable 1 milliGRAM(s) IntraMuscular once  influenza  Vaccine (HIGH DOSE) 0.7 milliLiter(s) IntraMuscular once  insulin lispro (ADMELOG) corrective regimen sliding scale   SubCutaneous three times a day before meals  insulin lispro Injectable (ADMELOG) 5 Unit(s) SubCutaneous three times a day before meals  losartan 25 milliGRAM(s) Oral daily  metoprolol tartrate 75 milliGRAM(s) Oral every 6 hours  pantoprazole    Tablet 40 milliGRAM(s) Oral before breakfast  tamsulosin 0.4 milliGRAM(s) Oral at bedtime          Physical Exam:  T(C): 36.7 (01-31-23 @ 12:48), Max: 36.7 (01-31-23 @ 12:48)  HR: 66 (01-31-23 @ 12:48) (63 - 126)  BP: 128/69 (01-31-23 @ 12:48) (94/54 - 128/69)  RR: 20 (01-31-23 @ 12:48) (17 - 20)  SpO2: 95% (01-31-23 @ 12:48) (92% - 97%)  Wt(kg): --  General: Comfortable in NAD  Neck: No JVD  CVS: nl s1s2, no s3  Pulm: CTA b/l  Abd: soft, non-tender  Ext: No c/c/e  Neuro A&O x3  Psych: Normal affect      Labs:   31 Jan 2023 04:40    137    |  100    |  27.0   ----------------------------<  240    4.1     |  25.0   |  0.73     Ca    8.7        31 Jan 2023 04:40  Phos  3.8       30 Jan 2023 04:44  Mg     1.8       30 Jan 2023 04:44                            15.3   8.05  )-----------( 222      ( 31 Jan 2023 04:40 )             47.1       [facility] 10/2021: No obstructive disease  LIBBY 11/2021: Normal EF, moderate AS (planimetered CIELO 1.46cm2) mild MR/TR  CT CHEST: ascending aortic aneurym 4.4cm    ECHO 1/2023:  1. Moderate LVH, EF 55-60%  2. Normal RV function  3. Moderate AS visually but limited study, patient in rapid AF    CXR:  INTERPRETATION:   1. Heart magnified by technique. Somewhat prominent aorta again noted.  2.There may be a minimal infiltrate developing off the lower left hilum compared to July 3, 2022.    CTC:  1.  No pulmonary thromboembolism  2.  Dilated ascending aorta measuring 4.7 cm.     ECHO 1/28/2023 (personally reviewed)  1. LVH with moderate LV dysfunction, limited assessment as patient in rapid AF during exam  2. Moderate to severe AS  3. Mild RV dysfunction  4. Limited study     [facility] 1/30/23    - Left dominant system   - Patent prox and mid LAD stents   - Moderate stenosis of the distal LCx   - The RCA is a small, non-dominat vessel.  The proximal segment is  occluded with brisk right-to-right collaterals.    Assessment:    77y Male with a PMH of moderate AS, CAD s/p PCI (patent cath 2021), morbid obesity, OA, HTN, HLD, DM here with SOB, rapid AF.  -Positive COVID, ?lower left hilar infiltrate.  -Currently hemodynamically stable, AF rate controlled and BP better.  Appears to have PAF and has been on amio, will continue for now and A/C.  -Suspect rapid AF in response to COVID and rapid from low BP that has responded to resuscitation.  Appeared volume down initially, BNP only mildly elevated.  Lasix restarted.  -CTC with stable TAA and no PE.  -Echo with drop in EF, maybe related to rapid AF as tachy during exam. AS moderate to severe, continued outpatient survieillance. Drop in EF also could be COVID related/myocarditis, possibly ischemic. Will need cath prior to discharge and also consider DCCV now  -Metoprolol changed to 75mg Q6 horus and amio to 200mg bid. On lovenox now, will planl LHC to ensure CMP not from ischemic heart disease. IF negative and remains in AF will need LIBBY/DCCV. Cath first also to avoid interruption of A/C after DCCV  -Euvolemic   -S/P LIBBY/CV back in sinus     Plan:    1. resume AC  2. Continue po lasix,.   3 Continue current CV meds   4. Stable from CV to be dc, outpatient follow up with [facility].

## 2023-01-31 NOTE — PROGRESS NOTE ADULT - SUBJECTIVE AND OBJECTIVE BOX
[facility] Division of Hospital Medicine    Chief Complaint:  a.fib with rvr    SUBJECTIVE: reports feeling better, no new complains    OVERNIGHT EVENTS: none reported    Patient denies chest pain, SOB, abd pain, N/V, fever, chills, dysuria or any other complaints. All remainder ROS negative.     MEDICATIONS  (STANDING):  aMIOdarone    Tablet 200 milliGRAM(s) Oral two times a day  apixaban 5 milliGRAM(s) Oral every 12 hours  aspirin enteric coated 81 milliGRAM(s) Oral daily  atorvastatin 20 milliGRAM(s) Oral at bedtime  dexAMETHasone  Injectable 6 milliGRAM(s) IV Push daily  dextrose 5%. 1000 milliLiter(s) (50 mL/Hr) IV Continuous <Continuous>  dextrose 5%. 1000 milliLiter(s) (100 mL/Hr) IV Continuous <Continuous>  dextrose 50% Injectable 25 Gram(s) IV Push once  dextrose 50% Injectable 12.5 Gram(s) IV Push once  dextrose 50% Injectable 25 Gram(s) IV Push once  finasteride 5 milliGRAM(s) Oral daily  fluticasone propionate 50 MICROgram(s)/spray Nasal Spray 1 Spray(s) Both Nostrils two times a day  furosemide    Tablet 40 milliGRAM(s) Oral daily  glucagon  Injectable 1 milliGRAM(s) IntraMuscular once  influenza  Vaccine (HIGH DOSE) 0.7 milliLiter(s) IntraMuscular once  insulin lispro (ADMELOG) corrective regimen sliding scale   SubCutaneous three times a day before meals  insulin lispro Injectable (ADMELOG) 5 Unit(s) SubCutaneous three times a day before meals  losartan 25 milliGRAM(s) Oral daily  metoprolol succinate  milliGRAM(s) Oral daily  pantoprazole    Tablet 40 milliGRAM(s) Oral before breakfast  tamsulosin 0.4 milliGRAM(s) Oral at bedtime    MEDICATIONS  (PRN):  acetaminophen     Tablet .. 650 milliGRAM(s) Oral every 6 hours PRN Temp greater or equal to 38C (100.4F), Mild Pain (1 - 3)  ALPRAZolam 0.25 milliGRAM(s) Oral three times a day PRN anxiety  dextrose Oral Gel 15 Gram(s) Oral once PRN Blood Glucose LESS THAN 70 milliGRAM(s)/deciliter        I&O's Summary      PHYSICAL EXAM:  Vital Signs Last 24 Hrs  T(C): 36.8 (31 Jan 2023 14:27), Max: 36.8 (31 Jan 2023 14:27)  T(F): 98.2 (31 Jan 2023 14:27), Max: 98.2 (31 Jan 2023 14:27)  HR: 66 (31 Jan 2023 14:27) (63 - 126)  BP: 131/79 (31 Jan 2023 14:27) (94/54 - 131/79)  BP(mean): --  RR: 20 (31 Jan 2023 14:27) (17 - 20)  SpO2: 93% (31 Jan 2023 14:27) (93% - 97%)    Parameters below as of 31 Jan 2023 14:27  Patient On (Oxygen Delivery Method): room air      CONSTITUTIONAL: NAD,  ENMT: Moist oral mucosa, no pharyngeal injection or exudates; normal dentition; No JVD  RESPIRATORY: Normal respiratory effort; lungs are clear to auscultation bilaterally  CARDIOVASCULAR: irregular rate and rhythm, normal S1 and S2, systolic murmur in RUSB 3/6, trace lower extremity edema; Peripheral pulses are 2+ bilaterally  ABDOMEN: Nontender to palpation, normoactive bowel sounds, no rebound/guarding; No hepatosplenomegaly  MUSCLOSKELETAL:  no clubbing or cyanosis of digits; no joint swelling or tenderness to palpation  PSYCH: A+O to person, place, and time; affect appropriate  NEUROLOGY: CN 2-12 are intact and symmetric; no gross sensory deficits; was observed moving all 4 ext against gravity cooperating with exam.   SKIN: No rashes; no palpable lesions    LABS:                        15.3   8.05  )-----------( 222      ( 31 Jan 2023 04:40 )             47.1     01-31    137  |  100  |  27.0<H>  ----------------------------<  240<H>  4.1   |  25.0  |  0.73    Ca    8.7      31 Jan 2023 04:40  Phos  3.8     01-30  Mg     1.8     01-30                CAPILLARY BLOOD GLUCOSE      POCT Blood Glucose.: 266 mg/dL (31 Jan 2023 07:31)  POCT Blood Glucose.: 245 mg/dL (31 Jan 2023 00:42)  POCT Blood Glucose.: 283 mg/dL (30 Jan 2023 17:12)        RADIOLOGY & ADDITIONAL TESTS:  Results Reviewed:   Imaging Personally Reviewed:  Electrocardiogram Personally Reviewed:

## 2023-02-01 ENCOUNTER — TRANSCRIPTION ENCOUNTER (OUTPATIENT)
Age: 78
End: 2023-02-01

## 2023-02-01 LAB
ANION GAP SERPL CALC-SCNC: 11 MMOL/L — SIGNIFICANT CHANGE UP (ref 5–17)
BUN SERPL-MCNC: 26.5 MG/DL — HIGH (ref 8–20)
CALCIUM SERPL-MCNC: 8.8 MG/DL — SIGNIFICANT CHANGE UP (ref 8.4–10.5)
CHLORIDE SERPL-SCNC: 99 MMOL/L — SIGNIFICANT CHANGE UP (ref 96–108)
CO2 SERPL-SCNC: 29 MMOL/L — SIGNIFICANT CHANGE UP (ref 22–29)
CREAT SERPL-MCNC: 0.74 MG/DL — SIGNIFICANT CHANGE UP (ref 0.5–1.3)
EGFR: 93 ML/MIN/1.73M2 — SIGNIFICANT CHANGE UP
GLUCOSE BLDC GLUCOMTR-MCNC: 263 MG/DL — HIGH (ref 70–99)
GLUCOSE BLDC GLUCOMTR-MCNC: 312 MG/DL — HIGH (ref 70–99)
GLUCOSE BLDC GLUCOMTR-MCNC: 329 MG/DL — HIGH (ref 70–99)
GLUCOSE BLDC GLUCOMTR-MCNC: 398 MG/DL — HIGH (ref 70–99)
GLUCOSE SERPL-MCNC: 307 MG/DL — HIGH (ref 70–99)
HCT VFR BLD CALC: 48.9 % — SIGNIFICANT CHANGE UP (ref 39–50)
HGB BLD-MCNC: 16 G/DL — SIGNIFICANT CHANGE UP (ref 13–17)
MAGNESIUM SERPL-MCNC: 1.8 MG/DL — SIGNIFICANT CHANGE UP (ref 1.6–2.6)
MCHC RBC-ENTMCNC: 30.5 PG — SIGNIFICANT CHANGE UP (ref 27–34)
MCHC RBC-ENTMCNC: 32.7 GM/DL — SIGNIFICANT CHANGE UP (ref 32–36)
MCV RBC AUTO: 93.1 FL — SIGNIFICANT CHANGE UP (ref 80–100)
PHOSPHATE SERPL-MCNC: 3.6 MG/DL — SIGNIFICANT CHANGE UP (ref 2.4–4.7)
PLATELET # BLD AUTO: 233 K/UL — SIGNIFICANT CHANGE UP (ref 150–400)
POTASSIUM SERPL-MCNC: 4.1 MMOL/L — SIGNIFICANT CHANGE UP (ref 3.5–5.3)
POTASSIUM SERPL-SCNC: 4.1 MMOL/L — SIGNIFICANT CHANGE UP (ref 3.5–5.3)
RBC # BLD: 5.25 M/UL — SIGNIFICANT CHANGE UP (ref 4.2–5.8)
RBC # FLD: 14 % — SIGNIFICANT CHANGE UP (ref 10.3–14.5)
SODIUM SERPL-SCNC: 139 MMOL/L — SIGNIFICANT CHANGE UP (ref 135–145)
WBC # BLD: 6.5 K/UL — SIGNIFICANT CHANGE UP (ref 3.8–10.5)
WBC # FLD AUTO: 6.5 K/UL — SIGNIFICANT CHANGE UP (ref 3.8–10.5)

## 2023-02-01 PROCEDURE — 99232 SBSQ HOSP IP/OBS MODERATE 35: CPT

## 2023-02-01 RX ORDER — INSULIN LISPRO 100/ML
3 VIAL (ML) SUBCUTANEOUS ONCE
Refills: 0 | Status: COMPLETED | OUTPATIENT
Start: 2023-02-01 | End: 2023-02-01

## 2023-02-01 RX ADMIN — Medication 0.25 MILLIGRAM(S): at 23:25

## 2023-02-01 RX ADMIN — Medication 650 MILLIGRAM(S): at 23:25

## 2023-02-01 RX ADMIN — LOSARTAN POTASSIUM 25 MILLIGRAM(S): 100 TABLET, FILM COATED ORAL at 05:04

## 2023-02-01 RX ADMIN — Medication 1 SPRAY(S): at 17:15

## 2023-02-01 RX ADMIN — ATORVASTATIN CALCIUM 20 MILLIGRAM(S): 80 TABLET, FILM COATED ORAL at 23:23

## 2023-02-01 RX ADMIN — Medication 5 UNIT(S): at 12:05

## 2023-02-01 RX ADMIN — Medication 81 MILLIGRAM(S): at 12:06

## 2023-02-01 RX ADMIN — Medication 200 MILLIGRAM(S): at 05:04

## 2023-02-01 RX ADMIN — TAMSULOSIN HYDROCHLORIDE 0.4 MILLIGRAM(S): 0.4 CAPSULE ORAL at 23:23

## 2023-02-01 RX ADMIN — PANTOPRAZOLE SODIUM 40 MILLIGRAM(S): 20 TABLET, DELAYED RELEASE ORAL at 05:01

## 2023-02-01 RX ADMIN — Medication 650 MILLIGRAM(S): at 23:55

## 2023-02-01 RX ADMIN — Medication 5 UNIT(S): at 16:51

## 2023-02-01 RX ADMIN — AMIODARONE HYDROCHLORIDE 200 MILLIGRAM(S): 400 TABLET ORAL at 17:15

## 2023-02-01 RX ADMIN — Medication 1 SPRAY(S): at 05:05

## 2023-02-01 RX ADMIN — Medication 8: at 08:07

## 2023-02-01 RX ADMIN — APIXABAN 5 MILLIGRAM(S): 2.5 TABLET, FILM COATED ORAL at 05:02

## 2023-02-01 RX ADMIN — AMIODARONE HYDROCHLORIDE 200 MILLIGRAM(S): 400 TABLET ORAL at 05:02

## 2023-02-01 RX ADMIN — Medication 8: at 16:51

## 2023-02-01 RX ADMIN — Medication 10: at 12:05

## 2023-02-01 RX ADMIN — APIXABAN 5 MILLIGRAM(S): 2.5 TABLET, FILM COATED ORAL at 17:15

## 2023-02-01 RX ADMIN — Medication 40 MILLIGRAM(S): at 05:02

## 2023-02-01 RX ADMIN — Medication 5 UNIT(S): at 08:07

## 2023-02-01 RX ADMIN — FINASTERIDE 5 MILLIGRAM(S): 5 TABLET, FILM COATED ORAL at 12:06

## 2023-02-01 RX ADMIN — Medication 6 MILLIGRAM(S): at 05:05

## 2023-02-01 RX ADMIN — Medication 3 UNIT(S): at 23:54

## 2023-02-01 NOTE — DISCHARGE NOTE PROVIDER - CARE PROVIDER_API CALL
Dr. Sy,   Phone: (   )    -  Fax: (   )    -  Established Patient  Follow Up Time: 1 week    Primary medical doctor,   Phone: (   )    -  Fax: (   )    -  Established Patient  Follow Up Time: 2 weeks

## 2023-02-01 NOTE — DISCHARGE NOTE PROVIDER - HOSPITAL COURSE
76yo male with PMHX CAD s/p PCI (patent cath 2021), AF on Eliquis, moderate AS, morbid obesity, OA, HTN, HLD, DM, TAA (4.4cm), BPH s/p TURP c/b Chronic Franz x9 mos (now removed), Left renal mass was now sent from cards office for sudden onset of shortness of breath and felt carotid pulsating. . No increasing dyspnea or CP recently, legs swollen today but have not been lately. No fevers/chills/sweats, states had COVID in the fall and [place] [facility] [place] diagnosed and treated for covid. In ED- Pt hypotensive 70/30 and RVR 130s. Responded to fluid boluses and metoprolol. Is also COVID +. Pt  was admitted and cardiology team consulted. Rate controlled medication up titrated. ECHO on 1/28 with mod AS, and now mildly reduced EF to 40-45%.  Pt is also requires 2-3 L NC, therefore was started on short course of steroids for COVID and weaned of oxygen now. On 1/30 went for LHC with none occlusive CAD, no new MARCO ANTONIO at this time. On 1/31 underwent successful DCCV and resumed back on DOAC. Pt is now stable for d/c home

## 2023-02-01 NOTE — PHYSICAL THERAPY INITIAL EVALUATION ADULT - PREDICTED DURATION OF THERAPY (DAYS/WKS), PT EVAL
pt Modified Independent with pt's own 3-wheeled cheryle walker for mobility at this time. will not follow. no skilled needs.

## 2023-02-01 NOTE — PROGRESS NOTE ADULT - ASSESSMENT
76yo male with PMHX CAD s/p PCI (patent cath 2021), AF on Eliquis, moderate AS, morbid obesity, OA, HTN, HLD, DM, TAA (4.4cm), BPH s/p TURP c/b Chronic Franz x9 mos (now removed), Left renal mass was now sent from cards office for sudden onset of shortness of breath and felt carotid pulsating. . No increasing dyspnea or CP recently, legs swollen today but have not been lately. No fevers/chills/sweats, states had COVID in the fall and was in [facility] [place] [facility] where he also was diagnosed and treated for covid.   In ED- Pt hypotensive 70/30 and RVR 130s. Responded to fluid boluses and metoprolol. Is also COVID +. Pt  was admitted and cardiology team conslulted. Rate controlled medication up titrated. ECHO on 1/28 with mod AS, and now mildly reduced EF to 40-45%.  Pt is also requires 2-3 L NC, therefore was started on short course of steroids for COVID. On 1/30 went for [facility] with none occlusive CAD, no new MARCO ANTONIO at this time. On 1/31 underwent succesful DCCV and resumed back on DOAC.       # A fib RVR , better controlled now  # HFpEF, now with reduced EF  in pt with known CAD s/p PCI in 7/2021   PE ruled out, no foci of infection noted except COVID, management covid as bellow  - c/w amio 200 bid,  switched to metop  mg as per Dr. Rudolph recommendation, c/w home dose of furosemide 40  -  LH today - with not oclusive CAD, no new MARCO ANTONIO this time  - s/p  DCCV on 01/31  - c/w doac    # Hypotension,  has improved  suspect compensatory to rapid A fib  responded well to fluid    # COVID +  since fall 2022  vaccinated  ct angio negative for infiltrates  will hold off on remdesivir given arrythmia and qtc 550  - c/w empiric steroids d4/5    # CAD, HTN,   Cont rest of Home cardiac meds    # Elevated BNP in Obese patient  CHF meds as above    # DM2   a1c 9/8  -,c/w  ac to 5, given started steroids, will adjust base on poc    # BPH - c/w flomax, finasteride    DVT ppx - on theraputic lovenox  code/social - full code  Dispo -  potential d/c w/in next 24 hr

## 2023-02-01 NOTE — DISCHARGE NOTE PROVIDER - ATTENDING DISCHARGE PHYSICAL EXAMINATION:
Vital Signs Last 24 Hrs  T(C): 36.7 (02 Feb 2023 10:16), Max: 36.7 (01 Feb 2023 11:55)  T(F): 98 (02 Feb 2023 10:16), Max: 98 (01 Feb 2023 11:55)  HR: 56 (02 Feb 2023 10:16) (56 - 66)  BP: 131/75 (02 Feb 2023 10:16) (114/62 - 149/81)  BP(mean): --  RR: 19 (02 Feb 2023 10:16) (18 - 20)  SpO2: 98% (02 Feb 2023 10:16) (93% - 98%)    Parameters below as of 02 Feb 2023 10:16  Patient On (Oxygen Delivery Method): room air    CONSTITUTIONAL: NAD,  ENMT: Moist oral mucosa, no pharyngeal injection or exudates; normal dentition; No JVD  RESPIRATORY: Normal respiratory effort; lungs are clear to auscultation bilaterally  CARDIOVASCULAR: regular rate and rhythm, normal S1 and S2, systolic murmur in RUSB 3/6, trace lower extremity edema; Peripheral pulses are 2+ bilaterally, R radial access w/o bleeidn or infection  ABDOMEN: Nontender to palpation, normoactive bowel sounds, no rebound/guarding; No hepatosplenomegaly  MUSCLOSKELETAL:  no clubbing or cyanosis of digits; no joint swelling or tenderness to palpation  PSYCH: A+O to person, place, and time; affect appropriate  NEUROLOGY: CN 2-12 are intact and symmetric; no gross sensory deficits; was observed moving all 4 ext against gravity cooperating with exam.   SKIN: No rashes; no palpable lesions

## 2023-02-01 NOTE — DISCHARGE NOTE PROVIDER - NSDCMRMEDTOKEN_GEN_ALL_CORE_FT
alfuzosin 10 mg oral tablet, extended release: 1 tab(s) orally once a day  ALPRAZolam 0.25 mg oral tablet: 1 tab(s) orally 3 times a day, As Needed  amiodarone 200 mg oral tablet: 1 tab(s) orally once a day   aspirin 81 mg oral delayed release tablet: 1 tab(s) orally once a day  Crestor 5 mg oral tablet: 1 tab(s) orally once a day  Eliquis 5 mg oral tablet: 1 tab(s) orally 2 times a day  Farxiga 5 mg oral tablet: 1 tab(s) orally once a day   finasteride 5 mg oral tablet: 1 tab(s) orally once a day  furosemide 40 mg oral tablet: 1 tab(s) orally 2 times a day  metFORMIN 500 mg oral tablet: 1  orally 3 times a day  Metoprolol Succinate  mg oral tablet, extended release: 1 tab(s) orally once a day   NexIUM 40 mg oral delayed release capsule: 1 cap(s) orally once a day   alfuzosin 10 mg oral tablet, extended release: 1 tab(s) orally once a day  ALPRAZolam 0.25 mg oral tablet: 1 tab(s) orally 3 times a day, As Needed  amiodarone 200 mg oral tablet: 1 tab(s) orally 2 times a day, till you see your cardiologist.   aspirin 81 mg oral delayed release tablet: 1 tab(s) orally once a day  Crestor 5 mg oral tablet: 1 tab(s) orally once a day  Eliquis 5 mg oral tablet: 1 tab(s) orally 2 times a day  Farxiga 5 mg oral tablet: 1 tab(s) orally once a day   finasteride 5 mg oral tablet: 1 tab(s) orally once a day  furosemide 40 mg oral tablet: 1 tab(s) orally 2 times a day  losartan 25 mg oral tablet: 1 tab(s) orally once a day  metFORMIN 500 mg oral tablet: 1  orally 3 times a day  metoprolol succinate 200 mg oral tablet, extended release: 1 tab(s) orally once a day  NexIUM 40 mg oral delayed release capsule: 1 cap(s) orally once a day

## 2023-02-01 NOTE — DISCHARGE NOTE PROVIDER - NSDCCPCAREPLAN_GEN_ALL_CORE_FT
PRINCIPAL DISCHARGE DIAGNOSIS  Diagnosis: Afib  Assessment and Plan of Treatment: - you got electric cardiovertion during this hospital stay   - please take Amiodaron 200 mg twice a day till you seen your Cardiologist   - please continue home dose of Metoprolol and Eliquis  - you was also started on Losartan 25 mg during this hospital stay upon cardiologist recommendations, it will help your heart to be stronger.      SECONDARY DISCHARGE DIAGNOSES  Diagnosis: 2019 novel coronavirus disease (COVID-19)  Assessment and Plan of Treatment: - you got 5 days of IV steroids and was weaned off oxygen  - follow up with your primary care doctor w/in next 2 weeks    Diagnosis: CAD (coronary artery disease)  Assessment and Plan of Treatment: - continue your baby aspirin and cholesterol pill    Diagnosis: (HFpEF) heart failure with preserved ejection fraction  Assessment and Plan of Treatment: - continue Furosemide as directed    Diagnosis: BPH without urinary obstruction  Assessment and Plan of Treatment: - continue medication as directed

## 2023-02-01 NOTE — PHYSICAL THERAPY INITIAL EVALUATION ADULT - ADDITIONAL COMMENTS
pt states he lives in an apartment in his son's house with a ramped entrance. pt uses a 3-wheeled cheryle walker, also has a cane and rollator, transfer tub bench. Modified Independent for mobility and ADLs.

## 2023-02-01 NOTE — PHYSICAL THERAPY INITIAL EVALUATION ADULT - PERTINENT HX OF CURRENT PROBLEM, REHAB EVAL
76yo male with PMHX CAD s/p PCI (patent cath 2021), AF on Eliquis, moderate AS, morbid obesity, OA, HTN, HLD, DM, TAA (4.4cm), BPH s/p TURP c/b Chronic Franz x9 mos (now removed), Left renal mass was now sent from cards office for sudden onset of shortness of breath and felt carotid pulsating. . No increasing dyspnea or CP recently, legs swollen today but have not been lately. No fevers/chills/sweats, states had COVID in the fall and was in [facility] where he also was diagnosed and treated for covid.

## 2023-02-01 NOTE — PROGRESS NOTE ADULT - SUBJECTIVE AND OBJECTIVE BOX
[facility] Division of Hospital Medicine - late entry note pt was seen in am    Chief Complaint:  a.fib with rvr    SUBJECTIVE: reports being unsteady on ambulation    OVERNIGHT EVENTS: none reported    Patient denies chest pain, SOB, abd pain, N/V, fever, chills, dysuria or any other complaints. All remainder ROS negative.     MEDICATIONS  (STANDING):  aMIOdarone    Tablet 200 milliGRAM(s) Oral two times a day  apixaban 5 milliGRAM(s) Oral every 12 hours  aspirin enteric coated 81 milliGRAM(s) Oral daily  atorvastatin 20 milliGRAM(s) Oral at bedtime  dexAMETHasone  Injectable 6 milliGRAM(s) IV Push daily  dextrose 5%. 1000 milliLiter(s) (50 mL/Hr) IV Continuous <Continuous>  dextrose 5%. 1000 milliLiter(s) (100 mL/Hr) IV Continuous <Continuous>  dextrose 50% Injectable 25 Gram(s) IV Push once  dextrose 50% Injectable 12.5 Gram(s) IV Push once  dextrose 50% Injectable 25 Gram(s) IV Push once  finasteride 5 milliGRAM(s) Oral daily  fluticasone propionate 50 MICROgram(s)/spray Nasal Spray 1 Spray(s) Both Nostrils two times a day  furosemide    Tablet 40 milliGRAM(s) Oral daily  glucagon  Injectable 1 milliGRAM(s) IntraMuscular once  influenza  Vaccine (HIGH DOSE) 0.7 milliLiter(s) IntraMuscular once  insulin lispro (ADMELOG) corrective regimen sliding scale   SubCutaneous three times a day before meals  insulin lispro Injectable (ADMELOG) 5 Unit(s) SubCutaneous three times a day before meals  losartan 25 milliGRAM(s) Oral daily  metoprolol succinate  milliGRAM(s) Oral daily  pantoprazole    Tablet 40 milliGRAM(s) Oral before breakfast  tamsulosin 0.4 milliGRAM(s) Oral at bedtime    MEDICATIONS  (PRN):  acetaminophen     Tablet .. 650 milliGRAM(s) Oral every 6 hours PRN Temp greater or equal to 38C (100.4F), Mild Pain (1 - 3)  ALPRAZolam 0.25 milliGRAM(s) Oral three times a day PRN anxiety  dextrose Oral Gel 15 Gram(s) Oral once PRN Blood Glucose LESS THAN 70 milliGRAM(s)/deciliter        I&O's Summary      PHYSICAL EXAM:  Vital Signs Last 24 Hrs  T(C): 36.7 (01 Feb 2023 11:55), Max: 36.7 (01 Feb 2023 11:55)  T(F): 98 (01 Feb 2023 11:55), Max: 98 (01 Feb 2023 11:55)  HR: 76 (01 Feb 2023 04:28) (65 - 76)  BP: 149/81 (01 Feb 2023 11:55) (125/65 - 155/90)  BP(mean): 111 (01 Feb 2023 00:30) (100 - 111)  RR: 18 (01 Feb 2023 11:55) (18 - 20)  SpO2: 94% (01 Feb 2023 11:55) (93% - 95%)    Parameters below as of 01 Feb 2023 11:55  Patient On (Oxygen Delivery Method): room air          CONSTITUTIONAL: NAD,  ENMT: Moist oral mucosa, no pharyngeal injection or exudates; normal dentition; No JVD  RESPIRATORY: Normal respiratory effort; lungs are clear to auscultation bilaterally  CARDIOVASCULAR: regular rate and rhythm, normal S1 and S2, systolic murmur in RUSB 3/6, trace lower extremity edema; Peripheral pulses are 2+ bilaterally, R radial access w/o bleeidn or infection  ABDOMEN: Nontender to palpation, normoactive bowel sounds, no rebound/guarding; No hepatosplenomegaly  MUSCLOSKELETAL:  no clubbing or cyanosis of digits; no joint swelling or tenderness to palpation  PSYCH: A+O to person, place, and time; affect appropriate  NEUROLOGY: CN 2-12 are intact and symmetric; no gross sensory deficits; was observed moving all 4 ext against gravity cooperating with exam.   SKIN: No rashes; no palpable lesions    LABS:                        16.0   6.50  )-----------( 233      ( 01 Feb 2023 06:45 )             48.9     02-01    139  |  99  |  26.5<H>  ----------------------------<  307<H>  4.1   |  29.0  |  0.74    Ca    8.8      01 Feb 2023 06:45  Phos  3.6     02-01  Mg     1.8     02-01                CAPILLARY BLOOD GLUCOSE      POCT Blood Glucose.: 329 mg/dL (01 Feb 2023 16:36)  POCT Blood Glucose.: 398 mg/dL (01 Feb 2023 11:51)  POCT Blood Glucose.: 312 mg/dL (01 Feb 2023 07:52)        RADIOLOGY & ADDITIONAL TESTS:  Results Reviewed:   Imaging Personally Reviewed:  Electrocardiogram Personally Reviewed:

## 2023-02-01 NOTE — PROGRESS NOTE ADULT - PROVIDER SPECIALTY LIST ADULT
Cardiology
Electrophysiology
Hospitalist
Cardiology
Cardiology
Hospitalist
Hospitalist
Internal Medicine
Intervent Cardiology
Hospitalist
Hospitalist
Intervent Cardiology

## 2023-02-01 NOTE — DISCHARGE NOTE PROVIDER - PROVIDER TOKENS
FREE:[LAST:[Dr. Sy],PHONE:[(   )    -],FAX:[(   )    -],FOLLOWUP:[1 week],ESTABLISHEDPATIENT:[T]],FREE:[LAST:[Primary medical doctor],PHONE:[(   )    -],FAX:[(   )    -],FOLLOWUP:[2 weeks],ESTABLISHEDPATIENT:[T]]

## 2023-02-02 VITALS
OXYGEN SATURATION: 96 % | DIASTOLIC BLOOD PRESSURE: 80 MMHG | SYSTOLIC BLOOD PRESSURE: 126 MMHG | TEMPERATURE: 98 F | RESPIRATION RATE: 18 BRPM | HEART RATE: 58 BPM

## 2023-02-02 LAB — GLUCOSE BLDC GLUCOMTR-MCNC: 241 MG/DL — HIGH (ref 70–99)

## 2023-02-02 PROCEDURE — 85610 PROTHROMBIN TIME: CPT

## 2023-02-02 PROCEDURE — 93325 DOPPLER ECHO COLOR FLOW MAPG: CPT

## 2023-02-02 PROCEDURE — 71275 CT ANGIOGRAPHY CHEST: CPT | Mod: MA

## 2023-02-02 PROCEDURE — C8929: CPT

## 2023-02-02 PROCEDURE — 84443 ASSAY THYROID STIM HORMONE: CPT

## 2023-02-02 PROCEDURE — 93458 L HRT ARTERY/VENTRICLE ANGIO: CPT

## 2023-02-02 PROCEDURE — 83880 ASSAY OF NATRIURETIC PEPTIDE: CPT

## 2023-02-02 PROCEDURE — 85025 COMPLETE CBC W/AUTO DIFF WBC: CPT

## 2023-02-02 PROCEDURE — 83605 ASSAY OF LACTIC ACID: CPT

## 2023-02-02 PROCEDURE — 96375 TX/PRO/DX INJ NEW DRUG ADDON: CPT

## 2023-02-02 PROCEDURE — 83036 HEMOGLOBIN GLYCOSYLATED A1C: CPT

## 2023-02-02 PROCEDURE — 84100 ASSAY OF PHOSPHORUS: CPT

## 2023-02-02 PROCEDURE — 87040 BLOOD CULTURE FOR BACTERIA: CPT

## 2023-02-02 PROCEDURE — 80048 BASIC METABOLIC PNL TOTAL CA: CPT

## 2023-02-02 PROCEDURE — 93320 DOPPLER ECHO COMPLETE: CPT

## 2023-02-02 PROCEDURE — 71045 X-RAY EXAM CHEST 1 VIEW: CPT

## 2023-02-02 PROCEDURE — 83735 ASSAY OF MAGNESIUM: CPT

## 2023-02-02 PROCEDURE — 82962 GLUCOSE BLOOD TEST: CPT

## 2023-02-02 PROCEDURE — 99291 CRITICAL CARE FIRST HOUR: CPT | Mod: 25

## 2023-02-02 PROCEDURE — 85027 COMPLETE CBC AUTOMATED: CPT

## 2023-02-02 PROCEDURE — 80053 COMPREHEN METABOLIC PANEL: CPT

## 2023-02-02 PROCEDURE — 93005 ELECTROCARDIOGRAM TRACING: CPT

## 2023-02-02 PROCEDURE — 93312 ECHO TRANSESOPHAGEAL: CPT

## 2023-02-02 PROCEDURE — C1894: CPT

## 2023-02-02 PROCEDURE — 99239 HOSP IP/OBS DSCHRG MGMT >30: CPT

## 2023-02-02 PROCEDURE — 97163 PT EVAL HIGH COMPLEX 45 MIN: CPT

## 2023-02-02 PROCEDURE — 81001 URINALYSIS AUTO W/SCOPE: CPT

## 2023-02-02 PROCEDURE — C1769: CPT

## 2023-02-02 PROCEDURE — 84484 ASSAY OF TROPONIN QUANT: CPT

## 2023-02-02 PROCEDURE — 94640 AIRWAY INHALATION TREATMENT: CPT

## 2023-02-02 PROCEDURE — 96374 THER/PROPH/DIAG INJ IV PUSH: CPT

## 2023-02-02 PROCEDURE — 87637 SARSCOV2&INF A&B&RSV AMP PRB: CPT

## 2023-02-02 PROCEDURE — 87086 URINE CULTURE/COLONY COUNT: CPT

## 2023-02-02 PROCEDURE — 36415 COLL VENOUS BLD VENIPUNCTURE: CPT

## 2023-02-02 PROCEDURE — C1887: CPT

## 2023-02-02 PROCEDURE — 85730 THROMBOPLASTIN TIME PARTIAL: CPT

## 2023-02-02 RX ORDER — METOPROLOL TARTRATE 50 MG
1 TABLET ORAL
Qty: 30 | Refills: 0
Start: 2023-02-02 | End: 2023-03-03

## 2023-02-02 RX ORDER — AMIODARONE HYDROCHLORIDE 400 MG/1
1 TABLET ORAL
Qty: 60 | Refills: 0
Start: 2023-02-02 | End: 2023-03-03

## 2023-02-02 RX ORDER — LOSARTAN POTASSIUM 100 MG/1
1 TABLET, FILM COATED ORAL
Qty: 30 | Refills: 0
Start: 2023-02-02 | End: 2023-03-03

## 2023-02-02 RX ADMIN — Medication 4: at 07:56

## 2023-02-02 RX ADMIN — Medication 1 SPRAY(S): at 06:58

## 2023-02-02 RX ADMIN — AMIODARONE HYDROCHLORIDE 200 MILLIGRAM(S): 400 TABLET ORAL at 06:58

## 2023-02-02 RX ADMIN — Medication 5 UNIT(S): at 07:56

## 2023-02-02 RX ADMIN — LOSARTAN POTASSIUM 25 MILLIGRAM(S): 100 TABLET, FILM COATED ORAL at 06:58

## 2023-02-02 RX ADMIN — Medication 40 MILLIGRAM(S): at 06:58

## 2023-02-02 RX ADMIN — PANTOPRAZOLE SODIUM 40 MILLIGRAM(S): 20 TABLET, DELAYED RELEASE ORAL at 06:58

## 2023-02-02 RX ADMIN — APIXABAN 5 MILLIGRAM(S): 2.5 TABLET, FILM COATED ORAL at 06:58

## 2023-03-07 ENCOUNTER — EMERGENCY (EMERGENCY)
Facility: HOSPITAL | Age: 78
LOS: 1 days | Discharge: HOME | End: 2023-03-07
Attending: STUDENT IN AN ORGANIZED HEALTH CARE EDUCATION/TRAINING PROGRAM
Payer: MEDICARE

## 2023-03-07 VITALS
SYSTOLIC BLOOD PRESSURE: 93 MMHG | DIASTOLIC BLOOD PRESSURE: 56 MMHG | RESPIRATION RATE: 16 BRPM | TEMPERATURE: 98 F | HEART RATE: 74 BPM

## 2023-03-07 DIAGNOSIS — Z98.89 OTHER SPECIFIED POSTPROCEDURAL STATES: Chronic | ICD-10-CM

## 2023-03-07 DIAGNOSIS — Z95.5 PRESENCE OF CORONARY ANGIOPLASTY IMPLANT AND GRAFT: Chronic | ICD-10-CM

## 2023-03-07 DIAGNOSIS — C49.9 MALIGNANT NEOPLASM OF CONNECTIVE AND SOFT TISSUE, UNSPECIFIED: Chronic | ICD-10-CM

## 2023-03-07 LAB
ALBUMIN SERPL ELPH-MCNC: 3.9 G/DL — SIGNIFICANT CHANGE UP (ref 3.3–5.2)
ALP SERPL-CCNC: 114 U/L — SIGNIFICANT CHANGE UP (ref 40–120)
ALT FLD-CCNC: 10 U/L — SIGNIFICANT CHANGE UP
ANION GAP SERPL CALC-SCNC: 11 MMOL/L — SIGNIFICANT CHANGE UP (ref 5–17)
APPEARANCE UR: CLEAR — SIGNIFICANT CHANGE UP
AST SERPL-CCNC: 13 U/L — SIGNIFICANT CHANGE UP
BACTERIA # UR AUTO: ABNORMAL
BASOPHILS # BLD AUTO: 0.05 K/UL — SIGNIFICANT CHANGE UP (ref 0–0.2)
BASOPHILS NFR BLD AUTO: 0.7 % — SIGNIFICANT CHANGE UP (ref 0–2)
BILIRUB SERPL-MCNC: 0.5 MG/DL — SIGNIFICANT CHANGE UP (ref 0.4–2)
BILIRUB UR-MCNC: NEGATIVE — SIGNIFICANT CHANGE UP
BUN SERPL-MCNC: 18.2 MG/DL — SIGNIFICANT CHANGE UP (ref 8–20)
CALCIUM SERPL-MCNC: 8.8 MG/DL — SIGNIFICANT CHANGE UP (ref 8.4–10.5)
CHLORIDE SERPL-SCNC: 97 MMOL/L — SIGNIFICANT CHANGE UP (ref 96–108)
CO2 SERPL-SCNC: 30 MMOL/L — HIGH (ref 22–29)
COLOR SPEC: YELLOW — SIGNIFICANT CHANGE UP
CREAT SERPL-MCNC: 0.75 MG/DL — SIGNIFICANT CHANGE UP (ref 0.5–1.3)
DIFF PNL FLD: ABNORMAL
EGFR: 93 ML/MIN/1.73M2 — SIGNIFICANT CHANGE UP
EOSINOPHIL # BLD AUTO: 0.24 K/UL — SIGNIFICANT CHANGE UP (ref 0–0.5)
EOSINOPHIL NFR BLD AUTO: 3.3 % — SIGNIFICANT CHANGE UP (ref 0–6)
EPI CELLS # UR: SIGNIFICANT CHANGE UP
FLUAV AG NPH QL: SIGNIFICANT CHANGE UP
FLUBV AG NPH QL: SIGNIFICANT CHANGE UP
GLUCOSE BLDC GLUCOMTR-MCNC: 167 MG/DL — HIGH (ref 70–99)
GLUCOSE SERPL-MCNC: 249 MG/DL — HIGH (ref 70–99)
GLUCOSE UR QL: 1000 MG/DL
HCT VFR BLD CALC: 46.6 % — SIGNIFICANT CHANGE UP (ref 39–50)
HGB BLD-MCNC: 15.6 G/DL — SIGNIFICANT CHANGE UP (ref 13–17)
IMM GRANULOCYTES NFR BLD AUTO: 0.5 % — SIGNIFICANT CHANGE UP (ref 0–0.9)
KETONES UR-MCNC: NEGATIVE — SIGNIFICANT CHANGE UP
LEUKOCYTE ESTERASE UR-ACNC: NEGATIVE — SIGNIFICANT CHANGE UP
LYMPHOCYTES # BLD AUTO: 0.97 K/UL — LOW (ref 1–3.3)
LYMPHOCYTES # BLD AUTO: 13.2 % — SIGNIFICANT CHANGE UP (ref 13–44)
MAGNESIUM SERPL-MCNC: 1.9 MG/DL — SIGNIFICANT CHANGE UP (ref 1.6–2.6)
MCHC RBC-ENTMCNC: 30 PG — SIGNIFICANT CHANGE UP (ref 27–34)
MCHC RBC-ENTMCNC: 33.5 GM/DL — SIGNIFICANT CHANGE UP (ref 32–36)
MCV RBC AUTO: 89.6 FL — SIGNIFICANT CHANGE UP (ref 80–100)
MONOCYTES # BLD AUTO: 0.65 K/UL — SIGNIFICANT CHANGE UP (ref 0–0.9)
MONOCYTES NFR BLD AUTO: 8.9 % — SIGNIFICANT CHANGE UP (ref 2–14)
NEUTROPHILS # BLD AUTO: 5.38 K/UL — SIGNIFICANT CHANGE UP (ref 1.8–7.4)
NEUTROPHILS NFR BLD AUTO: 73.4 % — SIGNIFICANT CHANGE UP (ref 43–77)
NITRITE UR-MCNC: NEGATIVE — SIGNIFICANT CHANGE UP
NT-PROBNP SERPL-SCNC: 248 PG/ML — SIGNIFICANT CHANGE UP (ref 0–300)
PH UR: 5 — SIGNIFICANT CHANGE UP (ref 5–8)
PLATELET # BLD AUTO: 197 K/UL — SIGNIFICANT CHANGE UP (ref 150–400)
POTASSIUM SERPL-MCNC: 3.6 MMOL/L — SIGNIFICANT CHANGE UP (ref 3.5–5.3)
POTASSIUM SERPL-SCNC: 3.6 MMOL/L — SIGNIFICANT CHANGE UP (ref 3.5–5.3)
PROT SERPL-MCNC: 6.4 G/DL — LOW (ref 6.6–8.7)
PROT UR-MCNC: 100
RBC # BLD: 5.2 M/UL — SIGNIFICANT CHANGE UP (ref 4.2–5.8)
RBC # FLD: 13.4 % — SIGNIFICANT CHANGE UP (ref 10.3–14.5)
RBC CASTS # UR COMP ASSIST: ABNORMAL /HPF (ref 0–4)
RSV RNA NPH QL NAA+NON-PROBE: SIGNIFICANT CHANGE UP
SARS-COV-2 RNA SPEC QL NAA+PROBE: SIGNIFICANT CHANGE UP
SODIUM SERPL-SCNC: 138 MMOL/L — SIGNIFICANT CHANGE UP (ref 135–145)
SP GR SPEC: 1.01 — SIGNIFICANT CHANGE UP (ref 1.01–1.02)
TROPONIN T SERPL-MCNC: 0.02 NG/ML — SIGNIFICANT CHANGE UP (ref 0–0.06)
UROBILINOGEN FLD QL: NEGATIVE MG/DL — SIGNIFICANT CHANGE UP
WBC # BLD: 7.33 K/UL — SIGNIFICANT CHANGE UP (ref 3.8–10.5)
WBC # FLD AUTO: 7.33 K/UL — SIGNIFICANT CHANGE UP (ref 3.8–10.5)
WBC UR QL: SIGNIFICANT CHANGE UP /HPF (ref 0–5)

## 2023-03-07 PROCEDURE — 71045 X-RAY EXAM CHEST 1 VIEW: CPT | Mod: 26

## 2023-03-07 PROCEDURE — 73562 X-RAY EXAM OF KNEE 3: CPT | Mod: 26,50

## 2023-03-07 PROCEDURE — G1004: CPT

## 2023-03-07 PROCEDURE — 73630 X-RAY EXAM OF FOOT: CPT | Mod: 26,LT

## 2023-03-07 PROCEDURE — 72125 CT NECK SPINE W/O DYE: CPT | Mod: 26,MA

## 2023-03-07 PROCEDURE — 99223 1ST HOSP IP/OBS HIGH 75: CPT | Mod: FS

## 2023-03-07 PROCEDURE — 73700 CT LOWER EXTREMITY W/O DYE: CPT | Mod: 26,RT,ME

## 2023-03-07 PROCEDURE — 70450 CT HEAD/BRAIN W/O DYE: CPT | Mod: 26,MA

## 2023-03-07 PROCEDURE — 93010 ELECTROCARDIOGRAM REPORT: CPT

## 2023-03-07 PROCEDURE — 72170 X-RAY EXAM OF PELVIS: CPT | Mod: 26

## 2023-03-07 PROCEDURE — 73610 X-RAY EXAM OF ANKLE: CPT | Mod: 26,LT

## 2023-03-07 RX ORDER — SODIUM CHLORIDE 9 MG/ML
1000 INJECTION, SOLUTION INTRAVENOUS
Refills: 0 | Status: DISCONTINUED | OUTPATIENT
Start: 2023-03-07 | End: 2023-03-14

## 2023-03-07 RX ORDER — METFORMIN HYDROCHLORIDE 850 MG/1
500 TABLET ORAL THREE TIMES A DAY
Refills: 0 | Status: DISCONTINUED | OUTPATIENT
Start: 2023-03-07 | End: 2023-03-14

## 2023-03-07 RX ORDER — DEXTROSE 50 % IN WATER 50 %
12.5 SYRINGE (ML) INTRAVENOUS ONCE
Refills: 0 | Status: DISCONTINUED | OUTPATIENT
Start: 2023-03-07 | End: 2023-03-14

## 2023-03-07 RX ORDER — AMIODARONE HYDROCHLORIDE 400 MG/1
200 TABLET ORAL
Refills: 0 | Status: DISCONTINUED | OUTPATIENT
Start: 2023-03-07 | End: 2023-03-14

## 2023-03-07 RX ORDER — APIXABAN 2.5 MG/1
5 TABLET, FILM COATED ORAL
Refills: 0 | Status: DISCONTINUED | OUTPATIENT
Start: 2023-03-07 | End: 2023-03-14

## 2023-03-07 RX ORDER — DEXTROSE 50 % IN WATER 50 %
25 SYRINGE (ML) INTRAVENOUS ONCE
Refills: 0 | Status: DISCONTINUED | OUTPATIENT
Start: 2023-03-07 | End: 2023-03-14

## 2023-03-07 RX ORDER — INSULIN LISPRO 100/ML
VIAL (ML) SUBCUTANEOUS
Refills: 0 | Status: DISCONTINUED | OUTPATIENT
Start: 2023-03-07 | End: 2023-03-14

## 2023-03-07 RX ORDER — ACETAMINOPHEN 500 MG
650 TABLET ORAL EVERY 6 HOURS
Refills: 0 | Status: DISCONTINUED | OUTPATIENT
Start: 2023-03-07 | End: 2023-03-14

## 2023-03-07 RX ORDER — OXYCODONE AND ACETAMINOPHEN 5; 325 MG/1; MG/1
1 TABLET ORAL ONCE
Refills: 0 | Status: DISCONTINUED | OUTPATIENT
Start: 2023-03-07 | End: 2023-03-07

## 2023-03-07 RX ORDER — GLUCAGON INJECTION, SOLUTION 0.5 MG/.1ML
1 INJECTION, SOLUTION SUBCUTANEOUS ONCE
Refills: 0 | Status: DISCONTINUED | OUTPATIENT
Start: 2023-03-07 | End: 2023-03-14

## 2023-03-07 RX ORDER — ASPIRIN/CALCIUM CARB/MAGNESIUM 324 MG
81 TABLET ORAL DAILY
Refills: 0 | Status: DISCONTINUED | OUTPATIENT
Start: 2023-03-07 | End: 2023-03-14

## 2023-03-07 RX ORDER — FUROSEMIDE 40 MG
40 TABLET ORAL
Refills: 0 | Status: DISCONTINUED | OUTPATIENT
Start: 2023-03-07 | End: 2023-03-14

## 2023-03-07 RX ORDER — METFORMIN HYDROCHLORIDE 850 MG/1
500 TABLET ORAL ONCE
Refills: 0 | Status: COMPLETED | OUTPATIENT
Start: 2023-03-07 | End: 2023-03-07

## 2023-03-07 RX ORDER — OXYCODONE HYDROCHLORIDE 5 MG/1
5 TABLET ORAL EVERY 6 HOURS
Refills: 0 | Status: DISCONTINUED | OUTPATIENT
Start: 2023-03-07 | End: 2023-03-08

## 2023-03-07 RX ORDER — ALPRAZOLAM 0.25 MG
0.25 TABLET ORAL AT BEDTIME
Refills: 0 | Status: DISCONTINUED | OUTPATIENT
Start: 2023-03-07 | End: 2023-03-08

## 2023-03-07 RX ORDER — METOPROLOL TARTRATE 50 MG
200 TABLET ORAL DAILY
Refills: 0 | Status: DISCONTINUED | OUTPATIENT
Start: 2023-03-07 | End: 2023-03-14

## 2023-03-07 RX ORDER — LOSARTAN POTASSIUM 100 MG/1
25 TABLET, FILM COATED ORAL DAILY
Refills: 0 | Status: DISCONTINUED | OUTPATIENT
Start: 2023-03-07 | End: 2023-03-14

## 2023-03-07 RX ORDER — ACETAMINOPHEN 500 MG
975 TABLET ORAL ONCE
Refills: 0 | Status: COMPLETED | OUTPATIENT
Start: 2023-03-07 | End: 2023-03-07

## 2023-03-07 RX ORDER — DEXTROSE 50 % IN WATER 50 %
15 SYRINGE (ML) INTRAVENOUS ONCE
Refills: 0 | Status: DISCONTINUED | OUTPATIENT
Start: 2023-03-07 | End: 2023-03-14

## 2023-03-07 RX ORDER — ATORVASTATIN CALCIUM 80 MG/1
20 TABLET, FILM COATED ORAL AT BEDTIME
Refills: 0 | Status: DISCONTINUED | OUTPATIENT
Start: 2023-03-07 | End: 2023-03-14

## 2023-03-07 RX ORDER — OXYCODONE HYDROCHLORIDE 5 MG/1
10 TABLET ORAL ONCE
Refills: 0 | Status: DISCONTINUED | OUTPATIENT
Start: 2023-03-07 | End: 2023-03-07

## 2023-03-07 RX ORDER — DAPAGLIFLOZIN 10 MG/1
5 TABLET, FILM COATED ORAL EVERY 24 HOURS
Refills: 0 | Status: DISCONTINUED | OUTPATIENT
Start: 2023-03-07 | End: 2023-03-14

## 2023-03-07 RX ADMIN — OXYCODONE HYDROCHLORIDE 10 MILLIGRAM(S): 5 TABLET ORAL at 11:14

## 2023-03-07 RX ADMIN — Medication 650 MILLIGRAM(S): at 23:56

## 2023-03-07 RX ADMIN — OXYCODONE AND ACETAMINOPHEN 1 TABLET(S): 5; 325 TABLET ORAL at 16:36

## 2023-03-07 RX ADMIN — Medication 0.25 MILLIGRAM(S): at 23:18

## 2023-03-07 RX ADMIN — Medication 2: at 16:49

## 2023-03-07 RX ADMIN — METFORMIN HYDROCHLORIDE 500 MILLIGRAM(S): 850 TABLET ORAL at 23:17

## 2023-03-07 RX ADMIN — OXYCODONE AND ACETAMINOPHEN 1 TABLET(S): 5; 325 TABLET ORAL at 17:30

## 2023-03-07 RX ADMIN — METFORMIN HYDROCHLORIDE 500 MILLIGRAM(S): 850 TABLET ORAL at 16:35

## 2023-03-07 RX ADMIN — AMIODARONE HYDROCHLORIDE 200 MILLIGRAM(S): 400 TABLET ORAL at 23:22

## 2023-03-07 RX ADMIN — OXYCODONE HYDROCHLORIDE 5 MILLIGRAM(S): 5 TABLET ORAL at 23:56

## 2023-03-07 RX ADMIN — APIXABAN 5 MILLIGRAM(S): 2.5 TABLET, FILM COATED ORAL at 23:22

## 2023-03-07 RX ADMIN — ATORVASTATIN CALCIUM 20 MILLIGRAM(S): 80 TABLET, FILM COATED ORAL at 23:18

## 2023-03-07 RX ADMIN — OXYCODONE HYDROCHLORIDE 10 MILLIGRAM(S): 5 TABLET ORAL at 12:26

## 2023-03-07 RX ADMIN — Medication 975 MILLIGRAM(S): at 10:26

## 2023-03-07 RX ADMIN — Medication 975 MILLIGRAM(S): at 09:27

## 2023-03-07 NOTE — PROVIDER CONTACT NOTE (OTHER) - ASSESSMENT
Pt received on stretcher, pt amb, and returned with CBWR in NAD on stretcher.  Pt with Pre 10/10,  session  10/10, Post  10/10 pain levels at B/L LEs and back : RN Aware. PT will follow.

## 2023-03-07 NOTE — ED PROVIDER NOTE - PHYSICAL EXAMINATION
Gen: Well appearing in NAD  Head: NC/AT  Neck: trachea midline  Resp:  No distress, lungs cta b/l  Cardiac: Heart rrr.   Abd: Soft, nontender, nondistended.   Ext: TTP over right knee, ttp over left foot.   Neuro:  A&O appears non focal  Skin:  Warm and dry as visualized  Psych:  Normal affect and mood

## 2023-03-07 NOTE — ED CDU PROVIDER INITIAL DAY NOTE - NS ED ATTENDING STATEMENT MOD
This was a shared visit with the FERDINAND. I reviewed and verified the documentation and independently performed the documented:

## 2023-03-07 NOTE — PHYSICAL THERAPY INITIAL EVALUATION ADULT - ADDITIONAL COMMENTS
Pt reports living alone in a studio apartment in his son's house with ramp entrance. Pt amb with 3 wheeled rollator and is independent with functional mobility, ADLs, and IADLs (orders food). Pt does not drives (uses taxi service). Pt has support of family. Pt owns rollator.

## 2023-03-07 NOTE — ED PROVIDER NOTE - ATTENDING CONTRIBUTION TO CARE
77yoM; with PMH signif for CAD(s/p PCI), Afib (moderate AS), Morbid Obesity, HTN, HLD, DM, TAA(4.4cm), BPH s/p TURP c/b chronic golden x9 months; now p/w head trauma s/p fall. states he was walking to the bathroom and slipped, falling onto bilateral knees, then fell backward, +head trauma. denies loc. denies n/v. denies numbness/tingling.  denies cp/sob/palp prior to fall. denies dizziness prior to fall.   Gen: Alert, NAD  Head: NC, AT, PERRL, EOMI, normal lids/conjunctiva  Neck: +supple, no tenderness/meningismus/JVD, +Trachea midline  Pulm: Bilateral BS, normal resp effort, no wheeze/stridor/retractions  CV: RRR, no M/R/G, +dist pulses  Abd: soft, NT/ND, +BS, no hepatosplenomegaly  Mskel:    L UE: from/nt @shoulder/elbow/wrist/hand   R UE: from/nt @shoulder/elbow/wrist/hand  ttp @ anterior knee, from.    L LE: from/nt @ hip//ankle. ttp @ anterior knee, from.   Neuro: AAOx3, no sensory/motor deficit  A/P:  77yoM p/w head trauma and bilateral knee pain  -labs, ekg, ct head/c-spine.

## 2023-03-07 NOTE — PROVIDER CONTACT NOTE (OTHER) - ACTION/TREATMENT ORDERED:
PT goals (to be achieved in 5 days) supine <>sit c CG A, sit<> stand c min A, amb 50 ft with RW and min A.

## 2023-03-07 NOTE — PROVIDER CONTACT NOTE (OTHER) - SITUATION
PT orders received and noted, chart reviewed. PT eval completed and documented in chart.
Chart reviewed, contents noted. Pt unable to be seen at this time secondary to pt off unit for testing.   PT will reattempt later in day if schedule permits. Thank You

## 2023-03-07 NOTE — ED ADULT NURSE REASSESSMENT NOTE - NS ED NURSE REASSESS COMMENT FT1
Assuming care from BESSY Sales, review of patients history, reason for ED visit, medication administered, tests performed/to be performed reviewed, introduced to pt at bedside, provided follow up information at change of shift, IV site assessed, clean dry and intact, updated patient as to the plan of care and plan for disposition, pt education deemed successful after teach back shows proficiency. Pt provided ample time for question at answers at conclusion of interview. Bed is locked, in the lowest position, side rails are up, personal belongings and call bell are within reach.

## 2023-03-07 NOTE — ED PROVIDER NOTE - CLINICAL SUMMARY MEDICAL DECISION MAKING FREE TEXT BOX
76 yo male s/p mechanical fall. Will check CT, XR. PO medications for pain control. Monitor and reassess.

## 2023-03-07 NOTE — ED CDU PROVIDER INITIAL DAY NOTE - ATTENDING APP SHARED VISIT CONTRIBUTION OF CARE
Placed on observation for PT and case management consultations.  Slip and fall this morning, injuring right knee and left foot.  Also hit back of head.  On eliquis.  Labs unremarkable except for elevated glucose. CT head and c-spine: no acute abnormality.  Xrays L foot, L ankle, Charlie Knee, pelvis: no acute fracture/  CT right knee:  large knee joint effusion.  A/P fall with right knee swelling/ effusion, likely hemarthrosis.  PT and case management consulted: will likely need E[facility].

## 2023-03-07 NOTE — PHYSICAL THERAPY INITIAL EVALUATION ADULT - RANGE OF MOTION EXAMINATION, REHAB EVAL
B/L LE limited by pain to 3/4 range/bilateral upper extremity ROM was WFL (within functional limits)

## 2023-03-07 NOTE — PHYSICAL THERAPY INITIAL EVALUATION ADULT - PERTINENT HX OF CURRENT PROBLEM, REHAB EVAL
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 Patient states that he was trying to transfer from the bathtub to the toilet when he slipped and fell. He states that he did strike his head and neck. Additionally, patient is reporting right knee and left foot pain.

## 2023-03-07 NOTE — ED ADULT TRIAGE NOTE - CHIEF COMPLAINT QUOTE
76M on Eliquis BIBA c/o mechanical fall from standing height and then hitting back of his head on tile floor. Pt AAOX3, clear speech, no facial droop, breathing even and unlabored, +GUERRA, Dr Glover saw pt during triage and sent for priority CT.

## 2023-03-07 NOTE — ED ADULT NURSE NOTE - OBJECTIVE STATEMENT
patient came in for fall earlier today from standing height, complains of right knee pain and feels unsteady as claimed. patient is on eliquis.

## 2023-03-07 NOTE — ED PROVIDER NOTE - OBJECTIVE STATEMENT
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 Patient states that he was trying to transfer from the bathtub to the toilet when he slipped and fell. He states that he did strike his head and neck. Additionally, patient is reporting right knee and left foot pain. He pulled his body back to his bed from the bathroom before his daughter called EMS. Patient denies other recent trauma, other recent illness.

## 2023-03-08 VITALS
HEART RATE: 78 BPM | RESPIRATION RATE: 20 BRPM | TEMPERATURE: 98 F | DIASTOLIC BLOOD PRESSURE: 68 MMHG | SYSTOLIC BLOOD PRESSURE: 129 MMHG | OXYGEN SATURATION: 97 %

## 2023-03-08 LAB
CULTURE RESULTS: SIGNIFICANT CHANGE UP
GLUCOSE BLDC GLUCOMTR-MCNC: 176 MG/DL — HIGH (ref 70–99)
GLUCOSE BLDC GLUCOMTR-MCNC: 185 MG/DL — HIGH (ref 70–99)
SPECIMEN SOURCE: SIGNIFICANT CHANGE UP

## 2023-03-08 PROCEDURE — 73700 CT LOWER EXTREMITY W/O DYE: CPT | Mod: ME

## 2023-03-08 PROCEDURE — 80053 COMPREHEN METABOLIC PANEL: CPT

## 2023-03-08 PROCEDURE — 84484 ASSAY OF TROPONIN QUANT: CPT

## 2023-03-08 PROCEDURE — 99284 EMERGENCY DEPT VISIT MOD MDM: CPT | Mod: 25

## 2023-03-08 PROCEDURE — 87637 SARSCOV2&INF A&B&RSV AMP PRB: CPT

## 2023-03-08 PROCEDURE — 73562 X-RAY EXAM OF KNEE 3: CPT

## 2023-03-08 PROCEDURE — 71045 X-RAY EXAM CHEST 1 VIEW: CPT

## 2023-03-08 PROCEDURE — 87086 URINE CULTURE/COLONY COUNT: CPT

## 2023-03-08 PROCEDURE — 82962 GLUCOSE BLOOD TEST: CPT

## 2023-03-08 PROCEDURE — G1004: CPT

## 2023-03-08 PROCEDURE — 83880 ASSAY OF NATRIURETIC PEPTIDE: CPT

## 2023-03-08 PROCEDURE — 73630 X-RAY EXAM OF FOOT: CPT

## 2023-03-08 PROCEDURE — 81001 URINALYSIS AUTO W/SCOPE: CPT

## 2023-03-08 PROCEDURE — 70450 CT HEAD/BRAIN W/O DYE: CPT | Mod: MA

## 2023-03-08 PROCEDURE — 99238 HOSP IP/OBS DSCHRG MGMT 30/<: CPT

## 2023-03-08 PROCEDURE — 73610 X-RAY EXAM OF ANKLE: CPT

## 2023-03-08 PROCEDURE — 85025 COMPLETE CBC W/AUTO DIFF WBC: CPT

## 2023-03-08 PROCEDURE — 36415 COLL VENOUS BLD VENIPUNCTURE: CPT

## 2023-03-08 PROCEDURE — 93005 ELECTROCARDIOGRAM TRACING: CPT

## 2023-03-08 PROCEDURE — 72170 X-RAY EXAM OF PELVIS: CPT

## 2023-03-08 PROCEDURE — 72125 CT NECK SPINE W/O DYE: CPT | Mod: MA

## 2023-03-08 PROCEDURE — 83735 ASSAY OF MAGNESIUM: CPT

## 2023-03-08 PROCEDURE — G0378: CPT

## 2023-03-08 RX ADMIN — OXYCODONE HYDROCHLORIDE 5 MILLIGRAM(S): 5 TABLET ORAL at 06:29

## 2023-03-08 RX ADMIN — METFORMIN HYDROCHLORIDE 500 MILLIGRAM(S): 850 TABLET ORAL at 12:20

## 2023-03-08 RX ADMIN — APIXABAN 5 MILLIGRAM(S): 2.5 TABLET, FILM COATED ORAL at 11:26

## 2023-03-08 RX ADMIN — Medication 2: at 12:18

## 2023-03-08 RX ADMIN — Medication 650 MILLIGRAM(S): at 06:28

## 2023-03-08 RX ADMIN — AMIODARONE HYDROCHLORIDE 200 MILLIGRAM(S): 400 TABLET ORAL at 11:26

## 2023-03-08 RX ADMIN — Medication 81 MILLIGRAM(S): at 11:26

## 2023-03-08 RX ADMIN — Medication 40 MILLIGRAM(S): at 06:30

## 2023-03-08 RX ADMIN — LOSARTAN POTASSIUM 25 MILLIGRAM(S): 100 TABLET, FILM COATED ORAL at 06:29

## 2023-03-08 RX ADMIN — Medication 2: at 08:26

## 2023-03-08 RX ADMIN — Medication 200 MILLIGRAM(S): at 06:30

## 2023-03-08 RX ADMIN — Medication 0.25 MILLIGRAM(S): at 10:24

## 2023-03-08 NOTE — ED CDU PROVIDER DISPOSITION NOTE - ADDITIONAL NOTES AND INSTRUCTIONS:
PT was evaluated At [facility] ED and was found to have a condition that warranted time of to rest and heal from WORK/SCHOOL.   Magno Boyd PA-C

## 2023-03-08 NOTE — ED CDU PROVIDER SUBSEQUENT DAY NOTE - PHYSICAL EXAMINATION
Gen: No acute distress, non toxic  HEENT: Mucous membranes moist, pink conjunctivae, EOMI  CV: RRR, nl s1/s2.  Resp: CTAB, normal rate and effort  GI: Abdomen soft, NT, ND. No rebound, no guarding  Neuro: A&O x 3, moving all 4 extremities  MSK: TTP over right knee. TTP left foot. No spine or joint tenderness to palpation  Skin: No rashes. intact and perfused.

## 2023-03-08 NOTE — ED ADULT NURSE REASSESSMENT NOTE - NS ED NURSE REASSESS COMMENT FT1
Patient is laying in bed, guarding and restless. Patient in mod to severe pain. Requested PRN pain meds as patient did not have any on board.  Patient advised that continuously going to bedside commode will result in more pain. Patient states he cannot use bathroom in bed. Otherwise patient is stable. AAOX4. Patient denies CP, SOB, N/V. Patient also givn ice packs with further prescribed pain meds that will be set from MD. safety measures in place.

## 2023-03-08 NOTE — ED CDU PROVIDER DISPOSITION NOTE - NSFOLLOWUPINSTRUCTIONS_ED_ALL_ED_FT
Acute Knee Pain, Adult      Acute knee pain is sudden and may be caused by damage, swelling, or irritation of the muscles and tissues that support the knee. Pain may result from:  •A fall.      •An injury to the knee from twisting motions.      •A hit to the knee.      •Infection.      Acute knee pain may go away on its own with time and rest. If it does not, your health care provider may order tests to find the cause of the pain. These may include:  •Imaging tests, such as an X-ray, MRI, CT scan, or ultrasound.      •Joint aspiration. In this test, fluid is removed from the knee and evaluated.      •Arthroscopy. In this test, a lighted tube is inserted into the knee and an image is projected onto a TV screen.      •Biopsy. In this test, a sample of tissue is removed from the body and studied under a microscope.        Follow these instructions at home:      If you have a knee sleeve or brace:      •Wear the knee sleeve or brace as told by your health care provider. Remove it only as told by your health care provider.      •Loosen it if your toes tingle, become numb, or turn cold and blue.      •Keep it clean.    •If the knee sleeve or brace is not waterproof:  •Do not let it get wet.      •Cover it with a watertight covering when you take a bath or shower.        Activity     •Rest your knee.      • Do not do things that cause pain or make pain worse.      •Avoid high-impact activities or exercises, such as running, jumping rope, or doing jumping jacks.      •Work with a physical therapist to make a safe exercise program, as recommended by your health care provider. Do exercises as told by your physical therapist.        Managing pain, stiffness, and swelling    •If directed, put ice on the affected knee. To do this:  •If you have a removable knee sleeve or brace, remove it as told by your health care provider.      •Put ice in a plastic bag.      •Place a towel between your skin and the bag.      •Leave the ice on for 20 minutes, 2–3 times a day.      •Remove the ice if your skin turns bright red. This is very important. If you cannot feel pain, heat, or cold, you have a greater risk of damage to the area.        •If directed, use an elastic bandage to put pressure (compression) on your injured knee. This may control swelling, give support, and help with discomfort.      •Raise (elevate) your knee above the level of your heart while you are sitting or lying down.      •Sleep with a pillow under your knee.      General instructions     •Take over-the-counter and prescription medicines only as told by your health care provider.      • Do not use any products that contain nicotine or tobacco, such as cigarettes, e-cigarettes, and chewing tobacco. If you need help quitting, ask your health care provider.      •If you are overweight, work with your health care provider and a dietitian to set a weight-loss goal that is healthy and reasonable for you. Extra weight can put pressure on your knee.      •Pay attention to any changes in your symptoms.      •Keep all follow-up visits. This is important.        Contact a health care provider if:    •Your knee pain continues, changes, or gets worse.      •You have a fever along with knee pain.      •Your knee feels warm to the touch or is red.      •Your knee janice or locks up.        Get help right away if:    •Your knee swells, and the swelling becomes worse.      •You cannot move your knee.      •You have severe pain in your knee that cannot be managed with pain medicine.        Summary    •Acute knee pain can be caused by a fall, an injury, an infection, or damage, swelling, or irritation of the tissues that support your knee.      •Your health care provider may perform tests to find out the cause of the pain.      •Pay attention to any changes in your symptoms. Relieve your pain with rest, medicines, light activity, and the use of ice.      •Get help right away if your knee swells, you cannot move your knee, or you have severe pain that cannot be managed with medicine.      This information is not intended to replace advice given to you by your health care provider. Make sure you discuss any questions you have with your health care provider.

## 2023-03-08 NOTE — ED CDU PROVIDER DISPOSITION NOTE - CARE PROVIDER_API CALL
Duy Vyas)  Orthopedics  [address] [place] [address] [id number]  Phone: [phone]  Fax: [phone]  Follow Up Time:

## 2023-03-08 NOTE — CHART NOTE - NSCHARTNOTEFT_GEN_A_CORE
SWNote: per SW handoff , PT recc E[facility]. FAISAL completed and circulating to multiple facilities, SW to meet with pt to pick preferred facility among acceptances in the am . SW to follow.
SW Note: SW following for [facility] placement, per ED provider pt is medically stable, SW met w/ pt, provided w accepting facilities. After discussion pt is choosing [facility] w/ a private room. SW arranged [facility], [facility] Billing letter left@ pt's bedside, D/C packet w/ signed screen and NEAF left w/ RN, all clinicals forwarded to [facility] including d/c summary, no other SW services noted at this time

## 2023-03-08 NOTE — ED CDU PROVIDER DISPOSITION NOTE - ATTENDING CONTRIBUTION TO CARE
Pt presented to the ED for evaluation of knee pain s/p his knee buckling, to acute fx on xr/CT imaging. Pt placed in obs for DC planing, pain control, Pt cleared for dc to [facility], follow up to ortho

## 2023-03-08 NOTE — ED CDU PROVIDER DISPOSITION NOTE - PATIENT PORTAL LINK FT
You can access the FollowMyHealth Patient Portal offered by [facility] by registering at the following website: [email or web address]. By joining [facility]’s FollowMyHealth portal, you will also be able to view your health information using other applications (apps) compatible with our system.

## 2023-03-08 NOTE — ED CDU PROVIDER SUBSEQUENT DAY NOTE - CLINICAL SUMMARY MEDICAL DECISION MAKING FREE TEXT BOX
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 Patient states that he was trying to transfer from the bathtub to the toilet when he slipped and fell. Pt with right knee pain, left foot pain - xrays preformed/ct will place in observation for PT/SW  Pt recommends [facility]

## 2023-03-08 NOTE — ED CDU PROVIDER SUBSEQUENT DAY NOTE - PROGRESS NOTE DETAILS
Pt presented to the ED for evaluation of knee pain s/p his knee buckling, Pt placed in obs for DC planing, pain control, Pt cleared for dc to [facility], follow up to ortho, educated about when to return to the ED if needed. PT verbalizes that he understands all instructions and results. Pt informed that ED is open and available 24/7 365 days a yr, encouraged to return to the ED if they have any change in condition, or feel the need for revaluation.

## 2023-03-18 ENCOUNTER — EMERGENCY (EMERGENCY)
Facility: HOSPITAL | Age: 78
LOS: 1 days | Discharge: HOME | End: 2023-03-18
Attending: STUDENT IN AN ORGANIZED HEALTH CARE EDUCATION/TRAINING PROGRAM
Payer: MEDICARE

## 2023-03-18 VITALS
DIASTOLIC BLOOD PRESSURE: 76 MMHG | HEART RATE: 79 BPM | TEMPERATURE: 99 F | OXYGEN SATURATION: 92 % | SYSTOLIC BLOOD PRESSURE: 182 MMHG | RESPIRATION RATE: 18 BRPM | WEIGHT: 220.02 LBS

## 2023-03-18 DIAGNOSIS — Z95.5 PRESENCE OF CORONARY ANGIOPLASTY IMPLANT AND GRAFT: Chronic | ICD-10-CM

## 2023-03-18 DIAGNOSIS — Z98.89 OTHER SPECIFIED POSTPROCEDURAL STATES: Chronic | ICD-10-CM

## 2023-03-18 DIAGNOSIS — C49.9 MALIGNANT NEOPLASM OF CONNECTIVE AND SOFT TISSUE, UNSPECIFIED: Chronic | ICD-10-CM

## 2023-03-18 PROCEDURE — 99284 EMERGENCY DEPT VISIT MOD MDM: CPT | Mod: CS

## 2023-03-18 PROCEDURE — 93010 ELECTROCARDIOGRAM REPORT: CPT

## 2023-03-18 RX ORDER — IPRATROPIUM/ALBUTEROL SULFATE 18-103MCG
3 AEROSOL WITH ADAPTER (GRAM) INHALATION ONCE
Refills: 0 | Status: COMPLETED | OUTPATIENT
Start: 2023-03-18 | End: 2023-03-18

## 2023-03-18 RX ADMIN — Medication 3 MILLILITER(S): at 23:30

## 2023-03-18 NOTE — ED PROVIDER NOTE - OBJECTIVE STATEMENT
77-year-old male history of CHF, former smoker for 17 years but no formal diagnosis of COPD, presenting from nursing facility for evaluation after having a fever and shortness of breath earlier today.  Notes that he was seen by pulmonologist at his facility and had x-ray performed today however was not made aware of results until he had already called EMS to come get him and bring him here.  Notes that he feels like he has been developing a cold and  last night had been unable to sleep secondary to having chills, had an aide at the facility come check him and take his temperature which was 101.2 °F.  Notes he has had UTIs as well as pneumonia in the past, whenever he gets an infection it exacerbates his CHF and he notes that this may be what is happening now.  He did have a nebulizer treatment earlier today as he was told he was wheezing.  Denies any other new medications.  Notes that his leg swelling seems to be under control, and  he did take a dose of Lasix, 40 mg, just prior to coming here.

## 2023-03-18 NOTE — ED PROVIDER NOTE - NSFOLLOWUPINSTRUCTIONS_ED_ALL_ED_FT
Called pt and scheduled follow up appt per Dr. Bhat. Appt confirmed.    Viral Respiratory Infection    A viral respiratory infection is an illness that affects parts of the body used for breathing, like the lungs, nose, and throat. It is caused by a germ called a virus. Symptoms can include runny nose, coughing, sneezing, fatigue, body aches, sore throat, fever, or headache. Over the counter medicine can be used to manage the symptoms but the infection typically goes away on its own in 5 to 10 days.     Take acetaminophen (Tylenol) 975mg (3 regular strength tablets or 2 extra strength tablets) as often as every 6 hours as needed for pain. Never take more than 4000mg of acetaminophen/Tylenol in any 24 hour period. Be cautious of over the counter medications as many formularies contain acetaminophen in them.     SEEK IMMEDIATE MEDICAL CARE IF YOU HAVE ANY OF THE FOLLOWING SYMPTOMS: shortness of breath, chest pain, fever over 10 days, or lightheadedness/dizziness.

## 2023-03-18 NOTE — ED PROVIDER NOTE - PHYSICAL EXAMINATION
Gen: NAD, non-toxic, conversational  Eyes: PERRLA, EOMI   HENT: Normocephalic, atraumatic. External ears normal, no rhinorrhea, moist mucous membranes.   CV: RRR, no M/R/G, 1+ pitting edema BLE   Resp: wheezing in all lobes bilaterally, mild basilar crackles, non-labored, speaking in full sentences with 94% SPO2 on 2L O2NC   Abd: soft, non tender, non rigid, no guarding or rebound tenderness  Back: No CVAT bilaterally, no midline ttp  Skin: dry, wwp   Neuro: AOx3, speech is fluent and appropriate  Psych: Mood concerned, affect euthymic

## 2023-03-18 NOTE — ED PROVIDER NOTE - CLINICAL SUMMARY MEDICAL DECISION MAKING FREE TEXT BOX
77-year-old male presenting for evaluation of shortness of breath in the setting of having had a fever last night, on examination there is mild pitting edema bilateral lower extremities but patient reports this is better than usual, already took Lasix prior to arrival, does also have a diffuse expiratory wheeze.  Per report is not on any medications for wheezing at baseline though had a nebulizer treatment while at his facility earlier today after being seen by pulmonology.  Differential diagnosis for presentation includes pneumonia, UTI, viral syndrome,?  COPD exacerbation though not diagnosed with COPD was a former smoker for 17 years.  Given wheezing will treat with DuoNeb, work-up for possible infection versus CHF exacerbation versus COPD exacerbation, follow-up studies, reassess, dispo pending clinical course and results though did bedside trial off O2 and patient non-hypoxic does desat to 92% on RA without any moving suspect would desat further if ambulatory, may require admit.

## 2023-03-18 NOTE — ED PROVIDER NOTE - PROGRESS NOTE DETAILS
FILEMON Burroughs: workup positive for hmpv; o/w negative, d/w patient, not requiring O2 though had been on NC for comfort, saturation 94% off oxygen, will have patient return to [facility] rehab.

## 2023-03-18 NOTE — ED PROVIDER NOTE - PATIENT PORTAL LINK FT
You can access the FollowMyHealth Patient Portal offered by [facility] by registering at the following website: [email or web address]. By joining [facility]’s FollowMyHealth portal, you will also be able to view your health information using other applications (apps) compatible with our system.

## 2023-03-19 VITALS
SYSTOLIC BLOOD PRESSURE: 117 MMHG | DIASTOLIC BLOOD PRESSURE: 68 MMHG | RESPIRATION RATE: 18 BRPM | TEMPERATURE: 98 F | HEART RATE: 79 BPM | OXYGEN SATURATION: 91 %

## 2023-03-19 LAB
ALBUMIN SERPL ELPH-MCNC: 3.6 G/DL — SIGNIFICANT CHANGE UP (ref 3.3–5.2)
ALP SERPL-CCNC: 168 U/L — HIGH (ref 40–120)
ALT FLD-CCNC: 11 U/L — SIGNIFICANT CHANGE UP
ANION GAP SERPL CALC-SCNC: 10 MMOL/L — SIGNIFICANT CHANGE UP (ref 5–17)
APPEARANCE UR: CLEAR — SIGNIFICANT CHANGE UP
AST SERPL-CCNC: 15 U/L — SIGNIFICANT CHANGE UP
BASE EXCESS BLDV CALC-SCNC: 6.4 MMOL/L — HIGH (ref -2–3)
BASOPHILS # BLD AUTO: 0.04 K/UL — SIGNIFICANT CHANGE UP (ref 0–0.2)
BASOPHILS NFR BLD AUTO: 0.6 % — SIGNIFICANT CHANGE UP (ref 0–2)
BILIRUB SERPL-MCNC: 0.3 MG/DL — LOW (ref 0.4–2)
BILIRUB UR-MCNC: NEGATIVE — SIGNIFICANT CHANGE UP
BUN SERPL-MCNC: 22.8 MG/DL — HIGH (ref 8–20)
CA-I SERPL-SCNC: 1.09 MMOL/L — LOW (ref 1.15–1.33)
CALCIUM SERPL-MCNC: 8.8 MG/DL — SIGNIFICANT CHANGE UP (ref 8.4–10.5)
CHLORIDE BLDV-SCNC: 99 MMOL/L — SIGNIFICANT CHANGE UP (ref 96–108)
CHLORIDE SERPL-SCNC: 100 MMOL/L — SIGNIFICANT CHANGE UP (ref 96–108)
CO2 SERPL-SCNC: 30 MMOL/L — HIGH (ref 22–29)
COLOR SPEC: YELLOW — SIGNIFICANT CHANGE UP
CREAT SERPL-MCNC: 0.77 MG/DL — SIGNIFICANT CHANGE UP (ref 0.5–1.3)
DIFF PNL FLD: ABNORMAL
EGFR: 92 ML/MIN/1.73M2 — SIGNIFICANT CHANGE UP
EOSINOPHIL # BLD AUTO: 0.31 K/UL — SIGNIFICANT CHANGE UP (ref 0–0.5)
EOSINOPHIL NFR BLD AUTO: 4.9 % — SIGNIFICANT CHANGE UP (ref 0–6)
EPI CELLS # UR: SIGNIFICANT CHANGE UP
GAS PNL BLDV: 134 MMOL/L — LOW (ref 136–145)
GAS PNL BLDV: SIGNIFICANT CHANGE UP
GLUCOSE BLDV-MCNC: 293 MG/DL — HIGH (ref 70–99)
GLUCOSE SERPL-MCNC: 278 MG/DL — HIGH (ref 70–99)
GLUCOSE UR QL: 1000 MG/DL
HCO3 BLDV-SCNC: 32 MMOL/L — HIGH (ref 22–29)
HCT VFR BLD CALC: 45.6 % — SIGNIFICANT CHANGE UP (ref 39–50)
HCT VFR BLDA CALC: 45 % — SIGNIFICANT CHANGE UP
HGB BLD CALC-MCNC: 15.1 G/DL — SIGNIFICANT CHANGE UP (ref 12.6–17.4)
HGB BLD-MCNC: 14.7 G/DL — SIGNIFICANT CHANGE UP (ref 13–17)
HMPV RNA SPEC QL NAA+PROBE: DETECTED
IMM GRANULOCYTES NFR BLD AUTO: 0.5 % — SIGNIFICANT CHANGE UP (ref 0–0.9)
KETONES UR-MCNC: NEGATIVE — SIGNIFICANT CHANGE UP
LACTATE BLDV-MCNC: 1.6 MMOL/L — SIGNIFICANT CHANGE UP (ref 0.5–2)
LEUKOCYTE ESTERASE UR-ACNC: NEGATIVE — SIGNIFICANT CHANGE UP
LYMPHOCYTES # BLD AUTO: 0.52 K/UL — LOW (ref 1–3.3)
LYMPHOCYTES # BLD AUTO: 8.1 % — LOW (ref 13–44)
MCHC RBC-ENTMCNC: 30.3 PG — SIGNIFICANT CHANGE UP (ref 27–34)
MCHC RBC-ENTMCNC: 32.2 GM/DL — SIGNIFICANT CHANGE UP (ref 32–36)
MCV RBC AUTO: 94 FL — SIGNIFICANT CHANGE UP (ref 80–100)
MONOCYTES # BLD AUTO: 0.68 K/UL — SIGNIFICANT CHANGE UP (ref 0–0.9)
MONOCYTES NFR BLD AUTO: 10.6 % — SIGNIFICANT CHANGE UP (ref 2–14)
NEUTROPHILS # BLD AUTO: 4.81 K/UL — SIGNIFICANT CHANGE UP (ref 1.8–7.4)
NEUTROPHILS NFR BLD AUTO: 75.3 % — SIGNIFICANT CHANGE UP (ref 43–77)
NITRITE UR-MCNC: NEGATIVE — SIGNIFICANT CHANGE UP
NT-PROBNP SERPL-SCNC: 550 PG/ML — HIGH (ref 0–300)
PCO2 BLDV: 63 MMHG — HIGH (ref 42–55)
PH BLDV: 7.32 — SIGNIFICANT CHANGE UP (ref 7.32–7.43)
PH UR: 6 — SIGNIFICANT CHANGE UP (ref 5–8)
PLATELET # BLD AUTO: 249 K/UL — SIGNIFICANT CHANGE UP (ref 150–400)
PO2 BLDV: 72 MMHG — HIGH (ref 25–45)
POTASSIUM BLDV-SCNC: 4.7 MMOL/L — SIGNIFICANT CHANGE UP (ref 3.5–5.1)
POTASSIUM SERPL-MCNC: 4.7 MMOL/L — SIGNIFICANT CHANGE UP (ref 3.5–5.3)
POTASSIUM SERPL-SCNC: 4.7 MMOL/L — SIGNIFICANT CHANGE UP (ref 3.5–5.3)
PROCALCITONIN SERPL-MCNC: 0.14 NG/ML — HIGH (ref 0.02–0.1)
PROT SERPL-MCNC: 6.7 G/DL — SIGNIFICANT CHANGE UP (ref 6.6–8.7)
PROT UR-MCNC: NEGATIVE — SIGNIFICANT CHANGE UP
RAPID RVP RESULT: DETECTED
RBC # BLD: 4.85 M/UL — SIGNIFICANT CHANGE UP (ref 4.2–5.8)
RBC # FLD: 13.6 % — SIGNIFICANT CHANGE UP (ref 10.3–14.5)
RBC CASTS # UR COMP ASSIST: SIGNIFICANT CHANGE UP /HPF (ref 0–4)
SAO2 % BLDV: 94.4 % — SIGNIFICANT CHANGE UP
SARS-COV-2 RNA SPEC QL NAA+PROBE: SIGNIFICANT CHANGE UP
SODIUM SERPL-SCNC: 140 MMOL/L — SIGNIFICANT CHANGE UP (ref 135–145)
SP GR SPEC: 1.01 — SIGNIFICANT CHANGE UP (ref 1.01–1.02)
TROPONIN T SERPL-MCNC: <0.01 NG/ML — SIGNIFICANT CHANGE UP (ref 0–0.06)
UROBILINOGEN FLD QL: NEGATIVE MG/DL — SIGNIFICANT CHANGE UP
WBC # BLD: 6.39 K/UL — SIGNIFICANT CHANGE UP (ref 3.8–10.5)
WBC # FLD AUTO: 6.39 K/UL — SIGNIFICANT CHANGE UP (ref 3.8–10.5)
WBC UR QL: NEGATIVE /HPF — SIGNIFICANT CHANGE UP (ref 0–5)

## 2023-03-19 PROCEDURE — G1004: CPT

## 2023-03-19 PROCEDURE — 81001 URINALYSIS AUTO W/SCOPE: CPT

## 2023-03-19 PROCEDURE — 84145 PROCALCITONIN (PCT): CPT

## 2023-03-19 PROCEDURE — 71275 CT ANGIOGRAPHY CHEST: CPT | Mod: ME

## 2023-03-19 PROCEDURE — 80053 COMPREHEN METABOLIC PANEL: CPT

## 2023-03-19 PROCEDURE — 84295 ASSAY OF SERUM SODIUM: CPT

## 2023-03-19 PROCEDURE — 99285 EMERGENCY DEPT VISIT HI MDM: CPT | Mod: 25

## 2023-03-19 PROCEDURE — 83880 ASSAY OF NATRIURETIC PEPTIDE: CPT

## 2023-03-19 PROCEDURE — 82435 ASSAY OF BLOOD CHLORIDE: CPT

## 2023-03-19 PROCEDURE — 71275 CT ANGIOGRAPHY CHEST: CPT | Mod: 26,ME

## 2023-03-19 PROCEDURE — 82947 ASSAY GLUCOSE BLOOD QUANT: CPT

## 2023-03-19 PROCEDURE — 85014 HEMATOCRIT: CPT

## 2023-03-19 PROCEDURE — 87086 URINE CULTURE/COLONY COUNT: CPT

## 2023-03-19 PROCEDURE — 82330 ASSAY OF CALCIUM: CPT

## 2023-03-19 PROCEDURE — 85025 COMPLETE CBC W/AUTO DIFF WBC: CPT

## 2023-03-19 PROCEDURE — 85018 HEMOGLOBIN: CPT

## 2023-03-19 PROCEDURE — 0225U NFCT DS DNA&RNA 21 SARSCOV2: CPT

## 2023-03-19 PROCEDURE — 83605 ASSAY OF LACTIC ACID: CPT

## 2023-03-19 PROCEDURE — 71045 X-RAY EXAM CHEST 1 VIEW: CPT | Mod: 26

## 2023-03-19 PROCEDURE — 84132 ASSAY OF SERUM POTASSIUM: CPT

## 2023-03-19 PROCEDURE — 93005 ELECTROCARDIOGRAM TRACING: CPT

## 2023-03-19 PROCEDURE — 84484 ASSAY OF TROPONIN QUANT: CPT

## 2023-03-19 PROCEDURE — 94640 AIRWAY INHALATION TREATMENT: CPT

## 2023-03-19 PROCEDURE — 71045 X-RAY EXAM CHEST 1 VIEW: CPT

## 2023-03-19 PROCEDURE — 36415 COLL VENOUS BLD VENIPUNCTURE: CPT

## 2023-03-19 PROCEDURE — 82803 BLOOD GASES ANY COMBINATION: CPT

## 2023-03-19 RX ORDER — ALPRAZOLAM 0.25 MG
0.25 TABLET ORAL ONCE
Refills: 0 | Status: COMPLETED | OUTPATIENT
Start: 2023-03-19 | End: 2023-03-19

## 2023-03-19 RX ORDER — FUROSEMIDE 40 MG
40 TABLET ORAL ONCE
Refills: 0 | Status: DISCONTINUED | OUTPATIENT
Start: 2023-03-19 | End: 2023-03-26

## 2023-03-19 RX ORDER — DAPAGLIFLOZIN 10 MG/1
10 TABLET, FILM COATED ORAL ONCE
Refills: 0 | Status: DISCONTINUED | OUTPATIENT
Start: 2023-03-19 | End: 2023-03-26

## 2023-03-19 RX ORDER — ASPIRIN/CALCIUM CARB/MAGNESIUM 324 MG
81 TABLET ORAL DAILY
Refills: 0 | Status: DISCONTINUED | OUTPATIENT
Start: 2023-03-19 | End: 2023-03-26

## 2023-03-19 RX ORDER — APIXABAN 2.5 MG/1
5 TABLET, FILM COATED ORAL ONCE
Refills: 0 | Status: DISCONTINUED | OUTPATIENT
Start: 2023-03-19 | End: 2023-03-26

## 2023-03-19 RX ORDER — METOPROLOL TARTRATE 50 MG
200 TABLET ORAL DAILY
Refills: 0 | Status: DISCONTINUED | OUTPATIENT
Start: 2023-03-19 | End: 2023-03-26

## 2023-03-19 RX ORDER — LOSARTAN POTASSIUM 100 MG/1
25 TABLET, FILM COATED ORAL DAILY
Refills: 0 | Status: DISCONTINUED | OUTPATIENT
Start: 2023-03-19 | End: 2023-03-26

## 2023-03-19 RX ORDER — FLUTICASONE PROPIONATE 50 MCG
2 SPRAY, SUSPENSION NASAL ONCE
Refills: 0 | Status: DISCONTINUED | OUTPATIENT
Start: 2023-03-19 | End: 2023-03-26

## 2023-03-19 RX ORDER — AMIODARONE HYDROCHLORIDE 400 MG/1
200 TABLET ORAL DAILY
Refills: 0 | Status: DISCONTINUED | OUTPATIENT
Start: 2023-03-19 | End: 2023-03-26

## 2023-03-19 RX ORDER — PANTOPRAZOLE SODIUM 20 MG/1
40 TABLET, DELAYED RELEASE ORAL
Refills: 0 | Status: DISCONTINUED | OUTPATIENT
Start: 2023-03-19 | End: 2023-03-26

## 2023-03-19 RX ORDER — METFORMIN HYDROCHLORIDE 850 MG/1
500 TABLET ORAL ONCE
Refills: 0 | Status: DISCONTINUED | OUTPATIENT
Start: 2023-03-19 | End: 2023-03-26

## 2023-03-19 NOTE — ED ADULT NURSE NOTE - OBJECTIVE STATEMENT
PT A&Ox4. PT stated that earlier today he started to experiencing SOB and chest pain.  Pt stated that he told the charge nurse at the rehab and was told it was nothing. PT stated that he called 911 and wanted to come to be evaluated. Pt noted to be SOB.  Will continue to monitor.

## 2023-03-19 NOTE — ED ADULT NURSE NOTE - INTERVENTIONS DEFINITIONS
[place] to call system/Call bell, personal items and telephone within reach/Non-slip footwear when patient is off stretcher/Physically safe environment: no spills, clutter or unnecessary equipment

## 2023-03-19 NOTE — ED ADULT NURSE REASSESSMENT NOTE - NS ED NURSE REASSESS COMMENT FT1
Assumed care 0720, received report from Glo RN, pt discharged waiting for ambulette back to [facility], update for estimated time of transportation is within 1 hour, pt VSS, respirations even and unlabored on 2 L n/c, no distress noted.

## 2023-03-20 LAB
CULTURE RESULTS: SIGNIFICANT CHANGE UP
SPECIMEN SOURCE: SIGNIFICANT CHANGE UP

## 2023-03-20 RX ORDER — ACETYLCYSTEINE 200 MG/ML
3 VIAL (ML) MISCELLANEOUS
Qty: 0 | Refills: 0 | DISCHARGE
Start: 2023-03-20 | End: 2023-03-24

## 2023-03-22 ENCOUNTER — INPATIENT (INPATIENT)
Facility: HOSPITAL | Age: 78
LOS: 15 days | Discharge: NURSING, REHAB OR LONG-TERM CARE | DRG: 871 | End: 2023-04-07
Attending: INTERNAL MEDICINE | Admitting: INTERNAL MEDICINE
Payer: MEDICARE

## 2023-03-22 VITALS
RESPIRATION RATE: 30 BRPM | HEART RATE: 77 BPM | OXYGEN SATURATION: 91 % | DIASTOLIC BLOOD PRESSURE: 40 MMHG | SYSTOLIC BLOOD PRESSURE: 84 MMHG

## 2023-03-22 DIAGNOSIS — Z98.89 OTHER SPECIFIED POSTPROCEDURAL STATES: Chronic | ICD-10-CM

## 2023-03-22 DIAGNOSIS — J96.02 ACUTE RESPIRATORY FAILURE WITH HYPERCAPNIA: ICD-10-CM

## 2023-03-22 DIAGNOSIS — C49.9 MALIGNANT NEOPLASM OF CONNECTIVE AND SOFT TISSUE, UNSPECIFIED: Chronic | ICD-10-CM

## 2023-03-22 DIAGNOSIS — Z95.5 PRESENCE OF CORONARY ANGIOPLASTY IMPLANT AND GRAFT: Chronic | ICD-10-CM

## 2023-03-22 LAB
ALBUMIN SERPL ELPH-MCNC: 3.6 G/DL — SIGNIFICANT CHANGE UP (ref 3.3–5.2)
ALP SERPL-CCNC: 221 U/L — HIGH (ref 40–120)
ALT FLD-CCNC: 24 U/L — SIGNIFICANT CHANGE UP
ANION GAP SERPL CALC-SCNC: 8 MMOL/L — SIGNIFICANT CHANGE UP (ref 5–17)
APPEARANCE UR: CLEAR — SIGNIFICANT CHANGE UP
APTT BLD: 45.6 SEC — HIGH (ref 27.5–35.5)
AST SERPL-CCNC: 21 U/L — SIGNIFICANT CHANGE UP
BACTERIA # UR AUTO: ABNORMAL
BASOPHILS # BLD AUTO: 0.04 K/UL — SIGNIFICANT CHANGE UP (ref 0–0.2)
BASOPHILS NFR BLD AUTO: 0.6 % — SIGNIFICANT CHANGE UP (ref 0–2)
BILIRUB SERPL-MCNC: 0.3 MG/DL — LOW (ref 0.4–2)
BILIRUB UR-MCNC: NEGATIVE — SIGNIFICANT CHANGE UP
BLD GP AB SCN SERPL QL: SIGNIFICANT CHANGE UP
BUN SERPL-MCNC: 30.6 MG/DL — HIGH (ref 8–20)
CALCIUM SERPL-MCNC: 8.1 MG/DL — LOW (ref 8.4–10.5)
CHLORIDE SERPL-SCNC: 96 MMOL/L — SIGNIFICANT CHANGE UP (ref 96–108)
CO2 SERPL-SCNC: 33 MMOL/L — HIGH (ref 22–29)
COLOR SPEC: YELLOW — SIGNIFICANT CHANGE UP
CREAT SERPL-MCNC: 1.13 MG/DL — SIGNIFICANT CHANGE UP (ref 0.5–1.3)
DIFF PNL FLD: ABNORMAL
EGFR: 67 ML/MIN/1.73M2 — SIGNIFICANT CHANGE UP
EOSINOPHIL # BLD AUTO: 0.03 K/UL — SIGNIFICANT CHANGE UP (ref 0–0.5)
EOSINOPHIL NFR BLD AUTO: 0.4 % — SIGNIFICANT CHANGE UP (ref 0–6)
EPI CELLS # UR: SIGNIFICANT CHANGE UP
ETHANOL SERPL-MCNC: <10 MG/DL — SIGNIFICANT CHANGE UP (ref 0–9)
FLUAV AG NPH QL: SIGNIFICANT CHANGE UP
FLUBV AG NPH QL: SIGNIFICANT CHANGE UP
GAS PNL BLDA: SIGNIFICANT CHANGE UP
GLUCOSE BLDC GLUCOMTR-MCNC: 246 MG/DL — HIGH (ref 70–99)
GLUCOSE SERPL-MCNC: 238 MG/DL — HIGH (ref 70–99)
GLUCOSE SERPL-MCNC: 265 MG/DL — HIGH (ref 70–99)
GLUCOSE UR QL: 1000 MG/DL
HCT VFR BLD CALC: 45.6 % — SIGNIFICANT CHANGE UP (ref 39–50)
HGB BLD-MCNC: 14 G/DL — SIGNIFICANT CHANGE UP (ref 13–17)
HMPV RNA SPEC QL NAA+PROBE: DETECTED
IMM GRANULOCYTES NFR BLD AUTO: 0.6 % — SIGNIFICANT CHANGE UP (ref 0–0.9)
INR BLD: 1.83 RATIO — HIGH (ref 0.88–1.16)
KETONES UR-MCNC: ABNORMAL
LEUKOCYTE ESTERASE UR-ACNC: NEGATIVE — SIGNIFICANT CHANGE UP
LIDOCAIN IGE QN: 12 U/L — LOW (ref 22–51)
LYMPHOCYTES # BLD AUTO: 0.68 K/UL — LOW (ref 1–3.3)
LYMPHOCYTES # BLD AUTO: 9.5 % — LOW (ref 13–44)
MCHC RBC-ENTMCNC: 29.4 PG — SIGNIFICANT CHANGE UP (ref 27–34)
MCHC RBC-ENTMCNC: 30.7 GM/DL — LOW (ref 32–36)
MCV RBC AUTO: 95.8 FL — SIGNIFICANT CHANGE UP (ref 80–100)
MONOCYTES # BLD AUTO: 0.74 K/UL — SIGNIFICANT CHANGE UP (ref 0–0.9)
MONOCYTES NFR BLD AUTO: 10.3 % — SIGNIFICANT CHANGE UP (ref 2–14)
NEUTROPHILS # BLD AUTO: 5.66 K/UL — SIGNIFICANT CHANGE UP (ref 1.8–7.4)
NEUTROPHILS NFR BLD AUTO: 78.6 % — HIGH (ref 43–77)
NITRITE UR-MCNC: NEGATIVE — SIGNIFICANT CHANGE UP
PH UR: 5 — SIGNIFICANT CHANGE UP (ref 5–8)
PLATELET # BLD AUTO: 241 K/UL — SIGNIFICANT CHANGE UP (ref 150–400)
POTASSIUM SERPL-MCNC: 4.5 MMOL/L — SIGNIFICANT CHANGE UP (ref 3.5–5.3)
POTASSIUM SERPL-SCNC: 4.5 MMOL/L — SIGNIFICANT CHANGE UP (ref 3.5–5.3)
PROT SERPL-MCNC: 6.5 G/DL — LOW (ref 6.6–8.7)
PROT UR-MCNC: 30 MG/DL
PROTHROM AB SERPL-ACNC: 21.4 SEC — HIGH (ref 10.5–13.4)
RAPID RVP RESULT: DETECTED
RBC # BLD: 4.76 M/UL — SIGNIFICANT CHANGE UP (ref 4.2–5.8)
RBC # FLD: 13.7 % — SIGNIFICANT CHANGE UP (ref 10.3–14.5)
RBC CASTS # UR COMP ASSIST: ABNORMAL /HPF (ref 0–4)
RSV RNA NPH QL NAA+NON-PROBE: SIGNIFICANT CHANGE UP
SARS-COV-2 RNA SPEC QL NAA+PROBE: SIGNIFICANT CHANGE UP
SARS-COV-2 RNA SPEC QL NAA+PROBE: SIGNIFICANT CHANGE UP
SODIUM SERPL-SCNC: 137 MMOL/L — SIGNIFICANT CHANGE UP (ref 135–145)
SP GR SPEC: 1.01 — SIGNIFICANT CHANGE UP (ref 1.01–1.02)
UROBILINOGEN FLD QL: NEGATIVE MG/DL — SIGNIFICANT CHANGE UP
WBC # BLD: 7.19 K/UL — SIGNIFICANT CHANGE UP (ref 3.8–10.5)
WBC # FLD AUTO: 7.19 K/UL — SIGNIFICANT CHANGE UP (ref 3.8–10.5)
WBC UR QL: SIGNIFICANT CHANGE UP /HPF (ref 0–5)

## 2023-03-22 PROCEDURE — 99223 1ST HOSP IP/OBS HIGH 75: CPT

## 2023-03-22 PROCEDURE — 72125 CT NECK SPINE W/O DYE: CPT | Mod: 26,MA

## 2023-03-22 PROCEDURE — 93010 ELECTROCARDIOGRAM REPORT: CPT

## 2023-03-22 PROCEDURE — 99291 CRITICAL CARE FIRST HOUR: CPT

## 2023-03-22 PROCEDURE — 70498 CT ANGIOGRAPHY NECK: CPT | Mod: 26,MD

## 2023-03-22 PROCEDURE — 71045 X-RAY EXAM CHEST 1 VIEW: CPT | Mod: 26

## 2023-03-22 PROCEDURE — 70496 CT ANGIOGRAPHY HEAD: CPT | Mod: 26,MD

## 2023-03-22 PROCEDURE — 71260 CT THORAX DX C+: CPT | Mod: 26,MA

## 2023-03-22 PROCEDURE — 36620 INSERTION CATHETER ARTERY: CPT

## 2023-03-22 PROCEDURE — 31500 INSERT EMERGENCY AIRWAY: CPT

## 2023-03-22 PROCEDURE — 74177 CT ABD & PELVIS W/CONTRAST: CPT | Mod: 26,MA

## 2023-03-22 PROCEDURE — 36556 INSERT NON-TUNNEL CV CATH: CPT

## 2023-03-22 PROCEDURE — 0042T: CPT

## 2023-03-22 PROCEDURE — 99291 CRITICAL CARE FIRST HOUR: CPT | Mod: CS,25

## 2023-03-22 RX ORDER — CHLORHEXIDINE GLUCONATE 213 G/1000ML
15 SOLUTION TOPICAL EVERY 12 HOURS
Refills: 0 | Status: DISCONTINUED | OUTPATIENT
Start: 2023-03-22 | End: 2023-03-23

## 2023-03-22 RX ORDER — DEXTROSE 50 % IN WATER 50 %
12.5 SYRINGE (ML) INTRAVENOUS ONCE
Refills: 0 | Status: DISCONTINUED | OUTPATIENT
Start: 2023-03-22 | End: 2023-03-27

## 2023-03-22 RX ORDER — SODIUM CHLORIDE 9 MG/ML
1000 INJECTION, SOLUTION INTRAVENOUS
Refills: 0 | Status: DISCONTINUED | OUTPATIENT
Start: 2023-03-22 | End: 2023-03-23

## 2023-03-22 RX ORDER — IPRATROPIUM/ALBUTEROL SULFATE 18-103MCG
3 AEROSOL WITH ADAPTER (GRAM) INHALATION
Refills: 0 | Status: COMPLETED | OUTPATIENT
Start: 2023-03-22 | End: 2023-03-22

## 2023-03-22 RX ORDER — NOREPINEPHRINE BITARTRATE/D5W 8 MG/250ML
0.05 PLASTIC BAG, INJECTION (ML) INTRAVENOUS
Qty: 8 | Refills: 0 | Status: DISCONTINUED | OUTPATIENT
Start: 2023-03-22 | End: 2023-03-25

## 2023-03-22 RX ORDER — OMEPRAZOLE 10 MG/1
1 CAPSULE, DELAYED RELEASE ORAL
Qty: 0 | Refills: 0 | DISCHARGE

## 2023-03-22 RX ORDER — DEXTROSE 50 % IN WATER 50 %
25 SYRINGE (ML) INTRAVENOUS ONCE
Refills: 0 | Status: DISCONTINUED | OUTPATIENT
Start: 2023-03-22 | End: 2023-03-27

## 2023-03-22 RX ORDER — ASPIRIN/CALCIUM CARB/MAGNESIUM 324 MG
1 TABLET ORAL
Qty: 0 | Refills: 0 | DISCHARGE

## 2023-03-22 RX ORDER — LANOLIN/MINERAL OIL
1 LOTION (ML) TOPICAL
Qty: 0 | Refills: 0 | DISCHARGE

## 2023-03-22 RX ORDER — KETAMINE HYDROCHLORIDE 100 MG/ML
250 INJECTION INTRAMUSCULAR; INTRAVENOUS ONCE
Refills: 0 | Status: DISCONTINUED | OUTPATIENT
Start: 2023-03-22 | End: 2023-03-22

## 2023-03-22 RX ORDER — CEFEPIME 1 G/1
2000 INJECTION, POWDER, FOR SOLUTION INTRAMUSCULAR; INTRAVENOUS EVERY 8 HOURS
Refills: 0 | Status: COMPLETED | OUTPATIENT
Start: 2023-03-22 | End: 2023-03-28

## 2023-03-22 RX ORDER — HYDROCORTISONE 0.5 %
1 CREAM (GRAM) TOPICAL
Qty: 0 | Refills: 0 | DISCHARGE

## 2023-03-22 RX ORDER — HYDROCORTISONE 20 MG
50 TABLET ORAL EVERY 6 HOURS
Refills: 0 | Status: DISCONTINUED | OUTPATIENT
Start: 2023-03-22 | End: 2023-03-24

## 2023-03-22 RX ORDER — ENOXAPARIN SODIUM 100 MG/ML
150 INJECTION SUBCUTANEOUS EVERY 12 HOURS
Refills: 0 | Status: DISCONTINUED | OUTPATIENT
Start: 2023-03-22 | End: 2023-03-23

## 2023-03-22 RX ORDER — VASOPRESSIN 20 [USP'U]/ML
0.04 INJECTION INTRAVENOUS
Qty: 40 | Refills: 0 | Status: DISCONTINUED | OUTPATIENT
Start: 2023-03-22 | End: 2023-03-24

## 2023-03-22 RX ORDER — SUCCINYLCHOLINE CHLORIDE 100 MG/5ML
180 SYRINGE (ML) INTRAVENOUS ONCE
Refills: 0 | Status: COMPLETED | OUTPATIENT
Start: 2023-03-22 | End: 2023-03-22

## 2023-03-22 RX ORDER — AZITHROMYCIN 500 MG/1
TABLET, FILM COATED ORAL
Refills: 0 | Status: DISCONTINUED | OUTPATIENT
Start: 2023-03-22 | End: 2023-03-23

## 2023-03-22 RX ORDER — CEFEPIME 1 G/1
2000 INJECTION, POWDER, FOR SOLUTION INTRAMUSCULAR; INTRAVENOUS ONCE
Refills: 0 | Status: COMPLETED | OUTPATIENT
Start: 2023-03-22 | End: 2023-03-22

## 2023-03-22 RX ORDER — SALIVA SUBSTITUTE COMB NO.11 351 MG
15 POWDER IN PACKET (EA) MUCOUS MEMBRANE
Qty: 0 | Refills: 0 | DISCHARGE

## 2023-03-22 RX ORDER — ATORVASTATIN CALCIUM 80 MG/1
10 TABLET, FILM COATED ORAL AT BEDTIME
Refills: 0 | Status: DISCONTINUED | OUTPATIENT
Start: 2023-03-23 | End: 2023-04-07

## 2023-03-22 RX ORDER — CEFEPIME 1 G/1
INJECTION, POWDER, FOR SOLUTION INTRAMUSCULAR; INTRAVENOUS
Refills: 0 | Status: DISCONTINUED | OUTPATIENT
Start: 2023-03-22 | End: 2023-03-22

## 2023-03-22 RX ORDER — ACETAMINOPHEN 500 MG
650 TABLET ORAL EVERY 6 HOURS
Refills: 0 | Status: DISCONTINUED | OUTPATIENT
Start: 2023-03-22 | End: 2023-04-07

## 2023-03-22 RX ORDER — AMIODARONE HYDROCHLORIDE 400 MG/1
200 TABLET ORAL DAILY
Refills: 0 | Status: DISCONTINUED | OUTPATIENT
Start: 2023-03-23 | End: 2023-04-07

## 2023-03-22 RX ORDER — AZITHROMYCIN 500 MG/1
500 TABLET, FILM COATED ORAL EVERY 24 HOURS
Refills: 0 | Status: DISCONTINUED | OUTPATIENT
Start: 2023-03-23 | End: 2023-03-23

## 2023-03-22 RX ORDER — FUROSEMIDE 40 MG
20 TABLET ORAL ONCE
Refills: 0 | Status: COMPLETED | OUTPATIENT
Start: 2023-03-22 | End: 2023-03-22

## 2023-03-22 RX ORDER — INSULIN LISPRO 100/ML
VIAL (ML) SUBCUTANEOUS EVERY 6 HOURS
Refills: 0 | Status: DISCONTINUED | OUTPATIENT
Start: 2023-03-22 | End: 2023-03-23

## 2023-03-22 RX ORDER — ACETAMINOPHEN 500 MG
2 TABLET ORAL
Qty: 0 | Refills: 0 | DISCHARGE

## 2023-03-22 RX ORDER — ACETAMINOPHEN 500 MG
1000 TABLET ORAL ONCE
Refills: 0 | Status: COMPLETED | OUTPATIENT
Start: 2023-03-22 | End: 2023-03-22

## 2023-03-22 RX ORDER — ASPIRIN/CALCIUM CARB/MAGNESIUM 324 MG
81 TABLET ORAL DAILY
Refills: 0 | Status: DISCONTINUED | OUTPATIENT
Start: 2023-03-23 | End: 2023-04-07

## 2023-03-22 RX ORDER — PROPOFOL 10 MG/ML
10 INJECTION, EMULSION INTRAVENOUS
Qty: 1000 | Refills: 0 | Status: DISCONTINUED | OUTPATIENT
Start: 2023-03-22 | End: 2023-03-23

## 2023-03-22 RX ORDER — FUROSEMIDE 40 MG
1 TABLET ORAL
Qty: 0 | Refills: 0 | DISCHARGE

## 2023-03-22 RX ORDER — PANTOPRAZOLE SODIUM 20 MG/1
40 TABLET, DELAYED RELEASE ORAL DAILY
Refills: 0 | Status: DISCONTINUED | OUTPATIENT
Start: 2023-03-22 | End: 2023-03-23

## 2023-03-22 RX ORDER — CEFEPIME 1 G/1
2000 INJECTION, POWDER, FOR SOLUTION INTRAMUSCULAR; INTRAVENOUS EVERY 12 HOURS
Refills: 0 | Status: DISCONTINUED | OUTPATIENT
Start: 2023-03-22 | End: 2023-03-22

## 2023-03-22 RX ORDER — SODIUM CHLORIDE 9 MG/ML
500 INJECTION INTRAMUSCULAR; INTRAVENOUS; SUBCUTANEOUS ONCE
Refills: 0 | Status: COMPLETED | OUTPATIENT
Start: 2023-03-22 | End: 2023-03-22

## 2023-03-22 RX ORDER — APIXABAN 2.5 MG/1
1 TABLET, FILM COATED ORAL
Qty: 0 | Refills: 0 | DISCHARGE

## 2023-03-22 RX ORDER — AZITHROMYCIN 500 MG/1
500 TABLET, FILM COATED ORAL ONCE
Refills: 0 | Status: COMPLETED | OUTPATIENT
Start: 2023-03-22 | End: 2023-03-22

## 2023-03-22 RX ORDER — SODIUM CHLORIDE 9 MG/ML
500 INJECTION, SOLUTION INTRAVENOUS ONCE
Refills: 0 | Status: COMPLETED | OUTPATIENT
Start: 2023-03-22 | End: 2023-03-22

## 2023-03-22 RX ORDER — SODIUM CHLORIDE 0.65 %
1 AEROSOL, SPRAY (ML) NASAL
Qty: 0 | Refills: 0 | DISCHARGE

## 2023-03-22 RX ORDER — ROSUVASTATIN CALCIUM 5 MG/1
1 TABLET ORAL
Qty: 0 | Refills: 0 | DISCHARGE

## 2023-03-22 RX ORDER — DEXTROSE 50 % IN WATER 50 %
15 SYRINGE (ML) INTRAVENOUS ONCE
Refills: 0 | Status: DISCONTINUED | OUTPATIENT
Start: 2023-03-22 | End: 2023-03-23

## 2023-03-22 RX ORDER — INSULIN LISPRO 100/ML
0 VIAL (ML) SUBCUTANEOUS
Qty: 0 | Refills: 0 | DISCHARGE

## 2023-03-22 RX ORDER — LOSARTAN POTASSIUM 100 MG/1
1 TABLET, FILM COATED ORAL
Qty: 0 | Refills: 0 | DISCHARGE

## 2023-03-22 RX ORDER — MAGNESIUM HYDROXIDE 400 MG/1
30 TABLET, CHEWABLE ORAL
Qty: 0 | Refills: 0 | DISCHARGE

## 2023-03-22 RX ORDER — SODIUM CHLORIDE 9 MG/ML
250 INJECTION INTRAMUSCULAR; INTRAVENOUS; SUBCUTANEOUS ONCE
Refills: 0 | Status: DISCONTINUED | OUTPATIENT
Start: 2023-03-22 | End: 2023-03-22

## 2023-03-22 RX ORDER — CHLORHEXIDINE GLUCONATE 213 G/1000ML
1 SOLUTION TOPICAL
Refills: 0 | Status: DISCONTINUED | OUTPATIENT
Start: 2023-03-22 | End: 2023-04-07

## 2023-03-22 RX ORDER — GLUCAGON INJECTION, SOLUTION 0.5 MG/.1ML
1 INJECTION, SOLUTION SUBCUTANEOUS ONCE
Refills: 0 | Status: DISCONTINUED | OUTPATIENT
Start: 2023-03-22 | End: 2023-03-23

## 2023-03-22 RX ADMIN — SODIUM CHLORIDE 500 MILLILITER(S): 9 INJECTION INTRAMUSCULAR; INTRAVENOUS; SUBCUTANEOUS at 09:18

## 2023-03-22 RX ADMIN — Medication 11.3 MICROGRAM(S)/KG/MIN: at 15:44

## 2023-03-22 RX ADMIN — PANTOPRAZOLE SODIUM 40 MILLIGRAM(S): 20 TABLET, DELAYED RELEASE ORAL at 12:21

## 2023-03-22 RX ADMIN — Medication 3 MILLILITER(S): at 08:20

## 2023-03-22 RX ADMIN — Medication 125 MILLIGRAM(S): at 08:48

## 2023-03-22 RX ADMIN — Medication 650 MILLIGRAM(S): at 18:00

## 2023-03-22 RX ADMIN — ENOXAPARIN SODIUM 150 MILLIGRAM(S): 100 INJECTION SUBCUTANEOUS at 19:10

## 2023-03-22 RX ADMIN — Medication 1000 MILLIGRAM(S): at 11:00

## 2023-03-22 RX ADMIN — Medication 11.3 MICROGRAM(S)/KG/MIN: at 10:06

## 2023-03-22 RX ADMIN — Medication 3 MILLILITER(S): at 08:40

## 2023-03-22 RX ADMIN — Medication 180 MILLIGRAM(S): at 11:42

## 2023-03-22 RX ADMIN — PROPOFOL 7.2 MICROGRAM(S)/KG/MIN: 10 INJECTION, EMULSION INTRAVENOUS at 15:44

## 2023-03-22 RX ADMIN — Medication 1000 MILLIGRAM(S): at 10:50

## 2023-03-22 RX ADMIN — KETAMINE HYDROCHLORIDE 250 MILLIGRAM(S): 100 INJECTION INTRAMUSCULAR; INTRAVENOUS at 11:42

## 2023-03-22 RX ADMIN — PROPOFOL 7.2 MICROGRAM(S)/KG/MIN: 10 INJECTION, EMULSION INTRAVENOUS at 20:49

## 2023-03-22 RX ADMIN — CEFEPIME 2000 MILLIGRAM(S): 1 INJECTION, POWDER, FOR SOLUTION INTRAMUSCULAR; INTRAVENOUS at 08:52

## 2023-03-22 RX ADMIN — Medication 11.3 MICROGRAM(S)/KG/MIN: at 22:12

## 2023-03-22 RX ADMIN — CHLORHEXIDINE GLUCONATE 15 MILLILITER(S): 213 SOLUTION TOPICAL at 17:05

## 2023-03-22 RX ADMIN — CEFEPIME 2000 MILLIGRAM(S): 1 INJECTION, POWDER, FOR SOLUTION INTRAMUSCULAR; INTRAVENOUS at 17:05

## 2023-03-22 RX ADMIN — Medication 400 MILLIGRAM(S): at 10:34

## 2023-03-22 RX ADMIN — Medication 4: at 23:29

## 2023-03-22 RX ADMIN — Medication 3 MILLILITER(S): at 09:10

## 2023-03-22 RX ADMIN — PROPOFOL 7.2 MICROGRAM(S)/KG/MIN: 10 INJECTION, EMULSION INTRAVENOUS at 12:12

## 2023-03-22 RX ADMIN — SODIUM CHLORIDE 1000 MILLILITER(S): 9 INJECTION INTRAMUSCULAR; INTRAVENOUS; SUBCUTANEOUS at 08:48

## 2023-03-22 RX ADMIN — Medication 50 MILLIGRAM(S): at 23:11

## 2023-03-22 RX ADMIN — VASOPRESSIN 6 UNIT(S)/MIN: 20 INJECTION INTRAVENOUS at 19:09

## 2023-03-22 RX ADMIN — Medication 650 MILLIGRAM(S): at 17:04

## 2023-03-22 RX ADMIN — CHLORHEXIDINE GLUCONATE 1 APPLICATION(S): 213 SOLUTION TOPICAL at 17:06

## 2023-03-22 RX ADMIN — SODIUM CHLORIDE 500 MILLILITER(S): 9 INJECTION, SOLUTION INTRAVENOUS at 15:44

## 2023-03-22 RX ADMIN — Medication 20 MILLIGRAM(S): at 12:12

## 2023-03-22 NOTE — ED PROVIDER NOTE - CARE PLAN
Principal Discharge DX:	Acute respiratory failure with hypercapnia   1 Principal Discharge DX:	Acute respiratory failure with hypercapnia  Secondary Diagnosis:	Sepsis

## 2023-03-22 NOTE — GOALS OF CARE CONVERSATION - ADVANCED CARE PLANNING - CONVERSATION DETAILS
Pt with CHF/COPD/obesity, respiratory failure. septic shock. Pt with initial response to noninvasive measures, discussed progression of illness to require intubation with possibility of long term vent/unable to get off vent vs progressive respiratory failure today. CPR discussed as well. Pt to have trial of intubation and no limitation in medical interventions but family would like DNR placed.

## 2023-03-22 NOTE — ED ADULT TRIAGE NOTE - CHIEF COMPLAINT QUOTE
pt BIBA from [facility] in resp distress. per EMS, pt reports SOB 2 days ago, worse when laying flat. pt responsive to painful stimuli. MD Carter and Resp called to bedside.

## 2023-03-22 NOTE — H&P ADULT - CRITICAL CARE ATTENDING COMMENT
CC time spent adjusting noninvasive ventilator settings, assessing neurologic exam, titrating vasopressor, interpreting CXR and ABGs.

## 2023-03-22 NOTE — CONSULT NOTE ADULT - SUBJECTIVE AND OBJECTIVE BOX
CHIEF COMPLAINT:    HPI: 77y Male with a PMH of moderate AS, CAD s/p PCI (patent LAD cath 2021), Moderately obstructive distal LCX around 60%, afib on AC s/p DCCV back in jan currently in sinus,  morbid obesity, OA, HTN, HLD, DM here with SOB  Per notes patient was found by EMS around 05:00 pt was on his buttocks in the bathroom after he fell, they were able to get him back into bed, they then found pt obtunded, low O2 saturations. Pt was seen in [facility] 3 days ago after c/o SOB and orthopnea, diagnosed with HMPV was saturating well at that time and sent back to [facility]. Pt is obtunded in the ED, unable to provide any hx. Cardiology consult was requested for evaluation of respiratory distress concerning for pulmonary edema.     PAST MEDICAL & SURGICAL HISTORY:  Anxiety      Hypertension      Hyperlipidemia      Prostate disease      Gastroesophageal reflux disease      Gallbladder stone without cholecystitis or obstruction      BPH (benign prostatic hyperplasia)      DM (diabetes mellitus)      DVT (deep venous thrombosis)  left leg      Afib      Anxiety      HLD (hyperlipidemia)      Malignant schwannoma      H/O arthroscopy of knee      S/P laminectomy      S/P coronary artery stent placement  x2          Allergies    Bactrim (Rash)  Ceftin (Hives)  penicillin (Anaphylaxis)  sulfa drugs (Unknown)    Intolerances        MEDICATIONS  (STANDING):  cefepime  Injectable. 2000 milliGRAM(s) IV Push every 8 hours  chlorhexidine 0.12% Liquid 15 milliLiter(s) Oral Mucosa every 12 hours  chlorhexidine 2% Cloths 1 Application(s) Topical <User Schedule>  norepinephrine Infusion 0.05 MICROgram(s)/kG/Min (11.3 mL/Hr) IV Continuous <Continuous>  pantoprazole  Injectable 40 milliGRAM(s) IV Push daily  propofol Infusion 10 MICROgram(s)/kG/Min (7.2 mL/Hr) IV Continuous <Continuous>    MEDICATIONS  (PRN):      FAMILY HISTORY:  No pertinent family history in first degree relatives        ***No family history of premature coronary artery disease or sudden cardiac death    SOCIAL HISTORY:  Smoking-  Alcohol-  Illicit Drug use-    REVIEW OF SYSTEMS:  Constitutional: [ ] fever, [ ]weight loss,  [ ]fatigue  Eyes: [ ] visual changes  Respiratory: [ ]shortness of breath;  [ ] cough, [ ]wheezing, [ ]chills, [ ]hemoptysis  Cardiovascular: [ ] chest pain, [ ]palpitations, [ ]dizziness,  [ ]leg swelling [ ]syncope  Gastrointestinal: [ ] abdominal pain, [ ]nausea, [ ]vomiting,  [ ]diarrhea   Genitourinary: [ ] dysuria, [ ] hematuria  Neurologic: [ ] headaches [ ] tremors  [ ] weakness [ ] lightheadedness  Skin: [ ] itching, [ ]burning, [ ] rashes  Endocrine: [ ] heat or cold intolerance  Musculoskeletal: [ ] joint pain or swelling; [ ] muscle, back, or extremity pain  Psychiatric: [ ] depression, [ ]anxiety, [ ]mood swings, or [ ]difficulty sleeping  Hematologic: [ ] easy bruising, [ ] bleeding gums     [ ] All others negative	  [x ] Unable to obtain    Vital Signs Last 24 Hrs  T(C): 38.6 (22 Mar 2023 12:17), Max: 39.6 (22 Mar 2023 09:58)  T(F): 101.5 (22 Mar 2023 12:17), Max: 103.3 (22 Mar 2023 09:58)  HR: 68 (22 Mar 2023 12:49) (68 - 78)  BP: 103/61 (22 Mar 2023 11:52) (84/40 - 120/53)  BP(mean): 10 (22 Mar 2023 11:04) (10 - 97)  RR: 18 (22 Mar 2023 11:52) (18 - 30)  SpO2: 92% (22 Mar 2023 12:49) (91% - 100%)    Parameters below as of 22 Mar 2023 11:52  Patient On (Oxygen Delivery Method): ventilator    O2 Concentration (%): 60  I&O's Summary    22 Mar 2023 07:01  -  22 Mar 2023 13:01  --------------------------------------------------------  IN: 0 mL / OUT: 1200 mL / NET: -1200 mL        PHYSICAL EXAM:  General: Respiratory distress on BIPAP  HEENT: EOMI  Neck:  No JVD  Lungs: bl Rales  Heart: Regular rate and rhythm; No murmurs, rubs, or gallops  Abdomen: soft, non tender, distended   Extremities: warm, no edema   Nervous system:  Alert & Oriented X0  Psychiatric: not assessed   Skin: No rashes or lesions    LABS:  03-22    137  |  96  |  30.6<H>  ----------------------------<  238<H>  4.5   |  33.0<H>  |  1.13    Ca    8.1<L>      22 Mar 2023 08:25    TPro  6.5<L>  /  Alb  3.6  /  TBili  0.3<L>  /  DBili  x   /  AST  21  /  ALT  24  /  AlkPhos  221<H>  03-22    Creatinine Trend: 1.13<--, 0.77<--, 0.75<--                        14.0   7.19  )-----------( 241      ( 22 Mar 2023 08:25 )             45.6     PT/INR - ( 22 Mar 2023 08:25 )   PT: 21.4 sec;   INR: 1.83 ratio         PTT - ( 22 Mar 2023 08:25 )  PTT:45.6 sec    Lipid Panel:   Cardiac Enzymes: CARDIAC MARKERS ( 22 Mar 2023 08:25 )  x     / 0.03 ng/mL / x     / x     / x                RADIOLOGY:    ECG: NSR, RBBB    TELEMETRY: SR    ECHO:    LIBBY 1/31/23  Summary:   1. Left ventricular ejection fraction, by visual estimation, is 40 to   45%.   2. Mildly decreased global left ventricular systolic function.   3. Normal right ventricular size and function.   4. Mildly enlarged left atrium.   5. Mild mitral annular calcification.   6. Mild mitral valve regurgitation.   7. Thickening of the anterior and posterior mitral valve leaflets.   8. Mild tricuspid regurgitation.   9. Mild aortic regurgitation.  10. Mild to moderate aortic valve stenosis.  11. CIELO by planimetry 1.2cm2.  12. Aortic root measured at Sinus of Valsalva is dilated. 4.1cm  13. No left atrial appendage thrombus.  14. Color flow doppler and intravenous injection of agitated saline   demonstrates the presence of an intact intra atrial septum.    TTE 1/28/23  Summary:   1. Technically difficult study.   2. Tachycardic throughout the study and this may preclude true   assessment of LVEF.   3. Left ventricular ejection fraction, by visual estimation, is 40 to   45%.   4. Moderately decreased global left ventricular systolic function.   5. The mitral in-flow pattern reveals no discernable A-wave, therefore   no comment on diastolic function can be made.   6. Mildly enlarged left atrium.   7. Normal right atrial size.   8. Mild tricuspid regurgitation.   9. Mild thickening and calcification of the anterior mitral valve   leaflet.  10. Mild to moderate mitral annular calcification.  11. Trace mitral valve regurgitation.  12. Moderate aortic valve stenosis, with peak velocity of 2.87 m/s, mean   gradient of 18 mmHg, dimensionless index of 0.29, and CIELO via VTI of 1.42   sqcm.  13. Dilatation of the sinuses of Valsalva, measures 4.15 cm but based on   BSA 1.6cm/sqm is within normal limits.  14. There is a significant pericardial fat pad present.  15. There is no evidence of pericardial effusion.  16. Endocardial visualization was enhanced with intravenous echo contrast.  17. Compared to prior TTE done on 7/6/2022, the left ventricular function   is now reduced to 40-45%, in the setting of Afib wth RVR (prior LVEF   60-65%).    STRESS TEST:    CATHETERIZATION:  1/30/23  LM   Left main artery: Angiography shows minor irregularities.      LAD   Proximal left anterior descending: There is a 10 % stenosis within the  stented segment. Mid left anterior descending: There is a  10 % stenosis within the stented segment.      CX   Distal circumflex: There is a 60 % stenosis. VALENTINO Flow 3.      RCA   Proximal right coronary artery: There is a 100 % stenosis. There is  good collateral blood supply to the distal myocardium. This  lesion is a chronic total occlusion.  This is a small non-dominant  vessel.    Ramus   Ramus intermedius: Angiography shows mild atherosclerosis.

## 2023-03-22 NOTE — ED PROCEDURE NOTE - CPROC ED TIME OUT STATEMENT1
“Patient's name, , procedure and correct site were confirmed during the [place] Timeout.”
“Patient's name, , procedure and correct site were confirmed during the [place] Timeout.”
“Patient's name, , procedure and correct site were confirmed during the [place] Timeout.”

## 2023-03-22 NOTE — ED PROVIDER NOTE - PHYSICAL EXAMINATION
Gen: ill appearing, obtunded  Head: NCAT  HEENT: Pupil +3 mm ERRL, EOMI, oral mucosa moist, normal conjunctiva, neck supple  Lung: decreased breath sounds, + crackles, + rhonchi   CV: rrr, no murmur   Abd: soft, obese  MSK: + bilateral lower extremity edema   Neuro: not responding to painful stimuli  Skin: No rash

## 2023-03-22 NOTE — CONSULT NOTE ADULT - ASSESSMENT
77y Male with a PMH of moderate AS, CAD s/p PCI (patent LAD cath 2021), Moderately obstructive distal LCX around 60%, afib on AC s/p DCCV back in jan currently in sinus,  morbid obesity, OA, HTN, HLD, DM here with SOB  Per notes patient was found by EMS around 05:00 pt was on his buttocks in the bathroom after he fell, they were able to get him back into bed, they then found pt obtunded, low O2 saturations. Pt was seen in [facility] 3 days ago after c/o SOB and orthopnea, diagnosed with HMPV was saturating well at that time and sent back to [facility]. Pt is obtunded in the ED, unable to provide any hx. Cardiology consult was requested for evaluation of respiratory distress concerning for pulmonary edema.   In the ED patient noted to be febrile, hypotensive, obtunded and in severe respiratory distress  on exam looks Vol up    Plan   May need to be intubated for management of respiratory failure   continue IV pressors, Keep MAP>60mmHg  broad spectrum antibiotics   Lasix 60mg IV bid  Echocardiogram   continue anticoagulation for afib   continue asa, statin   Resume other CV as his hemodynamic status allows for it   ICU eval.

## 2023-03-22 NOTE — PATIENT PROFILE ADULT - FALL HARM RISK - HARM RISK INTERVENTIONS

## 2023-03-22 NOTE — ED ADULT NURSE NOTE - NSIMPLEMENTINTERV_GEN_ALL_ED
Implemented All Fall Risk Interventions:  [place] to call system. Call bell, personal items and telephone within reach. Instruct patient to call for assistance. Room bathroom lighting operational. Non-slip footwear when patient is off stretcher. Physically safe environment: no spills, clutter or unnecessary equipment. Stretcher in lowest position, wheels locked, appropriate side rails in place. Provide visual cue, wrist band, yellow gown, etc. Monitor gait and stability. Monitor for mental status changes and reorient to person, place, and time. Review medications for side effects contributing to fall risk. Reinforce activity limits and safety measures with patient and family.

## 2023-03-22 NOTE — H&P ADULT - HISTORY OF PRESENT ILLNESS
76 yo male with CAD s/p PCI, AF on eliquis, moderate AS, morbid obesity, HTN, HLD, DM, thoracic aortic aneurysm, BPH s/p TURP, left renal mass, recent hMPV infection, presented from [facility]  to the ED after a fall.  He was found on the floor in the bathroom, hypoxemic and obtunded.    In the ED patient was found to be in hypercapnic respiratory failure and placed on NIV.  He was also noted to be hypotensive and started on pressors after a fluid bolus.

## 2023-03-22 NOTE — PROGRESS NOTE ADULT - SUBJECTIVE AND OBJECTIVE BOX
Patient is a 77y old  Male who presents with a chief complaint of Acute respiratory failure (22 Mar 2023 13:00)      BRIEF HOSPITAL COURSE: 78 y/o M with a h/o CAD s/p PCI, AF on eliquis, moderate AS, morbid obesity, HTN, HLD, DM, thoracic aortic aneurysm, BPH s/p TURP, left renal mass, recent hMPV infection, admitted earlier from [facility] nursing facility following a fall where he was found on the floor in the bathroom, hypoxemic and obtunded.    In the ED patient was found to be in hypercapnic respiratory failure and placed on NIV.  He was also noted to be hypotensive and started on pressors after a fluid bolus.       Events last 24 hours: ***        PAST MEDICAL & SURGICAL HISTORY:  Anxiety      Hypertension      Hyperlipidemia      Prostate disease      Gastroesophageal reflux disease      Gallbladder stone without cholecystitis or obstruction      BPH (benign prostatic hyperplasia)      DM (diabetes mellitus)      DVT (deep venous thrombosis)  left leg      Afib      Anxiety      HLD (hyperlipidemia)      Malignant schwannoma      H/O arthroscopy of knee      S/P laminectomy      S/P coronary artery stent placement  x2          Review of Systems:  CONSTITUTIONAL: No fever, chills, or fatigue  EYES: No eye pain, visual disturbances, or discharge  ENMT:  No difficulty hearing, tinnitus, vertigo; No sinus or throat pain  NECK: No pain or stiffness  RESPIRATORY: No cough, wheezing, chills or hemoptysis; No shortness of breath  CARDIOVASCULAR: No chest pain, palpitations, dizziness, or leg swelling  GASTROINTESTINAL: No abdominal or epigastric pain. No nausea, vomiting, or hematemesis; No diarrhea or constipation. No melena or hematochezia.  GENITOURINARY: No dysuria, frequency, hematuria, or incontinence  NEUROLOGICAL: No headaches, memory loss, loss of strength, numbness, or tremors  SKIN: No itching, burning, rashes, or lesions   MUSCULOSKELETAL: No joint pain or swelling; No muscle, back, or extremity pain  PSYCHIATRIC: No depression, anxiety, mood swings, or difficulty sleeping      Medications:  cefepime  Injectable. 2000 milliGRAM(s) IV Push every 8 hours    norepinephrine Infusion 0.05 MICROgram(s)/kG/Min IV Continuous <Continuous>      acetaminophen     Tablet .. 650 milliGRAM(s) Oral every 6 hours PRN  propofol Infusion 10 MICROgram(s)/kG/Min IV Continuous <Continuous>      enoxaparin Injectable 150 milliGRAM(s) SubCutaneous every 12 hours    pantoprazole  Injectable 40 milliGRAM(s) IV Push daily      dextrose 50% Injectable 25 Gram(s) IV Push once  dextrose 50% Injectable 12.5 Gram(s) IV Push once  dextrose 50% Injectable 25 Gram(s) IV Push once  dextrose Oral Gel 15 Gram(s) Oral once PRN  glucagon  Injectable 1 milliGRAM(s) IntraMuscular once  hydrocortisone sodium succinate Injectable 50 milliGRAM(s) IV Push every 6 hours  insulin lispro (ADMELOG) corrective regimen sliding scale   SubCutaneous every 6 hours  vasopressin Infusion 0.04 Unit(s)/Min IV Continuous <Continuous>    dextrose 5%. 1000 milliLiter(s) IV Continuous <Continuous>  dextrose 5%. 1000 milliLiter(s) IV Continuous <Continuous>      chlorhexidine 0.12% Liquid 15 milliLiter(s) Oral Mucosa every 12 hours  chlorhexidine 2% Cloths 1 Application(s) Topical <User Schedule>        Mode: AC/ CMV (Assist Control/ Continuous Mandatory Ventilation)  RR (machine): 18  TV (machine): 600  FiO2: 60  PEEP: 6  ITime: 1  MAP: 12  PIP: 26      ICU Vital Signs Last 24 Hrs  T(C): 37.9 (22 Mar 2023 22:00), Max: 39.6 (22 Mar 2023 09:58)  T(F): 100.2 (22 Mar 2023 22:00), Max: 103.3 (22 Mar 2023 09:58)  HR: 58 (22 Mar 2023 22:00) (58 - 78)  BP: 103/61 (22 Mar 2023 11:52) (84/40 - 120/53)  BP(mean): 10 (22 Mar 2023 11:04) (10 - 97)  ABP: 103/48 (22 Mar 2023 22:00) (81/49 - 133/99)  ABP(mean): 66 (22 Mar 2023 22:00) (60 - 109)  RR: 18 (22 Mar 2023 22:00) (16 - 30)  SpO2: 96% (22 Mar 2023 22:00) (91% - 100%)    O2 Parameters below as of 22 Mar 2023 13:10  Patient On (Oxygen Delivery Method): ventilator    O2 Concentration (%): 60        ABG - ( 22 Mar 2023 20:07 )  pH, Arterial: 7.260 pH, Blood: x     /  pCO2: 44    /  pO2: 103   / HCO3: 20    / Base Excess: -7.4  /  SaO2: 98.8                I&O's Detail    22 Mar 2023 07:01  -  22 Mar 2023 22:26  --------------------------------------------------------  IN:    Norepinephrine: 796 mL    Propofol: 152 mL    Vasopressin: 24 mL  Total IN: 972 mL    OUT:    Indwelling Catheter - Urethral (mL): 2275 mL  Total OUT: 2275 mL    Total NET: -1303 mL            LABS:                        14.0   7.19  )-----------( 241      ( 22 Mar 2023 08:25 )             45.6         x   |  x   |  x   ----------------------------<  265<H>  x    |  x   |  x     Ca    8.1<L>      22 Mar 2023 08:25    TPro  6.5<L>  /  Alb  3.6  /  TBili  0.3<L>  /  DBili  x   /  AST  21  /  ALT  24  /  AlkPhos  221<H>        CARDIAC MARKERS ( 22 Mar 2023 08:25 )  x     / 0.03 ng/mL / x     / x     / x          CAPILLARY BLOOD GLUCOSE        PT/INR - ( 22 Mar 2023 08:25 )   PT: 21.4 sec;   INR: 1.83 ratio         PTT - ( 22 Mar 2023 08:25 )  PTT:45.6 sec  Urinalysis Basic - ( 22 Mar 2023 08:40 )    Color: Yellow / Appearance: Clear / S.015 / pH: x  Gluc: x / Ketone: Trace  / Bili: Negative / Urobili: Negative mg/dL   Blood: x / Protein: 30 mg/dL / Nitrite: Negative   Leuk Esterase: Negative / RBC: 3-5 /HPF / WBC 0-2 /HPF   Sq Epi: x / Non Sq Epi: Occasional / Bacteria: Many      CULTURES:  Rapid RVP Result: Detected (23 @ 08:25)  Rapid RVP Result: Detected (23 @ 23:44)  Culture Results:   <10,000 CFU/mL Normal Urogenital Negin (23 @ 23:44)        Physical Examination:    General: No acute distress.  Alert, oriented, interactive, nonfocal    HEENT: Pupils equal, reactive to light.  Symmetric.    PULM: Clear to auscultation bilaterally, no significant sputum production    CVS: Regular rate and rhythm, no murmurs, rubs, or gallops    ABD: Soft, nondistended, nontender, normoactive bowel sounds, no masses    EXT: No edema, nontender    SKIN: Warm and well perfused, no rashes noted.    NEURO: A&Ox3, strength 5/5 all extremities, cranial nerves grossly intact, no focal deficits        RADIOLOGY: ***        CRITICAL CARE TIME SPENT: ***  Time spent evaluating/treating patient with medical issues that pose a high risk for life threatening deterioration and/or end-organ damage, reviewing data/labs/imaging, discussing case with multidisciplinary team, discussing plan/goals of care with patient/family. Non-inclusive of procedure time.   Patient is a 77y old  Male who presents with a chief complaint of Acute respiratory failure (22 Mar 2023 13:00)      BRIEF HOSPITAL COURSE: 76 y/o M with a h/o CAD s/p PCI, AF on eliquis, moderate AS, morbid obesity, HTN, HLD, DM, thoracic aortic aneurysm, BPH s/p TURP, left renal mass, recent hMPV infection, admitted earlier from [facility] following a fall where he was found on the floor in the bathroom, hypoxemic and obtunded. Also noted to be hypercapnic in ED. Failed NIPPV and was intubated. CT chest revealing of multifocal pneumonia. Hospital course further complicated by septic shock requiring IV vasopressor therapy.        Events last 24 hours: Now on dual IV vasopressors. Persistent low grade fevers. Dependent on full mechanical vent support.        PAST MEDICAL & SURGICAL HISTORY:  Anxiety      Hypertension      Hyperlipidemia      Prostate disease      Gastroesophageal reflux disease      Gallbladder stone without cholecystitis or obstruction      BPH (benign prostatic hyperplasia)      DM (diabetes mellitus)      DVT (deep venous thrombosis)  left leg      Afib      Anxiety      HLD (hyperlipidemia)      Malignant schwannoma      H/O arthroscopy of knee      S/P laminectomy      S/P coronary artery stent placement  x2          Review of Systems:  Unable to obtain secondary to sedation/intubation.      Medications:  cefepime  Injectable. 2000 milliGRAM(s) IV Push every 8 hours  norepinephrine Infusion 0.05 MICROgram(s)/kG/Min IV Continuous <Continuous>  acetaminophen     Tablet .. 650 milliGRAM(s) Oral every 6 hours PRN  propofol Infusion 10 MICROgram(s)/kG/Min IV Continuous <Continuous>  enoxaparin Injectable 150 milliGRAM(s) SubCutaneous every 12 hours  pantoprazole  Injectable 40 milliGRAM(s) IV Push daily  dextrose 50% Injectable 25 Gram(s) IV Push once  dextrose 50% Injectable 12.5 Gram(s) IV Push once  dextrose 50% Injectable 25 Gram(s) IV Push once  dextrose Oral Gel 15 Gram(s) Oral once PRN  glucagon  Injectable 1 milliGRAM(s) IntraMuscular once  hydrocortisone sodium succinate Injectable 50 milliGRAM(s) IV Push every 6 hours  insulin lispro (ADMELOG) corrective regimen sliding scale   SubCutaneous every 6 hours  vasopressin Infusion 0.04 Unit(s)/Min IV Continuous <Continuous>  dextrose 5%. 1000 milliLiter(s) IV Continuous <Continuous>  dextrose 5%. 1000 milliLiter(s) IV Continuous <Continuous>  chlorhexidine 0.12% Liquid 15 milliLiter(s) Oral Mucosa every 12 hours  chlorhexidine 2% Cloths 1 Application(s) Topical <User Schedule>        Mode: AC/ CMV (Assist Control/ Continuous Mandatory Ventilation)  RR (machine): 18  TV (machine): 600  FiO2: 60  PEEP: 6  ITime: 1  MAP: 12  PIP: 26      ICU Vital Signs Last 24 Hrs  T(C): 37.9 (22 Mar 2023 22:00), Max: 39.6 (22 Mar 2023 09:58)  T(F): 100.2 (22 Mar 2023 22:00), Max: 103.3 (22 Mar 2023 09:58)  HR: 58 (22 Mar 2023 22:00) (58 - 78)  BP: 103/61 (22 Mar 2023 11:52) (84/40 - 120/53)  BP(mean): 10 (22 Mar 2023 11:04) (10 - 97)  ABP: 103/48 (22 Mar 2023 22:00) (81/49 - 133/99)  ABP(mean): 66 (22 Mar 2023 22:00) (60 - 109)  RR: 18 (22 Mar 2023 22:00) (16 - 30)  SpO2: 96% (22 Mar 2023 22:00) (91% - 100%)    O2 Parameters below as of 22 Mar 2023 13:10  Patient On (Oxygen Delivery Method): ventilator    O2 Concentration (%): 60        ABG - ( 22 Mar 2023 20:07 )  pH, Arterial: 7.260 pH, Blood: x     /  pCO2: 44    /  pO2: 103   / HCO3: 20    / Base Excess: -7.4  /  SaO2: 98.8                I&O's Detail    22 Mar 2023 07:01  -  22 Mar 2023 22:26  --------------------------------------------------------  IN:    Norepinephrine: 796 mL    Propofol: 152 mL    Vasopressin: 24 mL  Total IN: 972 mL    OUT:    Indwelling Catheter - Urethral (mL): 2275 mL  Total OUT: 2275 mL    Total NET: -1303 mL            LABS:                        14.0   7.19  )-----------( 241      ( 22 Mar 2023 08:25 )             45.6     03-    x   |  x   |  x   ----------------------------<  265<H>  x    |  x   |  x     Ca    8.1<L>      22 Mar 2023 08:25    TPro  6.5<L>  /  Alb  3.6  /  TBili  0.3<L>  /  DBili  x   /  AST  21  /  ALT  24  /  AlkPhos  221<H>  03-      CARDIAC MARKERS ( 22 Mar 2023 08:25 )  x     / 0.03 ng/mL / x     / x     / x          CAPILLARY BLOOD GLUCOSE        PT/INR - ( 22 Mar 2023 08:25 )   PT: 21.4 sec;   INR: 1.83 ratio         PTT - ( 22 Mar 2023 08:25 )  PTT:45.6 sec  Urinalysis Basic - ( 22 Mar 2023 08:40 )    Color: Yellow / Appearance: Clear / S.015 / pH: x  Gluc: x / Ketone: Trace  / Bili: Negative / Urobili: Negative mg/dL   Blood: x / Protein: 30 mg/dL / Nitrite: Negative   Leuk Esterase: Negative / RBC: 3-5 /HPF / WBC 0-2 /HPF   Sq Epi: x / Non Sq Epi: Occasional / Bacteria: Many      CULTURES:  Rapid RVP Result: Detected (23 @ 08:25)  Rapid RVP Result: Detected (23 @ 23:44)  Culture Results:   <10,000 CFU/mL Normal Urogenital Negin (23 @ 23:44)        Physical Examination:    General: No acute distress.  sedated, intubated, morbidly obese    HEENT: Pupils equal, reactive to light.  Symmetric.    PULM: Clear to auscultation bilaterally, no significant sputum production    CVS: Regular rate and rhythm, no murmurs, rubs, or gallops    ABD: Soft, nondistended, nontender, normoactive bowel sounds, no masses    EXT: bilateral lower extremity edema, nontender    SKIN: Warm and well perfused, no rashes noted.    NEURO: sedated, will move all extremities spontaneously        RADIOLOGY:     < from: CT Chest w/ IV Cont (23 @ 10:30) >  FINDINGS:  CHEST:  LUNGS AND LARGE AIRWAYS: Again seen is collapse of the mid/distal   trachea, suggestive of tracheomalacia. Scattered atelectasis bilaterally.   Patchy opacities both lungs suggesting areas of pneumonia.  PLEURA: No pleural effusion.  VESSELS: Atherosclerotic changes of the aorta and coronary arteries.  HEART: Cardiomegaly. No pericardial effusion.  MEDIASTINUM AND MOLLY: No lymphadenopathy.  CHEST WALL AND LOWER NECK: Within normal limits.    ABDOMEN AND PELVIS:  LIVER: Within normal limits.  BILE DUCTS: Normal caliber.  GALLBLADDER: Within normal limits.  SPLEEN: Within normal limits.  PANCREAS: Diffuse fatty atrophy.  ADRENALS: Within normal limits.  KIDNEYS/URETERS: Multiple bilateral simple renal cysts, largest which   measures 4.6 cm at the right lower pole.    BLADDER: Franz catheter.  REPRODUCTIVE ORGANS: Prostate within normal limits.    BOWEL: No bowel obstruction. Appendix is normal.  PERITONEUM: No ascites.  VESSELS: Atherosclerotic changes.  RETROPERITONEUM/LYMPH NODES: No lymphadenopathy.  ABDOMINAL WALL: Small bilateral fat-containing inguinal hernias.  Skin   thickening lower ventral abdominal wall with subcutaneous edema also   extending into the periumbilical region suggesting cellulitis.  BONES: No evidence of acute fracture. Degenerative changes throughout the   lumbar spine. Laminectomy in the lower lumbar spine.    IMPRESSION:  Interval development ofbilateral pulmonary opacities suggesting   pneumonia. Scattered areas of atelectasis bilaterally.    No evidence of solid visceral injury in the thorax, abdomen or pelvis.    Skin thickening and subcutaneous edema lower ventral abdominal wall   suggesting cellulitis.          CRITICAL CARE TIME SPENT: 39 mins  Time spent evaluating/treating patient with medical issues that pose a high risk for life threatening deterioration and/or end-organ damage, reviewing data/labs/imaging, discussing case with multidisciplinary team, discussing plan/goals of care with patient/family. Non-inclusive of procedure time.

## 2023-03-22 NOTE — ED PROVIDER NOTE - OBJECTIVE STATEMENT
78 y/o male with PMHx of HTN, HLD, CHF, COPD, a-fib on Eliquis, presents to the ED with respiratory distress, Per EMS around 05:00 pt was found by staff on his buttocks in the bathroom after he fell, they were able to get him back into bed, they then found pt obtunded, low O2 saturations. Pt was seen in [facility] 3 days ago after c/o SOB and orthopnea, diagnosed with HMPV was saturating well at that time and [facility]. Pt is obtunded in the ED, unable to provide any hx.

## 2023-03-22 NOTE — ED PROCEDURE NOTE - CPROC ED INDICATIONS1
emergency venous access/hypertonic/irritant infusion
airway protection/critical patient/mental status change
arterial puncture to obtain ABG's/critical patient

## 2023-03-22 NOTE — ED ADULT NURSE NOTE - NS ED NOTE  TALK SOMEONE YN
Returned Staci's call regarding her mother Jennifer. Patient has been experiencing frequency, urgency and feeling very tired. Denies fever, chills or flank pain. Staci will  a specimen kit at an ACL  Lab and send her urine for ua and urine culture tonight or in am. Ordered placed. We will call her with results. Prescriptions can be sent to [facility].    No

## 2023-03-22 NOTE — ED ADULT NURSE NOTE - OBJECTIVE STATEMENT
pt BIBA from [facility] in resp distress. EMS reports pt brought in 2 days ago for c/o SOB. as per EMS, pt had unwitnessed fall, unknown if hit head, on Eliquis. PT brought to CC, MD Carter and Resp called to bedside. BIPAP placed. pt responsive to pain. Pt leaning to L side. pt appears mottled and diaphoretic.

## 2023-03-22 NOTE — ED PROVIDER NOTE - CLINICAL SUMMARY MEDICAL DECISION MAKING FREE TEXT BOX
78yo M with COPD/CHF/ a fib on eliquis recent ED visit for fever and +humanmetapneumo virus found down this morning AMS, hypoxic. placed on BIPAP with initial improvement in MS. hypotensive initial response to fluid bolus and again hypotensive, levophed started. only 1L given due to CHF, scant b lines on bedside sono, +LE edema. duonebs/steroids given on arrival. abx given. CT head no acute fidnings. CT CAP pending. Micu ADMISSION

## 2023-03-22 NOTE — H&P ADULT - NSHPPHYSICALEXAM_GEN_ALL_CORE
poorly responsive, on NIV, good tidal volumes  pupils reactive   irregular rhythm, normal rate  bilat air entry, no wheeze  abd obese, nontender  bilat edema

## 2023-03-22 NOTE — H&P ADULT - ASSESSMENT
78 yo male with CAD s/p PCI, AF on eliquis, moderate AS, chronic systolic CHF/HFrEF, morbid obesity, HTN, HLD, DM, thoracic aortic aneurysm, BPH s/p TURP, left renal mass, recent hMPV infection, now admitted with acute hypercapnic respiratory failure, septic shock, multifocal pneumonia, encephalopathy, PHILLY.     Plan    Acute respiratory failure/ multifocal pneumonia  - if fails NIV then will intubate  - good tidal volumes at the moment  - empiric abx, cultures     Encephalopathy  - likely secondary to hypercapnia    Septic shock  - currently on cefepime  - will try additional fluid challenge  - lactate normal   I have re-evaluated the patient's fluid status and reviewed vital signs.  Clinical perfusion assessment was performed.     Afib  - lovenox until he can swallow    Chronic HFrEF  - no ace/arb/entresto due to hypotension

## 2023-03-22 NOTE — ED PROVIDER NOTE - ATTENDING CONTRIBUTION TO CARE
patient critically ill requiring medications with intensive monitoring, discussion with consultants, discussion with patient and family, interpretation of diagnostic studies.   I agree with the PA's note and was available for any issues/concerns. I was directly involved in patient care. My brief overall assessment is as follows:     see note

## 2023-03-22 NOTE — ED ADULT NURSE REASSESSMENT NOTE - NS ED NURSE REASSESS COMMENT FT1
Pt remains on telemonitor. Bed locked and in lowest position. Frequent checks made. Pt sedated and intubated. Pt responds to painful stimuli. PERRL. Pt remains on telemonitor. Bed locked and in lowest position. Frequent checks made.

## 2023-03-22 NOTE — ED PROVIDER NOTE - NS_ ATTENDINGSCRIBEDETAILS _ED_A_ED_FT
I, Kitty Carter, performed the initial face to face bedside interview with this patient regarding history of present illness, review of symptoms and relevant past medical, social and family history.  I completed an independent physical examination.  The history, relevant review of systems, past medical and surgical history, medical decision making, and physical examination was documented by the scribe in my presence and I attest to the accuracy of the documentation.

## 2023-03-22 NOTE — ED PROVIDER NOTE - PROGRESS NOTE DETAILS
Fernando Wiggins for ED attending, Dr. Carter: Pt is unable to consent to CT with IV contrast given patients mental state, given the significant and immediate threats to this patient based on initial presentation, the benefits of emergency contrast-enhanced CT imaging without patients consent greatly outweigh the potential risk of harm due to contrast-induced nephropathy. radial a line and central RIJ line placed. MICU @ bedside, patient with a decline in MS, rate low on BIPAP, increased and pressure support adjusted. -Raul DO patient more awake, opening eyes to command, resisting in UE Rt > Lt, ct head without acute infarct

## 2023-03-22 NOTE — PROGRESS NOTE ADULT - ASSESSMENT
76 y/o M with a h/o CAD s/p PCI, AF on eliquis, moderate AS, morbid obesity, HTN, HLD, DM, thoracic aortic aneurysm, BPH s/p TURP, left renal mass, recent hMPV infection, with:    # Acute mixed hypoxemic/hypercapnic respiratory failure  # Septic shock  # Multifocal pneumonia  # hMPV    - actively titrating vent settings to maintain SpO2 > 92% and adequate minute ventilation  - repeat ABG indicates improving gas exchange, FiO2 reduced to 50%  - sedate with propofol to promote vent synchrony and patient comfort  - suspect superimposed bacterial pneumonia on recent hMPV infection, persistent fevers  - blood cultures are pending, will add sputum culture and check urine legionella Ag  - continue empiric cefepime for now, add azithromycin for coverage of atypical organisms  - actively titrating norepinephrine infusion to maintain a MAP > 65  - fixed dose vasopressin and stress dose hydrocortisone added  - full anticoagulation with SC enoxaparin for now  - start sliding scale insulin coverage    Case discussed with MICU physician, Dr. Soria.

## 2023-03-23 LAB
ANION GAP SERPL CALC-SCNC: 10 MMOL/L — SIGNIFICANT CHANGE UP (ref 5–17)
ANION GAP SERPL CALC-SCNC: 27 MMOL/L — HIGH (ref 5–17)
ANION GAP SERPL CALC-SCNC: 9 MMOL/L — SIGNIFICANT CHANGE UP (ref 5–17)
APTT BLD: 39.7 SEC — HIGH (ref 27.5–35.5)
BUN SERPL-MCNC: 44.3 MG/DL — HIGH (ref 8–20)
BUN SERPL-MCNC: 45.1 MG/DL — HIGH (ref 8–20)
BUN SERPL-MCNC: 45.8 MG/DL — HIGH (ref 8–20)
CALCIUM SERPL-MCNC: 8 MG/DL — LOW (ref 8.4–10.5)
CALCIUM SERPL-MCNC: 8.1 MG/DL — LOW (ref 8.4–10.5)
CALCIUM SERPL-MCNC: 8.2 MG/DL — LOW (ref 8.4–10.5)
CHLORIDE SERPL-SCNC: 100 MMOL/L — SIGNIFICANT CHANGE UP (ref 96–108)
CHLORIDE SERPL-SCNC: 104 MMOL/L — SIGNIFICANT CHANGE UP (ref 96–108)
CHLORIDE SERPL-SCNC: 105 MMOL/L — SIGNIFICANT CHANGE UP (ref 96–108)
CO2 SERPL-SCNC: 16 MMOL/L — LOW (ref 22–29)
CO2 SERPL-SCNC: 29 MMOL/L — SIGNIFICANT CHANGE UP (ref 22–29)
CO2 SERPL-SCNC: 30 MMOL/L — HIGH (ref 22–29)
CREAT SERPL-MCNC: 1.1 MG/DL — SIGNIFICANT CHANGE UP (ref 0.5–1.3)
CREAT SERPL-MCNC: 1.23 MG/DL — SIGNIFICANT CHANGE UP (ref 0.5–1.3)
CREAT SERPL-MCNC: 1.23 MG/DL — SIGNIFICANT CHANGE UP (ref 0.5–1.3)
EGFR: 60 ML/MIN/1.73M2 — SIGNIFICANT CHANGE UP
EGFR: 60 ML/MIN/1.73M2 — SIGNIFICANT CHANGE UP
EGFR: 69 ML/MIN/1.73M2 — SIGNIFICANT CHANGE UP
GAS PNL BLDA: SIGNIFICANT CHANGE UP
GAS PNL BLDA: SIGNIFICANT CHANGE UP
GLUCOSE BLDC GLUCOMTR-MCNC: 137 MG/DL — HIGH (ref 70–99)
GLUCOSE BLDC GLUCOMTR-MCNC: 139 MG/DL — HIGH (ref 70–99)
GLUCOSE BLDC GLUCOMTR-MCNC: 144 MG/DL — HIGH (ref 70–99)
GLUCOSE BLDC GLUCOMTR-MCNC: 148 MG/DL — HIGH (ref 70–99)
GLUCOSE BLDC GLUCOMTR-MCNC: 149 MG/DL — HIGH (ref 70–99)
GLUCOSE BLDC GLUCOMTR-MCNC: 177 MG/DL — HIGH (ref 70–99)
GLUCOSE BLDC GLUCOMTR-MCNC: 201 MG/DL — HIGH (ref 70–99)
GLUCOSE BLDC GLUCOMTR-MCNC: 225 MG/DL — HIGH (ref 70–99)
GLUCOSE BLDC GLUCOMTR-MCNC: 239 MG/DL — HIGH (ref 70–99)
GLUCOSE BLDC GLUCOMTR-MCNC: 250 MG/DL — HIGH (ref 70–99)
GLUCOSE BLDC GLUCOMTR-MCNC: 255 MG/DL — HIGH (ref 70–99)
GLUCOSE SERPL-MCNC: 154 MG/DL — HIGH (ref 70–99)
GLUCOSE SERPL-MCNC: 173 MG/DL — HIGH (ref 70–99)
GLUCOSE SERPL-MCNC: 280 MG/DL — HIGH (ref 70–99)
GRAM STN FLD: SIGNIFICANT CHANGE UP
HCT VFR BLD CALC: 44.3 % — SIGNIFICANT CHANGE UP (ref 39–50)
HGB BLD-MCNC: 14 G/DL — SIGNIFICANT CHANGE UP (ref 13–17)
INR BLD: 1.59 RATIO — HIGH (ref 0.88–1.16)
LEGIONELLA AG UR QL: NEGATIVE — SIGNIFICANT CHANGE UP
MAGNESIUM SERPL-MCNC: 2.4 MG/DL — SIGNIFICANT CHANGE UP (ref 1.6–2.6)
MAGNESIUM SERPL-MCNC: 2.5 MG/DL — SIGNIFICANT CHANGE UP (ref 1.6–2.6)
MAGNESIUM SERPL-MCNC: 2.5 MG/DL — SIGNIFICANT CHANGE UP (ref 1.8–2.6)
MCHC RBC-ENTMCNC: 30 PG — SIGNIFICANT CHANGE UP (ref 27–34)
MCHC RBC-ENTMCNC: 31.6 GM/DL — LOW (ref 32–36)
MCV RBC AUTO: 94.9 FL — SIGNIFICANT CHANGE UP (ref 80–100)
MRSA PCR RESULT.: DETECTED
PHOSPHATE SERPL-MCNC: 3 MG/DL — SIGNIFICANT CHANGE UP (ref 2.4–4.7)
PHOSPHATE SERPL-MCNC: 3 MG/DL — SIGNIFICANT CHANGE UP (ref 2.4–4.7)
PHOSPHATE SERPL-MCNC: 5.1 MG/DL — HIGH (ref 2.4–4.7)
PLATELET # BLD AUTO: 359 K/UL — SIGNIFICANT CHANGE UP (ref 150–400)
POTASSIUM SERPL-MCNC: 3.9 MMOL/L — SIGNIFICANT CHANGE UP (ref 3.5–5.3)
POTASSIUM SERPL-MCNC: 4.2 MMOL/L — SIGNIFICANT CHANGE UP (ref 3.5–5.3)
POTASSIUM SERPL-MCNC: 4.3 MMOL/L — SIGNIFICANT CHANGE UP (ref 3.5–5.3)
POTASSIUM SERPL-SCNC: 3.9 MMOL/L — SIGNIFICANT CHANGE UP (ref 3.5–5.3)
POTASSIUM SERPL-SCNC: 4.2 MMOL/L — SIGNIFICANT CHANGE UP (ref 3.5–5.3)
POTASSIUM SERPL-SCNC: 4.3 MMOL/L — SIGNIFICANT CHANGE UP (ref 3.5–5.3)
PROTHROM AB SERPL-ACNC: 18.5 SEC — HIGH (ref 10.5–13.4)
RBC # BLD: 4.67 M/UL — SIGNIFICANT CHANGE UP (ref 4.2–5.8)
RBC # FLD: 13.9 % — SIGNIFICANT CHANGE UP (ref 10.3–14.5)
S AUREUS DNA NOSE QL NAA+PROBE: DETECTED
SODIUM SERPL-SCNC: 143 MMOL/L — SIGNIFICANT CHANGE UP (ref 135–145)
SODIUM SERPL-SCNC: 143 MMOL/L — SIGNIFICANT CHANGE UP (ref 135–145)
SODIUM SERPL-SCNC: 144 MMOL/L — SIGNIFICANT CHANGE UP (ref 135–145)
SPECIMEN SOURCE: SIGNIFICANT CHANGE UP
WBC # BLD: 13.18 K/UL — HIGH (ref 3.8–10.5)
WBC # FLD AUTO: 13.18 K/UL — HIGH (ref 3.8–10.5)

## 2023-03-23 PROCEDURE — 12011 RPR F/E/E/N/L/M 2.5 CM/<: CPT

## 2023-03-23 PROCEDURE — 71045 X-RAY EXAM CHEST 1 VIEW: CPT | Mod: 26

## 2023-03-23 PROCEDURE — 99233 SBSQ HOSP IP/OBS HIGH 50: CPT

## 2023-03-23 PROCEDURE — 99291 CRITICAL CARE FIRST HOUR: CPT | Mod: 25

## 2023-03-23 RX ORDER — DEXMEDETOMIDINE HYDROCHLORIDE IN 0.9% SODIUM CHLORIDE 4 UG/ML
0.5 INJECTION INTRAVENOUS
Qty: 200 | Refills: 0 | Status: DISCONTINUED | OUTPATIENT
Start: 2023-03-23 | End: 2023-03-26

## 2023-03-23 RX ORDER — DOXAZOSIN MESYLATE 4 MG
2 TABLET ORAL AT BEDTIME
Refills: 0 | Status: DISCONTINUED | OUTPATIENT
Start: 2023-03-23 | End: 2023-04-07

## 2023-03-23 RX ORDER — INSULIN GLARGINE 100 [IU]/ML
15 INJECTION, SOLUTION SUBCUTANEOUS EVERY MORNING
Refills: 0 | Status: DISCONTINUED | OUTPATIENT
Start: 2023-03-23 | End: 2023-03-27

## 2023-03-23 RX ORDER — INSULIN HUMAN 100 [IU]/ML
4 INJECTION, SOLUTION SUBCUTANEOUS
Qty: 100 | Refills: 0 | Status: DISCONTINUED | OUTPATIENT
Start: 2023-03-23 | End: 2023-03-23

## 2023-03-23 RX ORDER — INSULIN LISPRO 100/ML
VIAL (ML) SUBCUTANEOUS
Refills: 0 | Status: DISCONTINUED | OUTPATIENT
Start: 2023-03-23 | End: 2023-03-23

## 2023-03-23 RX ORDER — FINASTERIDE 5 MG/1
5 TABLET, FILM COATED ORAL DAILY
Refills: 0 | Status: DISCONTINUED | OUTPATIENT
Start: 2023-03-23 | End: 2023-04-07

## 2023-03-23 RX ORDER — INSULIN GLARGINE 100 [IU]/ML
15 INJECTION, SOLUTION SUBCUTANEOUS ONCE
Refills: 0 | Status: DISCONTINUED | OUTPATIENT
Start: 2023-03-23 | End: 2023-03-23

## 2023-03-23 RX ORDER — APIXABAN 2.5 MG/1
5 TABLET, FILM COATED ORAL EVERY 12 HOURS
Refills: 0 | Status: DISCONTINUED | OUTPATIENT
Start: 2023-03-23 | End: 2023-04-07

## 2023-03-23 RX ORDER — IPRATROPIUM/ALBUTEROL SULFATE 18-103MCG
3 AEROSOL WITH ADAPTER (GRAM) INHALATION EVERY 6 HOURS
Refills: 0 | Status: DISCONTINUED | OUTPATIENT
Start: 2023-03-23 | End: 2023-04-07

## 2023-03-23 RX ORDER — SODIUM CHLORIDE 9 MG/ML
1000 INJECTION, SOLUTION INTRAVENOUS
Refills: 0 | Status: DISCONTINUED | OUTPATIENT
Start: 2023-03-23 | End: 2023-03-23

## 2023-03-23 RX ORDER — INSULIN HUMAN 100 [IU]/ML
3 INJECTION, SOLUTION SUBCUTANEOUS
Qty: 100 | Refills: 0 | Status: DISCONTINUED | OUTPATIENT
Start: 2023-03-23 | End: 2023-03-24

## 2023-03-23 RX ORDER — INSULIN LISPRO 100/ML
5 VIAL (ML) SUBCUTANEOUS
Refills: 0 | Status: DISCONTINUED | OUTPATIENT
Start: 2023-03-23 | End: 2023-03-23

## 2023-03-23 RX ADMIN — SODIUM CHLORIDE 150 MILLILITER(S): 9 INJECTION, SOLUTION INTRAVENOUS at 14:01

## 2023-03-23 RX ADMIN — Medication 650 MILLIGRAM(S): at 05:43

## 2023-03-23 RX ADMIN — PROPOFOL 7.2 MICROGRAM(S)/KG/MIN: 10 INJECTION, EMULSION INTRAVENOUS at 08:55

## 2023-03-23 RX ADMIN — PROPOFOL 7.2 MICROGRAM(S)/KG/MIN: 10 INJECTION, EMULSION INTRAVENOUS at 03:05

## 2023-03-23 RX ADMIN — Medication 50 MILLIGRAM(S): at 12:01

## 2023-03-23 RX ADMIN — VASOPRESSIN 6 UNIT(S)/MIN: 20 INJECTION INTRAVENOUS at 05:52

## 2023-03-23 RX ADMIN — Medication 3 MILLILITER(S): at 20:53

## 2023-03-23 RX ADMIN — Medication 50 MILLIGRAM(S): at 05:24

## 2023-03-23 RX ADMIN — PROPOFOL 7.2 MICROGRAM(S)/KG/MIN: 10 INJECTION, EMULSION INTRAVENOUS at 05:52

## 2023-03-23 RX ADMIN — Medication 4: at 05:39

## 2023-03-23 RX ADMIN — DEXMEDETOMIDINE HYDROCHLORIDE IN 0.9% SODIUM CHLORIDE 19.7 MICROGRAM(S)/KG/HR: 4 INJECTION INTRAVENOUS at 18:50

## 2023-03-23 RX ADMIN — INSULIN HUMAN 4 UNIT(S)/HR: 100 INJECTION, SOLUTION SUBCUTANEOUS at 14:01

## 2023-03-23 RX ADMIN — Medication 50 MILLIGRAM(S): at 17:56

## 2023-03-23 RX ADMIN — Medication 11.3 MICROGRAM(S)/KG/MIN: at 04:40

## 2023-03-23 RX ADMIN — Medication 11.3 MICROGRAM(S)/KG/MIN: at 21:05

## 2023-03-23 RX ADMIN — INSULIN GLARGINE 15 UNIT(S): 100 INJECTION, SOLUTION SUBCUTANEOUS at 21:10

## 2023-03-23 RX ADMIN — DEXMEDETOMIDINE HYDROCHLORIDE IN 0.9% SODIUM CHLORIDE 19.7 MICROGRAM(S)/KG/HR: 4 INJECTION INTRAVENOUS at 21:06

## 2023-03-23 RX ADMIN — Medication 650 MILLIGRAM(S): at 13:39

## 2023-03-23 RX ADMIN — CEFEPIME 2000 MILLIGRAM(S): 1 INJECTION, POWDER, FOR SOLUTION INTRAMUSCULAR; INTRAVENOUS at 01:13

## 2023-03-23 RX ADMIN — CHLORHEXIDINE GLUCONATE 1 APPLICATION(S): 213 SOLUTION TOPICAL at 05:24

## 2023-03-23 RX ADMIN — INSULIN HUMAN 3 UNIT(S)/HR: 100 INJECTION, SOLUTION SUBCUTANEOUS at 21:06

## 2023-03-23 RX ADMIN — CHLORHEXIDINE GLUCONATE 15 MILLILITER(S): 213 SOLUTION TOPICAL at 05:24

## 2023-03-23 RX ADMIN — AZITHROMYCIN 255 MILLIGRAM(S): 500 TABLET, FILM COATED ORAL at 00:25

## 2023-03-23 RX ADMIN — PANTOPRAZOLE SODIUM 40 MILLIGRAM(S): 20 TABLET, DELAYED RELEASE ORAL at 12:02

## 2023-03-23 RX ADMIN — INSULIN HUMAN 5 UNIT(S)/HR: 100 INJECTION, SOLUTION SUBCUTANEOUS at 14:25

## 2023-03-23 RX ADMIN — AMIODARONE HYDROCHLORIDE 200 MILLIGRAM(S): 400 TABLET ORAL at 05:24

## 2023-03-23 RX ADMIN — CEFEPIME 2000 MILLIGRAM(S): 1 INJECTION, POWDER, FOR SOLUTION INTRAMUSCULAR; INTRAVENOUS at 17:56

## 2023-03-23 RX ADMIN — Medication 650 MILLIGRAM(S): at 04:40

## 2023-03-23 RX ADMIN — Medication 650 MILLIGRAM(S): at 12:07

## 2023-03-23 RX ADMIN — Medication 81 MILLIGRAM(S): at 12:07

## 2023-03-23 RX ADMIN — Medication 50 MILLIGRAM(S): at 23:10

## 2023-03-23 RX ADMIN — Medication 11.3 MICROGRAM(S)/KG/MIN: at 01:22

## 2023-03-23 RX ADMIN — CEFEPIME 2000 MILLIGRAM(S): 1 INJECTION, POWDER, FOR SOLUTION INTRAMUSCULAR; INTRAVENOUS at 08:56

## 2023-03-23 RX ADMIN — PROPOFOL 7.2 MICROGRAM(S)/KG/MIN: 10 INJECTION, EMULSION INTRAVENOUS at 12:01

## 2023-03-23 RX ADMIN — Medication 11.3 MICROGRAM(S)/KG/MIN: at 08:55

## 2023-03-23 NOTE — PROGRESS NOTE ADULT - CRITICAL CARE ATTENDING COMMENT
CC time spent titrating vasoactive medications, adjusting mechanical ventilator settings, assessing mental status and readiness for extubation, interpreting ABGs and Chest imaging.

## 2023-03-23 NOTE — PROGRESS NOTE ADULT - SUBJECTIVE AND OBJECTIVE BOX
Patient is a 77y old  Male who presents with a chief complaint of Acute respiratory failure (22 Mar 2023 22:26)      BRIEF HOSPITAL COURSE: 78 yo male with CAD s/p PCI, AF on Eliquis, moderate AS, morbid obesity, HTN, HLD, T2DM, ALLIE not on CPAP, thoracic aortic aneurysm, BPH s/p TURP, left renal mass, recent hMPV infection, presented from [facility]  to the ED after a fall.  He was found on the floor in the bathroom, hypoxemic and obtunded.  In the ED patient was found to be in acute on chronic hypercapnic hypoxic respiratory failure and placed on NIV.  He was also noted to be persistently hypotensive after ~700cc fluid bolus and was started on Norepinephrine. His respiratory status continued to deteriorate and he was ultimately intubated.            Events last 24 hours: Remains intubated on full vent support, distributive shock on Norepinephrine and Vasopressin gtts.        PAST MEDICAL & SURGICAL HISTORY:  Anxiety      Hypertension      Hyperlipidemia      Prostate disease      Gastroesophageal reflux disease      Gallbladder stone without cholecystitis or obstruction      BPH (benign prostatic hyperplasia)      DM (diabetes mellitus)      DVT (deep venous thrombosis)  left leg      Afib      Anxiety      HLD (hyperlipidemia)      Malignant schwannoma      H/O arthroscopy of knee      S/P laminectomy      S/P coronary artery stent placement  x2        Review of Systems: Due to altered mental status/intubation, subjective information was not able to be obtained from the patient. History was obtained, to the extent possible, from review of the chart and collateral sources of information.        Medications:  azithromycin  IVPB      azithromycin  IVPB 500 milliGRAM(s) IV Intermittent every 24 hours  cefepime  Injectable. 2000 milliGRAM(s) IV Push every 8 hours    aMIOdarone    Tablet 200 milliGRAM(s) Oral daily  norepinephrine Infusion 0.05 MICROgram(s)/kG/Min IV Continuous <Continuous>      acetaminophen     Tablet .. 650 milliGRAM(s) Oral every 6 hours PRN  propofol Infusion 10 MICROgram(s)/kG/Min IV Continuous <Continuous>      apixaban 5 milliGRAM(s) Enteral Tube every 12 hours  aspirin  chewable 81 milliGRAM(s) Oral daily    pantoprazole  Injectable 40 milliGRAM(s) IV Push daily      atorvastatin 10 milliGRAM(s) Oral at bedtime  dextrose 50% Injectable 25 Gram(s) IV Push once  dextrose 50% Injectable 12.5 Gram(s) IV Push once  dextrose 50% Injectable 25 Gram(s) IV Push once  dextrose Oral Gel 15 Gram(s) Oral once PRN  glucagon  Injectable 1 milliGRAM(s) IntraMuscular once  hydrocortisone sodium succinate Injectable 50 milliGRAM(s) IV Push every 6 hours  insulin lispro (ADMELOG) corrective regimen sliding scale   SubCutaneous every 6 hours  vasopressin Infusion 0.04 Unit(s)/Min IV Continuous <Continuous>    dextrose 5%. 1000 milliLiter(s) IV Continuous <Continuous>  dextrose 5%. 1000 milliLiter(s) IV Continuous <Continuous>      chlorhexidine 0.12% Liquid 15 milliLiter(s) Oral Mucosa every 12 hours  chlorhexidine 2% Cloths 1 Application(s) Topical <User Schedule>            Mode: AC/ CMV (Assist Control/ Continuous Mandatory Ventilation)  RR (machine): 18  TV (machine): 600  FiO2: 50  PEEP: 6  ITime: 1  MAP: 12  PIP: 25            ICU Vital Signs Last 24 Hrs  T(C): 38.1 (23 Mar 2023 08:45), Max: 39.6 (22 Mar 2023 09:58)  T(F): 100.6 (23 Mar 2023 08:45), Max: 103.3 (22 Mar 2023 09:58)  HR: 59 (23 Mar 2023 08:45) (58 - 78)  BP: 103/61 (22 Mar 2023 11:52) (86/56 - 120/53)  BP(mean): 10 (22 Mar 2023 11:04) (10 - 97)  ABP: 113/49 (23 Mar 2023 08:45) (81/49 - 133/99)  ABP(mean): 69 (23 Mar 2023 08:45) (60 - 109)  RR: 18 (23 Mar 2023 08:45) (16 - 26)  SpO2: 94% (23 Mar 2023 08:45) (92% - 100%)    O2 Parameters below as of 22 Mar 2023 13:10  Patient On (Oxygen Delivery Method): ventilator    O2 Concentration (%): 60        ABG - ( 22 Mar 2023 20:07 )  pH, Arterial: 7.260 pH, Blood: x     /  pCO2: 44    /  pO2: 103   / HCO3: 20    / Base Excess: -7.4  /  SaO2: 98.8                I&O's Detail    22 Mar 2023 07:01  -  23 Mar 2023 07:00  --------------------------------------------------------  IN:    IV PiggyBack: 250 mL    Norepinephrine: 1742 mL    Propofol: 484 mL    Vasopressin: 78 mL  Total IN: 2554 mL    OUT:    Indwelling Catheter - Urethral (mL): 3350 mL  Total OUT: 3350 mL    Total NET: -796 mL      23 Mar 2023 07:01  -  23 Mar 2023 09:01  --------------------------------------------------------  IN:    Norepinephrine: 59 mL    Propofol: 36 mL    Vasopressin: 6 mL  Total IN: 101 mL    OUT:    Indwelling Catheter - Urethral (mL): 250 mL  Total OUT: 250 mL    Total NET: -149 mL            LABS:                        14.0   13.18 )-----------( 359      ( 23 Mar 2023 03:35 )             44.3     03-23    143  |  100  |  44.3<H>  ----------------------------<  280<H>  4.2   |  16.0<L>  |  1.23    Ca    8.1<L>      23 Mar 2023 03:35  Phos  5.1     03-23  Mg     2.4     03-23    TPro  6.5<L>  /  Alb  3.6  /  TBili  0.3<L>  /  DBili  x   /  AST  21  /  ALT  24  /  AlkPhos  221<H>  03-22      CARDIAC MARKERS ( 22 Mar 2023 08:25 )  x     / 0.03 ng/mL / x     / x     / x          CAPILLARY BLOOD GLUCOSE      POCT Blood Glucose.: 239 mg/dL (23 Mar 2023 05:33)    PT/INR - ( 23 Mar 2023 03:35 )   PT: 18.5 sec;   INR: 1.59 ratio         PTT - ( 23 Mar 2023 03:35 )  PTT:39.7 sec  Urinalysis Basic - ( 22 Mar 2023 08:40 )    Color: Yellow / Appearance: Clear / S.015 / pH: x  Gluc: x / Ketone: Trace  / Bili: Negative / Urobili: Negative mg/dL   Blood: x / Protein: 30 mg/dL / Nitrite: Negative   Leuk Esterase: Negative / RBC: 3-5 /HPF / WBC 0-2 /HPF   Sq Epi: x / Non Sq Epi: Occasional / Bacteria: Many      CULTURES:  Rapid RVP Result: Detected ( @ 08:25)  Rapid RVP Result: Detected ( @ 23:44)  Culture Results:   <10,000 CFU/mL Normal Urogenital Negin ( @ 23:44)            Physical Examination:    General: No acute distress.      HEENT: Pupils equal, reactive to light.  Symmetric.    PULM: Clear to auscultation bilaterally, no significant sputum production    CVS: Regular rate and rhythm, no murmurs    ABD: Soft, nondistended, nontender, normoactive bowel sounds, no masses    EXT: 3+ pitting bilateral LE edema    SKIN: Warm and well perfused    NEURO: Sedated        RADIOLOGY: < from: Xray Chest 1 View- PORTABLE-Urgent (Xray Chest 1 View- PORTABLE-Urgent .) (23 @ 12:17) >  ACC: [id number] EXAM:  XR CHEST PORTABLE URGENT 1V   ORDERED BY: KRAIG VIGIL     PROCEDURE DATE:  2023          INTERPRETATION:  TECHNIQUE: Single portable view of the chest.    COMPARISON:  3/22/2023    CLINICAL HISTORY: intubated    FINDINGS:    Single frontal view of the chest demonstrates the lungs to be clear. The   cardiomediastinal silhouette is enlarged. Mediastinal sternotomy wires.   Widened mediastinum, unchanged. Endotracheal tube tip is 3 cm above the   myles. Right-sidedcentral venous catheter is unchanged. NG tube courses   below the hemidiaphragm. It's distal end is not well-visualized. Consider   additional views. No acute osseous abnormalities. Overlying EKG leads and   wires are noted    IMPRESSION: Status postintubation and NG tube placement.    --- End of Report ---    NORMA SILVERIO MD; Attending Radiologist  This document has been electronically signed. Mar 22 2023  2:46PM          INVASIVE LINES: R IJ TLC  INDWELLING SANCHEZ: Y  VTE PROPHYLAXIS: Lovenox full dose - Afib  CAM ICU: N/A  CODE STATUS: DNR        CRITICAL CARE TIME SPENT: 45 minutes spent performing frequent bedside reassessments and augmenting plan of care to address problems of acute critical illness that pose high probability of life threatening deterioration and/or end organ damage/dysfunction and discussing goals of care, non-inclusive of time spent on procedures performed.

## 2023-03-23 NOTE — PROGRESS NOTE ADULT - SUBJECTIVE AND OBJECTIVE BOX
Interval HPI:  Extubated this AM, asking for water  Started on insulin infusion  was bleeding from right IJ TLC, additional sutures placed today, no further bleeding    Exam:  awake and alert, follows commands  obese  bilat air entry, +rhonchi  abd nontender  bilat edema    Radiology: cxr - bilat basilar opacities     On Admission  23 (1d)  HPI:  76 yo male with CAD s/p PCI, AF on eliquis, moderate AS, morbid obesity, HTN, HLD, DM, thoracic aortic aneurysm, BPH s/p TURP, left renal mass, recent hMPV infection, presented from [facility]  to the ED after a fall.  He was found on the floor in the bathroom, hypoxemic and obtunded.    In the ED patient was found to be in hypercapnic respiratory failure and placed on NIV.  He was also noted to be hypotensive and started on pressors after a fluid bolus.    (22 Mar 2023 11:15)    PAST MEDICAL & SURGICAL HISTORY:  Anxiety      Hypertension      Hyperlipidemia      Prostate disease      Gastroesophageal reflux disease      Gallbladder stone without cholecystitis or obstruction      BPH (benign prostatic hyperplasia)      DM (diabetes mellitus)      DVT (deep venous thrombosis)  left leg      Afib      Anxiety      HLD (hyperlipidemia)      Malignant schwannoma      H/O arthroscopy of knee      S/P laminectomy      S/P coronary artery stent placement  x2          Antimicrobial:  cefepime  Injectable. 2000 milliGRAM(s) IV Push every 8 hours    Cardiovascular:  aMIOdarone    Tablet 200 milliGRAM(s) Oral daily  doxazosin 2 milliGRAM(s) Oral at bedtime  norepinephrine Infusion 0.05 MICROgram(s)/kG/Min IV Continuous <Continuous>    Pulmonary:  albuterol/ipratropium for Nebulization 3 milliLiter(s) Nebulizer every 6 hours    Hematalogic:  apixaban 5 milliGRAM(s) Enteral Tube every 12 hours  aspirin  chewable 81 milliGRAM(s) Oral daily    Other:  acetaminophen     Tablet .. 650 milliGRAM(s) Oral every 6 hours PRN  atorvastatin 10 milliGRAM(s) Oral at bedtime  chlorhexidine 2% Cloths 1 Application(s) Topical <User Schedule>  dextrose 5% + lactated ringers. 1000 milliLiter(s) IV Continuous <Continuous>  dextrose 50% Injectable 25 Gram(s) IV Push once  dextrose 50% Injectable 12.5 Gram(s) IV Push once  dextrose 50% Injectable 25 Gram(s) IV Push once  finasteride 5 milliGRAM(s) Oral daily  hydrocortisone sodium succinate Injectable 50 milliGRAM(s) IV Push every 6 hours  insulin regular Infusion 5 Unit(s)/Hr IV Continuous <Continuous>  vasopressin Infusion 0.04 Unit(s)/Min IV Continuous <Continuous>      Drug Dosing Weight  Height (cm): 185.4 (22 Mar 2023 13:10)  Weight (kg): 157.7 (22 Mar 2023 13:10)  BMI (kg/m2): 45.9 (22 Mar 2023 13:10)  BSA (m2): 2.72 (22 Mar 2023 13:10)    T(C): 38 (23 @ 14:30), Max: 38.4 (23 @ 12:30)  HR: 78 (23 @ 14:30)  BP: --  BP(mean): --  ABP: 124/57 (23 @ 14:30)  ABP(mean): 80 (23 @ 14:30)  RR: 17 (23 @ 14:30)  SpO2: 91% (23 @ 14:30)    ABG - ( 23 Mar 2023 10:01 )  pH, Arterial: 7.320 pH, Blood: x     /  pCO2: 43    /  pO2: 71    / HCO3: 22    / Base Excess: -3.9  /  SaO2: 95.8                   @ 07:01  -   @ 07:00  --------------------------------------------------------  IN: 2554 mL / OUT: 3350 mL / NET: -796 mL        Mode: AC/ CMV (Assist Control/ Continuous Mandatory Ventilation)  RR (machine): 18  TV (machine): 600  FiO2: 50  PEEP: 6  MAP: 12  PIP: 24        LABS:  CBC Full  -  ( 23 Mar 2023 03:35 )  WBC Count : 13.18 K/uL  RBC Count : 4.67 M/uL  Hemoglobin : 14.0 g/dL  Hematocrit : 44.3 %  Platelet Count - Automated : 359 K/uL  Mean Cell Volume : 94.9 fl  Mean Cell Hemoglobin : 30.0 pg  Mean Cell Hemoglobin Concentration : 31.6 gm/dL  Auto Neutrophil # : x  Auto Lymphocyte # : x  Auto Monocyte # : x  Auto Eosinophil # : x  Auto Basophil # : x  Auto Neutrophil % : x  Auto Lymphocyte % : x  Auto Monocyte % : x  Auto Eosinophil % : x  Auto Basophil % : x        143  |  100  |  44.3<H>  ----------------------------<  280<H>  4.2   |  16.0<L>  |  1.23    Ca    8.1<L>      23 Mar 2023 03:35  Phos  5.1       Mg     2.4         TPro  6.5<L>  /  Alb  3.6  /  TBili  0.3<L>  /  DBili  x   /  AST  21  /  ALT  24  /  AlkPhos  221<H>      PT/INR - ( 23 Mar 2023 03:35 )   PT: 18.5 sec;   INR: 1.59 ratio         PTT - ( 23 Mar 2023 03:35 )  PTT:39.7 sec  Urinalysis Basic - ( 22 Mar 2023 08:40 )    Color: Yellow / Appearance: Clear / S.015 / pH: x  Gluc: x / Ketone: Trace  / Bili: Negative / Urobili: Negative mg/dL   Blood: x / Protein: 30 mg/dL / Nitrite: Negative   Leuk Esterase: Negative / RBC: 3-5 /HPF / WBC 0-2 /HPF   Sq Epi: x / Non Sq Epi: Occasional / Bacteria: Many      Culture Results:   No growth to date. ( @ 08:25)  Culture Results:   No growth to date. ( @ 08:25)    ____________________________________________________________________________________________________

## 2023-03-23 NOTE — PROGRESS NOTE ADULT - SUBJECTIVE AND OBJECTIVE BOX
ANIA CRISTOBAL  [id number]      Chief Complaint:  AMS/Hypoxia/CHF    Interval History:  Patient intubated and sedated on vent.    Tele:  No events      acetaminophen     Tablet .. 650 milliGRAM(s) Oral every 6 hours PRN  aMIOdarone    Tablet 200 milliGRAM(s) Oral daily  apixaban 5 milliGRAM(s) Enteral Tube every 12 hours  aspirin  chewable 81 milliGRAM(s) Oral daily  atorvastatin 10 milliGRAM(s) Oral at bedtime  azithromycin  IVPB      azithromycin  IVPB 500 milliGRAM(s) IV Intermittent every 24 hours  cefepime  Injectable. 2000 milliGRAM(s) IV Push every 8 hours  chlorhexidine 0.12% Liquid 15 milliLiter(s) Oral Mucosa every 12 hours  chlorhexidine 2% Cloths 1 Application(s) Topical <User Schedule>  dextrose 5%. 1000 milliLiter(s) IV Continuous <Continuous>  dextrose 5%. 1000 milliLiter(s) IV Continuous <Continuous>  dextrose 50% Injectable 25 Gram(s) IV Push once  dextrose 50% Injectable 12.5 Gram(s) IV Push once  dextrose 50% Injectable 25 Gram(s) IV Push once  dextrose Oral Gel 15 Gram(s) Oral once PRN  glucagon  Injectable 1 milliGRAM(s) IntraMuscular once  hydrocortisone sodium succinate Injectable 50 milliGRAM(s) IV Push every 6 hours  insulin lispro (ADMELOG) corrective regimen sliding scale   SubCutaneous every 6 hours  norepinephrine Infusion 0.05 MICROgram(s)/kG/Min IV Continuous <Continuous>  pantoprazole  Injectable 40 milliGRAM(s) IV Push daily  propofol Infusion 10 MICROgram(s)/kG/Min IV Continuous <Continuous>  vasopressin Infusion 0.04 Unit(s)/Min IV Continuous <Continuous>          Physical Exam:  T(C): 38.2 (03-23-23 @ 10:45), Max: 38.7 (03-22-23 @ 11:31)  HR: 60 (03-23-23 @ 10:45) (58 - 75)  BP: 103/61 (03-22-23 @ 11:52) (98/56 - 103/61)  RR: 18 (03-23-23 @ 10:45) (16 - 20)  SpO2: 93% (03-23-23 @ 10:45) (92% - 100%)  General: Intubated and sedated on vent  Neck: No JVD  CVS: nl s1s2, no s3  Pulm: CTA b/l anteriorly  Abd: soft, non-tender  Ext: No c/c/e  Neuro Sedated  Psych: Sedated      Labs:   23 Mar 2023 03:35    143    |  100    |  44.3   ----------------------------<  280    4.2     |  16.0   |  1.23     Ca    8.1        23 Mar 2023 03:35  Phos  5.1       23 Mar 2023 03:35  Mg     2.4       23 Mar 2023 03:35    TPro  6.5    /  Alb  3.6    /  TBili  0.3    /  DBili  x      /  AST  21     /  ALT  24     /  AlkPhos  221    22 Mar 2023 08:25                          14.0   13.18 )-----------( 359      ( 23 Mar 2023 03:35 )             44.3     PT/INR - ( 23 Mar 2023 03:35 )   PT: 18.5 sec;   INR: 1.59 ratio         PTT - ( 23 Mar 2023 03:35 )  PTT:39.7 sec  CARDIAC MARKERS ( 22 Mar 2023 08:25 )  x     / 0.03 ng/mL / x     / x     / x          LIBBY 1/31/23  Summary:   1. Left ventricular ejection fraction, by visual estimation, is 40 to 45%.   2. Mildly decreased global left ventricular systolic function.   3. Normal right ventricular size and function.   4. Mildly enlarged left atrium.   5. Mild mitral annular calcification.   6. Mild mitral valve regurgitation.   7. Thickening of the anterior and posterior mitral valve leaflets.   8. Mild tricuspid regurgitation.   9. Mild aortic regurgitation.  10. Mild to moderate aortic valve stenosis.  11. CIELO by planimetry 1.2cm2.  12. Aortic root measured at Sinus of Valsalva is dilated. 4.1cm  13. No left atrial appendage thrombus.  14. Color flow doppler and intravenous injection of agitated saline demonstrates the presence of an intact intra atrial septum.    TTE 1/28/23  Summary:   1. Technically difficult study.   2. Tachycardic throughout the study and this may preclude true assessment of LVEF.   3. Left ventricular ejection fraction, by visual estimation, is 40 to 45%.   4. Moderately decreased global left ventricular systolic function.   5. The mitral in-flow pattern reveals no discernable A-wave, therefore no comment on diastolic function can be made.   6. Mildly enlarged left atrium.   7. Normal right atrial size.   8. Mild tricuspid regurgitation.   9. Mild thickening and calcification of the anterior mitral valve leaflet.  10. Mild to moderate mitral annular calcification.  11. Trace mitral valve regurgitation.  12. Moderate aortic valve stenosis, with peak velocity of 2.87 m/s, mean gradient of 18 mmHg, dimensionless index of 0.29, and CIELO via VTI of 1.42 sqcm.  13. Dilatation of the sinuses of Valsalva, measures 4.15 cm but based on BSA 1.6cm/sqm is within normal limits.  14. There is a significant pericardial fat pad present.  15. There is no evidence of pericardial effusion.  16. Endocardial visualization was enhanced with intravenous echo contrast.  17. Compared to prior TTE done on 7/6/2022, the left ventricular function is now reduced to 40-45%, in the setting of Afib wth RVR (prior LVEF   60-65%).      CATHETERIZATION:  1/30/23  LM   Left main artery: Angiography shows minor irregularities.      LAD   Proximal left anterior descending: There is a 10 % stenosis within the  stented segment. Mid left anterior descending: There is a  10 % stenosis within the stented segment.      CX   Distal circumflex: There is a 60 % stenosis. VALENTINO Flow 3.      RCA   Proximal right coronary artery: There is a 100 % stenosis. There is  good collateral blood supply to the distal myocardium. This  lesion is a chronic total occlusion.  This is a small non-dominant  vessel.    Ramus   Ramus intermedius: Angiography shows mild atherosclerosis.      Echo:   1. Left ventricular ejection fraction, by visual estimation, is 60 to 65%.   2. Normal right ventricular size and function.   3. There is no evidence of pericardial effusion.   4. Endocardial visualizationwas enhanced with intravenous echo contrast.        Assessment:  77y Male with a PMH of moderate AS, CAD s/p PCI (patent LAD cath 2021), Moderately obstructive distal LCX around 60%, afib on AC s/p DCCV back in jan currently in sinus,  morbid obesity, OA, HTN, HLD, DM here with SOB  Per notes patient was found by EMS around 05:00 pt was on his buttocks in the bathroom after he fell, they were able to get him back into bed, they then found pt obtunded, low O2 saturations. Pt was seen in [facility] 3 days ago after c/o SOB and orthopnea, diagnosed with HMPV was saturating well at that time and [facility]. Pt obtunded in the ED, unable to provide any hx. Cardiology consult was requested for evaluation of respiratory distress concerning for pulmonary edema.   -In the ED patient noted to be febrile, hypotensive, obtunded and in severe respiratory distress and intubated and sedated.  -Now in ICU intubated on vent and 2 pressors.  Likely more sepsis.  -Echo limited but EF appears normal.    Plan:  1. Care per ICU.  2. Abx per ID.  3. Wean pressors as tolerated.  4. Hold diuretics now with likely sepsis.  5. Continue anticoagulation for AF.  6. Continue asa, statin.  7. Resume other CV as his hemodynamic status allows for it.

## 2023-03-23 NOTE — PHARMACOTHERAPY INTERVENTION NOTE - INTERVENTION TYPE RECOOMEND
Therapy Recommended - Alternative treatment
Therapy Recommended - Med Rec related
Dose Optimization/Non-renal Dose Adjustment

## 2023-03-23 NOTE — PROGRESS NOTE ADULT - ASSESSMENT
78 yo male with CAD s/p PCI, AF on Eliquis, moderate AS, morbid obesity, HTN, HLD, T2DM, ALLIE not on CPAP, thoracic aortic aneurysm, BPH s/p TURP, left renal mass, recent hMPV infection, admitted with acute toxic-metabolic encephalopathy secondary to acute on chronic hypoxic hypercapnic respiratory failure and septic shock likely secondary to +hMPV viral pneumonia with superimposed bacterial pneumonia.   76 yo male with CAD s/p PCI, AF on Eliquis, moderate AS, morbid obesity, HTN, HLD, T2DM, ALLIE not on CPAP, thoracic aortic aneurysm, BPH s/p TURP, left renal mass, recent hMPV infection, admitted with acute toxic-metabolic encephalopathy secondary to acute on chronic hypoxic hypercapnic respiratory failure and septic shock likely secondary to +hMPV viral pneumonia with superimposed bacterial pneumonia.     Remains intubated on full vent support, oxygenation improving weaned down to 60% Fio2  Sedated on Propofol  Plan for SAT/SBT this AM  Distributive shock on Norepinephrine titrating to targeted MAP goal 65-70 and fixed-dose physiologic Vasopressin  Stress steroids  Broad-spectrum empiric antibiotics with Cefepime. Prolonged QTc and Legionella Ag negative, d/c azithromycin. Sputum cx and bcx NTD.     Addendum:  Passed SAT and SBT  Extubated to HFNC on 80% FIO2 and 40 LPM, titrating to keep SpO2 90%  Nocturnal CPAP ordered for underlying ALLIE/OHS  Start duonebs  Needs aggressive chest PT and pulmonary toileting  Remains in two pressor shock as above, though able to titrate down Norepinephrine gtt  Labs noted to have anion gap metabolic acidosis in the setting of iatrogenic hyperglycemia due to stress steroids, lactate normal. Start insulin infusion and D5W for suspected DKA. Recheck BMP, Mg, Phos later today.  Dysphagia screen  PT eval  Patient's two sons updated at bedside

## 2023-03-23 NOTE — AIRWAY REMOVAL NOTE  ADULT & PEDS - ARTIFICAL AIRWAY REMOVAL COMMENTS
pt extubated and will be placed on high flow nasal cannula for low saturation. Bonnie APPIAH is aware

## 2023-03-23 NOTE — PROGRESS NOTE ADULT - ASSESSMENT
Impression:  76 yo male with CAD s/p PCI, AF on eliquis, moderate AS, chronic systolic CHF/HFrEF, morbid obesity, HTN, HLD, DM, thoracic aortic aneurysm, BPH s/p TURP, left renal mass, recent hMPV infection, now admitted with acute hypercapnic respiratory failure, septic shock, multifocal pneumonia, encephalopathy, PHILLY.       Acute respiratory failure/ multifocal pneumonia/ recent hMPV infection   - intubated 3/22, extubated 3/23 to high flow nasal cannula   - empiric abx (cefepime), cultures pending    Encephalopathy  - secondary to hypercapnia, now resolving     Septic shock  attempting to wean pressors, started on stress dose hydrocortisone    DKA  - started on insulin and d5LR, will continue until anion gap resolves, repeat labs tonight, monitor electrolytes     Afib  - restarted on eliquis    Chronic HFrEF  - no ace/arb/entresto due to hypotension

## 2023-03-24 LAB
ANION GAP SERPL CALC-SCNC: 11 MMOL/L — SIGNIFICANT CHANGE UP (ref 5–17)
BASE EXCESS BLDA CALC-SCNC: 5.4 MMOL/L — HIGH (ref -2–3)
BLOOD GAS COMMENTS ARTERIAL: SIGNIFICANT CHANGE UP
BUN SERPL-MCNC: 41.1 MG/DL — HIGH (ref 8–20)
CALCIUM SERPL-MCNC: 8.1 MG/DL — LOW (ref 8.4–10.5)
CHLORIDE SERPL-SCNC: 105 MMOL/L — SIGNIFICANT CHANGE UP (ref 96–108)
CO2 SERPL-SCNC: 27 MMOL/L — SIGNIFICANT CHANGE UP (ref 22–29)
CREAT SERPL-MCNC: 1.04 MG/DL — SIGNIFICANT CHANGE UP (ref 0.5–1.3)
EGFR: 74 ML/MIN/1.73M2 — SIGNIFICANT CHANGE UP
GAS PNL BLDA: SIGNIFICANT CHANGE UP
GLUCOSE BLDC GLUCOMTR-MCNC: 178 MG/DL — HIGH (ref 70–99)
GLUCOSE BLDC GLUCOMTR-MCNC: 183 MG/DL — HIGH (ref 70–99)
GLUCOSE BLDC GLUCOMTR-MCNC: 187 MG/DL — HIGH (ref 70–99)
GLUCOSE BLDC GLUCOMTR-MCNC: 194 MG/DL — HIGH (ref 70–99)
GLUCOSE BLDC GLUCOMTR-MCNC: 199 MG/DL — HIGH (ref 70–99)
GLUCOSE BLDC GLUCOMTR-MCNC: 209 MG/DL — HIGH (ref 70–99)
GLUCOSE BLDC GLUCOMTR-MCNC: 219 MG/DL — HIGH (ref 70–99)
GLUCOSE BLDC GLUCOMTR-MCNC: 220 MG/DL — HIGH (ref 70–99)
GLUCOSE SERPL-MCNC: 227 MG/DL — HIGH (ref 70–99)
HCO3 BLDA-SCNC: 32 MMOL/L — HIGH (ref 21–28)
HCT VFR BLD CALC: 41.8 % — SIGNIFICANT CHANGE UP (ref 39–50)
HGB BLD-MCNC: 13.3 G/DL — SIGNIFICANT CHANGE UP (ref 13–17)
HOROWITZ INDEX BLDA+IHG-RTO: 0.7 — SIGNIFICANT CHANGE UP
MAGNESIUM SERPL-MCNC: 2.6 MG/DL — SIGNIFICANT CHANGE UP (ref 1.6–2.6)
MCHC RBC-ENTMCNC: 29.7 PG — SIGNIFICANT CHANGE UP (ref 27–34)
MCHC RBC-ENTMCNC: 31.8 GM/DL — LOW (ref 32–36)
MCV RBC AUTO: 93.3 FL — SIGNIFICANT CHANGE UP (ref 80–100)
PCO2 BLDA: 63 MMHG — HIGH (ref 35–48)
PH BLDA: 7.31 — LOW (ref 7.35–7.45)
PHOSPHATE SERPL-MCNC: 2.8 MG/DL — SIGNIFICANT CHANGE UP (ref 2.4–4.7)
PLATELET # BLD AUTO: 242 K/UL — SIGNIFICANT CHANGE UP (ref 150–400)
PO2 BLDA: 75 MMHG — LOW (ref 83–108)
POTASSIUM SERPL-MCNC: 4.6 MMOL/L — SIGNIFICANT CHANGE UP (ref 3.5–5.3)
POTASSIUM SERPL-SCNC: 4.6 MMOL/L — SIGNIFICANT CHANGE UP (ref 3.5–5.3)
RBC # BLD: 4.48 M/UL — SIGNIFICANT CHANGE UP (ref 4.2–5.8)
RBC # FLD: 13.8 % — SIGNIFICANT CHANGE UP (ref 10.3–14.5)
SAO2 % BLDA: 96.1 % — SIGNIFICANT CHANGE UP (ref 94–98)
SODIUM SERPL-SCNC: 143 MMOL/L — SIGNIFICANT CHANGE UP (ref 135–145)
WBC # BLD: 10.47 K/UL — SIGNIFICANT CHANGE UP (ref 3.8–10.5)
WBC # FLD AUTO: 10.47 K/UL — SIGNIFICANT CHANGE UP (ref 3.8–10.5)

## 2023-03-24 PROCEDURE — 99232 SBSQ HOSP IP/OBS MODERATE 35: CPT

## 2023-03-24 RX ORDER — INSULIN LISPRO 100/ML
VIAL (ML) SUBCUTANEOUS
Refills: 0 | Status: DISCONTINUED | OUTPATIENT
Start: 2023-03-24 | End: 2023-03-27

## 2023-03-24 RX ORDER — POTASSIUM PHOSPHATE, MONOBASIC POTASSIUM PHOSPHATE, DIBASIC 236; 224 MG/ML; MG/ML
30 INJECTION, SOLUTION INTRAVENOUS ONCE
Refills: 0 | Status: COMPLETED | OUTPATIENT
Start: 2023-03-24 | End: 2023-03-24

## 2023-03-24 RX ORDER — OLANZAPINE 15 MG/1
5 TABLET, FILM COATED ORAL EVERY 4 HOURS
Refills: 0 | Status: DISCONTINUED | OUTPATIENT
Start: 2023-03-24 | End: 2023-03-26

## 2023-03-24 RX ORDER — ACETAMINOPHEN 500 MG
1000 TABLET ORAL ONCE
Refills: 0 | Status: COMPLETED | OUTPATIENT
Start: 2023-03-24 | End: 2023-03-24

## 2023-03-24 RX ORDER — HALOPERIDOL DECANOATE 100 MG/ML
5 INJECTION INTRAMUSCULAR ONCE
Refills: 0 | Status: COMPLETED | OUTPATIENT
Start: 2023-03-24 | End: 2023-03-24

## 2023-03-24 RX ORDER — MUPIROCIN 20 MG/G
1 OINTMENT TOPICAL
Refills: 0 | Status: COMPLETED | OUTPATIENT
Start: 2023-03-24 | End: 2023-03-29

## 2023-03-24 RX ADMIN — Medication 3 MILLILITER(S): at 09:13

## 2023-03-24 RX ADMIN — Medication 3 MILLILITER(S): at 20:47

## 2023-03-24 RX ADMIN — Medication 2: at 11:34

## 2023-03-24 RX ADMIN — Medication 400 MILLIGRAM(S): at 02:02

## 2023-03-24 RX ADMIN — DEXMEDETOMIDINE HYDROCHLORIDE IN 0.9% SODIUM CHLORIDE 19.7 MICROGRAM(S)/KG/HR: 4 INJECTION INTRAVENOUS at 08:25

## 2023-03-24 RX ADMIN — DEXMEDETOMIDINE HYDROCHLORIDE IN 0.9% SODIUM CHLORIDE 19.7 MICROGRAM(S)/KG/HR: 4 INJECTION INTRAVENOUS at 21:11

## 2023-03-24 RX ADMIN — OLANZAPINE 5 MILLIGRAM(S): 15 TABLET, FILM COATED ORAL at 21:11

## 2023-03-24 RX ADMIN — DEXMEDETOMIDINE HYDROCHLORIDE IN 0.9% SODIUM CHLORIDE 19.7 MICROGRAM(S)/KG/HR: 4 INJECTION INTRAVENOUS at 09:07

## 2023-03-24 RX ADMIN — DEXMEDETOMIDINE HYDROCHLORIDE IN 0.9% SODIUM CHLORIDE 19.7 MICROGRAM(S)/KG/HR: 4 INJECTION INTRAVENOUS at 08:03

## 2023-03-24 RX ADMIN — DEXMEDETOMIDINE HYDROCHLORIDE IN 0.9% SODIUM CHLORIDE 19.7 MICROGRAM(S)/KG/HR: 4 INJECTION INTRAVENOUS at 21:19

## 2023-03-24 RX ADMIN — CEFEPIME 2000 MILLIGRAM(S): 1 INJECTION, POWDER, FOR SOLUTION INTRAMUSCULAR; INTRAVENOUS at 00:15

## 2023-03-24 RX ADMIN — DEXMEDETOMIDINE HYDROCHLORIDE IN 0.9% SODIUM CHLORIDE 19.7 MICROGRAM(S)/KG/HR: 4 INJECTION INTRAVENOUS at 11:34

## 2023-03-24 RX ADMIN — INSULIN GLARGINE 15 UNIT(S): 100 INJECTION, SOLUTION SUBCUTANEOUS at 08:00

## 2023-03-24 RX ADMIN — MUPIROCIN 1 APPLICATION(S): 20 OINTMENT TOPICAL at 17:09

## 2023-03-24 RX ADMIN — Medication 1: at 17:23

## 2023-03-24 RX ADMIN — DEXMEDETOMIDINE HYDROCHLORIDE IN 0.9% SODIUM CHLORIDE 19.7 MICROGRAM(S)/KG/HR: 4 INJECTION INTRAVENOUS at 15:09

## 2023-03-24 RX ADMIN — Medication 1000 MILLIGRAM(S): at 02:17

## 2023-03-24 RX ADMIN — DEXMEDETOMIDINE HYDROCHLORIDE IN 0.9% SODIUM CHLORIDE 19.7 MICROGRAM(S)/KG/HR: 4 INJECTION INTRAVENOUS at 19:36

## 2023-03-24 RX ADMIN — Medication 400 MILLIGRAM(S): at 09:07

## 2023-03-24 RX ADMIN — CHLORHEXIDINE GLUCONATE 1 APPLICATION(S): 213 SOLUTION TOPICAL at 05:09

## 2023-03-24 RX ADMIN — Medication 50 MILLIGRAM(S): at 05:03

## 2023-03-24 RX ADMIN — DEXMEDETOMIDINE HYDROCHLORIDE IN 0.9% SODIUM CHLORIDE 19.7 MICROGRAM(S)/KG/HR: 4 INJECTION INTRAVENOUS at 13:17

## 2023-03-24 RX ADMIN — Medication 1000 MILLIGRAM(S): at 09:29

## 2023-03-24 RX ADMIN — Medication 40 MILLIGRAM(S): at 21:12

## 2023-03-24 RX ADMIN — CEFEPIME 2000 MILLIGRAM(S): 1 INJECTION, POWDER, FOR SOLUTION INTRAMUSCULAR; INTRAVENOUS at 08:03

## 2023-03-24 RX ADMIN — Medication 40 MILLIGRAM(S): at 13:16

## 2023-03-24 RX ADMIN — POTASSIUM PHOSPHATE, MONOBASIC POTASSIUM PHOSPHATE, DIBASIC 83.33 MILLIMOLE(S): 236; 224 INJECTION, SOLUTION INTRAVENOUS at 08:26

## 2023-03-24 RX ADMIN — CEFEPIME 2000 MILLIGRAM(S): 1 INJECTION, POWDER, FOR SOLUTION INTRAMUSCULAR; INTRAVENOUS at 17:14

## 2023-03-24 RX ADMIN — Medication 3 MILLILITER(S): at 03:31

## 2023-03-24 RX ADMIN — Medication 3 MILLILITER(S): at 16:05

## 2023-03-24 RX ADMIN — Medication 400 MILLIGRAM(S): at 03:00

## 2023-03-24 RX ADMIN — DEXMEDETOMIDINE HYDROCHLORIDE IN 0.9% SODIUM CHLORIDE 19.7 MICROGRAM(S)/KG/HR: 4 INJECTION INTRAVENOUS at 19:42

## 2023-03-24 RX ADMIN — HALOPERIDOL DECANOATE 5 MILLIGRAM(S): 100 INJECTION INTRAMUSCULAR at 05:38

## 2023-03-24 NOTE — PROGRESS NOTE ADULT - SUBJECTIVE AND OBJECTIVE BOX
CC: respiratory failure     BRIEF HOSPITAL COURSE: 77M with pmhx CAD s/p PCI, Afib on eliquis, mod obesity, htn, hld, dm2, isaias not on CPAP, thoracic aortic aneurysm, BPH s/p TURP, left renal mass, recent hMPV infection, presented from [facility] to ED s/p fall. Found down there hypoxemic and obtunded. On arrival to the ED he was found to be in acute on chronic hypercapnic respiratory failure and started on NIV. Given approx. 700cc of IVF but ultimately required initiation on norepi. His respiratory status worsened and he required intubation. ICU called for admission. Now extubated and is being treated for acute hypoxemic and hypercapnic respiratory failure and septic shock 2/2 multifocal pna.       Events last 24 hours: Remains on HFNC 40L/80%. Weaned off vaso overnight but remains on norepi. Sedated with precedex.        ROS: Due to altered mental status/intubation, subjective information was not able to be obtained from the patient. History was obtained, to the extent possible, from review of the chart and collateral sources of information.    PAST MEDICAL & SURGICAL HISTORY:  Anxiety  Hypertension  Hyperlipidemia  Prostate disease  Gastroesophageal reflux disease  Gallbladder stone without cholecystitis or obstruction  BPH (benign prostatic hyperplasia)  DM (diabetes mellitus)  DVT (deep venous thrombosis)  left leg  Afib  Anxiety  HLD (hyperlipidemia)  Malignant schwannoma  H/O arthroscopy of knee  S/P laminectomy  S/P coronary artery stent placement  x2      Medications:  cefepime  Injectable. 2000 milliGRAM(s) IV Push every 8 hours    aMIOdarone    Tablet 200 milliGRAM(s) Oral daily  doxazosin 2 milliGRAM(s) Oral at bedtime  norepinephrine Infusion 0.05 MICROgram(s)/kG/Min IV Continuous <Continuous>    albuterol/ipratropium for Nebulization 3 milliLiter(s) Nebulizer every 6 hours    acetaminophen     Tablet .. 650 milliGRAM(s) Oral every 6 hours PRN  dexMEDEtomidine Infusion 0.5 MICROgram(s)/kG/Hr IV Continuous <Continuous>      apixaban 5 milliGRAM(s) Enteral Tube every 12 hours  aspirin  chewable 81 milliGRAM(s) Oral daily    atorvastatin 10 milliGRAM(s) Oral at bedtime  dextrose 50% Injectable 25 Gram(s) IV Push once  dextrose 50% Injectable 12.5 Gram(s) IV Push once  dextrose 50% Injectable 25 Gram(s) IV Push once  finasteride 5 milliGRAM(s) Oral daily  insulin glargine Injectable (LANTUS) 15 Unit(s) SubCutaneous every morning  methylPREDNISolone sodium succinate Injectable 40 milliGRAM(s) IV Push every 8 hours  vasopressin Infusion 0.04 Unit(s)/Min IV Continuous <Continuous>    chlorhexidine 2% Cloths 1 Application(s) Topical <User Schedule>  mupirocin 2% Ointment 1 Application(s) Both Nostrils two times a day      ICU Vital Signs Last 24 Hrs  T(C): 38.4 (24 Mar 2023 09:30), Max: 38.9 (24 Mar 2023 01:45)  T(F): 101.1 (24 Mar 2023 09:30), Max: 102 (24 Mar 2023 01:45)  HR: 66 (24 Mar 2023 09:30) (59 - 87)  BP: --  BP(mean): --  ABP: 107/52 (24 Mar 2023 09:30) (92/47 - 144/52)  ABP(mean): 69 (24 Mar 2023 09:30) (56 - 81)  RR: 30 (24 Mar 2023 09:30) (16 - 33)  SpO2: 95% (24 Mar 2023 09:30) (90% - 97%)    O2 Parameters below as of 24 Mar 2023 08:00  Patient On (Oxygen Delivery Method): hi flow        Physical Examination:    General: no acute distress  HEENT: Pupils equal, reactive to light.  Symmetric.  PULM: Clear to auscultation bilaterally  CVS: Regular rate and rhythm, no murmurs, rubs, or gallops  ABD: Soft, nondistended, nontender  EXT: No edema, nontender  SKIN: Warm and well perfused  NEURO: sedated    ABG - ( 24 Mar 2023 04:17 )  pH, Arterial: 7.310 pH, Blood: x     /  pCO2: 63    /  pO2: 75    / HCO3: 32    / Base Excess: 5.4   /  SaO2: 96.1        I&O's Detail    23 Mar 2023 07:01  -  24 Mar 2023 07:00  --------------------------------------------------------  IN:    Dexmedetomidine: 406 mL    dextrose 5% + lactated ringers: 750 mL    Insulin: 42 mL    Insulin: 7 mL    IV PiggyBack: 100 mL    Norepinephrine: 613.1 mL    Propofol: 180 mL    Vasopressin: 48 mL  Total IN: 2146.1 mL    OUT:    Indwelling Catheter - Urethral (mL): 2865 mL  Total OUT: 2865 mL    Total NET: -718.9 mL      24 Mar 2023 07:01  -  24 Mar 2023 09:39  --------------------------------------------------------  IN:    Dexmedetomidine: 118.2 mL  Total IN: 118.2 mL    OUT:    Insulin: 0 mL  Total OUT: 0 mL    Total NET: 118.2 mL      LABS:                        13.3   10.47 )-----------( 242      ( 24 Mar 2023 04:00 )             41.8     03-24    143  |  105  |  41.1<H>  ----------------------------<  227<H>  4.6   |  27.0  |  1.04    Ca    8.1<L>      24 Mar 2023 04:00  Phos  2.8     03-24  Mg     2.6     03-24            CAPILLARY BLOOD GLUCOSE      POCT Blood Glucose.: 183 mg/dL (24 Mar 2023 09:18)    PT/INR - ( 23 Mar 2023 03:35 )   PT: 18.5 sec;   INR: 1.59 ratio         PTT - ( 23 Mar 2023 03:35 )  PTT:39.7 sec    CULTURES:  Culture Results:   Normal Respiratory Negin present (03-23 @ 00:35)  Rapid RVP Result: Detected (03-22 @ 08:25)  Culture Results:   No growth to date. (03-22 @ 08:25)  Culture Results:   No growth to date. (03-22 @ 08:25)  Rapid RVP Result: Detected (03-18 @ 23:44)  Culture Results:   <10,000 CFU/mL Normal Urogenital Negin (03-18 @ 23:44)        RADIOLOGY: < from: CT Chest w/ IV Cont (03.22.23 @ 10:30) >    IMPRESSION:  Interval development ofbilateral pulmonary opacities suggesting   pneumonia. Scattered areas of atelectasis bilaterally.    No evidence of solid visceral injury in the thorax, abdomen or pelvis.    Skin thickening and subcutaneous edema lower ventral abdominal wall   suggesting cellulitis.    < end of copied text >      INVASIVE LINES: R IJ   INDWELLING SANCHEZ: Y   VTE PROPHYLAXIS: eliquis   CODE STATUS: DNR    CRITICAL CARE TIME SPENT: 35 minutes spent performing frequent bedside reassessments and augmenting plan of care to address problems of acute critical illness that pose high probability of life threatening deterioration and/or end organ damage/dysfunction and discussing goals of care, non-inclusive of time spent on procedures performed. CC: respiratory failure     BRIEF HOSPITAL COURSE: 77M with pmhx CAD s/p PCI, Afib on eliquis, mod obesity, htn, hld, dm2, isaias not on CPAP, thoracic aortic aneurysm, BPH s/p TURP, left renal mass, recent hMPV infection, presented from [facility] to ED s/p fall. Found down there hypoxemic and obtunded. On arrival to the ED he was found to be in acute on chronic hypercapnic respiratory failure and started on NIV. Given approx. 700cc of IVF but ultimately required initiation on norepi. His respiratory status worsened and he required intubation. ICU called for admission. Now extubated and is being treated for acute hypoxemic and hypercapnic respiratory failure and septic shock 2/2 multifocal pna.       Events last 24 hours: Remains on HFNC 40L/80%. Weaned off vaso overnight but remains on norepi. Sedated with precedex.        ROS: Due to altered mental status/intubation, subjective information was not able to be obtained from the patient. History was obtained, to the extent possible, from review of the chart and collateral sources of information.    PAST MEDICAL & SURGICAL HISTORY:  Anxiety  Hypertension  Hyperlipidemia  Prostate disease  Gastroesophageal reflux disease  Gallbladder stone without cholecystitis or obstruction  BPH (benign prostatic hyperplasia)  DM (diabetes mellitus)  DVT (deep venous thrombosis)  left leg  Afib  Anxiety  HLD (hyperlipidemia)  Malignant schwannoma  H/O arthroscopy of knee  S/P laminectomy  S/P coronary artery stent placement  x2      Medications:  cefepime  Injectable. 2000 milliGRAM(s) IV Push every 8 hours    aMIOdarone    Tablet 200 milliGRAM(s) Oral daily  doxazosin 2 milliGRAM(s) Oral at bedtime  norepinephrine Infusion 0.05 MICROgram(s)/kG/Min IV Continuous <Continuous>    albuterol/ipratropium for Nebulization 3 milliLiter(s) Nebulizer every 6 hours    acetaminophen     Tablet .. 650 milliGRAM(s) Oral every 6 hours PRN  dexMEDEtomidine Infusion 0.5 MICROgram(s)/kG/Hr IV Continuous <Continuous>      apixaban 5 milliGRAM(s) Enteral Tube every 12 hours  aspirin  chewable 81 milliGRAM(s) Oral daily    atorvastatin 10 milliGRAM(s) Oral at bedtime  dextrose 50% Injectable 25 Gram(s) IV Push once  dextrose 50% Injectable 12.5 Gram(s) IV Push once  dextrose 50% Injectable 25 Gram(s) IV Push once  finasteride 5 milliGRAM(s) Oral daily  insulin glargine Injectable (LANTUS) 15 Unit(s) SubCutaneous every morning  methylPREDNISolone sodium succinate Injectable 40 milliGRAM(s) IV Push every 8 hours  vasopressin Infusion 0.04 Unit(s)/Min IV Continuous <Continuous>    chlorhexidine 2% Cloths 1 Application(s) Topical <User Schedule>  mupirocin 2% Ointment 1 Application(s) Both Nostrils two times a day      ICU Vital Signs Last 24 Hrs  T(C): 38.4 (24 Mar 2023 09:30), Max: 38.9 (24 Mar 2023 01:45)  T(F): 101.1 (24 Mar 2023 09:30), Max: 102 (24 Mar 2023 01:45)  HR: 66 (24 Mar 2023 09:30) (59 - 87)  BP: --  BP(mean): --  ABP: 107/52 (24 Mar 2023 09:30) (92/47 - 144/52)  ABP(mean): 69 (24 Mar 2023 09:30) (56 - 81)  RR: 30 (24 Mar 2023 09:30) (16 - 33)  SpO2: 95% (24 Mar 2023 09:30) (90% - 97%)    O2 Parameters below as of 24 Mar 2023 08:00  Patient On (Oxygen Delivery Method): hi flow        Physical Examination:    General: no acute distress  HEENT: Pupils equal, reactive to light.  Symmetric.  PULM: Clear to auscultation bilaterally  CVS: Regular rate and rhythm  ABD: Soft, nondistended, nontender  EXT: 2+ pitting edema, nontender  SKIN: Warm and well perfused  NEURO: sedated    ABG - ( 24 Mar 2023 04:17 )  pH, Arterial: 7.310 pH, Blood: x     /  pCO2: 63    /  pO2: 75    / HCO3: 32    / Base Excess: 5.4   /  SaO2: 96.1        I&O's Detail    23 Mar 2023 07:01  -  24 Mar 2023 07:00  --------------------------------------------------------  IN:    Dexmedetomidine: 406 mL    dextrose 5% + lactated ringers: 750 mL    Insulin: 42 mL    Insulin: 7 mL    IV PiggyBack: 100 mL    Norepinephrine: 613.1 mL    Propofol: 180 mL    Vasopressin: 48 mL  Total IN: 2146.1 mL    OUT:    Indwelling Catheter - Urethral (mL): 2865 mL  Total OUT: 2865 mL    Total NET: -718.9 mL      24 Mar 2023 07:01  -  24 Mar 2023 09:39  --------------------------------------------------------  IN:    Dexmedetomidine: 118.2 mL  Total IN: 118.2 mL    OUT:    Insulin: 0 mL  Total OUT: 0 mL    Total NET: 118.2 mL      LABS:                        13.3   10.47 )-----------( 242      ( 24 Mar 2023 04:00 )             41.8     03-24    143  |  105  |  41.1<H>  ----------------------------<  227<H>  4.6   |  27.0  |  1.04    Ca    8.1<L>      24 Mar 2023 04:00  Phos  2.8     03-24  Mg     2.6     03-24            CAPILLARY BLOOD GLUCOSE      POCT Blood Glucose.: 183 mg/dL (24 Mar 2023 09:18)    PT/INR - ( 23 Mar 2023 03:35 )   PT: 18.5 sec;   INR: 1.59 ratio         PTT - ( 23 Mar 2023 03:35 )  PTT:39.7 sec    CULTURES:  Culture Results:   Normal Respiratory Negin present (03-23 @ 00:35)  Rapid RVP Result: Detected (03-22 @ 08:25)  Culture Results:   No growth to date. (03-22 @ 08:25)  Culture Results:   No growth to date. (03-22 @ 08:25)  Rapid RVP Result: Detected (03-18 @ 23:44)  Culture Results:   <10,000 CFU/mL Normal Urogenital Negin (03-18 @ 23:44)        RADIOLOGY: < from: CT Chest w/ IV Cont (03.22.23 @ 10:30) >    IMPRESSION:  Interval development ofbilateral pulmonary opacities suggesting   pneumonia. Scattered areas of atelectasis bilaterally.    No evidence of solid visceral injury in the thorax, abdomen or pelvis.    Skin thickening and subcutaneous edema lower ventral abdominal wall   suggesting cellulitis.    < end of copied text >      INVASIVE LINES: R IJ   INDWELLING SANCHEZ: Y   VTE PROPHYLAXIS: eliquis   CODE STATUS: DNR    CRITICAL CARE TIME SPENT: 35 minutes spent performing frequent bedside reassessments and augmenting plan of care to address problems of acute critical illness that pose high probability of life threatening deterioration and/or end organ damage/dysfunction and discussing goals of care, non-inclusive of time spent on procedures performed.

## 2023-03-24 NOTE — PROGRESS NOTE ADULT - NS PANP COMMENT GEN_ALL_CORE FT
76 yo male with CAD s/p PCI, AF on eliquis, moderate AS, chronic systolic CHF/HFrEF, morbid obesity, HTN, HLD, DM, thoracic aortic aneurysm, BPH s/p TURP, left renal mass, recent hMPV infection, now admitted with acute hypercapnic respiratory failure, septic shock, multifocal pneumonia, hMPV infection, encephalopathy, PHILLY.  Currently alternating between NIV and high flow. Wife and son

## 2023-03-24 NOTE — DIETITIAN INITIAL EVALUATION ADULT - CONTINUE CURRENT NUTRITION CARE PLAN
- Advance diet as medically feasible to consistent carbohydrate, DASH/TLC; if concerns for chewing/swallowing difficulty, suggest SLP swallow evaluation prior to diet advancement./yes

## 2023-03-24 NOTE — PROGRESS NOTE ADULT - SUBJECTIVE AND OBJECTIVE BOX
ANIA CRISTOBAL  [id number]      Chief Complaint:  AMS/Hypoxia/CHF    Interval History:  Patient extubated, now awake, confused and somewhat lethargic.    Tele:  No events          acetaminophen     Tablet .. 650 milliGRAM(s) Oral every 6 hours PRN  albuterol/ipratropium for Nebulization 3 milliLiter(s) Nebulizer every 6 hours  aMIOdarone    Tablet 200 milliGRAM(s) Oral daily  apixaban 5 milliGRAM(s) Enteral Tube every 12 hours  aspirin  chewable 81 milliGRAM(s) Oral daily  atorvastatin 10 milliGRAM(s) Oral at bedtime  cefepime  Injectable. 2000 milliGRAM(s) IV Push every 8 hours  chlorhexidine 2% Cloths 1 Application(s) Topical <User Schedule>  dexMEDEtomidine Infusion 0.5 MICROgram(s)/kG/Hr IV Continuous <Continuous>  dextrose 50% Injectable 25 Gram(s) IV Push once  dextrose 50% Injectable 12.5 Gram(s) IV Push once  dextrose 50% Injectable 25 Gram(s) IV Push once  doxazosin 2 milliGRAM(s) Oral at bedtime  finasteride 5 milliGRAM(s) Oral daily  insulin glargine Injectable (LANTUS) 15 Unit(s) SubCutaneous every morning  methylPREDNISolone sodium succinate Injectable 40 milliGRAM(s) IV Push every 8 hours  mupirocin 2% Ointment 1 Application(s) Both Nostrils two times a day  norepinephrine Infusion 0.05 MICROgram(s)/kG/Min IV Continuous <Continuous>  vasopressin Infusion 0.04 Unit(s)/Min IV Continuous <Continuous>          Physical Exam:  T(C): 38.4 (03-24-23 @ 09:30), Max: 38.9 (03-24-23 @ 01:45)  HR: 66 (03-24-23 @ 09:30) (59 - 87)  RR: 30 (03-24-23 @ 09:30) (16 - 33)  SpO2: 95% (03-24-23 @ 09:30) (90% - 97%)  General: Awake, somewhat lethargic, confused  Neck: ?JVD  CVS: nl s1s2, no s3  Pulm: CTA b/l anteriorly, poor effort  Abd: soft, non-tender  Ext: No c/c/e  Neuro Awake and alert, confused  Psych: Agitated      Labs:   24 Mar 2023 04:00    143    |  105    |  41.1   ----------------------------<  227    4.6     |  27.0   |  1.04     Ca    8.1        24 Mar 2023 04:00  Phos  2.8       24 Mar 2023 04:00  Mg     2.6       24 Mar 2023 04:00                            13.3   10.47 )-----------( 242      ( 24 Mar 2023 04:00 )             41.8     PT/INR - ( 23 Mar 2023 03:35 )   PT: 18.5 sec;   INR: 1.59 ratio         PTT - ( 23 Mar 2023 03:35 )  PTT:39.7 sec            LIBBY 1/31/23  Summary:   1. Left ventricular ejection fraction, by visual estimation, is 40 to 45%.   2. Mildly decreased global left ventricular systolic function.   3. Normal right ventricular size and function.   4. Mildly enlarged left atrium.   5. Mild mitral annular calcification.   6. Mild mitral valve regurgitation.   7. Thickening of the anterior and posterior mitral valve leaflets.   8. Mild tricuspid regurgitation.   9. Mild aortic regurgitation.  10. Mild to moderate aortic valve stenosis.  11. CIELO by planimetry 1.2cm2.  12. Aortic root measured at Sinus of Valsalva is dilated. 4.1cm  13. No left atrial appendage thrombus.  14. Color flow doppler and intravenous injection of agitated saline demonstrates the presence of an intact intra atrial septum.    TTE 1/28/23  Summary:   1. Technically difficult study.   2. Tachycardic throughout the study and this may preclude true assessment of LVEF.   3. Left ventricular ejection fraction, by visual estimation, is 40 to 45%.   4. Moderately decreased global left ventricular systolic function.   5. The mitral in-flow pattern reveals no discernable A-wave, therefore no comment on diastolic function can be made.   6. Mildly enlarged left atrium.   7. Normal right atrial size.   8. Mild tricuspid regurgitation.   9. Mild thickening and calcification of the anterior mitral valve leaflet.  10. Mild to moderate mitral annular calcification.  11. Trace mitral valve regurgitation.  12. Moderate aortic valve stenosis, with peak velocity of 2.87 m/s, mean gradient of 18 mmHg, dimensionless index of 0.29, and CIELO via VTI of 1.42 sqcm.  13. Dilatation of the sinuses of Valsalva, measures 4.15 cm but based on BSA 1.6cm/sqm is within normal limits.  14. There is a significant pericardial fat pad present.  15. There is no evidence of pericardial effusion.  16. Endocardial visualization was enhanced with intravenous echo contrast.  17. Compared to prior TTE done on 7/6/2022, the left ventricular function is now reduced to 40-45%, in the setting of Afib wth RVR (prior LVEF   60-65%).      CATHETERIZATION:  1/30/23  LM   Left main artery: Angiography shows minor irregularities.      LAD   Proximal left anterior descending: There is a 10 % stenosis within the  stented segment. Mid left anterior descending: There is a  10 % stenosis within the stented segment.      CX   Distal circumflex: There is a 60 % stenosis. VALENTINO Flow 3.      RCA   Proximal right coronary artery: There is a 100 % stenosis. There is  good collateral blood supply to the distal myocardium. This  lesion is a chronic total occlusion.  This is a small non-dominant  vessel.    Ramus   Ramus intermedius: Angiography shows mild atherosclerosis.      Echo:   1. Left ventricular ejection fraction, by visual estimation, is 60 to 65%.   2. Normal right ventricular size and function.   3. There is no evidence of pericardial effusion.   4. Endocardial visualizationwas enhanced with intravenous echo contrast.        Assessment:  77y Male with a PMH of moderate AS, CAD s/p PCI (patent LAD cath 2021), Moderately obstructive distal LCX around 60%, afib on AC s/p DCCV back in jan currently in sinus,  morbid obesity, OA, HTN, HLD, DM here with SOB  Per notes patient was found by EMS around 05:00 pt was on his buttocks in the bathroom after he fell, they were able to get him back into bed, they then found pt obtunded, low O2 saturations. Pt was seen in [facility] 3 days ago after c/o SOB and orthopnea, diagnosed with HMPV was saturating well at that time and sent [facility]. Pt obtunded in the ED, unable to provide any hx. Cardiology consult was requested for evaluation of respiratory distress concerning for pulmonary edema.   -In the ED patient noted to be febrile, hypotensive, obtunded and in severe respiratory distress and intubated and sedated.  -Now in ICU intubated on vent and 2 pressors.  Likely more sepsis.  -Echo limited but EF appears normal.  -Now extubated to HFNC.  Confused but appears hemodynamically improved.  Still on low dose John.    Plan:  1. Care per ICU.  2. Abx per ID.  3. Wean pressors as tolerated.  4. Hold diuretics now with sepsis.  5. Continue anticoagulation for AF.  6. Continue ASA, statin.  7. Resume other CV as his hemodynamic status allows for it.

## 2023-03-24 NOTE — PROGRESS NOTE ADULT - ASSESSMENT
77M with pmhx CAD s/p PCI, Afib on eliquis, mod obesity, htn, hld, dm2, isaias not on CPAP, thoracic aortic aneurysm, BPH s/p TURP, left renal mass, recent hMPV infection, presented from [facility] to ED s/p fall, now admitted to ICU with:     Acute hypoxemic/hypercapnic respiratory failure  Septic shock   Multifocal pneumonia  Human metapneumovirus infection   Metabolic encephalopathy   PHILLY  Hyperglycemia       Neuro: Sedated on precedex for compliance with O2, will wean off as tolerated   CV: Remains in vasopressor dependent shock although now off vaso. Will continue to wean norepi to maintain map >65. PO amio, ASA/eliquis. Hydrocortisone switched to solumedrol this morning.   Pulm: On HFNC 40L/80%, attemting to titrate down to maintain SpO2 >92%  GI:   Renal:  ID: Off insulin drip, transitioned to lantus this morning. G  Endo: goal bg 110-180  Heme: DVT ppx with     Dispo:  77M with pmhx CAD s/p PCI, Afib on eliquis, mod obesity, htn, hld, dm2, isaias not on CPAP, thoracic aortic aneurysm, BPH s/p TURP, left renal mass, recent hMPV infection, presented from [facility] to ED s/p fall, now admitted to ICU with:     Acute hypoxemic/hypercapnic respiratory failure  Septic shock   Multifocal pneumonia  Human metapneumovirus infection   Metabolic encephalopathy   PHILLY  Hyperglycemia     - Remains in vasopressor dependent shock although now off vaso  - Will continue to wean norepi to maintain map >65  - On HFNC 40L/80%, attempting to titrate down to maintain SpO2 >92%  - Off insulin drip, transitioned to lantus this morning, will add SSI for goal -180  - On cefepime. Bcx and scx NGTD.   - Sedated on precedex for compliance with O2, will wean off as tolerated   - Hydrocortisone switched to solumedrol this morning  - PO amio, ASA/eliquis    Dispo: remain in MICU for continued weaning of vasopressors and O2

## 2023-03-24 NOTE — DIETITIAN INITIAL EVALUATION ADULT - PERTINENT MEDS FT
MEDICATIONS  (STANDING):  albuterol/ipratropium for Nebulization 3 milliLiter(s) Nebulizer every 6 hours  aMIOdarone    Tablet 200 milliGRAM(s) Oral daily  apixaban 5 milliGRAM(s) Enteral Tube every 12 hours  aspirin  chewable 81 milliGRAM(s) Oral daily  atorvastatin 10 milliGRAM(s) Oral at bedtime  cefepime  Injectable. 2000 milliGRAM(s) IV Push every 8 hours  chlorhexidine 2% Cloths 1 Application(s) Topical <User Schedule>  dexMEDEtomidine Infusion 0.5 MICROgram(s)/kG/Hr (19.7 mL/Hr) IV Continuous <Continuous>  dextrose 50% Injectable 25 Gram(s) IV Push once  dextrose 50% Injectable 12.5 Gram(s) IV Push once  dextrose 50% Injectable 25 Gram(s) IV Push once  doxazosin 2 milliGRAM(s) Oral at bedtime  finasteride 5 milliGRAM(s) Oral daily  insulin glargine Injectable (LANTUS) 15 Unit(s) SubCutaneous every morning  methylPREDNISolone sodium succinate Injectable 40 milliGRAM(s) IV Push every 8 hours  mupirocin 2% Ointment 1 Application(s) Both Nostrils two times a day  norepinephrine Infusion 0.05 MICROgram(s)/kG/Min (11.3 mL/Hr) IV Continuous <Continuous>  vasopressin Infusion 0.04 Unit(s)/Min (6 mL/Hr) IV Continuous <Continuous>    MEDICATIONS  (PRN):  acetaminophen     Tablet .. 650 milliGRAM(s) Oral every 6 hours PRN Temp greater or equal to 38C (100.4F), Mild Pain (1 - 3), Moderate Pain (4 - 6)

## 2023-03-24 NOTE — DIETITIAN NUTRITION RISK NOTIFICATION - ADDITIONAL COMMENTS/DIETITIAN RECOMMENDATIONS
Advance diet as medically feasible to consistent carbohydrate, DASH/TLC; if concerns for chewing/swallowing difficulty, suggest SLP swallow evaluation prior to diet advancement.  Rx: MVI daily as feasible. Obtain daily weights to monitor trends.

## 2023-03-24 NOTE — DIETITIAN INITIAL EVALUATION ADULT - OTHER INFO
77 year old male with CAD s/p PCI, AF on eliquis, moderate AS, chronic systolic CHF/HFrEF, morbid obesity, HTN, HLD, DM, thoracic aortic aneurysm, BPH s/p TURP, left renal mass, recent hMPV infection, now admitted with acute hypercapnic respiratory failure, septic shock, multifocal pneumonia, encephalopathy, PHILLY.     Pt extubated yesterday. Remains NPO, lethargic at this time; unable to participate in interview. No family present at bedside. RD to follow up as feasible.

## 2023-03-24 NOTE — DIETITIAN INITIAL EVALUATION ADULT - PERTINENT LABORATORY DATA
03-24    143  |  105  |  41.1<H>  ----------------------------<  227<H>  4.6   |  27.0  |  1.04    Ca    8.1<L>      24 Mar 2023 04:00  Phos  2.8     03-24  Mg     2.6     03-24    POCT Blood Glucose.: 183 mg/dL (03-24-23 @ 09:18)  A1C with Estimated Average Glucose Result: 9.8 % (01-27-23 @ 03:10)  A1C with Estimated Average Glucose Result: 9.9 % (07-05-22 @ 04:23)  A1C with Estimated Average Glucose Result: 10.9 % (06-10-22 @ 03:42)

## 2023-03-25 LAB
ALBUMIN SERPL ELPH-MCNC: 3.1 G/DL — LOW (ref 3.3–5.2)
ALP SERPL-CCNC: 171 U/L — HIGH (ref 40–120)
ALT FLD-CCNC: 18 U/L — SIGNIFICANT CHANGE UP
ANION GAP SERPL CALC-SCNC: 13 MMOL/L — SIGNIFICANT CHANGE UP (ref 5–17)
AST SERPL-CCNC: 13 U/L — SIGNIFICANT CHANGE UP
BILIRUB SERPL-MCNC: 0.3 MG/DL — LOW (ref 0.4–2)
BUN SERPL-MCNC: 34.5 MG/DL — HIGH (ref 8–20)
CALCIUM SERPL-MCNC: 8.8 MG/DL — SIGNIFICANT CHANGE UP (ref 8.4–10.5)
CHLORIDE SERPL-SCNC: 108 MMOL/L — SIGNIFICANT CHANGE UP (ref 96–108)
CO2 SERPL-SCNC: 27 MMOL/L — SIGNIFICANT CHANGE UP (ref 22–29)
CREAT SERPL-MCNC: 0.92 MG/DL — SIGNIFICANT CHANGE UP (ref 0.5–1.3)
CULTURE RESULTS: SIGNIFICANT CHANGE UP
EGFR: 86 ML/MIN/1.73M2 — SIGNIFICANT CHANGE UP
GAS PNL BLDA: SIGNIFICANT CHANGE UP
GLUCOSE BLDC GLUCOMTR-MCNC: 169 MG/DL — HIGH (ref 70–99)
GLUCOSE BLDC GLUCOMTR-MCNC: 177 MG/DL — HIGH (ref 70–99)
GLUCOSE BLDC GLUCOMTR-MCNC: 199 MG/DL — HIGH (ref 70–99)
GLUCOSE BLDC GLUCOMTR-MCNC: 205 MG/DL — HIGH (ref 70–99)
GLUCOSE BLDC GLUCOMTR-MCNC: 210 MG/DL — HIGH (ref 70–99)
GLUCOSE SERPL-MCNC: 241 MG/DL — HIGH (ref 70–99)
HCT VFR BLD CALC: 42.9 % — SIGNIFICANT CHANGE UP (ref 39–50)
HGB BLD-MCNC: 13.4 G/DL — SIGNIFICANT CHANGE UP (ref 13–17)
MAGNESIUM SERPL-MCNC: 2.6 MG/DL — SIGNIFICANT CHANGE UP (ref 1.6–2.6)
MCHC RBC-ENTMCNC: 30 PG — SIGNIFICANT CHANGE UP (ref 27–34)
MCHC RBC-ENTMCNC: 31.2 GM/DL — LOW (ref 32–36)
MCV RBC AUTO: 96 FL — SIGNIFICANT CHANGE UP (ref 80–100)
PHOSPHATE SERPL-MCNC: 2.3 MG/DL — LOW (ref 2.4–4.7)
PLATELET # BLD AUTO: 189 K/UL — SIGNIFICANT CHANGE UP (ref 150–400)
POTASSIUM SERPL-MCNC: 4.9 MMOL/L — SIGNIFICANT CHANGE UP (ref 3.5–5.3)
POTASSIUM SERPL-SCNC: 4.9 MMOL/L — SIGNIFICANT CHANGE UP (ref 3.5–5.3)
PROT SERPL-MCNC: 6 G/DL — LOW (ref 6.6–8.7)
RBC # BLD: 4.47 M/UL — SIGNIFICANT CHANGE UP (ref 4.2–5.8)
RBC # FLD: 13.9 % — SIGNIFICANT CHANGE UP (ref 10.3–14.5)
SODIUM SERPL-SCNC: 148 MMOL/L — HIGH (ref 135–145)
SPECIMEN SOURCE: SIGNIFICANT CHANGE UP
WBC # BLD: 5.05 K/UL — SIGNIFICANT CHANGE UP (ref 3.8–10.5)
WBC # FLD AUTO: 5.05 K/UL — SIGNIFICANT CHANGE UP (ref 3.8–10.5)

## 2023-03-25 PROCEDURE — 99233 SBSQ HOSP IP/OBS HIGH 50: CPT

## 2023-03-25 RX ORDER — BUMETANIDE 0.25 MG/ML
2 INJECTION INTRAMUSCULAR; INTRAVENOUS ONCE
Refills: 0 | Status: DISCONTINUED | OUTPATIENT
Start: 2023-03-25 | End: 2023-03-25

## 2023-03-25 RX ORDER — ACETAMINOPHEN 500 MG
1000 TABLET ORAL ONCE
Refills: 0 | Status: COMPLETED | OUTPATIENT
Start: 2023-03-25 | End: 2023-03-25

## 2023-03-25 RX ORDER — BUMETANIDE 0.25 MG/ML
2 INJECTION INTRAMUSCULAR; INTRAVENOUS ONCE
Refills: 0 | Status: COMPLETED | OUTPATIENT
Start: 2023-03-25 | End: 2023-03-25

## 2023-03-25 RX ORDER — ACETAMINOPHEN 500 MG
1000 TABLET ORAL ONCE
Refills: 0 | Status: COMPLETED | OUTPATIENT
Start: 2023-03-25 | End: 2023-03-24

## 2023-03-25 RX ADMIN — Medication 1: at 10:06

## 2023-03-25 RX ADMIN — Medication 3 MILLILITER(S): at 14:40

## 2023-03-25 RX ADMIN — DEXMEDETOMIDINE HYDROCHLORIDE IN 0.9% SODIUM CHLORIDE 19.7 MICROGRAM(S)/KG/HR: 4 INJECTION INTRAVENOUS at 21:26

## 2023-03-25 RX ADMIN — Medication 40 MILLIGRAM(S): at 14:18

## 2023-03-25 RX ADMIN — OLANZAPINE 5 MILLIGRAM(S): 15 TABLET, FILM COATED ORAL at 21:25

## 2023-03-25 RX ADMIN — DEXMEDETOMIDINE HYDROCHLORIDE IN 0.9% SODIUM CHLORIDE 19.7 MICROGRAM(S)/KG/HR: 4 INJECTION INTRAVENOUS at 21:39

## 2023-03-25 RX ADMIN — Medication 3 MILLILITER(S): at 20:59

## 2023-03-25 RX ADMIN — DEXMEDETOMIDINE HYDROCHLORIDE IN 0.9% SODIUM CHLORIDE 19.7 MICROGRAM(S)/KG/HR: 4 INJECTION INTRAVENOUS at 12:24

## 2023-03-25 RX ADMIN — Medication 40 MILLIGRAM(S): at 06:00

## 2023-03-25 RX ADMIN — BUMETANIDE 2 MILLIGRAM(S): 0.25 INJECTION INTRAMUSCULAR; INTRAVENOUS at 18:17

## 2023-03-25 RX ADMIN — Medication 1: at 16:44

## 2023-03-25 RX ADMIN — Medication 400 MILLIGRAM(S): at 14:17

## 2023-03-25 RX ADMIN — INSULIN GLARGINE 15 UNIT(S): 100 INJECTION, SOLUTION SUBCUTANEOUS at 10:06

## 2023-03-25 RX ADMIN — DEXMEDETOMIDINE HYDROCHLORIDE IN 0.9% SODIUM CHLORIDE 19.7 MICROGRAM(S)/KG/HR: 4 INJECTION INTRAVENOUS at 23:15

## 2023-03-25 RX ADMIN — CHLORHEXIDINE GLUCONATE 1 APPLICATION(S): 213 SOLUTION TOPICAL at 10:06

## 2023-03-25 RX ADMIN — DEXMEDETOMIDINE HYDROCHLORIDE IN 0.9% SODIUM CHLORIDE 19.7 MICROGRAM(S)/KG/HR: 4 INJECTION INTRAVENOUS at 22:30

## 2023-03-25 RX ADMIN — DEXMEDETOMIDINE HYDROCHLORIDE IN 0.9% SODIUM CHLORIDE 19.7 MICROGRAM(S)/KG/HR: 4 INJECTION INTRAVENOUS at 18:00

## 2023-03-25 RX ADMIN — OLANZAPINE 5 MILLIGRAM(S): 15 TABLET, FILM COATED ORAL at 17:15

## 2023-03-25 RX ADMIN — DEXMEDETOMIDINE HYDROCHLORIDE IN 0.9% SODIUM CHLORIDE 19.7 MICROGRAM(S)/KG/HR: 4 INJECTION INTRAVENOUS at 17:21

## 2023-03-25 RX ADMIN — DEXMEDETOMIDINE HYDROCHLORIDE IN 0.9% SODIUM CHLORIDE 19.7 MICROGRAM(S)/KG/HR: 4 INJECTION INTRAVENOUS at 20:09

## 2023-03-25 RX ADMIN — Medication 1000 MILLIGRAM(S): at 15:21

## 2023-03-25 RX ADMIN — DEXMEDETOMIDINE HYDROCHLORIDE IN 0.9% SODIUM CHLORIDE 19.7 MICROGRAM(S)/KG/HR: 4 INJECTION INTRAVENOUS at 14:00

## 2023-03-25 RX ADMIN — DEXMEDETOMIDINE HYDROCHLORIDE IN 0.9% SODIUM CHLORIDE 19.7 MICROGRAM(S)/KG/HR: 4 INJECTION INTRAVENOUS at 16:00

## 2023-03-25 RX ADMIN — DEXMEDETOMIDINE HYDROCHLORIDE IN 0.9% SODIUM CHLORIDE 19.7 MICROGRAM(S)/KG/HR: 4 INJECTION INTRAVENOUS at 17:00

## 2023-03-25 RX ADMIN — CEFEPIME 2000 MILLIGRAM(S): 1 INJECTION, POWDER, FOR SOLUTION INTRAMUSCULAR; INTRAVENOUS at 17:14

## 2023-03-25 RX ADMIN — MUPIROCIN 1 APPLICATION(S): 20 OINTMENT TOPICAL at 06:00

## 2023-03-25 RX ADMIN — Medication 40 MILLIGRAM(S): at 21:18

## 2023-03-25 RX ADMIN — Medication 1: at 12:15

## 2023-03-25 RX ADMIN — CEFEPIME 2000 MILLIGRAM(S): 1 INJECTION, POWDER, FOR SOLUTION INTRAMUSCULAR; INTRAVENOUS at 10:05

## 2023-03-25 RX ADMIN — DEXMEDETOMIDINE HYDROCHLORIDE IN 0.9% SODIUM CHLORIDE 19.7 MICROGRAM(S)/KG/HR: 4 INJECTION INTRAVENOUS at 11:17

## 2023-03-25 RX ADMIN — MUPIROCIN 1 APPLICATION(S): 20 OINTMENT TOPICAL at 17:15

## 2023-03-25 RX ADMIN — CEFEPIME 2000 MILLIGRAM(S): 1 INJECTION, POWDER, FOR SOLUTION INTRAMUSCULAR; INTRAVENOUS at 16:30

## 2023-03-25 NOTE — SWALLOW BEDSIDE ASSESSMENT ADULT - PHARYNGEAL PHASE
Max cues required for swallow elicitation, reduced laryngeal elevation on palpation, wet voice and 02 desaturation to 89% post swallow

## 2023-03-25 NOTE — PROGRESS NOTE ADULT - SUBJECTIVE AND OBJECTIVE BOX
Patient is a 77y old  Male who presents with a chief complaint of Acute respiratory failure (24 Mar 2023 09:39)      BRIEF HOSPITAL COURSE: 76 y/o M with a h/o CAD s/p PCI, AF on eliquis, moderate AS, morbid obesity, HTN, HLD, DM, thoracic aortic aneurysm, BPH s/p TURP, left renal mass, recent hMPV infection, admitted earlier from [facility] following a fall where he was found on the floor in the bathroom, hypoxemic and obtunded. Also noted to be hypercapnic in ED. Failed NIPPV and was intubated. CT chest revealing of multifocal pneumonia. Hospital course further complicated by septic shock requiring IV vasopressor therapy.        Events last 24 hours: Now on dual IV vasopressors. Persistent low grade fevers. Dependent on full mechanical vent support.        PAST MEDICAL & SURGICAL HISTORY:  Anxiety      Hypertension      Hyperlipidemia      Prostate disease      Gastroesophageal reflux disease      Gallbladder stone without cholecystitis or obstruction      BPH (benign prostatic hyperplasia)      DM (diabetes mellitus)      DVT (deep venous thrombosis)  left leg      Afib      Anxiety      HLD (hyperlipidemia)      Malignant schwannoma      H/O arthroscopy of knee      S/P laminectomy      S/P coronary artery stent placement  x2          Review of Systems:  CONSTITUTIONAL: No fever, chills, or fatigue  EYES: No eye pain, visual disturbances, or discharge  ENMT:  No difficulty hearing, tinnitus, vertigo; No sinus or throat pain  NECK: No pain or stiffness  RESPIRATORY: No cough, wheezing, chills or hemoptysis; No shortness of breath  CARDIOVASCULAR: No chest pain, palpitations, dizziness, or leg swelling  GASTROINTESTINAL: No abdominal or epigastric pain. No nausea, vomiting, or hematemesis; No diarrhea or constipation. No melena or hematochezia.  GENITOURINARY: No dysuria, frequency, hematuria, or incontinence  NEUROLOGICAL: No headaches, memory loss, loss of strength, numbness, or tremors  SKIN: No itching, burning, rashes, or lesions   MUSCULOSKELETAL: No joint pain or swelling; No muscle, back, or extremity pain  PSYCHIATRIC: No depression, anxiety, mood swings, or difficulty sleeping      Medications:  cefepime  Injectable. 2000 milliGRAM(s) IV Push every 8 hours    aMIOdarone    Tablet 200 milliGRAM(s) Oral daily  doxazosin 2 milliGRAM(s) Oral at bedtime  norepinephrine Infusion 0.05 MICROgram(s)/kG/Min IV Continuous <Continuous>    albuterol/ipratropium for Nebulization 3 milliLiter(s) Nebulizer every 6 hours    acetaminophen     Tablet .. 650 milliGRAM(s) Oral every 6 hours PRN  acetaminophen   IVPB .. 1000 milliGRAM(s) IV Intermittent once  acetaminophen   IVPB .. 1000 milliGRAM(s) IV Intermittent once  dexMEDEtomidine Infusion 0.5 MICROgram(s)/kG/Hr IV Continuous <Continuous>  OLANZapine Injectable 5 milliGRAM(s) IntraMuscular every 4 hours PRN      apixaban 5 milliGRAM(s) Enteral Tube every 12 hours  aspirin  chewable 81 milliGRAM(s) Oral daily        atorvastatin 10 milliGRAM(s) Oral at bedtime  dextrose 50% Injectable 25 Gram(s) IV Push once  dextrose 50% Injectable 12.5 Gram(s) IV Push once  dextrose 50% Injectable 25 Gram(s) IV Push once  finasteride 5 milliGRAM(s) Oral daily  insulin glargine Injectable (LANTUS) 15 Unit(s) SubCutaneous every morning  insulin lispro (ADMELOG) corrective regimen sliding scale   SubCutaneous three times a day before meals  methylPREDNISolone sodium succinate Injectable 40 milliGRAM(s) IV Push every 8 hours        chlorhexidine 2% Cloths 1 Application(s) Topical <User Schedule>  mupirocin 2% Ointment 1 Application(s) Both Nostrils two times a day            ICU Vital Signs Last 24 Hrs  T(C): 38.1 (25 Mar 2023 12:00), Max: 38.4 (24 Mar 2023 19:00)  T(F): 100.6 (25 Mar 2023 12:00), Max: 101.1 (24 Mar 2023 19:00)  HR: 70 (25 Mar 2023 12:29) (55 - 80)  BP: --  BP(mean): --  ABP: 135/64 (25 Mar 2023 12:00) (101/49 - 140/61)  ABP(mean): 92 (25 Mar 2023 12:00) (65 - 92)  RR: 29 (25 Mar 2023 12:00) (20 - 29)  SpO2: 96% (25 Mar 2023 12:29) (91% - 100%)    O2 Parameters below as of 25 Mar 2023 08:30  Patient On (Oxygen Delivery Method): nasal cannula, high flow  O2 Flow (L/min): 40  O2 Concentration (%): 90        ABG - ( 25 Mar 2023 12:18 )  pH, Arterial: 7.310 pH, Blood: x     /  pCO2: 79    /  pO2: 76    / HCO3: 40    / Base Excess: 13.5  /  SaO2: 95.7                I&O's Detail    24 Mar 2023 07:01  -  25 Mar 2023 07:00  --------------------------------------------------------  IN:    Dexmedetomidine: 561.1 mL    IV PiggyBack: 100 mL    IV PiggyBack: 416.5 mL    Norepinephrine: 44.4 mL  Total IN: 1122 mL    OUT:    Indwelling Catheter - Urethral (mL): 845 mL    Insulin: 0 mL  Total OUT: 845 mL    Total NET: 277 mL      25 Mar 2023 07:01  -  25 Mar 2023 13:35  --------------------------------------------------------  IN:    Dexmedetomidine: 295 mL  Total IN: 295 mL    OUT:    Indwelling Catheter - Urethral (mL): 445 mL  Total OUT: 445 mL    Total NET: -150 mL            LABS:                        13.4   5.05  )-----------( 189      ( 25 Mar 2023 04:00 )             42.9     03-25    148<H>  |  108  |  34.5<H>  ----------------------------<  241<H>  4.9   |  27.0  |  0.92    Ca    8.8      25 Mar 2023 04:00  Phos  2.3     03-25  Mg     2.6     03-25    TPro  6.0<L>  /  Alb  3.1<L>  /  TBili  0.3<L>  /  DBili  x   /  AST  13  /  ALT  18  /  AlkPhos  171<H>  03-25          CAPILLARY BLOOD GLUCOSE      POCT Blood Glucose.: 177 mg/dL (25 Mar 2023 12:10)        CULTURES:  Culture Results:   Normal Respiratory Negin present (03-23-23 @ 00:35)  Rapid RVP Result: Detected (03-22-23 @ 08:25)  Culture Results:   No growth to date. (03-22-23 @ 08:25)  Culture Results:   No growth to date. (03-22-23 @ 08:25)  Rapid RVP Result: Detected (03-18-23 @ 23:44)  Culture Results:   <10,000 CFU/mL Normal Urogenital Negin (03-18-23 @ 23:44)        Physical Examination:    General: No acute distress.  Alert, oriented, interactive, nonfocal    HEENT: Pupils equal, reactive to light.  Symmetric.    PULM: Clear to auscultation bilaterally, no significant sputum production    CVS: Regular rate and rhythm, no murmurs, rubs, or gallops    ABD: Soft, nondistended, nontender, normoactive bowel sounds, no masses    EXT: No edema, nontender    SKIN: Warm and well perfused, no rashes noted.    NEURO: A&Ox3, strength 5/5 all extremities, cranial nerves grossly intact, no focal deficits        RADIOLOGY: ***        CRITICAL CARE TIME SPENT: ***  Time spent evaluating/treating patient with medical issues that pose a high risk for life threatening deterioration and/or end-organ damage, reviewing data/labs/imaging, discussing case with multidisciplinary team, discussing plan/goals of care with patient/family. Non-inclusive of procedure time.   Patient is a 77y old  Male who presents with a chief complaint of Acute respiratory failure (24 Mar 2023 09:39)      BRIEF HOSPITAL COURSE: 78 y/o M with a h/o CAD s/p PCI, AF on eliquis, moderate AS, morbid obesity, HTN, HLD, DM, thoracic aortic aneurysm, BPH s/p TURP, left renal mass, recent hMPV infection, admitted earlier from [facility] following a fall where he was found on the floor in the bathroom, hypoxemic and obtunded. Also noted to be hypercapnic in ED. Failed NIPPV and was intubated. CT chest revealing of multifocal pneumonia. Hospital course further complicated by septic shock requiring IV vasopressor therapy.        Events last 24 hours: Restarted on AVAPS today for worsening hypercapnia in the setting of labored breathing and encephalopathy. Had been requiring 90% FiO2/40 LPM on HFNC. Off IV vasopressors.        PAST MEDICAL & SURGICAL HISTORY:  Anxiety      Hypertension      Hyperlipidemia      Prostate disease      Gastroesophageal reflux disease      Gallbladder stone without cholecystitis or obstruction      BPH (benign prostatic hyperplasia)      DM (diabetes mellitus)      DVT (deep venous thrombosis)  left leg      Afib      Anxiety      HLD (hyperlipidemia)      Malignant schwannoma      H/O arthroscopy of knee      S/P laminectomy      S/P coronary artery stent placement  x2          Review of Systems:  Unable to obtain secondary to AMS.    Medications:  cefepime  Injectable. 2000 milliGRAM(s) IV Push every 8 hours  aMIOdarone    Tablet 200 milliGRAM(s) Oral daily  doxazosin 2 milliGRAM(s) Oral at bedtime  norepinephrine Infusion 0.05 MICROgram(s)/kG/Min IV Continuous <Continuous>  albuterol/ipratropium for Nebulization 3 milliLiter(s) Nebulizer every 6 hours  acetaminophen     Tablet .. 650 milliGRAM(s) Oral every 6 hours PRN  acetaminophen   IVPB .. 1000 milliGRAM(s) IV Intermittent once  acetaminophen   IVPB .. 1000 milliGRAM(s) IV Intermittent once  dexMEDEtomidine Infusion 0.5 MICROgram(s)/kG/Hr IV Continuous <Continuous>  OLANZapine Injectable 5 milliGRAM(s) IntraMuscular every 4 hours PRN  apixaban 5 milliGRAM(s) Enteral Tube every 12 hours  aspirin  chewable 81 milliGRAM(s) Oral daily  atorvastatin 10 milliGRAM(s) Oral at bedtime  dextrose 50% Injectable 25 Gram(s) IV Push once  dextrose 50% Injectable 12.5 Gram(s) IV Push once  dextrose 50% Injectable 25 Gram(s) IV Push once  finasteride 5 milliGRAM(s) Oral daily  insulin glargine Injectable (LANTUS) 15 Unit(s) SubCutaneous every morning  insulin lispro (ADMELOG) corrective regimen sliding scale   SubCutaneous three times a day before meals  methylPREDNISolone sodium succinate Injectable 40 milliGRAM(s) IV Push every 8 hours  chlorhexidine 2% Cloths 1 Application(s) Topical <User Schedule>  mupirocin 2% Ointment 1 Application(s) Both Nostrils two times a day            ICU Vital Signs Last 24 Hrs  T(C): 38.1 (25 Mar 2023 12:00), Max: 38.4 (24 Mar 2023 19:00)  T(F): 100.6 (25 Mar 2023 12:00), Max: 101.1 (24 Mar 2023 19:00)  HR: 70 (25 Mar 2023 12:29) (55 - 80)  BP: --  BP(mean): --  ABP: 135/64 (25 Mar 2023 12:00) (101/49 - 140/61)  ABP(mean): 92 (25 Mar 2023 12:00) (65 - 92)  RR: 29 (25 Mar 2023 12:00) (20 - 29)  SpO2: 96% (25 Mar 2023 12:29) (91% - 100%)    O2 Parameters below as of 25 Mar 2023 08:30  Patient On (Oxygen Delivery Method): nasal cannula, high flow  O2 Flow (L/min): 40  O2 Concentration (%): 90        ABG - ( 25 Mar 2023 12:18 )  pH, Arterial: 7.310 pH, Blood: x     /  pCO2: 79    /  pO2: 76    / HCO3: 40    / Base Excess: 13.5  /  SaO2: 95.7                I&O's Detail    24 Mar 2023 07:01  -  25 Mar 2023 07:00  --------------------------------------------------------  IN:    Dexmedetomidine: 561.1 mL    IV PiggyBack: 100 mL    IV PiggyBack: 416.5 mL    Norepinephrine: 44.4 mL  Total IN: 1122 mL    OUT:    Indwelling Catheter - Urethral (mL): 845 mL    Insulin: 0 mL  Total OUT: 845 mL    Total NET: 277 mL      25 Mar 2023 07:01  -  25 Mar 2023 13:35  --------------------------------------------------------  IN:    Dexmedetomidine: 295 mL  Total IN: 295 mL    OUT:    Indwelling Catheter - Urethral (mL): 445 mL  Total OUT: 445 mL    Total NET: -150 mL            LABS:                        13.4   5.05  )-----------( 189      ( 25 Mar 2023 04:00 )             42.9     03-25    148<H>  |  108  |  34.5<H>  ----------------------------<  241<H>  4.9   |  27.0  |  0.92    Ca    8.8      25 Mar 2023 04:00  Phos  2.3     03-25  Mg     2.6     03-25    TPro  6.0<L>  /  Alb  3.1<L>  /  TBili  0.3<L>  /  DBili  x   /  AST  13  /  ALT  18  /  AlkPhos  171<H>  03-25          CAPILLARY BLOOD GLUCOSE      POCT Blood Glucose.: 177 mg/dL (25 Mar 2023 12:10)        CULTURES:  Culture Results:   Normal Respiratory Negin present (03-23-23 @ 00:35)  Rapid RVP Result: Detected (03-22-23 @ 08:25)  Culture Results:   No growth to date. (03-22-23 @ 08:25)  Culture Results:   No growth to date. (03-22-23 @ 08:25)  Rapid RVP Result: Detected (03-18-23 @ 23:44)  Culture Results:   <10,000 CFU/mL Normal Urogenital Negin (03-18-23 @ 23:44)        Physical Examination:    General: moderate respiratory distress, very lethargic, mouth agape, now with NIPPV mask on    HEENT: Pupils equal, reactive to light.  Symmetric.    PULM: scattered rhonchi bilaterally, no significant sputum production    CVS: Regular rate and rhythm, no murmurs, rubs, or gallops    ABD: Soft, nondistended, nontender, normoactive bowel sounds, no masses    EXT: No edema, nontender    SKIN: Warm and well perfused, no rashes noted.    NEURO: very lethargic, not following commands, moves all extremities        RADIOLOGY:     < from: Xray Chest 1 View-PORTABLE IMMEDIATE (Xray Chest 1 View-PORTABLE IMMEDIATE .) (03.23.23 @ 20:51) >  Heart enlargement, prominent aorta, and right jugular line remain.    Endotracheal tube and nasogastric tube present on March 22 of been   removed.    On the present examination there is scattered significant infiltrates in   the mid lower lung fields right greater than left and this has increased   particularly on the right compared to March 22.    IMPRESSION: Endotracheal tube and nasogastric tube removed.    Increasing bilateral infiltrates particularly on the right.          CRITICAL CARE TIME SPENT: 38 mins  Time spent evaluating/treating patient with medical issues that pose a high risk for life threatening deterioration and/or end-organ damage, reviewing data/labs/imaging, discussing case with multidisciplinary team, discussing plan/goals of care with patient/family. Non-inclusive of procedure time.

## 2023-03-25 NOTE — PROGRESS NOTE ADULT - ASSESSMENT
78 y/o M with a h/o CAD s/p PCI, AF on eliquis, moderate AS, morbid obesity, HTN, HLD, DM, thoracic aortic aneurysm, BPH s/p TURP, left renal mass, recent hMPV infection, with:    # Acute mixed hypoxemic/hypercapnic respiratory failure  # Septic shock  # Multifocal pneumonia  # hMPV    - restarted on AVAPS for worsening hypercapnia on repeat ABG + labored breathing/AMS  - actively titrating vent settings to maintain SpO2 > 92% and adequate minute ventilation  - NIPPV seems to allow for significant reduction in FiO2 (90% --> 50%)  - low threshold to re-intubate, he remains at high risk for further life threatening respiratory deterioration  - wean dexmedetomidine to help optimize mental status, no longer agitated, utilize IM olanzapine PRN   - blood and sputum cultures are unremarkable, urine legionella Ag is negative  - continue empiric cefepime  - weaned off norepinephrine infusion, monitor hemodynamics closely, maintain a MAP > 65  - full anticoagulation with apixaban, may need to switch to heparin gtt if he cannot tolerate PO    Case discussed with MICU physician, Dr. Correia.

## 2023-03-25 NOTE — PROGRESS NOTE ADULT - SUBJECTIVE AND OBJECTIVE BOX
ANIA CRISTOBAL  [id number]        Chief Complaint:  AMS/Hypoxia/CHF    Interval History:  Placed on BIPAP due to CO2 retention    Tele:  No events      acetaminophen     Tablet .. 650 milliGRAM(s) Oral every 6 hours PRN  albuterol/ipratropium for Nebulization 3 milliLiter(s) Nebulizer every 6 hours  aMIOdarone    Tablet 200 milliGRAM(s) Oral daily  apixaban 5 milliGRAM(s) Enteral Tube every 12 hours  aspirin  chewable 81 milliGRAM(s) Oral daily  atorvastatin 10 milliGRAM(s) Oral at bedtime  cefepime  Injectable. 2000 milliGRAM(s) IV Push every 8 hours  chlorhexidine 2% Cloths 1 Application(s) Topical <User Schedule>  dexMEDEtomidine Infusion 0.5 MICROgram(s)/kG/Hr IV Continuous <Continuous>  dextrose 50% Injectable 25 Gram(s) IV Push once  dextrose 50% Injectable 12.5 Gram(s) IV Push once  dextrose 50% Injectable 25 Gram(s) IV Push once  doxazosin 2 milliGRAM(s) Oral at bedtime  finasteride 5 milliGRAM(s) Oral daily  insulin glargine Injectable (LANTUS) 15 Unit(s) SubCutaneous every morning  insulin lispro (ADMELOG) corrective regimen sliding scale   SubCutaneous three times a day before meals  methylPREDNISolone sodium succinate Injectable 40 milliGRAM(s) IV Push every 8 hours  mupirocin 2% Ointment 1 Application(s) Both Nostrils two times a day  OLANZapine Injectable 5 milliGRAM(s) IntraMuscular every 4 hours PRN          Physical Exam:  T(C): 38.6 (03-25-23 @ 20:00), Max: 38.8 (03-25-23 @ 19:00)  HR: 56 (03-25-23 @ 20:00) (54 - 80)  BP: --  RR: 14 (03-25-23 @ 20:00) (14 - 31)  SpO2: 98% (03-25-23 @ 20:00) (91% - 100%)  Wt(kg): --  General: On BIPAP  Neck: No JVD  CVS: nl s1s2, no s3  Pulm: rales at bases, with mild bilateral wheezes   Abd: soft, non-tender  Ext: 1 + edema bilaterally   Neuro A&O x1  Psych: Normal affect      Labs:   25 Mar 2023 04:00    148    |  108    |  34.5   ----------------------------<  241    4.9     |  27.0   |  0.92     Ca    8.8        25 Mar 2023 04:00  Phos  2.3       25 Mar 2023 04:00  Mg     2.6       25 Mar 2023 04:00    TPro  6.0    /  Alb  3.1    /  TBili  0.3    /  DBili  x      /  AST  13     /  ALT  18     /  AlkPhos  171    25 Mar 2023 04:00                          13.4   5.05  )-----------( 189      ( 25 Mar 2023 04:00 )             42.9           LIBBY 1/31/23  Summary:   1. Left ventricular ejection fraction, by visual estimation, is 40 to 45%.   2. Mildly decreased global left ventricular systolic function.   3. Normal right ventricular size and function.   4. Mildly enlarged left atrium.   5. Mild mitral annular calcification.   6. Mild mitral valve regurgitation.   7. Thickening of the anterior and posterior mitral valve leaflets.   8. Mild tricuspid regurgitation.   9. Mild aortic regurgitation.  10. Mild to moderate aortic valve stenosis.  11. CIELO by planimetry 1.2cm2.  12. Aortic root measured at Sinus of Valsalva is dilated. 4.1cm  13. No left atrial appendage thrombus.  14. Color flow doppler and intravenous injection of agitated saline demonstrates the presence of an intact intra atrial septum.    TTE 1/28/23  Summary:   1. Technically difficult study.   2. Tachycardic throughout the study and this may preclude true assessment of LVEF.   3. Left ventricular ejection fraction, by visual estimation, is 40 to 45%.   4. Moderately decreased global left ventricular systolic function.   5. The mitral in-flow pattern reveals no discernable A-wave, therefore no comment on diastolic function can be made.   6. Mildly enlarged left atrium.   7. Normal right atrial size.   8. Mild tricuspid regurgitation.   9. Mild thickening and calcification of the anterior mitral valve leaflet.  10. Mild to moderate mitral annular calcification.  11. Trace mitral valve regurgitation.  12. Moderate aortic valve stenosis, with peak velocity of 2.87 m/s, mean gradient of 18 mmHg, dimensionless index of 0.29, and CIELO via VTI of 1.42 sqcm.  13. Dilatation of the sinuses of Valsalva, measures 4.15 cm but based on BSA 1.6cm/sqm is within normal limits.  14. There is a significant pericardial fat pad present.  15. There is no evidence of pericardial effusion.  16. Endocardial visualization was enhanced with intravenous echo contrast.  17. Compared to prior TTE done on 7/6/2022, the left ventricular function is now reduced to 40-45%, in the setting of Afib wth RVR (prior LVEF   60-65%).      CATHETERIZATION:  1/30/23  LM   Left main artery: Angiography shows minor irregularities.      LAD   Proximal left anterior descending: There is a 10 % stenosis within the  stented segment. Mid left anterior descending: There is a  10 % stenosis within the stented segment.      CX   Distal circumflex: There is a 60 % stenosis. VALENTINO Flow 3.      RCA   Proximal right coronary artery: There is a 100 % stenosis. There is  good collateral blood supply to the distal myocardium. This  lesion is a chronic total occlusion.  This is a small non-dominant  vessel.    Ramus   Ramus intermedius: Angiography shows mild atherosclerosis.      Echo:   1. Left ventricular ejection fraction, by visual estimation, is 60 to 65%.   2. Normal right ventricular size and function.   3. There is no evidence of pericardial effusion.   4. Endocardial visualizationwas enhanced with intravenous echo contrast.        Assessment:  77y Male with a PMH of moderate AS, CAD s/p PCI (patent LAD cath 2021), Moderately obstructive distal LCX around 60%, afib on AC s/p DCCV back in jan currently in sinus,  morbid obesity, OA, HTN, HLD, DM here with SOB  Per notes patient was found by EMS around 05:00 pt was on his buttocks in the bathroom after he fell, they were able to get him back into bed, they then found pt obtunded, low O2 saturations. Pt was seen in [facility] 3 days ago after c/o SOB and orthopnea, diagnosed with HMPV was saturating well at that time and sent [facility]. Pt obtunded in the ED, unable to provide any hx. Cardiology consult was requested for evaluation of respiratory distress concerning for pulmonary edema.   -In the ED patient noted to be febrile, hypotensive, obtunded and in severe respiratory distress and intubated and sedated.  -Now in ICU intubated on vent and 2 pressors.  Likely more sepsis.  -Echo limited but EF appears normal.  -Extubated> HFNC> CO2 retention> BIPAP  -Off pressors   -slightly vol up    Plan:  1. Care per ICU.  2. Abx per ID.  3. IV diuretics, monitor renal function and electrolytes   4. Continue anticoagulation for AF.  5. Continue ASA, statin.  6. Resume other CV as his hemodynamic status allows for it.  7. Consider discussion with family to broad code status to DNI.     D/W ICU

## 2023-03-25 NOTE — SWALLOW BEDSIDE ASSESSMENT ADULT - ORAL PREPARATORY PHASE
reduced orientation to utensil, reudced oral grading with poor stripping of spoon, reduced attention to bolus, verbalizing with PO in mouth despite cues

## 2023-03-25 NOTE — SWALLOW BEDSIDE ASSESSMENT ADULT - ORAL PHASE
reduced bolus manipulation  confounded by reduced attention. Cessation of bolus manipulation with verbalizing. Prolonged oral transit time with reduced A-P transfer., suspected posterior loss impacted by cognition reduced bolus manipulation  confounded by reduced attention. Cessation of bolus manipulation with verbalizing. Prolonged oral transit time with reduced A-P transfer.

## 2023-03-25 NOTE — SWALLOW BEDSIDE ASSESSMENT ADULT - SWALLOW EVAL: DIAGNOSIS
Severe oral dysphagia, confounded by reduced cognition and agitation. Suspected pharyngeal dysphagia with most conservative consistencies

## 2023-03-25 NOTE — SWALLOW BEDSIDE ASSESSMENT ADULT - SLP GENERAL OBSERVATIONS
Pt received awake and alert in bed, significantly Platinum, 0x3, however reduced cognition with impuslivity and mild agitation with +benefit from verbal cues, +02 via HFNC ((90%), Sp02 92-95%, nonverbal pain 0/10 pre/post

## 2023-03-26 LAB
ALBUMIN SERPL ELPH-MCNC: 2.8 G/DL — LOW (ref 3.3–5.2)
ALP SERPL-CCNC: 153 U/L — HIGH (ref 40–120)
ALT FLD-CCNC: 14 U/L — SIGNIFICANT CHANGE UP
ANION GAP SERPL CALC-SCNC: 17 MMOL/L — SIGNIFICANT CHANGE UP (ref 5–17)
AST SERPL-CCNC: 11 U/L — SIGNIFICANT CHANGE UP
BILIRUB SERPL-MCNC: 0.4 MG/DL — SIGNIFICANT CHANGE UP (ref 0.4–2)
BUN SERPL-MCNC: 39.7 MG/DL — HIGH (ref 8–20)
CALCIUM SERPL-MCNC: 8.5 MG/DL — SIGNIFICANT CHANGE UP (ref 8.4–10.5)
CHLORIDE SERPL-SCNC: 109 MMOL/L — HIGH (ref 96–108)
CO2 SERPL-SCNC: 27 MMOL/L — SIGNIFICANT CHANGE UP (ref 22–29)
CREAT SERPL-MCNC: 0.92 MG/DL — SIGNIFICANT CHANGE UP (ref 0.5–1.3)
EGFR: 86 ML/MIN/1.73M2 — SIGNIFICANT CHANGE UP
GAS PNL BLDA: SIGNIFICANT CHANGE UP
GLUCOSE BLDC GLUCOMTR-MCNC: 186 MG/DL — HIGH (ref 70–99)
GLUCOSE BLDC GLUCOMTR-MCNC: 216 MG/DL — HIGH (ref 70–99)
GLUCOSE BLDC GLUCOMTR-MCNC: 221 MG/DL — HIGH (ref 70–99)
GLUCOSE SERPL-MCNC: 270 MG/DL — HIGH (ref 70–99)
HCT VFR BLD CALC: 45 % — SIGNIFICANT CHANGE UP (ref 39–50)
HGB BLD-MCNC: 13.7 G/DL — SIGNIFICANT CHANGE UP (ref 13–17)
MAGNESIUM SERPL-MCNC: 2.5 MG/DL — SIGNIFICANT CHANGE UP (ref 1.6–2.6)
MCHC RBC-ENTMCNC: 29.1 PG — SIGNIFICANT CHANGE UP (ref 27–34)
MCHC RBC-ENTMCNC: 30.4 GM/DL — LOW (ref 32–36)
MCV RBC AUTO: 95.7 FL — SIGNIFICANT CHANGE UP (ref 80–100)
PHOSPHATE SERPL-MCNC: 2.7 MG/DL — SIGNIFICANT CHANGE UP (ref 2.4–4.7)
PLATELET # BLD AUTO: 200 K/UL — SIGNIFICANT CHANGE UP (ref 150–400)
POTASSIUM SERPL-MCNC: 4.3 MMOL/L — SIGNIFICANT CHANGE UP (ref 3.5–5.3)
POTASSIUM SERPL-SCNC: 4.3 MMOL/L — SIGNIFICANT CHANGE UP (ref 3.5–5.3)
PROT SERPL-MCNC: 6.2 G/DL — LOW (ref 6.6–8.7)
RBC # BLD: 4.7 M/UL — SIGNIFICANT CHANGE UP (ref 4.2–5.8)
RBC # FLD: 13.8 % — SIGNIFICANT CHANGE UP (ref 10.3–14.5)
SODIUM SERPL-SCNC: 152 MMOL/L — HIGH (ref 135–145)
WBC # BLD: 4.61 K/UL — SIGNIFICANT CHANGE UP (ref 3.8–10.5)
WBC # FLD AUTO: 4.61 K/UL — SIGNIFICANT CHANGE UP (ref 3.8–10.5)

## 2023-03-26 PROCEDURE — 93010 ELECTROCARDIOGRAM REPORT: CPT

## 2023-03-26 PROCEDURE — 99233 SBSQ HOSP IP/OBS HIGH 50: CPT

## 2023-03-26 RX ORDER — POTASSIUM PHOSPHATE, MONOBASIC POTASSIUM PHOSPHATE, DIBASIC 236; 224 MG/ML; MG/ML
30 INJECTION, SOLUTION INTRAVENOUS ONCE
Refills: 0 | Status: COMPLETED | OUTPATIENT
Start: 2023-03-26 | End: 2023-03-26

## 2023-03-26 RX ORDER — FUROSEMIDE 40 MG
60 TABLET ORAL
Refills: 0 | Status: DISCONTINUED | OUTPATIENT
Start: 2023-03-26 | End: 2023-03-27

## 2023-03-26 RX ORDER — OLANZAPINE 15 MG/1
10 TABLET, FILM COATED ORAL EVERY 4 HOURS
Refills: 0 | Status: DISCONTINUED | OUTPATIENT
Start: 2023-03-26 | End: 2023-03-29

## 2023-03-26 RX ORDER — ALPRAZOLAM 0.25 MG
0.25 TABLET ORAL THREE TIMES A DAY
Refills: 0 | Status: DISCONTINUED | OUTPATIENT
Start: 2023-03-26 | End: 2023-03-29

## 2023-03-26 RX ORDER — METOPROLOL TARTRATE 50 MG
12.5 TABLET ORAL EVERY 12 HOURS
Refills: 0 | Status: DISCONTINUED | OUTPATIENT
Start: 2023-03-26 | End: 2023-04-07

## 2023-03-26 RX ORDER — HALOPERIDOL DECANOATE 100 MG/ML
5 INJECTION INTRAMUSCULAR ONCE
Refills: 0 | Status: DISCONTINUED | OUTPATIENT
Start: 2023-03-26 | End: 2023-03-26

## 2023-03-26 RX ORDER — HALOPERIDOL DECANOATE 100 MG/ML
5 INJECTION INTRAMUSCULAR ONCE
Refills: 0 | Status: COMPLETED | OUTPATIENT
Start: 2023-03-26 | End: 2023-03-26

## 2023-03-26 RX ADMIN — CHLORHEXIDINE GLUCONATE 1 APPLICATION(S): 213 SOLUTION TOPICAL at 06:58

## 2023-03-26 RX ADMIN — DEXMEDETOMIDINE HYDROCHLORIDE IN 0.9% SODIUM CHLORIDE 19.7 MICROGRAM(S)/KG/HR: 4 INJECTION INTRAVENOUS at 02:55

## 2023-03-26 RX ADMIN — DEXMEDETOMIDINE HYDROCHLORIDE IN 0.9% SODIUM CHLORIDE 19.7 MICROGRAM(S)/KG/HR: 4 INJECTION INTRAVENOUS at 09:37

## 2023-03-26 RX ADMIN — DEXMEDETOMIDINE HYDROCHLORIDE IN 0.9% SODIUM CHLORIDE 19.7 MICROGRAM(S)/KG/HR: 4 INJECTION INTRAVENOUS at 01:55

## 2023-03-26 RX ADMIN — CEFEPIME 2000 MILLIGRAM(S): 1 INJECTION, POWDER, FOR SOLUTION INTRAMUSCULAR; INTRAVENOUS at 16:23

## 2023-03-26 RX ADMIN — MUPIROCIN 1 APPLICATION(S): 20 OINTMENT TOPICAL at 06:59

## 2023-03-26 RX ADMIN — Medication 3 MILLILITER(S): at 20:48

## 2023-03-26 RX ADMIN — DEXMEDETOMIDINE HYDROCHLORIDE IN 0.9% SODIUM CHLORIDE 19.7 MICROGRAM(S)/KG/HR: 4 INJECTION INTRAVENOUS at 09:23

## 2023-03-26 RX ADMIN — CEFEPIME 2000 MILLIGRAM(S): 1 INJECTION, POWDER, FOR SOLUTION INTRAMUSCULAR; INTRAVENOUS at 09:18

## 2023-03-26 RX ADMIN — OLANZAPINE 10 MILLIGRAM(S): 15 TABLET, FILM COATED ORAL at 16:34

## 2023-03-26 RX ADMIN — DEXMEDETOMIDINE HYDROCHLORIDE IN 0.9% SODIUM CHLORIDE 19.7 MICROGRAM(S)/KG/HR: 4 INJECTION INTRAVENOUS at 00:55

## 2023-03-26 RX ADMIN — FINASTERIDE 5 MILLIGRAM(S): 5 TABLET, FILM COATED ORAL at 13:16

## 2023-03-26 RX ADMIN — DEXMEDETOMIDINE HYDROCHLORIDE IN 0.9% SODIUM CHLORIDE 19.7 MICROGRAM(S)/KG/HR: 4 INJECTION INTRAVENOUS at 04:00

## 2023-03-26 RX ADMIN — Medication 1 PATCH: at 20:30

## 2023-03-26 RX ADMIN — Medication 3 MILLILITER(S): at 08:14

## 2023-03-26 RX ADMIN — Medication 3 MILLILITER(S): at 04:12

## 2023-03-26 RX ADMIN — INSULIN GLARGINE 15 UNIT(S): 100 INJECTION, SOLUTION SUBCUTANEOUS at 09:19

## 2023-03-26 RX ADMIN — Medication 2: at 16:58

## 2023-03-26 RX ADMIN — Medication 1 PATCH: at 13:17

## 2023-03-26 RX ADMIN — Medication 40 MILLIGRAM(S): at 06:58

## 2023-03-26 RX ADMIN — OLANZAPINE 10 MILLIGRAM(S): 15 TABLET, FILM COATED ORAL at 22:38

## 2023-03-26 RX ADMIN — POTASSIUM PHOSPHATE, MONOBASIC POTASSIUM PHOSPHATE, DIBASIC 83.33 MILLIMOLE(S): 236; 224 INJECTION, SOLUTION INTRAVENOUS at 09:24

## 2023-03-26 RX ADMIN — CEFEPIME 2000 MILLIGRAM(S): 1 INJECTION, POWDER, FOR SOLUTION INTRAMUSCULAR; INTRAVENOUS at 01:03

## 2023-03-26 RX ADMIN — ATORVASTATIN CALCIUM 10 MILLIGRAM(S): 80 TABLET, FILM COATED ORAL at 21:21

## 2023-03-26 RX ADMIN — Medication 0.25 MILLIGRAM(S): at 21:23

## 2023-03-26 RX ADMIN — DEXMEDETOMIDINE HYDROCHLORIDE IN 0.9% SODIUM CHLORIDE 19.7 MICROGRAM(S)/KG/HR: 4 INJECTION INTRAVENOUS at 07:50

## 2023-03-26 RX ADMIN — Medication 1: at 13:15

## 2023-03-26 RX ADMIN — Medication 3 MILLILITER(S): at 15:15

## 2023-03-26 RX ADMIN — APIXABAN 5 MILLIGRAM(S): 2.5 TABLET, FILM COATED ORAL at 21:21

## 2023-03-26 RX ADMIN — Medication 12.5 MILLIGRAM(S): at 16:36

## 2023-03-26 RX ADMIN — HALOPERIDOL DECANOATE 5 MILLIGRAM(S): 100 INJECTION INTRAMUSCULAR at 02:05

## 2023-03-26 RX ADMIN — MUPIROCIN 1 APPLICATION(S): 20 OINTMENT TOPICAL at 18:23

## 2023-03-26 RX ADMIN — Medication 2 MILLIGRAM(S): at 21:21

## 2023-03-26 RX ADMIN — Medication 2: at 09:19

## 2023-03-26 RX ADMIN — Medication 60 MILLIGRAM(S): at 16:59

## 2023-03-26 RX ADMIN — Medication 0.25 MILLIGRAM(S): at 13:16

## 2023-03-26 RX ADMIN — Medication 81 MILLIGRAM(S): at 13:16

## 2023-03-26 RX ADMIN — Medication 650 MILLIGRAM(S): at 14:17

## 2023-03-26 NOTE — PROGRESS NOTE ADULT - ASSESSMENT
76 y/o M with a h/o CAD s/p PCI, AF on eliquis, moderate AS, morbid obesity, HTN, HLD, DM, thoracic aortic aneurysm, BPH s/p TURP, left renal mass, recent hMPV infection, admitted earlier from [facility] nursing facility following a fall where he was found on the floor in the bathroom, hypoxemic and obtunded with:      1. Acute Metabolic Encephalopathy  2. Acute Hypoxic/Hypercapnic respiratory Failure  3. Sepsis   4. Multifocal Pneumonia  5. hMPV 78 y/o M with a h/o CAD s/p PCI, AF (On Eliquis), moderate AS, morbid obesity, HTN, HLD, DM, Thoracic Aortic Aneurysm, BPH (s/p TURP), Left Renal Mass, recent hMPV infection, admitted earlier from [facility] following a fall where he was found on the floor in the bathroom, hypoxemic and obtunded with:       1. Acute Metabolic Encephalopathy  2. Acute Hypoxic/Hypercapnic respiratory Failure  3. Sepsis   4. Multifocal Pneumonia  5. hMPV      Neuro: Lethargic, however; increasingly arousbale. Patient continues to display periods of delirium/agitation requiring Precedex gtt. Now with persistent bradycardia and fevers, will attempt to wean Precedex. Initiate constant observation for redirection, as patient is beginning to show more periods of clarity. Mentation likely multifactorial in setting of hypercapnic v steroid psychosis v infectious etiology. Zyprexa Q4hrs PRN for delirium. Will add Clonidine patch. Tylenol PRN for fevers.   Resp: Transitioned to HFNC yesterday AM; however, restarted on AVAPs for worsening hypercapnia in setting of labored breathing and encephalopathy. NIPPV dependent, unable to tolerate transition to HFNC. Repeat ABG in AM. Actively titrating vent settings to maintain SpO2 > 92% and adequate minute ventilation tolerated. Diffuse wheezing resolved steroids decreased to 40mg. Remains high risk for re-intubation, given tenuous respiratory status.   CV: Shock state resolved, remains off vasopressors. Hemodynamically stable. Monitor clinical and laboratory end points of perfusion, maintain MAP>65. C/W Lipitor and Amiodarone.   GI: NPO while on NIPPV. GI prophylaxis with Protonix.    Renal: PHILLY on admission, BUN/Cr downtrending Likely ATN in setting of shock state. Adequate urine output. Trend labs, replete lytes as needed to maintain K>4, Mg>3 and Phos >2.   Endo: FSS. ISS with 15U Lantus. Goal -180 while critically ill.   ID: Persistently febrile despite normal WC and empiric coverage with Cefepime. BCx, SCx and UCx NGTD. +hMPV.   Heme: H/H stable. Trend H/H, transfuse for hgb<7 if necessary. Full AC with Eliquis, will transition to Heparin gtt given NIPPV dependence. SCDs in place.      Plan discussed with ICU Attending and ICU RN.  76 y/o M with a h/o CAD s/p PCI, AF (On Eliquis), moderate AS, morbid obesity, HTN, HLD, DM, Thoracic Aortic Aneurysm, BPH (s/p TURP), Left Renal Mass, recent hMPV infection, admitted earlier from [facility] following a fall where he was found on the floor in the bathroom, hypoxemic and obtunded with:       1. Acute Metabolic Encephalopathy  2. Acute Hypoxic/Hypercapnic respiratory Failure  3. Sepsis   4. Multifocal Pneumonia  5. hMPV      Neuro: Lethargic, however; increasingly arousbale. Patient continues to display periods of delirium/agitation requiring Precedex gtt. Now with persistent bradycardia and fevers, will attempt to wean Precedex. Initiate constant observation for redirection, as patient is beginning to show more periods of clarity. Trial of 5mg Haldol. Mentation likely multifactorial in setting of hypercapnic v steroid psychosis v infectious etiology. Zyprexa Q4hrs PRN for delirium. Will add Clonidine patch. Tylenol PRN for fevers.   Resp: Transitioned to HFNC yesterday AM; however, restarted on AVAPs for worsening hypercapnia in setting of labored breathing and encephalopathy. NIPPV dependent, unable to tolerate transition to HFNC. Repeat ABG in AM. Actively titrating vent settings to maintain SpO2 > 92% and adequate minute ventilation tolerated. Diffuse wheezing resolved steroids decreased to 40mg. Remains high risk for re-intubation, given tenuous respiratory status.   CV: Shock state resolved, remains off vasopressors. Hemodynamically stable. Monitor clinical and laboratory end points of perfusion, maintain MAP>65. C/W Lipitor and Amiodarone.   GI: NPO while on NIPPV. GI prophylaxis with Protonix.    Renal: PHILLY on admission, BUN/Cr downtrending Likely ATN in setting of shock state. Adequate urine output. Trend labs, replete lytes as needed to maintain K>4, Mg>3 and Phos >2.   Endo: FSS. ISS with 15U Lantus. Goal -180 while critically ill.   ID: Persistently febrile despite normal WC and empiric coverage with Cefepime. BCx, SCx and UCx NGTD. +hMPV.   Heme: H/H stable. Trend H/H, transfuse for hgb<7 if necessary. Full AC with Eliquis, will transition to Heparin gtt given NIPPV dependence. SCDs in place.      Plan discussed with ICU Attending and ICU RN.

## 2023-03-26 NOTE — PROGRESS NOTE ADULT - SUBJECTIVE AND OBJECTIVE BOX
Patient is a 77y old  Male who presents with a chief complaint of Acute respiratory failure (25 Mar 2023 20:17)      BRIEF HOSPITAL COURSE: ***    Events last 24 hours: ***    Review of Systems:  CONSTITUTIONAL: No fever, chills, or fatigue  EYES: No eye pain, visual disturbances, or discharge  ENMT:  No difficulty hearing, tinnitus, vertigo; No sinus or throat pain  NECK: No pain or stiffness  RESPIRATORY: No cough, wheezing, chills or hemoptysis; No shortness of breath  CARDIOVASCULAR: No chest pain, palpitations, dizziness, or leg swelling  GASTROINTESTINAL: No abdominal or epigastric pain. No nausea, vomiting, or hematemesis; No diarrhea or constipation. No melena or hematochezia.  GENITOURINARY: No dysuria, frequency, hematuria, or incontinence  NEUROLOGICAL: No headaches, memory loss, loss of strength, numbness, or tremors  SKIN: No itching, burning, rashes, or lesions   MUSCULOSKELETAL: No joint pain or swelling; No muscle, back, or extremity pain  PSYCHIATRIC: No depression, anxiety, mood swings, or difficulty sleeping    PAST MEDICAL & SURGICAL HISTORY:  Anxiety      Hypertension      Hyperlipidemia      Prostate disease      Gastroesophageal reflux disease      Gallbladder stone without cholecystitis or obstruction      BPH (benign prostatic hyperplasia)      DM (diabetes mellitus)      DVT (deep venous thrombosis)  left leg      Afib      Anxiety      HLD (hyperlipidemia)      Malignant schwannoma      H/O arthroscopy of knee      S/P laminectomy      S/P coronary artery stent placement  x2          Medications:  cefepime  Injectable. 2000 milliGRAM(s) IV Push every 8 hours    aMIOdarone    Tablet 200 milliGRAM(s) Oral daily  cloNIDine Patch 0.2 mG/24Hr(s) 1 patch Transdermal every 7 days  doxazosin 2 milliGRAM(s) Oral at bedtime    albuterol/ipratropium for Nebulization 3 milliLiter(s) Nebulizer every 6 hours    acetaminophen     Tablet .. 650 milliGRAM(s) Oral every 6 hours PRN  dexMEDEtomidine Infusion 0.5 MICROgram(s)/kG/Hr IV Continuous <Continuous>  OLANZapine Injectable 5 milliGRAM(s) IntraMuscular every 4 hours PRN      apixaban 5 milliGRAM(s) Enteral Tube every 12 hours  aspirin  chewable 81 milliGRAM(s) Oral daily        atorvastatin 10 milliGRAM(s) Oral at bedtime  dextrose 50% Injectable 25 Gram(s) IV Push once  dextrose 50% Injectable 12.5 Gram(s) IV Push once  dextrose 50% Injectable 25 Gram(s) IV Push once  finasteride 5 milliGRAM(s) Oral daily  insulin glargine Injectable (LANTUS) 15 Unit(s) SubCutaneous every morning  insulin lispro (ADMELOG) corrective regimen sliding scale   SubCutaneous three times a day before meals  methylPREDNISolone sodium succinate Injectable 40 milliGRAM(s) IV Push daily        chlorhexidine 2% Cloths 1 Application(s) Topical <User Schedule>  mupirocin 2% Ointment 1 Application(s) Both Nostrils two times a day            ICU Vital Signs Last 24 Hrs  T(C): 37.8 (26 Mar 2023 00:00), Max: 38.8 (25 Mar 2023 19:00)  T(F): 100 (26 Mar 2023 00:00), Max: 101.8 (25 Mar 2023 19:00)  HR: 58 (26 Mar 2023 00:00) (54 - 80)  BP: --  BP(mean): --  ABP: 163/62 (26 Mar 2023 00:00) (127/61 - 173/64)  ABP(mean): 93 (26 Mar 2023 00:00) (82 - 100)  RR: 18 (26 Mar 2023 00:00) (14 - 31)  SpO2: 96% (26 Mar 2023 00:00) (91% - 100%)    O2 Parameters below as of 25 Mar 2023 21:00  Patient On (Oxygen Delivery Method): BiPAP/CPAP            ABG - ( 25 Mar 2023 12:18 )  pH, Arterial: 7.310 pH, Blood: x     /  pCO2: 79    /  pO2: 76    / HCO3: 40    / Base Excess: 13.5  /  SaO2: 95.7                I&O's Detail    24 Mar 2023 07:01  -  25 Mar 2023 07:00  --------------------------------------------------------  IN:    Dexmedetomidine: 561.1 mL    IV PiggyBack: 100 mL    IV PiggyBack: 416.5 mL    Norepinephrine: 44.4 mL  Total IN: 1122 mL    OUT:    Indwelling Catheter - Urethral (mL): 845 mL    Insulin: 0 mL  Total OUT: 845 mL    Total NET: 277 mL      25 Mar 2023 07:01  -  26 Mar 2023 01:01  --------------------------------------------------------  IN:    Dexmedetomidine: 919 mL    IV PiggyBack: 100 mL  Total IN: 1019 mL    OUT:    Indwelling Catheter - Urethral (mL): 4045 mL  Total OUT: 4045 mL    Total NET: -3026 mL          LABS:                        13.4   5.05  )-----------( 189      ( 25 Mar 2023 04:00 )             42.9     03-25    148<H>  |  108  |  34.5<H>  ----------------------------<  241<H>  4.9   |  27.0  |  0.92    Ca    8.8      25 Mar 2023 04:00  Phos  2.3     03-25  Mg     2.6     03-25    TPro  6.0<L>  /  Alb  3.1<L>  /  TBili  0.3<L>  /  DBili  x   /  AST  13  /  ALT  18  /  AlkPhos  171<H>  03-25          CAPILLARY BLOOD GLUCOSE      POCT Blood Glucose.: 205 mg/dL (25 Mar 2023 21:46)        CULTURES:  Culture Results:   Normal Respiratory Negin present (03-23 @ 00:35)  Rapid RVP Result: Detected (03-22 @ 08:25)  Culture Results:   No growth to date. (03-22 @ 08:25)  Culture Results:   No growth to date. (03-22 @ 08:25)      Physical Examination:  General: No acute distress.    HEENT: Pupils equal, reactive to light.  Symmetric.  PULM: Clear to auscultation bilaterally, no significant sputum production  NECK: Supple, no lymphadenopathy, trachea midline  CVS: Regular rate and rhythm, no murmurs, rubs, or gallops  ABD: Soft, nondistended, nontender, normoactive bowel sounds, no masses  EXT: No edema, nontender  SKIN: Warm and well perfused, no rashes noted.  NEURO: Alert, oriented, interactive, nonfocal  DEVICES:     RADIOLOGY: ***    CRITICAL CARE TIME SPENT: ** minutes assessing presenting problems of acute illness, which pose high probability of life threatening deterioration or end organ damage/dysfunction, as well as medical decision making including initiating plan of care, reviewing data, reviewing radiologic exams, discussing with multidisciplinary team,  discussing goals of care with patient/family, and writing this note.  Non-inclusive of procedures performed,   Patient is a 77y old  Male who presents with a chief complaint of Acute respiratory failure (25 Mar 2023 20:17)      BRIEF HOSPITAL COURSE-  76 y/o M with a h/o CAD s/p PCI, AF on eliquis, moderate AS, morbid obesity, HTN, HLD, DM, thoracic aortic aneurysm, BPH s/p TURP, left renal mass, recent hMPV infection, admitted earlier from [facility] following a fall where he was found on the floor in the bathroom, hypoxemic and obtunded. Also noted to be hypercapnic in ED. Failed NIPPV and was intubated. CT chest revealing of multifocal pneumonia. Hospital course further complicated by septic shock requiring IV vasopressor therapy.    Events last 24 hours:   Remains off vasopressors. Continues to be persistently febrile. Unable to tolerate Precedex weaning, however, now with persistent bradycardia. Restarted on AVAPs yesterday afternoon for worsening hypercapnia in setting of labored breathing and encephalopathy.       Review of Systems:  CONSTITUTIONAL: +Fatigue and fever. No chills.  EYES: No eye pain, visual disturbances, or discharge.  ENMT:  No difficulty hearing, tinnitus, vertigo. No sinus or throat pain.  NECK: No pain or stiffness.  RESPIRATORY: No cough, wheezing, chills or hemoptysis. No shortness of breath.  CARDIOVASCULAR: No chest pain, palpitations, dizziness, or leg swelling.  GASTROINTESTINAL: No abdominal or epigastric pain. No nausea, vomiting, or hematemesis. No diarrhea or constipation. No melena or hematochezia.  GENITOURINARY: No dysuria, frequency, hematuria, or incontinence/  NEUROLOGICAL: No headaches, memory loss, loss of strength, numbness, or tremors.  SKIN: No itching, burning, rashes, or lesions.  MUSCULOSKELETAL: No joint pain or swelling. No muscle, back, or extremity pain.  PSYCHIATRIC: No depression, anxiety, mood swings, or difficulty sleeping.        PAST MEDICAL & SURGICAL HISTORY-  Anxiety      Hypertension      Hyperlipidemia      Prostate disease      Gastroesophageal reflux disease      Gallbladder stone without cholecystitis or obstruction      BPH (benign prostatic hyperplasia)      DM (diabetes mellitus)      DVT (deep venous thrombosis)  left leg      Afib      Anxiety      HLD (hyperlipidemia)      Malignant schwannoma      H/O arthroscopy of knee      S/P laminectomy      S/P coronary artery stent placement  x2          Medications:  cefepime  Injectable. 2000 milliGRAM(s) IV Push every 8 hours    aMIOdarone    Tablet 200 milliGRAM(s) Oral daily  cloNIDine Patch 0.2 mG/24Hr(s) 1 patch Transdermal every 7 days  doxazosin 2 milliGRAM(s) Oral at bedtime    albuterol/ipratropium for Nebulization 3 milliLiter(s) Nebulizer every 6 hours    acetaminophen     Tablet .. 650 milliGRAM(s) Oral every 6 hours PRN  dexMEDEtomidine Infusion 0.5 MICROgram(s)/kG/Hr IV Continuous <Continuous>  OLANZapine Injectable 5 milliGRAM(s) IntraMuscular every 4 hours PRN      apixaban 5 milliGRAM(s) Enteral Tube every 12 hours  aspirin  chewable 81 milliGRAM(s) Oral daily      atorvastatin 10 milliGRAM(s) Oral at bedtime  dextrose 50% Injectable 25 Gram(s) IV Push once  dextrose 50% Injectable 12.5 Gram(s) IV Push once  dextrose 50% Injectable 25 Gram(s) IV Push once  finasteride 5 milliGRAM(s) Oral daily  insulin glargine Injectable (LANTUS) 15 Unit(s) SubCutaneous every morning  insulin lispro (ADMELOG) corrective regimen sliding scale   SubCutaneous three times a day before meals  methylPREDNISolone sodium succinate Injectable 40 milliGRAM(s) IV Push daily        chlorhexidine 2% Cloths 1 Application(s) Topical <User Schedule>  mupirocin 2% Ointment 1 Application(s) Both Nostrils two times a day          ICU Vital Signs Last 24 Hrs  T(C): 37.8 (26 Mar 2023 00:00), Max: 38.8 (25 Mar 2023 19:00)  T(F): 100 (26 Mar 2023 00:00), Max: 101.8 (25 Mar 2023 19:00)  HR: 58 (26 Mar 2023 00:00) (54 - 80)  BP: --  BP(mean): --  ABP: 163/62 (26 Mar 2023 00:00) (127/61 - 173/64)  ABP(mean): 93 (26 Mar 2023 00:00) (82 - 100)  RR: 18 (26 Mar 2023 00:00) (14 - 31)  SpO2: 96% (26 Mar 2023 00:00) (91% - 100%)    O2 Parameters below as of 25 Mar 2023 21:00  Patient On (Oxygen Delivery Method): BiPAP/CPAP            ABG - ( 25 Mar 2023 12:18 )  pH, Arterial: 7.310 pH, Blood: x     /  pCO2: 79    /  pO2: 76    / HCO3: 40    / Base Excess: 13.5  /  SaO2: 95.7                I&O's Detail    24 Mar 2023 07:01  -  25 Mar 2023 07:00  --------------------------------------------------------  IN:    Dexmedetomidine: 561.1 mL    IV PiggyBack: 100 mL    IV PiggyBack: 416.5 mL    Norepinephrine: 44.4 mL  Total IN: 1122 mL    OUT:    Indwelling Catheter - Urethral (mL): 845 mL    Insulin: 0 mL  Total OUT: 845 mL    Total NET: 277 mL      25 Mar 2023 07:01  -  26 Mar 2023 01:01  --------------------------------------------------------  IN:    Dexmedetomidine: 919 mL    IV PiggyBack: 100 mL  Total IN: 1019 mL    OUT:    Indwelling Catheter - Urethral (mL): 4045 mL  Total OUT: 4045 mL    Total NET: -3026 mL          LABS:                        13.4   5.05  )-----------( 189      ( 25 Mar 2023 04:00 )             42.9     03-25    148<H>  |  108  |  34.5<H>  ----------------------------<  241<H>  4.9   |  27.0  |  0.92    Ca    8.8      25 Mar 2023 04:00  Phos  2.3     03-25  Mg     2.6     03-25    TPro  6.0<L>  /  Alb  3.1<L>  /  TBili  0.3<L>  /  DBili  x   /  AST  13  /  ALT  18  /  AlkPhos  171<H>  03-25          CAPILLARY BLOOD GLUCOSE  POCT Blood Glucose.: 205 mg/dL (25 Mar 2023 21:46)        CULTURES:  Culture Results:   Normal Respiratory Negin present (03-23 @ 00:35)  Rapid RVP Result: Detected (03-22 @ 08:25)  Culture Results:   No growth to date. (03-22 @ 08:25)  Culture Results:   No growth to date. (03-22 @ 08:25)          Physical Examination:  General: Elderly male, AVAPs dependent, mild respiratory distress.   HEENT: Pupils equal, reactive to light. Symmetric.  PULM: Diffuse rhonci to auscultation bilaterally. No significant sputum production.  NECK: Supple, no lymphadenopathy, trachea midline.  CVS: Sinus bradycardia. No murmurs, rubs, or gallops. S1, S2 intact.   ABD: Soft, nondistended, nontender, normoactive bowel sounds. No masses.  EXT: No edema, nontender.  SKIN: Warm and well perfused, no rashes noted.  NEURO: Lethargic, but arousable, oriented however delirious/agitated at times, interactive, nonfocal.   DEVICES:       RADIOLOGY-      < from: Xray Chest 1 View-PORTABLE IMMEDIATE (Xray Chest 1 View-PORTABLE IMMEDIATE .) (03.23.23 @ 20:51) >    ACC: [id number] EXAM:  XR CHEST PORTABLE IMMED 1V   ORDERED BY: MARNI LUBIN     PROCEDURE DATE:  03/23/2023      INTERPRETATION:  AP chest on March 23, 2023 at 8:40 PM. Patient is   hypoxic.    Heart enlargement, prominent aorta, and right jugular line remain.    Endotracheal tube and nasogastric tube present on March 22 of been   removed.    On the present examination there is scattered significant infiltrates in   the mid lower lung fields right greater than left and this has increased   particularly on the right compared to March 22.    IMPRESSION: Endotracheal tube and nasogastric tube removed.    Increasing bilateral infiltrates particularly on the right.    --- End of Report ---      DESTINEE ESTEVEZ MD; Attending Radiologist  This document has been electronically signed. Mar 24 2023  9:41AM    < end of copied text >      CRITICAL CARE TIME SPENT: 45 minutes assessing presenting problems of acute illness, which pose high probability of life threatening deterioration or end organ damage/dysfunction, as well as medical decision making including initiating plan of care, reviewing data, reviewing radiologic exams, discussing with multidisciplinary team,  discussing goals of care with patient/family, and writing this note.  Non-inclusive of procedures performed,   Patient is a 77y old  Male who presents with a chief complaint of Acute respiratory failure (25 Mar 2023 20:17)      BRIEF HOSPITAL COURSE-  78 y/o M with a h/o CAD s/p PCI, AF (On Eliquis), moderate AS, morbid obesity, HTN, HLD, DM, Thoracic Aortic Aneurysm, BPH (s/p TURP), Left Renal Mass, recent hMPV infection, admitted earlier from [facility] nursing facility following a fall where he was found on the floor in the bathroom, hypoxemic and obtunded. Also noted to be hypercapnic in ED. Failed NIPPV and was intubated. CT chest revealing of multifocal pneumonia. Hospital course further complicated by septic shock requiring IV vasopressor therapy.    Events last 24 hours:   Remains off vasopressors. Continues to be persistently febrile. Unable to tolerate Precedex weaning, however, now with persistent bradycardia. Restarted on AVAPs yesterday afternoon for worsening hypercapnia in setting of labored breathing and encephalopathy.       Review of Systems:  CONSTITUTIONAL: +Fatigue and fever. No chills.  EYES: No eye pain, visual disturbances, or discharge.  ENMT:  No difficulty hearing, tinnitus, vertigo. No sinus or throat pain.  NECK: No pain or stiffness.  RESPIRATORY: No cough, wheezing, chills or hemoptysis. No shortness of breath.  CARDIOVASCULAR: No chest pain, palpitations, dizziness, or leg swelling.  GASTROINTESTINAL: No abdominal or epigastric pain. No nausea, vomiting, or hematemesis. No diarrhea or constipation. No melena or hematochezia.  GENITOURINARY: No dysuria, frequency, hematuria, or incontinence/  NEUROLOGICAL: No headaches, memory loss, loss of strength, numbness, or tremors.  SKIN: No itching, burning, rashes, or lesions.  MUSCULOSKELETAL: No joint pain or swelling. No muscle, back, or extremity pain.  PSYCHIATRIC: No depression, anxiety, mood swings, or difficulty sleeping.        PAST MEDICAL & SURGICAL HISTORY-  Anxiety      Hypertension      Hyperlipidemia      Prostate disease      Gastroesophageal reflux disease      Gallbladder stone without cholecystitis or obstruction      BPH (benign prostatic hyperplasia)      DM (diabetes mellitus)      DVT (deep venous thrombosis)  left leg      Afib      Anxiety      HLD (hyperlipidemia)      Malignant schwannoma      H/O arthroscopy of knee      S/P laminectomy      S/P coronary artery stent placement  x2          Medications:  cefepime  Injectable. 2000 milliGRAM(s) IV Push every 8 hours    aMIOdarone    Tablet 200 milliGRAM(s) Oral daily  cloNIDine Patch 0.2 mG/24Hr(s) 1 patch Transdermal every 7 days  doxazosin 2 milliGRAM(s) Oral at bedtime    albuterol/ipratropium for Nebulization 3 milliLiter(s) Nebulizer every 6 hours    acetaminophen     Tablet .. 650 milliGRAM(s) Oral every 6 hours PRN  dexMEDEtomidine Infusion 0.5 MICROgram(s)/kG/Hr IV Continuous <Continuous>  OLANZapine Injectable 5 milliGRAM(s) IntraMuscular every 4 hours PRN      apixaban 5 milliGRAM(s) Enteral Tube every 12 hours  aspirin  chewable 81 milliGRAM(s) Oral daily      atorvastatin 10 milliGRAM(s) Oral at bedtime  dextrose 50% Injectable 25 Gram(s) IV Push once  dextrose 50% Injectable 12.5 Gram(s) IV Push once  dextrose 50% Injectable 25 Gram(s) IV Push once  finasteride 5 milliGRAM(s) Oral daily  insulin glargine Injectable (LANTUS) 15 Unit(s) SubCutaneous every morning  insulin lispro (ADMELOG) corrective regimen sliding scale   SubCutaneous three times a day before meals  methylPREDNISolone sodium succinate Injectable 40 milliGRAM(s) IV Push daily        chlorhexidine 2% Cloths 1 Application(s) Topical <User Schedule>  mupirocin 2% Ointment 1 Application(s) Both Nostrils two times a day          ICU Vital Signs Last 24 Hrs  T(C): 37.8 (26 Mar 2023 00:00), Max: 38.8 (25 Mar 2023 19:00)  T(F): 100 (26 Mar 2023 00:00), Max: 101.8 (25 Mar 2023 19:00)  HR: 58 (26 Mar 2023 00:00) (54 - 80)  BP: --  BP(mean): --  ABP: 163/62 (26 Mar 2023 00:00) (127/61 - 173/64)  ABP(mean): 93 (26 Mar 2023 00:00) (82 - 100)  RR: 18 (26 Mar 2023 00:00) (14 - 31)  SpO2: 96% (26 Mar 2023 00:00) (91% - 100%)    O2 Parameters below as of 25 Mar 2023 21:00  Patient On (Oxygen Delivery Method): BiPAP/CPAP            ABG - ( 25 Mar 2023 12:18 )  pH, Arterial: 7.310 pH, Blood: x     /  pCO2: 79    /  pO2: 76    / HCO3: 40    / Base Excess: 13.5  /  SaO2: 95.7                I&O's Detail    24 Mar 2023 07:01  -  25 Mar 2023 07:00  --------------------------------------------------------  IN:    Dexmedetomidine: 561.1 mL    IV PiggyBack: 100 mL    IV PiggyBack: 416.5 mL    Norepinephrine: 44.4 mL  Total IN: 1122 mL    OUT:    Indwelling Catheter - Urethral (mL): 845 mL    Insulin: 0 mL  Total OUT: 845 mL    Total NET: 277 mL      25 Mar 2023 07:01  -  26 Mar 2023 01:01  --------------------------------------------------------  IN:    Dexmedetomidine: 919 mL    IV PiggyBack: 100 mL  Total IN: 1019 mL    OUT:    Indwelling Catheter - Urethral (mL): 4045 mL  Total OUT: 4045 mL    Total NET: -3026 mL          LABS:                        13.4   5.05  )-----------( 189      ( 25 Mar 2023 04:00 )             42.9     03-25    148<H>  |  108  |  34.5<H>  ----------------------------<  241<H>  4.9   |  27.0  |  0.92    Ca    8.8      25 Mar 2023 04:00  Phos  2.3     03-25  Mg     2.6     03-25    TPro  6.0<L>  /  Alb  3.1<L>  /  TBili  0.3<L>  /  DBili  x   /  AST  13  /  ALT  18  /  AlkPhos  171<H>  03-25          CAPILLARY BLOOD GLUCOSE  POCT Blood Glucose.: 205 mg/dL (25 Mar 2023 21:46)        CULTURES:  Culture Results:   Normal Respiratory Negin present (03-23 @ 00:35)  Rapid RVP Result: Detected (03-22 @ 08:25)  Culture Results:   No growth to date. (03-22 @ 08:25)  Culture Results:   No growth to date. (03-22 @ 08:25)          Physical Examination:  General: Elderly male, AVAPs dependent, mild respiratory distress.   HEENT: Pupils equal, reactive to light. Symmetric.  PULM: Diffuse rhonci to auscultation bilaterally. No significant sputum production.  NECK: Supple, no lymphadenopathy, trachea midline.  CVS: Sinus bradycardia. No murmurs, rubs, or gallops. S1, S2 intact.   ABD: Soft, nondistended, nontender, normoactive bowel sounds. No masses.  EXT: No edema, nontender.  SKIN: Warm and well perfused, no rashes noted.  NEURO: Lethargic, but arousable, oriented however delirious/agitated at times, interactive, nonfocal.   DEVICES:       RADIOLOGY-      < from: Xray Chest 1 View-PORTABLE IMMEDIATE (Xray Chest 1 View-PORTABLE IMMEDIATE .) (03.23.23 @ 20:51) >    ACC: [id number] EXAM:  XR CHEST PORTABLE IMMED 1V   ORDERED BY: MARNI LUBIN     PROCEDURE DATE:  03/23/2023      INTERPRETATION:  AP chest on March 23, 2023 at 8:40 PM. Patient is   hypoxic.    Heart enlargement, prominent aorta, and right jugular line remain.    Endotracheal tube and nasogastric tube present on March 22 of been   removed.    On the present examination there is scattered significant infiltrates in   the mid lower lung fields right greater than left and this has increased   particularly on the right compared to March 22.    IMPRESSION: Endotracheal tube and nasogastric tube removed.    Increasing bilateral infiltrates particularly on the right.    --- End of Report ---      DESTINEE ESTEVEZ MD; Attending Radiologist  This document has been electronically signed. Mar 24 2023  9:41AM    < end of copied text >      CRITICAL CARE TIME SPENT: 45 minutes assessing presenting problems of acute illness, which pose high probability of life threatening deterioration or end organ damage/dysfunction, as well as medical decision making including initiating plan of care, reviewing data, reviewing radiologic exams, discussing with multidisciplinary team,  discussing goals of care with patient/family, and writing this note.  Non-inclusive of procedures performed,

## 2023-03-26 NOTE — PROGRESS NOTE ADULT - SUBJECTIVE AND OBJECTIVE BOX
ANIA CRISTOBAL  [id number]        Chief Complaint:  AMS/Hypoxia/CHF    Interval History:  Back on HFNC, excellent urine output     Tele:  atrial tachycardia      acetaminophen     Tablet .. 650 milliGRAM(s) Oral every 6 hours PRN  albuterol/ipratropium for Nebulization 3 milliLiter(s) Nebulizer every 6 hours  ALPRAZolam 0.25 milliGRAM(s) Oral three times a day  aMIOdarone    Tablet 200 milliGRAM(s) Oral daily  apixaban 5 milliGRAM(s) Enteral Tube every 12 hours  aspirin  chewable 81 milliGRAM(s) Oral daily  atorvastatin 10 milliGRAM(s) Oral at bedtime  cefepime  Injectable. 2000 milliGRAM(s) IV Push every 8 hours  chlorhexidine 2% Cloths 1 Application(s) Topical <User Schedule>  cloNIDine Patch 0.2 mG/24Hr(s) 1 patch Transdermal every 7 days  dextrose 50% Injectable 25 Gram(s) IV Push once  dextrose 50% Injectable 12.5 Gram(s) IV Push once  dextrose 50% Injectable 25 Gram(s) IV Push once  doxazosin 2 milliGRAM(s) Oral at bedtime  finasteride 5 milliGRAM(s) Oral daily  furosemide   Injectable 60 milliGRAM(s) IV Push two times a day  insulin glargine Injectable (LANTUS) 15 Unit(s) SubCutaneous every morning  insulin lispro (ADMELOG) corrective regimen sliding scale   SubCutaneous three times a day before meals  methylPREDNISolone sodium succinate Injectable 40 milliGRAM(s) IV Push daily  metoprolol tartrate 12.5 milliGRAM(s) Oral every 12 hours  mupirocin 2% Ointment 1 Application(s) Both Nostrils two times a day  OLANZapine Injectable 10 milliGRAM(s) IntraMuscular every 4 hours PRN          Physical Exam:  T(C): 37.3 (03-26-23 @ 15:00), Max: 38.8 (03-25-23 @ 19:00)  HR: 88 (03-26-23 @ 15:37) (44 - 122)  BP: --  RR: 26 (03-26-23 @ 15:00) (14 - 26)  SpO2: 90% (03-26-23 @ 15:37) (88% - 98%)  Wt(kg): --  General: On HFNC  Neck: No JVD  CVS: nl s1s2, no s3  Pulm: CTA b/l  Abd: soft, non-tender  Ext: 1+ edema   Neuro A&O x3  Psych: Normal affect      Labs:   26 Mar 2023 04:45    152    |  109    |  39.7   ----------------------------<  270    4.3     |  27.0   |  0.92     Ca    8.5        26 Mar 2023 04:45  Phos  2.7       26 Mar 2023 04:45  Mg     2.5       26 Mar 2023 04:45    TPro  6.2    /  Alb  2.8    /  TBili  0.4    /  DBili  x      /  AST  11     /  ALT  14     /  AlkPhos  153    26 Mar 2023 04:45                          13.7   4.61  )-----------( 200      ( 26 Mar 2023 04:45 )             45.0         LIBBY 1/31/23  Summary:   1. Left ventricular ejection fraction, by visual estimation, is 40 to 45%.   2. Mildly decreased global left ventricular systolic function.   3. Normal right ventricular size and function.   4. Mildly enlarged left atrium.   5. Mild mitral annular calcification.   6. Mild mitral valve regurgitation.   7. Thickening of the anterior and posterior mitral valve leaflets.   8. Mild tricuspid regurgitation.   9. Mild aortic regurgitation.  10. Mild to moderate aortic valve stenosis.  11. CIELO by planimetry 1.2cm2.  12. Aortic root measured at Sinus of Valsalva is dilated. 4.1cm  13. No left atrial appendage thrombus.  14. Color flow doppler and intravenous injection of agitated saline demonstrates the presence of an intact intra atrial septum.    TTE 1/28/23  Summary:   1. Technically difficult study.   2. Tachycardic throughout the study and this may preclude true assessment of LVEF.   3. Left ventricular ejection fraction, by visual estimation, is 40 to 45%.   4. Moderately decreased global left ventricular systolic function.   5. The mitral in-flow pattern reveals no discernable A-wave, therefore no comment on diastolic function can be made.   6. Mildly enlarged left atrium.   7. Normal right atrial size.   8. Mild tricuspid regurgitation.   9. Mild thickening and calcification of the anterior mitral valve leaflet.  10. Mild to moderate mitral annular calcification.  11. Trace mitral valve regurgitation.  12. Moderate aortic valve stenosis, with peak velocity of 2.87 m/s, mean gradient of 18 mmHg, dimensionless index of 0.29, and CIELO via VTI of 1.42 sqcm.  13. Dilatation of the sinuses of Valsalva, measures 4.15 cm but based on BSA 1.6cm/sqm is within normal limits.  14. There is a significant pericardial fat pad present.  15. There is no evidence of pericardial effusion.  16. Endocardial visualization was enhanced with intravenous echo contrast.  17. Compared to prior TTE done on 7/6/2022, the left ventricular function is now reduced to 40-45%, in the setting of Afib wth RVR (prior LVEF   60-65%).      CATHETERIZATION:  1/30/23  LM   Left main artery: Angiography shows minor irregularities.      LAD   Proximal left anterior descending: There is a 10 % stenosis within the  stented segment. Mid left anterior descending: There is a  10 % stenosis within the stented segment.      CX   Distal circumflex: There is a 60 % stenosis. VALENTINO Flow 3.      RCA   Proximal right coronary artery: There is a 100 % stenosis. There is  good collateral blood supply to the distal myocardium. This  lesion is a chronic total occlusion.  This is a small non-dominant  vessel.    Ramus   Ramus intermedius: Angiography shows mild atherosclerosis.      Echo:   1. Left ventricular ejection fraction, by visual estimation, is 60 to 65%.   2. Normal right ventricular size and function.   3. There is no evidence of pericardial effusion.   4. Endocardial visualizationwas enhanced with intravenous echo contrast.        Assessment:  77y Male with a PMH of moderate AS, CAD s/p PCI (patent LAD cath 2021), Moderately obstructive distal LCX around 60%, afib on AC s/p DCCV back in jan currently in sinus,  morbid obesity, OA, HTN, HLD, DM here with SOB  Per notes patient was found by EMS around 05:00 pt was on his buttocks in the bathroom after he fell, they were able to get him back into bed, they then found pt obtunded, low O2 saturations. Pt was seen in [facility] 3 days ago after c/o SOB and orthopnea, diagnosed with HMPV was saturating well at that time and sent back to [facility]. Pt obtunded in the ED, unable to provide any hx. Cardiology consult was requested for evaluation of respiratory distress concerning for pulmonary edema.   -In the ED patient noted to be febrile, hypotensive, obtunded and in severe respiratory distress and intubated and sedated.  -Now in ICU intubated on vent and 2 pressors.  Likely more sepsis.  -Echo limited but EF appears normal.  -Extubated> HFNC> CO2 retention> BIPAP>> back on HFNC  -Off pressors   -slightly vol up, but responded well to IV diuretics  -Developed atrial tachycardia    Plan:  1. Care per ICU.  2. Abx per ID.  3. Continue with IV diuretics, monitor renal function and electrolytes   4. Continue anticoagulation for AF.  5. Continue ASA, statin.  6. Resume small dose of BB to control AT      D/W ICU

## 2023-03-27 LAB
ANION GAP SERPL CALC-SCNC: 10 MMOL/L — SIGNIFICANT CHANGE UP (ref 5–17)
ANION GAP SERPL CALC-SCNC: 11 MMOL/L — SIGNIFICANT CHANGE UP (ref 5–17)
BUN SERPL-MCNC: 36 MG/DL — HIGH (ref 8–20)
BUN SERPL-MCNC: 37.2 MG/DL — HIGH (ref 8–20)
CALCIUM SERPL-MCNC: 8.4 MG/DL — SIGNIFICANT CHANGE UP (ref 8.4–10.5)
CALCIUM SERPL-MCNC: 8.6 MG/DL — SIGNIFICANT CHANGE UP (ref 8.4–10.5)
CHLORIDE SERPL-SCNC: 113 MMOL/L — HIGH (ref 96–108)
CHLORIDE SERPL-SCNC: 114 MMOL/L — HIGH (ref 96–108)
CO2 SERPL-SCNC: 35 MMOL/L — HIGH (ref 22–29)
CO2 SERPL-SCNC: 36 MMOL/L — HIGH (ref 22–29)
CREAT SERPL-MCNC: 0.93 MG/DL — SIGNIFICANT CHANGE UP (ref 0.5–1.3)
CREAT SERPL-MCNC: 1.11 MG/DL — SIGNIFICANT CHANGE UP (ref 0.5–1.3)
CULTURE RESULTS: SIGNIFICANT CHANGE UP
CULTURE RESULTS: SIGNIFICANT CHANGE UP
EGFR: 68 ML/MIN/1.73M2 — SIGNIFICANT CHANGE UP
EGFR: 85 ML/MIN/1.73M2 — SIGNIFICANT CHANGE UP
GAS PNL BLDV: SIGNIFICANT CHANGE UP
GLUCOSE BLDC GLUCOMTR-MCNC: 209 MG/DL — HIGH (ref 70–99)
GLUCOSE BLDC GLUCOMTR-MCNC: 209 MG/DL — HIGH (ref 70–99)
GLUCOSE BLDC GLUCOMTR-MCNC: 263 MG/DL — HIGH (ref 70–99)
GLUCOSE BLDC GLUCOMTR-MCNC: 276 MG/DL — HIGH (ref 70–99)
GLUCOSE SERPL-MCNC: 184 MG/DL — HIGH (ref 70–99)
GLUCOSE SERPL-MCNC: 291 MG/DL — HIGH (ref 70–99)
HCT VFR BLD CALC: 45.7 % — SIGNIFICANT CHANGE UP (ref 39–50)
HGB BLD-MCNC: 13.9 G/DL — SIGNIFICANT CHANGE UP (ref 13–17)
MAGNESIUM SERPL-MCNC: 2 MG/DL — SIGNIFICANT CHANGE UP (ref 1.6–2.6)
MAGNESIUM SERPL-MCNC: 2.2 MG/DL — SIGNIFICANT CHANGE UP (ref 1.6–2.6)
MCHC RBC-ENTMCNC: 29.3 PG — SIGNIFICANT CHANGE UP (ref 27–34)
MCHC RBC-ENTMCNC: 30.4 GM/DL — LOW (ref 32–36)
MCV RBC AUTO: 96.4 FL — SIGNIFICANT CHANGE UP (ref 80–100)
PHOSPHATE SERPL-MCNC: 2.8 MG/DL — SIGNIFICANT CHANGE UP (ref 2.4–4.7)
PHOSPHATE SERPL-MCNC: 3 MG/DL — SIGNIFICANT CHANGE UP (ref 2.4–4.7)
PLATELET # BLD AUTO: 218 K/UL — SIGNIFICANT CHANGE UP (ref 150–400)
POTASSIUM SERPL-MCNC: 3.7 MMOL/L — SIGNIFICANT CHANGE UP (ref 3.5–5.3)
POTASSIUM SERPL-MCNC: 4 MMOL/L — SIGNIFICANT CHANGE UP (ref 3.5–5.3)
POTASSIUM SERPL-SCNC: 3.7 MMOL/L — SIGNIFICANT CHANGE UP (ref 3.5–5.3)
POTASSIUM SERPL-SCNC: 4 MMOL/L — SIGNIFICANT CHANGE UP (ref 3.5–5.3)
RBC # BLD: 4.74 M/UL — SIGNIFICANT CHANGE UP (ref 4.2–5.8)
RBC # FLD: 14 % — SIGNIFICANT CHANGE UP (ref 10.3–14.5)
SODIUM SERPL-SCNC: 159 MMOL/L — HIGH (ref 135–145)
SODIUM SERPL-SCNC: 159 MMOL/L — HIGH (ref 135–145)
SPECIMEN SOURCE: SIGNIFICANT CHANGE UP
SPECIMEN SOURCE: SIGNIFICANT CHANGE UP
WBC # BLD: 6.28 K/UL — SIGNIFICANT CHANGE UP (ref 3.8–10.5)
WBC # FLD AUTO: 6.28 K/UL — SIGNIFICANT CHANGE UP (ref 3.8–10.5)

## 2023-03-27 PROCEDURE — 71045 X-RAY EXAM CHEST 1 VIEW: CPT | Mod: 26

## 2023-03-27 PROCEDURE — 99232 SBSQ HOSP IP/OBS MODERATE 35: CPT

## 2023-03-27 PROCEDURE — 99233 SBSQ HOSP IP/OBS HIGH 50: CPT

## 2023-03-27 RX ORDER — POTASSIUM CHLORIDE 20 MEQ
40 PACKET (EA) ORAL EVERY 4 HOURS
Refills: 0 | Status: DISCONTINUED | OUTPATIENT
Start: 2023-03-27 | End: 2023-03-27

## 2023-03-27 RX ORDER — SODIUM CHLORIDE 9 MG/ML
1000 INJECTION, SOLUTION INTRAVENOUS
Refills: 0 | Status: DISCONTINUED | OUTPATIENT
Start: 2023-03-27 | End: 2023-03-30

## 2023-03-27 RX ORDER — TRAZODONE HCL 50 MG
100 TABLET ORAL ONCE
Refills: 0 | Status: COMPLETED | OUTPATIENT
Start: 2023-03-27 | End: 2023-03-27

## 2023-03-27 RX ORDER — INSULIN LISPRO 100/ML
VIAL (ML) SUBCUTANEOUS
Refills: 0 | Status: DISCONTINUED | OUTPATIENT
Start: 2023-03-27 | End: 2023-04-07

## 2023-03-27 RX ORDER — QUETIAPINE FUMARATE 200 MG/1
50 TABLET, FILM COATED ORAL AT BEDTIME
Refills: 0 | Status: DISCONTINUED | OUTPATIENT
Start: 2023-03-27 | End: 2023-04-05

## 2023-03-27 RX ORDER — POTASSIUM CHLORIDE 20 MEQ
40 PACKET (EA) ORAL EVERY 4 HOURS
Refills: 0 | Status: COMPLETED | OUTPATIENT
Start: 2023-03-27 | End: 2023-03-27

## 2023-03-27 RX ORDER — INSULIN LISPRO 100/ML
6 VIAL (ML) SUBCUTANEOUS
Refills: 0 | Status: DISCONTINUED | OUTPATIENT
Start: 2023-03-27 | End: 2023-04-07

## 2023-03-27 RX ORDER — INSULIN GLARGINE 100 [IU]/ML
20 INJECTION, SOLUTION SUBCUTANEOUS EVERY MORNING
Refills: 0 | Status: DISCONTINUED | OUTPATIENT
Start: 2023-03-27 | End: 2023-04-07

## 2023-03-27 RX ADMIN — Medication 1 PATCH: at 19:00

## 2023-03-27 RX ADMIN — Medication 4: at 21:56

## 2023-03-27 RX ADMIN — INSULIN GLARGINE 20 UNIT(S): 100 INJECTION, SOLUTION SUBCUTANEOUS at 08:58

## 2023-03-27 RX ADMIN — Medication 40 MILLIEQUIVALENT(S): at 17:19

## 2023-03-27 RX ADMIN — Medication 1 PATCH: at 10:27

## 2023-03-27 RX ADMIN — APIXABAN 5 MILLIGRAM(S): 2.5 TABLET, FILM COATED ORAL at 21:57

## 2023-03-27 RX ADMIN — Medication 650 MILLIGRAM(S): at 11:37

## 2023-03-27 RX ADMIN — Medication 6: at 09:00

## 2023-03-27 RX ADMIN — CEFEPIME 2000 MILLIGRAM(S): 1 INJECTION, POWDER, FOR SOLUTION INTRAMUSCULAR; INTRAVENOUS at 08:59

## 2023-03-27 RX ADMIN — ATORVASTATIN CALCIUM 10 MILLIGRAM(S): 80 TABLET, FILM COATED ORAL at 21:58

## 2023-03-27 RX ADMIN — Medication 6 UNIT(S): at 17:13

## 2023-03-27 RX ADMIN — CEFEPIME 2000 MILLIGRAM(S): 1 INJECTION, POWDER, FOR SOLUTION INTRAMUSCULAR; INTRAVENOUS at 17:09

## 2023-03-27 RX ADMIN — Medication 12.5 MILLIGRAM(S): at 17:19

## 2023-03-27 RX ADMIN — OLANZAPINE 10 MILLIGRAM(S): 15 TABLET, FILM COATED ORAL at 21:29

## 2023-03-27 RX ADMIN — CEFEPIME 2000 MILLIGRAM(S): 1 INJECTION, POWDER, FOR SOLUTION INTRAMUSCULAR; INTRAVENOUS at 00:49

## 2023-03-27 RX ADMIN — Medication 12.5 MILLIGRAM(S): at 05:20

## 2023-03-27 RX ADMIN — SODIUM CHLORIDE 75 MILLILITER(S): 9 INJECTION, SOLUTION INTRAVENOUS at 09:00

## 2023-03-27 RX ADMIN — OLANZAPINE 10 MILLIGRAM(S): 15 TABLET, FILM COATED ORAL at 12:22

## 2023-03-27 RX ADMIN — Medication 0.25 MILLIGRAM(S): at 21:56

## 2023-03-27 RX ADMIN — Medication 2 MILLIGRAM(S): at 21:56

## 2023-03-27 RX ADMIN — OLANZAPINE 10 MILLIGRAM(S): 15 TABLET, FILM COATED ORAL at 02:48

## 2023-03-27 RX ADMIN — Medication 650 MILLIGRAM(S): at 08:58

## 2023-03-27 RX ADMIN — FINASTERIDE 5 MILLIGRAM(S): 5 TABLET, FILM COATED ORAL at 11:50

## 2023-03-27 RX ADMIN — Medication 3 MILLILITER(S): at 17:46

## 2023-03-27 RX ADMIN — Medication 81 MILLIGRAM(S): at 11:45

## 2023-03-27 RX ADMIN — Medication 0.25 MILLIGRAM(S): at 05:20

## 2023-03-27 RX ADMIN — MUPIROCIN 1 APPLICATION(S): 20 OINTMENT TOPICAL at 05:08

## 2023-03-27 RX ADMIN — Medication 40 MILLIEQUIVALENT(S): at 14:03

## 2023-03-27 RX ADMIN — Medication 40 MILLIGRAM(S): at 05:21

## 2023-03-27 RX ADMIN — MUPIROCIN 1 APPLICATION(S): 20 OINTMENT TOPICAL at 17:19

## 2023-03-27 RX ADMIN — CHLORHEXIDINE GLUCONATE 1 APPLICATION(S): 213 SOLUTION TOPICAL at 05:06

## 2023-03-27 RX ADMIN — Medication 3 MILLILITER(S): at 05:24

## 2023-03-27 RX ADMIN — APIXABAN 5 MILLIGRAM(S): 2.5 TABLET, FILM COATED ORAL at 08:57

## 2023-03-27 RX ADMIN — Medication 0.25 MILLIGRAM(S): at 13:55

## 2023-03-27 RX ADMIN — Medication 3 MILLILITER(S): at 09:50

## 2023-03-27 RX ADMIN — Medication 6 UNIT(S): at 11:46

## 2023-03-27 RX ADMIN — QUETIAPINE FUMARATE 50 MILLIGRAM(S): 200 TABLET, FILM COATED ORAL at 22:01

## 2023-03-27 RX ADMIN — Medication 4: at 17:10

## 2023-03-27 RX ADMIN — Medication 6: at 11:45

## 2023-03-27 RX ADMIN — Medication 60 MILLIGRAM(S): at 05:21

## 2023-03-27 RX ADMIN — AMIODARONE HYDROCHLORIDE 200 MILLIGRAM(S): 400 TABLET ORAL at 05:20

## 2023-03-27 NOTE — PROGRESS NOTE ADULT - ASSESSMENT
78 yo male with CAD s/p PCI, AF on eliquis, moderate AS, chronic systolic CHF/HFpEF, morbid obesity, HTN, HLD, DM, thoracic aortic aneurysm, BPH s/p TURP, left renal mass, recent hMPV infection, now admitted with acute hypercapnic respiratory failure, septic shock, multifocal pneumonia, hMPV infection, encephalopathy, PHILLY.      Plan    Acute respiratory failure  - continue abx for a total of 7 days, procal in AM, sputum and blood cultures negative so far  - steroid taper  - TTE revealed normal EF, patient seems slightly hypervolemic   - currently on high flow at 90% fio2, 4 lpm    Atrial fibrillation  - eliquis for AC    HFpEF/ moderate AS  - starting thiazide diuretic (patient severely hypernatremic)  - will likely need loop diuretic as well, however would like to correct sodium to some degree before giving additional loop diuretic     Chronic hypercapnic respiratory failure/  OHS/ALILE  - nocturnal NIV    Hypernatremia  - short course of d5w and will start thiazide diuretic     Hyperglycemia  - increased lantus, started premeal lispro in addition to sliding scale    Sedation/Analgesia: none  Vasoactive medications: none  DVT prophylaxis: eliquis  GI prophylaxis: protonix  Nutrition: puree diet  Central line: remove TLC   Mobility: oob  Patient updated extensively on plan.  Very hard of hearing but was able to communicate with him by shouting.

## 2023-03-27 NOTE — PHYSICAL THERAPY INITIAL EVALUATION ADULT - LIVES WITH, PROFILE
Quality 226: Preventive Care And Screening: Tobacco Use: Screening And Cessation Intervention: Patient screened for tobacco use and is an ex/non-smoker Quality 130: Documentation Of Current Medications In The Medical Record: Current Medications Documented Detail Level: Detailed Quality 431: Preventive Care And Screening: Unhealthy Alcohol Use - Screening: Patient screened for unhealthy alcohol use using a single question and scores less than 2 times per year alone

## 2023-03-27 NOTE — PHYSICAL THERAPY INITIAL EVALUATION ADULT - IMPAIRMENTS FOUND, PT EVAL
arousal, attention, and cognition/cognitive impairment/gait, locomotion, and balance/muscle strength/ventilation and respiration/gas exchange

## 2023-03-27 NOTE — PROGRESS NOTE ADULT - SUBJECTIVE AND OBJECTIVE BOX
ANIA CRISTOBAL  [id number]      Chief Complaint:  AMS/Hypoxia/CHF    Interval History:  Back on HFNC, excellent urine output     Tele:  SR      acetaminophen     Tablet .. 650 milliGRAM(s) Oral every 6 hours PRN  albuterol/ipratropium for Nebulization 3 milliLiter(s) Nebulizer every 6 hours  ALPRAZolam 0.25 milliGRAM(s) Oral three times a day  aMIOdarone    Tablet 200 milliGRAM(s) Oral daily  apixaban 5 milliGRAM(s) Enteral Tube every 12 hours  aspirin  chewable 81 milliGRAM(s) Oral daily  atorvastatin 10 milliGRAM(s) Oral at bedtime  cefepime  Injectable. 2000 milliGRAM(s) IV Push every 8 hours  chlorhexidine 2% Cloths 1 Application(s) Topical <User Schedule>  cloNIDine Patch 0.2 mG/24Hr(s) 1 patch Transdermal every 7 days  dextrose 5%. 1000 milliLiter(s) IV Continuous <Continuous>  doxazosin 2 milliGRAM(s) Oral at bedtime  finasteride 5 milliGRAM(s) Oral daily  insulin glargine Injectable (LANTUS) 20 Unit(s) SubCutaneous every morning  insulin lispro (ADMELOG) corrective regimen sliding scale   SubCutaneous Before meals and at bedtime  insulin lispro Injectable (ADMELOG) 6 Unit(s) SubCutaneous three times a day before meals  metolazone 5 milliGRAM(s) Oral daily  metoprolol tartrate 12.5 milliGRAM(s) Oral every 12 hours  mupirocin 2% Ointment 1 Application(s) Both Nostrils two times a day  OLANZapine Injectable 10 milliGRAM(s) IntraMuscular every 4 hours PRN  potassium chloride   Powder 40 milliEquivalent(s) Oral every 4 hours  predniSONE   Tablet   Oral           Physical Exam:  T(C): 37.5 (03-27-23 @ 15:00), Max: 37.9 (03-27-23 @ 09:00)  HR: 93 (03-27-23 @ 15:00) (93 - 134)  BP: 106/59 (03-27-23 @ 15:00) (102/60 - 140/69)  RR: 30 (03-27-23 @ 15:00) (19 - 40)  SpO2: 87% (03-27-23 @ 15:00) (84% - 99%)  Wt(kg): --  General: Comfortable in NAD  Neck: No JVD  CVS: nl s1s2, no s3  Pulm: CTA b/l  Abd: soft, non-tender  Ext: Trace edema  Neuro A&O x3  Psych: Normal affect      Labs:   27 Mar 2023 03:10    159    |  113    |  37.2   ----------------------------<  184    3.7     |  35.0   |  0.93     Ca    8.6        27 Mar 2023 03:10  Phos  3.0       27 Mar 2023 03:10  Mg     2.2       27 Mar 2023 03:10    TPro  6.2    /  Alb  2.8    /  TBili  0.4    /  DBili  x      /  AST  11     /  ALT  14     /  AlkPhos  153    26 Mar 2023 04:45                          13.9   6.28  )-----------( 218      ( 27 Mar 2023 03:10 )             45.7             LIBBY 1/31/23  Summary:   1. Left ventricular ejection fraction, by visual estimation, is 40 to 45%.   2. Mildly decreased global left ventricular systolic function.   3. Normal right ventricular size and function.   4. Mildly enlarged left atrium.   5. Mild mitral annular calcification.   6. Mild mitral valve regurgitation.   7. Thickening of the anterior and posterior mitral valve leaflets.   8. Mild tricuspid regurgitation.   9. Mild aortic regurgitation.  10. Mild to moderate aortic valve stenosis.  11. CIELO by planimetry 1.2cm2.  12. Aortic root measured at Sinus of Valsalva is dilated. 4.1cm  13. No left atrial appendage thrombus.  14. Color flow doppler and intravenous injection of agitated saline demonstrates the presence of an intact intra atrial septum.    TTE 1/28/23  Summary:   1. Technically difficult study.   2. Tachycardic throughout the study and this may preclude true assessment of LVEF.   3. Left ventricular ejection fraction, by visual estimation, is 40 to 45%.   4. Moderately decreased global left ventricular systolic function.   5. The mitral in-flow pattern reveals no discernable A-wave, therefore no comment on diastolic function can be made.   6. Mildly enlarged left atrium.   7. Normal right atrial size.   8. Mild tricuspid regurgitation.   9. Mild thickening and calcification of the anterior mitral valve leaflet.  10. Mild to moderate mitral annular calcification.  11. Trace mitral valve regurgitation.  12. Moderate aortic valve stenosis, with peak velocity of 2.87 m/s, mean gradient of 18 mmHg, dimensionless index of 0.29, and CIELO via VTI of 1.42 sqcm.  13. Dilatation of the sinuses of Valsalva, measures 4.15 cm but based on BSA 1.6cm/sqm is within normal limits.  14. There is a significant pericardial fat pad present.  15. There is no evidence of pericardial effusion.  16. Endocardial visualization was enhanced with intravenous echo contrast.  17. Compared to prior TTE done on 7/6/2022, the left ventricular function is now reduced to 40-45%, in the setting of Afib wth RVR (prior LVEF   60-65%).      CATHETERIZATION:  1/30/23  LM   Left main artery: Angiography shows minor irregularities.      LAD   Proximal left anterior descending: There is a 10 % stenosis within the  stented segment. Mid left anterior descending: There is a  10 % stenosis within the stented segment.      CX   Distal circumflex: There is a 60 % stenosis. VALENTINO Flow 3.      RCA   Proximal right coronary artery: There is a 100 % stenosis. There is  good collateral blood supply to the distal myocardium. This  lesion is a chronic total occlusion.  This is a small non-dominant  vessel.    Ramus   Ramus intermedius: Angiography shows mild atherosclerosis.      Echo:   1. Left ventricular ejection fraction, by visual estimation, is 60 to 65%.   2. Normal right ventricular size and function.   3. There is no evidence of pericardial effusion.   4. Endocardial visualizationwas enhanced with intravenous echo contrast.        Assessment:  77y Male with a PMH of moderate AS, CAD s/p PCI (patent LAD cath 2021), Moderately obstructive distal LCX around 60%, afib on AC s/p DCCV back in jan currently in sinus,  morbid obesity, OA, HTN, HLD, DM here with SOB  Per notes patient was found by EMS around 05:00 pt was on his buttocks in the bathroom after he fell, they were able to get him back into bed, they then found pt obtunded, low O2 saturations. Pt was seen in [facility] 3 days ago after c/o SOB and orthopnea, diagnosed with HMPV was saturating well at that time and sent back to [facility]. Pt obtunded in the ED, unable to provide any hx. Cardiology consult was requested for evaluation of respiratory distress concerning for pulmonary edema.   -In the ED patient noted to be febrile, hypotensive, obtunded and in severe respiratory distress and intubated and sedated.  -Now in ICU intubated on vent and 2 pressors.  Likely more sepsis.  -Echo limited but EF appears normal.  -Extubated> HFNC> CO2 retention> BIPAP>> back on HFNC  -Off pressors   -IV diuretics held due to hypernatrmia  -Developed atrial tachycardia, back to sinus    Plan:  1. Care per ICU.  2. Abx per ID.  3. Agree with holding IV diuretics, resume once Na normalize   4. Continue anticoagulation for AF.  5. Continue ASA, statin.  6. Continue BB to control AT, currently in SR  7. Stable from CV perspective, no need for additional cardiac testing, please call us back with questions or concerns.

## 2023-03-27 NOTE — PHYSICAL THERAPY INITIAL EVALUATION ADULT - ADDITIONAL COMMENTS
Pt lives alone in a private home, No steps to navigate. Pt reports he was independent PTA with a 3 wheeled rollator. Pt owns rollator, Shower chair and commode. Pt was admitted from [facility] after a fall and 2*2 knee pain.

## 2023-03-27 NOTE — PHYSICAL THERAPY INITIAL EVALUATION ADULT - PERTINENT HX OF CURRENT PROBLEM, REHAB EVAL
76 yo male with CAD s/p PCI, AF on eliquis, moderate AS, morbid obesity, HTN, HLD, DM, thoracic aortic aneurysm, BPH s/p TURP, left renal mass, recent hMPV infection, presented from [facility]  to the ED after a fall.  He was found on the floor in the bathroom, hypoxemic and obtunded. In the ED patient was found to be in hypercapnic respiratory failure and placed on NIV.  He was also noted to be hypotensive and started on pressors after a fluid bolus. Now admitted with acute hypercapnic respiratory failure, septic shock, multifocal pneumonia, hMPV infection, encephalopathy, PHILLY

## 2023-03-27 NOTE — PHYSICAL THERAPY INITIAL EVALUATION ADULT - IMPAIRED TRANSFERS: BED/CHAIR, REHAB EVAL
multiple verbal cues for upright posture/impaired balance/cognition/pain/impaired postural control/decreased strength

## 2023-03-27 NOTE — PHYSICAL THERAPY INITIAL EVALUATION ADULT - NSACTIVITYREC_GEN_A_PT
no difficulty walking/imbalance/no extremity numbness/weakness
OOB to chair via keith with Nursing staff daily.

## 2023-03-27 NOTE — PROGRESS NOTE ADULT - ASSESSMENT
78 yo male with CAD s/p PCI, AF on Eliquis, moderate AS, morbid obesity, HTN, HLD, T2DM, ALLIE not on CPAP, thoracic aortic aneurysm, BPH s/p TURP, left renal mass, recent hMPV infection, admitted with acute toxic-metabolic encephalopathy secondary to acute on chronic hypoxic hypercapnic respiratory failure and septic shock likely secondary to +hMPV viral pneumonia with superimposed bacterial pneumonia.     Shock resolved, off vasopressors  Remains hypoxic, HFNC dependent with significant FiO2 requirements on 90% 40 LPM, attempting to titrate down as tolerated to keep SpO2 >88%  May have component of acute on chronic diastolic heart failure, diuresing with Lasix 60mg IV BID   Empiric antibiotics with Cefepime, fever curve downtrending  Encouraged use of nocturnal NIV   Repeat CXR ordered  Tapering steroids, starting Prednisone taper  Passed dysphagia screen for puree only no liquids, diet advanced with strict aspiration precautions high risk  PT, OOB and IS to optimize oxygenation     76 yo male with CAD s/p PCI, AF on Eliquis, moderate AS, morbid obesity, HTN, HLD, T2DM, ALLIE not on CPAP, thoracic aortic aneurysm, BPH s/p TURP, left renal mass, recent hMPV infection, admitted with acute toxic-metabolic encephalopathy secondary to acute on chronic hypoxic hypercapnic respiratory failure and septic shock likely secondary to +hMPV viral pneumonia with superimposed bacterial pneumonia.     Shock resolved, off vasopressors  Remains hypoxic, HFNC dependent with significant FiO2 requirements on 90% 40 LPM, attempting to titrate down as tolerated to keep SpO2 >88%  May have component of acute on chronic diastolic heart failure, diuresing with Lasix 60mg IV BID   Empiric antibiotics with Cefepime, fever curve downtrending  Encouraged use of nocturnal NIV   Repeat CXR ordered  Tapering steroids, starting Prednisone taper  Remains encephalopathic/delirious requiring frequent reorientation and intermittent antipsychotics. Off Dexmedetomidine.  Passed dysphagia screen for puree only no liquids, diet advanced with strict aspiration precautions high risk  PT, OOB and IS to optimize oxygenation

## 2023-03-27 NOTE — PHYSICAL THERAPY INITIAL EVALUATION ADULT - GENERAL OBSERVATIONS, REHAB EVAL
Pt received supine in bed + telemetry//BP + IV + Venous Compression Boots +Monika with BP + High flow 90%  + golden 1:1 present. C/o 7/10 low back and stomach pain pre/post PT, pain meds received per RN, pt agreeable to PT

## 2023-03-28 LAB
ALBUMIN SERPL ELPH-MCNC: 3.1 G/DL — LOW (ref 3.3–5.2)
ALP SERPL-CCNC: 125 U/L — HIGH (ref 40–120)
ALT FLD-CCNC: 9 U/L — SIGNIFICANT CHANGE UP
ANION GAP SERPL CALC-SCNC: 10 MMOL/L — SIGNIFICANT CHANGE UP (ref 5–17)
ANION GAP SERPL CALC-SCNC: 9 MMOL/L — SIGNIFICANT CHANGE UP (ref 5–17)
AST SERPL-CCNC: 10 U/L — SIGNIFICANT CHANGE UP
BILIRUB SERPL-MCNC: 0.7 MG/DL — SIGNIFICANT CHANGE UP (ref 0.4–2)
BUN SERPL-MCNC: 27.7 MG/DL — HIGH (ref 8–20)
BUN SERPL-MCNC: 33.1 MG/DL — HIGH (ref 8–20)
CALCIUM SERPL-MCNC: 8.3 MG/DL — LOW (ref 8.4–10.5)
CALCIUM SERPL-MCNC: 8.6 MG/DL — SIGNIFICANT CHANGE UP (ref 8.4–10.5)
CHLORIDE SERPL-SCNC: 108 MMOL/L — SIGNIFICANT CHANGE UP (ref 96–108)
CHLORIDE SERPL-SCNC: 112 MMOL/L — HIGH (ref 96–108)
CO2 SERPL-SCNC: 37 MMOL/L — HIGH (ref 22–29)
CO2 SERPL-SCNC: 38 MMOL/L — HIGH (ref 22–29)
CREAT SERPL-MCNC: 0.66 MG/DL — SIGNIFICANT CHANGE UP (ref 0.5–1.3)
CREAT SERPL-MCNC: 0.88 MG/DL — SIGNIFICANT CHANGE UP (ref 0.5–1.3)
EGFR: 89 ML/MIN/1.73M2 — SIGNIFICANT CHANGE UP
EGFR: 97 ML/MIN/1.73M2 — SIGNIFICANT CHANGE UP
GLUCOSE BLDC GLUCOMTR-MCNC: 129 MG/DL — HIGH (ref 70–99)
GLUCOSE BLDC GLUCOMTR-MCNC: 155 MG/DL — HIGH (ref 70–99)
GLUCOSE BLDC GLUCOMTR-MCNC: 177 MG/DL — HIGH (ref 70–99)
GLUCOSE BLDC GLUCOMTR-MCNC: 217 MG/DL — HIGH (ref 70–99)
GLUCOSE SERPL-MCNC: 167 MG/DL — HIGH (ref 70–99)
GLUCOSE SERPL-MCNC: 201 MG/DL — HIGH (ref 70–99)
HCT VFR BLD CALC: 44.3 % — SIGNIFICANT CHANGE UP (ref 39–50)
HGB BLD-MCNC: 13.3 G/DL — SIGNIFICANT CHANGE UP (ref 13–17)
MAGNESIUM SERPL-MCNC: 1.9 MG/DL — SIGNIFICANT CHANGE UP (ref 1.6–2.6)
MAGNESIUM SERPL-MCNC: 2 MG/DL — SIGNIFICANT CHANGE UP (ref 1.6–2.6)
MCHC RBC-ENTMCNC: 29.3 PG — SIGNIFICANT CHANGE UP (ref 27–34)
MCHC RBC-ENTMCNC: 30 GM/DL — LOW (ref 32–36)
MCV RBC AUTO: 97.6 FL — SIGNIFICANT CHANGE UP (ref 80–100)
NT-PROBNP SERPL-SCNC: 967 PG/ML — HIGH (ref 0–300)
PHOSPHATE SERPL-MCNC: 2.1 MG/DL — LOW (ref 2.4–4.7)
PHOSPHATE SERPL-MCNC: 3.6 MG/DL — SIGNIFICANT CHANGE UP (ref 2.4–4.7)
PLATELET # BLD AUTO: 198 K/UL — SIGNIFICANT CHANGE UP (ref 150–400)
POTASSIUM SERPL-MCNC: 3.5 MMOL/L — SIGNIFICANT CHANGE UP (ref 3.5–5.3)
POTASSIUM SERPL-MCNC: 4.2 MMOL/L — SIGNIFICANT CHANGE UP (ref 3.5–5.3)
POTASSIUM SERPL-SCNC: 3.5 MMOL/L — SIGNIFICANT CHANGE UP (ref 3.5–5.3)
POTASSIUM SERPL-SCNC: 4.2 MMOL/L — SIGNIFICANT CHANGE UP (ref 3.5–5.3)
PROCALCITONIN SERPL-MCNC: 0.15 NG/ML — HIGH (ref 0.02–0.1)
PROT SERPL-MCNC: 6 G/DL — LOW (ref 6.6–8.7)
RBC # BLD: 4.54 M/UL — SIGNIFICANT CHANGE UP (ref 4.2–5.8)
RBC # FLD: 13.9 % — SIGNIFICANT CHANGE UP (ref 10.3–14.5)
SODIUM SERPL-SCNC: 155 MMOL/L — HIGH (ref 135–145)
SODIUM SERPL-SCNC: 159 MMOL/L — HIGH (ref 135–145)
WBC # BLD: 5.06 K/UL — SIGNIFICANT CHANGE UP (ref 3.8–10.5)
WBC # FLD AUTO: 5.06 K/UL — SIGNIFICANT CHANGE UP (ref 3.8–10.5)

## 2023-03-28 PROCEDURE — 93010 ELECTROCARDIOGRAM REPORT: CPT

## 2023-03-28 PROCEDURE — 71275 CT ANGIOGRAPHY CHEST: CPT | Mod: 26

## 2023-03-28 RX ORDER — POTASSIUM PHOSPHATE, MONOBASIC POTASSIUM PHOSPHATE, DIBASIC 236; 224 MG/ML; MG/ML
30 INJECTION, SOLUTION INTRAVENOUS ONCE
Refills: 0 | Status: COMPLETED | OUTPATIENT
Start: 2023-03-28 | End: 2023-03-28

## 2023-03-28 RX ORDER — SENNA PLUS 8.6 MG/1
2 TABLET ORAL AT BEDTIME
Refills: 0 | Status: DISCONTINUED | OUTPATIENT
Start: 2023-03-28 | End: 2023-04-07

## 2023-03-28 RX ORDER — POLYETHYLENE GLYCOL 3350 17 G/17G
17 POWDER, FOR SOLUTION ORAL DAILY
Refills: 0 | Status: DISCONTINUED | OUTPATIENT
Start: 2023-03-28 | End: 2023-04-07

## 2023-03-28 RX ORDER — POTASSIUM CHLORIDE 20 MEQ
10 PACKET (EA) ORAL
Refills: 0 | Status: COMPLETED | OUTPATIENT
Start: 2023-03-28 | End: 2023-03-28

## 2023-03-28 RX ADMIN — Medication 100 MILLIGRAM(S): at 00:24

## 2023-03-28 RX ADMIN — ATORVASTATIN CALCIUM 10 MILLIGRAM(S): 80 TABLET, FILM COATED ORAL at 21:22

## 2023-03-28 RX ADMIN — Medication 81 MILLIGRAM(S): at 16:46

## 2023-03-28 RX ADMIN — APIXABAN 5 MILLIGRAM(S): 2.5 TABLET, FILM COATED ORAL at 21:22

## 2023-03-28 RX ADMIN — Medication 0.25 MILLIGRAM(S): at 16:50

## 2023-03-28 RX ADMIN — AMIODARONE HYDROCHLORIDE 200 MILLIGRAM(S): 400 TABLET ORAL at 05:21

## 2023-03-28 RX ADMIN — CHLORHEXIDINE GLUCONATE 1 APPLICATION(S): 213 SOLUTION TOPICAL at 05:22

## 2023-03-28 RX ADMIN — FINASTERIDE 5 MILLIGRAM(S): 5 TABLET, FILM COATED ORAL at 16:47

## 2023-03-28 RX ADMIN — Medication 1 PATCH: at 09:08

## 2023-03-28 RX ADMIN — CEFEPIME 2000 MILLIGRAM(S): 1 INJECTION, POWDER, FOR SOLUTION INTRAMUSCULAR; INTRAVENOUS at 00:24

## 2023-03-28 RX ADMIN — Medication 12.5 MILLIGRAM(S): at 17:16

## 2023-03-28 RX ADMIN — Medication 20 MILLIGRAM(S): at 05:21

## 2023-03-28 RX ADMIN — Medication 0.25 MILLIGRAM(S): at 05:29

## 2023-03-28 RX ADMIN — Medication 2: at 17:19

## 2023-03-28 RX ADMIN — CEFEPIME 2000 MILLIGRAM(S): 1 INJECTION, POWDER, FOR SOLUTION INTRAMUSCULAR; INTRAVENOUS at 16:49

## 2023-03-28 RX ADMIN — Medication 12.5 MILLIGRAM(S): at 05:21

## 2023-03-28 RX ADMIN — Medication 4: at 08:22

## 2023-03-28 RX ADMIN — MUPIROCIN 1 APPLICATION(S): 20 OINTMENT TOPICAL at 05:23

## 2023-03-28 RX ADMIN — OLANZAPINE 10 MILLIGRAM(S): 15 TABLET, FILM COATED ORAL at 09:45

## 2023-03-28 RX ADMIN — Medication 3 MILLILITER(S): at 15:30

## 2023-03-28 RX ADMIN — Medication 100 MILLIEQUIVALENT(S): at 08:25

## 2023-03-28 RX ADMIN — Medication 6 UNIT(S): at 08:22

## 2023-03-28 RX ADMIN — APIXABAN 5 MILLIGRAM(S): 2.5 TABLET, FILM COATED ORAL at 08:24

## 2023-03-28 RX ADMIN — INSULIN GLARGINE 20 UNIT(S): 100 INJECTION, SOLUTION SUBCUTANEOUS at 08:21

## 2023-03-28 RX ADMIN — POTASSIUM PHOSPHATE, MONOBASIC POTASSIUM PHOSPHATE, DIBASIC 83.33 MILLIMOLE(S): 236; 224 INJECTION, SOLUTION INTRAVENOUS at 08:22

## 2023-03-28 RX ADMIN — Medication 2 MILLIGRAM(S): at 21:21

## 2023-03-28 RX ADMIN — Medication 100 MILLIEQUIVALENT(S): at 09:45

## 2023-03-28 RX ADMIN — SENNA PLUS 2 TABLET(S): 8.6 TABLET ORAL at 21:21

## 2023-03-28 RX ADMIN — CEFEPIME 2000 MILLIGRAM(S): 1 INJECTION, POWDER, FOR SOLUTION INTRAMUSCULAR; INTRAVENOUS at 08:24

## 2023-03-28 RX ADMIN — Medication 3 MILLILITER(S): at 20:53

## 2023-03-28 RX ADMIN — Medication 2: at 12:45

## 2023-03-28 RX ADMIN — POLYETHYLENE GLYCOL 3350 17 GRAM(S): 17 POWDER, FOR SOLUTION ORAL at 16:49

## 2023-03-28 RX ADMIN — QUETIAPINE FUMARATE 50 MILLIGRAM(S): 200 TABLET, FILM COATED ORAL at 21:21

## 2023-03-28 RX ADMIN — Medication 0.25 MILLIGRAM(S): at 21:22

## 2023-03-28 RX ADMIN — MUPIROCIN 1 APPLICATION(S): 20 OINTMENT TOPICAL at 17:16

## 2023-03-28 RX ADMIN — Medication 3 MILLILITER(S): at 08:57

## 2023-03-28 NOTE — PROGRESS NOTE ADULT - ASSESSMENT
76 yo male with CAD s/p PCI, AF on Eliquis, moderate AS, morbid obesity, HTN, HLD, T2DM, ALLIE not on CPAP, thoracic aortic aneurysm, BPH s/p TURP, left renal mass, recent hMPV infection, admitted with acute toxic-metabolic encephalopathy secondary to acute on chronic hypoxic hypercapnic respiratory failure and septic shock likely secondary to +hMPV viral pneumonia with superimposed bacterial pneumonia.     Shock resolved, off vasopressors  Remains hypoxic, HFNC dependent with significant FiO2 requirements on 90% 40 LPM, attempting to titrate down as tolerated to keep SpO2 >88%. NIPPV nocturnally as tolerated.  Was diuresed for component of acute on chronic diastolic heart failure, Lasix stopped due to worsening hypernatremia and PHILLY without improvement in oxygenation  Empiric antibiotics with Cefepime, fever curve downtrending  Tapering steroids  Worsening delirium overnight requiring frequent reorientation, 1:1 CO, and intermittent antipsychotics. Off Dexmedetomidine. Given Seroquel and Trazodone overnight without effect.  Passed dysphagia screen for puree only no liquids, diet advanced with strict aspiration precautions high risk  PT, OOB and IS to optimize oxygenation    High risk for necessitating reintubation 76 yo male with CAD s/p PCI, AF on Eliquis, moderate AS, morbid obesity, HTN, HLD, T2DM, ALLIE not on CPAP, thoracic aortic aneurysm, BPH s/p TURP, left renal mass, recent hMPV infection, admitted with acute toxic-metabolic encephalopathy secondary to acute on chronic hypoxic hypercapnic respiratory failure and septic shock likely secondary to +hMPV viral pneumonia with superimposed bacterial pneumonia.     Shock resolved, off vasopressors  Remains hypoxic, HFNC dependent with significant FiO2 requirements on 90% 40 LPM, attempting to titrate down as tolerated to keep SpO2 >88%. NIPPV nocturnally as tolerated for ALLIE.  Was diuresed for component of acute on chronic diastolic heart failure, Lasix stopped due to worsening hypernatremia and PHILLY without improvement in oxygenation  Empiric antibiotics with Cefepime, fever curve downtrending  Tapering steroids  Worsening delirium overnight requiring frequent reorientation, 1:1 CO, and intermittent antipsychotics. Off Dexmedetomidine. Given Seroquel and Trazodone overnight without effect.  Significantly hypernatremic, on D5W increased to 100cc/hr  Passed dysphagia screen for puree only no liquids, diet advanced with strict aspiration precautions high risk  PT, OOB and IS to optimize oxygenation    High risk for necessitating reintubation

## 2023-03-28 NOTE — PROGRESS NOTE ADULT - SUBJECTIVE AND OBJECTIVE BOX
Patient is a 77y old  Male who presents with a chief complaint of Acute respiratory failure (27 Mar 2023 15:57)      BRIEF HOSPITAL COURSE: 76 yo male with CAD s/p PCI, AF on Eliquis, moderate AS, morbid obesity, HTN, HLD, T2DM, ALLIE not on CPAP, thoracic aortic aneurysm, BPH s/p TURP, left renal mass, recent hMPV infection, presented from [facility]  to the ED after a fall.  He was found on the floor in the bathroom, hypoxemic and obtunded.  In the ED patient was found to be in acute on chronic hypercapnic hypoxic respiratory failure and placed on NIV.  He was also noted to be persistently hypotensive after ~700cc fluid bolus and was started on Norepinephrine. His respiratory status continued to deteriorate and he was ultimately intubated. Patient was extubated to HFNC on 3/23, though has remained intermittently encephalopathic.      Events last 24 hours: Worsening delirium overnight. Remains significantly hypoxic on HFNC 90% FiO2      PAST MEDICAL & SURGICAL HISTORY:  Anxiety      Hypertension      Hyperlipidemia      Prostate disease      Gastroesophageal reflux disease      Gallbladder stone without cholecystitis or obstruction      BPH (benign prostatic hyperplasia)      DM (diabetes mellitus)      DVT (deep venous thrombosis)  left leg      Afib      Anxiety      HLD (hyperlipidemia)      Malignant schwannoma      H/O arthroscopy of knee      S/P laminectomy      S/P coronary artery stent placement  x2              SOCIAL HISTORY:        Review of Systems:  CONSTITUTIONAL: No fever, chills, or fatigue  EYES: No visual disturbances  ENMT:  No difficulty hearing  NECK: No pain  RESPIRATORY: No cough. No shortness of breath  CARDIOVASCULAR: No chest pain, palpitations, or leg swelling  GASTROINTESTINAL: No abdominal pain. No nausea, vomiting, diarrhea, or constipation. No hematemesis, melena or hematochezia  GENITOURINARY: No dysuria, frequency, hematuria, or incontinence  NEUROLOGICAL: No headaches, loss of strength, numbness, or tremors  SKIN: No rashes  MUSCULOSKELETAL: No back or extremity pain. No calf pain  PSYCHIATRIC: No depression, anxiety, or difficulty sleeping      [  ] Due to altered mental status/intubation, subjective information was not able to be obtained from the patient. History was obtained, to the extent possible, from review of the chart and collateral sources of information.        Medications:  cefepime  Injectable. 2000 milliGRAM(s) IV Push every 8 hours    aMIOdarone    Tablet 200 milliGRAM(s) Oral daily  cloNIDine Patch 0.2 mG/24Hr(s) 1 patch Transdermal every 7 days  doxazosin 2 milliGRAM(s) Oral at bedtime  metolazone 5 milliGRAM(s) Oral daily  metoprolol tartrate 12.5 milliGRAM(s) Oral every 12 hours    albuterol/ipratropium for Nebulization 3 milliLiter(s) Nebulizer every 6 hours    acetaminophen     Tablet .. 650 milliGRAM(s) Oral every 6 hours PRN  ALPRAZolam 0.25 milliGRAM(s) Oral three times a day  OLANZapine Injectable 10 milliGRAM(s) IntraMuscular every 4 hours PRN  QUEtiapine 50 milliGRAM(s) Oral at bedtime      apixaban 5 milliGRAM(s) Enteral Tube every 12 hours  aspirin  chewable 81 milliGRAM(s) Oral daily        atorvastatin 10 milliGRAM(s) Oral at bedtime  finasteride 5 milliGRAM(s) Oral daily  insulin glargine Injectable (LANTUS) 20 Unit(s) SubCutaneous every morning  insulin lispro (ADMELOG) corrective regimen sliding scale   SubCutaneous Before meals and at bedtime  insulin lispro Injectable (ADMELOG) 6 Unit(s) SubCutaneous three times a day before meals  predniSONE   Tablet   Oral   predniSONE   Tablet 20 milliGRAM(s) Oral daily    dextrose 5%. 1000 milliLiter(s) IV Continuous <Continuous>      chlorhexidine 2% Cloths 1 Application(s) Topical <User Schedule>  mupirocin 2% Ointment 1 Application(s) Both Nostrils two times a day            ICU Vital Signs Last 24 Hrs  T(C): 37.8 (28 Mar 2023 02:00), Max: 37.9 (27 Mar 2023 09:00)  T(F): 100 (28 Mar 2023 02:00), Max: 100.2 (27 Mar 2023 09:00)  HR: 91 (28 Mar 2023 02:00) (89 - 106)  BP: 113/56 (28 Mar 2023 02:00) (101/59 - 129/67)  BP(mean): 73 (28 Mar 2023 02:00) (63 - 108)  ABP: 107/70 (27 Mar 2023 08:00) (107/70 - 157/96)  ABP(mean): 56 (27 Mar 2023 09:00) (56 - 122)  RR: 26 (28 Mar 2023 02:00) (17 - 37)  SpO2: 99% (28 Mar 2023 02:00) (84% - 100%)    O2 Parameters below as of 28 Mar 2023 00:00  Patient On (Oxygen Delivery Method): BiPAP/CPAP                I&O's Detail    26 Mar 2023 07:01  -  27 Mar 2023 07:00  --------------------------------------------------------  IN:    Dexmedetomidine: 80 mL    IV PiggyBack: 500 mL    Oral Fluid: 200 mL  Total IN: 780 mL    OUT:    Indwelling Catheter - Urethral (mL): 4105 mL  Total OUT: 4105 mL    Total NET: -3325 mL      27 Mar 2023 07:01  -  28 Mar 2023 04:02  --------------------------------------------------------  IN:    dextrose 5%: 1275 mL    Oral Fluid: 1730 mL  Total IN: 3005 mL    OUT:    Indwelling Catheter - Urethral (mL): 2380 mL    Voided (mL): 550 mL  Total OUT: 2930 mL    Total NET: 75 mL            LABS:                        13.3   5.06  )-----------( 198      ( 28 Mar 2023 02:34 )             44.3     03-28    159<H>  |  112<H>  |  33.1<H>  ----------------------------<  167<H>  3.5   |  37.0<H>  |  0.88    Ca    8.6      28 Mar 2023 02:34  Phos  2.1     03-28  Mg     2.0     03-28    TPro  6.2<L>  /  Alb  2.8<L>  /  TBili  0.4  /  DBili  x   /  AST  11  /  ALT  14  /  AlkPhos  153<H>  03-26          CAPILLARY BLOOD GLUCOSE      POCT Blood Glucose.: 209 mg/dL (27 Mar 2023 21:40)        CULTURES:  Culture Results:   Normal Respiratory Negin present (03-23 @ 00:35)  Rapid RVP Result: Detected (03-22 @ 08:25)  Culture Results:   No Growth Final (03-22 @ 08:25)  Culture Results:   No Growth Final (03-22 @ 08:25)            Physical Examination:    General: Disheveled, disorganized.     HEENT: Pupils equal, reactive to light.  Symmetric.    PULM: Bilateral ronchi    CVS: Regular rate and rhythm, no murmurs, rubs, or gallops    ABD: Soft, nondistended, nontender, normoactive bowel sounds, no masses    EXT: Mild anasarca    SKIN: Warm and well perfused, no rashes noted.    NEURO: Alert, oriented to self only, interactive, nonfocal        RADIOLOGY: ***        INVASIVE LINES:  INDWELLING SANCHEZ:  VTE PROPHYLAXIS:  CAM ICU:  CODE STATUS:        CRITICAL CARE TIME SPENT: *** minutes spent performing frequent bedside reassessments and augmenting plan of care to address problems of acute critical illness that pose high probability of life threatening deterioration and/or end organ damage/dysfunction and discussing goals of care, non-inclusive of time spent on procedures performed. Patient is a 77y old  Male who presents with a chief complaint of Acute respiratory failure (27 Mar 2023 15:57)      BRIEF HOSPITAL COURSE: 78 yo male with CAD s/p PCI, AF on Eliquis, moderate AS, morbid obesity, HTN, HLD, T2DM, ALLIE not on CPAP, thoracic aortic aneurysm, BPH s/p TURP, left renal mass, recent hMPV infection, presented from [facility]  to the ED after a fall.  He was found on the floor in the bathroom, hypoxemic and obtunded.  In the ED patient was found to be in acute on chronic hypercapnic hypoxic respiratory failure and placed on NIV.  He was also noted to be persistently hypotensive after ~700cc fluid bolus and was started on Norepinephrine. His respiratory status continued to deteriorate and he was ultimately intubated. Patient was extubated to HFNC on 3/23, though has remained intermittently encephalopathic.      Events last 24 hours: Worsening delirium overnight. Remains significantly hypoxic on HFNC 90% FiO2      PAST MEDICAL & SURGICAL HISTORY:  Anxiety      Hypertension      Hyperlipidemia      Prostate disease      Gastroesophageal reflux disease      Gallbladder stone without cholecystitis or obstruction      BPH (benign prostatic hyperplasia)      DM (diabetes mellitus)      DVT (deep venous thrombosis)  left leg      Afib      Anxiety      HLD (hyperlipidemia)      Malignant schwannoma      H/O arthroscopy of knee      S/P laminectomy      S/P coronary artery stent placement  x2          Review of Systems: Due to altered mental status, subjective information was not able to be obtained from the patient. History was obtained, to the extent possible, from review of the chart and collateral sources of information.        Medications:  cefepime  Injectable. 2000 milliGRAM(s) IV Push every 8 hours    aMIOdarone    Tablet 200 milliGRAM(s) Oral daily  cloNIDine Patch 0.2 mG/24Hr(s) 1 patch Transdermal every 7 days  doxazosin 2 milliGRAM(s) Oral at bedtime  metolazone 5 milliGRAM(s) Oral daily  metoprolol tartrate 12.5 milliGRAM(s) Oral every 12 hours    albuterol/ipratropium for Nebulization 3 milliLiter(s) Nebulizer every 6 hours    acetaminophen     Tablet .. 650 milliGRAM(s) Oral every 6 hours PRN  ALPRAZolam 0.25 milliGRAM(s) Oral three times a day  OLANZapine Injectable 10 milliGRAM(s) IntraMuscular every 4 hours PRN  QUEtiapine 50 milliGRAM(s) Oral at bedtime      apixaban 5 milliGRAM(s) Enteral Tube every 12 hours  aspirin  chewable 81 milliGRAM(s) Oral daily        atorvastatin 10 milliGRAM(s) Oral at bedtime  finasteride 5 milliGRAM(s) Oral daily  insulin glargine Injectable (LANTUS) 20 Unit(s) SubCutaneous every morning  insulin lispro (ADMELOG) corrective regimen sliding scale   SubCutaneous Before meals and at bedtime  insulin lispro Injectable (ADMELOG) 6 Unit(s) SubCutaneous three times a day before meals  predniSONE   Tablet   Oral   predniSONE   Tablet 20 milliGRAM(s) Oral daily    dextrose 5%. 1000 milliLiter(s) IV Continuous <Continuous>      chlorhexidine 2% Cloths 1 Application(s) Topical <User Schedule>  mupirocin 2% Ointment 1 Application(s) Both Nostrils two times a day            ICU Vital Signs Last 24 Hrs  T(C): 37.8 (28 Mar 2023 02:00), Max: 37.9 (27 Mar 2023 09:00)  T(F): 100 (28 Mar 2023 02:00), Max: 100.2 (27 Mar 2023 09:00)  HR: 91 (28 Mar 2023 02:00) (89 - 106)  BP: 113/56 (28 Mar 2023 02:00) (101/59 - 129/67)  BP(mean): 73 (28 Mar 2023 02:00) (63 - 108)  ABP: 107/70 (27 Mar 2023 08:00) (107/70 - 157/96)  ABP(mean): 56 (27 Mar 2023 09:00) (56 - 122)  RR: 26 (28 Mar 2023 02:00) (17 - 37)  SpO2: 99% (28 Mar 2023 02:00) (84% - 100%)    O2 Parameters below as of 28 Mar 2023 00:00  Patient On (Oxygen Delivery Method): BiPAP/CPAP                I&O's Detail    26 Mar 2023 07:01  -  27 Mar 2023 07:00  --------------------------------------------------------  IN:    Dexmedetomidine: 80 mL    IV PiggyBack: 500 mL    Oral Fluid: 200 mL  Total IN: 780 mL    OUT:    Indwelling Catheter - Urethral (mL): 4105 mL  Total OUT: 4105 mL    Total NET: -3325 mL      27 Mar 2023 07:01  -  28 Mar 2023 04:02  --------------------------------------------------------  IN:    dextrose 5%: 1275 mL    Oral Fluid: 1730 mL  Total IN: 3005 mL    OUT:    Indwelling Catheter - Urethral (mL): 2380 mL    Voided (mL): 550 mL  Total OUT: 2930 mL    Total NET: 75 mL            LABS:                        13.3   5.06  )-----------( 198      ( 28 Mar 2023 02:34 )             44.3     03-28    159<H>  |  112<H>  |  33.1<H>  ----------------------------<  167<H>  3.5   |  37.0<H>  |  0.88    Ca    8.6      28 Mar 2023 02:34  Phos  2.1     03-28  Mg     2.0     03-28    TPro  6.2<L>  /  Alb  2.8<L>  /  TBili  0.4  /  DBili  x   /  AST  11  /  ALT  14  /  AlkPhos  153<H>  03-26          CAPILLARY BLOOD GLUCOSE      POCT Blood Glucose.: 209 mg/dL (27 Mar 2023 21:40)        CULTURES:  Culture Results:   Normal Respiratory Negin present (03-23 @ 00:35)  Rapid RVP Result: Detected (03-22 @ 08:25)  Culture Results:   No Growth Final (03-22 @ 08:25)  Culture Results:   No Growth Final (03-22 @ 08:25)            Physical Examination:    General: Disheveled, disorganized.     HEENT: Pupils equal, reactive to light.  Symmetric.    PULM: Bilateral ronchi    CVS: Regular rate and rhythm, no murmurs, rubs, or gallops    ABD: Soft, nondistended, nontender, normoactive bowel sounds, no masses    EXT: Mild anasarca    SKIN: Warm and well perfused, no rashes noted.    NEURO: Alert, oriented to self only, interactive, disorganized, nonfocal        INVASIVE LINES: R IJ tlc  INDWELLING SANCHEZ: Y  VTE PROPHYLAXIS: Eliquis  CAM ICU: +  CODE STATUS: DNR        CRITICAL CARE TIME SPENT: 45 minutes spent performing frequent bedside reassessments and augmenting plan of care to address problems of acute critical illness that pose high probability of life threatening deterioration and/or end organ damage/dysfunction and discussing goals of care, non-inclusive of time spent on procedures performed.

## 2023-03-29 LAB
ALBUMIN SERPL ELPH-MCNC: 3 G/DL — LOW (ref 3.3–5.2)
ALP SERPL-CCNC: 129 U/L — HIGH (ref 40–120)
ALT FLD-CCNC: 9 U/L — SIGNIFICANT CHANGE UP
ANION GAP SERPL CALC-SCNC: 6 MMOL/L — SIGNIFICANT CHANGE UP (ref 5–17)
AST SERPL-CCNC: 12 U/L — SIGNIFICANT CHANGE UP
BASOPHILS # BLD AUTO: 0 K/UL — SIGNIFICANT CHANGE UP (ref 0–0.2)
BASOPHILS NFR BLD AUTO: 0 % — SIGNIFICANT CHANGE UP (ref 0–2)
BILIRUB SERPL-MCNC: 0.8 MG/DL — SIGNIFICANT CHANGE UP (ref 0.4–2)
BUN SERPL-MCNC: 26.6 MG/DL — HIGH (ref 8–20)
CALCIUM SERPL-MCNC: 8.4 MG/DL — SIGNIFICANT CHANGE UP (ref 8.4–10.5)
CHLORIDE SERPL-SCNC: 102 MMOL/L — SIGNIFICANT CHANGE UP (ref 96–108)
CO2 SERPL-SCNC: 41 MMOL/L — HIGH (ref 22–29)
CREAT SERPL-MCNC: 0.57 MG/DL — SIGNIFICANT CHANGE UP (ref 0.5–1.3)
EGFR: 101 ML/MIN/1.73M2 — SIGNIFICANT CHANGE UP
EOSINOPHIL # BLD AUTO: 0.04 K/UL — SIGNIFICANT CHANGE UP (ref 0–0.5)
EOSINOPHIL NFR BLD AUTO: 0.9 % — SIGNIFICANT CHANGE UP (ref 0–6)
GLUCOSE BLDC GLUCOMTR-MCNC: 184 MG/DL — HIGH (ref 70–99)
GLUCOSE BLDC GLUCOMTR-MCNC: 211 MG/DL — HIGH (ref 70–99)
GLUCOSE BLDC GLUCOMTR-MCNC: 219 MG/DL — HIGH (ref 70–99)
GLUCOSE SERPL-MCNC: 171 MG/DL — HIGH (ref 70–99)
HCT VFR BLD CALC: 45.9 % — SIGNIFICANT CHANGE UP (ref 39–50)
HGB BLD-MCNC: 13.5 G/DL — SIGNIFICANT CHANGE UP (ref 13–17)
LYMPHOCYTES # BLD AUTO: 0.63 K/UL — LOW (ref 1–3.3)
LYMPHOCYTES # BLD AUTO: 14.8 % — SIGNIFICANT CHANGE UP (ref 13–44)
MAGNESIUM SERPL-MCNC: 2 MG/DL — SIGNIFICANT CHANGE UP (ref 1.6–2.6)
MANUAL SMEAR VERIFICATION: SIGNIFICANT CHANGE UP
MCHC RBC-ENTMCNC: 29.2 PG — SIGNIFICANT CHANGE UP (ref 27–34)
MCHC RBC-ENTMCNC: 29.4 GM/DL — LOW (ref 32–36)
MCV RBC AUTO: 99.1 FL — SIGNIFICANT CHANGE UP (ref 80–100)
MONOCYTES # BLD AUTO: 0.18 K/UL — SIGNIFICANT CHANGE UP (ref 0–0.9)
MONOCYTES NFR BLD AUTO: 4.3 % — SIGNIFICANT CHANGE UP (ref 2–14)
MYELOCYTES NFR BLD: 0.9 % — HIGH (ref 0–0)
NEUTROPHILS # BLD AUTO: 3.36 K/UL — SIGNIFICANT CHANGE UP (ref 1.8–7.4)
NEUTROPHILS NFR BLD AUTO: 79.1 % — HIGH (ref 43–77)
PHOSPHATE SERPL-MCNC: 3 MG/DL — SIGNIFICANT CHANGE UP (ref 2.4–4.7)
PLAT MORPH BLD: NORMAL — SIGNIFICANT CHANGE UP
PLATELET # BLD AUTO: 176 K/UL — SIGNIFICANT CHANGE UP (ref 150–400)
POTASSIUM SERPL-MCNC: 3.5 MMOL/L — SIGNIFICANT CHANGE UP (ref 3.5–5.3)
POTASSIUM SERPL-SCNC: 3.5 MMOL/L — SIGNIFICANT CHANGE UP (ref 3.5–5.3)
PROT SERPL-MCNC: 6.1 G/DL — LOW (ref 6.6–8.7)
RBC # BLD: 4.63 M/UL — SIGNIFICANT CHANGE UP (ref 4.2–5.8)
RBC # FLD: 14 % — SIGNIFICANT CHANGE UP (ref 10.3–14.5)
RBC BLD AUTO: NORMAL — SIGNIFICANT CHANGE UP
SODIUM SERPL-SCNC: 149 MMOL/L — HIGH (ref 135–145)
WBC # BLD: 4.25 K/UL — SIGNIFICANT CHANGE UP (ref 3.8–10.5)
WBC # FLD AUTO: 4.25 K/UL — SIGNIFICANT CHANGE UP (ref 3.8–10.5)

## 2023-03-29 PROCEDURE — 99233 SBSQ HOSP IP/OBS HIGH 50: CPT

## 2023-03-29 RX ORDER — ALPRAZOLAM 0.25 MG
0.25 TABLET ORAL AT BEDTIME
Refills: 0 | Status: DISCONTINUED | OUTPATIENT
Start: 2023-03-29 | End: 2023-04-05

## 2023-03-29 RX ORDER — POTASSIUM CHLORIDE 20 MEQ
40 PACKET (EA) ORAL EVERY 4 HOURS
Refills: 0 | Status: COMPLETED | OUTPATIENT
Start: 2023-03-29 | End: 2023-03-29

## 2023-03-29 RX ADMIN — Medication 6 UNIT(S): at 17:14

## 2023-03-29 RX ADMIN — Medication 650 MILLIGRAM(S): at 23:43

## 2023-03-29 RX ADMIN — Medication 6 UNIT(S): at 11:29

## 2023-03-29 RX ADMIN — APIXABAN 5 MILLIGRAM(S): 2.5 TABLET, FILM COATED ORAL at 22:11

## 2023-03-29 RX ADMIN — Medication 20 MILLIGRAM(S): at 05:57

## 2023-03-29 RX ADMIN — Medication 4: at 17:13

## 2023-03-29 RX ADMIN — ATORVASTATIN CALCIUM 10 MILLIGRAM(S): 80 TABLET, FILM COATED ORAL at 22:16

## 2023-03-29 RX ADMIN — Medication 3 MILLILITER(S): at 20:26

## 2023-03-29 RX ADMIN — QUETIAPINE FUMARATE 50 MILLIGRAM(S): 200 TABLET, FILM COATED ORAL at 22:11

## 2023-03-29 RX ADMIN — Medication 0.25 MILLIGRAM(S): at 05:56

## 2023-03-29 RX ADMIN — Medication 2: at 07:55

## 2023-03-29 RX ADMIN — Medication 3 MILLILITER(S): at 04:04

## 2023-03-29 RX ADMIN — Medication 12.5 MILLIGRAM(S): at 17:21

## 2023-03-29 RX ADMIN — SODIUM CHLORIDE 100 MILLILITER(S): 9 INJECTION, SOLUTION INTRAVENOUS at 04:55

## 2023-03-29 RX ADMIN — Medication 3 MILLILITER(S): at 08:30

## 2023-03-29 RX ADMIN — Medication 12.5 MILLIGRAM(S): at 05:56

## 2023-03-29 RX ADMIN — Medication 6 UNIT(S): at 08:00

## 2023-03-29 RX ADMIN — MUPIROCIN 1 APPLICATION(S): 20 OINTMENT TOPICAL at 05:58

## 2023-03-29 RX ADMIN — Medication 81 MILLIGRAM(S): at 11:29

## 2023-03-29 RX ADMIN — Medication 3 MILLILITER(S): at 15:35

## 2023-03-29 RX ADMIN — AMIODARONE HYDROCHLORIDE 200 MILLIGRAM(S): 400 TABLET ORAL at 05:56

## 2023-03-29 RX ADMIN — Medication 2: at 22:20

## 2023-03-29 RX ADMIN — INSULIN GLARGINE 20 UNIT(S): 100 INJECTION, SOLUTION SUBCUTANEOUS at 08:00

## 2023-03-29 RX ADMIN — SODIUM CHLORIDE 100 MILLILITER(S): 9 INJECTION, SOLUTION INTRAVENOUS at 11:09

## 2023-03-29 RX ADMIN — Medication 0.25 MILLIGRAM(S): at 23:44

## 2023-03-29 RX ADMIN — Medication 4: at 11:28

## 2023-03-29 RX ADMIN — FINASTERIDE 5 MILLIGRAM(S): 5 TABLET, FILM COATED ORAL at 17:14

## 2023-03-29 RX ADMIN — Medication 40 MILLIEQUIVALENT(S): at 14:34

## 2023-03-29 RX ADMIN — APIXABAN 5 MILLIGRAM(S): 2.5 TABLET, FILM COATED ORAL at 07:59

## 2023-03-29 RX ADMIN — Medication 40 MILLIEQUIVALENT(S): at 11:09

## 2023-03-29 RX ADMIN — Medication 2 MILLIGRAM(S): at 22:11

## 2023-03-29 RX ADMIN — CHLORHEXIDINE GLUCONATE 1 APPLICATION(S): 213 SOLUTION TOPICAL at 06:02

## 2023-03-29 RX ADMIN — Medication 1 PATCH: at 08:28

## 2023-03-29 RX ADMIN — Medication 1 PATCH: at 21:01

## 2023-03-29 NOTE — PROGRESS NOTE ADULT - ASSESSMENT
76 yo male with CAD s/p PCI, AF on Eliquis, moderate AS, morbid obesity, HTN, HLD, T2DM, ALLIE not on CPAP, thoracic aortic aneurysm, BPH s/p TURP, left renal mass, recent hMPV infection, admitted with acute toxic-metabolic encephalopathy secondary to acute on chronic hypoxic hypercapnic respiratory failure and septic shock likely secondary to +hMPV viral pneumonia with superimposed bacterial pneumonia.     Shock resolved, off vasopressors for several days now though continues to require ICU level of care for refractory hypoxemia and encephalopathy with high risk for necessitating reintubation  HFNC dependent with significant FiO2 requirements, though improving today able to wean down to 60%/40LPM.  CTA obtained negative for PE, with tracheomalacia of uncertain significance and RML/RLL/LLL atelectasis which may account for persistent hypoxemia and chronic hypercapnea due to underlying OHS.   More lethargic tonight, placed on NIV, FiO2 weaned to 40%. If mentation does not improve with NIV will obtain ABG to eval for CO2 narcosis/acid-base balance.  Was diuresed for component of acute on chronic diastolic heart failure, Lasix stopped due to worsening hypernatremia and PHILLY without improvement in oxygenation. Overall net negative fluid balance 6.5L.  Empiric antibiotics with Cefepime, fever curve downtrending. Cultures negative to date.  Tapering steroids  Limiting use of neurosuppressants as able  Significantly hypernatremic, on D5W at 100cc/hr  Passed dysphagia screen diet advanced to puree moderately thickened liquids with strict aspiration precautions high risk  PT, OOB and IS to optimize oxygenation

## 2023-03-29 NOTE — PROGRESS NOTE ADULT - ASSESSMENT
· Assessment	  76 yo male with CAD s/p PCI, AF on eliquis, moderate AS, chronic systolic CHF/HFpEF, morbid obesity, HTN, HLD, DM, thoracic aortic aneurysm, BPH s/p TURP, left renal mass, recent hMPV infection, now admitted with acute hypercapnic respiratory failure, septic shock, multifocal pneumonia, hMPV infection, encephalopathy, PHILLY.      Plan    Acute respiratory failure  - completed course of abx for possible superimposed bacterial pneumonia  - on high flow, weaning down    Atrial fibrillation  - eliquis for AC    HFpEF/ moderate AS  - on thiazide diuretic (patient very hypernatremic)  - will likely need loop diuretic soon     Chronic hypercapnic respiratory failure/  OHS/ALLIE  - nocturnal NIV    Hypernatremia  - short course of d5w and will start thiazide diuretic     Hyperglycemia  - increased lantus, started premeal lispro in addition to sliding scale    Sedation/Analgesia: none  Vasoactive medications: none  DVT prophylaxis: eliquis  GI prophylaxis: protonix  Nutrition: puree diet  Mobility: oob   Patient to follow up with her established outpatient psychiatrist Dr. Amaya Davidson.

## 2023-03-29 NOTE — PROGRESS NOTE ADULT - SUBJECTIVE AND OBJECTIVE BOX
Interval HPI:  no major events    Exam:  sleeping when I arrived  on 50% fio2, 40 lpm, comfortable  reg rhythm  abd nontender  bilat edema     Radiology: CTA chest - bilat airspace opacities, no PE    On Admission  03-22-23 (7d)  HPI:  76 yo male with CAD s/p PCI, AF on eliquis, moderate AS, morbid obesity, HTN, HLD, DM, thoracic aortic aneurysm, BPH s/p TURP, left renal mass, recent hMPV infection, presented from [facility]  to the ED after a fall.  He was found on the floor in the bathroom, hypoxemic and obtunded.    In the ED patient was found to be in hypercapnic respiratory failure and placed on NIV.  He was also noted to be hypotensive and started on pressors after a fluid bolus.    (22 Mar 2023 11:15)    PAST MEDICAL & SURGICAL HISTORY:  Anxiety      Hypertension      Hyperlipidemia      Prostate disease      Gastroesophageal reflux disease      Gallbladder stone without cholecystitis or obstruction      BPH (benign prostatic hyperplasia)      DM (diabetes mellitus)      DVT (deep venous thrombosis)  left leg      Afib      Anxiety      HLD (hyperlipidemia)      Malignant schwannoma      H/O arthroscopy of knee      S/P laminectomy      S/P coronary artery stent placement  x2          Antimicrobial:    Cardiovascular:  aMIOdarone    Tablet 200 milliGRAM(s) Oral daily  cloNIDine Patch 0.2 mG/24Hr(s) 1 patch Transdermal every 7 days  doxazosin 2 milliGRAM(s) Oral at bedtime  metolazone 5 milliGRAM(s) Oral daily  metoprolol tartrate 12.5 milliGRAM(s) Oral every 12 hours    Pulmonary:  albuterol/ipratropium for Nebulization 3 milliLiter(s) Nebulizer every 6 hours    Hematalogic:  apixaban 5 milliGRAM(s) Enteral Tube every 12 hours  aspirin  chewable 81 milliGRAM(s) Oral daily    Other:  acetaminophen     Tablet .. 650 milliGRAM(s) Oral every 6 hours PRN  ALPRAZolam 0.25 milliGRAM(s) Oral three times a day  atorvastatin 10 milliGRAM(s) Oral at bedtime  chlorhexidine 2% Cloths 1 Application(s) Topical <User Schedule>  dextrose 5%. 1000 milliLiter(s) IV Continuous <Continuous>  finasteride 5 milliGRAM(s) Oral daily  insulin glargine Injectable (LANTUS) 20 Unit(s) SubCutaneous every morning  insulin lispro (ADMELOG) corrective regimen sliding scale   SubCutaneous Before meals and at bedtime  insulin lispro Injectable (ADMELOG) 6 Unit(s) SubCutaneous three times a day before meals  polyethylene glycol 3350 17 Gram(s) Oral daily  potassium chloride    Tablet ER 40 milliEquivalent(s) Oral every 4 hours  predniSONE   Tablet   Oral   QUEtiapine 50 milliGRAM(s) Oral at bedtime  senna 2 Tablet(s) Oral at bedtime      Drug Dosing Weight  Height (cm): 185.4 (22 Mar 2023 13:10)  Weight (kg): 157.7 (22 Mar 2023 13:10)  BMI (kg/m2): 45.9 (22 Mar 2023 13:10)  BSA (m2): 2.72 (22 Mar 2023 13:10)    T(C): 36.9 (03-29-23 @ 12:00), Max: 37.2 (03-28-23 @ 15:44)  HR: 75 (03-29-23 @ 12:00)  BP: 125/89 (03-29-23 @ 12:00)  BP(mean): 90 (03-29-23 @ 12:00)  ABP: --  ABP(mean): --  RR: 17 (03-29-23 @ 12:00)  SpO2: 96% (03-29-23 @ 12:00)          03-28 @ 07:01  -  03-29 @ 07:00  --------------------------------------------------------  IN: 2850 mL / OUT: 1250 mL / NET: 1600 mL              LABS:  CBC Full  -  ( 29 Mar 2023 03:50 )  WBC Count : 4.25 K/uL  RBC Count : 4.63 M/uL  Hemoglobin : 13.5 g/dL  Hematocrit : 45.9 %  Platelet Count - Automated : 176 K/uL  Mean Cell Volume : 99.1 fl  Mean Cell Hemoglobin : 29.2 pg  Mean Cell Hemoglobin Concentration : 29.4 gm/dL  Auto Neutrophil # : 3.36 K/uL  Auto Lymphocyte # : 0.63 K/uL  Auto Monocyte # : 0.18 K/uL  Auto Eosinophil # : 0.04 K/uL  Auto Basophil # : 0.00 K/uL  Auto Neutrophil % : 79.1 %  Auto Lymphocyte % : 14.8 %  Auto Monocyte % : 4.3 %  Auto Eosinophil % : 0.9 %  Auto Basophil % : 0.0 %    03-29    149<H>  |  102  |  26.6<H>  ----------------------------<  171<H>  3.5   |  41.0<H>  |  0.57    Ca    8.4      29 Mar 2023 03:50  Phos  3.0     03-29  Mg     2.0     03-29    TPro  6.1<L>  /  Alb  3.0<L>  /  TBili  0.8  /  DBili  x   /  AST  12  /  ALT  9   /  AlkPhos  129<H>  03-29          ____________________________________________________________________________________________________

## 2023-03-29 NOTE — PROGRESS NOTE ADULT - SUBJECTIVE AND OBJECTIVE BOX
Patient is a 77y old  Male who presents with a chief complaint of Acute respiratory failure (28 Mar 2023 04:01)      BRIEF HOSPITAL COURSE: 78 yo male with CAD s/p PCI, AF on Eliquis, moderate AS, morbid obesity, HTN, HLD, T2DM, ALLIE not on CPAP, thoracic aortic aneurysm, BPH s/p TURP, left renal mass, recent hMPV infection, presented from [facility]  to the ED after a fall.  He was found on the floor in the bathroom, hypoxemic and obtunded.  In the ED patient was found to be in acute on chronic hypercapnic hypoxic respiratory failure and placed on NIV.  He was also noted to be persistently hypotensive after ~700cc fluid bolus and was started on Norepinephrine. His respiratory status continued to deteriorate and he was ultimately intubated. Patient was extubated to HFNC on 3/23, though has remained encephalopathic.      Events last 24 hours: More lethargic tonight, placed on NIV. Hypoxia improving, weaned down to 60% / 40 LPM on HFNC, and 40% on NIV.        PAST MEDICAL & SURGICAL HISTORY:  Anxiety      Hypertension      Hyperlipidemia      Prostate disease      Gastroesophageal reflux disease      Gallbladder stone without cholecystitis or obstruction      BPH (benign prostatic hyperplasia)      DM (diabetes mellitus)      DVT (deep venous thrombosis)  left leg      Afib      Anxiety      HLD (hyperlipidemia)      Malignant schwannoma      H/O arthroscopy of knee      S/P laminectomy      S/P coronary artery stent placement  x2            Review of Systems: Due to altered mental status, subjective information was not able to be obtained from the patient. History was obtained, to the extent possible, from review of the chart and collateral sources of information.        Medications:    aMIOdarone    Tablet 200 milliGRAM(s) Oral daily  cloNIDine Patch 0.2 mG/24Hr(s) 1 patch Transdermal every 7 days  doxazosin 2 milliGRAM(s) Oral at bedtime  metolazone 5 milliGRAM(s) Oral daily  metoprolol tartrate 12.5 milliGRAM(s) Oral every 12 hours    albuterol/ipratropium for Nebulization 3 milliLiter(s) Nebulizer every 6 hours    acetaminophen     Tablet .. 650 milliGRAM(s) Oral every 6 hours PRN  ALPRAZolam 0.25 milliGRAM(s) Oral three times a day  OLANZapine Injectable 10 milliGRAM(s) IntraMuscular every 4 hours PRN  QUEtiapine 50 milliGRAM(s) Oral at bedtime      apixaban 5 milliGRAM(s) Enteral Tube every 12 hours  aspirin  chewable 81 milliGRAM(s) Oral daily    polyethylene glycol 3350 17 Gram(s) Oral daily  senna 2 Tablet(s) Oral at bedtime      atorvastatin 10 milliGRAM(s) Oral at bedtime  finasteride 5 milliGRAM(s) Oral daily  insulin glargine Injectable (LANTUS) 20 Unit(s) SubCutaneous every morning  insulin lispro (ADMELOG) corrective regimen sliding scale   SubCutaneous Before meals and at bedtime  insulin lispro Injectable (ADMELOG) 6 Unit(s) SubCutaneous three times a day before meals  predniSONE   Tablet 20 milliGRAM(s) Oral daily  predniSONE   Tablet   Oral     dextrose 5%. 1000 milliLiter(s) IV Continuous <Continuous>      chlorhexidine 2% Cloths 1 Application(s) Topical <User Schedule>  mupirocin 2% Ointment 1 Application(s) Both Nostrils two times a day            ICU Vital Signs Last 24 Hrs  T(C): 36.9 (28 Mar 2023 20:30), Max: 37.8 (28 Mar 2023 02:00)  T(F): 98.5 (28 Mar 2023 20:30), Max: 100 (28 Mar 2023 02:00)  HR: 82 (29 Mar 2023 00:00) (71 - 96)  BP: 121/66 (29 Mar 2023 00:00) (106/64 - 142/77)  BP(mean): 82 (29 Mar 2023 00:00) (73 - 94)  ABP: --  ABP(mean): --  RR: 26 (29 Mar 2023 00:00) (15 - 36)  SpO2: 99% (29 Mar 2023 00:00) (85% - 99%)    O2 Parameters below as of 28 Mar 2023 20:55  Patient On (Oxygen Delivery Method): nasal cannula, high flow  O2 Flow (L/min): 40  O2 Concentration (%): 60            I&O's Detail    27 Mar 2023 07:01  -  28 Mar 2023 07:00  --------------------------------------------------------  IN:    dextrose 5%: 1800 mL    Oral Fluid: 1730 mL  Total IN: 3530 mL    OUT:    Indwelling Catheter - Urethral (mL): 2380 mL    Voided (mL): 1000 mL  Total OUT: 3380 mL    Total NET: 150 mL      28 Mar 2023 07:01  -  29 Mar 2023 01:15  --------------------------------------------------------  IN:    dextrose 5%: 1500 mL    IV PiggyBack: 200 mL    IV PiggyBack: 500 mL    Oral Fluid: 250 mL  Total IN: 2450 mL    OUT:    Voided (mL): 900 mL  Total OUT: 900 mL    Total NET: 1550 mL            LABS:                        13.3   5.06  )-----------( 198      ( 28 Mar 2023 02:34 )             44.3     03-28    155<H>  |  108  |  27.7<H>  ----------------------------<  201<H>  4.2   |  38.0<H>  |  0.66    Ca    8.3<L>      28 Mar 2023 14:00  Phos  3.6     03-28  Mg     1.9     03-28    TPro  6.0<L>  /  Alb  3.1<L>  /  TBili  0.7  /  DBili  x   /  AST  10  /  ALT  9   /  AlkPhos  125<H>  03-28          CAPILLARY BLOOD GLUCOSE      POCT Blood Glucose.: 129 mg/dL (28 Mar 2023 21:21)        CULTURES:  Culture Results:   Normal Respiratory Negin present (03-23 @ 00:35)  Rapid RVP Result: Detected (03-22 @ 08:25)  Culture Results:   No Growth Final (03-22 @ 08:25)  Culture Results:   No Growth Final (03-22 @ 08:25)            Physical Examination:    General: Ill appearing. No acute distress.      HEENT: Pupils equal, reactive to light.  Symmetric.    PULM: Diminished bilaterally    CVS: Regular rate and rhythm, no murmurs, rubs, or gallops    ABD: Soft, nondistended, nontender, normoactive bowel sounds, no masses    EXT: No edema, nontender    SKIN: Warm and well perfused, no rashes noted.    NEURO: Somnolent, arousable to verbal stimuli, disoriented, interactive, nonfocal          INVASIVE LINES: X   INDWELLING SANCHEZ: X   VTE PROPHYLAXIS: Eliquis  CAM ICU: +   CODE STATUS: DNR        CRITICAL CARE TIME SPENT: 40 minutes spent performing frequent bedside reassessments and augmenting plan of care to address problems of acute critical illness that pose high probability of life threatening deterioration and/or end organ damage/dysfunction and discussing goals of care, non-inclusive of time spent on procedures performed.

## 2023-03-30 LAB
ALBUMIN SERPL ELPH-MCNC: 2.9 G/DL — LOW (ref 3.3–5.2)
ALP SERPL-CCNC: 122 U/L — HIGH (ref 40–120)
ALT FLD-CCNC: 7 U/L — SIGNIFICANT CHANGE UP
ANION GAP SERPL CALC-SCNC: 9 MMOL/L — SIGNIFICANT CHANGE UP (ref 5–17)
AST SERPL-CCNC: 11 U/L — SIGNIFICANT CHANGE UP
BASOPHILS # BLD AUTO: 0.02 K/UL — SIGNIFICANT CHANGE UP (ref 0–0.2)
BASOPHILS NFR BLD AUTO: 0.4 % — SIGNIFICANT CHANGE UP (ref 0–2)
BILIRUB SERPL-MCNC: 0.7 MG/DL — SIGNIFICANT CHANGE UP (ref 0.4–2)
BUN SERPL-MCNC: 18.4 MG/DL — SIGNIFICANT CHANGE UP (ref 8–20)
CALCIUM SERPL-MCNC: 8.2 MG/DL — LOW (ref 8.4–10.5)
CHLORIDE SERPL-SCNC: 97 MMOL/L — SIGNIFICANT CHANGE UP (ref 96–108)
CO2 SERPL-SCNC: 37 MMOL/L — HIGH (ref 22–29)
CREAT SERPL-MCNC: 0.57 MG/DL — SIGNIFICANT CHANGE UP (ref 0.5–1.3)
EGFR: 101 ML/MIN/1.73M2 — SIGNIFICANT CHANGE UP
EOSINOPHIL # BLD AUTO: 0.25 K/UL — SIGNIFICANT CHANGE UP (ref 0–0.5)
EOSINOPHIL NFR BLD AUTO: 4.5 % — SIGNIFICANT CHANGE UP (ref 0–6)
GLUCOSE BLDC GLUCOMTR-MCNC: 151 MG/DL — HIGH (ref 70–99)
GLUCOSE BLDC GLUCOMTR-MCNC: 176 MG/DL — HIGH (ref 70–99)
GLUCOSE BLDC GLUCOMTR-MCNC: 200 MG/DL — HIGH (ref 70–99)
GLUCOSE BLDC GLUCOMTR-MCNC: 222 MG/DL — HIGH (ref 70–99)
GLUCOSE SERPL-MCNC: 176 MG/DL — HIGH (ref 70–99)
HCT VFR BLD CALC: 43 % — SIGNIFICANT CHANGE UP (ref 39–50)
HGB BLD-MCNC: 12.9 G/DL — LOW (ref 13–17)
IMM GRANULOCYTES NFR BLD AUTO: 1.8 % — HIGH (ref 0–0.9)
LYMPHOCYTES # BLD AUTO: 0.65 K/UL — LOW (ref 1–3.3)
LYMPHOCYTES # BLD AUTO: 11.7 % — LOW (ref 13–44)
MAGNESIUM SERPL-MCNC: 1.8 MG/DL — SIGNIFICANT CHANGE UP (ref 1.6–2.6)
MCHC RBC-ENTMCNC: 28.7 PG — SIGNIFICANT CHANGE UP (ref 27–34)
MCHC RBC-ENTMCNC: 30 GM/DL — LOW (ref 32–36)
MCV RBC AUTO: 95.8 FL — SIGNIFICANT CHANGE UP (ref 80–100)
MONOCYTES # BLD AUTO: 0.48 K/UL — SIGNIFICANT CHANGE UP (ref 0–0.9)
MONOCYTES NFR BLD AUTO: 8.6 % — SIGNIFICANT CHANGE UP (ref 2–14)
NEUTROPHILS # BLD AUTO: 4.05 K/UL — SIGNIFICANT CHANGE UP (ref 1.8–7.4)
NEUTROPHILS NFR BLD AUTO: 73 % — SIGNIFICANT CHANGE UP (ref 43–77)
PHOSPHATE SERPL-MCNC: 2.3 MG/DL — LOW (ref 2.4–4.7)
PLATELET # BLD AUTO: 172 K/UL — SIGNIFICANT CHANGE UP (ref 150–400)
POTASSIUM SERPL-MCNC: 3.5 MMOL/L — SIGNIFICANT CHANGE UP (ref 3.5–5.3)
POTASSIUM SERPL-SCNC: 3.5 MMOL/L — SIGNIFICANT CHANGE UP (ref 3.5–5.3)
PROT SERPL-MCNC: 5.7 G/DL — LOW (ref 6.6–8.7)
RBC # BLD: 4.49 M/UL — SIGNIFICANT CHANGE UP (ref 4.2–5.8)
RBC # FLD: 13.4 % — SIGNIFICANT CHANGE UP (ref 10.3–14.5)
SODIUM SERPL-SCNC: 143 MMOL/L — SIGNIFICANT CHANGE UP (ref 135–145)
WBC # BLD: 5.55 K/UL — SIGNIFICANT CHANGE UP (ref 3.8–10.5)
WBC # FLD AUTO: 5.55 K/UL — SIGNIFICANT CHANGE UP (ref 3.8–10.5)

## 2023-03-30 PROCEDURE — 99233 SBSQ HOSP IP/OBS HIGH 50: CPT

## 2023-03-30 PROCEDURE — 99233 SBSQ HOSP IP/OBS HIGH 50: CPT | Mod: GC

## 2023-03-30 RX ORDER — PANTOPRAZOLE SODIUM 20 MG/1
40 TABLET, DELAYED RELEASE ORAL DAILY
Refills: 0 | Status: DISCONTINUED | OUTPATIENT
Start: 2023-03-30 | End: 2023-04-07

## 2023-03-30 RX ORDER — LIDOCAINE 4 G/100G
1 CREAM TOPICAL DAILY
Refills: 0 | Status: DISCONTINUED | OUTPATIENT
Start: 2023-03-30 | End: 2023-04-07

## 2023-03-30 RX ORDER — POTASSIUM PHOSPHATE, MONOBASIC POTASSIUM PHOSPHATE, DIBASIC 236; 224 MG/ML; MG/ML
30 INJECTION, SOLUTION INTRAVENOUS ONCE
Refills: 0 | Status: COMPLETED | OUTPATIENT
Start: 2023-03-30 | End: 2023-03-30

## 2023-03-30 RX ORDER — MAGNESIUM OXIDE 400 MG ORAL TABLET 241.3 MG
400 TABLET ORAL
Refills: 0 | Status: COMPLETED | OUTPATIENT
Start: 2023-03-30 | End: 2023-04-01

## 2023-03-30 RX ORDER — POTASSIUM CHLORIDE 20 MEQ
40 PACKET (EA) ORAL ONCE
Refills: 0 | Status: COMPLETED | OUTPATIENT
Start: 2023-03-30 | End: 2023-03-30

## 2023-03-30 RX ORDER — IBUPROFEN 200 MG
600 TABLET ORAL EVERY 6 HOURS
Refills: 0 | Status: DISCONTINUED | OUTPATIENT
Start: 2023-03-30 | End: 2023-04-07

## 2023-03-30 RX ORDER — MAGNESIUM OXIDE 400 MG ORAL TABLET 241.3 MG
400 TABLET ORAL ONCE
Refills: 0 | Status: COMPLETED | OUTPATIENT
Start: 2023-03-30 | End: 2023-03-30

## 2023-03-30 RX ADMIN — Medication 3 MILLILITER(S): at 09:32

## 2023-03-30 RX ADMIN — INSULIN GLARGINE 20 UNIT(S): 100 INJECTION, SOLUTION SUBCUTANEOUS at 08:12

## 2023-03-30 RX ADMIN — POTASSIUM PHOSPHATE, MONOBASIC POTASSIUM PHOSPHATE, DIBASIC 83.33 MILLIMOLE(S): 236; 224 INJECTION, SOLUTION INTRAVENOUS at 09:26

## 2023-03-30 RX ADMIN — LIDOCAINE 1 PATCH: 4 CREAM TOPICAL at 16:18

## 2023-03-30 RX ADMIN — LIDOCAINE 1 PATCH: 4 CREAM TOPICAL at 16:19

## 2023-03-30 RX ADMIN — AMIODARONE HYDROCHLORIDE 200 MILLIGRAM(S): 400 TABLET ORAL at 06:34

## 2023-03-30 RX ADMIN — LIDOCAINE 1 PATCH: 4 CREAM TOPICAL at 18:50

## 2023-03-30 RX ADMIN — Medication 2: at 21:53

## 2023-03-30 RX ADMIN — Medication 600 MILLIGRAM(S): at 17:19

## 2023-03-30 RX ADMIN — Medication 3 MILLILITER(S): at 04:16

## 2023-03-30 RX ADMIN — Medication 2: at 16:30

## 2023-03-30 RX ADMIN — Medication 6 UNIT(S): at 12:20

## 2023-03-30 RX ADMIN — Medication 600 MILLIGRAM(S): at 16:19

## 2023-03-30 RX ADMIN — Medication 81 MILLIGRAM(S): at 12:21

## 2023-03-30 RX ADMIN — Medication 6 UNIT(S): at 16:31

## 2023-03-30 RX ADMIN — Medication 3 MILLILITER(S): at 21:48

## 2023-03-30 RX ADMIN — FINASTERIDE 5 MILLIGRAM(S): 5 TABLET, FILM COATED ORAL at 12:21

## 2023-03-30 RX ADMIN — Medication 12.5 MILLIGRAM(S): at 06:35

## 2023-03-30 RX ADMIN — Medication 2: at 12:19

## 2023-03-30 RX ADMIN — Medication 1 PATCH: at 18:50

## 2023-03-30 RX ADMIN — Medication 40 MILLIEQUIVALENT(S): at 08:15

## 2023-03-30 RX ADMIN — MAGNESIUM OXIDE 400 MG ORAL TABLET 400 MILLIGRAM(S): 241.3 TABLET ORAL at 18:22

## 2023-03-30 RX ADMIN — Medication 6 UNIT(S): at 08:12

## 2023-03-30 RX ADMIN — MAGNESIUM OXIDE 400 MG ORAL TABLET 400 MILLIGRAM(S): 241.3 TABLET ORAL at 08:15

## 2023-03-30 RX ADMIN — Medication 650 MILLIGRAM(S): at 21:30

## 2023-03-30 RX ADMIN — Medication 10 MILLIGRAM(S): at 06:34

## 2023-03-30 RX ADMIN — APIXABAN 5 MILLIGRAM(S): 2.5 TABLET, FILM COATED ORAL at 21:06

## 2023-03-30 RX ADMIN — APIXABAN 5 MILLIGRAM(S): 2.5 TABLET, FILM COATED ORAL at 08:15

## 2023-03-30 RX ADMIN — Medication 12.5 MILLIGRAM(S): at 18:23

## 2023-03-30 RX ADMIN — Medication 650 MILLIGRAM(S): at 21:05

## 2023-03-30 RX ADMIN — Medication 1 PATCH: at 07:38

## 2023-03-30 RX ADMIN — ATORVASTATIN CALCIUM 10 MILLIGRAM(S): 80 TABLET, FILM COATED ORAL at 21:07

## 2023-03-30 RX ADMIN — CHLORHEXIDINE GLUCONATE 1 APPLICATION(S): 213 SOLUTION TOPICAL at 06:40

## 2023-03-30 RX ADMIN — QUETIAPINE FUMARATE 50 MILLIGRAM(S): 200 TABLET, FILM COATED ORAL at 21:07

## 2023-03-30 RX ADMIN — Medication 3 MILLILITER(S): at 15:30

## 2023-03-30 RX ADMIN — Medication 650 MILLIGRAM(S): at 00:30

## 2023-03-30 RX ADMIN — Medication 2 MILLIGRAM(S): at 21:07

## 2023-03-30 RX ADMIN — Medication 4: at 08:11

## 2023-03-30 NOTE — PROGRESS NOTE ADULT - SUBJECTIVE AND OBJECTIVE BOX
Acute respiratory failure with hypercapnia    HPI:  78 yo male with CAD s/p PCI, AF on eliquis, moderate AS, morbid obesity, HTN, HLD, DM, thoracic aortic aneurysm, BPH s/p TURP, left renal mass, recent hMPV infection, presented from [facility]  to the ED after a fall.  He was found on the floor in the bathroom, hypoxemic and obtunded.    In the ED patient was found to be in hypercapnic respiratory failure and placed on NIV.  He was also noted to be hypotensive and started on pressors after a fluid bolus.    (22 Mar 2023 11:15)    Interval History:  Patient was seen and examined at bedside.  Very hard of hearing.  Feels OK.  No active complaints.   Denies chest pain, palpitations, shortness of breath, symptoms, nausea, vomiting or abdominal pain.    ROS:  As per interval history otherwise unremarkable.    PHYSICAL EXAM:  Vital Signs   T(C): 36.4 (30 Mar 2023 11:04), Max: 37.2 (30 Mar 2023 07:37)  T(F): 97.6 (30 Mar 2023 11:04), Max: 99 (30 Mar 2023 07:37)  HR: 81 (30 Mar 2023 12:00) (67 - 86)  BP: 85/57 (30 Mar 2023 12:00) (85/57 - 154/74)  BP(mean): 67 (30 Mar 2023 12:00) (62 - 94)  RR: 13 (30 Mar 2023 12:00) (13 - 33)  SpO2: 92% (30 Mar 2023 12:00) (91% - 98%)  Parameters below as of 30 Mar 2023 12:00  Patient On (Oxygen Delivery Method): nasal cannula, high flow  O2 Flow (L/min): 40  O2 Concentration (%): 40  General: Elderly male lying in bed comfortably. No acute distress  HEENT: EOMI. Clear conjunctivae. Moist mucus membrane. Hard of hearing.   Neck: Supple.   Chest: Good air entry. No wheezing, rales or rhonchi. No chest wall tenderness.  Heart: Normal S1 & S2. RRR.   Abdomen: Obese. Soft. Non-tender. + BS  Ext: 2+ pedal edema. No calf tenderness. Knees swollen (effusion) - no erythema, warmth or tenderness.    Neuro: Active and alert. No focal deficit. No speech disorder  Skin: Warm and Dry  Psychiatry: Normal mood and affect    I&O's Summary    29 Mar 2023 07:01  -  30 Mar 2023 07:00  --------------------------------------------------------  IN: 3100 mL / OUT: 2400 mL / NET: 700 mL    30 Mar 2023 07:01  -  30 Mar 2023 12:42  --------------------------------------------------------  IN: 516.5 mL / OUT: 1350 mL / NET: -833.5 mL    LABS:  CAPILLARY BLOOD GLUCOSE  POCT Blood Glucose.: 200 mg/dL (30 Mar 2023 12:16)  POCT Blood Glucose.: 222 mg/dL (30 Mar 2023 07:59)  POCT Blood Glucose.: 184 mg/dL (29 Mar 2023 22:09)  POCT Blood Glucose.: 219 mg/dL (29 Mar 2023 17:11)                      12.9   5.55  )-----------( 172      ( 30 Mar 2023 04:00 )             43.0     03-30    143  |  97  |  18.4  ----------------------------<  176<H>  3.5   |  37.0<H>  |  0.57    Ca    8.2<L>      30 Mar 2023 04:00  Phos  2.3     03-30  Mg     1.8     03-30    TPro  5.7<L>  /  Alb  2.9<L>  /  TBili  0.7  /  DBili  x   /  AST  11  /  ALT  7   /  AlkPhos  122<H>  03-30    RADIOLOGY & ADDITIONAL STUDIES:  Reviewed     MEDICATIONS  (STANDING):  albuterol/ipratropium for Nebulization 3 milliLiter(s) Nebulizer every 6 hours  aMIOdarone    Tablet 200 milliGRAM(s) Oral daily  apixaban 5 milliGRAM(s) Enteral Tube every 12 hours  aspirin  chewable 81 milliGRAM(s) Oral daily  atorvastatin 10 milliGRAM(s) Oral at bedtime  chlorhexidine 2% Cloths 1 Application(s) Topical <User Schedule>  cloNIDine Patch 0.2 mG/24Hr(s) 1 patch Transdermal every 7 days  doxazosin 2 milliGRAM(s) Oral at bedtime  finasteride 5 milliGRAM(s) Oral daily  insulin glargine Injectable (LANTUS) 20 Unit(s) SubCutaneous every morning  insulin lispro (ADMELOG) corrective regimen sliding scale   SubCutaneous Before meals and at bedtime  insulin lispro Injectable (ADMELOG) 6 Unit(s) SubCutaneous three times a day before meals  metolazone 5 milliGRAM(s) Oral daily  metoprolol tartrate 12.5 milliGRAM(s) Oral every 12 hours  polyethylene glycol 3350 17 Gram(s) Oral daily  predniSONE   Tablet   Oral   predniSONE   Tablet 10 milliGRAM(s) Oral daily  QUEtiapine 50 milliGRAM(s) Oral at bedtime  senna 2 Tablet(s) Oral at bedtime    MEDICATIONS  (PRN):  acetaminophen     Tablet .. 650 milliGRAM(s) Oral every 6 hours PRN Temp greater or equal to 38C (100.4F), Mild Pain (1 - 3), Moderate Pain (4 - 6)  ALPRAZolam 0.25 milliGRAM(s) Oral at bedtime PRN anxiety       Acute respiratory failure with hypercapnia    HPI:  78 yo male with CAD s/p PCI, AF on eliquis, moderate AS, morbid obesity, HTN, HLD, DM, thoracic aortic aneurysm, BPH s/p TURP, left renal mass, recent hMPV infection, presented from [facility] [place]  to the ED after a fall.  He was found on the floor in the bathroom, hypoxemic and obtunded.    In the ED patient was found to be in hypercapnic respiratory failure and placed on NIV.  He was also noted to be hypotensive and started on pressors after a fluid bolus. (22 Mar 2023 11:15)    Hospital Course:  77 year old male with history of A. Fib on Eliquis (s/p Cardioversion on 1/31/23), Moderate AS, Chronic Diastolic Heart Failure, CAD s/p PCI, HTN, HLD, DM 2, Morbid Obesity, ALLIE on CPAP, Thoracic Aortic Aneurysm, BPH s/p TURP, Left Renal Mass and recent hMPV infection presented from [facility] with respiratory distress, confusion and fall. Found to be in Acute on Chronic Hypercapnic Respiratory Failure and Shock. He was placed on NIV and was started on Levophed. Admitted to MICU. Respiratory status worsened and he was intubated. He was treated for Septic Shock / Pneumonia / Respiratory Failure / Metabolic Encephalopathy. He was extubated on 3/23/23, currently on HFNC and nocturnal CPAP. Levophed weaned off on 3/25/23.   He is stable at this time for downgrade to Medicine Service.     Interval History:  Patient was seen and examined at bedside.  Very hard of hearing.  Feels OK.  No active complaints.   Denies chest pain, palpitations, shortness of breath, symptoms, nausea, vomiting or abdominal pain.    ROS:  As per interval history otherwise unremarkable.    PHYSICAL EXAM:  Vital Signs   T(C): 36.4 (30 Mar 2023 11:04), Max: 37.2 (30 Mar 2023 07:37)  T(F): 97.6 (30 Mar 2023 11:04), Max: 99 (30 Mar 2023 07:37)  HR: 81 (30 Mar 2023 12:00) (67 - 86)  BP: 85/57 (30 Mar 2023 12:00) (85/57 - 154/74)  BP(mean): 67 (30 Mar 2023 12:00) (62 - 94)  RR: 13 (30 Mar 2023 12:00) (13 - 33)  SpO2: 92% (30 Mar 2023 12:00) (91% - 98%)  Parameters below as of 30 Mar 2023 12:00  Patient On (Oxygen Delivery Method): nasal cannula, high flow  O2 Flow (L/min): 40  O2 Concentration (%): 40  General: Elderly male lying in bed comfortably. No acute distress  HEENT: EOMI. Clear conjunctivae. Moist mucus membrane. Hard of hearing.   Neck: Supple.   Chest: Good air entry. No wheezing, rales or rhonchi. No chest wall tenderness.  Heart: Normal S1 & S2. RRR.   Abdomen: Obese. Soft. Non-tender. + BS  Ext: 2+ pedal edema. No calf tenderness. Knees swollen (effusion) - no erythema, warmth or tenderness.    Neuro: Active and alert. No focal deficit. No speech disorder  Skin: Warm and Dry  Psychiatry: Normal mood and affect    I&O's Summary    29 Mar 2023 07:01  -  30 Mar 2023 07:00  --------------------------------------------------------  IN: 3100 mL / OUT: 2400 mL / NET: 700 mL    30 Mar 2023 07:01  -  30 Mar 2023 12:42  --------------------------------------------------------  IN: 516.5 mL / OUT: 1350 mL / NET: -833.5 mL    LABS:  CAPILLARY BLOOD GLUCOSE  POCT Blood Glucose.: 200 mg/dL (30 Mar 2023 12:16)  POCT Blood Glucose.: 222 mg/dL (30 Mar 2023 07:59)  POCT Blood Glucose.: 184 mg/dL (29 Mar 2023 22:09)  POCT Blood Glucose.: 219 mg/dL (29 Mar 2023 17:11)                      12.9   5.55  )-----------( 172      ( 30 Mar 2023 04:00 )             43.0     03-30    143  |  97  |  18.4  ----------------------------<  176<H>  3.5   |  37.0<H>  |  0.57    Ca    8.2<L>      30 Mar 2023 04:00  Phos  2.3     03-30  Mg     1.8     03-30    TPro  5.7<L>  /  Alb  2.9<L>  /  TBili  0.7  /  DBili  x   /  AST  11  /  ALT  7   /  AlkPhos  122<H>  03-30    RADIOLOGY & ADDITIONAL STUDIES:  Reviewed     MEDICATIONS  (STANDING):  albuterol/ipratropium for Nebulization 3 milliLiter(s) Nebulizer every 6 hours  aMIOdarone    Tablet 200 milliGRAM(s) Oral daily  apixaban 5 milliGRAM(s) Enteral Tube every 12 hours  aspirin  chewable 81 milliGRAM(s) Oral daily  atorvastatin 10 milliGRAM(s) Oral at bedtime  chlorhexidine 2% Cloths 1 Application(s) Topical <User Schedule>  cloNIDine Patch 0.2 mG/24Hr(s) 1 patch Transdermal every 7 days  doxazosin 2 milliGRAM(s) Oral at bedtime  finasteride 5 milliGRAM(s) Oral daily  insulin glargine Injectable (LANTUS) 20 Unit(s) SubCutaneous every morning  insulin lispro (ADMELOG) corrective regimen sliding scale   SubCutaneous Before meals and at bedtime  insulin lispro Injectable (ADMELOG) 6 Unit(s) SubCutaneous three times a day before meals  metolazone 5 milliGRAM(s) Oral daily  metoprolol tartrate 12.5 milliGRAM(s) Oral every 12 hours  polyethylene glycol 3350 17 Gram(s) Oral daily  predniSONE   Tablet   Oral   predniSONE   Tablet 10 milliGRAM(s) Oral daily  QUEtiapine 50 milliGRAM(s) Oral at bedtime  senna 2 Tablet(s) Oral at bedtime    MEDICATIONS  (PRN):  acetaminophen     Tablet .. 650 milliGRAM(s) Oral every 6 hours PRN Temp greater or equal to 38C (100.4F), Mild Pain (1 - 3), Moderate Pain (4 - 6)  ALPRAZolam 0.25 milliGRAM(s) Oral at bedtime PRN anxiety       TRANSFER NOTE / MICU DOWNGRADE    Acute respiratory failure with hypercapnia    HPI:  76 yo male with CAD s/p PCI, AF on eliquis, moderate AS, morbid obesity, HTN, HLD, DM, thoracic aortic aneurysm, BPH s/p TURP, left renal mass, recent hMPV infection, presented from [facility]  to the ED after a fall.  He was found on the floor in the bathroom, hypoxemic and obtunded.    In the ED patient was found to be in hypercapnic respiratory failure and placed on NIV.  He was also noted to be hypotensive and started on pressors after a fluid bolus. (22 Mar 2023 11:15)    Hospital Course:  77 year old male with history of A. Fib on Eliquis (s/p Cardioversion on 1/31/23), Moderate AS, Chronic Diastolic Heart Failure, CAD s/p PCI, HTN, HLD, DM 2, Morbid Obesity, ALLIE on CPAP, Thoracic Aortic Aneurysm, BPH s/p TURP, Left Renal Mass and recent hMPV infection presented from [facility] with respiratory distress, confusion and fall. Found to be in Acute on Chronic Hypercapnic Respiratory Failure and Shock. He was placed on NIV and was started on Levophed. Admitted to MICU. Respiratory status worsened and he was intubated. He was treated for Septic Shock / Pneumonia / Respiratory Failure / Metabolic Encephalopathy. He was extubated on 3/23/23, currently on HFNC and nocturnal CPAP. Levophed weaned off on 3/25/23.   He is stable at this time for downgrade to Medicine Service.     Interval History:  Patient was seen and examined at bedside.  Very hard of hearing.  Feels OK.  No active complaints.   Denies chest pain, palpitations, shortness of breath, symptoms, nausea, vomiting or abdominal pain.    ROS:  As per interval history otherwise unremarkable.    PHYSICAL EXAM:  Vital Signs   T(C): 36.4 (30 Mar 2023 11:04), Max: 37.2 (30 Mar 2023 07:37)  T(F): 97.6 (30 Mar 2023 11:04), Max: 99 (30 Mar 2023 07:37)  HR: 81 (30 Mar 2023 12:00) (67 - 86)  BP: 85/57 (30 Mar 2023 12:00) (85/57 - 154/74)  BP(mean): 67 (30 Mar 2023 12:00) (62 - 94)  RR: 13 (30 Mar 2023 12:00) (13 - 33)  SpO2: 92% (30 Mar 2023 12:00) (91% - 98%)  Parameters below as of 30 Mar 2023 12:00  Patient On (Oxygen Delivery Method): nasal cannula, high flow  O2 Flow (L/min): 40  O2 Concentration (%): 40  General: Elderly male lying in bed comfortably. No acute distress  HEENT: EOMI. Clear conjunctivae. Moist mucus membrane. Hard of hearing.   Neck: Supple.   Chest: Good air entry. No wheezing, rales or rhonchi. No chest wall tenderness.  Heart: Normal S1 & S2. RRR.   Abdomen: Obese. Soft. Non-tender. + BS  Ext: 2+ pedal edema. No calf tenderness. Knees swollen (effusion) - no erythema, warmth or tenderness.    Neuro: Active and alert. No focal deficit. No speech disorder  Skin: Warm and Dry  Psychiatry: Normal mood and affect    I&O's Summary    29 Mar 2023 07:01  -  30 Mar 2023 07:00  --------------------------------------------------------  IN: 3100 mL / OUT: 2400 mL / NET: 700 mL    30 Mar 2023 07:01  -  30 Mar 2023 12:42  --------------------------------------------------------  IN: 516.5 mL / OUT: 1350 mL / NET: -833.5 mL    LABS:  CAPILLARY BLOOD GLUCOSE  POCT Blood Glucose.: 200 mg/dL (30 Mar 2023 12:16)  POCT Blood Glucose.: 222 mg/dL (30 Mar 2023 07:59)  POCT Blood Glucose.: 184 mg/dL (29 Mar 2023 22:09)  POCT Blood Glucose.: 219 mg/dL (29 Mar 2023 17:11)                      12.9   5.55  )-----------( 172      ( 30 Mar 2023 04:00 )             43.0     03-30    143  |  97  |  18.4  ----------------------------<  176<H>  3.5   |  37.0<H>  |  0.57    Ca    8.2<L>      30 Mar 2023 04:00  Phos  2.3     03-30  Mg     1.8     03-30    TPro  5.7<L>  /  Alb  2.9<L>  /  TBili  0.7  /  DBili  x   /  AST  11  /  ALT  7   /  AlkPhos  122<H>  03-30    RADIOLOGY & ADDITIONAL STUDIES:  Reviewed     MEDICATIONS  (STANDING):  albuterol/ipratropium for Nebulization 3 milliLiter(s) Nebulizer every 6 hours  aMIOdarone    Tablet 200 milliGRAM(s) Oral daily  apixaban 5 milliGRAM(s) Enteral Tube every 12 hours  aspirin  chewable 81 milliGRAM(s) Oral daily  atorvastatin 10 milliGRAM(s) Oral at bedtime  chlorhexidine 2% Cloths 1 Application(s) Topical <User Schedule>  cloNIDine Patch 0.2 mG/24Hr(s) 1 patch Transdermal every 7 days  doxazosin 2 milliGRAM(s) Oral at bedtime  finasteride 5 milliGRAM(s) Oral daily  insulin glargine Injectable (LANTUS) 20 Unit(s) SubCutaneous every morning  insulin lispro (ADMELOG) corrective regimen sliding scale   SubCutaneous Before meals and at bedtime  insulin lispro Injectable (ADMELOG) 6 Unit(s) SubCutaneous three times a day before meals  metolazone 5 milliGRAM(s) Oral daily  metoprolol tartrate 12.5 milliGRAM(s) Oral every 12 hours  polyethylene glycol 3350 17 Gram(s) Oral daily  predniSONE   Tablet   Oral   predniSONE   Tablet 10 milliGRAM(s) Oral daily  QUEtiapine 50 milliGRAM(s) Oral at bedtime  senna 2 Tablet(s) Oral at bedtime    MEDICATIONS  (PRN):  acetaminophen     Tablet .. 650 milliGRAM(s) Oral every 6 hours PRN Temp greater or equal to 38C (100.4F), Mild Pain (1 - 3), Moderate Pain (4 - 6)  ALPRAZolam 0.25 milliGRAM(s) Oral at bedtime PRN anxiety

## 2023-03-30 NOTE — PROGRESS NOTE ADULT - ATTENDING COMMENTS
Seen and examined, agree with above, patient doing better today than he's been in several days.  Now alert, conversive, on nasal cannula.    Only complaint today is left knee pain and swelling (has been present for weeks he says).  On exam there is large effusion, no erythema over skin.  Was going to offer arthrocentesis however patient is on eliquis so will hold off.  Stable for transfer out of ICU.  Discussed with hospitalist.

## 2023-03-30 NOTE — PROGRESS NOTE ADULT - ASSESSMENT
INCOMPLETE 77 year old male with history of A. Fib on Eliquis (s/p Cardioversion on 1/31/23), Moderate AS, Chronic Diastolic Heart Failure, CAD s/p PCI, HTN, HLD, DM 2, Morbid Obesity, ALLIE on CPAP, Thoracic Aortic Aneurysm, BPH s/p TURP, Left Renal Mass and recent hMPV infection presented from [facility] with respiratory distress, confusion and fall. Found to be in Acute on Chronic Hypercapnic Respiratory Failure and Shock. He was placed on NIV and was started on Levophed. Admitted to MICU. Respiratory status worsened and he was intubated. He was treated for Septic Shock / Pneumonia / Respiratory Failure / Metabolic Encephalopathy. He was extubated on 3/23/23, currently on HFNC and nocturnal CPAP. Levophed weaned off on 3/25/23.   He is stable at this time for downgrade to Medicine Service.     1) Acute on Chronic Respiratory Failure with Hypoxia / Hypercapnia  - Recent hMPV Infection   - History of ALLIE  - Treated for suspected superimposed bacterial pneumonia (gram positive vs atypical bacteria)   - Finished Prednisone taper   - s/p extubation on 3/23  - Continue HFNC, wean off as tolerated   - Nocturnal CPAP    2) Metabolic Encephalopathy  - Likely due to above  - CT Head with no acute findings  - Improving     3) A. Fib   - s/p DCCV on 1/31/23  - Continue Amiodarone and Metoprolol  - Continue Eliquis   - Cardio follow up noted     4) Chronic Diastolic Heart Failure / Moderate AS   - Continue Metolazone   - Continue Metoprolol  - Cardio follow up noted     5) CAD / HLD / HTN  - Continue Aspirin, Lipitor, Metoprolol and Clonidine    6) DM 2  - A1c 9.8 on 1/27/23  - Accu checks and ISS  - Continue Lantus 20 units daily   - Continue Premeal Admelog 6 units     7) Hypernatremia   - Resolved     8) Hypophosphatemia   - Repleted     9) BPH  - Continue Proscar and Cardura     10) Osteoarthritis of Both Knees  - Supportive care   - Tylenol for pain control  - PT    DVT Prophylaxis -- Patient is on Eliquis     Dispo: [facility] once stable.

## 2023-03-30 NOTE — PROGRESS NOTE ADULT - SUBJECTIVE AND OBJECTIVE BOX
Patient is a 77y old  Male who presents with a chief complaint of Acute respiratory failure (29 Mar 2023 13:21)      BRIEF HOSPITAL COURSE:   76 yo male with CAD s/p PCI, AF on Eliquis, moderate AS, morbid obesity, HTN, HLD, T2DM, ALLIE not on CPAP, thoracic aortic aneurysm, BPH s/p TURP, left renal mass, recent hMPV infection, presented from [facility]  to the ED after a fall.  He was found on the floor in the bathroom, hypoxemic and obtunded.  In the ED patient was found to be in acute on chronic hypercapnic hypoxic respiratory failure and placed on NIV.  He was also noted to be persistently hypotensive after ~700cc fluid bolus and was started on Norepinephrine. His respiratory status continued to deteriorate and he was ultimately intubated. +hMPV, CT showed bilateral pulmonary opacities, suspected superimposed bacterial PNA, completed course of cefepime. Patient was extubated to HFNC on 3/23, but remained encephalopathic. Weaned off levophed on 3/25.      Events last 24 hours:   Placed on bipap for waxing and waning mental status, hypercapnia, suspected ALLIE, and mental status improved. Now AAOx3 on HFNC.     PAST MEDICAL & SURGICAL HISTORY:  Anxiety      Hypertension      Hyperlipidemia      Prostate disease      Gastroesophageal reflux disease      Gallbladder stone without cholecystitis or obstruction      BPH (benign prostatic hyperplasia)      DM (diabetes mellitus)      DVT (deep venous thrombosis)  left leg      Afib      Anxiety      HLD (hyperlipidemia)      Malignant schwannoma      H/O arthroscopy of knee      S/P laminectomy      S/P coronary artery stent placement  x2            Medications:    aMIOdarone    Tablet 200 milliGRAM(s) Oral daily  cloNIDine Patch 0.2 mG/24Hr(s) 1 patch Transdermal every 7 days  doxazosin 2 milliGRAM(s) Oral at bedtime  metolazone 5 milliGRAM(s) Oral daily  metoprolol tartrate 12.5 milliGRAM(s) Oral every 12 hours    albuterol/ipratropium for Nebulization 3 milliLiter(s) Nebulizer every 6 hours    acetaminophen     Tablet .. 650 milliGRAM(s) Oral every 6 hours PRN  ALPRAZolam 0.25 milliGRAM(s) Oral at bedtime PRN  QUEtiapine 50 milliGRAM(s) Oral at bedtime      apixaban 5 milliGRAM(s) Enteral Tube every 12 hours  aspirin  chewable 81 milliGRAM(s) Oral daily    polyethylene glycol 3350 17 Gram(s) Oral daily  senna 2 Tablet(s) Oral at bedtime      atorvastatin 10 milliGRAM(s) Oral at bedtime  finasteride 5 milliGRAM(s) Oral daily  insulin glargine Injectable (LANTUS) 20 Unit(s) SubCutaneous every morning  insulin lispro (ADMELOG) corrective regimen sliding scale   SubCutaneous Before meals and at bedtime  insulin lispro Injectable (ADMELOG) 6 Unit(s) SubCutaneous three times a day before meals  predniSONE   Tablet 10 milliGRAM(s) Oral daily  predniSONE   Tablet   Oral         chlorhexidine 2% Cloths 1 Application(s) Topical <User Schedule>            ICU Vital Signs Last 24 Hrs  T(C): 37.2 (30 Mar 2023 07:37), Max: 37.2 (30 Mar 2023 07:37)  T(F): 99 (30 Mar 2023 07:37), Max: 99 (30 Mar 2023 07:37)  HR: 75 (30 Mar 2023 09:35) (67 - 86)  BP: 110/61 (30 Mar 2023 09:00) (98/57 - 154/74)  BP(mean): 72 (30 Mar 2023 09:00) (62 - 94)  ABP: --  ABP(mean): --  RR: 31 (30 Mar 2023 09:00) (17 - 33)  SpO2: 93% (30 Mar 2023 09:35) (91% - 98%)    O2 Parameters below as of 30 Mar 2023 09:35  Patient On (Oxygen Delivery Method): nasal cannula, high flow                I&O's Detail    29 Mar 2023 07:01  -  30 Mar 2023 07:00  --------------------------------------------------------  IN:    dextrose 5%: 2500 mL    Oral Fluid: 600 mL  Total IN: 3100 mL    OUT:    Voided (mL): 2400 mL  Total OUT: 2400 mL    Total NET: 700 mL      30 Mar 2023 07:01  -  30 Mar 2023 10:04  --------------------------------------------------------  IN:    dextrose 5%: 100 mL    IV PiggyBack: 166.6 mL  Total IN: 266.6 mL    OUT:    Voided (mL): 700 mL  Total OUT: 700 mL    Total NET: -433.4 mL            LABS:                        12.9   5.55  )-----------( 172      ( 30 Mar 2023 04:00 )             43.0     03-30    143  |  97  |  18.4  ----------------------------<  176<H>  3.5   |  37.0<H>  |  0.57    Ca    8.2<L>      30 Mar 2023 04:00  Phos  2.3     03-30  Mg     1.8     03-30    TPro  5.7<L>  /  Alb  2.9<L>  /  TBili  0.7  /  DBili  x   /  AST  11  /  ALT  7   /  AlkPhos  122<H>  03-30          CAPILLARY BLOOD GLUCOSE      POCT Blood Glucose.: 222 mg/dL (30 Mar 2023 07:59)        CULTURES:      Physical Examination:    General: No acute distress.      HEENT: Pupils equal, reactive to light.  Symmetric.    PULM: Clear to auscultation bilaterally, no significant sputum production    NECK: Supple, no lymphadenopathy, trachea midline    CVS: Regular rate and rhythm, no murmurs, rubs, or gallops    ABD: Soft, nondistended, nontender, normoactive bowel sounds, no masses    EXT: No edema, nontender    SKIN: Warm and well perfused, no rashes noted.    NEURO: Alert, oriented, interactive, nonfocal    DEVICES:     RADIOLOGY: ***    CRITICAL CARE TIME SPENT: ***   Patient is a 77y old  Male who presents with a chief complaint of Acute respiratory failure (29 Mar 2023 13:21)      BRIEF HOSPITAL COURSE:   76 yo male with CAD s/p PCI, AF on Eliquis, moderate AS, morbid obesity, HTN, HLD, T2DM, ALLIE not on CPAP, thoracic aortic aneurysm, BPH s/p TURP, left renal mass, recent hMPV infection, presented from [facility]  to the ED after a fall.  He was found on the floor in the bathroom, hypoxemic and obtunded.  In the ED patient was found to be in acute on chronic hypercapnic hypoxic respiratory failure and placed on NIV.  He was also noted to be persistently hypotensive after ~700cc fluid bolus and was started on Norepinephrine. His respiratory status continued to deteriorate and he was ultimately intubated. +hMPV, CT showed bilateral pulmonary opacities, suspected superimposed bacterial PNA, completed course of cefepime. Patient was extubated to HFNC on 3/23, but remained encephalopathic. Weaned off levophed on 3/25.      Events last 24 hours:   Placed on bipap for waxing and waning mental status, hypercapnia, suspected ALLIE, and mental status improved. Now AAOx3 on HFNC.     PAST MEDICAL & SURGICAL HISTORY:  Anxiety      Hypertension      Hyperlipidemia      Prostate disease      Gastroesophageal reflux disease      Gallbladder stone without cholecystitis or obstruction      BPH (benign prostatic hyperplasia)      DM (diabetes mellitus)      DVT (deep venous thrombosis)  left leg      Afib      Anxiety      HLD (hyperlipidemia)      Malignant schwannoma      H/O arthroscopy of knee      S/P laminectomy      S/P coronary artery stent placement  x2            Medications:    aMIOdarone    Tablet 200 milliGRAM(s) Oral daily  cloNIDine Patch 0.2 mG/24Hr(s) 1 patch Transdermal every 7 days  doxazosin 2 milliGRAM(s) Oral at bedtime  metolazone 5 milliGRAM(s) Oral daily  metoprolol tartrate 12.5 milliGRAM(s) Oral every 12 hours    albuterol/ipratropium for Nebulization 3 milliLiter(s) Nebulizer every 6 hours    acetaminophen     Tablet .. 650 milliGRAM(s) Oral every 6 hours PRN  ALPRAZolam 0.25 milliGRAM(s) Oral at bedtime PRN  QUEtiapine 50 milliGRAM(s) Oral at bedtime      apixaban 5 milliGRAM(s) Enteral Tube every 12 hours  aspirin  chewable 81 milliGRAM(s) Oral daily    polyethylene glycol 3350 17 Gram(s) Oral daily  senna 2 Tablet(s) Oral at bedtime      atorvastatin 10 milliGRAM(s) Oral at bedtime  finasteride 5 milliGRAM(s) Oral daily  insulin glargine Injectable (LANTUS) 20 Unit(s) SubCutaneous every morning  insulin lispro (ADMELOG) corrective regimen sliding scale   SubCutaneous Before meals and at bedtime  insulin lispro Injectable (ADMELOG) 6 Unit(s) SubCutaneous three times a day before meals  predniSONE   Tablet 10 milliGRAM(s) Oral daily  predniSONE   Tablet   Oral         chlorhexidine 2% Cloths 1 Application(s) Topical <User Schedule>            ICU Vital Signs Last 24 Hrs  T(C): 37.2 (30 Mar 2023 07:37), Max: 37.2 (30 Mar 2023 07:37)  T(F): 99 (30 Mar 2023 07:37), Max: 99 (30 Mar 2023 07:37)  HR: 75 (30 Mar 2023 09:35) (67 - 86)  BP: 110/61 (30 Mar 2023 09:00) (98/57 - 154/74)  BP(mean): 72 (30 Mar 2023 09:00) (62 - 94)  ABP: --  ABP(mean): --  RR: 31 (30 Mar 2023 09:00) (17 - 33)  SpO2: 93% (30 Mar 2023 09:35) (91% - 98%)    O2 Parameters below as of 30 Mar 2023 09:35  Patient On (Oxygen Delivery Method): nasal cannula, high flow                I&O's Detail    29 Mar 2023 07:01  -  30 Mar 2023 07:00  --------------------------------------------------------  IN:    dextrose 5%: 2500 mL    Oral Fluid: 600 mL  Total IN: 3100 mL    OUT:    Voided (mL): 2400 mL  Total OUT: 2400 mL    Total NET: 700 mL      30 Mar 2023 07:01  -  30 Mar 2023 10:04  --------------------------------------------------------  IN:    dextrose 5%: 100 mL    IV PiggyBack: 166.6 mL  Total IN: 266.6 mL    OUT:    Voided (mL): 700 mL  Total OUT: 700 mL    Total NET: -433.4 mL            LABS:                        12.9   5.55  )-----------( 172      ( 30 Mar 2023 04:00 )             43.0     03-30    143  |  97  |  18.4  ----------------------------<  176<H>  3.5   |  37.0<H>  |  0.57    Ca    8.2<L>      30 Mar 2023 04:00  Phos  2.3     03-30  Mg     1.8     03-30    TPro  5.7<L>  /  Alb  2.9<L>  /  TBili  0.7  /  DBili  x   /  AST  11  /  ALT  7   /  AlkPhos  122<H>  03-30          CAPILLARY BLOOD GLUCOSE      POCT Blood Glucose.: 222 mg/dL (30 Mar 2023 07:59)        CULTURES:      Physical Examination:    General: No acute distress.      HEENT: Pupils equal, reactive to light.  Symmetric.    PULM: Clear to auscultation bilaterally, no significant sputum production    NECK: Supple, no lymphadenopathy, trachea midline    CVS: Regular rate and rhythm, no murmurs, rubs, or gallops    ABD: Soft, nondistended, nontender, normoactive bowel sounds, no masses    EXT: No edema, nontender    SKIN: Warm and well perfused, no rashes noted.    NEURO: Alert, oriented, interactive, nonfocal    DEVICES:     RADIOLOGY:   No new studies    CRITICAL CARE TIME SPENT: ***   Patient is a 77y old  Male who presents with a chief complaint of Acute respiratory failure (29 Mar 2023 13:21)      BRIEF HOSPITAL COURSE:   76 yo male with CAD s/p PCI, AF on Eliquis, moderate AS, morbid obesity, HTN, HLD, T2DM, ALLIE not on CPAP, thoracic aortic aneurysm, BPH s/p TURP, left renal mass, recent hMPV infection, presented from [facility]  to the ED after a fall.  He was found on the floor in the bathroom, hypoxemic and obtunded.  In the ED patient was found to be in acute on chronic hypercapnic hypoxic respiratory failure and placed on NIV.  He was also noted to be persistently hypotensive after ~700cc fluid bolus and was started on Norepinephrine. His respiratory status continued to deteriorate and he was ultimately intubated. +hMPV, CT showed bilateral pulmonary opacities, suspected superimposed bacterial PNA, completed course of cefepime. Patient was extubated to HFNC on 3/23, but remained encephalopathic. Weaned off levophed on 3/25.      Events last 24 hours:   Placed on bipap for waxing and waning mental status, hypercapnia/ALLIE, and mental status improved. Now AAOx3 on HFNC.     PAST MEDICAL & SURGICAL HISTORY:  Anxiety      Hypertension      Hyperlipidemia      Prostate disease      Gastroesophageal reflux disease      Gallbladder stone without cholecystitis or obstruction      BPH (benign prostatic hyperplasia)      DM (diabetes mellitus)      DVT (deep venous thrombosis)  left leg      Afib      Anxiety      HLD (hyperlipidemia)      Malignant schwannoma      H/O arthroscopy of knee      S/P laminectomy      S/P coronary artery stent placement  x2            Medications:    aMIOdarone    Tablet 200 milliGRAM(s) Oral daily  cloNIDine Patch 0.2 mG/24Hr(s) 1 patch Transdermal every 7 days  doxazosin 2 milliGRAM(s) Oral at bedtime  metolazone 5 milliGRAM(s) Oral daily  metoprolol tartrate 12.5 milliGRAM(s) Oral every 12 hours    albuterol/ipratropium for Nebulization 3 milliLiter(s) Nebulizer every 6 hours    acetaminophen     Tablet .. 650 milliGRAM(s) Oral every 6 hours PRN  ALPRAZolam 0.25 milliGRAM(s) Oral at bedtime PRN  QUEtiapine 50 milliGRAM(s) Oral at bedtime      apixaban 5 milliGRAM(s) Enteral Tube every 12 hours  aspirin  chewable 81 milliGRAM(s) Oral daily    polyethylene glycol 3350 17 Gram(s) Oral daily  senna 2 Tablet(s) Oral at bedtime      atorvastatin 10 milliGRAM(s) Oral at bedtime  finasteride 5 milliGRAM(s) Oral daily  insulin glargine Injectable (LANTUS) 20 Unit(s) SubCutaneous every morning  insulin lispro (ADMELOG) corrective regimen sliding scale   SubCutaneous Before meals and at bedtime  insulin lispro Injectable (ADMELOG) 6 Unit(s) SubCutaneous three times a day before meals  predniSONE   Tablet 10 milliGRAM(s) Oral daily  predniSONE   Tablet   Oral         chlorhexidine 2% Cloths 1 Application(s) Topical <User Schedule>            ICU Vital Signs Last 24 Hrs  T(C): 37.2 (30 Mar 2023 07:37), Max: 37.2 (30 Mar 2023 07:37)  T(F): 99 (30 Mar 2023 07:37), Max: 99 (30 Mar 2023 07:37)  HR: 75 (30 Mar 2023 09:35) (67 - 86)  BP: 110/61 (30 Mar 2023 09:00) (98/57 - 154/74)  BP(mean): 72 (30 Mar 2023 09:00) (62 - 94)  ABP: --  ABP(mean): --  RR: 31 (30 Mar 2023 09:00) (17 - 33)  SpO2: 93% (30 Mar 2023 09:35) (91% - 98%)    O2 Parameters below as of 30 Mar 2023 09:35  Patient On (Oxygen Delivery Method): nasal cannula, high flow                I&O's Detail    29 Mar 2023 07:01  -  30 Mar 2023 07:00  --------------------------------------------------------  IN:    dextrose 5%: 2500 mL    Oral Fluid: 600 mL  Total IN: 3100 mL    OUT:    Voided (mL): 2400 mL  Total OUT: 2400 mL    Total NET: 700 mL      30 Mar 2023 07:01  -  30 Mar 2023 10:04  --------------------------------------------------------  IN:    dextrose 5%: 100 mL    IV PiggyBack: 166.6 mL  Total IN: 266.6 mL    OUT:    Voided (mL): 700 mL  Total OUT: 700 mL    Total NET: -433.4 mL            LABS:                        12.9   5.55  )-----------( 172      ( 30 Mar 2023 04:00 )             43.0     03-30    143  |  97  |  18.4  ----------------------------<  176<H>  3.5   |  37.0<H>  |  0.57    Ca    8.2<L>      30 Mar 2023 04:00  Phos  2.3     03-30  Mg     1.8     03-30    TPro  5.7<L>  /  Alb  2.9<L>  /  TBili  0.7  /  DBili  x   /  AST  11  /  ALT  7   /  AlkPhos  122<H>  03-30          CAPILLARY BLOOD GLUCOSE      POCT Blood Glucose.: 222 mg/dL (30 Mar 2023 07:59)        CULTURES:      Physical Examination:    General: No acute distress.      HEENT: Pupils equal, reactive to light.  Symmetric.    PULM: Clear to auscultation bilaterally, no significant sputum production    NECK: Supple, no lymphadenopathy, trachea midline    CVS: Regular rate and rhythm, no murmurs, rubs, or gallops    ABD: Soft, nondistended, nontender, normoactive bowel sounds, no masses    EXT: No edema, nontender    SKIN: Warm and well perfused, no rashes noted.    NEURO: Alert, oriented, interactive, nonfocal    DEVICES:     RADIOLOGY:   No new studies    CRITICAL CARE TIME SPENT: ***

## 2023-03-30 NOTE — PROGRESS NOTE ADULT - TIME BILLING
coordinating care with MICU PA/resident, MICU RN, reviewing labs and prior records, personally reviewing and interpreting imaging, documenting findings and assessment in the medical record, counseling patient.
coordinating care with MICU PA/resident, MICU RN, reviewing labs and prior records, personally reviewing and interpreting imaging, documenting findings and assessment in the medical record.
coordinating care with MICU PA/resident, MICU RN, reviewing labs and prior records, personally reviewing and interpreting imaging, documenting findings and assessment in the medical record, counseling patient.

## 2023-03-30 NOTE — PROGRESS NOTE ADULT - ASSESSMENT
78 yo male with CAD s/p PCI, AF on eliquis, moderate AS, chronic systolic CHF/HFpEF, morbid obesity, HTN, HLD, DM, thoracic aortic aneurysm, BPH s/p TURP, left renal mass, recent hMPV infection, now admitted with acute hypercapnic respiratory failure, septic shock, multifocal pneumonia, hMPV infection, encephalopathy, PHILLY.      Plan    Acute respiratory failure  - completed course of abx for possible superimposed bacterial pneumonia  - on high flow, weaning down    Atrial fibrillation  - eliquis for AC    HFpEF/ moderate AS  - Started on metolazone 3/27 (thiazide diuretic 2/2 hypernatremia)  - will likely need loop diuretic soon     Chronic hypercapnic respiratory failure/  OHS/ALLIE  - nocturnal NIV    Hypernatremia  - short course of d5w and will start thiazide diuretic     Hyperglycemia  - increased lantus, started premeal lispro in addition to sliding scale    Sedation/Analgesia: none  Vasoactive medications: none  DVT prophylaxis: eliquis  GI prophylaxis: protonix  Nutrition: puree diet  Mobility: oob   78 yo male with CAD s/p PCI, AF on eliquis, moderate AS, chronic systolic CHF/HFpEF, morbid obesity, HTN, HLD, DM, thoracic aortic aneurysm, BPH s/p TURP, left renal mass, recent hMPV infection, now admitted with acute hypercapnic respiratory failure, septic shock, multifocal pneumonia, hMPV infection, encephalopathy, PHILLY.      Plan    Acute respiratory failure  - completed course of abx for possible superimposed bacterial pneumonia  - on high flow, weaning down    Atrial fibrillation  - eliquis for AC    HFpEF/ moderate AS  - Started on metolazone 3/27 (thiazide diuretic 2/2 hypernatremia)  - will likely need loop diuretic soon     Chronic hypercapnic respiratory failure/  OHS/ALLIE  - nocturnal NIV    Hypernatremia  - short course of d5w and will start thiazide diuretic     Hyperglycemia  - increased lantus, started premeal lispro in addition to sliding scale    Sedation/Analgesia: none  Vasoactive medications: none  DVT prophylaxis: eliquis  GI prophylaxis: protonix  Nutrition: puree diet  Mobility: oob    Dispo: Okay for downgrade to stepdown.  78 yo male with CAD s/p PCI, AF on eliquis, moderate AS, chronic systolic CHF/HFpEF, morbid obesity, HTN, HLD, DM, thoracic aortic aneurysm, BPH s/p TURP, left renal mass, recent hMPV infection, now admitted with acute hypercapnic respiratory failure, septic shock, multifocal pneumonia, hMPV infection, encephalopathy, PHILLY.      Plan    Acute respiratory failure  - completed course of abx for possible superimposed bacterial pneumonia  - on high flow, weaning down    Atrial fibrillation  - eliquis for AC    HFpEF/ moderate AS  - Started on metolazone 3/27 (thiazide diuretic 2/2 hypernatremia)  - Was diuresed for component of acute on chronic diastolic heart failure, Lasix stopped due to worsening hypernatremia and PHILLY without improvement in oxygenation.     Chronic hypercapnic respiratory failure/OHS/ALLIE  - nocturnal NIV    Hypernatremia  - short course of d5w, discontinued this morning due to normalization of sodium level  - Started on thiazide diuretic     Hyperglycemia  - increased lantus, started premeal lispro in addition to sliding scale    Sedation/Analgesia: none  Vasoactive medications: none  DVT prophylaxis: eliquis  GI prophylaxis: protonix  Nutrition: passed speech eval, okay for full consistent carb diet  Mobility: oob    Dispo: Okay for downgrade to stepdown.

## 2023-03-31 LAB
ANION GAP SERPL CALC-SCNC: 6 MMOL/L — SIGNIFICANT CHANGE UP (ref 5–17)
BUN SERPL-MCNC: 19.1 MG/DL — SIGNIFICANT CHANGE UP (ref 8–20)
CALCIUM SERPL-MCNC: 8.4 MG/DL — SIGNIFICANT CHANGE UP (ref 8.4–10.5)
CHLORIDE SERPL-SCNC: 97 MMOL/L — SIGNIFICANT CHANGE UP (ref 96–108)
CO2 SERPL-SCNC: 38 MMOL/L — HIGH (ref 22–29)
CREAT SERPL-MCNC: 0.56 MG/DL — SIGNIFICANT CHANGE UP (ref 0.5–1.3)
EGFR: 102 ML/MIN/1.73M2 — SIGNIFICANT CHANGE UP
GLUCOSE BLDC GLUCOMTR-MCNC: 185 MG/DL — HIGH (ref 70–99)
GLUCOSE BLDC GLUCOMTR-MCNC: 212 MG/DL — HIGH (ref 70–99)
GLUCOSE BLDC GLUCOMTR-MCNC: 230 MG/DL — HIGH (ref 70–99)
GLUCOSE BLDC GLUCOMTR-MCNC: 231 MG/DL — HIGH (ref 70–99)
GLUCOSE SERPL-MCNC: 192 MG/DL — HIGH (ref 70–99)
MAGNESIUM SERPL-MCNC: 1.8 MG/DL — SIGNIFICANT CHANGE UP (ref 1.6–2.6)
POTASSIUM SERPL-MCNC: 4 MMOL/L — SIGNIFICANT CHANGE UP (ref 3.5–5.3)
POTASSIUM SERPL-SCNC: 4 MMOL/L — SIGNIFICANT CHANGE UP (ref 3.5–5.3)
SODIUM SERPL-SCNC: 141 MMOL/L — SIGNIFICANT CHANGE UP (ref 135–145)

## 2023-03-31 PROCEDURE — 99233 SBSQ HOSP IP/OBS HIGH 50: CPT

## 2023-03-31 RX ORDER — SODIUM CHLORIDE 9 MG/ML
500 INJECTION INTRAMUSCULAR; INTRAVENOUS; SUBCUTANEOUS ONCE
Refills: 0 | Status: COMPLETED | OUTPATIENT
Start: 2023-03-31 | End: 2023-03-31

## 2023-03-31 RX ADMIN — CHLORHEXIDINE GLUCONATE 1 APPLICATION(S): 213 SOLUTION TOPICAL at 05:59

## 2023-03-31 RX ADMIN — INSULIN GLARGINE 20 UNIT(S): 100 INJECTION, SOLUTION SUBCUTANEOUS at 09:02

## 2023-03-31 RX ADMIN — Medication 650 MILLIGRAM(S): at 06:35

## 2023-03-31 RX ADMIN — MAGNESIUM OXIDE 400 MG ORAL TABLET 400 MILLIGRAM(S): 241.3 TABLET ORAL at 12:33

## 2023-03-31 RX ADMIN — Medication 6 UNIT(S): at 18:48

## 2023-03-31 RX ADMIN — Medication 2 MILLIGRAM(S): at 22:19

## 2023-03-31 RX ADMIN — APIXABAN 5 MILLIGRAM(S): 2.5 TABLET, FILM COATED ORAL at 20:12

## 2023-03-31 RX ADMIN — Medication 6 UNIT(S): at 09:02

## 2023-03-31 RX ADMIN — FINASTERIDE 5 MILLIGRAM(S): 5 TABLET, FILM COATED ORAL at 12:33

## 2023-03-31 RX ADMIN — POLYETHYLENE GLYCOL 3350 17 GRAM(S): 17 POWDER, FOR SOLUTION ORAL at 12:39

## 2023-03-31 RX ADMIN — Medication 650 MILLIGRAM(S): at 06:05

## 2023-03-31 RX ADMIN — Medication 12.5 MILLIGRAM(S): at 06:02

## 2023-03-31 RX ADMIN — Medication 10 MILLIGRAM(S): at 05:59

## 2023-03-31 RX ADMIN — Medication 1 PATCH: at 19:00

## 2023-03-31 RX ADMIN — LIDOCAINE 1 PATCH: 4 CREAM TOPICAL at 04:00

## 2023-03-31 RX ADMIN — Medication 1 PATCH: at 22:45

## 2023-03-31 RX ADMIN — Medication 2: at 22:16

## 2023-03-31 RX ADMIN — Medication 600 MILLIGRAM(S): at 15:38

## 2023-03-31 RX ADMIN — Medication 600 MILLIGRAM(S): at 16:24

## 2023-03-31 RX ADMIN — Medication 3 MILLILITER(S): at 14:09

## 2023-03-31 RX ADMIN — Medication 4: at 18:47

## 2023-03-31 RX ADMIN — Medication 1 PATCH: at 07:30

## 2023-03-31 RX ADMIN — LIDOCAINE 1 PATCH: 4 CREAM TOPICAL at 12:33

## 2023-03-31 RX ADMIN — Medication 6 UNIT(S): at 12:32

## 2023-03-31 RX ADMIN — SODIUM CHLORIDE 500 MILLILITER(S): 9 INJECTION INTRAMUSCULAR; INTRAVENOUS; SUBCUTANEOUS at 22:38

## 2023-03-31 RX ADMIN — AMIODARONE HYDROCHLORIDE 200 MILLIGRAM(S): 400 TABLET ORAL at 05:59

## 2023-03-31 RX ADMIN — LIDOCAINE 1 PATCH: 4 CREAM TOPICAL at 12:34

## 2023-03-31 RX ADMIN — Medication 4: at 12:31

## 2023-03-31 RX ADMIN — Medication 12.5 MILLIGRAM(S): at 18:49

## 2023-03-31 RX ADMIN — Medication 3 MILLILITER(S): at 04:13

## 2023-03-31 RX ADMIN — LIDOCAINE 1 PATCH: 4 CREAM TOPICAL at 20:38

## 2023-03-31 RX ADMIN — ATORVASTATIN CALCIUM 10 MILLIGRAM(S): 80 TABLET, FILM COATED ORAL at 22:18

## 2023-03-31 RX ADMIN — APIXABAN 5 MILLIGRAM(S): 2.5 TABLET, FILM COATED ORAL at 09:05

## 2023-03-31 RX ADMIN — Medication 4: at 09:01

## 2023-03-31 RX ADMIN — QUETIAPINE FUMARATE 50 MILLIGRAM(S): 200 TABLET, FILM COATED ORAL at 22:18

## 2023-03-31 RX ADMIN — Medication 3 MILLILITER(S): at 08:28

## 2023-03-31 RX ADMIN — PANTOPRAZOLE SODIUM 40 MILLIGRAM(S): 20 TABLET, DELAYED RELEASE ORAL at 12:33

## 2023-03-31 RX ADMIN — MAGNESIUM OXIDE 400 MG ORAL TABLET 400 MILLIGRAM(S): 241.3 TABLET ORAL at 18:49

## 2023-03-31 RX ADMIN — Medication 3 MILLILITER(S): at 21:07

## 2023-03-31 RX ADMIN — LIDOCAINE 1 PATCH: 4 CREAM TOPICAL at 20:37

## 2023-03-31 RX ADMIN — Medication 81 MILLIGRAM(S): at 12:33

## 2023-03-31 RX ADMIN — MAGNESIUM OXIDE 400 MG ORAL TABLET 400 MILLIGRAM(S): 241.3 TABLET ORAL at 09:05

## 2023-03-31 NOTE — PROGRESS NOTE ADULT - ASSESSMENT
77 year old male with history of A. Fib on Eliquis (s/p Cardioversion on 1/31/23), Moderate AS, Chronic Diastolic Heart Failure, CAD s/p PCI, HTN, HLD, DM 2, Morbid Obesity, ALLIE on CPAP, Thoracic Aortic Aneurysm, BPH s/p TURP, Left Renal Mass and recent hMPV infection presented from [facility] with respiratory distress, confusion and fall. Found to be in Acute on Chronic Hypercapnic Respiratory Failure and Shock. He was placed on NIV and was started on Levophed. Admitted to MICU. Respiratory status worsened and he was intubated. He was treated for Septic Shock / Pneumonia / Respiratory Failure / Metabolic Encephalopathy. He was extubated on 3/23/23, currently on HFNC and nocturnal CPAP. Levophed weaned off on 3/25/23.   He is stable at this time for downgrade to Medicine Service.     1) Acute on Chronic Respiratory Failure with Hypoxia / Hypercapnia  - Recent hMPV Infection   - History of ALLIE  - Treated for suspected superimposed bacterial pneumonia (gram positive vs atypical bacteria)   - Finished Prednisone taper   - s/p extubation on 3/23  - Continue HFNC,oxygen support  wean as tolerated   - Nocturnal CPAP    2) Metabolic Encephalopathy- improved   - CT Head with no acute findings    3) A. Fib -- s/p DCCV on 1/31/23  - Amiodarone and Metoprolol  - Eliquis   - Cardio follow up noted     4) Chronic Diastolic Heart Failure / Moderate AS   - Continue Metolazone   - Continue Metoprolol  - Cardio follow up noted     5) CAD / HLD / HTN  - Continue Aspirin, Lipitor, Metoprolol and Clonidine    6) DM 2  - A1c 9.8 on 1/27/23  - Accu checks and ISS  - Continue Lantus 20 units daily   - Continue Premeal Admelog 6 units     7) Hypernatremia   - Resolved     8) Hypophosphatemia   - Repleted     9) BPH  - Continue Proscar and Cardura     10) Osteoarthritis of Both Knees  - Supportive care   - Tylenol for pain control  - PT    DVT Prophylaxis -- Patient is on Eliquis     Dispo: ELLYN once stable.

## 2023-03-31 NOTE — PROGRESS NOTE ADULT - SUBJECTIVE AND OBJECTIVE BOX
ANIA CRISTOBAL Patient is a 77y old  Male who presents with a chief complaint of Acute respiratory failure (30 Mar 2023 12:42)     HPI:  76 yo male with CAD s/p PCI, AF on eliquis, moderate AS, morbid obesity, HTN, HLD, DM, thoracic aortic aneurysm, BPH s/p TURP, left renal mass, recent hMPV infection, presented from [facility]  to the ED after a fall.  He was found on the floor in the bathroom, hypoxemic and obtunded.    In the ED patient was found to be in hypercapnic respiratory failure and placed on NIV.  He was also noted to be hypotensive and started on pressors after a fluid bolus.    (22 Mar 2023 11:15)    The patient was seen and evaluated - inclined support up lying in bed -obese on the phone=-"I a,m still weak and stuff but not as sick as before "  The patient is in no acute distress.  Denied any fever chest pain, palpitations, shortness of breath, abdominal pain, fever, dysuria, cough, edema       I&O's Summary    30 Mar 2023 07:01  -  31 Mar 2023 07:00  --------------------------------------------------------  IN: 599.8 mL / OUT: 3250 mL / NET: -2650.2 mL    31 Mar 2023 07:01  -  31 Mar 2023 16:00  --------------------------------------------------------  IN: 0 mL / OUT: 700 mL / NET: -700 mL      Allergies    Bactrim (Rash)  Ceftin (Hives)  penicillin (Anaphylaxis)  sulfa drugs (Unknown)    Intolerances      HEALTH ISSUES - PROBLEM Dx:        PAST MEDICAL & SURGICAL HISTORY:  Anxiety      Hypertension      Hyperlipidemia      Prostate disease      Gastroesophageal reflux disease      Gallbladder stone without cholecystitis or obstruction      BPH (benign prostatic hyperplasia)      DM (diabetes mellitus)      DVT (deep venous thrombosis)  left leg      Afib      Anxiety      HLD (hyperlipidemia)      Malignant schwannoma      H/O arthroscopy of knee      S/P laminectomy      S/P coronary artery stent placement  x2              Vital Signs Last 24 Hrs  T(C): 36.7 (31 Mar 2023 15:12), Max: 36.8 (30 Mar 2023 23:40)  T(F): 98.1 (31 Mar 2023 15:12), Max: 98.3 (31 Mar 2023 07:33)  HR: 90 (31 Mar 2023 14:00) (71 - 91)  BP: 125/69 (31 Mar 2023 14:00) (90/60 - 125/72)  BP(mean): 83 (31 Mar 2023 14:00) (63 - 87)  RR: 20 (31 Mar 2023 14:00) (19 - 81)  SpO2: 95% (31 Mar 2023 14:00) (92% - 97%)    Parameters below as of 31 Mar 2023 14:00  Patient On (Oxygen Delivery Method): nasal cannula, high flow  O2 Flow (L/min): 25  O2 Concentration (%): 60T(C): 36.7 (03-31-23 @ 15:12), Max: 36.8 (03-30-23 @ 23:40)  HR: 90 (03-31-23 @ 14:00) (71 - 91)  BP: 125/69 (03-31-23 @ 14:00) (90/60 - 125/72)  RR: 20 (03-31-23 @ 14:00) (19 - 81)  SpO2: 95% (03-31-23 @ 14:00) (92% - 97%)  Wt(kg): --    PHYSICAL EXAM:    GENERAL: NAD, obese   HEAD:  Atraumatic, Normocephalic  EYES:conjunctiva and sclera clear  ENMT:  Moist mucous membranes,  No lesions  NECK: Supple, No JVD, Normal thyroid  NERVOUS SYSTEM:  Alert & Oriented X3, in bed can Moves upper and lower extremities; CNS-II-XII  CHEST/LUNG: Clear to auscultation bilaterally; No rales, rhonchi, wheezing,   HEART: Regular rate and rhythm; No murmurs,   ABDOMEN: Soft, Nontender, Nondistended; Bowel sounds present  EXTREMITIES:  Peripheral Pulses, No  cyanosis, or edema  SKIN: No rashes or lesions  psychiatry- mood and affect appropriate, I    acetaminophen     Tablet .. 650 milliGRAM(s) Oral every 6 hours PRN  albuterol/ipratropium for Nebulization 3 milliLiter(s) Nebulizer every 6 hours  ALPRAZolam 0.25 milliGRAM(s) Oral at bedtime PRN  aMIOdarone    Tablet 200 milliGRAM(s) Oral daily  apixaban 5 milliGRAM(s) Enteral Tube every 12 hours  aspirin  chewable 81 milliGRAM(s) Oral daily  atorvastatin 10 milliGRAM(s) Oral at bedtime  chlorhexidine 2% Cloths 1 Application(s) Topical <User Schedule>  cloNIDine Patch 0.2 mG/24Hr(s) 1 patch Transdermal every 7 days  doxazosin 2 milliGRAM(s) Oral at bedtime  finasteride 5 milliGRAM(s) Oral daily  ibuprofen  Tablet. 600 milliGRAM(s) Oral every 6 hours PRN  insulin glargine Injectable (LANTUS) 20 Unit(s) SubCutaneous every morning  insulin lispro (ADMELOG) corrective regimen sliding scale   SubCutaneous Before meals and at bedtime  insulin lispro Injectable (ADMELOG) 6 Unit(s) SubCutaneous three times a day before meals  lidocaine   4% Patch 1 Patch Transdermal daily  lidocaine   4% Patch 1 Patch Transdermal daily  magnesium oxide 400 milliGRAM(s) Oral three times a day with meals  metolazone 5 milliGRAM(s) Oral daily  metoprolol tartrate 12.5 milliGRAM(s) Oral every 12 hours  pantoprazole    Tablet 40 milliGRAM(s) Oral daily  polyethylene glycol 3350 17 Gram(s) Oral daily  QUEtiapine 50 milliGRAM(s) Oral at bedtime  senna 2 Tablet(s) Oral at bedtime      LABS:                          12.9   5.55  )-----------( 172      ( 30 Mar 2023 04:00 )             43.0     03-31    141  |  97  |  19.1  ----------------------------<  192<H>  4.0   |  38.0<H>  |  0.56    Ca    8.4      31 Mar 2023 07:10  Phos  2.3     03-30  Mg     1.8     03-31    TPro  5.7<L>  /  Alb  2.9<L>  /  TBili  0.7  /  DBili  x   /  AST  11  /  ALT  7   /  AlkPhos  122<H>  03-30    LIVER FUNCTIONS - ( 30 Mar 2023 04:00 )  Alb: 2.9 g/dL / Pro: 5.7 g/dL / ALK PHOS: 122 U/L / ALT: 7 U/L / AST: 11 U/L / GGT: x                   CAPILLARY BLOOD GLUCOSE      POCT Blood Glucose.: 231 mg/dL (31 Mar 2023 12:28)  POCT Blood Glucose.: 212 mg/dL (31 Mar 2023 08:58)  POCT Blood Glucose.: 151 mg/dL (30 Mar 2023 21:51)  POCT Blood Glucose.: 176 mg/dL (30 Mar 2023 16:27)      RADIOLOGY & ADDITIONAL TESTS:      Consultant notes reviewed    Case discussed with consultant/provider/ family /patient

## 2023-04-01 LAB
ALBUMIN SERPL ELPH-MCNC: 3 G/DL — LOW (ref 3.3–5.2)
ALP SERPL-CCNC: 127 U/L — HIGH (ref 40–120)
ALT FLD-CCNC: 9 U/L — SIGNIFICANT CHANGE UP
ANION GAP SERPL CALC-SCNC: 10 MMOL/L — SIGNIFICANT CHANGE UP (ref 5–17)
AST SERPL-CCNC: 15 U/L — SIGNIFICANT CHANGE UP
BILIRUB SERPL-MCNC: 0.4 MG/DL — SIGNIFICANT CHANGE UP (ref 0.4–2)
BUN SERPL-MCNC: 23.4 MG/DL — HIGH (ref 8–20)
CALCIUM SERPL-MCNC: 8 MG/DL — LOW (ref 8.4–10.5)
CHLORIDE SERPL-SCNC: 95 MMOL/L — LOW (ref 96–108)
CO2 SERPL-SCNC: 32 MMOL/L — HIGH (ref 22–29)
CREAT SERPL-MCNC: 0.83 MG/DL — SIGNIFICANT CHANGE UP (ref 0.5–1.3)
EGFR: 90 ML/MIN/1.73M2 — SIGNIFICANT CHANGE UP
GLUCOSE BLDC GLUCOMTR-MCNC: 148 MG/DL — HIGH (ref 70–99)
GLUCOSE BLDC GLUCOMTR-MCNC: 158 MG/DL — HIGH (ref 70–99)
GLUCOSE BLDC GLUCOMTR-MCNC: 180 MG/DL — HIGH (ref 70–99)
GLUCOSE BLDC GLUCOMTR-MCNC: 273 MG/DL — HIGH (ref 70–99)
GLUCOSE SERPL-MCNC: 163 MG/DL — HIGH (ref 70–99)
HCT VFR BLD CALC: 43.1 % — SIGNIFICANT CHANGE UP (ref 39–50)
HGB BLD-MCNC: 13.2 G/DL — SIGNIFICANT CHANGE UP (ref 13–17)
MCHC RBC-ENTMCNC: 28.7 PG — SIGNIFICANT CHANGE UP (ref 27–34)
MCHC RBC-ENTMCNC: 30.6 GM/DL — LOW (ref 32–36)
MCV RBC AUTO: 93.7 FL — SIGNIFICANT CHANGE UP (ref 80–100)
PLATELET # BLD AUTO: 211 K/UL — SIGNIFICANT CHANGE UP (ref 150–400)
POTASSIUM SERPL-MCNC: 3.4 MMOL/L — LOW (ref 3.5–5.3)
POTASSIUM SERPL-SCNC: 3.4 MMOL/L — LOW (ref 3.5–5.3)
PROT SERPL-MCNC: 5.9 G/DL — LOW (ref 6.6–8.7)
RBC # BLD: 4.6 M/UL — SIGNIFICANT CHANGE UP (ref 4.2–5.8)
RBC # FLD: 13.8 % — SIGNIFICANT CHANGE UP (ref 10.3–14.5)
SODIUM SERPL-SCNC: 137 MMOL/L — SIGNIFICANT CHANGE UP (ref 135–145)
WBC # BLD: 7.59 K/UL — SIGNIFICANT CHANGE UP (ref 3.8–10.5)
WBC # FLD AUTO: 7.59 K/UL — SIGNIFICANT CHANGE UP (ref 3.8–10.5)

## 2023-04-01 PROCEDURE — 99233 SBSQ HOSP IP/OBS HIGH 50: CPT

## 2023-04-01 RX ORDER — SODIUM CHLORIDE 9 MG/ML
500 INJECTION INTRAMUSCULAR; INTRAVENOUS; SUBCUTANEOUS ONCE
Refills: 0 | Status: COMPLETED | OUTPATIENT
Start: 2023-04-01 | End: 2023-04-01

## 2023-04-01 RX ORDER — ALBUMIN HUMAN 25 %
100 VIAL (ML) INTRAVENOUS ONCE
Refills: 0 | Status: COMPLETED | OUTPATIENT
Start: 2023-04-01 | End: 2023-04-01

## 2023-04-01 RX ADMIN — Medication 3 MILLILITER(S): at 19:44

## 2023-04-01 RX ADMIN — Medication 3 MILLILITER(S): at 15:26

## 2023-04-01 RX ADMIN — MAGNESIUM OXIDE 400 MG ORAL TABLET 400 MILLIGRAM(S): 241.3 TABLET ORAL at 08:42

## 2023-04-01 RX ADMIN — LIDOCAINE 1 PATCH: 4 CREAM TOPICAL at 19:05

## 2023-04-01 RX ADMIN — Medication 81 MILLIGRAM(S): at 11:31

## 2023-04-01 RX ADMIN — LIDOCAINE 1 PATCH: 4 CREAM TOPICAL at 11:31

## 2023-04-01 RX ADMIN — Medication 6 UNIT(S): at 12:49

## 2023-04-01 RX ADMIN — Medication 650 MILLIGRAM(S): at 22:48

## 2023-04-01 RX ADMIN — Medication 600 MILLIGRAM(S): at 12:30

## 2023-04-01 RX ADMIN — Medication 50 MILLILITER(S): at 22:32

## 2023-04-01 RX ADMIN — Medication 3 MILLILITER(S): at 09:02

## 2023-04-01 RX ADMIN — LIDOCAINE 1 PATCH: 4 CREAM TOPICAL at 00:00

## 2023-04-01 RX ADMIN — Medication 2: at 17:41

## 2023-04-01 RX ADMIN — Medication 3 MILLILITER(S): at 03:27

## 2023-04-01 RX ADMIN — Medication 6 UNIT(S): at 17:42

## 2023-04-01 RX ADMIN — APIXABAN 5 MILLIGRAM(S): 2.5 TABLET, FILM COATED ORAL at 20:33

## 2023-04-01 RX ADMIN — ATORVASTATIN CALCIUM 10 MILLIGRAM(S): 80 TABLET, FILM COATED ORAL at 22:33

## 2023-04-01 RX ADMIN — LIDOCAINE 1 PATCH: 4 CREAM TOPICAL at 23:00

## 2023-04-01 RX ADMIN — FINASTERIDE 5 MILLIGRAM(S): 5 TABLET, FILM COATED ORAL at 11:32

## 2023-04-01 RX ADMIN — APIXABAN 5 MILLIGRAM(S): 2.5 TABLET, FILM COATED ORAL at 08:42

## 2023-04-01 RX ADMIN — Medication 2: at 08:35

## 2023-04-01 RX ADMIN — QUETIAPINE FUMARATE 50 MILLIGRAM(S): 200 TABLET, FILM COATED ORAL at 22:33

## 2023-04-01 RX ADMIN — AMIODARONE HYDROCHLORIDE 200 MILLIGRAM(S): 400 TABLET ORAL at 12:49

## 2023-04-01 RX ADMIN — PANTOPRAZOLE SODIUM 40 MILLIGRAM(S): 20 TABLET, DELAYED RELEASE ORAL at 11:32

## 2023-04-01 RX ADMIN — SODIUM CHLORIDE 500 MILLILITER(S): 9 INJECTION INTRAMUSCULAR; INTRAVENOUS; SUBCUTANEOUS at 20:35

## 2023-04-01 RX ADMIN — MAGNESIUM OXIDE 400 MG ORAL TABLET 400 MILLIGRAM(S): 241.3 TABLET ORAL at 11:32

## 2023-04-01 RX ADMIN — Medication 600 MILLIGRAM(S): at 11:39

## 2023-04-01 RX ADMIN — Medication 12.5 MILLIGRAM(S): at 17:42

## 2023-04-01 RX ADMIN — Medication 6: at 12:49

## 2023-04-01 RX ADMIN — INSULIN GLARGINE 20 UNIT(S): 100 INJECTION, SOLUTION SUBCUTANEOUS at 08:34

## 2023-04-01 RX ADMIN — Medication 650 MILLIGRAM(S): at 23:30

## 2023-04-01 RX ADMIN — Medication 6 UNIT(S): at 09:00

## 2023-04-01 NOTE — PROGRESS NOTE ADULT - ASSESSMENT
77 year old male with history of A. Fib on Eliquis (s/p Cardioversion on 1/31/23), Moderate AS, Chronic Diastolic Heart Failure, CAD s/p PCI, HTN, HLD, DM 2, Morbid Obesity, ALLIE on CPAP, Thoracic Aortic Aneurysm, BPH s/p TURP, Left Renal Mass and recent hMPV infection presented from [facility] with respiratory distress, confusion and fall. Found to be in Acute on Chronic Hypercapnic Respiratory Failure and Shock. He was placed on NIV and was started on Levophed. Admitted to MICU. Respiratory status worsened and he was intubated. He was treated for Septic Shock / Pneumonia / Respiratory Failure / Metabolic Encephalopathy. He was extubated on 3/23/23, currently on HFNC and nocturnal CPAP. Levophed weaned off on 3/25/23, now downgraded to medicine service.     Acute on Chronic Respiratory Failure with Hypoxia / Hypercapnia  - s/p extubation on 3/23   - remans on HFNC  - Recent hMPV Infection, History of ALLIE   - Treated for suspected superimposed bacterial pneumonia   - Finished Prednisone taper   - c/w Oxygen support, wean as tolerated   - Nocturnal CPAP     Metabolic Encephalopathy  - improved   - CT Head with no acute findings    A. Fib   - s/p DCCV on 1/31/23  - c/w Amiodarone and Metoprolol  - Eliquis   - Cardio follow up noted     Chronic Diastolic Heart Failure / Moderate AS   - soft BP this AM   - Hold Metolazone today  - Continue Metoprolol with holding parameters   - Cardio follow up noted     CAD / HLD / HTN  - Continue Aspirin, Lipitor, Metoprolol and Clonidine    DM 2  - A1c 9.8 on 1/27/23  - Accu checks and ISS  - Continue Lantus 20 units daily   - Continue Premeal Admelog 6 units     BPH  - Continue Proscar and Cardura     Osteoarthritis of Both Knees  - Supportive care   - Tylenol for pain control  - PT    DVT Prophylaxis - Eliquis     Dispo: ELLYN once stable.

## 2023-04-01 NOTE — PROGRESS NOTE ADULT - SUBJECTIVE AND OBJECTIVE BOX
[facility] Division of Hospital Medicine    Chief Complaint:  Acute resp failure     SUBJECTIVE / OVERNIGHT EVENTS:  Pt reports he feels SOB, overall feels frustrated because he is very [place] and uses lip reading to communicate     Patient denies chest pain, SOB, abd pain, N/V, fever, chills, dysuria or any other complaints. All remainder ROS negative.     MEDICATIONS  (STANDING):  albuterol/ipratropium for Nebulization 3 milliLiter(s) Nebulizer every 6 hours  aMIOdarone    Tablet 200 milliGRAM(s) Oral daily  apixaban 5 milliGRAM(s) Enteral Tube every 12 hours  aspirin  chewable 81 milliGRAM(s) Oral daily  atorvastatin 10 milliGRAM(s) Oral at bedtime  chlorhexidine 2% Cloths 1 Application(s) Topical <User Schedule>  cloNIDine Patch 0.2 mG/24Hr(s) 1 patch Transdermal every 7 days  doxazosin 2 milliGRAM(s) Oral at bedtime  finasteride 5 milliGRAM(s) Oral daily  insulin glargine Injectable (LANTUS) 20 Unit(s) SubCutaneous every morning  insulin lispro (ADMELOG) corrective regimen sliding scale   SubCutaneous Before meals and at bedtime  insulin lispro Injectable (ADMELOG) 6 Unit(s) SubCutaneous three times a day before meals  lidocaine   4% Patch 1 Patch Transdermal daily  lidocaine   4% Patch 1 Patch Transdermal daily  metoprolol tartrate 12.5 milliGRAM(s) Oral every 12 hours  pantoprazole    Tablet 40 milliGRAM(s) Oral daily  polyethylene glycol 3350 17 Gram(s) Oral daily  QUEtiapine 50 milliGRAM(s) Oral at bedtime  senna 2 Tablet(s) Oral at bedtime    MEDICATIONS  (PRN):  acetaminophen     Tablet .. 650 milliGRAM(s) Oral every 6 hours PRN Temp greater or equal to 38C (100.4F), Mild Pain (1 - 3), Moderate Pain (4 - 6)  ALPRAZolam 0.25 milliGRAM(s) Oral at bedtime PRN anxiety  ibuprofen  Tablet. 600 milliGRAM(s) Oral every 6 hours PRN Severe Pain (7 - 10)        I&O's Summary    31 Mar 2023 07:01  -  01 Apr 2023 07:00  --------------------------------------------------------  IN: 500 mL / OUT: 700 mL / NET: -200 mL        PHYSICAL EXAM:  Vital Signs Last 24 Hrs  T(C): 37.3 (01 Apr 2023 07:44), Max: 37.3 (01 Apr 2023 07:44)  T(F): 99.1 (01 Apr 2023 07:44), Max: 99.1 (01 Apr 2023 07:44)  HR: 102 (01 Apr 2023 12:00) (74 - 104)  BP: 90/57 (01 Apr 2023 12:00) (79/50 - 109/59)  BP(mean): 62 (01 Apr 2023 12:00) (60 - 72)  RR: 22 (01 Apr 2023 12:45) (20 - 25)  SpO2: 98% (01 Apr 2023 12:45) (91% - 98%)    Parameters below as of 01 Apr 2023 12:45  Patient On (Oxygen Delivery Method): nasal cannula, high flow  O2 Flow (L/min): 35  O2 Concentration (%): 80      CONSTITUTIONAL: NAD, sitting up in bed  ENMT: Moist oral mucosa, EOMI   RESPIRATORY: Normal respiratory effort; lungs with rhonchi and reduced BS bilaterally  CARDIOVASCULAR: Regular rate and rhythm, normal S1 and S2   ABDOMEN: Obese, Nontender to palpation, normoactive bowel sounds  MUSCULOSKELETAL:  No clubbing or cyanosis of digits   PSYCH: A+O to person, place, and time; affect appropriate  NEUROLOGY: No gross sensory or motor deficits   SKIN: warm and dry       LABS:    03-31    141  |  97  |  19.1  ----------------------------<  192<H>  4.0   |  38.0<H>  |  0.56    Ca    8.4      31 Mar 2023 07:10  Mg     1.8     03-31            CAPILLARY BLOOD GLUCOSE      POCT Blood Glucose.: 273 mg/dL (01 Apr 2023 12:45)  POCT Blood Glucose.: 180 mg/dL (01 Apr 2023 08:31)  POCT Blood Glucose.: 185 mg/dL (31 Mar 2023 22:14)  POCT Blood Glucose.: 230 mg/dL (31 Mar 2023 18:42)

## 2023-04-02 LAB
ANION GAP SERPL CALC-SCNC: 10 MMOL/L — SIGNIFICANT CHANGE UP (ref 5–17)
ANISOCYTOSIS BLD QL: SLIGHT — SIGNIFICANT CHANGE UP
BASO STIPL BLD QL SMEAR: PRESENT — SIGNIFICANT CHANGE UP
BASOPHILS # BLD AUTO: 0 K/UL — SIGNIFICANT CHANGE UP (ref 0–0.2)
BASOPHILS NFR BLD AUTO: 0 % — SIGNIFICANT CHANGE UP (ref 0–2)
BUN SERPL-MCNC: 19.8 MG/DL — SIGNIFICANT CHANGE UP (ref 8–20)
CALCIUM SERPL-MCNC: 7.9 MG/DL — LOW (ref 8.4–10.5)
CHLORIDE SERPL-SCNC: 98 MMOL/L — SIGNIFICANT CHANGE UP (ref 96–108)
CO2 SERPL-SCNC: 33 MMOL/L — HIGH (ref 22–29)
CREAT SERPL-MCNC: 0.59 MG/DL — SIGNIFICANT CHANGE UP (ref 0.5–1.3)
DACRYOCYTES BLD QL SMEAR: SLIGHT — SIGNIFICANT CHANGE UP
EGFR: 100 ML/MIN/1.73M2 — SIGNIFICANT CHANGE UP
EOSINOPHIL # BLD AUTO: 0.4 K/UL — SIGNIFICANT CHANGE UP (ref 0–0.5)
EOSINOPHIL NFR BLD AUTO: 5.2 % — SIGNIFICANT CHANGE UP (ref 0–6)
GLUCOSE BLDC GLUCOMTR-MCNC: 125 MG/DL — HIGH (ref 70–99)
GLUCOSE BLDC GLUCOMTR-MCNC: 138 MG/DL — HIGH (ref 70–99)
GLUCOSE BLDC GLUCOMTR-MCNC: 147 MG/DL — HIGH (ref 70–99)
GLUCOSE BLDC GLUCOMTR-MCNC: 187 MG/DL — HIGH (ref 70–99)
GLUCOSE SERPL-MCNC: 148 MG/DL — HIGH (ref 70–99)
HCT VFR BLD CALC: 42.5 % — SIGNIFICANT CHANGE UP (ref 39–50)
HGB BLD-MCNC: 13.2 G/DL — SIGNIFICANT CHANGE UP (ref 13–17)
LYMPHOCYTES # BLD AUTO: 0.47 K/UL — LOW (ref 1–3.3)
LYMPHOCYTES # BLD AUTO: 6.1 % — LOW (ref 13–44)
MACROCYTES BLD QL: SLIGHT — SIGNIFICANT CHANGE UP
MANUAL SMEAR VERIFICATION: SIGNIFICANT CHANGE UP
MCHC RBC-ENTMCNC: 29.3 PG — SIGNIFICANT CHANGE UP (ref 27–34)
MCHC RBC-ENTMCNC: 31.1 GM/DL — LOW (ref 32–36)
MCV RBC AUTO: 94.2 FL — SIGNIFICANT CHANGE UP (ref 80–100)
MONOCYTES # BLD AUTO: 0.67 K/UL — SIGNIFICANT CHANGE UP (ref 0–0.9)
MONOCYTES NFR BLD AUTO: 8.7 % — SIGNIFICANT CHANGE UP (ref 2–14)
MYELOCYTES NFR BLD: 0.9 % — HIGH (ref 0–0)
NEUTROPHILS # BLD AUTO: 5.87 K/UL — SIGNIFICANT CHANGE UP (ref 1.8–7.4)
NEUTROPHILS NFR BLD AUTO: 76.5 % — SIGNIFICANT CHANGE UP (ref 43–77)
OVALOCYTES BLD QL SMEAR: SLIGHT — SIGNIFICANT CHANGE UP
PLAT MORPH BLD: NORMAL — SIGNIFICANT CHANGE UP
PLATELET # BLD AUTO: 185 K/UL — SIGNIFICANT CHANGE UP (ref 150–400)
POIKILOCYTOSIS BLD QL AUTO: SLIGHT — SIGNIFICANT CHANGE UP
POLYCHROMASIA BLD QL SMEAR: SLIGHT — SIGNIFICANT CHANGE UP
POTASSIUM SERPL-MCNC: 3.4 MMOL/L — LOW (ref 3.5–5.3)
POTASSIUM SERPL-SCNC: 3.4 MMOL/L — LOW (ref 3.5–5.3)
RBC # BLD: 4.51 M/UL — SIGNIFICANT CHANGE UP (ref 4.2–5.8)
RBC # FLD: 13.9 % — SIGNIFICANT CHANGE UP (ref 10.3–14.5)
RBC BLD AUTO: ABNORMAL
SODIUM SERPL-SCNC: 141 MMOL/L — SIGNIFICANT CHANGE UP (ref 135–145)
VARIANT LYMPHS # BLD: 2.6 % — SIGNIFICANT CHANGE UP (ref 0–6)
WBC # BLD: 7.67 K/UL — SIGNIFICANT CHANGE UP (ref 3.8–10.5)
WBC # FLD AUTO: 7.67 K/UL — SIGNIFICANT CHANGE UP (ref 3.8–10.5)

## 2023-04-02 PROCEDURE — 99233 SBSQ HOSP IP/OBS HIGH 50: CPT

## 2023-04-02 RX ORDER — POTASSIUM CHLORIDE 20 MEQ
40 PACKET (EA) ORAL ONCE
Refills: 0 | Status: COMPLETED | OUTPATIENT
Start: 2023-04-02 | End: 2023-04-02

## 2023-04-02 RX ADMIN — Medication 6 UNIT(S): at 12:32

## 2023-04-02 RX ADMIN — Medication 40 MILLIEQUIVALENT(S): at 05:25

## 2023-04-02 RX ADMIN — Medication 81 MILLIGRAM(S): at 12:31

## 2023-04-02 RX ADMIN — Medication 6 UNIT(S): at 08:36

## 2023-04-02 RX ADMIN — Medication 650 MILLIGRAM(S): at 21:57

## 2023-04-02 RX ADMIN — FINASTERIDE 5 MILLIGRAM(S): 5 TABLET, FILM COATED ORAL at 12:32

## 2023-04-02 RX ADMIN — AMIODARONE HYDROCHLORIDE 200 MILLIGRAM(S): 400 TABLET ORAL at 12:33

## 2023-04-02 RX ADMIN — SENNA PLUS 2 TABLET(S): 8.6 TABLET ORAL at 21:56

## 2023-04-02 RX ADMIN — Medication 0.25 MILLIGRAM(S): at 21:56

## 2023-04-02 RX ADMIN — LIDOCAINE 1 PATCH: 4 CREAM TOPICAL at 17:06

## 2023-04-02 RX ADMIN — Medication 12.5 MILLIGRAM(S): at 12:32

## 2023-04-02 RX ADMIN — Medication 1 PATCH: at 08:02

## 2023-04-02 RX ADMIN — QUETIAPINE FUMARATE 50 MILLIGRAM(S): 200 TABLET, FILM COATED ORAL at 21:56

## 2023-04-02 RX ADMIN — Medication 2: at 17:11

## 2023-04-02 RX ADMIN — LIDOCAINE 1 PATCH: 4 CREAM TOPICAL at 17:05

## 2023-04-02 RX ADMIN — ATORVASTATIN CALCIUM 10 MILLIGRAM(S): 80 TABLET, FILM COATED ORAL at 21:57

## 2023-04-02 RX ADMIN — Medication 3 MILLILITER(S): at 16:24

## 2023-04-02 RX ADMIN — Medication 2 MILLIGRAM(S): at 21:57

## 2023-04-02 RX ADMIN — CHLORHEXIDINE GLUCONATE 1 APPLICATION(S): 213 SOLUTION TOPICAL at 05:23

## 2023-04-02 RX ADMIN — INSULIN GLARGINE 20 UNIT(S): 100 INJECTION, SOLUTION SUBCUTANEOUS at 08:36

## 2023-04-02 RX ADMIN — APIXABAN 5 MILLIGRAM(S): 2.5 TABLET, FILM COATED ORAL at 08:35

## 2023-04-02 RX ADMIN — Medication 3 MILLILITER(S): at 20:57

## 2023-04-02 RX ADMIN — LIDOCAINE 1 PATCH: 4 CREAM TOPICAL at 12:31

## 2023-04-02 RX ADMIN — LIDOCAINE 1 PATCH: 4 CREAM TOPICAL at 22:05

## 2023-04-02 RX ADMIN — PANTOPRAZOLE SODIUM 40 MILLIGRAM(S): 20 TABLET, DELAYED RELEASE ORAL at 12:33

## 2023-04-02 RX ADMIN — Medication 650 MILLIGRAM(S): at 22:57

## 2023-04-02 RX ADMIN — Medication 3 MILLILITER(S): at 03:58

## 2023-04-02 RX ADMIN — APIXABAN 5 MILLIGRAM(S): 2.5 TABLET, FILM COATED ORAL at 21:57

## 2023-04-02 RX ADMIN — Medication 3 MILLILITER(S): at 10:16

## 2023-04-02 RX ADMIN — Medication 40 MILLIEQUIVALENT(S): at 17:12

## 2023-04-02 RX ADMIN — Medication 6 UNIT(S): at 17:12

## 2023-04-02 NOTE — PROGRESS NOTE ADULT - ASSESSMENT
77 year old male with history of A. Fib on Eliquis (s/p Cardioversion on 1/31/23), Moderate AS, Chronic Diastolic Heart Failure, CAD s/p PCI, HTN, HLD, DM 2, Morbid Obesity, ALLIE on CPAP, Thoracic Aortic Aneurysm, BPH s/p TURP, Left Renal Mass and recent hMPV infection presented from [facility] with respiratory distress, confusion and fall. Found to be in Acute on Chronic Hypercapnic Respiratory Failure and Shock. He was placed on NIV and was started on Levophed. Admitted to MICU. Respiratory status worsened and he was intubated. He was treated for Septic Shock / Pneumonia / Respiratory Failure / Metabolic Encephalopathy. He was extubated on 3/23/23, currently on HFNC and nocturnal CPAP. Levophed weaned off on 3/25/23, now downgraded to medicine service.     Acute on Chronic Respiratory Failure with Hypoxia / Hypercapnia  - s/p extubation on 3/23   - remans on HFNC, wean as tolerated   - Recent hMPV Infection, History of ALLIE   - Treated for suspected superimposed bacterial pneumonia   - Finished Prednisone taper   - c/w Oxygen support, wean as tolerated   - Nocturnal CPAP     Metabolic Encephalopathy   - improved   - CT Head with no acute findings     A. Fib   - s/p DCCV on 1/31/23  - c/w Amiodarone and Metoprolol  - c/w Eliquis   - Cardio follow up noted     Chronic Diastolic Heart Failure / Moderate AS   - soft BPs    - DC Metolazone   - Continue Metoprolol with holding parameters   - Cardio follow up noted     CAD / HLD / HTN  - Continue Aspirin, Lipitor, Metoprolol and Clonidine    DM 2  - A1c 9.8 on 1/27/23  - Accu checks and ISS  - Continue Lantus 20 units daily   - Continue Premeal Admelog 6 units     BPH  - Continue Proscar and Cardura     Osteoarthritis of Both Knees  - Supportive care   - Tylenol for pain control  - PT    DVT Prophylaxis - Eliquis     Dispo: ELLYN once stable.

## 2023-04-02 NOTE — PROGRESS NOTE ADULT - SUBJECTIVE AND OBJECTIVE BOX
[facility] Division of Hospital Medicine     Chief Complaint:  Acute resp failure     SUBJECTIVE / OVERNIGHT EVENTS:   Pt reports he feels OK   Very [place] and uses lip reading to communicate     Patient denies chest pain, SOB, abd pain, N/V, fever, chills, dysuria or any other complaints. All remainder ROS negative.     MEDICATIONS  (STANDING):  albuterol/ipratropium for Nebulization 3 milliLiter(s) Nebulizer every 6 hours  aMIOdarone    Tablet 200 milliGRAM(s) Oral daily  apixaban 5 milliGRAM(s) Enteral Tube every 12 hours  aspirin  chewable 81 milliGRAM(s) Oral daily  atorvastatin 10 milliGRAM(s) Oral at bedtime  chlorhexidine 2% Cloths 1 Application(s) Topical <User Schedule>  cloNIDine Patch 0.2 mG/24Hr(s) 1 patch Transdermal every 7 days  doxazosin 2 milliGRAM(s) Oral at bedtime  finasteride 5 milliGRAM(s) Oral daily  insulin glargine Injectable (LANTUS) 20 Unit(s) SubCutaneous every morning  insulin lispro (ADMELOG) corrective regimen sliding scale   SubCutaneous Before meals and at bedtime  insulin lispro Injectable (ADMELOG) 6 Unit(s) SubCutaneous three times a day before meals  lidocaine   4% Patch 1 Patch Transdermal daily  lidocaine   4% Patch 1 Patch Transdermal daily  metoprolol tartrate 12.5 milliGRAM(s) Oral every 12 hours  pantoprazole    Tablet 40 milliGRAM(s) Oral daily  polyethylene glycol 3350 17 Gram(s) Oral daily  potassium chloride    Tablet ER 40 milliEquivalent(s) Oral once  QUEtiapine 50 milliGRAM(s) Oral at bedtime  senna 2 Tablet(s) Oral at bedtime    MEDICATIONS  (PRN):  acetaminophen     Tablet .. 650 milliGRAM(s) Oral every 6 hours PRN Temp greater or equal to 38C (100.4F), Mild Pain (1 - 3), Moderate Pain (4 - 6)  ALPRAZolam 0.25 milliGRAM(s) Oral at bedtime PRN anxiety  ibuprofen  Tablet. 600 milliGRAM(s) Oral every 6 hours PRN Severe Pain (7 - 10)        I&O's Summary    01 Apr 2023 07:01  -  02 Apr 2023 07:00  --------------------------------------------------------  IN: 600 mL / OUT: 1200 mL / NET: -600 mL        PHYSICAL EXAM:  Vital Signs Last 24 Hrs  T(C): 37 (02 Apr 2023 08:08), Max: 37 (02 Apr 2023 08:08)  T(F): 98.6 (02 Apr 2023 08:08), Max: 98.6 (02 Apr 2023 08:08)  HR: 93 (02 Apr 2023 10:16) (60 - 97)  BP: 112/48 (02 Apr 2023 10:00) (74/50 - 116/61)  BP(mean): 61 (02 Apr 2023 10:00) (55 - 95)  RR: 21 (02 Apr 2023 10:00) (17 - 23)  SpO2: 96% (02 Apr 2023 10:16) (90% - 100%)    Parameters below as of 02 Apr 2023 10:16  Patient On (Oxygen Delivery Method): nasal cannula, high flow        CONSTITUTIONAL: NAD, sitting up in bed  ENMT: Moist oral mucosa, EOMI   RESPIRATORY: Normal respiratory effort; lungs with rhonchi and reduced BS bilaterally  CARDIOVASCULAR: Regular rate and rhythm, normal S1 and S2   ABDOMEN: Obese, Nontender to palpation, normoactive bowel sounds  MUSCULOSKELETAL:  No clubbing or cyanosis of digits   PSYCH: A+O to person, place, and time; affect appropriate  NEUROLOGY: No gross sensory or motor deficits   SKIN: warm and dry       LABS:                        13.2   7.67  )-----------( 185      ( 02 Apr 2023 07:33 )             42.5     04-02    141  |  98  |  19.8  ----------------------------<  148<H>  3.4<L>   |  33.0<H>  |  0.59    Ca    7.9<L>      02 Apr 2023 07:33    TPro  5.9<L>  /  Alb  3.0<L>  /  TBili  0.4  /  DBili  x   /  AST  15  /  ALT  9   /  AlkPhos  127<H>  04-01            CAPILLARY BLOOD GLUCOSE      POCT Blood Glucose.: 147 mg/dL (02 Apr 2023 11:46)  POCT Blood Glucose.: 138 mg/dL (02 Apr 2023 08:06)  POCT Blood Glucose.: 148 mg/dL (01 Apr 2023 22:31)  POCT Blood Glucose.: 158 mg/dL (01 Apr 2023 17:40)

## 2023-04-03 LAB
ANION GAP SERPL CALC-SCNC: 9 MMOL/L — SIGNIFICANT CHANGE UP (ref 5–17)
BUN SERPL-MCNC: 15.9 MG/DL — SIGNIFICANT CHANGE UP (ref 8–20)
CALCIUM SERPL-MCNC: 7.9 MG/DL — LOW (ref 8.4–10.5)
CHLORIDE SERPL-SCNC: 101 MMOL/L — SIGNIFICANT CHANGE UP (ref 96–108)
CO2 SERPL-SCNC: 34 MMOL/L — HIGH (ref 22–29)
CREAT SERPL-MCNC: 0.55 MG/DL — SIGNIFICANT CHANGE UP (ref 0.5–1.3)
EGFR: 102 ML/MIN/1.73M2 — SIGNIFICANT CHANGE UP
GLUCOSE BLDC GLUCOMTR-MCNC: 165 MG/DL — HIGH (ref 70–99)
GLUCOSE BLDC GLUCOMTR-MCNC: 169 MG/DL — HIGH (ref 70–99)
GLUCOSE BLDC GLUCOMTR-MCNC: 173 MG/DL — HIGH (ref 70–99)
GLUCOSE BLDC GLUCOMTR-MCNC: 181 MG/DL — HIGH (ref 70–99)
GLUCOSE SERPL-MCNC: 148 MG/DL — HIGH (ref 70–99)
POTASSIUM SERPL-MCNC: 3.4 MMOL/L — LOW (ref 3.5–5.3)
POTASSIUM SERPL-SCNC: 3.4 MMOL/L — LOW (ref 3.5–5.3)
SODIUM SERPL-SCNC: 144 MMOL/L — SIGNIFICANT CHANGE UP (ref 135–145)

## 2023-04-03 PROCEDURE — 99233 SBSQ HOSP IP/OBS HIGH 50: CPT

## 2023-04-03 RX ORDER — POTASSIUM CHLORIDE 20 MEQ
40 PACKET (EA) ORAL ONCE
Refills: 0 | Status: COMPLETED | OUTPATIENT
Start: 2023-04-03 | End: 2023-04-03

## 2023-04-03 RX ADMIN — Medication 6 UNIT(S): at 17:51

## 2023-04-03 RX ADMIN — Medication 3 MILLILITER(S): at 15:49

## 2023-04-03 RX ADMIN — Medication 81 MILLIGRAM(S): at 12:03

## 2023-04-03 RX ADMIN — Medication 0.25 MILLIGRAM(S): at 21:15

## 2023-04-03 RX ADMIN — Medication 40 MILLIEQUIVALENT(S): at 08:56

## 2023-04-03 RX ADMIN — Medication 2 MILLIGRAM(S): at 21:14

## 2023-04-03 RX ADMIN — LIDOCAINE 1 PATCH: 4 CREAM TOPICAL at 12:04

## 2023-04-03 RX ADMIN — Medication 6 UNIT(S): at 09:00

## 2023-04-03 RX ADMIN — Medication 12.5 MILLIGRAM(S): at 05:46

## 2023-04-03 RX ADMIN — Medication 3 MILLILITER(S): at 09:41

## 2023-04-03 RX ADMIN — INSULIN GLARGINE 20 UNIT(S): 100 INJECTION, SOLUTION SUBCUTANEOUS at 08:59

## 2023-04-03 RX ADMIN — Medication 12.5 MILLIGRAM(S): at 18:45

## 2023-04-03 RX ADMIN — Medication 6 UNIT(S): at 13:27

## 2023-04-03 RX ADMIN — CHLORHEXIDINE GLUCONATE 1 APPLICATION(S): 213 SOLUTION TOPICAL at 05:48

## 2023-04-03 RX ADMIN — Medication 600 MILLIGRAM(S): at 13:33

## 2023-04-03 RX ADMIN — LIDOCAINE 1 PATCH: 4 CREAM TOPICAL at 19:35

## 2023-04-03 RX ADMIN — Medication 2: at 13:26

## 2023-04-03 RX ADMIN — Medication 3 MILLILITER(S): at 21:04

## 2023-04-03 RX ADMIN — Medication 2: at 17:50

## 2023-04-03 RX ADMIN — QUETIAPINE FUMARATE 50 MILLIGRAM(S): 200 TABLET, FILM COATED ORAL at 21:14

## 2023-04-03 RX ADMIN — Medication 3 MILLILITER(S): at 03:30

## 2023-04-03 RX ADMIN — Medication 600 MILLIGRAM(S): at 12:04

## 2023-04-03 RX ADMIN — LIDOCAINE 1 PATCH: 4 CREAM TOPICAL at 21:37

## 2023-04-03 RX ADMIN — PANTOPRAZOLE SODIUM 40 MILLIGRAM(S): 20 TABLET, DELAYED RELEASE ORAL at 12:03

## 2023-04-03 RX ADMIN — ATORVASTATIN CALCIUM 10 MILLIGRAM(S): 80 TABLET, FILM COATED ORAL at 21:14

## 2023-04-03 RX ADMIN — Medication 2: at 08:59

## 2023-04-03 RX ADMIN — APIXABAN 5 MILLIGRAM(S): 2.5 TABLET, FILM COATED ORAL at 21:15

## 2023-04-03 RX ADMIN — AMIODARONE HYDROCHLORIDE 200 MILLIGRAM(S): 400 TABLET ORAL at 05:46

## 2023-04-03 RX ADMIN — FINASTERIDE 5 MILLIGRAM(S): 5 TABLET, FILM COATED ORAL at 12:03

## 2023-04-03 RX ADMIN — Medication 2: at 21:23

## 2023-04-03 RX ADMIN — APIXABAN 5 MILLIGRAM(S): 2.5 TABLET, FILM COATED ORAL at 08:57

## 2023-04-03 NOTE — PROGRESS NOTE ADULT - ASSESSMENT
77 year old male with history of A. Fib on Eliquis (s/p Cardioversion on 1/31/23), Moderate AS, Chronic Diastolic Heart Failure, CAD s/p PCI, HTN, HLD, DM 2, Morbid Obesity, ALLIE on CPAP, Thoracic Aortic Aneurysm, BPH s/p TURP, Left Renal Mass and recent hMPV infection presented from [facility] with respiratory distress, confusion and fall. Found to be in Acute on Chronic Hypercapnic Respiratory Failure and Shock. He was placed on NIV and was started on Levophed. Admitted to MICU. Respiratory status worsened and he was intubated. He was treated for Septic Shock / Pneumonia / Respiratory Failure / Metabolic Encephalopathy. He was extubated on 3/23/23, currently on HFNC and nocturnal CPAP. Levophed weaned off on 3/25/23, now downgraded to medicine service.     Acute on Chronic Respiratory Failure with Hypoxia / Hypercapnia  - s/p extubation on 3/23   - remains on HFNC, wean as tolerated   - Recent hMPV Infection, History of ALLIE   - Completed treatment for suspected superimposed bacterial pneumonia   - Finished Prednisone taper   - c/w Oxygen support, wean as tolerated   - c/w Nocturnal CPAP     Metabolic Encephalopathy   - improved   - CT Head with no acute findings     A. Fib   - s/p DCCV on 1/31/23  - c/w Amiodarone and Metoprolol  - c/w Eliquis   - Cardio follow up noted     Chronic Diastolic Heart Failure / Moderate AS   - soft BPs    - DC'ed Metolazone   - Continue Metoprolol with holding parameters   - Cardio follow up noted     CAD / HLD / HTN  - Continue Aspirin, Lipitor, Metoprolol and Clonidine    DM 2  - A1c 9.8 on 1/27/23  - Accu checks and ISS  - Continue Lantus 20 units daily   - Continue Premeal Admelog 6 units     BPH  - Continue Proscar and Cardura     Osteoarthritis of Both Knees  - Supportive care   - Tylenol for pain control  - PT    DVT Prophylaxis - Eliquis     Dispo: ELLYN once off [employer].

## 2023-04-03 NOTE — CHART NOTE - NSCHARTNOTEFT_GEN_A_CORE
Pt with BP 74/49  All remainder of vital signs stable  Resting comfortably in NAD  C/o mild dizziness  Denies chest pain, SOB, palpitations, HA, n/v/d/c, abdominal pain, blurry vision  500cc NS bolus given  Clonidine patch removed
Source: Patient [ x]  Family [ ]   other [x ]  77 year old male with history of A. Fib on Eliquis (s/p Cardioversion on 1/31/23), Moderate AS, Chronic Diastolic Heart Failure, CAD s/p PCI, HTN, HLD, DM 2, Morbid Obesity, ALLIE on CPAP, Thoracic Aortic Aneurysm, BPH s/p TURP, Left Renal Mass and recent hMPV infection presented from [facility] with respiratory distress, confusion and fall. Found to be in Acute on Chronic Hypercapnic Respiratory Failure and Shock. He was placed on NIV and was started on Levophed. Admitted to MICU. Respiratory status worsened and he was intubated. He was treated for Septic Shock / Pneumonia / Respiratory Failure / Metabolic Encephalopathy. He was extubated on 3/23/23, currently on HFNC and nocturnal CPAP. Levophed weaned off on 3/25/23, now downgraded to medicine service.     Acute on Chronic Respiratory Failure with Hypoxia / Hypercapnia  - s/p extubation on 3/23       Current Diet: Diet, Consistent Carbohydrate/No Snacks:   DASH/TLC {Sodium & Cholesterol Restricted} (DASH) (03-30-23 @ 08:51)      Patient reports [ ] nausea  [ ] vomiting [ ] diarrhea [ ] constipation  [ ]chewing problems [ ] swallowing issues  [ ] other:     PO intake:  < 50% [ ]   50-75%  [x ]   %  [ ]  other :    Source for PO intake [x ] Patient [ ] family [ ] chart [ ] staff [ ] other      Current Weight:     3/27 149.3 kg  3/26 157.3 kg  3/24 155.8 kg      % Weight Change     Pertinent Medications: MEDICATIONS  (STANDING):  albuterol/ipratropium for Nebulization 3 milliLiter(s) Nebulizer every 6 hours  aMIOdarone    Tablet 200 milliGRAM(s) Oral daily  apixaban 5 milliGRAM(s) Enteral Tube every 12 hours  aspirin  chewable 81 milliGRAM(s) Oral daily  atorvastatin 10 milliGRAM(s) Oral at bedtime  chlorhexidine 2% Cloths 1 Application(s) Topical <User Schedule>  cloNIDine Patch 0.2 mG/24Hr(s) 1 patch Transdermal every 7 days  doxazosin 2 milliGRAM(s) Oral at bedtime  finasteride 5 milliGRAM(s) Oral daily  insulin glargine Injectable (LANTUS) 20 Unit(s) SubCutaneous every morning  insulin lispro (ADMELOG) corrective regimen sliding scale   SubCutaneous Before meals and at bedtime  insulin lispro Injectable (ADMELOG) 6 Unit(s) SubCutaneous three times a day before meals  lidocaine   4% Patch 1 Patch Transdermal daily  lidocaine   4% Patch 1 Patch Transdermal daily  metoprolol tartrate 12.5 milliGRAM(s) Oral every 12 hours  pantoprazole    Tablet 40 milliGRAM(s) Oral daily  polyethylene glycol 3350 17 Gram(s) Oral daily  QUEtiapine 50 milliGRAM(s) Oral at bedtime  senna 2 Tablet(s) Oral at bedtime    MEDICATIONS  (PRN):  acetaminophen     Tablet .. 650 milliGRAM(s) Oral every 6 hours PRN Temp greater or equal to 38C (100.4F), Mild Pain (1 - 3), Moderate Pain (4 - 6)  ALPRAZolam 0.25 milliGRAM(s) Oral at bedtime PRN anxiety  ibuprofen  Tablet. 600 milliGRAM(s) Oral every 6 hours PRN Severe Pain (7 - 10)    Pertinent Labs: CBC Full  -  ( 02 Apr 2023 07:33 )  WBC Count : 7.67 K/uL  RBC Count : 4.51 M/uL  Hemoglobin : 13.2 g/dL  Hematocrit : 42.5 %  Platelet Count - Automated : 185 K/uL  Mean Cell Volume : 94.2 fl  Mean Cell Hemoglobin : 29.3 pg  Mean Cell Hemoglobin Concentration : 31.1 gm/dL  Auto Neutrophil # : 5.87 K/uL  Auto Lymphocyte # : 0.47 K/uL  Auto Monocyte # : 0.67 K/uL  Auto Eosinophil # : 0.40 K/uL  Auto Basophil # : 0.00 K/uL  Auto Neutrophil % : 76.5 %  Auto Lymphocyte % : 6.1 %  Auto Monocyte % : 8.7 %  Auto Eosinophil % : 5.2 %  Auto Basophil % : 0.0 %  04-03 Na144 mmol/L Glu 148 mg/dL<H> K+ 3.4 mmol/L<L> Cr  0.55 mg/dL BUN 15.9 mg/dL Phos n/a   Alb n/a   PAB n/a               Skin: coccyx stage II  Edema : 1+ dep    Nutrition focused physical exam conducted - found signs of malnutrition [ ]absent [x ]present    Subcutaneous fat loss: [x ] Orbital fat pads region, [ x]Buccal fat region, [ ]Triceps region,  [ ]Ribs region    Muscle wasting: [ x]Temples region, [ ]Clavicle region, [ ]Shoulder region, [ ]Scapula region, [ ]Interosseous region,  [ ]thigh region, [ ]Calf region    Estimated Needs:   [ ] no change since previous assessment  [ ] recalculated:     Current Nutrition Diagnosis:  Pt presents at risk secondary to increased protein calorie needs, related to increased  physiologic demand stress factor, as evidenced by  Acute respiratory failure, pt remains on HFNC. aware of plan for ELLYN. Pt reports fair PO intake, pt [place], brief education on Healthy plate and high protein provided.     Recommendations:   1) Add Glucerna BID  2) Daily weight  3) RX MVI, Vit C    Monitoring and Evaluation:   [ ] PO intake [ ] Tolerance to diet prescription [X] Weights  [X] Follow up per protocol [X] Labs:
Pt with BP 74/50, ~2hrs after receiving PO lopressor  All remainder of vital signs stable  Resting comfortably in NAD  Denies chest pain, SOB, palpitations, HA, n/v/d/c, abdominal pain, dizziness, blurry vision  500cc NS bolus x 1hr given with improvement in BP to 92/51.     RN recalled 2hrs later, pt with subsequent episode of hypotension.   Remains asymptomatic, remainder of VSS  Albumin x 1 dose ordered  STAT CBC, CMP  RN to notify of any acute change in pt status

## 2023-04-03 NOTE — PROGRESS NOTE ADULT - SUBJECTIVE AND OBJECTIVE BOX
[facility] Division of Hospital Medicine    Chief Complaint:  Acute resp failure     SUBJECTIVE / OVERNIGHT EVENTS:   Pt reports he feels better overall   Very [place] and uses lip reading to communicate     Patient denies chest pain, SOB, abd pain, N/V, fever, chills, dysuria or any other complaints. All remainder ROS negative.     MEDICATIONS  (STANDING):  albuterol/ipratropium for Nebulization 3 milliLiter(s) Nebulizer every 6 hours  aMIOdarone    Tablet 200 milliGRAM(s) Oral daily  apixaban 5 milliGRAM(s) Enteral Tube every 12 hours  aspirin  chewable 81 milliGRAM(s) Oral daily  atorvastatin 10 milliGRAM(s) Oral at bedtime  chlorhexidine 2% Cloths 1 Application(s) Topical <User Schedule>  cloNIDine Patch 0.2 mG/24Hr(s) 1 patch Transdermal every 7 days  doxazosin 2 milliGRAM(s) Oral at bedtime  finasteride 5 milliGRAM(s) Oral daily  insulin glargine Injectable (LANTUS) 20 Unit(s) SubCutaneous every morning  insulin lispro (ADMELOG) corrective regimen sliding scale   SubCutaneous Before meals and at bedtime  insulin lispro Injectable (ADMELOG) 6 Unit(s) SubCutaneous three times a day before meals  lidocaine   4% Patch 1 Patch Transdermal daily  lidocaine   4% Patch 1 Patch Transdermal daily  metoprolol tartrate 12.5 milliGRAM(s) Oral every 12 hours  pantoprazole    Tablet 40 milliGRAM(s) Oral daily  polyethylene glycol 3350 17 Gram(s) Oral daily  QUEtiapine 50 milliGRAM(s) Oral at bedtime  senna 2 Tablet(s) Oral at bedtime    MEDICATIONS  (PRN):  acetaminophen     Tablet .. 650 milliGRAM(s) Oral every 6 hours PRN Temp greater or equal to 38C (100.4F), Mild Pain (1 - 3), Moderate Pain (4 - 6)  ALPRAZolam 0.25 milliGRAM(s) Oral at bedtime PRN anxiety  ibuprofen  Tablet. 600 milliGRAM(s) Oral every 6 hours PRN Severe Pain (7 - 10)        I&O's Summary    02 Apr 2023 07:01  -  03 Apr 2023 07:00  --------------------------------------------------------  IN: 1920 mL / OUT: 775 mL / NET: 1145 mL        PHYSICAL EXAM:  Vital Signs Last 24 Hrs  T(C): 37.2 (03 Apr 2023 07:45), Max: 37.2 (03 Apr 2023 07:45)  T(F): 99 (03 Apr 2023 07:45), Max: 99 (03 Apr 2023 07:45)  HR: 101 (03 Apr 2023 10:00) (81 - 101)  BP: 92/50 (03 Apr 2023 10:00) (92/50 - 121/64)  BP(mean): 60 (03 Apr 2023 10:00) (60 - 71)  RR: 20 (03 Apr 2023 10:00) (20 - 23)  SpO2: 91% (03 Apr 2023 10:00) (91% - 98%)    Parameters below as of 03 Apr 2023 10:00  Patient On (Oxygen Delivery Method): nasal cannula, high flow  O2 Flow (L/min): 30  O2 Concentration (%): 50        CONSTITUTIONAL: NAD, sitting up in bed  ENMT: Moist oral mucosa, EOMI, very [place]   RESPIRATORY: Normal respiratory effort; lungs with rhonchi and reduced BS bilaterally  CARDIOVASCULAR: Regular rate and rhythm, normal S1 and S2   ABDOMEN: Obese, Nontender to palpation, normoactive bowel sounds  MUSCULOSKELETAL:  No clubbing or cyanosis of digits   PSYCH: A+O to person, place, and time; affect appropriate  NEUROLOGY: No gross sensory or motor deficits   SKIN: warm and dry         LABS:                        13.2   7.67  )-----------( 185      ( 02 Apr 2023 07:33 )             42.5     04-03    144  |  101  |  15.9  ----------------------------<  148<H>  3.4<L>   |  34.0<H>  |  0.55    Ca    7.9<L>      03 Apr 2023 04:44    TPro  5.9<L>  /  Alb  3.0<L>  /  TBili  0.4  /  DBili  x   /  AST  15  /  ALT  9   /  AlkPhos  127<H>  04-01            CAPILLARY BLOOD GLUCOSE      POCT Blood Glucose.: 165 mg/dL (03 Apr 2023 08:56)  POCT Blood Glucose.: 125 mg/dL (02 Apr 2023 21:59)  POCT Blood Glucose.: 187 mg/dL (02 Apr 2023 16:44)

## 2023-04-04 LAB
ANION GAP SERPL CALC-SCNC: 8 MMOL/L — SIGNIFICANT CHANGE UP (ref 5–17)
BUN SERPL-MCNC: 14.8 MG/DL — SIGNIFICANT CHANGE UP (ref 8–20)
CALCIUM SERPL-MCNC: 8 MG/DL — LOW (ref 8.4–10.5)
CHLORIDE SERPL-SCNC: 101 MMOL/L — SIGNIFICANT CHANGE UP (ref 96–108)
CO2 SERPL-SCNC: 32 MMOL/L — HIGH (ref 22–29)
CREAT SERPL-MCNC: 0.57 MG/DL — SIGNIFICANT CHANGE UP (ref 0.5–1.3)
EGFR: 101 ML/MIN/1.73M2 — SIGNIFICANT CHANGE UP
GLUCOSE BLDC GLUCOMTR-MCNC: 135 MG/DL — HIGH (ref 70–99)
GLUCOSE BLDC GLUCOMTR-MCNC: 177 MG/DL — HIGH (ref 70–99)
GLUCOSE BLDC GLUCOMTR-MCNC: 203 MG/DL — HIGH (ref 70–99)
GLUCOSE BLDC GLUCOMTR-MCNC: 232 MG/DL — HIGH (ref 70–99)
GLUCOSE SERPL-MCNC: 148 MG/DL — HIGH (ref 70–99)
POTASSIUM SERPL-MCNC: 3.4 MMOL/L — LOW (ref 3.5–5.3)
POTASSIUM SERPL-SCNC: 3.4 MMOL/L — LOW (ref 3.5–5.3)
SARS-COV-2 RNA SPEC QL NAA+PROBE: SIGNIFICANT CHANGE UP
SODIUM SERPL-SCNC: 141 MMOL/L — SIGNIFICANT CHANGE UP (ref 135–145)

## 2023-04-04 PROCEDURE — 99233 SBSQ HOSP IP/OBS HIGH 50: CPT

## 2023-04-04 RX ORDER — POTASSIUM CHLORIDE 20 MEQ
40 PACKET (EA) ORAL ONCE
Refills: 0 | Status: COMPLETED | OUTPATIENT
Start: 2023-04-04 | End: 2023-04-04

## 2023-04-04 RX ADMIN — Medication 3 MILLILITER(S): at 14:54

## 2023-04-04 RX ADMIN — Medication 12.5 MILLIGRAM(S): at 17:02

## 2023-04-04 RX ADMIN — LIDOCAINE 1 PATCH: 4 CREAM TOPICAL at 19:00

## 2023-04-04 RX ADMIN — LIDOCAINE 1 PATCH: 4 CREAM TOPICAL at 11:16

## 2023-04-04 RX ADMIN — Medication 6 UNIT(S): at 17:10

## 2023-04-04 RX ADMIN — Medication 2 MILLIGRAM(S): at 22:04

## 2023-04-04 RX ADMIN — QUETIAPINE FUMARATE 50 MILLIGRAM(S): 200 TABLET, FILM COATED ORAL at 22:04

## 2023-04-04 RX ADMIN — FINASTERIDE 5 MILLIGRAM(S): 5 TABLET, FILM COATED ORAL at 11:16

## 2023-04-04 RX ADMIN — SENNA PLUS 2 TABLET(S): 8.6 TABLET ORAL at 22:04

## 2023-04-04 RX ADMIN — Medication 600 MILLIGRAM(S): at 18:06

## 2023-04-04 RX ADMIN — Medication 2: at 13:23

## 2023-04-04 RX ADMIN — Medication 6 UNIT(S): at 08:33

## 2023-04-04 RX ADMIN — Medication 81 MILLIGRAM(S): at 11:16

## 2023-04-04 RX ADMIN — Medication 3 MILLILITER(S): at 03:49

## 2023-04-04 RX ADMIN — Medication 6 UNIT(S): at 13:22

## 2023-04-04 RX ADMIN — AMIODARONE HYDROCHLORIDE 200 MILLIGRAM(S): 400 TABLET ORAL at 05:50

## 2023-04-04 RX ADMIN — Medication 4: at 22:03

## 2023-04-04 RX ADMIN — PANTOPRAZOLE SODIUM 40 MILLIGRAM(S): 20 TABLET, DELAYED RELEASE ORAL at 11:16

## 2023-04-04 RX ADMIN — Medication 4: at 17:09

## 2023-04-04 RX ADMIN — CHLORHEXIDINE GLUCONATE 1 APPLICATION(S): 213 SOLUTION TOPICAL at 08:38

## 2023-04-04 RX ADMIN — INSULIN GLARGINE 20 UNIT(S): 100 INJECTION, SOLUTION SUBCUTANEOUS at 08:33

## 2023-04-04 RX ADMIN — ATORVASTATIN CALCIUM 10 MILLIGRAM(S): 80 TABLET, FILM COATED ORAL at 22:04

## 2023-04-04 RX ADMIN — Medication 0.25 MILLIGRAM(S): at 22:10

## 2023-04-04 RX ADMIN — Medication 3 MILLILITER(S): at 08:37

## 2023-04-04 RX ADMIN — LIDOCAINE 1 PATCH: 4 CREAM TOPICAL at 22:01

## 2023-04-04 RX ADMIN — APIXABAN 5 MILLIGRAM(S): 2.5 TABLET, FILM COATED ORAL at 08:29

## 2023-04-04 RX ADMIN — Medication 12.5 MILLIGRAM(S): at 05:50

## 2023-04-04 RX ADMIN — Medication 600 MILLIGRAM(S): at 17:06

## 2023-04-04 RX ADMIN — Medication 3 MILLILITER(S): at 21:05

## 2023-04-04 RX ADMIN — Medication 40 MILLIEQUIVALENT(S): at 11:20

## 2023-04-04 RX ADMIN — LIDOCAINE 1 PATCH: 4 CREAM TOPICAL at 23:35

## 2023-04-04 NOTE — PROGRESS NOTE ADULT - ASSESSMENT
77 year old male with history of A. Fib on Eliquis (s/p Cardioversion on 1/31/23), Moderate AS, Chronic Diastolic Heart Failure, CAD s/p PCI, HTN, HLD, DM 2, Morbid Obesity, ALLIE on CPAP, Thoracic Aortic Aneurysm, BPH s/p TURP, Left Renal Mass and recent hMPV infection presented from [facility] with respiratory distress, confusion and fall. Found to be in Acute on Chronic Hypercapnic Respiratory Failure and Shock. He was placed on NIV and was started on Levophed. Admitted to MICU. Respiratory status worsened and he was intubated. He was treated for Septic Shock / Pneumonia / Respiratory Failure / Metabolic Encephalopathy. He was extubated on 3/23/23, currently on HFNC and nocturnal CPAP. Levophed weaned off on 3/25/23, now downgraded to medicine service.     Acute on Chronic Respiratory Failure with Hypoxia / Hypercapnia  - s/p extubation on 3/23   - Now off HiFlo, on NC   - Recent hMPV Infection, History of ALLIE   - Completed treatment for suspected superimposed bacterial pneumonia   - Finished Prednisone taper   - c/w Oxygen support, wean as tolerated   - c/w Nocturnal CPAP     Metabolic Encephalopathy   - improved   - CT Head with no acute findings     A. Fib   - s/p DCCV on 1/31/23  - c/w Amiodarone and Metoprolol  - c/w Eliquis   - Cardio follow up noted     Chronic Diastolic Heart Failure / Moderate AS   - soft BPs    - DC'ed Metolazone   - Continue Metoprolol with holding parameters   - Cardio follow up noted     CAD / HLD / HTN  - Continue Aspirin, Lipitor, Metoprolol and Clonidine    DM 2  - A1c 9.8 on 1/27/23  - Accu checks and ISS  - Continue Lantus 20 units daily   - Continue Premeal Admelog 6 units     BPH  - Continue Proscar and Cardura     Osteoarthritis of Both Knees  - Supportive care   - Tylenol for pain control  - PT    DVT Prophylaxis - Eliquis     Dispo: ELLYN garcia

## 2023-04-04 NOTE — PROGRESS NOTE ADULT - SUBJECTIVE AND OBJECTIVE BOX
[facility] Division of Hospital Medicine    Chief Complaint:  Acute resp failure     SUBJECTIVE / OVERNIGHT EVENTS:   Pt reports he feels better overall   Very [place]   Says he does not wish to go back to the rehab he came from and wants another facility     Patient denies chest pain, SOB, abd pain, N/V, fever, chills, dysuria or any other complaints. All remainder ROS negative.     MEDICATIONS  (STANDING):   albuterol/ipratropium for Nebulization 3 milliLiter(s) Nebulizer every 6 hours  aMIOdarone    Tablet 200 milliGRAM(s) Oral daily  apixaban 5 milliGRAM(s) Enteral Tube every 12 hours  aspirin  chewable 81 milliGRAM(s) Oral daily  atorvastatin 10 milliGRAM(s) Oral at bedtime  chlorhexidine 2% Cloths 1 Application(s) Topical <User Schedule>  cloNIDine Patch 0.2 mG/24Hr(s) 1 patch Transdermal every 7 days  doxazosin 2 milliGRAM(s) Oral at bedtime  finasteride 5 milliGRAM(s) Oral daily  insulin glargine Injectable (LANTUS) 20 Unit(s) SubCutaneous every morning  insulin lispro (ADMELOG) corrective regimen sliding scale   SubCutaneous Before meals and at bedtime  insulin lispro Injectable (ADMELOG) 6 Unit(s) SubCutaneous three times a day before meals  lidocaine   4% Patch 1 Patch Transdermal daily  lidocaine   4% Patch 1 Patch Transdermal daily  metoprolol tartrate 12.5 milliGRAM(s) Oral every 12 hours  pantoprazole    Tablet 40 milliGRAM(s) Oral daily  polyethylene glycol 3350 17 Gram(s) Oral daily  QUEtiapine 50 milliGRAM(s) Oral at bedtime  senna 2 Tablet(s) Oral at bedtime    MEDICATIONS  (PRN):  acetaminophen     Tablet .. 650 milliGRAM(s) Oral every 6 hours PRN Temp greater or equal to 38C (100.4F), Mild Pain (1 - 3), Moderate Pain (4 - 6)  ALPRAZolam 0.25 milliGRAM(s) Oral at bedtime PRN anxiety  ibuprofen  Tablet. 600 milliGRAM(s) Oral every 6 hours PRN Severe Pain (7 - 10)        I&O's Summary    03 Apr 2023 07:01  -  04 Apr 2023 07:00  --------------------------------------------------------  IN: 0 mL / OUT: 850 mL / NET: -850 mL        PHYSICAL EXAM:  Vital Signs Last 24 Hrs  T(C): 37 (04 Apr 2023 11:40), Max: 37.2 (04 Apr 2023 04:12)  T(F): 98.6 (04 Apr 2023 11:40), Max: 98.9 (04 Apr 2023 04:12)  HR: 92 (04 Apr 2023 10:00) (72 - 93)  BP: 97/57 (04 Apr 2023 10:00) (93/51 - 122/66)  BP(mean): 67 (04 Apr 2023 10:00) (65 - 73)  RR: 19 (04 Apr 2023 10:00) (16 - 21)  SpO2: 95% (04 Apr 2023 10:00) (93% - 100%)    Parameters below as of 04 Apr 2023 10:00  Patient On (Oxygen Delivery Method): nasal cannula  O2 Flow (L/min): 3        CONSTITUTIONAL: NAD, sitting up in bed  ENMT: Moist oral mucosa, EOMI, very [place]   RESPIRATORY: Normal respiratory effort; lungs with rhonchi and reduced BS bilaterally  CARDIOVASCULAR: Regular rate and rhythm, normal S1 and S2   ABDOMEN: Obese, Nontender to palpation, normoactive bowel sounds  MUSCULOSKELETAL:  No clubbing or cyanosis of digits   PSYCH: A+O to person, place, and time; affect appropriate  NEUROLOGY: No gross sensory or motor deficits   SKIN: warm and dry       LABS:    04-04    141  |  101  |  14.8  ----------------------------<  148<H>  3.4<L>   |  32.0<H>  |  0.57    Ca    8.0<L>      04 Apr 2023 05:14        CAPILLARY BLOOD GLUCOSE      POCT Blood Glucose.: 135 mg/dL (04 Apr 2023 08:27)  POCT Blood Glucose.: 181 mg/dL (03 Apr 2023 21:21)  POCT Blood Glucose.: 169 mg/dL (03 Apr 2023 17:48)  POCT Blood Glucose.: 173 mg/dL (03 Apr 2023 13:09)

## 2023-04-05 ENCOUNTER — TRANSCRIPTION ENCOUNTER (OUTPATIENT)
Age: 78
End: 2023-04-05

## 2023-04-05 LAB
ANION GAP SERPL CALC-SCNC: 7 MMOL/L — SIGNIFICANT CHANGE UP (ref 5–17)
BUN SERPL-MCNC: 15.9 MG/DL — SIGNIFICANT CHANGE UP (ref 8–20)
CALCIUM SERPL-MCNC: 8.6 MG/DL — SIGNIFICANT CHANGE UP (ref 8.4–10.5)
CHLORIDE SERPL-SCNC: 100 MMOL/L — SIGNIFICANT CHANGE UP (ref 96–108)
CO2 SERPL-SCNC: 32 MMOL/L — HIGH (ref 22–29)
CREAT SERPL-MCNC: 0.5 MG/DL — SIGNIFICANT CHANGE UP (ref 0.5–1.3)
EGFR: 105 ML/MIN/1.73M2 — SIGNIFICANT CHANGE UP
GLUCOSE BLDC GLUCOMTR-MCNC: 133 MG/DL — HIGH (ref 70–99)
GLUCOSE BLDC GLUCOMTR-MCNC: 154 MG/DL — HIGH (ref 70–99)
GLUCOSE BLDC GLUCOMTR-MCNC: 182 MG/DL — HIGH (ref 70–99)
GLUCOSE BLDC GLUCOMTR-MCNC: 184 MG/DL — HIGH (ref 70–99)
GLUCOSE SERPL-MCNC: 158 MG/DL — HIGH (ref 70–99)
POTASSIUM SERPL-MCNC: 3.8 MMOL/L — SIGNIFICANT CHANGE UP (ref 3.5–5.3)
POTASSIUM SERPL-SCNC: 3.8 MMOL/L — SIGNIFICANT CHANGE UP (ref 3.5–5.3)
SODIUM SERPL-SCNC: 139 MMOL/L — SIGNIFICANT CHANGE UP (ref 135–145)

## 2023-04-05 PROCEDURE — 99233 SBSQ HOSP IP/OBS HIGH 50: CPT

## 2023-04-05 RX ORDER — MECLIZINE HCL 12.5 MG
12.5 TABLET ORAL THREE TIMES A DAY
Refills: 0 | Status: DISCONTINUED | OUTPATIENT
Start: 2023-04-05 | End: 2023-04-07

## 2023-04-05 RX ORDER — QUETIAPINE FUMARATE 200 MG/1
25 TABLET, FILM COATED ORAL AT BEDTIME
Refills: 0 | Status: DISCONTINUED | OUTPATIENT
Start: 2023-04-05 | End: 2023-04-07

## 2023-04-05 RX ADMIN — PANTOPRAZOLE SODIUM 40 MILLIGRAM(S): 20 TABLET, DELAYED RELEASE ORAL at 17:01

## 2023-04-05 RX ADMIN — Medication 2: at 11:40

## 2023-04-05 RX ADMIN — Medication 2: at 08:14

## 2023-04-05 RX ADMIN — LIDOCAINE 1 PATCH: 4 CREAM TOPICAL at 22:06

## 2023-04-05 RX ADMIN — LIDOCAINE 1 PATCH: 4 CREAM TOPICAL at 17:03

## 2023-04-05 RX ADMIN — Medication 6 UNIT(S): at 08:14

## 2023-04-05 RX ADMIN — Medication 12.5 MILLIGRAM(S): at 17:04

## 2023-04-05 RX ADMIN — Medication 12.5 MILLIGRAM(S): at 06:01

## 2023-04-05 RX ADMIN — Medication 0.25 MILLIGRAM(S): at 17:02

## 2023-04-05 RX ADMIN — Medication 3 MILLILITER(S): at 15:35

## 2023-04-05 RX ADMIN — LIDOCAINE 1 PATCH: 4 CREAM TOPICAL at 18:02

## 2023-04-05 RX ADMIN — Medication 2 MILLIGRAM(S): at 22:12

## 2023-04-05 RX ADMIN — CHLORHEXIDINE GLUCONATE 1 APPLICATION(S): 213 SOLUTION TOPICAL at 06:01

## 2023-04-05 RX ADMIN — FINASTERIDE 5 MILLIGRAM(S): 5 TABLET, FILM COATED ORAL at 17:02

## 2023-04-05 RX ADMIN — INSULIN GLARGINE 20 UNIT(S): 100 INJECTION, SOLUTION SUBCUTANEOUS at 08:16

## 2023-04-05 RX ADMIN — APIXABAN 5 MILLIGRAM(S): 2.5 TABLET, FILM COATED ORAL at 22:12

## 2023-04-05 RX ADMIN — QUETIAPINE FUMARATE 25 MILLIGRAM(S): 200 TABLET, FILM COATED ORAL at 22:12

## 2023-04-05 RX ADMIN — Medication 2: at 22:11

## 2023-04-05 RX ADMIN — Medication 6 UNIT(S): at 11:41

## 2023-04-05 RX ADMIN — Medication 6 UNIT(S): at 17:01

## 2023-04-05 RX ADMIN — Medication 650 MILLIGRAM(S): at 18:03

## 2023-04-05 RX ADMIN — AMIODARONE HYDROCHLORIDE 200 MILLIGRAM(S): 400 TABLET ORAL at 06:02

## 2023-04-05 RX ADMIN — APIXABAN 5 MILLIGRAM(S): 2.5 TABLET, FILM COATED ORAL at 08:14

## 2023-04-05 RX ADMIN — Medication 650 MILLIGRAM(S): at 19:03

## 2023-04-05 RX ADMIN — Medication 81 MILLIGRAM(S): at 17:02

## 2023-04-05 RX ADMIN — Medication 3 MILLILITER(S): at 04:17

## 2023-04-05 RX ADMIN — ATORVASTATIN CALCIUM 10 MILLIGRAM(S): 80 TABLET, FILM COATED ORAL at 22:12

## 2023-04-05 RX ADMIN — Medication 3 MILLILITER(S): at 21:11

## 2023-04-05 NOTE — DISCHARGE NOTE PROVIDER - NSDCCPCAREPLAN_GEN_ALL_CORE_FT
PRINCIPAL DISCHARGE DIAGNOSIS  Diagnosis: Acute respiratory failure with hypercapnia  Assessment and Plan of Treatment: - Improved  - Completed treatment for suspected superimposed bacterial pneumonia   - Finished Prednisone taper   - Continue oxygen support as needed and Nocturnal CPAP         SECONDARY DISCHARGE DIAGNOSES  Diagnosis: Metabolic encephalopathy  Assessment and Plan of Treatment: - Improved  - Follow up with PCP in 1 week    Diagnosis: Afib  Assessment and Plan of Treatment: - s/p DCCV on 1/31/23  - Continue Amiodarone, Eliquis and Metoprolol as directed  - Followup with Cardiology in 1 week    Diagnosis: Congestive heart failure  Assessment and Plan of Treatment: - Continue medication regimen as directed  - Follow up with Cardiology in 1 week    Diagnosis: CAD (coronary artery disease)  Assessment and Plan of Treatment: - In setting of HLD and HTN  - Continue Aspirin, Lipitor, Metoprolol and Clonidine as directed    Diagnosis: BPH (benign prostatic hyperplasia)  Assessment and Plan of Treatment: - Continue medication regimen as directed    Diagnosis: Osteoarthritis  Assessment and Plan of Treatment: - Continue current medication regimen    Diagnosis: DM (diabetes mellitus)  Assessment and Plan of Treatment: - A1c 9.8 on 1/27/23  - Continue medication regimen as directed  - Follow up with PCP in 1 week

## 2023-04-05 NOTE — DISCHARGE NOTE PROVIDER - CARE PROVIDER_API CALL
PCP,   Phone: (   )    -  Fax: (   )    -  Follow Up Time: 1 week    Nam Huynh (MD)  Cardiovascular Disease; Internal Medicine  [address] [place] [address]  Phone: [phone]  Fax: [phone]  Follow Up Time: 1 week

## 2023-04-05 NOTE — DISCHARGE NOTE PROVIDER - NSDCMRMEDTOKEN_GEN_ALL_CORE_FT
acetaminophen 325 mg oral tablet: 2 tab(s) orally every 6 hours  acetylcysteine 20% inhalation solution: 3 milliliter(s) inhaled 3 times a day  Admelog 100 units/mL injectable solution: sliding scale injectable 2 times a day  alfuzosin 10 mg oral tablet, extended release: 1 tab(s) orally once a day  amiodarone 200 mg oral tablet: 1 tab(s) orally 2 times a day  aspirin 81 mg oral tablet, chewable: 1 tab(s) orally once a day  atorvastatin 10 mg oral tablet: 1 tab(s) orally once a day  Bengay Ultra Strength: Apply topically to affected area once a day on B/L knee  Biotene Oral Balance oral solution: 15 milliliter(s) orally every 8 hours  bisacodyl 10 mg rectal suppository: 1 suppository(ies) rectal once a day  DermaPhor topical ointment: Apply topically to affected area 2 times a day on BLLE  Eliquis 5 mg oral tablet: 1 tab(s) orally 2 times a day  finasteride 5 mg oral tablet: 1 tab(s) orally once a day  fluticasone 50 mcg/inh nasal spray: 1 spray(s) nasal once a day  furosemide 40 mg oral tablet: 1 tab(s) orally once a day  levalbuterol 45 mcg/inh inhalation aerosol: 1 puff(s) inhaled every 4 hours, As Needed  losartan 25 mg oral tablet: 1 tab(s) orally once a day  metFORMIN 500 mg oral tablet: 1 tab(s) orally 3 times a day  metoprolol succinate 200 mg oral tablet, extended release: 1 tab(s) orally once a day  Milk of Magnesia 8% oral suspension: 30 milliliter(s) orally once a day, As Needed  omeprazole 40 mg oral delayed release capsule: 1 cap(s) orally once a day  oxyCODONE 5 mg oral tablet: 1 tab(s) orally every 4 hours, As Needed  Saline Mist 0.65% nasal spray (obsolete): 1 spray(s) nasal 3 times a day  Steglatro 5 mg oral tablet: 1 tab(s) orally once a day (in the morning)  Vitamin D3 25 mcg (1000 intl units) oral tablet: 1 tab(s) orally once a day  Xanax 0.25 mg oral tablet: 1 tab(s) orally 3 times a day, As Needed  Xopenex Concentrate 1.25 mg/0.5 mL inhalation solution: 0.5 milliliter(s) inhaled every 6 hours   acetaminophen 325 mg oral tablet: 2 tab(s) orally every 6 hours As needed Temp greater or equal to 38C (100.4F), Mild Pain (1 - 3), Moderate Pain (4 - 6)  ALPRAZolam 0.25 mg oral tablet: 1 tab(s) orally 3 times a day As needed Anxiety  amiodarone 200 mg oral tablet: 1 tab(s) orally once a day  apixaban 5 mg oral tablet: 1 tab(s) orally every 12 hours  aspirin 81 mg oral tablet, chewable: 1 tab(s) orally once a day  atorvastatin 10 mg oral tablet: 1 tab(s) orally once a day (at bedtime)  cloNIDine 0.2 mg/24 hr transdermal film, extended release: 1 patch transdermal every 7 days  doxazosin 2 mg oral tablet: 1 tab(s) orally once a day (at bedtime)  finasteride 5 mg oral tablet: 1 tab(s) orally once a day  fluticasone 50 mcg/inh nasal spray: 1 spray(s) nasal 2 times a day  insulin glargine 100 units/mL subcutaneous solution: 20 unit(s) subcutaneous once a day (in the morning)  insulin lispro 100 units/mL injectable solution: 6 unit(s) injectable 3 times a day (before meals)  insulin lispro 100 units/mL injectable solution: 1 unit(s) injectable 4 times a day (before meals and at bedtime) 2 Unit(s) if Glucose 151 - 200  4 Unit(s) if Glucose 201 - 250  6 Unit(s) if Glucose 251 - 300  8 Unit(s) if Glucose 301 - 350  10 Unit(s) if Glucose 351 - 400  12 Unit(s) if Glucose Greater Than 400  ipratropium-albuterol 0.5 mg-2.5 mg/3 mL inhalation solution: 3 milliliter(s) inhaled every 6 hours  lidocaine 4% topical film: Apply topically to affected area once a day  meclizine 12.5 mg oral tablet: 1 tab(s) orally 3 times a day As needed Dizziness  metoprolol: 12.5 milligram(s) buccal 2 times a day  oxyCODONE 5 mg oral tablet: 1 tab(s) orally every 4 hours, As Needed  pantoprazole 40 mg oral delayed release tablet: 1 tab(s) orally once a day  polyethylene glycol 3350 oral powder for reconstitution: 17 gram(s) orally once a day  QUEtiapine 25 mg oral tablet: 1 tab(s) orally once a day (at bedtime) Taper to off after two days  senna leaf extract oral tablet: 2 tab(s) orally once a day (at bedtime)

## 2023-04-05 NOTE — DISCHARGE NOTE PROVIDER - HOSPITAL COURSE
77 year old male with history of A. Fib on Eliquis (s/p Cardioversion on 1/31/23), Moderate AS, Chronic Diastolic Heart Failure, CAD s/p PCI, HTN, HLD, DM 2, Morbid Obesity, ALLIE on CPAP, Thoracic Aortic Aneurysm, BPH s/p TURP, Left Renal Mass and recent hMPV infection presented from [facility] with respiratory distress, confusion and fall. Pt admitted to [facility] for acute on Chronic Hypercapnic Respiratory Failure and Shock. He was placed on NIV, was started on Levophed and transferred to MICU. Respiratory status worsened and he was intubated. He was treated for Septic Shock / Pneumonia / Respiratory Failure / Metabolic Encephalopathy. He was extubated on 3/23/23, levophed weaned off on 3/25/23, and later downgraded to medicine service. Pt was later weaned off oxygen and is currently at 2LNC and satting well. Pt continued on nocturnal CPAP with benefit. Pt completed abx therapy for suspected superimposed PNA in setting of hMPV infection. Mental status has sine improved as well. CTH negative. Pt has since clinically improved and is now medically stable for discharge with appropriate outpatient follow up.    All electrolyte abnormalities were monitored carefully and repleted as necessary during this hospitalization. At the time of discharge patient was hemodynamically stable and amenable to all terms of discharge.

## 2023-04-05 NOTE — PROGRESS NOTE ADULT - SUBJECTIVE AND OBJECTIVE BOX
[facility] Division of Hospital Medicine    Chief Complaint:  Acute respiratory failure    SUBJECTIVE / OVERNIGHT EVENTS:  No events overnight, pt seen at bedside, in NAD, complaining of SOB on exertion, dizziness and worsened hearing. Patient denies chest pain, abd pain, N/V, fever, chills, dysuria or any other complaints. All remainder ROS negative.     MEDICATIONS  (STANDING):  albuterol/ipratropium for Nebulization 3 milliLiter(s) Nebulizer every 6 hours  aMIOdarone    Tablet 200 milliGRAM(s) Oral daily  apixaban 5 milliGRAM(s) Enteral Tube every 12 hours  aspirin  chewable 81 milliGRAM(s) Oral daily  atorvastatin 10 milliGRAM(s) Oral at bedtime  chlorhexidine 2% Cloths 1 Application(s) Topical <User Schedule>  cloNIDine Patch 0.2 mG/24Hr(s) 1 patch Transdermal every 7 days  doxazosin 2 milliGRAM(s) Oral at bedtime  finasteride 5 milliGRAM(s) Oral daily  insulin glargine Injectable (LANTUS) 20 Unit(s) SubCutaneous every morning  insulin lispro (ADMELOG) corrective regimen sliding scale   SubCutaneous Before meals and at bedtime  insulin lispro Injectable (ADMELOG) 6 Unit(s) SubCutaneous three times a day before meals  lidocaine   4% Patch 1 Patch Transdermal daily  lidocaine   4% Patch 1 Patch Transdermal daily  metoprolol tartrate 12.5 milliGRAM(s) Oral every 12 hours  pantoprazole    Tablet 40 milliGRAM(s) Oral daily  polyethylene glycol 3350 17 Gram(s) Oral daily  QUEtiapine 50 milliGRAM(s) Oral at bedtime  senna 2 Tablet(s) Oral at bedtime    MEDICATIONS  (PRN):  acetaminophen     Tablet .. 650 milliGRAM(s) Oral every 6 hours PRN Temp greater or equal to 38C (100.4F), Mild Pain (1 - 3), Moderate Pain (4 - 6)  ALPRAZolam 0.25 milliGRAM(s) Oral at bedtime PRN anxiety  ibuprofen  Tablet. 600 milliGRAM(s) Oral every 6 hours PRN Severe Pain (7 - 10)  meclizine 12.5 milliGRAM(s) Oral three times a day PRN Dizziness        I&O's Summary    04 Apr 2023 07:01  -  05 Apr 2023 07:00  --------------------------------------------------------  IN: 0 mL / OUT: 800 mL / NET: -800 mL    05 Apr 2023 07:01  -  05 Apr 2023 13:23  --------------------------------------------------------  IN: 0 mL / OUT: 1200 mL / NET: -1200 mL        PHYSICAL EXAM:  Vital Signs Last 24 Hrs  T(C): 36.4 (05 Apr 2023 10:02), Max: 36.9 (04 Apr 2023 15:20)  T(F): 97.5 (05 Apr 2023 10:02), Max: 98.5 (04 Apr 2023 15:20)  HR: 96 (05 Apr 2023 10:02) (77 - 96)  BP: 112/70 (05 Apr 2023 10:02) (103/63 - 135/67)  BP(mean): 79 (04 Apr 2023 14:00) (79 - 79)  RR: 18 (05 Apr 2023 04:29) (18 - 20)  SpO2: 91% (05 Apr 2023 10:02) (91% - 98%)    Parameters below as of 05 Apr 2023 10:02  Patient On (Oxygen Delivery Method): nasal cannula  O2 Flow (L/min): 2    CONSTITUTIONAL: NAD, sitting up in bed  ENMT: Moist oral mucosa, EOMI, very [place]   RESPIRATORY: Normal respiratory effort; lungs with rhonchi and reduced BS bilaterally  CARDIOVASCULAR: Regular rate and rhythm, normal S1 and S2   ABDOMEN: Obese, Nontender to palpation, normoactive bowel sounds  MUSCULOSKELETAL:  No clubbing or cyanosis of digits   PSYCH: A+O to person, place, and time; affect appropriate  NEUROLOGY: No gross sensory or motor deficits   SKIN: warm and dry     LABS:    04-05    139  |  100  |  15.9  ----------------------------<  158<H>  3.8   |  32.0<H>  |  0.50    Ca    8.6      05 Apr 2023 06:10                CAPILLARY BLOOD GLUCOSE      POCT Blood Glucose.: 182 mg/dL (05 Apr 2023 11:38)  POCT Blood Glucose.: 154 mg/dL (05 Apr 2023 08:09)  POCT Blood Glucose.: 232 mg/dL (04 Apr 2023 22:02)  POCT Blood Glucose.: 203 mg/dL (04 Apr 2023 17:06)        RADIOLOGY & ADDITIONAL TESTS:  Results Reviewed: Y  Imaging Personally Reviewed: N  Electrocardiogram Personally Reviewed: N

## 2023-04-05 NOTE — DISCHARGE NOTE PROVIDER - ATTENDING DISCHARGE PHYSICAL EXAMINATION:
CONSTITUTIONAL: NAD, sitting up in bed  ENMT: Moist oral mucosa, EOMI, very [place]   RESPIRATORY: Normal respiratory effort; lungs with rhonchi and reduced BS bilaterally  CARDIOVASCULAR: Regular rate and rhythm, normal S1 and S2   ABDOMEN: Obese, Nontender to palpation, normoactive bowel sounds  MUSCULOSKELETAL:  No clubbing or cyanosis of digits   PSYCH: A+O to person, place, and time; affect appropriate  NEUROLOGY: No gross sensory or motor deficits   SKIN: warm and dry  Vital Signs Last 24 Hrs  T(C): 36.6 (07 Apr 2023 13:27), Max: 36.8 (06 Apr 2023 19:40)  T(F): 97.9 (07 Apr 2023 13:27), Max: 98.3 (06 Apr 2023 19:40)  HR: 103 (07 Apr 2023 13:27) (78 - 103)  BP: 121/65 (07 Apr 2023 13:27) (90/50 - 121/65)  BP(mean): --  RR: 18 (07 Apr 2023 04:34) (18 - 20)  SpO2: 94% (07 Apr 2023 13:27) (93% - 97%)    Parameters below as of 07 Apr 2023 13:27  Patient On (Oxygen Delivery Method): nasal cannula    CONSTITUTIONAL: NAD, sitting up in bed  ENMT: Moist oral mucosa, EOMI, very [place]   RESPIRATORY: Normal respiratory effort; lungs with rhonchi and reduced BS bilaterally  CARDIOVASCULAR: Regular rate and rhythm, normal S1 and S2   ABDOMEN: Obese, Nontender to palpation, normoactive bowel sounds  MUSCULOSKELETAL:  No clubbing or cyanosis of digits   PSYCH: A+O to person, place, and time; affect appropriate  NEUROLOGY: No gross sensory or motor deficits   SKIN: warm and dry

## 2023-04-06 LAB
GLUCOSE BLDC GLUCOMTR-MCNC: 152 MG/DL — HIGH (ref 70–99)
GLUCOSE BLDC GLUCOMTR-MCNC: 178 MG/DL — HIGH (ref 70–99)
GLUCOSE BLDC GLUCOMTR-MCNC: 193 MG/DL — HIGH (ref 70–99)
GLUCOSE BLDC GLUCOMTR-MCNC: 200 MG/DL — HIGH (ref 70–99)

## 2023-04-06 PROCEDURE — 99233 SBSQ HOSP IP/OBS HIGH 50: CPT

## 2023-04-06 RX ORDER — ASPIRIN/CALCIUM CARB/MAGNESIUM 324 MG
1 TABLET ORAL
Qty: 0 | Refills: 0 | DISCHARGE

## 2023-04-06 RX ORDER — INSULIN LISPRO 100/ML
6 VIAL (ML) SUBCUTANEOUS
Qty: 0 | Refills: 0 | DISCHARGE
Start: 2023-04-06

## 2023-04-06 RX ORDER — METFORMIN HYDROCHLORIDE 850 MG/1
1 TABLET ORAL
Qty: 0 | Refills: 0 | DISCHARGE

## 2023-04-06 RX ORDER — METOPROLOL TARTRATE 50 MG
12.5 TABLET ORAL
Qty: 0 | Refills: 0 | DISCHARGE
Start: 2023-04-06

## 2023-04-06 RX ORDER — CHOLECALCIFEROL (VITAMIN D3) 125 MCG
1 CAPSULE ORAL
Qty: 0 | Refills: 0 | DISCHARGE

## 2023-04-06 RX ORDER — APIXABAN 2.5 MG/1
1 TABLET, FILM COATED ORAL
Qty: 0 | Refills: 0 | DISCHARGE
Start: 2023-04-06

## 2023-04-06 RX ORDER — INSULIN LISPRO 100/ML
1 VIAL (ML) SUBCUTANEOUS
Qty: 0 | Refills: 0 | DISCHARGE
Start: 2023-04-06

## 2023-04-06 RX ORDER — LOSARTAN POTASSIUM 100 MG/1
1 TABLET, FILM COATED ORAL
Qty: 0 | Refills: 0 | DISCHARGE

## 2023-04-06 RX ORDER — HYDROCORTISONE 0.5 %
1 CREAM (GRAM) TOPICAL
Qty: 0 | Refills: 0 | DISCHARGE

## 2023-04-06 RX ORDER — MECLIZINE HCL 12.5 MG
1 TABLET ORAL
Qty: 0 | Refills: 0 | DISCHARGE
Start: 2023-04-06

## 2023-04-06 RX ORDER — ATORVASTATIN CALCIUM 80 MG/1
1 TABLET, FILM COATED ORAL
Qty: 0 | Refills: 0 | DISCHARGE
Start: 2023-04-06

## 2023-04-06 RX ORDER — ALPRAZOLAM 0.25 MG
0.25 TABLET ORAL THREE TIMES A DAY
Refills: 0 | Status: DISCONTINUED | OUTPATIENT
Start: 2023-04-06 | End: 2023-04-07

## 2023-04-06 RX ORDER — ATORVASTATIN CALCIUM 80 MG/1
1 TABLET, FILM COATED ORAL
Qty: 0 | Refills: 0 | DISCHARGE

## 2023-04-06 RX ORDER — LANOLIN/MINERAL OIL
1 LOTION (ML) TOPICAL
Qty: 0 | Refills: 0 | DISCHARGE

## 2023-04-06 RX ORDER — SALIVA SUBSTITUTE COMB NO.11 351 MG
15 POWDER IN PACKET (EA) MUCOUS MEMBRANE
Qty: 0 | Refills: 0 | DISCHARGE

## 2023-04-06 RX ORDER — SENNA PLUS 8.6 MG/1
2 TABLET ORAL
Qty: 0 | Refills: 0 | DISCHARGE
Start: 2023-04-06

## 2023-04-06 RX ORDER — LIDOCAINE 4 G/100G
1 CREAM TOPICAL
Qty: 0 | Refills: 0 | DISCHARGE
Start: 2023-04-06

## 2023-04-06 RX ORDER — FINASTERIDE 5 MG/1
1 TABLET, FILM COATED ORAL
Qty: 0 | Refills: 0 | DISCHARGE
Start: 2023-04-06

## 2023-04-06 RX ORDER — AMIODARONE HYDROCHLORIDE 400 MG/1
1 TABLET ORAL
Qty: 0 | Refills: 0 | DISCHARGE
Start: 2023-04-06

## 2023-04-06 RX ORDER — OMEPRAZOLE 10 MG/1
1 CAPSULE, DELAYED RELEASE ORAL
Qty: 0 | Refills: 0 | DISCHARGE

## 2023-04-06 RX ORDER — METOPROLOL TARTRATE 50 MG
1 TABLET ORAL
Qty: 0 | Refills: 0 | DISCHARGE

## 2023-04-06 RX ORDER — FLUTICASONE PROPIONATE 50 MCG
1 SPRAY, SUSPENSION NASAL
Qty: 0 | Refills: 0 | DISCHARGE

## 2023-04-06 RX ORDER — ERTUGLIFLOZIN 5 MG/1
1 TABLET, FILM COATED ORAL
Qty: 0 | Refills: 0 | DISCHARGE

## 2023-04-06 RX ORDER — ALPRAZOLAM 0.25 MG
1 TABLET ORAL
Qty: 0 | Refills: 0 | DISCHARGE

## 2023-04-06 RX ORDER — LEVALBUTEROL 1.25 MG/.5ML
1 SOLUTION, CONCENTRATE RESPIRATORY (INHALATION)
Qty: 0 | Refills: 0 | DISCHARGE

## 2023-04-06 RX ORDER — FINASTERIDE 5 MG/1
1 TABLET, FILM COATED ORAL
Qty: 0 | Refills: 0 | DISCHARGE

## 2023-04-06 RX ORDER — FLUTICASONE PROPIONATE 50 MCG
1 SPRAY, SUSPENSION NASAL
Qty: 0 | Refills: 0 | DISCHARGE
Start: 2023-04-06

## 2023-04-06 RX ORDER — APIXABAN 2.5 MG/1
1 TABLET, FILM COATED ORAL
Qty: 0 | Refills: 0 | DISCHARGE

## 2023-04-06 RX ORDER — QUETIAPINE FUMARATE 200 MG/1
1 TABLET, FILM COATED ORAL
Qty: 0 | Refills: 0 | DISCHARGE
Start: 2023-04-06

## 2023-04-06 RX ORDER — IPRATROPIUM/ALBUTEROL SULFATE 18-103MCG
3 AEROSOL WITH ADAPTER (GRAM) INHALATION
Qty: 0 | Refills: 0 | DISCHARGE
Start: 2023-04-06

## 2023-04-06 RX ORDER — ALPRAZOLAM 0.25 MG
1 TABLET ORAL
Qty: 0 | Refills: 0 | DISCHARGE
Start: 2023-04-06

## 2023-04-06 RX ORDER — LEVALBUTEROL 1.25 MG/.5ML
0.5 SOLUTION, CONCENTRATE RESPIRATORY (INHALATION)
Qty: 0 | Refills: 0 | DISCHARGE

## 2023-04-06 RX ORDER — MAGNESIUM HYDROXIDE 400 MG/1
30 TABLET, CHEWABLE ORAL
Qty: 0 | Refills: 0 | DISCHARGE

## 2023-04-06 RX ORDER — DOXAZOSIN MESYLATE 4 MG
1 TABLET ORAL
Qty: 0 | Refills: 0 | DISCHARGE
Start: 2023-04-06

## 2023-04-06 RX ORDER — INSULIN GLARGINE 100 [IU]/ML
20 INJECTION, SOLUTION SUBCUTANEOUS
Qty: 0 | Refills: 0 | DISCHARGE
Start: 2023-04-06

## 2023-04-06 RX ORDER — INSULIN LISPRO 100/ML
0 VIAL (ML) SUBCUTANEOUS
Qty: 0 | Refills: 0 | DISCHARGE

## 2023-04-06 RX ORDER — AMIODARONE HYDROCHLORIDE 400 MG/1
1 TABLET ORAL
Qty: 0 | Refills: 0 | DISCHARGE

## 2023-04-06 RX ORDER — POLYETHYLENE GLYCOL 3350 17 G/17G
17 POWDER, FOR SOLUTION ORAL
Qty: 0 | Refills: 0 | DISCHARGE
Start: 2023-04-06

## 2023-04-06 RX ORDER — ACETAMINOPHEN 500 MG
2 TABLET ORAL
Qty: 0 | Refills: 0 | DISCHARGE

## 2023-04-06 RX ORDER — PANTOPRAZOLE SODIUM 20 MG/1
1 TABLET, DELAYED RELEASE ORAL
Qty: 0 | Refills: 0 | DISCHARGE
Start: 2023-04-06

## 2023-04-06 RX ORDER — ACETAMINOPHEN 500 MG
2 TABLET ORAL
Qty: 0 | Refills: 0 | DISCHARGE
Start: 2023-04-06

## 2023-04-06 RX ORDER — FLUTICASONE PROPIONATE 50 MCG
1 SPRAY, SUSPENSION NASAL
Refills: 0 | Status: DISCONTINUED | OUTPATIENT
Start: 2023-04-06 | End: 2023-04-07

## 2023-04-06 RX ORDER — SODIUM CHLORIDE 0.65 %
1 AEROSOL, SPRAY (ML) NASAL
Qty: 0 | Refills: 0 | DISCHARGE

## 2023-04-06 RX ORDER — FUROSEMIDE 40 MG
1 TABLET ORAL
Qty: 0 | Refills: 0 | DISCHARGE

## 2023-04-06 RX ORDER — ALFUZOSIN HYDROCHLORIDE 10 MG/1
1 TABLET, EXTENDED RELEASE ORAL
Qty: 0 | Refills: 0 | DISCHARGE

## 2023-04-06 RX ORDER — ASPIRIN/CALCIUM CARB/MAGNESIUM 324 MG
1 TABLET ORAL
Qty: 0 | Refills: 0 | DISCHARGE
Start: 2023-04-06

## 2023-04-06 RX ADMIN — Medication 12.5 MILLIGRAM(S): at 17:32

## 2023-04-06 RX ADMIN — LIDOCAINE 1 PATCH: 4 CREAM TOPICAL at 17:35

## 2023-04-06 RX ADMIN — Medication 2: at 12:13

## 2023-04-06 RX ADMIN — LIDOCAINE 1 PATCH: 4 CREAM TOPICAL at 06:16

## 2023-04-06 RX ADMIN — APIXABAN 5 MILLIGRAM(S): 2.5 TABLET, FILM COATED ORAL at 22:04

## 2023-04-06 RX ADMIN — Medication 81 MILLIGRAM(S): at 14:48

## 2023-04-06 RX ADMIN — Medication 1 SPRAY(S): at 17:34

## 2023-04-06 RX ADMIN — Medication 2: at 17:33

## 2023-04-06 RX ADMIN — Medication 2: at 22:04

## 2023-04-06 RX ADMIN — Medication 6 UNIT(S): at 09:11

## 2023-04-06 RX ADMIN — FINASTERIDE 5 MILLIGRAM(S): 5 TABLET, FILM COATED ORAL at 17:33

## 2023-04-06 RX ADMIN — APIXABAN 5 MILLIGRAM(S): 2.5 TABLET, FILM COATED ORAL at 09:11

## 2023-04-06 RX ADMIN — INSULIN GLARGINE 20 UNIT(S): 100 INJECTION, SOLUTION SUBCUTANEOUS at 09:10

## 2023-04-06 RX ADMIN — Medication 3 MILLILITER(S): at 09:08

## 2023-04-06 RX ADMIN — LIDOCAINE 1 PATCH: 4 CREAM TOPICAL at 20:20

## 2023-04-06 RX ADMIN — Medication 2 MILLIGRAM(S): at 22:05

## 2023-04-06 RX ADMIN — Medication 0.25 MILLIGRAM(S): at 23:42

## 2023-04-06 RX ADMIN — Medication 2: at 09:12

## 2023-04-06 RX ADMIN — Medication 600 MILLIGRAM(S): at 14:49

## 2023-04-06 RX ADMIN — Medication 0.25 MILLIGRAM(S): at 14:49

## 2023-04-06 RX ADMIN — Medication 3 MILLILITER(S): at 14:49

## 2023-04-06 RX ADMIN — QUETIAPINE FUMARATE 25 MILLIGRAM(S): 200 TABLET, FILM COATED ORAL at 22:05

## 2023-04-06 RX ADMIN — Medication 3 MILLILITER(S): at 03:07

## 2023-04-06 RX ADMIN — PANTOPRAZOLE SODIUM 40 MILLIGRAM(S): 20 TABLET, DELAYED RELEASE ORAL at 17:32

## 2023-04-06 RX ADMIN — ATORVASTATIN CALCIUM 10 MILLIGRAM(S): 80 TABLET, FILM COATED ORAL at 22:05

## 2023-04-06 RX ADMIN — Medication 3 MILLILITER(S): at 21:13

## 2023-04-06 RX ADMIN — CHLORHEXIDINE GLUCONATE 1 APPLICATION(S): 213 SOLUTION TOPICAL at 06:14

## 2023-04-06 RX ADMIN — Medication 6 UNIT(S): at 17:34

## 2023-04-06 RX ADMIN — Medication 600 MILLIGRAM(S): at 15:49

## 2023-04-06 RX ADMIN — Medication 6 UNIT(S): at 12:14

## 2023-04-06 RX ADMIN — AMIODARONE HYDROCHLORIDE 200 MILLIGRAM(S): 400 TABLET ORAL at 06:14

## 2023-04-06 RX ADMIN — Medication 12.5 MILLIGRAM(S): at 06:14

## 2023-04-06 NOTE — PROGRESS NOTE ADULT - REASON FOR ADMISSION
Acute respiratory failure

## 2023-04-06 NOTE — PROGRESS NOTE ADULT - SUBJECTIVE AND OBJECTIVE BOX
[facility] Division of Hospital Medicine    Chief Complaint:  Acute respiratory failure    SUBJECTIVE / OVERNIGHT EVENTS:  No events overnight, pt seen at bedside, in NAD, complaining of SOB on exertion and worsened hearing. Patient denies chest pain, abd pain, N/V, fever, chills, dysuria or any other complaints. All remainder ROS negative.     MEDICATIONS  (STANDING):  albuterol/ipratropium for Nebulization 3 milliLiter(s) Nebulizer every 6 hours  aMIOdarone    Tablet 200 milliGRAM(s) Oral daily  apixaban 5 milliGRAM(s) Enteral Tube every 12 hours  aspirin  chewable 81 milliGRAM(s) Oral daily  atorvastatin 10 milliGRAM(s) Oral at bedtime  chlorhexidine 2% Cloths 1 Application(s) Topical <User Schedule>  cloNIDine Patch 0.2 mG/24Hr(s) 1 patch Transdermal every 7 days  doxazosin 2 milliGRAM(s) Oral at bedtime  finasteride 5 milliGRAM(s) Oral daily  fluticasone propionate 50 MICROgram(s)/spray Nasal Spray 1 Spray(s) Both Nostrils two times a day  insulin glargine Injectable (LANTUS) 20 Unit(s) SubCutaneous every morning  insulin lispro (ADMELOG) corrective regimen sliding scale   SubCutaneous Before meals and at bedtime  insulin lispro Injectable (ADMELOG) 6 Unit(s) SubCutaneous three times a day before meals  lidocaine   4% Patch 1 Patch Transdermal daily  lidocaine   4% Patch 1 Patch Transdermal daily  metoprolol tartrate 12.5 milliGRAM(s) Oral every 12 hours  pantoprazole    Tablet 40 milliGRAM(s) Oral daily  polyethylene glycol 3350 17 Gram(s) Oral daily  QUEtiapine 25 milliGRAM(s) Oral at bedtime  senna 2 Tablet(s) Oral at bedtime    MEDICATIONS  (PRN):  acetaminophen     Tablet .. 650 milliGRAM(s) Oral every 6 hours PRN Temp greater or equal to 38C (100.4F), Mild Pain (1 - 3), Moderate Pain (4 - 6)  ALPRAZolam 0.25 milliGRAM(s) Oral three times a day PRN Anxiety  ibuprofen  Tablet. 600 milliGRAM(s) Oral every 6 hours PRN Severe Pain (7 - 10)  meclizine 12.5 milliGRAM(s) Oral three times a day PRN Dizziness        I&O's Summary    05 Apr 2023 07:01  -  06 Apr 2023 07:00  --------------------------------------------------------  IN: 0 mL / OUT: 2400 mL / NET: -2400 mL    06 Apr 2023 07:01  -  06 Apr 2023 20:25  --------------------------------------------------------  IN: 0 mL / OUT: 600 mL / NET: -600 mL        PHYSICAL EXAM:  Vital Signs Last 24 Hrs  T(C): 36.8 (06 Apr 2023 19:40), Max: 36.9 (06 Apr 2023 05:01)  T(F): 98.3 (06 Apr 2023 19:40), Max: 98.5 (06 Apr 2023 10:27)  HR: 91 (06 Apr 2023 19:40) (78 - 91)  BP: 118/67 (06 Apr 2023 19:40) (90/50 - 135/60)  BP(mean): --  RR: 18 (06 Apr 2023 19:40) (18 - 20)  SpO2: 93% (06 Apr 2023 19:40) (92% - 98%)    Parameters below as of 06 Apr 2023 19:40  Patient On (Oxygen Delivery Method): nasal cannula  O2 Flow (L/min): 2      CONSTITUTIONAL: NAD, sitting up in bed  ENMT: Moist oral mucosa, EOMI, very [place]   RESPIRATORY: Normal respiratory effort; lungs with rhonchi and reduced BS bilaterally  CARDIOVASCULAR: Regular rate and rhythm, normal S1 and S2   ABDOMEN: Obese, Nontender to palpation, normoactive bowel sounds  MUSCULOSKELETAL:  No clubbing or cyanosis of digits   PSYCH: A+O to person, place, and time; affect appropriate  NEUROLOGY: No gross sensory or motor deficits   SKIN: warm and dry     LABS:    04-05    139  |  100  |  15.9  ----------------------------<  158<H>  3.8   |  32.0<H>  |  0.50    Ca    8.6      05 Apr 2023 06:10                CAPILLARY BLOOD GLUCOSE      POCT Blood Glucose.: 193 mg/dL (06 Apr 2023 17:29)  POCT Blood Glucose.: 178 mg/dL (06 Apr 2023 12:11)  POCT Blood Glucose.: 152 mg/dL (06 Apr 2023 09:08)  POCT Blood Glucose.: 184 mg/dL (05 Apr 2023 21:29)        RADIOLOGY & ADDITIONAL TESTS:  Results Reviewed: Y  Imaging Personally Reviewed: N  Electrocardiogram Personally Reviewed: N

## 2023-04-06 NOTE — PROGRESS NOTE ADULT - ASSESSMENT
77 year old male with history of A. Fib on Eliquis (s/p Cardioversion on 1/31/23), Moderate AS, Chronic Diastolic Heart Failure, CAD s/p PCI, HTN, HLD, DM 2, Morbid Obesity, ALLIE on CPAP, Thoracic Aortic Aneurysm, BPH s/p TURP, Left Renal Mass and recent hMPV infection here for mgmt of Acute on chronic respiratory failure w/ Hypoxia and Hypercapnia    Acute on Chronic Respiratory Failure with Hypoxia / Hypercapnia  - s/p extubation on 3/23   - on NC   - Recent hMPV Infection, History of ALLIE   - Completed treatment for suspected superimposed bacterial pneumonia   - Finished Prednisone taper   - c/w Oxygen support, wean as tolerated   - c/w Nocturnal CPAP 10/40%    Metabolic Encephalopathy   - improved   - CT Head with no acute findings   - placed on seroquel in MICU for encephalopathy, now AAOx3, taper down from 50mg to 25mg today, likely DC after tonight if no issues overnight    Dizziness, Hearing Loss  complaining of worsening hearing loss since extubation and persistent dizziness  - start PRN meclizine  - Advised patient to see audiologist outpatient after DC    A. Fib   - s/p DCCV on 1/31/23  - c/w Amiodarone and Metoprolol  - c/w Eliquis   - Cardio follow up noted     Chronic Diastolic Heart Failure / Moderate AS   - soft BPs, stable  - Continue Metoprolol with holding parameters   - Cardio follow up noted     CAD / HLD / HTN  - Continue Aspirin, Lipitor, Metoprolol and Clonidine    DM 2  - A1c 9.8 on 1/27/23  - Accu checks and ISS  - Continue Lantus 20 units daily   - Continue Premeal Admelog 6 units     BPH  - Continue Proscar and Cardura     Osteoarthritis of Both Knees  - Supportive care   - Tylenol for pain control  - PT recommending ELLYN    Healthcare maintenance  DVT Prophylaxis - Eliquis     Dispo: E[facility]YN pending

## 2023-04-06 NOTE — PROGRESS NOTE ADULT - PROVIDER SPECIALTY LIST ADULT
Cardiology
Hospitalist
Cardiology
Critical Care
Hospitalist
MICU
Cardiology
Critical Care
Hospitalist
MICU
MICU
Critical Care
Hospitalist
Hospitalist
MICU
Hospitalist

## 2023-04-06 NOTE — PROGRESS NOTE ADULT - NUTRITIONAL ASSESSMENT
This patient has been assessed with a concern for Malnutrition and has been determined to have a diagnosis/diagnoses of Morbid obesity (BMI > 40).  
This patient has been assessed with a concern for Malnutrition and has been determined to have a diagnosis/diagnoses of Morbid obesity (BMI > 40).    This patient is being managed with:   Diet Consistent Carbohydrate/No Snacks-  DASH/TLC {Sodium & Cholesterol Restricted} (DASH)  Entered: Mar 30 2023  8:51AM  
This patient has been assessed with a concern for Malnutrition and has been determined to have a diagnosis/diagnoses of Morbid obesity (BMI > 40).    This patient is being managed with:   Diet Pureed-  Consistent Carbohydrate {Evening Snack} (CSTCHOSN)  DASH/TLC {Sodium & Cholesterol Restricted} (DASH)  Moderately Thick Liquids (MODTHICKLIQS)  Entered: Mar 27 2023 10:39AM  
This patient has been assessed with a concern for Malnutrition and has been determined to have a diagnosis/diagnoses of Morbid obesity (BMI > 40).    This patient is being managed with:   Diet Consistent Carbohydrate/No Snacks-  DASH/TLC {Sodium & Cholesterol Restricted} (DASH)  Entered: Mar 30 2023  8:51AM  
This patient has been assessed with a concern for Malnutrition and has been determined to have a diagnosis/diagnoses of Morbid obesity (BMI > 40).    This patient is being managed with:   Diet Pureed-  Consistent Carbohydrate {Evening Snack} (CSTCHOSN)  DASH/TLC {Sodium & Cholesterol Restricted} (DASH)  Moderately Thick Liquids (MODTHICKLIQS)  Entered: Mar 27 2023 10:39AM  
This patient has been assessed with a concern for Malnutrition and has been determined to have a diagnosis/diagnoses of Morbid obesity (BMI > 40).    This patient is being managed with:   Diet Pureed-  Consistent Carbohydrate {Evening Snack} (CSTCHOSN)  DASH/TLC {Sodium & Cholesterol Restricted} (DASH)  No Liquids (NOLIQUIDS)  Entered: Mar 26 2023  8:49PM  
This patient has been assessed with a concern for Malnutrition and has been determined to have a diagnosis/diagnoses of Morbid obesity (BMI > 40).  
This patient has been assessed with a concern for Malnutrition and has been determined to have a diagnosis/diagnoses of Morbid obesity (BMI > 40).    This patient is being managed with:   Diet Consistent Carbohydrate/No Snacks-  DASH/TLC {Sodium & Cholesterol Restricted} (DASH)  Entered: Mar 30 2023  8:51AM  
This patient has been assessed with a concern for Malnutrition and has been determined to have a diagnosis/diagnoses of Morbid obesity (BMI > 40).    This patient is being managed with:   Diet Pureed-  Consistent Carbohydrate {Evening Snack} (CSTCHOSN)  DASH/TLC {Sodium & Cholesterol Restricted} (DASH)  Moderately Thick Liquids (MODTHICKLIQS)  Entered: Mar 27 2023 10:39AM  
This patient has been assessed with a concern for Malnutrition and has been determined to have a diagnosis/diagnoses of Morbid obesity (BMI > 40).    This patient is being managed with:   Diet Pureed-  Consistent Carbohydrate {Evening Snack} (CSTCHOSN)  DASH/TLC {Sodium & Cholesterol Restricted} (DASH)  Moderately Thick Liquids (MODTHICKLIQS)  Entered: Mar 27 2023 10:39AM

## 2023-04-06 NOTE — PROGRESS NOTE ADULT - NS ATTEST RISK PROBLEM GEN_ALL_CORE FT
Acute on chronic respiratory failure
acute hypoxic respiratory failure
Acute on Chronic Respiratory Failure with Hypoxia/Hypercapnia.  Metabolic Encephalopathy.

## 2023-04-07 ENCOUNTER — TRANSCRIPTION ENCOUNTER (OUTPATIENT)
Age: 78
End: 2023-04-07

## 2023-04-07 VITALS
DIASTOLIC BLOOD PRESSURE: 67 MMHG | OXYGEN SATURATION: 94 % | RESPIRATION RATE: 18 BRPM | TEMPERATURE: 98 F | HEART RATE: 91 BPM | SYSTOLIC BLOOD PRESSURE: 118 MMHG

## 2023-04-07 LAB
GLUCOSE BLDC GLUCOMTR-MCNC: 143 MG/DL — HIGH (ref 70–99)
GLUCOSE BLDC GLUCOMTR-MCNC: 255 MG/DL — HIGH (ref 70–99)

## 2023-04-07 PROCEDURE — 99239 HOSP IP/OBS DSCHRG MGMT >30: CPT

## 2023-04-07 RX ADMIN — Medication 81 MILLIGRAM(S): at 13:22

## 2023-04-07 RX ADMIN — LIDOCAINE 1 PATCH: 4 CREAM TOPICAL at 06:19

## 2023-04-07 RX ADMIN — Medication 6 UNIT(S): at 09:15

## 2023-04-07 RX ADMIN — PANTOPRAZOLE SODIUM 40 MILLIGRAM(S): 20 TABLET, DELAYED RELEASE ORAL at 13:22

## 2023-04-07 RX ADMIN — INSULIN GLARGINE 20 UNIT(S): 100 INJECTION, SOLUTION SUBCUTANEOUS at 09:18

## 2023-04-07 RX ADMIN — Medication 6: at 12:48

## 2023-04-07 RX ADMIN — Medication 3 MILLILITER(S): at 04:15

## 2023-04-07 RX ADMIN — FINASTERIDE 5 MILLIGRAM(S): 5 TABLET, FILM COATED ORAL at 13:21

## 2023-04-07 RX ADMIN — LIDOCAINE 1 PATCH: 4 CREAM TOPICAL at 13:22

## 2023-04-07 RX ADMIN — Medication 1 SPRAY(S): at 06:18

## 2023-04-07 RX ADMIN — AMIODARONE HYDROCHLORIDE 200 MILLIGRAM(S): 400 TABLET ORAL at 06:18

## 2023-04-07 RX ADMIN — Medication 3 MILLILITER(S): at 08:41

## 2023-04-07 RX ADMIN — Medication 6 UNIT(S): at 12:48

## 2023-04-07 RX ADMIN — POLYETHYLENE GLYCOL 3350 17 GRAM(S): 17 POWDER, FOR SOLUTION ORAL at 13:22

## 2023-04-07 RX ADMIN — APIXABAN 5 MILLIGRAM(S): 2.5 TABLET, FILM COATED ORAL at 09:20

## 2023-04-07 RX ADMIN — CHLORHEXIDINE GLUCONATE 1 APPLICATION(S): 213 SOLUTION TOPICAL at 06:18

## 2023-04-07 NOTE — DISCHARGE NOTE NURSING/CASE MANAGEMENT/SOCIAL WORK - PATIENT PORTAL LINK FT
You can access the FollowMyHealth Patient Portal offered by [facility] by registering at the following website: [email or web address]. By joining [facility]’s FollowMyHealth portal, you will also be able to view your health information using other applications (apps) compatible with our system.

## 2023-04-18 ENCOUNTER — TRANSCRIPTION ENCOUNTER (OUTPATIENT)
Age: 78
End: 2023-04-18

## 2023-04-20 ENCOUNTER — TRANSCRIPTION ENCOUNTER (OUTPATIENT)
Age: 78
End: 2023-04-20

## 2023-04-20 PROCEDURE — 92610 EVALUATE SWALLOWING FUNCTION: CPT

## 2023-04-20 PROCEDURE — 74177 CT ABD & PELVIS W/CONTRAST: CPT | Mod: MA

## 2023-04-20 PROCEDURE — 85014 HEMATOCRIT: CPT

## 2023-04-20 PROCEDURE — 82330 ASSAY OF CALCIUM: CPT

## 2023-04-20 PROCEDURE — C1751: CPT

## 2023-04-20 PROCEDURE — 96365 THER/PROPH/DIAG IV INF INIT: CPT

## 2023-04-20 PROCEDURE — 31500 INSERT EMERGENCY AIRWAY: CPT

## 2023-04-20 PROCEDURE — 71275 CT ANGIOGRAPHY CHEST: CPT

## 2023-04-20 PROCEDURE — 96375 TX/PRO/DX INJ NEW DRUG ADDON: CPT

## 2023-04-20 PROCEDURE — 82803 BLOOD GASES ANY COMBINATION: CPT

## 2023-04-20 PROCEDURE — 93005 ELECTROCARDIOGRAM TRACING: CPT

## 2023-04-20 PROCEDURE — 85025 COMPLETE CBC W/AUTO DIFF WBC: CPT

## 2023-04-20 PROCEDURE — 80307 DRUG TEST PRSMV CHEM ANLYZR: CPT

## 2023-04-20 PROCEDURE — 83880 ASSAY OF NATRIURETIC PEPTIDE: CPT

## 2023-04-20 PROCEDURE — 84132 ASSAY OF SERUM POTASSIUM: CPT

## 2023-04-20 PROCEDURE — 36620 INSERTION CATHETER ARTERY: CPT

## 2023-04-20 PROCEDURE — 85610 PROTHROMBIN TIME: CPT

## 2023-04-20 PROCEDURE — 80053 COMPREHEN METABOLIC PANEL: CPT

## 2023-04-20 PROCEDURE — G1004: CPT

## 2023-04-20 PROCEDURE — 86900 BLOOD TYPING SEROLOGIC ABO: CPT

## 2023-04-20 PROCEDURE — 86850 RBC ANTIBODY SCREEN: CPT

## 2023-04-20 PROCEDURE — 87449 NOS EACH ORGANISM AG IA: CPT

## 2023-04-20 PROCEDURE — 71045 X-RAY EXAM CHEST 1 VIEW: CPT

## 2023-04-20 PROCEDURE — 84295 ASSAY OF SERUM SODIUM: CPT

## 2023-04-20 PROCEDURE — 82962 GLUCOSE BLOOD TEST: CPT

## 2023-04-20 PROCEDURE — 70498 CT ANGIOGRAPHY NECK: CPT | Mod: MD

## 2023-04-20 PROCEDURE — 84484 ASSAY OF TROPONIN QUANT: CPT

## 2023-04-20 PROCEDURE — 94660 CPAP INITIATION&MGMT: CPT

## 2023-04-20 PROCEDURE — 94002 VENT MGMT INPAT INIT DAY: CPT

## 2023-04-20 PROCEDURE — 82947 ASSAY GLUCOSE BLOOD QUANT: CPT

## 2023-04-20 PROCEDURE — 87641 MR-STAPH DNA AMP PROBE: CPT

## 2023-04-20 PROCEDURE — 83690 ASSAY OF LIPASE: CPT

## 2023-04-20 PROCEDURE — 0042T: CPT

## 2023-04-20 PROCEDURE — U0003: CPT

## 2023-04-20 PROCEDURE — 92526 ORAL FUNCTION THERAPY: CPT

## 2023-04-20 PROCEDURE — 94640 AIRWAY INHALATION TREATMENT: CPT

## 2023-04-20 PROCEDURE — U0005: CPT

## 2023-04-20 PROCEDURE — 36415 COLL VENOUS BLD VENIPUNCTURE: CPT

## 2023-04-20 PROCEDURE — 70450 CT HEAD/BRAIN W/O DYE: CPT | Mod: MA

## 2023-04-20 PROCEDURE — 70496 CT ANGIOGRAPHY HEAD: CPT | Mod: MD

## 2023-04-20 PROCEDURE — 84100 ASSAY OF PHOSPHORUS: CPT

## 2023-04-20 PROCEDURE — 83735 ASSAY OF MAGNESIUM: CPT

## 2023-04-20 PROCEDURE — 80048 BASIC METABOLIC PNL TOTAL CA: CPT

## 2023-04-20 PROCEDURE — 87070 CULTURE OTHR SPECIMN AEROBIC: CPT

## 2023-04-20 PROCEDURE — 84145 PROCALCITONIN (PCT): CPT

## 2023-04-20 PROCEDURE — 86901 BLOOD TYPING SEROLOGIC RH(D): CPT

## 2023-04-20 PROCEDURE — P9047: CPT

## 2023-04-20 PROCEDURE — 81001 URINALYSIS AUTO W/SCOPE: CPT

## 2023-04-20 PROCEDURE — 36569 INSJ PICC 5 YR+ W/O IMAGING: CPT

## 2023-04-20 PROCEDURE — 85018 HEMOGLOBIN: CPT

## 2023-04-20 PROCEDURE — 85027 COMPLETE CBC AUTOMATED: CPT

## 2023-04-20 PROCEDURE — C8929: CPT

## 2023-04-20 PROCEDURE — 85730 THROMBOPLASTIN TIME PARTIAL: CPT

## 2023-04-20 PROCEDURE — 87637 SARSCOV2&INF A&B&RSV AMP PRB: CPT

## 2023-04-20 PROCEDURE — 71260 CT THORAX DX C+: CPT | Mod: MA

## 2023-04-20 PROCEDURE — 82435 ASSAY OF BLOOD CHLORIDE: CPT

## 2023-04-20 PROCEDURE — 87640 STAPH A DNA AMP PROBE: CPT

## 2023-04-20 PROCEDURE — 94003 VENT MGMT INPAT SUBQ DAY: CPT

## 2023-04-20 PROCEDURE — 72125 CT NECK SPINE W/O DYE: CPT | Mod: MA

## 2023-04-20 PROCEDURE — 83605 ASSAY OF LACTIC ACID: CPT

## 2023-04-20 PROCEDURE — 99291 CRITICAL CARE FIRST HOUR: CPT

## 2023-04-20 PROCEDURE — 0225U NFCT DS DNA&RNA 21 SARSCOV2: CPT

## 2023-04-20 PROCEDURE — 87040 BLOOD CULTURE FOR BACTERIA: CPT

## 2023-05-02 NOTE — ED ADULT TRIAGE NOTE - NS ED NURSE BANDS TYPE
Name band; Plan: -\\n-Apply TAC cream bid to affected areas of rash bid for flares up to 2 weeks Render In Strict Bullet Format?: No Detail Level: Simple

## 2023-05-04 NOTE — ED CDU PROVIDER SUBSEQUENT DAY NOTE - NSICDXPASTSURGICALHX_GEN_ALL_CORE_FT
Never PAST SURGICAL HISTORY:  H/O arthroscopy of knee     Malignant schwannoma     S/P coronary artery stent placement x2    S/P laminectomy

## 2023-06-09 ENCOUNTER — INPATIENT (INPATIENT)
Facility: HOSPITAL | Age: 78
LOS: 13 days | Discharge: NURSING, REHAB OR LONG-TERM CARE | DRG: 981 | End: 2023-06-23
Attending: HOSPITALIST | Admitting: HOSPITALIST
Payer: MEDICARE

## 2023-06-09 VITALS
RESPIRATION RATE: 22 BRPM | HEIGHT: 74 IN | DIASTOLIC BLOOD PRESSURE: 63 MMHG | SYSTOLIC BLOOD PRESSURE: 96 MMHG | WEIGHT: 285.06 LBS | HEART RATE: 126 BPM | OXYGEN SATURATION: 90 % | TEMPERATURE: 98 F

## 2023-06-09 DIAGNOSIS — Z95.5 PRESENCE OF CORONARY ANGIOPLASTY IMPLANT AND GRAFT: Chronic | ICD-10-CM

## 2023-06-09 DIAGNOSIS — C49.9 MALIGNANT NEOPLASM OF CONNECTIVE AND SOFT TISSUE, UNSPECIFIED: Chronic | ICD-10-CM

## 2023-06-09 DIAGNOSIS — Z98.89 OTHER SPECIFIED POSTPROCEDURAL STATES: Chronic | ICD-10-CM

## 2023-06-09 LAB
ALBUMIN SERPL ELPH-MCNC: 3.6 G/DL — SIGNIFICANT CHANGE UP (ref 3.3–5.2)
ALP SERPL-CCNC: 114 U/L — SIGNIFICANT CHANGE UP (ref 40–120)
ALT FLD-CCNC: 8 U/L — SIGNIFICANT CHANGE UP
ANION GAP SERPL CALC-SCNC: 12 MMOL/L — SIGNIFICANT CHANGE UP (ref 5–17)
APTT BLD: 34.1 SEC — SIGNIFICANT CHANGE UP (ref 27.5–35.5)
AST SERPL-CCNC: 8 U/L — SIGNIFICANT CHANGE UP
BASOPHILS # BLD AUTO: 0.05 K/UL — SIGNIFICANT CHANGE UP (ref 0–0.2)
BASOPHILS NFR BLD AUTO: 0.5 % — SIGNIFICANT CHANGE UP (ref 0–2)
BILIRUB SERPL-MCNC: 0.4 MG/DL — SIGNIFICANT CHANGE UP (ref 0.4–2)
BUN SERPL-MCNC: 17.1 MG/DL — SIGNIFICANT CHANGE UP (ref 8–20)
CALCIUM SERPL-MCNC: 8.1 MG/DL — LOW (ref 8.4–10.5)
CHLORIDE SERPL-SCNC: 100 MMOL/L — SIGNIFICANT CHANGE UP (ref 96–108)
CO2 SERPL-SCNC: 28 MMOL/L — SIGNIFICANT CHANGE UP (ref 22–29)
CREAT SERPL-MCNC: 0.68 MG/DL — SIGNIFICANT CHANGE UP (ref 0.5–1.3)
EGFR: 96 ML/MIN/1.73M2 — SIGNIFICANT CHANGE UP
EOSINOPHIL # BLD AUTO: 0.14 K/UL — SIGNIFICANT CHANGE UP (ref 0–0.5)
EOSINOPHIL NFR BLD AUTO: 1.3 % — SIGNIFICANT CHANGE UP (ref 0–6)
GLUCOSE SERPL-MCNC: 213 MG/DL — HIGH (ref 70–99)
HCT VFR BLD CALC: 46.1 % — SIGNIFICANT CHANGE UP (ref 39–50)
HGB BLD-MCNC: 14.7 G/DL — SIGNIFICANT CHANGE UP (ref 13–17)
IMM GRANULOCYTES NFR BLD AUTO: 0.5 % — SIGNIFICANT CHANGE UP (ref 0–0.9)
INR BLD: 1.5 RATIO — HIGH (ref 0.88–1.16)
LACTATE BLDV-MCNC: 1.7 MMOL/L — SIGNIFICANT CHANGE UP (ref 0.5–2)
LYMPHOCYTES # BLD AUTO: 1.17 K/UL — SIGNIFICANT CHANGE UP (ref 1–3.3)
LYMPHOCYTES # BLD AUTO: 10.9 % — LOW (ref 13–44)
MCHC RBC-ENTMCNC: 28.1 PG — SIGNIFICANT CHANGE UP (ref 27–34)
MCHC RBC-ENTMCNC: 31.9 GM/DL — LOW (ref 32–36)
MCV RBC AUTO: 88.1 FL — SIGNIFICANT CHANGE UP (ref 80–100)
MONOCYTES # BLD AUTO: 1.21 K/UL — HIGH (ref 0–0.9)
MONOCYTES NFR BLD AUTO: 11.3 % — SIGNIFICANT CHANGE UP (ref 2–14)
NEUTROPHILS # BLD AUTO: 8.08 K/UL — HIGH (ref 1.8–7.4)
NEUTROPHILS NFR BLD AUTO: 75.5 % — SIGNIFICANT CHANGE UP (ref 43–77)
PLATELET # BLD AUTO: 205 K/UL — SIGNIFICANT CHANGE UP (ref 150–400)
POTASSIUM SERPL-MCNC: 3.4 MMOL/L — LOW (ref 3.5–5.3)
POTASSIUM SERPL-SCNC: 3.4 MMOL/L — LOW (ref 3.5–5.3)
PROT SERPL-MCNC: 6.4 G/DL — LOW (ref 6.6–8.7)
PROTHROM AB SERPL-ACNC: 17.5 SEC — HIGH (ref 10.5–13.4)
RBC # BLD: 5.23 M/UL — SIGNIFICANT CHANGE UP (ref 4.2–5.8)
RBC # FLD: 14 % — SIGNIFICANT CHANGE UP (ref 10.3–14.5)
SODIUM SERPL-SCNC: 140 MMOL/L — SIGNIFICANT CHANGE UP (ref 135–145)
TROPONIN T SERPL-MCNC: 0.01 NG/ML — SIGNIFICANT CHANGE UP (ref 0–0.06)
WBC # BLD: 10.7 K/UL — HIGH (ref 3.8–10.5)
WBC # FLD AUTO: 10.7 K/UL — HIGH (ref 3.8–10.5)

## 2023-06-09 PROCEDURE — 93010 ELECTROCARDIOGRAM REPORT: CPT | Mod: 76

## 2023-06-09 PROCEDURE — 71045 X-RAY EXAM CHEST 1 VIEW: CPT | Mod: 26

## 2023-06-09 PROCEDURE — 99291 CRITICAL CARE FIRST HOUR: CPT

## 2023-06-09 RX ORDER — MAGNESIUM SULFATE 500 MG/ML
2 VIAL (ML) INJECTION ONCE
Refills: 0 | Status: COMPLETED | OUTPATIENT
Start: 2023-06-09 | End: 2023-06-09

## 2023-06-09 RX ORDER — MEROPENEM 1 G/30ML
INJECTION INTRAVENOUS
Refills: 0 | Status: DISCONTINUED | OUTPATIENT
Start: 2023-06-09 | End: 2023-06-09

## 2023-06-09 RX ORDER — MEROPENEM 1 G/30ML
1000 INJECTION INTRAVENOUS ONCE
Refills: 0 | Status: COMPLETED | OUTPATIENT
Start: 2023-06-09 | End: 2023-06-09

## 2023-06-09 RX ORDER — MEROPENEM 1 G/30ML
INJECTION INTRAVENOUS
Refills: 0 | Status: COMPLETED | OUTPATIENT
Start: 2023-06-09 | End: 2023-06-10

## 2023-06-09 RX ORDER — POTASSIUM CHLORIDE 20 MEQ
10 PACKET (EA) ORAL
Refills: 0 | Status: COMPLETED | OUTPATIENT
Start: 2023-06-09 | End: 2023-06-10

## 2023-06-09 RX ORDER — MEROPENEM 1 G/30ML
1000 INJECTION INTRAVENOUS EVERY 8 HOURS
Refills: 0 | Status: COMPLETED | OUTPATIENT
Start: 2023-06-10 | End: 2023-06-10

## 2023-06-09 RX ORDER — SODIUM CHLORIDE 9 MG/ML
2500 INJECTION INTRAMUSCULAR; INTRAVENOUS; SUBCUTANEOUS ONCE
Refills: 0 | Status: COMPLETED | OUTPATIENT
Start: 2023-06-09 | End: 2023-06-09

## 2023-06-09 RX ORDER — MORPHINE SULFATE 50 MG/1
4 CAPSULE, EXTENDED RELEASE ORAL ONCE
Refills: 0 | Status: DISCONTINUED | OUTPATIENT
Start: 2023-06-09 | End: 2023-06-09

## 2023-06-09 RX ORDER — SODIUM CHLORIDE 9 MG/ML
1000 INJECTION, SOLUTION INTRAVENOUS ONCE
Refills: 0 | Status: COMPLETED | OUTPATIENT
Start: 2023-06-09 | End: 2023-06-09

## 2023-06-09 RX ORDER — ONDANSETRON 8 MG/1
4 TABLET, FILM COATED ORAL ONCE
Refills: 0 | Status: COMPLETED | OUTPATIENT
Start: 2023-06-09 | End: 2023-06-09

## 2023-06-09 RX ADMIN — SODIUM CHLORIDE 1000 MILLILITER(S): 9 INJECTION, SOLUTION INTRAVENOUS at 23:54

## 2023-06-09 RX ADMIN — SODIUM CHLORIDE 2500 MILLILITER(S): 9 INJECTION INTRAMUSCULAR; INTRAVENOUS; SUBCUTANEOUS at 19:11

## 2023-06-09 RX ADMIN — Medication 100 MILLIEQUIVALENT(S): at 23:54

## 2023-06-09 RX ADMIN — MORPHINE SULFATE 4 MILLIGRAM(S): 50 CAPSULE, EXTENDED RELEASE ORAL at 19:17

## 2023-06-09 RX ADMIN — MEROPENEM 1000 MILLIGRAM(S): 1 INJECTION INTRAVENOUS at 19:17

## 2023-06-09 RX ADMIN — ONDANSETRON 4 MILLIGRAM(S): 8 TABLET, FILM COATED ORAL at 19:17

## 2023-06-09 RX ADMIN — MORPHINE SULFATE 4 MILLIGRAM(S): 50 CAPSULE, EXTENDED RELEASE ORAL at 19:47

## 2023-06-09 RX ADMIN — Medication 100 GRAM(S): at 23:54

## 2023-06-09 NOTE — ED ADULT NURSE REASSESSMENT NOTE - NS ED NURSE REASSESS COMMENT FT1
Report received from critical care, care assumed.  77 year old male presents to ED from [facility] with c/o right sided abdominal pain times a few days,.  A&Ox4  Septic work up done in critical.  Currently receiving NS bolus as ordered.  CM-ST  Patient on O2 3lpm via NC with O2 sat 95%, patient is chronically on oxygen at assisted living.  Resting comfortably at this time in no acute distress,.  Offers no complaints at this time.f

## 2023-06-09 NOTE — ED ADULT TRIAGE NOTE - ARRIVAL FROM
Complete 32 week pregnancy checklist when RTC.    Rio presents to the clinic today with her 2 children.  Eager to meet the new baby!  Concerned about transverse lie last visit; cephalic presentation by [facility]'s today.  Reassured.  Denies regular uterine contractions, loss of fluid or vaginal bleeding.  28 week lab results reviewed and all within normal limits.   labor precautions and danger signs and symptoms reviewed.  All questions answered.  Encouraged daily fetal movement counting and to call or return to clinic with any questions, concerns, or as needed.  
Nursing home

## 2023-06-09 NOTE — ED ADULT NURSE NOTE - NS TRANSFER EYEGLASSES PAIRS
1 pair Consent: The patient's verbal consent was obtained including but not limited to risks of crusting, scabbing, blistering, scarring, darker or lighter pigmentary change, recurrence, incomplete removal and infection. Duration Of Freeze Thaw-Cycle (Seconds): 2 Render Note In Bullet Format When Appropriate: No Post-Care Instructions: I reviewed with the patient in detail post-care instructions. Patient is to wear sunprotection, and avoid picking at any of the treated lesions. Pt may apply Vaseline to crusted or scabbing areas. Detail Level: Detailed Number Of Freeze-Thaw Cycles: 1 freeze-thaw cycle

## 2023-06-09 NOTE — ED ADULT NURSE NOTE - OBJECTIVE STATEMENT
pt from a nursing home c/o right sided abd pain, abd distention  and headache  x3 days denies fever, n/v/d, gu sx

## 2023-06-09 NOTE — ED ADULT TRIAGE NOTE - GLASGOW COMA SCALE: BEST VERBAL RESPONSE, MLM
(V5) oriented This scribe's documentation has been prepared under my direction and personally reviewed by me in its entirety. I confirm that the note above accurately reflects all work, treatment, procedures, and medical decision making performed by me.

## 2023-06-09 NOTE — ED PROVIDER NOTE - PHYSICAL EXAMINATION
General: NAD, well appearing  HEENT: Normocephalic, atraumatic, PERRLA, EOMI  Neck: No apparent stiffness or JVD  Pulm: Chest wall symmetric and nontender, lungs clear to ascultation   Cardiac: fast rate and regular rhythm, distal pulses intact  Abdomen: mildly tender and gravid  Skin: Skin is warm, dry and intact without rashes or lesions.  Neuro: No motor or sensory deficits    MSK: No deformity or tenderness

## 2023-06-09 NOTE — ED ADULT TRIAGE NOTE - CHIEF COMPLAINT QUOTE
" I have pain to the right side of my abdomen from my naval to my lower abdomen for 3 days" pt also co low oxygen, denies any fevers, chills, chest pain, SOB. pt from [facility]. AOX4

## 2023-06-09 NOTE — ED ADULT NURSE NOTE - CHIEF COMPLAINT QUOTE
" I have pain to the right side of my abdomen from my naval to my lower abdomen for 3 days" pt also co low oxygen, denies any fevers, chills, chest pain, SOB. pt from [facility]. AOX4

## 2023-06-09 NOTE — ED PROVIDER NOTE - OBJECTIVE STATEMENT
77 year old male with history of A. Fib on Eliquis (s/p Cardioversion on 1/31/23), Moderate AS, Chronic Diastolic Heart Failure, CAD s/p PCI, HTN, HLD, DM 2, Morbid Obesity, ALLIE on CPAP, Thoracic Aortic Aneurysm, BPH s/p TURP, Left Renal Mass and recent hMPV infection presented from NH with 3 days right sided abd pain.  States he has had gallstones in the past but no abdominal surgeries.  Otherwise denies  bleeding, SOB, chest pain, n/v/d or fevers.  Denies other pertinent medical problems.  Denies any other substance use.

## 2023-06-09 NOTE — ED PROVIDER NOTE - ATTENDING CONTRIBUTION TO CARE
hypotension and tachycardia, slight leukocytosis, no fever. Only complaint is right sided abdominal pain, though also hypoxic. CTA performed to r/o PE, bibasilar opacities, tracheomalacia, maybe chronically aspirating? Abdomen remarkable for new hepatosplenomegaly of unclear significance, LFTs all normal. Remains with borderline vitals after fluid resuscitation however mental status is approved and clinically appears well perfused. Covered empirically with broad spectrum abx and cultures sent for follow up. Will require admission
No

## 2023-06-10 DIAGNOSIS — R16.0 HEPATOMEGALY, NOT ELSEWHERE CLASSIFIED: ICD-10-CM

## 2023-06-10 LAB
ALBUMIN SERPL ELPH-MCNC: 3 G/DL — LOW (ref 3.3–5.2)
ALP SERPL-CCNC: 103 U/L — SIGNIFICANT CHANGE UP (ref 40–120)
ALT FLD-CCNC: 7 U/L — SIGNIFICANT CHANGE UP
ANION GAP SERPL CALC-SCNC: 8 MMOL/L — SIGNIFICANT CHANGE UP (ref 5–17)
APPEARANCE UR: CLEAR — SIGNIFICANT CHANGE UP
AST SERPL-CCNC: 6 U/L — SIGNIFICANT CHANGE UP
BACTERIA # UR AUTO: ABNORMAL
BASE EXCESS BLDV CALC-SCNC: 5.9 MMOL/L — HIGH (ref -2–3)
BILIRUB SERPL-MCNC: 0.5 MG/DL — SIGNIFICANT CHANGE UP (ref 0.4–2)
BILIRUB UR-MCNC: NEGATIVE — SIGNIFICANT CHANGE UP
BUN SERPL-MCNC: 16.9 MG/DL — SIGNIFICANT CHANGE UP (ref 8–20)
CA-I SERPL-SCNC: 1.05 MMOL/L — LOW (ref 1.15–1.33)
CALCIUM SERPL-MCNC: 7.7 MG/DL — LOW (ref 8.4–10.5)
CHLORIDE BLDV-SCNC: 102 MMOL/L — SIGNIFICANT CHANGE UP (ref 96–108)
CHLORIDE SERPL-SCNC: 103 MMOL/L — SIGNIFICANT CHANGE UP (ref 96–108)
CO2 SERPL-SCNC: 30 MMOL/L — HIGH (ref 22–29)
COLOR SPEC: YELLOW — SIGNIFICANT CHANGE UP
COMMENT - URINE: SIGNIFICANT CHANGE UP
CREAT SERPL-MCNC: 0.64 MG/DL — SIGNIFICANT CHANGE UP (ref 0.5–1.3)
DIFF PNL FLD: ABNORMAL
EBV EA AB SER IA-ACNC: <5 U/ML — SIGNIFICANT CHANGE UP
EBV EA AB TITR SER IF: POSITIVE
EBV EA IGG SER-ACNC: NEGATIVE — SIGNIFICANT CHANGE UP
EBV NA IGG SER IA-ACNC: 61.5 U/ML — HIGH
EBV PATRN SPEC IB-IMP: SIGNIFICANT CHANGE UP
EBV VCA IGG AVIDITY SER QL IA: POSITIVE
EBV VCA IGM SER IA-ACNC: <10 U/ML — SIGNIFICANT CHANGE UP
EBV VCA IGM SER IA-ACNC: >750 U/ML — HIGH
EBV VCA IGM TITR FLD: NEGATIVE — SIGNIFICANT CHANGE UP
EGFR: 98 ML/MIN/1.73M2 — SIGNIFICANT CHANGE UP
EPI CELLS # UR: SIGNIFICANT CHANGE UP
GAS PNL BLDV: 136 MMOL/L — SIGNIFICANT CHANGE UP (ref 136–145)
GAS PNL BLDV: SIGNIFICANT CHANGE UP
GAS PNL BLDV: SIGNIFICANT CHANGE UP
GLUCOSE BLDC GLUCOMTR-MCNC: 131 MG/DL — HIGH (ref 70–99)
GLUCOSE BLDC GLUCOMTR-MCNC: 136 MG/DL — HIGH (ref 70–99)
GLUCOSE BLDC GLUCOMTR-MCNC: 182 MG/DL — HIGH (ref 70–99)
GLUCOSE BLDC GLUCOMTR-MCNC: 213 MG/DL — HIGH (ref 70–99)
GLUCOSE BLDV-MCNC: 186 MG/DL — HIGH (ref 70–99)
GLUCOSE SERPL-MCNC: 191 MG/DL — HIGH (ref 70–99)
GLUCOSE UR QL: 100 MG/DL
HAV IGM SER-ACNC: SIGNIFICANT CHANGE UP
HBV CORE IGM SER-ACNC: SIGNIFICANT CHANGE UP
HBV SURFACE AG SER-ACNC: SIGNIFICANT CHANGE UP
HCO3 BLDV-SCNC: 32 MMOL/L — HIGH (ref 22–29)
HCT VFR BLD CALC: 42.8 % — SIGNIFICANT CHANGE UP (ref 39–50)
HCT VFR BLDA CALC: 42 % — SIGNIFICANT CHANGE UP
HCV AB S/CO SERPL IA: 0.07 S/CO — SIGNIFICANT CHANGE UP (ref 0–0.99)
HCV AB SERPL-IMP: SIGNIFICANT CHANGE UP
HGB BLD CALC-MCNC: 14 G/DL — SIGNIFICANT CHANGE UP (ref 12.6–17.4)
HGB BLD-MCNC: 13.6 G/DL — SIGNIFICANT CHANGE UP (ref 13–17)
HYALINE CASTS # UR AUTO: ABNORMAL /LPF
KETONES UR-MCNC: ABNORMAL
LACTATE BLDV-MCNC: 0.9 MMOL/L — SIGNIFICANT CHANGE UP (ref 0.5–2)
LEUKOCYTE ESTERASE UR-ACNC: ABNORMAL
MCHC RBC-ENTMCNC: 28.4 PG — SIGNIFICANT CHANGE UP (ref 27–34)
MCHC RBC-ENTMCNC: 31.8 GM/DL — LOW (ref 32–36)
MCV RBC AUTO: 89.4 FL — SIGNIFICANT CHANGE UP (ref 80–100)
NITRITE UR-MCNC: POSITIVE
PCO2 BLDV: 64 MMHG — HIGH (ref 42–55)
PH BLDV: 7.31 — LOW (ref 7.32–7.43)
PH UR: 5 — SIGNIFICANT CHANGE UP (ref 5–8)
PLATELET # BLD AUTO: 167 K/UL — SIGNIFICANT CHANGE UP (ref 150–400)
PO2 BLDV: 62 MMHG — HIGH (ref 25–45)
POTASSIUM BLDV-SCNC: 3.5 MMOL/L — SIGNIFICANT CHANGE UP (ref 3.5–5.1)
POTASSIUM SERPL-MCNC: 3.5 MMOL/L — SIGNIFICANT CHANGE UP (ref 3.5–5.3)
POTASSIUM SERPL-SCNC: 3.5 MMOL/L — SIGNIFICANT CHANGE UP (ref 3.5–5.3)
PROT SERPL-MCNC: 5.6 G/DL — LOW (ref 6.6–8.7)
PROT UR-MCNC: 30 MG/DL
RAPID RVP RESULT: SIGNIFICANT CHANGE UP
RBC # BLD: 4.79 M/UL — SIGNIFICANT CHANGE UP (ref 4.2–5.8)
RBC # FLD: 14.1 % — SIGNIFICANT CHANGE UP (ref 10.3–14.5)
RBC CASTS # UR COMP ASSIST: ABNORMAL /HPF (ref 0–4)
SAO2 % BLDV: 89.9 % — SIGNIFICANT CHANGE UP
SARS-COV-2 RNA SPEC QL NAA+PROBE: SIGNIFICANT CHANGE UP
SODIUM SERPL-SCNC: 141 MMOL/L — SIGNIFICANT CHANGE UP (ref 135–145)
SP GR SPEC: 1.02 — SIGNIFICANT CHANGE UP (ref 1.01–1.02)
UROBILINOGEN FLD QL: NEGATIVE — SIGNIFICANT CHANGE UP
WBC # BLD: 8.49 K/UL — SIGNIFICANT CHANGE UP (ref 3.8–10.5)
WBC # FLD AUTO: 8.49 K/UL — SIGNIFICANT CHANGE UP (ref 3.8–10.5)
WBC UR QL: ABNORMAL /HPF (ref 0–5)

## 2023-06-10 PROCEDURE — 71275 CT ANGIOGRAPHY CHEST: CPT | Mod: 26,MA

## 2023-06-10 PROCEDURE — 99223 1ST HOSP IP/OBS HIGH 75: CPT

## 2023-06-10 PROCEDURE — 76700 US EXAM ABDOM COMPLETE: CPT | Mod: 26

## 2023-06-10 PROCEDURE — 74177 CT ABD & PELVIS W/CONTRAST: CPT | Mod: 26,MA

## 2023-06-10 PROCEDURE — 12345: CPT | Mod: NC

## 2023-06-10 PROCEDURE — 71045 X-RAY EXAM CHEST 1 VIEW: CPT | Mod: 26

## 2023-06-10 PROCEDURE — 73560 X-RAY EXAM OF KNEE 1 OR 2: CPT | Mod: 26,LT

## 2023-06-10 RX ORDER — FLUTICASONE PROPIONATE 50 MCG
1 SPRAY, SUSPENSION NASAL
Refills: 0 | Status: DISCONTINUED | OUTPATIENT
Start: 2023-06-10 | End: 2023-06-23

## 2023-06-10 RX ORDER — METOPROLOL TARTRATE 50 MG
25 TABLET ORAL
Refills: 0 | Status: DISCONTINUED | OUTPATIENT
Start: 2023-06-10 | End: 2023-06-11

## 2023-06-10 RX ORDER — FUROSEMIDE 40 MG
20 TABLET ORAL ONCE
Refills: 0 | Status: COMPLETED | OUTPATIENT
Start: 2023-06-10 | End: 2023-06-10

## 2023-06-10 RX ORDER — DEXTROSE 50 % IN WATER 50 %
25 SYRINGE (ML) INTRAVENOUS ONCE
Refills: 0 | Status: DISCONTINUED | OUTPATIENT
Start: 2023-06-10 | End: 2023-06-23

## 2023-06-10 RX ORDER — ACETAMINOPHEN 500 MG
650 TABLET ORAL EVERY 6 HOURS
Refills: 0 | Status: DISCONTINUED | OUTPATIENT
Start: 2023-06-10 | End: 2023-06-12

## 2023-06-10 RX ORDER — METOPROLOL TARTRATE 50 MG
12.5 TABLET ORAL EVERY 12 HOURS
Refills: 0 | Status: DISCONTINUED | OUTPATIENT
Start: 2023-06-10 | End: 2023-06-10

## 2023-06-10 RX ORDER — SODIUM CHLORIDE 9 MG/ML
1000 INJECTION INTRAMUSCULAR; INTRAVENOUS; SUBCUTANEOUS ONCE
Refills: 0 | Status: COMPLETED | OUTPATIENT
Start: 2023-06-10 | End: 2023-06-10

## 2023-06-10 RX ORDER — DEXTROSE 50 % IN WATER 50 %
12.5 SYRINGE (ML) INTRAVENOUS ONCE
Refills: 0 | Status: DISCONTINUED | OUTPATIENT
Start: 2023-06-10 | End: 2023-06-23

## 2023-06-10 RX ORDER — SODIUM CHLORIDE 9 MG/ML
1000 INJECTION, SOLUTION INTRAVENOUS
Refills: 0 | Status: DISCONTINUED | OUTPATIENT
Start: 2023-06-10 | End: 2023-06-23

## 2023-06-10 RX ORDER — ONDANSETRON 8 MG/1
4 TABLET, FILM COATED ORAL EVERY 8 HOURS
Refills: 0 | Status: DISCONTINUED | OUTPATIENT
Start: 2023-06-10 | End: 2023-06-23

## 2023-06-10 RX ORDER — SODIUM CHLORIDE 9 MG/ML
1000 INJECTION, SOLUTION INTRAVENOUS
Refills: 0 | Status: DISCONTINUED | OUTPATIENT
Start: 2023-06-10 | End: 2023-06-10

## 2023-06-10 RX ORDER — FUROSEMIDE 40 MG
40 TABLET ORAL ONCE
Refills: 0 | Status: DISCONTINUED | OUTPATIENT
Start: 2023-06-10 | End: 2023-06-10

## 2023-06-10 RX ORDER — GLUCAGON INJECTION, SOLUTION 0.5 MG/.1ML
1 INJECTION, SOLUTION SUBCUTANEOUS ONCE
Refills: 0 | Status: DISCONTINUED | OUTPATIENT
Start: 2023-06-10 | End: 2023-06-23

## 2023-06-10 RX ORDER — ERTAPENEM SODIUM 1 G/1
1000 INJECTION, POWDER, LYOPHILIZED, FOR SOLUTION INTRAMUSCULAR; INTRAVENOUS EVERY 24 HOURS
Refills: 0 | Status: COMPLETED | OUTPATIENT
Start: 2023-06-11 | End: 2023-06-17

## 2023-06-10 RX ORDER — DIPHENHYDRAMINE HCL 50 MG
50 CAPSULE ORAL ONCE
Refills: 0 | Status: DISCONTINUED | OUTPATIENT
Start: 2023-06-10 | End: 2023-06-23

## 2023-06-10 RX ORDER — ATORVASTATIN CALCIUM 80 MG/1
10 TABLET, FILM COATED ORAL AT BEDTIME
Refills: 0 | Status: DISCONTINUED | OUTPATIENT
Start: 2023-06-10 | End: 2023-06-12

## 2023-06-10 RX ORDER — OXYCODONE HYDROCHLORIDE 5 MG/1
5 TABLET ORAL EVERY 4 HOURS
Refills: 0 | Status: DISCONTINUED | OUTPATIENT
Start: 2023-06-10 | End: 2023-06-17

## 2023-06-10 RX ORDER — FINASTERIDE 5 MG/1
5 TABLET, FILM COATED ORAL DAILY
Refills: 0 | Status: DISCONTINUED | OUTPATIENT
Start: 2023-06-10 | End: 2023-06-23

## 2023-06-10 RX ORDER — POTASSIUM CHLORIDE 20 MEQ
20 PACKET (EA) ORAL DAILY
Refills: 0 | Status: DISCONTINUED | OUTPATIENT
Start: 2023-06-10 | End: 2023-06-10

## 2023-06-10 RX ORDER — APIXABAN 2.5 MG/1
5 TABLET, FILM COATED ORAL EVERY 12 HOURS
Refills: 0 | Status: DISCONTINUED | OUTPATIENT
Start: 2023-06-10 | End: 2023-06-12

## 2023-06-10 RX ORDER — SODIUM CHLORIDE 9 MG/ML
1000 INJECTION, SOLUTION INTRAVENOUS
Refills: 0 | Status: DISCONTINUED | OUTPATIENT
Start: 2023-06-10 | End: 2023-06-11

## 2023-06-10 RX ORDER — POLYETHYLENE GLYCOL 3350 17 G/17G
17 POWDER, FOR SOLUTION ORAL DAILY
Refills: 0 | Status: DISCONTINUED | OUTPATIENT
Start: 2023-06-10 | End: 2023-06-23

## 2023-06-10 RX ORDER — ALPRAZOLAM 0.25 MG
0.25 TABLET ORAL THREE TIMES A DAY
Refills: 0 | Status: DISCONTINUED | OUTPATIENT
Start: 2023-06-10 | End: 2023-06-17

## 2023-06-10 RX ORDER — DEXTROSE 50 % IN WATER 50 %
15 SYRINGE (ML) INTRAVENOUS ONCE
Refills: 0 | Status: DISCONTINUED | OUTPATIENT
Start: 2023-06-10 | End: 2023-06-23

## 2023-06-10 RX ORDER — SENNA PLUS 8.6 MG/1
2 TABLET ORAL AT BEDTIME
Refills: 0 | Status: DISCONTINUED | OUTPATIENT
Start: 2023-06-10 | End: 2023-06-23

## 2023-06-10 RX ORDER — PANTOPRAZOLE SODIUM 20 MG/1
40 TABLET, DELAYED RELEASE ORAL
Refills: 0 | Status: DISCONTINUED | OUTPATIENT
Start: 2023-06-10 | End: 2023-06-19

## 2023-06-10 RX ORDER — FUROSEMIDE 40 MG
40 TABLET ORAL DAILY
Refills: 0 | Status: DISCONTINUED | OUTPATIENT
Start: 2023-06-10 | End: 2023-06-14

## 2023-06-10 RX ORDER — POTASSIUM CHLORIDE 20 MEQ
20 PACKET (EA) ORAL DAILY
Refills: 0 | Status: DISCONTINUED | OUTPATIENT
Start: 2023-06-10 | End: 2023-06-23

## 2023-06-10 RX ORDER — INSULIN LISPRO 100/ML
VIAL (ML) SUBCUTANEOUS
Refills: 0 | Status: DISCONTINUED | OUTPATIENT
Start: 2023-06-10 | End: 2023-06-23

## 2023-06-10 RX ORDER — AMIODARONE HYDROCHLORIDE 400 MG/1
200 TABLET ORAL DAILY
Refills: 0 | Status: DISCONTINUED | OUTPATIENT
Start: 2023-06-10 | End: 2023-06-10

## 2023-06-10 RX ORDER — DOXAZOSIN MESYLATE 4 MG
2 TABLET ORAL AT BEDTIME
Refills: 0 | Status: DISCONTINUED | OUTPATIENT
Start: 2023-06-10 | End: 2023-06-23

## 2023-06-10 RX ORDER — ASPIRIN/CALCIUM CARB/MAGNESIUM 324 MG
81 TABLET ORAL DAILY
Refills: 0 | Status: DISCONTINUED | OUTPATIENT
Start: 2023-06-10 | End: 2023-06-23

## 2023-06-10 RX ORDER — MECLIZINE HCL 12.5 MG
12.5 TABLET ORAL THREE TIMES A DAY
Refills: 0 | Status: DISCONTINUED | OUTPATIENT
Start: 2023-06-10 | End: 2023-06-23

## 2023-06-10 RX ORDER — AMIODARONE HYDROCHLORIDE 400 MG/1
200 TABLET ORAL
Refills: 0 | Status: DISCONTINUED | OUTPATIENT
Start: 2023-06-10 | End: 2023-06-12

## 2023-06-10 RX ORDER — INSULIN LISPRO 100/ML
VIAL (ML) SUBCUTANEOUS AT BEDTIME
Refills: 0 | Status: DISCONTINUED | OUTPATIENT
Start: 2023-06-10 | End: 2023-06-23

## 2023-06-10 RX ORDER — METOPROLOL TARTRATE 50 MG
5 TABLET ORAL ONCE
Refills: 0 | Status: COMPLETED | OUTPATIENT
Start: 2023-06-10 | End: 2023-06-10

## 2023-06-10 RX ORDER — LANOLIN ALCOHOL/MO/W.PET/CERES
3 CREAM (GRAM) TOPICAL AT BEDTIME
Refills: 0 | Status: DISCONTINUED | OUTPATIENT
Start: 2023-06-10 | End: 2023-06-23

## 2023-06-10 RX ORDER — ALBUTEROL 90 UG/1
2 AEROSOL, METERED ORAL EVERY 6 HOURS
Refills: 0 | Status: DISCONTINUED | OUTPATIENT
Start: 2023-06-10 | End: 2023-06-17

## 2023-06-10 RX ADMIN — Medication 12.5 MILLIGRAM(S): at 11:44

## 2023-06-10 RX ADMIN — OXYCODONE HYDROCHLORIDE 5 MILLIGRAM(S): 5 TABLET ORAL at 22:03

## 2023-06-10 RX ADMIN — ATORVASTATIN CALCIUM 10 MILLIGRAM(S): 80 TABLET, FILM COATED ORAL at 21:42

## 2023-06-10 RX ADMIN — APIXABAN 5 MILLIGRAM(S): 2.5 TABLET, FILM COATED ORAL at 06:21

## 2023-06-10 RX ADMIN — SENNA PLUS 2 TABLET(S): 8.6 TABLET ORAL at 21:41

## 2023-06-10 RX ADMIN — OXYCODONE HYDROCHLORIDE 5 MILLIGRAM(S): 5 TABLET ORAL at 09:11

## 2023-06-10 RX ADMIN — SODIUM CHLORIDE 100 MILLILITER(S): 9 INJECTION, SOLUTION INTRAVENOUS at 08:16

## 2023-06-10 RX ADMIN — Medication 650 MILLIGRAM(S): at 08:16

## 2023-06-10 RX ADMIN — Medication 3 MILLIGRAM(S): at 21:42

## 2023-06-10 RX ADMIN — OXYCODONE HYDROCHLORIDE 5 MILLIGRAM(S): 5 TABLET ORAL at 15:49

## 2023-06-10 RX ADMIN — OXYCODONE HYDROCHLORIDE 5 MILLIGRAM(S): 5 TABLET ORAL at 16:49

## 2023-06-10 RX ADMIN — Medication 0.25 MILLIGRAM(S): at 15:12

## 2023-06-10 RX ADMIN — PANTOPRAZOLE SODIUM 40 MILLIGRAM(S): 20 TABLET, DELAYED RELEASE ORAL at 06:21

## 2023-06-10 RX ADMIN — OXYCODONE HYDROCHLORIDE 5 MILLIGRAM(S): 5 TABLET ORAL at 22:33

## 2023-06-10 RX ADMIN — Medication 100 MILLIEQUIVALENT(S): at 01:08

## 2023-06-10 RX ADMIN — Medication 81 MILLIGRAM(S): at 11:44

## 2023-06-10 RX ADMIN — SODIUM CHLORIDE 100 MILLILITER(S): 9 INJECTION, SOLUTION INTRAVENOUS at 15:50

## 2023-06-10 RX ADMIN — SODIUM CHLORIDE 1000 MILLILITER(S): 9 INJECTION INTRAMUSCULAR; INTRAVENOUS; SUBCUTANEOUS at 06:25

## 2023-06-10 RX ADMIN — Medication 12.5 MILLIGRAM(S): at 15:44

## 2023-06-10 RX ADMIN — Medication 650 MILLIGRAM(S): at 09:16

## 2023-06-10 RX ADMIN — MEROPENEM 1000 MILLIGRAM(S): 1 INJECTION INTRAVENOUS at 06:21

## 2023-06-10 RX ADMIN — Medication 5 MILLIGRAM(S): at 21:35

## 2023-06-10 RX ADMIN — Medication 1 SPRAY(S): at 18:02

## 2023-06-10 RX ADMIN — Medication 0.25 MILLIGRAM(S): at 06:53

## 2023-06-10 RX ADMIN — OXYCODONE HYDROCHLORIDE 5 MILLIGRAM(S): 5 TABLET ORAL at 10:11

## 2023-06-10 RX ADMIN — Medication 20 MILLIGRAM(S): at 18:31

## 2023-06-10 RX ADMIN — Medication 100 MILLIEQUIVALENT(S): at 02:48

## 2023-06-10 RX ADMIN — FINASTERIDE 5 MILLIGRAM(S): 5 TABLET, FILM COATED ORAL at 11:43

## 2023-06-10 RX ADMIN — APIXABAN 5 MILLIGRAM(S): 2.5 TABLET, FILM COATED ORAL at 18:02

## 2023-06-10 RX ADMIN — Medication 1: at 18:03

## 2023-06-10 RX ADMIN — Medication 2 MILLIGRAM(S): at 21:42

## 2023-06-10 RX ADMIN — AMIODARONE HYDROCHLORIDE 200 MILLIGRAM(S): 400 TABLET ORAL at 11:44

## 2023-06-10 RX ADMIN — SODIUM CHLORIDE 125 MILLILITER(S): 9 INJECTION, SOLUTION INTRAVENOUS at 08:54

## 2023-06-10 NOTE — H&P ADULT - ASSESSMENT
76 y/o male with PMH of afib on Eliquis (s/p Cardioversion on 1/31/23), Moderate AS, Chronic Diastolic Heart Failure, CAD s/p PCI, HTN, HLD, DM 2, Morbid Obesity, ALLIE, Thoracic Aortic Aneurysm, BPH s/p TURP, Left Renal Mass was sent to the ED from [facility] for abdominal pain x 4 days. Patient reported pain on the right side of the abdomen (upper and lower), 10/10, non-radiating. Patient reported feeling congested, noted cough but not productive.   CT C/A/P: No PE; Trace bilateral pleural effusions with adjacent patchy bibasilar opacities, likely represent dependent atelectasis vs infection. Hepatomegaly. Liver surface nodularity. Hepatitis or cirrhosis considered in differential. Trace ascites. Mild bladder wall thickening with suggestion of left posterolateral diverticulum. Correlate with urinalysis and lab values to assess for cystitis and/or ascending urinary tract infection.   Patient also noted to be hypotensive in the ED, blood culture sent, antibiotic given, IVF, UA suggestive of UTI.     No document was found from assisted living, patient said his medications has changed from discharge, could not recall the names of his medications. Please confirm medications in AM.     Hypotension likely due to underlying UTI?   Admit to step down   UA noted   IVF about 4L given in the ED, BP in the 90s , MAP > 65  Merrem given in the ED, will continue   Blood and urine culture sent, follow up   Of note, he was seen for similar presentation few months ago   Monitor BP     Hepatosplenomegaly   As noted on CT above   LFT WNL   Hepatitis panel sent   Will get US abdomen     Right sided abdominal pain   CT noted above   Etiology? patient reported prior hx of gallstone   Will get US   Pain control     Afib   Eliquis 5mg bid   Amiodarone 100mg     HTN/HLD/CAD  Hold BP medications due to hypotension   Atorvastatin 10mg   Aspirin 81mg     BPH   Finasteride 5mg     DM-2   Patient said he only takes Metformin tid. Used to be on Farxiga was but taken off  Hold Metformin   Insulin sliding scale     ALLIE   Said he is not on CPAP, only uses oxygen   Oxygen therapy     Supportive   DVT prophylaxis: on Eliquis bid   Diet: CHO     Plan of care discussed with patient

## 2023-06-10 NOTE — H&P ADULT - HISTORY OF PRESENT ILLNESS
78 y/o male with PMH of afib on Eliquis (s/p Cardioversion on 1/31/23), Moderate AS, Chronic Diastolic Heart Failure, CAD s/p PCI, HTN, HLD, DM 2, Morbid Obesity, ALLIE, Thoracic Aortic Aneurysm, BPH s/p TURP, Left Renal Mass was sent to the ED from [facility] for abdominal pain x 4 days. Patient reported pain on the right side of the abdomen (upper and lower), 10/10, non-radiating. Patient reported feeling congested, noted cough but not productive. Patient has no trauma, nausea, vomiting, chest pain, shortness of breath, fever, chills, palpitation, change in bowel/urinary habit, trauma to the abdomen.

## 2023-06-10 NOTE — PROGRESS NOTE ADULT - SUBJECTIVE AND OBJECTIVE BOX
Hospitalist Progress Note    Chief Complaint:  Abdominal pain    SUBJECTIVE / OVERNIGHT EVENTS:  Admitted in the AM, patient seen at bedside, in NAD, complaining of abdominal pain and swelling of L knee. Patient denies chest pain, N/V, fever, chills, dysuria or any other complaints. All remainder ROS negative.     MEDICATIONS  (STANDING):  aMIOdarone    Tablet 200 milliGRAM(s) Oral daily  apixaban 5 milliGRAM(s) Oral every 12 hours  aspirin  chewable 81 milliGRAM(s) Oral daily  atorvastatin 10 milliGRAM(s) Oral at bedtime  dextrose 5%. 1000 milliLiter(s) (50 mL/Hr) IV Continuous <Continuous>  dextrose 5%. 1000 milliLiter(s) (100 mL/Hr) IV Continuous <Continuous>  dextrose 50% Injectable 25 Gram(s) IV Push once  dextrose 50% Injectable 25 Gram(s) IV Push once  dextrose 50% Injectable 12.5 Gram(s) IV Push once  finasteride 5 milliGRAM(s) Oral daily  glucagon  Injectable 1 milliGRAM(s) IntraMuscular once  insulin lispro (ADMELOG) corrective regimen sliding scale   SubCutaneous three times a day before meals  insulin lispro (ADMELOG) corrective regimen sliding scale   SubCutaneous at bedtime  lactated ringers. 1000 milliLiter(s) (125 mL/Hr) IV Continuous <Continuous>  pantoprazole    Tablet 40 milliGRAM(s) Oral before breakfast    MEDICATIONS  (PRN):  acetaminophen     Tablet .. 650 milliGRAM(s) Oral every 6 hours PRN Temp greater or equal to 38C (100.4F), Mild Pain (1 - 3)  ALPRAZolam 0.25 milliGRAM(s) Oral three times a day PRN Anxiety  aluminum hydroxide/magnesium hydroxide/simethicone Suspension 30 milliLiter(s) Oral every 4 hours PRN Dyspepsia  dextrose Oral Gel 15 Gram(s) Oral once PRN Blood Glucose LESS THAN 70 milliGRAM(s)/deciliter  meclizine 12.5 milliGRAM(s) Oral three times a day PRN Dizziness  melatonin 3 milliGRAM(s) Oral at bedtime PRN Insomnia  ondansetron Injectable 4 milliGRAM(s) IV Push every 8 hours PRN Nausea and/or Vomiting  oxyCODONE    IR 5 milliGRAM(s) Oral every 4 hours PRN Severe Pain (7 - 10)        I&O's Summary      PHYSICAL EXAM:  Vital Signs Last 24 Hrs  T(C): 36.1 (10 Matt 2023 07:45), Max: 36.9 (2023 18:57)  T(F): 97 (10 Matt 2023 07:45), Max: 98.4 (2023 18:57)  HR: 104 (10 Matt 2023 08:00) (104 - 126)  BP: 91/67 (10 Matt 2023 08:00) (81/62 - 117/72)  BP(mean): 73 (10 Matt 2023 08:00) (64 - 82)  RR: 22 (10 Matt 2023 08:00) (20 - 28)  SpO2: 90% (10 Matt 2023 08:00) (88% - 96%)    Parameters below as of 10 Matt 2023 08:00  Patient On (Oxygen Delivery Method): nasal cannula  O2 Flow (L/min): 4          CONSTITUTIONAL: NAD,  well-groomed  HEENMT: NC/AT, PERRLA, EOMI, Moist oral mucosa, no pharyngeal injection or exudates; normal dentition  RESPIRATORY: BLAE, No adventitious Lungs Sounds, +On NC  CARDIOVASCULAR: S1/S2+, RRR, no MRG, No LE Edema  ABDOMEN: Soft, +TTP R side abdomen, hepatomegaly, non distended, BS+, No guarding  MSK:  Moves limbs with purpose and direction; no clubbing or cyanosis of digits; +TTP and Swelling to LLE  PSYCH: Anxious, cooperative  NEUROLOGY: AAOx4, CN 2-12 are intact and symmetric; no gross sensory deficits;   SKIN: Warm, Dry, No rashes; no palpable lesions    LABS:                        13.6   8.49  )-----------( 167      ( 10 Matt 2023 02:50 )             42.8     06-10    141  |  103  |  16.9  ----------------------------<  191<H>  3.5   |  30.0<H>  |  0.64    Ca    7.7<L>      10 Matt 2023 02:50    TPro  5.6<L>  /  Alb  3.0<L>  /  TBili  0.5  /  DBili  x   /  AST  6   /  ALT  7   /  AlkPhos  103  06-10    PT/INR - ( 2023 19:00 )   PT: 17.5 sec;   INR: 1.50 ratio         PTT - ( 2023 19:00 )  PTT:34.1 sec  CARDIAC MARKERS ( 2023 19:00 )  x     / 0.01 ng/mL / x     / x     / x          Urinalysis Basic - ( 10 Matt 2023 03:40 )    Color: Yellow / Appearance: Clear / S.020 / pH: x  Gluc: x / Ketone: Trace  / Bili: Negative / Urobili: Negative   Blood: x / Protein: 30 mg/dL / Nitrite: Positive   Leuk Esterase: Trace / RBC: 3-5 /HPF / WBC 6-10 /HPF   Sq Epi: x / Non Sq Epi: x / Bacteria: Few        CAPILLARY BLOOD GLUCOSE      POCT Blood Glucose.: 131 mg/dL (10 Matt 2023 08:08)        RADIOLOGY & ADDITIONAL TESTS:  Results Reviewed: Y  Imaging Personally Reviewed: N  Electrocardiogram Personally Reviewed: NETSOR

## 2023-06-10 NOTE — H&P ADULT - NSHPPHYSICALEXAM_GEN_ALL_CORE
Vital Signs Last 24 Hrs  T(C): 36.9 (09 Jun 2023 23:29), Max: 36.9 (09 Jun 2023 18:57)  T(F): 98.4 (09 Jun 2023 23:29), Max: 98.4 (09 Jun 2023 18:57)  HR: 113 (10 Matt 2023 04:38) (110 - 126)  BP: 91/54 (10 Matt 2023 04:38) (84/54 - 96/63)  BP(mean): 67 (10 Matt 2023 04:38) (64 - 74)  RR: 22 (10 Matt 2023 04:38) (20 - 28)  SpO2: 95% (10 Matt 2023 04:38) (88% - 96%)    Parameters below as of 10 Matt 2023 04:38  Patient On (Oxygen Delivery Method): nasal cannula  O2 Flow (L/min): 3

## 2023-06-10 NOTE — H&P ADULT - TIME BILLING
reviewing prior documentations, chart/lab/image review, discussion with patient and ED attending/nurse, med rec, physical exam and documentation.

## 2023-06-10 NOTE — PATIENT PROFILE ADULT - FALL HARM RISK - HARM RISK INTERVENTIONS

## 2023-06-11 LAB
ANION GAP SERPL CALC-SCNC: 9 MMOL/L — SIGNIFICANT CHANGE UP (ref 5–17)
BUN SERPL-MCNC: 19.3 MG/DL — SIGNIFICANT CHANGE UP (ref 8–20)
CALCIUM SERPL-MCNC: 8.2 MG/DL — LOW (ref 8.4–10.5)
CHLORIDE SERPL-SCNC: 102 MMOL/L — SIGNIFICANT CHANGE UP (ref 96–108)
CO2 SERPL-SCNC: 27 MMOL/L — SIGNIFICANT CHANGE UP (ref 22–29)
CREAT SERPL-MCNC: 0.69 MG/DL — SIGNIFICANT CHANGE UP (ref 0.5–1.3)
CULTURE RESULTS: NO GROWTH — SIGNIFICANT CHANGE UP
EGFR: 95 ML/MIN/1.73M2 — SIGNIFICANT CHANGE UP
GLUCOSE BLDC GLUCOMTR-MCNC: 135 MG/DL — HIGH (ref 70–99)
GLUCOSE BLDC GLUCOMTR-MCNC: 163 MG/DL — HIGH (ref 70–99)
GLUCOSE BLDC GLUCOMTR-MCNC: 171 MG/DL — HIGH (ref 70–99)
GLUCOSE SERPL-MCNC: 170 MG/DL — HIGH (ref 70–99)
HCT VFR BLD CALC: 44.7 % — SIGNIFICANT CHANGE UP (ref 39–50)
HGB BLD-MCNC: 14.1 G/DL — SIGNIFICANT CHANGE UP (ref 13–17)
MAGNESIUM SERPL-MCNC: 1.7 MG/DL — SIGNIFICANT CHANGE UP (ref 1.6–2.6)
MCHC RBC-ENTMCNC: 28.2 PG — SIGNIFICANT CHANGE UP (ref 27–34)
MCHC RBC-ENTMCNC: 31.5 GM/DL — LOW (ref 32–36)
MCV RBC AUTO: 89.4 FL — SIGNIFICANT CHANGE UP (ref 80–100)
PLATELET # BLD AUTO: 180 K/UL — SIGNIFICANT CHANGE UP (ref 150–400)
POTASSIUM SERPL-MCNC: 3.4 MMOL/L — LOW (ref 3.5–5.3)
POTASSIUM SERPL-SCNC: 3.4 MMOL/L — LOW (ref 3.5–5.3)
RBC # BLD: 5 M/UL — SIGNIFICANT CHANGE UP (ref 4.2–5.8)
RBC # FLD: 14.2 % — SIGNIFICANT CHANGE UP (ref 10.3–14.5)
SODIUM SERPL-SCNC: 138 MMOL/L — SIGNIFICANT CHANGE UP (ref 135–145)
SPECIMEN SOURCE: SIGNIFICANT CHANGE UP
WBC # BLD: 9.62 K/UL — SIGNIFICANT CHANGE UP (ref 3.8–10.5)
WBC # FLD AUTO: 9.62 K/UL — SIGNIFICANT CHANGE UP (ref 3.8–10.5)

## 2023-06-11 PROCEDURE — 93306 TTE W/DOPPLER COMPLETE: CPT | Mod: 26

## 2023-06-11 PROCEDURE — 12345: CPT | Mod: NC

## 2023-06-11 RX ORDER — MAGNESIUM SULFATE 500 MG/ML
2 VIAL (ML) INJECTION
Refills: 0 | Status: COMPLETED | OUTPATIENT
Start: 2023-06-11 | End: 2023-06-11

## 2023-06-11 RX ORDER — METOPROLOL TARTRATE 50 MG
25 TABLET ORAL EVERY 8 HOURS
Refills: 0 | Status: DISCONTINUED | OUTPATIENT
Start: 2023-06-11 | End: 2023-06-12

## 2023-06-11 RX ADMIN — Medication 25 MILLIGRAM(S): at 14:32

## 2023-06-11 RX ADMIN — APIXABAN 5 MILLIGRAM(S): 2.5 TABLET, FILM COATED ORAL at 17:47

## 2023-06-11 RX ADMIN — AMIODARONE HYDROCHLORIDE 200 MILLIGRAM(S): 400 TABLET ORAL at 17:47

## 2023-06-11 RX ADMIN — PANTOPRAZOLE SODIUM 40 MILLIGRAM(S): 20 TABLET, DELAYED RELEASE ORAL at 05:58

## 2023-06-11 RX ADMIN — Medication 0.25 MILLIGRAM(S): at 22:02

## 2023-06-11 RX ADMIN — Medication 10 MILLIGRAM(S): at 05:58

## 2023-06-11 RX ADMIN — Medication 40 MILLIGRAM(S): at 05:58

## 2023-06-11 RX ADMIN — ATORVASTATIN CALCIUM 10 MILLIGRAM(S): 80 TABLET, FILM COATED ORAL at 22:02

## 2023-06-11 RX ADMIN — Medication 25 GRAM(S): at 10:21

## 2023-06-11 RX ADMIN — OXYCODONE HYDROCHLORIDE 5 MILLIGRAM(S): 5 TABLET ORAL at 20:55

## 2023-06-11 RX ADMIN — ALBUTEROL 2 PUFF(S): 90 AEROSOL, METERED ORAL at 22:04

## 2023-06-11 RX ADMIN — Medication 0.25 MILLIGRAM(S): at 14:37

## 2023-06-11 RX ADMIN — FINASTERIDE 5 MILLIGRAM(S): 5 TABLET, FILM COATED ORAL at 10:28

## 2023-06-11 RX ADMIN — Medication 1 SPRAY(S): at 05:58

## 2023-06-11 RX ADMIN — Medication 2 MILLIGRAM(S): at 22:02

## 2023-06-11 RX ADMIN — SENNA PLUS 2 TABLET(S): 8.6 TABLET ORAL at 22:02

## 2023-06-11 RX ADMIN — APIXABAN 5 MILLIGRAM(S): 2.5 TABLET, FILM COATED ORAL at 05:59

## 2023-06-11 RX ADMIN — Medication 1: at 17:44

## 2023-06-11 RX ADMIN — Medication 20 MILLIEQUIVALENT(S): at 10:21

## 2023-06-11 RX ADMIN — Medication 25 GRAM(S): at 14:32

## 2023-06-11 RX ADMIN — POLYETHYLENE GLYCOL 3350 17 GRAM(S): 17 POWDER, FOR SOLUTION ORAL at 10:22

## 2023-06-11 RX ADMIN — Medication 25 MILLIGRAM(S): at 22:02

## 2023-06-11 RX ADMIN — OXYCODONE HYDROCHLORIDE 5 MILLIGRAM(S): 5 TABLET ORAL at 19:55

## 2023-06-11 RX ADMIN — AMIODARONE HYDROCHLORIDE 200 MILLIGRAM(S): 400 TABLET ORAL at 05:59

## 2023-06-11 RX ADMIN — ERTAPENEM SODIUM 120 MILLIGRAM(S): 1 INJECTION, POWDER, LYOPHILIZED, FOR SOLUTION INTRAMUSCULAR; INTRAVENOUS at 06:02

## 2023-06-11 RX ADMIN — Medication 81 MILLIGRAM(S): at 10:21

## 2023-06-11 RX ADMIN — Medication 25 MILLIGRAM(S): at 06:40

## 2023-06-11 NOTE — PHARMACOTHERAPY INTERVENTION NOTE - COMMENTS
Modified penicillin and "Ceftin" allergy to state patient tolerate meropenem during this admission.    Sohail Jarquin PharmD  Clinical Pharmacy Specialist, Infectious Diseases  Tele-Antimicrobial Stewardship Program (Tele-ASP)  Tele-ASP Phone: [phone]

## 2023-06-11 NOTE — PROGRESS NOTE ADULT - SUBJECTIVE AND OBJECTIVE BOX
Hospitalist Progress Note    Chief Complaint:  Abdominal pain    SUBJECTIVE / OVERNIGHT EVENTS:  No events overnight, patient complaining of constipation and R side abdominal pain likely 2/2 biliary colic from sludge. Patient denies chest pain, N/V, fever, chills, dysuria or any other complaints. All remainder ROS negative.     MEDICATIONS  (STANDING):  albuterol    90 MICROgram(s) HFA Inhaler 2 Puff(s) Inhalation every 6 hours  aMIOdarone    Tablet 200 milliGRAM(s) Oral two times a day  apixaban 5 milliGRAM(s) Oral every 12 hours  aspirin  chewable 81 milliGRAM(s) Oral daily  atorvastatin 10 milliGRAM(s) Oral at bedtime  dextrose 5%. 1000 milliLiter(s) (50 mL/Hr) IV Continuous <Continuous>  dextrose 5%. 1000 milliLiter(s) (100 mL/Hr) IV Continuous <Continuous>  dextrose 50% Injectable 25 Gram(s) IV Push once  dextrose 50% Injectable 25 Gram(s) IV Push once  dextrose 50% Injectable 12.5 Gram(s) IV Push once  doxazosin 2 milliGRAM(s) Oral at bedtime  ertapenem  IVPB 1000 milliGRAM(s) IV Intermittent every 24 hours  finasteride 5 milliGRAM(s) Oral daily  fluticasone propionate 50 MICROgram(s)/spray Nasal Spray 1 Spray(s) Both Nostrils two times a day  furosemide   Injectable 40 milliGRAM(s) IV Push daily  glucagon  Injectable 1 milliGRAM(s) IntraMuscular once  insulin lispro (ADMELOG) corrective regimen sliding scale   SubCutaneous three times a day before meals  insulin lispro (ADMELOG) corrective regimen sliding scale   SubCutaneous at bedtime  lactated ringers. 1000 milliLiter(s) (100 mL/Hr) IV Continuous <Continuous>  magnesium sulfate  IVPB 2 Gram(s) IV Intermittent every 2 hours  metoprolol tartrate 25 milliGRAM(s) Oral every 8 hours  pantoprazole    Tablet 40 milliGRAM(s) Oral before breakfast  polyethylene glycol 3350 17 Gram(s) Oral daily  potassium chloride   Powder 20 milliEquivalent(s) Oral daily  senna 2 Tablet(s) Oral at bedtime    MEDICATIONS  (PRN):  acetaminophen     Tablet .. 650 milliGRAM(s) Oral every 6 hours PRN Temp greater or equal to 38C (100.4F), Mild Pain (1 - 3)  ALPRAZolam 0.25 milliGRAM(s) Oral three times a day PRN Anxiety  aluminum hydroxide/magnesium hydroxide/simethicone Suspension 30 milliLiter(s) Oral every 4 hours PRN Dyspepsia  bisacodyl 5 milliGRAM(s) Oral every 12 hours PRN Constipation  dextrose Oral Gel 15 Gram(s) Oral once PRN Blood Glucose LESS THAN 70 milliGRAM(s)/deciliter  diphenhydrAMINE Injectable 50 milliGRAM(s) IV Push once PRN Allergic Reaction  meclizine 12.5 milliGRAM(s) Oral three times a day PRN Dizziness  melatonin 3 milliGRAM(s) Oral at bedtime PRN Insomnia  ondansetron Injectable 4 milliGRAM(s) IV Push every 8 hours PRN Nausea and/or Vomiting  oxyCODONE    IR 5 milliGRAM(s) Oral every 4 hours PRN Severe Pain (7 - 10)        I&O's Summary    10 Matt 2023 07:01  -  2023 07:00  --------------------------------------------------------  IN: 1175 mL / OUT: 800 mL / NET: 375 mL        PHYSICAL EXAM:  Vital Signs Last 24 Hrs  T(C): 36.4 (2023 08:00), Max: 37 (2023 00:00)  T(F): 97.5 (2023 08:00), Max: 98.6 (2023 00:00)  HR: 107 (2023 08:00) (104 - 125)  BP: 102/71 (2023 08:00) (94/69 - 131/100)  BP(mean): 77 (2023 08:00) (68 - 106)  RR: 16 (2023 08:00) (16 - 25)  SpO2: 92% (2023 08:00) (91% - 97%)    Parameters below as of 2023 08:00  Patient On (Oxygen Delivery Method): nasal cannula  O2 Flow (L/min): 4      CONSTITUTIONAL: NAD,  well-groomed  HEENMT: NC/AT, PERRLA, EOMI, Moist oral mucosa, no pharyngeal injection or exudates; normal dentition  RESPIRATORY: BLAE, No adventitious Lungs Sounds, +On NC  CARDIOVASCULAR: S1/S2+, RRR, no MRG, No LE Edema  ABDOMEN: Soft, +TTP R side abdomen, hepatomegaly, non distended, BS+, No guarding  MSK:  Moves limbs with purpose and direction; no clubbing or cyanosis of digits; +TTP and Swelling to LLE  PSYCH: Anxious, cooperative  NEUROLOGY: AAOx4, CN 2-12 are intact and symmetric; no gross sensory deficits;   SKIN: Warm, Dry, No rashes; no palpable lesions    LABS:                        14.1   9.62  )-----------( 180      ( 2023 07:30 )             44.7     06-11    138  |  102  |  19.3  ----------------------------<  170<H>  3.4<L>   |  27.0  |  0.69    Ca    8.2<L>      2023 07:30  Mg     1.7     11    TPro  5.6<L>  /  Alb  3.0<L>  /  TBili  0.5  /  DBili  x   /  AST  6   /  ALT  7   /  AlkPhos  103  06-10    PT/INR - ( 2023 19:00 )   PT: 17.5 sec;   INR: 1.50 ratio         PTT - ( 2023 19:00 )  PTT:34.1 sec  CARDIAC MARKERS ( 2023 19:00 )  x     / 0.01 ng/mL / x     / x     / x          Urinalysis Basic - ( 10 Matt 2023 03:40 )    Color: Yellow / Appearance: Clear / S.020 / pH: x  Gluc: x / Ketone: Trace  / Bili: Negative / Urobili: Negative   Blood: x / Protein: 30 mg/dL / Nitrite: Positive   Leuk Esterase: Trace / RBC: 3-5 /HPF / WBC 6-10 /HPF   Sq Epi: x / Non Sq Epi: x / Bacteria: Few        Culture - Urine (collected 10 Matt 2023 03:40)  Source: Clean Catch Clean Catch (Midstream)  Final Report (2023 07:15):    No growth    Culture - Blood (collected 2023 19:00)  Source: .Blood Blood-Peripheral  Preliminary Report (2023 01:02):    No growth to date.    Culture - Blood (collected 2023 18:55)  Source: .Blood Blood-Peripheral  Preliminary Report (2023 01:02):    No growth to date.      CAPILLARY BLOOD GLUCOSE      POCT Blood Glucose.: 135 mg/dL (2023 08:32)  POCT Blood Glucose.: 213 mg/dL (10 Matt 2023 21:40)  POCT Blood Glucose.: 182 mg/dL (10 Matt 2023 18:01)  POCT Blood Glucose.: 136 mg/dL (10 Matt 2023 11:41)        RADIOLOGY & ADDITIONAL TESTS:  Results Reviewed: Y  Imaging Personally Reviewed: N  Electrocardiogram Personally Reviewed: NESTOR

## 2023-06-12 LAB
ALBUMIN SERPL ELPH-MCNC: 2.5 G/DL — LOW (ref 3.3–5.2)
ALP SERPL-CCNC: 142 U/L — HIGH (ref 40–120)
ALT FLD-CCNC: 7 U/L — SIGNIFICANT CHANGE UP
ANION GAP SERPL CALC-SCNC: 10 MMOL/L — SIGNIFICANT CHANGE UP (ref 5–17)
AST SERPL-CCNC: 8 U/L — SIGNIFICANT CHANGE UP
BILIRUB SERPL-MCNC: 0.3 MG/DL — LOW (ref 0.4–2)
BUN SERPL-MCNC: 26.2 MG/DL — HIGH (ref 8–20)
CALCIUM SERPL-MCNC: 8.4 MG/DL — SIGNIFICANT CHANGE UP (ref 8.4–10.5)
CHLORIDE SERPL-SCNC: 99 MMOL/L — SIGNIFICANT CHANGE UP (ref 96–108)
CO2 SERPL-SCNC: 28 MMOL/L — SIGNIFICANT CHANGE UP (ref 22–29)
CREAT SERPL-MCNC: 0.67 MG/DL — SIGNIFICANT CHANGE UP (ref 0.5–1.3)
EGFR: 96 ML/MIN/1.73M2 — SIGNIFICANT CHANGE UP
GLUCOSE BLDC GLUCOMTR-MCNC: 139 MG/DL — HIGH (ref 70–99)
GLUCOSE BLDC GLUCOMTR-MCNC: 171 MG/DL — HIGH (ref 70–99)
GLUCOSE BLDC GLUCOMTR-MCNC: 201 MG/DL — HIGH (ref 70–99)
GLUCOSE BLDC GLUCOMTR-MCNC: 249 MG/DL — HIGH (ref 70–99)
GLUCOSE SERPL-MCNC: 221 MG/DL — HIGH (ref 70–99)
HAV IGM SER-ACNC: SIGNIFICANT CHANGE UP
HBV CORE IGM SER-ACNC: SIGNIFICANT CHANGE UP
HBV SURFACE AG SER-ACNC: SIGNIFICANT CHANGE UP
HCT VFR BLD CALC: 43.9 % — SIGNIFICANT CHANGE UP (ref 39–50)
HCV AB S/CO SERPL IA: 0.05 S/CO — SIGNIFICANT CHANGE UP (ref 0–0.99)
HCV AB SERPL-IMP: SIGNIFICANT CHANGE UP
HGB BLD-MCNC: 13.8 G/DL — SIGNIFICANT CHANGE UP (ref 13–17)
MAGNESIUM SERPL-MCNC: 1.9 MG/DL — SIGNIFICANT CHANGE UP (ref 1.8–2.6)
MCHC RBC-ENTMCNC: 28.2 PG — SIGNIFICANT CHANGE UP (ref 27–34)
MCHC RBC-ENTMCNC: 31.4 GM/DL — LOW (ref 32–36)
MCV RBC AUTO: 89.8 FL — SIGNIFICANT CHANGE UP (ref 80–100)
NT-PROBNP SERPL-SCNC: 1636 PG/ML — HIGH (ref 0–300)
PLATELET # BLD AUTO: 191 K/UL — SIGNIFICANT CHANGE UP (ref 150–400)
POTASSIUM SERPL-MCNC: 4.1 MMOL/L — SIGNIFICANT CHANGE UP (ref 3.5–5.3)
POTASSIUM SERPL-SCNC: 4.1 MMOL/L — SIGNIFICANT CHANGE UP (ref 3.5–5.3)
PROT SERPL-MCNC: 5.5 G/DL — LOW (ref 6.6–8.7)
RBC # BLD: 4.89 M/UL — SIGNIFICANT CHANGE UP (ref 4.2–5.8)
RBC # FLD: 14.3 % — SIGNIFICANT CHANGE UP (ref 10.3–14.5)
SODIUM SERPL-SCNC: 137 MMOL/L — SIGNIFICANT CHANGE UP (ref 135–145)
TSH SERPL-MCNC: 2.23 UIU/ML — SIGNIFICANT CHANGE UP (ref 0.27–4.2)
WBC # BLD: 7.77 K/UL — SIGNIFICANT CHANGE UP (ref 3.8–10.5)
WBC # FLD AUTO: 7.77 K/UL — SIGNIFICANT CHANGE UP (ref 3.8–10.5)

## 2023-06-12 PROCEDURE — 99223 1ST HOSP IP/OBS HIGH 75: CPT

## 2023-06-12 PROCEDURE — 93010 ELECTROCARDIOGRAM REPORT: CPT

## 2023-06-12 PROCEDURE — 99232 SBSQ HOSP IP/OBS MODERATE 35: CPT

## 2023-06-12 PROCEDURE — 99223 1ST HOSP IP/OBS HIGH 75: CPT | Mod: FS

## 2023-06-12 RX ORDER — MULTIVIT WITH MIN/MFOLATE/K2 340-15/3 G
296 POWDER (GRAM) ORAL ONCE
Refills: 0 | Status: DISCONTINUED | OUTPATIENT
Start: 2023-06-12 | End: 2023-06-12

## 2023-06-12 RX ORDER — METOPROLOL TARTRATE 50 MG
25 TABLET ORAL
Refills: 0 | Status: DISCONTINUED | OUTPATIENT
Start: 2023-06-12 | End: 2023-06-13

## 2023-06-12 RX ORDER — LACTULOSE 10 G/15ML
10 SOLUTION ORAL THREE TIMES A DAY
Refills: 0 | Status: DISCONTINUED | OUTPATIENT
Start: 2023-06-12 | End: 2023-06-13

## 2023-06-12 RX ORDER — ENOXAPARIN SODIUM 100 MG/ML
135 INJECTION SUBCUTANEOUS EVERY 12 HOURS
Refills: 0 | Status: DISCONTINUED | OUTPATIENT
Start: 2023-06-12 | End: 2023-06-17

## 2023-06-12 RX ORDER — CHLORHEXIDINE GLUCONATE 213 G/1000ML
1 SOLUTION TOPICAL
Refills: 0 | Status: DISCONTINUED | OUTPATIENT
Start: 2023-06-12 | End: 2023-06-23

## 2023-06-12 RX ORDER — KETOROLAC TROMETHAMINE 30 MG/ML
30 SYRINGE (ML) INJECTION ONCE
Refills: 0 | Status: DISCONTINUED | OUTPATIENT
Start: 2023-06-12 | End: 2023-06-12

## 2023-06-12 RX ORDER — AMIODARONE HYDROCHLORIDE 400 MG/1
200 TABLET ORAL DAILY
Refills: 0 | Status: DISCONTINUED | OUTPATIENT
Start: 2023-06-12 | End: 2023-06-12

## 2023-06-12 RX ORDER — METOPROLOL TARTRATE 50 MG
25 TABLET ORAL ONCE
Refills: 0 | Status: COMPLETED | OUTPATIENT
Start: 2023-06-12 | End: 2023-06-12

## 2023-06-12 RX ORDER — TRAMADOL HYDROCHLORIDE 50 MG/1
25 TABLET ORAL ONCE
Refills: 0 | Status: DISCONTINUED | OUTPATIENT
Start: 2023-06-12 | End: 2023-06-12

## 2023-06-12 RX ADMIN — ALBUTEROL 2 PUFF(S): 90 AEROSOL, METERED ORAL at 09:37

## 2023-06-12 RX ADMIN — Medication 0.25 MILLIGRAM(S): at 14:28

## 2023-06-12 RX ADMIN — Medication 1 ENEMA: at 12:59

## 2023-06-12 RX ADMIN — Medication 25 MILLIGRAM(S): at 14:26

## 2023-06-12 RX ADMIN — Medication 1 SPRAY(S): at 17:42

## 2023-06-12 RX ADMIN — ALBUTEROL 2 PUFF(S): 90 AEROSOL, METERED ORAL at 15:05

## 2023-06-12 RX ADMIN — Medication 2: at 12:13

## 2023-06-12 RX ADMIN — ENOXAPARIN SODIUM 135 MILLIGRAM(S): 100 INJECTION SUBCUTANEOUS at 17:43

## 2023-06-12 RX ADMIN — POLYETHYLENE GLYCOL 3350 17 GRAM(S): 17 POWDER, FOR SOLUTION ORAL at 09:09

## 2023-06-12 RX ADMIN — Medication 1: at 17:42

## 2023-06-12 RX ADMIN — Medication 1 SPRAY(S): at 06:03

## 2023-06-12 RX ADMIN — TRAMADOL HYDROCHLORIDE 25 MILLIGRAM(S): 50 TABLET ORAL at 22:11

## 2023-06-12 RX ADMIN — Medication 30 MILLIGRAM(S): at 01:15

## 2023-06-12 RX ADMIN — Medication 20 MILLIEQUIVALENT(S): at 09:10

## 2023-06-12 RX ADMIN — ERTAPENEM SODIUM 120 MILLIGRAM(S): 1 INJECTION, POWDER, LYOPHILIZED, FOR SOLUTION INTRAMUSCULAR; INTRAVENOUS at 06:52

## 2023-06-12 RX ADMIN — LACTULOSE 10 GRAM(S): 10 SOLUTION ORAL at 21:05

## 2023-06-12 RX ADMIN — Medication 2 MILLIGRAM(S): at 21:05

## 2023-06-12 RX ADMIN — ALBUTEROL 2 PUFF(S): 90 AEROSOL, METERED ORAL at 20:57

## 2023-06-12 RX ADMIN — Medication 81 MILLIGRAM(S): at 09:09

## 2023-06-12 RX ADMIN — APIXABAN 5 MILLIGRAM(S): 2.5 TABLET, FILM COATED ORAL at 06:02

## 2023-06-12 RX ADMIN — PANTOPRAZOLE SODIUM 40 MILLIGRAM(S): 20 TABLET, DELAYED RELEASE ORAL at 06:02

## 2023-06-12 RX ADMIN — Medication 3 MILLIGRAM(S): at 21:04

## 2023-06-12 RX ADMIN — Medication 30 MILLIGRAM(S): at 00:47

## 2023-06-12 RX ADMIN — Medication 25 MILLIGRAM(S): at 17:43

## 2023-06-12 RX ADMIN — AMIODARONE HYDROCHLORIDE 200 MILLIGRAM(S): 400 TABLET ORAL at 06:02

## 2023-06-12 RX ADMIN — LACTULOSE 10 GRAM(S): 10 SOLUTION ORAL at 12:13

## 2023-06-12 RX ADMIN — TRAMADOL HYDROCHLORIDE 25 MILLIGRAM(S): 50 TABLET ORAL at 21:11

## 2023-06-12 RX ADMIN — Medication 0.25 MILLIGRAM(S): at 21:58

## 2023-06-12 RX ADMIN — FINASTERIDE 5 MILLIGRAM(S): 5 TABLET, FILM COATED ORAL at 09:09

## 2023-06-12 NOTE — CONSULT NOTE ADULT - SUBJECTIVE AND OBJECTIVE BOX
ANIA CRISTOBAL  [id number]      HPI:  78 y/o male PMHx AF on Eliquis (s/p Cardioversion on 1/31/23), moderate-severe AS, Chronic Diastolic Heart Failure, CAD s/p PCI, HTN, HLD, DM 2, Morbid Obesity, ALLIE, Thoracic Aortic Aneurysm, BPH s/p TURP, Left Renal Mass was sent to the ED from [facility] for abdominal pain x 4 days. Patient reported pain on the right side of the abdomen (upper and lower), 10/10, non-radiating.  Patient found to have UTI.  Echo performed noted significant drop in EF.  Patient reports ongoing issues with abd pain, no other specific c/o now.  Denies CP, SOB, palps, orthopnea, syncope.      ALLERGIES:  penicillin (Anaphylaxis)  Ceftin (Hives)  sulfa drugs (Unknown)  Bactrim (Rash)      PAST MEDICAL & SURGICAL HISTORY:  Anxiety  Gastroesophageal reflux disease  Gallbladder stone without cholecystitis or obstruction  DVT (deep venous thrombosis)  Malignant schwannoma  H/O arthroscopy of knee  S/P laminectomy  Otherwise, as noted above      MEDICATIONS (HOME):  Home Medications:  acetaminophen 325 mg oral tablet: 2 tab(s) orally every 6 hours As needed Temp greater or equal to 38C (100.4F), Mild Pain (1 - 3), Moderate Pain (4 - 6) (12 Jun 2023 09:50)  ALPRAZolam 0.25 mg oral tablet: 1 tab(s) orally 3 times a day As needed Anxiety (12 Jun 2023 09:50)  amiodarone 200 mg oral tablet: 1 tab(s) orally once a day (12 Jun 2023 09:50)  apixaban 5 mg oral tablet: 1 tab(s) orally every 12 hours (12 Jun 2023 09:50)  aspirin 81 mg oral tablet, chewable: 1 tab(s) orally once a day (12 Jun 2023 09:50)  atorvastatin 10 mg oral tablet: 1 tab(s) orally once a day (at bedtime) (12 Jun 2023 09:50)  cloNIDine 0.2 mg/24 hr transdermal film, extended release: 1 patch transdermal every 7 days (12 Jun 2023 09:50)  doxazosin 2 mg oral tablet: 1 tab(s) orally once a day (at bedtime) (12 Jun 2023 09:50)  finasteride 5 mg oral tablet: 1 tab(s) orally once a day (12 Jun 2023 09:50)  fluticasone 50 mcg/inh nasal spray: 1 spray(s) nasal 2 times a day (12 Jun 2023 09:50)  furosemide 40 mg oral tablet: 1 tab(s) orally once a day (10 Matt 2023 20:49)  ipratropium-albuterol 0.5 mg-2.5 mg/3 mL inhalation solution: 3 milliliter(s) inhaled every 6 hours (12 Jun 2023 09:50)  lidocaine 4% topical film: Apply topically to affected area once a day (12 Jun 2023 09:50)  meclizine 12.5 mg oral tablet: 1 tab(s) orally 3 times a day As needed Dizziness (12 Jun 2023 09:50)  metFORMIN 500 mg oral tablet: 1 orally 3 times a day (10 Matt 2023 05:31)  metoprolol tartrate 25 mg oral tablet: 1 tab(s) orally 2 times a day (10 Matt 2023 20:49)  oxyCODONE 5 mg oral tablet: 1 tab(s) orally every 4 hours, As Needed (22 Mar 2023 11:18)  pantoprazole 40 mg oral delayed release tablet: 1 tab(s) orally once a day (06 Apr 2023 11:37)  polyethylene glycol 3350 oral powder for reconstitution: 17 gram(s) orally once a day (06 Apr 2023 11:37)  potassium chloride 20 mEq oral tablet, extended release: 1 tab(s) orally (10 Matt 2023 20:51)  QUEtiapine 25 mg oral tablet: 1 tab(s) orally once a day (at bedtime) Taper to off after two days (06 Apr 2023 11:37)  senna leaf extract oral tablet: 2 tab(s) orally once a day (at bedtime) (06 Apr 2023 11:37)  sertraline 50 mg oral tablet: 1 tab(s) orally once a day (10 Matt 2023 20:49)      SOCIAL HISTORY:  Patient denies alcohol, tobacco, drug use    FAMILY HISTORY:  FH: heart disease (Father)    ROS:  Patient denies cough, and other than noted above full ROS is unremarkable      PHYSICAL EXAM:  Vital Signs Last 24 Hrs  T(C): 36.4 (12 Jun 2023 08:36), Max: 36.8 (11 Jun 2023 16:02)  T(F): 97.5 (12 Jun 2023 08:36), Max: 98.2 (11 Jun 2023 16:02)  HR: 91 (12 Jun 2023 08:36) (86 - 109)  BP: 94/68 (12 Jun 2023 08:36) (89/61 - 118/69)  BP(mean): 77 (12 Jun 2023 08:36) (77 - 77)  RR: 21 (12 Jun 2023 08:36) (17 - 21)  SpO2: 97% (12 Jun 2023 08:36) (94% - 97%)  General: Patient comfortable in NAD  HEENT: NCAT, mmm, EOMI  Neck: ?JVD, no carotid bruits  CVS: nl s1, split s2, HS distant  Chest: Wheezing b/l diffusely with some crackles at bases b/l  Abdomen: soft, nt/nd  Extremities: 1+ b/l pretibial edema  Neuro: A&O x3  Psych: Normal affect      ECG:  SR with 1st degree AVB with RBBB/LAFB.  No ST-T wave changes.  PRWP.      LABS:                        13.8   7.77  )-----------( 191      ( 12 Jun 2023 10:42 )             43.9     06-12    137  |  99  |  26.2<H>  ----------------------------<  221<H>  4.1   |  28.0  |  0.67    Ca    8.4      12 Jun 2023 10:42  Mg     1.9     06-12    TPro  5.5<L>  /  Alb  2.5<L>  /  TBili  0.3<L>  /  DBili  x   /  AST  8   /  ALT  7   /  AlkPhos  142<H>  06-12              RADIOLOGY:  CT A/P:  No pulmonary embolism though evaluation of subsegmental branches limited   secondary to respiratory motion artifact.    Trace bilateral pleural effusions with adjacent patchy bibasilar   opacities, likely represent dependent atelectasis. Recommend clinical   correlation to assess for superimposed infection.    Hepatomegaly. Liver surface nodularity. Hepatitis or cirrhosis considered   in differential. Correlate with clinical parameters.    Trace ascites.    Mild bladder wall thickening with suggestion of left posterolateral   diverticulum. Correlate with urinalysis andlab values to assess for   cystitis and/or ascending urinary tract infection.    Additional findings as mentioned above.      Echo (3/33112):   1. Left ventricular ejection fraction, by visual estimation, is 60 to 65%.   2. Normal right ventricular size and function.   3. There is no evidence of pericardial effusion.   4. Endocardial visualizationwas enhanced with intravenous echo contrast.    Echo personally reviewed:   1. Left ventricularejection fraction, by visual estimation, is 35-40%.   2. Moderately decreased global left ventricular systolic   function.   3. There is mild concentric left ventricular hypertrophy.   4. Mildly enlarged right ventricle.   5. Moderately reduced RV systolic function.   6. Normal left atrial size.   7. Normal right atrial size.   8. There is no evidence of pericardial effusion.   9. Peak Gradient across the Aortic Valve is 35 mm Hg. Mean gradient is 26 mm Hg. DI 0.25.  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        Assessment:  78 y/o male PMHx AF on Eliquis (s/p Cardioversion on 1/31/23), moderate-severe AS, Chronic Diastolic Heart Failure, CAD s/p PCI, HTN, HLD, DM 2, Morbid Obesity, ALLIE, Thoracic Aortic Aneurysm, BPH s/p TURP, Left Renal Mass was sent to the ED from [facility] for abdominal pain x 4 days. Patient found to have UTI.  Echo performed noted significant drop in EF.  CT with hepatomegaly appearing ?significantly larger than CT from March.  ?2/2 CPC but would consider other causes such as hepatitis.  Appears somewhat fluid overloaded but not in severe acute CHF.  WOuld diurese within limits of BP.  Also now back in AF, rate controlled.  ?Contributing to new drop in EF.  ?2/2 stress myopathy.  Cath in January with no obstructive dz, doubt CAD as cause.  Also with significant AS, ?severe LFLG potentially contributing.  Tele with likely AF but appears regular, ?junctional.  P waves difficult to see.  Has been on higher dose Amio and would d/c for now.    Plan:  1. Tele  2. Lasix 40mg IV QD as tolerated.  3. Agree with lowering BB to allow for diuresis, monitor HR.  4. Add Entresto when BP tolerates after diuresed.  5. D/c Amio.  Also d/c statin with hepatomegaly, ?cause.  6. GI eval.  D/w radiology regarding size of liver on last study for comparison.  7. Lovenox for CVA PPX for now.  8. Continue other current CV meds at current doses.    D/w med NP Lori.  Will follow.  Thanks!

## 2023-06-12 NOTE — PROGRESS NOTE ADULT - SUBJECTIVE AND OBJECTIVE BOX
ANIA CRISTOBAL    [id number]    77y      Male    INTERVAL HPI/OVERNIGHT EVENTS: patient being seen for chf. patient complains of constipation. patient is hard of hearing      REVIEW OF SYSTEMS:    CONSTITUTIONAL: No fever, weight loss, or fatigue  RESPIRATORY: No cough, wheezing, hemoptysis; No shortness of breath  CARDIOVASCULAR: No chest pain, palpitations  GASTROINTESTINAL: constipation   NEUROLOGICAL: No headaches, memory loss, loss of strength.  MISCELLANEOUS:      Vital Signs Last 24 Hrs  T(C): 36.4 (12 Jun 2023 08:36), Max: 36.8 (11 Jun 2023 16:02)  T(F): 97.5 (12 Jun 2023 08:36), Max: 98.2 (11 Jun 2023 16:02)  HR: 91 (12 Jun 2023 08:36) (86 - 109)  BP: 94/68 (12 Jun 2023 08:36) (89/61 - 118/69)  BP(mean): 77 (12 Jun 2023 08:36) (77 - 77)  RR: 21 (12 Jun 2023 08:36) (17 - 21)  SpO2: 97% (12 Jun 2023 08:36) (94% - 97%)    Parameters below as of 12 Jun 2023 09:40  Patient On (Oxygen Delivery Method): nasal cannula        PHYSICAL EXAM:    CONSTITUTIONAL: NAD,  obese, [place]  HEENMT: NC/AT, PERRLA, EOMI, Moist oral mucosa, no pharyngeal injection or exudates;  RESPIRATORY:  diminsihjed  CARDIOVASCULAR: S1/S2+, RRR, no MRG, trace edema  ABDOMEN: Soft, obese, non distended, BS+, No guardingSwelling to LLE  PSYCH: Anxious, cooperative  NEUROLOGY: AAOx4, CN 2-12 are intact and symmetric; no gross sensory deficits;   SKIN: Warm, Dry, No rashes; no palpable lesions      LABS:                        13.8   7.77  )-----------( 191      ( 12 Jun 2023 10:42 )             43.9     06-12    137  |  99  |  26.2<H>  ----------------------------<  221<H>  4.1   |  28.0  |  0.67    Ca    8.4      12 Jun 2023 10:42  Mg     1.9     06-12    TPro  5.5<L>  /  Alb  2.5<L>  /  TBili  0.3<L>  /  DBili  x   /  AST  8   /  ALT  7   /  AlkPhos  142<H>  06-12        MEDICATIONS  (STANDING):  albuterol    90 MICROgram(s) HFA Inhaler 2 Puff(s) Inhalation every 6 hours  aMIOdarone    Tablet 200 milliGRAM(s) Oral daily  aspirin  chewable 81 milliGRAM(s) Oral daily  chlorhexidine 2% Cloths 1 Application(s) Topical <User Schedule>  dextrose 5%. 1000 milliLiter(s) (50 mL/Hr) IV Continuous <Continuous>  dextrose 5%. 1000 milliLiter(s) (100 mL/Hr) IV Continuous <Continuous>  dextrose 50% Injectable 25 Gram(s) IV Push once  dextrose 50% Injectable 25 Gram(s) IV Push once  dextrose 50% Injectable 12.5 Gram(s) IV Push once  doxazosin 2 milliGRAM(s) Oral at bedtime  enoxaparin Injectable 135 milliGRAM(s) SubCutaneous every 12 hours  ertapenem  IVPB 1000 milliGRAM(s) IV Intermittent every 24 hours  finasteride 5 milliGRAM(s) Oral daily  fluticasone propionate 50 MICROgram(s)/spray Nasal Spray 1 Spray(s) Both Nostrils two times a day  furosemide   Injectable 40 milliGRAM(s) IV Push daily  glucagon  Injectable 1 milliGRAM(s) IntraMuscular once  insulin lispro (ADMELOG) corrective regimen sliding scale   SubCutaneous at bedtime  insulin lispro (ADMELOG) corrective regimen sliding scale   SubCutaneous three times a day before meals  metoprolol tartrate 25 milliGRAM(s) Oral two times a day  pantoprazole    Tablet 40 milliGRAM(s) Oral before breakfast  polyethylene glycol 3350 17 Gram(s) Oral daily  potassium chloride   Powder 20 milliEquivalent(s) Oral daily  saline laxative (FLEET) Rectal Enema 1 Enema Rectal once  senna 2 Tablet(s) Oral at bedtime    MEDICATIONS  (PRN):  ALPRAZolam 0.25 milliGRAM(s) Oral three times a day PRN Anxiety  aluminum hydroxide/magnesium hydroxide/simethicone Suspension 30 milliLiter(s) Oral every 4 hours PRN Dyspepsia  bisacodyl 5 milliGRAM(s) Oral every 12 hours PRN Constipation  dextrose Oral Gel 15 Gram(s) Oral once PRN Blood Glucose LESS THAN 70 milliGRAM(s)/deciliter  diphenhydrAMINE Injectable 50 milliGRAM(s) IV Push once PRN Allergic Reaction  meclizine 12.5 milliGRAM(s) Oral three times a day PRN Dizziness  melatonin 3 milliGRAM(s) Oral at bedtime PRN Insomnia  ondansetron Injectable 4 milliGRAM(s) IV Push every 8 hours PRN Nausea and/or Vomiting  oxyCODONE    IR 5 milliGRAM(s) Oral every 4 hours PRN Severe Pain (7 - 10)      RADIOLOGY & ADDITIONAL TESTS:      tte -   Summary:   1. Left ventricular ejection fraction, by visual estimation, is 25 to   30%.   2. Moderately to severely decreased global left ventricular systolic   function.   3. There is mild concentric left ventricular hypertrophy.   4. Mildly enlarged right ventricle.   5. Moderately reduced RV systolic function.

## 2023-06-12 NOTE — CONSULT NOTE ADULT - SUBJECTIVE AND OBJECTIVE BOX
Patient is a 77y old  Male who presents with a chief complaint of chf (12 Jun 2023 11:36)      HPI:  78 y/o male with PMH of afib on Eliquis (s/p Cardioversion on 1/31/23), Moderate AS, Chronic Diastolic Heart Failure, CAD s/p PCI, HTN, HLD, DM 2, Morbid Obesity, ALLIE, Thoracic Aortic Aneurysm, BPH s/p TURP, Left Renal Mass was sent to the ED from [facility] for abdominal pain x 4 days. Patient reported pain on the right side of the abdomen (upper and lower), 10/10, non-radiating. Patient reported feeling congested, noted cough but not productive. Patient has no trauma, nausea, vomiting, chest pain, shortness of breath, fever, chills, palpitation, change in bowel/urinary habit, trauma to the abdomen.   GI consulted for CT of A/P findings of Hepatomegaly. Liver surface nodularity. Hepatitis or cirrhosis considered in differential. Correlate with clinical parameters. Patient seen and evaluated at bedside, reporting no complaints. Denies any liver disease in the past. Denies Heavy ETOH use or smoking. Reports previews EGD/colonoscopy in the past with EGD showing antral ulcer which he has been on Nexium on and polypectomy. Liver enzymes normal. At this time, Denies nausea, vomiting, abdominal pain, fever, chills, chest pain, shortness of breath.         PAST MEDICAL & SURGICAL HISTORY:  Anxiety      Hypertension      Hyperlipidemia      Prostate disease      Gastroesophageal reflux disease      Gallbladder stone without cholecystitis or obstruction      BPH (benign prostatic hyperplasia)      DM (diabetes mellitus)      DVT (deep venous thrombosis)  left leg      Afib      Anxiety      HLD (hyperlipidemia)      Malignant schwannoma      H/O arthroscopy of knee      S/P laminectomy      S/P coronary artery stent placement  x2          Allergies    penicillin (Anaphylaxis)  Ceftin (Hives)  sulfa drugs (Unknown)  Bactrim (Rash)    Intolerances        MEDICATIONS  (STANDING):  albuterol    90 MICROgram(s) HFA Inhaler 2 Puff(s) Inhalation every 6 hours  aspirin  chewable 81 milliGRAM(s) Oral daily  chlorhexidine 2% Cloths 1 Application(s) Topical <User Schedule>  dextrose 5%. 1000 milliLiter(s) (50 mL/Hr) IV Continuous <Continuous>  dextrose 5%. 1000 milliLiter(s) (100 mL/Hr) IV Continuous <Continuous>  dextrose 50% Injectable 25 Gram(s) IV Push once  dextrose 50% Injectable 25 Gram(s) IV Push once  dextrose 50% Injectable 12.5 Gram(s) IV Push once  doxazosin 2 milliGRAM(s) Oral at bedtime  enoxaparin Injectable 135 milliGRAM(s) SubCutaneous every 12 hours  ertapenem  IVPB 1000 milliGRAM(s) IV Intermittent every 24 hours  finasteride 5 milliGRAM(s) Oral daily  fluticasone propionate 50 MICROgram(s)/spray Nasal Spray 1 Spray(s) Both Nostrils two times a day  furosemide   Injectable 40 milliGRAM(s) IV Push daily  glucagon  Injectable 1 milliGRAM(s) IntraMuscular once  insulin lispro (ADMELOG) corrective regimen sliding scale   SubCutaneous three times a day before meals  insulin lispro (ADMELOG) corrective regimen sliding scale   SubCutaneous at bedtime  lactulose Syrup 10 Gram(s) Oral three times a day  magnesium citrate Oral Solution 296 milliLiter(s) Oral once  metoprolol tartrate 25 milliGRAM(s) Oral two times a day  pantoprazole    Tablet 40 milliGRAM(s) Oral before breakfast  polyethylene glycol 3350 17 Gram(s) Oral daily  potassium chloride   Powder 20 milliEquivalent(s) Oral daily  senna 2 Tablet(s) Oral at bedtime    MEDICATIONS  (PRN):  ALPRAZolam 0.25 milliGRAM(s) Oral three times a day PRN Anxiety  aluminum hydroxide/magnesium hydroxide/simethicone Suspension 30 milliLiter(s) Oral every 4 hours PRN Dyspepsia  bisacodyl 5 milliGRAM(s) Oral every 12 hours PRN Constipation  dextrose Oral Gel 15 Gram(s) Oral once PRN Blood Glucose LESS THAN 70 milliGRAM(s)/deciliter  diphenhydrAMINE Injectable 50 milliGRAM(s) IV Push once PRN Allergic Reaction  meclizine 12.5 milliGRAM(s) Oral three times a day PRN Dizziness  melatonin 3 milliGRAM(s) Oral at bedtime PRN Insomnia  ondansetron Injectable 4 milliGRAM(s) IV Push every 8 hours PRN Nausea and/or Vomiting  oxyCODONE    IR 5 milliGRAM(s) Oral every 4 hours PRN Severe Pain (7 - 10)      Social History:    Marital Status:  (   )    (   ) Single    (   )    (  )   Occupation:   Lives with: (  ) alone  (  ) children   (  ) spouse   (  ) parents  (  ) other    Substance Use (street drugs): (  ) never used  (  ) other:  Tobacco Usage:  (   ) never smoked   (   ) former smoker   (   ) current smoker  (     ) pack year  (        ) last cigarette date  Alcohol Usage:  Sexual History:     Family History   IBD (  ) Yes   (  ) No  GI Malignancy (  )  Yes    (  ) No    Health Management     Last Colonoscopy -      (     ) Advanced Directives: (     ) None    (      ) DNR    (     ) DNI    (     ) Health Care Proxy:       REVIEW OF SYSTEMS:   General: Negative  HEENT: Negative  CV: Negative  Respiratory: Negative  GI: See HPI  : Negative  MSK: Negative  Hematologic: Negative  Skin: Negative      Vital Signs Last 24 Hrs  T(C): 36.9 (12 Jun 2023 12:09), Max: 36.9 (12 Jun 2023 12:09)  T(F): 98.5 (12 Jun 2023 12:09), Max: 98.5 (12 Jun 2023 12:09)  HR: 120 (12 Jun 2023 12:09) (86 - 120)  BP: 85/60 (12 Jun 2023 12:09) (85/60 - 118/69)  BP(mean): 77 (12 Jun 2023 08:36) (77 - 77)  RR: 20 (12 Jun 2023 12:09) (17 - 21)  SpO2: 95% (12 Jun 2023 12:09) (94% - 97%)    Parameters below as of 12 Jun 2023 12:09  Patient On (Oxygen Delivery Method): nasal cannula  O2 Flow (L/min): 4      PHYSICAL EXAM:   GENERAL:  No acute distress  HEENT:  NC/AT, conjunctiva clear, sclera anicteric  CHEST:  No increased effort, breath sounds clear  HEART:  Regular rhythm  ABDOMEN:  Soft, non-tender, non-distended, normoactive bowel sounds  EXTREMITIES: No edema  SKIN:  Warm, dry  NEURO:  Calm, cooperative        LABS:                        13.8   7.77  )-----------( 191      ( 12 Jun 2023 10:42 )             43.9     06-12    137  |  99  |  26.2<H>  ----------------------------<  221<H>  4.1   |  28.0  |  0.67    Ca    8.4      12 Jun 2023 10:42  Mg     1.9     06-12    TPro  5.5<L>  /  Alb  2.5<L>  /  TBili  0.3<L>  /  DBili  x   /  AST  8   /  ALT  7   /  AlkPhos  142<H>  06-12        LIVER FUNCTIONS - ( 12 Jun 2023 10:42 )  Alb: 2.5 g/dL / Pro: 5.5 g/dL / ALK PHOS: 142 U/L / ALT: 7 U/L / AST: 8 U/L / GGT: x                 RADIOLOGY & ADDITIONAL TESTS:        < from: CT Abdomen and Pelvis w/ IV Cont (06.10.23 @ 02:29) >    ACC: [id number] EXAM:  CT ABDOMEN AND PELVIS IC   ORDERED BY: JOHNNY LAZARO     ACC: [id number] EXAM:  CT ANGIO CHEST PULM ART WAWIC   ORDERED BY:   AGGIE SCHAFFER     PROCEDURE DATE:  06/10/2023          FINDINGS:    CHEST:  LUNGS, PLEURA AND LARGE AIRWAYS: Reidentified collapse of the mid to   distal trachea, suggest tracheomalacia. Trace bilateral pleural effusions   with adjacent patchy bibasilar opacities, likely representdependent   atelectasis. Recommend clinical correlation to assess for superimposed   infection.  VESSELS: No pulmonary embolism though evaluation of subsegmental branches   limited secondary to respiratory motion artifact.  HEART: Cardiomegaly.. No pericardial effusion. Valvular and coronary   artery calcifications.  MEDIASTINUM AND MOLLY: No lymphadenopathy.  CHEST WALL AND LOWER NECK: Within normal limits.    ABDOMEN AND PELVIS:  LIVER: Surface nodularity. Enlarged measuring 26.5 cm in length.  BILE DUCTS: Normal caliber.  GALLBLADDER: Within normal limits.  SPLEEN: Enlarged measuring 15.5 cm length.  PANCREAS: Diffuse fatty atrophy.  ADRENALS: Within normal limits.  KIDNEYS/URETERS: No hydronephrosis. Bilateral renal cysts as well as too   small to characterize hypodensities.    BLADDER: Mild wall thickening with suggestion of left posterolateral   diverticulum.  REPRODUCTIVE ORGANS: Mild prominent prostate.    BOWEL: No acute bowel inflammatory change or obstruction though detailed   evaluation of of distal GI tract is limited secondary to inadequate   distention. Nonvisualized appendix.  PERITONEUM: Trace ascites.  VESSELS: Atherosclerotic changes.  RETROPERITONEUM/LYMPH NODES: No lymphadenopathy.  ABDOMINAL WALL Small bilateral groin hernias containing fat. Mild   subcutis edema.  BONES: Degenerative changes. Laminectomy of the lower lumbar spine. Mild   anterolisthesis is of L3 on L4.    IMPRESSION:    No pulmonary embolism though evaluation of subsegmental branches limited   secondary to respiratory motion artifact.    Trace bilateral pleural effusions with adjacent patchy bibasilar   opacities, likely represent dependent atelectasis. Recommend clinical   correlation to assess for superimposed infection.    Hepatomegaly. Liver surface nodularity. Hepatitis or cirrhosis considered   in differential. Correlate with clinical parameters.    Trace ascites.    Mild bladder wall thickening with suggestion of left posterolateral   diverticulum. Correlate with urinalysis andlab values to assess for   cystitis and/or ascending urinary tract infection.    Additional findings as mentioned above.    --- End of Report ---            < end of copied text >        < from: US Abdomen Complete (US Abdomen Complete .) (06.10.23 @ 11:27) >    ACC: [id number] EXAM:  US ABDOMEN COMPLETE   ORDERED BY: COLIN ALONSO     PROCEDURE DATE:  06/10/2023          FINDINGS:  Liver: Hepatomegaly. No focal liver lesions.  Bile ducts: Normal caliber. Common bile duct measures 8 mm, within normal   limits for age.  Gallbladder: Contracted, with biliary sludge.  Pancreas: Poorly visualized.  Spleen: 12.3 cm. Within normal limits.  Right kidney: 10.9 cm. No hydronephrosis. Multiple cysts, largest   measuring 3.8 x 3.5 x 4.5 cm in the lower pole.  Left kidney: 14.2 cm. No hydronephrosis. Upper pole cyst measures 1.7 x   2.0 x 1.0 cm.  Ascites: None.  Aorta and IVC: Visualized portions are within normal limits.    IMPRESSION:  *  Hepatomegaly.  *  Biliary sludge in the contracted gallbladder.  *  Nonspecific gallbladder wall thickening. No evidence of acute   cholecystitis.          --- End of Report ---      < end of copied text >     Patient is a 77y old  Male who presents with a chief complaint of chf (12 Jun 2023 11:36)      HPI:  78 y/o male with PMH of afib on Eliquis (s/p Cardioversion on 1/31/23), Moderate AS, Chronic Diastolic Heart Failure, CAD s/p PCI, HTN, HLD, DM 2, Morbid Obesity, ALLIE, Thoracic Aortic Aneurysm, BPH s/p TURP, Left Renal Mass was sent to the ED from [facility] for abdominal pain x 4 days. Patient reported pain on the right side of the abdomen (upper and lower), 10/10, non-radiating. Patient reported feeling congested, noted cough but not productive. Patient has no trauma, nausea, vomiting, chest pain, shortness of breath, fever, chills, palpitation, change in bowel/urinary habit, trauma to the abdomen.   GI consulted for CT of A/P findings of Hepatomegaly. Liver surface nodularity. Hepatitis or cirrhosis considered in differential. Correlate with clinical parameters. Patient seen and evaluated at bedside, reporting no complaints. Denies any liver disease in the past. Denies Heavy ETOH use or smoking. Reports previews EGD/colonoscopy in the past with EGD showing antral ulcer which he has been on Nexium on and polypectomy. Liver enzymes normal. At this time, Denies nausea, vomiting, abdominal pain, fever, chills, chest pain, shortness of breath.         PAST MEDICAL & SURGICAL HISTORY:  Anxiety      Hypertension      Hyperlipidemia      Prostate disease      Gastroesophageal reflux disease      Gallbladder stone without cholecystitis or obstruction      BPH (benign prostatic hyperplasia)      DM (diabetes mellitus)      DVT (deep venous thrombosis)  left leg      Afib      Anxiety      HLD (hyperlipidemia)      Malignant schwannoma      H/O arthroscopy of knee      S/P laminectomy      S/P coronary artery stent placement  x2          Allergies    penicillin (Anaphylaxis)  Ceftin (Hives)  sulfa drugs (Unknown)  Bactrim (Rash)    Intolerances        MEDICATIONS  (STANDING):  albuterol    90 MICROgram(s) HFA Inhaler 2 Puff(s) Inhalation every 6 hours  aspirin  chewable 81 milliGRAM(s) Oral daily  chlorhexidine 2% Cloths 1 Application(s) Topical <User Schedule>  dextrose 5%. 1000 milliLiter(s) (50 mL/Hr) IV Continuous <Continuous>  dextrose 5%. 1000 milliLiter(s) (100 mL/Hr) IV Continuous <Continuous>  dextrose 50% Injectable 25 Gram(s) IV Push once  dextrose 50% Injectable 25 Gram(s) IV Push once  dextrose 50% Injectable 12.5 Gram(s) IV Push once  doxazosin 2 milliGRAM(s) Oral at bedtime  enoxaparin Injectable 135 milliGRAM(s) SubCutaneous every 12 hours  ertapenem  IVPB 1000 milliGRAM(s) IV Intermittent every 24 hours  finasteride 5 milliGRAM(s) Oral daily  fluticasone propionate 50 MICROgram(s)/spray Nasal Spray 1 Spray(s) Both Nostrils two times a day  furosemide   Injectable 40 milliGRAM(s) IV Push daily  glucagon  Injectable 1 milliGRAM(s) IntraMuscular once  insulin lispro (ADMELOG) corrective regimen sliding scale   SubCutaneous three times a day before meals  insulin lispro (ADMELOG) corrective regimen sliding scale   SubCutaneous at bedtime  lactulose Syrup 10 Gram(s) Oral three times a day  magnesium citrate Oral Solution 296 milliLiter(s) Oral once  metoprolol tartrate 25 milliGRAM(s) Oral two times a day  pantoprazole    Tablet 40 milliGRAM(s) Oral before breakfast  polyethylene glycol 3350 17 Gram(s) Oral daily  potassium chloride   Powder 20 milliEquivalent(s) Oral daily  senna 2 Tablet(s) Oral at bedtime    MEDICATIONS  (PRN):  ALPRAZolam 0.25 milliGRAM(s) Oral three times a day PRN Anxiety  aluminum hydroxide/magnesium hydroxide/simethicone Suspension 30 milliLiter(s) Oral every 4 hours PRN Dyspepsia  bisacodyl 5 milliGRAM(s) Oral every 12 hours PRN Constipation  dextrose Oral Gel 15 Gram(s) Oral once PRN Blood Glucose LESS THAN 70 milliGRAM(s)/deciliter  diphenhydrAMINE Injectable 50 milliGRAM(s) IV Push once PRN Allergic Reaction  meclizine 12.5 milliGRAM(s) Oral three times a day PRN Dizziness  melatonin 3 milliGRAM(s) Oral at bedtime PRN Insomnia  ondansetron Injectable 4 milliGRAM(s) IV Push every 8 hours PRN Nausea and/or Vomiting  oxyCODONE    IR 5 milliGRAM(s) Oral every 4 hours PRN Severe Pain (7 - 10)      Social History:    Marital Status:  (   )    (   ) Single    (   )    (  )   Occupation:   Lives with: (  ) alone  (  ) children   (  ) spouse   (  ) parents  (  ) other    Substance Use (street drugs): (  ) never used  (  ) other:  Tobacco Usage:  (   ) never smoked   (   ) former smoker   (   ) current smoker  (     ) pack year  (        ) last cigarette date  Alcohol Usage:  Sexual History:     Family History   IBD (  ) Yes   (  ) No  GI Malignancy (  )  Yes    (  ) No    Health Management     Last Colonoscopy -      (     ) Advanced Directives: (     ) None    (      ) DNR    (     ) DNI    (     ) Health Care Proxy:       REVIEW OF SYSTEMS:   General: Negative  HEENT: Negative  CV: Negative  Respiratory: Negative  GI: See HPI  : Negative  MSK: Negative  Hematologic: Negative  Skin: Negative      Vital Signs Last 24 Hrs  T(C): 36.9 (12 Jun 2023 12:09), Max: 36.9 (12 Jun 2023 12:09)  T(F): 98.5 (12 Jun 2023 12:09), Max: 98.5 (12 Jun 2023 12:09)  HR: 120 (12 Jun 2023 12:09) (86 - 120)  BP: 85/60 (12 Jun 2023 12:09) (85/60 - 118/69)  BP(mean): 77 (12 Jun 2023 08:36) (77 - 77)  RR: 20 (12 Jun 2023 12:09) (17 - 21)  SpO2: 95% (12 Jun 2023 12:09) (94% - 97%)    Parameters below as of 12 Jun 2023 12:09  Patient On (Oxygen Delivery Method): nasal cannula  O2 Flow (L/min): 4      PHYSICAL EXAM:   GENERAL: Obese, No acute distress  HEENT:  NC/AT, conjunctiva clear, sclera anicteric  CHEST:  No increased effort, breath sounds clear  HEART:  Regular rhythm  ABDOMEN:  Softly distended, non-tender, normoactive bowel sounds  EXTREMITIES: No edema  SKIN:  Warm, dry  NEURO:  Calm, cooperative        LABS:                        13.8   7.77  )-----------( 191      ( 12 Jun 2023 10:42 )             43.9     06-12    137  |  99  |  26.2<H>  ----------------------------<  221<H>  4.1   |  28.0  |  0.67    Ca    8.4      12 Jun 2023 10:42  Mg     1.9     06-12    TPro  5.5<L>  /  Alb  2.5<L>  /  TBili  0.3<L>  /  DBili  x   /  AST  8   /  ALT  7   /  AlkPhos  142<H>  06-12        LIVER FUNCTIONS - ( 12 Jun 2023 10:42 )  Alb: 2.5 g/dL / Pro: 5.5 g/dL / ALK PHOS: 142 U/L / ALT: 7 U/L / AST: 8 U/L / GGT: x                 RADIOLOGY & ADDITIONAL TESTS:        < from: CT Abdomen and Pelvis w/ IV Cont (06.10.23 @ 02:29) >    ACC: [id number] EXAM:  CT ABDOMEN AND PELVIS IC   ORDERED BY: JOHNNY LAZARO     ACC: [id number] EXAM:  CT ANGIO CHEST PULM ART WAWIC   ORDERED BY:   AGGIE SCHAFFER     PROCEDURE DATE:  06/10/2023          FINDINGS:    CHEST:  LUNGS, PLEURA AND LARGE AIRWAYS: Reidentified collapse of the mid to   distal trachea, suggest tracheomalacia. Trace bilateral pleural effusions   with adjacent patchy bibasilar opacities, likely representdependent   atelectasis. Recommend clinical correlation to assess for superimposed   infection.  VESSELS: No pulmonary embolism though evaluation of subsegmental branches   limited secondary to respiratory motion artifact.  HEART: Cardiomegaly.. No pericardial effusion. Valvular and coronary   artery calcifications.  MEDIASTINUM AND MOLLY: No lymphadenopathy.  CHEST WALL AND LOWER NECK: Within normal limits.    ABDOMEN AND PELVIS:  LIVER: Surface nodularity. Enlarged measuring 26.5 cm in length.  BILE DUCTS: Normal caliber.  GALLBLADDER: Within normal limits.  SPLEEN: Enlarged measuring 15.5 cm length.  PANCREAS: Diffuse fatty atrophy.  ADRENALS: Within normal limits.  KIDNEYS/URETERS: No hydronephrosis. Bilateral renal cysts as well as too   small to characterize hypodensities.    BLADDER: Mild wall thickening with suggestion of left posterolateral   diverticulum.  REPRODUCTIVE ORGANS: Mild prominent prostate.    BOWEL: No acute bowel inflammatory change or obstruction though detailed   evaluation of of distal GI tract is limited secondary to inadequate   distention. Nonvisualized appendix.  PERITONEUM: Trace ascites.  VESSELS: Atherosclerotic changes.  RETROPERITONEUM/LYMPH NODES: No lymphadenopathy.  ABDOMINAL WALL Small bilateral groin hernias containing fat. Mild   subcutis edema.  BONES: Degenerative changes. Laminectomy of the lower lumbar spine. Mild   anterolisthesis is of L3 on L4.    IMPRESSION:    No pulmonary embolism though evaluation of subsegmental branches limited   secondary to respiratory motion artifact.    Trace bilateral pleural effusions with adjacent patchy bibasilar   opacities, likely represent dependent atelectasis. Recommend clinical   correlation to assess for superimposed infection.    Hepatomegaly. Liver surface nodularity. Hepatitis or cirrhosis considered   in differential. Correlate with clinical parameters.    Trace ascites.    Mild bladder wall thickening with suggestion of left posterolateral   diverticulum. Correlate with urinalysis andlab values to assess for   cystitis and/or ascending urinary tract infection.    Additional findings as mentioned above.    --- End of Report ---            < end of copied text >        < from: US Abdomen Complete (US Abdomen Complete .) (06.10.23 @ 11:27) >    ACC: [id number] EXAM:  US ABDOMEN COMPLETE   ORDERED BY: CLOIN ALONSO     PROCEDURE DATE:  06/10/2023          FINDINGS:  Liver: Hepatomegaly. No focal liver lesions.  Bile ducts: Normal caliber. Common bile duct measures 8 mm, within normal   limits for age.  Gallbladder: Contracted, with biliary sludge.  Pancreas: Poorly visualized.  Spleen: 12.3 cm. Within normal limits.  Right kidney: 10.9 cm. No hydronephrosis. Multiple cysts, largest   measuring 3.8 x 3.5 x 4.5 cm in the lower pole.  Left kidney: 14.2 cm. No hydronephrosis. Upper pole cyst measures 1.7 x   2.0 x 1.0 cm.  Ascites: None.  Aorta and IVC: Visualized portions are within normal limits.    IMPRESSION:  *  Hepatomegaly.  *  Biliary sludge in the contracted gallbladder.  *  Nonspecific gallbladder wall thickening. No evidence of acute   cholecystitis.          --- End of Report ---      < end of copied text >

## 2023-06-13 LAB
A1C WITH ESTIMATED AVERAGE GLUCOSE RESULT: 6.5 % — HIGH (ref 4–5.6)
ALBUMIN SERPL ELPH-MCNC: 2.6 G/DL — LOW (ref 3.3–5.2)
ALP SERPL-CCNC: 168 U/L — HIGH (ref 40–120)
ALT FLD-CCNC: 8 U/L — SIGNIFICANT CHANGE UP
ANION GAP SERPL CALC-SCNC: 8 MMOL/L — SIGNIFICANT CHANGE UP (ref 5–17)
AST SERPL-CCNC: 7 U/L — SIGNIFICANT CHANGE UP
BASOPHILS # BLD AUTO: 0.03 K/UL — SIGNIFICANT CHANGE UP (ref 0–0.2)
BASOPHILS NFR BLD AUTO: 0.4 % — SIGNIFICANT CHANGE UP (ref 0–2)
BILIRUB SERPL-MCNC: 0.3 MG/DL — LOW (ref 0.4–2)
BUN SERPL-MCNC: 27.4 MG/DL — HIGH (ref 8–20)
CALCIUM SERPL-MCNC: 8.2 MG/DL — LOW (ref 8.4–10.5)
CHLORIDE SERPL-SCNC: 101 MMOL/L — SIGNIFICANT CHANGE UP (ref 96–108)
CHOLEST SERPL-MCNC: 92 MG/DL — SIGNIFICANT CHANGE UP
CO2 SERPL-SCNC: 29 MMOL/L — SIGNIFICANT CHANGE UP (ref 22–29)
CREAT SERPL-MCNC: 0.6 MG/DL — SIGNIFICANT CHANGE UP (ref 0.5–1.3)
EGFR: 99 ML/MIN/1.73M2 — SIGNIFICANT CHANGE UP
EOSINOPHIL # BLD AUTO: 0.3 K/UL — SIGNIFICANT CHANGE UP (ref 0–0.5)
EOSINOPHIL NFR BLD AUTO: 4 % — SIGNIFICANT CHANGE UP (ref 0–6)
ESTIMATED AVERAGE GLUCOSE: 140 MG/DL — HIGH (ref 68–114)
GLUCOSE BLDC GLUCOMTR-MCNC: 147 MG/DL — HIGH (ref 70–99)
GLUCOSE BLDC GLUCOMTR-MCNC: 154 MG/DL — HIGH (ref 70–99)
GLUCOSE BLDC GLUCOMTR-MCNC: 155 MG/DL — HIGH (ref 70–99)
GLUCOSE BLDC GLUCOMTR-MCNC: 216 MG/DL — HIGH (ref 70–99)
GLUCOSE SERPL-MCNC: 173 MG/DL — HIGH (ref 70–99)
HCT VFR BLD CALC: 41.8 % — SIGNIFICANT CHANGE UP (ref 39–50)
HDLC SERPL-MCNC: 27 MG/DL — LOW
HGB BLD-MCNC: 13.3 G/DL — SIGNIFICANT CHANGE UP (ref 13–17)
IMM GRANULOCYTES NFR BLD AUTO: 0.7 % — SIGNIFICANT CHANGE UP (ref 0–0.9)
LIPID PNL WITH DIRECT LDL SERPL: 40 MG/DL — SIGNIFICANT CHANGE UP
LYMPHOCYTES # BLD AUTO: 0.79 K/UL — LOW (ref 1–3.3)
LYMPHOCYTES # BLD AUTO: 10.4 % — LOW (ref 13–44)
MAGNESIUM SERPL-MCNC: 1.9 MG/DL — SIGNIFICANT CHANGE UP (ref 1.8–2.6)
MCHC RBC-ENTMCNC: 28.2 PG — SIGNIFICANT CHANGE UP (ref 27–34)
MCHC RBC-ENTMCNC: 31.8 GM/DL — LOW (ref 32–36)
MCV RBC AUTO: 88.7 FL — SIGNIFICANT CHANGE UP (ref 80–100)
MONOCYTES # BLD AUTO: 0.84 K/UL — SIGNIFICANT CHANGE UP (ref 0–0.9)
MONOCYTES NFR BLD AUTO: 11.1 % — SIGNIFICANT CHANGE UP (ref 2–14)
MRSA PCR RESULT.: DETECTED
NEUTROPHILS # BLD AUTO: 5.56 K/UL — SIGNIFICANT CHANGE UP (ref 1.8–7.4)
NEUTROPHILS NFR BLD AUTO: 73.4 % — SIGNIFICANT CHANGE UP (ref 43–77)
NON HDL CHOLESTEROL: 65 MG/DL — SIGNIFICANT CHANGE UP
PLATELET # BLD AUTO: 203 K/UL — SIGNIFICANT CHANGE UP (ref 150–400)
POTASSIUM SERPL-MCNC: 3.5 MMOL/L — SIGNIFICANT CHANGE UP (ref 3.5–5.3)
POTASSIUM SERPL-SCNC: 3.5 MMOL/L — SIGNIFICANT CHANGE UP (ref 3.5–5.3)
PROT SERPL-MCNC: 5.5 G/DL — LOW (ref 6.6–8.7)
RBC # BLD: 4.71 M/UL — SIGNIFICANT CHANGE UP (ref 4.2–5.8)
RBC # FLD: 14.3 % — SIGNIFICANT CHANGE UP (ref 10.3–14.5)
S AUREUS DNA NOSE QL NAA+PROBE: DETECTED
SODIUM SERPL-SCNC: 138 MMOL/L — SIGNIFICANT CHANGE UP (ref 135–145)
TRIGL SERPL-MCNC: 126 MG/DL — SIGNIFICANT CHANGE UP
TSH SERPL-MCNC: 1.83 UIU/ML — SIGNIFICANT CHANGE UP (ref 0.27–4.2)
WBC # BLD: 7.57 K/UL — SIGNIFICANT CHANGE UP (ref 3.8–10.5)
WBC # FLD AUTO: 7.57 K/UL — SIGNIFICANT CHANGE UP (ref 3.8–10.5)

## 2023-06-13 PROCEDURE — 99232 SBSQ HOSP IP/OBS MODERATE 35: CPT

## 2023-06-13 PROCEDURE — 99233 SBSQ HOSP IP/OBS HIGH 50: CPT

## 2023-06-13 RX ORDER — POTASSIUM CHLORIDE 20 MEQ
40 PACKET (EA) ORAL ONCE
Refills: 0 | Status: COMPLETED | OUTPATIENT
Start: 2023-06-13 | End: 2023-06-13

## 2023-06-13 RX ORDER — MUPIROCIN 20 MG/G
1 OINTMENT TOPICAL
Refills: 0 | Status: COMPLETED | OUTPATIENT
Start: 2023-06-13 | End: 2023-06-18

## 2023-06-13 RX ORDER — METOPROLOL TARTRATE 50 MG
25 TABLET ORAL THREE TIMES A DAY
Refills: 0 | Status: DISCONTINUED | OUTPATIENT
Start: 2023-06-13 | End: 2023-06-16

## 2023-06-13 RX ADMIN — ALBUTEROL 2 PUFF(S): 90 AEROSOL, METERED ORAL at 20:49

## 2023-06-13 RX ADMIN — ALBUTEROL 2 PUFF(S): 90 AEROSOL, METERED ORAL at 15:27

## 2023-06-13 RX ADMIN — Medication 25 MILLIGRAM(S): at 05:57

## 2023-06-13 RX ADMIN — OXYCODONE HYDROCHLORIDE 5 MILLIGRAM(S): 5 TABLET ORAL at 21:57

## 2023-06-13 RX ADMIN — OXYCODONE HYDROCHLORIDE 5 MILLIGRAM(S): 5 TABLET ORAL at 14:02

## 2023-06-13 RX ADMIN — OXYCODONE HYDROCHLORIDE 5 MILLIGRAM(S): 5 TABLET ORAL at 15:02

## 2023-06-13 RX ADMIN — Medication 25 MILLIGRAM(S): at 21:53

## 2023-06-13 RX ADMIN — ENOXAPARIN SODIUM 135 MILLIGRAM(S): 100 INJECTION SUBCUTANEOUS at 08:41

## 2023-06-13 RX ADMIN — ALBUTEROL 2 PUFF(S): 90 AEROSOL, METERED ORAL at 10:17

## 2023-06-13 RX ADMIN — Medication 40 MILLIEQUIVALENT(S): at 10:44

## 2023-06-13 RX ADMIN — Medication 2 MILLIGRAM(S): at 21:53

## 2023-06-13 RX ADMIN — Medication 1 SPRAY(S): at 05:59

## 2023-06-13 RX ADMIN — ENOXAPARIN SODIUM 135 MILLIGRAM(S): 100 INJECTION SUBCUTANEOUS at 22:02

## 2023-06-13 RX ADMIN — FINASTERIDE 5 MILLIGRAM(S): 5 TABLET, FILM COATED ORAL at 08:39

## 2023-06-13 RX ADMIN — MUPIROCIN 1 APPLICATION(S): 20 OINTMENT TOPICAL at 17:03

## 2023-06-13 RX ADMIN — Medication 25 MILLIGRAM(S): at 14:03

## 2023-06-13 RX ADMIN — ALBUTEROL 2 PUFF(S): 90 AEROSOL, METERED ORAL at 04:38

## 2023-06-13 RX ADMIN — Medication 1: at 08:41

## 2023-06-13 RX ADMIN — Medication 3 MILLIGRAM(S): at 21:55

## 2023-06-13 RX ADMIN — Medication 0.25 MILLIGRAM(S): at 10:47

## 2023-06-13 RX ADMIN — OXYCODONE HYDROCHLORIDE 5 MILLIGRAM(S): 5 TABLET ORAL at 05:56

## 2023-06-13 RX ADMIN — Medication 1 SPRAY(S): at 17:07

## 2023-06-13 RX ADMIN — ERTAPENEM SODIUM 120 MILLIGRAM(S): 1 INJECTION, POWDER, LYOPHILIZED, FOR SOLUTION INTRAMUSCULAR; INTRAVENOUS at 05:58

## 2023-06-13 RX ADMIN — PANTOPRAZOLE SODIUM 40 MILLIGRAM(S): 20 TABLET, DELAYED RELEASE ORAL at 06:12

## 2023-06-13 RX ADMIN — Medication 1: at 17:02

## 2023-06-13 RX ADMIN — OXYCODONE HYDROCHLORIDE 5 MILLIGRAM(S): 5 TABLET ORAL at 06:30

## 2023-06-13 RX ADMIN — Medication 40 MILLIGRAM(S): at 08:39

## 2023-06-13 RX ADMIN — Medication 20 MILLIEQUIVALENT(S): at 08:39

## 2023-06-13 RX ADMIN — OXYCODONE HYDROCHLORIDE 5 MILLIGRAM(S): 5 TABLET ORAL at 22:57

## 2023-06-13 RX ADMIN — Medication 81 MILLIGRAM(S): at 08:39

## 2023-06-13 RX ADMIN — CHLORHEXIDINE GLUCONATE 1 APPLICATION(S): 213 SOLUTION TOPICAL at 07:32

## 2023-06-13 NOTE — PROGRESS NOTE ADULT - SUBJECTIVE AND OBJECTIVE BOX
ANIA CRISTOBAL  [id number]        Chief Complaint: follow up new CMP      Subjective:      24 hour Tele:        albuterol    90 MICROgram(s) HFA Inhaler 2 Puff(s) Inhalation every 6 hours  ALPRAZolam 0.25 milliGRAM(s) Oral three times a day PRN  aluminum hydroxide/magnesium hydroxide/simethicone Suspension 30 milliLiter(s) Oral every 4 hours PRN  aspirin  chewable 81 milliGRAM(s) Oral daily  bisacodyl 5 milliGRAM(s) Oral every 12 hours PRN  chlorhexidine 2% Cloths 1 Application(s) Topical <User Schedule>  dextrose 5%. 1000 milliLiter(s) IV Continuous <Continuous>  dextrose 5%. 1000 milliLiter(s) IV Continuous <Continuous>  dextrose 50% Injectable 12.5 Gram(s) IV Push once  dextrose 50% Injectable 25 Gram(s) IV Push once  dextrose 50% Injectable 25 Gram(s) IV Push once  dextrose Oral Gel 15 Gram(s) Oral once PRN  diphenhydrAMINE Injectable 50 milliGRAM(s) IV Push once PRN  doxazosin 2 milliGRAM(s) Oral at bedtime  enoxaparin Injectable 135 milliGRAM(s) SubCutaneous every 12 hours  ertapenem  IVPB 1000 milliGRAM(s) IV Intermittent every 24 hours  finasteride 5 milliGRAM(s) Oral daily  fluticasone propionate 50 MICROgram(s)/spray Nasal Spray 1 Spray(s) Both Nostrils two times a day  furosemide   Injectable 40 milliGRAM(s) IV Push daily  glucagon  Injectable 1 milliGRAM(s) IntraMuscular once  insulin lispro (ADMELOG) corrective regimen sliding scale   SubCutaneous three times a day before meals  insulin lispro (ADMELOG) corrective regimen sliding scale   SubCutaneous at bedtime  lactulose Syrup 10 Gram(s) Oral three times a day  meclizine 12.5 milliGRAM(s) Oral three times a day PRN  melatonin 3 milliGRAM(s) Oral at bedtime PRN  metoprolol tartrate 25 milliGRAM(s) Oral two times a day  ondansetron Injectable 4 milliGRAM(s) IV Push every 8 hours PRN  oxyCODONE    IR 5 milliGRAM(s) Oral every 4 hours PRN  pantoprazole    Tablet 40 milliGRAM(s) Oral before breakfast  polyethylene glycol 3350 17 Gram(s) Oral daily  potassium chloride    Tablet ER 40 milliEquivalent(s) Oral once  potassium chloride   Powder 20 milliEquivalent(s) Oral daily  senna 2 Tablet(s) Oral at bedtime          Physical Exam:  T(C): 36.6 (06-13-23 @ 07:38), Max: 37 (06-12-23 @ 20:42)  HR: 100 (06-13-23 @ 08:38) (100 - 125)  BP: 106/60 (06-13-23 @ 08:38) (85/60 - 134/83)  RR: 22 (06-13-23 @ 08:38) (18 - 23)  SpO2: 98% (06-13-23 @ 08:38) (93% - 98%)  Wt(kg): --  General: Comfortable in NAD  HEENT: MMM, sclera anicteric  Resp: bilateral wheezing  CVS: soft s1s2, rrr, no s3/JVD, II/VI systolic murmur  Abd: soft, NT, ND, BS+  Ext: No c/c/e  Neuro A&O x3  Psych: Normal affect    I&O's Summary    12 Jun 2023 07:01  -  13 Jun 2023 07:00  --------------------------------------------------------  IN: 240 mL / OUT: 550 mL / NET: -310 mL          Labs:   13 Jun 2023 04:57    138    |  101    |  27.4   ----------------------------<  173    3.5     |  29.0   |  0.60     Ca    8.2        13 Jun 2023 04:57  Mg     1.9       13 Jun 2023 04:57    TPro  5.5    /  Alb  2.6    /  TBili  0.3    /  DBili  x      /  AST  7      /  ALT  8      /  AlkPhos  168    13 Jun 2023 04:57                          13.3   7.57  )-----------( 203      ( 13 Jun 2023 04:57 )             41.8       ECG:  SR with 1st degree AVB with RBBB/LAFB.  No ST-T wave changes.  PRWP.            RADIOLOGY:  CT A/P:  No pulmonary embolism though evaluation of subsegmental branches limited   secondary to respiratory motion artifact.    Trace bilateral pleural effusions with adjacent patchy bibasilar   opacities, likely represent dependent atelectasis. Recommend clinical   correlation to assess for superimposed infection.    Hepatomegaly. Liver surface nodularity. Hepatitis or cirrhosis considered   in differential. Correlate with clinical parameters.    Trace ascites.    Mild bladder wall thickening with suggestion of left posterolateral   diverticulum. Correlate with urinalysis andlab values to assess for   cystitis and/or ascending urinary tract infection.    Additional findings as mentioned above.      Echo (3/90498):   1. Left ventricular ejection fraction, by visual estimation, is 60 to 65%.   2. Normal right ventricular size and function.   3. There is no evidence of pericardial effusion.   4. Endocardial visualizationwas enhanced with intravenous echo contrast.    Echo personally reviewed:   1. Left ventricularejection fraction, by visual estimation, is 35-40%.   2. Moderately decreased global left ventricular systolic   function.   3. There is mild concentric left ventricular hypertrophy.   4. Mildly enlarged right ventricle.   5. Moderately reduced RV systolic function.   6. Normal left atrial size.   7. Normal right atrial size.   8. There is no evidence of pericardial effusion.   9. Peak Gradient across the Aortic Valve is 35 mm Hg. Mean gradient is 26 mm Hg. DI 0.25.  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        Assessment:  76 y/o male PMHx AF on Eliquis (s/p Cardioversion on 1/31/23), moderate-severe AS, Chronic Diastolic Heart Failure, CAD s/p PCI, HTN, HLD, DM 2, Morbid Obesity, ALLIE, Thoracic Aortic Aneurysm, BPH s/p TURP, Left Renal Mass was sent to the ED from [facility] for abdominal pain x 4 days. Patient found to have UTI.  Echo performed noted significant drop in EF.  CT with hepatomegaly appearing ?significantly larger than CT from March.  ?2/2 CPC but would consider other causes such as hepatitis.  Appears somewhat fluid overloaded but not in severe acute CHF.  -Acute systolic CHF being diuresed  -Also now back in AF, rate controlled.  ?Contributing to new drop in EF.  ?2/2 stress myopathy.  Cath in January with no obstructive dz, very low suspicion CAD as cause.    -Also with significant AS, ?severe LFLG potentially contributing.  Tele with likely AF but appears regular, ?junctional.  P waves difficult to see.  Has been on higher dose Amio and now stoppdd  -significant hepatosplenomegaly   -Also with UTI, biliary colic 2/2 sludge    Plan: (TO BE SEEN)   1. Tele  2. Lasix 40mg IV QD as tolerated. Monitor renal function   3. Agree with lowering BB to allow for diuresis, monitor HR.  4. Add Entresto when BP tolerates after diuresed.  5. Keep off  Amio and statin, GI follow up    6.  7. Lovenox for CVA PPX for now. Ultimately transition back to eliquis   8. Continue other current CV meds at current doses.  9. Antibiotics for UTI         Chong Carranza MD ANIA CRISTOBAL  [id number]        Chief Complaint: follow up new CMP      Subjective:      24 hour Tele:        albuterol    90 MICROgram(s) HFA Inhaler 2 Puff(s) Inhalation every 6 hours  ALPRAZolam 0.25 milliGRAM(s) Oral three times a day PRN  aluminum hydroxide/magnesium hydroxide/simethicone Suspension 30 milliLiter(s) Oral every 4 hours PRN  aspirin  chewable 81 milliGRAM(s) Oral daily  bisacodyl 5 milliGRAM(s) Oral every 12 hours PRN  chlorhexidine 2% Cloths 1 Application(s) Topical <User Schedule>  dextrose 5%. 1000 milliLiter(s) IV Continuous <Continuous>  dextrose 5%. 1000 milliLiter(s) IV Continuous <Continuous>  dextrose 50% Injectable 12.5 Gram(s) IV Push once  dextrose 50% Injectable 25 Gram(s) IV Push once  dextrose 50% Injectable 25 Gram(s) IV Push once  dextrose Oral Gel 15 Gram(s) Oral once PRN  diphenhydrAMINE Injectable 50 milliGRAM(s) IV Push once PRN  doxazosin 2 milliGRAM(s) Oral at bedtime  enoxaparin Injectable 135 milliGRAM(s) SubCutaneous every 12 hours  ertapenem  IVPB 1000 milliGRAM(s) IV Intermittent every 24 hours  finasteride 5 milliGRAM(s) Oral daily  fluticasone propionate 50 MICROgram(s)/spray Nasal Spray 1 Spray(s) Both Nostrils two times a day  furosemide   Injectable 40 milliGRAM(s) IV Push daily  glucagon  Injectable 1 milliGRAM(s) IntraMuscular once  insulin lispro (ADMELOG) corrective regimen sliding scale   SubCutaneous three times a day before meals  insulin lispro (ADMELOG) corrective regimen sliding scale   SubCutaneous at bedtime  lactulose Syrup 10 Gram(s) Oral three times a day  meclizine 12.5 milliGRAM(s) Oral three times a day PRN  melatonin 3 milliGRAM(s) Oral at bedtime PRN  metoprolol tartrate 25 milliGRAM(s) Oral two times a day  ondansetron Injectable 4 milliGRAM(s) IV Push every 8 hours PRN  oxyCODONE    IR 5 milliGRAM(s) Oral every 4 hours PRN  pantoprazole    Tablet 40 milliGRAM(s) Oral before breakfast  polyethylene glycol 3350 17 Gram(s) Oral daily  potassium chloride    Tablet ER 40 milliEquivalent(s) Oral once  potassium chloride   Powder 20 milliEquivalent(s) Oral daily  senna 2 Tablet(s) Oral at bedtime          Physical Exam:  T(C): 36.6 (06-13-23 @ 07:38), Max: 37 (06-12-23 @ 20:42)  HR: 100 (06-13-23 @ 08:38) (100 - 125)  BP: 106/60 (06-13-23 @ 08:38) (85/60 - 134/83)  RR: 22 (06-13-23 @ 08:38) (18 - 23)  SpO2: 98% (06-13-23 @ 08:38) (93% - 98%)  Wt(kg): --  General: Comfortable in NAD  HEENT: MMM, sclera anicteric  Resp: bilateral wheezing  CVS: soft s1s2, rrr, no s3/JVD, II/VI systolic murmur  Abd: soft, NT, ND, BS+  Ext: No c/c/e  Neuro A&O x3  Psych: Normal affect    I&O's Summary    12 Jun 2023 07:01  -  13 Jun 2023 07:00  --------------------------------------------------------  IN: 240 mL / OUT: 550 mL / NET: -310 mL          Labs:   13 Jun 2023 04:57    138    |  101    |  27.4   ----------------------------<  173    3.5     |  29.0   |  0.60     Ca    8.2        13 Jun 2023 04:57  Mg     1.9       13 Jun 2023 04:57    TPro  5.5    /  Alb  2.6    /  TBili  0.3    /  DBili  x      /  AST  7      /  ALT  8      /  AlkPhos  168    13 Jun 2023 04:57                          13.3   7.57  )-----------( 203      ( 13 Jun 2023 04:57 )             41.8     [facility] 1/2023:  Coronary Angiography   - Left dominant system   - Patent prox and mid LAD stents   - Moderate stenosis of the distal LCx   - The RCA is a small, non-dominat vessel.  The proximal segment is occluded with brisk right-to-right collaterals.  - 25mm LV-Ao gradient on pullback.  CIELO 1.4cm2 with moderate AS byecho.  - Moderately reduced LVEF by echo       LM   Left main artery: Angiography shows minor irregularities.    LAD   Proximal left anterior descending: There is a 10 % stenosis within the  stented segment. Mid left anterior descending: There is a10 % stenosis within the stented segment.    CX   Distal circumflex: There is a 60 % stenosis. VALENTINO Flow 3.    RCA   Proximal right coronary artery: There is a 100 % stenosis. There is  good collateral blood supply to the distal myocardium. This  lesion is a chronic total occlusion.  This is a small non-dominant  vessel.  Ramus   Ramus intermedius: Angiography shows mild atherosclerosis.      Left Heart Cath   Ejection fraction was calculated by echo with a value of 40-45%. LV to  AO pullback was performed and there is a pressure gradient of 25 mmHg.      ECG:  SR with 1st degree AVB with RBBB/LAFB.  No ST-T wave changes.  PRWP.            RADIOLOGY:  CT A/P:  No pulmonary embolism though evaluation of subsegmental branches limited   secondary to respiratory motion artifact.    Trace bilateral pleural effusions with adjacent patchy bibasilar   opacities, likely represent dependent atelectasis. Recommend clinical   correlation to assess for superimposed infection.    Hepatomegaly. Liver surface nodularity. Hepatitis or cirrhosis considered   in differential. Correlate with clinical parameters.    Trace ascites.    Mild bladder wall thickening with suggestion of left posterolateral   diverticulum. Correlate with urinalysis andlab values to assess for   cystitis and/or ascending urinary tract infection.    Additional findings as mentioned above.    ECHO 1/2023:   1. Technically difficult study.   2. Tachycardic throughout the study and this may preclude true assessment of LVEF.   3. Left ventricular ejection fraction, by visual estimation, is 40 to 45%.   4. Moderately decreased global left ventricular systolic function.   5. The mitral in-flow pattern reveals no discernable A-wave, therefore   no comment on diastolic function can be made.   6. Mildly enlarged left atrium.   7. Normal right atrial size.   8. Mild tricuspid regurgitation.   9. Mild thickening and calcification of the anterior mitral valve leaflet.  10. Mild to moderate mitral annular calcification.  11. Trace mitral valve regurgitation.  12. Moderate aortic valve stenosis, with peak velocity of 2.87 m/s, mean   gradient of 18 mmHg, dimensionless index of 0.29, and CIELO via VTI of 1.42   sqcm.  13. Dilatation of the sinuses of Valsalva, measures 4.15 cm but based on   BSA 1.6cm/sqm is within normal limits.  14. There is a significant pericardial fat pad present.  15. There isno evidence of pericardial effusion.  16. Endocardial visualization was enhanced with intravenous echo contrast.  17. Compared to prior TTE done on 7/6/2022, the left ventricular function   is now reduced to 40-45%, in the setting of Afib wth RVR (prior LVEF   60-65%).      LIBBY 1/2023:   1. Left ventricular ejection fraction, by visual estimation, is 40 to 45%.   2. Mildly decreased global left ventricular systolic function.   3. Normal right ventricular size and function.   4. Mildly enlarged left atrium.   5. Mild mitral annular calcification.   6. Mild mitral valve regurgitation.   7. Thickening of the anterior and posterior mitral valve leaflets.   8. Mild tricuspid regurgitation.   9. Mild aortic regurgitation.  10. Mild to moderate aortic valvestenosis.  11. CIELO by planimetry 1.2cm2.  12. Aortic root measured at Sinus of Valsalva is dilated. 4.1cm  13. No left atrial appendage thrombus.  14. Color flow doppler and intravenous injection of agitated saline   demonstrates the presence of an intact intra atrial septum.    Echo (3/14283):   1. Left ventricular ejection fraction, by visual estimation, is 60 to 65%.   2. Normal right ventricular size and function.   3. There is no evidence of pericardial effusion.   4. Endocardial visualizationwas enhanced with intravenous echo contrast.    Echo personally reviewed:   1. Left ventricularejection fraction, by visual estimation, is 35-40%.   2. Moderately decreased global left ventricular systolic   function.   3. There is mild concentric left ventricular hypertrophy.   4. Mildly enlarged right ventricle.   5. Moderately reduced RV systolic function.   6. Normal left atrial size.   7. Normal right atrial size.   8. There is no evidence of pericardial effusion.   9. Peak Gradient across the Aortic Valve is 35 mm Hg. Mean gradient is 26 mm Hg. DI 0.25.  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        Assessment:  76 y/o male PMHx AF on Eliquis (s/p Cardioversion on 1/31/23), moderate-severe AS, Chronic Diastolic Heart Failure, CAD s/p PCI, HTN, HLD, DM 2, Morbid Obesity, ALLIE, Thoracic Aortic Aneurysm, BPH s/p TURP, Left Renal Mass was sent to the ED from [facility] for abdominal pain x 4 days. Patient found to have UTI.  Echo performed noted significant drop in EF.  CT with hepatomegaly appearing ?significantly larger than CT from March.  ?2/2 CPC but would consider other causes such as hepatitis.  Appears somewhat fluid overloaded but not in severe acute CHF.  -Acute systolic CHF being diuresed  -Also now back in AF, rate controlled.  ?Contributing to new drop in EF.  ?2/2 stress myopathy.  Cath in January with  RCA, 60% distal LCX disease and unlikely to be contributing to CMP  -Also with significant AS, ?severe LFLG potentially contributing but fairly similar to prior echos. Has had some fluctuation in EF and when i AF this past January had drop in EF with improvement in 3/2023. Now drop again with recurrent AF and suspect contributing factor.  On monitor some SR with 1st degree AV delay and now in AF , at times rapid   -significant hepatosplenomegaly needs further evaluation, remains symptomatic from hepatomegaly   -Also with UTI, biliary colic 2/2 sludge    Plan:   1. Trial of increasing metoprolol again for better rate control, TID dosing   2. Lasix 40mg IV QD as tolerated. Monitor renal function   3. Add Entresto when BP tolerates after diuresed, BP too low now  4. Keep off  Amio and statin, GI follow up    5 Lovenox for CVA PPX for now. Ultimately transition back to eliquis   6. Continue other current CV meds at current doses. and med management of CAD   7. Antibiotics for UTI         Chong Carranza MD ANIA ELISAELSY  [id number]        Chief Complaint: follow up new CMP      Subjective: abdominal pain      24 hour Tele: AF with elevated VR         albuterol    90 MICROgram(s) HFA Inhaler 2 Puff(s) Inhalation every 6 hours  ALPRAZolam 0.25 milliGRAM(s) Oral three times a day PRN  aluminum hydroxide/magnesium hydroxide/simethicone Suspension 30 milliLiter(s) Oral every 4 hours PRN  aspirin  chewable 81 milliGRAM(s) Oral daily  bisacodyl 5 milliGRAM(s) Oral every 12 hours PRN  chlorhexidine 2% Cloths 1 Application(s) Topical <User Schedule>  dextrose 5%. 1000 milliLiter(s) IV Continuous <Continuous>  dextrose 5%. 1000 milliLiter(s) IV Continuous <Continuous>  dextrose 50% Injectable 12.5 Gram(s) IV Push once  dextrose 50% Injectable 25 Gram(s) IV Push once  dextrose 50% Injectable 25 Gram(s) IV Push once  dextrose Oral Gel 15 Gram(s) Oral once PRN  diphenhydrAMINE Injectable 50 milliGRAM(s) IV Push once PRN  doxazosin 2 milliGRAM(s) Oral at bedtime  enoxaparin Injectable 135 milliGRAM(s) SubCutaneous every 12 hours  ertapenem  IVPB 1000 milliGRAM(s) IV Intermittent every 24 hours  finasteride 5 milliGRAM(s) Oral daily  fluticasone propionate 50 MICROgram(s)/spray Nasal Spray 1 Spray(s) Both Nostrils two times a day  furosemide   Injectable 40 milliGRAM(s) IV Push daily  glucagon  Injectable 1 milliGRAM(s) IntraMuscular once  insulin lispro (ADMELOG) corrective regimen sliding scale   SubCutaneous three times a day before meals  insulin lispro (ADMELOG) corrective regimen sliding scale   SubCutaneous at bedtime  lactulose Syrup 10 Gram(s) Oral three times a day  meclizine 12.5 milliGRAM(s) Oral three times a day PRN  melatonin 3 milliGRAM(s) Oral at bedtime PRN  metoprolol tartrate 25 milliGRAM(s) Oral two times a day  ondansetron Injectable 4 milliGRAM(s) IV Push every 8 hours PRN  oxyCODONE    IR 5 milliGRAM(s) Oral every 4 hours PRN  pantoprazole    Tablet 40 milliGRAM(s) Oral before breakfast  polyethylene glycol 3350 17 Gram(s) Oral daily  potassium chloride    Tablet ER 40 milliEquivalent(s) Oral once  potassium chloride   Powder 20 milliEquivalent(s) Oral daily  senna 2 Tablet(s) Oral at bedtime          Physical Exam:  T(C): 36.6 (06-13-23 @ 07:38), Max: 37 (06-12-23 @ 20:42)  HR: 100 (06-13-23 @ 08:38) (100 - 125)  BP: 106/60 (06-13-23 @ 08:38) (85/60 - 134/83)  RR: 22 (06-13-23 @ 08:38) (18 - 23)  SpO2: 98% (06-13-23 @ 08:38) (93% - 98%)  Wt(kg): --  General: Comfortable in NAD  HEENT: MMM, sclera anicteric  Resp: bilateral wheezing  CVS: soft s1s2, rrr, no s3/JVD, II/VI systolic murmur  Abd: soft, NT, ND, BS+  Ext: No c/c/e  Neuro A&O x3  Psych: Normal affect    I&O's Summary    12 Jun 2023 07:01  -  13 Jun 2023 07:00  --------------------------------------------------------  IN: 240 mL / OUT: 550 mL / NET: -310 mL          Labs:   13 Jun 2023 04:57    138    |  101    |  27.4   ----------------------------<  173    3.5     |  29.0   |  0.60     Ca    8.2        13 Jun 2023 04:57  Mg     1.9       13 Jun 2023 04:57    TPro  5.5    /  Alb  2.6    /  TBili  0.3    /  DBili  x      /  AST  7      /  ALT  8      /  AlkPhos  168    13 Jun 2023 04:57                          13.3   7.57  )-----------( 203      ( 13 Jun 2023 04:57 )             41.8     [facility] 1/2023:  Coronary Angiography   - Left dominant system   - Patent prox and mid LAD stents   - Moderate stenosis of the distal LCx   - The RCA is a small, non-dominat vessel.  The proximal segment is occluded with brisk right-to-right collaterals.  - 25mm LV-Ao gradient on pullback.  CIELO 1.4cm2 with moderate AS byecho.  - Moderately reduced LVEF by echo       LM   Left main artery: Angiography shows minor irregularities.    LAD   Proximal left anterior descending: There is a 10 % stenosis within the  stented segment. Mid left anterior descending: There is a10 % stenosis within the stented segment.    CX   Distal circumflex: There is a 60 % stenosis. VALENTINO Flow 3.    RCA   Proximal right coronary artery: There is a 100 % stenosis. There is  good collateral blood supply to the distal myocardium. This  lesion is a chronic total occlusion.  This is a small non-dominant  vessel.  Ramus   Ramus intermedius: Angiography shows mild atherosclerosis.      Left Heart Cath   Ejection fraction was calculated by echo with a value of 40-45%. LV to  AO pullback was performed and there is a pressure gradient of 25 mmHg.      ECG:  SR with 1st degree AVB with RBBB/LAFB.  No ST-T wave changes.  PRWP.            RADIOLOGY:  CT A/P:  No pulmonary embolism though evaluation of subsegmental branches limited   secondary to respiratory motion artifact.    Trace bilateral pleural effusions with adjacent patchy bibasilar   opacities, likely represent dependent atelectasis. Recommend clinical   correlation to assess for superimposed infection.    Hepatomegaly. Liver surface nodularity. Hepatitis or cirrhosis considered   in differential. Correlate with clinical parameters.    Trace ascites.    Mild bladder wall thickening with suggestion of left posterolateral   diverticulum. Correlate with urinalysis andlab values to assess for   cystitis and/or ascending urinary tract infection.    Additional findings as mentioned above.    ECHO 1/2023:   1. Technically difficult study.   2. Tachycardic throughout the study and this may preclude true assessment of LVEF.   3. Left ventricular ejection fraction, by visual estimation, is 40 to 45%.   4. Moderately decreased global left ventricular systolic function.   5. The mitral in-flow pattern reveals no discernable A-wave, therefore   no comment on diastolic function can be made.   6. Mildly enlarged left atrium.   7. Normal right atrial size.   8. Mild tricuspid regurgitation.   9. Mild thickening and calcification of the anterior mitral valve leaflet.  10. Mild to moderate mitral annular calcification.  11. Trace mitral valve regurgitation.  12. Moderate aortic valve stenosis, with peak velocity of 2.87 m/s, mean   gradient of 18 mmHg, dimensionless index of 0.29, and CIELO via VTI of 1.42   sqcm.  13. Dilatation of the sinuses of Valsalva, measures 4.15 cm but based on   BSA 1.6cm/sqm is within normal limits.  14. There is a significant pericardial fat pad present.  15. There isno evidence of pericardial effusion.  16. Endocardial visualization was enhanced with intravenous echo contrast.  17. Compared to prior TTE done on 7/6/2022, the left ventricular function   is now reduced to 40-45%, in the setting of Afib wth RVR (prior LVEF   60-65%).      LIBBY 1/2023:   1. Left ventricular ejection fraction, by visual estimation, is 40 to 45%.   2. Mildly decreased global left ventricular systolic function.   3. Normal right ventricular size and function.   4. Mildly enlarged left atrium.   5. Mild mitral annular calcification.   6. Mild mitral valve regurgitation.   7. Thickening of the anterior and posterior mitral valve leaflets.   8. Mild tricuspid regurgitation.   9. Mild aortic regurgitation.  10. Mild to moderate aortic valvestenosis.  11. CIELO by planimetry 1.2cm2.  12. Aortic root measured at Sinus of Valsalva is dilated. 4.1cm  13. No left atrial appendage thrombus.  14. Color flow doppler and intravenous injection of agitated saline   demonstrates the presence of an intact intra atrial septum.    Echo (3/60583):   1. Left ventricular ejection fraction, by visual estimation, is 60 to 65%.   2. Normal right ventricular size and function.   3. There is no evidence of pericardial effusion.   4. Endocardial visualizationwas enhanced with intravenous echo contrast.    Echo personally reviewed:   1. Left ventricularejection fraction, by visual estimation, is 35-40%.   2. Moderately decreased global left ventricular systolic   function.   3. There is mild concentric left ventricular hypertrophy.   4. Mildly enlarged right ventricle.   5. Moderately reduced RV systolic function.   6. Normal left atrial size.   7. Normal right atrial size.   8. There is no evidence of pericardial effusion.   9. Peak Gradient across the Aortic Valve is 35 mm Hg. Mean gradient is 26 mm Hg. DI 0.25.  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        Assessment:  76 y/o male PMHx AF on Eliquis (s/p Cardioversion on 1/31/23), moderate-severe AS, Chronic Diastolic Heart Failure, CAD s/p PCI, HTN, HLD, DM 2, Morbid Obesity, ALLIE, Thoracic Aortic Aneurysm, BPH s/p TURP, Left Renal Mass was sent to the ED from [facility] for abdominal pain x 4 days. Patient found to have UTI.  Echo performed noted significant drop in EF.  CT with hepatomegaly appearing ?significantly larger than CT from March.  ?2/2 CPC but would consider other causes such as hepatitis.  Appears somewhat fluid overloaded but not in severe acute CHF.  -Acute systolic CHF being diuresed  -Also now back in AF, rate controlled.  ?Contributing to new drop in EF.  ?2/2 stress myopathy.  Cath in January with  RCA, 60% distal LCX disease and unlikely to be contributing to CMP  -Also with significant AS, ?severe LFLG potentially contributing but fairly similar to prior echos. Has had some fluctuation in EF and when i AF this past January had drop in EF with improvement in 3/2023. Now drop again with recurrent AF and suspect contributing factor.  On monitor some SR with 1st degree AV delay and now in AF , at times rapid   -significant hepatosplenomegaly needs further evaluation, remains symptomatic from hepatomegaly   -Also with UTI, biliary colic 2/2 sludge    Plan:   1. Trial of increasing metoprolol again for better rate control, TID dosing   2. Lasix 40mg IV QD as tolerated. Monitor renal function   3. Add Entresto when BP tolerates after diuresed, BP too low now  4. Keep off  Amio and statin, GI follow up    5 Lovenox for CVA PPX for now. Ultimately transition back to eliquis   6. Continue other current CV meds at current doses. and med management of CAD   7. Antibiotics for UTI         Chong Carranza MD

## 2023-06-13 NOTE — PROGRESS NOTE ADULT - SUBJECTIVE AND OBJECTIVE BOX
ANIA CRISTOBAL    [id number]    77y      Male    INTERVAL HPI/OVERNIGHT EVENTS: patient being seen for chf. Patient seen at bedside and continues to have abd pain     REVIEW OF SYSTEMS:    CONSTITUTIONAL: No fever, weight loss, or fatigue  RESPIRATORY: No cough, wheezing, hemoptysis; No shortness of breath  CARDIOVASCULAR: No chest pain, palpitations  GASTROINTESTINAL: abd pain   NEUROLOGICAL: No headaches, memory loss, loss of strength.  MISCELLANEOUS:      Vital Signs Last 24 Hrs  T(C): 36.6 (13 Jun 2023 07:38), Max: 37 (12 Jun 2023 20:42)  T(F): 97.9 (13 Jun 2023 07:38), Max: 98.6 (12 Jun 2023 20:42)  HR: 102 (13 Jun 2023 12:07) (100 - 125)  BP: 92/60 (13 Jun 2023 12:07) (92/60 - 134/83)  BP(mean): 81 (12 Jun 2023 13:56) (81 - 81)  RR: 18 (13 Jun 2023 12:07) (18 - 23)  SpO2: 97% (13 Jun 2023 12:07) (93% - 98%)    Parameters below as of 13 Jun 2023 12:07  Patient On (Oxygen Delivery Method): nasal cannula  O2 Flow (L/min): 4      PHYSICAL EXAM:    CONSTITUTIONAL: NAD,  obese, [place]  HEENMT: NC/AT, PERRLA, EOMI, Moist oral mucosa, no pharyngeal injection or exudates;  RESPIRATORY:  diminsihjed  CARDIOVASCULAR: S1/S2+, RRR, no MRG, trace edema  ABDOMEN: Soft, tender ruq  PSYCH: Anxious, cooperative  NEUROLOGY: AAOx4, CN 2-12 are intact and symmetric; no gross sensory deficits;   SKIN: Warm, Dry, No rashes; no palpable lesions        LABS:                        13.3   7.57  )-----------( 203      ( 13 Jun 2023 04:57 )             41.8     06-13    138  |  101  |  27.4<H>  ----------------------------<  173<H>  3.5   |  29.0  |  0.60    Ca    8.2<L>      13 Jun 2023 04:57  Mg     1.9     06-13    TPro  5.5<L>  /  Alb  2.6<L>  /  TBili  0.3<L>  /  DBili  x   /  AST  7   /  ALT  8   /  AlkPhos  168<H>  06-13      MEDICATIONS  (STANDING):  albuterol    90 MICROgram(s) HFA Inhaler 2 Puff(s) Inhalation every 6 hours  aspirin  chewable 81 milliGRAM(s) Oral daily  chlorhexidine 2% Cloths 1 Application(s) Topical <User Schedule>  dextrose 5%. 1000 milliLiter(s) (100 mL/Hr) IV Continuous <Continuous>  dextrose 5%. 1000 milliLiter(s) (50 mL/Hr) IV Continuous <Continuous>  dextrose 50% Injectable 12.5 Gram(s) IV Push once  dextrose 50% Injectable 25 Gram(s) IV Push once  dextrose 50% Injectable 25 Gram(s) IV Push once  doxazosin 2 milliGRAM(s) Oral at bedtime  enoxaparin Injectable 135 milliGRAM(s) SubCutaneous every 12 hours  ertapenem  IVPB 1000 milliGRAM(s) IV Intermittent every 24 hours  finasteride 5 milliGRAM(s) Oral daily  fluticasone propionate 50 MICROgram(s)/spray Nasal Spray 1 Spray(s) Both Nostrils two times a day  furosemide   Injectable 40 milliGRAM(s) IV Push daily  glucagon  Injectable 1 milliGRAM(s) IntraMuscular once  insulin lispro (ADMELOG) corrective regimen sliding scale   SubCutaneous three times a day before meals  insulin lispro (ADMELOG) corrective regimen sliding scale   SubCutaneous at bedtime  metoprolol tartrate 25 milliGRAM(s) Oral three times a day  mupirocin 2% Nasal 1 Application(s) Both Nostrils two times a day  pantoprazole    Tablet 40 milliGRAM(s) Oral before breakfast  polyethylene glycol 3350 17 Gram(s) Oral daily  potassium chloride   Powder 20 milliEquivalent(s) Oral daily  senna 2 Tablet(s) Oral at bedtime    MEDICATIONS  (PRN):  ALPRAZolam 0.25 milliGRAM(s) Oral three times a day PRN Anxiety  aluminum hydroxide/magnesium hydroxide/simethicone Suspension 30 milliLiter(s) Oral every 4 hours PRN Dyspepsia  bisacodyl 5 milliGRAM(s) Oral every 12 hours PRN Constipation  dextrose Oral Gel 15 Gram(s) Oral once PRN Blood Glucose LESS THAN 70 milliGRAM(s)/deciliter  diphenhydrAMINE Injectable 50 milliGRAM(s) IV Push once PRN Allergic Reaction  meclizine 12.5 milliGRAM(s) Oral three times a day PRN Dizziness  melatonin 3 milliGRAM(s) Oral at bedtime PRN Insomnia  ondansetron Injectable 4 milliGRAM(s) IV Push every 8 hours PRN Nausea and/or Vomiting  oxyCODONE    IR 5 milliGRAM(s) Oral every 4 hours PRN Severe Pain (7 - 10)      RADIOLOGY & ADDITIONAL TESTS:

## 2023-06-14 DIAGNOSIS — I45.2 BIFASCICULAR BLOCK: ICD-10-CM

## 2023-06-14 DIAGNOSIS — I48.0 PAROXYSMAL ATRIAL FIBRILLATION: ICD-10-CM

## 2023-06-14 DIAGNOSIS — R16.0 HEPATOMEGALY, NOT ELSEWHERE CLASSIFIED: ICD-10-CM

## 2023-06-14 DIAGNOSIS — R94.31 ABNORMAL ELECTROCARDIOGRAM [ECG] [EKG]: ICD-10-CM

## 2023-06-14 DIAGNOSIS — R10.9 UNSPECIFIED ABDOMINAL PAIN: ICD-10-CM

## 2023-06-14 DIAGNOSIS — Z79.899 OTHER LONG TERM (CURRENT) DRUG THERAPY: ICD-10-CM

## 2023-06-14 DIAGNOSIS — I50.23 ACUTE ON CHRONIC SYSTOLIC (CONGESTIVE) HEART FAILURE: ICD-10-CM

## 2023-06-14 DIAGNOSIS — I48.4 ATYPICAL ATRIAL FLUTTER: ICD-10-CM

## 2023-06-14 LAB
ALBUMIN SERPL ELPH-MCNC: 2.4 G/DL — LOW (ref 3.3–5.2)
ALP SERPL-CCNC: 160 U/L — HIGH (ref 40–120)
ALT FLD-CCNC: 8 U/L — SIGNIFICANT CHANGE UP
ANION GAP SERPL CALC-SCNC: 8 MMOL/L — SIGNIFICANT CHANGE UP (ref 5–17)
AST SERPL-CCNC: 8 U/L — SIGNIFICANT CHANGE UP
BILIRUB SERPL-MCNC: 0.2 MG/DL — LOW (ref 0.4–2)
BUN SERPL-MCNC: 24 MG/DL — HIGH (ref 8–20)
CALCIUM SERPL-MCNC: 8 MG/DL — LOW (ref 8.4–10.5)
CHLORIDE SERPL-SCNC: 101 MMOL/L — SIGNIFICANT CHANGE UP (ref 96–108)
CO2 SERPL-SCNC: 30 MMOL/L — HIGH (ref 22–29)
CREAT SERPL-MCNC: 0.69 MG/DL — SIGNIFICANT CHANGE UP (ref 0.5–1.3)
EGFR: 95 ML/MIN/1.73M2 — SIGNIFICANT CHANGE UP
GLUCOSE BLDC GLUCOMTR-MCNC: 138 MG/DL — HIGH (ref 70–99)
GLUCOSE BLDC GLUCOMTR-MCNC: 141 MG/DL — HIGH (ref 70–99)
GLUCOSE BLDC GLUCOMTR-MCNC: 160 MG/DL — HIGH (ref 70–99)
GLUCOSE BLDC GLUCOMTR-MCNC: 177 MG/DL — HIGH (ref 70–99)
GLUCOSE SERPL-MCNC: 163 MG/DL — HIGH (ref 70–99)
MAGNESIUM SERPL-MCNC: 1.7 MG/DL — SIGNIFICANT CHANGE UP (ref 1.6–2.6)
POTASSIUM SERPL-MCNC: 3.8 MMOL/L — SIGNIFICANT CHANGE UP (ref 3.5–5.3)
POTASSIUM SERPL-SCNC: 3.8 MMOL/L — SIGNIFICANT CHANGE UP (ref 3.5–5.3)
PROT SERPL-MCNC: 5.3 G/DL — LOW (ref 6.6–8.7)
SODIUM SERPL-SCNC: 139 MMOL/L — SIGNIFICANT CHANGE UP (ref 135–145)

## 2023-06-14 PROCEDURE — 99233 SBSQ HOSP IP/OBS HIGH 50: CPT

## 2023-06-14 PROCEDURE — 99232 SBSQ HOSP IP/OBS MODERATE 35: CPT | Mod: FS

## 2023-06-14 PROCEDURE — 99232 SBSQ HOSP IP/OBS MODERATE 35: CPT

## 2023-06-14 PROCEDURE — 99223 1ST HOSP IP/OBS HIGH 75: CPT

## 2023-06-14 PROCEDURE — 76705 ECHO EXAM OF ABDOMEN: CPT | Mod: 26

## 2023-06-14 RX ORDER — MAGNESIUM SULFATE 500 MG/ML
2 VIAL (ML) INJECTION ONCE
Refills: 0 | Status: COMPLETED | OUTPATIENT
Start: 2023-06-14 | End: 2023-06-14

## 2023-06-14 RX ORDER — POTASSIUM CHLORIDE 20 MEQ
40 PACKET (EA) ORAL ONCE
Refills: 0 | Status: COMPLETED | OUTPATIENT
Start: 2023-06-14 | End: 2023-06-14

## 2023-06-14 RX ORDER — DIGOXIN 250 MCG
125 TABLET ORAL ONCE
Refills: 0 | Status: COMPLETED | OUTPATIENT
Start: 2023-06-15 | End: 2023-06-15

## 2023-06-14 RX ORDER — FUROSEMIDE 40 MG
40 TABLET ORAL
Refills: 0 | Status: DISCONTINUED | OUTPATIENT
Start: 2023-06-14 | End: 2023-06-17

## 2023-06-14 RX ORDER — DIGOXIN 250 MCG
250 TABLET ORAL ONCE
Refills: 0 | Status: COMPLETED | OUTPATIENT
Start: 2023-06-14 | End: 2023-06-14

## 2023-06-14 RX ORDER — POTASSIUM CHLORIDE 20 MEQ
40 PACKET (EA) ORAL ONCE
Refills: 0 | Status: DISCONTINUED | OUTPATIENT
Start: 2023-06-14 | End: 2023-06-14

## 2023-06-14 RX ORDER — TRAMADOL HYDROCHLORIDE 50 MG/1
25 TABLET ORAL ONCE
Refills: 0 | Status: DISCONTINUED | OUTPATIENT
Start: 2023-06-14 | End: 2023-06-14

## 2023-06-14 RX ORDER — DIGOXIN 250 MCG
125 TABLET ORAL DAILY
Refills: 0 | Status: DISCONTINUED | OUTPATIENT
Start: 2023-06-16 | End: 2023-06-16

## 2023-06-14 RX ADMIN — CHLORHEXIDINE GLUCONATE 1 APPLICATION(S): 213 SOLUTION TOPICAL at 06:30

## 2023-06-14 RX ADMIN — ERTAPENEM SODIUM 120 MILLIGRAM(S): 1 INJECTION, POWDER, LYOPHILIZED, FOR SOLUTION INTRAMUSCULAR; INTRAVENOUS at 06:24

## 2023-06-14 RX ADMIN — Medication 81 MILLIGRAM(S): at 08:24

## 2023-06-14 RX ADMIN — Medication 20 MILLIEQUIVALENT(S): at 08:24

## 2023-06-14 RX ADMIN — Medication 25 GRAM(S): at 09:40

## 2023-06-14 RX ADMIN — Medication 40 MILLIEQUIVALENT(S): at 09:41

## 2023-06-14 RX ADMIN — Medication 2 MILLIGRAM(S): at 21:39

## 2023-06-14 RX ADMIN — MUPIROCIN 1 APPLICATION(S): 20 OINTMENT TOPICAL at 17:15

## 2023-06-14 RX ADMIN — Medication 0.25 MILLIGRAM(S): at 22:34

## 2023-06-14 RX ADMIN — ENOXAPARIN SODIUM 135 MILLIGRAM(S): 100 INJECTION SUBCUTANEOUS at 08:25

## 2023-06-14 RX ADMIN — Medication 250 MICROGRAM(S): at 19:49

## 2023-06-14 RX ADMIN — Medication 1 SPRAY(S): at 06:29

## 2023-06-14 RX ADMIN — ENOXAPARIN SODIUM 135 MILLIGRAM(S): 100 INJECTION SUBCUTANEOUS at 21:41

## 2023-06-14 RX ADMIN — Medication 40 MILLIGRAM(S): at 08:24

## 2023-06-14 RX ADMIN — ALBUTEROL 2 PUFF(S): 90 AEROSOL, METERED ORAL at 04:32

## 2023-06-14 RX ADMIN — OXYCODONE HYDROCHLORIDE 5 MILLIGRAM(S): 5 TABLET ORAL at 19:49

## 2023-06-14 RX ADMIN — Medication 0.25 MILLIGRAM(S): at 17:19

## 2023-06-14 RX ADMIN — PANTOPRAZOLE SODIUM 40 MILLIGRAM(S): 20 TABLET, DELAYED RELEASE ORAL at 06:30

## 2023-06-14 RX ADMIN — MUPIROCIN 1 APPLICATION(S): 20 OINTMENT TOPICAL at 06:39

## 2023-06-14 RX ADMIN — Medication 25 MILLIGRAM(S): at 06:31

## 2023-06-14 RX ADMIN — TRAMADOL HYDROCHLORIDE 25 MILLIGRAM(S): 50 TABLET ORAL at 04:37

## 2023-06-14 RX ADMIN — Medication 1 SPRAY(S): at 17:15

## 2023-06-14 RX ADMIN — TRAMADOL HYDROCHLORIDE 25 MILLIGRAM(S): 50 TABLET ORAL at 05:37

## 2023-06-14 RX ADMIN — ALBUTEROL 2 PUFF(S): 90 AEROSOL, METERED ORAL at 15:12

## 2023-06-14 RX ADMIN — ALBUTEROL 2 PUFF(S): 90 AEROSOL, METERED ORAL at 20:51

## 2023-06-14 RX ADMIN — Medication 3 MILLIGRAM(S): at 21:39

## 2023-06-14 RX ADMIN — OXYCODONE HYDROCHLORIDE 5 MILLIGRAM(S): 5 TABLET ORAL at 20:32

## 2023-06-14 RX ADMIN — FINASTERIDE 5 MILLIGRAM(S): 5 TABLET, FILM COATED ORAL at 08:24

## 2023-06-14 RX ADMIN — Medication 1: at 17:15

## 2023-06-14 NOTE — CONSULT NOTE ADULT - NS ATTEND AMEND GEN_ALL_CORE FT
Patient seen and examined at bedside. GI consulted for enlarged liver, he is obese, hepatomegaly not best appreciated on exam. 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 He denies previous liver decompensations. MELD 11 based on available labs . Please have patient follow up with Hepatology an outpatient.
.  .  Howie Rubio is a 77 year old male with HTN, HL, DM 2, morbid obesity (BMI 38), ALLIE, thoracic aortic aneurysm, BPH s/p TURP, left renal mass, bifascicular block (RBBB + LAFB), left renal mass, newly diagnosed HFrEF (LVEF: 25%; though previously had AF-mediated CMY which improved after CV), moderate AS, and persistent AF/AFL (s/p CV 1/31/23; previously on amiodarone) who presented with severe abdominal pain found to have hepatomegaly with early cirrhosis and AF/AFL with RVR for which EP is consulted.    The patient is currently getting GI evaluation to understand cause of hepatomegaly. Hepatomegaly is not a common complication of amiodarone use but has been reported. I would expect that amiodarone related hepatotoxicity to be associated with transaminitis. Regardless, in the setting of new early cirrhosis and GI recommendations, agree with holding amiodarone.    Since he has newly reduced EF and AF/AFL with RVR, I believe the cause of cardiomyopathy is likely AF/tachycardia mediated. I believe that rhythm control options right now are very limited. I would be hesitant to use a class III agent since he already has a wide QRS with a prolonged QTc which even if corrected for wide QRS, would be equal to or greater than 500 msec. Class IC agents may not be desirable since they have some hepatic metabolism and he already has baseline QRS widening. Additionally, he has a depressed EF. Dronedarone will likely be ineffective and should also not be used in patients with hepatic dysfunction. As such, I would favor a rhythm control strategy for now. He is borderline hypotensive with Metoprolol so I would start Digoxin load for rate control, but would start at a lower dose since he has amiodarone in his system. At some point, he would do best with catheter ablation.    I would pursue R/LHC to assess severity of aortic stenosis and to confirm that patient is euvolemic. He does not appear to have significant volume overload which makes me feel it is unlikely that he has cardiac cirrhosis.    Recommendations:  - GI evaluation for hepatomegaly (thus far hepatitis work up negative; may be due to amiodarone?)  - Remain off of amiodarone  - Avoid rhythm control for now  - Rate control with Metoprolol, start Digoxin load  - Consider R/LHC to evaluate AoV & volume status  - May consider LIBBY/CV when fully dry    Discussed with Dr. Carranza.    Thank you for this consult. We will follow with you.    Jordan Rocha MD  Clinical Cardiac Electrophysiology

## 2023-06-14 NOTE — PROGRESS NOTE ADULT - SUBJECTIVE AND OBJECTIVE BOX
ANIA CRISTOBAL  [id number]        Chief Complaint: follow up new CMP      Subjective:      24 hour Tele:          albuterol    90 MICROgram(s) HFA Inhaler 2 Puff(s) Inhalation every 6 hours  ALPRAZolam 0.25 milliGRAM(s) Oral three times a day PRN  aluminum hydroxide/magnesium hydroxide/simethicone Suspension 30 milliLiter(s) Oral every 4 hours PRN  aspirin  chewable 81 milliGRAM(s) Oral daily  bisacodyl 5 milliGRAM(s) Oral every 12 hours PRN  chlorhexidine 2% Cloths 1 Application(s) Topical <User Schedule>  dextrose 5%. 1000 milliLiter(s) IV Continuous <Continuous>  dextrose 5%. 1000 milliLiter(s) IV Continuous <Continuous>  dextrose 50% Injectable 12.5 Gram(s) IV Push once  dextrose 50% Injectable 25 Gram(s) IV Push once  dextrose 50% Injectable 25 Gram(s) IV Push once  dextrose Oral Gel 15 Gram(s) Oral once PRN  diphenhydrAMINE Injectable 50 milliGRAM(s) IV Push once PRN  doxazosin 2 milliGRAM(s) Oral at bedtime  enoxaparin Injectable 135 milliGRAM(s) SubCutaneous every 12 hours  ertapenem  IVPB 1000 milliGRAM(s) IV Intermittent every 24 hours  finasteride 5 milliGRAM(s) Oral daily  fluticasone propionate 50 MICROgram(s)/spray Nasal Spray 1 Spray(s) Both Nostrils two times a day  furosemide   Injectable 40 milliGRAM(s) IV Push daily  glucagon  Injectable 1 milliGRAM(s) IntraMuscular once  insulin lispro (ADMELOG) corrective regimen sliding scale   SubCutaneous three times a day before meals  insulin lispro (ADMELOG) corrective regimen sliding scale   SubCutaneous at bedtime  magnesium sulfate  IVPB 2 Gram(s) IV Intermittent once  meclizine 12.5 milliGRAM(s) Oral three times a day PRN  melatonin 3 milliGRAM(s) Oral at bedtime PRN  metoprolol tartrate 25 milliGRAM(s) Oral three times a day  mupirocin 2% Nasal 1 Application(s) Both Nostrils two times a day  ondansetron Injectable 4 milliGRAM(s) IV Push every 8 hours PRN  oxyCODONE    IR 5 milliGRAM(s) Oral every 4 hours PRN  pantoprazole    Tablet 40 milliGRAM(s) Oral before breakfast  polyethylene glycol 3350 17 Gram(s) Oral daily  potassium chloride   Powder 20 milliEquivalent(s) Oral daily  potassium chloride   Powder 40 milliEquivalent(s) Oral once  senna 2 Tablet(s) Oral at bedtime          Physical Exam:  T(C): 36.6 (06-14-23 @ 08:18), Max: 36.9 (06-13-23 @ 16:07)  HR: 84 (06-14-23 @ 08:18) (84 - 121)  BP: 114/84 (06-14-23 @ 08:18) (92/60 - 114/84)  RR: 20 (06-14-23 @ 08:18) (17 - 20)  SpO2: 96% (06-14-23 @ 08:18) (93% - 97%)  Wt(kg): --  General: Comfortable in NAD  HEENT: MMM, sclera anicteric  Resp: bilateral wheezing  CVS: soft s1s2, rrr, no s3/JVD, II/VI systolic murmur  Abd: soft, NT, ND, BS+  Ext: No c/c/e  Neuro A&O x3  Psych: Normal affect      I&O's Summary    13 Jun 2023 07:01  -  14 Jun 2023 07:00  --------------------------------------------------------  IN: 300 mL / OUT: 2552 mL / NET: -2252 mL          Labs:   14 Jun 2023 04:51    139    |  101    |  24.0   ----------------------------<  163    3.8     |  30.0   |  0.69     Ca    8.0        14 Jun 2023 04:51  Mg     1.7       14 Jun 2023 04:51    TPro  5.3    /  Alb  2.4    /  TBili  0.2    /  DBili  x      /  AST  8      /  ALT  8      /  AlkPhos  160    14 Jun 2023 04:51                          13.3   7.57  )-----------( 203      ( 13 Jun 2023 04:57 )             41.8             [facility] 1/2023:  Coronary Angiography   - Left dominant system   - Patent prox and mid LAD stents   - Moderate stenosis of the distal LCx   - The RCA is a small, non-dominat vessel.  The proximal segment is occluded with brisk right-to-right collaterals.  - 25mm LV-Ao gradient on pullback.  CIELO 1.4cm2 with moderate AS byecho.  - Moderately reduced LVEF by echo       LM   Left main artery: Angiography shows minor irregularities.    LAD   Proximal left anterior descending: There is a 10 % stenosis within the  stented segment. Mid left anterior descending: There is a10 % stenosis within the stented segment.    CX   Distal circumflex: There is a 60 % stenosis. VALENTINO Flow 3.    RCA   Proximal right coronary artery: There is a 100 % stenosis. There is  good collateral blood supply to the distal myocardium. This  lesion is a chronic total occlusion.  This is a small non-dominant  vessel.  Ramus   Ramus intermedius: Angiography shows mild atherosclerosis.      Left Heart Cath   Ejection fraction was calculated by echo with a value of 40-45%. LV to  AO pullback was performed and there is a pressure gradient of 25 mmHg.      ECG:  SR with 1st degree AVB with RBBB/LAFB.  No ST-T wave changes.  PRWP.            RADIOLOGY:  CT A/P:  No pulmonary embolism though evaluation of subsegmental branches limited   secondary to respiratory motion artifact.    Trace bilateral pleural effusions with adjacent patchy bibasilar   opacities, likely represent dependent atelectasis. Recommend clinical   correlation to assess for superimposed infection.    Hepatomegaly. Liver surface nodularity. Hepatitis or cirrhosis considered   in differential. Correlate with clinical parameters.    Trace ascites.    Mild bladder wall thickening with suggestion of left posterolateral   diverticulum. Correlate with urinalysis andlab values to assess for   cystitis and/or ascending urinary tract infection.    Additional findings as mentioned above.    ECHO 1/2023:   1. Technically difficult study.   2. Tachycardic throughout the study and this may preclude true assessment of LVEF.   3. Left ventricular ejection fraction, by visual estimation, is 40 to 45%.   4. Moderately decreased global left ventricular systolic function.   5. The mitral in-flow pattern reveals no discernable A-wave, therefore   no comment on diastolic function can be made.   6. Mildly enlarged left atrium.   7. Normal right atrial size.   8. Mild tricuspid regurgitation.   9. Mild thickening and calcification of the anterior mitral valve leaflet.  10. Mild to moderate mitral annular calcification.  11. Trace mitral valve regurgitation.  12. Moderate aortic valve stenosis, with peak velocity of 2.87 m/s, mean   gradient of 18 mmHg, dimensionless index of 0.29, and CIELO via VTI of 1.42   sqcm.  13. Dilatation of the sinuses of Valsalva, measures 4.15 cm but based on   BSA 1.6cm/sqm is within normal limits.  14. There is a significant pericardial fat pad present.  15. There isno evidence of pericardial effusion.  16. Endocardial visualization was enhanced with intravenous echo contrast.  17. Compared to prior TTE done on 7/6/2022, the left ventricular function   is now reduced to 40-45%, in the setting of Afib wth RVR (prior LVEF   60-65%).      [facility]BY 1/2023:   1. Left ventricular ejection fraction, by visual estimation, is 40 to 45%.   2. Mildly decreased global left ventricular systolic function.   3. Normal right ventricular size and function.   4. Mildly enlarged left atrium.   5. Mild mitral annular calcification.   6. Mild mitral valve regurgitation.   7. Thickening of the anterior and posterior mitral valve leaflets.   8. Mild tricuspid regurgitation.   9. Mild aortic regurgitation.  10. Mild to moderate aortic valvestenosis.  11. CIELO by planimetry 1.2cm2.  12. Aortic root measured at Sinus of Valsalva is dilated. 4.1cm  13. No left atrial appendage thrombus.  14. Color flow doppler and intravenous injection of agitated saline   demonstrates the presence of an intact intra atrial septum.    Echo (3/48255):   1. Left ventricular ejection fraction, by visual estimation, is 60 to 65%.   2. Normal right ventricular size and function.   3. There is no evidence of pericardial effusion.   4. Endocardial visualizationwas enhanced with intravenous echo contrast.    Echo personally reviewed:   1. Left ventricularejection fraction, by visual estimation, is 35-40%.   2. Moderately decreased global left ventricular systolic   function.   3. There is mild concentric left ventricular hypertrophy.   4. Mildly enlarged right ventricle.   5. Moderately reduced RV systolic function.   6. Normal left atrial size.   7. Normal right atrial size.   8. There is no evidence of pericardial effusion.   9. Peak Gradient across the Aortic Valve is 35 mm Hg. Mean gradient is 26 mm Hg. DI 0.25.  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        Assessment:  76 y/o male PMHx AF on Eliquis (s/p Cardioversion on 1/31/23), moderate-severe AS, Chronic Diastolic Heart Failure, CAD s/p PCI, HTN, HLD, DM 2, Morbid Obesity, ALLIE, Thoracic Aortic Aneurysm, BPH s/p TURP, Left Renal Mass was sent to the ED from [facility] for abdominal pain x 4 days. Patient found to have UTI.  Echo performed noted significant drop in EF.  CT with hepatomegaly appearing ?significantly larger than CT from March.  ?2/2 CPC but would consider other causes such as hepatitis.  Appears somewhat fluid overloaded but not in severe acute CHF.  -Acute systolic CHF being diuresed  -Also now back in AF, rate controlled.  ?Contributing to new drop in EF.  ?2/2 stress myopathy with acute infection and new liver dysfunction.  Cath in January with  RCA, 60% distal LCX disease and unlikely to be contributing to CMP  -Also with significant AS, ?severe LFLG potentially contributing but fairly similar to prior echos. Has had some fluctuation in EF and when i AF this past January had drop in EF with improvement in 3/2023. Now drop again with recurrent AF and suspect contributing factor.  On monitor some SR with 1st degree AV delay and now in AF , at times rapid   -significant hepatosplenomegaly needs further evaluation, remains symptomatic from hepatomegaly   -Also with UTI, biliary colic 2/2 sludge    Plan: (TO BE SEEN)   1.   2. Lasix 40mg IV QD as tolerated. Monitor renal function   3. Add Entresto when BP tolerates after diuresed, BP too low now  4. Keep off  Amio and statin, GI follow up    5 Lovenox for CVA PPX for now. Ultimately transition back to eliquis   6. Continue other current CV meds at current doses. and med management of CAD   7. Antibiotics for UTI             Chong Carranza MD ANIA CRISTOBAL  [id number]        Chief Complaint: follow up new CMP      Subjective: still with RUQ pain but mildly improved      24 hour Tele: AF rate controlled           albuterol    90 MICROgram(s) HFA Inhaler 2 Puff(s) Inhalation every 6 hours  ALPRAZolam 0.25 milliGRAM(s) Oral three times a day PRN  aluminum hydroxide/magnesium hydroxide/simethicone Suspension 30 milliLiter(s) Oral every 4 hours PRN  aspirin  chewable 81 milliGRAM(s) Oral daily  bisacodyl 5 milliGRAM(s) Oral every 12 hours PRN  chlorhexidine 2% Cloths 1 Application(s) Topical <User Schedule>  dextrose 5%. 1000 milliLiter(s) IV Continuous <Continuous>  dextrose 5%. 1000 milliLiter(s) IV Continuous <Continuous>  dextrose 50% Injectable 12.5 Gram(s) IV Push once  dextrose 50% Injectable 25 Gram(s) IV Push once  dextrose 50% Injectable 25 Gram(s) IV Push once  dextrose Oral Gel 15 Gram(s) Oral once PRN  diphenhydrAMINE Injectable 50 milliGRAM(s) IV Push once PRN  doxazosin 2 milliGRAM(s) Oral at bedtime  enoxaparin Injectable 135 milliGRAM(s) SubCutaneous every 12 hours  ertapenem  IVPB 1000 milliGRAM(s) IV Intermittent every 24 hours  finasteride 5 milliGRAM(s) Oral daily  fluticasone propionate 50 MICROgram(s)/spray Nasal Spray 1 Spray(s) Both Nostrils two times a day  furosemide   Injectable 40 milliGRAM(s) IV Push daily  glucagon  Injectable 1 milliGRAM(s) IntraMuscular once  insulin lispro (ADMELOG) corrective regimen sliding scale   SubCutaneous three times a day before meals  insulin lispro (ADMELOG) corrective regimen sliding scale   SubCutaneous at bedtime  magnesium sulfate  IVPB 2 Gram(s) IV Intermittent once  meclizine 12.5 milliGRAM(s) Oral three times a day PRN  melatonin 3 milliGRAM(s) Oral at bedtime PRN  metoprolol tartrate 25 milliGRAM(s) Oral three times a day  mupirocin 2% Nasal 1 Application(s) Both Nostrils two times a day  ondansetron Injectable 4 milliGRAM(s) IV Push every 8 hours PRN  oxyCODONE    IR 5 milliGRAM(s) Oral every 4 hours PRN  pantoprazole    Tablet 40 milliGRAM(s) Oral before breakfast  polyethylene glycol 3350 17 Gram(s) Oral daily  potassium chloride   Powder 20 milliEquivalent(s) Oral daily  potassium chloride   Powder 40 milliEquivalent(s) Oral once  senna 2 Tablet(s) Oral at bedtime          Physical Exam:  T(C): 36.6 (06-14-23 @ 08:18), Max: 36.9 (06-13-23 @ 16:07)  HR: 84 (06-14-23 @ 08:18) (84 - 121)  BP: 114/84 (06-14-23 @ 08:18) (92/60 - 114/84)  RR: 20 (06-14-23 @ 08:18) (17 - 20)  SpO2: 96% (06-14-23 @ 08:18) (93% - 97%)  Wt(kg): --  General: Comfortable in NAD  HEENT: MMM, sclera anicteric  Resp: reduced at bases   CVS: soft s1s2, rrr, no s3/JVD, II/VI systolic murmur  Abd: soft, NT, ND, BS+  Ext: No c/c/e  Neuro A&O x3  Psych: Normal affect      I&O's Summary    13 Jun 2023 07:01  -  14 Jun 2023 07:00  --------------------------------------------------------  IN: 300 mL / OUT: 2552 mL / NET: -2252 mL          Labs:   14 Jun 2023 04:51    139    |  101    |  24.0   ----------------------------<  163    3.8     |  30.0   |  0.69     Ca    8.0        14 Jun 2023 04:51  Mg     1.7       14 Jun 2023 04:51    TPro  5.3    /  Alb  2.4    /  TBili  0.2    /  DBili  x      /  AST  8      /  ALT  8      /  AlkPhos  160    14 Jun 2023 04:51                          13.3   7.57  )-----------( 203      ( 13 Jun 2023 04:57 )             41.8             [facility] 1/2023:  Coronary Angiography   - Left dominant system   - Patent prox and mid LAD stents   - Moderate stenosis of the distal LCx   - The RCA is a small, non-dominat vessel.  The proximal segment is occluded with brisk right-to-right collaterals.  - 25mm LV-Ao gradient on pullback.  CIELO 1.4cm2 with moderate AS byecho.  - Moderately reduced LVEF by echo       LM   Left main artery: Angiography shows minor irregularities.    LAD   Proximal left anterior descending: There is a 10 % stenosis within the  stented segment. Mid left anterior descending: There is a10 % stenosis within the stented segment.    CX   Distal circumflex: There is a 60 % stenosis. VALENTINO Flow 3.    RCA   Proximal right coronary artery: There is a 100 % stenosis. There is  good collateral blood supply to the distal myocardium. This  lesion is a chronic total occlusion.  This is a small non-dominant  vessel.  Ramus   Ramus intermedius: Angiography shows mild atherosclerosis.      Left Heart Cath   Ejection fraction was calculated by echo with a value of 40-45%. LV to  AO pullback was performed and there is a pressure gradient of 25 mmHg.      ECG:  SR with 1st degree AVB with RBBB/LAFB.  No ST-T wave changes.  PRWP.            RADIOLOGY:  CT A/P:  No pulmonary embolism though evaluation of subsegmental branches limited   secondary to respiratory motion artifact.    Trace bilateral pleural effusions with adjacent patchy bibasilar   opacities, likely represent dependent atelectasis. Recommend clinical   correlation to assess for superimposed infection.    Hepatomegaly. Liver surface nodularity. Hepatitis or cirrhosis considered   in differential. Correlate with clinical parameters.    Trace ascites.    Mild bladder wall thickening with suggestion of left posterolateral   diverticulum. Correlate with urinalysis andlab values to assess for   cystitis and/or ascending urinary tract infection.    Additional findings as mentioned above.    ECHO 1/2023:   1. Technically difficult study.   2. Tachycardic throughout the study and this may preclude true assessment of LVEF.   3. Left ventricular ejection fraction, by visual estimation, is 40 to 45%.   4. Moderately decreased global left ventricular systolic function.   5. The mitral in-flow pattern reveals no discernable A-wave, therefore   no comment on diastolic function can be made.   6. Mildly enlarged left atrium.   7. Normal right atrial size.   8. Mild tricuspid regurgitation.   9. Mild thickening and calcification of the anterior mitral valve leaflet.  10. Mild to moderate mitral annular calcification.  11. Trace mitral valve regurgitation.  12. Moderate aortic valve stenosis, with peak velocity of 2.87 m/s, mean   gradient of 18 mmHg, dimensionless index of 0.29, and CIELO via VTI of 1.42   sqcm.  13. Dilatation of the sinuses of Valsalva, measures 4.15 cm but based on   BSA 1.6cm/sqm is within normal limits.  14. There is a significant pericardial fat pad present.  15. There isno evidence of pericardial effusion.  16. Endocardial visualization was enhanced with intravenous echo contrast.  17. Compared to prior TTE done on 7/6/2022, the left ventricular function   is now reduced to 40-45%, in the setting of Afib wth RVR (prior LVEF   60-65%).      [facility]BY 1/2023:   1. Left ventricular ejection fraction, by visual estimation, is 40 to 45%.   2. Mildly decreased global left ventricular systolic function.   3. Normal right ventricular size and function.   4. Mildly enlarged left atrium.   5. Mild mitral annular calcification.   6. Mild mitral valve regurgitation.   7. Thickening of the anterior and posterior mitral valve leaflets.   8. Mild tricuspid regurgitation.   9. Mild aortic regurgitation.  10. Mild to moderate aortic valvestenosis.  11. CIELO by planimetry 1.2cm2.  12. Aortic root measured at Sinus of Valsalva is dilated. 4.1cm  13. No left atrial appendage thrombus.  14. Color flow doppler and intravenous injection of agitated saline   demonstrates the presence of an intact intra atrial septum.    Echo (3/81782):   1. Left ventricular ejection fraction, by visual estimation, is 60 to 65%.   2. Normal right ventricular size and function.   3. There is no evidence of pericardial effusion.   4. Endocardial visualizationwas enhanced with intravenous echo contrast.    Echo personally reviewed:   1. Left ventricularejection fraction, by visual estimation, is 35-40%.   2. Moderately decreased global left ventricular systolic   function.   3. There is mild concentric left ventricular hypertrophy.   4. Mildly enlarged right ventricle.   5. Moderately reduced RV systolic function.   6. Normal left atrial size.   7. Normal right atrial size.   8. There is no evidence of pericardial effusion.   9. Peak Gradient across the Aortic Valve is 35 mm Hg. Mean gradient is 26 mm Hg. DI 0.25.  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        Assessment:  76 y/o male PMHx AF on Eliquis (s/p Cardioversion on 1/31/23), moderate-severe AS, Chronic Diastolic Heart Failure, CAD s/p PCI, HTN, HLD, DM 2, Morbid Obesity, ALLIE, Thoracic Aortic Aneurysm, BPH s/p TURP, Left Renal Mass was sent to the ED from [facility] for abdominal pain x 4 days. Patient found to have UTI.  Echo performed noted significant drop in EF.  CT with hepatomegaly appearing ?significantly larger than CT from March.  ?2/2 CPC but would consider other causes such as hepatitis.  Appears somewhat fluid overloaded but not in severe acute CHF.  -Acute systolic CHF being diuresed  -Also now back in AF, rate controlled.  ?Contributing to new drop in EF.  ?2/2 stress myopathy with acute infection and new liver dysfunction.  Cath in January with  RCA, 60% distal LCX disease and unlikely to be contributing to CMP  -Also with significant AS, ?severe LFLG potentially contributing but fairly similar to prior echos. Has had some fluctuation in EF and when i AF this past January had drop in EF with improvement in 3/2023. Now drop again with recurrent AF and suspect contributing factor.  On monitor some SR with 1st degree AV delay and now in AF , at times rapid   -significant hepatosplenomegaly needs further evaluation, remains symptomatic from hepatomegaly   -Also with UTI, biliary colic 2/2 sludge    Plan:   1. AF better rate controlled, will ask EP for assistance, ? benefit ablation longer term but increased risk   2. Trial of increasing Lasix 40mg IV BID, Monitor renal function and BP. Tolerating so far  3. Add Entresto when BP tolerates after diuresed, BP too low now  4. Keep off  Amio and statin, GI follow up  , plan for MRI  5 Lovenox for CVA PPX for now. Ultimately transition back to eliquis   6. Continue other current CV meds at current doses and med management of CAD   7. Antibiotics for UTI   8. CAC of aortic valve to further evaluate severity            Chong Carranza MD ANIA CRISTOBAL  [id number]        Chief Complaint: follow up new CMP      Subjective: still with RUQ pain but mildly improved      24 hour Tele: AF rate controlled           albuterol    90 MICROgram(s) HFA Inhaler 2 Puff(s) Inhalation every 6 hours  ALPRAZolam 0.25 milliGRAM(s) Oral three times a day PRN  aluminum hydroxide/magnesium hydroxide/simethicone Suspension 30 milliLiter(s) Oral every 4 hours PRN  aspirin  chewable 81 milliGRAM(s) Oral daily  bisacodyl 5 milliGRAM(s) Oral every 12 hours PRN  chlorhexidine 2% Cloths 1 Application(s) Topical <User Schedule>  dextrose 5%. 1000 milliLiter(s) IV Continuous <Continuous>  dextrose 5%. 1000 milliLiter(s) IV Continuous <Continuous>  dextrose 50% Injectable 12.5 Gram(s) IV Push once  dextrose 50% Injectable 25 Gram(s) IV Push once  dextrose 50% Injectable 25 Gram(s) IV Push once  dextrose Oral Gel 15 Gram(s) Oral once PRN  diphenhydrAMINE Injectable 50 milliGRAM(s) IV Push once PRN  doxazosin 2 milliGRAM(s) Oral at bedtime  enoxaparin Injectable 135 milliGRAM(s) SubCutaneous every 12 hours  ertapenem  IVPB 1000 milliGRAM(s) IV Intermittent every 24 hours  finasteride 5 milliGRAM(s) Oral daily  fluticasone propionate 50 MICROgram(s)/spray Nasal Spray 1 Spray(s) Both Nostrils two times a day  furosemide   Injectable 40 milliGRAM(s) IV Push daily  glucagon  Injectable 1 milliGRAM(s) IntraMuscular once  insulin lispro (ADMELOG) corrective regimen sliding scale   SubCutaneous three times a day before meals  insulin lispro (ADMELOG) corrective regimen sliding scale   SubCutaneous at bedtime  magnesium sulfate  IVPB 2 Gram(s) IV Intermittent once  meclizine 12.5 milliGRAM(s) Oral three times a day PRN  melatonin 3 milliGRAM(s) Oral at bedtime PRN  metoprolol tartrate 25 milliGRAM(s) Oral three times a day  mupirocin 2% Nasal 1 Application(s) Both Nostrils two times a day  ondansetron Injectable 4 milliGRAM(s) IV Push every 8 hours PRN  oxyCODONE    IR 5 milliGRAM(s) Oral every 4 hours PRN  pantoprazole    Tablet 40 milliGRAM(s) Oral before breakfast  polyethylene glycol 3350 17 Gram(s) Oral daily  potassium chloride   Powder 20 milliEquivalent(s) Oral daily  potassium chloride   Powder 40 milliEquivalent(s) Oral once  senna 2 Tablet(s) Oral at bedtime          Physical Exam:  T(C): 36.6 (06-14-23 @ 08:18), Max: 36.9 (06-13-23 @ 16:07)  HR: 84 (06-14-23 @ 08:18) (84 - 121)  BP: 114/84 (06-14-23 @ 08:18) (92/60 - 114/84)  RR: 20 (06-14-23 @ 08:18) (17 - 20)  SpO2: 96% (06-14-23 @ 08:18) (93% - 97%)  Wt(kg): --  General: Comfortable in NAD  HEENT: MMM, sclera anicteric  Resp: reduced at bases   CVS: soft s1s2, rrr, no s3/JVD, II/VI systolic murmur  Abd: soft, NT, ND, BS+  Ext: No c/c/e  Neuro A&O x3  Psych: Normal affect      I&O's Summary    13 Jun 2023 07:01  -  14 Jun 2023 07:00  --------------------------------------------------------  IN: 300 mL / OUT: 2552 mL / NET: -2252 mL          Labs:   14 Jun 2023 04:51    139    |  101    |  24.0   ----------------------------<  163    3.8     |  30.0   |  0.69     Ca    8.0        14 Jun 2023 04:51  Mg     1.7       14 Jun 2023 04:51    TPro  5.3    /  Alb  2.4    /  TBili  0.2    /  DBili  x      /  AST  8      /  ALT  8      /  AlkPhos  160    14 Jun 2023 04:51                          13.3   7.57  )-----------( 203      ( 13 Jun 2023 04:57 )             41.8             [facility] 1/2023:  Coronary Angiography   - Left dominant system   - Patent prox and mid LAD stents   - Moderate stenosis of the distal LCx   - The RCA is a small, non-dominat vessel.  The proximal segment is occluded with brisk right-to-right collaterals.  - 25mm LV-Ao gradient on pullback.  CIELO 1.4cm2 with moderate AS byecho.  - Moderately reduced LVEF by echo       LM   Left main artery: Angiography shows minor irregularities.    LAD   Proximal left anterior descending: There is a 10 % stenosis within the  stented segment. Mid left anterior descending: There is a10 % stenosis within the stented segment.    CX   Distal circumflex: There is a 60 % stenosis. VALENTINO Flow 3.    RCA   Proximal right coronary artery: There is a 100 % stenosis. There is  good collateral blood supply to the distal myocardium. This  lesion is a chronic total occlusion.  This is a small non-dominant  vessel.  Ramus   Ramus intermedius: Angiography shows mild atherosclerosis.      Left Heart Cath   Ejection fraction was calculated by echo with a value of 40-45%. LV to  AO pullback was performed and there is a pressure gradient of 25 mmHg.      ECG:  SR with 1st degree AVB with RBBB/LAFB.  No ST-T wave changes.  PRWP.            RADIOLOGY:  CT A/P:  No pulmonary embolism though evaluation of subsegmental branches limited   secondary to respiratory motion artifact.    Trace bilateral pleural effusions with adjacent patchy bibasilar   opacities, likely represent dependent atelectasis. Recommend clinical   correlation to assess for superimposed infection.    Hepatomegaly. Liver surface nodularity. Hepatitis or cirrhosis considered   in differential. Correlate with clinical parameters.    Trace ascites.    Mild bladder wall thickening with suggestion of left posterolateral   diverticulum. Correlate with urinalysis andlab values to assess for   cystitis and/or ascending urinary tract infection.    Additional findings as mentioned above.    ECHO 1/2023:   1. Technically difficult study.   2. Tachycardic throughout the study and this may preclude true assessment of LVEF.   3. Left ventricular ejection fraction, by visual estimation, is 40 to 45%.   4. Moderately decreased global left ventricular systolic function.   5. The mitral in-flow pattern reveals no discernable A-wave, therefore   no comment on diastolic function can be made.   6. Mildly enlarged left atrium.   7. Normal right atrial size.   8. Mild tricuspid regurgitation.   9. Mild thickening and calcification of the anterior mitral valve leaflet.  10. Mild to moderate mitral annular calcification.  11. Trace mitral valve regurgitation.  12. Moderate aortic valve stenosis, with peak velocity of 2.87 m/s, mean   gradient of 18 mmHg, dimensionless index of 0.29, and CIELO via VTI of 1.42   sqcm.  13. Dilatation of the sinuses of Valsalva, measures 4.15 cm but based on   BSA 1.6cm/sqm is within normal limits.  14. There is a significant pericardial fat pad present.  15. There isno evidence of pericardial effusion.  16. Endocardial visualization was enhanced with intravenous echo contrast.  17. Compared to prior TTE done on 7/6/2022, the left ventricular function   is now reduced to 40-45%, in the setting of Afib wth RVR (prior LVEF   60-65%).      LIBBY 1/2023:   1. Left ventricular ejection fraction, by visual estimation, is 40 to 45%.   2. Mildly decreased global left ventricular systolic function.   3. Normal right ventricular size and function.   4. Mildly enlarged left atrium.   5. Mild mitral annular calcification.   6. Mild mitral valve regurgitation.   7. Thickening of the anterior and posterior mitral valve leaflets.   8. Mild tricuspid regurgitation.   9. Mild aortic regurgitation.  10. Mild to moderate aortic valvestenosis.  11. CIELO by planimetry 1.2cm2.  12. Aortic root measured at Sinus of Valsalva is dilated. 4.1cm  13. No left atrial appendage thrombus.  14. Color flow doppler and intravenous injection of agitated saline   demonstrates the presence of an intact intra atrial septum.    Echo (3/[id number]):   1. Left ventricular ejection fraction, by visual estimation, is 60 to 65%.   2. Normal right ventricular size and function.   3. There is no evidence of pericardial effusion.   4. Endocardial visualizationwas enhanced with intravenous echo contrast.    Echo personally reviewed:   1. Left ventricularejection fraction, by visual estimation, is 35-40%.   2. Moderately decreased global left ventricular systolic   function.   3. There is mild concentric left ventricular hypertrophy.   4. Mildly enlarged right ventricle.   5. Moderately reduced RV systolic function.   6. Normal left atrial size.   7. Normal right atrial size.   8. There is no evidence of pericardial effusion.   9. Peak Gradient across the Aortic Valve is 35 mm Hg. Mean gradient is 26 mm Hg. DI 0.25.  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        Assessment:  76 y/o male PMHx AF on Eliquis (s/p Cardioversion on 1/31/23), moderate-severe AS, Chronic Diastolic Heart Failure, CAD s/p PCI, HTN, HLD, DM 2, Morbid Obesity, ALLIE, Thoracic Aortic Aneurysm, BPH s/p TURP, Left Renal Mass was sent to the ED from [facility] for abdominal pain x 4 days. Patient found to have UTI.  Echo performed noted significant drop in EF.  CT with hepatomegaly appearing ?significantly larger than CT from March.  ?2/2 CPC but would consider other causes such as hepatitis.  Appears somewhat fluid overloaded but not in severe acute CHF.  -Acute systolic CHF being diuresed  -Also now back in AF, rate controlled.  ?Contributing to new drop in EF.  ?2/2 stress myopathy with acute infection and new liver dysfunction.  Cath in January with  RCA, 60% distal LCX disease and unlikely to be contributing to CMP. Will need to consider repeat this admission after further diuresis   -Also with significant AS, ?severe LFLG potentially contributing but fairly similar to prior echos. Has had some fluctuation in EF and when i AF this past January had drop in EF with improvement in 3/2023. Now drop again with recurrent AF and suspect contributing factor.  On monitor some SR with 1st degree AV delay and now in AF , at times rapid   -significant hepatosplenomegaly needs further evaluation, remains symptomatic from hepatomegaly   -Also with UTI, biliary colic 2/2 sludge    Plan:   1. AF better rate controlled, will ask EP for assistance, ? benefit ablation longer term but increased risk   2. Trial of increasing Lasix 40mg IV BID, Monitor renal function and BP. Tolerating so far  3. Add Entresto when BP tolerates after diuresed, BP too low now  4. Keep off  Amio and statin, GI follow up  , plan for MRI  5 Lovenox for CVA PPX for now. Ultimately transition back to eliquis   6. Continue other current CV meds at current doses and med management of CAD   7. Antibiotics for UTI   8. CAC of aortic valve to further evaluate severity, will consider LHC/RHC once further diuresed as well             Chong Carranza MD

## 2023-06-14 NOTE — CONSULT NOTE ADULT - SUBJECTIVE AND OBJECTIVE BOX
[place] CARDIAC ELECTROPHYSIOLOGY  [facility] Faculty Practice   Office: [address] [address]  Telephone: [phone] Fax:[phone]      HPI:  Pt is a 78 y/o male, PMHx significant for Atrial fibrillation (Eliquis) s/p DCCV on 1/31/23, Moderate AS, Chronic Diastolic Heart Failure, CAD s/p PCI, HTN, HLD, DM 2, Morbid Obesity, ALLIE, Thoracic Aortic Aneurysm, BPH s/p TURP, Left Renal Mass, who was sent to [facility] ED from [facility] for abdominal pain x 4 days.             PAST MEDICAL & SURGICAL HISTORY:  Anxiety      Hypertension      Hyperlipidemia      Prostate disease      Gastroesophageal reflux disease      Gallbladder stone without cholecystitis or obstruction      BPH (benign prostatic hyperplasia)      DM (diabetes mellitus)      DVT (deep venous thrombosis)  left leg      Afib      Anxiety      HLD (hyperlipidemia)      Malignant schwannoma      H/O arthroscopy of knee      S/P laminectomy      S/P coronary artery stent placement  x2          REVIEW OF SYSTEMS:          MEDICATIONS  (STANDING):  albuterol    90 MICROgram(s) HFA Inhaler 2 Puff(s) Inhalation every 6 hours  aspirin  chewable 81 milliGRAM(s) Oral daily  chlorhexidine 2% Cloths 1 Application(s) Topical <User Schedule>  dextrose 5%. 1000 milliLiter(s) (100 mL/Hr) IV Continuous <Continuous>  dextrose 5%. 1000 milliLiter(s) (50 mL/Hr) IV Continuous <Continuous>  dextrose 50% Injectable 12.5 Gram(s) IV Push once  dextrose 50% Injectable 25 Gram(s) IV Push once  dextrose 50% Injectable 25 Gram(s) IV Push once  doxazosin 2 milliGRAM(s) Oral at bedtime  enoxaparin Injectable 135 milliGRAM(s) SubCutaneous every 12 hours  ertapenem  IVPB 1000 milliGRAM(s) IV Intermittent every 24 hours  finasteride 5 milliGRAM(s) Oral daily  fluticasone propionate 50 MICROgram(s)/spray Nasal Spray 1 Spray(s) Both Nostrils two times a day  furosemide   Injectable 40 milliGRAM(s) IV Push two times a day  glucagon  Injectable 1 milliGRAM(s) IntraMuscular once  insulin lispro (ADMELOG) corrective regimen sliding scale   SubCutaneous at bedtime  insulin lispro (ADMELOG) corrective regimen sliding scale   SubCutaneous three times a day before meals  metoprolol tartrate 25 milliGRAM(s) Oral three times a day  mupirocin 2% Nasal 1 Application(s) Both Nostrils two times a day  pantoprazole    Tablet 40 milliGRAM(s) Oral before breakfast  polyethylene glycol 3350 17 Gram(s) Oral daily  potassium chloride   Powder 20 milliEquivalent(s) Oral daily  senna 2 Tablet(s) Oral at bedtime    MEDICATIONS  (PRN):  ALPRAZolam 0.25 milliGRAM(s) Oral three times a day PRN Anxiety  aluminum hydroxide/magnesium hydroxide/simethicone Suspension 30 milliLiter(s) Oral every 4 hours PRN Dyspepsia  bisacodyl 5 milliGRAM(s) Oral every 12 hours PRN Constipation  dextrose Oral Gel 15 Gram(s) Oral once PRN Blood Glucose LESS THAN 70 milliGRAM(s)/deciliter  diphenhydrAMINE Injectable 50 milliGRAM(s) IV Push once PRN Allergic Reaction  meclizine 12.5 milliGRAM(s) Oral three times a day PRN Dizziness  melatonin 3 milliGRAM(s) Oral at bedtime PRN Insomnia  ondansetron Injectable 4 milliGRAM(s) IV Push every 8 hours PRN Nausea and/or Vomiting  oxyCODONE    IR 5 milliGRAM(s) Oral every 4 hours PRN Severe Pain (7 - 10)      Allergies    penicillin (Anaphylaxis)  Ceftin (Hives)  sulfa drugs (Unknown)  Bactrim (Rash)    Intolerances        SOCIAL HISTORY:    FAMILY HISTORY:  FH: heart disease (Father)        Vital Signs Last 24 Hrs  T(C): 36.6 (14 Jun 2023 08:18), Max: 36.9 (13 Jun 2023 16:07)  T(F): 97.9 (14 Jun 2023 08:18), Max: 98.4 (13 Jun 2023 16:07)  HR: 122 (14 Jun 2023 12:53) (84 - 134)  BP: 97/64 (14 Jun 2023 12:53) (87/60 - 114/84)  BP(mean): 75 (14 Jun 2023 12:53) (69 - 75)  RR: 18 (14 Jun 2023 12:52) (17 - 20)  SpO2: 100% (14 Jun 2023 12:52) (93% - 100%)    Parameters below as of 14 Jun 2023 12:52  Patient On (Oxygen Delivery Method): nasal cannula  O2 Flow (L/min): 3      Physical Exam:        LABS:                        13.3   7.57  )-----------( 203      ( 13 Jun 2023 04:57 )             41.8   06-14    139  |  101  |  24.0<H>  ----------------------------<  163<H>  3.8   |  30.0<H>  |  0.69    Ca    8.0<L>      14 Jun 2023 04:51  Mg     1.7     06-14    TPro  5.3<L>  /  Alb  2.4<L>  /  TBili  0.2<L>  /  DBili  x   /  AST  8   /  ALT  8   /  AlkPhos  160<H>  06-14  LIVER FUNCTIONS - ( 14 Jun 2023 04:51 )  Alb: 2.4 g/dL / Pro: 5.3 g/dL / ALK PHOS: 160 U/L / ALT: 8 U/L / AST: 8 U/L / GGT: x                   RADIOLOGY & ADDITIONAL STUDIES:          Incomplete note [place] CARDIAC ELECTROPHYSIOLOGY  [facility] Faculty Practice   Office: [address] [address]  Telephone: [phone] Fax:[phone]      HPI:  Pt is a 78 y/o male, PMHx significant for Atrial fibrillation (Eliquis) s/p DCCV on 23, Moderate AS, Chronic Diastolic Heart Failure, CAD s/p PCI, HTN, HLD, DM 2, Morbid Obesity, ALLIE, Thoracic Aortic Aneurysm, BPH s/p TURP, Left Renal Mass, RBBB and LAHB,  who was sent to [facility] ED from [facility] for abdominal pain x 4 days associated with mild SOB. TTE performed on 23 revealed newly  EF (25-30%) down from 60-65% on 3/22/23. Pt has been receiving IV diuresis for suspected HF exacerbation and reports some improvement in SOB / abdominal pain. Pt also noted with new Hepatomegaly with Liver surface nodularity. Due to hepatic findings pts Amiodarone has been discontinued and pt noted with AF with RVR for which EP service has been consulted.     Pt previously admitted to [facility] in January of this year with AF with RVR and was noted with a LVEF of 40-45%. Pt underwent successful DCCV at that time and has since been maintained on Amiodarone. LHC performed prior to DCCV revealed non obstructive CAD, mid to distal circ  50%. Pt was then admitted again in march of this year requiring MICU Septic Shock 2/2 with Respiratory Failure and Metabolic Encephalopathy. TTE from prior admission on 3/22/23 revealed recovery in EF of 60-65%, and pt was eventually discharged back to assisted living facility.     Pt now noted with newly decreased EF of 20-25% while in AF with rates ranging from 90s-130s. Pt currently denies chest pain, palpitations, dizziness, lethargy, recent syncopal events.    EKG: Atrial fibrillation with RVR (126 BPM) RBBB, LAHB   Telemetry: Atrial fibrillation with ventricular rates ranging from 90-130s. Intermittent sinus rhythm with 1st degree AVB              PAST MEDICAL & SURGICAL HISTORY:  Anxiety      Hypertension      Hyperlipidemia      Prostate disease      Gastroesophageal reflux disease      Gallbladder stone without cholecystitis or obstruction      BPH (benign prostatic hyperplasia)      DM (diabetes mellitus)      DVT (deep venous thrombosis)  left leg      Afib      Anxiety      HLD (hyperlipidemia)      Malignant schwannoma      H/O arthroscopy of knee      S/P laminectomy      S/P coronary artery stent placement  x2          REVIEW OF SYSTEMS:    CONSTITUTIONAL: No fevers. + sweating  EYES: No visual disturbances  NECK: No pain or stiffness  RESPIRATORY: + CRUZ  CARDIOVASCULAR: see HPI  GASTROINTESTINAL: + abdominal pain. + diarrhea. No nausea, vomiting, or hematemesis; No melena or hematochezia.  NEUROLOGICAL: No headaches, memory loss, loss of strength, numbness, or tremors  SKIN: No itching, burning, rashes, or lesions         MEDICATIONS  (STANDING):  albuterol    90 MICROgram(s) HFA Inhaler 2 Puff(s) Inhalation every 6 hours  aspirin  chewable 81 milliGRAM(s) Oral daily  chlorhexidine 2% Cloths 1 Application(s) Topical <User Schedule>  dextrose 5%. 1000 milliLiter(s) (100 mL/Hr) IV Continuous <Continuous>  dextrose 5%. 1000 milliLiter(s) (50 mL/Hr) IV Continuous <Continuous>  dextrose 50% Injectable 12.5 Gram(s) IV Push once  dextrose 50% Injectable 25 Gram(s) IV Push once  dextrose 50% Injectable 25 Gram(s) IV Push once  doxazosin 2 milliGRAM(s) Oral at bedtime  enoxaparin Injectable 135 milliGRAM(s) SubCutaneous every 12 hours  ertapenem  IVPB 1000 milliGRAM(s) IV Intermittent every 24 hours  finasteride 5 milliGRAM(s) Oral daily  fluticasone propionate 50 MICROgram(s)/spray Nasal Spray 1 Spray(s) Both Nostrils two times a day  furosemide   Injectable 40 milliGRAM(s) IV Push two times a day  glucagon  Injectable 1 milliGRAM(s) IntraMuscular once  insulin lispro (ADMELOG) corrective regimen sliding scale   SubCutaneous at bedtime  insulin lispro (ADMELOG) corrective regimen sliding scale   SubCutaneous three times a day before meals  metoprolol tartrate 25 milliGRAM(s) Oral three times a day  mupirocin 2% Nasal 1 Application(s) Both Nostrils two times a day  pantoprazole    Tablet 40 milliGRAM(s) Oral before breakfast  polyethylene glycol 3350 17 Gram(s) Oral daily  potassium chloride   Powder 20 milliEquivalent(s) Oral daily  senna 2 Tablet(s) Oral at bedtime    MEDICATIONS  (PRN):  ALPRAZolam 0.25 milliGRAM(s) Oral three times a day PRN Anxiety  aluminum hydroxide/magnesium hydroxide/simethicone Suspension 30 milliLiter(s) Oral every 4 hours PRN Dyspepsia  bisacodyl 5 milliGRAM(s) Oral every 12 hours PRN Constipation  dextrose Oral Gel 15 Gram(s) Oral once PRN Blood Glucose LESS THAN 70 milliGRAM(s)/deciliter  diphenhydrAMINE Injectable 50 milliGRAM(s) IV Push once PRN Allergic Reaction  meclizine 12.5 milliGRAM(s) Oral three times a day PRN Dizziness  melatonin 3 milliGRAM(s) Oral at bedtime PRN Insomnia  ondansetron Injectable 4 milliGRAM(s) IV Push every 8 hours PRN Nausea and/or Vomiting  oxyCODONE    IR 5 milliGRAM(s) Oral every 4 hours PRN Severe Pain (7 - 10)      Allergies    penicillin (Anaphylaxis)  Ceftin (Hives)  sulfa drugs (Unknown)  Bactrim (Rash)    Intolerances        SOCIAL HISTORY:    Denies ETOH  Denies Smoking  Denies Illicit drug use      FAMILY HISTORY:  FH: heart disease (Father)      Vital Signs Last 24 Hrs  T(C): 36.6 (2023 08:18), Max: 36.9 (2023 16:07)  T(F): 97.9 (2023 08:18), Max: 98.4 (2023 16:07)  HR: 122 (2023 12:53) (84 - 134)  BP: 97/64 (2023 12:53) (87/60 - 114/84)  BP(mean): 75 (2023 12:53) (69 - 75)  RR: 18 (2023 12:52) (17 - 20)  SpO2: 100% (2023 12:52) (93% - 100%)    Parameters below as of 2023 12:52  Patient On (Oxygen Delivery Method): nasal cannula  O2 Flow (L/min): 3      Physical Exam    Constitutional: Obese, NAD  Head: normocephalic atraumatic  Neck: supple, full range of motion, nontender to palpation midline  Chest wall: normal in appearance, nontender to palpation  Resp: effort normal, breath sounds clear to auscultation bilaterally  Cardiac: Irregular rhythm, tachycardic. No murmurs / rubs / gallops. No b/l LE edema  Abdomen: + RUQ tenderness.   Neuro: Alert and oriented x 3, motor & sensation grossly in tact  Skin: color normal, no rashes or injury  Psych: mood calm           LABS:                        13.3   7.57  )-----------( 203      ( 2023 04:57 )             41.8   06-14    139  |  101  |  24.0<H>  ----------------------------<  163<H>  3.8   |  30.0<H>  |  0.69    Ca    8.0<L>      2023 04:51  Mg     1.7         TPro  5.3<L>  /  Alb  2.4<L>  /  TBili  0.2<L>  /  DBili  x   /  AST  8   /  ALT  8   /  AlkPhos  160<H>    LIVER FUNCTIONS - ( 2023 04:51 )  Alb: 2.4 g/dL / Pro: 5.3 g/dL / ALK PHOS: 160 U/L / ALT: 8 U/L / AST: 8 U/L / GGT: x                   RADIOLOGY & ADDITIONAL STUDIES:      < from: TTE Echo Limited or F/U (23 @ 11:44) >  Summary:   1. Left ventricularejection fraction, by visual estimation, is 25 to   30%.   2. Moderately to severely decreased global left ventricular systolic   function.   3. There is mild concentric left ventricular hypertrophy.   4. Mildly enlarged right ventricle.   5. Moderately reduced RV systolic function.   6. Normal left atrial size.   7. Normal right atrial size.   8. There is no evidence of pericardial effusion.   9. Peak Gradient across the Aortic Valve is 35 mm Hg      Mean gradient is 26 mm Hg      DI 0.25      These findings are suggestive of LFLG Severe Aortic Stenosis.  10. Recommendations: Consider Dobutamine Stress echo to differentiate   True Severe AS from Pseudo AS or CT Scan for Aortic Valve Calcium Scoring.  11. Compared to prior Echo in 3/2023, lowEF is a new finding.  12. Endocardial visualization was enhanced with intravenous echo contrast.    MD Priya Electronically signed on 2023 at 5:17:26 PM      < from: CT Abdomen and Pelvis w/ IV Cont (06.10.23 @ 02:29) >  IMPRESSION:    No pulmonary embolism though evaluation of subsegmental branches limited   secondary to respiratory motion artifact.    Trace bilateral pleural effusions with adjacent patchy bibasilar   opacities, likely represent dependent atelectasis. Recommend clinical   correlation to assess for superimposed infection.    Hepatomegaly. Liver surface nodularity. Hepatitis or cirrhosis considered   in differential. Correlate with clinical parameters.    Trace ascites.    Mild bladder wall thickening with suggestion of left posterolateral   diverticulum. Correlate with urinalysis andlab values to assess for   cystitis and/or ascending urinary tract infection.    Additional findings as mentioned above.          Assessment:      Pt is a 78 y/o male, PMHx significant for Atrial fibrillation (Eliquis) s/p DCCV on 23, Moderate AS, Chronic Diastolic Heart Failure, CAD s/p PCI, HTN, HLD, DM 2, Morbid Obesity, ALLIE, Thoracic Aortic Aneurysm, BPH s/p TURP, Left Renal Mass, RBBB and LAHB,  who was sent to [facility] ED from [facility] for abdominal pain x 4 days associated with mild SOB. TTE performed on 23 revealed newly  EF (25-30%) down from 60-65% on 3/22/23. Pt has been receiving IV diuresis for suspected HF exacerbation and reports some improvement in SOB / abdominal pain. Pt also noted with new Hepatomegaly with Liver surface nodularity. Due to hepatic findings pts Amiodarone has been discontinued and pt noted with AF with RVR for which EP service has been consulted.     Pt previously admitted to [facility] in January of this year with AF with RVR and was noted with a LVEF of 40-45%. Pt underwent successful DCCV at that time and has since been maintained on Amiodarone. LHC performed prior to DCCV revealed non obstructive CAD, mid to distal circ  50%. Pt was then admitted again in march of this year requiring MICU Septic Shock 2/2 with Respiratory Failure and Metabolic Encephalopathy. TTE from prior admission on 3/22/23 revealed recovery in EF of 60-65%, and pt was eventually discharged back to assisted living facility.     Pt now noted with newly decreased EF of 20-25% while in AF with rates ranging from 90s-130s. Pt currently denies chest pain, palpitations, dizziness, lethargy, recent syncopal events.        Incomplete note [place] CARDIAC ELECTROPHYSIOLOGY  [facility] Faculty Practice   Office: [address] [address]  Telephone: [phone] Fax:[phone]      HPI:  Pt is a 78 y/o male, PMHx significant for Atrial fibrillation (Eliquis) s/p DCCV on 23, Moderate AS, Chronic Diastolic Heart Failure, CAD s/p PCI, HTN, HLD, DM 2, Morbid Obesity, ALLIE, Thoracic Aortic Aneurysm, BPH s/p TURP, Left Renal Mass, RBBB and LAHB,  who was sent to [facility] ED from [facility] for abdominal pain x 4 days associated with mild SOB. TTE performed on 23 revealed newly  EF (25-30%) down from 60-65% on 3/22/23. Pt has been receiving IV diuresis for suspected HF exacerbation and reports some improvement in SOB / abdominal pain. Pt also noted with new Hepatomegaly with Liver surface nodularity. Due to hepatic findings pts Amiodarone has been discontinued and pt noted with AF with RVR for which EP service has been consulted.     Pt previously admitted to [facility] in January of this year with AF with RVR and was noted with a LVEF of 40-45%. Pt underwent successful DCCV at that time and has since been maintained on Amiodarone. LHC performed prior to DCCV revealed non obstructive CAD, mid to distal circ  50%. Pt was then admitted again in march of this year requiring MICU Septic Shock 2/2 with Respiratory Failure and Metabolic Encephalopathy. TTE from prior admission on 3/22/23 revealed recovery in EF of 60-65%, and pt was eventually discharged back to assisted living facility.     Pt now noted with newly decreased EF of 20-25% while in AF with rates ranging from 90s-130s. Pt currently denies chest pain, palpitations, dizziness, lethargy, recent syncopal events.    EKG: Atrial fibrillation with RVR (126 BPM) RBBB, LAHB   Telemetry: Atrial fibrillation with ventricular rates ranging from 90-130s. Intermittent sinus rhythm with 1st degree AVB              PAST MEDICAL & SURGICAL HISTORY:  Anxiety      Hypertension      Hyperlipidemia      Prostate disease      Gastroesophageal reflux disease      Gallbladder stone without cholecystitis or obstruction      BPH (benign prostatic hyperplasia)      DM (diabetes mellitus)      DVT (deep venous thrombosis)  left leg      Afib      Anxiety      HLD (hyperlipidemia)      Malignant schwannoma      H/O arthroscopy of knee      S/P laminectomy      S/P coronary artery stent placement  x2          REVIEW OF SYSTEMS:    CONSTITUTIONAL: No fevers. + sweating  EYES: No visual disturbances  NECK: No pain or stiffness  RESPIRATORY: + CRUZ  CARDIOVASCULAR: see HPI  GASTROINTESTINAL: + abdominal pain. + diarrhea. No nausea, vomiting, or hematemesis; No melena or hematochezia.  NEUROLOGICAL: No headaches, memory loss, loss of strength, numbness, or tremors  SKIN: No itching, burning, rashes, or lesions         MEDICATIONS  (STANDING):  albuterol    90 MICROgram(s) HFA Inhaler 2 Puff(s) Inhalation every 6 hours  aspirin  chewable 81 milliGRAM(s) Oral daily  chlorhexidine 2% Cloths 1 Application(s) Topical <User Schedule>  dextrose 5%. 1000 milliLiter(s) (100 mL/Hr) IV Continuous <Continuous>  dextrose 5%. 1000 milliLiter(s) (50 mL/Hr) IV Continuous <Continuous>  dextrose 50% Injectable 12.5 Gram(s) IV Push once  dextrose 50% Injectable 25 Gram(s) IV Push once  dextrose 50% Injectable 25 Gram(s) IV Push once  doxazosin 2 milliGRAM(s) Oral at bedtime  enoxaparin Injectable 135 milliGRAM(s) SubCutaneous every 12 hours  ertapenem  IVPB 1000 milliGRAM(s) IV Intermittent every 24 hours  finasteride 5 milliGRAM(s) Oral daily  fluticasone propionate 50 MICROgram(s)/spray Nasal Spray 1 Spray(s) Both Nostrils two times a day  furosemide   Injectable 40 milliGRAM(s) IV Push two times a day  glucagon  Injectable 1 milliGRAM(s) IntraMuscular once  insulin lispro (ADMELOG) corrective regimen sliding scale   SubCutaneous at bedtime  insulin lispro (ADMELOG) corrective regimen sliding scale   SubCutaneous three times a day before meals  metoprolol tartrate 25 milliGRAM(s) Oral three times a day  mupirocin 2% Nasal 1 Application(s) Both Nostrils two times a day  pantoprazole    Tablet 40 milliGRAM(s) Oral before breakfast  polyethylene glycol 3350 17 Gram(s) Oral daily  potassium chloride   Powder 20 milliEquivalent(s) Oral daily  senna 2 Tablet(s) Oral at bedtime    MEDICATIONS  (PRN):  ALPRAZolam 0.25 milliGRAM(s) Oral three times a day PRN Anxiety  aluminum hydroxide/magnesium hydroxide/simethicone Suspension 30 milliLiter(s) Oral every 4 hours PRN Dyspepsia  bisacodyl 5 milliGRAM(s) Oral every 12 hours PRN Constipation  dextrose Oral Gel 15 Gram(s) Oral once PRN Blood Glucose LESS THAN 70 milliGRAM(s)/deciliter  diphenhydrAMINE Injectable 50 milliGRAM(s) IV Push once PRN Allergic Reaction  meclizine 12.5 milliGRAM(s) Oral three times a day PRN Dizziness  melatonin 3 milliGRAM(s) Oral at bedtime PRN Insomnia  ondansetron Injectable 4 milliGRAM(s) IV Push every 8 hours PRN Nausea and/or Vomiting  oxyCODONE    IR 5 milliGRAM(s) Oral every 4 hours PRN Severe Pain (7 - 10)      Allergies    penicillin (Anaphylaxis)  Ceftin (Hives)  sulfa drugs (Unknown)  Bactrim (Rash)    Intolerances        SOCIAL HISTORY:    Denies ETOH  Denies Smoking  Denies Illicit drug use      FAMILY HISTORY:  FH: heart disease (Father)      Vital Signs Last 24 Hrs  T(C): 36.6 (2023 08:18), Max: 36.9 (2023 16:07)  T(F): 97.9 (2023 08:18), Max: 98.4 (2023 16:07)  HR: 122 (2023 12:53) (84 - 134)  BP: 97/64 (2023 12:53) (87/60 - 114/84)  BP(mean): 75 (2023 12:53) (69 - 75)  RR: 18 (2023 12:52) (17 - 20)  SpO2: 100% (2023 12:52) (93% - 100%)    Parameters below as of 2023 12:52  Patient On (Oxygen Delivery Method): nasal cannula  O2 Flow (L/min): 3      Physical Exam    Constitutional: Obese, NAD  Head: normocephalic atraumatic  Neck: supple, full range of motion, nontender to palpation midline  Chest wall: normal in appearance, nontender to palpation  Resp: effort normal, breath sounds clear to auscultation bilaterally  Cardiac: Irregular rhythm, tachycardic. No murmurs / rubs / gallops. No b/l LE edema  Abdomen: + RUQ tenderness.   Neuro: Alert and oriented x 3, motor & sensation grossly in tact  Skin: color normal, no rashes or injury  Psych: mood calm           LABS:                        13.3   7.57  )-----------( 203      ( 2023 04:57 )             41.8   06-14    139  |  101  |  24.0<H>  ----------------------------<  163<H>  3.8   |  30.0<H>  |  0.69    Ca    8.0<L>      2023 04:51  Mg     1.7         TPro  5.3<L>  /  Alb  2.4<L>  /  TBili  0.2<L>  /  DBili  x   /  AST  8   /  ALT  8   /  AlkPhos  160<H>    LIVER FUNCTIONS - ( 2023 04:51 )  Alb: 2.4 g/dL / Pro: 5.3 g/dL / ALK PHOS: 160 U/L / ALT: 8 U/L / AST: 8 U/L / GGT: x                   RADIOLOGY & ADDITIONAL STUDIES:      < from: TTE Echo Limited or F/U (23 @ 11:44) >  Summary:   1. Left ventricularejection fraction, by visual estimation, is 25 to   30%.   2. Moderately to severely decreased global left ventricular systolic   function.   3. There is mild concentric left ventricular hypertrophy.   4. Mildly enlarged right ventricle.   5. Moderately reduced RV systolic function.   6. Normal left atrial size.   7. Normal right atrial size.   8. There is no evidence of pericardial effusion.   9. Peak Gradient across the Aortic Valve is 35 mm Hg      Mean gradient is 26 mm Hg      DI 0.25      These findings are suggestive of LFLG Severe Aortic Stenosis.  10. Recommendations: Consider Dobutamine Stress echo to differentiate   True Severe AS from Pseudo AS or CT Scan for Aortic Valve Calcium Scoring.  11. Compared to prior Echo in 3/2023, lowEF is a new finding.  12. Endocardial visualization was enhanced with intravenous echo contrast.    MD Priya Electronically signed on 2023 at 5:17:26 PM      < from: CT Abdomen and Pelvis w/ IV Cont (06.10.23 @ 02:29) >  IMPRESSION:    No pulmonary embolism though evaluation of subsegmental branches limited   secondary to respiratory motion artifact.    Trace bilateral pleural effusions with adjacent patchy bibasilar   opacities, likely represent dependent atelectasis. Recommend clinical   correlation to assess for superimposed infection.    Hepatomegaly. Liver surface nodularity. Hepatitis or cirrhosis considered   in differential. Correlate with clinical parameters.    Trace ascites.    Mild bladder wall thickening with suggestion of left posterolateral   diverticulum. Correlate with urinalysis andlab values to assess for   cystitis and/or ascending urinary tract infection.    Additional findings as mentioned above.          Assessment:      Pt is a 78 y/o male, PMHx significant for Atrial fibrillation (Eliquis) s/p DCCV on 23, Moderate AS, Chronic Diastolic Heart Failure, CAD s/p PCI, HTN, HLD, DM 2, Morbid Obesity, ALLIE, Thoracic Aortic Aneurysm, BPH s/p TURP, Left Renal Mass, RBBB and LAHB,  who was sent to [facility] ED from [facility] for abdominal pain x 4 days associated with mild SOB. TTE performed on 23 revealed newly  EF (25-30%) down from 60-65% on 3/22/23. Pt has been receiving IV diuresis for suspected HF exacerbation and reports some improvement in SOB / abdominal pain. Pt also noted with new Hepatomegaly with Liver surface nodularity. Due to hepatic findings pts Amiodarone has been discontinued and pt noted with AF with RVR for which EP service has been consulted.     Pt previously admitted to [facility] in January of this year with AF with RVR and was noted with a LVEF of 40-45%. Pt underwent successful DCCV at that time and has since been maintained on Amiodarone. LHC performed prior to DCCV revealed non obstructive CAD, mid to distal circ  50%. Pt was then admitted again in march of this year requiring MICU Septic Shock 2/2 with Respiratory Failure and Metabolic Encephalopathy. TTE from prior admission on 3/22/23 revealed recovery in EF of 60-65%, and pt was eventually discharged back to assisted living facility.     Pt now noted with newly decreased EF of 20-25% while in AF with rates ranging from 90s-130s. Pt currently denies chest pain, palpitations, dizziness, lethargy, recent syncopal events.         [place] [facility]  [facility] Practice   Office: [address] [address]  Telephone: [phone] Fax:[phone]      HPI:  Pt is a 76 y/o male, PMHx significant for Atrial fibrillation (Eliquis) s/p DCCV on 23, Moderate AS, Chronic Diastolic Heart Failure, CAD s/p PCI, HTN, HLD, DM 2, Morbid Obesity, ALLIE, Thoracic Aortic Aneurysm, BPH s/p TURP, Left Renal Mass, RBBB and LAHB,  who was sent to [facility] ED from [facility] for abdominal pain x 4 days associated with mild SOB. TTE performed on 23 revealed newly  EF (25-30%) down from 60-65% on 3/22/23. Pt has been receiving IV diuresis for suspected HF exacerbation and reports some improvement in SOB / abdominal pain. Pt also noted with new Hepatomegaly with Liver surface nodularity. Due to hepatic findings pts Amiodarone has been discontinued and pt noted with AF with RVR for which EP service has been consulted.     Pt previously admitted to [facility] in January of this year with AF with RVR and was noted with a LVEF of 40-45%. Pt underwent successful DCCV at that time and has since been maintained on Amiodarone. LHC performed prior to DCCV revealed non obstructive CAD, mid to distal circ  50%. Pt was then admitted again in march of this year requiring MICU Septic Shock 2/2 with Respiratory Failure and Metabolic Encephalopathy. TTE from prior admission on 3/22/23 revealed recovery in EF of 60-65%, and pt was eventually discharged back to assisted living facility.     Pt now noted with newly decreased EF of 20-25% while in AF with rates ranging from 90s-130s. Pt currently denies chest pain, palpitations, dizziness, lethargy, recent syncopal events.    EKG: Atrial fibrillation with RVR (126 BPM) RBBB, LAHB   Telemetry: Atrial fibrillation with ventricular rates ranging from 90-130s. Intermittent sinus rhythm with 1st degree AVB              PAST MEDICAL & SURGICAL HISTORY:  Anxiety      Hypertension      Hyperlipidemia      Prostate disease      Gastroesophageal reflux disease      Gallbladder stone without cholecystitis or obstruction      BPH (benign prostatic hyperplasia)      DM (diabetes mellitus)      DVT (deep venous thrombosis)  left leg      Afib      Anxiety      HLD (hyperlipidemia)      Malignant schwannoma      H/O arthroscopy of knee      S/P laminectomy      S/P coronary artery stent placement  x2          REVIEW OF SYSTEMS:    CONSTITUTIONAL: No fevers. + sweating  EYES: No visual disturbances  NECK: No pain or stiffness  RESPIRATORY: + CRUZ  CARDIOVASCULAR: see HPI  GASTROINTESTINAL: + abdominal pain. + diarrhea. No nausea, vomiting, or hematemesis; No melena or hematochezia.  NEUROLOGICAL: No headaches, memory loss, loss of strength, numbness, or tremors  SKIN: No itching, burning, rashes, or lesions         MEDICATIONS  (STANDING):  albuterol    90 MICROgram(s) HFA Inhaler 2 Puff(s) Inhalation every 6 hours  aspirin  chewable 81 milliGRAM(s) Oral daily  chlorhexidine 2% Cloths 1 Application(s) Topical <User Schedule>  dextrose 5%. 1000 milliLiter(s) (100 mL/Hr) IV Continuous <Continuous>  dextrose 5%. 1000 milliLiter(s) (50 mL/Hr) IV Continuous <Continuous>  dextrose 50% Injectable 12.5 Gram(s) IV Push once  dextrose 50% Injectable 25 Gram(s) IV Push once  dextrose 50% Injectable 25 Gram(s) IV Push once  doxazosin 2 milliGRAM(s) Oral at bedtime  enoxaparin Injectable 135 milliGRAM(s) SubCutaneous every 12 hours  ertapenem  IVPB 1000 milliGRAM(s) IV Intermittent every 24 hours  finasteride 5 milliGRAM(s) Oral daily  fluticasone propionate 50 MICROgram(s)/spray Nasal Spray 1 Spray(s) Both Nostrils two times a day  furosemide   Injectable 40 milliGRAM(s) IV Push two times a day  glucagon  Injectable 1 milliGRAM(s) IntraMuscular once  insulin lispro (ADMELOG) corrective regimen sliding scale   SubCutaneous at bedtime  insulin lispro (ADMELOG) corrective regimen sliding scale   SubCutaneous three times a day before meals  metoprolol tartrate 25 milliGRAM(s) Oral three times a day  mupirocin 2% Nasal 1 Application(s) Both Nostrils two times a day  pantoprazole    Tablet 40 milliGRAM(s) Oral before breakfast  polyethylene glycol 3350 17 Gram(s) Oral daily  potassium chloride   Powder 20 milliEquivalent(s) Oral daily  senna 2 Tablet(s) Oral at bedtime    MEDICATIONS  (PRN):  ALPRAZolam 0.25 milliGRAM(s) Oral three times a day PRN Anxiety  aluminum hydroxide/magnesium hydroxide/simethicone Suspension 30 milliLiter(s) Oral every 4 hours PRN Dyspepsia  bisacodyl 5 milliGRAM(s) Oral every 12 hours PRN Constipation  dextrose Oral Gel 15 Gram(s) Oral once PRN Blood Glucose LESS THAN 70 milliGRAM(s)/deciliter  diphenhydrAMINE Injectable 50 milliGRAM(s) IV Push once PRN Allergic Reaction  meclizine 12.5 milliGRAM(s) Oral three times a day PRN Dizziness  melatonin 3 milliGRAM(s) Oral at bedtime PRN Insomnia  ondansetron Injectable 4 milliGRAM(s) IV Push every 8 hours PRN Nausea and/or Vomiting  oxyCODONE    IR 5 milliGRAM(s) Oral every 4 hours PRN Severe Pain (7 - 10)      Allergies    penicillin (Anaphylaxis)  Ceftin (Hives)  sulfa drugs (Unknown)  Bactrim (Rash)    Intolerances        SOCIAL HISTORY:    Denies ETOH  Denies Smoking  Denies Illicit drug use      FAMILY HISTORY:  FH: heart disease (Father)      Vital Signs Last 24 Hrs  T(C): 36.6 (2023 08:18), Max: 36.9 (2023 16:07)  T(F): 97.9 (2023 08:18), Max: 98.4 (2023 16:07)  HR: 122 (2023 12:53) (84 - 134)  BP: 97/64 (2023 12:53) (87/60 - 114/84)  BP(mean): 75 (2023 12:53) (69 - 75)  RR: 18 (2023 12:52) (17 - 20)  SpO2: 100% (2023 12:52) (93% - 100%)    Parameters below as of 2023 12:52  Patient On (Oxygen Delivery Method): nasal cannula  O2 Flow (L/min): 3      Physical Exam    Constitutional: Obese, NAD  Head: normocephalic atraumatic  Neck: supple, full range of motion, nontender to palpation midline  Chest wall: normal in appearance, nontender to palpation  Resp: effort normal, breath sounds clear to auscultation bilaterally  Cardiac: Irregular rhythm, tachycardic. No murmurs / rubs / gallops. No b/l LE edema  Abdomen: + RUQ tenderness.   Neuro: Alert and oriented x 3, motor & sensation grossly in tact  Skin: color normal, no rashes or injury  Psych: mood calm           LABS:                        13.3   7.57  )-----------( 203      ( 2023 04:57 )             41.8   06-14    139  |  101  |  24.0<H>  ----------------------------<  163<H>  3.8   |  30.0<H>  |  0.69    Ca    8.0<L>      2023 04:51  Mg     1.7         TPro  5.3<L>  /  Alb  2.4<L>  /  TBili  0.2<L>  /  DBili  x   /  AST  8   /  ALT  8   /  AlkPhos  160<H>    LIVER FUNCTIONS - ( 2023 04:51 )  Alb: 2.4 g/dL / Pro: 5.3 g/dL / ALK PHOS: 160 U/L / ALT: 8 U/L / AST: 8 U/L / GGT: x                   RADIOLOGY & ADDITIONAL STUDIES:      < from: TTE Echo Limited or F/U (23 @ 11:44) >  Summary:   1. Left ventricularejection fraction, by visual estimation, is 25 to   30%.   2. Moderately to severely decreased global left ventricular systolic   function.   3. There is mild concentric left ventricular hypertrophy.   4. Mildly enlarged right ventricle.   5. Moderately reduced RV systolic function.   6. Normal left atrial size.   7. Normal right atrial size.   8. There is no evidence of pericardial effusion.   9. Peak Gradient across the Aortic Valve is 35 mm Hg      Mean gradient is 26 mm Hg      DI 0.25      These findings are suggestive of LFLG Severe Aortic Stenosis.  10. Recommendations: Consider Dobutamine Stress echo to differentiate   True Severe AS from Pseudo AS or CT Scan for Aortic Valve Calcium Scoring.  11. Compared to prior Echo in 3/2023, lowEF is a new finding.  12. Endocardial visualization was enhanced with intravenous echo contrast.    MD Priya Electronically signed on 2023 at 5:17:26 PM      < from: CT Abdomen and Pelvis w/ IV Cont (06.10.23 @ 02:29) >  IMPRESSION:    No pulmonary embolism though evaluation of subsegmental branches limited   secondary to respiratory motion artifact.    Trace bilateral pleural effusions with adjacent patchy bibasilar   opacities, likely represent dependent atelectasis. Recommend clinical   correlation to assess for superimposed infection.    Hepatomegaly. Liver surface nodularity. Hepatitis or cirrhosis considered   in differential. Correlate with clinical parameters.    Trace ascites.    Mild bladder wall thickening with suggestion of left posterolateral   diverticulum. Correlate with urinalysis andlab values to assess for   cystitis and/or ascending urinary tract infection.    Additional findings as mentioned above.          Assessment:      Pt is a 76 y/o male, PMHx significant for Atrial fibrillation (Eliquis) s/p DCCV on 23, Moderate AS, Chronic Diastolic Heart Failure, CAD s/p PCI, HTN, HLD, DM 2, Morbid Obesity, ALLIE, Thoracic Aortic Aneurysm, BPH s/p TURP, Left Renal Mass, RBBB and LAHB,  who was sent to [facility] ED from [facility] for abdominal pain x 4 days associated with mild SOB. TTE performed on 23 revealed newly  EF (25-30%) down from 60-65% on 3/22/23. Pt has been receiving IV diuresis for suspected HF exacerbation and reports some improvement in SOB / abdominal pain. Pt also noted with new Hepatomegaly with Liver surface nodularity. Due to hepatic findings pts Amiodarone has been discontinued and pt noted with AF with RVR for which EP service has been consulted.     Pt previously admitted to [facility] in January of this year with AF with RVR and was noted with a LVEF of 40-45%. Pt underwent successful DCCV at that time and has since been maintained on Amiodarone. LHC performed prior to DCCV revealed non obstructive CAD, mid to distal circ  50%. Pt was then admitted again in march of this year requiring MICU Septic Shock 2/2 with Respiratory Failure and Metabolic Encephalopathy. TTE from prior admission on 3/22/23 revealed recovery in EF of 60-65%, and pt was eventually discharged back to assisted living facility.     Pt now noted with newly decreased EF of 20-25% while in AF with rates ranging from 90s-130s. Pt currently denies chest pain, palpitations, dizziness, lethargy, recent syncopal events.      Plan  1) Paroxsymal atrial fibrillation with rapid ventricular responses   - Rates previously controlled on amiodarone this admission  - Amiodarone being held secondary to hepatomegaly and ? Cirrhosis.  - Beta blocker therapy limited to labile BPs (Systolic 80 - 90s)  - Start PO Digoxin loading dose. Will start low dose due to recent amiodarone therapy  - Digoxin 250ug STAT, followed by 125ug in 6 hours, then 125ug in another 6 hours, followed by 125ug QD  - Digoxin serum level to be drawn 12 hours after third dose  - c/w Metoprolol with hold parameters  - Maintain telemetry monitoring  - Currently receiving full dose Lovenox for AC. Transition to Eliquis prior to discharge    2) HFrEF (EF 20-25%)  - Prior EF 60-65% on 3/23/23  - Likely exacerbated in setting of tachy arrythmia  - GDMT and Lasix as per primary cardiology team    3) Hepatomegaly with ? cirrhosis  - Avoid amiodarone use   - Abd MRI pending   - GI consult appreciated      Case discussed with Dr. Rocha, full recommendations to follow        [place] [facility]  [facility] Faculty Practice   Office: [address] [address]  Telephone: [phone] Fax:[phone]      HPI:  Pt is a 78 y/o male, PMHx significant for Atrial fibrillation (Eliquis) s/p DCCV on 23, Moderate AS, Chronic Diastolic Heart Failure, CAD s/p PCI, HTN, HLD, DM 2, Morbid Obesity, ALLIE, Thoracic Aortic Aneurysm, BPH s/p TURP, Left Renal Mass, RBBB and LAHB,  who was sent to [facility] ED from [facility] for abdominal pain x 4 days associated with mild SOB. TTE performed on 23 revealed newly  EF (25-30%) down from 60-65% on 3/22/23. Pt has been receiving IV diuresis for suspected HF exacerbation and reports some improvement in SOB / abdominal pain. Pt also noted with new Hepatomegaly with Liver surface nodularity. Due to hepatic findings pts Amiodarone has been discontinued and pt noted with AF with RVR for which EP service has been consulted.     Pt previously admitted to [facility] in January of this year with AF with RVR and was noted with a LVEF of 40-45%. Pt underwent successful DCCV at that time and has since been maintained on Amiodarone. LHC performed prior to DCCV revealed non obstructive CAD, mid to distal circ  50%. Pt was then admitted again in march of this year requiring MICU Septic Shock 2/2 with Respiratory Failure and Metabolic Encephalopathy. TTE from prior admission on 3/22/23 revealed recovery in EF of 60-65%, and pt was eventually discharged back to assisted living facility.     Pt now noted with newly decreased EF of 20-25% while in AF with rates ranging from 90s-130s. Pt currently denies chest pain, palpitations, dizziness, lethargy, recent syncopal events.    EKG: Atrial fibrillation with RVR (126 BPM) RBBB, LAHB   Telemetry: Atrial fibrillation with ventricular rates ranging from 90-130s. Intermittent sinus rhythm with 1st degree AVB              PAST MEDICAL & SURGICAL HISTORY:  Anxiety      Hypertension      Hyperlipidemia      Prostate disease      Gastroesophageal reflux disease      Gallbladder stone without cholecystitis or obstruction      BPH (benign prostatic hyperplasia)      DM (diabetes mellitus)      DVT (deep venous thrombosis)  left leg      Afib      Anxiety      HLD (hyperlipidemia)      Malignant schwannoma      H/O arthroscopy of knee      S/P laminectomy      S/P coronary artery stent placement  x2          REVIEW OF SYSTEMS:    CONSTITUTIONAL: No fevers. + sweating  EYES: No visual disturbances  NECK: No pain or stiffness  RESPIRATORY: + CRUZ  CARDIOVASCULAR: see HPI  GASTROINTESTINAL: + abdominal pain. + diarrhea. No nausea, vomiting, or hematemesis; No melena or hematochezia.  NEUROLOGICAL: No headaches, memory loss, loss of strength, numbness, or tremors  SKIN: No itching, burning, rashes, or lesions         MEDICATIONS  (STANDING):  albuterol    90 MICROgram(s) HFA Inhaler 2 Puff(s) Inhalation every 6 hours  aspirin  chewable 81 milliGRAM(s) Oral daily  chlorhexidine 2% Cloths 1 Application(s) Topical <User Schedule>  dextrose 5%. 1000 milliLiter(s) (100 mL/Hr) IV Continuous <Continuous>  dextrose 5%. 1000 milliLiter(s) (50 mL/Hr) IV Continuous <Continuous>  dextrose 50% Injectable 12.5 Gram(s) IV Push once  dextrose 50% Injectable 25 Gram(s) IV Push once  dextrose 50% Injectable 25 Gram(s) IV Push once  doxazosin 2 milliGRAM(s) Oral at bedtime  enoxaparin Injectable 135 milliGRAM(s) SubCutaneous every 12 hours  ertapenem  IVPB 1000 milliGRAM(s) IV Intermittent every 24 hours  finasteride 5 milliGRAM(s) Oral daily  fluticasone propionate 50 MICROgram(s)/spray Nasal Spray 1 Spray(s) Both Nostrils two times a day  furosemide   Injectable 40 milliGRAM(s) IV Push two times a day  glucagon  Injectable 1 milliGRAM(s) IntraMuscular once  insulin lispro (ADMELOG) corrective regimen sliding scale   SubCutaneous at bedtime  insulin lispro (ADMELOG) corrective regimen sliding scale   SubCutaneous three times a day before meals  metoprolol tartrate 25 milliGRAM(s) Oral three times a day  mupirocin 2% Nasal 1 Application(s) Both Nostrils two times a day  pantoprazole    Tablet 40 milliGRAM(s) Oral before breakfast  polyethylene glycol 3350 17 Gram(s) Oral daily  potassium chloride   Powder 20 milliEquivalent(s) Oral daily  senna 2 Tablet(s) Oral at bedtime    MEDICATIONS  (PRN):  ALPRAZolam 0.25 milliGRAM(s) Oral three times a day PRN Anxiety  aluminum hydroxide/magnesium hydroxide/simethicone Suspension 30 milliLiter(s) Oral every 4 hours PRN Dyspepsia  bisacodyl 5 milliGRAM(s) Oral every 12 hours PRN Constipation  dextrose Oral Gel 15 Gram(s) Oral once PRN Blood Glucose LESS THAN 70 milliGRAM(s)/deciliter  diphenhydrAMINE Injectable 50 milliGRAM(s) IV Push once PRN Allergic Reaction  meclizine 12.5 milliGRAM(s) Oral three times a day PRN Dizziness  melatonin 3 milliGRAM(s) Oral at bedtime PRN Insomnia  ondansetron Injectable 4 milliGRAM(s) IV Push every 8 hours PRN Nausea and/or Vomiting  oxyCODONE    IR 5 milliGRAM(s) Oral every 4 hours PRN Severe Pain (7 - 10)      Allergies    penicillin (Anaphylaxis)  Ceftin (Hives)  sulfa drugs (Unknown)  Bactrim (Rash)    Intolerances        SOCIAL HISTORY:    Denies ETOH  Denies Smoking  Denies Illicit drug use      FAMILY HISTORY:  FH: heart disease (Father)      Vital Signs Last 24 Hrs  T(C): 36.6 (2023 08:18), Max: 36.9 (2023 16:07)  T(F): 97.9 (2023 08:18), Max: 98.4 (2023 16:07)  HR: 122 (2023 12:53) (84 - 134)  BP: 97/64 (2023 12:53) (87/60 - 114/84)  BP(mean): 75 (2023 12:53) (69 - 75)  RR: 18 (2023 12:52) (17 - 20)  SpO2: 100% (2023 12:52) (93% - 100%)    Parameters below as of 2023 12:52  Patient On (Oxygen Delivery Method): nasal cannula  O2 Flow (L/min): 3      Physical Exam    Constitutional: Obese, NAD  Head: normocephalic atraumatic  Neck: supple, full range of motion, nontender to palpation midline  Chest wall: normal in appearance, nontender to palpation  Resp: effort normal, breath sounds clear to auscultation bilaterally  Cardiac: Irregular rhythm, tachycardic. No murmurs / rubs / gallops. No b/l LE edema  Abdomen: + RUQ tenderness.   Neuro: Alert and oriented x 3, motor & sensation grossly in tact  Skin: color normal, no rashes or injury  Psych: mood calm           LABS:                        13.3   7.57  )-----------( 203      ( 2023 04:57 )             41.8   06-14    139  |  101  |  24.0<H>  ----------------------------<  163<H>  3.8   |  30.0<H>  |  0.69    Ca    8.0<L>      2023 04:51  Mg     1.7         TPro  5.3<L>  /  Alb  2.4<L>  /  TBili  0.2<L>  /  DBili  x   /  AST  8   /  ALT  8   /  AlkPhos  160<H>    LIVER FUNCTIONS - ( 2023 04:51 )  Alb: 2.4 g/dL / Pro: 5.3 g/dL / ALK PHOS: 160 U/L / ALT: 8 U/L / AST: 8 U/L / GGT: x                   RADIOLOGY & ADDITIONAL STUDIES:      < from: TTE Echo Limited or F/U (23 @ 11:44) >  Summary:   1. Left ventricularejection fraction, by visual estimation, is 25 to   30%.   2. Moderately to severely decreased global left ventricular systolic   function.   3. There is mild concentric left ventricular hypertrophy.   4. Mildly enlarged right ventricle.   5. Moderately reduced RV systolic function.   6. Normal left atrial size.   7. Normal right atrial size.   8. There is no evidence of pericardial effusion.   9. Peak Gradient across the Aortic Valve is 35 mm Hg      Mean gradient is 26 mm Hg      DI 0.25      These findings are suggestive of LFLG Severe Aortic Stenosis.  10. Recommendations: Consider Dobutamine Stress echo to differentiate   True Severe AS from Pseudo AS or CT Scan for Aortic Valve Calcium Scoring.  11. Compared to prior Echo in 3/2023, lowEF is a new finding.  12. Endocardial visualization was enhanced with intravenous echo contrast.    MD Priya Electronically signed on 2023 at 5:17:26 PM      < from: CT Abdomen and Pelvis w/ IV Cont (06.10.23 @ 02:29) >  IMPRESSION:    No pulmonary embolism though evaluation of subsegmental branches limited   secondary to respiratory motion artifact.    Trace bilateral pleural effusions with adjacent patchy bibasilar   opacities, likely represent dependent atelectasis. Recommend clinical   correlation to assess for superimposed infection.    Hepatomegaly. Liver surface nodularity. Hepatitis or cirrhosis considered   in differential. Correlate with clinical parameters.    Trace ascites.    Mild bladder wall thickening with suggestion of left posterolateral   diverticulum. Correlate with urinalysis andlab values to assess for   cystitis and/or ascending urinary tract infection.    Additional findings as mentioned above.          Assessment:      Pt is a 78 y/o male, PMHx significant for Atrial fibrillation (Eliquis) s/p DCCV on 23, Moderate AS, Chronic Diastolic Heart Failure, CAD s/p PCI, HTN, HLD, DM 2, Morbid Obesity, ALLIE, Thoracic Aortic Aneurysm, BPH s/p TURP, Left Renal Mass, RBBB and LAHB,  who was sent to [facility] ED from [facility] for abdominal pain x 4 days associated with mild SOB. TTE performed on 23 revealed newly  EF (25-30%) down from 60-65% on 3/22/23. Pt has been receiving IV diuresis for suspected HF exacerbation and reports some improvement in SOB / abdominal pain. Pt also noted with new Hepatomegaly with Liver surface nodularity. Due to hepatic findings pts Amiodarone has been discontinued and pt noted with AF with RVR for which EP service has been consulted.     Pt previously admitted to [facility] in January of this year with AF with RVR and was noted with a LVEF of 40-45%. Pt underwent successful DCCV at that time and has since been maintained on Amiodarone. LHC performed prior to DCCV revealed non obstructive CAD, mid to distal circ  50%. Pt was then admitted again in march of this year requiring MICU Septic Shock 2/2 with Respiratory Failure and Metabolic Encephalopathy. TTE from prior admission on 3/22/23 revealed recovery in EF of 60-65%, and pt was eventually discharged back to assisted living facility.     Pt now noted with newly decreased EF of 20-25% while in AF with rates ranging from 90s-130s. Pt currently denies chest pain, palpitations, dizziness, lethargy, recent syncopal events.      Plan  1) Paroxsymal atrial fibrillation with rapid ventricular responses   - Rates previously controlled on amiodarone this admission  - Amiodarone being held secondary to hepatomegaly and ? Cirrhosis.  - Beta blocker therapy limited to labile BPs (Systolic 80 - 90s)  - Start PO Digoxin loading dose. Will start low dose due to recent amiodarone therapy  - Digoxin 250ug STAT, followed by 125ug in 6 hours, then 125ug in another 6 hours, followed by 125ug QD  - Digoxin serum level to be drawn 12 hours after third dose  - c/w Metoprolol with hold parameters  - Plan for eventual LIBBY / DCCV when patient more euvolemic  - Maintain telemetry monitoring  - Currently receiving full dose Lovenox for AC. Transition to Eliquis prior to discharge    2) HFrEF (EF 20-25%)  - Prior EF 60-65% on 3/23/23  - Likely exacerbated in setting of tachy arrythmia  - GDMT and Lasix as per primary cardiology team    3) Hepatomegaly with ? cirrhosis  - Avoid amiodarone use   - Abd MRI pending   - GI consult appreciated      Case discussed with Dr. Rocha, Dr. Zheng, and BESSY Baires at bedside  Further recommendations to follow        [place] [facility] ELECTROPHYSIOLOGY  [facility] Faculty Practice   Office: [address] [address]  Telephone: [phone] Fax:[phone]      HPI:  Pt is a 78 y/o male, PMHx significant for Atrial fibrillation (Eliquis) s/p DCCV on 23, Moderate AS, Chronic Diastolic Heart Failure, CAD s/p PCI, HTN, HLD, DM 2, Morbid Obesity, ALLIE, Thoracic Aortic Aneurysm, BPH s/p TURP, Left Renal Mass, RBBB and LAHB,  who was sent to [facility] ED from [facility] for abdominal pain x 4 days associated with mild SOB. TTE performed on 23 revealed newly  EF (25-30%) down from 60-65% on 3/22/23. Pt has been receiving IV diuresis for suspected HF exacerbation and reports some improvement in SOB / abdominal pain. Pt also noted with new Hepatomegaly with Liver surface nodularity. Due to hepatic findings pts Amiodarone has been discontinued and pt noted with AF with RVR for which EP service has been consulted.     Pt previously admitted to [facility] in January of this year with AF with RVR and was noted with a LVEF of 40-45%. Pt underwent successful DCCV at that time and has since been maintained on Amiodarone. LHC performed prior to DCCV revealed non obstructive CAD, mid to distal circ  50%. Pt was then admitted again in march of this year requiring MICU Septic Shock 2/2 with Respiratory Failure and Metabolic Encephalopathy. TTE from prior admission on 3/22/23 revealed recovery in EF of 60-65%, and pt was eventually discharged back to assisted living facility.     Pt now noted with newly decreased EF of 20-25% while in AF with rates ranging from 90s-130s. Pt currently denies chest pain, palpitations, dizziness, lethargy, recent syncopal events.    EKG: Atrial fibrillation with RVR (126 BPM) RBBB, LAHB   Telemetry: Atrial fibrillation with ventricular rates ranging from 90-130s. Intermittent sinus rhythm with 1st degree AVB              PAST MEDICAL & SURGICAL HISTORY:  Anxiety      Hypertension      Hyperlipidemia      Prostate disease      Gastroesophageal reflux disease      Gallbladder stone without cholecystitis or obstruction      BPH (benign prostatic hyperplasia)      DM (diabetes mellitus)      DVT (deep venous thrombosis)  left leg      Afib      Anxiety      HLD (hyperlipidemia)      Malignant schwannoma      H/O arthroscopy of knee      S/P laminectomy      S/P coronary artery stent placement  x2          REVIEW OF SYSTEMS:    CONSTITUTIONAL: No fevers. + sweating  EYES: No visual disturbances  NECK: No pain or stiffness  RESPIRATORY: + CRUZ  CARDIOVASCULAR: see HPI  GASTROINTESTINAL: + abdominal pain. + diarrhea. No nausea, vomiting, or hematemesis; No melena or hematochezia.  NEUROLOGICAL: No headaches, memory loss, loss of strength, numbness, or tremors  SKIN: No itching, burning, rashes, or lesions         MEDICATIONS  (STANDING):  albuterol    90 MICROgram(s) HFA Inhaler 2 Puff(s) Inhalation every 6 hours  aspirin  chewable 81 milliGRAM(s) Oral daily  chlorhexidine 2% Cloths 1 Application(s) Topical <User Schedule>  dextrose 5%. 1000 milliLiter(s) (100 mL/Hr) IV Continuous <Continuous>  dextrose 5%. 1000 milliLiter(s) (50 mL/Hr) IV Continuous <Continuous>  dextrose 50% Injectable 12.5 Gram(s) IV Push once  dextrose 50% Injectable 25 Gram(s) IV Push once  dextrose 50% Injectable 25 Gram(s) IV Push once  doxazosin 2 milliGRAM(s) Oral at bedtime  enoxaparin Injectable 135 milliGRAM(s) SubCutaneous every 12 hours  ertapenem  IVPB 1000 milliGRAM(s) IV Intermittent every 24 hours  finasteride 5 milliGRAM(s) Oral daily  fluticasone propionate 50 MICROgram(s)/spray Nasal Spray 1 Spray(s) Both Nostrils two times a day  furosemide   Injectable 40 milliGRAM(s) IV Push two times a day  glucagon  Injectable 1 milliGRAM(s) IntraMuscular once  insulin lispro (ADMELOG) corrective regimen sliding scale   SubCutaneous at bedtime  insulin lispro (ADMELOG) corrective regimen sliding scale   SubCutaneous three times a day before meals  metoprolol tartrate 25 milliGRAM(s) Oral three times a day  mupirocin 2% Nasal 1 Application(s) Both Nostrils two times a day  pantoprazole    Tablet 40 milliGRAM(s) Oral before breakfast  polyethylene glycol 3350 17 Gram(s) Oral daily  potassium chloride   Powder 20 milliEquivalent(s) Oral daily  senna 2 Tablet(s) Oral at bedtime    MEDICATIONS  (PRN):  ALPRAZolam 0.25 milliGRAM(s) Oral three times a day PRN Anxiety  aluminum hydroxide/magnesium hydroxide/simethicone Suspension 30 milliLiter(s) Oral every 4 hours PRN Dyspepsia  bisacodyl 5 milliGRAM(s) Oral every 12 hours PRN Constipation  dextrose Oral Gel 15 Gram(s) Oral once PRN Blood Glucose LESS THAN 70 milliGRAM(s)/deciliter  diphenhydrAMINE Injectable 50 milliGRAM(s) IV Push once PRN Allergic Reaction  meclizine 12.5 milliGRAM(s) Oral three times a day PRN Dizziness  melatonin 3 milliGRAM(s) Oral at bedtime PRN Insomnia  ondansetron Injectable 4 milliGRAM(s) IV Push every 8 hours PRN Nausea and/or Vomiting  oxyCODONE    IR 5 milliGRAM(s) Oral every 4 hours PRN Severe Pain (7 - 10)      Allergies    penicillin (Anaphylaxis)  Ceftin (Hives)  sulfa drugs (Unknown)  Bactrim (Rash)    Intolerances        SOCIAL HISTORY:    Denies ETOH  Denies Smoking  Denies Illicit drug use      FAMILY HISTORY:  FH: heart disease (Father)      Vital Signs Last 24 Hrs  T(C): 36.6 (2023 08:18), Max: 36.9 (2023 16:07)  T(F): 97.9 (2023 08:18), Max: 98.4 (2023 16:07)  HR: 122 (2023 12:53) (84 - 134)  BP: 97/64 (2023 12:53) (87/60 - 114/84)  BP(mean): 75 (2023 12:53) (69 - 75)  RR: 18 (2023 12:52) (17 - 20)  SpO2: 100% (2023 12:52) (93% - 100%)    Parameters below as of 2023 12:52  Patient On (Oxygen Delivery Method): nasal cannula  O2 Flow (L/min): 3      Physical Exam    Constitutional: Obese, NAD  Head: normocephalic atraumatic  Neck: supple, full range of motion, nontender to palpation midline  Chest wall: normal in appearance, nontender to palpation  Resp: effort normal, breath sounds clear to auscultation bilaterally  Cardiac: Irregular rhythm, tachycardic. No murmurs / rubs / gallops. No b/l LE edema  Abdomen: + RUQ tenderness.   Neuro: Alert and oriented x 3, motor & sensation grossly in tact  Skin: color normal, no rashes or injury  Psych: mood calm           LABS:                        13.3   7.57  )-----------( 203      ( 2023 04:57 )             41.8   06-14    139  |  101  |  24.0<H>  ----------------------------<  163<H>  3.8   |  30.0<H>  |  0.69    Ca    8.0<L>      2023 04:51  Mg     1.7         TPro  5.3<L>  /  Alb  2.4<L>  /  TBili  0.2<L>  /  DBili  x   /  AST  8   /  ALT  8   /  AlkPhos  160<H>    LIVER FUNCTIONS - ( 2023 04:51 )  Alb: 2.4 g/dL / Pro: 5.3 g/dL / ALK PHOS: 160 U/L / ALT: 8 U/L / AST: 8 U/L / GGT: x                   RADIOLOGY & ADDITIONAL STUDIES:      < from: TTE Echo Limited or F/U (23 @ 11:44) >  Summary:   1. Left ventricularejection fraction, by visual estimation, is 25 to   30%.   2. Moderately to severely decreased global left ventricular systolic   function.   3. There is mild concentric left ventricular hypertrophy.   4. Mildly enlarged right ventricle.   5. Moderately reduced RV systolic function.   6. Normal left atrial size.   7. Normal right atrial size.   8. There is no evidence of pericardial effusion.   9. Peak Gradient across the Aortic Valve is 35 mm Hg      Mean gradient is 26 mm Hg      DI 0.25      These findings are suggestive of LFLG Severe Aortic Stenosis.  10. Recommendations: Consider Dobutamine Stress echo to differentiate   True Severe AS from Pseudo AS or CT Scan for Aortic Valve Calcium Scoring.  11. Compared to prior Echo in 3/2023, lowEF is a new finding.  12. Endocardial visualization was enhanced with intravenous echo contrast.    MD Priya Electronically signed on 2023 at 5:17:26 PM      < from: CT Abdomen and Pelvis w/ IV Cont (06.10.23 @ 02:29) >  IMPRESSION:    No pulmonary embolism though evaluation of subsegmental branches limited   secondary to respiratory motion artifact.    Trace bilateral pleural effusions with adjacent patchy bibasilar   opacities, likely represent dependent atelectasis. Recommend clinical   correlation to assess for superimposed infection.    Hepatomegaly. Liver surface nodularity. Hepatitis or cirrhosis considered   in differential. Correlate with clinical parameters.    Trace ascites.    Mild bladder wall thickening with suggestion of left posterolateral   diverticulum. Correlate with urinalysis andlab values to assess for   cystitis and/or ascending urinary tract infection.    Additional findings as mentioned above.          Assessment:      Pt is a 78 y/o male, PMHx significant for Atrial fibrillation (Eliquis) s/p DCCV on 23, Moderate AS, Chronic Diastolic Heart Failure, CAD s/p PCI, HTN, HLD, DM 2, Morbid Obesity, ALLIE, Thoracic Aortic Aneurysm, BPH s/p TURP, Left Renal Mass, RBBB and LAHB,  who was sent to [facility] ED from [facility] for abdominal pain x 4 days associated with mild SOB. TTE performed on 23 revealed newly  EF (25-30%) down from 60-65% on 3/22/23. Pt has been receiving IV diuresis for suspected HF exacerbation and reports some improvement in SOB / abdominal pain. Pt also noted with new Hepatomegaly with Liver surface nodularity. Due to hepatic findings pts Amiodarone has been discontinued and pt noted with AF with RVR for which EP service has been consulted.     Pt previously admitted to [facility] in January of this year with AF with RVR and was noted with a LVEF of 40-45%. Pt underwent successful DCCV at that time and has since been maintained on Amiodarone. LHC performed prior to DCCV revealed non obstructive CAD, mid to distal circ  50%. Pt was then admitted again in march of this year requiring MICU Septic Shock 2/2 with Respiratory Failure and Metabolic Encephalopathy. TTE from prior admission on 3/22/23 revealed recovery in EF of 60-65%, and pt was eventually discharged back to assisted living facility.     Pt now noted with newly decreased EF of 20-25% while in AF with rates ranging from 90s-130s. Pt currently denies chest pain, palpitations, dizziness, lethargy, recent syncopal events.      Plan  1) Paroxsymal atrial fibrillation with rapid ventricular responses   - Rates previously controlled on amiodarone this admission  - Amiodarone being held secondary to hepatomegaly and ? Cirrhosis.  - Beta blocker therapy limited to labile BPs (Systolic 80 - 90s)  - Start PO Digoxin loading dose. Will start low dose due to recent amiodarone therapy  - Digoxin 250ug STAT, followed by 125ug in 6 hours, then 125ug in another 6 hours, followed by 125ug QD  - Digoxin serum level to be drawn 12 hours after third dose  - c/w Metoprolol with hold parameters  - Plan for eventual LIBBY / DCCV when patient more euvolemic  - Maintain telemetry monitoring  - Currently receiving full dose Lovenox for AC. Transition to Eliquis prior to discharge    2) HFrEF (EF 20-25%)  - Prior EF 60-65% on 3/23/23  - Likely exacerbated in setting of tachy arrythmia  - GDMT and Lasix as per primary cardiology team    3) Hepatomegaly with ? cirrhosis  - Avoid amiodarone use   - Abd MRI pending   - GI consult appreciated      Plan as per Dr. Rocha. Case discussed with  Dr. Zheng, and BESSY Baires at bedside  Further recommendations to follow        [place] [facility]  [facility]   Office: [address] [address]  Telephone: [phone] Fax:[phone]      HPI:  Pt is a 78 y/o male, PMHx significant for Atrial fibrillation (Eliquis) s/p DCCV on 23, Moderate AS, Chronic Diastolic Heart Failure, CAD s/p PCI, HTN, HLD, DM 2, Morbid Obesity, ALLIE, Thoracic Aortic Aneurysm, BPH s/p TURP, Left Renal Mass, RBBB and LAHB,  who was sent to [facility] ED from [facility] for abdominal pain x 4 days associated with mild SOB. TTE performed on 23 revealed newly  EF (25-30%) down from 60-65% on 3/22/23. Pt has been receiving IV diuresis for suspected HF exacerbation and reports some improvement in SOB / abdominal pain. Pt also noted with new Hepatomegaly with Liver surface nodularity. Due to hepatic findings pts Amiodarone has been discontinued and pt noted with AF with RVR for which EP service has been consulted.     Pt previously admitted to [facility] in January of this year with AF with RVR and was noted with a LVEF of 40-45%. Pt underwent successful DCCV at that time and has since been maintained on Amiodarone. LHC performed prior to DCCV revealed non obstructive CAD, mid to distal circ  50%. Pt was then admitted again in march of this year requiring MICU Septic Shock 2/2 with Respiratory Failure and Metabolic Encephalopathy. TTE from prior admission on 3/22/23 revealed recovery in EF of 60-65%, and pt was eventually discharged back to assisted living facility.     Pt now noted with newly decreased EF of 20-25% while in AFL with rates ranging from 90s-130s. Pt currently denies chest pain, palpitations, dizziness, lethargy, recent syncopal events.    EKG: Atrial flutter with RVR (126 BPM) RBBB, LAHB   Telemetry: Atrial Flutter with ventricular rates ranging from 90-130s. Intermittent sinus rhythm with 1st degree AVB              PAST MEDICAL & SURGICAL HISTORY:  Anxiety      Hypertension      Hyperlipidemia      Prostate disease      Gastroesophageal reflux disease      Gallbladder stone without cholecystitis or obstruction      BPH (benign prostatic hyperplasia)      DM (diabetes mellitus)      DVT (deep venous thrombosis)  left leg      Afib      Anxiety      HLD (hyperlipidemia)      Malignant schwannoma      H/O arthroscopy of knee      S/P laminectomy      S/P coronary artery stent placement  x2          REVIEW OF SYSTEMS:    CONSTITUTIONAL: No fevers. + sweating  EYES: No visual disturbances  NECK: No pain or stiffness  RESPIRATORY: + CRUZ  CARDIOVASCULAR: see HPI  GASTROINTESTINAL: + abdominal pain. + diarrhea. No nausea, vomiting, or hematemesis; No melena or hematochezia.  NEUROLOGICAL: No headaches, memory loss, loss of strength, numbness, or tremors  SKIN: No itching, burning, rashes, or lesions         MEDICATIONS  (STANDING):  albuterol    90 MICROgram(s) HFA Inhaler 2 Puff(s) Inhalation every 6 hours  aspirin  chewable 81 milliGRAM(s) Oral daily  chlorhexidine 2% Cloths 1 Application(s) Topical <User Schedule>  dextrose 5%. 1000 milliLiter(s) (100 mL/Hr) IV Continuous <Continuous>  dextrose 5%. 1000 milliLiter(s) (50 mL/Hr) IV Continuous <Continuous>  dextrose 50% Injectable 12.5 Gram(s) IV Push once  dextrose 50% Injectable 25 Gram(s) IV Push once  dextrose 50% Injectable 25 Gram(s) IV Push once  doxazosin 2 milliGRAM(s) Oral at bedtime  enoxaparin Injectable 135 milliGRAM(s) SubCutaneous every 12 hours  ertapenem  IVPB 1000 milliGRAM(s) IV Intermittent every 24 hours  finasteride 5 milliGRAM(s) Oral daily  fluticasone propionate 50 MICROgram(s)/spray Nasal Spray 1 Spray(s) Both Nostrils two times a day  furosemide   Injectable 40 milliGRAM(s) IV Push two times a day  glucagon  Injectable 1 milliGRAM(s) IntraMuscular once  insulin lispro (ADMELOG) corrective regimen sliding scale   SubCutaneous at bedtime  insulin lispro (ADMELOG) corrective regimen sliding scale   SubCutaneous three times a day before meals  metoprolol tartrate 25 milliGRAM(s) Oral three times a day  mupirocin 2% Nasal 1 Application(s) Both Nostrils two times a day  pantoprazole    Tablet 40 milliGRAM(s) Oral before breakfast  polyethylene glycol 3350 17 Gram(s) Oral daily  potassium chloride   Powder 20 milliEquivalent(s) Oral daily  senna 2 Tablet(s) Oral at bedtime    MEDICATIONS  (PRN):  ALPRAZolam 0.25 milliGRAM(s) Oral three times a day PRN Anxiety  aluminum hydroxide/magnesium hydroxide/simethicone Suspension 30 milliLiter(s) Oral every 4 hours PRN Dyspepsia  bisacodyl 5 milliGRAM(s) Oral every 12 hours PRN Constipation  dextrose Oral Gel 15 Gram(s) Oral once PRN Blood Glucose LESS THAN 70 milliGRAM(s)/deciliter  diphenhydrAMINE Injectable 50 milliGRAM(s) IV Push once PRN Allergic Reaction  meclizine 12.5 milliGRAM(s) Oral three times a day PRN Dizziness  melatonin 3 milliGRAM(s) Oral at bedtime PRN Insomnia  ondansetron Injectable 4 milliGRAM(s) IV Push every 8 hours PRN Nausea and/or Vomiting  oxyCODONE    IR 5 milliGRAM(s) Oral every 4 hours PRN Severe Pain (7 - 10)      Allergies    penicillin (Anaphylaxis)  Ceftin (Hives)  sulfa drugs (Unknown)  Bactrim (Rash)    Intolerances        SOCIAL HISTORY:    Denies ETOH  Denies Smoking  Denies Illicit drug use      FAMILY HISTORY:  FH: heart disease (Father)      Vital Signs Last 24 Hrs  T(C): 36.6 (2023 08:18), Max: 36.9 (2023 16:07)  T(F): 97.9 (2023 08:18), Max: 98.4 (2023 16:07)  HR: 122 (2023 12:53) (84 - 134)  BP: 97/64 (2023 12:53) (87/60 - 114/84)  BP(mean): 75 (2023 12:53) (69 - 75)  RR: 18 (2023 12:52) (17 - 20)  SpO2: 100% (2023 12:52) (93% - 100%)    Parameters below as of 2023 12:52  Patient On (Oxygen Delivery Method): nasal cannula  O2 Flow (L/min): 3      Physical Exam    Constitutional: Obese, NAD  Head: normocephalic atraumatic  Neck: supple, full range of motion, nontender to palpation midline  Chest wall: normal in appearance, nontender to palpation  Resp: effort normal, breath sounds clear to auscultation bilaterally  Cardiac: Irregular rhythm, tachycardic. No murmurs / rubs / gallops. No b/l LE edema  Abdomen: + RUQ tenderness.   Neuro: Alert and oriented x 3, motor & sensation grossly in tact  Skin: color normal, no rashes or injury  Psych: mood calm           LABS:                        13.3   7.57  )-----------( 203      ( 2023 04:57 )             41.8       139  |  101  |  24.0<H>  ----------------------------<  163<H>  3.8   |  30.0<H>  |  0.69    Ca    8.0<L>      2023 04:51  Mg     1.7         TPro  5.3<L>  /  Alb  2.4<L>  /  TBili  0.2<L>  /  DBili  x   /  AST  8   /  ALT  8   /  AlkPhos  160<H>    LIVER FUNCTIONS - ( 2023 04:51 )  Alb: 2.4 g/dL / Pro: 5.3 g/dL / ALK PHOS: 160 U/L / ALT: 8 U/L / AST: 8 U/L / GGT: x                   RADIOLOGY & ADDITIONAL STUDIES:      < from: TTE Echo Limited or F/U (23 @ 11:44) >  Summary:   1. Left ventricularejection fraction, by visual estimation, is 25 to   30%.   2. Moderately to severely decreased global left ventricular systolic   function.   3. There is mild concentric left ventricular hypertrophy.   4. Mildly enlarged right ventricle.   5. Moderately reduced RV systolic function.   6. Normal left atrial size.   7. Normal right atrial size.   8. There is no evidence of pericardial effusion.   9. Peak Gradient across the Aortic Valve is 35 mm Hg      Mean gradient is 26 mm Hg      DI 0.25      These findings are suggestive of LFLG Severe Aortic Stenosis.  10. Recommendations: Consider Dobutamine Stress echo to differentiate   True Severe AS from Pseudo AS or CT Scan for Aortic Valve Calcium Scoring.  11. Compared to prior Echo in 3/2023, lowEF is a new finding.  12. Endocardial visualization was enhanced with intravenous echo contrast.    MD Priya Electronically signed on 2023 at 5:17:26 PM      < from: CT Abdomen and Pelvis w/ IV Cont (06.10.23 @ 02:29) >  IMPRESSION:    No pulmonary embolism though evaluation of subsegmental branches limited   secondary to respiratory motion artifact.    Trace bilateral pleural effusions with adjacent patchy bibasilar   opacities, likely represent dependent atelectasis. Recommend clinical   correlation to assess for superimposed infection.    Hepatomegaly. Liver surface nodularity. Hepatitis or cirrhosis considered   in differential. Correlate with clinical parameters.    Trace ascites.    Mild bladder wall thickening with suggestion of left posterolateral   diverticulum. Correlate with urinalysis andlab values to assess for   cystitis and/or ascending urinary tract infection.    Additional findings as mentioned above.          Assessment:      Pt is a 78 y/o male, PMHx significant for Atrial fibrillation (Eliquis) s/p DCCV on 23, Moderate AS, Chronic Diastolic Heart Failure, CAD s/p PCI, HTN, HLD, DM 2, Morbid Obesity, ALLIE, Thoracic Aortic Aneurysm, BPH s/p TURP, Left Renal Mass, RBBB and LAHB,  who was sent to [facility] ED from [facility] for abdominal pain x 4 days associated with mild SOB. TTE performed on 23 revealed newly  EF (25-30%) down from 60-65% on 3/22/23. Pt has been receiving IV diuresis for suspected HF exacerbation and reports some improvement in SOB / abdominal pain. Pt also noted with new Hepatomegaly with Liver surface nodularity. Due to hepatic findings pts Amiodarone has been discontinued and pt noted with AF with RVR for which EP service has been consulted.     Pt previously admitted to [facility] in January of this year with AF with RVR and was noted with a LVEF of 40-45%. Pt underwent successful DCCV at that time and has since been maintained on Amiodarone. LHC performed prior to DCCV revealed non obstructive CAD, mid to distal circ  50%. Pt was then admitted again in march of this year requiring MICU Septic Shock 2/2 with Respiratory Failure and Metabolic Encephalopathy. TTE from prior admission on 3/22/23 revealed recovery in EF of 60-65%, and pt was eventually discharged back to assisted living facility.     Pt now noted with newly decreased EF of 20-25% while in AFL with rates ranging from 90s-130s. Pt currently denies chest pain, palpitations, dizziness, lethargy, recent syncopal events.      Plan  1) Paroxsymal atrial flutter with rapid ventricular responses   - Rates previously controlled on amiodarone this admission  - Amiodarone being held secondary to hepatomegaly and ? Cirrhosis.  - Beta blocker therapy limited to labile BPs (Systolic 80 - 90s)  - Start PO Digoxin loading dose. Will start low dose due to recent amiodarone therapy  - Digoxin 250ug STAT, followed by 125ug in 6 hours, then 125ug in another 6 hours, followed by 125ug QD  - Digoxin serum level to be drawn 12 hours after third dose  - c/w Metoprolol with hold parameters  - Plan for eventual LIBBY / DCCV when patient more euvolemic  - Maintain telemetry monitoring  - Currently receiving full dose Lovenox for AC. Transition to Eliquis prior to discharge    2) HFrEF (EF 20-25%)  - Prior EF 60-65% on 3/23/23  - Likely exacerbated in setting of tachy arrythmia  - GDMT and Lasix as per primary cardiology team    3) Hepatomegaly with ? cirrhosis  - Avoid amiodarone use   - Abd MRI pending   - GI consult appreciated      Plan as per Dr. Rocha. Case discussed with  Dr. Zheng, and BESSY Baires at bedside  Further recommendations to follow

## 2023-06-14 NOTE — PROGRESS NOTE ADULT - SUBJECTIVE AND OBJECTIVE BOX
Chief Complaint:  Patient is a 77y old  Male who presents with a chief complaint of HF exacerbation (14 Jun 2023 13:37)      HPI/ 24 hr events: Re-consulted by EP for medication management.   Patient seen and examined at bedside. He is complaining of right sided abdominal pain, states it is worse with bending down. He also notes he was constipated, received laxatives and is now having loose stools. He feels very bloated and is complaining of "gas pain." Denies nausea, vomiting. Afebrile, tachycardic. No leukocytosis, Alk phos elevated 160 otherwise LFTs normal, platelets normal, INR from 6/9 was 1.50. Acute hepatitis panel negative. MRI pending.       REVIEW OF SYSTEMS:   General: Negative  HEENT: Negative  CV: Negative  Respiratory: Negative  GI: See HPI  : Negative  MSK: Negative  Hematologic: Negative  Skin: Negative    MEDICATIONS:   MEDICATIONS  (STANDING):  albuterol    90 MICROgram(s) HFA Inhaler 2 Puff(s) Inhalation every 6 hours  aspirin  chewable 81 milliGRAM(s) Oral daily  chlorhexidine 2% Cloths 1 Application(s) Topical <User Schedule>  dextrose 5%. 1000 milliLiter(s) (50 mL/Hr) IV Continuous <Continuous>  dextrose 5%. 1000 milliLiter(s) (100 mL/Hr) IV Continuous <Continuous>  dextrose 50% Injectable 25 Gram(s) IV Push once  dextrose 50% Injectable 25 Gram(s) IV Push once  dextrose 50% Injectable 12.5 Gram(s) IV Push once  doxazosin 2 milliGRAM(s) Oral at bedtime  enoxaparin Injectable 135 milliGRAM(s) SubCutaneous every 12 hours  ertapenem  IVPB 1000 milliGRAM(s) IV Intermittent every 24 hours  finasteride 5 milliGRAM(s) Oral daily  fluticasone propionate 50 MICROgram(s)/spray Nasal Spray 1 Spray(s) Both Nostrils two times a day  furosemide   Injectable 40 milliGRAM(s) IV Push two times a day  glucagon  Injectable 1 milliGRAM(s) IntraMuscular once  insulin lispro (ADMELOG) corrective regimen sliding scale   SubCutaneous three times a day before meals  insulin lispro (ADMELOG) corrective regimen sliding scale   SubCutaneous at bedtime  metoprolol tartrate 25 milliGRAM(s) Oral three times a day  mupirocin 2% Nasal 1 Application(s) Both Nostrils two times a day  pantoprazole    Tablet 40 milliGRAM(s) Oral before breakfast  polyethylene glycol 3350 17 Gram(s) Oral daily  potassium chloride   Powder 20 milliEquivalent(s) Oral daily  senna 2 Tablet(s) Oral at bedtime    MEDICATIONS  (PRN):  ALPRAZolam 0.25 milliGRAM(s) Oral three times a day PRN Anxiety  aluminum hydroxide/magnesium hydroxide/simethicone Suspension 30 milliLiter(s) Oral every 4 hours PRN Dyspepsia  bisacodyl 5 milliGRAM(s) Oral every 12 hours PRN Constipation  dextrose Oral Gel 15 Gram(s) Oral once PRN Blood Glucose LESS THAN 70 milliGRAM(s)/deciliter  diphenhydrAMINE Injectable 50 milliGRAM(s) IV Push once PRN Allergic Reaction  meclizine 12.5 milliGRAM(s) Oral three times a day PRN Dizziness  melatonin 3 milliGRAM(s) Oral at bedtime PRN Insomnia  ondansetron Injectable 4 milliGRAM(s) IV Push every 8 hours PRN Nausea and/or Vomiting  oxyCODONE    IR 5 milliGRAM(s) Oral every 4 hours PRN Severe Pain (7 - 10)      ALLERGIES:   Allergies    penicillin (Anaphylaxis)  Ceftin (Hives)  sulfa drugs (Unknown)  Bactrim (Rash)    Intolerances        VITAL SIGNS:   Vital Signs Last 24 Hrs  T(C): 36.4 (14 Jun 2023 16:07), Max: 36.8 (14 Jun 2023 04:21)  T(F): 97.6 (14 Jun 2023 16:07), Max: 98.3 (14 Jun 2023 04:21)  HR: 129 (14 Jun 2023 16:07) (84 - 134)  BP: 90/61 (14 Jun 2023 16:07) (87/60 - 114/84)  BP(mean): 75 (14 Jun 2023 12:53) (69 - 75)  RR: 18 (14 Jun 2023 16:07) (17 - 20)  SpO2: 100% (14 Jun 2023 16:07) (93% - 100%)    Parameters below as of 14 Jun 2023 16:07  Patient On (Oxygen Delivery Method): nasal cannula  O2 Flow (L/min): 3    I&O's Summary    13 Jun 2023 07:01  -  14 Jun 2023 07:00  --------------------------------------------------------  IN: 300 mL / OUT: 2552 mL / NET: -2252 mL        PHYSICAL EXAM:   GENERAL:  No acute distress  HEENT:  NC/AT, conjunctiva clear, sclera anicteric  CHEST:  No increased effort  HEART:  tachycardic rate  ABDOMEN:  Soft, obese, tenderness to RUQ/R lower rib, non-distended, normoactive bowel sounds, no rebound or guarding  SKIN:  Warm, dry  NEURO:  Calm, cooperative    LABS:                        13.3   7.57  )-----------( 203      ( 13 Jun 2023 04:57 )             41.8     06-14    139  |  101  |  24.0<H>  ----------------------------<  163<H>  3.8   |  30.0<H>  |  0.69    Ca    8.0<L>      14 Jun 2023 04:51  Mg     1.7     06-14    TPro  5.3<L>  /  Alb  2.4<L>  /  TBili  0.2<L>  /  DBili  x   /  AST  8   /  ALT  8   /  AlkPhos  160<H>  06-14    LIVER FUNCTIONS - ( 14 Jun 2023 04:51 )  Alb: 2.4 g/dL / Pro: 5.3 g/dL / ALK PHOS: 160 U/L / ALT: 8 U/L / AST: 8 U/L / GGT: x               Hepatitis A IgM Antibody: Nonreact (06-12-23 @ 10:42)  Hepatitis C Virus S/CO Ratio: 0.05 S/CO (06-12-23 @ 10:42)  Hepatitis B Core IgM Antibody: Nonreact (06-12-23 @ 10:42)  Hepatitis B Surface Antigen: Nonreact (06-12-23 @ 10:42)                      RADIOLOGY & ADDITIONAL STUDIES:        < from: CT Abdomen and Pelvis w/ IV Cont (06.10.23 @ 02:29) >    ACC: [id number] EXAM:  CT ABDOMEN AND PELVIS IC   ORDERED BY: JOHNNY LAZARO     ACC: [id number] EXAM:  CT ANGIO CHEST PULM ART WAWIC   ORDERED BY:   AGGIE SCHAFFER     PROCEDURE DATE:  06/10/2023          FINDINGS:    CHEST:  LUNGS, PLEURA AND LARGE AIRWAYS: Reidentified collapse of the mid to   distal trachea, suggest tracheomalacia. Trace bilateral pleural effusions   with adjacent patchy bibasilar opacities, likely representdependent   atelectasis. Recommend clinical correlation to assess for superimposed   infection.  VESSELS: No pulmonary embolism though evaluation of subsegmental branches   limited secondary to respiratory motion artifact.  HEART: Cardiomegaly.. No pericardial effusion. Valvular and coronary   artery calcifications.  MEDIASTINUM AND MOLLY: No lymphadenopathy.  CHEST WALL AND LOWER NECK: Within normal limits.    ABDOMEN AND PELVIS:  LIVER: Surface nodularity. Enlarged measuring 26.5 cm in length.  BILE DUCTS: Normal caliber.  GALLBLADDER: Within normal limits.  SPLEEN: Enlarged measuring 15.5 cm length.  PANCREAS: Diffuse fatty atrophy.  ADRENALS: Within normal limits.  KIDNEYS/URETERS: No hydronephrosis. Bilateral renal cysts as well as too   small to characterize hypodensities.    BLADDER: Mild wall thickening with suggestion of left posterolateral   diverticulum.  REPRODUCTIVE ORGANS: Mild prominent prostate.    BOWEL: No acute bowel inflammatory change or obstruction though detailed   evaluation of of distal GI tract is limited secondary to inadequate   distention. Nonvisualized appendix.  PERITONEUM: Trace ascites.  VESSELS: Atherosclerotic changes.  RETROPERITONEUM/LYMPH NODES: No lymphadenopathy.  ABDOMINAL WALL Small bilateral groin hernias containing fat. Mild   subcutis edema.  BONES: Degenerative changes. Laminectomy of the lower lumbar spine. Mild   anterolisthesis is of L3 on L4.    IMPRESSION:    No pulmonary embolism though evaluation of subsegmental branches limited   secondary to respiratory motion artifact.    Trace bilateral pleural effusions with adjacent patchy bibasilar   opacities, likely represent dependent atelectasis. Recommend clinical   correlation to assess for superimposed infection.    Hepatomegaly. Liver surface nodularity. Hepatitis or cirrhosis considered   in differential. Correlate with clinical parameters.    Trace ascites.    Mild bladder wall thickening with suggestion of left posterolateral   diverticulum. Correlate with urinalysis andlab values to assess for   cystitis and/or ascending urinary tract infection.    Additional findings as mentioned above.    --- End of Report ---            < end of copied text >        < from: US Abdomen Complete (US Abdomen Complete .) (06.10.23 @ 11:27) >    ACC: [id number] EXAM:  US ABDOMEN COMPLETE   ORDERED BY: COLIN ALONSO     PROCEDURE DATE:  06/10/2023          FINDINGS:  Liver: Hepatomegaly. No focal liver lesions.  Bile ducts: Normal caliber. Common bile duct measures 8 mm, within normal   limits for age.  Gallbladder: Contracted, with biliary sludge.  Pancreas: Poorly visualized.  Spleen: 12.3 cm. Within normal limits.  Right kidney: 10.9 cm. No hydronephrosis. Multiple cysts, largest   measuring 3.8 x 3.5 x 4.5 cm in the lower pole.  Left kidney: 14.2 cm. No hydronephrosis. Upper pole cyst measures 1.7 x   2.0 x 1.0 cm.  Ascites: None.  Aorta and IVC: Visualized portions are within normal limits.    IMPRESSION:  *  Hepatomegaly.  *  Biliary sludge in the contracted gallbladder.  *  Nonspecific gallbladder wall thickening. No evidence of acute   cholecystitis.          --- End of Report ---      < end of copied text >     Chief Complaint:  Patient is a 77y old  Male who presents with a chief complaint of HF exacerbation (14 Jun 2023 13:37)      HPI/ 24 hr events: Re-consulted by EP for medication management.   Patient seen and examined at bedside. He is complaining of right sided abdominal pain, states it is worse with bending down, laying on right side and when reaching back with his right arm. . He also notes he was previousll constipated, received laxatives and is now having loose stools. He feels very bloated and is complaining of "gas pain." Denies nausea, vomiting. Afebrile, tachycardic. No leukocytosis, Alk phos elevated 160 otherwise LFTs normal, platelets normal, INR from 6/9 was 1.50. Acute hepatitis panel negative. MRI pending.       REVIEW OF SYSTEMS:   General: Negative  HEENT: Negative  CV: Negative  Respiratory: Negative  GI: See HPI  : Negative  MSK: Negative  Hematologic: Negative  Skin: Negative    MEDICATIONS:   MEDICATIONS  (STANDING):  albuterol    90 MICROgram(s) HFA Inhaler 2 Puff(s) Inhalation every 6 hours  aspirin  chewable 81 milliGRAM(s) Oral daily  chlorhexidine 2% Cloths 1 Application(s) Topical <User Schedule>  dextrose 5%. 1000 milliLiter(s) (50 mL/Hr) IV Continuous <Continuous>  dextrose 5%. 1000 milliLiter(s) (100 mL/Hr) IV Continuous <Continuous>  dextrose 50% Injectable 25 Gram(s) IV Push once  dextrose 50% Injectable 25 Gram(s) IV Push once  dextrose 50% Injectable 12.5 Gram(s) IV Push once  doxazosin 2 milliGRAM(s) Oral at bedtime  enoxaparin Injectable 135 milliGRAM(s) SubCutaneous every 12 hours  ertapenem  IVPB 1000 milliGRAM(s) IV Intermittent every 24 hours  finasteride 5 milliGRAM(s) Oral daily  fluticasone propionate 50 MICROgram(s)/spray Nasal Spray 1 Spray(s) Both Nostrils two times a day  furosemide   Injectable 40 milliGRAM(s) IV Push two times a day  glucagon  Injectable 1 milliGRAM(s) IntraMuscular once  insulin lispro (ADMELOG) corrective regimen sliding scale   SubCutaneous three times a day before meals  insulin lispro (ADMELOG) corrective regimen sliding scale   SubCutaneous at bedtime  metoprolol tartrate 25 milliGRAM(s) Oral three times a day  mupirocin 2% Nasal 1 Application(s) Both Nostrils two times a day  pantoprazole    Tablet 40 milliGRAM(s) Oral before breakfast  polyethylene glycol 3350 17 Gram(s) Oral daily  potassium chloride   Powder 20 milliEquivalent(s) Oral daily  senna 2 Tablet(s) Oral at bedtime    MEDICATIONS  (PRN):  ALPRAZolam 0.25 milliGRAM(s) Oral three times a day PRN Anxiety  aluminum hydroxide/magnesium hydroxide/simethicone Suspension 30 milliLiter(s) Oral every 4 hours PRN Dyspepsia  bisacodyl 5 milliGRAM(s) Oral every 12 hours PRN Constipation  dextrose Oral Gel 15 Gram(s) Oral once PRN Blood Glucose LESS THAN 70 milliGRAM(s)/deciliter  diphenhydrAMINE Injectable 50 milliGRAM(s) IV Push once PRN Allergic Reaction  meclizine 12.5 milliGRAM(s) Oral three times a day PRN Dizziness  melatonin 3 milliGRAM(s) Oral at bedtime PRN Insomnia  ondansetron Injectable 4 milliGRAM(s) IV Push every 8 hours PRN Nausea and/or Vomiting  oxyCODONE    IR 5 milliGRAM(s) Oral every 4 hours PRN Severe Pain (7 - 10)      ALLERGIES:   Allergies    penicillin (Anaphylaxis)  Ceftin (Hives)  sulfa drugs (Unknown)  Bactrim (Rash)    Intolerances        VITAL SIGNS:   Vital Signs Last 24 Hrs  T(C): 36.4 (14 Jun 2023 16:07), Max: 36.8 (14 Jun 2023 04:21)  T(F): 97.6 (14 Jun 2023 16:07), Max: 98.3 (14 Jun 2023 04:21)  HR: 129 (14 Jun 2023 16:07) (84 - 134)  BP: 90/61 (14 Jun 2023 16:07) (87/60 - 114/84)  BP(mean): 75 (14 Jun 2023 12:53) (69 - 75)  RR: 18 (14 Jun 2023 16:07) (17 - 20)  SpO2: 100% (14 Jun 2023 16:07) (93% - 100%)    Parameters below as of 14 Jun 2023 16:07  Patient On (Oxygen Delivery Method): nasal cannula  O2 Flow (L/min): 3    I&O's Summary    13 Jun 2023 07:01  -  14 Jun 2023 07:00  --------------------------------------------------------  IN: 300 mL / OUT: 2552 mL / NET: -2252 mL        PHYSICAL EXAM:   GENERAL:  No acute distress  HEENT:  NC/AT, conjunctiva clear, sclera anicteric  CHEST:  No increased effort  HEART:  tachycardic rate  ABDOMEN:  Soft, obese, reproducible pain - palpatioin  tenderness to R lower rib, non-distended, normoactive bowel sounds, no rebound or guarding  SKIN:  Warm, dry  NEURO:  Calm, cooperative    LABS:                        13.3   7.57  )-----------( 203      ( 13 Jun 2023 04:57 )             41.8     06-14    139  |  101  |  24.0<H>  ----------------------------<  163<H>  3.8   |  30.0<H>  |  0.69    Ca    8.0<L>      14 Jun 2023 04:51  Mg     1.7     06-14    TPro  5.3<L>  /  Alb  2.4<L>  /  TBili  0.2<L>  /  DBili  x   /  AST  8   /  ALT  8   /  AlkPhos  160<H>  06-14    LIVER FUNCTIONS - ( 14 Jun 2023 04:51 )  Alb: 2.4 g/dL / Pro: 5.3 g/dL / ALK PHOS: 160 U/L / ALT: 8 U/L / AST: 8 U/L / GGT: x               Hepatitis A IgM Antibody: Nonreact (06-12-23 @ 10:42)  Hepatitis C Virus S/CO Ratio: 0.05 S/CO (06-12-23 @ 10:42)  Hepatitis B Core IgM Antibody: Nonreact (06-12-23 @ 10:42)  Hepatitis B Surface Antigen: Nonreact (06-12-23 @ 10:42)                      RADIOLOGY & ADDITIONAL STUDIES:        < from: CT Abdomen and Pelvis w/ IV Cont (06.10.23 @ 02:29) >    ACC: [id number] EXAM:  CT ABDOMEN AND PELVIS IC   ORDERED BY: JOHNNY LAZARO     ACC: [id number] EXAM:  CT ANGIO CHEST PULM ART WAWIC   ORDERED BY:   AGGIE SCHAFFER     PROCEDURE DATE:  06/10/2023          FINDINGS:    CHEST:  LUNGS, PLEURA AND LARGE AIRWAYS: Reidentified collapse of the mid to   distal trachea, suggest tracheomalacia. Trace bilateral pleural effusions   with adjacent patchy bibasilar opacities, likely representdependent   atelectasis. Recommend clinical correlation to assess for superimposed   infection.  VESSELS: No pulmonary embolism though evaluation of subsegmental branches   limited secondary to respiratory motion artifact.  HEART: Cardiomegaly.. No pericardial effusion. Valvular and coronary   artery calcifications.  MEDIASTINUM AND MOLLY: No lymphadenopathy.  CHEST WALL AND LOWER NECK: Within normal limits.    ABDOMEN AND PELVIS:  LIVER: Surface nodularity. Enlarged measuring 26.5 cm in length.  BILE DUCTS: Normal caliber.  GALLBLADDER: Within normal limits.  SPLEEN: Enlarged measuring 15.5 cm length.  PANCREAS: Diffuse fatty atrophy.  ADRENALS: Within normal limits.  KIDNEYS/URETERS: No hydronephrosis. Bilateral renal cysts as well as too   small to characterize hypodensities.    BLADDER: Mild wall thickening with suggestion of left posterolateral   diverticulum.  REPRODUCTIVE ORGANS: Mild prominent prostate.    BOWEL: No acute bowel inflammatory change or obstruction though detailed   evaluation of of distal GI tract is limited secondary to inadequate   distention. Nonvisualized appendix.  PERITONEUM: Trace ascites.  VESSELS: Atherosclerotic changes.  RETROPERITONEUM/LYMPH NODES: No lymphadenopathy.  ABDOMINAL WALL Small bilateral groin hernias containing fat. Mild   subcutis edema.  BONES: Degenerative changes. Laminectomy of the lower lumbar spine. Mild   anterolisthesis is of L3 on L4.    IMPRESSION:    No pulmonary embolism though evaluation of subsegmental branches limited   secondary to respiratory motion artifact.    Trace bilateral pleural effusions with adjacent patchy bibasilar   opacities, likely represent dependent atelectasis. Recommend clinical   correlation to assess for superimposed infection.    Hepatomegaly. Liver surface nodularity. Hepatitis or cirrhosis considered   in differential. Correlate with clinical parameters.    Trace ascites.    Mild bladder wall thickening with suggestion of left posterolateral   diverticulum. Correlate with urinalysis andlab values to assess for   cystitis and/or ascending urinary tract infection.    Additional findings as mentioned above.    --- End of Report ---            < end of copied text >        < from: US Abdomen Complete (US Abdomen Complete .) (06.10.23 @ 11:27) >    ACC: [id number] EXAM:  US ABDOMEN COMPLETE   ORDERED BY: COLIN KIARA MEHTAESVIN     PROCEDURE DATE:  06/10/2023          FINDINGS:  Liver: Hepatomegaly. No focal liver lesions.  Bile ducts: Normal caliber. Common bile duct measures 8 mm, within normal   limits for age.  Gallbladder: Contracted, with biliary sludge.  Pancreas: Poorly visualized.  Spleen: 12.3 cm. Within normal limits.  Right kidney: 10.9 cm. No hydronephrosis. Multiple cysts, largest   measuring 3.8 x 3.5 x 4.5 cm in the lower pole.  Left kidney: 14.2 cm. No hydronephrosis. Upper pole cyst measures 1.7 x   2.0 x 1.0 cm.  Ascites: None.  Aorta and IVC: Visualized portions are within normal limits.    IMPRESSION:  *  Hepatomegaly.  *  Biliary sludge in the contracted gallbladder.  *  Nonspecific gallbladder wall thickening. No evidence of acute   cholecystitis.          --- End of Report ---      < end of copied text >

## 2023-06-14 NOTE — PROGRESS NOTE ADULT - PROBLEM SELECTOR PLAN 1
CT A/P w/ IV Cont (06.10.23)- Hepatomegaly. Liver surface nodularity. Hepatitis or cirrhosis considered   in differential. Trace ascites.  US ABD (06.10.23)- Hepatomegaly, biliary sludge in contracted GB, nonspecific GB wall thickening   Alk phos elevated otherwise LFTs nrml, nrml platelets, INR elevated (1.50). Acute hepatitis (-). MELD 11 based on available labs  ?cirrhosis, uncertain etiology, not decompensated    - Consider repeating abdominal ultrasound   - Trend renal function, LFTs, electrolytes, coags daily  - Check alk phos isoenzyme for further differentiation   - Avoid hepatotoxins. Would consider alternative agent than Amiodarone in a patient with liver disease   - MRI abd is pending  - Pain control, Tylenol 2g max per day  - Recommend Simethicone for gas pain  - Needs outpatient follow up with Hepatologist for further assessment and monitoring. May follow up in our office with Dr. Petesr, Hepatology  - He is also reports he is due for colonoscopy, will need outpatient follow up   Rest of care as per primary team  _________________________________________________________________  Assessment and recommendations are final when note is signed by the attending physician. CT A/P w/ IV Cont (06.10.23)- Hepatomegaly. Liver surface nodularity. Hepatitis or cirrhosis considered   in differential. Trace ascites.  US ABD (06.10.23)- Hepatomegaly, biliary sludge in contracted GB, nonspecific GB wall thickening   Alk phos  minimally elevated otherwise LFTs nrml, nrml platelets, INR elevated (1.50). Acute hepatitis (-). MELD 11 based on available labs  ?cirrhosis, uncertain etiology, not decompensated      - Trend renal function, LFTs, electrolytes, coags daily  - Check alk phos isoenzyme for further differentiation   - Avoid hepatotoxins. Would consider alternative agent than Amiodarone in a patient with possible cirrhosis   - Pain control, Tylenol 2g max per day  - Recommend Simethicone for gas pain  - Needs outpatient follow up with Hepatologist for further assessment and monitoring.  Can get Fibroscan as outpatient to assess degree of fibrosis/cirrhosis  ( we do not have this equipement at [facility] ) May follow up in our office with Dr. Peters, Hepatology  Rest of care as per primary team  _________________________________________________________________  Assessment and recommendations are final when note is signed by the attending physician.

## 2023-06-14 NOTE — PROGRESS NOTE ADULT - SUBJECTIVE AND OBJECTIVE BOX
ANIA CRISTOBAL    [id number]    77y      Male    INTERVAL HPI/OVERNIGHT EVENTS: patient being seen for chf and abd pain.       REVIEW OF SYSTEMS:    CONSTITUTIONAL: No fever, weight loss, or fatigue  RESPIRATORY: No cough, wheezing, hemoptysis; No shortness of breath  CARDIOVASCULAR: No chest pain, palpitations  GASTROINTESTINAL: No abdominal or epigastric pain. No nausea, vomiting  NEUROLOGICAL: No headaches, memory loss, loss of strength.  MISCELLANEOUS:      Vital Signs Last 24 Hrs  T(C): 36.6 (14 Jun 2023 08:18), Max: 36.9 (13 Jun 2023 16:07)  T(F): 97.9 (14 Jun 2023 08:18), Max: 98.4 (13 Jun 2023 16:07)  HR: 122 (14 Jun 2023 12:53) (84 - 134)  BP: 97/64 (14 Jun 2023 12:53) (87/60 - 114/84)  BP(mean): 75 (14 Jun 2023 12:53) (69 - 75)  RR: 18 (14 Jun 2023 12:52) (17 - 20)  SpO2: 100% (14 Jun 2023 12:52) (93% - 100%)    Parameters below as of 14 Jun 2023 12:52  Patient On (Oxygen Delivery Method): nasal cannula  O2 Flow (L/min): 3      PHYSICAL EXAM:    CONSTITUTIONAL: NAD,  obese, [place]  HEENMT: NC/AT, PERRLA, EOMI, Moist oral mucosa, no pharyngeal injection or exudates;  RESPIRATORY:  diminsihjed  CARDIOVASCULAR: S1/S2+, RRR, no MRG, trace edema  ABDOMEN: Soft, tender ruq  PSYCH: Anxious, cooperative  NEUROLOGY: AAOx4, CN 2-12 are intact and symmetric; no gross sensory deficits;   SKIN: Warm, Dry, No rashes; no palpable lesions        LABS:                        13.3   7.57  )-----------( 203      ( 13 Jun 2023 04:57 )             41.8     06-14    139  |  101  |  24.0<H>  ----------------------------<  163<H>  3.8   |  30.0<H>  |  0.69    Ca    8.0<L>      14 Jun 2023 04:51  Mg     1.7     06-14    TPro  5.3<L>  /  Alb  2.4<L>  /  TBili  0.2<L>  /  DBili  x   /  AST  8   /  ALT  8   /  AlkPhos  160<H>  06-14            MEDICATIONS  (STANDING):  albuterol    90 MICROgram(s) HFA Inhaler 2 Puff(s) Inhalation every 6 hours  aspirin  chewable 81 milliGRAM(s) Oral daily  chlorhexidine 2% Cloths 1 Application(s) Topical <User Schedule>  dextrose 5%. 1000 milliLiter(s) (50 mL/Hr) IV Continuous <Continuous>  dextrose 5%. 1000 milliLiter(s) (100 mL/Hr) IV Continuous <Continuous>  dextrose 50% Injectable 25 Gram(s) IV Push once  dextrose 50% Injectable 25 Gram(s) IV Push once  dextrose 50% Injectable 12.5 Gram(s) IV Push once  doxazosin 2 milliGRAM(s) Oral at bedtime  enoxaparin Injectable 135 milliGRAM(s) SubCutaneous every 12 hours  ertapenem  IVPB 1000 milliGRAM(s) IV Intermittent every 24 hours  finasteride 5 milliGRAM(s) Oral daily  fluticasone propionate 50 MICROgram(s)/spray Nasal Spray 1 Spray(s) Both Nostrils two times a day  furosemide   Injectable 40 milliGRAM(s) IV Push two times a day  glucagon  Injectable 1 milliGRAM(s) IntraMuscular once  insulin lispro (ADMELOG) corrective regimen sliding scale   SubCutaneous three times a day before meals  insulin lispro (ADMELOG) corrective regimen sliding scale   SubCutaneous at bedtime  metoprolol tartrate 25 milliGRAM(s) Oral three times a day  mupirocin 2% Nasal 1 Application(s) Both Nostrils two times a day  pantoprazole    Tablet 40 milliGRAM(s) Oral before breakfast  polyethylene glycol 3350 17 Gram(s) Oral daily  potassium chloride   Powder 20 milliEquivalent(s) Oral daily  senna 2 Tablet(s) Oral at bedtime    MEDICATIONS  (PRN):  ALPRAZolam 0.25 milliGRAM(s) Oral three times a day PRN Anxiety  aluminum hydroxide/magnesium hydroxide/simethicone Suspension 30 milliLiter(s) Oral every 4 hours PRN Dyspepsia  bisacodyl 5 milliGRAM(s) Oral every 12 hours PRN Constipation  dextrose Oral Gel 15 Gram(s) Oral once PRN Blood Glucose LESS THAN 70 milliGRAM(s)/deciliter  diphenhydrAMINE Injectable 50 milliGRAM(s) IV Push once PRN Allergic Reaction  meclizine 12.5 milliGRAM(s) Oral three times a day PRN Dizziness  melatonin 3 milliGRAM(s) Oral at bedtime PRN Insomnia  ondansetron Injectable 4 milliGRAM(s) IV Push every 8 hours PRN Nausea and/or Vomiting  oxyCODONE    IR 5 milliGRAM(s) Oral every 4 hours PRN Severe Pain (7 - 10)      RADIOLOGY & ADDITIONAL TESTS:

## 2023-06-15 LAB
ALBUMIN SERPL ELPH-MCNC: 2.5 G/DL — LOW (ref 3.3–5.2)
ALP SERPL-CCNC: 169 U/L — HIGH (ref 40–120)
ALT FLD-CCNC: 8 U/L — SIGNIFICANT CHANGE UP
ANION GAP SERPL CALC-SCNC: 9 MMOL/L — SIGNIFICANT CHANGE UP (ref 5–17)
AST SERPL-CCNC: 7 U/L — SIGNIFICANT CHANGE UP
BILIRUB SERPL-MCNC: 0.3 MG/DL — LOW (ref 0.4–2)
BUN SERPL-MCNC: 21.9 MG/DL — HIGH (ref 8–20)
CALCIUM SERPL-MCNC: 8.3 MG/DL — LOW (ref 8.4–10.5)
CHLORIDE SERPL-SCNC: 100 MMOL/L — SIGNIFICANT CHANGE UP (ref 96–108)
CO2 SERPL-SCNC: 30 MMOL/L — HIGH (ref 22–29)
CREAT SERPL-MCNC: 0.64 MG/DL — SIGNIFICANT CHANGE UP (ref 0.5–1.3)
CULTURE RESULTS: SIGNIFICANT CHANGE UP
CULTURE RESULTS: SIGNIFICANT CHANGE UP
DIGOXIN SERPL-MCNC: 0.5 NG/ML — LOW (ref 0.8–2)
EGFR: 98 ML/MIN/1.73M2 — SIGNIFICANT CHANGE UP
GLUCOSE BLDC GLUCOMTR-MCNC: 127 MG/DL — HIGH (ref 70–99)
GLUCOSE BLDC GLUCOMTR-MCNC: 143 MG/DL — HIGH (ref 70–99)
GLUCOSE BLDC GLUCOMTR-MCNC: 157 MG/DL — HIGH (ref 70–99)
GLUCOSE BLDC GLUCOMTR-MCNC: 161 MG/DL — HIGH (ref 70–99)
GLUCOSE SERPL-MCNC: 146 MG/DL — HIGH (ref 70–99)
INR BLD: 1.26 RATIO — HIGH (ref 0.88–1.16)
MAGNESIUM SERPL-MCNC: 2 MG/DL — SIGNIFICANT CHANGE UP (ref 1.8–2.6)
POTASSIUM SERPL-MCNC: 3.6 MMOL/L — SIGNIFICANT CHANGE UP (ref 3.5–5.3)
POTASSIUM SERPL-SCNC: 3.6 MMOL/L — SIGNIFICANT CHANGE UP (ref 3.5–5.3)
PROT SERPL-MCNC: 5.5 G/DL — LOW (ref 6.6–8.7)
PROTHROM AB SERPL-ACNC: 14.6 SEC — HIGH (ref 10.5–13.4)
SODIUM SERPL-SCNC: 139 MMOL/L — SIGNIFICANT CHANGE UP (ref 135–145)
SPECIMEN SOURCE: SIGNIFICANT CHANGE UP
SPECIMEN SOURCE: SIGNIFICANT CHANGE UP

## 2023-06-15 PROCEDURE — 99232 SBSQ HOSP IP/OBS MODERATE 35: CPT

## 2023-06-15 PROCEDURE — 99233 SBSQ HOSP IP/OBS HIGH 50: CPT

## 2023-06-15 PROCEDURE — 75571 CT HRT W/O DYE W/CA TEST: CPT | Mod: 26

## 2023-06-15 RX ORDER — SIMETHICONE 80 MG/1
80 TABLET, CHEWABLE ORAL EVERY 8 HOURS
Refills: 0 | Status: DISCONTINUED | OUTPATIENT
Start: 2023-06-15 | End: 2023-06-23

## 2023-06-15 RX ADMIN — Medication 125 MICROGRAM(S): at 06:50

## 2023-06-15 RX ADMIN — Medication 1 SPRAY(S): at 05:59

## 2023-06-15 RX ADMIN — Medication 25 MILLIGRAM(S): at 17:44

## 2023-06-15 RX ADMIN — Medication 25 MILLIGRAM(S): at 10:05

## 2023-06-15 RX ADMIN — Medication 1: at 17:44

## 2023-06-15 RX ADMIN — Medication 2 MILLIGRAM(S): at 21:45

## 2023-06-15 RX ADMIN — ENOXAPARIN SODIUM 135 MILLIGRAM(S): 100 INJECTION SUBCUTANEOUS at 10:05

## 2023-06-15 RX ADMIN — ERTAPENEM SODIUM 120 MILLIGRAM(S): 1 INJECTION, POWDER, LYOPHILIZED, FOR SOLUTION INTRAMUSCULAR; INTRAVENOUS at 05:57

## 2023-06-15 RX ADMIN — MUPIROCIN 1 APPLICATION(S): 20 OINTMENT TOPICAL at 17:44

## 2023-06-15 RX ADMIN — Medication 125 MICROGRAM(S): at 01:14

## 2023-06-15 RX ADMIN — MUPIROCIN 1 APPLICATION(S): 20 OINTMENT TOPICAL at 05:57

## 2023-06-15 RX ADMIN — ALBUTEROL 2 PUFF(S): 90 AEROSOL, METERED ORAL at 15:07

## 2023-06-15 RX ADMIN — ALBUTEROL 2 PUFF(S): 90 AEROSOL, METERED ORAL at 03:54

## 2023-06-15 RX ADMIN — Medication 0.25 MILLIGRAM(S): at 17:44

## 2023-06-15 RX ADMIN — Medication 1 SPRAY(S): at 17:44

## 2023-06-15 RX ADMIN — Medication 20 MILLIEQUIVALENT(S): at 12:19

## 2023-06-15 RX ADMIN — ALBUTEROL 2 PUFF(S): 90 AEROSOL, METERED ORAL at 21:12

## 2023-06-15 RX ADMIN — OXYCODONE HYDROCHLORIDE 5 MILLIGRAM(S): 5 TABLET ORAL at 21:44

## 2023-06-15 RX ADMIN — PANTOPRAZOLE SODIUM 40 MILLIGRAM(S): 20 TABLET, DELAYED RELEASE ORAL at 05:58

## 2023-06-15 RX ADMIN — Medication 81 MILLIGRAM(S): at 10:04

## 2023-06-15 RX ADMIN — FINASTERIDE 5 MILLIGRAM(S): 5 TABLET, FILM COATED ORAL at 10:05

## 2023-06-15 RX ADMIN — Medication 40 MILLIGRAM(S): at 05:58

## 2023-06-15 RX ADMIN — OXYCODONE HYDROCHLORIDE 5 MILLIGRAM(S): 5 TABLET ORAL at 22:44

## 2023-06-15 RX ADMIN — CHLORHEXIDINE GLUCONATE 1 APPLICATION(S): 213 SOLUTION TOPICAL at 05:57

## 2023-06-15 RX ADMIN — ALBUTEROL 2 PUFF(S): 90 AEROSOL, METERED ORAL at 10:07

## 2023-06-15 RX ADMIN — ENOXAPARIN SODIUM 135 MILLIGRAM(S): 100 INJECTION SUBCUTANEOUS at 21:45

## 2023-06-15 RX ADMIN — Medication 40 MILLIGRAM(S): at 14:09

## 2023-06-15 RX ADMIN — Medication 25 MILLIGRAM(S): at 21:47

## 2023-06-15 NOTE — PROGRESS NOTE ADULT - SUBJECTIVE AND OBJECTIVE BOX
ANIA CRISTOBAL  [id number]    Chief Complaint: follow up new CMP      Subjective: still with RUQ pain but mildly improved      24 hour Tele: AF rate controlled, with episodes of SR and PVCs      albuterol    90 MICROgram(s) HFA Inhaler 2 Puff(s) Inhalation every 6 hours  ALPRAZolam 0.25 milliGRAM(s) Oral three times a day PRN  aluminum hydroxide/magnesium hydroxide/simethicone Suspension 30 milliLiter(s) Oral every 4 hours PRN  aspirin  chewable 81 milliGRAM(s) Oral daily  bisacodyl 5 milliGRAM(s) Oral every 12 hours PRN  chlorhexidine 2% Cloths 1 Application(s) Topical <User Schedule>  dextrose 5%. 1000 milliLiter(s) IV Continuous <Continuous>  dextrose 5%. 1000 milliLiter(s) IV Continuous <Continuous>  dextrose 50% Injectable 12.5 Gram(s) IV Push once  dextrose 50% Injectable 25 Gram(s) IV Push once  dextrose 50% Injectable 25 Gram(s) IV Push once  dextrose Oral Gel 15 Gram(s) Oral once PRN  diphenhydrAMINE Injectable 50 milliGRAM(s) IV Push once PRN  doxazosin 2 milliGRAM(s) Oral at bedtime  enoxaparin Injectable 135 milliGRAM(s) SubCutaneous every 12 hours  ertapenem  IVPB 1000 milliGRAM(s) IV Intermittent every 24 hours  finasteride 5 milliGRAM(s) Oral daily  fluticasone propionate 50 MICROgram(s)/spray Nasal Spray 1 Spray(s) Both Nostrils two times a day  furosemide   Injectable 40 milliGRAM(s) IV Push two times a day  glucagon  Injectable 1 milliGRAM(s) IntraMuscular once  insulin lispro (ADMELOG) corrective regimen sliding scale   SubCutaneous at bedtime  insulin lispro (ADMELOG) corrective regimen sliding scale   SubCutaneous three times a day before meals  meclizine 12.5 milliGRAM(s) Oral three times a day PRN  melatonin 3 milliGRAM(s) Oral at bedtime PRN  metoprolol tartrate 25 milliGRAM(s) Oral three times a day  mupirocin 2% Nasal 1 Application(s) Both Nostrils two times a day  ondansetron Injectable 4 milliGRAM(s) IV Push every 8 hours PRN  oxyCODONE    IR 5 milliGRAM(s) Oral every 4 hours PRN  pantoprazole    Tablet 40 milliGRAM(s) Oral before breakfast  polyethylene glycol 3350 17 Gram(s) Oral daily  potassium chloride   Powder 20 milliEquivalent(s) Oral daily  senna 2 Tablet(s) Oral at bedtime  simethicone 80 milliGRAM(s) Chew every 8 hours PRN          Physical Exam:  T(C): 36.5 (06-15-23 @ 04:43), Max: 36.8 (06-15-23 @ 00:27)  HR: 86 (06-15-23 @ 10:10) (86 - 129)  BP: 101/62 (06-15-23 @ 10:03) (90/61 - 102/65)  RR: 16 (06-15-23 @ 04:43) (16 - 18)  SpO2: 95% (06-15-23 @ 10:10) (93% - 100%)  Wt(kg): --  General: Comfortable, obese male   Neck: No JVD  CVS: nl s1s2, no s3  Pulm: CTA b/l  Abd: soft, non-tender  Ext: No c/c/e  Neuro A&O x3  Psych: Normal affect      Labs:   15 Matt 2023 05:00    139    |  100    |  21.9   ----------------------------<  146    3.6     |  30.0   |  0.64     Ca    8.3        15 Matt 2023 05:00  Mg     2.0       15 Matt 2023 05:00    TPro  5.5    /  Alb  2.5    /  TBili  0.3    /  DBili  x      /  AST  7      /  ALT  8      /  AlkPhos  169    15 Matt 2023 05:00      PT/INR - ( 15 Matt 2023 05:00 )   PT: 14.6 sec;   INR: 1.26 ratio                 [facility] 1/2023:  Coronary Angiography   - Left dominant system   - Patent prox and mid LAD stents   - Moderate stenosis of the distal LCx   - The RCA is a small, non-dominat vessel.  The proximal segment is occluded with brisk right-to-right collaterals.  - 25mm LV-Ao gradient on pullback.  CIELO 1.4cm2 with moderate AS byecho.  - Moderately reduced LVEF by echo       LM   Left main artery: Angiography shows minor irregularities.    LAD   Proximal left anterior descending: There is a 10 % stenosis within the  stented segment. Mid left anterior descending: There is a10 % stenosis within the stented segment.    CX   Distal circumflex: There is a 60 % stenosis. VALENTINO Flow 3.    RCA   Proximal right coronary artery: There is a 100 % stenosis. There is  good collateral blood supply to the distal myocardium. This  lesion is a chronic total occlusion.  This is a small non-dominant  vessel.  Ramus   Ramus intermedius: Angiography shows mild atherosclerosis.      Left Heart Cath   Ejection fraction was calculated by echo with a value of 40-45%. LV to  AO pullback was performed and there is a pressure gradient of 25 mmHg.      ECG:  SR with 1st degree AVB with RBBB/LAFB.  No ST-T wave changes.  PRWP.            RADIOLOGY:  CT A/P:  No pulmonary embolism though evaluation of subsegmental branches limited   secondary to respiratory motion artifact.    Trace bilateral pleural effusions with adjacent patchy bibasilar   opacities, likely represent dependent atelectasis. Recommend clinical   correlation to assess for superimposed infection.    Hepatomegaly. Liver surface nodularity. Hepatitis or cirrhosis considered   in differential. Correlate with clinical parameters.    Trace ascites.    Mild bladder wall thickening with suggestion of left posterolateral   diverticulum. Correlate with urinalysis andlab values to assess for   cystitis and/or ascending urinary tract infection.    Additional findings as mentioned above.    ECHO 1/2023:   1. Technically difficult study.   2. Tachycardic throughout the study and this may preclude true assessment of LVEF.   3. Left ventricular ejection fraction, by visual estimation, is 40 to 45%.   4. Moderately decreased global left ventricular systolic function.   5. The mitral in-flow pattern reveals no discernable A-wave, therefore   no comment on diastolic function can be made.   6. Mildly enlarged left atrium.   7. Normal right atrial size.   8. Mild tricuspid regurgitation.   9. Mild thickening and calcification of the anterior mitral valve leaflet.  10. Mild to moderate mitral annular calcification.  11. Trace mitral valve regurgitation.  12. Moderate aortic valve stenosis, with peak velocity of 2.87 m/s, mean   gradient of 18 mmHg, dimensionless index of 0.29, and CIELO via VTI of 1.42   sqcm.  13. Dilatation of the sinuses of Valsalva, measures 4.15 cm but based on   BSA 1.6cm/sqm is within normal limits.  14. There is a significant pericardial fat pad present.  15. There isno evidence of pericardial effusion.  16. Endocardial visualization was enhanced with intravenous echo contrast.  17. Compared to prior TTE done on 7/6/2022, the left ventricular function   is now reduced to 40-45%, in the setting of Afib wth RVR (prior LVEF   60-65%).      [facility] 1/2023:   1. Left ventricular ejection fraction, by visual estimation, is 40 to 45%.   2. Mildly decreased global left ventricular systolic function.   3. Normal right ventricular size and function.   4. Mildly enlarged left atrium.   5. Mild mitral annular calcification.   6. Mild mitral valve regurgitation.   7. Thickening of the anterior and posterior mitral valve leaflets.   8. Mild tricuspid regurgitation.   9. Mild aortic regurgitation.  10. Mild to moderate aortic valvestenosis.  11. CIELO by planimetry 1.2cm2.  12. Aortic root measured at Sinus of Valsalva is dilated. 4.1cm  13. No left atrial appendage thrombus.  14. Color flow doppler and intravenous injection of agitated saline   demonstrates the presence of an intact intra atrial septum.    Echo (3/89272):   1. Left ventricular ejection fraction, by visual estimation, is 60 to 65%.   2. Normal right ventricular size and function.   3. There is no evidence of pericardial effusion.   4. Endocardial visualizationwas enhanced with intravenous echo contrast.    Echo personally reviewed:   1. Left ventricularejection fraction, by visual estimation, is 35-40%.   2. Moderately decreased global left ventricular systolic   function.   3. There is mild concentric left ventricular hypertrophy.   4. Mildly enlarged right ventricle.   5. Moderately reduced RV systolic function.   6. Normal left atrial size.   7. Normal right atrial size.   8. There is no evidence of pericardial effusion.   9. Peak Gradient across the Aortic Valve is 35 mm Hg. Mean gradient is 26 mm Hg. DI 0.25.  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        Assessment:  76 y/o male PMHx AF on Eliquis (s/p Cardioversion on 1/31/23), moderate-severe AS, Chronic Diastolic Heart Failure, CAD s/p PCI, HTN, HLD, DM 2, Morbid Obesity, ALLIE, Thoracic Aortic Aneurysm, BPH s/p TURP, Left Renal Mass was sent to the ED from [facility] for abdominal pain x 4 days. Patient found to have UTI.  Echo performed noted significant drop in EF.  CT with hepatomegaly appearing ?significantly larger than CT from March.  ?2/2 CPC but would consider other causes such as hepatitis.  Appears somewhat fluid overloaded but not in severe acute CHF.  -Acute systolic CHF being diuresed  -Also now back in AF, rate controlled.  ?Contributing to new drop in EF.  ?2/2 stress myopathy with acute infection and new liver dysfunction.  Cath in January with  RCA, 60% distal LCX disease and unlikely to be contributing to CMP. Will need to consider repeat this admission after further diuresis   -Also with significant AS, ?severe LFLG potentially contributing but fairly similar to prior echos. Has had some fluctuation in EF and when i AF this past January had drop in EF with improvement in 3/2023. Now drop again with recurrent AF and suspect contributing factor.  On monitor some SR with 1st degree AV delay and now in AF , at times rapid   -significant hepatosplenomegaly needs further evaluation, remains symptomatic from hepatomegaly   -Also with UTI, biliary colic 2/2 sludge    Plan:   1. AF better rate controlled, EP recs appreciated, continue digoxin   2. Continue  Lasix 40mg IV BID, Monitor renal function and BP. Tolerating so far  3. Add Entresto when BP tolerates after diuresed, BP too low now  4. Keep off  Amio and statin, GI follow up  , plannedr MRCP  5 Lovenox for CVA PPX for now. Ultimately transition back to eliquis   6. Continue other current CV meds at current doses and med management of CAD   7. Antibiotics for UTI   8. Will proceed with RHC+ Cardiomems tomorrow, f/u CAC score of AOV

## 2023-06-15 NOTE — PROGRESS NOTE ADULT - SUBJECTIVE AND OBJECTIVE BOX
Pt seen lying in bed. Denies abd pain, chest pain, palpitation today.   Telemetry shows sinus rhythm with conversion ~ 6:20AM. No offset pause. Few PVCs.     PAST MEDICAL & SURGICAL HISTORY:  Anxiety  Hypertension  Hyperlipidemia  Prostate disease  Gastroesophageal reflux disease  Gallbladder stone without cholecystitis or obstruction  BPH (benign prostatic hyperplasia)  DM (diabetes mellitus)  DVT (deep venous thrombosis) left leg  Afib  Malignant schwannoma  H/O arthroscopy of knee  S/P laminectomy  S/P coronary artery stent placement x2    MEDICATIONS  (STANDING):  albuterol    90 MICROgram(s) HFA Inhaler 2 Puff(s) Inhalation every 6 hours  aspirin  chewable 81 milliGRAM(s) Oral daily  chlorhexidine 2% Cloths 1 Application(s) Topical <User Schedule>  dextrose 5%. 1000 milliLiter(s) (100 mL/Hr) IV Continuous <Continuous>  dextrose 5%. 1000 milliLiter(s) (50 mL/Hr) IV Continuous <Continuous>  dextrose 50% Injectable 12.5 Gram(s) IV Push once  dextrose 50% Injectable 25 Gram(s) IV Push once  dextrose 50% Injectable 25 Gram(s) IV Push once  doxazosin 2 milliGRAM(s) Oral at bedtime  enoxaparin Injectable 135 milliGRAM(s) SubCutaneous every 12 hours  ertapenem  IVPB 1000 milliGRAM(s) IV Intermittent every 24 hours  finasteride 5 milliGRAM(s) Oral daily  fluticasone propionate 50 MICROgram(s)/spray Nasal Spray 1 Spray(s) Both Nostrils two times a day  furosemide   Injectable 40 milliGRAM(s) IV Push two times a day  glucagon  Injectable 1 milliGRAM(s) IntraMuscular once  insulin lispro (ADMELOG) corrective regimen sliding scale   SubCutaneous three times a day before meals  insulin lispro (ADMELOG) corrective regimen sliding scale   SubCutaneous at bedtime  metoprolol tartrate 25 milliGRAM(s) Oral three times a day  mupirocin 2% Nasal 1 Application(s) Both Nostrils two times a day  pantoprazole    Tablet 40 milliGRAM(s) Oral before breakfast  polyethylene glycol 3350 17 Gram(s) Oral daily  potassium chloride   Powder 20 milliEquivalent(s) Oral daily  senna 2 Tablet(s) Oral at bedtime    MEDICATIONS  (PRN):  ALPRAZolam 0.25 milliGRAM(s) Oral three times a day PRN Anxiety  aluminum hydroxide/magnesium hydroxide/simethicone Suspension 30 milliLiter(s) Oral every 4 hours PRN Dyspepsia  bisacodyl 5 milliGRAM(s) Oral every 12 hours PRN Constipation  dextrose Oral Gel 15 Gram(s) Oral once PRN Blood Glucose LESS THAN 70 milliGRAM(s)/deciliter  diphenhydrAMINE Injectable 50 milliGRAM(s) IV Push once PRN Allergic Reaction  meclizine 12.5 milliGRAM(s) Oral three times a day PRN Dizziness  melatonin 3 milliGRAM(s) Oral at bedtime PRN Insomnia  ondansetron Injectable 4 milliGRAM(s) IV Push every 8 hours PRN Nausea and/or Vomiting  oxyCODONE    IR 5 milliGRAM(s) Oral every 4 hours PRN Severe Pain (7 - 10)  simethicone 80 milliGRAM(s) Chew every 8 hours PRN Gas    Allergies:  penicillin (Anaphylaxis)  Ceftin (Hives)  sulfa drugs (Unknown)  Bactrim (Rash)    Vital Signs Last 24 Hrs  T(C): 36.2 (15 Matt 2023 16:02), Max: 36.8 (15 Matt 2023 00:27)  T(F): 97.2 (15 Matt 2023 16:02), Max: 98.3 (15 Matt 2023 00:27)  HR: 76 (15 Matt 2023 16:02) (72 - 125)  BP: 128/73 (15 Matt 2023 16:02) (93/68 - 128/73)  RR: 18 (15 Matt 2023 16:02) (16 - 18)  SpO2: 94% (15 Matt 2023 16:02) (93% - 98%)    Parameters below as of 15 Matt 2023 16:02  Patient On (Oxygen Delivery Method): nasal cannula  O2 Flow (L/min): 3    Physical Exam:  Constitutional: NAD, AAOx3  Cardiovascular: +S1S2 RRR  Pulmonary: non-labored breathing   GI: + rotund   Extremities: no pedal edema, +distal pulses b/l  Neuro: non focal, GUERRA x4    LABS:    06-15    139  |  100  |  21.9<H>  ----------------------------<  146<H>  3.6   |  30.0<H>  |  0.64    Ca    8.3<L>      15 Matt 2023 05:00  Mg     2.0     06-15    TPro  5.5<L>  /  Alb  2.5<L>  /  TBili  0.3<L>  /  DBili  x   /  AST  7   /  ALT  8   /  AlkPhos  169<H>  06-15    PT/INR - ( 15 Matt 2023 05:00 )   PT: 14.6 sec;   INR: 1.26 ratio      RADIOLOGY & ADDITIONAL TESTS:       Pt seen lying in bed. Denies abd pain, chest pain, palpitation today.   Telemetry shows sinus rhythm with conversion ~ 6:20AM. No offset pause. Few PVCs. Wide QRS.     PAST MEDICAL & SURGICAL HISTORY:  Anxiety  Hypertension  Hyperlipidemia  Prostate disease  Gastroesophageal reflux disease  Gallbladder stone without cholecystitis or obstruction  BPH (benign prostatic hyperplasia)  DM (diabetes mellitus)  DVT (deep venous thrombosis) left leg  Afib  Malignant schwannoma  H/O arthroscopy of knee  S/P laminectomy  S/P coronary artery stent placement x2    MEDICATIONS  (STANDING):  albuterol    90 MICROgram(s) HFA Inhaler 2 Puff(s) Inhalation every 6 hours  aspirin  chewable 81 milliGRAM(s) Oral daily  chlorhexidine 2% Cloths 1 Application(s) Topical <User Schedule>  dextrose 5%. 1000 milliLiter(s) (100 mL/Hr) IV Continuous <Continuous>  dextrose 5%. 1000 milliLiter(s) (50 mL/Hr) IV Continuous <Continuous>  dextrose 50% Injectable 12.5 Gram(s) IV Push once  dextrose 50% Injectable 25 Gram(s) IV Push once  dextrose 50% Injectable 25 Gram(s) IV Push once  doxazosin 2 milliGRAM(s) Oral at bedtime  enoxaparin Injectable 135 milliGRAM(s) SubCutaneous every 12 hours  ertapenem  IVPB 1000 milliGRAM(s) IV Intermittent every 24 hours  finasteride 5 milliGRAM(s) Oral daily  fluticasone propionate 50 MICROgram(s)/spray Nasal Spray 1 Spray(s) Both Nostrils two times a day  furosemide   Injectable 40 milliGRAM(s) IV Push two times a day  glucagon  Injectable 1 milliGRAM(s) IntraMuscular once  insulin lispro (ADMELOG) corrective regimen sliding scale   SubCutaneous three times a day before meals  insulin lispro (ADMELOG) corrective regimen sliding scale   SubCutaneous at bedtime  metoprolol tartrate 25 milliGRAM(s) Oral three times a day  mupirocin 2% Nasal 1 Application(s) Both Nostrils two times a day  pantoprazole    Tablet 40 milliGRAM(s) Oral before breakfast  polyethylene glycol 3350 17 Gram(s) Oral daily  potassium chloride   Powder 20 milliEquivalent(s) Oral daily  senna 2 Tablet(s) Oral at bedtime    MEDICATIONS  (PRN):  ALPRAZolam 0.25 milliGRAM(s) Oral three times a day PRN Anxiety  aluminum hydroxide/magnesium hydroxide/simethicone Suspension 30 milliLiter(s) Oral every 4 hours PRN Dyspepsia  bisacodyl 5 milliGRAM(s) Oral every 12 hours PRN Constipation  dextrose Oral Gel 15 Gram(s) Oral once PRN Blood Glucose LESS THAN 70 milliGRAM(s)/deciliter  diphenhydrAMINE Injectable 50 milliGRAM(s) IV Push once PRN Allergic Reaction  meclizine 12.5 milliGRAM(s) Oral three times a day PRN Dizziness  melatonin 3 milliGRAM(s) Oral at bedtime PRN Insomnia  ondansetron Injectable 4 milliGRAM(s) IV Push every 8 hours PRN Nausea and/or Vomiting  oxyCODONE    IR 5 milliGRAM(s) Oral every 4 hours PRN Severe Pain (7 - 10)  simethicone 80 milliGRAM(s) Chew every 8 hours PRN Gas    Allergies:  penicillin (Anaphylaxis)  Ceftin (Hives)  sulfa drugs (Unknown)  Bactrim (Rash)    Vital Signs Last 24 Hrs  T(C): 36.2 (15 Matt 2023 16:02), Max: 36.8 (15 Matt 2023 00:27)  T(F): 97.2 (15 Matt 2023 16:02), Max: 98.3 (15 Matt 2023 00:27)  HR: 76 (15 Matt 2023 16:02) (72 - 125)  BP: 128/73 (15 Matt 2023 16:02) (93/68 - 128/73)  RR: 18 (15 Matt 2023 16:02) (16 - 18)  SpO2: 94% (15 Matt 2023 16:02) (93% - 98%)    Parameters below as of 15 Matt 2023 16:02  Patient On (Oxygen Delivery Method): nasal cannula  O2 Flow (L/min): 3    Physical Exam:  Constitutional: NAD, AAOx3  Cardiovascular: +S1S2 RRR  Pulmonary: non-labored breathing   GI: + rotund   Extremities: no pedal edema, +distal pulses b/l  Neuro: non focal, GUERRA x4    LABS:    06-15    139  |  100  |  21.9<H>  ----------------------------<  146<H>  3.6   |  30.0<H>  |  0.64    Ca    8.3<L>      15 Matt 2023 05:00  Mg     2.0     06-15    TPro  5.5<L>  /  Alb  2.5<L>  /  TBili  0.3<L>  /  DBili  x   /  AST  7   /  ALT  8   /  AlkPhos  169<H>  06-15    PT/INR - ( 15 Matt 2023 05:00 )   PT: 14.6 sec;   INR: 1.26 ratio      RADIOLOGY & ADDITIONAL TESTS:  Echo: 6/11/23: Summary:   1. Left ventricularejection fraction, by visual estimation, is 25 to 30%.   2. Moderately to severely decreased global left ventricular systolic function.   3. There is mild concentric left ventricular hypertrophy.   4. Mildly enlarged right ventricle.   5. Moderately reduced RV systolic function.   6. Normal left atrial size.   7. Normal right atrial size.   8. There is no evidence of pericardial effusion.   9. Peak Gradient across the Aortic Valve is 35 mm Hg      Mean gradient is 26 mm Hg      DI 0.25      These findings are suggestive of LFLG Severe Aortic Stenosis.  10. Recommendations: Consider Dobutamine Stress echo to differentiate   True Severe AS from Pseudo AS or CT Scan for Aortic Valve Calcium Scoring.  11. Compared to prior Echo in 3/2023, lowEF is a new finding.  12. Endocardial visualization was enhanced with intravenous echo contrast.  Mindi Walsh MD Electronically signed on 6/11/2023 at 5:17:26 PM    CT angio chest PE protocol: 6/10/23:   IMPRESSION:  No pulmonary embolism though evaluation of subsegmental branches limited secondary to respiratory motion artifact.  Trace bilateral pleural effusions with adjacent patchy bibasilar opacities, likely represent dependent atelectasis. Recommend clinical correlation to assess for superimposed infection.  Hepatomegaly. Liver surface nodularity. Hepatitis or cirrhosis considered in differential. Correlate with clinical parameters.  Trace ascites.  Mild bladder wall thickening with suggestion of left posterolateral diverticulum. Correlate with urinalysis andlab values to assess for cystitis and/or ascending urinary tract infection.  Additional findings as mentioned above.  --- End of Report ---  JLUIS GONZALEZ MD; Attending Radiologist  This document has been electronically signed. Matt 10 2023  2:53AM

## 2023-06-15 NOTE — PROGRESS NOTE ADULT - SUBJECTIVE AND OBJECTIVE BOX
ANIA CRISTOBAL    [id number]    77y      Male    INTERVAL HPI/OVERNIGHT EVENTS: patient being seen for afib and chf. Patient states abd pain is improved    tele appreciated, still in afib     REVIEW OF SYSTEMS:    CONSTITUTIONAL: No fever, weight loss, or fatigue  RESPIRATORY: No cough, wheezing, hemoptysis; No shortness of breath  CARDIOVASCULAR: No chest pain, palpitations  GASTROINTESTINAL: No abdominal or epigastric pain. No nausea, vomiting  NEUROLOGICAL: No headaches, memory loss, loss of strength.  MISCELLANEOUS:      Vital Signs Last 24 Hrs  T(C): 36.5 (15 Matt 2023 04:43), Max: 36.8 (15 Matt 2023 00:27)  T(F): 97.7 (15 Matt 2023 04:43), Max: 98.3 (15 Matt 2023 00:27)  HR: 86 (15 Matt 2023 10:10) (86 - 134)  BP: 101/62 (15 Matt 2023 10:03) (87/60 - 102/65)  BP(mean): 75 (14 Jun 2023 12:53) (69 - 75)  RR: 16 (15 Matt 2023 04:43) (16 - 18)  SpO2: 95% (15 Matt 2023 10:10) (93% - 100%)    Parameters below as of 15 Matt 2023 10:10  Patient On (Oxygen Delivery Method): nasal cannula, 3.5l        PHYSICAL EXAM:    CONSTITUTIONAL: NAD,  obese, [place]  HEENMT: NC/AT, PERRLA, EOMI, Moist oral mucosa, no pharyngeal injection or exudates;  RESPIRATORY:  diminsihjed  CARDIOVASCULAR: S1/S2+, RRR, no MRG, trace edema  ABDOMEN: Soft, tender ruq  PSYCH: Anxious, cooperative  NEUROLOGY: AAOx4, CN 2-12 are intact and symmetric; no gross sensory deficits;   SKIN: Warm, Dry, No rashes; no palpable lesions      LABS:    06-15    139  |  100  |  21.9<H>  ----------------------------<  146<H>  3.6   |  30.0<H>  |  0.64    Ca    8.3<L>      15 Matt 2023 05:00  Mg     2.0     06-15    TPro  5.5<L>  /  Alb  2.5<L>  /  TBili  0.3<L>  /  DBili  x   /  AST  7   /  ALT  8   /  AlkPhos  169<H>  06-15    PT/INR - ( 15 Matt 2023 05:00 )   PT: 14.6 sec;   INR: 1.26 ratio                 MEDICATIONS  (STANDING):  albuterol    90 MICROgram(s) HFA Inhaler 2 Puff(s) Inhalation every 6 hours  aspirin  chewable 81 milliGRAM(s) Oral daily  chlorhexidine 2% Cloths 1 Application(s) Topical <User Schedule>  dextrose 5%. 1000 milliLiter(s) (100 mL/Hr) IV Continuous <Continuous>  dextrose 5%. 1000 milliLiter(s) (50 mL/Hr) IV Continuous <Continuous>  dextrose 50% Injectable 12.5 Gram(s) IV Push once  dextrose 50% Injectable 25 Gram(s) IV Push once  dextrose 50% Injectable 25 Gram(s) IV Push once  doxazosin 2 milliGRAM(s) Oral at bedtime  enoxaparin Injectable 135 milliGRAM(s) SubCutaneous every 12 hours  ertapenem  IVPB 1000 milliGRAM(s) IV Intermittent every 24 hours  finasteride 5 milliGRAM(s) Oral daily  fluticasone propionate 50 MICROgram(s)/spray Nasal Spray 1 Spray(s) Both Nostrils two times a day  furosemide   Injectable 40 milliGRAM(s) IV Push two times a day  glucagon  Injectable 1 milliGRAM(s) IntraMuscular once  insulin lispro (ADMELOG) corrective regimen sliding scale   SubCutaneous three times a day before meals  insulin lispro (ADMELOG) corrective regimen sliding scale   SubCutaneous at bedtime  metoprolol tartrate 25 milliGRAM(s) Oral three times a day  mupirocin 2% Nasal 1 Application(s) Both Nostrils two times a day  pantoprazole    Tablet 40 milliGRAM(s) Oral before breakfast  polyethylene glycol 3350 17 Gram(s) Oral daily  potassium chloride   Powder 20 milliEquivalent(s) Oral daily  senna 2 Tablet(s) Oral at bedtime    MEDICATIONS  (PRN):  ALPRAZolam 0.25 milliGRAM(s) Oral three times a day PRN Anxiety  aluminum hydroxide/magnesium hydroxide/simethicone Suspension 30 milliLiter(s) Oral every 4 hours PRN Dyspepsia  bisacodyl 5 milliGRAM(s) Oral every 12 hours PRN Constipation  dextrose Oral Gel 15 Gram(s) Oral once PRN Blood Glucose LESS THAN 70 milliGRAM(s)/deciliter  diphenhydrAMINE Injectable 50 milliGRAM(s) IV Push once PRN Allergic Reaction  meclizine 12.5 milliGRAM(s) Oral three times a day PRN Dizziness  melatonin 3 milliGRAM(s) Oral at bedtime PRN Insomnia  ondansetron Injectable 4 milliGRAM(s) IV Push every 8 hours PRN Nausea and/or Vomiting  oxyCODONE    IR 5 milliGRAM(s) Oral every 4 hours PRN Severe Pain (7 - 10)  simethicone 80 milliGRAM(s) Chew every 8 hours PRN Gas      RADIOLOGY & ADDITIONAL TESTS:

## 2023-06-16 LAB
ALBUMIN SERPL ELPH-MCNC: 2.7 G/DL — LOW (ref 3.3–5.2)
ALP SERPL-CCNC: 177 U/L — HIGH (ref 40–120)
ALT FLD-CCNC: 9 U/L — SIGNIFICANT CHANGE UP
ANION GAP SERPL CALC-SCNC: 7 MMOL/L — SIGNIFICANT CHANGE UP (ref 5–17)
AST SERPL-CCNC: 10 U/L — SIGNIFICANT CHANGE UP
BASOPHILS # BLD AUTO: 0.03 K/UL — SIGNIFICANT CHANGE UP (ref 0–0.2)
BASOPHILS NFR BLD AUTO: 0.5 % — SIGNIFICANT CHANGE UP (ref 0–2)
BILIRUB SERPL-MCNC: 0.3 MG/DL — LOW (ref 0.4–2)
BLD GP AB SCN SERPL QL: SIGNIFICANT CHANGE UP
BUN SERPL-MCNC: 20.7 MG/DL — HIGH (ref 8–20)
CALCIUM SERPL-MCNC: 8.4 MG/DL — SIGNIFICANT CHANGE UP (ref 8.4–10.5)
CHLORIDE SERPL-SCNC: 100 MMOL/L — SIGNIFICANT CHANGE UP (ref 96–108)
CO2 SERPL-SCNC: 33 MMOL/L — HIGH (ref 22–29)
CREAT SERPL-MCNC: 0.66 MG/DL — SIGNIFICANT CHANGE UP (ref 0.5–1.3)
EGFR: 97 ML/MIN/1.73M2 — SIGNIFICANT CHANGE UP
EOSINOPHIL # BLD AUTO: 0.54 K/UL — HIGH (ref 0–0.5)
EOSINOPHIL NFR BLD AUTO: 9 % — HIGH (ref 0–6)
GLUCOSE BLDC GLUCOMTR-MCNC: 108 MG/DL — HIGH (ref 70–99)
GLUCOSE BLDC GLUCOMTR-MCNC: 121 MG/DL — HIGH (ref 70–99)
GLUCOSE BLDC GLUCOMTR-MCNC: 126 MG/DL — HIGH (ref 70–99)
GLUCOSE SERPL-MCNC: 141 MG/DL — HIGH (ref 70–99)
HCT VFR BLD CALC: 41.4 % — SIGNIFICANT CHANGE UP (ref 39–50)
HGB BLD-MCNC: 12.9 G/DL — LOW (ref 13–17)
IMM GRANULOCYTES NFR BLD AUTO: 0.5 % — SIGNIFICANT CHANGE UP (ref 0–0.9)
LYMPHOCYTES # BLD AUTO: 1.17 K/UL — SIGNIFICANT CHANGE UP (ref 1–3.3)
LYMPHOCYTES # BLD AUTO: 19.6 % — SIGNIFICANT CHANGE UP (ref 13–44)
MAGNESIUM SERPL-MCNC: 1.9 MG/DL — SIGNIFICANT CHANGE UP (ref 1.6–2.6)
MCHC RBC-ENTMCNC: 27.7 PG — SIGNIFICANT CHANGE UP (ref 27–34)
MCHC RBC-ENTMCNC: 31.2 GM/DL — LOW (ref 32–36)
MCV RBC AUTO: 89 FL — SIGNIFICANT CHANGE UP (ref 80–100)
MONOCYTES # BLD AUTO: 0.68 K/UL — SIGNIFICANT CHANGE UP (ref 0–0.9)
MONOCYTES NFR BLD AUTO: 11.4 % — SIGNIFICANT CHANGE UP (ref 2–14)
NEUTROPHILS # BLD AUTO: 3.53 K/UL — SIGNIFICANT CHANGE UP (ref 1.8–7.4)
NEUTROPHILS NFR BLD AUTO: 59 % — SIGNIFICANT CHANGE UP (ref 43–77)
PLATELET # BLD AUTO: 256 K/UL — SIGNIFICANT CHANGE UP (ref 150–400)
POTASSIUM SERPL-MCNC: 3.7 MMOL/L — SIGNIFICANT CHANGE UP (ref 3.5–5.3)
POTASSIUM SERPL-SCNC: 3.7 MMOL/L — SIGNIFICANT CHANGE UP (ref 3.5–5.3)
PROT SERPL-MCNC: 5.7 G/DL — LOW (ref 6.6–8.7)
RBC # BLD: 4.65 M/UL — SIGNIFICANT CHANGE UP (ref 4.2–5.8)
RBC # FLD: 14.4 % — SIGNIFICANT CHANGE UP (ref 10.3–14.5)
SODIUM SERPL-SCNC: 140 MMOL/L — SIGNIFICANT CHANGE UP (ref 135–145)
WBC # BLD: 5.98 K/UL — SIGNIFICANT CHANGE UP (ref 3.8–10.5)
WBC # FLD AUTO: 5.98 K/UL — SIGNIFICANT CHANGE UP (ref 3.8–10.5)

## 2023-06-16 PROCEDURE — 93010 ELECTROCARDIOGRAM REPORT: CPT

## 2023-06-16 PROCEDURE — 99232 SBSQ HOSP IP/OBS MODERATE 35: CPT

## 2023-06-16 PROCEDURE — 99233 SBSQ HOSP IP/OBS HIGH 50: CPT

## 2023-06-16 RX ORDER — ATORVASTATIN CALCIUM 80 MG/1
20 TABLET, FILM COATED ORAL AT BEDTIME
Refills: 0 | Status: DISCONTINUED | OUTPATIENT
Start: 2023-06-16 | End: 2023-06-23

## 2023-06-16 RX ORDER — IPRATROPIUM/ALBUTEROL SULFATE 18-103MCG
3 AEROSOL WITH ADAPTER (GRAM) INHALATION ONCE
Refills: 0 | Status: COMPLETED | OUTPATIENT
Start: 2023-06-16 | End: 2023-06-16

## 2023-06-16 RX ORDER — METOPROLOL TARTRATE 50 MG
37.5 TABLET ORAL
Refills: 0 | Status: DISCONTINUED | OUTPATIENT
Start: 2023-06-16 | End: 2023-06-17

## 2023-06-16 RX ADMIN — Medication 40 MILLIGRAM(S): at 05:41

## 2023-06-16 RX ADMIN — Medication 3 MILLIGRAM(S): at 21:49

## 2023-06-16 RX ADMIN — Medication 3 MILLILITER(S): at 23:05

## 2023-06-16 RX ADMIN — ATORVASTATIN CALCIUM 20 MILLIGRAM(S): 80 TABLET, FILM COATED ORAL at 21:46

## 2023-06-16 RX ADMIN — SENNA PLUS 2 TABLET(S): 8.6 TABLET ORAL at 21:43

## 2023-06-16 RX ADMIN — ALBUTEROL 2 PUFF(S): 90 AEROSOL, METERED ORAL at 09:22

## 2023-06-16 RX ADMIN — FINASTERIDE 5 MILLIGRAM(S): 5 TABLET, FILM COATED ORAL at 08:27

## 2023-06-16 RX ADMIN — Medication 37.5 MILLIGRAM(S): at 17:32

## 2023-06-16 RX ADMIN — ENOXAPARIN SODIUM 135 MILLIGRAM(S): 100 INJECTION SUBCUTANEOUS at 21:45

## 2023-06-16 RX ADMIN — Medication 81 MILLIGRAM(S): at 08:27

## 2023-06-16 RX ADMIN — POLYETHYLENE GLYCOL 3350 17 GRAM(S): 17 POWDER, FOR SOLUTION ORAL at 08:28

## 2023-06-16 RX ADMIN — Medication 1 SPRAY(S): at 05:41

## 2023-06-16 RX ADMIN — OXYCODONE HYDROCHLORIDE 5 MILLIGRAM(S): 5 TABLET ORAL at 00:00

## 2023-06-16 RX ADMIN — OXYCODONE HYDROCHLORIDE 5 MILLIGRAM(S): 5 TABLET ORAL at 22:46

## 2023-06-16 RX ADMIN — Medication 20 MILLIEQUIVALENT(S): at 08:27

## 2023-06-16 RX ADMIN — Medication 1 SPRAY(S): at 17:31

## 2023-06-16 RX ADMIN — MUPIROCIN 1 APPLICATION(S): 20 OINTMENT TOPICAL at 05:42

## 2023-06-16 RX ADMIN — Medication 40 MILLIGRAM(S): at 17:32

## 2023-06-16 RX ADMIN — ALBUTEROL 2 PUFF(S): 90 AEROSOL, METERED ORAL at 05:33

## 2023-06-16 RX ADMIN — PANTOPRAZOLE SODIUM 40 MILLIGRAM(S): 20 TABLET, DELAYED RELEASE ORAL at 05:42

## 2023-06-16 RX ADMIN — OXYCODONE HYDROCHLORIDE 5 MILLIGRAM(S): 5 TABLET ORAL at 06:53

## 2023-06-16 RX ADMIN — CHLORHEXIDINE GLUCONATE 1 APPLICATION(S): 213 SOLUTION TOPICAL at 05:43

## 2023-06-16 RX ADMIN — Medication 2 MILLIGRAM(S): at 21:46

## 2023-06-16 RX ADMIN — OXYCODONE HYDROCHLORIDE 5 MILLIGRAM(S): 5 TABLET ORAL at 05:53

## 2023-06-16 RX ADMIN — OXYCODONE HYDROCHLORIDE 5 MILLIGRAM(S): 5 TABLET ORAL at 21:46

## 2023-06-16 RX ADMIN — OXYCODONE HYDROCHLORIDE 5 MILLIGRAM(S): 5 TABLET ORAL at 15:19

## 2023-06-16 RX ADMIN — MUPIROCIN 1 APPLICATION(S): 20 OINTMENT TOPICAL at 17:37

## 2023-06-16 RX ADMIN — ERTAPENEM SODIUM 120 MILLIGRAM(S): 1 INJECTION, POWDER, LYOPHILIZED, FOR SOLUTION INTRAMUSCULAR; INTRAVENOUS at 05:41

## 2023-06-16 NOTE — PROGRESS NOTE ADULT - SUBJECTIVE AND OBJECTIVE BOX
ANIA CRISTOBAL    [id number]    77y      Male    INTERVAL HPI/OVERNIGHT EVENTS: patient seen at bedside and denies any complaints    patient is npo for rhc and cardiomems today    REVIEW OF SYSTEMS:    CONSTITUTIONAL: No fever, weight loss, or fatigue  RESPIRATORY: No cough, wheezing, hemoptysis; No shortness of breath  CARDIOVASCULAR: No chest pain, palpitations  GASTROINTESTINAL: No abdominal or epigastric pain. No nausea, vomiting  NEUROLOGICAL: No headaches, memory loss, loss of strength.  MISCELLANEOUS:      Vital Signs Last 24 Hrs  T(C): 36.6 (16 Jun 2023 12:15), Max: 36.7 (16 Jun 2023 11:29)  T(F): 97.8 (16 Jun 2023 12:15), Max: 98 (16 Jun 2023 11:29)  HR: 69 (16 Jun 2023 12:15) (59 - 76)  BP: 110/57 (16 Jun 2023 12:15) (101/63 - 128/73)  BP(mean): --  RR: 12 (16 Jun 2023 12:15) (12 - 18)  SpO2: 92% (16 Jun 2023 12:15) (92% - 96%)    Parameters below as of 16 Jun 2023 12:15  Patient On (Oxygen Delivery Method): nasal cannula  O2 Flow (L/min): 3      PHYSICAL EXAM:    CONSTITUTIONAL: NAD,  obese, [place]  HEENMT: NC/AT, PERRLA, EOMI, Moist oral mucosa, no pharyngeal injection or exudates;  RESPIRATORY:  diminsihjed  CARDIOVASCULAR: S1/S2+, RRR, no MRG, trace edema  ABDOMEN: Soft, tender ruq  PSYCH: Anxious, cooperative  NEUROLOGY: AAOx4, CN 2-12 are intact and symmetric; no gross sensory deficits;   SKIN: Warm, Dry, No rashes; no palpable lesions        LABS:                        12.9   5.98  )-----------( 256      ( 16 Jun 2023 04:48 )             41.4     06-16    140  |  100  |  20.7<H>  ----------------------------<  141<H>  3.7   |  33.0<H>  |  0.66    Ca    8.4      16 Jun 2023 04:48  Mg     1.9     06-16    TPro  5.7<L>  /  Alb  2.7<L>  /  TBili  0.3<L>  /  DBili  x   /  AST  10  /  ALT  9   /  AlkPhos  177<H>  06-16    PT/INR - ( 15 Matt 2023 05:00 )   PT: 14.6 sec;   INR: 1.26 ratio                 MEDICATIONS  (STANDING):  albuterol    90 MICROgram(s) HFA Inhaler 2 Puff(s) Inhalation every 6 hours  aspirin  chewable 81 milliGRAM(s) Oral daily  chlorhexidine 2% Cloths 1 Application(s) Topical <User Schedule>  dextrose 5%. 1000 milliLiter(s) (100 mL/Hr) IV Continuous <Continuous>  dextrose 5%. 1000 milliLiter(s) (50 mL/Hr) IV Continuous <Continuous>  dextrose 50% Injectable 25 Gram(s) IV Push once  dextrose 50% Injectable 12.5 Gram(s) IV Push once  dextrose 50% Injectable 25 Gram(s) IV Push once  doxazosin 2 milliGRAM(s) Oral at bedtime  enoxaparin Injectable 135 milliGRAM(s) SubCutaneous every 12 hours  ertapenem  IVPB 1000 milliGRAM(s) IV Intermittent every 24 hours  finasteride 5 milliGRAM(s) Oral daily  fluticasone propionate 50 MICROgram(s)/spray Nasal Spray 1 Spray(s) Both Nostrils two times a day  furosemide   Injectable 40 milliGRAM(s) IV Push two times a day  glucagon  Injectable 1 milliGRAM(s) IntraMuscular once  insulin lispro (ADMELOG) corrective regimen sliding scale   SubCutaneous three times a day before meals  insulin lispro (ADMELOG) corrective regimen sliding scale   SubCutaneous at bedtime  metoprolol tartrate 37.5 milliGRAM(s) Oral two times a day  mupirocin 2% Nasal 1 Application(s) Both Nostrils two times a day  pantoprazole    Tablet 40 milliGRAM(s) Oral before breakfast  polyethylene glycol 3350 17 Gram(s) Oral daily  potassium chloride   Powder 20 milliEquivalent(s) Oral daily  senna 2 Tablet(s) Oral at bedtime    MEDICATIONS  (PRN):  ALPRAZolam 0.25 milliGRAM(s) Oral three times a day PRN Anxiety  aluminum hydroxide/magnesium hydroxide/simethicone Suspension 30 milliLiter(s) Oral every 4 hours PRN Dyspepsia  bisacodyl 5 milliGRAM(s) Oral every 12 hours PRN Constipation  dextrose Oral Gel 15 Gram(s) Oral once PRN Blood Glucose LESS THAN 70 milliGRAM(s)/deciliter  diphenhydrAMINE Injectable 50 milliGRAM(s) IV Push once PRN Allergic Reaction  meclizine 12.5 milliGRAM(s) Oral three times a day PRN Dizziness  melatonin 3 milliGRAM(s) Oral at bedtime PRN Insomnia  ondansetron Injectable 4 milliGRAM(s) IV Push every 8 hours PRN Nausea and/or Vomiting  oxyCODONE    IR 5 milliGRAM(s) Oral every 4 hours PRN Severe Pain (7 - 10)  simethicone 80 milliGRAM(s) Chew every 8 hours PRN Gas      RADIOLOGY & ADDITIONAL TESTS:

## 2023-06-16 NOTE — PROGRESS NOTE ADULT - SUBJECTIVE AND OBJECTIVE BOX
Now s/p RHC/cardiomems via left femoral with suture and stopcock in place . Procedure performed by Dr. Mckinney.  Pt arrived to recovery in NAD and HDS. Left femoral  access site stable, no bleed/hematoma, distal pulse +.    Procedure results: S/P RHC and successful cardiomems    Medication received during procedure:  Versed: 0  Fentanyl: 25 mcg  Heparin: o  Omnipaque: 10 ml    Exam:   Neuro: A&O X.  GUERRA=  CV: RRR  Lungs: CTA  Ext: + palp pulses.  Vascular access: Left femoral suture thru stop cock in place.  Site stable. No bleeding/hematoma/ecchymosis.  + cap refill     Plan:  -Formal cath report pending  -Post procedure management/monitoring per protocol  -Access site precautions  -Will removed suture in 2 hours  -Bedrest until 7pm  -Repeat ECG if any clinical indication or change on tele  -Continue current medical therapy  -Transfer back to bed when criteria met  -Site check in AM

## 2023-06-16 NOTE — PROGRESS NOTE ADULT - SUBJECTIVE AND OBJECTIVE BOX
Subjective:    No acute events documented overnight. Pt reports improvement in RUQ pain. Pt remains in sinus rhythm. Currently denies chest pain, SOB, dizziness, lethargy, chills, weakness.       EKG:    TELE: Sinus rhythm with intact conduction. 1st degree AVB. RBBB    MEDICATIONS  (STANDING):  albuterol    90 MICROgram(s) HFA Inhaler 2 Puff(s) Inhalation every 6 hours  aspirin  chewable 81 milliGRAM(s) Oral daily  chlorhexidine 2% Cloths 1 Application(s) Topical <User Schedule>  dextrose 5%. 1000 milliLiter(s) (100 mL/Hr) IV Continuous <Continuous>  dextrose 5%. 1000 milliLiter(s) (50 mL/Hr) IV Continuous <Continuous>  dextrose 50% Injectable 25 Gram(s) IV Push once  dextrose 50% Injectable 12.5 Gram(s) IV Push once  dextrose 50% Injectable 25 Gram(s) IV Push once  doxazosin 2 milliGRAM(s) Oral at bedtime  enoxaparin Injectable 135 milliGRAM(s) SubCutaneous every 12 hours  ertapenem  IVPB 1000 milliGRAM(s) IV Intermittent every 24 hours  finasteride 5 milliGRAM(s) Oral daily  fluticasone propionate 50 MICROgram(s)/spray Nasal Spray 1 Spray(s) Both Nostrils two times a day  furosemide   Injectable 40 milliGRAM(s) IV Push two times a day  glucagon  Injectable 1 milliGRAM(s) IntraMuscular once  insulin lispro (ADMELOG) corrective regimen sliding scale   SubCutaneous three times a day before meals  insulin lispro (ADMELOG) corrective regimen sliding scale   SubCutaneous at bedtime  metoprolol tartrate 37.5 milliGRAM(s) Oral two times a day  mupirocin 2% Nasal 1 Application(s) Both Nostrils two times a day  pantoprazole    Tablet 40 milliGRAM(s) Oral before breakfast  polyethylene glycol 3350 17 Gram(s) Oral daily  potassium chloride   Powder 20 milliEquivalent(s) Oral daily  senna 2 Tablet(s) Oral at bedtime    MEDICATIONS  (PRN):  ALPRAZolam 0.25 milliGRAM(s) Oral three times a day PRN Anxiety  aluminum hydroxide/magnesium hydroxide/simethicone Suspension 30 milliLiter(s) Oral every 4 hours PRN Dyspepsia  bisacodyl 5 milliGRAM(s) Oral every 12 hours PRN Constipation  dextrose Oral Gel 15 Gram(s) Oral once PRN Blood Glucose LESS THAN 70 milliGRAM(s)/deciliter  diphenhydrAMINE Injectable 50 milliGRAM(s) IV Push once PRN Allergic Reaction  meclizine 12.5 milliGRAM(s) Oral three times a day PRN Dizziness  melatonin 3 milliGRAM(s) Oral at bedtime PRN Insomnia  ondansetron Injectable 4 milliGRAM(s) IV Push every 8 hours PRN Nausea and/or Vomiting  oxyCODONE    IR 5 milliGRAM(s) Oral every 4 hours PRN Severe Pain (7 - 10)  simethicone 80 milliGRAM(s) Chew every 8 hours PRN Gas      Allergies    penicillin (Anaphylaxis)  Ceftin (Hives)  sulfa drugs (Unknown)  Bactrim (Rash)    Intolerances        Vital Signs Last 24 Hrs  T(C): 36.6 (16 Jun 2023 07:53), Max: 36.6 (16 Jun 2023 00:25)  T(F): 97.8 (16 Jun 2023 07:53), Max: 97.9 (16 Jun 2023 00:25)  HR: 66 (16 Jun 2023 09:28) (59 - 76)  BP: 106/63 (16 Jun 2023 07:53) (101/63 - 128/73)  BP(mean): --  RR: 18 (16 Jun 2023 07:53) (17 - 18)  SpO2: 96% (16 Jun 2023 09:28) (93% - 96%)    Parameters below as of 16 Jun 2023 09:28  Patient On (Oxygen Delivery Method): nasal cannula        Physical Exam:    Constitutional: Obese, NAD  Head: normocephalic atraumatic  Resp: effort normal, breath sounds clear to auscultation bilaterally  Cardiac: Regular rate / rhythm. No murmurs / rubs / gallops. No b/l LE edema  Abdomen: Soft. NTND. no rebound / guarding.   Neuro: Alert and oriented x 3  Psych: mood calm       LABS:                        12.9   5.98  )-----------( 256      ( 16 Jun 2023 04:48 )             41.4     06-16    140  |  100  |  20.7<H>  ----------------------------<  141<H>  3.7   |  33.0<H>  |  0.66    Ca    8.4      16 Jun 2023 04:48  Mg     1.9     06-16    TPro  5.7<L>  /  Alb  2.7<L>  /  TBili  0.3<L>  /  DBili  x   /  AST  10  /  ALT  9   /  AlkPhos  177<H>  06-16    PT/INR - ( 15 Matt 2023 05:00 )   PT: 14.6 sec;   INR: 1.26 ratio               RADIOLOGY & ADDITIONAL TESTS:    RADIOLOGY & ADDITIONAL STUDIES:      < from: TTE Echo Limited or F/U (06.11.23 @ 11:44) >  Summary:   1. Left ventricularejection fraction, by visual estimation, is 25 to   30%.   2. Moderately to severely decreased global left ventricular systolic   function.   3. There is mild concentric left ventricular hypertrophy.   4. Mildly enlarged right ventricle.   5. Moderately reduced RV systolic function.   6. Normal left atrial size.   7. Normal right atrial size.   8. There is no evidence of pericardial effusion.   9. Peak Gradient across the Aortic Valve is 35 mm Hg      Mean gradient is 26 mm Hg      DI 0.25      These findings are suggestive of LFLG Severe Aortic Stenosis.  10. Recommendations: Consider Dobutamine Stress echo to differentiate   True Severe AS from Pseudo AS or CT Scan for Aortic Valve Calcium Scoring.  11. Compared to prior Echo in 3/2023, lowEF is a new finding.  12. Endocardial visualization was enhanced with intravenous echo contrast.    MD Priya Electronically signed on 6/11/2023 at 5:17:26 PM      < from: CT Abdomen and Pelvis w/ IV Cont (06.10.23 @ 02:29) >  IMPRESSION:    No pulmonary embolism though evaluation of subsegmental branches limited   secondary to respiratory motion artifact.    Trace bilateral pleural effusions with adjacent patchy bibasilar   opacities, likely represent dependent atelectasis. Recommend clinical   correlation to assess for superimposed infection.    Hepatomegaly. Liver surface nodularity. Hepatitis or cirrhosis considered   in differential. Correlate with clinical parameters.    Trace ascites.    Mild bladder wall thickening with suggestion of left posterolateral   diverticulum. Correlate with urinalysis andlab values to assess for   cystitis and/or ascending urinary tract infection.          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 He presented with severe abdominal pain found to have hepatomegaly with early cirrhosis. Noted to be in AF/AFL with RVR and EP consult was requested.   Given he has newly reduced EF in the setting of AF/AFL with RVR, it is likely the cause of cardiomyopathy is AF/tachycardia mediated.     Pt self converted to sinus rhythm on 6/15/23 and has since maintained SR while recieving PO Digoxin and Metoprolol. Rhythm control options with AAD are very limited given baseline wide QRS, and hepatic dysfunction. He would benefit from an eventual catheter ablation. Pt currently denies chest pain, SOB, dizziness, lethargy, chills, weakness.     # pAFL w/ RVR   - Maintaining sinus rhythm > 24 hours  - Change metoprolol to 37.5mg Q12HR  - Stop digoxin   - avoid amiodarone   - Unable to arrange inpatient ablation  - Currently recieving full dose Lovenox  - Transition to Eliquis on d/c.   - telemetry monitoring   - Outpatient follow up with Dr. Amato in 2 - 4 weeks arrange potential radiofrequency ablation    # HFrEF   - newly reduced EF 25% likely AF/tachycardia mediated   - diuresis as per cardiology  - GDMT: lasix, metoprolol  - scheduled for RHC and Cardiomems today     # Hepatomegaly, mild cirrhosis   - GI input appreciated    Subjective:    No acute events documented overnight. Pt reports improvement in RUQ pain. Pt remains in sinus rhythm. Currently denies chest pain, SOB, dizziness, lethargy, chills, weakness.       EKG: Sinus rhythm with intact conduction. 1st degree AVB, RBBB, LAFB    TELE: Sinus rhythm with intact conduction. 1st degree AVB. RBBB    MEDICATIONS  (STANDING):  albuterol    90 MICROgram(s) HFA Inhaler 2 Puff(s) Inhalation every 6 hours  aspirin  chewable 81 milliGRAM(s) Oral daily  chlorhexidine 2% Cloths 1 Application(s) Topical <User Schedule>  dextrose 5%. 1000 milliLiter(s) (100 mL/Hr) IV Continuous <Continuous>  dextrose 5%. 1000 milliLiter(s) (50 mL/Hr) IV Continuous <Continuous>  dextrose 50% Injectable 25 Gram(s) IV Push once  dextrose 50% Injectable 12.5 Gram(s) IV Push once  dextrose 50% Injectable 25 Gram(s) IV Push once  doxazosin 2 milliGRAM(s) Oral at bedtime  enoxaparin Injectable 135 milliGRAM(s) SubCutaneous every 12 hours  ertapenem  IVPB 1000 milliGRAM(s) IV Intermittent every 24 hours  finasteride 5 milliGRAM(s) Oral daily  fluticasone propionate 50 MICROgram(s)/spray Nasal Spray 1 Spray(s) Both Nostrils two times a day  furosemide   Injectable 40 milliGRAM(s) IV Push two times a day  glucagon  Injectable 1 milliGRAM(s) IntraMuscular once  insulin lispro (ADMELOG) corrective regimen sliding scale   SubCutaneous three times a day before meals  insulin lispro (ADMELOG) corrective regimen sliding scale   SubCutaneous at bedtime  metoprolol tartrate 37.5 milliGRAM(s) Oral two times a day  mupirocin 2% Nasal 1 Application(s) Both Nostrils two times a day  pantoprazole    Tablet 40 milliGRAM(s) Oral before breakfast  polyethylene glycol 3350 17 Gram(s) Oral daily  potassium chloride   Powder 20 milliEquivalent(s) Oral daily  senna 2 Tablet(s) Oral at bedtime    MEDICATIONS  (PRN):  ALPRAZolam 0.25 milliGRAM(s) Oral three times a day PRN Anxiety  aluminum hydroxide/magnesium hydroxide/simethicone Suspension 30 milliLiter(s) Oral every 4 hours PRN Dyspepsia  bisacodyl 5 milliGRAM(s) Oral every 12 hours PRN Constipation  dextrose Oral Gel 15 Gram(s) Oral once PRN Blood Glucose LESS THAN 70 milliGRAM(s)/deciliter  diphenhydrAMINE Injectable 50 milliGRAM(s) IV Push once PRN Allergic Reaction  meclizine 12.5 milliGRAM(s) Oral three times a day PRN Dizziness  melatonin 3 milliGRAM(s) Oral at bedtime PRN Insomnia  ondansetron Injectable 4 milliGRAM(s) IV Push every 8 hours PRN Nausea and/or Vomiting  oxyCODONE    IR 5 milliGRAM(s) Oral every 4 hours PRN Severe Pain (7 - 10)  simethicone 80 milliGRAM(s) Chew every 8 hours PRN Gas      Allergies    penicillin (Anaphylaxis)  Ceftin (Hives)  sulfa drugs (Unknown)  Bactrim (Rash)    Intolerances        Vital Signs Last 24 Hrs  T(C): 36.6 (16 Jun 2023 07:53), Max: 36.6 (16 Jun 2023 00:25)  T(F): 97.8 (16 Jun 2023 07:53), Max: 97.9 (16 Jun 2023 00:25)  HR: 66 (16 Jun 2023 09:28) (59 - 76)  BP: 106/63 (16 Jun 2023 07:53) (101/63 - 128/73)  BP(mean): --  RR: 18 (16 Jun 2023 07:53) (17 - 18)  SpO2: 96% (16 Jun 2023 09:28) (93% - 96%)    Parameters below as of 16 Jun 2023 09:28  Patient On (Oxygen Delivery Method): nasal cannula        Physical Exam:    Constitutional: Obese, NAD  Head: normocephalic atraumatic  Resp: effort normal, breath sounds clear to auscultation bilaterally  Cardiac: Regular rate / rhythm. No murmurs / rubs / gallops. No b/l LE edema  Abdomen: Soft. NTND. no rebound / guarding.   Neuro: Alert and oriented x 3  Psych: mood calm       LABS:                        12.9   5.98  )-----------( 256      ( 16 Jun 2023 04:48 )             41.4     06-16    140  |  100  |  20.7<H>  ----------------------------<  141<H>  3.7   |  33.0<H>  |  0.66    Ca    8.4      16 Jun 2023 04:48  Mg     1.9     06-16    TPro  5.7<L>  /  Alb  2.7<L>  /  TBili  0.3<L>  /  DBili  x   /  AST  10  /  ALT  9   /  AlkPhos  177<H>  06-16    PT/INR - ( 15 Matt 2023 05:00 )   PT: 14.6 sec;   INR: 1.26 ratio               RADIOLOGY & ADDITIONAL TESTS:    RADIOLOGY & ADDITIONAL STUDIES:      < from: TTE Echo Limited or F/U (06.11.23 @ 11:44) >  Summary:   1. Left ventricularejection fraction, by visual estimation, is 25 to   30%.   2. Moderately to severely decreased global left ventricular systolic   function.   3. There is mild concentric left ventricular hypertrophy.   4. Mildly enlarged right ventricle.   5. Moderately reduced RV systolic function.   6. Normal left atrial size.   7. Normal right atrial size.   8. There is no evidence of pericardial effusion.   9. Peak Gradient across the Aortic Valve is 35 mm Hg      Mean gradient is 26 mm Hg      DI 0.25      These findings are suggestive of LFLG Severe Aortic Stenosis.  10. Recommendations: Consider Dobutamine Stress echo to differentiate   True Severe AS from Pseudo AS or CT Scan for Aortic Valve Calcium Scoring.  11. Compared to prior Echo in 3/2023, lowEF is a new finding.  12. Endocardial visualization was enhanced with intravenous echo contrast.    MD Priya Electronically signed on 6/11/2023 at 5:17:26 PM      < from: CT Abdomen and Pelvis w/ IV Cont (06.10.23 @ 02:29) >  IMPRESSION:    No pulmonary embolism though evaluation of subsegmental branches limited   secondary to respiratory motion artifact.    Trace bilateral pleural effusions with adjacent patchy bibasilar   opacities, likely represent dependent atelectasis. Recommend clinical   correlation to assess for superimposed infection.    Hepatomegaly. Liver surface nodularity. Hepatitis or cirrhosis considered   in differential. Correlate with clinical parameters.    Trace ascites.    Mild bladder wall thickening with suggestion of left posterolateral   diverticulum. Correlate with urinalysis andlab values to assess for   cystitis and/or ascending urinary tract infection.          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 He presented with severe abdominal pain found to have hepatomegaly with early cirrhosis. Noted to be in AF/AFL with RVR and EP consult was requested.   Given he has newly reduced EF in the setting of AF/AFL with RVR, it is likely the cause of cardiomyopathy is AF/tachycardia mediated.     Pt self converted to sinus rhythm on 6/15/23 and has since maintained SR while recieving PO Digoxin and Metoprolol. Rhythm control options with AAD are very limited given baseline wide QRS, and hepatic dysfunction. He would benefit from an eventual catheter ablation. Pt currently denies chest pain, SOB, dizziness, lethargy, chills, weakness.     # pAFL w/ RVR   - Maintaining sinus rhythm > 24 hours  - Change metoprolol to 37.5mg Q12HR  - Stop digoxin   - avoid amiodarone   - Unable to arrange inpatient ablation  - Currently recieving full dose Lovenox  - Transition to Eliquis on d/c.   - telemetry monitoring   - Outpatient follow up with Dr. Amato in 2 - 4 weeks arrange potential radiofrequency ablation    # HFrEF   - newly reduced EF 25% likely AF/tachycardia mediated   - diuresis as per cardiology  - GDMT: lasix, metoprolol  - scheduled for RHC and Cardiomems today     # Hepatomegaly, mild cirrhosis   - GI input appreciated    Subjective:    No acute events documented overnight. Pt reports improvement in RUQ pain. Pt remains in sinus rhythm. Currently denies chest pain, SOB, dizziness, lethargy, chills, weakness.       EKG: Sinus rhythm with intact conduction. 1st degree AVB, RBBB, LAFB    TELE: Sinus rhythm with intact conduction. 1st degree AVB. RBBB    MEDICATIONS  (STANDING):  albuterol    90 MICROgram(s) HFA Inhaler 2 Puff(s) Inhalation every 6 hours  aspirin  chewable 81 milliGRAM(s) Oral daily  chlorhexidine 2% Cloths 1 Application(s) Topical <User Schedule>  dextrose 5%. 1000 milliLiter(s) (100 mL/Hr) IV Continuous <Continuous>  dextrose 5%. 1000 milliLiter(s) (50 mL/Hr) IV Continuous <Continuous>  dextrose 50% Injectable 25 Gram(s) IV Push once  dextrose 50% Injectable 12.5 Gram(s) IV Push once  dextrose 50% Injectable 25 Gram(s) IV Push once  doxazosin 2 milliGRAM(s) Oral at bedtime  enoxaparin Injectable 135 milliGRAM(s) SubCutaneous every 12 hours  ertapenem  IVPB 1000 milliGRAM(s) IV Intermittent every 24 hours  finasteride 5 milliGRAM(s) Oral daily  fluticasone propionate 50 MICROgram(s)/spray Nasal Spray 1 Spray(s) Both Nostrils two times a day  furosemide   Injectable 40 milliGRAM(s) IV Push two times a day  glucagon  Injectable 1 milliGRAM(s) IntraMuscular once  insulin lispro (ADMELOG) corrective regimen sliding scale   SubCutaneous three times a day before meals  insulin lispro (ADMELOG) corrective regimen sliding scale   SubCutaneous at bedtime  metoprolol tartrate 37.5 milliGRAM(s) Oral two times a day  mupirocin 2% Nasal 1 Application(s) Both Nostrils two times a day  pantoprazole    Tablet 40 milliGRAM(s) Oral before breakfast  polyethylene glycol 3350 17 Gram(s) Oral daily  potassium chloride   Powder 20 milliEquivalent(s) Oral daily  senna 2 Tablet(s) Oral at bedtime    MEDICATIONS  (PRN):  ALPRAZolam 0.25 milliGRAM(s) Oral three times a day PRN Anxiety  aluminum hydroxide/magnesium hydroxide/simethicone Suspension 30 milliLiter(s) Oral every 4 hours PRN Dyspepsia  bisacodyl 5 milliGRAM(s) Oral every 12 hours PRN Constipation  dextrose Oral Gel 15 Gram(s) Oral once PRN Blood Glucose LESS THAN 70 milliGRAM(s)/deciliter  diphenhydrAMINE Injectable 50 milliGRAM(s) IV Push once PRN Allergic Reaction  meclizine 12.5 milliGRAM(s) Oral three times a day PRN Dizziness  melatonin 3 milliGRAM(s) Oral at bedtime PRN Insomnia  ondansetron Injectable 4 milliGRAM(s) IV Push every 8 hours PRN Nausea and/or Vomiting  oxyCODONE    IR 5 milliGRAM(s) Oral every 4 hours PRN Severe Pain (7 - 10)  simethicone 80 milliGRAM(s) Chew every 8 hours PRN Gas      Allergies    penicillin (Anaphylaxis)  Ceftin (Hives)  sulfa drugs (Unknown)  Bactrim (Rash)    Intolerances        Vital Signs Last 24 Hrs  T(C): 36.6 (16 Jun 2023 07:53), Max: 36.6 (16 Jun 2023 00:25)  T(F): 97.8 (16 Jun 2023 07:53), Max: 97.9 (16 Jun 2023 00:25)  HR: 66 (16 Jun 2023 09:28) (59 - 76)  BP: 106/63 (16 Jun 2023 07:53) (101/63 - 128/73)  BP(mean): --  RR: 18 (16 Jun 2023 07:53) (17 - 18)  SpO2: 96% (16 Jun 2023 09:28) (93% - 96%)    Parameters below as of 16 Jun 2023 09:28  Patient On (Oxygen Delivery Method): nasal cannula        Physical Exam:    Constitutional: Obese, NAD  Head: normocephalic atraumatic  Resp: effort normal, breath sounds clear to auscultation bilaterally  Cardiac: Regular rate / rhythm. No murmurs / rubs / gallops. No b/l LE edema  Abdomen: Soft. NTND. no rebound / guarding.   Neuro: Alert and oriented x 3  Psych: mood calm       LABS:                        12.9   5.98  )-----------( 256      ( 16 Jun 2023 04:48 )             41.4     06-16    140  |  100  |  20.7<H>  ----------------------------<  141<H>  3.7   |  33.0<H>  |  0.66    Ca    8.4      16 Jun 2023 04:48  Mg     1.9     06-16    TPro  5.7<L>  /  Alb  2.7<L>  /  TBili  0.3<L>  /  DBili  x   /  AST  10  /  ALT  9   /  AlkPhos  177<H>  06-16    PT/INR - ( 15 Matt 2023 05:00 )   PT: 14.6 sec;   INR: 1.26 ratio               RADIOLOGY & ADDITIONAL TESTS:    RADIOLOGY & ADDITIONAL STUDIES:      < from: TTE Echo Limited or F/U (06.11.23 @ 11:44) >  Summary:   1. Left ventricularejection fraction, by visual estimation, is 25 to   30%.   2. Moderately to severely decreased global left ventricular systolic   function.   3. There is mild concentric left ventricular hypertrophy.   4. Mildly enlarged right ventricle.   5. Moderately reduced RV systolic function.   6. Normal left atrial size.   7. Normal right atrial size.   8. There is no evidence of pericardial effusion.   9. Peak Gradient across the Aortic Valve is 35 mm Hg      Mean gradient is 26 mm Hg      DI 0.25      These findings are suggestive of LFLG Severe Aortic Stenosis.  10. Recommendations: Consider Dobutamine Stress echo to differentiate   True Severe AS from Pseudo AS or CT Scan for Aortic Valve Calcium Scoring.  11. Compared to prior Echo in 3/2023, lowEF is a new finding.  12. Endocardial visualization was enhanced with intravenous echo contrast.    MD Priya Electronically signed on 6/11/2023 at 5:17:26 PM      < from: CT Abdomen and Pelvis w/ IV Cont (06.10.23 @ 02:29) >  IMPRESSION:    No pulmonary embolism though evaluation of subsegmental branches limited   secondary to respiratory motion artifact.    Trace bilateral pleural effusions with adjacent patchy bibasilar   opacities, likely represent dependent atelectasis. Recommend clinical   correlation to assess for superimposed infection.    Hepatomegaly. Liver surface nodularity. Hepatitis or cirrhosis considered   in differential. Correlate with clinical parameters.    Trace ascites.    Mild bladder wall thickening with suggestion of left posterolateral   diverticulum. Correlate with urinalysis andlab values to assess for   cystitis and/or ascending urinary tract infection.          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 He presented with severe abdominal pain found to have hepatomegaly with early cirrhosis. Noted to be in AF/AFL with RVR and EP consult was requested.   Given he has newly reduced EF in the setting of AF/AFL with RVR, it is likely the cause of cardiomyopathy is AF/tachycardia mediated.     Pt self converted to sinus rhythm on 6/15/23 and has since maintained SR while recieving PO Digoxin and Metoprolol. Rhythm control options with AAD are very limited given baseline wide QRS, and hepatic dysfunction. He would benefit from an eventual catheter ablation. Pt currently denies chest pain, SOB, dizziness, lethargy, chills, weakness.     # pAFL w/ RVR   - Maintaining sinus rhythm > 24 hours  - Change metoprolol to 37.5mg Q12HR  - Stop digoxin   - avoid amiodarone   - Unable to arrange inpatient ablation  - Currently recieving full dose Lovenox  - Transition to Eliquis on d/c.   - telemetry monitoring   - Outpatient follow up with Dr. Amato in 2 - 4 weeks arrange potential radiofrequency ablation    # HFrEF   - newly reduced EF 25% likely AF/tachycardia mediated   - diuresis as per cardiology  - GDMT: lasix, metoprolol  - scheduled for RHC and Cardiomems today     # Hepatomegaly, mild cirrhosis   - GI input appreciated             Addendum  Will be able to add patient for atrial flutter with Dr. Amato this Monday. Please keep NPO after midnight 6/18/23 and hold AM dose of lovenox.   Subjective:    No acute events documented overnight. Pt reports improvement in RUQ pain. Pt remains in sinus rhythm. Currently denies chest pain, SOB, dizziness, lethargy, chills, weakness.       EKG: Sinus rhythm with intact conduction. 1st degree AVB, RBBB, LAFB    TELE: Sinus rhythm with intact conduction. 1st degree AVB. RBBB    MEDICATIONS  (STANDING):  albuterol    90 MICROgram(s) HFA Inhaler 2 Puff(s) Inhalation every 6 hours  aspirin  chewable 81 milliGRAM(s) Oral daily  chlorhexidine 2% Cloths 1 Application(s) Topical <User Schedule>  dextrose 5%. 1000 milliLiter(s) (100 mL/Hr) IV Continuous <Continuous>  dextrose 5%. 1000 milliLiter(s) (50 mL/Hr) IV Continuous <Continuous>  dextrose 50% Injectable 25 Gram(s) IV Push once  dextrose 50% Injectable 12.5 Gram(s) IV Push once  dextrose 50% Injectable 25 Gram(s) IV Push once  doxazosin 2 milliGRAM(s) Oral at bedtime  enoxaparin Injectable 135 milliGRAM(s) SubCutaneous every 12 hours  ertapenem  IVPB 1000 milliGRAM(s) IV Intermittent every 24 hours  finasteride 5 milliGRAM(s) Oral daily  fluticasone propionate 50 MICROgram(s)/spray Nasal Spray 1 Spray(s) Both Nostrils two times a day  furosemide   Injectable 40 milliGRAM(s) IV Push two times a day  glucagon  Injectable 1 milliGRAM(s) IntraMuscular once  insulin lispro (ADMELOG) corrective regimen sliding scale   SubCutaneous three times a day before meals  insulin lispro (ADMELOG) corrective regimen sliding scale   SubCutaneous at bedtime  metoprolol tartrate 37.5 milliGRAM(s) Oral two times a day  mupirocin 2% Nasal 1 Application(s) Both Nostrils two times a day  pantoprazole    Tablet 40 milliGRAM(s) Oral before breakfast  polyethylene glycol 3350 17 Gram(s) Oral daily  potassium chloride   Powder 20 milliEquivalent(s) Oral daily  senna 2 Tablet(s) Oral at bedtime    MEDICATIONS  (PRN):  ALPRAZolam 0.25 milliGRAM(s) Oral three times a day PRN Anxiety  aluminum hydroxide/magnesium hydroxide/simethicone Suspension 30 milliLiter(s) Oral every 4 hours PRN Dyspepsia  bisacodyl 5 milliGRAM(s) Oral every 12 hours PRN Constipation  dextrose Oral Gel 15 Gram(s) Oral once PRN Blood Glucose LESS THAN 70 milliGRAM(s)/deciliter  diphenhydrAMINE Injectable 50 milliGRAM(s) IV Push once PRN Allergic Reaction  meclizine 12.5 milliGRAM(s) Oral three times a day PRN Dizziness  melatonin 3 milliGRAM(s) Oral at bedtime PRN Insomnia  ondansetron Injectable 4 milliGRAM(s) IV Push every 8 hours PRN Nausea and/or Vomiting  oxyCODONE    IR 5 milliGRAM(s) Oral every 4 hours PRN Severe Pain (7 - 10)  simethicone 80 milliGRAM(s) Chew every 8 hours PRN Gas      Allergies    penicillin (Anaphylaxis)  Ceftin (Hives)  sulfa drugs (Unknown)  Bactrim (Rash)    Intolerances        Vital Signs Last 24 Hrs  T(C): 36.6 (16 Jun 2023 07:53), Max: 36.6 (16 Jun 2023 00:25)  T(F): 97.8 (16 Jun 2023 07:53), Max: 97.9 (16 Jun 2023 00:25)  HR: 66 (16 Jun 2023 09:28) (59 - 76)  BP: 106/63 (16 Jun 2023 07:53) (101/63 - 128/73)  BP(mean): --  RR: 18 (16 Jun 2023 07:53) (17 - 18)  SpO2: 96% (16 Jun 2023 09:28) (93% - 96%)    Parameters below as of 16 Jun 2023 09:28  Patient On (Oxygen Delivery Method): nasal cannula        Physical Exam:    Constitutional: Obese, NAD  Head: normocephalic atraumatic  Resp: effort normal, breath sounds clear to auscultation bilaterally  Cardiac: Regular rate / rhythm. No murmurs / rubs / gallops. No b/l LE edema  Abdomen: Soft. NTND. no rebound / guarding.   Neuro: Alert and oriented x 3  Psych: mood calm       LABS:                        12.9   5.98  )-----------( 256      ( 16 Jun 2023 04:48 )             41.4     06-16    140  |  100  |  20.7<H>  ----------------------------<  141<H>  3.7   |  33.0<H>  |  0.66    Ca    8.4      16 Jun 2023 04:48  Mg     1.9     06-16    TPro  5.7<L>  /  Alb  2.7<L>  /  TBili  0.3<L>  /  DBili  x   /  AST  10  /  ALT  9   /  AlkPhos  177<H>  06-16    PT/INR - ( 15 Matt 2023 05:00 )   PT: 14.6 sec;   INR: 1.26 ratio               RADIOLOGY & ADDITIONAL TESTS:    RADIOLOGY & ADDITIONAL STUDIES:      < from: TTE Echo Limited or F/U (06.11.23 @ 11:44) >  Summary:   1. Left ventricularejection fraction, by visual estimation, is 25 to   30%.   2. Moderately to severely decreased global left ventricular systolic   function.   3. There is mild concentric left ventricular hypertrophy.   4. Mildly enlarged right ventricle.   5. Moderately reduced RV systolic function.   6. Normal left atrial size.   7. Normal right atrial size.   8. There is no evidence of pericardial effusion.   9. Peak Gradient across the Aortic Valve is 35 mm Hg      Mean gradient is 26 mm Hg      DI 0.25      These findings are suggestive of LFLG Severe Aortic Stenosis.  10. Recommendations: Consider Dobutamine Stress echo to differentiate   True Severe AS from Pseudo AS or CT Scan for Aortic Valve Calcium Scoring.  11. Compared to prior Echo in 3/2023, lowEF is a new finding.  12. Endocardial visualization was enhanced with intravenous echo contrast.    MD Priya Electronically signed on 6/11/2023 at 5:17:26 PM      < from: CT Abdomen and Pelvis w/ IV Cont (06.10.23 @ 02:29) >  IMPRESSION:    No pulmonary embolism though evaluation of subsegmental branches limited   secondary to respiratory motion artifact.    Trace bilateral pleural effusions with adjacent patchy bibasilar   opacities, likely represent dependent atelectasis. Recommend clinical   correlation to assess for superimposed infection.    Hepatomegaly. Liver surface nodularity. Hepatitis or cirrhosis considered   in differential. Correlate with clinical parameters.    Trace ascites.    Mild bladder wall thickening with suggestion of left posterolateral   diverticulum. Correlate with urinalysis andlab values to assess for   cystitis and/or ascending urinary tract infection.          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 He presented with severe abdominal pain found to have hepatomegaly with early cirrhosis. Noted to be in AF/AFL with RVR and EP consult was requested.   Given he has newly reduced EF in the setting of AF/AFL with RVR, it is likely the cause of cardiomyopathy is AF/tachycardia mediated.     Pt self converted to sinus rhythm on 6/15/23 and has since maintained SR while recieving PO Digoxin and Metoprolol. Rhythm control options with AAD are very limited given baseline wide QRS, and hepatic dysfunction. He would benefit from an eventual catheter ablation. Pt currently denies chest pain, SOB, dizziness, lethargy, chills, weakness.     # pAFL w/ RVR   - Maintaining sinus rhythm > 24 hours  - Change metoprolol to 37.5mg Q12HR  - Stop digoxin   - avoid amiodarone   - Unable to arrange inpatient ablation  - Currently recieving full dose Lovenox  - Transition to Eliquis on d/c.   - telemetry monitoring   - Outpatient follow up with Dr. Amato in 2 - 4 weeks arrange potential radiofrequency ablation    # HFrEF   - newly reduced EF 25% likely AF/tachycardia mediated   - diuresis as per cardiology  - GDMT: lasix, metoprolol  - scheduled for RHC and Cardiomems today     # Hepatomegaly, mild cirrhosis   - GI input appreciated             Addendum  Will be able to add patient for atrial flutter with Dr. Amato this Monday. Please keep NPO after midnight 6/18/23 and hold AM dose of lovenox day of procedure.

## 2023-06-16 NOTE — PROGRESS NOTE ADULT - SUBJECTIVE AND OBJECTIVE BOX
CCL NP  Pre procedure Note    Pt presents for RHC/Cardiomems.  Pt with h/o AF on Eliquis (s/p Cardioversion on 1/31/23), moderate-severe AS, Chronic Diastolic Heart Failure, CAD s/p PCI, HTN, HLD, DM 2, Morbid Obesity, ALLIE, Thoracic Aortic Aneurysm, BPH s/p TURP, Left Renal Mass was sent to the ED from [facility] for abdominal pain x 4 days. Patient found to have UTI.  Echo performed noted significant drop in EF.  CT with hepatomegaly appearing ?significantly larger than CT from March.  ?2/2 CPC but would consider other causes such as hepatitis.  Appears somewhat fluid overloaded but not in severe acute CHF.  RHC+ Cardiomems     VSS  Neuro: A&O X3  Lungs: CTA  CV: NSR  + palp pulses    ASA 3  Mallampati 2  BRA 1.0%    -Eliquis has been on hold

## 2023-06-16 NOTE — PROGRESS NOTE ADULT - SUBJECTIVE AND OBJECTIVE BOX
ANIA ELISAELSY  [id number]      Chief Complaint:   follow up new CMP/AF      Subjective:   Patient feeling better, less pain.  Denies SOB, palps.      24 hour Tele:   AF -> SR        albuterol    90 MICROgram(s) HFA Inhaler 2 Puff(s) Inhalation every 6 hours  ALPRAZolam 0.25 milliGRAM(s) Oral three times a day PRN  aluminum hydroxide/magnesium hydroxide/simethicone Suspension 30 milliLiter(s) Oral every 4 hours PRN  aspirin  chewable 81 milliGRAM(s) Oral daily  atorvastatin 20 milliGRAM(s) Oral at bedtime  bisacodyl 5 milliGRAM(s) Oral every 12 hours PRN  chlorhexidine 2% Cloths 1 Application(s) Topical <User Schedule>  dextrose 5%. 1000 milliLiter(s) IV Continuous <Continuous>  dextrose 5%. 1000 milliLiter(s) IV Continuous <Continuous>  dextrose 50% Injectable 25 Gram(s) IV Push once  dextrose 50% Injectable 12.5 Gram(s) IV Push once  dextrose 50% Injectable 25 Gram(s) IV Push once  dextrose Oral Gel 15 Gram(s) Oral once PRN  diphenhydrAMINE Injectable 50 milliGRAM(s) IV Push once PRN  doxazosin 2 milliGRAM(s) Oral at bedtime  enoxaparin Injectable 135 milliGRAM(s) SubCutaneous every 12 hours  ertapenem  IVPB 1000 milliGRAM(s) IV Intermittent every 24 hours  finasteride 5 milliGRAM(s) Oral daily  fluticasone propionate 50 MICROgram(s)/spray Nasal Spray 1 Spray(s) Both Nostrils two times a day  furosemide   Injectable 40 milliGRAM(s) IV Push two times a day  glucagon  Injectable 1 milliGRAM(s) IntraMuscular once  insulin lispro (ADMELOG) corrective regimen sliding scale   SubCutaneous three times a day before meals  insulin lispro (ADMELOG) corrective regimen sliding scale   SubCutaneous at bedtime  meclizine 12.5 milliGRAM(s) Oral three times a day PRN  melatonin 3 milliGRAM(s) Oral at bedtime PRN  metoprolol tartrate 37.5 milliGRAM(s) Oral two times a day  mupirocin 2% Nasal 1 Application(s) Both Nostrils two times a day  ondansetron Injectable 4 milliGRAM(s) IV Push every 8 hours PRN  oxyCODONE    IR 5 milliGRAM(s) Oral every 4 hours PRN  pantoprazole    Tablet 40 milliGRAM(s) Oral before breakfast  polyethylene glycol 3350 17 Gram(s) Oral daily  potassium chloride   Powder 20 milliEquivalent(s) Oral daily  senna 2 Tablet(s) Oral at bedtime  simethicone 80 milliGRAM(s) Chew every 8 hours PRN          Physical Exam:  T(C): 36.6 (06-16-23 @ 12:15), Max: 36.7 (06-16-23 @ 11:29)  HR: 69 (06-16-23 @ 12:15) (59 - 76)  BP: 110/57 (06-16-23 @ 12:15) (101/63 - 128/73)  RR: 12 (06-16-23 @ 12:15) (12 - 18)  SpO2: 92% (06-16-23 @ 12:15) (92% - 96%)  General: Comfortable in NAD  Neck: No JVD  CVS: nl s1s2, no s3  Pulm: CTA b/l  Abd: soft, non-tender  Ext: No c/c/e  Neuro A&O x3  Psych: Normal affect      Labs:   16 Jun 2023 04:48    140    |  100    |  20.7   ----------------------------<  141    3.7     |  33.0   |  0.66     Ca    8.4        16 Jun 2023 04:48  Mg     1.9       16 Jun 2023 04:48    TPro  5.7    /  Alb  2.7    /  TBili  0.3    /  DBili  x      /  AST  10     /  ALT  9      /  AlkPhos  177    16 Jun 2023 04:48                          12.9   5.98  )-----------( 256      ( 16 Jun 2023 04:48 )             41.4     PT/INR - ( 15 Matt 2023 05:00 )   PT: 14.6 sec;   INR: 1.26 ratio                         [facility] [place] 1/2023:  Coronary Angiography   - Left dominant system   - Patent prox and mid LAD stents   - Moderate stenosis of the distal LCx   - The RCA is a small, non-dominat vessel.  The proximal segment is occluded with brisk right-to-right collaterals.  - 25mm LV-Ao gradient on pullback.  CIELO 1.4cm2 with moderate AS byecho.  - Moderately reduced LVEF by echo       LM   Left main artery: Angiography shows minor irregularities.    LAD   Proximal left anterior descending: There is a 10 % stenosis within the  stented segment. Mid left anterior descending: There is a10 % stenosis within the stented segment.    CX   Distal circumflex: There is a 60 % stenosis. VALENTINO Flow 3.    RCA   Proximal right coronary artery: There is a 100 % stenosis. There is  good collateral blood supply to the distal myocardium. This  lesion is a chronic total occlusion.  This is a small non-dominant  vessel.  Ramus   Ramus intermedius: Angiography shows mild atherosclerosis.      Left Heart Cath   Ejection fraction was calculated by echo with a value of 40-45%. LV to  AO pullback was performed and there is a pressure gradient of 25 mmHg.      ECG:  SR with 1st degree AVB with RBBB/LAFB.  No ST-T wave changes.  PRWP.            RADIOLOGY:  CT A/P:  No pulmonary embolism though evaluation of subsegmental branches limited   secondary to respiratory motion artifact.    Trace bilateral pleural effusions with adjacent patchy bibasilar   opacities, likely represent dependent atelectasis. Recommend clinical   correlation to assess for superimposed infection.    Hepatomegaly. Liver surface nodularity. Hepatitis or cirrhosis considered   in differential. Correlate with clinical parameters.    Trace ascites.    Mild bladder wall thickening with suggestion of left posterolateral   diverticulum. Correlate with urinalysis andlab values to assess for   cystitis and/or ascending urinary tract infection.    Additional findings as mentioned above.    ECHO 1/2023:   1. Technically difficult study.   2. Tachycardic throughout the study and this may preclude true assessment of LVEF.   3. Left ventricular ejection fraction, by visual estimation, is 40 to 45%.   4. Moderately decreased global left ventricular systolic function.   5. The mitral in-flow pattern reveals no discernable A-wave, therefore   no comment on diastolic function can be made.   6. Mildly enlarged left atrium.   7. Normal right atrial size.   8. Mild tricuspid regurgitation.   9. Mild thickening and calcification of the anterior mitral valve leaflet.  10. Mild to moderate mitral annular calcification.  11. Trace mitral valve regurgitation.  12. Moderate aortic valve stenosis, with peak velocity of 2.87 m/s, mean   gradient of 18 mmHg, dimensionless index of 0.29, and CIELO via VTI of 1.42   sqcm.  13. Dilatation of the sinuses of Valsalva, measures 4.15 cm but based on   BSA 1.6cm/sqm is within normal limits.  14. There is a significant pericardial fat pad present.  15. There isno evidence of pericardial effusion.  16. Endocardial visualization was enhanced with intravenous echo contrast.  17. Compared to prior TTE done on 7/6/2022, the left ventricular function   is now reduced to 40-45%, in the setting of Afib wth RVR (prior LVEF   60-65%).      [facility] 1/2023:   1. Left ventricular ejection fraction, by visual estimation, is 40 to 45%.   2. Mildly decreased global left ventricular systolic function.   3. Normal right ventricular size and function.   4. Mildly enlarged left atrium.   5. Mild mitral annular calcification.   6. Mild mitral valve regurgitation.   7. Thickening of the anterior and posterior mitral valve leaflets.   8. Mild tricuspid regurgitation.   9. Mild aortic regurgitation.  10. Mild to moderate aortic valvestenosis.  11. CIELO by planimetry 1.2cm2.  12. Aortic root measured at Sinus of Valsalva is dilated. 4.1cm  13. No left atrial appendage thrombus.  14. Color flow doppler and intravenous injection of agitated saline   demonstrates the presence of an intact intra atrial septum.    Echo (3/46536):   1. Left ventricular ejection fraction, by visual estimation, is 60 to 65%.   2. Normal right ventricular size and function.   3. There is no evidence of pericardial effusion.   4. Endocardial visualizationwas enhanced with intravenous echo contrast.    Echo personally reviewed:   1. Left ventricularejection fraction, by visual estimation, is 35-40%.   2. Moderately decreased global left ventricular systolic   function.   3. There is mild concentric left ventricular hypertrophy.   4. Mildly enlarged right ventricle.   5. Moderately reduced RV systolic function.   6. Normal left atrial size.   7. Normal right atrial size.   8. There is no evidence of pericardial effusion.   9. Peak Gradient across the Aortic Valve is 35 mm Hg. Mean gradient is 26 mm Hg. DI 0.25.  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    Calcium Score:  Cardiac:  Total Coronary Artery Calcium Score = 5373.  >90th percentile.   Significant calcification. Significant atherosclerotic burden.  Aortic calcification    Non Cardiac:  1.  Small bilateral pleural effusions and lower lobe passive atelectasis.  2.  Ascending aorta measures 4.8 cm at the main pulmonary artery.      Assessment:  78 y/o male PMHx AF on Eliquis (s/p Cardioversion on 1/31/23), moderate-severe AS, Chronic Diastolic Heart Failure, CAD s/p PCI, HTN, HLD, DM 2, Morbid Obesity, ALLIE, Thoracic Aortic Aneurysm, BPH s/p TURP, Left Renal Mass was sent to the ED from [facility] for abdominal pain x 4 days. Patient found to have UTI.  Echo performed noted significant drop in EF.  CT with hepatomegaly appearing ?significantly larger than CT from March.  ?2/2 CPC but would consider other causes such as hepatitis.  Appears somewhat fluid overloaded but not in severe acute CHF.  -Acute systolic CHF being diuresed  -Also now back in AF, rate controlled.  ?Contributing to new drop in EF.  ?2/2 stress myopathy with acute infection and new liver dysfunction.  Cath in January with  RCA, 60% distal LCX disease and unlikely to be contributing to CMP. Will need to consider repeat this admission after further diuresis   -Also with significant AS, ?severe LFLG potentially contributing but fairly similar to prior echos. Has had some fluctuation in EF and when i AF this past January had drop in EF with improvement in 3/2023. Now drop again with recurrent AF and suspect contributing factor.  On monitor some SR with 1st degree AV delay and now in AF , at times rapid   -significant hepatosplenomegaly needs further evaluation, remains symptomatic from hepatomegaly   -Also with UTI, biliary colic 2/2 sludge    Plan:   1. AF better rate controlled, and now in SR.  EP recs appreciated, continue digoxin .  2. Continue Lasix 40mg IV BID, Monitor renal function and BP.  Will reassess post RHC.  3. Add Entresto when BP tolerates after diuresed.  Consider when Lasix changed but BP better now.  4. Keep off Amio and statin, GI follow up, Planned MRCP.  5. Lovenox for CVA PPX for now. Ultimately transition back to eliquis   6. Continue other current CV meds at current doses and med management of CAD   7. Antibiotics for UTI   8. Will proceed with RHC+ Cardiomems.  9. F/u CAC of AV to assess AS severity.

## 2023-06-16 NOTE — PROGRESS NOTE ADULT - SUBJECTIVE AND OBJECTIVE BOX
CCL NP    Left groin suture removed.  Site benign.  No bleeding/hematoma/ecchymosis.  -VS and site check per routine  -flat X 1 hour  -Bedrest until 7pm

## 2023-06-17 LAB
ALBUMIN SERPL ELPH-MCNC: 2.7 G/DL — LOW (ref 3.3–5.2)
ALP SERPL-CCNC: 198 U/L — HIGH (ref 40–120)
ALT FLD-CCNC: 12 U/L — SIGNIFICANT CHANGE UP
ANION GAP SERPL CALC-SCNC: 11 MMOL/L — SIGNIFICANT CHANGE UP (ref 5–17)
AST SERPL-CCNC: 14 U/L — SIGNIFICANT CHANGE UP
BASOPHILS # BLD AUTO: 0.05 K/UL — SIGNIFICANT CHANGE UP (ref 0–0.2)
BASOPHILS NFR BLD AUTO: 0.7 % — SIGNIFICANT CHANGE UP (ref 0–2)
BILIRUB SERPL-MCNC: 0.4 MG/DL — SIGNIFICANT CHANGE UP (ref 0.4–2)
BUN SERPL-MCNC: 19.5 MG/DL — SIGNIFICANT CHANGE UP (ref 8–20)
CALCIUM SERPL-MCNC: 8.4 MG/DL — SIGNIFICANT CHANGE UP (ref 8.4–10.5)
CHLORIDE SERPL-SCNC: 97 MMOL/L — SIGNIFICANT CHANGE UP (ref 96–108)
CO2 SERPL-SCNC: 31 MMOL/L — HIGH (ref 22–29)
CREAT SERPL-MCNC: 0.58 MG/DL — SIGNIFICANT CHANGE UP (ref 0.5–1.3)
EGFR: 100 ML/MIN/1.73M2 — SIGNIFICANT CHANGE UP
EOSINOPHIL # BLD AUTO: 0.56 K/UL — HIGH (ref 0–0.5)
EOSINOPHIL NFR BLD AUTO: 8.3 % — HIGH (ref 0–6)
GLUCOSE BLDC GLUCOMTR-MCNC: 121 MG/DL — HIGH (ref 70–99)
GLUCOSE BLDC GLUCOMTR-MCNC: 149 MG/DL — HIGH (ref 70–99)
GLUCOSE BLDC GLUCOMTR-MCNC: 152 MG/DL — HIGH (ref 70–99)
GLUCOSE BLDC GLUCOMTR-MCNC: 221 MG/DL — HIGH (ref 70–99)
GLUCOSE SERPL-MCNC: 126 MG/DL — HIGH (ref 70–99)
HCT VFR BLD CALC: 45.3 % — SIGNIFICANT CHANGE UP (ref 39–50)
HGB BLD-MCNC: 14 G/DL — SIGNIFICANT CHANGE UP (ref 13–17)
IMM GRANULOCYTES NFR BLD AUTO: 0.3 % — SIGNIFICANT CHANGE UP (ref 0–0.9)
LYMPHOCYTES # BLD AUTO: 1.26 K/UL — SIGNIFICANT CHANGE UP (ref 1–3.3)
LYMPHOCYTES # BLD AUTO: 18.8 % — SIGNIFICANT CHANGE UP (ref 13–44)
MAGNESIUM SERPL-MCNC: 1.8 MG/DL — SIGNIFICANT CHANGE UP (ref 1.6–2.6)
MCHC RBC-ENTMCNC: 27.7 PG — SIGNIFICANT CHANGE UP (ref 27–34)
MCHC RBC-ENTMCNC: 30.9 GM/DL — LOW (ref 32–36)
MCV RBC AUTO: 89.7 FL — SIGNIFICANT CHANGE UP (ref 80–100)
MONOCYTES # BLD AUTO: 0.68 K/UL — SIGNIFICANT CHANGE UP (ref 0–0.9)
MONOCYTES NFR BLD AUTO: 10.1 % — SIGNIFICANT CHANGE UP (ref 2–14)
NEUTROPHILS # BLD AUTO: 4.14 K/UL — SIGNIFICANT CHANGE UP (ref 1.8–7.4)
NEUTROPHILS NFR BLD AUTO: 61.8 % — SIGNIFICANT CHANGE UP (ref 43–77)
PLATELET # BLD AUTO: 261 K/UL — SIGNIFICANT CHANGE UP (ref 150–400)
POTASSIUM SERPL-MCNC: 4.1 MMOL/L — SIGNIFICANT CHANGE UP (ref 3.5–5.3)
POTASSIUM SERPL-SCNC: 4.1 MMOL/L — SIGNIFICANT CHANGE UP (ref 3.5–5.3)
PROT SERPL-MCNC: 6 G/DL — LOW (ref 6.6–8.7)
RBC # BLD: 5.05 M/UL — SIGNIFICANT CHANGE UP (ref 4.2–5.8)
RBC # FLD: 14.4 % — SIGNIFICANT CHANGE UP (ref 10.3–14.5)
SODIUM SERPL-SCNC: 139 MMOL/L — SIGNIFICANT CHANGE UP (ref 135–145)
WBC # BLD: 6.71 K/UL — SIGNIFICANT CHANGE UP (ref 3.8–10.5)
WBC # FLD AUTO: 6.71 K/UL — SIGNIFICANT CHANGE UP (ref 3.8–10.5)

## 2023-06-17 PROCEDURE — 99233 SBSQ HOSP IP/OBS HIGH 50: CPT

## 2023-06-17 PROCEDURE — 74183 MRI ABD W/O CNTR FLWD CNTR: CPT | Mod: 26

## 2023-06-17 RX ORDER — METOPROLOL TARTRATE 50 MG
25 TABLET ORAL EVERY 6 HOURS
Refills: 0 | Status: DISCONTINUED | OUTPATIENT
Start: 2023-06-17 | End: 2023-06-23

## 2023-06-17 RX ORDER — TRAMADOL HYDROCHLORIDE 50 MG/1
25 TABLET ORAL ONCE
Refills: 0 | Status: DISCONTINUED | OUTPATIENT
Start: 2023-06-17 | End: 2023-06-17

## 2023-06-17 RX ORDER — DIGOXIN 250 MCG
125 TABLET ORAL DAILY
Refills: 0 | Status: DISCONTINUED | OUTPATIENT
Start: 2023-06-17 | End: 2023-06-19

## 2023-06-17 RX ORDER — APIXABAN 2.5 MG/1
5 TABLET, FILM COATED ORAL EVERY 12 HOURS
Refills: 0 | Status: COMPLETED | OUTPATIENT
Start: 2023-06-17 | End: 2023-06-18

## 2023-06-17 RX ORDER — FUROSEMIDE 40 MG
40 TABLET ORAL DAILY
Refills: 0 | Status: DISCONTINUED | OUTPATIENT
Start: 2023-06-18 | End: 2023-06-23

## 2023-06-17 RX ORDER — ALBUTEROL 90 UG/1
2.5 AEROSOL, METERED ORAL EVERY 6 HOURS
Refills: 0 | Status: DISCONTINUED | OUTPATIENT
Start: 2023-06-17 | End: 2023-06-23

## 2023-06-17 RX ORDER — MAGNESIUM SULFATE 500 MG/ML
2 VIAL (ML) INJECTION ONCE
Refills: 0 | Status: COMPLETED | OUTPATIENT
Start: 2023-06-17 | End: 2023-06-17

## 2023-06-17 RX ORDER — ALPRAZOLAM 0.25 MG
0.25 TABLET ORAL ONCE
Refills: 0 | Status: DISCONTINUED | OUTPATIENT
Start: 2023-06-17 | End: 2023-06-17

## 2023-06-17 RX ORDER — METOPROLOL TARTRATE 50 MG
5 TABLET ORAL ONCE
Refills: 0 | Status: COMPLETED | OUTPATIENT
Start: 2023-06-17 | End: 2023-06-17

## 2023-06-17 RX ORDER — ALPRAZOLAM 0.25 MG
0.5 TABLET ORAL THREE TIMES A DAY
Refills: 0 | Status: DISCONTINUED | OUTPATIENT
Start: 2023-06-17 | End: 2023-06-23

## 2023-06-17 RX ADMIN — Medication 1 SPRAY(S): at 17:38

## 2023-06-17 RX ADMIN — APIXABAN 5 MILLIGRAM(S): 2.5 TABLET, FILM COATED ORAL at 23:26

## 2023-06-17 RX ADMIN — OXYCODONE HYDROCHLORIDE 5 MILLIGRAM(S): 5 TABLET ORAL at 03:18

## 2023-06-17 RX ADMIN — FINASTERIDE 5 MILLIGRAM(S): 5 TABLET, FILM COATED ORAL at 13:03

## 2023-06-17 RX ADMIN — Medication 25 MILLIGRAM(S): at 23:27

## 2023-06-17 RX ADMIN — Medication 81 MILLIGRAM(S): at 13:02

## 2023-06-17 RX ADMIN — Medication 0.25 MILLIGRAM(S): at 11:05

## 2023-06-17 RX ADMIN — SENNA PLUS 2 TABLET(S): 8.6 TABLET ORAL at 23:26

## 2023-06-17 RX ADMIN — ALBUTEROL 2.5 MILLIGRAM(S): 90 AEROSOL, METERED ORAL at 20:30

## 2023-06-17 RX ADMIN — ENOXAPARIN SODIUM 135 MILLIGRAM(S): 100 INJECTION SUBCUTANEOUS at 10:43

## 2023-06-17 RX ADMIN — Medication 125 MICROGRAM(S): at 13:02

## 2023-06-17 RX ADMIN — Medication 5 MILLIGRAM(S): at 14:55

## 2023-06-17 RX ADMIN — ATORVASTATIN CALCIUM 20 MILLIGRAM(S): 80 TABLET, FILM COATED ORAL at 23:26

## 2023-06-17 RX ADMIN — Medication 1 SPRAY(S): at 05:32

## 2023-06-17 RX ADMIN — Medication 0.25 MILLIGRAM(S): at 13:58

## 2023-06-17 RX ADMIN — Medication 50 GRAM(S): at 14:55

## 2023-06-17 RX ADMIN — CHLORHEXIDINE GLUCONATE 1 APPLICATION(S): 213 SOLUTION TOPICAL at 05:31

## 2023-06-17 RX ADMIN — ALBUTEROL 2.5 MILLIGRAM(S): 90 AEROSOL, METERED ORAL at 15:16

## 2023-06-17 RX ADMIN — PANTOPRAZOLE SODIUM 40 MILLIGRAM(S): 20 TABLET, DELAYED RELEASE ORAL at 05:29

## 2023-06-17 RX ADMIN — Medication 20 MILLIEQUIVALENT(S): at 13:02

## 2023-06-17 RX ADMIN — Medication 2 MILLIGRAM(S): at 23:27

## 2023-06-17 RX ADMIN — TRAMADOL HYDROCHLORIDE 25 MILLIGRAM(S): 50 TABLET ORAL at 23:25

## 2023-06-17 RX ADMIN — MUPIROCIN 1 APPLICATION(S): 20 OINTMENT TOPICAL at 17:38

## 2023-06-17 RX ADMIN — Medication 40 MILLIGRAM(S): at 05:27

## 2023-06-17 RX ADMIN — Medication 1: at 17:37

## 2023-06-17 RX ADMIN — OXYCODONE HYDROCHLORIDE 5 MILLIGRAM(S): 5 TABLET ORAL at 17:38

## 2023-06-17 RX ADMIN — ERTAPENEM SODIUM 120 MILLIGRAM(S): 1 INJECTION, POWDER, LYOPHILIZED, FOR SOLUTION INTRAMUSCULAR; INTRAVENOUS at 05:32

## 2023-06-17 RX ADMIN — MUPIROCIN 1 APPLICATION(S): 20 OINTMENT TOPICAL at 05:31

## 2023-06-17 RX ADMIN — Medication 37.5 MILLIGRAM(S): at 05:29

## 2023-06-17 NOTE — PROGRESS NOTE ADULT - NS ATTEND OPT1 GEN_ALL_CORE
In an effort to ensure that our patients [employer]Well, a Team Member has reviewed your chart and identified an opportunity to provide the best care possible. An attempt was made to discuss or schedule overdue Preventive or Disease Management screening.     The Outcome was Contact was not made, appointment already scheduled If you have any questions or need help with scheduling, contact your primary care provider.. Care Gaps include Wellness Visits.    ELI scheduled for 08/09/2023  
I attest my time as attending is greater than 50% of the total combined time spent on qualifying patient care activities by the PA/NP and attending.

## 2023-06-17 NOTE — PROGRESS NOTE ADULT - SUBJECTIVE AND OBJECTIVE BOX
ANIA ELISAELSY  [id number]        Chief Complaint:   follow up new CMP/AF      Subjective:   Patient feeling better, less pain.  Denies SOB, palps.      24 hour Tele:   SR          albuterol    0.083% 2.5 milliGRAM(s) Nebulizer every 6 hours  ALPRAZolam 0.25 milliGRAM(s) Oral three times a day PRN  aluminum hydroxide/magnesium hydroxide/simethicone Suspension 30 milliLiter(s) Oral every 4 hours PRN  aspirin  chewable 81 milliGRAM(s) Oral daily  atorvastatin 20 milliGRAM(s) Oral at bedtime  bisacodyl 5 milliGRAM(s) Oral every 12 hours PRN  chlorhexidine 2% Cloths 1 Application(s) Topical <User Schedule>  dextrose 5%. 1000 milliLiter(s) IV Continuous <Continuous>  dextrose 5%. 1000 milliLiter(s) IV Continuous <Continuous>  dextrose 50% Injectable 25 Gram(s) IV Push once  dextrose 50% Injectable 12.5 Gram(s) IV Push once  dextrose 50% Injectable 25 Gram(s) IV Push once  dextrose Oral Gel 15 Gram(s) Oral once PRN  diphenhydrAMINE Injectable 50 milliGRAM(s) IV Push once PRN  doxazosin 2 milliGRAM(s) Oral at bedtime  enoxaparin Injectable 135 milliGRAM(s) SubCutaneous every 12 hours  finasteride 5 milliGRAM(s) Oral daily  fluticasone propionate 50 MICROgram(s)/spray Nasal Spray 1 Spray(s) Both Nostrils two times a day  furosemide   Injectable 40 milliGRAM(s) IV Push two times a day  glucagon  Injectable 1 milliGRAM(s) IntraMuscular once  insulin lispro (ADMELOG) corrective regimen sliding scale   SubCutaneous three times a day before meals  insulin lispro (ADMELOG) corrective regimen sliding scale   SubCutaneous at bedtime  meclizine 12.5 milliGRAM(s) Oral three times a day PRN  melatonin 3 milliGRAM(s) Oral at bedtime PRN  metoprolol tartrate 37.5 milliGRAM(s) Oral two times a day  mupirocin 2% Nasal 1 Application(s) Both Nostrils two times a day  ondansetron Injectable 4 milliGRAM(s) IV Push every 8 hours PRN  oxyCODONE    IR 5 milliGRAM(s) Oral every 4 hours PRN  pantoprazole    Tablet 40 milliGRAM(s) Oral before breakfast  polyethylene glycol 3350 17 Gram(s) Oral daily  potassium chloride   Powder 20 milliEquivalent(s) Oral daily  senna 2 Tablet(s) Oral at bedtime  simethicone 80 milliGRAM(s) Chew every 8 hours PRN          Physical Exam:  T(C): 36.6 (06-17-23 @ 07:48), Max: 37.1 (06-17-23 @ 00:11)  HR: 88 (06-17-23 @ 07:48) (62 - 88)  BP: 100/58 (06-17-23 @ 07:48) (100/58 - 138/70)  RR: 19 (06-17-23 @ 07:48) (12 - 19)  SpO2: 98% (06-17-23 @ 04:25) (92% - 98%)  Wt(kg): --  General: Comfortable in NAD  Neck:supple  CVS: distant  s1s2,rrr,  no s3, II/VI systolic murmur  Pulm: CTA b/l  Abd: soft, non-tender  Ext: No c/c/e  Neuro A&O x3  Psych: Normal affect      I&O's Summary    16 Jun 2023 07:01  -  17 Jun 2023 07:00  --------------------------------------------------------  IN: 0 mL / OUT: 2400 mL / NET: -2400 mL          Labs:   17 Jun 2023 05:52    139    |  97     |  19.5   ----------------------------<  126    4.1     |  31.0   |  0.58     Ca    8.4        17 Jun 2023 05:52  Mg     1.8       17 Jun 2023 05:52    TPro  6.0    /  Alb  2.7    /  TBili  0.4    /  DBili  x      /  AST  14     /  ALT  12     /  AlkPhos  198    17 Jun 2023 05:52                          14.0   6.71  )-----------( 261      ( 17 Jun 2023 05:52 )             45.3         [facility] 1/2023:  Coronary Angiography   - Left dominant system   - Patent prox and mid LAD stents   - Moderate stenosis of the distal LCx   - The RCA is a small, non-dominat vessel.  The proximal segment is occluded with brisk right-to-right collaterals.  - 25mm LV-Ao gradient on pullback.  CIELO 1.4cm2 with moderate AS byecho.  - Moderately reduced LVEF by echo       LM   Left main artery: Angiography shows minor irregularities.    LAD   Proximal left anterior descending: There is a 10 % stenosis within the  stented segment. Mid left anterior descending: There is a10 % stenosis within the stented segment.    CX   Distal circumflex: There is a 60 % stenosis. VALENTINO Flow 3.    RCA   Proximal right coronary artery: There is a 100 % stenosis. There is  good collateral blood supply to the distal myocardium. This  lesion is a chronic total occlusion.  This is a small non-dominant  vessel.  Ramus   Ramus intermedius: Angiography shows mild atherosclerosis.      Left Heart Cath   Ejection fraction was calculated by echo with a value of 40-45%. LV to  AO pullback was performed and there is a pressure gradient of 25 mmHg.      ECG:  SR with 1st degree AVB with RBBB/LAFB.  No ST-T wave changes.  PRWP.            RADIOLOGY:  CT A/P:  No pulmonary embolism though evaluation of subsegmental branches limited   secondary to respiratory motion artifact.    Trace bilateral pleural effusions with adjacent patchy bibasilar   opacities, likely represent dependent atelectasis. Recommend clinical   correlation to assess for superimposed infection.    Hepatomegaly. Liver surface nodularity. Hepatitis or cirrhosis considered   in differential. Correlate with clinical parameters.    Trace ascites.    Mild bladder wall thickening with suggestion of left posterolateral   diverticulum. Correlate with urinalysis andlab values to assess for   cystitis and/or ascending urinary tract infection.    Additional findings as mentioned above.    ECHO 1/2023:   1. Technically difficult study.   2. Tachycardic throughout the study and this may preclude true assessment of LVEF.   3. Left ventricular ejection fraction, by visual estimation, is 40 to 45%.   4. Moderately decreased global left ventricular systolic function.   5. The mitral in-flow pattern reveals no discernable A-wave, therefore   no comment on diastolic function can be made.   6. Mildly enlarged left atrium.   7. Normal right atrial size.   8. Mild tricuspid regurgitation.   9. Mild thickening and calcification of the anterior mitral valve leaflet.  10. Mild to moderate mitral annular calcification.  11. Trace mitral valve regurgitation.  12. Moderate aortic valve stenosis, with peak velocity of 2.87 m/s, mean   gradient of 18 mmHg, dimensionless index of 0.29, and CIELO via VTI of 1.42   sqcm.  13. Dilatation of the sinuses of Valsalva, measures 4.15 cm but based on   BSA 1.6cm/sqm is within normal limits.  14. There is a significant pericardial fat pad present.  15. There isno evidence of pericardial effusion.  16. Endocardial visualization was enhanced with intravenous echo contrast.  17. Compared to prior TTE done on 7/6/2022, the left ventricular function   is now reduced to 40-45%, in the setting of Afib wth RVR (prior LVEF   60-65%).      LIBBY 1/2023:   1. Left ventricular ejection fraction, by visual estimation, is 40 to 45%.   2. Mildly decreased global left ventricular systolic function.   3. Normal right ventricular size and function.   4. Mildly enlarged left atrium.   5. Mild mitral annular calcification.   6. Mild mitral valve regurgitation.   7. Thickening of the anterior and posterior mitral valve leaflets.   8. Mild tricuspid regurgitation.   9. Mild aortic regurgitation.  10. Mild to moderate aortic valvestenosis.  11. CIELO by planimetry 1.2cm2.  12. Aortic root measured at Sinus of Valsalva is dilated. 4.1cm  13. No left atrial appendage thrombus.  14. Color flow doppler and intravenous injection of agitated saline   demonstrates the presence of an intact intra atrial septum.    Echo (3/02711):   1. Left ventricular ejection fraction, by visual estimation, is 60 to 65%.   2. Normal right ventricular size and function.   3. There is no evidence of pericardial effusion.   4. Endocardial visualizationwas enhanced with intravenous echo contrast.    Echo personally reviewed:   1. Left ventricularejection fraction, by visual estimation, is 35-40%.   2. Moderately decreased global left ventricular systolic   function.   3. There is mild concentric left ventricular hypertrophy.   4. Mildly enlarged right ventricle.   5. Moderately reduced RV systolic function.   6. Normal left atrial size.   7. Normal right atrial size.   8. There is no evidence of pericardial effusion.   9. Peak Gradient across the Aortic Valve is 35 mm Hg. Mean gradient is 26 mm Hg. DI 0.25.  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    Calcium Score:  Cardiac:  Total Coronary Artery Calcium Score = 5373.  >90th percentile.   Significant calcification. Significant atherosclerotic burden.  Aortic calcification    Non Cardiac:  1.  Small bilateral pleural effusions and lower lobe passive atelectasis.  2.  Ascending aorta measures 4.8 cm at the main pulmonary artery.      Assessment:  76 y/o male PMHx AF on Eliquis (s/p Cardioversion on 1/31/23), moderate-severe AS, Chronic Diastolic Heart Failure, CAD s/p PCI, HTN, HLD, DM 2, Morbid Obesity, ALLIE, Thoracic Aortic Aneurysm, BPH s/p TURP, Left Renal Mass was sent to the ED from [facility] for abdominal pain x 4 days. Patient found to have UTI.  Echo performed noted significant drop in EF.  CT with hepatomegaly appearing ?significantly larger than CT from March.  ?2/2 CPC but would consider other causes such as hepatitis.  Appears somewhat fluid overloaded but not in severe acute CHF.  -Acute systolic CHF being diuresed  -Also now back in AF, rate controlled.  ?Contributing to new drop in EF.  ?2/2 stress myopathy with acute infection and new liver dysfunction.  Cath in January with  RCA, 60% distal LCX disease and unlikely to be contributing to CMP. Will need to consider repeat this admission after further diuresis   -Also with significant AS, ?severe LFLG potentially contributing but fairly similar to prior echos. Has had some fluctuation in EF and when i AF this past January had drop in EF with improvement in 3/2023. Now drop again with recurrent AF and suspect contributing factor.  On monitor some SR with 1st degree AV delay then in rapid AF , improved with increase metoprolol and adding digoxin.  now back to SR. Appreciate EP assistance   -significant hepatosplenomegaly , evaluation with GI. Will need hepatology as outpatient came in symptomatic from hepatomegaly with RUQ pain, better now  -Also with UTI, biliary colic 2/2 sludge    Plan: (TO BE SEEN)   1. AF better rate controlled, and now in SR.  EP recs appreciated, continue digoxin and metoprolol   2. Change to po daily lasix given well diuresed and PCWP 17. HAs CardioMEMS, to be followed by [facility] as outpatient   3.   4. Keep off Amio and statin, GI follow up, Planned MRCP.  5. Restart eliquis   6. Continue other current CV meds at current doses and med management of CAD   7. Antibiotics for UTI   8.   9. F/u CAC of AV to assess AS severity, AoV calcium score not calculated and will touch base with radiology         Chong Carranza MD ANIACRISTIAN PÉREZELSY  [id number]        Chief Complaint:   follow up new CMP/AF      Subjective:   Patient feeling better, less pain.  Denies SOB, palps.      24 hour Tele:   SR now back in AF, rapid at times to 120s          albuterol    0.083% 2.5 milliGRAM(s) Nebulizer every 6 hours  ALPRAZolam 0.25 milliGRAM(s) Oral three times a day PRN  aluminum hydroxide/magnesium hydroxide/simethicone Suspension 30 milliLiter(s) Oral every 4 hours PRN  aspirin  chewable 81 milliGRAM(s) Oral daily  atorvastatin 20 milliGRAM(s) Oral at bedtime  bisacodyl 5 milliGRAM(s) Oral every 12 hours PRN  chlorhexidine 2% Cloths 1 Application(s) Topical <User Schedule>  dextrose 5%. 1000 milliLiter(s) IV Continuous <Continuous>  dextrose 5%. 1000 milliLiter(s) IV Continuous <Continuous>  dextrose 50% Injectable 25 Gram(s) IV Push once  dextrose 50% Injectable 12.5 Gram(s) IV Push once  dextrose 50% Injectable 25 Gram(s) IV Push once  dextrose Oral Gel 15 Gram(s) Oral once PRN  diphenhydrAMINE Injectable 50 milliGRAM(s) IV Push once PRN  doxazosin 2 milliGRAM(s) Oral at bedtime  enoxaparin Injectable 135 milliGRAM(s) SubCutaneous every 12 hours  finasteride 5 milliGRAM(s) Oral daily  fluticasone propionate 50 MICROgram(s)/spray Nasal Spray 1 Spray(s) Both Nostrils two times a day  furosemide   Injectable 40 milliGRAM(s) IV Push two times a day  glucagon  Injectable 1 milliGRAM(s) IntraMuscular once  insulin lispro (ADMELOG) corrective regimen sliding scale   SubCutaneous three times a day before meals  insulin lispro (ADMELOG) corrective regimen sliding scale   SubCutaneous at bedtime  meclizine 12.5 milliGRAM(s) Oral three times a day PRN  melatonin 3 milliGRAM(s) Oral at bedtime PRN  metoprolol tartrate 37.5 milliGRAM(s) Oral two times a day  mupirocin 2% Nasal 1 Application(s) Both Nostrils two times a day  ondansetron Injectable 4 milliGRAM(s) IV Push every 8 hours PRN  oxyCODONE    IR 5 milliGRAM(s) Oral every 4 hours PRN  pantoprazole    Tablet 40 milliGRAM(s) Oral before breakfast  polyethylene glycol 3350 17 Gram(s) Oral daily  potassium chloride   Powder 20 milliEquivalent(s) Oral daily  senna 2 Tablet(s) Oral at bedtime  simethicone 80 milliGRAM(s) Chew every 8 hours PRN          Physical Exam:  T(C): 36.6 (06-17-23 @ 07:48), Max: 37.1 (06-17-23 @ 00:11)  HR: 88 (06-17-23 @ 07:48) (62 - 88)  BP: 100/58 (06-17-23 @ 07:48) (100/58 - 138/70)  RR: 19 (06-17-23 @ 07:48) (12 - 19)  SpO2: 98% (06-17-23 @ 04:25) (92% - 98%)  Wt(kg): --  General: Comfortable in NAD  Neck:supple  CVS: distant  s1s2, irregular  no s3, II/VI systolic murmur  Pulm: CTA b/l  Abd: soft, non-tender  Ext: trace edema  Neuro A&O x3  Psych: Normal affect      I&O's Summary    16 Jun 2023 07:01  -  17 Jun 2023 07:00  --------------------------------------------------------  IN: 0 mL / OUT: 2400 mL / NET: -2400 mL          Labs:   17 Jun 2023 05:52    139    |  97     |  19.5   ----------------------------<  126    4.1     |  31.0   |  0.58     Ca    8.4        17 Jun 2023 05:52  Mg     1.8       17 Jun 2023 05:52    TPro  6.0    /  Alb  2.7    /  TBili  0.4    /  DBili  x      /  AST  14     /  ALT  12     /  AlkPhos  198    17 Jun 2023 05:52                          14.0   6.71  )-----------( 261      ( 17 Jun 2023 05:52 )             45.3         [facility] 1/2023:  Coronary Angiography   - Left dominant system   - Patent prox and mid LAD stents   - Moderate stenosis of the distal LCx   - The RCA is a small, non-dominat vessel.  The proximal segment is occluded with brisk right-to-right collaterals.  - 25mm LV-Ao gradient on pullback.  CIELO 1.4cm2 with moderate AS byecho.  - Moderately reduced LVEF by echo       LM   Left main artery: Angiography shows minor irregularities.    LAD   Proximal left anterior descending: There is a 10 % stenosis within the  stented segment. Mid left anterior descending: There is a10 % stenosis within the stented segment.    CX   Distal circumflex: There is a 60 % stenosis. VALENTINO Flow 3.    RCA   Proximal right coronary artery: There is a 100 % stenosis. There is  good collateral blood supply to the distal myocardium. This  lesion is a chronic total occlusion.  This is a small non-dominant  vessel.  Ramus   Ramus intermedius: Angiography shows mild atherosclerosis.      Left Heart Cath   Ejection fraction was calculated by echo with a value of 40-45%. LV to  AO pullback was performed and there is a pressure gradient of 25 mmHg.      ECG:  SR with 1st degree AVB with RBBB/LAFB.  No ST-T wave changes.  PRWP.            RADIOLOGY:  CT A/P:  No pulmonary embolism though evaluation of subsegmental branches limited   secondary to respiratory motion artifact.    Trace bilateral pleural effusions with adjacent patchy bibasilar   opacities, likely represent dependent atelectasis. Recommend clinical   correlation to assess for superimposed infection.    Hepatomegaly. Liver surface nodularity. Hepatitis or cirrhosis considered   in differential. Correlate with clinical parameters.    Trace ascites.    Mild bladder wall thickening with suggestion of left posterolateral   diverticulum. Correlate with urinalysis andlab values to assess for   cystitis and/or ascending urinary tract infection.    Additional findings as mentioned above.    ECHO 1/2023:   1. Technically difficult study.   2. Tachycardic throughout the study and this may preclude true assessment of LVEF.   3. Left ventricular ejection fraction, by visual estimation, is 40 to 45%.   4. Moderately decreased global left ventricular systolic function.   5. The mitral in-flow pattern reveals no discernable A-wave, therefore   no comment on diastolic function can be made.   6. Mildly enlarged left atrium.   7. Normal right atrial size.   8. Mild tricuspid regurgitation.   9. Mild thickening and calcification of the anterior mitral valve leaflet.  10. Mild to moderate mitral annular calcification.  11. Trace mitral valve regurgitation.  12. Moderate aortic valve stenosis, with peak velocity of 2.87 m/s, mean   gradient of 18 mmHg, dimensionless index of 0.29, and CIELO via VTI of 1.42   sqcm.  13. Dilatation of the sinuses of Valsalva, measures 4.15 cm but based on   BSA 1.6cm/sqm is within normal limits.  14. There is a significant pericardial fat pad present.  15. There isno evidence of pericardial effusion.  16. Endocardial visualization was enhanced with intravenous echo contrast.  17. Compared to prior TTE done on 7/6/2022, the left ventricular function   is now reduced to 40-45%, in the setting of Afib wth RVR (prior LVEF   60-65%).      LIBBY 1/2023:   1. Left ventricular ejection fraction, by visual estimation, is 40 to 45%.   2. Mildly decreased global left ventricular systolic function.   3. Normal right ventricular size and function.   4. Mildly enlarged left atrium.   5. Mild mitral annular calcification.   6. Mild mitral valve regurgitation.   7. Thickening of the anterior and posterior mitral valve leaflets.   8. Mild tricuspid regurgitation.   9. Mild aortic regurgitation.  10. Mild to moderate aortic valvestenosis.  11. CIELO by planimetry 1.2cm2.  12. Aortic root measured at Sinus of Valsalva is dilated. 4.1cm  13. No left atrial appendage thrombus.  14. Color flow doppler and intravenous injection of agitated saline   demonstrates the presence of an intact intra atrial septum.    Echo (3/60205):   1. Left ventricular ejection fraction, by visual estimation, is 60 to 65%.   2. Normal right ventricular size and function.   3. There is no evidence of pericardial effusion.   4. Endocardial visualizationwas enhanced with intravenous echo contrast.    Echo personally reviewed:   1. Left ventricularejection fraction, by visual estimation, is 35-40%.   2. Moderately decreased global left ventricular systolic   function.   3. There is mild concentric left ventricular hypertrophy.   4. Mildly enlarged right ventricle.   5. Moderately reduced RV systolic function.   6. Normal left atrial size.   7. Normal right atrial size.   8. There is no evidence of pericardial effusion.   9. Peak Gradient across the Aortic Valve is 35 mm Hg. Mean gradient is 26 mm Hg. DI 0.25.  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    Calcium Score:  Cardiac:  Total Coronary Artery Calcium Score = 5373.  >90th percentile.   Significant calcification. Significant atherosclerotic burden.  Aortic calcification    Non Cardiac:  1.  Small bilateral pleural effusions and lower lobe passive atelectasis.  2.  Ascending aorta measures 4.8 cm at the main pulmonary artery.      Assessment:  78 y/o male PMHx AF on Eliquis (s/p Cardioversion on 1/31/23), moderate-severe AS, Chronic Diastolic Heart Failure, CAD s/p PCI, HTN, HLD, DM 2, Morbid Obesity, ALLIE, Thoracic Aortic Aneurysm, BPH s/p TURP, Left Renal Mass was sent to the ED from [facility] for abdominal pain x 4 days. Patient found to have UTI.  Echo performed noted significant drop in EF.  CT with hepatomegaly appearing ?significantly larger than CT from March.  ?2/2 CPC but would consider other causes such as hepatitis.  Appears somewhat fluid overloaded but not in severe acute CHF.  -Acute systolic CHF being diuresed  -Also now back in AF, rate controlled.  ?Contributing to new drop in EF.  ?2/2 stress myopathy with acute infection and new liver dysfunction.  Cath in January with  RCA, 60% distal LCX disease and unlikely to be contributing to CMP. Will need to consider repeat this admission after further diuresis   -Also with significant AS, ?severe LFLG potentially contributing but fairly similar to prior echos. Has had some fluctuation in EF and when i AF this past January had drop in EF with improvement in 3/2023. Now drop again with recurrent AF and suspect contributing factor.  On monitor some SR with 1st degree AV delay then in rapid AF , improved with increase metoprolol and adding digoxin.  now back to SR. Appreciate EP assistance   -significant hepatosplenomegaly , evaluation with GI. Will need hepatology as outpatient came in symptomatic from hepatomegaly with RUQ pain, better now  -Also with UTI, biliary colic 2/2 sludge    Plan:  1. Back in AF, continue metopolol and digoxin. Rates up when anxious.  EP recs appreciated, continue digoxin and metoprolol and plan for ablation monday. Eliquis over weekend and hold am monday   2. Change to po daily lasix 40mg given well diuresed and PCWP 17. HAs CardioMEMS, to be followed by [facility] as outpatient   3. Keep off Amio and statin, GI follow up, Planned MRCP.  4. Continue other current CV meds at current doses and med management of CAD   5. recommend starting low dose entresto post ablation if BP tolerates  6. F/u CAC of AV to assess AS severity, AoV calcium score not calculated and will touch base with radiology . Ultimately consider TAVR         Chong Carranza MD ANIACRISTIAN PÉREZELSY  [id number]        Chief Complaint:   follow up new CMP/AF      Subjective:   Patient feeling better, less pain.  Denies SOB, palps.      24 hour Tele:   SR now back in AF, rapid at times to 120s          albuterol    0.083% 2.5 milliGRAM(s) Nebulizer every 6 hours  ALPRAZolam 0.25 milliGRAM(s) Oral three times a day PRN  aluminum hydroxide/magnesium hydroxide/simethicone Suspension 30 milliLiter(s) Oral every 4 hours PRN  aspirin  chewable 81 milliGRAM(s) Oral daily  atorvastatin 20 milliGRAM(s) Oral at bedtime  bisacodyl 5 milliGRAM(s) Oral every 12 hours PRN  chlorhexidine 2% Cloths 1 Application(s) Topical <User Schedule>  dextrose 5%. 1000 milliLiter(s) IV Continuous <Continuous>  dextrose 5%. 1000 milliLiter(s) IV Continuous <Continuous>  dextrose 50% Injectable 25 Gram(s) IV Push once  dextrose 50% Injectable 12.5 Gram(s) IV Push once  dextrose 50% Injectable 25 Gram(s) IV Push once  dextrose Oral Gel 15 Gram(s) Oral once PRN  diphenhydrAMINE Injectable 50 milliGRAM(s) IV Push once PRN  doxazosin 2 milliGRAM(s) Oral at bedtime  enoxaparin Injectable 135 milliGRAM(s) SubCutaneous every 12 hours  finasteride 5 milliGRAM(s) Oral daily  fluticasone propionate 50 MICROgram(s)/spray Nasal Spray 1 Spray(s) Both Nostrils two times a day  furosemide   Injectable 40 milliGRAM(s) IV Push two times a day  glucagon  Injectable 1 milliGRAM(s) IntraMuscular once  insulin lispro (ADMELOG) corrective regimen sliding scale   SubCutaneous three times a day before meals  insulin lispro (ADMELOG) corrective regimen sliding scale   SubCutaneous at bedtime  meclizine 12.5 milliGRAM(s) Oral three times a day PRN  melatonin 3 milliGRAM(s) Oral at bedtime PRN  metoprolol tartrate 37.5 milliGRAM(s) Oral two times a day  mupirocin 2% Nasal 1 Application(s) Both Nostrils two times a day  ondansetron Injectable 4 milliGRAM(s) IV Push every 8 hours PRN  oxyCODONE    IR 5 milliGRAM(s) Oral every 4 hours PRN  pantoprazole    Tablet 40 milliGRAM(s) Oral before breakfast  polyethylene glycol 3350 17 Gram(s) Oral daily  potassium chloride   Powder 20 milliEquivalent(s) Oral daily  senna 2 Tablet(s) Oral at bedtime  simethicone 80 milliGRAM(s) Chew every 8 hours PRN          Physical Exam:  T(C): 36.6 (06-17-23 @ 07:48), Max: 37.1 (06-17-23 @ 00:11)  HR: 88 (06-17-23 @ 07:48) (62 - 88)  BP: 100/58 (06-17-23 @ 07:48) (100/58 - 138/70)  RR: 19 (06-17-23 @ 07:48) (12 - 19)  SpO2: 98% (06-17-23 @ 04:25) (92% - 98%)  Wt(kg): --  General: Comfortable in NAD  Neck:supple  CVS: distant  s1s2, irregular  no s3, II/VI systolic murmur  Pulm: CTA b/l  Abd: soft, non-tender  Ext: trace edema  Neuro A&O x3  Psych: Normal affect      I&O's Summary    16 Jun 2023 07:01  -  17 Jun 2023 07:00  --------------------------------------------------------  IN: 0 mL / OUT: 2400 mL / NET: -2400 mL          Labs:   17 Jun 2023 05:52    139    |  97     |  19.5   ----------------------------<  126    4.1     |  31.0   |  0.58     Ca    8.4        17 Jun 2023 05:52  Mg     1.8       17 Jun 2023 05:52    TPro  6.0    /  Alb  2.7    /  TBili  0.4    /  DBili  x      /  AST  14     /  ALT  12     /  AlkPhos  198    17 Jun 2023 05:52                          14.0   6.71  )-----------( 261      ( 17 Jun 2023 05:52 )             45.3         [facility] 1/2023:  Coronary Angiography   - Left dominant system   - Patent prox and mid LAD stents   - Moderate stenosis of the distal LCx   - The RCA is a small, non-dominat vessel.  The proximal segment is occluded with brisk right-to-right collaterals.  - 25mm LV-Ao gradient on pullback.  CIELO 1.4cm2 with moderate AS byecho.  - Moderately reduced LVEF by echo       LM   Left main artery: Angiography shows minor irregularities.    LAD   Proximal left anterior descending: There is a 10 % stenosis within the  stented segment. Mid left anterior descending: There is a10 % stenosis within the stented segment.    CX   Distal circumflex: There is a 60 % stenosis. VALENTINO Flow 3.    RCA   Proximal right coronary artery: There is a 100 % stenosis. There is  good collateral blood supply to the distal myocardium. This  lesion is a chronic total occlusion.  This is a small non-dominant  vessel.  Ramus   Ramus intermedius: Angiography shows mild atherosclerosis.      Left Heart Cath   Ejection fraction was calculated by echo with a value of 40-45%. LV to  AO pullback was performed and there is a pressure gradient of 25 mmHg.      ECG:  SR with 1st degree AVB with RBBB/LAFB.  No ST-T wave changes.  PRWP.            RADIOLOGY:  CT A/P:  No pulmonary embolism though evaluation of subsegmental branches limited   secondary to respiratory motion artifact.    Trace bilateral pleural effusions with adjacent patchy bibasilar   opacities, likely represent dependent atelectasis. Recommend clinical   correlation to assess for superimposed infection.    Hepatomegaly. Liver surface nodularity. Hepatitis or cirrhosis considered   in differential. Correlate with clinical parameters.    Trace ascites.    Mild bladder wall thickening with suggestion of left posterolateral   diverticulum. Correlate with urinalysis andlab values to assess for   cystitis and/or ascending urinary tract infection.    Additional findings as mentioned above.    ECHO 1/2023:   1. Technically difficult study.   2. Tachycardic throughout the study and this may preclude true assessment of LVEF.   3. Left ventricular ejection fraction, by visual estimation, is 40 to 45%.   4. Moderately decreased global left ventricular systolic function.   5. The mitral in-flow pattern reveals no discernable A-wave, therefore   no comment on diastolic function can be made.   6. Mildly enlarged left atrium.   7. Normal right atrial size.   8. Mild tricuspid regurgitation.   9. Mild thickening and calcification of the anterior mitral valve leaflet.  10. Mild to moderate mitral annular calcification.  11. Trace mitral valve regurgitation.  12. Moderate aortic valve stenosis, with peak velocity of 2.87 m/s, mean   gradient of 18 mmHg, dimensionless index of 0.29, and CIELO via VTI of 1.42   sqcm.  13. Dilatation of the sinuses of Valsalva, measures 4.15 cm but based on   BSA 1.6cm/sqm is within normal limits.  14. There is a significant pericardial fat pad present.  15. There isno evidence of pericardial effusion.  16. Endocardial visualization was enhanced with intravenous echo contrast.  17. Compared to prior TTE done on 7/6/2022, the left ventricular function   is now reduced to 40-45%, in the setting of Afib wth RVR (prior LVEF   60-65%).      LIBBY 1/2023:   1. Left ventricular ejection fraction, by visual estimation, is 40 to 45%.   2. Mildly decreased global left ventricular systolic function.   3. Normal right ventricular size and function.   4. Mildly enlarged left atrium.   5. Mild mitral annular calcification.   6. Mild mitral valve regurgitation.   7. Thickening of the anterior and posterior mitral valve leaflets.   8. Mild tricuspid regurgitation.   9. Mild aortic regurgitation.  10. Mild to moderate aortic valvestenosis.  11. CIELO by planimetry 1.2cm2.  12. Aortic root measured at Sinus of Valsalva is dilated. 4.1cm  13. No left atrial appendage thrombus.  14. Color flow doppler and intravenous injection of agitated saline   demonstrates the presence of an intact intra atrial septum.    Echo (3/39469):   1. Left ventricular ejection fraction, by visual estimation, is 60 to 65%.   2. Normal right ventricular size and function.   3. There is no evidence of pericardial effusion.   4. Endocardial visualizationwas enhanced with intravenous echo contrast.    Echo personally reviewed:   1. Left ventricularejection fraction, by visual estimation, is 35-40%.   2. Moderately decreased global left ventricular systolic   function.   3. There is mild concentric left ventricular hypertrophy.   4. Mildly enlarged right ventricle.   5. Moderately reduced RV systolic function.   6. Normal left atrial size.   7. Normal right atrial size.   8. There is no evidence of pericardial effusion.   9. Peak Gradient across the Aortic Valve is 35 mm Hg. Mean gradient is 26 mm Hg. DI 0.25.  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    Calcium Score:  Cardiac:  Total Coronary Artery Calcium Score = 5373.  >90th percentile.   Significant calcification. Significant atherosclerotic burden.  Aortic calcification    Non Cardiac:  1.  Small bilateral pleural effusions and lower lobe passive atelectasis.  2.  Ascending aorta measures 4.8 cm at the main pulmonary artery.      Assessment:  78 y/o male PMHx AF on Eliquis (s/p Cardioversion on 1/31/23), moderate-severe AS, Chronic Diastolic Heart Failure, CAD s/p PCI, HTN, HLD, DM 2, Morbid Obesity, ALLIE, Thoracic Aortic Aneurysm, BPH s/p TURP, Left Renal Mass was sent to the ED from [facility] for abdominal pain x 4 days. Patient found to have UTI.  Echo performed noted significant drop in EF.  CT with hepatomegaly appearing ?significantly larger than CT from March.  ?2/2 CPC but would consider other causes such as hepatitis.  Appears somewhat fluid overloaded but not in severe acute CHF.  -Acute systolic CHF being diuresed  -Also now back in AF, rate controlled.  ?Contributing to new drop in EF.  ?2/2 stress myopathy with acute infection and new liver dysfunction.  Cath in January with  RCA, 60% distal LCX disease and unlikely to be contributing to CMP. Will need to consider repeat this admission after further diuresis   -Also with significant AS, ?severe LFLG potentially contributing but fairly similar to prior echos. Has had some fluctuation in EF and when i AF this past January had drop in EF with improvement in 3/2023. Now drop again with recurrent AF and suspect contributing factor.  On monitor some SR with 1st degree AV delay then in rapid AF , improved with increase metoprolol and adding digoxin.  now back to SR. Appreciate EP assistance   -significant hepatosplenomegaly , evaluation with GI. Will need hepatology as outpatient came in symptomatic from hepatomegaly with RUQ pain, better now  -Also with UTI, biliary colic 2/2 sludge    Plan:  1. Back in AF, continue metopolol and digoxin. Rates up when anxious.  EP recs appreciated plan for ablation monday. Eliquis over weekend and hold am monday . Was on digoxin but stopped when converted to SR, will restart and use until ablation done.   2. Change to po daily lasix 40mg given well diuresed and PCWP 17. HAs CardioMEMS, to be followed by [facility] as outpatient   3. Keep off Amio and statin, GI follow up, Planned MRCP.  4. Continue other current CV meds at current doses and med management of CAD   5. recommend starting low dose entresto post ablation if BP tolerates  6. F/u CAC of AV to assess AS severity, AoV calcium score not calculated and will touch base with radiology . Ultimately consider TAVR         Chong Carranza MD

## 2023-06-17 NOTE — PROGRESS NOTE ADULT - SUBJECTIVE AND OBJECTIVE BOX
EP follow up: Atrial fibrillation     Pt anxious about medical conditions and treatment plans "It's all to much"  He currently denied CP, palp, SOB at rest, N/V, diarrhea  TELE: AF/AFL 120bpm    MEDICATIONS  (STANDING):  albuterol    0.083% 2.5 milliGRAM(s) Nebulizer every 6 hours  apixaban 5 milliGRAM(s) Oral every 12 hours  aspirin  chewable 81 milliGRAM(s) Oral daily  atorvastatin 20 milliGRAM(s) Oral at bedtime  chlorhexidine 2% Cloths 1 Application(s) Topical <User Schedule>  dextrose 5%. 1000 milliLiter(s) (100 mL/Hr) IV Continuous <Continuous>  dextrose 5%. 1000 milliLiter(s) (50 mL/Hr) IV Continuous <Continuous>  dextrose 50% Injectable 25 Gram(s) IV Push once  dextrose 50% Injectable 12.5 Gram(s) IV Push once  dextrose 50% Injectable 25 Gram(s) IV Push once  digoxin     Tablet 125 MICROGram(s) Oral daily  doxazosin 2 milliGRAM(s) Oral at bedtime  finasteride 5 milliGRAM(s) Oral daily  fluticasone propionate 50 MICROgram(s)/spray Nasal Spray 1 Spray(s) Both Nostrils two times a day  glucagon  Injectable 1 milliGRAM(s) IntraMuscular once  insulin lispro (ADMELOG) corrective regimen sliding scale   SubCutaneous three times a day before meals  insulin lispro (ADMELOG) corrective regimen sliding scale   SubCutaneous at bedtime  magnesium sulfate  IVPB 2 Gram(s) IV Intermittent once  metoprolol tartrate 25 milliGRAM(s) Oral every 6 hours  metoprolol tartrate Injectable 5 milliGRAM(s) IV Push once  mupirocin 2% Nasal 1 Application(s) Both Nostrils two times a day  pantoprazole    Tablet 40 milliGRAM(s) Oral before breakfast  polyethylene glycol 3350 17 Gram(s) Oral daily  potassium chloride   Powder 20 milliEquivalent(s) Oral daily  senna 2 Tablet(s) Oral at bedtime    MEDICATIONS  (PRN):  ALPRAZolam 0.5 milliGRAM(s) Oral three times a day PRN anxiety  aluminum hydroxide/magnesium hydroxide/simethicone Suspension 30 milliLiter(s) Oral every 4 hours PRN Dyspepsia  bisacodyl 5 milliGRAM(s) Oral every 12 hours PRN Constipation  dextrose Oral Gel 15 Gram(s) Oral once PRN Blood Glucose LESS THAN 70 milliGRAM(s)/deciliter  diphenhydrAMINE Injectable 50 milliGRAM(s) IV Push once PRN Allergic Reaction  meclizine 12.5 milliGRAM(s) Oral three times a day PRN Dizziness  melatonin 3 milliGRAM(s) Oral at bedtime PRN Insomnia  ondansetron Injectable 4 milliGRAM(s) IV Push every 8 hours PRN Nausea and/or Vomiting  oxyCODONE    IR 5 milliGRAM(s) Oral every 4 hours PRN Severe Pain (7 - 10)  simethicone 80 milliGRAM(s) Chew every 8 hours PRN Gas      Vital Signs Last 24 Hrs  T(C): 36.6 (17 Jun 2023 07:48), Max: 37.1 (17 Jun 2023 00:11)  T(F): 97.9 (17 Jun 2023 07:48), Max: 98.7 (17 Jun 2023 00:11)  HR: 117 (17 Jun 2023 12:00) (63 - 130)  BP: 103/66 (17 Jun 2023 12:00) (100/58 - 134/68)  RR: 19 (17 Jun 2023 12:00) (12 - 20)  SpO2: 96% (17 Jun 2023 12:00) (92% - 98%)    Parameters below as of 17 Jun 2023 12:00  Patient On (Oxygen Delivery Method): nasal cannula  O2 Flow (L/min): 3    Physical Exam:  Constitutional: NAD, AAOx3, obese   Cardiovascular: +S1S2 tachycardic, regular   Pulmonary: CTA b/l, unlabored  GI: soft NTND +BS      LABS:                        14.0   6.71  )-----------( 261      ( 17 Jun 2023 05:52 )             45.3     06-17    139  |  97  |  19.5  ----------------------------<  126<H>  4.1   |  31.0<H>  |  0.58    Ca    8.4      17 Jun 2023 05:52  Mg     1.8     06-17    TPro  6.0<L>  /  Alb  2.7<L>  /  TBili  0.4  /  DBili  x   /  AST  14  /  ALT  12  /  AlkPhos  198<H>  06-17      RADIOLOGY & ADDITIONAL STUDIES:  < from: TTE Echo Limited or F/U (06.11.23 @ 11:44) >  Summary:   1. Left ventricularejection fraction, by visual estimation, is 25 to 30%.   2. Moderately to severely decreased global left ventricular systolic function.   3. There is mild concentric left ventricular hypertrophy.   4. Mildly enlarged right ventricle.   5. Moderately reduced RV systolic function.   6. Normal left atrial size.   7. Normal right atrial size.   8. There is no evidence of pericardial effusion.   9. Peak Gradient across the Aortic Valve is 35 mm Hg      Mean gradient is 26 mm Hg      DI 0.25      These findings are suggestive of LFLG Severe Aortic Stenosis.  10. Recommendations: Consider Dobutamine Stress echo to differentiate   True Severe AS from Pseudo AS or CT Scan for Aortic Valve Calcium Scoring.  11. Compared to prior Echo in 3/2023, lowEF is a new finding.  12. Endocardial visualization was enhanced with intravenous echo contrast.  MD Priya Electronically signed on 6/11/2023 at 5:17:26 PM    [facility] 1/2023:  Coronary Angiography   - Left dominant system   - Patent prox and mid LAD stents   - Moderate stenosis of the distal LCx   - The RCA is a small, non-dominat vessel.  The proximal segment is occluded with brisk right-to-right collaterals.  - 25mm LV-Ao gradient on pullback.  CIELO 1.4cm2 with moderate AS byecho.  - Moderately reduced LVEF by echo     < from: CT Abdomen and Pelvis w/ IV Cont (06.10.23 @ 02:29) >  IMPRESSION:    No pulmonary embolism though evaluation of subsegmental branches limited   secondary to respiratory motion artifact.    Trace bilateral pleural effusions with adjacent patchy bibasilar   opacities, likely represent dependent atelectasis. Recommend clinical   correlation to assess for superimposed infection.    Hepatomegaly. Liver surface nodularity. Hepatitis or cirrhosis considered   in differential. Correlate with clinical parameters.    Trace ascites.    Mild bladder wall thickening with suggestion of left posterolateral   diverticulum. Correlate with urinalysis andlab values to assess for   cystitis and/or ascending urinary tract infection.        A/P: 77 year old male with HTN, hyperlipidemia, DM type 2, obesity BMI 38, thoracic aortic aneurysm, ALLIE, BPH s/p TURP, left renal mass, bifascicular block (RBBB + LAFB), newly diagnosed HFrEF (LVEF 25%, though previously had AF-mediated CM which improved after CV), known moderate AS >worse (?severe) on TTE 6/11/23 (cath 1/2023 - 25mm LV-Ao gradient on pullback.  CIELO 1.4cm2), and persistent AF/AFL. He presented with severe abdominal pain found to have hepatomegaly with early cirrhosis. Noted to be in AF/AFL with RVR and EP consulted. Pt self converted to sinus rhythm on 6/15/23 but unfortunately is now back in AF/AFL at 120bpm, but remains asymptomatic and HD stable. Rhythm control options with AAD are very limited given baseline wide QRS, and hepatic dysfunction. Plan for inpatient AF/AFL ablation Monday 6/19/23.    1.  pAF/AFL w/ RVR   - back in atrial flutter (previously self converted to SR 6/15/23)  - lopressor 5mg IV x 1  - increase lopressor 25mg op q6hrs as BP tolerates  - avoid amiodarone   - plan for inpt AF/AFL ablation with Dr Amato Monday 6/19/23. NPO after midnight Sunday night, am labs and ECG  - Hold Eliquis Monday 6/19/23 am for AF ablation     2. HFrEF & moderate to severe AS  - continued plan and work up per primary medical team LICMA  - newly reduced EF 25% likely AF/tachycardia mediated   - [facility] 1/2023:         - Left dominant system          - Patent prox and mid LAD stents          - Moderate stenosis of the distal LCx          - The RCA is a small, non-dominat vessel.  The proximal segment is occluded with brisk right-to-right collaterals.         - 25mm LV-Ao gradient on pullback.  CIELO 1.4cm2 with moderate AS byecho.  - diuresis as per cardiology  - GDMT: lasix, metoprolol, will need ACEI/ARB/ARNI as BP and creat tolerate  - s/p cardiomems 6/16/23    d/w Dr Amato, full recommendations to follow                 .

## 2023-06-17 NOTE — PROGRESS NOTE ADULT - SUBJECTIVE AND OBJECTIVE BOX
ANIA CRISTOBAL  ----------------------------------------  The patient was seen at bedside. Patient with atrial fibrillation and heart failure. Reported feeling anxious.    Vital Signs Last 24 Hrs  T(C): 36.6 (17 Jun 2023 07:48), Max: 37.1 (17 Jun 2023 00:11)  T(F): 97.9 (17 Jun 2023 07:48), Max: 98.7 (17 Jun 2023 00:11)  HR: 117 (17 Jun 2023 12:00) (62 - 130)  BP: 103/66 (17 Jun 2023 12:00) (100/58 - 138/70)  BP(mean): --  RR: 19 (17 Jun 2023 12:00) (12 - 20)  SpO2: 96% (17 Jun 2023 12:00) (92% - 98%)    Parameters below as of 17 Jun 2023 12:00  Patient On (Oxygen Delivery Method): nasal cannula  O2 Flow (L/min): 3    CAPILLARY BLOOD GLUCOSE  POCT Blood Glucose.: 149 mg/dL (17 Jun 2023 12:07)  POCT Blood Glucose.: 121 mg/dL (17 Jun 2023 08:10)  POCT Blood Glucose.: 121 mg/dL (16 Jun 2023 21:41)  POCT Blood Glucose.: 108 mg/dL (16 Jun 2023 17:26)    PHYSICAL EXAMINATION:  ----------------------------------------  General appearance: No acute distress, Awake, Alert  HEENT: Normocephalic, Atraumatic, Conjunctiva clear, EOMI  Neck: Supple, No JVD, No tenderness  Lungs: Breath sound equal bilaterally, No wheezes, No rales  Cardiovascular: S1S2, Irregular rhythm  Abdomen: Soft, Nontender, Nondistended, No guarding/rebound, Positive bowel sounds  Extremities: No clubbing, No cyanosis, No edema, No calf tenderness  Neuro: Strength equal bilaterally, No tremors  Psychiatric: Normal affect, Anxious    LABORATORY STUDIES:  ----------------------------------------             14.0   6.71  )-----------( 261      ( 17 Jun 2023 05:52 )             45.3     06-17    139  |  97  |  19.5  ----------------------------<  126<H>  4.1   |  31.0<H>  |  0.58    Ca    8.4      17 Jun 2023 05:52  Mg     1.8     06-17    TPro  6.0<L>  /  Alb  2.7<L>  /  TBili  0.4  /  DBili  x   /  AST  14  /  ALT  12  /  AlkPhos  198<H>  06-17    LIVER FUNCTIONS - ( 17 Jun 2023 05:52 )  Alb: 2.7 g/dL / Pro: 6.0 g/dL / ALK PHOS: 198 U/L / ALT: 12 U/L / AST: 14 U/L / GGT: x           06-16-23 @ 07:01  -  06-17-23 @ 07:00  --------------------------------------------------------  IN: 0 mL / OUT: 2400 mL / NET: -2400 mL    MEDICATIONS  (STANDING):  albuterol    0.083% 2.5 milliGRAM(s) Nebulizer every 6 hours  apixaban 5 milliGRAM(s) Oral every 12 hours  aspirin  chewable 81 milliGRAM(s) Oral daily  atorvastatin 20 milliGRAM(s) Oral at bedtime  chlorhexidine 2% Cloths 1 Application(s) Topical <User Schedule>  dextrose 5%. 1000 milliLiter(s) (100 mL/Hr) IV Continuous <Continuous>  dextrose 5%. 1000 milliLiter(s) (50 mL/Hr) IV Continuous <Continuous>  dextrose 50% Injectable 25 Gram(s) IV Push once  dextrose 50% Injectable 12.5 Gram(s) IV Push once  dextrose 50% Injectable 25 Gram(s) IV Push once  digoxin     Tablet 125 MICROGram(s) Oral daily  doxazosin 2 milliGRAM(s) Oral at bedtime  finasteride 5 milliGRAM(s) Oral daily  fluticasone propionate 50 MICROgram(s)/spray Nasal Spray 1 Spray(s) Both Nostrils two times a day  glucagon  Injectable 1 milliGRAM(s) IntraMuscular once  insulin lispro (ADMELOG) corrective regimen sliding scale   SubCutaneous three times a day before meals  insulin lispro (ADMELOG) corrective regimen sliding scale   SubCutaneous at bedtime  metoprolol tartrate 37.5 milliGRAM(s) Oral two times a day  mupirocin 2% Nasal 1 Application(s) Both Nostrils two times a day  pantoprazole    Tablet 40 milliGRAM(s) Oral before breakfast  polyethylene glycol 3350 17 Gram(s) Oral daily  potassium chloride   Powder 20 milliEquivalent(s) Oral daily  senna 2 Tablet(s) Oral at bedtime    MEDICATIONS  (PRN):  ALPRAZolam 0.25 milliGRAM(s) Oral three times a day PRN Anxiety  aluminum hydroxide/magnesium hydroxide/simethicone Suspension 30 milliLiter(s) Oral every 4 hours PRN Dyspepsia  bisacodyl 5 milliGRAM(s) Oral every 12 hours PRN Constipation  dextrose Oral Gel 15 Gram(s) Oral once PRN Blood Glucose LESS THAN 70 milliGRAM(s)/deciliter  diphenhydrAMINE Injectable 50 milliGRAM(s) IV Push once PRN Allergic Reaction  meclizine 12.5 milliGRAM(s) Oral three times a day PRN Dizziness  melatonin 3 milliGRAM(s) Oral at bedtime PRN Insomnia  ondansetron Injectable 4 milliGRAM(s) IV Push every 8 hours PRN Nausea and/or Vomiting  oxyCODONE    IR 5 milliGRAM(s) Oral every 4 hours PRN Severe Pain (7 - 10)  simethicone 80 milliGRAM(s) Chew every 8 hours PRN Gas      ASSESSMENT / PLAN:  ----------------------------------------  76 y/o male with PMH of afib on Eliquis (s/p Cardioversion on 1/31/23), Moderate AS, Chronic Diastolic Heart Failure, CAD s/p PCI, HTN, HLD, DM 2, Morbid Obesity, ALLIE, Thoracic Aortic Aneurysm, BPH s/p TURP, Left Renal Mass was sent to the ED from [facility] for abdominal pain x 4 days. Patient reported pain on the right side of the abdomen (upper and lower), 10/10, non-radiating. Patient reported feeling congested, noted cough but not productive.   CT C/A/P: No PE; Trace bilateral pleural effusions with adjacent patchy bibasilar opacities, likely represent dependent atelectasis vs infection. Hepatomegaly. Liver surface nodularity. Hepatitis or cirrhosis considered in differential. Trace ascites. Mild bladder wall thickening with suggestion of left posterolateral diverticulum.  Patient had a tte showing reduced ef and is npo for rhc and cardiomems today    Acute systolic heart failure - Continue on metoprolol. Transitioned to oral furosemide. Monitor urine output. Status post right heart catheterization and CardioMEMS.    Atrial fibrillation - On digoxin and metoprolol. Planned for ablation on 6/19/23.    Anxiety - On alprazolam as needed.    Benign prostatic hyperplasia - On finasteride and doxazosin.    Diabetes - Insulin coverage, close monitoring of blood glucose levels.    Hypertension / Coronary artery disease - Close blood pressure monitoring. On aspirin and atorvastatin.    Hepatomegaly - Sludge noted on ultrasound. MRI results to be reviewed when available. Diet as tolerated.    Obstructive sleep apnea - Not on nocturnal CPAP at home. Monitoring oxygen levels.

## 2023-06-17 NOTE — PROGRESS NOTE ADULT - NS ATTEND AMEND GEN_ALL_CORE FT
Continue rate control with metoprolol. Prefer to discontinue digoxin. If patient able to follow as outpatient would arrange an ablation procedure. He is not a good candidate for antiarrhythmics. Will sign off. Please call with questions.
In and out of AFib/flutter. Poor candidate for antiarrhythmics. Additionally, has severe LV dysfunction which makes rhythm control doubly important. Recommend inpatient ablation prior to discharge. Will plan for Monday next week.
Continue rate control medications as above. Not a good candidate for antiarrhythmics. Would consider inpatient AF ablation if there is lab availability given significant issues with outpatient follow up.
Patient see and examined with PA   patient with constant right sided pain . Worse with movement- musculoskeletal    A+- +BS, soft, + reproducible pain on palpation of the right lower rib  nodular liver on Ct- may have mild cirrhosis ( possibly DRAKE). Would avoid amioderone if possible   Can have further assessment of liver fibrosis with fibroscan at the hepatology office    I have made amendments to the above assessment and plan

## 2023-06-18 ENCOUNTER — TRANSCRIPTION ENCOUNTER (OUTPATIENT)
Age: 78
End: 2023-06-18

## 2023-06-18 LAB
ANION GAP SERPL CALC-SCNC: 8 MMOL/L — SIGNIFICANT CHANGE UP (ref 5–17)
BUN SERPL-MCNC: 23.2 MG/DL — HIGH (ref 8–20)
CALCIUM SERPL-MCNC: 8.2 MG/DL — LOW (ref 8.4–10.5)
CHLORIDE SERPL-SCNC: 99 MMOL/L — SIGNIFICANT CHANGE UP (ref 96–108)
CO2 SERPL-SCNC: 32 MMOL/L — HIGH (ref 22–29)
CREAT SERPL-MCNC: 0.63 MG/DL — SIGNIFICANT CHANGE UP (ref 0.5–1.3)
EGFR: 98 ML/MIN/1.73M2 — SIGNIFICANT CHANGE UP
GLUCOSE BLDC GLUCOMTR-MCNC: 145 MG/DL — HIGH (ref 70–99)
GLUCOSE BLDC GLUCOMTR-MCNC: 152 MG/DL — HIGH (ref 70–99)
GLUCOSE BLDC GLUCOMTR-MCNC: 163 MG/DL — HIGH (ref 70–99)
GLUCOSE BLDC GLUCOMTR-MCNC: 197 MG/DL — HIGH (ref 70–99)
GLUCOSE SERPL-MCNC: 168 MG/DL — HIGH (ref 70–99)
POTASSIUM SERPL-MCNC: 3.6 MMOL/L — SIGNIFICANT CHANGE UP (ref 3.5–5.3)
POTASSIUM SERPL-SCNC: 3.6 MMOL/L — SIGNIFICANT CHANGE UP (ref 3.5–5.3)
SODIUM SERPL-SCNC: 139 MMOL/L — SIGNIFICANT CHANGE UP (ref 135–145)

## 2023-06-18 PROCEDURE — 99233 SBSQ HOSP IP/OBS HIGH 50: CPT

## 2023-06-18 RX ORDER — OXYCODONE HYDROCHLORIDE 5 MG/1
5 TABLET ORAL EVERY 4 HOURS
Refills: 0 | Status: DISCONTINUED | OUTPATIENT
Start: 2023-06-18 | End: 2023-06-20

## 2023-06-18 RX ORDER — TRAMADOL HYDROCHLORIDE 50 MG/1
25 TABLET ORAL ONCE
Refills: 0 | Status: DISCONTINUED | OUTPATIENT
Start: 2023-06-18 | End: 2023-06-18

## 2023-06-18 RX ADMIN — Medication 25 MILLIGRAM(S): at 05:57

## 2023-06-18 RX ADMIN — Medication 25 MILLIGRAM(S): at 11:31

## 2023-06-18 RX ADMIN — PANTOPRAZOLE SODIUM 40 MILLIGRAM(S): 20 TABLET, DELAYED RELEASE ORAL at 05:53

## 2023-06-18 RX ADMIN — ALBUTEROL 2.5 MILLIGRAM(S): 90 AEROSOL, METERED ORAL at 15:07

## 2023-06-18 RX ADMIN — ALBUTEROL 2.5 MILLIGRAM(S): 90 AEROSOL, METERED ORAL at 19:52

## 2023-06-18 RX ADMIN — Medication 1: at 12:56

## 2023-06-18 RX ADMIN — Medication 2 MILLIGRAM(S): at 21:17

## 2023-06-18 RX ADMIN — APIXABAN 5 MILLIGRAM(S): 2.5 TABLET, FILM COATED ORAL at 09:24

## 2023-06-18 RX ADMIN — Medication 1: at 18:26

## 2023-06-18 RX ADMIN — Medication 20 MILLIEQUIVALENT(S): at 11:31

## 2023-06-18 RX ADMIN — Medication 0.5 MILLIGRAM(S): at 05:56

## 2023-06-18 RX ADMIN — TRAMADOL HYDROCHLORIDE 25 MILLIGRAM(S): 50 TABLET ORAL at 04:16

## 2023-06-18 RX ADMIN — ATORVASTATIN CALCIUM 20 MILLIGRAM(S): 80 TABLET, FILM COATED ORAL at 21:17

## 2023-06-18 RX ADMIN — Medication 81 MILLIGRAM(S): at 11:31

## 2023-06-18 RX ADMIN — FINASTERIDE 5 MILLIGRAM(S): 5 TABLET, FILM COATED ORAL at 11:35

## 2023-06-18 RX ADMIN — ALBUTEROL 2.5 MILLIGRAM(S): 90 AEROSOL, METERED ORAL at 09:42

## 2023-06-18 RX ADMIN — OXYCODONE HYDROCHLORIDE 5 MILLIGRAM(S): 5 TABLET ORAL at 21:17

## 2023-06-18 RX ADMIN — Medication 1 SPRAY(S): at 05:52

## 2023-06-18 RX ADMIN — CHLORHEXIDINE GLUCONATE 1 APPLICATION(S): 213 SOLUTION TOPICAL at 05:53

## 2023-06-18 RX ADMIN — Medication 25 MILLIGRAM(S): at 18:26

## 2023-06-18 RX ADMIN — POLYETHYLENE GLYCOL 3350 17 GRAM(S): 17 POWDER, FOR SOLUTION ORAL at 11:31

## 2023-06-18 RX ADMIN — Medication 125 MICROGRAM(S): at 05:53

## 2023-06-18 RX ADMIN — MUPIROCIN 1 APPLICATION(S): 20 OINTMENT TOPICAL at 05:52

## 2023-06-18 RX ADMIN — Medication 1 SPRAY(S): at 18:26

## 2023-06-18 RX ADMIN — Medication 40 MILLIGRAM(S): at 05:53

## 2023-06-18 RX ADMIN — TRAMADOL HYDROCHLORIDE 25 MILLIGRAM(S): 50 TABLET ORAL at 03:16

## 2023-06-18 RX ADMIN — OXYCODONE HYDROCHLORIDE 5 MILLIGRAM(S): 5 TABLET ORAL at 22:15

## 2023-06-18 NOTE — DISCHARGE NOTE NURSING/CASE MANAGEMENT/SOCIAL WORK - NSPROEXTENSIONSOFSELF_GEN_A_NUR
MDI Discharge Education Provided by Respiratory Therapy    Proper Inhaler Use:  · Remove the caps from the inhaler and spacer  · Attach the spacer to the inhaler, Shake inhaler well  · Put the opening of the spacer in mouth, close lips around it making tig eyeglasses

## 2023-06-18 NOTE — PROGRESS NOTE ADULT - SUBJECTIVE AND OBJECTIVE BOX
ANIA CRISTOBAL  [id number]      Chief Complaint:   follow up new CMP/AF      Subjective:   anxious      24 hour Tele:    AF, rapid at times to 120s          albuterol    0.083% 2.5 milliGRAM(s) Nebulizer every 6 hours  ALPRAZolam 0.5 milliGRAM(s) Oral three times a day PRN  aluminum hydroxide/magnesium hydroxide/simethicone Suspension 30 milliLiter(s) Oral every 4 hours PRN  apixaban 5 milliGRAM(s) Oral every 12 hours  aspirin  chewable 81 milliGRAM(s) Oral daily  atorvastatin 20 milliGRAM(s) Oral at bedtime  bisacodyl 5 milliGRAM(s) Oral every 12 hours PRN  chlorhexidine 2% Cloths 1 Application(s) Topical <User Schedule>  dextrose 5%. 1000 milliLiter(s) IV Continuous <Continuous>  dextrose 5%. 1000 milliLiter(s) IV Continuous <Continuous>  dextrose 50% Injectable 25 Gram(s) IV Push once  dextrose 50% Injectable 12.5 Gram(s) IV Push once  dextrose 50% Injectable 25 Gram(s) IV Push once  dextrose Oral Gel 15 Gram(s) Oral once PRN  digoxin     Tablet 125 MICROGram(s) Oral daily  diphenhydrAMINE Injectable 50 milliGRAM(s) IV Push once PRN  doxazosin 2 milliGRAM(s) Oral at bedtime  finasteride 5 milliGRAM(s) Oral daily  fluticasone propionate 50 MICROgram(s)/spray Nasal Spray 1 Spray(s) Both Nostrils two times a day  furosemide    Tablet 40 milliGRAM(s) Oral daily  glucagon  Injectable 1 milliGRAM(s) IntraMuscular once  insulin lispro (ADMELOG) corrective regimen sliding scale   SubCutaneous three times a day before meals  insulin lispro (ADMELOG) corrective regimen sliding scale   SubCutaneous at bedtime  meclizine 12.5 milliGRAM(s) Oral three times a day PRN  melatonin 3 milliGRAM(s) Oral at bedtime PRN  metoprolol tartrate 25 milliGRAM(s) Oral every 6 hours  ondansetron Injectable 4 milliGRAM(s) IV Push every 8 hours PRN  pantoprazole    Tablet 40 milliGRAM(s) Oral before breakfast  polyethylene glycol 3350 17 Gram(s) Oral daily  potassium chloride   Powder 20 milliEquivalent(s) Oral daily  senna 2 Tablet(s) Oral at bedtime  simethicone 80 milliGRAM(s) Chew every 8 hours PRN          Physical Exam:  T(C): 36.9 (06-18-23 @ 08:00), Max: 37.1 (06-17-23 @ 16:10)  HR: 88 (06-18-23 @ 08:00) (88 - 131)  BP: 110/70 (06-18-23 @ 08:00) (85/50 - 129/79)  RR: 18 (06-18-23 @ 08:00) (18 - 20)  SpO2: 95% (06-18-23 @ 08:00) (94% - 97%)  Wt(kg): --  General: Comfortable in NAD  Neck:supple  CVS: distant  s1s2, irregular  no s3, II/VI systolic murmur  Pulm: CTA b/l  Abd: soft, non-tender  Ext: trace edema  Neuro A&O x3  Psych: Normal affect      I&O's Summary    17 Jun 2023 07:01  -  18 Jun 2023 07:00  --------------------------------------------------------  IN: 0 mL / OUT: 1300 mL / NET: -1300 mL          Labs:   18 Jun 2023 05:08    139    |  99     |  23.2   ----------------------------<  168    3.6     |  32.0   |  0.63     Ca    8.2        18 Jun 2023 05:08  Mg     1.8       17 Jun 2023 05:52    TPro  6.0    /  Alb  2.7    /  TBili  0.4    /  DBili  x      /  AST  14     /  ALT  12     /  AlkPhos  198    17 Jun 2023 05:52                          14.0   6.71  )-----------( 261      ( 17 Jun 2023 05:52 )             45.3             [facility] 1/2023:  Coronary Angiography   - Left dominant system   - Patent prox and mid LAD stents   - Moderate stenosis of the distal LCx   - The RCA is a small, non-dominat vessel.  The proximal segment is occluded with brisk right-to-right collaterals.  - 25mm LV-Ao gradient on pullback.  CIELO 1.4cm2 with moderate AS byecho.  - Moderately reduced LVEF by echo       LM   Left main artery: Angiography shows minor irregularities.    LAD   Proximal left anterior descending: There is a 10 % stenosis within the  stented segment. Mid left anterior descending: There is a10 % stenosis within the stented segment.    CX   Distal circumflex: There is a 60 % stenosis. VALENTINO Flow 3.    RCA   Proximal right coronary artery: There is a 100 % stenosis. There is  good collateral blood supply to the distal myocardium. This  lesion is a chronic total occlusion.  This is a small non-dominant  vessel.  Ramus   Ramus intermedius: Angiography shows mild atherosclerosis.      Left Heart Cath   Ejection fraction was calculated by echo with a value of 40-45%. LV to  AO pullback was performed and there is a pressure gradient of 25 mmHg.      ECG:  SR with 1st degree AVB with RBBB/LAFB.  No ST-T wave changes.  PRWP.            RADIOLOGY:  CT A/P:  No pulmonary embolism though evaluation of subsegmental branches limited   secondary to respiratory motion artifact.    Trace bilateral pleural effusions with adjacent patchy bibasilar   opacities, likely represent dependent atelectasis. Recommend clinical   correlation to assess for superimposed infection.    Hepatomegaly. Liver surface nodularity. Hepatitis or cirrhosis considered   in differential. Correlate with clinical parameters.    Trace ascites.    Mild bladder wall thickening with suggestion of left posterolateral   diverticulum. Correlate with urinalysis andlab values to assess for   cystitis and/or ascending urinary tract infection.    Additional findings as mentioned above.    ECHO 1/2023:   1. Technically difficult study.   2. Tachycardic throughout the study and this may preclude true assessment of LVEF.   3. Left ventricular ejection fraction, by visual estimation, is 40 to 45%.   4. Moderately decreased global left ventricular systolic function.   5. The mitral in-flow pattern reveals no discernable A-wave, therefore   no comment on diastolic function can be made.   6. Mildly enlarged left atrium.   7. Normal right atrial size.   8. Mild tricuspid regurgitation.   9. Mild thickening and calcification of the anterior mitral valve leaflet.  10. Mild to moderate mitral annular calcification.  11. Trace mitral valve regurgitation.  12. Moderate aortic valve stenosis, with peak velocity of 2.87 m/s, mean   gradient of 18 mmHg, dimensionless index of 0.29, and CIELO via VTI of 1.42   sqcm.  13. Dilatation of the sinuses of Valsalva, measures 4.15 cm but based on   BSA 1.6cm/sqm is within normal limits.  14. There is a significant pericardial fat pad present.  15. There isno evidence of pericardial effusion.  16. Endocardial visualization was enhanced with intravenous echo contrast.  17. Compared to prior TTE done on 7/6/2022, the left ventricular function   is now reduced to 40-45%, in the setting of Afib wth RVR (prior LVEF   60-65%).      LIBBY 1/2023:   1. Left ventricular ejection fraction, by visual estimation, is 40 to 45%.   2. Mildly decreased global left ventricular systolic function.   3. Normal right ventricular size and function.   4. Mildly enlarged left atrium.   5. Mild mitral annular calcification.   6. Mild mitral valve regurgitation.   7. Thickening of the anterior and posterior mitral valve leaflets.   8. Mild tricuspid regurgitation.   9. Mild aortic regurgitation.  10. Mild to moderate aortic valvestenosis.  11. CIELO by planimetry 1.2cm2.  12. Aortic root measured at Sinus of Valsalva is dilated. 4.1cm  13. No left atrial appendage thrombus.  14. Color flow doppler and intravenous injection of agitated saline   demonstrates the presence of an intact intra atrial septum.    Echo (3/95073):   1. Left ventricular ejection fraction, by visual estimation, is 60 to 65%.   2. Normal right ventricular size and function.   3. There is no evidence of pericardial effusion.   4. Endocardial visualizationwas enhanced with intravenous echo contrast.    Echo personally reviewed:   1. Left ventricularejection fraction, by visual estimation, is 35-40%.   2. Moderately decreased global left ventricular systolic   function.   3. There is mild concentric left ventricular hypertrophy.   4. Mildly enlarged right ventricle.   5. Moderately reduced RV systolic function.   6. Normal left atrial size.   7. Normal right atrial size.   8. There is no evidence of pericardial effusion.   9. Peak Gradient across the Aortic Valve is 35 mm Hg. Mean gradient is 26 mm Hg. DI 0.25.  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    Calcium Score:  Cardiac:  Total Coronary Artery Calcium Score = 5373.  >90th percentile.   Significant calcification. Significant atherosclerotic burden.  Aortic calcification    Non Cardiac:  1.  Small bilateral pleural effusions and lower lobe passive atelectasis.  2.  Ascending aorta measures 4.8 cm at the main pulmonary artery.      Assessment:  78 y/o male PMHx AF on Eliquis (s/p Cardioversion on 1/31/23), moderate-severe AS, Chronic Diastolic Heart Failure, CAD s/p PCI, HTN, HLD, DM 2, Morbid Obesity, ALLIE, Thoracic Aortic Aneurysm, BPH s/p TURP, Left Renal Mass was sent to the ED from [facility] for abdominal pain x 4 days. Patient found to have UTI.  Echo performed noted significant drop in EF.  CT with hepatomegaly appearing ?significantly larger than CT from March.  ?2/2 CPC but would consider other causes such as hepatitis.  Appears somewhat fluid overloaded but not in severe acute CHF.  -UTI on admission on antibiotics, biliary colic 2/2 sludge  -significant hepatosplenomegaly , evaluation with GI. Will need hepatology as outpatient came in symptomatic from hepatomegaly with RUQ pain, better now  -Acute systolic CHF being diuresed, now on PO   -Also now back in AF, rate controlled.  ?Contributing to new drop in EF.  ?2/2 stress myopathy with acute infection and new liver dysfunction.  Cath in January with  RCA, 60% distal LCX disease and unlikely to be contributing to CMP. Will need to consider repeat this admission after further diuresis   -Also with significant AS, ?severe LFLG potentially contributing but fairly similar to prior echos. Has had some fluctuation in EF and when i AF this past January had drop in EF with improvement in 3/2023. Now drop again with recurrent AF and suspect contributing factor.  On monitor some SR with 1st degree AV delay then in rapid AF , improved with increase metoprolol and adding digoxin, was back in SR but now AF again   -Back in rapid AF 6/17/2023, exacerbated as well by anxiety. Metoprolol changed to 25mg q6, digoxin given. Plan for ablation with EP tomorrow    Plan: (TO BE SEEN)  1. Back in AF, continue metopolol and digoxin at current doses. Rates up when anxious.  EP recs appreciated plan for ablation tomorrow   2. Change to po daily lasix 40mg given well diuresed and PCWP 17. HAs CardioMEMS, to be followed by [facility] as outpatient   3. Keep off Amio and statin, GI follow up, Planned MRCP.  4. Continue other current CV meds at current doses and med management of CAD   5. recommend starting low dose entresto post ablation if BP tolerates  6. F/u CAC of AV to assess AS severity, AoV calcium score not calculated and will touch base with radiology after weekend . Ultimately consider TAVR               Chong Carranza MD ANIA ELISAELSY  [id number]      Chief Complaint:   follow up new CMP/AF      Subjective: feeling better today, looking forward to ablation tomorrow     24 hour Tele: AF rate controlled           albuterol    0.083% 2.5 milliGRAM(s) Nebulizer every 6 hours  ALPRAZolam 0.5 milliGRAM(s) Oral three times a day PRN  aluminum hydroxide/magnesium hydroxide/simethicone Suspension 30 milliLiter(s) Oral every 4 hours PRN  apixaban 5 milliGRAM(s) Oral every 12 hours  aspirin  chewable 81 milliGRAM(s) Oral daily  atorvastatin 20 milliGRAM(s) Oral at bedtime  bisacodyl 5 milliGRAM(s) Oral every 12 hours PRN  chlorhexidine 2% Cloths 1 Application(s) Topical <User Schedule>  dextrose 5%. 1000 milliLiter(s) IV Continuous <Continuous>  dextrose 5%. 1000 milliLiter(s) IV Continuous <Continuous>  dextrose 50% Injectable 25 Gram(s) IV Push once  dextrose 50% Injectable 12.5 Gram(s) IV Push once  dextrose 50% Injectable 25 Gram(s) IV Push once  dextrose Oral Gel 15 Gram(s) Oral once PRN  digoxin     Tablet 125 MICROGram(s) Oral daily  diphenhydrAMINE Injectable 50 milliGRAM(s) IV Push once PRN  doxazosin 2 milliGRAM(s) Oral at bedtime  finasteride 5 milliGRAM(s) Oral daily  fluticasone propionate 50 MICROgram(s)/spray Nasal Spray 1 Spray(s) Both Nostrils two times a day  furosemide    Tablet 40 milliGRAM(s) Oral daily  glucagon  Injectable 1 milliGRAM(s) IntraMuscular once  insulin lispro (ADMELOG) corrective regimen sliding scale   SubCutaneous three times a day before meals  insulin lispro (ADMELOG) corrective regimen sliding scale   SubCutaneous at bedtime  meclizine 12.5 milliGRAM(s) Oral three times a day PRN  melatonin 3 milliGRAM(s) Oral at bedtime PRN  metoprolol tartrate 25 milliGRAM(s) Oral every 6 hours  ondansetron Injectable 4 milliGRAM(s) IV Push every 8 hours PRN  pantoprazole    Tablet 40 milliGRAM(s) Oral before breakfast  polyethylene glycol 3350 17 Gram(s) Oral daily  potassium chloride   Powder 20 milliEquivalent(s) Oral daily  senna 2 Tablet(s) Oral at bedtime  simethicone 80 milliGRAM(s) Chew every 8 hours PRN          Physical Exam:  T(C): 36.9 (06-18-23 @ 08:00), Max: 37.1 (06-17-23 @ 16:10)  HR: 88 (06-18-23 @ 08:00) (88 - 131)  BP: 110/70 (06-18-23 @ 08:00) (85/50 - 129/79)  RR: 18 (06-18-23 @ 08:00) (18 - 20)  SpO2: 95% (06-18-23 @ 08:00) (94% - 97%)  Wt(kg): --  General: Comfortable in NAD  Neck:supple  CVS: distant  s1s2, irregular  no s3, II/VI systolic murmur  Pulm: CTA b/l  Abd: soft, non-tender  Ext: trace edema  Neuro A&O x3  Psych: Normal affect      I&O's Summary    17 Jun 2023 07:01  -  18 Jun 2023 07:00  --------------------------------------------------------  IN: 0 mL / OUT: 1300 mL / NET: -1300 mL          Labs:   18 Jun 2023 05:08    139    |  99     |  23.2   ----------------------------<  168    3.6     |  32.0   |  0.63     Ca    8.2        18 Jun 2023 05:08  Mg     1.8       17 Jun 2023 05:52    TPro  6.0    /  Alb  2.7    /  TBili  0.4    /  DBili  x      /  AST  14     /  ALT  12     /  AlkPhos  198    17 Jun 2023 05:52                          14.0   6.71  )-----------( 261      ( 17 Jun 2023 05:52 )             45.3             [facility] 1/2023:  Coronary Angiography   - Left dominant system   - Patent prox and mid LAD stents   - Moderate stenosis of the distal LCx   - The RCA is a small, non-dominat vessel.  The proximal segment is occluded with brisk right-to-right collaterals.  - 25mm LV-Ao gradient on pullback.  CIELO 1.4cm2 with moderate AS byecho.  - Moderately reduced LVEF by echo       LM   Left main artery: Angiography shows minor irregularities.    LAD   Proximal left anterior descending: There is a 10 % stenosis within the  stented segment. Mid left anterior descending: There is a10 % stenosis within the stented segment.    CX   Distal circumflex: There is a 60 % stenosis. VALENTINO Flow 3.    RCA   Proximal right coronary artery: There is a 100 % stenosis. There is  good collateral blood supply to the distal myocardium. This  lesion is a chronic total occlusion.  This is a small non-dominant  vessel.  Ramus   Ramus intermedius: Angiography shows mild atherosclerosis.      Left Heart Cath   Ejection fraction was calculated by echo with a value of 40-45%. LV to  AO pullback was performed and there is a pressure gradient of 25 mmHg.      ECG:  SR with 1st degree AVB with RBBB/LAFB.  No ST-T wave changes.  PRWP.            RADIOLOGY:  CT A/P:  No pulmonary embolism though evaluation of subsegmental branches limited   secondary to respiratory motion artifact.    Trace bilateral pleural effusions with adjacent patchy bibasilar   opacities, likely represent dependent atelectasis. Recommend clinical   correlation to assess for superimposed infection.    Hepatomegaly. Liver surface nodularity. Hepatitis or cirrhosis considered   in differential. Correlate with clinical parameters.    Trace ascites.    Mild bladder wall thickening with suggestion of left posterolateral   diverticulum. Correlate with urinalysis andlab values to assess for   cystitis and/or ascending urinary tract infection.    Additional findings as mentioned above.    ECHO 1/2023:   1. Technically difficult study.   2. Tachycardic throughout the study and this may preclude true assessment of LVEF.   3. Left ventricular ejection fraction, by visual estimation, is 40 to 45%.   4. Moderately decreased global left ventricular systolic function.   5. The mitral in-flow pattern reveals no discernable A-wave, therefore   no comment on diastolic function can be made.   6. Mildly enlarged left atrium.   7. Normal right atrial size.   8. Mild tricuspid regurgitation.   9. Mild thickening and calcification of the anterior mitral valve leaflet.  10. Mild to moderate mitral annular calcification.  11. Trace mitral valve regurgitation.  12. Moderate aortic valve stenosis, with peak velocity of 2.87 m/s, mean   gradient of 18 mmHg, dimensionless index of 0.29, and CIELO via VTI of 1.42   sqcm.  13. Dilatation of the sinuses of Valsalva, measures 4.15 cm but based on   BSA 1.6cm/sqm is within normal limits.  14. There is a significant pericardial fat pad present.  15. There isno evidence of pericardial effusion.  16. Endocardial visualization was enhanced with intravenous echo contrast.  17. Compared to prior TTE done on 7/6/2022, the left ventricular function   is now reduced to 40-45%, in the setting of Afib wth RVR (prior LVEF   60-65%).      LIBBY 1/2023:   1. Left ventricular ejection fraction, by visual estimation, is 40 to 45%.   2. Mildly decreased global left ventricular systolic function.   3. Normal right ventricular size and function.   4. Mildly enlarged left atrium.   5. Mild mitral annular calcification.   6. Mild mitral valve regurgitation.   7. Thickening of the anterior and posterior mitral valve leaflets.   8. Mild tricuspid regurgitation.   9. Mild aortic regurgitation.  10. Mild to moderate aortic valvestenosis.  11. CIELO by planimetry 1.2cm2.  12. Aortic root measured at Sinus of Valsalva is dilated. 4.1cm  13. No left atrial appendage thrombus.  14. Color flow doppler and intravenous injection of agitated saline   demonstrates the presence of an intact intra atrial septum.    Echo (3/01403):   1. Left ventricular ejection fraction, by visual estimation, is 60 to 65%.   2. Normal right ventricular size and function.   3. There is no evidence of pericardial effusion.   4. Endocardial visualizationwas enhanced with intravenous echo contrast.    Echo personally reviewed:   1. Left ventricularejection fraction, by visual estimation, is 35-40%.   2. Moderately decreased global left ventricular systolic   function.   3. There is mild concentric left ventricular hypertrophy.   4. Mildly enlarged right ventricle.   5. Moderately reduced RV systolic function.   6. Normal left atrial size.   7. Normal right atrial size.   8. There is no evidence of pericardial effusion.   9. Peak Gradient across the Aortic Valve is 35 mm Hg. Mean gradient is 26 mm Hg. DI 0.25.  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    Calcium Score:  Cardiac:  Total Coronary Artery Calcium Score = 5373.  >90th percentile.   Significant calcification. Significant atherosclerotic burden.  Aortic calcification    Non Cardiac:  1.  Small bilateral pleural effusions and lower lobe passive atelectasis.  2.  Ascending aorta measures 4.8 cm at the main pulmonary artery.      Assessment:  78 y/o male PMHx AF on Eliquis (s/p Cardioversion on 1/31/23), moderate-severe AS, Chronic Diastolic Heart Failure, CAD s/p PCI, HTN, HLD, DM 2, Morbid Obesity, ALLIE, Thoracic Aortic Aneurysm, BPH s/p TURP, Left Renal Mass was sent to the ED from [facility] for abdominal pain x 4 days. Patient found to have UTI.  Echo performed noted significant drop in EF.  CT with hepatomegaly appearing ?significantly larger than CT from March.  ?2/2 CPC but would consider other causes such as hepatitis.  Appears somewhat fluid overloaded but not in severe acute CHF.  -UTI on admission on antibiotics, biliary colic 2/2 sludge  -significant hepatosplenomegaly , evaluation with GI. Will need hepatology as outpatient came in symptomatic from hepatomegaly with RUQ pain, better now  -Acute systolic CHF being diuresed, now on PO   -Also now back in AF, rate controlled.  ?Contributing to new drop in EF.  ?2/2 stress myopathy with acute infection and new liver dysfunction.  Cath in January with  RCA, 60% distal LCX disease and unlikely to be contributing to CMP. Will need to consider repeat this admission after further diuresis   -Also with significant AS, ?severe LFLG potentially contributing but fairly similar to prior echos. Has had some fluctuation in EF and when i AF this past January had drop in EF with improvement in 3/2023. Now drop again with recurrent AF and suspect contributing factor.  On monitor some SR with 1st degree AV delay then in rapid AF , improved with increase metoprolol and adding digoxin, was back in SR but now AF again   -Back in rapid AF 6/17/2023, exacerbated as well by anxiety. Metoprolol changed to 25mg q6, digoxin given. Plan for ablation with EP tomorrow    Plan:   1. Back in AF, continue metopolol and digoxin at current doses. Rates up when anxious.  EP recs appreciated plan for ablation tomorrow   2. continue po daily lasix 40mg given well diuresed and PCWP 17 on RHC 6/16/2023. HAs CardioMEMS,, to be followed by [facility] as outpatient   3. Keep off Amio and statin, GI follow up, Planned MRCP.  4. Continue other current CV meds at current doses and med management of CAD   5. recommend starting low dose entresto post ablation if BP tolerates  6. F/u CAC of AV to assess AS severity, AoV calcium score not calculated and will touch base with radiology after weekend . Ultimately consider TAVR . CMP likely due to AF/tachy mediated              Chong Carranza MD

## 2023-06-18 NOTE — DISCHARGE NOTE NURSING/CASE MANAGEMENT/SOCIAL WORK - NSDCPEELIQUIS_GEN_ALL_CORE
PROCEDURES:  Incisional hernia repair 20-Nov-2020 11:11:42 Right upper quadrant abdominal incisional hernia repair Isidra Garcia   Apixaban/Eliquis - Compliance/Apixaban/Eliquis - Dietary Advice/Apixaban/Eliquis - Follow up monitoring/Apixaban/Eliquis - Potential for adverse drug reactions and interactions

## 2023-06-18 NOTE — DISCHARGE NOTE NURSING/CASE MANAGEMENT/SOCIAL WORK - PATIENT PORTAL LINK FT
You can access the FollowMyHealth Patient Portal offered by [facility] by registering at the following website: [email or web address]. By joining [employer]’s FollowMyHealth portal, you will also be able to view your health information using other applications (apps) compatible with our system.

## 2023-06-19 ENCOUNTER — TRANSCRIPTION ENCOUNTER (OUTPATIENT)
Age: 78
End: 2023-06-19

## 2023-06-19 DIAGNOSIS — I35.0 NONRHEUMATIC AORTIC (VALVE) STENOSIS: ICD-10-CM

## 2023-06-19 LAB
ANION GAP SERPL CALC-SCNC: 7 MMOL/L — SIGNIFICANT CHANGE UP (ref 5–17)
APTT BLD: 34.6 SEC — SIGNIFICANT CHANGE UP (ref 27.5–35.5)
BLD GP AB SCN SERPL QL: SIGNIFICANT CHANGE UP
BUN SERPL-MCNC: 19.8 MG/DL — SIGNIFICANT CHANGE UP (ref 8–20)
CALCIUM SERPL-MCNC: 8.4 MG/DL — SIGNIFICANT CHANGE UP (ref 8.4–10.5)
CHLORIDE SERPL-SCNC: 101 MMOL/L — SIGNIFICANT CHANGE UP (ref 96–108)
CO2 SERPL-SCNC: 32 MMOL/L — HIGH (ref 22–29)
CREAT SERPL-MCNC: 0.64 MG/DL — SIGNIFICANT CHANGE UP (ref 0.5–1.3)
EGFR: 98 ML/MIN/1.73M2 — SIGNIFICANT CHANGE UP
GLUCOSE BLDC GLUCOMTR-MCNC: 145 MG/DL — HIGH (ref 70–99)
GLUCOSE BLDC GLUCOMTR-MCNC: 147 MG/DL — HIGH (ref 70–99)
GLUCOSE BLDC GLUCOMTR-MCNC: 151 MG/DL — HIGH (ref 70–99)
GLUCOSE SERPL-MCNC: 177 MG/DL — HIGH (ref 70–99)
HCT VFR BLD CALC: 46.7 % — SIGNIFICANT CHANGE UP (ref 39–50)
HGB BLD-MCNC: 14.2 G/DL — SIGNIFICANT CHANGE UP (ref 13–17)
INR BLD: 1.21 RATIO — HIGH (ref 0.88–1.16)
MAGNESIUM SERPL-MCNC: 2 MG/DL — SIGNIFICANT CHANGE UP (ref 1.8–2.6)
MCHC RBC-ENTMCNC: 27.3 PG — SIGNIFICANT CHANGE UP (ref 27–34)
MCHC RBC-ENTMCNC: 30.4 GM/DL — LOW (ref 32–36)
MCV RBC AUTO: 89.6 FL — SIGNIFICANT CHANGE UP (ref 80–100)
PLATELET # BLD AUTO: 243 K/UL — SIGNIFICANT CHANGE UP (ref 150–400)
POTASSIUM SERPL-MCNC: 4.2 MMOL/L — SIGNIFICANT CHANGE UP (ref 3.5–5.3)
POTASSIUM SERPL-SCNC: 4.2 MMOL/L — SIGNIFICANT CHANGE UP (ref 3.5–5.3)
PROTHROM AB SERPL-ACNC: 14.1 SEC — HIGH (ref 10.5–13.4)
RBC # BLD: 5.21 M/UL — SIGNIFICANT CHANGE UP (ref 4.2–5.8)
RBC # FLD: 14.1 % — SIGNIFICANT CHANGE UP (ref 10.3–14.5)
SODIUM SERPL-SCNC: 140 MMOL/L — SIGNIFICANT CHANGE UP (ref 135–145)
WBC # BLD: 6.05 K/UL — SIGNIFICANT CHANGE UP (ref 3.8–10.5)
WBC # FLD AUTO: 6.05 K/UL — SIGNIFICANT CHANGE UP (ref 3.8–10.5)

## 2023-06-19 PROCEDURE — 99233 SBSQ HOSP IP/OBS HIGH 50: CPT

## 2023-06-19 PROCEDURE — 93010 ELECTROCARDIOGRAM REPORT: CPT | Mod: 76

## 2023-06-19 RX ORDER — APIXABAN 2.5 MG/1
5 TABLET, FILM COATED ORAL
Refills: 0 | Status: DISCONTINUED | OUTPATIENT
Start: 2023-06-19 | End: 2023-06-23

## 2023-06-19 RX ORDER — PANTOPRAZOLE SODIUM 20 MG/1
40 TABLET, DELAYED RELEASE ORAL EVERY 12 HOURS
Refills: 0 | Status: DISCONTINUED | OUTPATIENT
Start: 2023-06-19 | End: 2023-06-23

## 2023-06-19 RX ORDER — SUCRALFATE 1 G
1 TABLET ORAL EVERY 12 HOURS
Refills: 0 | Status: DISCONTINUED | OUTPATIENT
Start: 2023-06-19 | End: 2023-06-23

## 2023-06-19 RX ORDER — FUROSEMIDE 40 MG
40 TABLET ORAL ONCE
Refills: 0 | Status: COMPLETED | OUTPATIENT
Start: 2023-06-19 | End: 2023-06-19

## 2023-06-19 RX ADMIN — Medication 1 GRAM(S): at 21:01

## 2023-06-19 RX ADMIN — OXYCODONE HYDROCHLORIDE 5 MILLIGRAM(S): 5 TABLET ORAL at 21:26

## 2023-06-19 RX ADMIN — SENNA PLUS 2 TABLET(S): 8.6 TABLET ORAL at 22:25

## 2023-06-19 RX ADMIN — Medication 0.5 MILLIGRAM(S): at 00:51

## 2023-06-19 RX ADMIN — ATORVASTATIN CALCIUM 20 MILLIGRAM(S): 80 TABLET, FILM COATED ORAL at 21:18

## 2023-06-19 RX ADMIN — Medication 25 MILLIGRAM(S): at 06:16

## 2023-06-19 RX ADMIN — Medication 2 MILLIGRAM(S): at 21:18

## 2023-06-19 RX ADMIN — CHLORHEXIDINE GLUCONATE 1 APPLICATION(S): 213 SOLUTION TOPICAL at 09:17

## 2023-06-19 RX ADMIN — APIXABAN 5 MILLIGRAM(S): 2.5 TABLET, FILM COATED ORAL at 20:49

## 2023-06-19 RX ADMIN — Medication 25 MILLIGRAM(S): at 20:57

## 2023-06-19 RX ADMIN — Medication 1 SPRAY(S): at 06:12

## 2023-06-19 RX ADMIN — Medication 25 MILLIGRAM(S): at 00:49

## 2023-06-19 RX ADMIN — ALBUTEROL 2.5 MILLIGRAM(S): 90 AEROSOL, METERED ORAL at 08:16

## 2023-06-19 RX ADMIN — Medication 40 MILLIGRAM(S): at 06:17

## 2023-06-19 RX ADMIN — Medication 0.5 MILLIGRAM(S): at 22:25

## 2023-06-19 RX ADMIN — Medication 40 MILLIGRAM(S): at 20:49

## 2023-06-19 RX ADMIN — Medication 125 MICROGRAM(S): at 06:16

## 2023-06-19 RX ADMIN — PANTOPRAZOLE SODIUM 40 MILLIGRAM(S): 20 TABLET, DELAYED RELEASE ORAL at 06:16

## 2023-06-19 RX ADMIN — FINASTERIDE 5 MILLIGRAM(S): 5 TABLET, FILM COATED ORAL at 20:58

## 2023-06-19 RX ADMIN — PANTOPRAZOLE SODIUM 40 MILLIGRAM(S): 20 TABLET, DELAYED RELEASE ORAL at 20:58

## 2023-06-19 NOTE — DISCHARGE NOTE PROVIDER - DETAILS OF MALNUTRITION DIAGNOSIS/DIAGNOSES
This patient has been assessed with a concern for Malnutrition and was treated during this hospitalization for the following Nutrition diagnosis/diagnoses:     -  06/20/2023: Severe protein-calorie malnutrition

## 2023-06-19 NOTE — DISCHARGE NOTE PROVIDER - NSDCMRMEDTOKEN_GEN_ALL_CORE_FT
acetaminophen 325 mg oral tablet: 2 tab(s) orally every 6 hours As needed Temp greater or equal to 38C (100.4F), Mild Pain (1 - 3), Moderate Pain (4 - 6)  ALPRAZolam 0.25 mg oral tablet: 1 tab(s) orally 3 times a day As needed Anxiety  amiodarone 200 mg oral tablet: 1 tab(s) orally once a day  apixaban 5 mg oral tablet: 1 tab(s) orally every 12 hours  aspirin 81 mg oral tablet, chewable: 1 tab(s) orally once a day  atorvastatin 10 mg oral tablet: 1 tab(s) orally once a day (at bedtime)  cloNIDine 0.2 mg/24 hr transdermal film, extended release: 1 patch transdermal every 7 days  doxazosin 2 mg oral tablet: 1 tab(s) orally once a day (at bedtime)  finasteride 5 mg oral tablet: 1 tab(s) orally once a day  fluticasone 50 mcg/inh nasal spray: 1 spray(s) nasal 2 times a day  furosemide 40 mg oral tablet: 1 tab(s) orally once a day  ipratropium-albuterol 0.5 mg-2.5 mg/3 mL inhalation solution: 3 milliliter(s) inhaled every 6 hours  lidocaine 4% topical film: Apply topically to affected area once a day  meclizine 12.5 mg oral tablet: 1 tab(s) orally 3 times a day As needed Dizziness  metFORMIN 500 mg oral tablet: 1 orally 3 times a day  metoprolol tartrate 25 mg oral tablet: 1 tab(s) orally 2 times a day  oxyCODONE 5 mg oral tablet: 1 tab(s) orally every 4 hours, As Needed  pantoprazole 40 mg oral delayed release tablet: 1 tab(s) orally once a day  polyethylene glycol 3350 oral powder for reconstitution: 17 gram(s) orally once a day  potassium chloride 20 mEq oral tablet, extended release: 1 tab(s) orally  QUEtiapine 25 mg oral tablet: 1 tab(s) orally once a day (at bedtime) Taper to off after two days  senna leaf extract oral tablet: 2 tab(s) orally once a day (at bedtime)  sertraline 50 mg oral tablet: 1 tab(s) orally once a day   ALPRAZolam 0.25 mg oral tablet: 1 tab(s) orally 3 times a day As needed Anxiety  amiodarone 200 mg oral tablet: 1 tab(s) orally once a day  apixaban 5 mg oral tablet: 1 tab(s) orally every 12 hours  aspirin 81 mg oral tablet: 1 orally once a day  atorvastatin 10 mg oral tablet: 1 tab(s) orally once a day (at bedtime)  colchicine 0.6 mg oral tablet: 1 tab(s) orally 2 times a day  dapagliflozin 10 mg oral tablet: 1 orally once a day  doxazosin 2 mg oral tablet: 1 orally once a day  finasteride 5 mg oral tablet: 1 tab(s) orally once a day  furosemide 40 mg oral tablet: 1 tab(s) orally once a day  ipratropium-albuterol 0.5 mg-2.5 mg/3 mL inhalation solution: 3 milliliter(s) inhaled every 6 hours  losartan 25 mg oral tablet: 1 tab(s) orally once a day  losartan 25 mg oral tablet: 1 tab(s) orally once a day  meclizine 12.5 mg oral tablet: 1 tab(s) orally 3 times a day As needed Dizziness  metFORMIN 500 mg oral tablet: 1 orally 3 times a day  metoprolol tartrate 50 mg oral tablet: 1 tab(s) orally 2 times a day  omeprazole 40 mg oral delayed release capsule: 1 orally once a day  polyethylene glycol 3350 oral powder for reconstitution: 17 gram(s) orally once a day  Potassium Chloride (Eqv-Klor-Con M20) 20 mEq oral tablet, extended release: 1 tab(s) orally once a day  senna leaf extract oral tablet: 2 tab(s) orally once a day (at bedtime)  sertraline 50 mg oral tablet: 1 tab(s) orally once a day  sucralfate 1 g oral tablet: 1 tab(s) orally 2 times a day   ALPRAZolam 0.25 mg oral tablet: 1 tab(s) orally 3 times a day As needed Anxiety  amiodarone 200 mg oral tablet: 1 tab(s) orally once a day  apixaban 5 mg oral tablet: 1 tab(s) orally every 12 hours  aspirin 81 mg oral tablet: 1 orally once a day  atorvastatin 10 mg oral tablet: 1 tab(s) orally once a day (at bedtime)  colchicine 0.6 mg oral tablet: 1 tab(s) orally 2 times a day  dapagliflozin 10 mg oral tablet: 1 orally once a day  doxazosin 2 mg oral tablet: 1 orally once a day  finasteride 5 mg oral tablet: 1 tab(s) orally once a day  furosemide 40 mg oral tablet: 1 tab(s) orally once a day  ipratropium-albuterol 0.5 mg-2.5 mg/3 mL inhalation solution: 3 milliliter(s) inhaled every 6 hours  losartan 25 mg oral tablet: 1 tab(s) orally once a day  meclizine 12.5 mg oral tablet: 1 tab(s) orally 3 times a day As needed Dizziness  metFORMIN 500 mg oral tablet: 1 orally 3 times a day  metoprolol tartrate 50 mg oral tablet: 1 tab(s) orally 2 times a day  omeprazole 40 mg oral delayed release capsule: 1 orally once a day  polyethylene glycol 3350 oral powder for reconstitution: 17 gram(s) orally once a day  Potassium Chloride (Eqv-Klor-Con M20) 20 mEq oral tablet, extended release: 1 tab(s) orally once a day  senna leaf extract oral tablet: 2 tab(s) orally once a day (at bedtime)  sertraline 50 mg oral tablet: 1 tab(s) orally once a day  sucralfate 1 g oral tablet: 1 tab(s) orally 2 times a day   ALPRAZolam 0.25 mg oral tablet: 1 tab(s) orally 3 times a day As needed Anxiety  apixaban 5 mg oral tablet: 1 tab(s) orally every 12 hours  aspirin 81 mg oral tablet: 1 orally once a day  atorvastatin 10 mg oral tablet: 1 tab(s) orally once a day (at bedtime)  colchicine 0.6 mg oral tablet: 1 tab(s) orally 2 times a day  dapagliflozin 10 mg oral tablet: 1 orally once a day  doxazosin 2 mg oral tablet: 1 orally once a day  finasteride 5 mg oral tablet: 1 tab(s) orally once a day  furosemide 40 mg oral tablet: 1 tab(s) orally once a day  ipratropium-albuterol 0.5 mg-2.5 mg/3 mL inhalation solution: 3 milliliter(s) inhaled every 6 hours  losartan 25 mg oral tablet: 1 tab(s) orally once a day  meclizine 12.5 mg oral tablet: 1 tab(s) orally 3 times a day As needed Dizziness  metFORMIN 500 mg oral tablet: 1 orally 3 times a day  metoprolol tartrate 50 mg oral tablet: 1 tab(s) orally 2 times a day  omeprazole 40 mg oral delayed release capsule: 1 orally once a day  polyethylene glycol 3350 oral powder for reconstitution: 17 gram(s) orally once a day  Potassium Chloride (Eqv-Klor-Con M20) 20 mEq oral tablet, extended release: 1 tab(s) orally once a day  senna leaf extract oral tablet: 2 tab(s) orally once a day (at bedtime)  sertraline 50 mg oral tablet: 1 tab(s) orally once a day  sucralfate 1 g oral tablet: 1 tab(s) orally 2 times a day

## 2023-06-19 NOTE — PROGRESS NOTE ADULT - SUBJECTIVE AND OBJECTIVE BOX
PROCEDURE: Radiofrequency Ablation of Atrial Flutter    ELECTROPHYSIOLOGIST: Demar Amato MD         COMPLICATIONS:  none        DISPOSITION: observation      CONDITION: stable      Pt doing well s/p elective radiofrequency atrial flutter ablation (WACA/PVI, posterior box, mitral line, and CTI line), access achieved via b/l femoral veins, hemostasis achieved with figure of 8 sutures b/l. Pt denies any complaints post procedure.     MEDICATIONS  (STANDING):  albuterol    0.083% 2.5 milliGRAM(s) Nebulizer every 6 hours  apixaban 5 milliGRAM(s) Oral <User Schedule>  aspirin  chewable 81 milliGRAM(s) Oral daily  atorvastatin 20 milliGRAM(s) Oral at bedtime  chlorhexidine 2% Cloths 1 Application(s) Topical <User Schedule>  dextrose 5%. 1000 milliLiter(s) (100 mL/Hr) IV Continuous <Continuous>  dextrose 5%. 1000 milliLiter(s) (50 mL/Hr) IV Continuous <Continuous>  dextrose 50% Injectable 25 Gram(s) IV Push once  dextrose 50% Injectable 12.5 Gram(s) IV Push once  dextrose 50% Injectable 25 Gram(s) IV Push once  doxazosin 2 milliGRAM(s) Oral at bedtime  finasteride 5 milliGRAM(s) Oral daily  fluticasone propionate 50 MICROgram(s)/spray Nasal Spray 1 Spray(s) Both Nostrils two times a day  furosemide    Tablet 40 milliGRAM(s) Oral daily  furosemide   Injectable 40 milliGRAM(s) IV Push once  glucagon  Injectable 1 milliGRAM(s) IntraMuscular once  insulin lispro (ADMELOG) corrective regimen sliding scale   SubCutaneous three times a day before meals  insulin lispro (ADMELOG) corrective regimen sliding scale   SubCutaneous at bedtime  metoprolol tartrate 25 milliGRAM(s) Oral every 6 hours  pantoprazole    Tablet 40 milliGRAM(s) Oral every 12 hours  polyethylene glycol 3350 17 Gram(s) Oral daily  potassium chloride   Powder 20 milliEquivalent(s) Oral daily  senna 2 Tablet(s) Oral at bedtime  sucralfate suspension 1 Gram(s) Oral every 12 hours    MEDICATIONS  (PRN):  ALPRAZolam 0.5 milliGRAM(s) Oral three times a day PRN anxiety  aluminum hydroxide/magnesium hydroxide/simethicone Suspension 30 milliLiter(s) Oral every 4 hours PRN Dyspepsia  bisacodyl 5 milliGRAM(s) Oral every 12 hours PRN Constipation  dextrose Oral Gel 15 Gram(s) Oral once PRN Blood Glucose LESS THAN 70 milliGRAM(s)/deciliter  diphenhydrAMINE Injectable 50 milliGRAM(s) IV Push once PRN Allergic Reaction  meclizine 12.5 milliGRAM(s) Oral three times a day PRN Dizziness  melatonin 3 milliGRAM(s) Oral at bedtime PRN Insomnia  ondansetron Injectable 4 milliGRAM(s) IV Push every 8 hours PRN Nausea and/or Vomiting  oxyCODONE    IR 5 milliGRAM(s) Oral every 4 hours PRN Severe Pain (7 - 10)  simethicone 80 milliGRAM(s) Chew every 8 hours PRN Gas      Allergies    penicillin (Anaphylaxis)  Ceftin (Hives)  sulfa drugs (Unknown)  Bactrim (Rash)    Intolerances          VS:  T(C): 36.7 (06-19-23 @ 10:37), Max: 36.7 (06-19-23 @ 00:46)  HR: 64 (06-19-23 @ 10:37) (64 - 80)  BP: 127/76 (06-19-23 @ 10:37) (102/63 - 127/76)  RR: 16 (06-19-23 @ 10:37) (16 - 20)  SpO2: 98% (06-19-23 @ 10:37) (94% - 98%)  Wt(kg): --        Post procedure VS:  HR: 74  BP: 132/73  RR: 18  SpO2: 95%      Exam:  General: NAD, A&O x 3  Card: S1/S2, RRR, no m/g/r  Resp: lungs CTA b/l  Abd: S/NT/ND  Groins: hemostatic sutures in place; sites C/D/I; no bleeding, hematoma, erythema, exudate or edema  Ext: no edema; distal pulses intact    I/O's    Input: 2112    LIBBY: Deferred. STACY cleared with ICE catheter.    ECG: Sinus rhythm with intact conduction. 1st degree AVB (), RBBB (), LAFB    Assessment:   76 y/o male with PMHx significant for HTN, hyperlipidemia, DM type 2, obesity BMI 38, thoracic aortic aneurysm, ALLIE, BPH s/p TURP, left renal mass, bifascicular block (RBBB + LAFB), newly diagnosed HFrEF (LVEF 25%, though previously had AF-mediated CM which improved after CV), known moderate AS >worse (?severe) on TTE 6/11/23 (cath 1/2023 - 25mm LV-Ao gradient on pullback.  CIELO 1.4cm2), and persistent AF/AFL. Pt initially presented with severe abdominal pain found to have hepatomegaly with early cirrhosis. Noted to be in AF/AFL with RVR and EP consulted. Pt self converted to sinus rhythm on 6/15/23 but has since converted back to AFL, but remains asymptomatic and HD stable. Rhythm control options with AAD are very limited given baseline wide QRS, and hepatic dysfunction. Pt is now status post uncomplicated radiofrequency ablation of atrial flutter (WACA/PVI, posterior box, mitral line, and CTI line), access achieved via b/l femoral veins, hemostasis achieved with figure of 8 sutures b/l. Pt denies any complaints post procedure.       Plan:    1.  pAF/AFL w/ RVR s/p successful AFL ablation   - Stop Digoxin  - c/w Metoprolol 25mg Q6HR.  - avoid amiodarone   - Resume Eliquis 5mg Q12HR starting at 2100 this evening   - DO NOT HOLD, INTERRUPT OR REVERSE ANTICOAGULATION WITHOUT EXPLICIT APPROVAL FROM EP SERVICE  - Will need to remain on uninterrupted anticoagulation until cleared by EP service   - Bedrest x 4 hours, then OOB with assistance and progress as tolerated.   - Groin sutures to be removed by EP service in AM.   - Radial art line to be removed once pt fully awake with stable vitals >1 hour.  - Start Protonix 40mg BID x 2 weeks then daily X 6 weeks.     - Start Carafate 1gm BID x 2 weeks.   - Strict I/Os.  - Please encourage incentive spirometry and ambulation once able.    2. HFrEF & moderate to severe AS  - continued plan and work up per primary medical team LICMA  - newly reduced EF 25% likely AF/tachycardia mediated   - [facility] 1/2023:         - Left dominant system          - Patent prox and mid LAD stents          - Moderate stenosis of the distal LCx          - The RCA is a small, non-dominat vessel.  The proximal segment is occluded with brisk right-to-right collaterals.         - 25mm LV-Ao gradient on pullback.  CIELO 1.4cm2 with moderate AS byecho.  - diuresis as per cardiology  - GDMT: lasix, metoprolol, will need ACEI/ARB/ARNI as BP and creat tolerate=  - Additional 40mg IV lasix this evening for post operative diuresis  - s/p cardiomems 6/16/23    Case discussed with Dr. Amato, full recommendations to follow

## 2023-06-19 NOTE — DISCHARGE NOTE PROVIDER - NSDCFUADDINST_GEN_ALL_CORE_FT
Follow up with Dr. Amato in 2 - 4 weeks. Our office will contact you in 3-5 days to schedule this appointment. Please call [phone] with questions or concerns.

## 2023-06-19 NOTE — PROGRESS NOTE ADULT - SUBJECTIVE AND OBJECTIVE BOX
Pt seen and examined in CCL hold room room bed #6 in anticipation for AF/AFL ablation with Dr. Amato. Pt confirms last PO intake > 8 hours PTA (besides meds with sips of water). Eliquis dose held yesterday evening in anticipation of procedure. Pt reports no complaints at time of assessment this morning. Will maintain NPO status and continue to monitor pending procedure. Pt seen and examined in CCL hold room room bed #6 in anticipation for AF/AFL ablation with Dr. Amato. Pt confirms last PO intake > 8 hours PTA (besides meds with sips of water). Eliquis dose held yesterday evening as advised by primary cardiology team in anticipation of procedure. Pt reports no complaints at time of assessment this morning. Will maintain NPO status and continue to monitor pending procedure.

## 2023-06-19 NOTE — DISCHARGE NOTE PROVIDER - PROVIDER TOKENS
PROVIDER:[TOKEN:[[id number]]] PROVIDER:[TOKEN:[[id number]]],PROVIDER:[TOKEN:[[id number]]],PROVIDER:[TOKEN:[[id number]]]

## 2023-06-19 NOTE — PROGRESS NOTE ADULT - SUBJECTIVE AND OBJECTIVE BOX
ANIA CRISTOBAL  ----------------------------------------  The patient was seen at bedside. Patient with heart failure and atrial fibrillation. Offered no complaints.    Vital Signs Last 24 Hrs  T(C): 36.7 (19 Jun 2023 10:37), Max: 36.8 (18 Jun 2023 15:20)  T(F): 98 (19 Jun 2023 10:37), Max: 98.2 (18 Jun 2023 15:20)  HR: 64 (19 Jun 2023 10:37) (64 - 80)  BP: 127/76 (19 Jun 2023 10:37) (102/63 - 127/76)  BP(mean): --  RR: 16 (19 Jun 2023 10:37) (16 - 20)  SpO2: 98% (19 Jun 2023 10:37) (94% - 98%)    Parameters below as of 19 Jun 2023 10:37  Patient On (Oxygen Delivery Method): nasal cannula    CAPILLARY BLOOD GLUCOSE  POCT Blood Glucose.: 145 mg/dL (19 Jun 2023 11:31)  POCT Blood Glucose.: 147 mg/dL (19 Jun 2023 07:59)  POCT Blood Glucose.: 197 mg/dL (18 Jun 2023 22:31)  POCT Blood Glucose.: 152 mg/dL (18 Jun 2023 17:46)    PHYSICAL EXAMINATION:  ----------------------------------------  General appearance: No acute distress, Awake, Alert  HEENT: Normocephalic, Atraumatic, Conjunctiva clear, EOMI  Neck: Supple, No JVD, No tenderness  Lungs: Breath sound equal bilaterally, No wheezes, No rales  Cardiovascular: S1S2, Irregular rhythm  Abdomen: Soft, Nontender, Nondistended, No guarding/rebound, Positive bowel sounds  Extremities: No clubbing, No cyanosis, No edema, No calf tenderness  Neuro: Strength equal bilaterally, No tremors  Psychiatric: Appropriate mood, Normal affect    LABORATORY STUDIES:  ----------------------------------------             14.2   6.05  )-----------( 243      ( 19 Jun 2023 04:55 )             46.7     06-19    140  |  101  |  19.8  ----------------------------<  177<H>  4.2   |  32.0<H>  |  0.64    Ca    8.4      19 Jun 2023 04:55  Mg     2.0     06-19    PT/INR - ( 19 Jun 2023 04:55 )   PT: 14.1 sec;   INR: 1.21 ratio    PTT - ( 19 Jun 2023 04:55 )  PTT:34.6 sec    MEDICATIONS  (STANDING):  albuterol    0.083% 2.5 milliGRAM(s) Nebulizer every 6 hours  aspirin  chewable 81 milliGRAM(s) Oral daily  atorvastatin 20 milliGRAM(s) Oral at bedtime  chlorhexidine 2% Cloths 1 Application(s) Topical <User Schedule>  dextrose 5%. 1000 milliLiter(s) (100 mL/Hr) IV Continuous <Continuous>  dextrose 5%. 1000 milliLiter(s) (50 mL/Hr) IV Continuous <Continuous>  dextrose 50% Injectable 25 Gram(s) IV Push once  dextrose 50% Injectable 12.5 Gram(s) IV Push once  dextrose 50% Injectable 25 Gram(s) IV Push once  digoxin     Tablet 125 MICROGram(s) Oral daily  doxazosin 2 milliGRAM(s) Oral at bedtime  finasteride 5 milliGRAM(s) Oral daily  fluticasone propionate 50 MICROgram(s)/spray Nasal Spray 1 Spray(s) Both Nostrils two times a day  furosemide    Tablet 40 milliGRAM(s) Oral daily  glucagon  Injectable 1 milliGRAM(s) IntraMuscular once  insulin lispro (ADMELOG) corrective regimen sliding scale   SubCutaneous three times a day before meals  insulin lispro (ADMELOG) corrective regimen sliding scale   SubCutaneous at bedtime  metoprolol tartrate 25 milliGRAM(s) Oral every 6 hours  pantoprazole    Tablet 40 milliGRAM(s) Oral before breakfast  polyethylene glycol 3350 17 Gram(s) Oral daily  potassium chloride   Powder 20 milliEquivalent(s) Oral daily  senna 2 Tablet(s) Oral at bedtime    MEDICATIONS  (PRN):  ALPRAZolam 0.5 milliGRAM(s) Oral three times a day PRN anxiety  aluminum hydroxide/magnesium hydroxide/simethicone Suspension 30 milliLiter(s) Oral every 4 hours PRN Dyspepsia  bisacodyl 5 milliGRAM(s) Oral every 12 hours PRN Constipation  dextrose Oral Gel 15 Gram(s) Oral once PRN Blood Glucose LESS THAN 70 milliGRAM(s)/deciliter  diphenhydrAMINE Injectable 50 milliGRAM(s) IV Push once PRN Allergic Reaction  meclizine 12.5 milliGRAM(s) Oral three times a day PRN Dizziness  melatonin 3 milliGRAM(s) Oral at bedtime PRN Insomnia  ondansetron Injectable 4 milliGRAM(s) IV Push every 8 hours PRN Nausea and/or Vomiting  oxyCODONE    IR 5 milliGRAM(s) Oral every 4 hours PRN Severe Pain (7 - 10)  simethicone 80 milliGRAM(s) Chew every 8 hours PRN Gas      ASSESSMENT / PLAN:  ----------------------------------------  78 y/o male with PMH of afib on Eliquis (s/p Cardioversion on 1/31/23), Moderate AS, Chronic Diastolic Heart Failure, CAD s/p PCI, HTN, HLD, DM 2, Morbid Obesity, ALLIE, Thoracic Aortic Aneurysm, BPH s/p TURP, Left Renal Mass was sent to the ED from [facility] for abdominal pain x 4 days. Patient reported pain on the right side of the abdomen (upper and lower), 10/10, non-radiating. Patient reported feeling congested, noted cough but not productive.   CT C/A/P: No PE; Trace bilateral pleural effusions with adjacent patchy bibasilar opacities, likely represent dependent atelectasis vs infection. Hepatomegaly. Liver surface nodularity. Hepatitis or cirrhosis considered in differential. Trace ascites. Mild bladder wall thickening with suggestion of left posterolateral diverticulum.  Patient had a tte showing reduced ef and is npo for rhc and cardiomems today    Acute systolic heart failure - Continue on metoprolol. Monitoring urine output while on furosemide. Status post right heart catheterization and CardioMEMS. Discussed with Cardiology.    Atrial fibrillation - On apixaban for anticoagulation. On digoxin and metoprolol. Planned for ablation on today.    Anxiety - On alprazolam as needed.    Benign prostatic hyperplasia - Continue with finasteride and doxazosin.    Diabetes - Insulin coverage, close monitoring of blood glucose levels.    Hypertension / Coronary artery disease - Close blood pressure monitoring. On aspirin and atorvastatin.    Hepatomegaly - Sludge noted on ultrasound. Official MRI results to be reviewed when available. Diet as tolerated. Bilirubin was not elevated and without transaminitis.    Obstructive sleep apnea - Not on nocturnal CPAP at home. Monitoring oxygen levels.

## 2023-06-19 NOTE — PROGRESS NOTE ADULT - REASON FOR ADMISSION
AF RVR / ABD pain
HF exacerbation / Abd pain
AFL RVR / abdominal pain
chf and abd pain
chf, afib
per HPI
chf
chf and abd pain
chf and abd pain
SOF, right sided pain

## 2023-06-19 NOTE — DISCHARGE NOTE PROVIDER - HOSPITAL COURSE
77M presented from the assisted living facility with a four day history of abdominal pain. CT angiogram of the chest noted tracheomalacia which was previously seen, trace bilateral pleural effusions with adjacent dependent atelectasis, no pulmonary embolism, noted cardiomegaly, no pericardial effusion. CT of the abdomen noted enlarged nodular liver, enlarged spleen, diffuse fatty atrophy of the pancreas, no acute bowel inflammatory change or obstruction, trace ascites, small bilateral fat containing groin hernias. Intravenous fluids and empiric antibiotics were administered for hypotension. Abdominal ultrasound noted hepatomegaly, biliary sludge in the contracted gallbladder, nonspecific gallbladder wall thickening, no acute cholecystitis. The patient was seen by Cardiology in consultation with recommendation for diuresis if blood pressure allowed. Echocardiogram noted moderately to severely decreased global left ventricular systolic function, ejection fraction of 25 to 30%, mildly enlarged right ventricular size, moderately reduced right ventricular systolic function, possible low flow low gradient severe aortic stenosis. Blood cultures were without growth. Urine culture was without growth. The patient was seen by Gastroenterology in consultation for hepatomegaly with recommendations for further testing and follow up with Hepatology on an outpatient basis. The patient had tachycardia and medications were adjusted. He was seen by Electrophysiology in consultation and digoxin was initiated. The patient had persistent abdominal pain and right upper quadrant ultrasound noted enlarged, cirrhotic liver. Cardiac CT noted elevated coronary artery calcium score suggestive of significant atherosclerotic burden. The patient underwent cardiac catheterization with implantation of CardioMEMS. He was seen by Cardiothoracic Surgery for evaluation of possible aortic valve replacement. MRCP noted early morphologic changes of cirrhosis, distended gallbladder with a few tiny gallstones, multiple tiny pancreatic cysts without main duct dilatation, small ascites, moderate area of inflamed fat in the right upper quadrant omentum suspicious for an omental infarct, The patient underwent radiofrequency atrial fibrillation ablation by Electrophysiology resulting in sinus rhythm. Repeat echocardiogram noted normal global left ventricular systolic function, no pericardial effusion, mild aortic regurgitation. The patient remained in sinus rhythm. The patient reported headache and CT of the head was without acute pathology. He was evaluated by Physical Therapy and thought to be appropriate for return to the assisted living facility with home physical therapy. The patient was discharged to return to the assisted living facility with instructions to follow up with Cardiology, Cardiothoracic Surgeyr, and Hepatology for further management.      35 minutes total time

## 2023-06-19 NOTE — PROGRESS NOTE ADULT - SUBJECTIVE AND OBJECTIVE BOX
ANIA ELISAELSY  [id number]      Chief Complaint:   follow up new CMP/AF      Subjective: feeling better today, looking forward to ablation tomorrow     24 hour Tele: AF rate controlled       albuterol    0.083% 2.5 milliGRAM(s) Nebulizer every 6 hours  ALPRAZolam 0.5 milliGRAM(s) Oral three times a day PRN  aluminum hydroxide/magnesium hydroxide/simethicone Suspension 30 milliLiter(s) Oral every 4 hours PRN  aspirin  chewable 81 milliGRAM(s) Oral daily  atorvastatin 20 milliGRAM(s) Oral at bedtime  bisacodyl 5 milliGRAM(s) Oral every 12 hours PRN  chlorhexidine 2% Cloths 1 Application(s) Topical <User Schedule>  dextrose 5%. 1000 milliLiter(s) IV Continuous <Continuous>  dextrose 5%. 1000 milliLiter(s) IV Continuous <Continuous>  dextrose 50% Injectable 25 Gram(s) IV Push once  dextrose 50% Injectable 12.5 Gram(s) IV Push once  dextrose 50% Injectable 25 Gram(s) IV Push once  dextrose Oral Gel 15 Gram(s) Oral once PRN  digoxin     Tablet 125 MICROGram(s) Oral daily  diphenhydrAMINE Injectable 50 milliGRAM(s) IV Push once PRN  doxazosin 2 milliGRAM(s) Oral at bedtime  finasteride 5 milliGRAM(s) Oral daily  fluticasone propionate 50 MICROgram(s)/spray Nasal Spray 1 Spray(s) Both Nostrils two times a day  furosemide    Tablet 40 milliGRAM(s) Oral daily  glucagon  Injectable 1 milliGRAM(s) IntraMuscular once  insulin lispro (ADMELOG) corrective regimen sliding scale   SubCutaneous three times a day before meals  insulin lispro (ADMELOG) corrective regimen sliding scale   SubCutaneous at bedtime  meclizine 12.5 milliGRAM(s) Oral three times a day PRN  melatonin 3 milliGRAM(s) Oral at bedtime PRN  metoprolol tartrate 25 milliGRAM(s) Oral every 6 hours  ondansetron Injectable 4 milliGRAM(s) IV Push every 8 hours PRN  oxyCODONE    IR 5 milliGRAM(s) Oral every 4 hours PRN  pantoprazole    Tablet 40 milliGRAM(s) Oral before breakfast  polyethylene glycol 3350 17 Gram(s) Oral daily  potassium chloride   Powder 20 milliEquivalent(s) Oral daily  senna 2 Tablet(s) Oral at bedtime  simethicone 80 milliGRAM(s) Chew every 8 hours PRN          Physical Exam:  T(C): 36.7 (06-19-23 @ 08:53), Max: 36.8 (06-18-23 @ 15:20)  HR: 80 (06-19-23 @ 08:53) (71 - 80)  BP: 106/68 (06-19-23 @ 08:53) (102/63 - 125/65)  RR: 18 (06-19-23 @ 08:53) (18 - 20)  SpO2: 98% (06-19-23 @ 08:53) (94% - 98%)  Wt(kg): --  General: Comfortable in NAD  Neck: No JVD  CVS: nl s1s2, no s3  Pulm: CTA b/l  Abd: soft, non-tender  Ext: No c/c/e  Neuro A&O x3  Psych: Normal affect      Labs:   19 Jun 2023 04:55    140    |  101    |  19.8   ----------------------------<  177    4.2     |  32.0   |  0.64     Ca    8.4        19 Jun 2023 04:55  Mg     2.0       19 Jun 2023 04:55                            14.2   6.05  )-----------( 243      ( 19 Jun 2023 04:55 )             46.7     PT/INR - ( 19 Jun 2023 04:55 )   PT: 14.1 sec;   INR: 1.21 ratio         PTT - ( 19 Jun 2023 04:55 )  PTT:34.6 sec          [facility] 1/2023:  Coronary Angiography   - Left dominant system   - Patent prox and mid LAD stents   - Moderate stenosis of the distal LCx   - The RCA is a small, non-dominat vessel.  The proximal segment is occluded with brisk right-to-right collaterals.  - 25mm LV-Ao gradient on pullback.  CIELO 1.4cm2 with moderate AS byecho.  - Moderately reduced LVEF by echo       LM   Left main artery: Angiography shows minor irregularities.    LAD   Proximal left anterior descending: There is a 10 % stenosis within the  stented segment. Mid left anterior descending: There is a10 % stenosis within the stented segment.    CX   Distal circumflex: There is a 60 % stenosis. VALENTINO Flow 3.    RCA   Proximal right coronary artery: There is a 100 % stenosis. There is  good collateral blood supply to the distal myocardium. This  lesion is a chronic total occlusion.  This is a small non-dominant  vessel.  Ramus   Ramus intermedius: Angiography shows mild atherosclerosis.      Left Heart Cath   Ejection fraction was calculated by echo with a value of 40-45%. LV to  AO pullback was performed and there is a pressure gradient of 25 mmHg.      ECG:  SR with 1st degree AVB with RBBB/LAFB.  No ST-T wave changes.  PRWP.            RADIOLOGY:  CT A/P:  No pulmonary embolism though evaluation of subsegmental branches limited   secondary to respiratory motion artifact.    Trace bilateral pleural effusions with adjacent patchy bibasilar   opacities, likely represent dependent atelectasis. Recommend clinical   correlation to assess for superimposed infection.    Hepatomegaly. Liver surface nodularity. Hepatitis or cirrhosis considered   in differential. Correlate with clinical parameters.    Trace ascites.    Mild bladder wall thickening with suggestion of left posterolateral   diverticulum. Correlate with urinalysis andlab values to assess for   cystitis and/or ascending urinary tract infection.    Additional findings as mentioned above.    ECHO 1/2023:   1. Technically difficult study.   2. Tachycardic throughout the study and this may preclude true assessment of LVEF.   3. Left ventricular ejection fraction, by visual estimation, is 40 to 45%.   4. Moderately decreased global left ventricular systolic function.   5. The mitral in-flow pattern reveals no discernable A-wave, therefore   no comment on diastolic function can be made.   6. Mildly enlarged left atrium.   7. Normal right atrial size.   8. Mild tricuspid regurgitation.   9. Mild thickening and calcification of the anterior mitral valve leaflet.  10. Mild to moderate mitral annular calcification.  11. Trace mitral valve regurgitation.  12. Moderate aortic valve stenosis, with peak velocity of 2.87 m/s, mean   gradient of 18 mmHg, dimensionless index of 0.29, and CIELO via VTI of 1.42   sqcm.  13. Dilatation of the sinuses of Valsalva, measures 4.15 cm but based on   BSA 1.6cm/sqm is within normal limits.  14. There is a significant pericardial fat pad present.  15. There isno evidence of pericardial effusion.  16. Endocardial visualization was enhanced with intravenous echo contrast.  17. Compared to prior TTE done on 7/6/2022, the left ventricular function   is now reduced to 40-45%, in the setting of Afib wth RVR (prior LVEF   60-65%).      [facility] 1/2023:   1. Left ventricular ejection fraction, by visual estimation, is 40 to 45%.   2. Mildly decreased global left ventricular systolic function.   3. Normal right ventricular size and function.   4. Mildly enlarged left atrium.   5. Mild mitral annular calcification.   6. Mild mitral valve regurgitation.   7. Thickening of the anterior and posterior mitral valve leaflets.   8. Mild tricuspid regurgitation.   9. Mild aortic regurgitation.  10. Mild to moderate aortic valvestenosis.  11. CIELO by planimetry 1.2cm2.  12. Aortic root measured at Sinus of Valsalva is dilated. 4.1cm  13. No left atrial appendage thrombus.  14. Color flow doppler and intravenous injection of agitated saline   demonstrates the presence of an intact intra atrial septum.    Echo (3/78232):   1. Left ventricular ejection fraction, by visual estimation, is 60 to 65%.   2. Normal right ventricular size and function.   3. There is no evidence of pericardial effusion.   4. Endocardial visualizationwas enhanced with intravenous echo contrast.    Echo personally reviewed:   1. Left ventricularejection fraction, by visual estimation, is 35-40%.   2. Moderately decreased global left ventricular systolic   function.   3. There is mild concentric left ventricular hypertrophy.   4. Mildly enlarged right ventricle.   5. Moderately reduced RV systolic function.   6. Normal left atrial size.   7. Normal right atrial size.   8. There is no evidence of pericardial effusion.   9. Peak Gradient across the Aortic Valve is 35 mm Hg. Mean gradient is 26 mm Hg. DI 0.25.  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    Calcium Score:  Cardiac:  Total Coronary Artery Calcium Score = 5373.  >90th percentile.   Significant calcification. Significant atherosclerotic burden.  Aortic calcification    Non Cardiac:  1.  Small bilateral pleural effusions and lower lobe passive atelectasis.  2.  Ascending aorta measures 4.8 cm at the main pulmonary artery.      Assessment:  76 y/o male PMHx AF on Eliquis (s/p Cardioversion on 1/31/23), moderate-severe AS, Chronic Diastolic Heart Failure, CAD s/p PCI, HTN, HLD, DM 2, Morbid Obesity, ALLIE, Thoracic Aortic Aneurysm, BPH s/p TURP, Left Renal Mass was sent to the ED from [facility] living for abdominal pain x 4 days. Patient found to have UTI.  Echo performed noted significant drop in EF.  CT with hepatomegaly appearing ?significantly larger than CT from March.  ?2/2 CPC but would consider other causes such as hepatitis.  Appears somewhat fluid overloaded but not in severe acute CHF.  -UTI on admission on antibiotics, biliary colic 2/2 sludge  -significant hepatosplenomegaly , evaluation with GI. Will need hepatology as outpatient came in symptomatic from hepatomegaly with RUQ pain, better now  -Acute systolic CHF being diuresed, now on PO   -Also now back in AF, rate controlled.  ?Contributing to new drop in EF.  ?2/2 stress myopathy with acute infection and new liver dysfunction.  Cath in January with  RCA, 60% distal LCX disease and unlikely to be contributing to CMP. Will need to consider repeat this admission after further diuresis   -Also with significant AS, ?severe LFLG potentially contributing but fairly similar to prior echos. Has had some fluctuation in EF and when i AF this past January had drop in EF with improvement in 3/2023. Now drop again with recurrent AF and suspect contributing factor.  On monitor some SR with 1st degree AV delay then in rapid AF , improved with increase metoprolol and adding digoxin, was back in SR but now AF again   -Back in rapid AF 6/17/2023, exacerbated as well by anxiety. Metoprolol changed to 25mg q6, digoxin given. Plan for ablation with EP tomorrow    Plan:   1. Back in AF, continue metopolol and digoxin at current doses. Rates up when anxious.  EP recs appreciated plan for ablation today   2. continue po daily lasix 40mg given well diuresed and PCWP 17 on RHC 6/16/2023. HAs CardioMEMS,, to be followed by [facility] as outpatient   3. Keep off Amio and statin, GI follow up, Planned MRCP.  4. Continue other current CV meds at current doses and med management of CAD   5. start low dose entresto post ablation if BP tolerates  6. Personally performed CAC of AV (>2K) which represents likely severe AS . Ultimately consider TAVR . CMP likely due to AF/tachy mediated

## 2023-06-19 NOTE — CONSULT NOTE ADULT - SUBJECTIVE AND OBJECTIVE BOX
Surgeon: Karthikeyan    Consult requesting by: Hank    HISTORY OF PRESENT ILLNESS:  77y Male with PMH of afib on Eliquis (s/p Cardioversion on 1/31/23), Moderate AS, Chronic Diastolic Heart Failure, CAD s/p PCI, HTN, HLD, DM 2, Morbid Obesity, ALLIE, Thoracic Aortic Aneurysm, BPH s/p TURP, Left Renal Mass was sent to the ED from [facility] 6/10 for abdominal pain x 4 days.  Patient reported pain on the right side of the abdomen (upper and lower).  Patient found to have UTI.  Echo performed noted significant drop in EF, severe AS.  CT with hepatomegaly appearing ?significantly larger than CT from March.   Also with intermittent afib which cardiology suspects is the reason for the drop in EF on TTE.  Plan for cardioversion today with EP.   CT surgery consulted for TAVR evaluation.     Pt had LHC in January that showed 60% dcx disease and 100% pRCA with collateral circulation.  LIBBY in January with moderate to severe AS and mild AI.            PAST MEDICAL & SURGICAL HISTORY:  Anxiety      Hypertension      Hyperlipidemia      Prostate disease      Gastroesophageal reflux disease      Gallbladder stone without cholecystitis or obstruction      BPH (benign prostatic hyperplasia)      DM (diabetes mellitus)      DVT (deep venous thrombosis)  left leg      Afib      Anxiety      HLD (hyperlipidemia)      Malignant schwannoma      H/O arthroscopy of knee      S/P laminectomy      S/P coronary artery stent placement  x2          MEDICATIONS  (STANDING):  albuterol    0.083% 2.5 milliGRAM(s) Nebulizer every 6 hours  aspirin  chewable 81 milliGRAM(s) Oral daily  atorvastatin 20 milliGRAM(s) Oral at bedtime  chlorhexidine 2% Cloths 1 Application(s) Topical <User Schedule>  dextrose 5%. 1000 milliLiter(s) (100 mL/Hr) IV Continuous <Continuous>  dextrose 5%. 1000 milliLiter(s) (50 mL/Hr) IV Continuous <Continuous>  dextrose 50% Injectable 25 Gram(s) IV Push once  dextrose 50% Injectable 12.5 Gram(s) IV Push once  dextrose 50% Injectable 25 Gram(s) IV Push once  digoxin     Tablet 125 MICROGram(s) Oral daily  doxazosin 2 milliGRAM(s) Oral at bedtime  finasteride 5 milliGRAM(s) Oral daily  fluticasone propionate 50 MICROgram(s)/spray Nasal Spray 1 Spray(s) Both Nostrils two times a day  furosemide    Tablet 40 milliGRAM(s) Oral daily  glucagon  Injectable 1 milliGRAM(s) IntraMuscular once  insulin lispro (ADMELOG) corrective regimen sliding scale   SubCutaneous three times a day before meals  insulin lispro (ADMELOG) corrective regimen sliding scale   SubCutaneous at bedtime  metoprolol tartrate 25 milliGRAM(s) Oral every 6 hours  pantoprazole    Tablet 40 milliGRAM(s) Oral before breakfast  polyethylene glycol 3350 17 Gram(s) Oral daily  potassium chloride   Powder 20 milliEquivalent(s) Oral daily  senna 2 Tablet(s) Oral at bedtime    MEDICATIONS  (PRN):  ALPRAZolam 0.5 milliGRAM(s) Oral three times a day PRN anxiety  aluminum hydroxide/magnesium hydroxide/simethicone Suspension 30 milliLiter(s) Oral every 4 hours PRN Dyspepsia  bisacodyl 5 milliGRAM(s) Oral every 12 hours PRN Constipation  dextrose Oral Gel 15 Gram(s) Oral once PRN Blood Glucose LESS THAN 70 milliGRAM(s)/deciliter  diphenhydrAMINE Injectable 50 milliGRAM(s) IV Push once PRN Allergic Reaction  meclizine 12.5 milliGRAM(s) Oral three times a day PRN Dizziness  melatonin 3 milliGRAM(s) Oral at bedtime PRN Insomnia  ondansetron Injectable 4 milliGRAM(s) IV Push every 8 hours PRN Nausea and/or Vomiting  oxyCODONE    IR 5 milliGRAM(s) Oral every 4 hours PRN Severe Pain (7 - 10)  simethicone 80 milliGRAM(s) Chew every 8 hours PRN Gas    Antiplatelet therapy:                           Last dose/amt:    Allergies    penicillin (Anaphylaxis)  Ceftin (Hives)  sulfa drugs (Unknown)  Bactrim (Rash)    Intolerances        SOCIAL HISTORY:  Substance use	No  Former cigarette use stopped 3 years ago, no alcohol or illicit drug use.  Resides in [facility] assisted living      FAMILY HISTORY:  FH: heart disease (Father)        Review of Systems  CONSTITUTIONAL:  Fevers[ ] chills[ ] sweats[ ] fatigue[ ] weight loss[ ] weight gain [ ]                                     NEGATIVE [ ]   NEURO:  parathesias[ ] seizures [ ]  syncope [ ]  confusion [ ]                                                                                NEGATIVE[ ]   EYES: glasses[ ]  blurry vision[ ]  discharge[ ] pain[ ] glaucoma [ ]                                                                          NEGATIVE[ ]   ENMT:  difficulty hearing [ ]  vertigo[ ]  dysphagia[ ] epistaxis[ ] recent dental work [ ]                                    NEGATIVE[ ]   CV:  chest pain[ ] palpitations[ ] CRUZ [ ] diaphoresis [ ]                                                                                           NEGATIVE[ ]   RESPIRATORY:  wheezing[ ] SOB[ ] cough [ ] sputum[ ] hemoptysis[ ]                                                                  NEGATIVE[ ]   GI:  nausea[ ]  vommiting [ ]  diarrhea[ ] constipation [ ] melena [ ]                                                                      NEGATIVE[ ]   : hematuria[ ]  dysuria[ ] urgency[ ] incontinence[ ]                                                                                            NEGATIVE[ ]   MUSKULOSKELETAL:  arthritis[ ]  joint swelling [ ] muscle weakness [ ]                                                                NEGATIVE[ ]   SKIN/BREAST:  rash[ ] itching [ ]  hair loss[ ] masses[ ]                                                                                              NEGATIVE[ ]   PSYCH:  dementia [ ] depresion [ ] anxiety[ ]                                                                                                               NEGATIVE[ ]   HEME/LYMPH:  bruises easily[ ] enlarged lymph nodes[ ] tender lymph nodes[ ]                                               NEGATIVE[ ]   ENDOCRINE:  cold intolerance[ ] heat intolerance[ ] polydipsia[ ]                                                                          NEGATIVE[ ]     PHYSICAL EXAM  Vital Signs Last 24 Hrs  T(C): 36.7 (19 Jun 2023 10:37), Max: 36.8 (18 Jun 2023 15:20)  T(F): 98 (19 Jun 2023 10:37), Max: 98.2 (18 Jun 2023 15:20)  HR: 64 (19 Jun 2023 10:37) (64 - 80)  BP: 127/76 (19 Jun 2023 10:37) (102/63 - 127/76)  BP(mean): --  RR: 16 (19 Jun 2023 10:37) (16 - 20)  SpO2: 98% (19 Jun 2023 10:37) (94% - 98%)    Parameters below as of 19 Jun 2023 10:37  Patient On (Oxygen Delivery Method): nasal cannula        CONSTITUTIONAL:                                                                          WNL[ ]   Neuro: WNL[ ] Normal exam oriented to person/place & time with no focal motor or sensory  deficits. Other                     Eyes: WNL[ ]   Normal exam of conjunctiva & lids, pupils equally reactive. Other     ENT: WNL[ ]    Normal exam of nasal/oral mucosa with absence of cyanosis. Other  Neck: WNL[ ]  Normal exam of jugular veins, trachea & thyroid. Other  Chest: WNL[ ] Normal lung exam with good air movement absence of wheezes, rales, or rhonchi: Other                                                                                CV:  Auscultation: normal [ ] S3[ ] S4[ ] Irregular [ ] Rub[ ] Clicks[ ]    Murmurs none:[ ]systolic [ ]  diastolic [ ] holosystolic [ ]  Carotids: No Bruits[ ] Other                 Abdominal Aorta: normal [ ] nonpalpable[ ]Other                                                                                      GI:           WNL[ ] Normal exam of abdomen, liver & spleen with no noted masses or tenderness. Other                                                                                                        Extremities: WNL[ ] Normal no evidence of cyanosis or deformity Edema: none[ ]trace[ ]1+[ ]2+[ ]3+[ ]4+[ ]  Lower Extremity Pulses: Right[ ] Left[ ]Varicosities[ ]  SKIN :WNL[ ] Normal exam to inspection & palation. Other:                                                          LABS:                        14.2   6.05  )-----------( 243      ( 19 Jun 2023 04:55 )             46.7     06-19    140  |  101  |  19.8  ----------------------------<  177<H>  4.2   |  32.0<H>  |  0.64    Ca    8.4      19 Jun 2023 04:55  Mg     2.0     06-19      PT/INR - ( 19 Jun 2023 04:55 )   PT: 14.1 sec;   INR: 1.21 ratio         PTT - ( 19 Jun 2023 04:55 )  PTT:34.6 sec        < from: TTE Echo Limited or F/U (06.11.23 @ 11:44) >      Summary:   1. Left ventricularejection fraction, by visual estimation, is 25 to   30%.   2. Moderately to severely decreased global left ventricular systolic   function.   3. There is mild concentric left ventricular hypertrophy.   4. Mildly enlarged right ventricle.   5. Moderately reduced RV systolic function.   6. Normal left atrial size.   7. Normal right atrial size.   8. There is no evidence of pericardial effusion.   9. Peak Gradient across the Aortic Valve is 35 mm Hg      Mean gradient is 26 mm Hg      DI 0.25      These findings are suggestive of LFLG Severe Aortic Stenosis.  10. Recommendations: Consider Dobutamine Stress echo to differentiate   True Severe AS from Pseudo AS or CT Scan for Aortic Valve Calcium Scoring.  11. Compared to prior Echo in 3/2023, lowEF is a new finding.  12. Endocardial visualization was enhanced with intravenous echo contrast.    MD Priya Electronically signed on 6/11/2023 at 5:17:26 PM    < end of copied text >      < from: [facility] Echo Doppler (01.31.23 @ 11:21) >  Summary:   1. Left ventricular ejection fraction, by visual estimation, is 40 to   45%.   2. Mildly decreased global left ventricular systolic function.   3. Normal right ventricular size and function.   4. Mildly enlarged left atrium.   5. Mild mitral annular calcification.   6. Mild mitral valve regurgitation.   7. Thickening of the anterior and posterior mitral valve leaflets.   8. Mild tricuspid regurgitation.   9. Mild aortic regurgitation.  10. Mild to moderate aortic valvestenosis.  11. CIELO by planimetry 1.2cm2.  12. Aortic root measured at Sinus of Valsalva is dilated. 4.1cm  13. No left atrial appendage thrombus.  14. Color flow doppler and intravenous injection of agitated saline   demonstrates the presence of an intact intra atrial septum.    Nam Huynh MD Electronically signed on 1/31/2023 at 1:24:13 PM        < end of copied text >          < from: Cardiac Catheterization (01.30.23 @ 13:30) >  Diagnostic Findings:     Coronary Angiography   The coronary circulation is left dominant. Cardiac catheterization was  performed electively.    LM   Left main artery: Angiography shows minor irregularities.      LAD   Proximal left anterior descending: There is a 10 % stenosis within the  stented segment. Mid left anterior descending: There is a  10 % stenosis within the stented segment.      CX   Distal circumflex: There is a 60 % stenosis. VALENTINO Flow 3.      RCA   Proximal right coronary artery: There is a 100 % stenosis. There is  good collateral blood supply to the distal myocardium. This  lesion is a chronic total occlusion.  This is a small non-dominant  vessel.    Ramus   Ramus intermedius: Angiography shows mild atherosclerosis.      Left Heart Cath   Ejection fraction was calculated by echo with a value of 40-45%. LV to  AO pullback was performed and there is a pressure  gradient of 25 mmHg.      < end of copied text >       Surgeon: Karthikeyan    Consult requesting by: Hank    HISTORY OF PRESENT ILLNESS:  77y Male with PMH of afib on Eliquis (s/p Cardioversion on 1/31/23), Moderate AS, Chronic Diastolic Heart Failure, CAD s/p PCI, HTN, HLD, DM 2, Morbid Obesity, ALLIE, Thoracic Aortic Aneurysm, BPH s/p TURP, Left Renal Mass was sent to the ED from [facility] 6/10 for abdominal pain x 4 days.  Patient reported pain on the right side of the abdomen (upper and lower).  Patient found to have UTI.  Echo performed noted significant drop in EF, severe AS.  CT with hepatomegaly appearing ?significantly larger than CT from March.   Also with intermittent afib which cardiology suspects is the reason for the drop in EF on TTE.  Plan for cardioversion today with EP.   CT surgery consulted for TAVR evaluation.     Pt had LHC in January that showed 60% dcx disease and 100% pRCA with collateral circulation.  LIBBY in January with moderate to severe AS and mild AI.      Pt s/p Cardioversion in EP recovery.  Pt sleeping and unable to answer questions at time of exam.  NAD noted.           PAST MEDICAL & SURGICAL HISTORY:  Anxiety      Hypertension      Hyperlipidemia      Prostate disease      Gastroesophageal reflux disease      Gallbladder stone without cholecystitis or obstruction      BPH (benign prostatic hyperplasia)      DM (diabetes mellitus)      DVT (deep venous thrombosis)  left leg      Afib      Anxiety      HLD (hyperlipidemia)      Malignant schwannoma      H/O arthroscopy of knee      S/P laminectomy      S/P coronary artery stent placement  x2          MEDICATIONS  (STANDING):  albuterol    0.083% 2.5 milliGRAM(s) Nebulizer every 6 hours  aspirin  chewable 81 milliGRAM(s) Oral daily  atorvastatin 20 milliGRAM(s) Oral at bedtime  chlorhexidine 2% Cloths 1 Application(s) Topical <User Schedule>  dextrose 5%. 1000 milliLiter(s) (100 mL/Hr) IV Continuous <Continuous>  dextrose 5%. 1000 milliLiter(s) (50 mL/Hr) IV Continuous <Continuous>  dextrose 50% Injectable 25 Gram(s) IV Push once  dextrose 50% Injectable 12.5 Gram(s) IV Push once  dextrose 50% Injectable 25 Gram(s) IV Push once  digoxin     Tablet 125 MICROGram(s) Oral daily  doxazosin 2 milliGRAM(s) Oral at bedtime  finasteride 5 milliGRAM(s) Oral daily  fluticasone propionate 50 MICROgram(s)/spray Nasal Spray 1 Spray(s) Both Nostrils two times a day  furosemide    Tablet 40 milliGRAM(s) Oral daily  glucagon  Injectable 1 milliGRAM(s) IntraMuscular once  insulin lispro (ADMELOG) corrective regimen sliding scale   SubCutaneous three times a day before meals  insulin lispro (ADMELOG) corrective regimen sliding scale   SubCutaneous at bedtime  metoprolol tartrate 25 milliGRAM(s) Oral every 6 hours  pantoprazole    Tablet 40 milliGRAM(s) Oral before breakfast  polyethylene glycol 3350 17 Gram(s) Oral daily  potassium chloride   Powder 20 milliEquivalent(s) Oral daily  senna 2 Tablet(s) Oral at bedtime    MEDICATIONS  (PRN):  ALPRAZolam 0.5 milliGRAM(s) Oral three times a day PRN anxiety  aluminum hydroxide/magnesium hydroxide/simethicone Suspension 30 milliLiter(s) Oral every 4 hours PRN Dyspepsia  bisacodyl 5 milliGRAM(s) Oral every 12 hours PRN Constipation  dextrose Oral Gel 15 Gram(s) Oral once PRN Blood Glucose LESS THAN 70 milliGRAM(s)/deciliter  diphenhydrAMINE Injectable 50 milliGRAM(s) IV Push once PRN Allergic Reaction  meclizine 12.5 milliGRAM(s) Oral three times a day PRN Dizziness  melatonin 3 milliGRAM(s) Oral at bedtime PRN Insomnia  ondansetron Injectable 4 milliGRAM(s) IV Push every 8 hours PRN Nausea and/or Vomiting  oxyCODONE    IR 5 milliGRAM(s) Oral every 4 hours PRN Severe Pain (7 - 10)  simethicone 80 milliGRAM(s) Chew every 8 hours PRN Gas    Antiplatelet therapy:                           Last dose/amt:    Allergies    penicillin (Anaphylaxis)  Ceftin (Hives)  sulfa drugs (Unknown)  Bactrim (Rash)    Intolerances        SOCIAL HISTORY:  Substance use	No  Former cigarette use stopped 3 years ago, no alcohol or illicit drug use.  Resides in [facility] assisted living      FAMILY HISTORY:  FH: heart disease (Father)        Review of Systems  **Pt s/p anesthesia and sleeping unable to answer questions.  Limited ROS from chart.   CONSTITUTIONAL:  Fevers[ ] chills[ ] sweats[ ] fatigue[ ] weight loss[ ] weight gain [ ]                                     NEGATIVE [x ]   NEURO:  parathesias[ ] seizures [ ]  syncope [ ]  confusion [ ]                                                                                NEGATIVE[x ]   EYES: glasses[ ]  blurry vision[ ]  discharge[ ] pain[ ] glaucoma [ ]                                                                          NEGATIVE[ x]   ENMT:  difficulty hearing [ ]  vertigo[ ]  dysphagia[ ] epistaxis[ ] recent dental work [ ]                                    NEGATIVE[ x]   CV:  chest pain[ ] palpitations[ ] CRUZ [ ] diaphoresis [ ]                                                                                           NEGATIVE[ ]   RESPIRATORY:  wheezing[ ] SOB[x ] cough [ ] sputum[ ] hemoptysis[ ]                                                                  NEGATIVE[ ]   GI:  nausea[ ]  vommiting [ ]  diarrhea[ ] constipation [ ] melena [ ]     abd pain                                                        NEGATIVE[ ]   : hematuria[ ]  dysuria[ ] urgency[ ] incontinence[ ]                                                                                            NEGATIVE[ x]   MUSKULOSKELETAL:  arthritis[ ]  joint swelling [ ] muscle weakness [ ]                                                                NEGATIVE[x ]   SKIN/BREAST:  rash[ ] itching [ ]  hair loss[ ] masses[ ]                                                                                              NEGATIVE[x ]   PSYCH:  dementia [ ] depresion [ ] anxiety[ ]                                                                                                               NEGATIVE[x ]   HEME/LYMPH:  bruises easily[ ] enlarged lymph nodes[ ] tender lymph nodes[ ]                                               NEGATIVE[x ]   ENDOCRINE:  cold intolerance[ ] heat intolerance[ ] polydipsia[ ]                                                                          NEGATIVE[x ]     PHYSICAL EXAM  Vital Signs Last 24 Hrs  T(C): 36.7 (19 Jun 2023 10:37), Max: 36.8 (18 Jun 2023 15:20)  T(F): 98 (19 Jun 2023 10:37), Max: 98.2 (18 Jun 2023 15:20)  HR: 64 (19 Jun 2023 10:37) (64 - 80)  BP: 127/76 (19 Jun 2023 10:37) (102/63 - 127/76)  BP(mean): --  RR: 16 (19 Jun 2023 10:37) (16 - 20)  SpO2: 98% (19 Jun 2023 10:37) (94% - 98%)    Parameters below as of 19 Jun 2023 10:37  Patient On (Oxygen Delivery Method): nasal cannula        CONSTITUTIONAL:                                                                            Neuro: WNL[ ] Normal exam oriented to person/place & time with no focal motor or sensory  deficits. Other** Sleepy and unable to answer questions.                     Eyes: WNL[ x]   Normal exam of conjunctiva & lids, pupils equally reactive. Other     ENT: WNL[ x]    Normal exam of nasal/oral mucosa with absence of cyanosis. Other  Neck: WNL[x ]  Normal exam of jugular veins, trachea & thyroid. Other  Chest: WNL[ ] Normal lung exam with good air movement absence of wheezes, rales, or rhonchi: Other  *wheezing                                                                              CV:  Auscultation: normal [x ] S3[ ] S4[ ] Irregular [ ] Rub[ ] Clicks[ ]    Murmurs none:[ ]systolic [x ]  diastolic [ ] holosystolic [ ]  Carotids: No Bruits[ x] Other                 Abdominal Aorta: normal [ ] nonpalpable[x ]Other                                                                                      GI:           WNL[x ] Normal exam of abdomen, liver & spleen with no noted masses or tenderness. Other                                                                                                        Extremities: WNL[x ] Normal no evidence of cyanosis or deformity Edema: none[x ]trace[ ]1+[ ]2+[ ]3+[ ]4+[ ]  Lower Extremity Pulses: Right[pp ] Left[pp ]  SKIN :WNL[x ] Normal exam to inspection & palpation. Other:                                                          LABS:                        14.2   6.05  )-----------( 243      ( 19 Jun 2023 04:55 )             46.7     06-19    140  |  101  |  19.8  ----------------------------<  177<H>  4.2   |  32.0<H>  |  0.64    Ca    8.4      19 Jun 2023 04:55  Mg     2.0     06-19      PT/INR - ( 19 Jun 2023 04:55 )   PT: 14.1 sec;   INR: 1.21 ratio         PTT - ( 19 Jun 2023 04:55 )  PTT:34.6 sec        < from: TTE Echo Limited or F/U (06.11.23 @ 11:44) >      Summary:   1. Left ventricularejection fraction, by visual estimation, is 25 to   30%.   2. Moderately to severely decreased global left ventricular systolic   function.   3. There is mild concentric left ventricular hypertrophy.   4. Mildly enlarged right ventricle.   5. Moderately reduced RV systolic function.   6. Normal left atrial size.   7. Normal right atrial size.   8. There is no evidence of pericardial effusion.   9. Peak Gradient across the Aortic Valve is 35 mm Hg      Mean gradient is 26 mm Hg      DI 0.25      These findings are suggestive of LFLG Severe Aortic Stenosis.  10. Recommendations: Consider Dobutamine Stress echo to differentiate   True Severe AS from Pseudo AS or CT Scan for Aortic Valve Calcium Scoring.  11. Compared to prior Echo in 3/2023, lowEF is a new finding.  12. Endocardial visualization was enhanced with intravenous echo contrast.    MD Priya Electronically signed on 6/11/2023 at 5:17:26 PM    < end of copied text >      < from: [facility] Echo Doppler (01.31.23 @ 11:21) >  Summary:   1. Left ventricular ejection fraction, by visual estimation, is 40 to   45%.   2. Mildly decreased global left ventricular systolic function.   3. Normal right ventricular size and function.   4. Mildly enlarged left atrium.   5. Mild mitral annular calcification.   6. Mild mitral valve regurgitation.   7. Thickening of the anterior and posterior mitral valve leaflets.   8. Mild tricuspid regurgitation.   9. Mild aortic regurgitation.  10. Mild to moderate aortic valvestenosis.  11. CIELO by planimetry 1.2cm2.  12. Aortic root measured at Sinus of Valsalva is dilated. 4.1cm  13. No left atrial appendage thrombus.  14. Color flow doppler and intravenous injection of agitated saline   demonstrates the presence of an intact intra atrial septum.    Nam Huynh MD Electronically signed on 1/31/2023 at 1:24:13 PM        < end of copied text >          < from: Cardiac Catheterization (01.30.23 @ 13:30) >  Diagnostic Findings:     Coronary Angiography   The coronary circulation is left dominant. Cardiac catheterization was  performed electively.    LM   Left main artery: Angiography shows minor irregularities.      LAD   Proximal left anterior descending: There is a 10 % stenosis within the  stented segment. Mid left anterior descending: There is a  10 % stenosis within the stented segment.      CX   Distal circumflex: There is a 60 % stenosis. VALENTINO Flow 3.      RCA   Proximal right coronary artery: There is a 100 % stenosis. There is  good collateral blood supply to the distal myocardium. This  lesion is a chronic total occlusion.  This is a small non-dominant  vessel.    Ramus   Ramus intermedius: Angiography shows mild atherosclerosis.      Left Heart Cath   Ejection fraction was calculated by echo with a value of 40-45%. LV to  AO pullback was performed and there is a pressure  gradient of 25 mmHg.      < end of copied text >

## 2023-06-19 NOTE — DISCHARGE NOTE PROVIDER - NSDCCPCAREPLAN_GEN_ALL_CORE_FT
PRINCIPAL DISCHARGE DIAGNOSIS  Diagnosis: Hepatomegaly  Assessment and Plan of Treatment: Follow up with Hepatology for further management.      SECONDARY DISCHARGE DIAGNOSES  Diagnosis: Atrial fibrillation  Assessment and Plan of Treatment: Continue on metoprolol, amiodareone, and apixaban. Status post ablation, continue on omeprazole and colchicine.    Diagnosis: Diabetes  Assessment and Plan of Treatment: Continue on your home diabetes medications. Monitor glucose levels.    Diagnosis: Aortic stenosis  Assessment and Plan of Treatment: Follow up with Cardiothoracic Surgery for further management.     PRINCIPAL DISCHARGE DIAGNOSIS  Diagnosis: Hepatomegaly  Assessment and Plan of Treatment: Follow up with Hepatology for further management.      SECONDARY DISCHARGE DIAGNOSES  Diagnosis: Atrial fibrillation  Assessment and Plan of Treatment: Continue on metoprolol, amiodarone, and apixaban. Status post ablation, continue on omeprazole and colchicine.    Diagnosis: Diabetes  Assessment and Plan of Treatment: Continue on your home diabetes medications. Monitor glucose levels.    Diagnosis: Aortic stenosis  Assessment and Plan of Treatment: Follow up with Cardiothoracic Surgery for further management.     PRINCIPAL DISCHARGE DIAGNOSIS  Diagnosis: Hepatomegaly  Assessment and Plan of Treatment: Follow up with Hepatology for further management.      SECONDARY DISCHARGE DIAGNOSES  Diagnosis: Atrial fibrillation  Assessment and Plan of Treatment: Continue on metoprolol and apixaban. Status post ablation, continue on omeprazole and colchicine. Amiodarone was previously discontinued.    Diagnosis: Diabetes  Assessment and Plan of Treatment: Continue on your home diabetes medications. Monitor glucose levels.    Diagnosis: Aortic stenosis  Assessment and Plan of Treatment: Follow up with Cardiothoracic Surgery for further management.

## 2023-06-19 NOTE — CONSULT NOTE ADULT - ASSESSMENT
78 y/o male with PMH of afib on Eliquis (s/p Cardioversion on 1/31/23), Moderate AS, Chronic Diastolic Heart Failure, CAD s/p PCI, HTN, HLD, DM 2, Morbid Obesity, ALLIE, Thoracic Aortic Aneurysm, BPH s/p TURP, Left Renal Mass was sent to the ED from [facility] for abdominal pain x 4 days. Patient reported pain on the right side of the abdomen (upper and lower), 10/10, non-radiating. Patient reported feeling congested, noted cough but not productive.     Hepatomegaly   CT of A/P findings of Hepatomegaly. Liver surface nodularity. Hepatitis or cirrhosis considered in differential.  No liver disease in the past. Denies Heavy ETOH use or smoking. Reports previews EGD/colonoscopy in the past with EGD showing antral ulcer which he has been on Nexium on and polypectomy.   Liver enzymes normal. US of abd reviewed   Acute Hepatitis panel collected   Continue to trend LFTs   Will need OPT F/U with Hepatologist Dr. Peters for further workup   Rest of care as per primary team 
77y Male with PMH of afib on Eliquis (s/p Cardioversion on 1/31/23), Moderate AS, Chronic Diastolic Heart Failure, CAD s/p PCI, HTN, HLD, DM 2, Morbid Obesity, ALLIE, Thoracic Aortic Aneurysm, BPH s/p TURP, Left Renal Mass was sent to the ED from [facility] 6/10 for abdominal pain x 4 days.  Patient reported pain on the right side of the abdomen (upper and lower).  Patient found to have UTI.  Echo performed noted significant drop in EF, severe AS.  CT with hepatomegaly appearing ?significantly larger than CT from March.   Also with intermittent afib which cardiology suspects is the reason for the drop in EF.  Plan for cardioversion today with EP.   CT surgery consulted for TAVR evaluation.     Pt had LHC in January that showed 60% dcx disease and 100% pRCA with collateral circulation.  LIBBY in January with moderate to severe AS and mild AI.

## 2023-06-19 NOTE — DISCHARGE NOTE PROVIDER - NSDCCPTREATMENT_GEN_ALL_CORE_FT
PRINCIPAL PROCEDURE  Procedure: Radiofrequency ablation (RFA) of arrhythmogenic focus for atrial flutter  Findings and Treatment: - Bruising at the groin, sometimes extending down the leg, and/or a small lump under the skin at the groin access site is normal and will resolve within 2 – 3 weeks.   - Occasional skipped beats or palpitations that last for a few beats are common and generally resolve within 1-2 months.   - You may walk and take stairs at a regular pace.   - Do not perform any exercise more strenuous than walking for 1 week.   - Do not strain or lift heavy objects for 1 week.  - You may shower the day after the procedure.  - Do not soak in water (such as tub baths, hot tubs, swimming, etc.) for 1 week.   - You may resume all other activities the day after the procedure.  Call your doctor if:   - you notice bleeding, redness, drainage, swelling, increased tenderness or a hot sensation around the catheter insertion site.   - your temperature is greater than 100 degrees F for more than 24 hours.  - your rapid heart rhythm returns.  - you have any questions or concerns regarding the procedure.  If significant bleeding and/or a large lump (the size of a golf ball or bigger) occurs:  - Lie flat and apply continuous direct pressure just above the puncture site for at least 10 minutes  - If the issue resolves, notify your physician immediately.    - If the bleeding cannot be controlled, please seek immediate medical attention.  If you experience increased difficulty breathing or chest pain, or if you faint or have dizzy spells, please seek immediate medical attention.

## 2023-06-19 NOTE — CONSULT NOTE ADULT - PROBLEM SELECTOR RECOMMENDATION 2
Hepatomegaly on CT scan .   Per GI, possible mild cirrhosis possible DRAKE with plan for outpatient fibroscan to evaluate degree of fibrosis /cirrhosis .

## 2023-06-19 NOTE — CONSULT NOTE ADULT - PROBLEM SELECTOR RECOMMENDATION 9
Admitted with acute systolic heart failure with new drop in EF attributed to Afib.   Current plan for cardioversion today per EP.  Severe AS on TTE.  Consulted for TAVR eval.   Has cardiomems.  Had LHC in January that showed 60% dcx disease and 100% pRCA with collateral circulation.  LIBBY in January with moderate to severe AS and mild AI.  Will discuss with Dr. Napier  Plan and recommendations to follow. Admitted with acute systolic heart failure with new drop in EF attributed to Afib.   Current plan for cardioversion today per EP.  Severe AS on TTE.  Consulted for TAVR eval.   Has cardiomems.  Had LHC in January that showed 60% dcx disease and 100% pRCA with collateral circulation.  LIBBY in January with moderate to severe AS and mild AI.  Discussed with Dr. Napier  Plan and recommendations to follow. Scalpel Size: 15 blade

## 2023-06-20 LAB
ALP BONE SERPL-MCNC: 35 % — SIGNIFICANT CHANGE UP (ref 12–68)
ALP FLD-CCNC: 173 IU/L — HIGH (ref 44–121)
ALP INTEST CFR SERPL: 0 % — SIGNIFICANT CHANGE UP (ref 0–18)
ALP LIVER SERPL-CCNC: 65 % — SIGNIFICANT CHANGE UP (ref 13–88)
ANION GAP SERPL CALC-SCNC: 10 MMOL/L — SIGNIFICANT CHANGE UP (ref 5–17)
BUN SERPL-MCNC: 17.2 MG/DL — SIGNIFICANT CHANGE UP (ref 8–20)
CALCIUM SERPL-MCNC: 8.2 MG/DL — LOW (ref 8.4–10.5)
CHLORIDE SERPL-SCNC: 99 MMOL/L — SIGNIFICANT CHANGE UP (ref 96–108)
CO2 SERPL-SCNC: 32 MMOL/L — HIGH (ref 22–29)
CREAT SERPL-MCNC: 0.7 MG/DL — SIGNIFICANT CHANGE UP (ref 0.5–1.3)
EGFR: 95 ML/MIN/1.73M2 — SIGNIFICANT CHANGE UP
GLUCOSE BLDC GLUCOMTR-MCNC: 144 MG/DL — HIGH (ref 70–99)
GLUCOSE BLDC GLUCOMTR-MCNC: 145 MG/DL — HIGH (ref 70–99)
GLUCOSE BLDC GLUCOMTR-MCNC: 161 MG/DL — HIGH (ref 70–99)
GLUCOSE BLDC GLUCOMTR-MCNC: 208 MG/DL — HIGH (ref 70–99)
GLUCOSE SERPL-MCNC: 166 MG/DL — HIGH (ref 70–99)
HCT VFR BLD CALC: 44.4 % — SIGNIFICANT CHANGE UP (ref 39–50)
HGB BLD-MCNC: 13.7 G/DL — SIGNIFICANT CHANGE UP (ref 13–17)
MAGNESIUM SERPL-MCNC: 1.8 MG/DL — SIGNIFICANT CHANGE UP (ref 1.6–2.6)
MCHC RBC-ENTMCNC: 27.8 PG — SIGNIFICANT CHANGE UP (ref 27–34)
MCHC RBC-ENTMCNC: 30.9 GM/DL — LOW (ref 32–36)
MCV RBC AUTO: 90.2 FL — SIGNIFICANT CHANGE UP (ref 80–100)
PLATELET # BLD AUTO: 261 K/UL — SIGNIFICANT CHANGE UP (ref 150–400)
POTASSIUM SERPL-MCNC: 4 MMOL/L — SIGNIFICANT CHANGE UP (ref 3.5–5.3)
POTASSIUM SERPL-SCNC: 4 MMOL/L — SIGNIFICANT CHANGE UP (ref 3.5–5.3)
RBC # BLD: 4.92 M/UL — SIGNIFICANT CHANGE UP (ref 4.2–5.8)
RBC # FLD: 14.4 % — SIGNIFICANT CHANGE UP (ref 10.3–14.5)
SODIUM SERPL-SCNC: 141 MMOL/L — SIGNIFICANT CHANGE UP (ref 135–145)
WBC # BLD: 7.84 K/UL — SIGNIFICANT CHANGE UP (ref 3.8–10.5)
WBC # FLD AUTO: 7.84 K/UL — SIGNIFICANT CHANGE UP (ref 3.8–10.5)

## 2023-06-20 PROCEDURE — 99233 SBSQ HOSP IP/OBS HIGH 50: CPT

## 2023-06-20 PROCEDURE — 93010 ELECTROCARDIOGRAM REPORT: CPT

## 2023-06-20 PROCEDURE — 93308 TTE F-UP OR LMTD: CPT | Mod: 26

## 2023-06-20 RX ORDER — COLCHICINE 0.6 MG
0.6 TABLET ORAL
Refills: 0 | Status: DISCONTINUED | OUTPATIENT
Start: 2023-06-20 | End: 2023-06-23

## 2023-06-20 RX ORDER — COLCHICINE 0.6 MG
0.6 TABLET ORAL ONCE
Refills: 0 | Status: COMPLETED | OUTPATIENT
Start: 2023-06-20 | End: 2023-06-20

## 2023-06-20 RX ORDER — OXYCODONE HYDROCHLORIDE 5 MG/1
5 TABLET ORAL EVERY 4 HOURS
Refills: 0 | Status: DISCONTINUED | OUTPATIENT
Start: 2023-06-20 | End: 2023-06-23

## 2023-06-20 RX ADMIN — Medication 20 MILLIEQUIVALENT(S): at 13:28

## 2023-06-20 RX ADMIN — Medication 1 SPRAY(S): at 17:38

## 2023-06-20 RX ADMIN — Medication 2: at 17:35

## 2023-06-20 RX ADMIN — SENNA PLUS 2 TABLET(S): 8.6 TABLET ORAL at 21:53

## 2023-06-20 RX ADMIN — Medication 1 GRAM(S): at 06:13

## 2023-06-20 RX ADMIN — Medication 0.5 MILLIGRAM(S): at 11:19

## 2023-06-20 RX ADMIN — PANTOPRAZOLE SODIUM 40 MILLIGRAM(S): 20 TABLET, DELAYED RELEASE ORAL at 17:31

## 2023-06-20 RX ADMIN — Medication 1 GRAM(S): at 17:35

## 2023-06-20 RX ADMIN — Medication 2 MILLIGRAM(S): at 21:54

## 2023-06-20 RX ADMIN — Medication 25 MILLIGRAM(S): at 06:11

## 2023-06-20 RX ADMIN — CHLORHEXIDINE GLUCONATE 1 APPLICATION(S): 213 SOLUTION TOPICAL at 07:32

## 2023-06-20 RX ADMIN — Medication 1 SPRAY(S): at 06:14

## 2023-06-20 RX ADMIN — Medication 0.6 MILLIGRAM(S): at 21:54

## 2023-06-20 RX ADMIN — Medication 81 MILLIGRAM(S): at 09:12

## 2023-06-20 RX ADMIN — OXYCODONE HYDROCHLORIDE 5 MILLIGRAM(S): 5 TABLET ORAL at 06:16

## 2023-06-20 RX ADMIN — OXYCODONE HYDROCHLORIDE 5 MILLIGRAM(S): 5 TABLET ORAL at 21:53

## 2023-06-20 RX ADMIN — Medication 25 MILLIGRAM(S): at 13:31

## 2023-06-20 RX ADMIN — PANTOPRAZOLE SODIUM 40 MILLIGRAM(S): 20 TABLET, DELAYED RELEASE ORAL at 06:12

## 2023-06-20 RX ADMIN — APIXABAN 5 MILLIGRAM(S): 2.5 TABLET, FILM COATED ORAL at 21:54

## 2023-06-20 RX ADMIN — FINASTERIDE 5 MILLIGRAM(S): 5 TABLET, FILM COATED ORAL at 13:31

## 2023-06-20 RX ADMIN — Medication 0.5 MILLIGRAM(S): at 21:54

## 2023-06-20 RX ADMIN — OXYCODONE HYDROCHLORIDE 5 MILLIGRAM(S): 5 TABLET ORAL at 07:15

## 2023-06-20 RX ADMIN — ATORVASTATIN CALCIUM 20 MILLIGRAM(S): 80 TABLET, FILM COATED ORAL at 21:56

## 2023-06-20 RX ADMIN — Medication 0.6 MILLIGRAM(S): at 14:50

## 2023-06-20 RX ADMIN — ALBUTEROL 2.5 MILLIGRAM(S): 90 AEROSOL, METERED ORAL at 14:43

## 2023-06-20 RX ADMIN — Medication 40 MILLIGRAM(S): at 06:12

## 2023-06-20 RX ADMIN — ALBUTEROL 2.5 MILLIGRAM(S): 90 AEROSOL, METERED ORAL at 20:39

## 2023-06-20 RX ADMIN — Medication 25 MILLIGRAM(S): at 17:35

## 2023-06-20 RX ADMIN — OXYCODONE HYDROCHLORIDE 5 MILLIGRAM(S): 5 TABLET ORAL at 17:32

## 2023-06-20 RX ADMIN — ALBUTEROL 2.5 MILLIGRAM(S): 90 AEROSOL, METERED ORAL at 08:38

## 2023-06-20 RX ADMIN — APIXABAN 5 MILLIGRAM(S): 2.5 TABLET, FILM COATED ORAL at 09:06

## 2023-06-20 NOTE — DIETITIAN INITIAL EVALUATION ADULT - ADD RECOMMEND
1) Encourage po intake, monitor diet tolerance, and provide assistance at meals as needed, tray set up, provide snacks   2) Obtain daily weights to monitor trends  3) Monitor nutrition related labs; glucose

## 2023-06-20 NOTE — DIETITIAN INITIAL EVALUATION ADULT - ORAL INTAKE PTA/DIET HISTORY
RD assessment completed at bedside. Pt states his weight 1mo prior to admission was 319lbs, currently 279.7lbs. Pts appetite is okay, eating smaller meals since moving into assisted living attributing to weight loss. Complaint of D/C, would like greek yogurt, ozzie crackers, banana as snack. RD to remain available.

## 2023-06-20 NOTE — DIETITIAN INITIAL EVALUATION ADULT - NS FNS DIET ORDER
Diet, Consistent Carbohydrate w/Evening Snack:   1200mL Fluid Restriction ([id number]) (06-12-23 @ 08:10)

## 2023-06-20 NOTE — PROGRESS NOTE ADULT - SUBJECTIVE AND OBJECTIVE BOX
Pt doing well POD #1 s/p radiofrequency ablation of atrial fibrillation. Reports chest pain described as 4/10 "soreness", worse with deep inspiration. Otherwise denies complaint.     EKG: sinus rhythm with bifascicular block; no acute changes  TELE: sinus rhythm w/ bifascicular block; no arrhythmias or acute changes.     MEDICATIONS  (STANDING):  albuterol    0.083% 2.5 milliGRAM(s) Nebulizer every 6 hours  apixaban 5 milliGRAM(s) Oral <User Schedule>  aspirin  chewable 81 milliGRAM(s) Oral daily  atorvastatin 20 milliGRAM(s) Oral at bedtime  chlorhexidine 2% Cloths 1 Application(s) Topical <User Schedule>  colchicine 0.6 milliGRAM(s) Oral two times a day  dextrose 5%. 1000 milliLiter(s) (50 mL/Hr) IV Continuous <Continuous>  dextrose 5%. 1000 milliLiter(s) (100 mL/Hr) IV Continuous <Continuous>  dextrose 50% Injectable 25 Gram(s) IV Push once  dextrose 50% Injectable 25 Gram(s) IV Push once  dextrose 50% Injectable 12.5 Gram(s) IV Push once  doxazosin 2 milliGRAM(s) Oral at bedtime  finasteride 5 milliGRAM(s) Oral daily  fluticasone propionate 50 MICROgram(s)/spray Nasal Spray 1 Spray(s) Both Nostrils two times a day  furosemide    Tablet 40 milliGRAM(s) Oral daily  glucagon  Injectable 1 milliGRAM(s) IntraMuscular once  insulin lispro (ADMELOG) corrective regimen sliding scale   SubCutaneous three times a day before meals  insulin lispro (ADMELOG) corrective regimen sliding scale   SubCutaneous at bedtime  metoprolol tartrate 25 milliGRAM(s) Oral every 6 hours  pantoprazole    Tablet 40 milliGRAM(s) Oral every 12 hours  polyethylene glycol 3350 17 Gram(s) Oral daily  potassium chloride   Powder 20 milliEquivalent(s) Oral daily  senna 2 Tablet(s) Oral at bedtime  sucralfate suspension 1 Gram(s) Oral every 12 hours    MEDICATIONS  (PRN):  ALPRAZolam 0.5 milliGRAM(s) Oral three times a day PRN anxiety  aluminum hydroxide/magnesium hydroxide/simethicone Suspension 30 milliLiter(s) Oral every 4 hours PRN Dyspepsia  bisacodyl 5 milliGRAM(s) Oral every 12 hours PRN Constipation  dextrose Oral Gel 15 Gram(s) Oral once PRN Blood Glucose LESS THAN 70 milliGRAM(s)/deciliter  diphenhydrAMINE Injectable 50 milliGRAM(s) IV Push once PRN Allergic Reaction  meclizine 12.5 milliGRAM(s) Oral three times a day PRN Dizziness  melatonin 3 milliGRAM(s) Oral at bedtime PRN Insomnia  ondansetron Injectable 4 milliGRAM(s) IV Push every 8 hours PRN Nausea and/or Vomiting  simethicone 80 milliGRAM(s) Chew every 8 hours PRN Gas      Allergies  penicillin (Anaphylaxis)  Ceftin (Hives)  sulfa drugs (Unknown)  Bactrim (Rash)      PAST MEDICAL & SURGICAL HISTORY:  Anxiety  Hypertension  Hyperlipidemia  Prostate disease  Gastroesophageal reflux disease  Gallbladder stone without cholecystitis or obstruction  BPH (benign prostatic hyperplasia)  DM (diabetes mellitus)  DVT (deep venous thrombosis)  left leg  Afib  Anxiety  HLD (hyperlipidemia)  Malignant schwannoma  H/O arthroscopy of knee  S/P laminectomy  S/P coronary artery stent placement x2        Vital Signs Last 24 Hrs  T(C): 37.1 (20 Jun 2023 07:52), Max: 37.1 (20 Jun 2023 07:52)  T(F): 98.7 (20 Jun 2023 07:52), Max: 98.7 (20 Jun 2023 07:52)  HR: 75 (20 Jun 2023 07:52) (64 - 81)  BP: 105/58 (20 Jun 2023 07:52) (98/61 - 138/70)  BP(mean): 89 (19 Jun 2023 21:15) (89 - 96)  RR: 18 (20 Jun 2023 07:52) (15 - 29)  SpO2: 98% (20 Jun 2023 07:52) (92% - 100%)    Parameters below as of 20 Jun 2023 07:52  Patient On (Oxygen Delivery Method): nasal cannula  O2 Flow (L/min): 3    Physical Exam:  Constitutional: NAD, AAOx3  Cardiovascular: +S1S2 RRR  Pulmonary: CTA b/l, unlabored  Abd: soft NTND +BS  Groins: hemostatic sutures removed; sites C/D/I bilaterally; no bleeding, hematoma, edema  Extremities: no pedal edema, +distal pulses b/l  Neuro: CN II-XII grossly intact, GUERRA x4     LABS:                      13.7   7.84  )-----------( 261      ( 20 Jun 2023 05:03 )             44.4     141  |  99  |  17.2  ----------------------------<  166<H>  4.0   |  32.0<H>  |  0.70    Ca    8.2<L>      20 Jun 2023 05:03  Mg     1.8     06-20    PT/INR - ( 19 Jun 2023 04:55 )   PT: 14.1 sec;   INR: 1.21 ratio      PTT - ( 19 Jun 2023 04:55 )  PTT:34.6 sec      Assessment:   78 y/o male with PMHx significant for HTN, hyperlipidemia, DM type 2, obesity BMI 38, thoracic aortic aneurysm, ALLIE, BPH s/p TURP, left renal mass, bifascicular block (RBBB + LAFB), newly diagnosed HFrEF (LVEF 25%, though previously had AF-mediated CM which improved after CV), known moderate AS on TTE 6/11/23 (cath 1/2023 - 25mm LV-Ao gradient on pullback.  CIELO 1.4cm2), and persistent AF/AFL. Now POD #1 status post uncomplicated radiofrequency ablation of atrial fibrillation and atrial flutter (WACA/PVI, PWI, mitral line, and CTI line) via b/l femoral veins, reporting chest pain described as soreness worse with deep inspiration; no ECG changes.       Plan:  Nature of CP most consistent w/ pericarditis. Will check echo to r/o effusion, and start colchicine 0.6mg PO BID x 14 days.   Continue Protonix BID while admitted. Pt takes Nexium 40mg daily at home, which he will increase to BID x 1 month, then resume once daily dosing.   Carafate suspension 1gm BID x 2 weeks.   Digoxin discontinued.   Continue metoprolol as tolerated - transition to long acting formulation on discharge (metoprolol succ 100mg daily)  Pt instructed as to activity limitations - no lifting/pushing/pulling >10 lbs or strenuous exercise x 1 week.   Pt instructed as to access site care and f/up - written instructions included in d/c documents.  Outpt f/up in 2-4 weeks - office will contact pt to schedule.  Will await echo results.

## 2023-06-20 NOTE — DIETITIAN INITIAL EVALUATION ADULT - PERTINENT MEDS FT
MEDICATIONS  (STANDING):  albuterol    0.083% 2.5 milliGRAM(s) Nebulizer every 6 hours  apixaban 5 milliGRAM(s) Oral <User Schedule>  aspirin  chewable 81 milliGRAM(s) Oral daily  atorvastatin 20 milliGRAM(s) Oral at bedtime  chlorhexidine 2% Cloths 1 Application(s) Topical <User Schedule>  colchicine 0.6 milliGRAM(s) Oral two times a day  dextrose 5%. 1000 milliLiter(s) (50 mL/Hr) IV Continuous <Continuous>  dextrose 5%. 1000 milliLiter(s) (100 mL/Hr) IV Continuous <Continuous>  dextrose 50% Injectable 25 Gram(s) IV Push once  dextrose 50% Injectable 25 Gram(s) IV Push once  dextrose 50% Injectable 12.5 Gram(s) IV Push once  doxazosin 2 milliGRAM(s) Oral at bedtime  finasteride 5 milliGRAM(s) Oral daily  fluticasone propionate 50 MICROgram(s)/spray Nasal Spray 1 Spray(s) Both Nostrils two times a day  furosemide    Tablet 40 milliGRAM(s) Oral daily  glucagon  Injectable 1 milliGRAM(s) IntraMuscular once  insulin lispro (ADMELOG) corrective regimen sliding scale   SubCutaneous three times a day before meals  insulin lispro (ADMELOG) corrective regimen sliding scale   SubCutaneous at bedtime  metoprolol tartrate 25 milliGRAM(s) Oral every 6 hours  pantoprazole    Tablet 40 milliGRAM(s) Oral every 12 hours  polyethylene glycol 3350 17 Gram(s) Oral daily  potassium chloride   Powder 20 milliEquivalent(s) Oral daily  senna 2 Tablet(s) Oral at bedtime  sucralfate suspension 1 Gram(s) Oral every 12 hours    MEDICATIONS  (PRN):  ALPRAZolam 0.5 milliGRAM(s) Oral three times a day PRN anxiety  aluminum hydroxide/magnesium hydroxide/simethicone Suspension 30 milliLiter(s) Oral every 4 hours PRN Dyspepsia  bisacodyl 5 milliGRAM(s) Oral every 12 hours PRN Constipation  dextrose Oral Gel 15 Gram(s) Oral once PRN Blood Glucose LESS THAN 70 milliGRAM(s)/deciliter  diphenhydrAMINE Injectable 50 milliGRAM(s) IV Push once PRN Allergic Reaction  meclizine 12.5 milliGRAM(s) Oral three times a day PRN Dizziness  melatonin 3 milliGRAM(s) Oral at bedtime PRN Insomnia  ondansetron Injectable 4 milliGRAM(s) IV Push every 8 hours PRN Nausea and/or Vomiting  simethicone 80 milliGRAM(s) Chew every 8 hours PRN Gas

## 2023-06-20 NOTE — DIETITIAN INITIAL EVALUATION ADULT - NS FNS REASON FOR WEIGHT CHANG
Spoke with patient. Two pt identifiers confirmed. Patient notified that all of her labs looked good per Dr. Yumiko Solis. Patient states that she reviewed her results on MyChart and she is concerned because her Vitamin D level is really low. Advised patient that I will check with Dr. Yumiko Solis to see if we need to have her start on a vitamin D supplement and I will call her back with her recommendations. Pt verbalized understanding of information discussed w/ no further questions at this time. decreased po intake

## 2023-06-20 NOTE — DIETITIAN INITIAL EVALUATION ADULT - PERTINENT LABORATORY DATA
06-20    141  |  99  |  17.2  ----------------------------<  166<H>  4.0   |  32.0<H>  |  0.70    Ca    8.2<L>      20 Jun 2023 05:03  Mg     1.8     06-20    POCT Blood Glucose.: 145 mg/dL (06-20-23 @ 08:21)  A1C with Estimated Average Glucose Result: 6.5 % (06-13-23 @ 04:57)  A1C with Estimated Average Glucose Result: 9.8 % (01-27-23 @ 03:10)  A1C with Estimated Average Glucose Result: 9.9 % (07-05-22 @ 04:23)  06-20 Na141 mmol/L Glu 166 mg/dL<H> K+ 4.0 mmol/L Cr  0.70 mg/dL BUN 17.2 mg/dL Phos n/a   Alb n/a   PAB n/a

## 2023-06-20 NOTE — DIETITIAN NUTRITION RISK NOTIFICATION - TREATMENT: THE FOLLOWING DIET HAS BEEN RECOMMENDED
Diet, Consistent Carbohydrate w/Evening Snack:   1200mL Fluid Restriction ([id number]) (06-12-23 @ 08:10) [Active]

## 2023-06-20 NOTE — PROGRESS NOTE ADULT - SUBJECTIVE AND OBJECTIVE BOX
ANIA CRISTOBAL  [id number]      Chief Complaint:   follow up new CMP/AF      Subjective: feeling better today, looking forward to ablation tomorrow     24 hour Tele: SR with occasional PACs        albuterol    0.083% 2.5 milliGRAM(s) Nebulizer every 6 hours  ALPRAZolam 0.5 milliGRAM(s) Oral three times a day PRN  aluminum hydroxide/magnesium hydroxide/simethicone Suspension 30 milliLiter(s) Oral every 4 hours PRN  apixaban 5 milliGRAM(s) Oral <User Schedule>  aspirin  chewable 81 milliGRAM(s) Oral daily  atorvastatin 20 milliGRAM(s) Oral at bedtime  bisacodyl 5 milliGRAM(s) Oral every 12 hours PRN  chlorhexidine 2% Cloths 1 Application(s) Topical <User Schedule>  colchicine 0.6 milliGRAM(s) Oral two times a day  dextrose 5%. 1000 milliLiter(s) IV Continuous <Continuous>  dextrose 5%. 1000 milliLiter(s) IV Continuous <Continuous>  dextrose 50% Injectable 25 Gram(s) IV Push once  dextrose 50% Injectable 12.5 Gram(s) IV Push once  dextrose 50% Injectable 25 Gram(s) IV Push once  dextrose Oral Gel 15 Gram(s) Oral once PRN  diphenhydrAMINE Injectable 50 milliGRAM(s) IV Push once PRN  doxazosin 2 milliGRAM(s) Oral at bedtime  finasteride 5 milliGRAM(s) Oral daily  fluticasone propionate 50 MICROgram(s)/spray Nasal Spray 1 Spray(s) Both Nostrils two times a day  furosemide    Tablet 40 milliGRAM(s) Oral daily  glucagon  Injectable 1 milliGRAM(s) IntraMuscular once  insulin lispro (ADMELOG) corrective regimen sliding scale   SubCutaneous three times a day before meals  insulin lispro (ADMELOG) corrective regimen sliding scale   SubCutaneous at bedtime  meclizine 12.5 milliGRAM(s) Oral three times a day PRN  melatonin 3 milliGRAM(s) Oral at bedtime PRN  metoprolol tartrate 25 milliGRAM(s) Oral every 6 hours  ondansetron Injectable 4 milliGRAM(s) IV Push every 8 hours PRN  oxyCODONE    IR 5 milliGRAM(s) Oral every 4 hours PRN  pantoprazole    Tablet 40 milliGRAM(s) Oral every 12 hours  polyethylene glycol 3350 17 Gram(s) Oral daily  potassium chloride   Powder 20 milliEquivalent(s) Oral daily  senna 2 Tablet(s) Oral at bedtime  simethicone 80 milliGRAM(s) Chew every 8 hours PRN  sucralfate suspension 1 Gram(s) Oral every 12 hours          Physical Exam:  T(C): 36.7 (06-20-23 @ 16:38), Max: 37.1 (06-20-23 @ 07:52)  HR: 82 (06-20-23 @ 16:38) (72 - 82)  BP: 100/65 (06-20-23 @ 16:38) (98/61 - 138/70)  RR: 18 (06-20-23 @ 16:38) (14 - 29)  SpO2: 93% (06-20-23 @ 16:38) (92% - 100%)  Wt(kg): --  General: Comfortable in NAD  Neck: No JVD  CVS: nl s1s2, no s3  Pulm: CTA b/l  Abd: soft, non-tender  Ext: No c/c/e  Neuro A&O x3  Psych: Normal affect      Labs:   20 Jun 2023 05:03    141    |  99     |  17.2   ----------------------------<  166    4.0     |  32.0   |  0.70     Ca    8.2        20 Jun 2023 05:03  Mg     1.8       20 Jun 2023 05:03                            13.7   7.84  )-----------( 261      ( 20 Jun 2023 05:03 )             44.4     PT/INR - ( 19 Jun 2023 04:55 )   PT: 14.1 sec;   INR: 1.21 ratio         PTT - ( 19 Jun 2023 04:55 )  PTT:34.6 sec          [facility] 1/2023:  Coronary Angiography   - Left dominant system   - Patent prox and mid LAD stents   - Moderate stenosis of the distal LCx   - The RCA is a small, non-dominat vessel.  The proximal segment is occluded with brisk right-to-right collaterals.  - 25mm LV-Ao gradient on pullback.  CIELO 1.4cm2 with moderate AS byecho.  - Moderately reduced LVEF by echo       LM   Left main artery: Angiography shows minor irregularities.    LAD   Proximal left anterior descending: There is a 10 % stenosis within the  stented segment. Mid left anterior descending: There is a10 % stenosis within the stented segment.    CX   Distal circumflex: There is a 60 % stenosis. VALENTINO Flow 3.    RCA   Proximal right coronary artery: There is a 100 % stenosis. There is  good collateral blood supply to the distal myocardium. This  lesion is a chronic total occlusion.  This is a small non-dominant  vessel.  Ramus   Ramus intermedius: Angiography shows mild atherosclerosis.      Left Heart Cath   Ejection fraction was calculated by echo with a value of 40-45%. LV to  AO pullback was performed and there is a pressure gradient of 25 mmHg.      ECG:  SR with 1st degree AVB with RBBB/LAFB.  No ST-T wave changes.  PRWP.            RADIOLOGY:  CT A/P:  No pulmonary embolism though evaluation of subsegmental branches limited   secondary to respiratory motion artifact.    Trace bilateral pleural effusions with adjacent patchy bibasilar   opacities, likely represent dependent atelectasis. Recommend clinical   correlation to assess for superimposed infection.    Hepatomegaly. Liver surface nodularity. Hepatitis or cirrhosis considered   in differential. Correlate with clinical parameters.    Trace ascites.    Mild bladder wall thickening with suggestion of left posterolateral   diverticulum. Correlate with urinalysis andlab values to assess for   cystitis and/or ascending urinary tract infection.    Additional findings as mentioned above.    ECHO 1/2023:   1. Technically difficult study.   2. Tachycardic throughout the study and this may preclude true assessment of LVEF.   3. Left ventricular ejection fraction, by visual estimation, is 40 to 45%.   4. Moderately decreased global left ventricular systolic function.   5. The mitral in-flow pattern reveals no discernable A-wave, therefore   no comment on diastolic function can be made.   6. Mildly enlarged left atrium.   7. Normal right atrial size.   8. Mild tricuspid regurgitation.   9. Mild thickening and calcification of the anterior mitral valve leaflet.  10. Mild to moderate mitral annular calcification.  11. Trace mitral valve regurgitation.  12. Moderate aortic valve stenosis, with peak velocity of 2.87 m/s, mean   gradient of 18 mmHg, dimensionless index of 0.29, and CIELO via VTI of 1.42   sqcm.  13. Dilatation of the sinuses of Valsalva, measures 4.15 cm but based on   BSA 1.6cm/sqm is within normal limits.  14. There is a significant pericardial fat pad present.  15. There isno evidence of pericardial effusion.  16. Endocardial visualization was enhanced with intravenous echo contrast.  17. Compared to prior TTE done on 7/6/2022, the left ventricular function   is now reduced to 40-45%, in the setting of Afib wth RVR (prior LVEF   60-65%).      LIBBY 1/2023:   1. Left ventricular ejection fraction, by visual estimation, is 40 to 45%.   2. Mildly decreased global left ventricular systolic function.   3. Normal right ventricular size and function.   4. Mildly enlarged left atrium.   5. Mild mitral annular calcification.   6. Mild mitral valve regurgitation.   7. Thickening of the anterior and posterior mitral valve leaflets.   8. Mild tricuspid regurgitation.   9. Mild aortic regurgitation.  10. Mild to moderate aortic valvestenosis.  11. CIELO by planimetry 1.2cm2.  12. Aortic root measured at Sinus of Valsalva is dilated. 4.1cm  13. No left atrial appendage thrombus.  14. Color flow doppler and intravenous injection of agitated saline   demonstrates the presence of an intact intra atrial septum.    Echo (3/14628):   1. Left ventricular ejection fraction, by visual estimation, is 60 to 65%.   2. Normal right ventricular size and function.   3. There is no evidence of pericardial effusion.   4. Endocardial visualizationwas enhanced with intravenous echo contrast.    Echo personally reviewed:   1. Left ventricularejection fraction, by visual estimation, is 35-40%.   2. Moderately decreased global left ventricular systolic   function.   3. There is mild concentric left ventricular hypertrophy.   4. Mildly enlarged right ventricle.   5. Moderately reduced RV systolic function.   6. Normal left atrial size.   7. Normal right atrial size.   8. There is no evidence of pericardial effusion.   9. Peak Gradient across the Aortic Valve is 35 mm Hg. Mean gradient is 26 mm Hg. DI 0.25.  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    Calcium Score:  Cardiac:  Total Coronary Artery Calcium Score = 5373.  >90th percentile.   Significant calcification. Significant atherosclerotic burden.  Aortic calcification    Non Cardiac:  1.  Small bilateral pleural effusions and lower lobe passive atelectasis.  2.  Ascending aorta measures 4.8 cm at the main pulmonary artery.    Echo 6/20/23  Summary:   1. LV Ejection Fraction by Oleary's Method with a biplane EF of 50 %.   2. Normal global left ventricular systolic function.   3. Trace mitral valve regurgitation.   4. Mild aortic regurgitation.   5. Endocardial visualization was enhanced with intravenous echo contrast.   6. Compared to TTE dated 6/11/23 the LVEF has improved.    Assessment:  76 y/o male PMHx AF on Eliquis (s/p Cardioversion on 1/31/23), moderate-severe AS, Chronic Diastolic Heart Failure, CAD s/p PCI, HTN, HLD, DM 2, Morbid Obesity, ALLIE, Thoracic Aortic Aneurysm, BPH s/p TURP, Left Renal Mass was sent to the ED from [facility] for abdominal pain x 4 days. Patient found to have UTI.  Echo performed noted significant drop in EF.  CT with hepatomegaly appearing ?significantly larger than CT from March.  ?2/2 CPC but would consider other causes such as hepatitis.  Appears somewhat fluid overloaded but not in severe acute CHF.  -UTI on admission on antibiotics, biliary colic 2/2 sludge  -significant hepatosplenomegaly , evaluation with GI. Will need hepatology as outpatient came in symptomatic from hepatomegaly with RUQ pain, better now  -Acute systolic CHF being diuresed, now on PO   -Also now back in AF, rate controlled.  ?Contributing to new drop in EF.  ?2/2 stress myopathy with acute infection and new liver dysfunction.  Cath in January with  RCA, 60% distal LCX disease and unlikely to be contributing to CMP. Will need to consider repeat this admission after further diuresis   -Also with significant AS, ?severe LFLG potentially contributing but fairly similar to prior echos. Has had some fluctuation in EF and when i AF this past January had drop in EF with improvement in 3/2023. Now drop again with recurrent AF and suspect contributing factor.  On monitor some SR with 1st degree AV delay then in rapid AF , improved with increase metoprolol and adding digoxin, was back in SR but now AF again   -s/p ablation, currently in SR   -Latest echo with improvement of LVEF      Plan:   1. Continue with Aspirin and eliquis   2. Continue with BB, diuretics   3. Consider slow dose of entresto, and farxiga   4. CT Sx eval appreciated Re: TAVR .  5. PT-OT

## 2023-06-20 NOTE — PROGRESS NOTE ADULT - SUBJECTIVE AND OBJECTIVE BOX
ANIA CRISTOBAL  ----------------------------------------  The patient was seen at bedside. Patient with heart failure and atrial fibrillation. Reported some chest discomfort. Denied dyspnea.    Vital Signs Last 24 Hrs  T(C): 37.1 (20 Jun 2023 07:52), Max: 37.1 (20 Jun 2023 07:52)  T(F): 98.7 (20 Jun 2023 07:52), Max: 98.7 (20 Jun 2023 07:52)  HR: 74 (20 Jun 2023 10:00) (72 - 81)  BP: 116/68 (20 Jun 2023 10:00) (98/61 - 138/70)  BP(mean): 89 (19 Jun 2023 21:15) (89 - 96)  RR: 14 (20 Jun 2023 10:00) (14 - 29)  SpO2: 93% (20 Jun 2023 10:00) (92% - 100%)    Parameters below as of 20 Jun 2023 10:00    O2 Flow (L/min): 3    CAPILLARY BLOOD GLUCOSE  POCT Blood Glucose.: 144 mg/dL (20 Jun 2023 12:16)  POCT Blood Glucose.: 145 mg/dL (20 Jun 2023 08:21)  POCT Blood Glucose.: 151 mg/dL (19 Jun 2023 21:21)    PHYSICAL EXAMINATION:  ----------------------------------------  General appearance: No acute distress, Awake, Alert  HEENT: Normocephalic, Atraumatic, Conjunctiva clear, EOMI  Neck: Supple, No JVD, No tenderness  Lungs: Breath sound equal bilaterally, No wheezes, No rales  Cardiovascular: S1S2, Regular rhythm  Abdomen: Soft, Nontender, Nondistended, No guarding/rebound, Positive bowel sounds  Extremities: No clubbing, No cyanosis, No edema, No calf tenderness  Neuro: Strength equal bilaterally, No tremors  Psychiatric: Appropriate mood, Normal affect    LABORATORY STUDIES:  ----------------------------------------             13.7   7.84  )-----------( 261      ( 20 Jun 2023 05:03 )             44.4     06-20    141  |  99  |  17.2  ----------------------------<  166<H>  4.0   |  32.0<H>  |  0.70    Ca    8.2<L>      20 Jun 2023 05:03  Mg     1.8     06-20    PT/INR - ( 19 Jun 2023 04:55 )   PT: 14.1 sec;   INR: 1.21 ratio    PTT - ( 19 Jun 2023 04:55 )  PTT:34.6 sec    MEDICATIONS  (STANDING):  albuterol    0.083% 2.5 milliGRAM(s) Nebulizer every 6 hours  apixaban 5 milliGRAM(s) Oral <User Schedule>  aspirin  chewable 81 milliGRAM(s) Oral daily  atorvastatin 20 milliGRAM(s) Oral at bedtime  chlorhexidine 2% Cloths 1 Application(s) Topical <User Schedule>  colchicine 0.6 milliGRAM(s) Oral two times a day  dextrose 5%. 1000 milliLiter(s) (50 mL/Hr) IV Continuous <Continuous>  dextrose 5%. 1000 milliLiter(s) (100 mL/Hr) IV Continuous <Continuous>  dextrose 50% Injectable 25 Gram(s) IV Push once  dextrose 50% Injectable 25 Gram(s) IV Push once  dextrose 50% Injectable 12.5 Gram(s) IV Push once  doxazosin 2 milliGRAM(s) Oral at bedtime  finasteride 5 milliGRAM(s) Oral daily  fluticasone propionate 50 MICROgram(s)/spray Nasal Spray 1 Spray(s) Both Nostrils two times a day  furosemide    Tablet 40 milliGRAM(s) Oral daily  glucagon  Injectable 1 milliGRAM(s) IntraMuscular once  insulin lispro (ADMELOG) corrective regimen sliding scale   SubCutaneous three times a day before meals  insulin lispro (ADMELOG) corrective regimen sliding scale   SubCutaneous at bedtime  metoprolol tartrate 25 milliGRAM(s) Oral every 6 hours  pantoprazole    Tablet 40 milliGRAM(s) Oral every 12 hours  polyethylene glycol 3350 17 Gram(s) Oral daily  potassium chloride   Powder 20 milliEquivalent(s) Oral daily  senna 2 Tablet(s) Oral at bedtime  sucralfate suspension 1 Gram(s) Oral every 12 hours    MEDICATIONS  (PRN):  ALPRAZolam 0.5 milliGRAM(s) Oral three times a day PRN anxiety  aluminum hydroxide/magnesium hydroxide/simethicone Suspension 30 milliLiter(s) Oral every 4 hours PRN Dyspepsia  bisacodyl 5 milliGRAM(s) Oral every 12 hours PRN Constipation  dextrose Oral Gel 15 Gram(s) Oral once PRN Blood Glucose LESS THAN 70 milliGRAM(s)/deciliter  diphenhydrAMINE Injectable 50 milliGRAM(s) IV Push once PRN Allergic Reaction  meclizine 12.5 milliGRAM(s) Oral three times a day PRN Dizziness  melatonin 3 milliGRAM(s) Oral at bedtime PRN Insomnia  ondansetron Injectable 4 milliGRAM(s) IV Push every 8 hours PRN Nausea and/or Vomiting  simethicone 80 milliGRAM(s) Chew every 8 hours PRN Gas      ASSESSMENT / PLAN:  ----------------------------------------  76 y/o male with PMH of afib on Eliquis (s/p Cardioversion on 1/31/23), Moderate AS, Chronic Diastolic Heart Failure, CAD s/p PCI, HTN, HLD, DM 2, Morbid Obesity, ALLIE, Thoracic Aortic Aneurysm, BPH s/p TURP, Left Renal Mass was sent to the ED from [facility] for abdominal pain x 4 days. Patient reported pain on the right side of the abdomen (upper and lower), 10/10, non-radiating. Patient reported feeling congested, noted cough but not productive.   CT C/A/P: No PE; Trace bilateral pleural effusions with adjacent patchy bibasilar opacities, likely represent dependent atelectasis vs infection. Hepatomegaly. Liver surface nodularity. Hepatitis or cirrhosis considered in differential. Trace ascites. Mild bladder wall thickening with suggestion of left posterolateral diverticulum.  Patient had a tte showing reduced ef and is npo for rhc and cardiomems today    Atrial fibrillation - Status post ablation. Continue on metoprolol. On apixaban for anticoagulation.    Acute systolic heart failure - Continue on metoprolol. Monitoring urine output while on furosemide. Status post right heart catheterization and CardioMEMS. Repeat echocardiogram noted improvement in the left ventricular systolic function.    Anxiety - On alprazolam as needed.    Benign prostatic hyperplasia - Continue with finasteride and doxazosin.    Diabetes - Insulin coverage, close monitoring of blood glucose levels.    Hypertension / Coronary artery disease - Close blood pressure monitoring. On aspirin and atorvastatin.    Hepatomegaly - Sludge noted on ultrasound. Bilirubin was not elevated and without transaminitis. MRI noted evidence of early cirrhosis and possible omental infarct. Pain improved.    Obstructive sleep apnea - Not on nocturnal CPAP at home. Monitoring oxygen levels.

## 2023-06-20 NOTE — DIETITIAN INITIAL EVALUATION ADULT - OTHER INFO
76 y/o male with PMH of afib on Eliquis (s/p Cardioversion on 1/31/23), Moderate AS, Chronic Diastolic Heart Failure, CAD s/p PCI, HTN, HLD, DM 2, Morbid Obesity, ALLIE, Thoracic Aortic Aneurysm, BPH s/p TURP, Left Renal Mass was sent to the ED from [facility] for abdominal pain x 4 days. Patient reported pain on the right side of the abdomen (upper and lower), 10/10, non-radiating. Patient reported feeling congested, noted cough but not productive.   CT C/A/P: No PE; Trace bilateral pleural effusions with adjacent patchy bibasilar opacities, likely represent dependent atelectasis vs infection. Hepatomegaly. Liver surface nodularity. Hepatitis or cirrhosis considered in differential. Trace ascites. Mild bladder wall thickening with suggestion of left posterolateral diverticulum.  Patient had a tte showing reduced ef and is npo for rhc and cardiomems today

## 2023-06-21 LAB
ANION GAP SERPL CALC-SCNC: 7 MMOL/L — SIGNIFICANT CHANGE UP (ref 5–17)
BUN SERPL-MCNC: 18.6 MG/DL — SIGNIFICANT CHANGE UP (ref 8–20)
CALCIUM SERPL-MCNC: 8.2 MG/DL — LOW (ref 8.4–10.5)
CHLORIDE SERPL-SCNC: 98 MMOL/L — SIGNIFICANT CHANGE UP (ref 96–108)
CO2 SERPL-SCNC: 31 MMOL/L — HIGH (ref 22–29)
CREAT SERPL-MCNC: 0.54 MG/DL — SIGNIFICANT CHANGE UP (ref 0.5–1.3)
EGFR: 103 ML/MIN/1.73M2 — SIGNIFICANT CHANGE UP
GLUCOSE BLDC GLUCOMTR-MCNC: 157 MG/DL — HIGH (ref 70–99)
GLUCOSE BLDC GLUCOMTR-MCNC: 165 MG/DL — HIGH (ref 70–99)
GLUCOSE BLDC GLUCOMTR-MCNC: 167 MG/DL — HIGH (ref 70–99)
GLUCOSE BLDC GLUCOMTR-MCNC: 180 MG/DL — HIGH (ref 70–99)
GLUCOSE BLDC GLUCOMTR-MCNC: 204 MG/DL — HIGH (ref 70–99)
GLUCOSE SERPL-MCNC: 167 MG/DL — HIGH (ref 70–99)
MAGNESIUM SERPL-MCNC: 1.9 MG/DL — SIGNIFICANT CHANGE UP (ref 1.6–2.6)
POTASSIUM SERPL-MCNC: 4.1 MMOL/L — SIGNIFICANT CHANGE UP (ref 3.5–5.3)
POTASSIUM SERPL-SCNC: 4.1 MMOL/L — SIGNIFICANT CHANGE UP (ref 3.5–5.3)
SODIUM SERPL-SCNC: 136 MMOL/L — SIGNIFICANT CHANGE UP (ref 135–145)

## 2023-06-21 PROCEDURE — 99233 SBSQ HOSP IP/OBS HIGH 50: CPT

## 2023-06-21 PROCEDURE — 70450 CT HEAD/BRAIN W/O DYE: CPT | Mod: 26

## 2023-06-21 PROCEDURE — 99232 SBSQ HOSP IP/OBS MODERATE 35: CPT

## 2023-06-21 PROCEDURE — 99231 SBSQ HOSP IP/OBS SF/LOW 25: CPT

## 2023-06-21 PROCEDURE — 93010 ELECTROCARDIOGRAM REPORT: CPT

## 2023-06-21 RX ORDER — SERTRALINE 25 MG/1
50 TABLET, FILM COATED ORAL DAILY
Refills: 0 | Status: DISCONTINUED | OUTPATIENT
Start: 2023-06-21 | End: 2023-06-23

## 2023-06-21 RX ADMIN — FINASTERIDE 5 MILLIGRAM(S): 5 TABLET, FILM COATED ORAL at 12:26

## 2023-06-21 RX ADMIN — Medication 0.6 MILLIGRAM(S): at 17:14

## 2023-06-21 RX ADMIN — Medication 25 MILLIGRAM(S): at 17:14

## 2023-06-21 RX ADMIN — PANTOPRAZOLE SODIUM 40 MILLIGRAM(S): 20 TABLET, DELAYED RELEASE ORAL at 17:14

## 2023-06-21 RX ADMIN — Medication 1: at 12:27

## 2023-06-21 RX ADMIN — Medication 20 MILLIEQUIVALENT(S): at 12:26

## 2023-06-21 RX ADMIN — Medication 0.6 MILLIGRAM(S): at 06:17

## 2023-06-21 RX ADMIN — Medication 1 GRAM(S): at 17:13

## 2023-06-21 RX ADMIN — Medication 81 MILLIGRAM(S): at 10:41

## 2023-06-21 RX ADMIN — Medication 40 MILLIGRAM(S): at 06:15

## 2023-06-21 RX ADMIN — PANTOPRAZOLE SODIUM 40 MILLIGRAM(S): 20 TABLET, DELAYED RELEASE ORAL at 06:16

## 2023-06-21 RX ADMIN — Medication 2 MILLIGRAM(S): at 22:30

## 2023-06-21 RX ADMIN — Medication 1: at 17:14

## 2023-06-21 RX ADMIN — ALBUTEROL 2.5 MILLIGRAM(S): 90 AEROSOL, METERED ORAL at 15:50

## 2023-06-21 RX ADMIN — CHLORHEXIDINE GLUCONATE 1 APPLICATION(S): 213 SOLUTION TOPICAL at 06:17

## 2023-06-21 RX ADMIN — Medication 25 MILLIGRAM(S): at 12:27

## 2023-06-21 RX ADMIN — ATORVASTATIN CALCIUM 20 MILLIGRAM(S): 80 TABLET, FILM COATED ORAL at 22:31

## 2023-06-21 RX ADMIN — Medication 25 MILLIGRAM(S): at 00:22

## 2023-06-21 RX ADMIN — Medication 1 SPRAY(S): at 06:20

## 2023-06-21 RX ADMIN — ALBUTEROL 2.5 MILLIGRAM(S): 90 AEROSOL, METERED ORAL at 21:28

## 2023-06-21 RX ADMIN — APIXABAN 5 MILLIGRAM(S): 2.5 TABLET, FILM COATED ORAL at 22:31

## 2023-06-21 RX ADMIN — OXYCODONE HYDROCHLORIDE 5 MILLIGRAM(S): 5 TABLET ORAL at 12:30

## 2023-06-21 RX ADMIN — OXYCODONE HYDROCHLORIDE 5 MILLIGRAM(S): 5 TABLET ORAL at 23:21

## 2023-06-21 RX ADMIN — APIXABAN 5 MILLIGRAM(S): 2.5 TABLET, FILM COATED ORAL at 10:41

## 2023-06-21 RX ADMIN — OXYCODONE HYDROCHLORIDE 5 MILLIGRAM(S): 5 TABLET ORAL at 22:31

## 2023-06-21 RX ADMIN — Medication 25 MILLIGRAM(S): at 06:16

## 2023-06-21 NOTE — PROGRESS NOTE ADULT - PROBLEM SELECTOR PLAN 1
Admitted with acute systolic heart failure with new drop in EF attributed to Afib.   S/p LIBBY cardioversion  Severe AS on TTE.  Consulted for TAVR eval.   Has cardiomems.  Had LHC in January that showed 60% dcx disease and 100% pRCA with collateral circulation.  LIBBY in January with moderate to severe AS and mild AI.    Plan for TAVR CTA tomorrow  Per EP, AC cannot be stopped at this time.  TAVR will need to be performed as outpatient.  Discussed with Dr. Napier

## 2023-06-21 NOTE — PROGRESS NOTE ADULT - SUBJECTIVE AND OBJECTIVE BOX
ANIA CRISTOBAL  [id number]      Chief Complaint:   follow up new CMP/AF      Subjective: feeling better today, looking forward to ablation tomorrow     24 hour Tele: SR with occasional PACs      albuterol    0.083% 2.5 milliGRAM(s) Nebulizer every 6 hours  ALPRAZolam 0.5 milliGRAM(s) Oral three times a day PRN  aluminum hydroxide/magnesium hydroxide/simethicone Suspension 30 milliLiter(s) Oral every 4 hours PRN  apixaban 5 milliGRAM(s) Oral <User Schedule>  aspirin  chewable 81 milliGRAM(s) Oral daily  atorvastatin 20 milliGRAM(s) Oral at bedtime  bisacodyl 5 milliGRAM(s) Oral every 12 hours PRN  chlorhexidine 2% Cloths 1 Application(s) Topical <User Schedule>  colchicine 0.6 milliGRAM(s) Oral two times a day  dextrose 5%. 1000 milliLiter(s) IV Continuous <Continuous>  dextrose 5%. 1000 milliLiter(s) IV Continuous <Continuous>  dextrose 50% Injectable 25 Gram(s) IV Push once  dextrose 50% Injectable 25 Gram(s) IV Push once  dextrose 50% Injectable 12.5 Gram(s) IV Push once  dextrose Oral Gel 15 Gram(s) Oral once PRN  diphenhydrAMINE Injectable 50 milliGRAM(s) IV Push once PRN  doxazosin 2 milliGRAM(s) Oral at bedtime  finasteride 5 milliGRAM(s) Oral daily  fluticasone propionate 50 MICROgram(s)/spray Nasal Spray 1 Spray(s) Both Nostrils two times a day  furosemide    Tablet 40 milliGRAM(s) Oral daily  glucagon  Injectable 1 milliGRAM(s) IntraMuscular once  insulin lispro (ADMELOG) corrective regimen sliding scale   SubCutaneous three times a day before meals  insulin lispro (ADMELOG) corrective regimen sliding scale   SubCutaneous at bedtime  meclizine 12.5 milliGRAM(s) Oral three times a day PRN  melatonin 3 milliGRAM(s) Oral at bedtime PRN  metoprolol tartrate 25 milliGRAM(s) Oral every 6 hours  ondansetron Injectable 4 milliGRAM(s) IV Push every 8 hours PRN  oxyCODONE    IR 5 milliGRAM(s) Oral every 4 hours PRN  pantoprazole    Tablet 40 milliGRAM(s) Oral every 12 hours  polyethylene glycol 3350 17 Gram(s) Oral daily  potassium chloride   Powder 20 milliEquivalent(s) Oral daily  senna 2 Tablet(s) Oral at bedtime  simethicone 80 milliGRAM(s) Chew every 8 hours PRN  sucralfate suspension 1 Gram(s) Oral every 12 hours          Physical Exam:  T(C): 36.8 (06-21-23 @ 08:15), Max: 36.9 (06-20-23 @ 21:52)  HR: 82 (06-21-23 @ 08:15) (74 - 82)  BP: 116/66 (06-21-23 @ 08:15) (100/65 - 116/66)  RR: 18 (06-21-23 @ 08:15) (15 - 18)  SpO2: 98% (06-21-23 @ 08:15) (93% - 98%)  Wt(kg): --  General: Comfortable in NAD  Neck: No JVD  CVS: nl s1s2, systolic murmur with radiation to the neck.   Pulm: CTA b/l  Abd: soft, non-tender  Ext: No c/c/e  Neuro A&O x3  Psych: Normal affect      Labs:   21 Jun 2023 04:47    136    |  98     |  18.6   ----------------------------<  167    4.1     |  31.0   |  0.54     Ca    8.2        21 Jun 2023 04:47  Mg     1.9       21 Jun 2023 04:47                            13.7   7.84  )-----------( 261      ( 20 Jun 2023 05:03 )             44.4           [facility] 1/2023:  Coronary Angiography   - Left dominant system   - Patent prox and mid LAD stents   - Moderate stenosis of the distal LCx   - The RCA is a small, non-dominat vessel.  The proximal segment is occluded with brisk right-to-right collaterals.  - 25mm LV-Ao gradient on pullback.  CIELO 1.4cm2 with moderate AS byecho.  - Moderately reduced LVEF by echo       LM   Left main artery: Angiography shows minor irregularities.    LAD   Proximal left anterior descending: There is a 10 % stenosis within the  stented segment. Mid left anterior descending: There is a10 % stenosis within the stented segment.    CX   Distal circumflex: There is a 60 % stenosis. VALENTINO Flow 3.    RCA   Proximal right coronary artery: There is a 100 % stenosis. There is  good collateral blood supply to the distal myocardium. This  lesion is a chronic total occlusion.  This is a small non-dominant  vessel.  Ramus   Ramus intermedius: Angiography shows mild atherosclerosis.      Left Heart Cath   Ejection fraction was calculated by echo with a value of 40-45%. LV to  AO pullback was performed and there is a pressure gradient of 25 mmHg.      ECG:  SR with 1st degree AVB with RBBB/LAFB.  No ST-T wave changes.  PRWP.            RADIOLOGY:  CT A/P:  No pulmonary embolism though evaluation of subsegmental branches limited   secondary to respiratory motion artifact.    Trace bilateral pleural effusions with adjacent patchy bibasilar   opacities, likely represent dependent atelectasis. Recommend clinical   correlation to assess for superimposed infection.    Hepatomegaly. Liver surface nodularity. Hepatitis or cirrhosis considered   in differential. Correlate with clinical parameters.    Trace ascites.    Mild bladder wall thickening with suggestion of left posterolateral   diverticulum. Correlate with urinalysis andlab values to assess for   cystitis and/or ascending urinary tract infection.    Additional findings as mentioned above.    ECHO 1/2023:   1. Technically difficult study.   2. Tachycardic throughout the study and this may preclude true assessment of LVEF.   3. Left ventricular ejection fraction, by visual estimation, is 40 to 45%.   4. Moderately decreased global left ventricular systolic function.   5. The mitral in-flow pattern reveals no discernable A-wave, therefore   no comment on diastolic function can be made.   6. Mildly enlarged left atrium.   7. Normal right atrial size.   8. Mild tricuspid regurgitation.   9. Mild thickening and calcification of the anterior mitral valve leaflet.  10. Mild to moderate mitral annular calcification.  11. Trace mitral valve regurgitation.  12. Moderate aortic valve stenosis, with peak velocity of 2.87 m/s, mean   gradient of 18 mmHg, dimensionless index of 0.29, and CIELO via VTI of 1.42   sqcm.  13. Dilatation of the sinuses of Valsalva, measures 4.15 cm but based on   BSA 1.6cm/sqm is within normal limits.  14. There is a significant pericardial fat pad present.  15. There isno evidence of pericardial effusion.  16. Endocardial visualization was enhanced with intravenous echo contrast.  17. Compared to prior TTE done on 7/6/2022, the left ventricular function   is now reduced to 40-45%, in the setting of Afib wth RVR (prior LVEF   60-65%).      [facility] 1/2023:   1. Left ventricular ejection fraction, by visual estimation, is 40 to 45%.   2. Mildly decreased global left ventricular systolic function.   3. Normal right ventricular size and function.   4. Mildly enlarged left atrium.   5. Mild mitral annular calcification.   6. Mild mitral valve regurgitation.   7. Thickening of the anterior and posterior mitral valve leaflets.   8. Mild tricuspid regurgitation.   9. Mild aortic regurgitation.  10. Mild to moderate aortic valvestenosis.  11. CIELO by planimetry 1.2cm2.  12. Aortic root measured at Sinus of Valsalva is dilated. 4.1cm  13. No left atrial appendage thrombus.  14. Color flow doppler and intravenous injection of agitated saline   demonstrates the presence of an intact intra atrial septum.    Echo (3/73954):   1. Left ventricular ejection fraction, by visual estimation, is 60 to 65%.   2. Normal right ventricular size and function.   3. There is no evidence of pericardial effusion.   4. Endocardial visualizationwas enhanced with intravenous echo contrast.    Echo personally reviewed:   1. Left ventricularejection fraction, by visual estimation, is 35-40%.   2. Moderately decreased global left ventricular systolic   function.   3. There is mild concentric left ventricular hypertrophy.   4. Mildly enlarged right ventricle.   5. Moderately reduced RV systolic function.   6. Normal left atrial size.   7. Normal right atrial size.   8. There is no evidence of pericardial effusion.   9. Peak Gradient across the Aortic Valve is 35 mm Hg. Mean gradient is 26 mm Hg. DI 0.25.  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    Calcium Score:  Cardiac:  Total Coronary Artery Calcium Score = 5373.  >90th percentile.   Significant calcification. Significant atherosclerotic burden.  Aortic calcification    Non Cardiac:  1.  Small bilateral pleural effusions and lower lobe passive atelectasis.  2.  Ascending aorta measures 4.8 cm at the main pulmonary artery.    Echo 6/20/23  Summary:   1. LV Ejection Fraction by Oleary's Method with a biplane EF of 50 %.   2. Normal global left ventricular systolic function.   3. Trace mitral valve regurgitation.   4. Mild aortic regurgitation.   5. Endocardial visualization was enhanced with intravenous echo contrast.   6. Compared to TTE dated 6/11/23 the LVEF has improved.    Assessment:  78 y/o male PMHx AF on Eliquis (s/p Cardioversion on 1/31/23), moderate-severe AS, Chronic Diastolic Heart Failure, CAD s/p PCI, HTN, HLD, DM 2, Morbid Obesity, ALLIE, Thoracic Aortic Aneurysm, BPH s/p TURP, Left Renal Mass was sent to the ED from [facility] for abdominal pain x 4 days. Patient found to have UTI.  Echo performed noted significant drop in EF.  CT with hepatomegaly appearing ?significantly larger than CT from March.  ?2/2 CPC but would consider other causes such as hepatitis.  Appears somewhat fluid overloaded but not in severe acute CHF.  -UTI on admission on antibiotics, biliary colic 2/2 sludge  -significant hepatosplenomegaly , evaluation with GI. Will need hepatology as outpatient came in symptomatic from hepatomegaly with RUQ pain, better now  -Acute systolic CHF being diuresed, now on PO   -Also now back in AF, rate controlled.  ?Contributing to new drop in EF.  ?2/2 stress myopathy with acute infection and new liver dysfunction.  Cath in January with  RCA, 60% distal LCX disease and unlikely to be contributing to CMP. Will need to consider repeat this admission after further diuresis   -Also with significant AS, ?severe LFLG potentially contributing but fairly similar to prior echos. Has had some fluctuation in EF and when i AF this past January had drop in EF with improvement in 3/2023. Now drop again with recurrent AF and suspect contributing factor.  On monitor some SR with 1st degree AV delay then in rapid AF , improved with increase metoprolol and adding digoxin, was back in SR but now AF again   -s/p ablation, currently in SR   -Latest echo with improvement of LVEF      Plan:   1. Continue with Aspirin and eliquis   2. Continue with BB, diuretics   3. Consider slow dose of entresto, and farxiga   4. CT Sx eval appreciated Re: TAVR .   5. PT-OT   6. Stable from CV perspective, TAVR procedure will be arrange in the outpatient setting.   7. Please call us back with  questions or concerns,

## 2023-06-21 NOTE — PROGRESS NOTE ADULT - SUBJECTIVE AND OBJECTIVE BOX
ANIA SUSU  ----------------------------------------  The patient was seen at bedside. Patient with heart failure. Reported that the chest pain has resolved.    Vital Signs Last 24 Hrs  T(C): 36.8 (21 Jun 2023 08:15), Max: 36.9 (20 Jun 2023 21:52)  T(F): 98.2 (21 Jun 2023 08:15), Max: 98.4 (20 Jun 2023 21:52)  HR: 82 (21 Jun 2023 08:15) (74 - 82)  BP: 116/66 (21 Jun 2023 08:15) (100/65 - 116/66)  BP(mean): 75 (20 Jun 2023 21:52) (75 - 77)  RR: 18 (21 Jun 2023 08:15) (15 - 18)  SpO2: 98% (21 Jun 2023 08:15) (93% - 98%)    Parameters below as of 21 Jun 2023 08:15  Patient On (Oxygen Delivery Method): nasal cannula  O2 Flow (L/min): 3    CAPILLARY BLOOD GLUCOSE  POCT Blood Glucose.: 180 mg/dL (21 Jun 2023 12:04)  POCT Blood Glucose.: 157 mg/dL (21 Jun 2023 08:03)  POCT Blood Glucose.: 161 mg/dL (20 Jun 2023 21:04)  POCT Blood Glucose.: 208 mg/dL (20 Jun 2023 17:04)    PHYSICAL EXAMINATION:  ----------------------------------------  General appearance: No acute distress, Awake, Alert  HEENT: Normocephalic, Atraumatic, Conjunctiva clear, EOMI  Neck: Supple, No JVD, No tenderness  Lungs: Breath sound equal bilaterally, No wheezes, No rales  Cardiovascular: S1S2, Regular rhythm  Abdomen: Soft, Nontender, Nondistended, No guarding/rebound, Positive bowel sounds  Extremities: No clubbing, No cyanosis, No edema, No calf tenderness  Neuro: Strength equal bilaterally, No tremors  Psychiatric: Appropriate mood, Normal affect    LABORATORY STUDIES:  ----------------------------------------             13.7   7.84  )-----------( 261      ( 20 Jun 2023 05:03 )             44.4     06-21    136  |  98  |  18.6  ----------------------------<  167<H>  4.1   |  31.0<H>  |  0.54    Ca    8.2<L>      21 Jun 2023 04:47  Mg     1.9     06-21    Urinalysis Basic - ( 21 Jun 2023 04:47 )  Color: x / Appearance: x / SG: x / pH: x  Gluc: 167 mg/dL / Ketone: x  / Bili: x / Urobili: x   Blood: x / Protein: x / Nitrite: x   Leuk Esterase: x / RBC: x / WBC x   Sq Epi: x / Non Sq Epi: x / Bacteria: x    MEDICATIONS  (STANDING):  albuterol    0.083% 2.5 milliGRAM(s) Nebulizer every 6 hours  apixaban 5 milliGRAM(s) Oral <User Schedule>  aspirin  chewable 81 milliGRAM(s) Oral daily  atorvastatin 20 milliGRAM(s) Oral at bedtime  chlorhexidine 2% Cloths 1 Application(s) Topical <User Schedule>  colchicine 0.6 milliGRAM(s) Oral two times a day  dextrose 5%. 1000 milliLiter(s) (50 mL/Hr) IV Continuous <Continuous>  dextrose 5%. 1000 milliLiter(s) (100 mL/Hr) IV Continuous <Continuous>  dextrose 50% Injectable 25 Gram(s) IV Push once  dextrose 50% Injectable 25 Gram(s) IV Push once  dextrose 50% Injectable 12.5 Gram(s) IV Push once  doxazosin 2 milliGRAM(s) Oral at bedtime  finasteride 5 milliGRAM(s) Oral daily  fluticasone propionate 50 MICROgram(s)/spray Nasal Spray 1 Spray(s) Both Nostrils two times a day  furosemide    Tablet 40 milliGRAM(s) Oral daily  glucagon  Injectable 1 milliGRAM(s) IntraMuscular once  insulin lispro (ADMELOG) corrective regimen sliding scale   SubCutaneous at bedtime  insulin lispro (ADMELOG) corrective regimen sliding scale   SubCutaneous three times a day before meals  metoprolol tartrate 25 milliGRAM(s) Oral every 6 hours  pantoprazole    Tablet 40 milliGRAM(s) Oral every 12 hours  polyethylene glycol 3350 17 Gram(s) Oral daily  potassium chloride   Powder 20 milliEquivalent(s) Oral daily  senna 2 Tablet(s) Oral at bedtime  sucralfate suspension 1 Gram(s) Oral every 12 hours    MEDICATIONS  (PRN):  ALPRAZolam 0.5 milliGRAM(s) Oral three times a day PRN anxiety  aluminum hydroxide/magnesium hydroxide/simethicone Suspension 30 milliLiter(s) Oral every 4 hours PRN Dyspepsia  bisacodyl 5 milliGRAM(s) Oral every 12 hours PRN Constipation  dextrose Oral Gel 15 Gram(s) Oral once PRN Blood Glucose LESS THAN 70 milliGRAM(s)/deciliter  diphenhydrAMINE Injectable 50 milliGRAM(s) IV Push once PRN Allergic Reaction  meclizine 12.5 milliGRAM(s) Oral three times a day PRN Dizziness  melatonin 3 milliGRAM(s) Oral at bedtime PRN Insomnia  ondansetron Injectable 4 milliGRAM(s) IV Push every 8 hours PRN Nausea and/or Vomiting  oxyCODONE    IR 5 milliGRAM(s) Oral every 4 hours PRN Severe Pain (7 - 10)  simethicone 80 milliGRAM(s) Chew every 8 hours PRN Gas      ASSESSMENT / PLAN:  ----------------------------------------  78 y/o male with PMH of afib on Eliquis (s/p Cardioversion on 1/31/23), Moderate AS, Chronic Diastolic Heart Failure, CAD s/p PCI, HTN, HLD, DM 2, Morbid Obesity, ALLIE, Thoracic Aortic Aneurysm, BPH s/p TURP, Left Renal Mass was sent to the ED from [facility] for abdominal pain x 4 days. Patient reported pain on the right side of the abdomen (upper and lower), 10/10, non-radiating. Patient reported feeling congested, noted cough but not productive.   CT C/A/P: No PE; Trace bilateral pleural effusions with adjacent patchy bibasilar opacities, likely represent dependent atelectasis vs infection. Hepatomegaly. Liver surface nodularity. Hepatitis or cirrhosis considered in differential. Trace ascites. Mild bladder wall thickening with suggestion of left posterolateral diverticulum.  Patient had a tte showing reduced ef and is npo for rhc and cardiomems today    Atrial fibrillation - Status post ablation. Continue on metoprolol. On apixaban for anticoagulation.    Acute systolic heart failure - Continue on metoprolol. Monitoring urine output while on furosemide. Status post right heart catheterization and CardioMEMS. Repeat echocardiogram noted improvement in the left ventricular systolic function.    Anxiety - On alprazolam as needed. For resumption of sertraline.    Benign prostatic hyperplasia - Continue with finasteride and doxazosin.    Diabetes - Insulin coverage, close monitoring of blood glucose levels.    Hypertension / Coronary artery disease - Close blood pressure monitoring. On aspirin and atorvastatin.    Hepatomegaly - Sludge noted on ultrasound. Bilirubin was not elevated and without transaminitis. MRI noted evidence of early cirrhosis and possible omental infarct. Pain improved.    Obstructive sleep apnea - Not on nocturnal CPAP at home. Monitoring oxygen levels. ANIA SUSU  ----------------------------------------  The patient was seen at bedside. Patient with heart failure. Reported that the chest pain has resolved.    Vital Signs Last 24 Hrs  T(C): 36.8 (21 Jun 2023 08:15), Max: 36.9 (20 Jun 2023 21:52)  T(F): 98.2 (21 Jun 2023 08:15), Max: 98.4 (20 Jun 2023 21:52)  HR: 82 (21 Jun 2023 08:15) (74 - 82)  BP: 116/66 (21 Jun 2023 08:15) (100/65 - 116/66)  BP(mean): 75 (20 Jun 2023 21:52) (75 - 77)  RR: 18 (21 Jun 2023 08:15) (15 - 18)  SpO2: 98% (21 Jun 2023 08:15) (93% - 98%)    Parameters below as of 21 Jun 2023 08:15  Patient On (Oxygen Delivery Method): nasal cannula  O2 Flow (L/min): 3    CAPILLARY BLOOD GLUCOSE  POCT Blood Glucose.: 180 mg/dL (21 Jun 2023 12:04)  POCT Blood Glucose.: 157 mg/dL (21 Jun 2023 08:03)  POCT Blood Glucose.: 161 mg/dL (20 Jun 2023 21:04)  POCT Blood Glucose.: 208 mg/dL (20 Jun 2023 17:04)    PHYSICAL EXAMINATION:  ----------------------------------------  General appearance: No acute distress, Awake, Alert  HEENT: Normocephalic, Atraumatic, Conjunctiva clear, EOMI  Neck: Supple, No JVD, No tenderness  Lungs: Breath sound equal bilaterally, No wheezes, No rales  Cardiovascular: S1S2, Regular rhythm  Abdomen: Soft, Nontender, Nondistended, No guarding/rebound, Positive bowel sounds  Extremities: No clubbing, No cyanosis, No edema, No calf tenderness  Neuro: Strength equal bilaterally, No tremors  Psychiatric: Appropriate mood, Normal affect    LABORATORY STUDIES:  ----------------------------------------             13.7   7.84  )-----------( 261      ( 20 Jun 2023 05:03 )             44.4     06-21    136  |  98  |  18.6  ----------------------------<  167<H>  4.1   |  31.0<H>  |  0.54    Ca    8.2<L>      21 Jun 2023 04:47  Mg     1.9     06-21    Urinalysis Basic - ( 21 Jun 2023 04:47 )  Color: x / Appearance: x / SG: x / pH: x  Gluc: 167 mg/dL / Ketone: x  / Bili: x / Urobili: x   Blood: x / Protein: x / Nitrite: x   Leuk Esterase: x / RBC: x / WBC x   Sq Epi: x / Non Sq Epi: x / Bacteria: x    MEDICATIONS  (STANDING):  albuterol    0.083% 2.5 milliGRAM(s) Nebulizer every 6 hours  apixaban 5 milliGRAM(s) Oral <User Schedule>  aspirin  chewable 81 milliGRAM(s) Oral daily  atorvastatin 20 milliGRAM(s) Oral at bedtime  chlorhexidine 2% Cloths 1 Application(s) Topical <User Schedule>  colchicine 0.6 milliGRAM(s) Oral two times a day  dextrose 5%. 1000 milliLiter(s) (50 mL/Hr) IV Continuous <Continuous>  dextrose 5%. 1000 milliLiter(s) (100 mL/Hr) IV Continuous <Continuous>  dextrose 50% Injectable 25 Gram(s) IV Push once  dextrose 50% Injectable 25 Gram(s) IV Push once  dextrose 50% Injectable 12.5 Gram(s) IV Push once  doxazosin 2 milliGRAM(s) Oral at bedtime  finasteride 5 milliGRAM(s) Oral daily  fluticasone propionate 50 MICROgram(s)/spray Nasal Spray 1 Spray(s) Both Nostrils two times a day  furosemide    Tablet 40 milliGRAM(s) Oral daily  glucagon  Injectable 1 milliGRAM(s) IntraMuscular once  insulin lispro (ADMELOG) corrective regimen sliding scale   SubCutaneous at bedtime  insulin lispro (ADMELOG) corrective regimen sliding scale   SubCutaneous three times a day before meals  metoprolol tartrate 25 milliGRAM(s) Oral every 6 hours  pantoprazole    Tablet 40 milliGRAM(s) Oral every 12 hours  polyethylene glycol 3350 17 Gram(s) Oral daily  potassium chloride   Powder 20 milliEquivalent(s) Oral daily  senna 2 Tablet(s) Oral at bedtime  sucralfate suspension 1 Gram(s) Oral every 12 hours    MEDICATIONS  (PRN):  ALPRAZolam 0.5 milliGRAM(s) Oral three times a day PRN anxiety  aluminum hydroxide/magnesium hydroxide/simethicone Suspension 30 milliLiter(s) Oral every 4 hours PRN Dyspepsia  bisacodyl 5 milliGRAM(s) Oral every 12 hours PRN Constipation  dextrose Oral Gel 15 Gram(s) Oral once PRN Blood Glucose LESS THAN 70 milliGRAM(s)/deciliter  diphenhydrAMINE Injectable 50 milliGRAM(s) IV Push once PRN Allergic Reaction  meclizine 12.5 milliGRAM(s) Oral three times a day PRN Dizziness  melatonin 3 milliGRAM(s) Oral at bedtime PRN Insomnia  ondansetron Injectable 4 milliGRAM(s) IV Push every 8 hours PRN Nausea and/or Vomiting  oxyCODONE    IR 5 milliGRAM(s) Oral every 4 hours PRN Severe Pain (7 - 10)  simethicone 80 milliGRAM(s) Chew every 8 hours PRN Gas      ASSESSMENT / PLAN:  ----------------------------------------  76 y/o male with PMH of afib on Eliquis (s/p Cardioversion on 1/31/23), Moderate AS, Chronic Diastolic Heart Failure, CAD s/p PCI, HTN, HLD, DM 2, Morbid Obesity, ALLIE, Thoracic Aortic Aneurysm, BPH s/p TURP, Left Renal Mass was sent to the ED from [facility] for abdominal pain x 4 days. Patient reported pain on the right side of the abdomen (upper and lower), 10/10, non-radiating. Patient reported feeling congested, noted cough but not productive.   CT C/A/P: No PE; Trace bilateral pleural effusions with adjacent patchy bibasilar opacities, likely represent dependent atelectasis vs infection. Hepatomegaly. Liver surface nodularity. Hepatitis or cirrhosis considered in differential. Trace ascites. Mild bladder wall thickening with suggestion of left posterolateral diverticulum.  Patient had a tte showing reduced ef and is npo for rhc and cardiomems today    Atrial fibrillation - Status post ablation. Continue on metoprolol. On apixaban for anticoagulation.    Acute systolic heart failure - Continue on metoprolol. Monitoring urine output while on furosemide. Status post right heart catheterization and CardioMEMS. Repeat echocardiogram noted improvement in the left ventricular systolic function. For outpatient follow up with Cardiothoracic Surgery in regards to aortic stenosis.    Anxiety - On alprazolam as needed. For resumption of sertraline.    Benign prostatic hyperplasia - Continue with finasteride and doxazosin.    Diabetes - Insulin coverage, close monitoring of blood glucose levels.    Hypertension / Coronary artery disease - Close blood pressure monitoring. On aspirin and atorvastatin.    Hepatomegaly - Sludge noted on ultrasound. Bilirubin was not elevated and without transaminitis. MRI noted evidence of early cirrhosis and possible omental infarct. Pain improved.    Obstructive sleep apnea - Not on nocturnal CPAP at home. Monitoring oxygen levels. ANIA SUSU  ----------------------------------------  The patient was seen at bedside. Patient with heart failure. Reported that the chest pain has resolved.    Vital Signs Last 24 Hrs  T(C): 36.8 (21 Jun 2023 08:15), Max: 36.9 (20 Jun 2023 21:52)  T(F): 98.2 (21 Jun 2023 08:15), Max: 98.4 (20 Jun 2023 21:52)  HR: 82 (21 Jun 2023 08:15) (74 - 82)  BP: 116/66 (21 Jun 2023 08:15) (100/65 - 116/66)  BP(mean): 75 (20 Jun 2023 21:52) (75 - 77)  RR: 18 (21 Jun 2023 08:15) (15 - 18)  SpO2: 98% (21 Jun 2023 08:15) (93% - 98%)    Parameters below as of 21 Jun 2023 08:15  Patient On (Oxygen Delivery Method): nasal cannula  O2 Flow (L/min): 3    CAPILLARY BLOOD GLUCOSE  POCT Blood Glucose.: 180 mg/dL (21 Jun 2023 12:04)  POCT Blood Glucose.: 157 mg/dL (21 Jun 2023 08:03)  POCT Blood Glucose.: 161 mg/dL (20 Jun 2023 21:04)  POCT Blood Glucose.: 208 mg/dL (20 Jun 2023 17:04)    PHYSICAL EXAMINATION:  ----------------------------------------  General appearance: No acute distress, Awake, Alert  HEENT: Normocephalic, Atraumatic, Conjunctiva clear, EOMI  Neck: Supple, No JVD, No tenderness  Lungs: Breath sound equal bilaterally, No wheezes, No rales  Cardiovascular: S1S2, Regular rhythm  Abdomen: Soft, Nontender, Nondistended, No guarding/rebound, Positive bowel sounds  Extremities: No clubbing, No cyanosis, No edema, No calf tenderness  Neuro: Strength equal bilaterally, No tremors  Psychiatric: Appropriate mood, Normal affect    LABORATORY STUDIES:  ----------------------------------------             13.7   7.84  )-----------( 261      ( 20 Jun 2023 05:03 )             44.4     06-21    136  |  98  |  18.6  ----------------------------<  167<H>  4.1   |  31.0<H>  |  0.54    Ca    8.2<L>      21 Jun 2023 04:47  Mg     1.9     06-21    Urinalysis Basic - ( 21 Jun 2023 04:47 )  Color: x / Appearance: x / SG: x / pH: x  Gluc: 167 mg/dL / Ketone: x  / Bili: x / Urobili: x   Blood: x / Protein: x / Nitrite: x   Leuk Esterase: x / RBC: x / WBC x   Sq Epi: x / Non Sq Epi: x / Bacteria: x    MEDICATIONS  (STANDING):  albuterol    0.083% 2.5 milliGRAM(s) Nebulizer every 6 hours  apixaban 5 milliGRAM(s) Oral <User Schedule>  aspirin  chewable 81 milliGRAM(s) Oral daily  atorvastatin 20 milliGRAM(s) Oral at bedtime  chlorhexidine 2% Cloths 1 Application(s) Topical <User Schedule>  colchicine 0.6 milliGRAM(s) Oral two times a day  dextrose 5%. 1000 milliLiter(s) (50 mL/Hr) IV Continuous <Continuous>  dextrose 5%. 1000 milliLiter(s) (100 mL/Hr) IV Continuous <Continuous>  dextrose 50% Injectable 25 Gram(s) IV Push once  dextrose 50% Injectable 25 Gram(s) IV Push once  dextrose 50% Injectable 12.5 Gram(s) IV Push once  doxazosin 2 milliGRAM(s) Oral at bedtime  finasteride 5 milliGRAM(s) Oral daily  fluticasone propionate 50 MICROgram(s)/spray Nasal Spray 1 Spray(s) Both Nostrils two times a day  furosemide    Tablet 40 milliGRAM(s) Oral daily  glucagon  Injectable 1 milliGRAM(s) IntraMuscular once  insulin lispro (ADMELOG) corrective regimen sliding scale   SubCutaneous at bedtime  insulin lispro (ADMELOG) corrective regimen sliding scale   SubCutaneous three times a day before meals  metoprolol tartrate 25 milliGRAM(s) Oral every 6 hours  pantoprazole    Tablet 40 milliGRAM(s) Oral every 12 hours  polyethylene glycol 3350 17 Gram(s) Oral daily  potassium chloride   Powder 20 milliEquivalent(s) Oral daily  senna 2 Tablet(s) Oral at bedtime  sucralfate suspension 1 Gram(s) Oral every 12 hours    MEDICATIONS  (PRN):  ALPRAZolam 0.5 milliGRAM(s) Oral three times a day PRN anxiety  aluminum hydroxide/magnesium hydroxide/simethicone Suspension 30 milliLiter(s) Oral every 4 hours PRN Dyspepsia  bisacodyl 5 milliGRAM(s) Oral every 12 hours PRN Constipation  dextrose Oral Gel 15 Gram(s) Oral once PRN Blood Glucose LESS THAN 70 milliGRAM(s)/deciliter  diphenhydrAMINE Injectable 50 milliGRAM(s) IV Push once PRN Allergic Reaction  meclizine 12.5 milliGRAM(s) Oral three times a day PRN Dizziness  melatonin 3 milliGRAM(s) Oral at bedtime PRN Insomnia  ondansetron Injectable 4 milliGRAM(s) IV Push every 8 hours PRN Nausea and/or Vomiting  oxyCODONE    IR 5 milliGRAM(s) Oral every 4 hours PRN Severe Pain (7 - 10)  simethicone 80 milliGRAM(s) Chew every 8 hours PRN Gas      ASSESSMENT / PLAN:  ----------------------------------------  78 y/o male with PMH of afib on Eliquis (s/p Cardioversion on 1/31/23), Moderate AS, Chronic Diastolic Heart Failure, CAD s/p PCI, HTN, HLD, DM 2, Morbid Obesity, ALLIE, Thoracic Aortic Aneurysm, BPH s/p TURP, Left Renal Mass was sent to the ED from [facility] for abdominal pain x 4 days. Patient reported pain on the right side of the abdomen (upper and lower), 10/10, non-radiating. Patient reported feeling congested, noted cough but not productive.   CT C/A/P: No PE; Trace bilateral pleural effusions with adjacent patchy bibasilar opacities, likely represent dependent atelectasis vs infection. Hepatomegaly. Liver surface nodularity. Hepatitis or cirrhosis considered in differential. Trace ascites. Mild bladder wall thickening with suggestion of left posterolateral diverticulum.  Patient had a tte showing reduced ef and is npo for rhc and cardiomems today    Atrial fibrillation - Status post ablation. Continue on metoprolol. On apixaban for anticoagulation.    Acute systolic heart failure - Continue on metoprolol. Monitoring urine output while on furosemide. Status post right heart catheterization and CardioMEMS. Repeat echocardiogram noted improvement in the left ventricular systolic function. For outpatient follow up with Cardiothoracic Surgery in regards to aortic stenosis.    Anxiety - On alprazolam as needed. For resumption of sertraline.    Benign prostatic hyperplasia - Continue with finasteride and doxazosin.    Diabetes - Insulin coverage, close monitoring of blood glucose levels.    Hypertension / Coronary artery disease - Close blood pressure monitoring. On aspirin and atorvastatin.    Hepatomegaly - Sludge noted on ultrasound. Bilirubin was not elevated and without transaminitis. MRI noted evidence of early cirrhosis and possible omental infarct. Pain improved.    Obstructive sleep apnea - Not on nocturnal CPAP at home. Monitoring oxygen levels.    For further evaluation by Physical Therapy to determine appropriate disposition.

## 2023-06-21 NOTE — CHART NOTE - NSCHARTNOTEFT_GEN_A_CORE
Called by RN, patient C/O dizziness, diaphoretic, while on toilet. Seen and examined back in bed. /84. Reports had "throbbing head ache" was concerned over family H/O stroke. Denies chest pain, SOB, nausea, vomiting , fever, chills.     VS: 79- 18- 128/78  B    PE:  General appearance: No acute distress, Awake, Alert  HEENT: Normocephalic, Atraumatic, Conjunctiva clear, EOMI  Neck: Supple, No JVD, No tenderness  Lungs: Breath sound equal bilaterally, No wheezes, No rales  Cardiovascular: S1S2, Regular rhythm, + murmur  Abdomen: Soft, Nontender, Nondistended, No guarding/rebound, Positive bowel sounds  Neuro: No focal deficits  Psychiatric: Mild anxiety    EKG: read by me: NSR first degree AV block, no acute ST-T wave changes    A/P  Dizziness  Probable vaso-vagal  Check orthostatic BP  Head CT  Discussed with Dr. Barber
In sinus rhythm today.     Please keep NPO past midnight for planned AF/aflutter ablation tomorrow. Hold AM dose of Eliquis.

## 2023-06-21 NOTE — PROGRESS NOTE ADULT - SUBJECTIVE AND OBJECTIVE BOX
Subjective - patient seen and evaluated bedside. Sitting in bed. States that he is experiencing dizziness after using commode. Denies CP, SOB, n/v/d, abd pain.     Review of Systems: negative x 10 systems except as noted above    Brief summary:  77yMale with severe AS, s/p LIBBY/DCCV undergoing workup for TAVR    Significant/[id number] events: none      PAST MEDICAL & SURGICAL HISTORY:  Anxiety      Hypertension      Hyperlipidemia      Prostate disease      Gastroesophageal reflux disease      Gallbladder stone without cholecystitis or obstruction      BPH (benign prostatic hyperplasia)      DM (diabetes mellitus)      DVT (deep venous thrombosis)  left leg      Afib      Anxiety      HLD (hyperlipidemia)      Malignant schwannoma      H/O arthroscopy of knee      S/P laminectomy      S/P coronary artery stent placement  x2            albuterol    0.083% 2.5 milliGRAM(s) Nebulizer every 6 hours  ALPRAZolam 0.5 milliGRAM(s) Oral three times a day PRN  aluminum hydroxide/magnesium hydroxide/simethicone Suspension 30 milliLiter(s) Oral every 4 hours PRN  apixaban 5 milliGRAM(s) Oral <User Schedule>  aspirin  chewable 81 milliGRAM(s) Oral daily  atorvastatin 20 milliGRAM(s) Oral at bedtime  bisacodyl 5 milliGRAM(s) Oral every 12 hours PRN  chlorhexidine 2% Cloths 1 Application(s) Topical <User Schedule>  colchicine 0.6 milliGRAM(s) Oral two times a day  dextrose 5%. 1000 milliLiter(s) IV Continuous <Continuous>  dextrose 5%. 1000 milliLiter(s) IV Continuous <Continuous>  dextrose 50% Injectable 25 Gram(s) IV Push once  dextrose 50% Injectable 25 Gram(s) IV Push once  dextrose 50% Injectable 12.5 Gram(s) IV Push once  dextrose Oral Gel 15 Gram(s) Oral once PRN  diphenhydrAMINE Injectable 50 milliGRAM(s) IV Push once PRN  doxazosin 2 milliGRAM(s) Oral at bedtime  finasteride 5 milliGRAM(s) Oral daily  fluticasone propionate 50 MICROgram(s)/spray Nasal Spray 1 Spray(s) Both Nostrils two times a day  furosemide    Tablet 40 milliGRAM(s) Oral daily  glucagon  Injectable 1 milliGRAM(s) IntraMuscular once  insulin lispro (ADMELOG) corrective regimen sliding scale   SubCutaneous three times a day before meals  insulin lispro (ADMELOG) corrective regimen sliding scale   SubCutaneous at bedtime  meclizine 12.5 milliGRAM(s) Oral three times a day PRN  melatonin 3 milliGRAM(s) Oral at bedtime PRN  metoprolol tartrate 25 milliGRAM(s) Oral every 6 hours  ondansetron Injectable 4 milliGRAM(s) IV Push every 8 hours PRN  oxyCODONE    IR 5 milliGRAM(s) Oral every 4 hours PRN  pantoprazole    Tablet 40 milliGRAM(s) Oral every 12 hours  polyethylene glycol 3350 17 Gram(s) Oral daily  potassium chloride   Powder 20 milliEquivalent(s) Oral daily  senna 2 Tablet(s) Oral at bedtime  sertraline 50 milliGRAM(s) Oral daily  simethicone 80 milliGRAM(s) Chew every 8 hours PRN  sucralfate suspension 1 Gram(s) Oral every 12 hours  MEDICATIONS  (PRN):  ALPRAZolam 0.5 milliGRAM(s) Oral three times a day PRN anxiety  aluminum hydroxide/magnesium hydroxide/simethicone Suspension 30 milliLiter(s) Oral every 4 hours PRN Dyspepsia  bisacodyl 5 milliGRAM(s) Oral every 12 hours PRN Constipation  dextrose Oral Gel 15 Gram(s) Oral once PRN Blood Glucose LESS THAN 70 milliGRAM(s)/deciliter  diphenhydrAMINE Injectable 50 milliGRAM(s) IV Push once PRN Allergic Reaction  meclizine 12.5 milliGRAM(s) Oral three times a day PRN Dizziness  melatonin 3 milliGRAM(s) Oral at bedtime PRN Insomnia  ondansetron Injectable 4 milliGRAM(s) IV Push every 8 hours PRN Nausea and/or Vomiting  oxyCODONE    IR 5 milliGRAM(s) Oral every 4 hours PRN Severe Pain (7 - 10)  simethicone 80 milliGRAM(s) Chew every 8 hours PRN Gas      Daily     Daily                               13.7   7.84  )-----------( 261      ( 20 Jun 2023 05:03 )             44.4   06-21    136  |  98  |  18.6  ----------------------------<  167<H>  4.1   |  31.0<H>  |  0.54    Ca    8.2<L>      21 Jun 2023 04:47  Mg     1.9     06-21              Objective:  T(C): 36.8 (06-21-23 @ 08:15), Max: 36.9 (06-20-23 @ 21:52)  HR: 79 (06-21-23 @ 12:50) (74 - 82)  BP: 108/64 (06-21-23 @ 12:50) (100/65 - 116/66)  RR: 18 (06-21-23 @ 08:15) (18 - 18)  SpO2: 98% (06-21-23 @ 08:15) (93% - 98%)  Wt(kg): --CAPILLARY BLOOD GLUCOSE      POCT Blood Glucose.: 167 mg/dL (21 Jun 2023 14:20)  POCT Blood Glucose.: 180 mg/dL (21 Jun 2023 12:04)  POCT Blood Glucose.: 157 mg/dL (21 Jun 2023 08:03)  POCT Blood Glucose.: 161 mg/dL (20 Jun 2023 21:04)  POCT Blood Glucose.: 208 mg/dL (20 Jun 2023 17:04)  I&O's Summary    20 Jun 2023 07:01  -  21 Jun 2023 07:00  --------------------------------------------------------  IN: 350 mL / OUT: 1500 mL / NET: -1150 mL        Physical Exam  General: NAD  Neuro: A+O x 3, non-focal, speech clear and intact  Psych: Appropriate affect  HEENT:  NCAT, No conjuctival edema or icterus, no thrush.  Pulm: CTA, equal bilaterally  CV: RRR, irregularly irregular, +S1S2  Abd: soft, NT, ND, +BS  Ext: +DP Pulses b/l, no edema  Skin: Warm, dry, intact  Inc: MSI C/D/I/stable w/ dressing, LE vein harvest site C/D/I with Ace wrap  Chest tubes:        Imaging:      < from: TTE Echo Limited or F/U (06.20.23 @ 09:42) >  PHYSICIAN INTERPRETATION:  Left Ventricle: Endocardial visualization was enhanced with intravenous   echo contrast. The left ventricular internal cavity size is normal.  Global LV systolic function was normal.  Right Ventricle: The right ventricular size is normal. RV systolic   function is normal.  Pericardium: There is no evidence of pericardial effusion.  Mitral Valve: No evidence of mitral stenosis.Trace mitral valve   regurgitation is seen.  Tricuspid Valve: The tricuspid valve is normal in structure. Trivial   tricuspid regurgitation is visualized.  Aortic Valve: The aortic valve is trileaflet. Mild aortic valve   regurgitation is seen.  Pulmonic Valve: The pulmonic valve was not well visualized. No indication   of pulmonic valve regurgitation.  Pulmonary Artery: The pulmonary artery is not well seen.  Venous: The inferior vena cava was normal sized, with respiratory size   variation lessthan 50%.  In comparison to the previous echocardiogram(s): Prior examinations are   available and were reviewed for comparison purposes. Compared to TTE   dated 6/11/23 the LVEF has improved.      Summary:   1. LV Ejection Fraction by Oleary's Method with a biplane EF of 50 %.   2. Normal global left ventricular systolic function.   3. Trace mitral valve regurgitation.   4. Mild aortic regurgitation.   5. Endocardial visualization was enhanced with intravenous echo contrast.   6. Compared to TTE dated 6/11/23 the LVEF has improved.   7. For additional echocardiographic findings refer to TTE dated 6/11/23.    < end of copied text >

## 2023-06-22 LAB
ANION GAP SERPL CALC-SCNC: 7 MMOL/L — SIGNIFICANT CHANGE UP (ref 5–17)
BUN SERPL-MCNC: 20.8 MG/DL — HIGH (ref 8–20)
CALCIUM SERPL-MCNC: 8.5 MG/DL — SIGNIFICANT CHANGE UP (ref 8.4–10.5)
CHLORIDE SERPL-SCNC: 98 MMOL/L — SIGNIFICANT CHANGE UP (ref 96–108)
CO2 SERPL-SCNC: 32 MMOL/L — HIGH (ref 22–29)
CREAT SERPL-MCNC: 0.6 MG/DL — SIGNIFICANT CHANGE UP (ref 0.5–1.3)
EGFR: 99 ML/MIN/1.73M2 — SIGNIFICANT CHANGE UP
GLUCOSE BLDC GLUCOMTR-MCNC: 116 MG/DL — HIGH (ref 70–99)
GLUCOSE BLDC GLUCOMTR-MCNC: 181 MG/DL — HIGH (ref 70–99)
GLUCOSE BLDC GLUCOMTR-MCNC: 193 MG/DL — HIGH (ref 70–99)
GLUCOSE BLDC GLUCOMTR-MCNC: 203 MG/DL — HIGH (ref 70–99)
GLUCOSE BLDC GLUCOMTR-MCNC: 241 MG/DL — HIGH (ref 70–99)
GLUCOSE SERPL-MCNC: 174 MG/DL — HIGH (ref 70–99)
POTASSIUM SERPL-MCNC: 3.9 MMOL/L — SIGNIFICANT CHANGE UP (ref 3.5–5.3)
POTASSIUM SERPL-SCNC: 3.9 MMOL/L — SIGNIFICANT CHANGE UP (ref 3.5–5.3)
SODIUM SERPL-SCNC: 137 MMOL/L — SIGNIFICANT CHANGE UP (ref 135–145)

## 2023-06-22 PROCEDURE — 75574 CT ANGIO HRT W/3D IMAGE: CPT | Mod: 26

## 2023-06-22 PROCEDURE — 74174 CTA ABD&PLVS W/CONTRAST: CPT | Mod: 26

## 2023-06-22 PROCEDURE — 99233 SBSQ HOSP IP/OBS HIGH 50: CPT

## 2023-06-22 PROCEDURE — 99231 SBSQ HOSP IP/OBS SF/LOW 25: CPT | Mod: FS

## 2023-06-22 RX ADMIN — APIXABAN 5 MILLIGRAM(S): 2.5 TABLET, FILM COATED ORAL at 21:06

## 2023-06-22 RX ADMIN — Medication 1: at 08:55

## 2023-06-22 RX ADMIN — Medication 0.5 MILLIGRAM(S): at 00:13

## 2023-06-22 RX ADMIN — ATORVASTATIN CALCIUM 20 MILLIGRAM(S): 80 TABLET, FILM COATED ORAL at 21:09

## 2023-06-22 RX ADMIN — Medication 2: at 12:29

## 2023-06-22 RX ADMIN — OXYCODONE HYDROCHLORIDE 5 MILLIGRAM(S): 5 TABLET ORAL at 15:50

## 2023-06-22 RX ADMIN — Medication 1 SPRAY(S): at 06:25

## 2023-06-22 RX ADMIN — Medication 1 GRAM(S): at 06:25

## 2023-06-22 RX ADMIN — OXYCODONE HYDROCHLORIDE 5 MILLIGRAM(S): 5 TABLET ORAL at 06:24

## 2023-06-22 RX ADMIN — OXYCODONE HYDROCHLORIDE 5 MILLIGRAM(S): 5 TABLET ORAL at 22:06

## 2023-06-22 RX ADMIN — Medication 25 MILLIGRAM(S): at 06:23

## 2023-06-22 RX ADMIN — Medication 0.6 MILLIGRAM(S): at 06:24

## 2023-06-22 RX ADMIN — ALBUTEROL 2.5 MILLIGRAM(S): 90 AEROSOL, METERED ORAL at 09:09

## 2023-06-22 RX ADMIN — CHLORHEXIDINE GLUCONATE 1 APPLICATION(S): 213 SOLUTION TOPICAL at 06:23

## 2023-06-22 RX ADMIN — Medication 2 MILLIGRAM(S): at 21:06

## 2023-06-22 RX ADMIN — ALBUTEROL 2.5 MILLIGRAM(S): 90 AEROSOL, METERED ORAL at 21:51

## 2023-06-22 RX ADMIN — PANTOPRAZOLE SODIUM 40 MILLIGRAM(S): 20 TABLET, DELAYED RELEASE ORAL at 06:24

## 2023-06-22 RX ADMIN — OXYCODONE HYDROCHLORIDE 5 MILLIGRAM(S): 5 TABLET ORAL at 21:06

## 2023-06-22 RX ADMIN — PANTOPRAZOLE SODIUM 40 MILLIGRAM(S): 20 TABLET, DELAYED RELEASE ORAL at 17:20

## 2023-06-22 RX ADMIN — OXYCODONE HYDROCHLORIDE 5 MILLIGRAM(S): 5 TABLET ORAL at 16:50

## 2023-06-22 RX ADMIN — APIXABAN 5 MILLIGRAM(S): 2.5 TABLET, FILM COATED ORAL at 08:17

## 2023-06-22 RX ADMIN — Medication 1 SPRAY(S): at 17:21

## 2023-06-22 RX ADMIN — OXYCODONE HYDROCHLORIDE 5 MILLIGRAM(S): 5 TABLET ORAL at 07:24

## 2023-06-22 RX ADMIN — Medication 1: at 17:19

## 2023-06-22 RX ADMIN — Medication 40 MILLIGRAM(S): at 06:23

## 2023-06-22 RX ADMIN — Medication 1 GRAM(S): at 17:23

## 2023-06-22 RX ADMIN — Medication 25 MILLIGRAM(S): at 00:13

## 2023-06-22 RX ADMIN — Medication 0.6 MILLIGRAM(S): at 17:19

## 2023-06-22 RX ADMIN — Medication 25 MILLIGRAM(S): at 17:20

## 2023-06-22 NOTE — PHYSICAL THERAPY INITIAL EVALUATION ADULT - ADDITIONAL COMMENTS
Pt lives at the [facility] living. Reports that he spends most of his time in bed or w/c. Has only been ambulating during home PT sessions with RW and assist
Pt lives at the [facility] living. Reports that he spends most of his time in bed or w/c. Has only been ambulating during home PT sessions with RW and assist

## 2023-06-22 NOTE — PHYSICAL THERAPY INITIAL EVALUATION ADULT - IMPAIRED TRANSFERS: SIT/STAND, REHAB EVAL
decreased endurance/decreased strength
decreased endurance/decreased strength
Mastoid Interpolation Flap Text: A decision was made to reconstruct the defect utilizing an interpolation axial flap and a staged reconstruction.  A telfa template was made of the defect.  This telfa template was then used to outline the mastoid interpolation flap.  The donor area for the pedicle flap was then injected with anesthesia.  The flap was excised through the skin and subcutaneous tissue down to the layer of the underlying musculature.  The pedicle flap was carefully excised within this deep plane to maintain its blood supply.  The edges of the donor site were undermined.   The donor site was closed in a primary fashion.  The pedicle was then rotated into position and sutured.  Once the tube was sutured into place, adequate blood supply was confirmed with blanching and refill.  The pedicle was then wrapped with xeroform gauze and dressed appropriately with a telfa and gauze bandage to ensure continued blood supply and protect the attached pedicle.

## 2023-06-22 NOTE — PHYSICAL THERAPY INITIAL EVALUATION ADULT - PERTINENT HX OF CURRENT PROBLEM, REHAB EVAL
76 y/o male admitted from retirement with abdominal pain x 4 days. Hepatomegaly, (+) UA, hypotensive.
78 y/o male admitted from detention with abdominal pain x 4 days. Hepatomegaly, (+) UA, hypotensive. Pt now post ablation 6/19.

## 2023-06-22 NOTE — PROGRESS NOTE ADULT - PROBLEM SELECTOR PLAN 1
Admitted with acute systolic heart failure with new drop in EF attributed to Afib.   S/p LIBBY cardioversion 6/19  Severe AS on TTE.  Consulted for TAVR eval.   Has cardiomems.  Had LHC in January that showed 60% dcx disease and 100% pRCA with collateral circulation.  LIBBY in January with moderate to severe AS and mild AI.    Plan for TAVR CTA later today  Per EP, AC cannot be stopped at this time.  TAVR will need to be performed as outpatient.  WILL NEED OUTPATIENT DENTAL EVALUATION WITH EXTRACTIONS, IF NECESSARY   Discussed with Dr. Napier    Please include in discharge: Make and appointment to follow up with Dr. Napier by calling [phone]. The cardiac surgery office is located at [facility], first floor. Take a left at the end of the lobby until the end of that lundberg (past the elevator bank). Make a left and the office is on your right across from the elevators.

## 2023-06-22 NOTE — PHYSICAL THERAPY INITIAL EVALUATION ADULT - GENERAL OBSERVATIONS, REHAB EVAL
Pt received seated on toilet with CNA present, (+) tele, (+) supplemental O2 via nasal cannula @ 4.0L/min, (+) IV lock, c/o constipation
Pt received seated at edge of bed on 4 tower, (+) tele, (+) IV lock, NAD. Agreeable to PT evaluation.

## 2023-06-22 NOTE — PHYSICAL THERAPY INITIAL EVALUATION ADULT - IMPAIRMENTS CONTRIBUTING TO GAIT DEVIATIONS, PT EVAL
decreased endurance, pt tachy to 130's RN aware/decreased strength
decreased endurance/decreased strength

## 2023-06-22 NOTE — PHYSICAL THERAPY INITIAL EVALUATION ADULT - DIAGNOSIS, PT EVAL
Impaired functional mobility due to decreased strength and endurance
Impaired functional mobility due to decreased strength and endurance

## 2023-06-22 NOTE — PROGRESS NOTE ADULT - PROBLEM SELECTOR PLAN 2
Hepatomegaly on CT scan .   Per GI, possible mild cirrhosis possible DRAKE with plan for outpatient fibroscan to evaluate degree of fibrosis /cirrhosis .
Hepatomegaly on CT scan.   Per GI, possible mild cirrhosis possible DRAKE with plan for outpatient fibroscan to evaluate degree of fibrosis /cirrhosis .
Patient 's pain  is likely musculoskeletal- possibly costochondritis    Patient has pain with bending forward laying on right side or with reaching his right arm back   Pt had reproducible tenderness to the right lower ribs

## 2023-06-22 NOTE — PROGRESS NOTE ADULT - SUBJECTIVE AND OBJECTIVE BOX
ANIA CRISTOBAL  ----------------------------------------  The patient was seen at bedside. Patient with heart failure. Offered no complaints.    Vital Signs Last 24 Hrs  T(C): 36.4 (22 Jun 2023 07:33), Max: 36.8 (22 Jun 2023 00:35)  T(F): 97.5 (22 Jun 2023 07:33), Max: 98.2 (22 Jun 2023 00:35)  HR: 72 (22 Jun 2023 09:09) (67 - 81)  BP: 111/66 (22 Jun 2023 07:33) (105/73 - 127/68)  BP(mean): 92 (21 Jun 2023 17:12) (92 - 92)  RR: 18 (22 Jun 2023 07:33) (18 - 18)  SpO2: 98% (22 Jun 2023 09:09) (96% - 98%)    Parameters below as of 22 Jun 2023 09:09  Patient On (Oxygen Delivery Method): room air    CAPILLARY BLOOD GLUCOSE  POCT Blood Glucose.: 203 mg/dL (22 Jun 2023 12:06)  POCT Blood Glucose.: 193 mg/dL (22 Jun 2023 08:09)  POCT Blood Glucose.: 204 mg/dL (21 Jun 2023 21:21)  POCT Blood Glucose.: 165 mg/dL (21 Jun 2023 17:02)  POCT Blood Glucose.: 167 mg/dL (21 Jun 2023 14:20)    PHYSICAL EXAMINATION:  ----------------------------------------  General appearance: No acute distress, Awake, Alert  HEENT: Normocephalic, Atraumatic, Conjunctiva clear, EOMI  Neck: Supple, No JVD, No tenderness  Lungs: Breath sound equal bilaterally, No wheezes, No rales  Cardiovascular: S1S2, Regular rhythm  Abdomen: Soft, Nontender, Nondistended, No guarding/rebound, Positive bowel sounds  Extremities: No clubbing, No cyanosis, No edema, No calf tenderness  Neuro: Strength equal bilaterally, No tremors  Psychiatric: Appropriate mood, Normal affect    LABORATORY STUDIES:  ----------------------------------------  06-22    137  |  98  |  20.8<H>  ----------------------------<  174<H>  3.9   |  32.0<H>  |  0.60    Ca    8.5      22 Jun 2023 04:46  Mg     1.9     06-21    Urinalysis Basic - ( 22 Jun 2023 04:46 )  Color: x / Appearance: x / SG: x / pH: x  Gluc: 174 mg/dL / Ketone: x  / Bili: x / Urobili: x   Blood: x / Protein: x / Nitrite: x   Leuk Esterase: x / RBC: x / WBC x   Sq Epi: x / Non Sq Epi: x / Bacteria: x    MEDICATIONS  (STANDING):  albuterol    0.083% 2.5 milliGRAM(s) Nebulizer every 6 hours  apixaban 5 milliGRAM(s) Oral <User Schedule>  aspirin  chewable 81 milliGRAM(s) Oral daily  atorvastatin 20 milliGRAM(s) Oral at bedtime  chlorhexidine 2% Cloths 1 Application(s) Topical <User Schedule>  colchicine 0.6 milliGRAM(s) Oral two times a day  dextrose 5%. 1000 milliLiter(s) (100 mL/Hr) IV Continuous <Continuous>  dextrose 5%. 1000 milliLiter(s) (50 mL/Hr) IV Continuous <Continuous>  dextrose 50% Injectable 12.5 Gram(s) IV Push once  dextrose 50% Injectable 25 Gram(s) IV Push once  dextrose 50% Injectable 25 Gram(s) IV Push once  doxazosin 2 milliGRAM(s) Oral at bedtime  finasteride 5 milliGRAM(s) Oral daily  fluticasone propionate 50 MICROgram(s)/spray Nasal Spray 1 Spray(s) Both Nostrils two times a day  furosemide    Tablet 40 milliGRAM(s) Oral daily  glucagon  Injectable 1 milliGRAM(s) IntraMuscular once  insulin lispro (ADMELOG) corrective regimen sliding scale   SubCutaneous three times a day before meals  insulin lispro (ADMELOG) corrective regimen sliding scale   SubCutaneous at bedtime  metoprolol tartrate 25 milliGRAM(s) Oral every 6 hours  pantoprazole    Tablet 40 milliGRAM(s) Oral every 12 hours  polyethylene glycol 3350 17 Gram(s) Oral daily  potassium chloride   Powder 20 milliEquivalent(s) Oral daily  senna 2 Tablet(s) Oral at bedtime  sertraline 50 milliGRAM(s) Oral daily  sucralfate suspension 1 Gram(s) Oral every 12 hours    MEDICATIONS  (PRN):  ALPRAZolam 0.5 milliGRAM(s) Oral three times a day PRN anxiety  aluminum hydroxide/magnesium hydroxide/simethicone Suspension 30 milliLiter(s) Oral every 4 hours PRN Dyspepsia  bisacodyl 5 milliGRAM(s) Oral every 12 hours PRN Constipation  dextrose Oral Gel 15 Gram(s) Oral once PRN Blood Glucose LESS THAN 70 milliGRAM(s)/deciliter  diphenhydrAMINE Injectable 50 milliGRAM(s) IV Push once PRN Allergic Reaction  meclizine 12.5 milliGRAM(s) Oral three times a day PRN Dizziness  melatonin 3 milliGRAM(s) Oral at bedtime PRN Insomnia  ondansetron Injectable 4 milliGRAM(s) IV Push every 8 hours PRN Nausea and/or Vomiting  oxyCODONE    IR 5 milliGRAM(s) Oral every 4 hours PRN Severe Pain (7 - 10)  simethicone 80 milliGRAM(s) Chew every 8 hours PRN Gas      ASSESSMENT / PLAN:  ----------------------------------------  76 y/o male with PMH of afib on Eliquis (s/p Cardioversion on 1/31/23), Moderate AS, Chronic Diastolic Heart Failure, CAD s/p PCI, HTN, HLD, DM 2, Morbid Obesity, ALLIE, Thoracic Aortic Aneurysm, BPH s/p TURP, Left Renal Mass was sent to the ED from [facility] for abdominal pain x 4 days. Patient reported pain on the right side of the abdomen (upper and lower), 10/10, non-radiating. Patient reported feeling congested, noted cough but not productive.   CT C/A/P: No PE; Trace bilateral pleural effusions with adjacent patchy bibasilar opacities, likely represent dependent atelectasis vs infection. Hepatomegaly. Liver surface nodularity. Hepatitis or cirrhosis considered in differential. Trace ascites. Mild bladder wall thickening with suggestion of left posterolateral diverticulum.  Patient had a tte showing reduced ef  Cardiac catheterization was performed and CardioMEMS placed. MRCP was notable for early cirrhosis and possible omental infarct. He was seen by Electrophysiology and underwent radioablation. Repeat echocardiogram noted normal global left ventricular systolic function.    Atrial fibrillation - Status post radiofrequency ablation. Continue on metoprolol. On apixaban for anticoagulation.    Acute systolic heart failure - Continue on metoprolol. Monitoring urine output while on furosemide. Status post right heart catheterization and CardioMEMS. Repeat echocardiogram noted improvement in the left ventricular systolic function. For outpatient follow up with Cardiothoracic Surgery in regards to aortic stenosis. Pending CT angiogram for TAVR.    Anxiety - On sertraline. Continue on alprazolam as needed.    Benign prostatic hyperplasia - Continue with finasteride and doxazosin.    Diabetes - Insulin coverage, close monitoring of blood glucose levels.    Hypertension / Coronary artery disease - Close blood pressure monitoring. On aspirin and atorvastatin.    Hepatomegaly - Sludge noted on ultrasound. MRI noted evidence of early cirrhosis and possible omental infarct. Pain improved and tolerating diet.    Obstructive sleep apnea - Not on nocturnal CPAP at home. Monitoring oxygen levels.    For further evaluation by Physical Therapy to determine appropriate disposition.

## 2023-06-23 VITALS
OXYGEN SATURATION: 96 % | SYSTOLIC BLOOD PRESSURE: 139 MMHG | HEART RATE: 73 BPM | TEMPERATURE: 98 F | RESPIRATION RATE: 19 BRPM | DIASTOLIC BLOOD PRESSURE: 79 MMHG

## 2023-06-23 LAB
ANION GAP SERPL CALC-SCNC: 10 MMOL/L — SIGNIFICANT CHANGE UP (ref 5–17)
BUN SERPL-MCNC: 23.9 MG/DL — HIGH (ref 8–20)
CALCIUM SERPL-MCNC: 8.5 MG/DL — SIGNIFICANT CHANGE UP (ref 8.4–10.5)
CHLORIDE SERPL-SCNC: 100 MMOL/L — SIGNIFICANT CHANGE UP (ref 96–108)
CO2 SERPL-SCNC: 31 MMOL/L — HIGH (ref 22–29)
CREAT SERPL-MCNC: 0.64 MG/DL — SIGNIFICANT CHANGE UP (ref 0.5–1.3)
EGFR: 98 ML/MIN/1.73M2 — SIGNIFICANT CHANGE UP
GLUCOSE BLDC GLUCOMTR-MCNC: 158 MG/DL — HIGH (ref 70–99)
GLUCOSE BLDC GLUCOMTR-MCNC: 180 MG/DL — HIGH (ref 70–99)
GLUCOSE BLDC GLUCOMTR-MCNC: 193 MG/DL — HIGH (ref 70–99)
GLUCOSE BLDC GLUCOMTR-MCNC: 245 MG/DL — HIGH (ref 70–99)
GLUCOSE SERPL-MCNC: 188 MG/DL — HIGH (ref 70–99)
HCT VFR BLD CALC: 41 % — SIGNIFICANT CHANGE UP (ref 39–50)
HGB BLD-MCNC: 13.1 G/DL — SIGNIFICANT CHANGE UP (ref 13–17)
MCHC RBC-ENTMCNC: 27.8 PG — SIGNIFICANT CHANGE UP (ref 27–34)
MCHC RBC-ENTMCNC: 32 GM/DL — SIGNIFICANT CHANGE UP (ref 32–36)
MCV RBC AUTO: 87 FL — SIGNIFICANT CHANGE UP (ref 80–100)
PLATELET # BLD AUTO: 289 K/UL — SIGNIFICANT CHANGE UP (ref 150–400)
POTASSIUM SERPL-MCNC: 4.4 MMOL/L — SIGNIFICANT CHANGE UP (ref 3.5–5.3)
POTASSIUM SERPL-SCNC: 4.4 MMOL/L — SIGNIFICANT CHANGE UP (ref 3.5–5.3)
RBC # BLD: 4.71 M/UL — SIGNIFICANT CHANGE UP (ref 4.2–5.8)
RBC # FLD: 14.3 % — SIGNIFICANT CHANGE UP (ref 10.3–14.5)
SARS-COV-2 RNA SPEC QL NAA+PROBE: SIGNIFICANT CHANGE UP
SODIUM SERPL-SCNC: 141 MMOL/L — SIGNIFICANT CHANGE UP (ref 135–145)
WBC # BLD: 7.43 K/UL — SIGNIFICANT CHANGE UP (ref 3.8–10.5)
WBC # FLD AUTO: 7.43 K/UL — SIGNIFICANT CHANGE UP (ref 3.8–10.5)

## 2023-06-23 PROCEDURE — 85025 COMPLETE CBC W/AUTO DIFF WBC: CPT

## 2023-06-23 PROCEDURE — 93308 TTE F-UP OR LMTD: CPT

## 2023-06-23 PROCEDURE — 70450 CT HEAD/BRAIN W/O DYE: CPT

## 2023-06-23 PROCEDURE — 82962 GLUCOSE BLOOD TEST: CPT

## 2023-06-23 PROCEDURE — C1894: CPT

## 2023-06-23 PROCEDURE — 97110 THERAPEUTIC EXERCISES: CPT

## 2023-06-23 PROCEDURE — 86850 RBC ANTIBODY SCREEN: CPT

## 2023-06-23 PROCEDURE — 94760 N-INVAS EAR/PLS OXIMETRY 1: CPT

## 2023-06-23 PROCEDURE — 84484 ASSAY OF TROPONIN QUANT: CPT

## 2023-06-23 PROCEDURE — 84080 ASSAY ALKALINE PHOSPHATASES: CPT

## 2023-06-23 PROCEDURE — 86665 EPSTEIN-BARR CAPSID VCA: CPT

## 2023-06-23 PROCEDURE — 96375 TX/PRO/DX INJ NEW DRUG ADDON: CPT

## 2023-06-23 PROCEDURE — 87635 SARS-COV-2 COVID-19 AMP PRB: CPT

## 2023-06-23 PROCEDURE — 83735 ASSAY OF MAGNESIUM: CPT

## 2023-06-23 PROCEDURE — 82330 ASSAY OF CALCIUM: CPT

## 2023-06-23 PROCEDURE — 84132 ASSAY OF SERUM POTASSIUM: CPT

## 2023-06-23 PROCEDURE — 84295 ASSAY OF SERUM SODIUM: CPT

## 2023-06-23 PROCEDURE — 83605 ASSAY OF LACTIC ACID: CPT

## 2023-06-23 PROCEDURE — 74183 MRI ABD W/O CNTR FLWD CNTR: CPT

## 2023-06-23 PROCEDURE — 73560 X-RAY EXAM OF KNEE 1 OR 2: CPT

## 2023-06-23 PROCEDURE — 74174 CTA ABD&PLVS W/CONTRAST: CPT

## 2023-06-23 PROCEDURE — 87640 STAPH A DNA AMP PROBE: CPT

## 2023-06-23 PROCEDURE — 86901 BLOOD TYPING SEROLOGIC RH(D): CPT

## 2023-06-23 PROCEDURE — 75571 CT HRT W/O DYE W/CA TEST: CPT

## 2023-06-23 PROCEDURE — 82947 ASSAY GLUCOSE BLOOD QUANT: CPT

## 2023-06-23 PROCEDURE — 82435 ASSAY OF BLOOD CHLORIDE: CPT

## 2023-06-23 PROCEDURE — 71045 X-RAY EXAM CHEST 1 VIEW: CPT

## 2023-06-23 PROCEDURE — 87086 URINE CULTURE/COLONY COUNT: CPT

## 2023-06-23 PROCEDURE — 86664 EPSTEIN-BARR NUCLEAR ANTIGEN: CPT

## 2023-06-23 PROCEDURE — 80053 COMPREHEN METABOLIC PANEL: CPT

## 2023-06-23 PROCEDURE — 0225U NFCT DS DNA&RNA 21 SARSCOV2: CPT

## 2023-06-23 PROCEDURE — 85014 HEMATOCRIT: CPT

## 2023-06-23 PROCEDURE — 82803 BLOOD GASES ANY COMBINATION: CPT

## 2023-06-23 PROCEDURE — 94640 AIRWAY INHALATION TREATMENT: CPT

## 2023-06-23 PROCEDURE — 80048 BASIC METABOLIC PNL TOTAL CA: CPT

## 2023-06-23 PROCEDURE — C1766: CPT

## 2023-06-23 PROCEDURE — 85027 COMPLETE CBC AUTOMATED: CPT

## 2023-06-23 PROCEDURE — 71275 CT ANGIOGRAPHY CHEST: CPT | Mod: MA

## 2023-06-23 PROCEDURE — 93657 TX L/R ATRIAL FIB ADDL: CPT

## 2023-06-23 PROCEDURE — C1769: CPT

## 2023-06-23 PROCEDURE — 74177 CT ABD & PELVIS W/CONTRAST: CPT | Mod: MA

## 2023-06-23 PROCEDURE — 80061 LIPID PANEL: CPT

## 2023-06-23 PROCEDURE — 83036 HEMOGLOBIN GLYCOSYLATED A1C: CPT

## 2023-06-23 PROCEDURE — 33289 TCAT IMPL WRLS P-ART PRS SNR: CPT

## 2023-06-23 PROCEDURE — 83880 ASSAY OF NATRIURETIC PEPTIDE: CPT

## 2023-06-23 PROCEDURE — 76700 US EXAM ABDOM COMPLETE: CPT

## 2023-06-23 PROCEDURE — 93005 ELECTROCARDIOGRAM TRACING: CPT

## 2023-06-23 PROCEDURE — 85730 THROMBOPLASTIN TIME PARTIAL: CPT

## 2023-06-23 PROCEDURE — 99239 HOSP IP/OBS DSCHRG MGMT >30: CPT

## 2023-06-23 PROCEDURE — 75574 CT ANGIO HRT W/3D IMAGE: CPT

## 2023-06-23 PROCEDURE — 86900 BLOOD TYPING SEROLOGIC ABO: CPT

## 2023-06-23 PROCEDURE — 99291 CRITICAL CARE FIRST HOUR: CPT

## 2023-06-23 PROCEDURE — C1759: CPT

## 2023-06-23 PROCEDURE — 85610 PROTHROMBIN TIME: CPT

## 2023-06-23 PROCEDURE — C1887: CPT

## 2023-06-23 PROCEDURE — 87641 MR-STAPH DNA AMP PROBE: CPT

## 2023-06-23 PROCEDURE — 84443 ASSAY THYROID STIM HORMONE: CPT

## 2023-06-23 PROCEDURE — 76705 ECHO EXAM OF ABDOMEN: CPT

## 2023-06-23 PROCEDURE — 87040 BLOOD CULTURE FOR BACTERIA: CPT

## 2023-06-23 PROCEDURE — 36415 COLL VENOUS BLD VENIPUNCTURE: CPT

## 2023-06-23 PROCEDURE — 80074 ACUTE HEPATITIS PANEL: CPT

## 2023-06-23 PROCEDURE — 86663 EPSTEIN-BARR ANTIBODY: CPT

## 2023-06-23 PROCEDURE — C1730: CPT

## 2023-06-23 PROCEDURE — 93656 COMPRE EP EVAL ABLTJ ATR FIB: CPT

## 2023-06-23 PROCEDURE — 81001 URINALYSIS AUTO W/SCOPE: CPT

## 2023-06-23 PROCEDURE — 85018 HEMOGLOBIN: CPT

## 2023-06-23 PROCEDURE — 80162 ASSAY OF DIGOXIN TOTAL: CPT

## 2023-06-23 PROCEDURE — C2624: CPT

## 2023-06-23 PROCEDURE — 96374 THER/PROPH/DIAG INJ IV PUSH: CPT

## 2023-06-23 PROCEDURE — 97116 GAIT TRAINING THERAPY: CPT

## 2023-06-23 PROCEDURE — C1732: CPT

## 2023-06-23 RX ORDER — LOSARTAN POTASSIUM 100 MG/1
1 TABLET, FILM COATED ORAL
Qty: 30 | Refills: 0
Start: 2023-06-23 | End: 2023-07-22

## 2023-06-23 RX ORDER — OXYCODONE HYDROCHLORIDE 5 MG/1
1 TABLET ORAL
Qty: 0 | Refills: 0 | DISCHARGE

## 2023-06-23 RX ORDER — METOPROLOL TARTRATE 50 MG
1 TABLET ORAL
Qty: 60 | Refills: 0
Start: 2023-06-23 | End: 2023-07-22

## 2023-06-23 RX ORDER — COLCHICINE 0.6 MG
1 TABLET ORAL
Qty: 20 | Refills: 0
Start: 2023-06-23 | End: 2023-07-02

## 2023-06-23 RX ORDER — SUCRALFATE 1 G
1 TABLET ORAL
Qty: 20 | Refills: 0
Start: 2023-06-23 | End: 2023-07-02

## 2023-06-23 RX ORDER — METOPROLOL TARTRATE 50 MG
1 TABLET ORAL
Refills: 0 | DISCHARGE

## 2023-06-23 RX ORDER — POTASSIUM CHLORIDE 20 MEQ
1 PACKET (EA) ORAL
Qty: 30 | Refills: 0
Start: 2023-06-23 | End: 2023-07-22

## 2023-06-23 RX ORDER — POTASSIUM CHLORIDE 20 MEQ
1 PACKET (EA) ORAL
Refills: 0 | DISCHARGE

## 2023-06-23 RX ADMIN — Medication 2: at 12:17

## 2023-06-23 RX ADMIN — ALBUTEROL 2.5 MILLIGRAM(S): 90 AEROSOL, METERED ORAL at 08:38

## 2023-06-23 RX ADMIN — Medication 81 MILLIGRAM(S): at 12:17

## 2023-06-23 RX ADMIN — Medication 25 MILLIGRAM(S): at 12:17

## 2023-06-23 RX ADMIN — PANTOPRAZOLE SODIUM 40 MILLIGRAM(S): 20 TABLET, DELAYED RELEASE ORAL at 04:50

## 2023-06-23 RX ADMIN — Medication 1: at 17:23

## 2023-06-23 RX ADMIN — Medication 1 GRAM(S): at 04:51

## 2023-06-23 RX ADMIN — Medication 1 GRAM(S): at 17:26

## 2023-06-23 RX ADMIN — Medication 25 MILLIGRAM(S): at 00:32

## 2023-06-23 RX ADMIN — Medication 1: at 09:09

## 2023-06-23 RX ADMIN — FINASTERIDE 5 MILLIGRAM(S): 5 TABLET, FILM COATED ORAL at 12:17

## 2023-06-23 RX ADMIN — Medication 1 SPRAY(S): at 17:27

## 2023-06-23 RX ADMIN — Medication 0.6 MILLIGRAM(S): at 17:24

## 2023-06-23 RX ADMIN — Medication 20 MILLIEQUIVALENT(S): at 12:17

## 2023-06-23 RX ADMIN — Medication 0.6 MILLIGRAM(S): at 04:50

## 2023-06-23 RX ADMIN — CHLORHEXIDINE GLUCONATE 1 APPLICATION(S): 213 SOLUTION TOPICAL at 04:50

## 2023-06-23 RX ADMIN — OXYCODONE HYDROCHLORIDE 5 MILLIGRAM(S): 5 TABLET ORAL at 04:50

## 2023-06-23 RX ADMIN — SERTRALINE 50 MILLIGRAM(S): 25 TABLET, FILM COATED ORAL at 12:17

## 2023-06-23 RX ADMIN — Medication 1 SPRAY(S): at 05:00

## 2023-06-23 RX ADMIN — PANTOPRAZOLE SODIUM 40 MILLIGRAM(S): 20 TABLET, DELAYED RELEASE ORAL at 17:25

## 2023-06-23 RX ADMIN — APIXABAN 5 MILLIGRAM(S): 2.5 TABLET, FILM COATED ORAL at 09:45

## 2023-06-23 RX ADMIN — Medication 40 MILLIGRAM(S): at 04:50

## 2023-06-23 RX ADMIN — Medication 25 MILLIGRAM(S): at 17:25

## 2023-06-23 RX ADMIN — Medication 0.5 MILLIGRAM(S): at 00:32

## 2023-06-23 RX ADMIN — Medication 25 MILLIGRAM(S): at 06:32

## 2023-06-23 NOTE — PROGRESS NOTE ADULT - SUBJECTIVE AND OBJECTIVE BOX
ANIA CRISTOBAL  ----------------------------------------  The patient was seen at bedside. Patient with heart failure. Offered no complaints. Denied chest pain or dyspnea.    Vital Signs Last 24 Hrs  T(C): 36.7 (23 Jun 2023 08:05), Max: 36.8 (22 Jun 2023 12:00)  T(F): 98 (23 Jun 2023 08:05), Max: 98.3 (22 Jun 2023 12:00)  HR: 66 (23 Jun 2023 08:38) (63 - 80)  BP: 110/62 (23 Jun 2023 08:05) (101/57 - 114/70)  BP(mean): 84 (22 Jun 2023 15:47) (84 - 84)  RR: 18 (23 Jun 2023 08:05) (18 - 22)  SpO2: 96% (23 Jun 2023 08:38) (91% - 98%)    Parameters below as of 23 Jun 2023 08:38  Patient On (Oxygen Delivery Method): nasal cannula, 2L    CAPILLARY BLOOD GLUCOSE  POCT Blood Glucose.: 180 mg/dL (23 Jun 2023 09:08)  POCT Blood Glucose.: 158 mg/dL (23 Jun 2023 08:00)  POCT Blood Glucose.: 241 mg/dL (22 Jun 2023 21:11)  POCT Blood Glucose.: 116 mg/dL (22 Jun 2023 21:10)  POCT Blood Glucose.: 181 mg/dL (22 Jun 2023 17:01)  POCT Blood Glucose.: 203 mg/dL (22 Jun 2023 12:06)    PHYSICAL EXAMINATION:  ----------------------------------------  General appearance: No acute distress, Awake, Alert  HEENT: Normocephalic, Atraumatic, Conjunctiva clear, EOMI  Neck: Supple, No JVD, No tenderness  Lungs: Breath sound equal bilaterally, No wheezes, No rales  Cardiovascular: S1S2, Regular rhythm  Abdomen: Soft, Nontender, Nondistended, No guarding/rebound, Positive bowel sounds  Extremities: No clubbing, No cyanosis, No edema, No calf tenderness  Neuro: Strength equal bilaterally, No tremors  Psychiatric: Appropriate mood, Normal affect    LABORATORY STUDIES:  ----------------------------------------             13.1   7.43  )-----------( 289      ( 23 Jun 2023 04:50 )             41.0     06-23    141  |  100  |  23.9<H>  ----------------------------<  188<H>  4.4   |  31.0<H>  |  0.64    Ca    8.5      23 Jun 2023 04:50    06-22-23 @ 07:01  -  06-23-23 @ 07:00  --------------------------------------------------------  IN: 1210 mL / OUT: 2170 mL / NET: -960 mL    Urinalysis Basic - ( 23 Jun 2023 04:50 )  Color: x / Appearance: x / SG: x / pH: x  Gluc: 188 mg/dL / Ketone: x  / Bili: x / Urobili: x   Blood: x / Protein: x / Nitrite: x   Leuk Esterase: x / RBC: x / WBC x   Sq Epi: x / Non Sq Epi: x / Bacteria: x    MEDICATIONS  (STANDING):  albuterol    0.083% 2.5 milliGRAM(s) Nebulizer every 6 hours  apixaban 5 milliGRAM(s) Oral <User Schedule>  aspirin  chewable 81 milliGRAM(s) Oral daily  atorvastatin 20 milliGRAM(s) Oral at bedtime  chlorhexidine 2% Cloths 1 Application(s) Topical <User Schedule>  colchicine 0.6 milliGRAM(s) Oral two times a day  dextrose 5%. 1000 milliLiter(s) (50 mL/Hr) IV Continuous <Continuous>  dextrose 5%. 1000 milliLiter(s) (100 mL/Hr) IV Continuous <Continuous>  dextrose 50% Injectable 25 Gram(s) IV Push once  dextrose 50% Injectable 25 Gram(s) IV Push once  dextrose 50% Injectable 12.5 Gram(s) IV Push once  doxazosin 2 milliGRAM(s) Oral at bedtime  finasteride 5 milliGRAM(s) Oral daily  fluticasone propionate 50 MICROgram(s)/spray Nasal Spray 1 Spray(s) Both Nostrils two times a day  furosemide    Tablet 40 milliGRAM(s) Oral daily  glucagon  Injectable 1 milliGRAM(s) IntraMuscular once  insulin lispro (ADMELOG) corrective regimen sliding scale   SubCutaneous three times a day before meals  insulin lispro (ADMELOG) corrective regimen sliding scale   SubCutaneous at bedtime  metoprolol tartrate 25 milliGRAM(s) Oral every 6 hours  pantoprazole    Tablet 40 milliGRAM(s) Oral every 12 hours  polyethylene glycol 3350 17 Gram(s) Oral daily  potassium chloride   Powder 20 milliEquivalent(s) Oral daily  senna 2 Tablet(s) Oral at bedtime  sertraline 50 milliGRAM(s) Oral daily  sucralfate suspension 1 Gram(s) Oral every 12 hours    MEDICATIONS  (PRN):  ALPRAZolam 0.5 milliGRAM(s) Oral three times a day PRN anxiety  aluminum hydroxide/magnesium hydroxide/simethicone Suspension 30 milliLiter(s) Oral every 4 hours PRN Dyspepsia  bisacodyl 5 milliGRAM(s) Oral every 12 hours PRN Constipation  dextrose Oral Gel 15 Gram(s) Oral once PRN Blood Glucose LESS THAN 70 milliGRAM(s)/deciliter  diphenhydrAMINE Injectable 50 milliGRAM(s) IV Push once PRN Allergic Reaction  meclizine 12.5 milliGRAM(s) Oral three times a day PRN Dizziness  melatonin 3 milliGRAM(s) Oral at bedtime PRN Insomnia  ondansetron Injectable 4 milliGRAM(s) IV Push every 8 hours PRN Nausea and/or Vomiting  oxyCODONE    IR 5 milliGRAM(s) Oral every 4 hours PRN Severe Pain (7 - 10)  simethicone 80 milliGRAM(s) Chew every 8 hours PRN Gas      ASSESSMENT / PLAN:  ----------------------------------------  78 y/o male with PMH of afib on Eliquis (s/p Cardioversion on 1/31/23), Moderate AS, Chronic Diastolic Heart Failure, CAD s/p PCI, HTN, HLD, DM 2, Morbid Obesity, ALLIE, Thoracic Aortic Aneurysm, BPH s/p TURP, Left Renal Mass was sent to the ED from [facility] for abdominal pain x 4 days. Patient reported pain on the right side of the abdomen (upper and lower), 10/10, non-radiating. Patient reported feeling congested, noted cough but not productive.   CT C/A/P: No PE; Trace bilateral pleural effusions with adjacent patchy bibasilar opacities, likely represent dependent atelectasis vs infection. Hepatomegaly. Liver surface nodularity. Hepatitis or cirrhosis considered in differential. Trace ascites. Mild bladder wall thickening with suggestion of left posterolateral diverticulum.  Patient had a tte showing reduced ef  Cardiac catheterization was performed and CardioMEMS placed. MRCP was notable for early cirrhosis and possible omental infarct. He was seen by Electrophysiology and underwent radioablation. Repeat echocardiogram noted normal global left ventricular systolic function.    Atrial fibrillation - Status post radiofrequency ablation. Continue on metoprolol. On apixaban for anticoagulation.    Acute systolic heart failure - Continue on metoprolol. Monitoring urine output while on furosemide. Status post right heart catheterization and CardioMEMS. Repeat echocardiogram noted improvement in the left ventricular systolic function. For outpatient follow up with Cardiothoracic Surgery in regards to aortic stenosis. Pending CT angiogram for TAVR.    Anxiety - On sertraline. Continue on alprazolam as needed.    Benign prostatic hyperplasia - Continue with finasteride and doxazosin.    Diabetes - Insulin coverage, close monitoring of blood glucose levels.    Hypertension / Coronary artery disease - Close blood pressure monitoring. On aspirin and atorvastatin.    Hepatomegaly - Sludge noted on ultrasound. MRI noted evidence of early cirrhosis and possible omental infarct. Pain improved and tolerating diet.    Obstructive sleep apnea - Not on nocturnal CPAP at home. Monitoring oxygen levels.

## 2023-06-23 NOTE — PROGRESS NOTE ADULT - NUTRITIONAL ASSESSMENT
This patient has been assessed with a concern for Malnutrition and has been determined to have a diagnosis/diagnoses of Severe protein-calorie malnutrition.    This patient is being managed with:   Diet Consistent Carbohydrate w/Evening Snack-  1200mL Fluid Restriction ([id number])  Entered: Jun 12 2023  8:10AM  
This patient has been assessed with a concern for Malnutrition and has been determined to have a diagnosis/diagnoses of Severe protein-calorie malnutrition.    This patient is being managed with:   Diet Consistent Carbohydrate w/Evening Snack-  1200mL Fluid Restriction ([id number])  Entered: Jun 12 2023  8:10AM  
This patient has been assessed with a concern for Malnutrition and has been determined to have a diagnosis/diagnoses of Severe protein-calorie malnutrition.    This patient is being managed with:   Diet Consistent Carbohydrate w/Evening Snack-  1200mL Fluid Restriction ([id number])  Entered: Jun 12 2023  8:10AM  
This patient has been assessed with a concern for Malnutrition and has been determined to have a diagnosis/diagnoses of Severe protein-calorie malnutrition.    This patient is being managed with:   Diet Consistent Carbohydrate w/Evening Snack-  1200mL Fluid Restriction ([id number])  Entered: Jun 12 2023  8:10AM  
This patient has been assessed with a concern for Malnutrition and has been determined to have a diagnosis/diagnoses of Severe protein-calorie malnutrition.    This patient is being managed with:   Diet Consistent Carbohydrate w/Evening Snack-  1200mL Fluid Restriction ([id number])  Entered: Jun 12 2023  8:10AM  
This patient has been assessed with a concern for Malnutrition and has been determined to have a diagnosis/diagnoses of Severe protein-calorie malnutrition.    This patient is being managed with:   Diet Consistent Carbohydrate w/Evening Snack-  1200mL Fluid Restriction ([id number])  Entered: Jun 12 2023  8:10AM

## 2023-07-05 RX ORDER — AMIODARONE HYDROCHLORIDE 200 MG/1
200 TABLET ORAL DAILY
Qty: 30 | Refills: 0 | Status: DISCONTINUED | COMMUNITY
Start: 2021-09-24 | End: 2023-07-05

## 2023-07-06 ENCOUNTER — NON-APPOINTMENT (OUTPATIENT)
Age: 78
End: 2023-07-06

## 2023-07-25 NOTE — PHYSICAL THERAPY INITIAL EVALUATION ADULT - NEUROVASCULAR ASSESSMENT LUE
[de-identified] : Very pleasant conversation examination was conducted with this gentleman.  Reviewed cervical thoracic and lumbar MRIs cervical spondylosis is noted at 4 5 and 5 6.  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  Short-term oral steroids anti-inflammatories physical therapy will be initiated.  Repeat thoracic MRI can be performed with and without contrast in approximately 3 to 4 months formal neurosurgical evaluation will also be conducted patient will follow-up in approximately 2 months for repeat clinical assessment possible ordering of a repeat thoracic MRI with and without contrast.
no numbness/no tingling

## 2023-08-08 NOTE — PATIENT PROFILE ADULT - FUNCTIONAL ASSESSMENT - BASIC MOBILITY SECTION LABEL
. Taltz Pregnancy And Lactation Text: The risk during pregnancy and breastfeeding is uncertain with this medication.

## 2023-08-09 ENCOUNTER — APPOINTMENT (OUTPATIENT)
Dept: ELECTROPHYSIOLOGY | Facility: CLINIC | Age: 78
End: 2023-08-09
Payer: MEDICARE

## 2023-08-09 DIAGNOSIS — I50.20 UNSPECIFIED SYSTOLIC (CONGESTIVE) HEART FAILURE: ICD-10-CM

## 2023-08-09 PROCEDURE — 99442: CPT | Mod: 95

## 2023-08-10 NOTE — DISCUSSION/SUMMARY
[FreeTextEntry1] : The patient is a 77 year old male with history of HTN, diabetes, obesity, thoracic aortic aneurysm, ALLIE, BPH s/p TURP, left renal mass, bifascicular block (RBBB + LAFB), newly diagnosed HFrEF (LVEF 25%, thought previously had AF-mediated CM which improved after restoration of sinus rhythm), known moderate AS, and persistent AF/AFL. He was also found to have hepatomegaly with early cirrhosis. Noted to be in AF/AFL with RVR. Rhythm control options with AAD are very limited given baseline wide QRS and hepatic dysfunction. Also given presence of LV dysfunction (likely tachy-mediated) he was felt to be a good candidate for long-term rhythm control via catheter ablation.   He is now s/p ablation (Multiple different atrial flutters -including left atrial roof flutter, CTI dependent flutter, and localized re-entrant flutter on anterior wall of the left atrium, Successful isolation of all four pulmonary veins (WACA lesion set) and posterior wall,  Successful CTI ablation with demonstration of bidirectional block,. Anterior mitral line with demonstration of bidirectional block ) on 6/19/23.   AMiodarone discontinue post proceudre. Today's visit was performed via phone as patient cannot come to office while resident of the [facility] . He will be leaving the [facility] 1 month and returning home during which he then can arrange in person follow up visits. Reports he has been feeling well. Denies palpitations, CRUZ, SOB. Mild stable LE edema.   No outpatient f/u as of yet to consider future TAVR, plans on arranging post discharge.   Recommendation:  Mr. Rubio has been doing well post ablation, denies symptomatic arrhythmia recurrence or progressive HF symptoms. Will arrange 1 week holter to be worn ideally at 3 months post ablation if home by that date. He must wait to wear monitor and arrange any in person f/u visits until after discharge per patient. Will then arrange in person follow up 4 weeks after monitoring complete with repeat echocardiogram to reassess EF. He will continue current medications including metoprolol 50mg BID and Eliquis 5mg bid which he has been tolerating without complaints.  Clarisa HARVEY-C

## 2023-08-10 NOTE — REVIEW OF SYSTEMS
[SOB] : no shortness of breath [Dyspnea on exertion] : not dyspnea during exertion [Chest Discomfort] : no chest discomfort [Palpitations] : no palpitations [Easy Bleeding] : no tendency for easy bleeding

## 2023-08-10 NOTE — HISTORY OF PRESENT ILLNESS
[FreeTextEntry1] : The patient is a 77 year old male with history of HTN, diabetes, obesity, thoracic aortic aneurysm, ALLIE, BPH s/p TURP, left renal mass, bifascicular block (RBBB + LAFB), newly diagnosed HFrEF (LVEF 25%, thought previously had AF-mediated CM which improved after restoration of sinus rhythm), known moderate AS, and persistent AF/AFL. He was also found to have hepatomegaly with early cirrhosis. Noted to be in AF/AFL with RVR. Rhythm control options with AAD are very limited given baseline wide QRS and hepatic dysfunction. Also given presence of LV dysfunction (likely tachy-mediated) he was felt to be a good candidate for long-term rhythm control via catheter ablation.   He is now s/p ablation (Multiple different atrial flutters -including left atrial roof flutter, CTI dependent flutter, and localized re-entrant flutter on anterior wall of the left atrium, Successful isolation of all four pulmonary veins (WACA lesion set) and posterior wall,  Successful CTI ablation with monstration of bidirectional block,. Anterior mitral line with demonstration of bidirectional block ) on 6/19/23.   AMiodarone discontinue post proceudre. Today's visit was performed via phone as patient cannot come to office while resident of the [facility] . He will be leaving the [facility] 1 month and returning home during which he then can arrange in person follow up visits. Reports he has been feeling well. Denies palpitations, CRUZ, SOB. Mild stable LE edema.   No outpatient f/u as of yet to consider future TAVR, plans on arranging post discharge.

## 2023-09-15 NOTE — PROGRESS NOTE ADULT - ASSESSMENT
Problem: Adult Inpatient Plan of Care  Goal: Plan of Care Review  Outcome: Ongoing, Progressing  Goal: Patient-Specific Goal (Individualized)  Outcome: Ongoing, Progressing  Goal: Absence of Hospital-Acquired Illness or Injury  Outcome: Ongoing, Progressing  Goal: Optimal Comfort and Wellbeing  Outcome: Ongoing, Progressing  Goal: Readiness for Transition of Care  Outcome: Ongoing, Progressing     Problem: Infection  Goal: Absence of Infection Signs and Symptoms  Outcome: Ongoing, Progressing     
76 y/o male with PMH of afib on Eliquis (s/p Cardioversion on 1/31/23), Moderate AS, Chronic Diastolic Heart Failure, CAD s/p PCI, HTN, HLD, DM 2, Morbid Obesity, ALLIE, Thoracic Aortic Aneurysm, BPH s/p TURP, Left Renal Mass was sent to the ED from [facility] for abdominal pain x 4 days. Patient reported pain on the right side of the abdomen (upper and lower), 10/10, non-radiating. Patient reported feeling congested, noted cough but not productive.   CT C/A/P: No PE; Trace bilateral pleural effusions with adjacent patchy bibasilar opacities, likely represent dependent atelectasis vs infection. Hepatomegaly. Liver surface nodularity. Hepatitis or cirrhosis considered in differential. Trace ascites. Mild bladder wall thickening with suggestion of left posterolateral diverticulum. Correlate with urinalysis and lab values to assess for cystitis and/or ascending urinary tract infection.   Patient also noted to be hypotensive in the ED, blood culture sent, antibiotic given, IVF, UA suggestive of UTI.       #UTI  #Biliary Colic 2/2 Sludge  US reviewed w/ biliary sludge no cholecystitis  UA noted   IVF about 4L given in the ED, BP in the 90s , MAP > 65  c/w merrem  Blood and urine culture sent, follow up   Of note, he was seen for similar presentation few months ago   Monitor BP     #Hepatosplenomegaly   #Biliary sludge  As noted on CT  US reviewed  LFT WNL   Hepatitis panel sent     #Right sided abdominal pain   CT noted  Biliary colic  Pain control     #Afib   Eliquis 5mg bid   Amiodarone 200mg Q12  Increase BB to Q8, hold for SBP<95    #HTN/HLD/CAD  Hold BP medications due to hypotension   Atorvastatin 10mg   Aspirin 81mg     #BPH   Finasteride 5mg   Cardura    #DM-2   Patient said he only takes Metformin tid. Used to be on Farxiga was but taken off  Hold Metformin   Insulin sliding scale     #ALLIE   Said he is not on CPAP, only uses oxygen   Oxygen therapy     #Supportive   DVT prophylaxis: on Eliquis bid   Diet: CHO     Plan of care discussed with patient
76 y/o male with PMH of afib on Eliquis (s/p Cardioversion on 1/31/23), Moderate AS, Chronic Diastolic Heart Failure, CAD s/p PCI, HTN, HLD, DM 2, Morbid Obesity, ALLIE, Thoracic Aortic Aneurysm, BPH s/p TURP, Left Renal Mass was sent to the ED from [facility] for abdominal pain x 4 days. Patient reported pain on the right side of the abdomen (upper and lower), 10/10, non-radiating. Patient reported feeling congested, noted cough but not productive.   CT C/A/P: No PE; Trace bilateral pleural effusions with adjacent patchy bibasilar opacities, likely represent dependent atelectasis vs infection. Hepatomegaly. Liver surface nodularity. Hepatitis or cirrhosis considered in differential. Trace ascites. Mild bladder wall thickening with suggestion of left posterolateral diverticulum.     #acute chf - tte appreciated  - cardio follwoing   c.w iv lasix    #Hepatosplenomegaly   #Biliary sludge/ abd pain   - gi consult appreciated  - will order mri abd    #Afib   cardio following   metoprolol  lovenox full dose    #HTN/HLD/CAD  Atorvastatin 10mg   Aspirin 81mg     #BPH   Finasteride 5mg   Cardura    #DM-2   ssi    #ALLIE   Said he is not on CPAP, only uses oxygen   Oxygen therapy     dispo - kavitha genao tomorrow if mri is negative
78 y/o male with PMH of afib on Eliquis (s/p Cardioversion on 1/31/23), Moderate AS, Chronic Diastolic Heart Failure, CAD s/p PCI, HTN, HLD, DM 2, Morbid Obesity, ALLIE, Thoracic Aortic Aneurysm, BPH s/p TURP, Left Renal Mass was sent to the ED from [facility] for abdominal pain x 4 days. Patient reported pain on the right side of the abdomen (upper and lower), 10/10, non-radiating. Patient reported feeling congested, noted cough but not productive.   CT C/A/P: No PE; Trace bilateral pleural effusions with adjacent patchy bibasilar opacities, likely represent dependent atelectasis vs infection. Hepatomegaly. Liver surface nodularity. Hepatitis or cirrhosis considered in differential. Trace ascites. Mild bladder wall thickening with suggestion of left posterolateral diverticulum.   Patient also noted to be hypotensive in the ED, blood culture sent, antibiotic given, IVF, UA suggestive of UTI.     #UTI  #Biliary Colic 2/2 Sludge  US reviewed w/ biliary sludge no cholecystitis  c/w iv abx    #acute chf - tte as above  - consulted cardio  - change diet to fluid restriction     #Hepatosplenomegaly   #Biliary sludge  As noted on CT  US reviewed    #Right sided abdominal pain   as above    #Afib   change to lovenox full dose in case he needs cath     #HTN/HLD/CAD  Atorvastatin 10mg   Aspirin 81mg     #BPH   Finasteride 5mg   Cardura    #DM-2   Patient said he only takes Metformin tid. Used to be on Farxiga was but taken off  Insulin sliding scale     #ALLIE   Said he is not on CPAP, only uses oxygen   Oxygen therapy     #constipation - mag citrate  senna  lactulose    called son and left a message  discussed with patient and rn   
76 y/o male with PMH of afib on Eliquis (s/p Cardioversion on 1/31/23), Moderate AS, Chronic Diastolic Heart Failure, CAD s/p PCI, HTN, HLD, DM 2, Morbid Obesity, ALLIE, Thoracic Aortic Aneurysm, BPH s/p TURP, Left Renal Mass was sent to the ED from [facility] for abdominal pain x 4 days. Patient reported pain on the right side of the abdomen (upper and lower), 10/10, non-radiating. Patient reported feeling congested, noted cough but not productive.   CT C/A/P: No PE; Trace bilateral pleural effusions with adjacent patchy bibasilar opacities, likely represent dependent atelectasis vs infection. Hepatomegaly. Liver surface nodularity. Hepatitis or cirrhosis considered in differential. Trace ascites. Mild bladder wall thickening with suggestion of left posterolateral diverticulum.     #acute chf - tte appreciated  - cardio follwoing   c.w iv lasix bid    #afib - will consult EP  - c,w metoprolol     #Hepatosplenomegaly   #Biliary sludge/ abd pain   - gi consult appreciated  -  mri abd pending    #Afib   cardio following   metoprolol  lovenox full dose    #HTN/HLD/CAD  Atorvastatin 10mg   Aspirin 81mg     #BPH   Finasteride 5mg   Cardura    #DM-2   ssi    #ALLIE   Said he is not on CPAP, only uses oxygen   Oxygen therapy     eventual [place] back [place]   
76 y/o male with PMH of afib on Eliquis (s/p Cardioversion on 1/31/23), Moderate AS, Chronic Diastolic Heart Failure, CAD s/p PCI, HTN, HLD, DM 2, Morbid Obesity, ALLIE, Thoracic Aortic Aneurysm, BPH s/p TURP, Left Renal Mass was sent to the ED from [facility] for abdominal pain x 4 days. Patient reported pain on the right side of the abdomen (upper and lower), 10/10, non-radiating. Patient reported feeling congested, noted cough but not productive.   CT C/A/P: No PE; Trace bilateral pleural effusions with adjacent patchy bibasilar opacities, likely represent dependent atelectasis vs infection. Hepatomegaly. Liver surface nodularity. Hepatitis or cirrhosis considered in differential. Trace ascites. Mild bladder wall thickening with suggestion of left posterolateral diverticulum.  Patient had a tte showing reduced ef and is npo for rhc and cardiomems today    #acute chf - tte appreciated  - cardio follwoing   c.w iv lasix bid  - for rhc and cardiomems today     #afib - ep following   - c.w metoprolol and hold amio  - n o longer on dig    #Hepatosplenomegaly   #Biliary sludge/ abd pain   - gi consult appreciated  -  mri abd pending  - outpatient follow up with Dr Peters    #Afib   cardio following   metoprolol  lovenox full dose on hold for cath     #HTN/HLD/CAD  Aspirin 81mg   lipitor    #BPH   Finasteride 5mg   Cardura    #DM-2   ssi    #ALLIE   Said he is not on CPAP, only uses oxygen   Oxygen therapy     dispo - c.w diuresis  - pt consult  mrcp still pending for abd pain   - [place] vs [place]  
78 y/o M with PMHx of Afib on Eliquis (s/p Cardioversion on 1/31/23), Moderate AS, Chronic Diastolic Heart Failure, CAD s/p PCI, HTN, HLD, DM 2, Morbid Obesity, ALLIE, Thoracic Aortic Aneurysm, BPH s/p TURP, Left Renal Mass was sent to the ED from [facility] for abdominal pain x 4 days. GI consulted for hepatomegaly on imaging 
78 y/o male with PMH of afib on Eliquis (s/p Cardioversion on 1/31/23), Moderate AS, Chronic Diastolic Heart Failure, CAD s/p PCI, HTN, HLD, DM 2, Morbid Obesity, ALLIE, Thoracic Aortic Aneurysm, BPH s/p TURP, Left Renal Mass was sent to the ED from [facility] for abdominal pain x 4 days. Patient reported pain on the right side of the abdomen (upper and lower), 10/10, non-radiating. Patient reported feeling congested, noted cough but not productive.   CT C/A/P: No PE; Trace bilateral pleural effusions with adjacent patchy bibasilar opacities, likely represent dependent atelectasis vs infection. Hepatomegaly. Liver surface nodularity. Hepatitis or cirrhosis considered in differential. Trace ascites. Mild bladder wall thickening with suggestion of left posterolateral diverticulum. Correlate with urinalysis and lab values to assess for cystitis and/or ascending urinary tract infection.   Patient also noted to be hypotensive in the ED, blood culture sent, antibiotic given, IVF, UA suggestive of UTI.       #Hypotension likely due to underlying UTI?   UA noted   IVF about 4L given in the ED, BP in the 90s , MAP > 65  c/w merrem  Blood and urine culture sent, follow up   Of note, he was seen for similar presentation few months ago   Monitor BP     #Hepatosplenomegaly   As noted on CT, ?cirrhosis  LFT WNL   Hepatitis panel sent   Will get US abdomen     #Right sided abdominal pain   CT noted  Etiology? patient reported prior hx of gallstone   Will get US   Pain control     #Afib   Eliquis 5mg bid   Amiodarone 100mg     #HTN/HLD/CAD  Hold BP medications due to hypotension   Atorvastatin 10mg   Aspirin 81mg     #BPH   Finasteride 5mg     #DM-2   Patient said he only takes Metformin tid. Used to be on Farxiga was but taken off  Hold Metformin   Insulin sliding scale     #ALLIE   Said he is not on CPAP, only uses oxygen   Oxygen therapy     #Supportive   DVT prophylaxis: on Eliquis bid   Diet: CHO     Plan of care discussed with patient
76 y/o male with PMH of afib on Eliquis (s/p Cardioversion on 1/31/23), Moderate AS, Chronic Diastolic Heart Failure, CAD s/p PCI, HTN, HLD, DM 2, Morbid Obesity, ALLIE, Thoracic Aortic Aneurysm, BPH s/p TURP, Left Renal Mass was sent to the ED from [facility] for abdominal pain x 4 days. Patient reported pain on the right side of the abdomen (upper and lower), 10/10, non-radiating. Patient reported feeling congested, noted cough but not productive.   CT C/A/P: No PE; Trace bilateral pleural effusions with adjacent patchy bibasilar opacities, likely represent dependent atelectasis vs infection. Hepatomegaly. Liver surface nodularity. Hepatitis or cirrhosis considered in differential. Trace ascites. Mild bladder wall thickening with suggestion of left posterolateral diverticulum.     #acute chf - tte appreciated  - cardio follwoing   c.w iv lasix bid    #afib - ep consulted  - eventual gabby as per EP     #Hepatosplenomegaly   #Biliary sludge/ abd pain   - gi consult appreciated  -  mri abd pending    #Afib   cardio following   metoprolol  lovenox full dose    #HTN/HLD/CAD  Atorvastatin 10mg   Aspirin 81mg     #BPH   Finasteride 5mg   Cardura    #DM-2   ssi    #ALLIE   Said he is not on CPAP, only uses oxygen   Oxygen therapy         
Assessment/Recommendations:   77 year old male with HTN, hyperlipidemia, DM type 2, obesity BMI 38, thoracic aortic aneurysm, ALLIE, BPH s/p TURP, left renal mass, bifascicular block (RBBB + LAFB), newly diagnosed HFrEF (LVEF 25%, though previously had AF-mediated CM which improved after CV), moderate AS, and persistent AF/AFL. He presented with severe abdominal pain found to have hepatomegaly with early cirrhosis. Noted to be in AF/AFL with RVR and EP consult was requested.   Given he has newly reduced EF in the setting of AF/AFL with RVR, it is likely the cause of cardiomyopathy is AF/tachycardia mediated. He self converted to sinus rhythm this morning. Rhythm control options with AAD are very limited given baseline wide QRS, and hepatic dysfunction. He would benefit from catheter ablation. However, pt expresses difficulty with scheduling outpatient follow up. Since it appears he will remain in hospital for the next few days for additional diuresis and optimization, we can attempt to schedule inpatient ablation on Monday 6/19. It was explained to patient that it is not likely this will happen but we will try to accommodate him.     # pAF w/ RVR   - now in sinus rhythm   - continue metoprolol as tolerated   - continue digoxin for now   - avoid amiodarone   - will attempt to schedule catheter ablation for Monday 6/19   - keep NPO after midnight Sunday night  - con't lovenox 1mg/kg Q 12 hrs. NO DOSE on Monday AM. Transition to Eliquis on d/c.   - telemetry monitoring     # HFrEF   - newly reduced EF 25% likely AF/tachycardia mediated   - diuresis as per cardiology  - GDMT: lasix, metoprolol  - scheduled for RHC and Cardiomems on 6/16 per cards     # Hepatomegaly, mild cirrhosis   - GI input appreciated     Seen and discussed w/ Dr. Amato     
77y Male with PMH of afib on Eliquis (s/p Cardioversion on 1/31/23), Moderate AS, Chronic Diastolic Heart Failure, CAD s/p PCI, HTN, HLD, DM 2, Morbid Obesity, ALLIE, Thoracic Aortic Aneurysm, BPH s/p TURP, Left Renal Mass was sent to the ED from [facility] 6/10 for abdominal pain x 4 days.  Patient reported pain on the right side of the abdomen (upper and lower).  Patient found to have UTI.  Echo performed noted significant drop in EF, severe AS.  CT with hepatomegaly appearing ?significantly larger than CT from March.   Also with intermittent afib which cardiology suspects is the reason for the drop in EF.  Plan for cardioversion today with EP.   CT surgery consulted for TAVR evaluation.     Pt had LHC in January that showed 60% dcx disease and 100% pRCA with collateral circulation.  LIBBY in January with moderate to severe AS and mild AI.     
77y Male with PMH of afib on Eliquis (s/p Cardioversion on 1/31/23), Moderate AS, Chronic Diastolic Heart Failure, CAD s/p PCI, HTN, HLD, DM 2, Morbid Obesity, ALLIE, Thoracic Aortic Aneurysm, BPH s/p TURP, Left Renal Mass was sent to the ED from [facility] 6/10 for abdominal pain x 4 days.  Patient reported pain on the right side of the abdomen (upper and lower).  Patient found to have UTI.  Echo performed noted significant drop in EF, severe AS.  CT with hepatomegaly appearing ?significantly larger than CT from March.   Also with intermittent afib which cardiology suspects is the reason for the drop in EF.  Plan for cardioversion today with EP.   CT surgery consulted for TAVR evaluation.     Pt had LHC in January that showed 60% dcx disease and 100% pRCA with collateral circulation.  LIBBY in January with moderate to severe AS and mild AI.

## 2023-09-24 ENCOUNTER — INPATIENT (INPATIENT)
Facility: HOSPITAL | Age: 78
LOS: 14 days | Discharge: HOME | DRG: 266 | End: 2023-10-09
Attending: THORACIC SURGERY (CARDIOTHORACIC VASCULAR SURGERY) | Admitting: INTERNAL MEDICINE
Payer: MEDICARE

## 2023-09-24 VITALS
SYSTOLIC BLOOD PRESSURE: 101 MMHG | TEMPERATURE: 98 F | WEIGHT: 281.09 LBS | RESPIRATION RATE: 22 BRPM | HEIGHT: 74 IN | OXYGEN SATURATION: 95 % | DIASTOLIC BLOOD PRESSURE: 67 MMHG | HEART RATE: 90 BPM

## 2023-09-24 DIAGNOSIS — Z98.89 OTHER SPECIFIED POSTPROCEDURAL STATES: Chronic | ICD-10-CM

## 2023-09-24 DIAGNOSIS — C49.9 MALIGNANT NEOPLASM OF CONNECTIVE AND SOFT TISSUE, UNSPECIFIED: Chronic | ICD-10-CM

## 2023-09-24 DIAGNOSIS — Z95.5 PRESENCE OF CORONARY ANGIOPLASTY IMPLANT AND GRAFT: Chronic | ICD-10-CM

## 2023-09-24 DIAGNOSIS — I50.23 ACUTE ON CHRONIC SYSTOLIC (CONGESTIVE) HEART FAILURE: ICD-10-CM

## 2023-09-24 LAB
ALBUMIN SERPL ELPH-MCNC: 3.3 G/DL — SIGNIFICANT CHANGE UP (ref 3.3–5.2)
ALP SERPL-CCNC: 250 U/L — HIGH (ref 40–120)
ALT FLD-CCNC: 36 U/L — SIGNIFICANT CHANGE UP
ANION GAP SERPL CALC-SCNC: 14 MMOL/L — SIGNIFICANT CHANGE UP (ref 5–17)
AST SERPL-CCNC: 27 U/L — SIGNIFICANT CHANGE UP
BASOPHILS # BLD AUTO: 0.06 K/UL — SIGNIFICANT CHANGE UP (ref 0–0.2)
BASOPHILS NFR BLD AUTO: 0.7 % — SIGNIFICANT CHANGE UP (ref 0–2)
BILIRUB SERPL-MCNC: 0.6 MG/DL — SIGNIFICANT CHANGE UP (ref 0.4–2)
BUN SERPL-MCNC: 26.1 MG/DL — HIGH (ref 8–20)
CALCIUM SERPL-MCNC: 8.4 MG/DL — SIGNIFICANT CHANGE UP (ref 8.4–10.5)
CHLORIDE SERPL-SCNC: 101 MMOL/L — SIGNIFICANT CHANGE UP (ref 96–108)
CK SERPL-CCNC: 33 U/L — SIGNIFICANT CHANGE UP (ref 30–200)
CO2 SERPL-SCNC: 24 MMOL/L — SIGNIFICANT CHANGE UP (ref 22–29)
CREAT SERPL-MCNC: 0.71 MG/DL — SIGNIFICANT CHANGE UP (ref 0.5–1.3)
EGFR: 94 ML/MIN/1.73M2 — SIGNIFICANT CHANGE UP
EOSINOPHIL # BLD AUTO: 0.37 K/UL — SIGNIFICANT CHANGE UP (ref 0–0.5)
EOSINOPHIL NFR BLD AUTO: 4.5 % — SIGNIFICANT CHANGE UP (ref 0–6)
GLUCOSE BLDC GLUCOMTR-MCNC: 208 MG/DL — HIGH (ref 70–99)
GLUCOSE SERPL-MCNC: 217 MG/DL — HIGH (ref 70–99)
HCT VFR BLD CALC: 37.9 % — LOW (ref 39–50)
HGB BLD-MCNC: 12.7 G/DL — LOW (ref 13–17)
IMM GRANULOCYTES NFR BLD AUTO: 0.2 % — SIGNIFICANT CHANGE UP (ref 0–0.9)
LACTATE BLDV-MCNC: 2 MMOL/L — SIGNIFICANT CHANGE UP (ref 0.5–2)
LYMPHOCYTES # BLD AUTO: 0.9 K/UL — LOW (ref 1–3.3)
LYMPHOCYTES # BLD AUTO: 10.9 % — LOW (ref 13–44)
MCHC RBC-ENTMCNC: 29.6 PG — SIGNIFICANT CHANGE UP (ref 27–34)
MCHC RBC-ENTMCNC: 33.5 GM/DL — SIGNIFICANT CHANGE UP (ref 32–36)
MCV RBC AUTO: 88.3 FL — SIGNIFICANT CHANGE UP (ref 80–100)
MONOCYTES # BLD AUTO: 0.98 K/UL — HIGH (ref 0–0.9)
MONOCYTES NFR BLD AUTO: 11.9 % — SIGNIFICANT CHANGE UP (ref 2–14)
NEUTROPHILS # BLD AUTO: 5.89 K/UL — SIGNIFICANT CHANGE UP (ref 1.8–7.4)
NEUTROPHILS NFR BLD AUTO: 71.8 % — SIGNIFICANT CHANGE UP (ref 43–77)
NT-PROBNP SERPL-SCNC: 2593 PG/ML — HIGH (ref 0–300)
PLATELET # BLD AUTO: 193 K/UL — SIGNIFICANT CHANGE UP (ref 150–400)
POTASSIUM SERPL-MCNC: 4.6 MMOL/L — SIGNIFICANT CHANGE UP (ref 3.5–5.3)
POTASSIUM SERPL-SCNC: 4.6 MMOL/L — SIGNIFICANT CHANGE UP (ref 3.5–5.3)
PROT SERPL-MCNC: 6.1 G/DL — LOW (ref 6.6–8.7)
RAPID RVP RESULT: SIGNIFICANT CHANGE UP
RBC # BLD: 4.29 M/UL — SIGNIFICANT CHANGE UP (ref 4.2–5.8)
RBC # FLD: 15.9 % — HIGH (ref 10.3–14.5)
SARS-COV-2 RNA SPEC QL NAA+PROBE: SIGNIFICANT CHANGE UP
SODIUM SERPL-SCNC: 138 MMOL/L — SIGNIFICANT CHANGE UP (ref 135–145)
TROPONIN T SERPL-MCNC: 0.02 NG/ML — SIGNIFICANT CHANGE UP (ref 0–0.06)
TROPONIN T SERPL-MCNC: <0.01 NG/ML — SIGNIFICANT CHANGE UP (ref 0–0.06)
WBC # BLD: 8.22 K/UL — SIGNIFICANT CHANGE UP (ref 3.8–10.5)
WBC # FLD AUTO: 8.22 K/UL — SIGNIFICANT CHANGE UP (ref 3.8–10.5)

## 2023-09-24 PROCEDURE — 71045 X-RAY EXAM CHEST 1 VIEW: CPT | Mod: 26

## 2023-09-24 PROCEDURE — 99223 1ST HOSP IP/OBS HIGH 75: CPT

## 2023-09-24 PROCEDURE — 99285 EMERGENCY DEPT VISIT HI MDM: CPT

## 2023-09-24 PROCEDURE — 93010 ELECTROCARDIOGRAM REPORT: CPT

## 2023-09-24 RX ORDER — SODIUM CHLORIDE 9 MG/ML
1000 INJECTION, SOLUTION INTRAVENOUS
Refills: 0 | Status: DISCONTINUED | OUTPATIENT
Start: 2023-09-24 | End: 2023-10-08

## 2023-09-24 RX ORDER — LOSARTAN POTASSIUM 100 MG/1
25 TABLET, FILM COATED ORAL DAILY
Refills: 0 | Status: DISCONTINUED | OUTPATIENT
Start: 2023-09-24 | End: 2023-09-26

## 2023-09-24 RX ORDER — FUROSEMIDE 40 MG
40 TABLET ORAL ONCE
Refills: 0 | Status: COMPLETED | OUTPATIENT
Start: 2023-09-24 | End: 2023-09-24

## 2023-09-24 RX ORDER — DEXTROSE 50 % IN WATER 50 %
15 SYRINGE (ML) INTRAVENOUS ONCE
Refills: 0 | Status: DISCONTINUED | OUTPATIENT
Start: 2023-09-24 | End: 2023-10-08

## 2023-09-24 RX ORDER — ALPRAZOLAM 0.25 MG
0.25 TABLET ORAL THREE TIMES A DAY
Refills: 0 | Status: DISCONTINUED | OUTPATIENT
Start: 2023-09-24 | End: 2023-09-26

## 2023-09-24 RX ORDER — FUROSEMIDE 40 MG
40 TABLET ORAL DAILY
Refills: 0 | Status: DISCONTINUED | OUTPATIENT
Start: 2023-09-25 | End: 2023-09-25

## 2023-09-24 RX ORDER — DOXAZOSIN MESYLATE 4 MG
2 TABLET ORAL DAILY
Refills: 0 | Status: DISCONTINUED | OUTPATIENT
Start: 2023-09-24 | End: 2023-10-09

## 2023-09-24 RX ORDER — INSULIN GLARGINE 100 [IU]/ML
10 INJECTION, SOLUTION SUBCUTANEOUS AT BEDTIME
Refills: 0 | Status: DISCONTINUED | OUTPATIENT
Start: 2023-09-24 | End: 2023-10-04

## 2023-09-24 RX ORDER — SUCRALFATE 1 G
1 TABLET ORAL
Refills: 0 | Status: DISCONTINUED | OUTPATIENT
Start: 2023-09-24 | End: 2023-10-09

## 2023-09-24 RX ORDER — ATORVASTATIN CALCIUM 80 MG/1
10 TABLET, FILM COATED ORAL AT BEDTIME
Refills: 0 | Status: DISCONTINUED | OUTPATIENT
Start: 2023-09-24 | End: 2023-10-09

## 2023-09-24 RX ORDER — ASPIRIN/CALCIUM CARB/MAGNESIUM 324 MG
81 TABLET ORAL DAILY
Refills: 0 | Status: DISCONTINUED | OUTPATIENT
Start: 2023-09-24 | End: 2023-10-03

## 2023-09-24 RX ORDER — DEXTROSE 50 % IN WATER 50 %
25 SYRINGE (ML) INTRAVENOUS ONCE
Refills: 0 | Status: DISCONTINUED | OUTPATIENT
Start: 2023-09-24 | End: 2023-10-09

## 2023-09-24 RX ORDER — PANTOPRAZOLE SODIUM 20 MG/1
40 TABLET, DELAYED RELEASE ORAL
Refills: 0 | Status: DISCONTINUED | OUTPATIENT
Start: 2023-09-24 | End: 2023-10-09

## 2023-09-24 RX ORDER — DEXTROSE 50 % IN WATER 50 %
12.5 SYRINGE (ML) INTRAVENOUS ONCE
Refills: 0 | Status: DISCONTINUED | OUTPATIENT
Start: 2023-09-24 | End: 2023-10-09

## 2023-09-24 RX ORDER — FINASTERIDE 5 MG/1
5 TABLET, FILM COATED ORAL DAILY
Refills: 0 | Status: DISCONTINUED | OUTPATIENT
Start: 2023-09-24 | End: 2023-10-09

## 2023-09-24 RX ORDER — DAPAGLIFLOZIN 10 MG/1
1 TABLET, FILM COATED ORAL
Qty: 0 | Refills: 0 | DISCHARGE

## 2023-09-24 RX ORDER — INSULIN LISPRO 100/ML
VIAL (ML) SUBCUTANEOUS
Refills: 0 | Status: DISCONTINUED | OUTPATIENT
Start: 2023-09-24 | End: 2023-10-09

## 2023-09-24 RX ORDER — APIXABAN 2.5 MG/1
5 TABLET, FILM COATED ORAL EVERY 12 HOURS
Refills: 0 | Status: DISCONTINUED | OUTPATIENT
Start: 2023-09-24 | End: 2023-09-27

## 2023-09-24 RX ORDER — FUROSEMIDE 40 MG
40 TABLET ORAL ONCE
Refills: 0 | Status: DISCONTINUED | OUTPATIENT
Start: 2023-09-24 | End: 2023-09-25

## 2023-09-24 RX ORDER — SERTRALINE 25 MG/1
1 TABLET, FILM COATED ORAL
Refills: 0 | DISCHARGE

## 2023-09-24 RX ORDER — POLYETHYLENE GLYCOL 3350 17 G/17G
17 POWDER, FOR SOLUTION ORAL DAILY
Refills: 0 | Status: DISCONTINUED | OUTPATIENT
Start: 2023-09-24 | End: 2023-09-30

## 2023-09-24 RX ORDER — ALBUTEROL 90 UG/1
2.5 AEROSOL, METERED ORAL
Refills: 0 | Status: COMPLETED | OUTPATIENT
Start: 2023-09-24 | End: 2023-09-24

## 2023-09-24 RX ORDER — GLUCAGON INJECTION, SOLUTION 0.5 MG/.1ML
1 INJECTION, SOLUTION SUBCUTANEOUS ONCE
Refills: 0 | Status: DISCONTINUED | OUTPATIENT
Start: 2023-09-24 | End: 2023-10-08

## 2023-09-24 RX ORDER — METOPROLOL TARTRATE 50 MG
50 TABLET ORAL
Refills: 0 | Status: DISCONTINUED | OUTPATIENT
Start: 2023-09-24 | End: 2023-09-26

## 2023-09-24 RX ADMIN — Medication 0.25 MILLIGRAM(S): at 17:55

## 2023-09-24 RX ADMIN — Medication 1 GRAM(S): at 17:41

## 2023-09-24 RX ADMIN — INSULIN GLARGINE 10 UNIT(S): 100 INJECTION, SOLUTION SUBCUTANEOUS at 22:16

## 2023-09-24 RX ADMIN — ATORVASTATIN CALCIUM 10 MILLIGRAM(S): 80 TABLET, FILM COATED ORAL at 21:26

## 2023-09-24 RX ADMIN — APIXABAN 5 MILLIGRAM(S): 2.5 TABLET, FILM COATED ORAL at 17:41

## 2023-09-24 RX ADMIN — ALBUTEROL 2.5 MILLIGRAM(S): 90 AEROSOL, METERED ORAL at 11:42

## 2023-09-24 RX ADMIN — ALBUTEROL 2.5 MILLIGRAM(S): 90 AEROSOL, METERED ORAL at 12:07

## 2023-09-24 RX ADMIN — Medication 40 MILLIGRAM(S): at 11:34

## 2023-09-24 RX ADMIN — ALBUTEROL 2.5 MILLIGRAM(S): 90 AEROSOL, METERED ORAL at 11:58

## 2023-09-24 RX ADMIN — Medication 0.25 MILLIGRAM(S): at 21:26

## 2023-09-24 NOTE — H&P ADULT - NSHPPHYSICALEXAM_GEN_ALL_CORE
Vital Signs Last 24 Hrs  T(C): 37.2 (24 Sep 2023 16:10), Max: 37.2 (24 Sep 2023 16:10)  T(F): 98.9 (24 Sep 2023 16:10), Max: 98.9 (24 Sep 2023 16:10)  HR: 91 (24 Sep 2023 16:10) (87 - 91)  BP: 99/68 (24 Sep 2023 16:10) (99/68 - 123/78)  BP(mean): --  RR: 20 (24 Sep 2023 16:10) (20 - 22)  SpO2: 92% (24 Sep 2023 16:10) (92% - 96%)    Parameters below as of 24 Sep 2023 16:10  Patient On (Oxygen Delivery Method): room air      PHYSICAL EXAM:   · CONSTITUTIONAL: Well appearing, awake, alert , x 3   · ENMT: Airway patent, Nasal mucosa clear. Mouth with normal mucosa  · CARDIAC: Normal rate, regular rhythm.  Heart sounds S1, S2.  No murmurs, rubs or gallops.  · RESPIRATORY: Breath sounds clear and equal bilaterally. basal diminished breath sounds , occasional crackles   · GASTROINTESTINAL: Abdomen soft, non-tender, no guarding.  · MUSCULOSKELETAL: Spine appears normal, range of motion is not limited, no muscle or joint tenderness  · NEUROLOGICAL: Alert and oriented, no focal deficits, no motor or sensory deficits.  · SKIN: Skin normal color for race, warm, dry and intact. No evidence of rash.

## 2023-09-24 NOTE — ED ADULT NURSE NOTE - OBJECTIVE STATEMENT
pt alert and oriented x4 comes in c/o fast Heart rate, and SOB for a few days. respirations even unlabored. pt unkept, disheveled, has walker with him. pt states he [facility] in the [facility] and then went home, orders out for breakfast and dinner, unable to shower, takes sink baths. cant stand alone. pt reports he has hx of CHF but does not follow diet due to [facility] not giving him the correct food either.

## 2023-09-24 NOTE — ED ADULT NURSE NOTE - NSFALLRISKINTERV_ED_ALL_ED

## 2023-09-24 NOTE — H&P ADULT - ASSESSMENT
76 y/o male with PMH of afib on Eliquis (s/p Cardioversion abd ablation  1/31/23), Moderate AS, Chronic Diastolic Heart Failure, CAD s/p PCI, HTN, HLD, DM 2, Morbid Obesity, ALLIE, Thoracic Aortic Aneurysm, BPH s/p TURP, Left Renal Mass Presents with shortness of breath for last few days , more worse since last night , also noted with worsening lower ext swelling . moved out of Assisted living recently to live with his son basement apartment.  Patient does not eat at home and usually eat food from [employer] as he has no one to cook for him.  also felt some palpitations last night but now feels better, given lasix iv in ed.   has home o2 , uses it prn.       > sob/  acute hypoxic resp failure / likely acute chf / possible diastolic   - admit to tele   - i/os , wts , fluid restriction   - lasix 40 mg iv qd , bp on lower side   - check echo   - trend trops , check tsh   - c/w bb, acei with holding parameters   - nutrition consult   - o2 to keep sat s> 90     > afib/ chronic / paroxysmal   - rate controlled   - c/w bb  - c/w eliquis     >  DM 2  - hold metformin   - basal insulin , ssi   - check a1c     > Morbid Obesity, ALLIE,  - wt loss advised      > hx of Thoracic Aortic Aneurysm  - check echo   - monitor as op     > BPH s/p TURP,  - cw home meds     > dvt ppx : on eliquis  78 y/o male with PMH of afib on Eliquis (s/p Cardioversion abd ablation  1/31/23), Moderate AS, Chronic Diastolic Heart Failure, CAD s/p PCI, HTN, HLD, DM 2, Morbid Obesity, ALLIE, Thoracic Aortic Aneurysm, BPH s/p TURP, Left Renal Mass Presents with shortness of breath for last few days , more worse since last night , also noted with worsening lower ext swelling . moved out of Assisted living recently to live with his son basement apartment.  Patient does not eat at home and usually eat food from [employer] as he has no one to cook for him.  also felt some palpitations last night but now feels better, given lasix iv in ed.   has home o2 , uses it prn.       > sob/  acute hypoxic resp failure / likely acute chf / possible diastolic   - admit to tele   - i/os , wts , fluid restriction   - lasix 40 mg iv qd , bp on lower side   - check echo   - trend trops , check tsh   - c/w bb, acei with holding parameters   - nutrition consult   - o2 to keep sat s> 90     > afib/ chronic / paroxysmal   - rate controlled   - c/w bb  - c/w eliquis     >  DM 2  - hold metformin   - basal insulin , ssi   - check a1c     > anxiety   - c/w xanax prn  > Morbid Obesity, ALLIE,  - wt loss advised      > hx of Thoracic Aortic Aneurysm  - check echo   - monitor as op     > BPH s/p TURP,  - cw home meds     > dvt ppx : on eliquis

## 2023-09-24 NOTE — H&P ADULT - NSHPLABSRESULTS_GEN_ALL_CORE
12.7   8.22  )-----------( 193      ( 24 Sep 2023 11:35 )             37.9   09-24    138  |  101  |  26.1<H>  ----------------------------<  217<H>  4.6   |  24.0  |  0.71    Ca    8.4      24 Sep 2023 11:35    TPro  6.1<L>  /  Alb  3.3  /  TBili  0.6  /  DBili  x   /  AST  27  /  ALT  36  /  AlkPhos  250<H>  09-24

## 2023-09-24 NOTE — H&P ADULT - HISTORY OF PRESENT ILLNESS
76 y/o male with PMH of afib on Eliquis (s/p Cardioversion abd ablation  1/31/23), Moderate AS, Chronic Diastolic Heart Failure, CAD s/p PCI, HTN, HLD, DM 2, Morbid Obesity, ALLIE, Thoracic Aortic Aneurysm, BPH s/p TURP, Left Renal Mass Presents with shortness of breath for last few days , more worse since last night , also noted with worsening lower ext swelling . moved out of Assisted living recently to live with his son basement apartment.  Patient does not eat at home and usually eat food from [employer] as he has no one to cook for him.  also felt some palpitations last night but now feels better, given lasix iv in ed.   has home o2 , uses it prn.

## 2023-09-24 NOTE — H&P ADULT - NSHPSOCIALHISTORY_GEN_ALL_CORE
lives in ground floor apartment with his son   was at assisted living for few weeks but moved out to live with his son   uses [employer] walker

## 2023-09-24 NOTE — ED PROVIDER NOTE - OBJECTIVE STATEMENT
76 y/o male with PMH of afib on Eliquis (s/p Cardioversion on 1/31/23), Moderate AS, Chronic Diastolic Heart Failure, CAD s/p PCI, HTN, HLD, DM 2, Morbid Obesity, ALLIE, Thoracic Aortic Aneurysm, BPH s/p TURP, Left Renal Mass Presents with shortness of breath since last night.  Patient says that he is living at home now with his son daughter-in-law and grandkids.  Patient is normal living in assisted living and was admitted in June for bilateral pneumonia heart failure.  Patient uses walker at baseline there has been no change in his medications since June.  Patient does not eat at home and usually eat food from [employer] as he has no one to cook for him.  Patient also cannot take shower himself so he just sponge brought himself.  When offered social help for possible home health arrangement patient does not want to entertain that right now.  Patient says that he had an of the lesion done on last admission for A-fib that helped him.  Patient felt his heartbeat also rapid along with shortness of breath when he laid down last night and right now is not feeling those

## 2023-09-24 NOTE — ED PROVIDER NOTE - CLINICAL SUMMARY MEDICAL DECISION MAKING FREE TEXT BOX
Mild shortness of breath and palpitations mainly on laying down.  Patient does not of appear fluid overloaded acutely.  Patient has occasional wheeze and appears to have bronchospasm we will treat with albuterol and give his Lasix which she is due for right now we will check labs including chest x-ray and rule out pneumonia

## 2023-09-25 DIAGNOSIS — I48.91 UNSPECIFIED ATRIAL FIBRILLATION: ICD-10-CM

## 2023-09-25 DIAGNOSIS — I35.0 NONRHEUMATIC AORTIC (VALVE) STENOSIS: ICD-10-CM

## 2023-09-25 DIAGNOSIS — I50.33 ACUTE ON CHRONIC DIASTOLIC (CONGESTIVE) HEART FAILURE: ICD-10-CM

## 2023-09-25 LAB
A1C WITH ESTIMATED AVERAGE GLUCOSE RESULT: 7.2 % — HIGH (ref 4–5.6)
ALBUMIN SERPL ELPH-MCNC: 3.2 G/DL — LOW (ref 3.3–5.2)
ALP SERPL-CCNC: 231 U/L — HIGH (ref 40–120)
ALT FLD-CCNC: 27 U/L — SIGNIFICANT CHANGE UP
ANION GAP SERPL CALC-SCNC: 13 MMOL/L — SIGNIFICANT CHANGE UP (ref 5–17)
AST SERPL-CCNC: 11 U/L — SIGNIFICANT CHANGE UP
BILIRUB SERPL-MCNC: 0.6 MG/DL — SIGNIFICANT CHANGE UP (ref 0.4–2)
BUN SERPL-MCNC: 26.1 MG/DL — HIGH (ref 8–20)
CALCIUM SERPL-MCNC: 8.2 MG/DL — LOW (ref 8.4–10.5)
CHLORIDE SERPL-SCNC: 100 MMOL/L — SIGNIFICANT CHANGE UP (ref 96–108)
CO2 SERPL-SCNC: 25 MMOL/L — SIGNIFICANT CHANGE UP (ref 22–29)
CREAT SERPL-MCNC: 0.67 MG/DL — SIGNIFICANT CHANGE UP (ref 0.5–1.3)
EGFR: 96 ML/MIN/1.73M2 — SIGNIFICANT CHANGE UP
ESTIMATED AVERAGE GLUCOSE: 160 MG/DL — HIGH (ref 68–114)
GLUCOSE BLDC GLUCOMTR-MCNC: 146 MG/DL — HIGH (ref 70–99)
GLUCOSE BLDC GLUCOMTR-MCNC: 168 MG/DL — HIGH (ref 70–99)
GLUCOSE BLDC GLUCOMTR-MCNC: 172 MG/DL — HIGH (ref 70–99)
GLUCOSE BLDC GLUCOMTR-MCNC: 186 MG/DL — HIGH (ref 70–99)
GLUCOSE BLDC GLUCOMTR-MCNC: 192 MG/DL — HIGH (ref 70–99)
GLUCOSE SERPL-MCNC: 166 MG/DL — HIGH (ref 70–99)
HCT VFR BLD CALC: 37.7 % — LOW (ref 39–50)
HGB BLD-MCNC: 12.2 G/DL — LOW (ref 13–17)
MAGNESIUM SERPL-MCNC: 1.8 MG/DL — SIGNIFICANT CHANGE UP (ref 1.6–2.6)
MCHC RBC-ENTMCNC: 29 PG — SIGNIFICANT CHANGE UP (ref 27–34)
MCHC RBC-ENTMCNC: 32.4 GM/DL — SIGNIFICANT CHANGE UP (ref 32–36)
MCV RBC AUTO: 89.8 FL — SIGNIFICANT CHANGE UP (ref 80–100)
PLATELET # BLD AUTO: 204 K/UL — SIGNIFICANT CHANGE UP (ref 150–400)
POTASSIUM SERPL-MCNC: 3.8 MMOL/L — SIGNIFICANT CHANGE UP (ref 3.5–5.3)
POTASSIUM SERPL-SCNC: 3.8 MMOL/L — SIGNIFICANT CHANGE UP (ref 3.5–5.3)
PROT SERPL-MCNC: 5.7 G/DL — LOW (ref 6.6–8.7)
RBC # BLD: 4.2 M/UL — SIGNIFICANT CHANGE UP (ref 4.2–5.8)
RBC # FLD: 15.9 % — HIGH (ref 10.3–14.5)
SODIUM SERPL-SCNC: 138 MMOL/L — SIGNIFICANT CHANGE UP (ref 135–145)
TROPONIN T SERPL-MCNC: 0.01 NG/ML — SIGNIFICANT CHANGE UP (ref 0–0.06)
TSH SERPL-MCNC: 2.42 UIU/ML — SIGNIFICANT CHANGE UP (ref 0.27–4.2)
WBC # BLD: 7.75 K/UL — SIGNIFICANT CHANGE UP (ref 3.8–10.5)
WBC # FLD AUTO: 7.75 K/UL — SIGNIFICANT CHANGE UP (ref 3.8–10.5)

## 2023-09-25 PROCEDURE — 99222 1ST HOSP IP/OBS MODERATE 55: CPT | Mod: FS

## 2023-09-25 PROCEDURE — 74018 RADEX ABDOMEN 1 VIEW: CPT | Mod: 26

## 2023-09-25 PROCEDURE — 99223 1ST HOSP IP/OBS HIGH 75: CPT

## 2023-09-25 PROCEDURE — 99233 SBSQ HOSP IP/OBS HIGH 50: CPT

## 2023-09-25 PROCEDURE — 93306 TTE W/DOPPLER COMPLETE: CPT | Mod: 26

## 2023-09-25 RX ORDER — FUROSEMIDE 40 MG
40 TABLET ORAL
Refills: 0 | Status: DISCONTINUED | OUTPATIENT
Start: 2023-09-25 | End: 2023-09-26

## 2023-09-25 RX ORDER — ACETAMINOPHEN 500 MG
1000 TABLET ORAL ONCE
Refills: 0 | Status: COMPLETED | OUTPATIENT
Start: 2023-09-25 | End: 2023-09-25

## 2023-09-25 RX ORDER — INFLUENZA VIRUS VACCINE 15; 15; 15; 15 UG/.5ML; UG/.5ML; UG/.5ML; UG/.5ML
0.7 SUSPENSION INTRAMUSCULAR ONCE
Refills: 0 | Status: DISCONTINUED | OUTPATIENT
Start: 2023-09-25 | End: 2023-10-03

## 2023-09-25 RX ORDER — LACTULOSE 10 G/15ML
20 SOLUTION ORAL
Refills: 0 | Status: DISCONTINUED | OUTPATIENT
Start: 2023-09-25 | End: 2023-10-03

## 2023-09-25 RX ADMIN — Medication 2: at 12:09

## 2023-09-25 RX ADMIN — Medication 0.25 MILLIGRAM(S): at 09:22

## 2023-09-25 RX ADMIN — Medication 2 MILLIGRAM(S): at 05:45

## 2023-09-25 RX ADMIN — PANTOPRAZOLE SODIUM 40 MILLIGRAM(S): 20 TABLET, DELAYED RELEASE ORAL at 05:45

## 2023-09-25 RX ADMIN — Medication 2: at 08:35

## 2023-09-25 RX ADMIN — ATORVASTATIN CALCIUM 10 MILLIGRAM(S): 80 TABLET, FILM COATED ORAL at 21:56

## 2023-09-25 RX ADMIN — Medication 1 GRAM(S): at 17:02

## 2023-09-25 RX ADMIN — FINASTERIDE 5 MILLIGRAM(S): 5 TABLET, FILM COATED ORAL at 09:22

## 2023-09-25 RX ADMIN — POLYETHYLENE GLYCOL 3350 17 GRAM(S): 17 POWDER, FOR SOLUTION ORAL at 09:22

## 2023-09-25 RX ADMIN — Medication 81 MILLIGRAM(S): at 09:22

## 2023-09-25 RX ADMIN — Medication 40 MILLIGRAM(S): at 05:45

## 2023-09-25 RX ADMIN — LOSARTAN POTASSIUM 25 MILLIGRAM(S): 100 TABLET, FILM COATED ORAL at 05:45

## 2023-09-25 RX ADMIN — Medication 2: at 16:53

## 2023-09-25 RX ADMIN — APIXABAN 5 MILLIGRAM(S): 2.5 TABLET, FILM COATED ORAL at 17:02

## 2023-09-25 RX ADMIN — LACTULOSE 20 GRAM(S): 10 SOLUTION ORAL at 17:02

## 2023-09-25 RX ADMIN — Medication 50 MILLIGRAM(S): at 05:45

## 2023-09-25 RX ADMIN — Medication 400 MILLIGRAM(S): at 22:20

## 2023-09-25 RX ADMIN — APIXABAN 5 MILLIGRAM(S): 2.5 TABLET, FILM COATED ORAL at 05:45

## 2023-09-25 RX ADMIN — Medication 1 GRAM(S): at 05:45

## 2023-09-25 RX ADMIN — INSULIN GLARGINE 10 UNIT(S): 100 INJECTION, SOLUTION SUBCUTANEOUS at 21:52

## 2023-09-25 NOTE — CONSULT NOTE ADULT - ASSESSMENT
76 y/o male PMHx AF on Eliquis (s/p ablation on 6/2023), moderate-severe AS, Chronic Diastolic Heart Failure, CAD (Patent prox and mid LAD stents, Moderate stenosis of the distal LCx. The pRCA  with brisk right-to-right collaterals)., HTN, HLD, DM 2, Morbid Obesity, ALLIE, Thoracic Aortic Aneurysm, BPH s/p TURP, Left Renal Mass, On last admission patient underwent CardioMeMs placement, Pt now presents from home (recently moved out of assisted living to live in his son's basement apartment), with palpitations and SOB.  He was given IV lasix and admitted for acute on chronic diastolic heart failure. Cardiology consultation requested for evaluation of acute decompensation of heart failure.  76 y/o male PMHx AF on Eliquis (s/p ablation on 6/2023), moderate-severe AS, Chronic Diastolic Heart Failure, CAD (Patent prox and mid LAD stents, Moderate stenosis of the distal LCx. The pRCA  with brisk right-to-right collaterals)., HTN, HLD, DM 2, Morbid Obesity, ALLIE, Thoracic Aortic Aneurysm, BPH s/p TURP, Left Renal Mass, On last admission patient underwent CardioMeMs placement, Pt now presents from home (recently moved out of assisted living to live in his son's basement apartment), with palpitations and SOB.  He was given IV lasix and admitted for acute on chronic diastolic heart failure. Cardiology consultation requested for evaluation of acute decompensation of heart failure.     On exam patient looks vol up   Will IV diurese until euvolemic, will then proceed with nuclear stress test to assess ischemia on LCX territory which could have contributed to his CHF.     Plan   continue with lasix 40mg IV BID, monitor renal function and electrolytes   continue with AC  Cardio MeMs interrogation   once euvolemic will proceed with lexiscan   CT surgery eval appreciated Re: Aortic stenosis  show

## 2023-09-25 NOTE — CONSULT NOTE ADULT - SUBJECTIVE AND OBJECTIVE BOX
CHIEF COMPLAINT:    HPI: 78 y/o male PMHx AF on Eliquis (s/p ablation on 6/2023), moderate-severe AS, Chronic Diastolic Heart Failure, CAD (Patent prox and mid LAD stents, Moderate stenosis of the distal LCx. The pRCA  with brisk right-to-right collaterals)., HTN, HLD, DM 2, Morbid Obesity, ALLIE, Thoracic Aortic Aneurysm, BPH s/p TURP, Left Renal Mass, On last admission patient underwent CardioMeMs placement, Pt now presents from home (recently moved out of assisted living to live in his son's basement apartment), with palpitations and SOB.  He was given IV lasix and admitted for acute on chronic diastolic heart failure. Cardiology consultation requested for evaluation of acute decompensation of heart failure.     PAST MEDICAL & SURGICAL HISTORY:  Anxiety      Hypertension      Hyperlipidemia      Prostate disease      Gastroesophageal reflux disease      Gallbladder stone without cholecystitis or obstruction      BPH (benign prostatic hyperplasia)      DM (diabetes mellitus)      DVT (deep venous thrombosis)  left leg      Afib      Anxiety      HLD (hyperlipidemia)      Malignant schwannoma      H/O arthroscopy of knee      S/P laminectomy      S/P coronary artery stent placement  x2          Allergies    Ceftin (Hives)  penicillin (Anaphylaxis)  Bactrim (Rash)  sulfa drugs (Unknown)    Intolerances        MEDICATIONS  (STANDING):  apixaban 5 milliGRAM(s) Oral every 12 hours  aspirin  chewable 81 milliGRAM(s) Oral daily  atorvastatin 10 milliGRAM(s) Oral at bedtime  dextrose 5%. 1000 milliLiter(s) (100 mL/Hr) IV Continuous <Continuous>  dextrose 5%. 1000 milliLiter(s) (50 mL/Hr) IV Continuous <Continuous>  dextrose 50% Injectable 25 Gram(s) IV Push once  dextrose 50% Injectable 12.5 Gram(s) IV Push once  dextrose 50% Injectable 25 Gram(s) IV Push once  doxazosin 2 milliGRAM(s) Oral daily  finasteride 5 milliGRAM(s) Oral daily  furosemide   Injectable 40 milliGRAM(s) IV Push Once  furosemide   Injectable 40 milliGRAM(s) IV Push daily  glucagon  Injectable 1 milliGRAM(s) IntraMuscular once  influenza  Vaccine (HIGH DOSE) 0.7 milliLiter(s) IntraMuscular once  insulin glargine Injectable (LANTUS) 10 Unit(s) SubCutaneous at bedtime  insulin lispro (ADMELOG) corrective regimen sliding scale   SubCutaneous three times a day before meals  losartan 25 milliGRAM(s) Oral daily  metoprolol tartrate 50 milliGRAM(s) Oral two times a day  pantoprazole    Tablet 40 milliGRAM(s) Oral before breakfast  polyethylene glycol 3350 17 Gram(s) Oral daily  sucralfate 1 Gram(s) Oral two times a day    MEDICATIONS  (PRN):  ALPRAZolam 0.25 milliGRAM(s) Oral three times a day PRN Anxiety  dextrose Oral Gel 15 Gram(s) Oral once PRN Blood Glucose LESS THAN 70 milliGRAM(s)/deciliter      FAMILY HISTORY:  FH: heart disease (Father)        ***No family history of premature coronary artery disease or sudden cardiac death    SOCIAL HISTORY:  Smoking-  Alcohol-  Illicit Drug use-    REVIEW OF SYSTEMS:  Constitutional: [ ] fever, [ ]weight loss,  [ ]fatigue  Eyes: [ ] visual changes  Respiratory: [ ]shortness of breath;  [ ] cough, [ ]wheezing, [ ]chills, [ ]hemoptysis  Cardiovascular: [ ] chest pain, [ ]palpitations, [ ]dizziness,  [ ]leg swelling [ ]syncope  Gastrointestinal: [ ] abdominal pain, [ ]nausea, [ ]vomiting,  [ ]diarrhea   Genitourinary: [ ] dysuria, [ ] hematuria  Neurologic: [ ] headaches [ ] tremors  [ ] weakness [ ] lightheadedness  Skin: [ ] itching, [ ]burning, [ ] rashes  Endocrine: [ ] heat or cold intolerance  Musculoskeletal: [ ] joint pain or swelling; [ ] muscle, back, or extremity pain  Psychiatric: [ ] depression, [ ]anxiety, [ ]mood swings, or [ ]difficulty sleeping  Hematologic: [ ] easy bruising, [ ] bleeding gums     [ x] All others negative	  [ ] Unable to obtain    Vital Signs Last 24 Hrs  T(C): 36.7 (25 Sep 2023 12:19), Max: 37.2 (24 Sep 2023 16:10)  T(F): 98.1 (25 Sep 2023 12:19), Max: 98.9 (24 Sep 2023 16:10)  HR: 82 (25 Sep 2023 12:19) (82 - 102)  BP: 90/60 (25 Sep 2023 12:11) (90/60 - 123/78)  BP(mean): --  RR: 18 (25 Sep 2023 12:19) (18 - 20)  SpO2: 95% (25 Sep 2023 12:19) (92% - 97%)    Parameters below as of 25 Sep 2023 04:00  Patient On (Oxygen Delivery Method): nasal cannula  O2 Flow (L/min): 2    I&O's Summary    24 Sep 2023 07:01  -  25 Sep 2023 07:00  --------------------------------------------------------  IN: 0 mL / OUT: 100 mL / NET: -100 mL    25 Sep 2023 07:01  -  25 Sep 2023 13:13  --------------------------------------------------------  IN: 240 mL / OUT: 150 mL / NET: 90 mL        PHYSICAL EXAM:  General: No acute distress  HEENT: EOMI  Neck:  No JVD  Lungs: Clear to auscultation bilaterally; No rales or wheezing  Heart: Regular rate and rhythm; No murmurs, rubs, or gallops  Abdomen: soft, non tender, non distended   Extremities: warm, no edema   Nervous system:  Alert & Oriented X3  Psychiatric: Normal affect  Skin: No rashes or lesions    LABS:  09-25    138  |  100  |  26.1<H>  ----------------------------<  166<H>  3.8   |  25.0  |  0.67    Ca    8.2<L>      25 Sep 2023 05:45  Mg     1.8     09-25    TPro  5.7<L>  /  Alb  3.2<L>  /  TBili  0.6  /  DBili  x   /  AST  11  /  ALT  27  /  AlkPhos  231<H>  09-25    Creatinine Trend: 0.67<--, 0.71<--                        12.2   7.75  )-----------( 204      ( 25 Sep 2023 05:45 )             37.7         Lipid Panel:   Cardiac Enzymes: CARDIAC MARKERS ( 25 Sep 2023 05:45 )  x     / 0.01 ng/mL / x     / x     / x      CARDIAC MARKERS ( 24 Sep 2023 20:15 )  x     / 0.02 ng/mL / x     / x     / x      CARDIAC MARKERS ( 24 Sep 2023 11:35 )  x     / <0.01 ng/mL / 33 U/L / x     / x                RADIOLOGY:    ECG [9/24/23]: Unknown rhythm, RBBB, LAFB    TELEMETRY: Unknown rhythm, no tachycardic episodes    ECHO:  Echo 6/20/23  Summary:   1. LV Ejection Fraction by Oleary's Method with a biplane EF of 50 %.   2. Normal global left ventricular systolic function.   3. Trace mitral valve regurgitation.   4. Mild aortic regurgitation.   5. Endocardial visualization was enhanced with intravenous echo contrast.   6. Compared to TTE dated 6/11/23 the LVEF has improved.    STRESS TEST:    CATHETERIZATION:  [facility] 1/2023:  Coronary Angiography   - Left dominant system   - Patent prox and mid LAD stents   - Moderate stenosis of the distal LCx   - The RCA is a small, non-dominat vessel.  The proximal segment is occluded with brisk right-to-right collaterals.  - 25mm LV-Ao gradient on pullback.  CIELO 1.4cm2 with moderate AS byecho.  - Moderately reduced LVEF by echo       LM   Left main artery: Angiography shows minor irregularities.    LAD   Proximal left anterior descending: There is a 10 % stenosis within the  stented segment. Mid left anterior descending: There is a10 % stenosis within the stented segment.    CX   Distal circumflex: There is a 60 % stenosis. VALENTINO Flow 3.    RCA   Proximal right coronary artery: There is a 100 % stenosis. There is  good collateral blood supply to the distal myocardium. This  lesion is a chronic total occlusion.  This is a small non-dominant  vessel.  Ramus   Ramus intermedius: Angiography shows mild atherosclerosis.             CHIEF COMPLAINT:    HPI: 76 y/o male PMHx AF on Eliquis (s/p ablation on 6/2023), moderate-severe AS, Chronic Diastolic Heart Failure, CAD (Patent prox and mid LAD stents, Moderate stenosis of the distal LCx. The pRCA  with brisk right-to-right collaterals)., HTN, HLD, DM 2, Morbid Obesity, ALLIE, Thoracic Aortic Aneurysm, BPH s/p TURP, Left Renal Mass, On last admission patient underwent CardioMeMs placement, Pt now presents from home (recently moved out of assisted living to live in his son's basement apartment), with palpitations and SOB.  He was given IV lasix and admitted for acute on chronic diastolic heart failure. Cardiology consultation requested for evaluation of acute decompensation of heart failure.     PAST MEDICAL & SURGICAL HISTORY:  Anxiety      Hypertension      Hyperlipidemia      Prostate disease      Gastroesophageal reflux disease      Gallbladder stone without cholecystitis or obstruction      BPH (benign prostatic hyperplasia)      DM (diabetes mellitus)      DVT (deep venous thrombosis)  left leg      Afib      Anxiety      HLD (hyperlipidemia)      Malignant schwannoma      H/O arthroscopy of knee      S/P laminectomy      S/P coronary artery stent placement  x2          Allergies    Ceftin (Hives)  penicillin (Anaphylaxis)  Bactrim (Rash)  sulfa drugs (Unknown)    Intolerances        MEDICATIONS  (STANDING):  apixaban 5 milliGRAM(s) Oral every 12 hours  aspirin  chewable 81 milliGRAM(s) Oral daily  atorvastatin 10 milliGRAM(s) Oral at bedtime  dextrose 5%. 1000 milliLiter(s) (100 mL/Hr) IV Continuous <Continuous>  dextrose 5%. 1000 milliLiter(s) (50 mL/Hr) IV Continuous <Continuous>  dextrose 50% Injectable 25 Gram(s) IV Push once  dextrose 50% Injectable 12.5 Gram(s) IV Push once  dextrose 50% Injectable 25 Gram(s) IV Push once  doxazosin 2 milliGRAM(s) Oral daily  finasteride 5 milliGRAM(s) Oral daily  furosemide   Injectable 40 milliGRAM(s) IV Push Once  furosemide   Injectable 40 milliGRAM(s) IV Push daily  glucagon  Injectable 1 milliGRAM(s) IntraMuscular once  influenza  Vaccine (HIGH DOSE) 0.7 milliLiter(s) IntraMuscular once  insulin glargine Injectable (LANTUS) 10 Unit(s) SubCutaneous at bedtime  insulin lispro (ADMELOG) corrective regimen sliding scale   SubCutaneous three times a day before meals  losartan 25 milliGRAM(s) Oral daily  metoprolol tartrate 50 milliGRAM(s) Oral two times a day  pantoprazole    Tablet 40 milliGRAM(s) Oral before breakfast  polyethylene glycol 3350 17 Gram(s) Oral daily  sucralfate 1 Gram(s) Oral two times a day    MEDICATIONS  (PRN):  ALPRAZolam 0.25 milliGRAM(s) Oral three times a day PRN Anxiety  dextrose Oral Gel 15 Gram(s) Oral once PRN Blood Glucose LESS THAN 70 milliGRAM(s)/deciliter      FAMILY HISTORY:  FH: heart disease (Father)        ***No family history of premature coronary artery disease or sudden cardiac death    SOCIAL HISTORY:  Smoking-  Alcohol-  Illicit Drug use-    REVIEW OF SYSTEMS:  Constitutional: [ ] fever, [ ]weight loss,  [ ]fatigue  Eyes: [ ] visual changes  Respiratory: [x ]shortness of breath;  [ ] cough, [ ]wheezing, [ ]chills, [ ]hemoptysis  Cardiovascular: [ ] chest pain, [ ]palpitations, [ ]dizziness,  [ ]leg swelling [ ]syncope  Gastrointestinal: [ ] abdominal pain, [ ]nausea, [ ]vomiting,  [ ]diarrhea   Genitourinary: [ ] dysuria, [ ] hematuria  Neurologic: [ ] headaches [ ] tremors  [ ] weakness [ ] lightheadedness  Skin: [ ] itching, [ ]burning, [ ] rashes  Endocrine: [ ] heat or cold intolerance  Musculoskeletal: [ ] joint pain or swelling; [ ] muscle, back, or extremity pain  Psychiatric: [ ] depression, [ ]anxiety, [ ]mood swings, or [ ]difficulty sleeping  Hematologic: [ ] easy bruising, [ ] bleeding gums     [ x] All others negative	  [ ] Unable to obtain    Vital Signs Last 24 Hrs  T(C): 36.7 (25 Sep 2023 12:19), Max: 37.2 (24 Sep 2023 16:10)  T(F): 98.1 (25 Sep 2023 12:19), Max: 98.9 (24 Sep 2023 16:10)  HR: 82 (25 Sep 2023 12:19) (82 - 102)  BP: 90/60 (25 Sep 2023 12:11) (90/60 - 123/78)  BP(mean): --  RR: 18 (25 Sep 2023 12:19) (18 - 20)  SpO2: 95% (25 Sep 2023 12:19) (92% - 97%)    Parameters below as of 25 Sep 2023 04:00  Patient On (Oxygen Delivery Method): nasal cannula  O2 Flow (L/min): 2    I&O's Summary    24 Sep 2023 07:01  -  25 Sep 2023 07:00  --------------------------------------------------------  IN: 0 mL / OUT: 100 mL / NET: -100 mL    25 Sep 2023 07:01  -  25 Sep 2023 13:13  --------------------------------------------------------  IN: 240 mL / OUT: 150 mL / NET: 90 mL        PHYSICAL EXAM:  General: No acute distress  HEENT: EOMI  Neck:  No JVD  Lungs: rales at bases   Heart: Regular rate and rhythm; No murmurs, rubs, or gallops  Abdomen: soft, non tender, non distended   Extremities: 1+ edema bilaterally    Nervous system:  Alert & Oriented X3  Psychiatric: Normal affect  Skin: No rashes or lesions    LABS:  09-25    138  |  100  |  26.1<H>  ----------------------------<  166<H>  3.8   |  25.0  |  0.67    Ca    8.2<L>      25 Sep 2023 05:45  Mg     1.8     09-25    TPro  5.7<L>  /  Alb  3.2<L>  /  TBili  0.6  /  DBili  x   /  AST  11  /  ALT  27  /  AlkPhos  231<H>  09-25    Creatinine Trend: 0.67<--, 0.71<--                        12.2   7.75  )-----------( 204      ( 25 Sep 2023 05:45 )             37.7         Lipid Panel:   Cardiac Enzymes: CARDIAC MARKERS ( 25 Sep 2023 05:45 )  x     / 0.01 ng/mL / x     / x     / x      CARDIAC MARKERS ( 24 Sep 2023 20:15 )  x     / 0.02 ng/mL / x     / x     / x      CARDIAC MARKERS ( 24 Sep 2023 11:35 )  x     / <0.01 ng/mL / 33 U/L / x     / x                RADIOLOGY:    ECG [9/24/23]: Unknown rhythm, RBBB, LAFB    TELEMETRY: Unknown rhythm, no tachycardic episodes    ECHO:  Echo 6/20/23  Summary:   1. LV Ejection Fraction by Oleary's Method with a biplane EF of 50 %.   2. Normal global left ventricular systolic function.   3. Trace mitral valve regurgitation.   4. Mild aortic regurgitation.   5. Endocardial visualization was enhanced with intravenous echo contrast.   6. Compared to TTE dated 6/11/23 the LVEF has improved.    STRESS TEST:    CATHETERIZATION:  [facility] 1/2023:  Coronary Angiography   - Left dominant system   - Patent prox and mid LAD stents   - Moderate stenosis of the distal LCx   - The RCA is a small, non-dominat vessel.  The proximal segment is occluded with brisk right-to-right collaterals.  - 25mm LV-Ao gradient on pullback.  CIELO 1.4cm2 with moderate AS byecho.  - Moderately reduced LVEF by echo       LM   Left main artery: Angiography shows minor irregularities.    LAD   Proximal left anterior descending: There is a 10 % stenosis within the  stented segment. Mid left anterior descending: There is a10 % stenosis within the stented segment.    CX   Distal circumflex: There is a 60 % stenosis. VALENTINO Flow 3.    RCA   Proximal right coronary artery: There is a 100 % stenosis. There is  good collateral blood supply to the distal myocardium. This  lesion is a chronic total occlusion.  This is a small non-dominant  vessel.  Ramus   Ramus intermedius: Angiography shows mild atherosclerosis.

## 2023-09-25 NOTE — PROGRESS NOTE ADULT - ASSESSMENT
78 y/o male with PMH of afib on Eliquis (s/p Cardioversion abd ablation  1/31/23), Moderate AS, Chronic Diastolic Heart Failure, CAD s/p PCI, HTN, HLD, DM 2, Morbid Obesity, ALLIE, Thoracic Aortic Aneurysm, BPH s/p TURP, Left Renal Mass Presents with shortness of breath for last few days , more worse since last night , also noted with worsening lower ext swelling . moved out of Assisted living recently to live with his son basement apartment.  Patient does not eat at home and usually eat food from [employer] as he has no one to cook for him.  also felt some palpitations last night but now feels better, given lasix iv in ed.   has home o2 , uses it prn.       1- sob due to CHFpEF h/o AS    cont Iv lasix   CT surgery and cardiology consult called this am   monitor lytes   ox sat   on oxygen cronically   has cardio membs HF team will be called   d/w Dr Huynh cardiologist   - check echo   c/w losartan and metoprolol   - nutrition consult   low salt diet     2-h/o Atrial fib/ chronic / paroxysmal   - rate controlled   - c/w eliquis and metoprolol    3- AS last admission evl;auted by CT surgery for TAVR   pt was not able to follow up in the office after DC   see CT surgery input     4- ABdominal  bloating and constipation   KUB ordered   cont miralax   add senna and lactulose     5- DM 2  - hold metformin   - basal insulin , ssi   controlled     6- anxiety   - c/w xanax prn  > Morbid Obesity, ALLIE,  - wt loss advised      7- hx of Thoracic Aortic Aneurysm  - monitor as op     8-BPH s/p TURP,  - cw home meds     9- Hypothyroidsm   will cont levothyroxine   check TSH     10- Severe protein elisabeth malnutrution   cont diet 'add supplement if  needed     > dvt ppx : on eliquis

## 2023-09-25 NOTE — CONSULT NOTE ADULT - PROBLEM SELECTOR RECOMMENDATION 9
Severe AS on last admission.  now admitted with acute on chronic diastolic heart failure.  TAVR CTA completed in June on last admission.  Pt was lost to follow up in the office.  Pt was supposed to see dental but has not.  Repeat TTE pending.  Will discuss with Dr. Napier.

## 2023-09-25 NOTE — PATIENT PROFILE ADULT - FALL HARM RISK - HARM RISK INTERVENTIONS

## 2023-09-25 NOTE — PROGRESS NOTE ADULT - SUBJECTIVE AND OBJECTIVE BOX
Patient is a 77y old  Male who presents with a chief complaint of sob (25 Sep 2023 13:12)      Patient seen and examined at bedside. Pt is feeling SOB and bloated   constipated   old records hospitalization  in June 2023 reviewed   mod to severe AS was seen by CT surgery however pt failed to follow up with them after DC         ALLERGIES:  Ceftin (Hives)  penicillin (Anaphylaxis)  Bactrim (Rash)  sulfa drugs (Unknown)    MEDICATIONS  (STANDING):  apixaban 5 milliGRAM(s) Oral every 12 hours  aspirin  chewable 81 milliGRAM(s) Oral daily  atorvastatin 10 milliGRAM(s) Oral at bedtime  dextrose 5%. 1000 milliLiter(s) (50 mL/Hr) IV Continuous <Continuous>  dextrose 5%. 1000 milliLiter(s) (100 mL/Hr) IV Continuous <Continuous>  dextrose 50% Injectable 25 Gram(s) IV Push once  dextrose 50% Injectable 12.5 Gram(s) IV Push once  dextrose 50% Injectable 25 Gram(s) IV Push once  doxazosin 2 milliGRAM(s) Oral daily  finasteride 5 milliGRAM(s) Oral daily  furosemide   Injectable 40 milliGRAM(s) IV Push Once  furosemide   Injectable 40 milliGRAM(s) IV Push two times a day  glucagon  Injectable 1 milliGRAM(s) IntraMuscular once  influenza  Vaccine (HIGH DOSE) 0.7 milliLiter(s) IntraMuscular once  insulin glargine Injectable (LANTUS) 10 Unit(s) SubCutaneous at bedtime  insulin lispro (ADMELOG) corrective regimen sliding scale   SubCutaneous three times a day before meals  losartan 25 milliGRAM(s) Oral daily  metoprolol tartrate 50 milliGRAM(s) Oral two times a day  pantoprazole    Tablet 40 milliGRAM(s) Oral before breakfast  polyethylene glycol 3350 17 Gram(s) Oral daily  sucralfate 1 Gram(s) Oral two times a day    MEDICATIONS  (PRN):  ALPRAZolam 0.25 milliGRAM(s) Oral three times a day PRN Anxiety  dextrose Oral Gel 15 Gram(s) Oral once PRN Blood Glucose LESS THAN 70 milliGRAM(s)/deciliter    Vital Signs Last 24 Hrs  T(F): 98.1 (25 Sep 2023 12:19), Max: 98.9 (24 Sep 2023 16:10)  HR: 82 (25 Sep 2023 12:19) (82 - 102)  BP: 90/60 (25 Sep 2023 12:11) (90/60 - 117/72)  RR: 18 (25 Sep 2023 12:19) (18 - 20)  SpO2: 95% (25 Sep 2023 12:19) (92% - 97%)  I&O's Summary    24 Sep 2023 07:01  -  25 Sep 2023 07:00  --------------------------------------------------------  IN: 0 mL / OUT: 100 mL / NET: -100 mL    25 Sep 2023 07:01  -  25 Sep 2023 15:14  --------------------------------------------------------  IN: 240 mL / OUT: 150 mL / NET: 90 mL        PHYSICAL EXAM:  General: awake alert , no distress   Neck: supple , no JVD   Lungs: CTA bilateral   Cardio: RRR, S1/S2, No murmur  Abdomen: Soft, Nontender, Nondistended; Bowel sounds present  Extremities: No calf tenderness, No pitting edema  Skin : normal color , warm     LABS:                        12.2   7.75  )-----------( 204      ( 25 Sep 2023 05:45 )             37.7     09-25    138  |  100  |  26.1  ----------------------------<  166  3.8   |  25.0  |  0.67    Ca    8.2      25 Sep 2023 05:45  Mg     1.8     09-25    TPro  5.7  /  Alb  3.2  /  TBili  0.6  /  DBili  x   /  AST  11  /  ALT  27  /  AlkPhos  231  09-25                CARDIAC MARKERS ( 24 Sep 2023 11:35 )  x     / x     / 33 U/L / x     / x            TSH 2.42   TSH with FT4 reflex --  Total T3 --    11:31 - VBG - pH:       | pCO2:       | pO2:       | Lactate: 2.00             POCT Blood Glucose.: 192 mg/dL (25 Sep 2023 11:55)  POCT Blood Glucose.: 186 mg/dL (25 Sep 2023 08:05)  POCT Blood Glucose.: 208 mg/dL (24 Sep 2023 21:25)  POCT Blood Glucose.: 172 mg/dL (24 Sep 2023 17:34)      Urinalysis Basic - ( 25 Sep 2023 05:45 )    Color: x / Appearance: x / SG: x / pH: x  Gluc: 166 mg/dL / Ketone: x  / Bili: x / Urobili: x   Blood: x / Protein: x / Nitrite: x   Leuk Esterase: x / RBC: x / WBC x   Sq Epi: x / Non Sq Epi: x / Bacteria: x          RADIOLOGY & ADDITIONAL TESTS:       Patient is a 77y old  Male who presents with a chief complaint of sob (25 Sep 2023 13:12)      Patient seen and examined at bedside. Pt is feeling SOB and bloated   constipated   old records hospitalization  in June 2023 reviewed   mod to severe AS was seen by CT surgery however pt failed to follow up with them after DC         ALLERGIES:  Ceftin (Hives)  penicillin (Anaphylaxis)  Bactrim (Rash)  sulfa drugs (Unknown)    MEDICATIONS  (STANDING):  apixaban 5 milliGRAM(s) Oral every 12 hours  aspirin  chewable 81 milliGRAM(s) Oral daily  atorvastatin 10 milliGRAM(s) Oral at bedtime  dextrose 5%. 1000 milliLiter(s) (50 mL/Hr) IV Continuous <Continuous>  dextrose 5%. 1000 milliLiter(s) (100 mL/Hr) IV Continuous <Continuous>  dextrose 50% Injectable 25 Gram(s) IV Push once  dextrose 50% Injectable 12.5 Gram(s) IV Push once  dextrose 50% Injectable 25 Gram(s) IV Push once  doxazosin 2 milliGRAM(s) Oral daily  finasteride 5 milliGRAM(s) Oral daily  furosemide   Injectable 40 milliGRAM(s) IV Push Once  furosemide   Injectable 40 milliGRAM(s) IV Push two times a day  glucagon  Injectable 1 milliGRAM(s) IntraMuscular once  influenza  Vaccine (HIGH DOSE) 0.7 milliLiter(s) IntraMuscular once  insulin glargine Injectable (LANTUS) 10 Unit(s) SubCutaneous at bedtime  insulin lispro (ADMELOG) corrective regimen sliding scale   SubCutaneous three times a day before meals  losartan 25 milliGRAM(s) Oral daily  metoprolol tartrate 50 milliGRAM(s) Oral two times a day  pantoprazole    Tablet 40 milliGRAM(s) Oral before breakfast  polyethylene glycol 3350 17 Gram(s) Oral daily  sucralfate 1 Gram(s) Oral two times a day    MEDICATIONS  (PRN):  ALPRAZolam 0.25 milliGRAM(s) Oral three times a day PRN Anxiety  dextrose Oral Gel 15 Gram(s) Oral once PRN Blood Glucose LESS THAN 70 milliGRAM(s)/deciliter    F): 98.1 (25 Sep 2023 12:19), Max: 98.9 (24 Sep 2023 16:10)  HR: 82 (25 Sep 2023 12:19) (82 - 102)  BP: 90/60 (25 Sep 2023 12:11) (90/60 - 117/72)  RR: 18 (25 Sep 2023 12:19) (18 - 20)  SpO2: 95% (25 Sep 2023 12:19) (92% - 97%)  I&O's Summary    24 Sep 2023 07:01  -  25 Sep 2023   PHYSICAL EXAM:  General: awake alert , no distress   Neck: supple , no JVD   Lungs: CTA bilateral   Cardio: RRR, S1/S2, No murmur  Abdomen: Soft, Nontender, distended ; Bowel sounds present  Extremities: No calf tenderness, + pitting edema  Skin : normal color , warm       LABS:                        12.2   7.75  )-----------( 204      ( 25 Sep 2023 05:45 )             37.7     09-25    138  |  100  |  26.1  ----------------------------<  166  3.8   |  25.0  |  0.67    Ca    8.2      25 Sep 2023 05:45  Mg     1.8     09-25    TPro  5.7  /  Alb  3.2  /  TBili  0.6  /  DBili  x   /  AST  11  /  ALT  27  /  AlkPhos  231  09-25                CARDIAC MARKERS ( 24 Sep 2023 11:35 )  x     / x     / 33 U/L / x     / x            TSH 2.42   TSH with FT4 reflex --  Total T3 --    11:31 - VBG - pH:       | pCO2:       | pO2:       | Lactate: 2.00         CAPILLARY BLOOD GLUCOSE      POCT Blood Glucose.: 173 mg/dL (26 Sep 2023 11:38)  POCT Blood Glucose.: 176 mg/dL (26 Sep 2023 08:07)  POCT Blood Glucose.: 146 mg/dL (25 Sep 2023 21:55)  POCT Blood Glucose.: 168 mg/dL (25 Sep 2023 16:45)    Urinalysis Basic - ( 25 Sep 2023 05:45 )    Color: x / Appearance: x / SG: x / pH: x  Gluc: 166 mg/dL / Ketone: x  / Bili: x / Urobili: x   Blood: x / Protein: x / Nitrite: x   Leuk Esterase: x / RBC: x / WBC x   Sq Epi: x / Non Sq Epi: x / Bacteria: x          RADIOLOGY & ADDITIONAL TESTS:

## 2023-09-25 NOTE — CONSULT NOTE ADULT - SUBJECTIVE AND OBJECTIVE BOX
Surgeon: Karthikeyan    Consult requesting by:    HISTORY OF PRESENT ILLNESS:  77y Male with PMH of afib on Eliquis (s/p Cardioversion on 1/31/23 and again on last admission 6/19/23), Moderate AS, Chronic Diastolic Heart Failure, CAD s/p PCI, HTN, HLD, DM 2, Morbid Obesity, ALLIE, Thoracic Aortic Aneurysm, BPH s/p TURP, Left Renal Mass, and patient uses PRN home O2.  He was admitted in June for abdominal pain x 4 days and found to have UTI.  Echo at the time showed a significant drop in EF, severe AS.  Reduced EF attributed to afib and patient underwent DCCV with EP.  Cardiomems was placed.  Post procedure TTE with normal EF.  He had an abdominal CT that showed trace ascite and also underwent MRCP on that admission that showed cirrhosis.   CT surgery was consulted on that admission for TAVR evaluation.  TAVR CTA completed 6/22/23.  At that time, anticoagulation was not able to be interrupted per EP.  Plan was for outpatient follow up with Dr. Napier, but patient was lost to follow up.  He also has poor dentition and was asked to see a dentist as an outpatient for possible extractions.    Pt now presents from home (recently moved out of assisted living to live in his son's basement apartment), with palpitations and SOB.  He was given IV lasix and admitted for acute on chronic diastolic heart failure.  Asked to consult regarding TAVR.    Of note, pt had LHC in January 2023 that showed 60% dcx disease and 100% pRCA with collateral circulation.  LIBBY in January with moderate to severe AS and mild AI.         PAST MEDICAL & SURGICAL HISTORY:  Anxiety      Hypertension      Hyperlipidemia      Prostate disease      Gastroesophageal reflux disease      Gallbladder stone without cholecystitis or obstruction      BPH (benign prostatic hyperplasia)      DM (diabetes mellitus)      DVT (deep venous thrombosis)  left leg      Afib      Anxiety      HLD (hyperlipidemia)      Malignant schwannoma      H/O arthroscopy of knee      S/P laminectomy      S/P coronary artery stent placement  x2          MEDICATIONS  (STANDING):  apixaban 5 milliGRAM(s) Oral every 12 hours  aspirin  chewable 81 milliGRAM(s) Oral daily  atorvastatin 10 milliGRAM(s) Oral at bedtime  dextrose 5%. 1000 milliLiter(s) (100 mL/Hr) IV Continuous <Continuous>  dextrose 5%. 1000 milliLiter(s) (50 mL/Hr) IV Continuous <Continuous>  dextrose 50% Injectable 25 Gram(s) IV Push once  dextrose 50% Injectable 12.5 Gram(s) IV Push once  dextrose 50% Injectable 25 Gram(s) IV Push once  doxazosin 2 milliGRAM(s) Oral daily  finasteride 5 milliGRAM(s) Oral daily  furosemide   Injectable 40 milliGRAM(s) IV Push Once  furosemide   Injectable 40 milliGRAM(s) IV Push daily  glucagon  Injectable 1 milliGRAM(s) IntraMuscular once  influenza  Vaccine (HIGH DOSE) 0.7 milliLiter(s) IntraMuscular once  insulin glargine Injectable (LANTUS) 10 Unit(s) SubCutaneous at bedtime  insulin lispro (ADMELOG) corrective regimen sliding scale   SubCutaneous three times a day before meals  losartan 25 milliGRAM(s) Oral daily  metoprolol tartrate 50 milliGRAM(s) Oral two times a day  pantoprazole    Tablet 40 milliGRAM(s) Oral before breakfast  polyethylene glycol 3350 17 Gram(s) Oral daily  sucralfate 1 Gram(s) Oral two times a day    MEDICATIONS  (PRN):  ALPRAZolam 0.25 milliGRAM(s) Oral three times a day PRN Anxiety  dextrose Oral Gel 15 Gram(s) Oral once PRN Blood Glucose LESS THAN 70 milliGRAM(s)/deciliter    Antiplatelet therapy:                           Last dose/amt:    Allergies    Ceftin (Hives)  penicillin (Anaphylaxis)  Bactrim (Rash)  sulfa drugs (Unknown)    Intolerances        SOCIAL HISTORY:  Smoker: [ ] Yes  [ ] No        PACK YEARS:                         WHEN QUIT?  ETOH use: [ ] Yes  [ ] No              FREQUENCY / QUANTITY:  Ilicit Drug use:  [ ] Yes  [ ] No  Occupation:  Live with:  Assisted device use:    FAMILY HISTORY:  FH: heart disease (Father)        Review of Systems  CONSTITUTIONAL:  Fevers[ ] chills[ ] sweats[ ] fatigue[ ] weight loss[ ] weight gain [ ]                                     NEGATIVE [ ]   NEURO:  parathesias[ ] seizures [ ]  syncope [ ]  confusion [ ]                                                                                NEGATIVE[ ]   EYES: glasses[ ]  blurry vision[ ]  discharge[ ] pain[ ] glaucoma [ ]                                                                          NEGATIVE[ ]   ENMT:  difficulty hearing [ ]  vertigo[ ]  dysphagia[ ] epistaxis[ ] recent dental work [ ]                                    NEGATIVE[ ]   CV:  chest pain[ ] palpitations[ ] CRUZ [ ] diaphoresis [ ]                                                                                           NEGATIVE[ ]   RESPIRATORY:  wheezing[ ] SOB[ ] cough [ ] sputum[ ] hemoptysis[ ]                                                                  NEGATIVE[ ]   GI:  nausea[ ]  vommiting [ ]  diarrhea[ ] constipation [ ] melena [ ]                                                                      NEGATIVE[ ]   : hematuria[ ]  dysuria[ ] urgency[ ] incontinence[ ]                                                                                            NEGATIVE[ ]   MUSKULOSKELETAL:  arthritis[ ]  joint swelling [ ] muscle weakness [ ]                                                                NEGATIVE[ ]   SKIN/BREAST:  rash[ ] itching [ ]  hair loss[ ] masses[ ]                                                                                              NEGATIVE[ ]   PSYCH:  dementia [ ] depresion [ ] anxiety[ ]                                                                                                               NEGATIVE[ ]   HEME/LYMPH:  bruises easily[ ] enlarged lymph nodes[ ] tender lymph nodes[ ]                                               NEGATIVE[ ]   ENDOCRINE:  cold intolerance[ ] heat intolerance[ ] polydipsia[ ]                                                                          NEGATIVE[ ]     PHYSICAL EXAM  Vital Signs Last 24 Hrs  T(C): 36.6 (25 Sep 2023 04:00), Max: 37.2 (24 Sep 2023 16:10)  T(F): 97.8 (25 Sep 2023 04:00), Max: 98.9 (24 Sep 2023 16:10)  HR: 102 (25 Sep 2023 04:00) (87 - 102)  BP: 113/73 (25 Sep 2023 04:00) (96/67 - 123/78)  BP(mean): --  RR: 18 (25 Sep 2023 04:00) (18 - 20)  SpO2: 94% (25 Sep 2023 04:00) (92% - 97%)    Parameters below as of 25 Sep 2023 04:00  Patient On (Oxygen Delivery Method): nasal cannula  O2 Flow (L/min): 2      CONSTITUTIONAL:                                                                          WNL[ ]   Neuro: WNL[ ] Normal exam oriented to person/place & time with no focal motor or sensory  deficits. Other                     Eyes: WNL[ ]   Normal exam of conjunctiva & lids, pupils equally reactive. Other     ENT: WNL[ ]    Normal exam of nasal/oral mucosa with absence of cyanosis. Other  Neck: WNL[ ]  Normal exam of jugular veins, trachea & thyroid. Other  Chest: WNL[ ] Normal lung exam with good air movement absence of wheezes, rales, or rhonchi: Other                                                                                CV:  Auscultation: normal [ ] S3[ ] S4[ ] Irregular [ ] Rub[ ] Clicks[ ]    Murmurs none:[ ]systolic [ ]  diastolic [ ] holosystolic [ ]  Carotids: No Bruits[ ] Other                 Abdominal Aorta: normal [ ] nonpalpable[ ]Other                                                                                      GI:           WNL[ ] Normal exam of abdomen, liver & spleen with no noted masses or tenderness. Other                                                                                                        Extremities: WNL[ ] Normal no evidence of cyanosis or deformity Edema: none[ ]trace[ ]1+[ ]2+[ ]3+[ ]4+[ ]  Lower Extremity Pulses: Right[ ] Left[ ]Varicosities[ ]  SKIN :WNL[ ] Normal exam to inspection & palation. Other:                                                          LABS:                        12.2   7.75  )-----------( 204      ( 25 Sep 2023 05:45 )             37.7     09-25    138  |  100  |  26.1<H>  ----------------------------<  166<H>  3.8   |  25.0  |  0.67    Ca    8.2<L>      25 Sep 2023 05:45  Mg     1.8     09-25    TPro  5.7<L>  /  Alb  3.2<L>  /  TBili  0.6  /  DBili  x   /  AST  11  /  ALT  27  /  AlkPhos  231<H>  09-25      Urinalysis Basic - ( 25 Sep 2023 05:45 )    Color: x / Appearance: x / SG: x / pH: x  Gluc: 166 mg/dL / Ketone: x  / Bili: x / Urobili: x   Blood: x / Protein: x / Nitrite: x   Leuk Esterase: x / RBC: x / WBC x   Sq Epi: x / Non Sq Epi: x / Bacteria: x      CARDIAC MARKERS ( 25 Sep 2023 05:45 )  x     / 0.01 ng/mL / x     / x     / x      CARDIAC MARKERS ( 24 Sep 2023 20:15 )  x     / 0.02 ng/mL / x     / x     / x      CARDIAC MARKERS ( 24 Sep 2023 11:35 )  x     / <0.01 ng/mL / 33 U/L / x     / x              Cardiac Cath: < from: Cardiac Catheterization (01.30.23 @ 13:30) >  Diagnostic Findings:     Coronary Angiography   The coronary circulation is left dominant. Cardiac catheterization was  performed electively.    LM   Left main artery: Angiography shows minor irregularities.      LAD   Proximal left anterior descending: There is a 10 % stenosis within the  stented segment. Mid left anterior descending: There is a  10 % stenosis within the stented segment.      CX   Distal circumflex: There is a 60 % stenosis. VALENTINO Flow 3.      RCA   Proximal right coronary artery: There is a 100 % stenosis. There is  good collateral blood supply to the distal myocardium. This  lesion is a chronic total occlusion.  This is a small non-dominant  vessel.    Ramus   Ramus intermedius: Angiography shows mild atherosclerosis.      Left Heart Cath   Ejection fraction was calculated by echo with a value of 40-45%. LV to  AO pullback was performed and there is a pressure  gradient of 25 mmHg.      < end of copied text >      TTE / LIBBY: < from: TTE Echo Limited or F/U (06.20.23 @ 09:42) >  Summary:   1. LV Ejection Fraction by Oleary's Method with a biplane EF of 50 %.   2. Normal global left ventricular systolic function.   3. Trace mitral valve regurgitation.   4. Mild aortic regurgitation.   5. Endocardial visualization was enhanced with intravenous echo contrast.   6. Compared to TTE dated 6/11/23 the LVEF has improved.   7. For additional echocardiographic findings refer to TTE dated 6/11/23.    MD Emelyn Electronically signed on 6/20/2023 at 12:36:04 PM    < end of copied text >                           Surgeon: Karthikeyan    Consult requesting by:    HISTORY OF PRESENT ILLNESS:  77y Male with PMH of afib on Eliquis (s/p Cardioversion on 1/31/23 and again on last admission 6/19/23), Moderate AS, Chronic Diastolic Heart Failure, CAD s/p PCI, HTN, HLD, DM 2, Morbid Obesity, ALLIE, Thoracic Aortic Aneurysm, BPH s/p TURP, Left Renal Mass, and patient uses PRN home O2.  He was admitted in June for abdominal pain x 4 days and found to have UTI.  Echo at the time showed a significant drop in EF, severe AS.  Reduced EF attributed to afib and patient underwent DCCV with EP.  Cardiomems was placed.  Post procedure TTE with normal EF.  He had an abdominal CT that showed trace ascites and also underwent MRCP on that admission that showed cirrhosis.   CT surgery was consulted on that admission for TAVR evaluation.  TAVR CTA completed 6/22/23.  At that time, anticoagulation was not able to be interrupted per EP.  Plan was for outpatient follow up with Dr. Napier, but patient was lost to follow up.  He also has poor dentition and was asked to see a dentist as an outpatient for possible extractions.    Pt now presents from home (recently moved out of assisted living to live in his son's basement apartment), with palpitations and SOB.  He was given IV lasix and admitted for acute on chronic diastolic heart failure.  Asked to consult regarding TAVR.    Of note, pt had LHC in January 2023 that showed 60% dcx disease and 100% pRCA with collateral circulation.  LIBBY in January with moderate to severe AS and mild AI.         PAST MEDICAL & SURGICAL HISTORY:  Anxiety      Hypertension      Hyperlipidemia      Prostate disease      Gastroesophageal reflux disease      Gallbladder stone without cholecystitis or obstruction      BPH (benign prostatic hyperplasia)      DM (diabetes mellitus)      DVT (deep venous thrombosis)  left leg      Afib      Anxiety      HLD (hyperlipidemia)      Malignant schwannoma      H/O arthroscopy of knee      S/P laminectomy      S/P coronary artery stent placement  x2          MEDICATIONS  (STANDING):  apixaban 5 milliGRAM(s) Oral every 12 hours  aspirin  chewable 81 milliGRAM(s) Oral daily  atorvastatin 10 milliGRAM(s) Oral at bedtime  dextrose 5%. 1000 milliLiter(s) (100 mL/Hr) IV Continuous <Continuous>  dextrose 5%. 1000 milliLiter(s) (50 mL/Hr) IV Continuous <Continuous>  dextrose 50% Injectable 25 Gram(s) IV Push once  dextrose 50% Injectable 12.5 Gram(s) IV Push once  dextrose 50% Injectable 25 Gram(s) IV Push once  doxazosin 2 milliGRAM(s) Oral daily  finasteride 5 milliGRAM(s) Oral daily  furosemide   Injectable 40 milliGRAM(s) IV Push Once  furosemide   Injectable 40 milliGRAM(s) IV Push daily  glucagon  Injectable 1 milliGRAM(s) IntraMuscular once  influenza  Vaccine (HIGH DOSE) 0.7 milliLiter(s) IntraMuscular once  insulin glargine Injectable (LANTUS) 10 Unit(s) SubCutaneous at bedtime  insulin lispro (ADMELOG) corrective regimen sliding scale   SubCutaneous three times a day before meals  losartan 25 milliGRAM(s) Oral daily  metoprolol tartrate 50 milliGRAM(s) Oral two times a day  pantoprazole    Tablet 40 milliGRAM(s) Oral before breakfast  polyethylene glycol 3350 17 Gram(s) Oral daily  sucralfate 1 Gram(s) Oral two times a day    MEDICATIONS  (PRN):  ALPRAZolam 0.25 milliGRAM(s) Oral three times a day PRN Anxiety  dextrose Oral Gel 15 Gram(s) Oral once PRN Blood Glucose LESS THAN 70 milliGRAM(s)/deciliter    Antiplatelet therapy:                           Last dose/amt:    Allergies    Ceftin (Hives)  penicillin (Anaphylaxis)  Bactrim (Rash)  sulfa drugs (Unknown)    Intolerances        SOCIAL HISTORY:  Substance use	No  Former cigarette use stopped 3 years ago, no alcohol or illicit drug use.    Lives in basement apartment of son's home.    Use a rolling walker.      FAMILY HISTORY:  FH: heart disease (Father)        Review of Systems  CONSTITUTIONAL:  Fevers[ ] chills[ ] sweats[ ] fatigue[ ] weight loss[ ] weight gain [ ]                                     NEGATIVE [x ]   NEURO:  parathesias[ ] seizures [ ]  syncope [ ]  confusion [ ]                                                                                NEGATIVE[x ]   EYES: glasses[x ]  blurry vision[ ]  discharge[ ] pain[ ] glaucoma [ ]                                                                          NEGATIVE[ ]   ENMT:  difficulty hearing [ ]  vertigo[ ]  dysphagia[ ] epistaxis[ ] recent dental work [ ]                                    NEGATIVE[ x]   CV:  chest pain[ ] palpitations[x ] CRUZ [ ] diaphoresis [ ]                                                                                           NEGATIVE[ ]   RESPIRATORY:  wheezing[ ] SOB[x ] cough [ ] sputum[ ] hemoptysis[ ]                                                                  NEGATIVE[ ]   GI:  nausea[ ]  vommiting [ ]  diarrhea[ ] constipation [ ] melena [ ]                                                                      NEGATIVE[x ]   : hematuria[ ]  dysuria[ ] urgency[ ] incontinence[ ]                                                                                            NEGATIVE[x ]   MUSKULOSKELETAL:  arthritis[ ]  joint swelling [ ] muscle weakness [ ]                                                                NEGATIVE[x ]   SKIN/BREAST:  rash[ ] itching [ ]  hair loss[ ] masses[ ]                                                                                              NEGATIVE[ x]   PSYCH:  dementia [ ] depression [ ] anxiety[ ]                                                                                                               NEGATIVE[ x]   HEME/LYMPH:  bruises easily[ ] enlarged lymph nodes[ ] tender lymph nodes[ ]                                               NEGATIVE[x ]   ENDOCRINE:  cold intolerance[ ] heat intolerance[ ] polydipsia[ ]                                                                          NEGATIVE[x ]     PHYSICAL EXAM  Vital Signs Last 24 Hrs  T(C): 36.6 (25 Sep 2023 04:00), Max: 37.2 (24 Sep 2023 16:10)  T(F): 97.8 (25 Sep 2023 04:00), Max: 98.9 (24 Sep 2023 16:10)  HR: 102 (25 Sep 2023 04:00) (87 - 102)  BP: 113/73 (25 Sep 2023 04:00) (96/67 - 123/78)  BP(mean): --  RR: 18 (25 Sep 2023 04:00) (18 - 20)  SpO2: 94% (25 Sep 2023 04:00) (92% - 97%)    Parameters below as of 25 Sep 2023 04:00  Patient On (Oxygen Delivery Method): nasal cannula  O2 Flow (L/min): 2      CONSTITUTIONAL:                                                                            Neuro: WNL[ x] Normal exam oriented to person/place & time with no focal motor or sensory  deficits. Other                     Eyes: WNL[ x]   Normal exam of conjunctiva & lids, pupils equally reactive. Other     ENT: WNL[x ]    Normal exam of nasal/oral mucosa with absence of cyanosis. Other  Neck: WNL[x ]  Normal exam of jugular veins, trachea & thyroid. Other  Chest: WNL[x ] Normal lung exam with good air movement absence of wheezes, rales, or rhonchi: Other                                                                                CV:  Auscultation: normal [x ] S3[ ] S4[ ] Irregular [ ] Rub[ ] Clicks[ ]    Murmurs none:[ ]systolic [x ]  diastolic [ ] holosystolic [ ]  Carotids: No Bruits[x ] Other                 Abdominal Aorta: normal [ ] nonpalpable[x ]Other                                                                                      GI:           WNL[x ] Normal exam of abdomen, liver & spleen with no noted masses or tenderness. Other                                                                                                        Extremities: WNL[x ] Normal no evidence of cyanosis or deformity Edema: none[ ]trace[ ]1+[x ]2+[ ]3+[ ]4+[ ]  Lower Extremity Pulses: Right[ pp] Left[pp ]  SKIN :WNL[x ] Normal exam to inspection & palpation. Other:                                                          LABS:                        12.2   7.75  )-----------( 204      ( 25 Sep 2023 05:45 )             37.7     09-25    138  |  100  |  26.1<H>  ----------------------------<  166<H>  3.8   |  25.0  |  0.67    Ca    8.2<L>      25 Sep 2023 05:45  Mg     1.8     09-25    TPro  5.7<L>  /  Alb  3.2<L>  /  TBili  0.6  /  DBili  x   /  AST  11  /  ALT  27  /  AlkPhos  231<H>  09-25      Urinalysis Basic - ( 25 Sep 2023 05:45 )    Color: x / Appearance: x / SG: x / pH: x  Gluc: 166 mg/dL / Ketone: x  / Bili: x / Urobili: x   Blood: x / Protein: x / Nitrite: x   Leuk Esterase: x / RBC: x / WBC x   Sq Epi: x / Non Sq Epi: x / Bacteria: x      CARDIAC MARKERS ( 25 Sep 2023 05:45 )  x     / 0.01 ng/mL / x     / x     / x      CARDIAC MARKERS ( 24 Sep 2023 20:15 )  x     / 0.02 ng/mL / x     / x     / x      CARDIAC MARKERS ( 24 Sep 2023 11:35 )  x     / <0.01 ng/mL / 33 U/L / x     / x              Cardiac Cath: < from: Cardiac Catheterization (01.30.23 @ 13:30) >  Diagnostic Findings:     Coronary Angiography   The coronary circulation is left dominant. Cardiac catheterization was  performed electively.    LM   Left main artery: Angiography shows minor irregularities.      LAD   Proximal left anterior descending: There is a 10 % stenosis within the  stented segment. Mid left anterior descending: There is a  10 % stenosis within the stented segment.      CX   Distal circumflex: There is a 60 % stenosis. VALENTINO Flow 3.      RCA   Proximal right coronary artery: There is a 100 % stenosis. There is  good collateral blood supply to the distal myocardium. This  lesion is a chronic total occlusion.  This is a small non-dominant  vessel.    Ramus   Ramus intermedius: Angiography shows mild atherosclerosis.      Left Heart Cath   Ejection fraction was calculated by echo with a value of 40-45%. LV to  AO pullback was performed and there is a pressure  gradient of 25 mmHg.      < end of copied text >      TTE / LIBBY: < from: TTE Echo Limited or F/U (06.20.23 @ 09:42) >  Summary:   1. LV Ejection Fraction by Oleary's Method with a biplane EF of 50 %.   2. Normal global left ventricular systolic function.   3. Trace mitral valve regurgitation.   4. Mild aortic regurgitation.   5. Endocardial visualization was enhanced with intravenous echo contrast.   6. Compared to TTE dated 6/11/23 the LVEF has improved.   7. For additional echocardiographic findings refer to TTE dated 6/11/23.    MD Emelyn Electronically signed on 6/20/2023 at 12:36:04 PM    < end of copied text >

## 2023-09-25 NOTE — CONSULT NOTE ADULT - ASSESSMENT
77y Male with PMH of afib on Eliquis (s/p Cardioversion on 1/31/23 and again on last admission 6/19/23), Moderate AS, Chronic Diastolic Heart Failure, CAD s/p PCI, HTN, HLD, DM 2, Morbid Obesity, ALLIE, Thoracic Aortic Aneurysm, BPH s/p TURP, Left Renal Mass, and patient uses PRN home O2.  He was admitted in June for abdominal pain x 4 days and found to have UTI.  Echo at the time showed a significant drop in EF, severe AS.  Reduced EF attributed to afib and patient underwent DCCV with EP.  Cardiomems was placed.  Post procedure TTE with normal EF.  He had an abdominal CT that showed trace ascite and also underwent MRCP on that admission that showed cirrhosis.   CT surgery was consulted on that admission for TAVR evaluation.  TAVR CTA completed 6/22/23.  At that time, anticoagulation was not able to be interrupted per EP.  Plan was for outpatient follow up with Dr. Napier, but patient was lost to follow up.  He also has poor dentition and was asked to see a dentist as an outpatient for possible extractions.    Pt now presents from home (recently moved out of assisted living to live in his son's basement apartment), with palpitations and SOB.  He was given IV lasix and admitted for acute on chronic diastolic heart failure.  Asked to consult regarding TAVR.    Of note, pt had LHC in January 2023 that showed 60% dcx disease and 100% pRCA with collateral circulation.  LIBBY in January with moderate to severe AS and mild AI.

## 2023-09-26 ENCOUNTER — TRANSCRIPTION ENCOUNTER (OUTPATIENT)
Age: 78
End: 2023-09-26

## 2023-09-26 LAB
A1C WITH ESTIMATED AVERAGE GLUCOSE RESULT: 7.2 % — HIGH (ref 4–5.6)
ALBUMIN SERPL ELPH-MCNC: 3.4 G/DL — SIGNIFICANT CHANGE UP (ref 3.3–5.2)
ALP SERPL-CCNC: 372 U/L — HIGH (ref 40–120)
ALT FLD-CCNC: 40 U/L — SIGNIFICANT CHANGE UP
ANION GAP SERPL CALC-SCNC: 13 MMOL/L — SIGNIFICANT CHANGE UP (ref 5–17)
AST SERPL-CCNC: 32 U/L — SIGNIFICANT CHANGE UP
BILIRUB DIRECT SERPL-MCNC: 0.2 MG/DL — SIGNIFICANT CHANGE UP (ref 0–0.3)
BILIRUB INDIRECT FLD-MCNC: 0.4 MG/DL — SIGNIFICANT CHANGE UP (ref 0.2–1)
BILIRUB SERPL-MCNC: 0.6 MG/DL — SIGNIFICANT CHANGE UP (ref 0.4–2)
BUN SERPL-MCNC: 31.3 MG/DL — HIGH (ref 8–20)
CALCIUM SERPL-MCNC: 8.3 MG/DL — LOW (ref 8.4–10.5)
CHLORIDE SERPL-SCNC: 99 MMOL/L — SIGNIFICANT CHANGE UP (ref 96–108)
CO2 SERPL-SCNC: 25 MMOL/L — SIGNIFICANT CHANGE UP (ref 22–29)
CREAT SERPL-MCNC: 0.79 MG/DL — SIGNIFICANT CHANGE UP (ref 0.5–1.3)
EGFR: 92 ML/MIN/1.73M2 — SIGNIFICANT CHANGE UP
ESTIMATED AVERAGE GLUCOSE: 160 MG/DL — HIGH (ref 68–114)
GLUCOSE BLDC GLUCOMTR-MCNC: 145 MG/DL — HIGH (ref 70–99)
GLUCOSE BLDC GLUCOMTR-MCNC: 173 MG/DL — HIGH (ref 70–99)
GLUCOSE BLDC GLUCOMTR-MCNC: 176 MG/DL — HIGH (ref 70–99)
GLUCOSE BLDC GLUCOMTR-MCNC: 176 MG/DL — HIGH (ref 70–99)
GLUCOSE SERPL-MCNC: 156 MG/DL — HIGH (ref 70–99)
IRON SATN MFR SERPL: 10 % — LOW (ref 16–55)
IRON SATN MFR SERPL: 30 UG/DL — LOW (ref 59–158)
IRON SATN MFR SERPL: 30 UG/DL — LOW (ref 59–158)
MAGNESIUM SERPL-MCNC: 2 MG/DL — SIGNIFICANT CHANGE UP (ref 1.6–2.6)
PHOSPHATE SERPL-MCNC: 4.1 MG/DL — SIGNIFICANT CHANGE UP (ref 2.4–4.7)
POTASSIUM SERPL-MCNC: 4.4 MMOL/L — SIGNIFICANT CHANGE UP (ref 3.5–5.3)
POTASSIUM SERPL-SCNC: 4.4 MMOL/L — SIGNIFICANT CHANGE UP (ref 3.5–5.3)
PROT SERPL-MCNC: 6.4 G/DL — LOW (ref 6.6–8.7)
SODIUM SERPL-SCNC: 137 MMOL/L — SIGNIFICANT CHANGE UP (ref 135–145)
TIBC SERPL-MCNC: 296 UG/DL — SIGNIFICANT CHANGE UP (ref 220–430)
TRANSFERRIN SERPL-MCNC: 207 MG/DL — SIGNIFICANT CHANGE UP (ref 180–329)
VIT B12 SERPL-MCNC: 762 PG/ML — SIGNIFICANT CHANGE UP (ref 232–1245)

## 2023-09-26 PROCEDURE — 99231 SBSQ HOSP IP/OBS SF/LOW 25: CPT

## 2023-09-26 PROCEDURE — 74176 CT ABD & PELVIS W/O CONTRAST: CPT | Mod: 26

## 2023-09-26 PROCEDURE — 99233 SBSQ HOSP IP/OBS HIGH 50: CPT

## 2023-09-26 PROCEDURE — 71045 X-RAY EXAM CHEST 1 VIEW: CPT | Mod: 26

## 2023-09-26 PROCEDURE — 70486 CT MAXILLOFACIAL W/O DYE: CPT | Mod: 26

## 2023-09-26 RX ORDER — MIDODRINE HYDROCHLORIDE 2.5 MG/1
5 TABLET ORAL THREE TIMES A DAY
Refills: 0 | Status: DISCONTINUED | OUTPATIENT
Start: 2023-09-26 | End: 2023-10-06

## 2023-09-26 RX ORDER — SENNA PLUS 8.6 MG/1
2 TABLET ORAL AT BEDTIME
Refills: 0 | Status: DISCONTINUED | OUTPATIENT
Start: 2023-09-26 | End: 2023-10-09

## 2023-09-26 RX ORDER — IPRATROPIUM/ALBUTEROL SULFATE 18-103MCG
3 AEROSOL WITH ADAPTER (GRAM) INHALATION EVERY 6 HOURS
Refills: 0 | Status: DISCONTINUED | OUTPATIENT
Start: 2023-09-26 | End: 2023-09-30

## 2023-09-26 RX ORDER — MIDODRINE HYDROCHLORIDE 2.5 MG/1
5 TABLET ORAL ONCE
Refills: 0 | Status: COMPLETED | OUTPATIENT
Start: 2023-09-26 | End: 2023-09-26

## 2023-09-26 RX ORDER — DIAZEPAM 5 MG
2 TABLET ORAL THREE TIMES A DAY
Refills: 0 | Status: DISCONTINUED | OUTPATIENT
Start: 2023-09-26 | End: 2023-10-03

## 2023-09-26 RX ORDER — METOPROLOL TARTRATE 50 MG
12.5 TABLET ORAL
Refills: 0 | Status: DISCONTINUED | OUTPATIENT
Start: 2023-09-27 | End: 2023-10-06

## 2023-09-26 RX ORDER — FUROSEMIDE 40 MG
40 TABLET ORAL DAILY
Refills: 0 | Status: DISCONTINUED | OUTPATIENT
Start: 2023-09-27 | End: 2023-09-27

## 2023-09-26 RX ORDER — DIAZEPAM 5 MG
2 TABLET ORAL ONCE
Refills: 0 | Status: DISCONTINUED | OUTPATIENT
Start: 2023-09-26 | End: 2023-09-26

## 2023-09-26 RX ORDER — ACETAMINOPHEN 500 MG
650 TABLET ORAL ONCE
Refills: 0 | Status: COMPLETED | OUTPATIENT
Start: 2023-09-26 | End: 2023-09-26

## 2023-09-26 RX ORDER — FUROSEMIDE 40 MG
40 TABLET ORAL DAILY
Refills: 0 | Status: DISCONTINUED | OUTPATIENT
Start: 2023-09-26 | End: 2023-09-26

## 2023-09-26 RX ADMIN — Medication 3 MILLILITER(S): at 20:42

## 2023-09-26 RX ADMIN — MIDODRINE HYDROCHLORIDE 5 MILLIGRAM(S): 2.5 TABLET ORAL at 09:15

## 2023-09-26 RX ADMIN — LACTULOSE 20 GRAM(S): 10 SOLUTION ORAL at 17:11

## 2023-09-26 RX ADMIN — MIDODRINE HYDROCHLORIDE 5 MILLIGRAM(S): 2.5 TABLET ORAL at 11:45

## 2023-09-26 RX ADMIN — INSULIN GLARGINE 10 UNIT(S): 100 INJECTION, SOLUTION SUBCUTANEOUS at 22:06

## 2023-09-26 RX ADMIN — Medication 3 MILLILITER(S): at 15:43

## 2023-09-26 RX ADMIN — MIDODRINE HYDROCHLORIDE 5 MILLIGRAM(S): 2.5 TABLET ORAL at 17:09

## 2023-09-26 RX ADMIN — Medication 1 GRAM(S): at 17:09

## 2023-09-26 RX ADMIN — Medication 650 MILLIGRAM(S): at 23:16

## 2023-09-26 RX ADMIN — LOSARTAN POTASSIUM 25 MILLIGRAM(S): 100 TABLET, FILM COATED ORAL at 06:17

## 2023-09-26 RX ADMIN — Medication 650 MILLIGRAM(S): at 22:06

## 2023-09-26 RX ADMIN — Medication 2: at 08:23

## 2023-09-26 RX ADMIN — Medication 50 MILLIGRAM(S): at 06:16

## 2023-09-26 RX ADMIN — Medication 1 GRAM(S): at 06:16

## 2023-09-26 RX ADMIN — Medication 2 MILLIGRAM(S): at 09:19

## 2023-09-26 RX ADMIN — Medication 40 MILLIGRAM(S): at 06:17

## 2023-09-26 RX ADMIN — LACTULOSE 20 GRAM(S): 10 SOLUTION ORAL at 08:23

## 2023-09-26 RX ADMIN — ATORVASTATIN CALCIUM 10 MILLIGRAM(S): 80 TABLET, FILM COATED ORAL at 22:00

## 2023-09-26 RX ADMIN — Medication 2 MILLIGRAM(S): at 06:16

## 2023-09-26 RX ADMIN — APIXABAN 5 MILLIGRAM(S): 2.5 TABLET, FILM COATED ORAL at 06:17

## 2023-09-26 RX ADMIN — Medication 81 MILLIGRAM(S): at 11:44

## 2023-09-26 RX ADMIN — Medication 2 MILLIGRAM(S): at 20:27

## 2023-09-26 RX ADMIN — APIXABAN 5 MILLIGRAM(S): 2.5 TABLET, FILM COATED ORAL at 17:09

## 2023-09-26 RX ADMIN — FINASTERIDE 5 MILLIGRAM(S): 5 TABLET, FILM COATED ORAL at 11:44

## 2023-09-26 RX ADMIN — PANTOPRAZOLE SODIUM 40 MILLIGRAM(S): 20 TABLET, DELAYED RELEASE ORAL at 06:17

## 2023-09-26 NOTE — PROGRESS NOTE ADULT - SUBJECTIVE AND OBJECTIVE BOX
Patient is a 77y old  Male who presents with a chief complaint of SOB     pt is seen this am around 8:30 c/o anxiety and feeling dizzy   BP is low had metoprolol, lasix and losartan given this am ,at 6   chart reviewed     no BM , he is also c/o feeling bloated  , SOB       ALLERGIES:  Ceftin (Hives)  penicillin (Anaphylaxis)  Bactrim (Rash)  sulfa drugs (Unknown)    My  dextrose Oral Gel 15 Gram(s) Oral once PRN Blood Glucose LESS THAN 70 milliGRAM(s)/deciliter    Vital Signs Last 24 Hrs  T(C): 36.6 (26 Sep 2023 05:54), Max: 36.7 (25 Sep 2023 12:19)  T(F): 97.8 (26 Sep 2023 05:54), Max: 98.1 (25 Sep 2023 12:19)  HR: 79 (26 Sep 2023 07:48) (79 - 103)  BP: 77/52 (26 Sep 2023 07:48) (77/52 - 118/80)  BP(mean): --  RR: 18 (26 Sep 2023 05:54) (18 - 18)  SpO2: 95% (26 Sep 2023 05:54) (94% - 95%)    Parameters below as of 26 Sep 2023 05:54  Patient On (Oxygen Delivery Method): nasal cannula  O2 Flow (L/min): 2        PHYSICAL EXAM:  General: awake alert , anxious   Lungs: CTA bilateral   Cardio: RRR, S1/S2, No murmur  Abdomen: Soft, Nontender,+ distention ; Bowel sounds present  Extremities: No calf tenderness, + 3 pitting edema   Skin : normal color , warm     LABS:                        12.2   7.75  )-----------( 204      ( 25 Sep 2023 05:45 )             37.7     09-25    138  |  100  |  26.1  ----------------------------<  166  3.8   |  25.0  |  0.67    Ca    8.2      25 Sep 2023 05:45  Mg     1.8     09-25    TPro  5.7  /  Alb  3.2  /  TBili  0.6  /  DBili  x   /  AST  11  /  ALT  27  /  AlkPhos  231  09-25        CAPILLARY BLOOD GLUCOSE      POCT Blood Glucose.: 176 mg/dL (26 Sep 2023 08:07)  POCT Blood Glucose.: 146 mg/dL (25 Sep 2023 21:55)  POCT Blood Glucose.: 168 mg/dL (25 Sep 2023 16:45)  POCT Blood Glucose.: 192 mg/dL (25 Sep 2023 11:55)

## 2023-09-26 NOTE — DIETITIAN INITIAL EVALUATION ADULT - PERTINENT MEDS FT
MEDICATIONS  (STANDING):  albuterol/ipratropium for Nebulization 3 milliLiter(s) Nebulizer every 6 hours  apixaban 5 milliGRAM(s) Oral every 12 hours  aspirin  chewable 81 milliGRAM(s) Oral daily  atorvastatin 10 milliGRAM(s) Oral at bedtime  dextrose 5%. 1000 milliLiter(s) (100 mL/Hr) IV Continuous <Continuous>  dextrose 5%. 1000 milliLiter(s) (50 mL/Hr) IV Continuous <Continuous>  dextrose 50% Injectable 25 Gram(s) IV Push once  dextrose 50% Injectable 12.5 Gram(s) IV Push once  dextrose 50% Injectable 25 Gram(s) IV Push once  doxazosin 2 milliGRAM(s) Oral daily  finasteride 5 milliGRAM(s) Oral daily  glucagon  Injectable 1 milliGRAM(s) IntraMuscular once  influenza  Vaccine (HIGH DOSE) 0.7 milliLiter(s) IntraMuscular once  insulin glargine Injectable (LANTUS) 10 Unit(s) SubCutaneous at bedtime  insulin lispro (ADMELOG) corrective regimen sliding scale   SubCutaneous three times a day before meals  lactulose Syrup 20 Gram(s) Oral two times a day  midodrine. 5 milliGRAM(s) Oral three times a day  pantoprazole    Tablet 40 milliGRAM(s) Oral before breakfast  polyethylene glycol 3350 17 Gram(s) Oral daily  senna 2 Tablet(s) Oral at bedtime  sucralfate 1 Gram(s) Oral two times a day    MEDICATIONS  (PRN):  dextrose Oral Gel 15 Gram(s) Oral once PRN Blood Glucose LESS THAN 70 milliGRAM(s)/deciliter  diazepam    Tablet 2 milliGRAM(s) Oral three times a day PRN anxiety   No

## 2023-09-26 NOTE — PROGRESS NOTE ADULT - SUBJECTIVE AND OBJECTIVE BOX
Significant recent/past 24 hr events:  No acute events reported overnight.     HPI:    SUBJECTIVE:  Patient seen and examined. Pt currently lying in bed / OOB to chair in NAD. Denies fevers, chills, HA, dizziness, CP, SOB, abd pain, N/V/D, numbness/tingling in extremities, or any other acute complaints.     +Tolerating diet  +Passing BMs since surgery   +Passing gas    Past Medical History:  Anxiety    Hypertension    Hyperlipidemia    Prostate disease    Gastroesophageal reflux disease    Gallbladder stone without cholecystitis or obstruction    BPH (benign prostatic hyperplasia)    DM (diabetes mellitus)    DVT (deep venous thrombosis)    Afib    Anxiety    HLD (hyperlipidemia)        Past Surgical History:  No significant past surgical history    Malignant schwannoma    H/O arthroscopy of knee    S/P laminectomy    S/P coronary artery stent placement        DAILY REVIEW:  Vitals   ICU Vital Signs Last 24 Hrs  T(C): 36.7 (26 Sep 2023 11:21), Max: 36.7 (25 Sep 2023 16:33)  T(F): 98.1 (26 Sep 2023 11:21), Max: 98.1 (26 Sep 2023 11:21)  HR: 76 (26 Sep 2023 11:21) (76 - 103)  BP: 75/50 (26 Sep 2023 11:21) (75/50 - 118/80)  BP(mean): --  ABP: --  ABP(mean): --  RR: 18 (26 Sep 2023 11:21) (18 - 18)  SpO2: 96% (26 Sep 2023 11:21) (94% - 96%)    O2 Parameters below as of 26 Sep 2023 05:54  Patient On (Oxygen Delivery Method): nasal cannula  O2 Flow (L/min): 2          VENT SETTINGS         I&O's Detail    25 Sep 2023 07:01  -  26 Sep 2023 07:00  --------------------------------------------------------  IN:    Oral Fluid: 240 mL  Total IN: 240 mL    OUT:    Voided (mL): 350 mL  Total OUT: 350 mL    Total NET: -110 mL          Daily Height in cm: 187.96 (25 Sep 2023 13:12)    Daily Weight in k (26 Sep 2023 07:48)  Admit Wt: Drug Dosing Weight  Height (cm): 188 (25 Sep 2023 13:12)  Weight (kg): 127.5 (25 Sep 2023 13:12)  BMI (kg/m2): 36.1 (25 Sep 2023 13:12)  BSA (m2): 2.51 (25 Sep 2023 13:12)    LABS:                        12.2   7.75  )-----------( 204      ( 25 Sep 2023 05:45 )             37.7         138  |  100  |  26.1<H>  ----------------------------<  166<H>  3.8   |  25.0  |  0.67    Ca    8.2<L>      25 Sep 2023 05:45  Mg     1.8         TPro  5.7<L>  /  Alb  3.2<L>  /  TBili  0.6  /  DBili  x   /  AST  11  /  ALT  27  /  AlkPhos  231<H>      LIVER FUNCTIONS - ( 25 Sep 2023 05:45 )  Alb: 3.2 g/dL / Pro: 5.7 g/dL / ALK PHOS: 231 U/L / ALT: 27 U/L / AST: 11 U/L / GGT: x                   Urinalysis Basic - ( 25 Sep 2023 05:45 )    Color: x / Appearance: x / SG: x / pH: x  Gluc: 166 mg/dL / Ketone: x  / Bili: x / Urobili: x   Blood: x / Protein: x / Nitrite: x   Leuk Esterase: x / RBC: x / WBC x   Sq Epi: x / Non Sq Epi: x / Bacteria: x      POCT Blood Glucose.: 173 mg/dL (23 @ 11:38)  POCT Blood Glucose.: 176 mg/dL (23 @ 08:07)  POCT Blood Glucose.: 146 mg/dL (23 @ 21:55)  POCT Blood Glucose.: 168 mg/dL (23 @ 16:45)          MEDICATIONS  (STANDING):  albuterol/ipratropium for Nebulization 3 milliLiter(s) Nebulizer every 6 hours  apixaban 5 milliGRAM(s) Oral every 12 hours  aspirin  chewable 81 milliGRAM(s) Oral daily  atorvastatin 10 milliGRAM(s) Oral at bedtime  dextrose 5%. 1000 milliLiter(s) (50 mL/Hr) IV Continuous <Continuous>  dextrose 5%. 1000 milliLiter(s) (100 mL/Hr) IV Continuous <Continuous>  dextrose 50% Injectable 25 Gram(s) IV Push once  dextrose 50% Injectable 12.5 Gram(s) IV Push once  dextrose 50% Injectable 25 Gram(s) IV Push once  doxazosin 2 milliGRAM(s) Oral daily  finasteride 5 milliGRAM(s) Oral daily  glucagon  Injectable 1 milliGRAM(s) IntraMuscular once  influenza  Vaccine (HIGH DOSE) 0.7 milliLiter(s) IntraMuscular once  insulin glargine Injectable (LANTUS) 10 Unit(s) SubCutaneous at bedtime  insulin lispro (ADMELOG) corrective regimen sliding scale   SubCutaneous three times a day before meals  lactulose Syrup 20 Gram(s) Oral two times a day  midodrine. 5 milliGRAM(s) Oral three times a day  pantoprazole    Tablet 40 milliGRAM(s) Oral before breakfast  polyethylene glycol 3350 17 Gram(s) Oral daily  sucralfate 1 Gram(s) Oral two times a day    MEDICATIONS  (PRN):  ALPRAZolam 0.25 milliGRAM(s) Oral three times a day PRN Anxiety  dextrose Oral Gel 15 Gram(s) Oral once PRN Blood Glucose LESS THAN 70 milliGRAM(s)/deciliter      Allergies:  Ceftin (Hives)  penicillin (Anaphylaxis)  Bactrim (Rash)  sulfa drugs (Unknown)      DIAGNOSTICS:    PHYSICAL EXAM:  Constitutional:  NAD  Neck: supple, trachea midline. No JVD   Respiratory: Breath sounds diminished b/l lower fields to auscultation, no accessory muscle use noted. No wheezing, rales, or rhonchi noted b/l   Cardiovascular: Regular rate, regular rhythm, normal S1, S2; no murmurs or rub   Gastrointestinal: Soft, non-tender, non distended, hypoactive bowel sounds   Extremities: GUERRA x 4, trace LE peripheral edema, no cyanosis, no clubbing    Vascular: Equal and normal pulses: 2+ peripheral pulses throughout  Neurological: A+O x 3; speech clear and intact; no gross sensory/motor deficits  Psychiatric: calm, normal mood, normal affect   Skin: warm, dry, well perfused, no rashes   Tubes/Lines: Mediastinal & Lt pleural CTs to sxn, serosang drainage, no obvious air leak noted; RIJ Cordis, LR yesika  Incision: Sternum with Mepilex dressing CDI - No audible/palpable click; R EVH without bleeding/discharge - wrapped with ACE bandage / Mepilex dressing CDI  Epicardial Wires: 1V1G connected to generator     SUBJECTIVE:  Patient seen and examined. Pt currently lying in bed / OOB to chair in NAD. Denies fevers, chills, HA, dizziness, CP, SOB, abd pain, N/V/D, numbness/tingling in extremities, or any other acute complaints.     Past Medical History:  Anxiety    Hypertension    Hyperlipidemia    Prostate disease    Gastroesophageal reflux disease    Gallbladder stone without cholecystitis or obstruction    BPH (benign prostatic hyperplasia)    DM (diabetes mellitus)    DVT (deep venous thrombosis)    Afib    Anxiety    HLD (hyperlipidemia)      Past Surgical History:  No significant past surgical history    Malignant schwannoma    H/O arthroscopy of knee    S/P laminectomy    S/P coronary artery stent placement      DAILY REVIEW:  Vitals   ICU Vital Signs Last 24 Hrs  T(C): 36.7 (26 Sep 2023 11:21), Max: 36.7 (25 Sep 2023 16:33)  T(F): 98.1 (26 Sep 2023 11:21), Max: 98.1 (26 Sep 2023 11:21)  HR: 76 (26 Sep 2023 11:21) (76 - 103)  BP: 75/50 (26 Sep 2023 11:21) (75/50 - 118/80)  BP(mean): --  ABP: --  ABP(mean): --  RR: 18 (26 Sep 2023 11:21) (18 - 18)  SpO2: 96% (26 Sep 2023 11:21) (94% - 96%)    O2 Parameters below as of 26 Sep 2023 05:54  Patient On (Oxygen Delivery Method): nasal cannula  O2 Flow (L/min): 2      I&O's Detail    25 Sep 2023 07:01  -  26 Sep 2023 07:00  --------------------------------------------------------  IN:    Oral Fluid: 240 mL  Total IN: 240 mL    OUT:    Voided (mL): 350 mL  Total OUT: 350 mL    Total NET: -110 mL      Daily Height in cm: 187.96 (25 Sep 2023 13:12)    Daily Weight in k (26 Sep 2023 07:48)  Admit Wt: Drug Dosing Weight  Height (cm): 188 (25 Sep 2023 13:12)  Weight (kg): 127.5 (25 Sep 2023 13:12)  BMI (kg/m2): 36.1 (25 Sep 2023 13:12)  BSA (m2): 2.51 (25 Sep 2023 13:12)    LABS:                        12.2   7.75  )-----------( 204      ( 25 Sep 2023 05:45 )             37.7         138  |  100  |  26.1<H>  ----------------------------<  166<H>  3.8   |  25.0  |  0.67    Ca    8.2<L>      25 Sep 2023 05:45  Mg     1.8         TPro  5.7<L>  /  Alb  3.2<L>  /  TBili  0.6  /  DBili  x   /  AST  11  /  ALT  27  /  AlkPhos  231<H>      LIVER FUNCTIONS - ( 25 Sep 2023 05:45 )  Alb: 3.2 g/dL / Pro: 5.7 g/dL / ALK PHOS: 231 U/L / ALT: 27 U/L / AST: 11 U/L / GGT: x             Urinalysis Basic - ( 25 Sep 2023 05:45 )    Color: x / Appearance: x / SG: x / pH: x  Gluc: 166 mg/dL / Ketone: x  / Bili: x / Urobili: x   Blood: x / Protein: x / Nitrite: x   Leuk Esterase: x / RBC: x / WBC x   Sq Epi: x / Non Sq Epi: x / Bacteria: x      POCT Blood Glucose.: 173 mg/dL (23 @ 11:38)  POCT Blood Glucose.: 176 mg/dL (23 @ 08:07)  POCT Blood Glucose.: 146 mg/dL (23 @ 21:55)  POCT Blood Glucose.: 168 mg/dL (23 @ 16:45)          MEDICATIONS  (STANDING):  albuterol/ipratropium for Nebulization 3 milliLiter(s) Nebulizer every 6 hours  apixaban 5 milliGRAM(s) Oral every 12 hours  aspirin  chewable 81 milliGRAM(s) Oral daily  atorvastatin 10 milliGRAM(s) Oral at bedtime  dextrose 5%. 1000 milliLiter(s) (50 mL/Hr) IV Continuous <Continuous>  dextrose 5%. 1000 milliLiter(s) (100 mL/Hr) IV Continuous <Continuous>  dextrose 50% Injectable 25 Gram(s) IV Push once  dextrose 50% Injectable 12.5 Gram(s) IV Push once  dextrose 50% Injectable 25 Gram(s) IV Push once  doxazosin 2 milliGRAM(s) Oral daily  finasteride 5 milliGRAM(s) Oral daily  glucagon  Injectable 1 milliGRAM(s) IntraMuscular once  influenza  Vaccine (HIGH DOSE) 0.7 milliLiter(s) IntraMuscular once  insulin glargine Injectable (LANTUS) 10 Unit(s) SubCutaneous at bedtime  insulin lispro (ADMELOG) corrective regimen sliding scale   SubCutaneous three times a day before meals  lactulose Syrup 20 Gram(s) Oral two times a day  midodrine. 5 milliGRAM(s) Oral three times a day  pantoprazole    Tablet 40 milliGRAM(s) Oral before breakfast  polyethylene glycol 3350 17 Gram(s) Oral daily  sucralfate 1 Gram(s) Oral two times a day    MEDICATIONS  (PRN):  ALPRAZolam 0.25 milliGRAM(s) Oral three times a day PRN Anxiety  dextrose Oral Gel 15 Gram(s) Oral once PRN Blood Glucose LESS THAN 70 milliGRAM(s)/deciliter      Allergies:  Ceftin (Hives)  penicillin (Anaphylaxis)  Bactrim (Rash)  sulfa drugs (Unknown)        PHYSICAL EXAM:  Constitutional:  NAD  Neck: supple, trachea midline. No JVD   Respiratory: Breath sounds diminished b/l lower fields to auscultation, no accessory muscle use noted. No wheezing, rales, or rhonchi noted b/l   Cardiovascular: Regular rate, regular rhythm, normal S1, S2; no murmurs or rub   Gastrointestinal: Soft, non-tender, non distended, hypoactive bowel sounds   Extremities: GUERRA x 4, trace LE peripheral edema, no cyanosis, no clubbing    Vascular: Equal and normal pulses: 2+ peripheral pulses throughout  Neurological: A+O x 3; speech clear and intact; no gross sensory/motor deficits  Psychiatric: calm, normal mood, normal affect   Skin: warm, dry, well perfused, no rashes      Events past 24 hours: Patient hypotensive this morning, given midodrine, BP now improved.     SUBJECTIVE:  Patient seen and examined for TAVR evaluation. Pt currently sitting in bed in NAD, on 3L NC. Pt states he has intermittent shortness of breath. He reports dizziness this morning with his associated hypotension, which has now resolved. Pt states "he doesn't have anything wrong with his teeth and doesn't need to see dental". Denies fevers, chills, HA, dizziness, CP, abd pain, N/V/D, numbness/tingling in extremities, or any other acute complaints.     Past Medical History:  Anxiety    Hypertension    Hyperlipidemia    Prostate disease    Gastroesophageal reflux disease    Gallbladder stone without cholecystitis or obstruction    BPH (benign prostatic hyperplasia)    DM (diabetes mellitus)    DVT (deep venous thrombosis)    Afib    Anxiety    HLD (hyperlipidemia)      Past Surgical History:  No significant past surgical history    Malignant schwannoma    H/O arthroscopy of knee    S/P laminectomy    S/P coronary artery stent placement      DAILY REVIEW:  Vitals   ICU Vital Signs Last 24 Hrs  T(C): 36.7 (26 Sep 2023 11:21), Max: 36.7 (25 Sep 2023 16:33)  T(F): 98.1 (26 Sep 2023 11:21), Max: 98.1 (26 Sep 2023 11:21)  HR: 76 (26 Sep 2023 11:21) (76 - 103)  BP: 75/50 (26 Sep 2023 11:21) (75/50 - 118/80)  BP(mean): --  ABP: --  ABP(mean): --  RR: 18 (26 Sep 2023 11:21) (18 - 18)  SpO2: 96% (26 Sep 2023 11:21) (94% - 96%)    O2 Parameters below as of 26 Sep 2023 05:54  Patient On (Oxygen Delivery Method): nasal cannula  O2 Flow (L/min): 2      I&O's Detail    25 Sep 2023 07:01  -  26 Sep 2023 07:00  --------------------------------------------------------  IN:    Oral Fluid: 240 mL  Total IN: 240 mL    OUT:    Voided (mL): 350 mL  Total OUT: 350 mL    Total NET: -110 mL      Daily Height in cm: 187.96 (25 Sep 2023 13:12)    Daily Weight in k (26 Sep 2023 07:48)  Admit Wt: Drug Dosing Weight  Height (cm): 188 (25 Sep 2023 13:12)  Weight (kg): 127.5 (25 Sep 2023 13:12)  BMI (kg/m2): 36.1 (25 Sep 2023 13:12)  BSA (m2): 2.51 (25 Sep 2023 13:12)    LABS:                        12.2   7.75  )-----------( 204      ( 25 Sep 2023 05:45 )             37.7         138  |  100  |  26.1<H>  ----------------------------<  166<H>  3.8   |  25.0  |  0.67    Ca    8.2<L>      25 Sep 2023 05:45  Mg     1.8         TPro  5.7<L>  /  Alb  3.2<L>  /  TBili  0.6  /  DBili  x   /  AST  11  /  ALT  27  /  AlkPhos  231<H>      LIVER FUNCTIONS - ( 25 Sep 2023 05:45 )  Alb: 3.2 g/dL / Pro: 5.7 g/dL / ALK PHOS: 231 U/L / ALT: 27 U/L / AST: 11 U/L / GGT: x             Urinalysis Basic - ( 25 Sep 2023 05:45 )    Color: x / Appearance: x / SG: x / pH: x  Gluc: 166 mg/dL / Ketone: x  / Bili: x / Urobili: x   Blood: x / Protein: x / Nitrite: x   Leuk Esterase: x / RBC: x / WBC x   Sq Epi: x / Non Sq Epi: x / Bacteria: x      POCT Blood Glucose.: 173 mg/dL (23 @ 11:38)  POCT Blood Glucose.: 176 mg/dL (23 @ 08:07)  POCT Blood Glucose.: 146 mg/dL (23 @ 21:55)  POCT Blood Glucose.: 168 mg/dL (23 @ 16:45)          MEDICATIONS  (STANDING):  albuterol/ipratropium for Nebulization 3 milliLiter(s) Nebulizer every 6 hours  apixaban 5 milliGRAM(s) Oral every 12 hours  aspirin  chewable 81 milliGRAM(s) Oral daily  atorvastatin 10 milliGRAM(s) Oral at bedtime  dextrose 5%. 1000 milliLiter(s) (50 mL/Hr) IV Continuous <Continuous>  dextrose 5%. 1000 milliLiter(s) (100 mL/Hr) IV Continuous <Continuous>  dextrose 50% Injectable 25 Gram(s) IV Push once  dextrose 50% Injectable 12.5 Gram(s) IV Push once  dextrose 50% Injectable 25 Gram(s) IV Push once  doxazosin 2 milliGRAM(s) Oral daily  finasteride 5 milliGRAM(s) Oral daily  glucagon  Injectable 1 milliGRAM(s) IntraMuscular once  influenza  Vaccine (HIGH DOSE) 0.7 milliLiter(s) IntraMuscular once  insulin glargine Injectable (LANTUS) 10 Unit(s) SubCutaneous at bedtime  insulin lispro (ADMELOG) corrective regimen sliding scale   SubCutaneous three times a day before meals  lactulose Syrup 20 Gram(s) Oral two times a day  midodrine. 5 milliGRAM(s) Oral three times a day  pantoprazole    Tablet 40 milliGRAM(s) Oral before breakfast  polyethylene glycol 3350 17 Gram(s) Oral daily  sucralfate 1 Gram(s) Oral two times a day    MEDICATIONS  (PRN):  ALPRAZolam 0.25 milliGRAM(s) Oral three times a day PRN Anxiety  dextrose Oral Gel 15 Gram(s) Oral once PRN Blood Glucose LESS THAN 70 milliGRAM(s)/deciliter      Allergies:  Ceftin (Hives)  penicillin (Anaphylaxis)  Bactrim (Rash)  sulfa drugs (Unknown)        PHYSICAL EXAM:  Constitutional:  NAD, alert, awake   Respiratory: CTA bilaterally, no accessory muscle use noted, on 3L NC  Cardiovascular: RRR, normal S1, S2  Gastrointestinal: Soft, non-tender, non distended, + bowel sounds   Extremities: GUERRA x 4, 2+ LE pitting edema, no cyanosis, + pulses bilaterally   Neurological: A+O x 3; speech clear and intact; no gross sensory/motor deficits  Psychiatric: calm, normal mood, normal affect   Skin: warm, dry, well perfused, no rashes

## 2023-09-26 NOTE — DIETITIAN INITIAL EVALUATION ADULT - PERTINENT LABORATORY DATA
09-25    138  |  100  |  26.1<H>  ----------------------------<  166<H>  3.8   |  25.0  |  0.67    Ca    8.2<L>      25 Sep 2023 05:45  Mg     1.8     09-25    TPro  5.7<L>  /  Alb  3.2<L>  /  TBili  0.6  /  DBili  x   /  AST  11  /  ALT  27  /  AlkPhos  231<H>  09-25  POCT Blood Glucose.: 173 mg/dL (09-26-23 @ 11:38)  A1C with Estimated Average Glucose Result: 7.2 % (09-25-23 @ 05:45)  A1C with Estimated Average Glucose Result: 6.5 % (06-13-23 @ 04:57)  A1C with Estimated Average Glucose Result: 9.8 % (01-27-23 @ 03:10)

## 2023-09-26 NOTE — DIETITIAN INITIAL EVALUATION ADULT - NS FNS DIET ORDER
Diet, DASH/TLC:   Sodium & Cholesterol Restricted  Consistent Carbohydrate {Evening Snack} (CSTCHOSN)  1500mL Fluid Restriction ([id number]) (09-26-23 @ 14:04)  Diet, NPO after Midnight:      NPO Start Date: 26-Sep-2023,   NPO Start Time: 23:59 (09-26-23 @ 14:01)

## 2023-09-26 NOTE — PROGRESS NOTE ADULT - ASSESSMENT
78 y/o male with PMH of afib on Eliquis (s/p Cardioversion abd ablation  1/31/23), Moderate AS, Chronic Diastolic Heart Failure, CAD s/p PCI, HTN, HLD, DM 2, Morbid Obesity, ALLIE, Thoracic Aortic Aneurysm, BPH s/p TURP, Left Renal Mass Presents with shortness of breath for last few days , more worse since last night , also noted with worsening lower ext swelling . moved out of Assisted living recently to live with his son basement apartment.  Patient does not eat at home and usually eat food from [employer] as he has no one to cook for him.  also felt some palpitations last night but now feels better, given lasix iv in ed.   has home o2 , uses it prn.       1- sob due to CHF with decreased LV function   s/p IV lasix   will hold due to hypotension   midodrine given 5 mg this am   DC losartan   decrease metoprolol dose to 12.5 mg start in am if BP improves   d/w Dr Huynh   TTE limited study   h/o AS per chart in June admission   daily weight     2- Severe Hypotension   despite midodrine low   will upgrade to setp down , BP every 2 hours   add middorine 5 mg tid and give extra dose now   hold lasix and BP cardica meds today ( had am doses )   if BP remains low will consult ICU   cardiology follow up     3-h/o Atrial fib, cronic   rate stable   cont eliquis   metoprolol bid if BP improves   cont tele     4- Abdominal  bloating and constipation   KUB ordered , non specific   CT of bad r/o ileus ,  NPO till Ct result obtained   cont bowel regimen     5- DM 2  - hold metformin   - basal insulin , ssi   Bg is controlled     6- anxiety   xana is not helping will order valium     7- Morbid Obesity, ALLIE,    8- hx of Thoracic Aortic Aneurysm  - monitor as op     9-BPH s/p TURP,  - cw home meds     10- Hypothyroid   will cont levothyroxine   TSH normal range     11- Severe protein elisabeth malnutrition   resume diet once CT scan resulted if no ileus     > dvt ppx : on eliquis

## 2023-09-26 NOTE — DISCHARGE NOTE NURSING/CASE MANAGEMENT/SOCIAL WORK - PATIENT PORTAL LINK FT
You can access the FollowMyHealth Patient Portal offered by [facility] by registering at the following website: [email or web address]. By joining [facility]’s FollowMyHealth portal, you will also be able to view your health information using other applications (apps) compatible with our system.

## 2023-09-26 NOTE — PHYSICAL THERAPY INITIAL EVALUATION ADULT - ADDITIONAL COMMENTS
Pt. states he [place] on the  ground [place]  ramp to enter.  Uses a rollator and has 2 canes, wheelchair, shower chair and raised toilet seat.

## 2023-09-26 NOTE — PROGRESS NOTE ADULT - PROBLEM SELECTOR PLAN 1
Severe AS on last admission. Now admitted with acute on chronic diastolic heart failure.  TAVR CTA completed in June on last admission.  Pt was lost to follow up in the office.  Pt was supposed to see dental but has not.  Repeat TTE pending.  Will discuss with Dr. Napier. Severe AS on last admission. Now admitted with acute on chronic diastolic heart failure.  TAVR CTA completed in June on last admission.  Pt was lost to follow up in the office.  Pt was supposed to see dental but has not. Pt will need to be seen by dental prior to TAVR. CT max/facial ordered.   Repeat TTE pending.  Plan discussed with Dr. Napier. Severe AS on last admission. Now admitted with acute on chronic diastolic heart failure.  TAVR CTA completed in June on last admission.  Pt was lost to follow up in the office.  Pt was supposed to see dental but has not. CT max/facial ordered. Pt may need to be seen by dental prior to TAVR procedure pending CT results.   Repeat TTE pending.  Plan discussed with Dr. Napier.

## 2023-09-26 NOTE — DIETITIAN INITIAL EVALUATION ADULT - OTHER INFO
78 y/o male with PMH of afib on Eliquis (s/p Cardioversion abd ablation  1/31/23), Moderate AS, Chronic Diastolic Heart Failure, CAD s/p PCI, HTN, HLD, DM 2, Morbid Obesity, ALLIE, Thoracic Aortic Aneurysm, BPH s/p TURP, Left Renal Mass Presents with shortness of breath for last few days , more worse since last night , also noted with worsening lower ext swelling . moved out of Assisted living recently to live with his son basement apartment.  Patient does not eat at home and usually eat food from [employer] as he has no one to cook for him.  also felt some palpitations last night but now feels better, given lasix iv in ed. Has home o2, uses it prn.

## 2023-09-26 NOTE — DISCHARGE NOTE NURSING/CASE MANAGEMENT/SOCIAL WORK - NSDCFUADDAPPT_GEN_ALL_CORE_FT
STAR pt-see DC tab    No meds to bed from [facility], no pharmacy review needed   [facility] referral  Will get necessary appts once ready  Yellow STAR folder to pt PCP- Appointment made with  Dr. Richardson,  at   [facility]  on  10/19  at 10:00 If you are unable to attend your pre-scheduled appointment, please contact the office directly at [phone] to reschedule.    Cardio- Appointment made with Dr. Mckinney  [facility] [place] at 10/20  on  9:30. If you are unable to attend your pre-scheduled appointment, please contact the office directly at [phone]  to reschedule.         ***[facility]***    1. PCP: Appointment scheduled for 10/19/2023 at 10:00am with BIJAL Foley, located at [address] [place] [address]. If you are unable to attend your pre-scheduled appointment, please contact the office directly at [phone] to reschedule.    2. CARDIOLOGY: Appointment scheduled for 10/20/2023 at 9:30am with Dr. Mckinney, [facility], [address] [place] [address]. If you are unable to attend your pre-scheduled appointment, please contact the office directly at [phone]  to reschedule.    3. AGREEABLE TO [facility] HOME (home care).    4. AGREEABLE TO [facility] PRIOR TO DISCHARGE.    4. YELLOW [facility] Resource Folder provided.

## 2023-09-26 NOTE — PROGRESS NOTE ADULT - SUBJECTIVE AND OBJECTIVE BOX
ANIA CRISTOBAL  [id number]      Chief Complaint:  Follow up of CHF, AS, CAD,     Interval History:  became hypotensive in the AM, much better currently, given midodrine    Tele: unremarkable        albuterol/ipratropium for Nebulization 3 milliLiter(s) Nebulizer every 6 hours  apixaban 5 milliGRAM(s) Oral every 12 hours  aspirin  chewable 81 milliGRAM(s) Oral daily  atorvastatin 10 milliGRAM(s) Oral at bedtime  dextrose 5%. 1000 milliLiter(s) IV Continuous <Continuous>  dextrose 5%. 1000 milliLiter(s) IV Continuous <Continuous>  dextrose 50% Injectable 25 Gram(s) IV Push once  dextrose 50% Injectable 12.5 Gram(s) IV Push once  dextrose 50% Injectable 25 Gram(s) IV Push once  dextrose Oral Gel 15 Gram(s) Oral once PRN  diazepam    Tablet 2 milliGRAM(s) Oral three times a day PRN  doxazosin 2 milliGRAM(s) Oral daily  finasteride 5 milliGRAM(s) Oral daily  glucagon  Injectable 1 milliGRAM(s) IntraMuscular once  influenza  Vaccine (HIGH DOSE) 0.7 milliLiter(s) IntraMuscular once  insulin glargine Injectable (LANTUS) 10 Unit(s) SubCutaneous at bedtime  insulin lispro (ADMELOG) corrective regimen sliding scale   SubCutaneous three times a day before meals  lactulose Syrup 20 Gram(s) Oral two times a day  midodrine. 5 milliGRAM(s) Oral three times a day  pantoprazole    Tablet 40 milliGRAM(s) Oral before breakfast  polyethylene glycol 3350 17 Gram(s) Oral daily  sucralfate 1 Gram(s) Oral two times a day          Physical Exam:  T(C): 36.7 (09-26-23 @ 11:21), Max: 36.7 (09-25-23 @ 16:33)  HR: 83 (09-26-23 @ 13:05) (76 - 103)  BP: 108/69 (09-26-23 @ 13:05) (75/50 - 118/80)  RR: 18 (09-26-23 @ 11:21) (18 - 18)  SpO2: 96% (09-26-23 @ 11:21) (94% - 96%)  Wt(kg): --  General: Comfortable in NAD  Neck: No JVD  CVS: nl s1s2, no s3  Pulm: CTA b/l  Abd: soft, non-tender  Ext: trace edema  Neuro A&O x3  Psych: Normal affect      Labs:   25 Sep 2023 05:45    138    |  100    |  26.1   ----------------------------<  166    3.8     |  25.0   |  0.67     Ca    8.2        25 Sep 2023 05:45  Mg     1.8       25 Sep 2023 05:45    TPro  5.7    /  Alb  3.2    /  TBili  0.6    /  DBili  x      /  AST  11     /  ALT  27     /  AlkPhos  231    25 Sep 2023 05:45                          12.2   7.75  )-----------( 204      ( 25 Sep 2023 05:45 )             37.7       CARDIAC MARKERS ( 25 Sep 2023 05:45 )  x     / 0.01 ng/mL / x     / x     / x      CARDIAC MARKERS ( 24 Sep 2023 20:15 )  x     / 0.02 ng/mL / x     / x     / x        ECHO:  Echo 6/20/23  Summary:   1. LV Ejection Fraction by Oleary's Method with a biplane EF of 50 %.   2. Normal global left ventricular systolic function.   3. Trace mitral valve regurgitation.   4. Mild aortic regurgitation.   5. Endocardial visualization was enhanced with intravenous echo contrast.   6. Compared to TTE dated 6/11/23 the LVEF has improved.    STRESS TEST:    CATHETERIZATION:  [facility] 1/2023:  Coronary Angiography   - Left dominant system   - Patent prox and mid LAD stents   - Moderate stenosis of the distal LCx   - The RCA is a small, non-dominat vessel.  The proximal segment is occluded with brisk right-to-right collaterals.  - 25mm LV-Ao gradient on pullback.  CIELO 1.4cm2 with moderate AS byecho.  - Moderately reduced LVEF by echo       LM   Left main artery: Angiography shows minor irregularities.    LAD   Proximal left anterior descending: There is a 10 % stenosis within the  stented segment. Mid left anterior descending: There is a10 % stenosis within the stented segment.    CX   Distal circumflex: There is a 60 % stenosis. VALENTINO Flow 3.    RCA   Proximal right coronary artery: There is a 100 % stenosis. There is  good collateral blood supply to the distal myocardium. This  lesion is a chronic total occlusion.  This is a small non-dominant  vessel.  Ramus   Ramus intermedius: Angiography shows mild atherosclerosis.      Assessement   76 y/o male PMHx AF on Eliquis (s/p ablation on 6/2023), moderate-severe AS, Chronic Diastolic Heart Failure, CAD (Patent prox and mid LAD stents, Moderate stenosis of the distal LCx. The pRCA  with brisk right-to-right collaterals)., HTN, HLD, DM 2, Morbid Obesity, ALLIE, Thoracic Aortic Aneurysm, BPH s/p TURP, Left Renal Mass, On last admission patient underwent CardioMeMs placement, Pt now presents from home (recently moved out of assisted living to live in his son's basement apartment), with palpitations and SOB.  He was given IV lasix and admitted for acute on chronic diastolic heart failure. Cardiology consultation requested for evaluation of acute decompensation of heart failure.   Today with low BP, received midodrine, currently BP WNL  On exam patient looks euvolemic   will  proceed with nuclear stress test to assess ischemia on LCX territory which could have contributed to his CHF.     Plan   Stop lasix IV , start torsemide 20mg tomorrow   continue with AC  Cardio MeMs interrogation with elevated PADP  lexiscan tomorrow, NPO after midnight   CT surgery eval appreciated

## 2023-09-26 NOTE — DIETITIAN INITIAL EVALUATION ADULT - ORAL INTAKE PTA/DIET HISTORY
Pt reports good appetite/PO intake in house and PTA, denies following any dietary restrictions.  Brief interview conducted 2/2 interrupted/in with nursing care. Pt reports good appetite/PO intake in house and PTA, denies following any dietary restrictions. Pt frequently orders take-out, admits to some weight gain x few weeks, likely in part 2/2 B/L LE edema. RD bed scale weight 273lbs noted. Written literature with RD contact info provided at bedside regarding heart health nutrition therapy. RD to remain available.

## 2023-09-27 LAB
24R-OH-CALCIDIOL SERPL-MCNC: 23.8 NG/ML — LOW (ref 30–80)
ALBUMIN SERPL ELPH-MCNC: 3.4 G/DL — SIGNIFICANT CHANGE UP (ref 3.3–5.2)
ALP SERPL-CCNC: 347 U/L — HIGH (ref 40–120)
ALT FLD-CCNC: 33 U/L — SIGNIFICANT CHANGE UP
ANION GAP SERPL CALC-SCNC: 12 MMOL/L — SIGNIFICANT CHANGE UP (ref 5–17)
AST SERPL-CCNC: 15 U/L — SIGNIFICANT CHANGE UP
BILIRUB SERPL-MCNC: 0.6 MG/DL — SIGNIFICANT CHANGE UP (ref 0.4–2)
BUN SERPL-MCNC: 33.2 MG/DL — HIGH (ref 8–20)
CALCIUM SERPL-MCNC: 8.5 MG/DL — SIGNIFICANT CHANGE UP (ref 8.4–10.5)
CHLORIDE SERPL-SCNC: 98 MMOL/L — SIGNIFICANT CHANGE UP (ref 96–108)
CO2 SERPL-SCNC: 26 MMOL/L — SIGNIFICANT CHANGE UP (ref 22–29)
CREAT SERPL-MCNC: 0.73 MG/DL — SIGNIFICANT CHANGE UP (ref 0.5–1.3)
EGFR: 94 ML/MIN/1.73M2 — SIGNIFICANT CHANGE UP
GLUCOSE BLDC GLUCOMTR-MCNC: 149 MG/DL — HIGH (ref 70–99)
GLUCOSE BLDC GLUCOMTR-MCNC: 153 MG/DL — HIGH (ref 70–99)
GLUCOSE BLDC GLUCOMTR-MCNC: 169 MG/DL — HIGH (ref 70–99)
GLUCOSE SERPL-MCNC: 151 MG/DL — HIGH (ref 70–99)
HCT VFR BLD CALC: 39.1 % — SIGNIFICANT CHANGE UP (ref 39–50)
HGB BLD-MCNC: 12.4 G/DL — LOW (ref 13–17)
MCHC RBC-ENTMCNC: 28.8 PG — SIGNIFICANT CHANGE UP (ref 27–34)
MCHC RBC-ENTMCNC: 31.7 GM/DL — LOW (ref 32–36)
MCV RBC AUTO: 90.7 FL — SIGNIFICANT CHANGE UP (ref 80–100)
NT-PROBNP SERPL-SCNC: 2847 PG/ML — HIGH (ref 0–300)
PLATELET # BLD AUTO: 226 K/UL — SIGNIFICANT CHANGE UP (ref 150–400)
POTASSIUM SERPL-MCNC: 3.7 MMOL/L — SIGNIFICANT CHANGE UP (ref 3.5–5.3)
POTASSIUM SERPL-SCNC: 3.7 MMOL/L — SIGNIFICANT CHANGE UP (ref 3.5–5.3)
PROT SERPL-MCNC: 6.3 G/DL — LOW (ref 6.6–8.7)
RBC # BLD: 4.31 M/UL — SIGNIFICANT CHANGE UP (ref 4.2–5.8)
RBC # FLD: 15.7 % — HIGH (ref 10.3–14.5)
SODIUM SERPL-SCNC: 135 MMOL/L — SIGNIFICANT CHANGE UP (ref 135–145)
VIT B12 SERPL-MCNC: 814 PG/ML — SIGNIFICANT CHANGE UP (ref 232–1245)
WBC # BLD: 7.54 K/UL — SIGNIFICANT CHANGE UP (ref 3.8–10.5)
WBC # FLD AUTO: 7.54 K/UL — SIGNIFICANT CHANGE UP (ref 3.8–10.5)

## 2023-09-27 PROCEDURE — 78452 HT MUSCLE IMAGE SPECT MULT: CPT | Mod: 26

## 2023-09-27 PROCEDURE — 99232 SBSQ HOSP IP/OBS MODERATE 35: CPT | Mod: FS

## 2023-09-27 PROCEDURE — 99233 SBSQ HOSP IP/OBS HIGH 50: CPT

## 2023-09-27 PROCEDURE — 93018 CV STRESS TEST I&R ONLY: CPT

## 2023-09-27 PROCEDURE — 93016 CV STRESS TEST SUPVJ ONLY: CPT

## 2023-09-27 PROCEDURE — 99232 SBSQ HOSP IP/OBS MODERATE 35: CPT

## 2023-09-27 RX ORDER — ENOXAPARIN SODIUM 100 MG/ML
130 INJECTION SUBCUTANEOUS EVERY 12 HOURS
Refills: 0 | Status: DISCONTINUED | OUTPATIENT
Start: 2023-09-27 | End: 2023-10-04

## 2023-09-27 RX ORDER — ACETAMINOPHEN 500 MG
650 TABLET ORAL ONCE
Refills: 0 | Status: COMPLETED | OUTPATIENT
Start: 2023-09-27 | End: 2023-09-27

## 2023-09-27 RX ADMIN — ENOXAPARIN SODIUM 130 MILLIGRAM(S): 100 INJECTION SUBCUTANEOUS at 17:40

## 2023-09-27 RX ADMIN — Medication 2: at 16:13

## 2023-09-27 RX ADMIN — MIDODRINE HYDROCHLORIDE 5 MILLIGRAM(S): 2.5 TABLET ORAL at 05:32

## 2023-09-27 RX ADMIN — APIXABAN 5 MILLIGRAM(S): 2.5 TABLET, FILM COATED ORAL at 05:32

## 2023-09-27 RX ADMIN — ATORVASTATIN CALCIUM 10 MILLIGRAM(S): 80 TABLET, FILM COATED ORAL at 21:28

## 2023-09-27 RX ADMIN — Medication 650 MILLIGRAM(S): at 23:00

## 2023-09-27 RX ADMIN — Medication 3 MILLILITER(S): at 20:25

## 2023-09-27 RX ADMIN — MIDODRINE HYDROCHLORIDE 5 MILLIGRAM(S): 2.5 TABLET ORAL at 17:32

## 2023-09-27 RX ADMIN — Medication 81 MILLIGRAM(S): at 13:31

## 2023-09-27 RX ADMIN — Medication 650 MILLIGRAM(S): at 22:15

## 2023-09-27 RX ADMIN — Medication 2 MILLIGRAM(S): at 18:48

## 2023-09-27 RX ADMIN — Medication 3 MILLILITER(S): at 15:47

## 2023-09-27 RX ADMIN — MIDODRINE HYDROCHLORIDE 5 MILLIGRAM(S): 2.5 TABLET ORAL at 13:31

## 2023-09-27 RX ADMIN — INSULIN GLARGINE 10 UNIT(S): 100 INJECTION, SOLUTION SUBCUTANEOUS at 21:28

## 2023-09-27 RX ADMIN — Medication 1 GRAM(S): at 17:32

## 2023-09-27 RX ADMIN — Medication 12.5 MILLIGRAM(S): at 17:32

## 2023-09-27 NOTE — PROGRESS NOTE ADULT - SUBJECTIVE AND OBJECTIVE BOX
Subjective:  Sitting up in bed.  NAD noted.                        T(F): 98.5 (09-27-23 @ 13:27), Max: 98.5 (09-26-23 @ 17:20)  HR: 75 (09-27-23 @ 15:47) (75 - 94)  BP: 99/67 (09-27-23 @ 13:27) (91/67 - 116/63)  RR: 18 (09-27-23 @ 13:27) (18 - 18)  SpO2: 94% (09-27-23 @ 15:47) (94% - 99%)    LV EF: 40%    Allergies:  Ceftin (Hives)  penicillin (Anaphylaxis)  Bactrim (Rash)  sulfa drugs (Unknown)      09-27    135  |  98  |  33.2<H>  ----------------------------<  151<H>  3.7   |  26.0  |  0.73    Ca    8.5      27 Sep 2023 05:42  Phos  4.1     09-26  Mg     2.0     09-26    TPro  6.3<L>  /  Alb  3.4  /  TBili  0.6  /  DBili  x   /  AST  15  /  ALT  33  /  AlkPhos  347<H>  09-27                               12.4   7.54  )-----------( 226      ( 27 Sep 2023 05:42 )             39.1               CAPILLARY BLOOD GLUCOSE  POCT Blood Glucose.: 153 mg/dL (27 Sep 2023 16:12)  POCT Blood Glucose.: 149 mg/dL (27 Sep 2023 08:01)  POCT Blood Glucose.: 176 mg/dL (26 Sep 2023 22:05)  POCT Blood Glucose.: 145 mg/dL (26 Sep 2023 17:02)           CXR: < from: Xray Chest 1 View- PORTABLE-Urgent (Xray Chest 1 View- PORTABLE-Urgent .) (09.26.23 @ 11:40) >    IMPRESSION:  1. Prominent cardiac silhouette with mild central pulmonary venous   engorgement, slightly improved from the prior exam. No pulmonary edema.  2. Pressure monitoring device within the left descending pulmonary   artery, unchanged, without pneumothorax.    --- End of Report ---    KESHIA CLEMENTE MD; Attending Radiologist  This document has been electronically signed. Sep 26 2023  5:34PM    < end of copied text >      I&O's Detail    26 Sep 2023 07:01  -  27 Sep 2023 07:00  --------------------------------------------------------  IN:    Oral Fluid: 240 mL  Total IN: 240 mL    OUT:    Voided (mL): 650 mL  Total OUT: 650 mL    Total NET: -410 mL      Active Medications:  albuterol/ipratropium for Nebulization 3 milliLiter(s) Nebulizer every 6 hours  apixaban 5 milliGRAM(s) Oral every 12 hours  aspirin  chewable 81 milliGRAM(s) Oral daily  atorvastatin 10 milliGRAM(s) Oral at bedtime  dextrose 5%. 1000 milliLiter(s) IV Continuous <Continuous>  dextrose 5%. 1000 milliLiter(s) IV Continuous <Continuous>  dextrose 50% Injectable 25 Gram(s) IV Push once  dextrose 50% Injectable 12.5 Gram(s) IV Push once  dextrose 50% Injectable 25 Gram(s) IV Push once  dextrose Oral Gel 15 Gram(s) Oral once PRN  diazepam    Tablet 2 milliGRAM(s) Oral three times a day PRN  doxazosin 2 milliGRAM(s) Oral daily  finasteride 5 milliGRAM(s) Oral daily  glucagon  Injectable 1 milliGRAM(s) IntraMuscular once  influenza  Vaccine (HIGH DOSE) 0.7 milliLiter(s) IntraMuscular once  insulin glargine Injectable (LANTUS) 10 Unit(s) SubCutaneous at bedtime  insulin lispro (ADMELOG) corrective regimen sliding scale   SubCutaneous three times a day before meals  lactulose Syrup 20 Gram(s) Oral two times a day  metoprolol tartrate 12.5 milliGRAM(s) Oral two times a day  midodrine. 5 milliGRAM(s) Oral three times a day  pantoprazole    Tablet 40 milliGRAM(s) Oral before breakfast  polyethylene glycol 3350 17 Gram(s) Oral daily  senna 2 Tablet(s) Oral at bedtime  sucralfate 1 Gram(s) Oral two times a day      Physical Exam:      Respiratory: CTA bilaterally  Cardiovascular: RRR, normal S1, S2  Gastrointestinal: Soft, non-tender, non distended, + bowel sounds   Extremities: GUERRA x 4, 2+ LE pitting edema, no cyanosis, + pulses bilaterally   Neurological: A+O x 3                   PAST MEDICAL & SURGICAL HISTORY:  Anxiety      Hypertension      Hyperlipidemia      Prostate disease      Gastroesophageal reflux disease      Gallbladder stone without cholecystitis or obstruction      BPH (benign prostatic hyperplasia)      DM (diabetes mellitus)      DVT (deep venous thrombosis)  left leg      Anxiety      HLD (hyperlipidemia)      Afib      Malignant schwannoma      H/O arthroscopy of knee      S/P laminectomy      S/P coronary artery stent placement  x2

## 2023-09-27 NOTE — PROGRESS NOTE ADULT - PROBLEM SELECTOR PLAN 1
Severe AS on last admission. Now admitted with acute on chronic diastolic heart failure.  TAVR CTA completed in June on last admission.  Pt was lost to follow up in the office.  Pt was supposed to see dental but has not. CT max/facial ordered. Pt may need to be seen by dental prior to TAVR.   CT scan with periapical lucencies per report.  Patient information sent to [facility] Dental team to review CT scan> will follow up tomorrow with them.  Patient may potentially need to be transferred to [facility] for formal dental eval if they feel it's necessary, which would also mean his Eliquis would need to be held (He could be placed on a heparin gtt).  Pt is agreeable per his conversation with Dr. Napier.   Repeat TTE with Severe low flow low gradient AS.  No need for repeat LIBBY per Dr. Napier.    Plan discussed with Dr. Napier.

## 2023-09-27 NOTE — PROGRESS NOTE ADULT - ASSESSMENT
78 y/o male with PMH of afib on Eliquis (s/p Cardioversion abd ablation  1/31/23), Moderate AS, Chronic Diastolic Heart Failure, CAD s/p PCI, HTN, HLD, DM 2, Morbid Obesity, ALLIE, Thoracic Aortic Aneurysm, BPH s/p TURP, Left Renal Mass Presents with shortness of breath for last few days , more worse since last night , also noted with worsening lower ext swelling . moved out of Assisted living recently to live with his son basement apartment.  Patient does not eat at home and usually eat food from [employer] as he has no one to cook for him.  also felt some palpitations last night but now feels better, given lasix iv in ed.   has home o2 , uses it prn.       1- CHF with decreased LV function   s/p IV lasix   hypotension better remains soft   cont midodrine tid   losartan discontinued   stress test performed result P   d/w Dr Huynh   repeat TTE reviewed ; Ef 40-45 % , severe AS   cont daily weight   fluid restriction       2-h/o Atrial fib, cronic   rate stable   cont eliquis   metoprolol bid if BP improves   cont tele     3- Severe As   seen by CT surgery team   possible TAVR pendng cardic work up and  CT max/facial ordered    4- Abdominal  bloating and constipation   KUB ordered , non specific   CT of bad r/o ileus ,  NPO till Ct result obtained   cont bowel regimen     5- DM 2  - hold metformin   - basal insulin , ssi   Bg is controlled     6- anxiety   xana is not helping will order valium     7- Morbid Obesity, ALLIE,    8- hx of Thoracic Aortic Aneurysm  - monitor as op     9-BPH s/p TURP,  - cw home meds     10- Hypothyroid   will cont levothyroxine   TSH normal range     11- Severe protein elisabeth malnutrition   resume diet once CT scan resulted if no ileus     > dvt ppx : on eliquis

## 2023-09-27 NOTE — PROGRESS NOTE ADULT - SUBJECTIVE AND OBJECTIVE BOX
Patient is a 77y old  Male who presents with a chief complaint of SOB     pt is seen around 1:30 pm   he is sitting in the bedside   no complaints     MEDICATIONS  (STANDING):  albuterol/ipratropium for Nebulization 3 milliLiter(s) Nebulizer every 6 hours  apixaban 5 milliGRAM(s) Oral every 12 hours  aspirin  chewable 81 milliGRAM(s) Oral daily  atorvastatin 10 milliGRAM(s) Oral at bedtime  dextrose 5%. 1000 milliLiter(s) (50 mL/Hr) IV Continuous <Continuous>  dextrose 5%. 1000 milliLiter(s) (100 mL/Hr) IV Continuous <Continuous>  dextrose 50% Injectable 25 Gram(s) IV Push once  dextrose 50% Injectable 12.5 Gram(s) IV Push once  dextrose 50% Injectable 25 Gram(s) IV Push once  doxazosin 2 milliGRAM(s) Oral daily  finasteride 5 milliGRAM(s) Oral daily  glucagon  Injectable 1 milliGRAM(s) IntraMuscular once  influenza  Vaccine (HIGH DOSE) 0.7 milliLiter(s) IntraMuscular once  insulin glargine Injectable (LANTUS) 10 Unit(s) SubCutaneous at bedtime  insulin lispro (ADMELOG) corrective regimen sliding scale   SubCutaneous three times a day before meals  lactulose Syrup 20 Gram(s) Oral two times a day  metoprolol tartrate 12.5 milliGRAM(s) Oral two times a day  midodrine. 5 milliGRAM(s) Oral three times a day  pantoprazole    Tablet 40 milliGRAM(s) Oral before breakfast  polyethylene glycol 3350 17 Gram(s) Oral daily  senna 2 Tablet(s) Oral at bedtime  sucralfate 1 Gram(s) Oral two times a day    Vital Signs Last 24 Hrs  T(C): 36.9 (27 Sep 2023 13:27), Max: 36.9 (26 Sep 2023 17:20)  T(F): 98.5 (27 Sep 2023 13:27), Max: 98.5 (26 Sep 2023 17:20)  HR: 75 (27 Sep 2023 15:47) (75 - 94)  BP: 99/67 (27 Sep 2023 13:27) (91/67 - 116/63)  BP(mean): --  RR: 18 (27 Sep 2023 13:27) (18 - 18)  SpO2: 94% (27 Sep 2023 15:47) (94% - 99%)    Parameters below as of 27 Sep 2023 15:47  Patient On (Oxygen Delivery Method): nasal cannula  O2 Flow (L/min): 2      PHYSICAL EXAM:  General: awake alert , anxious   Lungs: CTA bilateral   Cardio: RRR, S1/S2, No murmur  Abdomen: Soft, Nontender,+ distention ; Bowel sounds present  Extremities: No calf tenderness, + 3 pitting edema   Skin : normal color , warm                           12.4   7.54  )-----------( 226      ( 27 Sep 2023 05:42 )             39.1       09-27    135  |  98  |  33.2<H>  ----------------------------<  151<H>  3.7   |  26.0  |  0.73    Ca    8.5      27 Sep 2023 05:42  Phos  4.1     09-26  Mg     2.0     09-26    TPro  6.3<L>  /  Alb  3.4  /  TBili  0.6  /  DBili  x   /  AST  15  /  ALT  33  /  AlkPhos  347<H>  09-27

## 2023-09-27 NOTE — PROGRESS NOTE ADULT - SUBJECTIVE AND OBJECTIVE BOX
ANIA CRISTOBAL  [id number]      Chief Complaint:  CHF/ AS/ CAD    Interval History:  post stress test     Tele:  PAF        albuterol/ipratropium for Nebulization 3 milliLiter(s) Nebulizer every 6 hours  aspirin  chewable 81 milliGRAM(s) Oral daily  atorvastatin 10 milliGRAM(s) Oral at bedtime  dextrose 5%. 1000 milliLiter(s) IV Continuous <Continuous>  dextrose 5%. 1000 milliLiter(s) IV Continuous <Continuous>  dextrose 50% Injectable 25 Gram(s) IV Push once  dextrose 50% Injectable 12.5 Gram(s) IV Push once  dextrose 50% Injectable 25 Gram(s) IV Push once  dextrose Oral Gel 15 Gram(s) Oral once PRN  diazepam    Tablet 2 milliGRAM(s) Oral three times a day PRN  doxazosin 2 milliGRAM(s) Oral daily  enoxaparin Injectable 125 milliGRAM(s) SubCutaneous every 12 hours  finasteride 5 milliGRAM(s) Oral daily  glucagon  Injectable 1 milliGRAM(s) IntraMuscular once  influenza  Vaccine (HIGH DOSE) 0.7 milliLiter(s) IntraMuscular once  insulin glargine Injectable (LANTUS) 10 Unit(s) SubCutaneous at bedtime  insulin lispro (ADMELOG) corrective regimen sliding scale   SubCutaneous three times a day before meals  lactulose Syrup 20 Gram(s) Oral two times a day  metoprolol tartrate 12.5 milliGRAM(s) Oral two times a day  midodrine. 5 milliGRAM(s) Oral three times a day  pantoprazole    Tablet 40 milliGRAM(s) Oral before breakfast  polyethylene glycol 3350 17 Gram(s) Oral daily  senna 2 Tablet(s) Oral at bedtime  sucralfate 1 Gram(s) Oral two times a day          Physical Exam:  T(C): 36.9 (09-27-23 @ 13:27), Max: 36.9 (09-26-23 @ 17:20)  HR: 66 (09-27-23 @ 16:33) (66 - 94)  BP: 99/67 (09-27-23 @ 13:27) (91/67 - 116/63)  RR: 18 (09-27-23 @ 13:27) (18 - 18)  SpO2: 97% (09-27-23 @ 16:33) (94% - 99%)  Wt(kg): --  General: Comfortable in NAD  Neck: No JVD  CVS: irregular s1s2, no s3  Pulm: CTA b/l  Abd: soft, non-tender  Ext: No c/c/e  Neuro A&O x3  Psych: Normal affect      Labs:   27 Sep 2023 05:42    135    |  98     |  33.2   ----------------------------<  151    3.7     |  26.0   |  0.73     Ca    8.5        27 Sep 2023 05:42  Phos  4.1       26 Sep 2023 14:20  Mg     2.0       26 Sep 2023 14:20    TPro  6.3    /  Alb  3.4    /  TBili  0.6    /  DBili  x      /  AST  15     /  ALT  33     /  AlkPhos  347    27 Sep 2023 05:42                          12.4   7.54  )-----------( 226      ( 27 Sep 2023 05:42 )             39.1           ECHO:  Echo 6/20/23  Summary:   1. LV Ejection Fraction by Oleary's Method with a biplane EF of 50 %.   2. Normal global left ventricular systolic function.   3. Trace mitral valve regurgitation.   4. Mild aortic regurgitation.   5. Endocardial visualization was enhanced with intravenous echo contrast.   6. Compared to TTE dated 6/11/23 the LVEF has improved.    STRESS TEST:    CATHETERIZATION:  [facility] 1/2023:  Coronary Angiography   - Left dominant system   - Patent prox and mid LAD stents   - Moderate stenosis of the distal LCx   - The RCA is a small, non-dominat vessel.  The proximal segment is occluded with brisk right-to-right collaterals.  - 25mm LV-Ao gradient on pullback.  CIELO 1.4cm2 with moderate AS byecho.  - Moderately reduced LVEF by echo           Pharmacological stress test 9/27/23  * Myocardial Perfusion SPECT results are mildly abnormal. * There is a small, moderate defect in the distal inferior and inferoapical wall that is fixed, consistent with infarct. Basal inferior wall defect paradoxically improved during stress imaging. Prone imaging not available. No clear evidence of ischemia. No TID (ratio 1.23).  * Post-stress resting myocardial perfusion gated SPECT imaging was performed (LVEF = 48 %;LVEDV = 176 ml.), mild hypokinesis of the distal inferior wall.    TTE 9/27/23  Summary:   1. Left ventricular ejection fraction, by visual estimation, is 40 to   45%.   2. Mildly to moderately decreased global left ventricular systolic   function.   3. Mildly to moderately decreased segmental left ventricular systolic   function.   4. Basal and mid anterolateral wall and basal and mid inferolateral wall   are abnormal as described above.   5. Moderately increased LV wall thickness.   6. There is moderate concentric left ventricular hypertrophy.   7. Moderately enlarged left atrium.   8. Normal right atrial size.   9. Trivial pericardial effusion.  10. Mild thickening of the anterior and posterior mitral valve leaflets.  11. Moderate mitral annular calcification.  12. Trace mitral valve regurgitation.  13. Aortic valve is Poorly visualized.  14. Mild to moderate aortic regurgitation.  15. Severe aortic valve stenosis (Low flow-Low gradient severe aortic   stenosis).        Assessement   78 y/o male PMHx AF on Eliquis (s/p ablation on 6/2023), moderate-severe AS, Chronic Diastolic Heart Failure, CAD (Patent prox and mid LAD stents, Moderate stenosis of the distal LCx. The pRCA  with brisk right-to-right collaterals)., HTN, HLD, DM 2, Morbid Obesity, ALLIE, Thoracic Aortic Aneurysm, BPH s/p TURP, Left Renal Mass, On last admission patient underwent CardioMeMs placement, Pt now presents from home (recently moved out of assisted living to live in his son's basement apartment), with palpitations and SOB.  He was given IV lasix and admitted for acute on chronic diastolic heart failure. Cardiology consultation requested for evaluation of acute decompensation of heart failure.   Today with low BP, received midodrine, currently BP WNL  On exam patient looks euvolemic   tele with paroxysmal afib, with control rate.   will  proceed with nuclear stress test to assess ischemia on LCX territory which could have contributed to his CHF.  Stress test with no clear evidence of ischemia.    Plan   Continue current CV medications  Stop eliquis, start lovenox in  preparation for possible dental intervention   patient agreeable to dental eval and treatment   undergoing TAVR eval, possible intervention next week   will continue to follow.

## 2023-09-28 LAB
GLUCOSE BLDC GLUCOMTR-MCNC: 124 MG/DL — HIGH (ref 70–99)
GLUCOSE BLDC GLUCOMTR-MCNC: 145 MG/DL — HIGH (ref 70–99)
GLUCOSE BLDC GLUCOMTR-MCNC: 151 MG/DL — HIGH (ref 70–99)
GLUCOSE BLDC GLUCOMTR-MCNC: 170 MG/DL — HIGH (ref 70–99)

## 2023-09-28 PROCEDURE — 99232 SBSQ HOSP IP/OBS MODERATE 35: CPT

## 2023-09-28 PROCEDURE — 99223 1ST HOSP IP/OBS HIGH 75: CPT

## 2023-09-28 PROCEDURE — 99497 ADVNCD CARE PLAN 30 MIN: CPT | Mod: 25

## 2023-09-28 RX ORDER — NYSTATIN CREAM 100000 [USP'U]/G
1 CREAM TOPICAL EVERY 12 HOURS
Refills: 0 | Status: DISCONTINUED | OUTPATIENT
Start: 2023-09-28 | End: 2023-10-09

## 2023-09-28 RX ORDER — ERGOCALCIFEROL 1.25 MG/1
50000 CAPSULE ORAL
Refills: 0 | Status: DISCONTINUED | OUTPATIENT
Start: 2023-09-28 | End: 2023-10-09

## 2023-09-28 RX ADMIN — ENOXAPARIN SODIUM 130 MILLIGRAM(S): 100 INJECTION SUBCUTANEOUS at 17:25

## 2023-09-28 RX ADMIN — Medication 2 MILLIGRAM(S): at 14:31

## 2023-09-28 RX ADMIN — MIDODRINE HYDROCHLORIDE 5 MILLIGRAM(S): 2.5 TABLET ORAL at 06:21

## 2023-09-28 RX ADMIN — Medication 12.5 MILLIGRAM(S): at 17:26

## 2023-09-28 RX ADMIN — MIDODRINE HYDROCHLORIDE 5 MILLIGRAM(S): 2.5 TABLET ORAL at 17:27

## 2023-09-28 RX ADMIN — Medication 2: at 16:08

## 2023-09-28 RX ADMIN — ENOXAPARIN SODIUM 130 MILLIGRAM(S): 100 INJECTION SUBCUTANEOUS at 06:21

## 2023-09-28 RX ADMIN — MIDODRINE HYDROCHLORIDE 5 MILLIGRAM(S): 2.5 TABLET ORAL at 10:50

## 2023-09-28 RX ADMIN — Medication 2 MILLIGRAM(S): at 07:32

## 2023-09-28 RX ADMIN — Medication 3 MILLILITER(S): at 08:41

## 2023-09-28 RX ADMIN — Medication 1 GRAM(S): at 17:26

## 2023-09-28 RX ADMIN — ATORVASTATIN CALCIUM 10 MILLIGRAM(S): 80 TABLET, FILM COATED ORAL at 21:03

## 2023-09-28 RX ADMIN — NYSTATIN CREAM 1 APPLICATION(S): 100000 CREAM TOPICAL at 17:25

## 2023-09-28 RX ADMIN — Medication 12.5 MILLIGRAM(S): at 06:21

## 2023-09-28 RX ADMIN — PANTOPRAZOLE SODIUM 40 MILLIGRAM(S): 20 TABLET, DELAYED RELEASE ORAL at 06:21

## 2023-09-28 RX ADMIN — Medication 20 MILLIGRAM(S): at 06:22

## 2023-09-28 RX ADMIN — INSULIN GLARGINE 10 UNIT(S): 100 INJECTION, SOLUTION SUBCUTANEOUS at 21:03

## 2023-09-28 RX ADMIN — ERGOCALCIFEROL 50000 UNIT(S): 1.25 CAPSULE ORAL at 10:51

## 2023-09-28 RX ADMIN — Medication 1 GRAM(S): at 06:22

## 2023-09-28 RX ADMIN — Medication 81 MILLIGRAM(S): at 10:50

## 2023-09-28 RX ADMIN — Medication 2 MILLIGRAM(S): at 06:20

## 2023-09-28 RX ADMIN — Medication 2 MILLIGRAM(S): at 21:03

## 2023-09-28 NOTE — PROGRESS NOTE ADULT - SUBJECTIVE AND OBJECTIVE BOX
ANIA CRISTOBAL  [id number]    Chief Complaint:  CHF/ AS/ CAD    Interval History:  post stress test     Tele:  SR with episodes of AFib      albuterol/ipratropium for Nebulization 3 milliLiter(s) Nebulizer every 6 hours  aspirin  chewable 81 milliGRAM(s) Oral daily  atorvastatin 10 milliGRAM(s) Oral at bedtime  dextrose 5%. 1000 milliLiter(s) IV Continuous <Continuous>  dextrose 5%. 1000 milliLiter(s) IV Continuous <Continuous>  dextrose 50% Injectable 25 Gram(s) IV Push once  dextrose 50% Injectable 12.5 Gram(s) IV Push once  dextrose 50% Injectable 25 Gram(s) IV Push once  dextrose Oral Gel 15 Gram(s) Oral once PRN  diazepam    Tablet 2 milliGRAM(s) Oral three times a day PRN  doxazosin 2 milliGRAM(s) Oral daily  enoxaparin Injectable 130 milliGRAM(s) SubCutaneous every 12 hours  ergocalciferol 67288 Unit(s) Oral every week  finasteride 5 milliGRAM(s) Oral daily  glucagon  Injectable 1 milliGRAM(s) IntraMuscular once  influenza  Vaccine (HIGH DOSE) 0.7 milliLiter(s) IntraMuscular once  insulin glargine Injectable (LANTUS) 10 Unit(s) SubCutaneous at bedtime  insulin lispro (ADMELOG) corrective regimen sliding scale   SubCutaneous three times a day before meals  lactulose Syrup 20 Gram(s) Oral two times a day  metoprolol tartrate 12.5 milliGRAM(s) Oral two times a day  midodrine. 5 milliGRAM(s) Oral three times a day  nystatin Ointment 1 Application(s) Topical every 12 hours  pantoprazole    Tablet 40 milliGRAM(s) Oral before breakfast  polyethylene glycol 3350 17 Gram(s) Oral daily  senna 2 Tablet(s) Oral at bedtime  sucralfate 1 Gram(s) Oral two times a day  torsemide 20 milliGRAM(s) Oral daily          Physical Exam:  T(C): 36.9 (09-28-23 @ 10:55), Max: 36.9 (09-27-23 @ 13:27)  HR: 95 (09-28-23 @ 10:55) (66 - 100)  BP: 120/84 (09-28-23 @ 10:55) (90/58 - 120/84)  RR: 19 (09-28-23 @ 10:55) (18 - 19)  SpO2: 94% (09-28-23 @ 10:55) (94% - 97%)  Wt(kg): --  General: Comfortable in NAD  Neck: No JVD  CVS: nl s1s2, no s3  Pulm: CTA b/l  Abd: soft, non-tender  Ext: No c/c/e  Neuro A&O x3  Psych: Normal affect      Labs:   27 Sep 2023 05:42    135    |  98     |  33.2   ----------------------------<  151    3.7     |  26.0   |  0.73     Ca    8.5        27 Sep 2023 05:42  Phos  4.1       26 Sep 2023 14:20  Mg     2.0       26 Sep 2023 14:20    TPro  6.3    /  Alb  3.4    /  TBili  0.6    /  DBili  x      /  AST  15     /  ALT  33     /  AlkPhos  347    27 Sep 2023 05:42                          12.4   7.54  )-----------( 226      ( 27 Sep 2023 05:42 )             39.1           Echo 6/20/23  Summary:   1. LV Ejection Fraction by Oleary's Method with a biplane EF of 50 %.   2. Normal global left ventricular systolic function.   3. Trace mitral valve regurgitation.   4. Mild aortic regurgitation.   5. Endocardial visualization was enhanced with intravenous echo contrast.   6. Compared to TTE dated 6/11/23 the LVEF has improved.    STRESS TEST:    CATHETERIZATION:  [facility] 1/2023:  Coronary Angiography   - Left dominant system   - Patent prox and mid LAD stents   - Moderate stenosis of the distal LCx   - The RCA is a small, non-dominat vessel.  The proximal segment is occluded with brisk right-to-right collaterals.  - 25mm LV-Ao gradient on pullback.  CIELO 1.4cm2 with moderate AS byecho.  - Moderately reduced LVEF by echo           Pharmacological stress test 9/27/23  * Myocardial Perfusion SPECT results are mildly abnormal. * There is a small, moderate defect in the distal inferior and inferoapical wall that is fixed, consistent with infarct. Basal inferior wall defect paradoxically improved during stress imaging. Prone imaging not available. No clear evidence of ischemia. No TID (ratio 1.23).  * Post-stress resting myocardial perfusion gated SPECT imaging was performed (LVEF = 48 %;LVEDV = 176 ml.), mild hypokinesis of the distal inferior wall.    TTE 9/27/23  Summary:   1. Left ventricular ejection fraction, by visual estimation, is 40 to   45%.   2. Mildly to moderately decreased global left ventricular systolic   function.   3. Mildly to moderately decreased segmental left ventricular systolic   function.   4. Basal and mid anterolateral wall and basal and mid inferolateral wall   are abnormal as described above.   5. Moderately increased LV wall thickness.   6. There is moderate concentric left ventricular hypertrophy.   7. Moderately enlarged left atrium.   8. Normal right atrial size.   9. Trivial pericardial effusion.  10. Mild thickening of the anterior and posterior mitral valve leaflets.  11. Moderate mitral annular calcification.  12. Trace mitral valve regurgitation.  13. Aortic valve is Poorly visualized.  14. Mild to moderate aortic regurgitation.  15. Severe aortic valve stenosis (Low flow-Low gradient severe aortic   stenosis).        Assessement   78 y/o male PMHx AF on Eliquis (s/p ablation on 6/2023), moderate-severe AS, Chronic Diastolic Heart Failure, CAD (Patent prox and mid LAD stents, Moderate stenosis of the distal LCx. The pRCA  with brisk right-to-right collaterals)., HTN, HLD, DM 2, Morbid Obesity, ALLIE, Thoracic Aortic Aneurysm, BPH s/p TURP, Left Renal Mass, On last admission patient underwent CardioMeMs placement, Pt now presents from home (recently moved out of assisted living to live in his son's basement apartment), with palpitations and SOB.  He was given IV lasix and admitted for acute on chronic diastolic heart failure. Cardiology consultation requested for evaluation of acute decompensation of heart failure.   Today with low BP, received midodrine, currently BP WNL  On exam patient looks euvolemic   tele with paroxysmal afib, with control rate.   will  proceed with nuclear stress test to assess ischemia on LCX territory which could have contributed to his CHF.  Stress test with no clear evidence of ischemia.  Stable from cardiovascular perspective     Plan   Continue current CV medications  continue with  lovenox with lovenox while admitted   undergoing TAVR eval, possible intervention next week (10/6)  will continue to follow while admitted.

## 2023-09-28 NOTE — CONSULT NOTE ADULT - SUBJECTIVE AND OBJECTIVE BOX
[facility] PALLIATIVE MEDICINE CONSULT    CC: Patient is a 77y old  Male who presents with a chief complaint of sob (28 Sep 2023 12:49)    HPI:  76 y/o male with PMH of afib on Eliquis (s/p Cardioversion abd ablation  1/31/23), Moderate AS, Chronic Diastolic Heart Failure, CAD s/p PCI, HTN, HLD, DM 2, Morbid Obesity, ALLIE, Thoracic Aortic Aneurysm, BPH s/p TURP, Left Renal Mass Presents with shortness of breath for last few days , more worse since last night , also noted with worsening lower ext swelling . moved out of Assisted living recently to live with his son [facility] apartment.  Patient does not eat at home and usually eat food from [employer] as he has no one to cook for him.  also felt some palpitations last night but now feels better, given lasix iv in ed.   has home o2 , uses it prn.  (24 Sep 2023 16:43)      Mr. Rubio is a 77 year old male with PMHx of chronic Afib on Eliquis (s/p cardioversion and ablation in 1/31/2023), HFpEF, severe AS, CAD s/p PCI, HTN, HLD, DM2, morbid obesity, ALLIE, thoracic AAA, known left renal mass, multiple hospitalizations (currently 4th admission since 2023 per EMR) with recent discharge from [facility] and residing at his son's [facility] apartment presented 9/24 for worsening dyspnea and BLE edema and admitted for acute decompensated HFpEF. TTE done this admission notable for EF 40-45% and severe aortic stenosis. Cardio and CTS on board, planned for TAVR on 10/6/2023. Palliative consult as pt was identified on [facility].     Present Symptoms:     Dyspnea: no  Nausea/Vomiting:  No  Anxiety:   No  Depression:  No  Fatigue:  No  Loss of appetite:  No  Constipation:  No    Pain: No            Character-            Duration-            Effect-            Factors-            Frequency-            Location-            Severity-    Pain AD Score:  [email or web address] (press ctrl + left click to view)    Review of Systems: Reviewed                     Negative: no chest pain or dyspnea at rest                     Positive: +BLE edema  All others negative    PERTINENT PMH REVIEWED: Yes      PAST MEDICAL & SURGICAL HISTORY:  Anxiety  Hypertension  Hyperlipidemia  Prostate disease  Gastroesophageal reflux disease  Gallbladder stone without cholecystitis or obstruction  BPH (benign prostatic hyperplasia)  DM (diabetes mellitus)  DVT (deep venous thrombosis)  left leg  Afib  Anxiety  HLD (hyperlipidemia)  Malignant schwannoma  H/O arthroscopy of knee  S/P laminectomy  S/P coronary artery stent placement, x2    FAMILY HISTORY:  FH: heart disease (Father)    Allergies    Ceftin (Hives)  penicillin (Anaphylaxis)  Bactrim (Rash)  sulfa drugs (Unknown)    Intolerances    SOCIAL HISTORY:                      Substance history:                    Admitted from:  home                      Children: 2 sons                    Anabaptism/spirituality:                    Cultural concerns:       DECISION MAKER(s):[] Health Care Proxy(s)  [] Surrogate(s)  [] Guardian           - HCP Is carmen Logan per pt     ADVANCE DIRECTIVES/TREATMENT PREFERENCES: FULL CODE    Karnofsky/Palliative Performance Status Version 2:  50%  http://npcrc.org/files/news/palliative_performance_scale_ppsv2.pdf    Baseline ADLs (prior to admission): moderate independence      MEDICATIONS  (STANDING):  albuterol/ipratropium for Nebulization 3 milliLiter(s) Nebulizer every 6 hours  aspirin  chewable 81 milliGRAM(s) Oral daily  atorvastatin 10 milliGRAM(s) Oral at bedtime  dextrose 5%. 1000 milliLiter(s) (100 mL/Hr) IV Continuous <Continuous>  dextrose 5%. 1000 milliLiter(s) (50 mL/Hr) IV Continuous <Continuous>  dextrose 50% Injectable 25 Gram(s) IV Push once  dextrose 50% Injectable 12.5 Gram(s) IV Push once  dextrose 50% Injectable 25 Gram(s) IV Push once  doxazosin 2 milliGRAM(s) Oral daily  enoxaparin Injectable 130 milliGRAM(s) SubCutaneous every 12 hours  ergocalciferol 13029 Unit(s) Oral every week  finasteride 5 milliGRAM(s) Oral daily  glucagon  Injectable 1 milliGRAM(s) IntraMuscular once  influenza  Vaccine (HIGH DOSE) 0.7 milliLiter(s) IntraMuscular once  insulin glargine Injectable (LANTUS) 10 Unit(s) SubCutaneous at bedtime  insulin lispro (ADMELOG) corrective regimen sliding scale   SubCutaneous three times a day before meals  lactulose Syrup 20 Gram(s) Oral two times a day  metoprolol tartrate 12.5 milliGRAM(s) Oral two times a day  midodrine. 5 milliGRAM(s) Oral three times a day  nystatin Ointment 1 Application(s) Topical every 12 hours  pantoprazole    Tablet 40 milliGRAM(s) Oral before breakfast  polyethylene glycol 3350 17 Gram(s) Oral daily  senna 2 Tablet(s) Oral at bedtime  sucralfate 1 Gram(s) Oral two times a day  torsemide 20 milliGRAM(s) Oral daily    MEDICATIONS  (PRN):  dextrose Oral Gel 15 Gram(s) Oral once PRN Blood Glucose LESS THAN 70 milliGRAM(s)/deciliter  diazepam    Tablet 2 milliGRAM(s) Oral three times a day PRN anxiety      PHYSICAL EXAM:    Vital Signs Last 24 Hrs  T(C): 36.9 (28 Sep 2023 10:55), Max: 36.9 (27 Sep 2023 13:27)  T(F): 98.5 (28 Sep 2023 10:55), Max: 98.5 (27 Sep 2023 13:27)  HR: 95 (28 Sep 2023 10:55) (66 - 100)  BP: 120/84 (28 Sep 2023 10:55) (90/58 - 120/84)  BP(mean): 81 (27 Sep 2023 20:25) (81 - 81)  RR: 19 (28 Sep 2023 10:55) (18 - 19)  SpO2: 94% (28 Sep 2023 10:55) (94% - 97%)    Parameters below as of 28 Sep 2023 10:55  Patient On (Oxygen Delivery Method): nasal cannula    General: resting comfortably and no acute distress.     HEENT: mucous membrane moist     Lungs: comfortable nonlabored      CV: S1/S2. Regular rate and rhythm    GI: +BS abdomen soft, obese,, nontender     MSK:  no cyanosis  +BLE edema    Neuro: nonfocal. awake and alert, oriented x 4, interactive    Skin: warm and dry.      LABS:                        12.4   7.54  )-----------( 226      ( 27 Sep 2023 05:42 )             39.1     09-27    135  |  98  |  33.2<H>  ----------------------------<  151<H>  3.7   |  26.0  |  0.73    Ca    8.5      27 Sep 2023 05:42  Phos  4.1     09-26  Mg     2.0     09-26    TPro  6.3<L>  /  Alb  3.4  /  TBili  0.6  /  DBili  x   /  AST  15  /  ALT  33  /  AlkPhos  347<H>  09-27      Urinalysis Basic - ( 27 Sep 2023 05:42 )    Color: x / Appearance: x / SG: x / pH: x  Gluc: 151 mg/dL / Ketone: x  / Bili: x / Urobili: x   Blood: x / Protein: x / Nitrite: x   Leuk Esterase: x / RBC: x / WBC x   Sq Epi: x / Non Sq Epi: x / Bacteria: x      I&O's Summary    27 Sep 2023 07:01  -  28 Sep 2023 07:00  --------------------------------------------------------  IN: 930 mL / OUT: 650 mL / NET: 280 mL    28 Sep 2023 07:01  -  28 Sep 2023 13:05  --------------------------------------------------------  IN: 815 mL / OUT: 0 mL / NET: 815 mL    RADIOLOGY & ADDITIONAL STUDIES:    CXR    ACC: [id number] EXAM:  XR CHEST PORTABLE IMMED 1V   ORDERED BY: JORDAN HENLEY     PROCEDURE DATE:  09/24/2023      INTERPRETATION:  AP chest on September 24, 2023 at 11:54 AM. Patient is   short of breath.    Gross heart enlargement again noted. Mild right thoracic curve again seen.    Present film shows mild central CHF new since Mis 10.    IMPRESSION: Mild central CHF at this time.    --- End of Report ---  DESTINEE ESTEVEZ MD; Attending Radiologist  This document has been electronically signed. Sep 24 2023 12:49PM    TTE   Summary:   1. Left ventricular ejection fraction, by visual estimation, is 40 to 45%.   2. Mildly to moderately decreased global left ventricular systolic function.   3. Mildly to moderately decreased segmental left ventricular systolic   function.   4. Basal and mid anterolateral wall and basal and mid inferolateral wall   are abnormal as described above.   5. Moderately increased LV wall thickness.   6. There is moderate concentric left ventricular hypertrophy.   7. Moderately enlarged left atrium.   8. Normal right atrial size.   9. Trivial pericardial effusion.  10. Mild thickening of the anterior and posterior mitral valve leaflets.  11. Moderate mitral annular calcification.  12. Trace mitral valve regurgitation.  13. Aortic valve is Poorly visualized.  14. Mild to moderate aortic regurgitation.  15. Severe aortic valve stenosis (Low flow-Low gradient severe aortic   stenosis). Consider LIBBY for better characterization.  16. Moderate dilatation of the aortic root and ascending aorta.    Nicki, Electronically signed on 9/27/2023 at 10:54:08 AM  *** Final ***    Stress Test  IMPRESSIONS:  * Myocardial Perfusion SPECT results are mildly abnormal.  * There is a small, moderate defect in the distal inferior  and inferoapical wall that is fixed, consistent with  infarct. Basal inferior wall defect paradoxically improved  during stress imaging. Prone imaging not available. No  clear evidence of ischemia. No TID (ratio 1.23).  * Post-stress resting myocardial perfusion gated SPECT  imaging was performed (LVEF = 48 %;LVEDV = 176 ml.), mild  hypokinesis of the distal inferior wall.    ------------------------------------------------------------------------  ------------------------------------------------------------------------    Confirmed on  9/27/2023 - 14:22:04 by Ti Cates MD  Cardiology Fellow: Britton GARCIA    NEUROLOGICAL MEDICATIONS/OPIOIDS/BENZODIAZEPINE OVER PAST 24 HOURS  acetaminophen     Tablet ..   650 milliGRAM(s) Oral (09-27-23 @ 22:15)    diazepam    Tablet   2 milliGRAM(s) Oral (09-28-23 @ 07:32)   2 milliGRAM(s) Oral (09-27-23 @ 18:48)

## 2023-09-28 NOTE — PROGRESS NOTE ADULT - PROBLEM SELECTOR PLAN 1
Severe AS on last admission. Now admitted with acute on chronic diastolic heart failure.  TAVR CTA completed in June on last admission.  Pt was lost to follow up in the office.  Pt was supposed to see dental but has not. CT max/facial ordered. Pt may need to be seen by dental prior to TAVR.   CT scan with periapical lucencies per report.  Patient information sent to [facility] Dental team to review CT scan> will follow up tomorrow with them.  Patient may potentially need to be transferred to [facility] for formal dental eval if they feel it's necessary, which would also mean his Eliquis would need to be held (He could be placed on a heparin gtt).  Pt is agreeable per his conversation with Dr. Napier.   Repeat TTE with Severe low flow low gradient AS.  No need for repeat LIBBY per Dr. Napier.    Plan discussed with Dr. Napier. Severe AS on last admission. Now admitted with acute on chronic diastolic heart failure.  TAVR CTA completed in June on last admission.  Pt was lost to follow up in the office.  Pt was supposed to see dental but has not. CT max/facial ordered. Pt may need to be seen by dental prior to TAVR.  CT scan with periapical lucencies per report.  CT scan reviewed by Dr. Morin at [facility] dental, no need for transfer for evalution at this time, pt instructed to f/u as outpt for comprehensive dental exam.   NST 9/27 with sm-mod defect, no ishemia, reviewed with cardiology, no further work up needed (last [facility] in January 2023 that showed 60% dcx disease and 100% pRCA with collateral circulation.  TTE  with Severe low flow low gradient AS.  No need for repeat LIBBY per Dr. Napier.  tentative TAVR Fri 10/6/23.   Plan discussed with Dr. Napier.

## 2023-09-28 NOTE — PROGRESS NOTE ADULT - ASSESSMENT
78 y/o male with PMH of afib on Eliquis (s/p Cardioversion abd ablation  1/31/23), Moderate AS, Chronic Diastolic Heart Failure, CAD s/p PCI, HTN, HLD, DM 2, Morbid Obesity, ALLIE, Thoracic Aortic Aneurysm, BPH s/p TURP, Left Renal Mass Presents with shortness of breath for last few days , more worse since last night , also noted with worsening lower ext swelling . moved out of Assisted living recently to live with his son basement apartment.  Patient does not eat at home and usually eat food from [employer] as he has no one to cook for him.  also felt some palpitations last night but now feels better, given lasix iv in ed.   has home o2 , uses it prn.       1- CHF , systolic and severe AS   LIBBY result rviewed   s/p IV lasix   BP improving   cont Po diuretics   NST resulted neg for ischemia       2-h/o Atrial fib, cronic   rate stable   cont eliquis   metoprolol bid if BP improves   cont tele     3- Severe As   seen by CT surgery team   possible TAVR pendng dental evaluation for possible OR next week     4- Abdominal  bloating and constipation   resolved   cont senna   miralax prn     5- DM 2  - hold metformin   - basal insulin , ssi   Bg is controlled     6- anxiety   valium prn     7- Morbid Obesity, ALLIE    8- hx of Thoracic Aortic Aneurysm  - monitor as op     9-BPH s/p TURP,  - cw home meds     10- Hypothyroid   will cont levothyroxine     11- Severe protein elisabeth malnutrition   cont oral diet    > dvt ppx : on eliquis

## 2023-09-28 NOTE — PROGRESS NOTE ADULT - SUBJECTIVE AND OBJECTIVE BOX
Patient is a 77y old  Male who presents with a chief complaint of SOB     pt is seen in am at the bedside   c/o SOB on exertion   anxious     no overnight events   cardiology follow up appreciated      Vital Signs Last 24 Hrs  T(C): 36.8 (28 Sep 2023 20:33), Max: 36.9 (28 Sep 2023 10:55)  T(F): 98.2 (28 Sep 2023 20:33), Max: 98.5 (28 Sep 2023 10:55)  HR: 80 (28 Sep 2023 20:33) (69 - 100)  BP: 127/76 (28 Sep 2023 20:33) (90/58 - 138/79)  BP(mean): --  RR: 18 (28 Sep 2023 20:33) (18 - 19)  SpO2: 96% (28 Sep 2023 20:33) (93% - 97%)    Parameters below as of 28 Sep 2023 20:33  Patient On (Oxygen Delivery Method): nasal cannula  O2 Flow (L/min): 2    PHYSICAL EXAM:  General: awake alert , anxious   Lungs: CTA bilateral   Cardio: RRR, S1/S2, No murmur  Abdomen: Soft, Nontender,+ distention ; Bowel sounds present  Extremities: No calf tenderness, + 3 pitting edema   Skin : normal color , warm                                    12.4   7.54  )-----------( 226      ( 27 Sep 2023 05:42 )             39.1   09-27    135  |  98  |  33.2<H>  ----------------------------<  151<H>  3.7   |  26.0  |  0.73    Ca    8.5      27 Sep 2023 05:42    TPro  6.3<L>  /  Alb  3.4  /  TBili  0.6  /  DBili  x   /  AST  15  /  ALT  33  /  AlkPhos  347<H>  09-27      ccyct< from: CT Maxillofacial No Cont (09.26.23 @ 23:03) >  MPRESSION: Periapical lucencies involving the right maxillary second and   third molar roots.    --- End of Report ---      < end of copied text >

## 2023-09-28 NOTE — PROGRESS NOTE ADULT - SUBJECTIVE AND OBJECTIVE BOX
Subjective: no complaints denies CP, palpitations, SOB, cough, fever, chills, itchiness/rash, diaphoresis, vision changes, HA, dizziness/lightheadedness, numbness/tingling, abd pain, N/V     T(C): 36.8 (09-28-23 @ 08:58), Max: 36.9 (09-27-23 @ 13:27)  HR: 88 (09-28-23 @ 08:58) (66 - 100)  BP: 90/58 (09-28-23 @ 08:58) (90/58 - 120/78)  RR: 18 (09-28-23 @ 08:58) (18 - 19)  SpO2: 94% (09-28-23 @ 08:58) (94% - 97%)    09-27    135  |  98  |  33.2<H>  ----------------------------<  151<H>  3.7   |  26.0  |  0.73    Ca    8.5      27 Sep 2023 05:42  Phos  4.1     09-26  Mg     2.0     09-26    TPro  6.3<L>  /  Alb  3.4  /  TBili  0.6  /  DBili  x   /  AST  15  /  ALT  33  /  AlkPhos  347<H>  09-27                               12.4   7.54  )-----------( 226      ( 27 Sep 2023 05:42 )             39.1                    CAPILLARY BLOOD GLUCOSE  POCT Blood Glucose.: 124 mg/dL (28 Sep 2023 07:32)  POCT Blood Glucose.: 169 mg/dL (27 Sep 2023 21:26)  POCT Blood Glucose.: 153 mg/dL (27 Sep 2023 16:12)          I&O's Detail    27 Sep 2023 07:01  -  28 Sep 2023 07:00  --------------------------------------------------------  IN:    Oral Fluid: 930 mL  Total IN: 930 mL    OUT:    Voided (mL): 650 mL  Total OUT: 650 mL  Total NET: 280 mL    Drug Dosing Weight  Height (cm): 188 (25 Sep 2023 13:12)  Weight (kg): 127.5 (25 Sep 2023 13:12)  BMI (kg/m2): 36.1 (25 Sep 2023 13:12)  BSA (m2): 2.51 (25 Sep 2023 13:12)    MEDICATIONS  (STANDING):  albuterol/ipratropium for Nebulization 3 milliLiter(s) Nebulizer every 6 hours  aspirin  chewable 81 milliGRAM(s) Oral daily  atorvastatin 10 milliGRAM(s) Oral at bedtime  dextrose 5%. 1000 milliLiter(s) (50 mL/Hr) IV Continuous <Continuous>  dextrose 5%. 1000 milliLiter(s) (100 mL/Hr) IV Continuous <Continuous>  dextrose 50% Injectable 25 Gram(s) IV Push once  dextrose 50% Injectable 12.5 Gram(s) IV Push once  dextrose 50% Injectable 25 Gram(s) IV Push once  doxazosin 2 milliGRAM(s) Oral daily  enoxaparin Injectable 130 milliGRAM(s) SubCutaneous every 12 hours  ergocalciferol 40938 Unit(s) Oral every week  finasteride 5 milliGRAM(s) Oral daily  glucagon  Injectable 1 milliGRAM(s) IntraMuscular once  influenza  Vaccine (HIGH DOSE) 0.7 milliLiter(s) IntraMuscular once  insulin glargine Injectable (LANTUS) 10 Unit(s) SubCutaneous at bedtime  insulin lispro (ADMELOG) corrective regimen sliding scale   SubCutaneous three times a day before meals  lactulose Syrup 20 Gram(s) Oral two times a day  metoprolol tartrate 12.5 milliGRAM(s) Oral two times a day  midodrine. 5 milliGRAM(s) Oral three times a day  pantoprazole    Tablet 40 milliGRAM(s) Oral before breakfast  polyethylene glycol 3350 17 Gram(s) Oral daily  senna 2 Tablet(s) Oral at bedtime  sucralfate 1 Gram(s) Oral two times a day  torsemide 20 milliGRAM(s) Oral daily    MEDICATIONS  (PRN):  dextrose Oral Gel 15 Gram(s) Oral once PRN Blood Glucose LESS THAN 70 milliGRAM(s)/deciliter  diazepam    Tablet 2 milliGRAM(s) Oral three times a day PRN anxiety    Physical Exam  Gen: NAD  Neuro: A&Ox3 non focal speech clear and intact  Pulm: CTA b/l no wheezing  CV: S1S2 +DORINDA  Abd: +BS soft NT ND  Extrem/MS: mild LE edema, b/l groins with erythema,   Incision(s):        Subjective: no complaints denies CP, palpitations, SOB, cough, fever, chills, itchiness/rash, diaphoresis, vision changes, HA, dizziness/lightheadedness, numbness/tingling, abd pain, N/V     T(C): 36.8 (09-28-23 @ 08:58), Max: 36.9 (09-27-23 @ 13:27)  HR: 88 (09-28-23 @ 08:58) (66 - 100)  BP: 90/58 (09-28-23 @ 08:58) (90/58 - 120/78)  RR: 18 (09-28-23 @ 08:58) (18 - 19)  SpO2: 94% (09-28-23 @ 08:58) (94% - 97%)    09-27    135  |  98  |  33.2<H>  ----------------------------<  151<H>  3.7   |  26.0  |  0.73    Ca    8.5      27 Sep 2023 05:42  Phos  4.1     09-26  Mg     2.0     09-26    TPro  6.3<L>  /  Alb  3.4  /  TBili  0.6  /  DBili  x   /  AST  15  /  ALT  33  /  AlkPhos  347<H>  09-27                               12.4   7.54  )-----------( 226      ( 27 Sep 2023 05:42 )             39.1                    CAPILLARY BLOOD GLUCOSE  POCT Blood Glucose.: 124 mg/dL (28 Sep 2023 07:32)  POCT Blood Glucose.: 169 mg/dL (27 Sep 2023 21:26)  POCT Blood Glucose.: 153 mg/dL (27 Sep 2023 16:12)          I&O's Detail    27 Sep 2023 07:01  -  28 Sep 2023 07:00  --------------------------------------------------------  IN:    Oral Fluid: 930 mL  Total IN: 930 mL    OUT:    Voided (mL): 650 mL  Total OUT: 650 mL  Total NET: 280 mL    Drug Dosing Weight  Height (cm): 188 (25 Sep 2023 13:12)  Weight (kg): 127.5 (25 Sep 2023 13:12)  BMI (kg/m2): 36.1 (25 Sep 2023 13:12)  BSA (m2): 2.51 (25 Sep 2023 13:12)    MEDICATIONS  (STANDING):  albuterol/ipratropium for Nebulization 3 milliLiter(s) Nebulizer every 6 hours  aspirin  chewable 81 milliGRAM(s) Oral daily  atorvastatin 10 milliGRAM(s) Oral at bedtime  dextrose 5%. 1000 milliLiter(s) (50 mL/Hr) IV Continuous <Continuous>  dextrose 5%. 1000 milliLiter(s) (100 mL/Hr) IV Continuous <Continuous>  dextrose 50% Injectable 25 Gram(s) IV Push once  dextrose 50% Injectable 12.5 Gram(s) IV Push once  dextrose 50% Injectable 25 Gram(s) IV Push once  doxazosin 2 milliGRAM(s) Oral daily  enoxaparin Injectable 130 milliGRAM(s) SubCutaneous every 12 hours  ergocalciferol 44587 Unit(s) Oral every week  finasteride 5 milliGRAM(s) Oral daily  glucagon  Injectable 1 milliGRAM(s) IntraMuscular once  influenza  Vaccine (HIGH DOSE) 0.7 milliLiter(s) IntraMuscular once  insulin glargine Injectable (LANTUS) 10 Unit(s) SubCutaneous at bedtime  insulin lispro (ADMELOG) corrective regimen sliding scale   SubCutaneous three times a day before meals  lactulose Syrup 20 Gram(s) Oral two times a day  metoprolol tartrate 12.5 milliGRAM(s) Oral two times a day  midodrine. 5 milliGRAM(s) Oral three times a day  pantoprazole    Tablet 40 milliGRAM(s) Oral before breakfast  polyethylene glycol 3350 17 Gram(s) Oral daily  senna 2 Tablet(s) Oral at bedtime  sucralfate 1 Gram(s) Oral two times a day  torsemide 20 milliGRAM(s) Oral daily    MEDICATIONS  (PRN):  dextrose Oral Gel 15 Gram(s) Oral once PRN Blood Glucose LESS THAN 70 milliGRAM(s)/deciliter  diazepam    Tablet 2 milliGRAM(s) Oral three times a day PRN anxiety    Physical Exam  Gen: NAD  Neuro: A&Ox3 non focal speech clear and intact  Pulm: CTA b/l no wheezing  CV: S1S2 +DORINDA  Abd: +BS soft NT ND  Extrem/MS: mild LE edema, b/l groins with erythema, no cyanosis, GUERRA  skin:  no rashes WWP

## 2023-09-28 NOTE — CONSULT NOTE ADULT - TIME BILLING
D/W pt, RN, hospitalist Dr Alexandre    Total time also includes discussion during interdisciplinary team rounds, chart review including but limited to prior admissions/ GOC discussions, review of established ACP documentations ( ex. living will, HCP, MOLST form),  review of medications/ labs/ imaging, examination, care coordination with other health care professionals, documentation EXCLUDING current goals of care or advance care planning discussions.

## 2023-09-28 NOTE — CONSULT NOTE ADULT - ASSESSMENT
77M with hx of chronic afib, HFpEF, severe AS, CAD s/p PCI, HTN, HLD, DM2, obesity, ALLIE, AAA, known left renal mass admitted for acute decompensated heart failure and severe aortic stenosis,  tentative planned for TAVR on 10/6 with CTS. Palliative consult as pt was identified on STAR ollie list given underlying complex comorbidities.     Acute Decompensated Heart Failure  Severe Aortic Stenosis  CAD S/P Prior PCI  - TTE 9/27 reviewed, EF 40-45% and severe AS  - Stress test 9/27 reviewed  - cardio and CTS input appreciated, pt underwent TAVR evaluation and tentatively planned for TAVR on 10/6/23  - on PO Torsemide for diuresis, aspirin, statin, beta blocker    Debility  - s/p rehab and most recently discharged from long-term to home, currently utilizes a rolling walker for ambulation and is moderately independent  - fall precaution, eventual PT eval after TAVR     Palliative Care Encounter  Advance Care Planning  - see goals of care above  - Pt states completing a formal HCP and POA with an  a few years back, designated to his son Desmond  - FULL CODE     77M with hx of chronic afib, HFpEF, severe AS, CAD s/p PCI, HTN, HLD, DM2, obesity, ALLIE, AAA, known left renal mass admitted for acute decompensated heart failure and severe aortic stenosis,  tentative planned for TAVR on 10/6 with CTS. Palliative consult as pt was identified on STAR ollie list given underlying complex comorbidities.     Acute Decompensated Heart Failure  Severe Aortic Stenosis  CAD S/P Prior PCI  - TTE 9/27 reviewed, EF 40-45% and severe AS  - Stress test 9/27 reviewed  - cardio and CTS input appreciated, pt underwent TAVR evaluation and tentatively planned for TAVR on 10/6/23  - on PO Torsemide for diuresis, aspirin, statin, beta blocker    Debility  - s/p rehab and most recently discharged from skilled nursing to home, currently utilizes a rolling walker for ambulation and is moderately independent  - fall precaution, eventual PT eval after TAVR     Palliative Care Encounter  Advance Care Planning  - see goals of care above  - Pt states completing a formal HCP and POA with an  a few years back, designated to his son Desmond  - FULL CODE  - GOC: continue pursuit of all active medical interventions without limitations including planned tentative TAVR on 10/6 with CTS.    No further recommendations from a palliative standpoint. Will sign off. Please reconsult PRN if goals of care should change and assistance needed in further collaborative family discussions.  Ongoing medical mgnt per primary team.

## 2023-09-28 NOTE — CONSULT NOTE ADULT - CONVERSATION DETAILS
Met with pt to introduce role and scope of palliative care team as supportive in the setting of complex comorbidities/serious illnesses, to assist with complex medical decision making and any associated pain or symptom management. Pt was engaged in conversation. We reviewed baseline function, comorbidities, current hospital course and plan of care. Pt express good insight into his medical history, shared of his progressive debility and decline dating back to "March" after a fall and subsequent rehospitalizations and discharges to rehabs and most recent from [facility] to home (lives with 2nd son). Currently, he ambulates with walker and is moderately independent. We reviewed his most recent hospitalization and treatment including plans to pursue TAVR next week. He acknowledged the long road ahead but remains hopeful. His primary support system are two sons and their families.     Reviewed any prior Health Care Proxy (HCP) / Living Will/ Advance Directives or Medical Order for Life Sustaining Treatments (MOLST) Forms.   - He shared completing a formal HCP with an , designated to his 2nd son Desmond     Significant psychosocial support provided and all questions answered.

## 2023-09-28 NOTE — PROGRESS NOTE ADULT - ASSESSMENT
77M, pmhx afib on Eliquis (s/p Cardioversion on 1/31/23 and again on last admission 6/19/23), Moderate AS, Chronic Diastolic Heart Failure, CAD s/p PCI, HTN, HLD, DM 2, Morbid Obesity, ALLIE, Thoracic Aortic Aneurysm, BPH s/p TURP, Left Renal Mass, and patient uses PRN home O2.  He was admitted in June for abdominal pain x 4 days and found to have UTI.  Echo at the time showed a significant drop in EF, severe AS.  Reduced EF attributed to afib and patient underwent DCCV with EP.  Cardiomems was placed.  Post procedure TTE with normal EF.  He had an abdominal CT that showed trace ascites and also underwent MRCP on that admission that showed cirrhosis.   CT surgery was consulted on that admission for TAVR evaluation.  TAVR CTA completed 6/22/23.  At that time, anticoagulation was not able to be interrupted per EP.  Plan was for outpatient follow up with Dr. Napier, but patient was lost to follow up.  He also has poor dentition and was asked to see a dentist as an outpatient for possible extractions.    Pt now presents from home (recently moved out of assisted living to live in his son's basement apartment), with palpitations and SOB.  He was given IV lasix and admitted for acute on chronic diastolic heart failure.  Asked to consult regarding TAVR.    Of note, pt had LHC in January 2023 that showed 60% dcx disease and 100% pRCA with collateral circulation.  LIBBY in January with moderate to severe AS and mild AI.    77M, pmhx afib on Eliquis (s/p Cardioversion on 1/31/23 and again on last admission 6/19/23), Moderate AS, Chronic Diastolic Heart Failure, CAD s/p PCI, HTN, HLD, DM 2, Morbid Obesity, ALLIE, Thoracic Aortic Aneurysm, BPH s/p TURP, Left Renal Mass, and patient uses PRN home O2.  He was admitted in June for abdominal pain x 4 days and found to have UTI.  Echo at the time showed a significant drop in EF, severe AS.  Reduced EF attributed to afib and patient underwent DCCV with EP.  Cardiomems was placed.  Post procedure TTE with normal EF.  He had an abdominal CT that showed trace ascites and also underwent MRCP on that admission that showed cirrhosis.   CT surgery was consulted on that admission for TAVR evaluation.  TAVR CTA completed 6/22/23.  At that time, anticoagulation was not able to be interrupted per EP.  Plan was for outpatient follow up with Dr. Napier, but patient was lost to follow up.  He also has poor dentition and was asked to see a dentist as an outpatient for possible extractions.    Pt now presents 9/24 from home (recently moved out of assisted living to live in his son's basement apartment), with palpitations and SOB.  He was given IV lasix and admitted for acute on chronic diastolic heart failure.  Asked to consult regarding TAVR.    Of note, pt had LHC in January 2023 that showed 60% dcx disease and 100% pRCA with collateral circulation.  LIBBY in January with moderate to severe AS and mild AI.

## 2023-09-29 LAB
ANION GAP SERPL CALC-SCNC: 9 MMOL/L — SIGNIFICANT CHANGE UP (ref 5–17)
BUN SERPL-MCNC: 24.4 MG/DL — HIGH (ref 8–20)
CALCIUM SERPL-MCNC: 8.6 MG/DL — SIGNIFICANT CHANGE UP (ref 8.4–10.5)
CHLORIDE SERPL-SCNC: 102 MMOL/L — SIGNIFICANT CHANGE UP (ref 96–108)
CO2 SERPL-SCNC: 29 MMOL/L — SIGNIFICANT CHANGE UP (ref 22–29)
CREAT SERPL-MCNC: 0.74 MG/DL — SIGNIFICANT CHANGE UP (ref 0.5–1.3)
EGFR: 93 ML/MIN/1.73M2 — SIGNIFICANT CHANGE UP
GLUCOSE BLDC GLUCOMTR-MCNC: 118 MG/DL — HIGH (ref 70–99)
GLUCOSE BLDC GLUCOMTR-MCNC: 140 MG/DL — HIGH (ref 70–99)
GLUCOSE BLDC GLUCOMTR-MCNC: 216 MG/DL — HIGH (ref 70–99)
GLUCOSE SERPL-MCNC: 120 MG/DL — HIGH (ref 70–99)
HCT VFR BLD CALC: 39.3 % — SIGNIFICANT CHANGE UP (ref 39–50)
HGB BLD-MCNC: 12.3 G/DL — LOW (ref 13–17)
MAGNESIUM SERPL-MCNC: 2 MG/DL — SIGNIFICANT CHANGE UP (ref 1.6–2.6)
MCHC RBC-ENTMCNC: 29.6 PG — SIGNIFICANT CHANGE UP (ref 27–34)
MCHC RBC-ENTMCNC: 31.3 GM/DL — LOW (ref 32–36)
MCV RBC AUTO: 94.5 FL — SIGNIFICANT CHANGE UP (ref 80–100)
PHOSPHATE SERPL-MCNC: 3.8 MG/DL — SIGNIFICANT CHANGE UP (ref 2.4–4.7)
PLATELET # BLD AUTO: 216 K/UL — SIGNIFICANT CHANGE UP (ref 150–400)
POTASSIUM SERPL-MCNC: 4.1 MMOL/L — SIGNIFICANT CHANGE UP (ref 3.5–5.3)
POTASSIUM SERPL-SCNC: 4.1 MMOL/L — SIGNIFICANT CHANGE UP (ref 3.5–5.3)
RBC # BLD: 4.16 M/UL — LOW (ref 4.2–5.8)
RBC # FLD: 15.6 % — HIGH (ref 10.3–14.5)
SODIUM SERPL-SCNC: 140 MMOL/L — SIGNIFICANT CHANGE UP (ref 135–145)
WBC # BLD: 7 K/UL — SIGNIFICANT CHANGE UP (ref 3.8–10.5)
WBC # FLD AUTO: 7 K/UL — SIGNIFICANT CHANGE UP (ref 3.8–10.5)

## 2023-09-29 PROCEDURE — 99232 SBSQ HOSP IP/OBS MODERATE 35: CPT

## 2023-09-29 PROCEDURE — 99231 SBSQ HOSP IP/OBS SF/LOW 25: CPT | Mod: FS

## 2023-09-29 RX ORDER — FINASTERIDE 5 MG/1
1 TABLET, FILM COATED ORAL
Refills: 0 | DISCHARGE

## 2023-09-29 RX ADMIN — ENOXAPARIN SODIUM 130 MILLIGRAM(S): 100 INJECTION SUBCUTANEOUS at 05:10

## 2023-09-29 RX ADMIN — Medication 4: at 11:45

## 2023-09-29 RX ADMIN — Medication 20 MILLIGRAM(S): at 05:11

## 2023-09-29 RX ADMIN — MIDODRINE HYDROCHLORIDE 5 MILLIGRAM(S): 2.5 TABLET ORAL at 17:45

## 2023-09-29 RX ADMIN — Medication 2 MILLIGRAM(S): at 17:45

## 2023-09-29 RX ADMIN — NYSTATIN CREAM 1 APPLICATION(S): 100000 CREAM TOPICAL at 17:48

## 2023-09-29 RX ADMIN — INSULIN GLARGINE 10 UNIT(S): 100 INJECTION, SOLUTION SUBCUTANEOUS at 22:58

## 2023-09-29 RX ADMIN — PANTOPRAZOLE SODIUM 40 MILLIGRAM(S): 20 TABLET, DELAYED RELEASE ORAL at 05:11

## 2023-09-29 RX ADMIN — Medication 12.5 MILLIGRAM(S): at 05:11

## 2023-09-29 RX ADMIN — MIDODRINE HYDROCHLORIDE 5 MILLIGRAM(S): 2.5 TABLET ORAL at 05:11

## 2023-09-29 RX ADMIN — Medication 81 MILLIGRAM(S): at 11:45

## 2023-09-29 RX ADMIN — Medication 2 MILLIGRAM(S): at 22:58

## 2023-09-29 RX ADMIN — Medication 3 MILLILITER(S): at 20:53

## 2023-09-29 RX ADMIN — Medication 1 GRAM(S): at 17:45

## 2023-09-29 RX ADMIN — Medication 2 MILLIGRAM(S): at 11:44

## 2023-09-29 RX ADMIN — MIDODRINE HYDROCHLORIDE 5 MILLIGRAM(S): 2.5 TABLET ORAL at 11:45

## 2023-09-29 RX ADMIN — ATORVASTATIN CALCIUM 10 MILLIGRAM(S): 80 TABLET, FILM COATED ORAL at 22:58

## 2023-09-29 RX ADMIN — Medication 12.5 MILLIGRAM(S): at 17:45

## 2023-09-29 RX ADMIN — Medication 2 MILLIGRAM(S): at 05:11

## 2023-09-29 RX ADMIN — Medication 1 GRAM(S): at 05:11

## 2023-09-29 RX ADMIN — Medication 3 MILLILITER(S): at 09:13

## 2023-09-29 RX ADMIN — ENOXAPARIN SODIUM 130 MILLIGRAM(S): 100 INJECTION SUBCUTANEOUS at 17:49

## 2023-09-29 RX ADMIN — LACTULOSE 20 GRAM(S): 10 SOLUTION ORAL at 17:48

## 2023-09-29 RX ADMIN — NYSTATIN CREAM 1 APPLICATION(S): 100000 CREAM TOPICAL at 05:10

## 2023-09-29 NOTE — PROGRESS NOTE ADULT - ASSESSMENT
77M, pmhx afib on Eliquis (s/p Cardioversion on 1/31/23 and again on last admission 6/19/23), Moderate AS, Chronic Diastolic Heart Failure, CAD s/p PCI, HTN, HLD, DM 2, Morbid Obesity, ALLIE, Thoracic Aortic Aneurysm, BPH s/p TURP, Left Renal Mass, and patient uses PRN home O2.  He was admitted in June for abdominal pain x 4 days and found to have UTI.  Echo at the time showed a significant drop in EF, severe AS.  Reduced EF attributed to afib and patient underwent DCCV with EP.  Cardiomems was placed.  Post procedure TTE with normal EF.  He had an abdominal CT that showed trace ascites and also underwent MRCP on that admission that showed cirrhosis.   CT surgery was consulted on that admission for TAVR evaluation.  TAVR CTA completed 6/22/23.  At that time, anticoagulation was not able to be interrupted per EP.  Plan was for outpatient follow up with Dr. Napier, but patient was lost to follow up.  He also has poor dentition and was asked to see a dentist as an outpatient for possible extractions.    Pt now presents 9/24 from home (recently moved out of assisted living to live in his son's basement apartment), with palpitations and SOB.  He was given IV lasix and admitted for acute on chronic diastolic heart failure.  Asked to consult regarding TAVR.    Of note, pt had LHC in January 2023 that showed 60% dcx disease and 100% pRCA with collateral circulation.  LIBBY in January with moderate to severe AS and mild AI.

## 2023-09-29 NOTE — PROGRESS NOTE ADULT - SUBJECTIVE AND OBJECTIVE BOX
ANIA CRISTOBAL  [id number]      Chief Complaint:  CHF/ AS/ CAD    Interval History:  Patient resting comfortably, lying flat, no c/o.    Tele:  Mostly AF, rate controlled          albuterol/ipratropium for Nebulization 3 milliLiter(s) Nebulizer every 6 hours  aspirin  chewable 81 milliGRAM(s) Oral daily  atorvastatin 10 milliGRAM(s) Oral at bedtime  dextrose 5%. 1000 milliLiter(s) IV Continuous <Continuous>  dextrose 5%. 1000 milliLiter(s) IV Continuous <Continuous>  dextrose 50% Injectable 25 Gram(s) IV Push once  dextrose 50% Injectable 12.5 Gram(s) IV Push once  dextrose 50% Injectable 25 Gram(s) IV Push once  dextrose Oral Gel 15 Gram(s) Oral once PRN  diazepam    Tablet 2 milliGRAM(s) Oral three times a day PRN  doxazosin 2 milliGRAM(s) Oral daily  enoxaparin Injectable 130 milliGRAM(s) SubCutaneous every 12 hours  ergocalciferol 47125 Unit(s) Oral every week  finasteride 5 milliGRAM(s) Oral daily  glucagon  Injectable 1 milliGRAM(s) IntraMuscular once  influenza  Vaccine (HIGH DOSE) 0.7 milliLiter(s) IntraMuscular once  insulin glargine Injectable (LANTUS) 10 Unit(s) SubCutaneous at bedtime  insulin lispro (ADMELOG) corrective regimen sliding scale   SubCutaneous three times a day before meals  lactulose Syrup 20 Gram(s) Oral two times a day  metoprolol tartrate 12.5 milliGRAM(s) Oral two times a day  midodrine. 5 milliGRAM(s) Oral three times a day  nystatin Ointment 1 Application(s) Topical every 12 hours  pantoprazole    Tablet 40 milliGRAM(s) Oral before breakfast  polyethylene glycol 3350 17 Gram(s) Oral daily  senna 2 Tablet(s) Oral at bedtime  sucralfate 1 Gram(s) Oral two times a day  torsemide 20 milliGRAM(s) Oral daily          Physical Exam:  T(C): 36.3 (09-29-23 @ 10:43), Max: 37.1 (09-29-23 @ 04:00)  HR: 91 (09-29-23 @ 10:43) (74 - 96)  BP: 98/64 (09-29-23 @ 10:43) (98/64 - 138/79)  RR: 19 (09-29-23 @ 10:43) (17 - 19)  SpO2: 96% (09-29-23 @ 10:43) (93% - 98%)  General: Comfortable in NAD  Neck: No JVD  CVS: nl s1s2, no s3, irreg  Pulm: CTA b/l  Abd: soft, non-tender  Ext: No c/c/e  Neuro A&O x3  Psych: Normal affect      Labs:   29 Sep 2023 05:28    140    |  102    |  24.4   ----------------------------<  120    4.1     |  29.0   |  0.74     Ca    8.6        29 Sep 2023 05:28  Phos  3.8       29 Sep 2023 05:28  Mg     2.0       29 Sep 2023 05:28                            12.3   7.00  )-----------( 216      ( 29 Sep 2023 05:28 )             39.3                 Echo 6/20/23  Summary:   1. LV Ejection Fraction by Oleary's Method with a biplane EF of 50 %.   2. Normal global left ventricular systolic function.   3. Trace mitral valve regurgitation.   4. Mild aortic regurgitation.   5. Endocardial visualization was enhanced with intravenous echo contrast.   6. Compared to TTE dated 6/11/23 the LVEF has improved.    STRESS TEST:    CATHETERIZATION:  [facility] 1/2023:  Coronary Angiography   - Left dominant system   - Patent prox and mid LAD stents   - Moderate stenosis of the distal LCx   - The RCA is a small, non-dominat vessel.  The proximal segment is occluded with brisk right-to-right collaterals.  - 25mm LV-Ao gradient on pullback.  CIELO 1.4cm2 with moderate AS byecho.  - Moderately reduced LVEF by echo           Pharmacological stress test 9/27/23  * Myocardial Perfusion SPECT results are mildly abnormal. * There is a small, moderate defect in the distal inferior and inferoapical wall that is fixed, consistent with infarct. Basal inferior wall defect paradoxically improved during stress imaging. Prone imaging not available. No clear evidence of ischemia. No TID (ratio 1.23).  * Post-stress resting myocardial perfusion gated SPECT imaging was performed (LVEF = 48 %;LVEDV = 176 ml.), mild hypokinesis of the distal inferior wall.    TTE 9/27/23  Summary:   1. Left ventricular ejection fraction, by visual estimation, is 40 to   45%.   2. Mildly to moderately decreased global left ventricular systolic   function.   3. Mildly to moderately decreased segmental left ventricular systolic   function.   4. Basal and mid anterolateral wall and basal and mid inferolateral wall   are abnormal as described above.   5. Moderately increased LV wall thickness.   6. There is moderate concentric left ventricular hypertrophy.   7. Moderately enlarged left atrium.   8. Normal right atrial size.   9. Trivial pericardial effusion.  10. Mild thickening of the anterior and posterior mitral valve leaflets.  11. Moderate mitral annular calcification.  12. Trace mitral valve regurgitation.  13. Aortic valve is Poorly visualized.  14. Mild to moderate aortic regurgitation.  15. Severe aortic valve stenosis (Low flow-Low gradient severe aortic   stenosis).        Assessement   78 y/o male PMHx AF on Eliquis (s/p ablation on 6/2023), moderate-severe AS, Chronic Diastolic Heart Failure, CAD (Patent prox and mid LAD stents, Moderate stenosis of the distal LCx. The pRCA  with brisk right-to-right collaterals)., HTN, HLD, DM 2, Morbid Obesity, ALLIE, Thoracic Aortic Aneurysm, BPH s/p TURP, Left Renal Mass, On last admission patient underwent CardioMeMs placement, Pt now presents from home (recently moved out of assisted living to live in his son's basement apartment), with palpitations and SOB.  He was given IV lasix and admitted for acute on chronic diastolic heart failure. Cardiology consultation requested for evaluation of acute decompensation of heart failure.   Today with low BP, received midodrine, currently BP WNL  On exam patient looks euvolemic   tele with paroxysmal afib, with control rate.   Stress test with no clear evidence of ischemia.  Stable from cardiovascular perspective     Plan   1. Continue current CV medications  2. Continue with  lovenox.  Change to Eliquis on d/c.  3. TAVR per CTS, scheduled 10/6.

## 2023-09-29 NOTE — PROGRESS NOTE ADULT - SUBJECTIVE AND OBJECTIVE BOX
Subjective:    T(C): 37.1 (09-29-23 @ 04:00), Max: 37.1 (09-29-23 @ 04:00)  HR: 76 (09-29-23 @ 04:00) (69 - 96)  BP: 125/87 (09-29-23 @ 04:00) (90/58 - 138/79)  RR: 18 (09-29-23 @ 04:00) (17 - 19)  SpO2: 98% (09-29-23 @ 04:00) (93% - 98%)    09-29    140  |  102  |  24.4<H>  ----------------------------<  120<H>  4.1   |  29.0  |  0.74    Ca    8.6      29 Sep 2023 05:28  Phos  3.8     09-29  Mg     2.0     09-29                                 12.3   7.00  )-----------( 216      ( 29 Sep 2023 05:28 )             39.3                    CAPILLARY BLOOD GLUCOSE  POCT Blood Glucose.: 170 mg/dL (28 Sep 2023 21:01)  POCT Blood Glucose.: 151 mg/dL (28 Sep 2023 16:06)  POCT Blood Glucose.: 145 mg/dL (28 Sep 2023 11:09)  POCT Blood Glucose.: 124 mg/dL (28 Sep 2023 07:32)    I&O's Detail    27 Sep 2023 07:01  -  28 Sep 2023 07:00  --------------------------------------------------------  IN:    Oral Fluid: 930 mL  Total IN: 930 mL    OUT:    Voided (mL): 650 mL  Total OUT: 650 mL  Total NET: 280 mL    28 Sep 2023 07:01  -  29 Sep 2023 06:49  --------------------------------------------------------  IN:    Oral Fluid: 1405 mL  Total IN: 1405 mL    OUT:    Voided (mL): 750 mL  Total OUT: 750 mL  Total NET: 655 mL    Drug Dosing Weight  Height (cm): 188 (25 Sep 2023 13:12)  Weight (kg): 127.5 (25 Sep 2023 13:12)  BMI (kg/m2): 36.1 (25 Sep 2023 13:12)  BSA (m2): 2.51 (25 Sep 2023 13:12)    MEDICATIONS  (STANDING):  albuterol/ipratropium for Nebulization 3 milliLiter(s) Nebulizer every 6 hours  aspirin  chewable 81 milliGRAM(s) Oral daily  atorvastatin 10 milliGRAM(s) Oral at bedtime  dextrose 5%. 1000 milliLiter(s) (100 mL/Hr) IV Continuous <Continuous>  dextrose 5%. 1000 milliLiter(s) (50 mL/Hr) IV Continuous <Continuous>  dextrose 50% Injectable 25 Gram(s) IV Push once  dextrose 50% Injectable 12.5 Gram(s) IV Push once  dextrose 50% Injectable 25 Gram(s) IV Push once  doxazosin 2 milliGRAM(s) Oral daily  enoxaparin Injectable 130 milliGRAM(s) SubCutaneous every 12 hours  ergocalciferol 39767 Unit(s) Oral every week  finasteride 5 milliGRAM(s) Oral daily  glucagon  Injectable 1 milliGRAM(s) IntraMuscular once  influenza  Vaccine (HIGH DOSE) 0.7 milliLiter(s) IntraMuscular once  insulin glargine Injectable (LANTUS) 10 Unit(s) SubCutaneous at bedtime  insulin lispro (ADMELOG) corrective regimen sliding scale   SubCutaneous three times a day before meals  lactulose Syrup 20 Gram(s) Oral two times a day  metoprolol tartrate 12.5 milliGRAM(s) Oral two times a day  midodrine. 5 milliGRAM(s) Oral three times a day  nystatin Ointment 1 Application(s) Topical every 12 hours  pantoprazole    Tablet 40 milliGRAM(s) Oral before breakfast  polyethylene glycol 3350 17 Gram(s) Oral daily  senna 2 Tablet(s) Oral at bedtime  sucralfate 1 Gram(s) Oral two times a day  torsemide 20 milliGRAM(s) Oral daily    MEDICATIONS  (PRN):  dextrose Oral Gel 15 Gram(s) Oral once PRN Blood Glucose LESS THAN 70 milliGRAM(s)/deciliter  diazepam    Tablet 2 milliGRAM(s) Oral three times a day PRN anxiety    Physical Exam  Gen:   Neuro:   Pulm:   CV:   Abd:   Extrem/MS:          Subjective: no complaints denies CP, palpitations, SOB, cough, fever, chills, itchiness/rash, diaphoresis, vision changes, HA, dizziness/lightheadedness, numbness/tingling, abd pain, N/V     T(C): 37.1 (09-29-23 @ 04:00), Max: 37.1 (09-29-23 @ 04:00)  HR: 76 (09-29-23 @ 04:00) (69 - 96)  BP: 125/87 (09-29-23 @ 04:00) (90/58 - 138/79)  RR: 18 (09-29-23 @ 04:00) (17 - 19)  SpO2: 98% (09-29-23 @ 04:00) (93% - 98%)    09-29    140  |  102  |  24.4<H>  ----------------------------<  120<H>  4.1   |  29.0  |  0.74    Ca    8.6      29 Sep 2023 05:28  Phos  3.8     09-29  Mg     2.0     09-29                                 12.3   7.00  )-----------( 216      ( 29 Sep 2023 05:28 )             39.3                    CAPILLARY BLOOD GLUCOSE  POCT Blood Glucose.: 170 mg/dL (28 Sep 2023 21:01)  POCT Blood Glucose.: 151 mg/dL (28 Sep 2023 16:06)  POCT Blood Glucose.: 145 mg/dL (28 Sep 2023 11:09)  POCT Blood Glucose.: 124 mg/dL (28 Sep 2023 07:32)    I&O's Detail    27 Sep 2023 07:01  -  28 Sep 2023 07:00  --------------------------------------------------------  IN:    Oral Fluid: 930 mL  Total IN: 930 mL    OUT:    Voided (mL): 650 mL  Total OUT: 650 mL  Total NET: 280 mL    28 Sep 2023 07:01  -  29 Sep 2023 06:49  --------------------------------------------------------  IN:    Oral Fluid: 1405 mL  Total IN: 1405 mL    OUT:    Voided (mL): 750 mL  Total OUT: 750 mL  Total NET: 655 mL    Drug Dosing Weight  Height (cm): 188 (25 Sep 2023 13:12)  Weight (kg): 127.5 (25 Sep 2023 13:12)  BMI (kg/m2): 36.1 (25 Sep 2023 13:12)  BSA (m2): 2.51 (25 Sep 2023 13:12)    MEDICATIONS  (STANDING):  albuterol/ipratropium for Nebulization 3 milliLiter(s) Nebulizer every 6 hours  aspirin  chewable 81 milliGRAM(s) Oral daily  atorvastatin 10 milliGRAM(s) Oral at bedtime  dextrose 5%. 1000 milliLiter(s) (100 mL/Hr) IV Continuous <Continuous>  dextrose 5%. 1000 milliLiter(s) (50 mL/Hr) IV Continuous <Continuous>  dextrose 50% Injectable 25 Gram(s) IV Push once  dextrose 50% Injectable 12.5 Gram(s) IV Push once  dextrose 50% Injectable 25 Gram(s) IV Push once  doxazosin 2 milliGRAM(s) Oral daily  enoxaparin Injectable 130 milliGRAM(s) SubCutaneous every 12 hours  ergocalciferol 59684 Unit(s) Oral every week  finasteride 5 milliGRAM(s) Oral daily  glucagon  Injectable 1 milliGRAM(s) IntraMuscular once  influenza  Vaccine (HIGH DOSE) 0.7 milliLiter(s) IntraMuscular once  insulin glargine Injectable (LANTUS) 10 Unit(s) SubCutaneous at bedtime  insulin lispro (ADMELOG) corrective regimen sliding scale   SubCutaneous three times a day before meals  lactulose Syrup 20 Gram(s) Oral two times a day  metoprolol tartrate 12.5 milliGRAM(s) Oral two times a day  midodrine. 5 milliGRAM(s) Oral three times a day  nystatin Ointment 1 Application(s) Topical every 12 hours  pantoprazole    Tablet 40 milliGRAM(s) Oral before breakfast  polyethylene glycol 3350 17 Gram(s) Oral daily  senna 2 Tablet(s) Oral at bedtime  sucralfate 1 Gram(s) Oral two times a day  torsemide 20 milliGRAM(s) Oral daily    MEDICATIONS  (PRN):  dextrose Oral Gel 15 Gram(s) Oral once PRN Blood Glucose LESS THAN 70 milliGRAM(s)/deciliter  diazepam    Tablet 2 milliGRAM(s) Oral three times a day PRN anxiety    Physical Exam  Gen: NAD  Neuro: A&Ox3 non focal speech clear and intact  Pulm: CTA b/l no wheezing  CV: S1S2 irregular +DORINDA  Abd: +BS soft NT ND  Extrem/MS: mild LE edema no cyanosis, GUERRA, chronic venous stasis changes  b/l groins with erythema ?fungal rash

## 2023-09-29 NOTE — PROGRESS NOTE ADULT - SUBJECTIVE AND OBJECTIVE BOX
Patient is a 77y old  Male admitted for CHF , SOB , h/o AS     pt is feeling SOB at times more on exertion   + BM daily   NO CP reported   He is hard of hearing       Vital Signs Last 24 Hrs  T(C): 36.3 (29 Sep 2023 10:43), Max: 37.1 (29 Sep 2023 04:00)  T(F): 97.4 (29 Sep 2023 10:43), Max: 98.7 (29 Sep 2023 04:00)  HR: 91 (29 Sep 2023 10:43) (74 - 96)  BP: 98/64 (29 Sep 2023 10:43) (98/64 - 138/79)  BP(mean): --  RR: 19 (29 Sep 2023 10:43) (17 - 19)  SpO2: 96% (29 Sep 2023 10:43) (93% - 98%)    Parameters below as of 29 Sep 2023 09:17  Patient On (Oxygen Delivery Method): room air      General: awake alert , anxious   Lungs: CTA bilateral   Cardio: RRR, S1/S2, No murmur  Abdomen: Soft, Nontender,+ distention ; Bowel sounds present  Extremities: No calf tenderness, + 3 pitting edema   Skin : normal color , warm                                   12.3   7.00  )-----------( 216      ( 29 Sep 2023 05:28 )             39.3   09-29    140  |  102  |  24.4<H>  ----------------------------<  120<H>  4.1   |  29.0  |  0.74    Ca    8.6      29 Sep 2023 05:28  Phos  3.8     09-29  Mg     2.0     09-29      < from: CT Maxillofacial No Cont (09.26.23 @ 23:03) >    MPRESSION: Periapical lucencies involving the right maxillary second and   third molar roots.    --- End of Report ---      < end of copied text >

## 2023-09-29 NOTE — PROGRESS NOTE ADULT - PROBLEM SELECTOR PLAN 1
Severe AS on last admission. Now admitted with acute on chronic diastolic heart failure.  TAVR CTA completed in June on last admission.  Pt was lost to follow up in the office.  Pt was supposed to see dental but has not. CT max/facial with periapical lucencies per report.  CT scan reviewed by Dr. Morin at [facility] dental on 9/28 no need for transfer for evalution at this time, pt instructed to f/u as outpt for comprehensive dental exam.   NST 9/27 with sm-mod defect, no ishemia, reviewed with cardiology, no further work up needed (last C in January 2023 that showed 60% dcx disease and 100% pRCA with collateral circulation.  TTE  with Severe low flow low gradient AS.  No need for repeat LIBBY per Dr. Napier.  tentative TAVR Fri 10/6/23.   nystatin for b/l groins   Plan discussed with Dr. Napier.

## 2023-09-29 NOTE — PROGRESS NOTE ADULT - NUTRITIONAL ASSESSMENT
This patient has been assessed with a concern for Malnutrition and has been determined to have a diagnosis/diagnoses of Severe protein-calorie malnutrition.    This patient is being managed with:   Diet Consistent Carbohydrate w/Evening Snack-  1200mL Fluid Restriction ([id number])  Entered: Jun 12 2023  8:10AM  
This patient has been assessed with a concern for Malnutrition and has been determined to have a diagnosis/diagnoses of Severe protein-calorie malnutrition.    This patient is being managed with:   Diet Consistent Carbohydrate w/Evening Snack-  1200mL Fluid Restriction ([id number])  Entered: Jun 12 2023  8:10AM  
This patient has been assessed with a concern for Malnutrition and has been determined to have a diagnosis/diagnoses of Severe protein-calorie malnutrition.    This patient is being managed with:   Diet Consistent Carbohydrate w/Evening Snack-  1200mL Fluid Restriction ([id number])  Entered: Jun 12 2023  8:10AM  
This patient has been assessed with a concern for Malnutrition and has been determined to have a diagnosis/diagnoses of Severe protein-calorie malnutrition.    This patient is being managed with:   Diet Consistent Carbohydrate w/Evening Snack-  1200mL Fluid Restriction ([id number])  Entered: Jun 12 2023  8:10AM  
This patient has been assessed with a concern for Malnutrition and has been determined to have a diagnosis/diagnoses of Severe protein-calorie malnutrition.    This patient is being managed with:   Diet Consistent Carbohydrate w/Evening Snack-  1200mL Fluid Restriction ([id number])  Entered: Jun 12 2023  8:10AM

## 2023-09-29 NOTE — PROGRESS NOTE ADULT - ASSESSMENT
78 y/o male with PMH of afib on Eliquis (s/p Cardioversion abd ablation  1/31/23), Moderate AS, Chronic Diastolic Heart Failure, CAD s/p PCI, HTN, HLD, DM 2, Morbid Obesity, ALLIE, Thoracic Aortic Aneurysm, BPH s/p TURP, Left Renal Mass Presents with shortness of breath for last few days , more worse since last night , also noted with worsening lower ext swelling . moved out of Assisted living recently to live with his son basement apartment.  Patient does not eat at home and usually eat food from [employer] as he has no one to cook for him.  also felt some palpitations last night but now feels better, given lasix iv in ed.   has home o2 , uses it prn.       1- HFrEF acute on cronic with severe AS   improved euovolemic   s/p IV lasix   now on po toresmide cont   For TAVR on 10/6 by CT surgery team       2-h/o Atrial fib, cronic   cont eliquis   resume metoprolol low dose IF BP stable   hypotension  2 days ago cardiac ,meds on hold s  restart as BP tolerates     3- Severe As   seen by CT surgery team   no need to go to [facility] for dental evaluation   surgery planned for 10/6     4- Abdominal  bloating and constipation   resolved   cont senna   miralax prn     5- DM 2  - hold metformin   cont  basal insulin , ssi     6- anxiety   valium prn     7- Morbid Obesity, ALLIE    8- hx of Thoracic Aortic Aneurysm  - monitor as op     9-BPH s/p TURP,  - cw home meds     10- Hypothyroid   will cont levothyroxine     11- Severe protein elisabeth malnutrition   cont oral diet    > dvt ppx : on full dose lovenox in preparation for surgery next week

## 2023-09-29 NOTE — CHART NOTE - NSCHARTNOTEFT_GEN_A_CORE
Palliative care social work note.    SW contacted patients son to provide support and address questions and concerns. Son reviewed his understanding thus far regarding medical issues and planning for valve surgery.

## 2023-09-30 LAB
GLUCOSE BLDC GLUCOMTR-MCNC: 150 MG/DL — HIGH (ref 70–99)
GLUCOSE BLDC GLUCOMTR-MCNC: 152 MG/DL — HIGH (ref 70–99)
GLUCOSE BLDC GLUCOMTR-MCNC: 174 MG/DL — HIGH (ref 70–99)
GLUCOSE BLDC GLUCOMTR-MCNC: 187 MG/DL — HIGH (ref 70–99)

## 2023-09-30 PROCEDURE — 99231 SBSQ HOSP IP/OBS SF/LOW 25: CPT

## 2023-09-30 PROCEDURE — 99232 SBSQ HOSP IP/OBS MODERATE 35: CPT

## 2023-09-30 PROCEDURE — 99233 SBSQ HOSP IP/OBS HIGH 50: CPT

## 2023-09-30 RX ORDER — IPRATROPIUM/ALBUTEROL SULFATE 18-103MCG
3 AEROSOL WITH ADAPTER (GRAM) INHALATION EVERY 6 HOURS
Refills: 0 | Status: DISCONTINUED | OUTPATIENT
Start: 2023-09-30 | End: 2023-10-09

## 2023-09-30 RX ADMIN — Medication 2 MILLIGRAM(S): at 17:12

## 2023-09-30 RX ADMIN — Medication 1 GRAM(S): at 17:08

## 2023-09-30 RX ADMIN — Medication 2 MILLIGRAM(S): at 21:10

## 2023-09-30 RX ADMIN — MIDODRINE HYDROCHLORIDE 5 MILLIGRAM(S): 2.5 TABLET ORAL at 17:26

## 2023-09-30 RX ADMIN — Medication 81 MILLIGRAM(S): at 12:10

## 2023-09-30 RX ADMIN — Medication 12.5 MILLIGRAM(S): at 06:04

## 2023-09-30 RX ADMIN — Medication 1 GRAM(S): at 06:04

## 2023-09-30 RX ADMIN — Medication 2 MILLIGRAM(S): at 06:04

## 2023-09-30 RX ADMIN — Medication 2 MILLIGRAM(S): at 12:08

## 2023-09-30 RX ADMIN — NYSTATIN CREAM 1 APPLICATION(S): 100000 CREAM TOPICAL at 06:05

## 2023-09-30 RX ADMIN — Medication 2: at 12:09

## 2023-09-30 RX ADMIN — Medication 20 MILLIGRAM(S): at 06:05

## 2023-09-30 RX ADMIN — Medication 12.5 MILLIGRAM(S): at 17:09

## 2023-09-30 RX ADMIN — FINASTERIDE 5 MILLIGRAM(S): 5 TABLET, FILM COATED ORAL at 12:10

## 2023-09-30 RX ADMIN — ENOXAPARIN SODIUM 130 MILLIGRAM(S): 100 INJECTION SUBCUTANEOUS at 17:08

## 2023-09-30 RX ADMIN — MIDODRINE HYDROCHLORIDE 5 MILLIGRAM(S): 2.5 TABLET ORAL at 06:05

## 2023-09-30 RX ADMIN — NYSTATIN CREAM 1 APPLICATION(S): 100000 CREAM TOPICAL at 17:11

## 2023-09-30 RX ADMIN — PANTOPRAZOLE SODIUM 40 MILLIGRAM(S): 20 TABLET, DELAYED RELEASE ORAL at 06:04

## 2023-09-30 RX ADMIN — Medication 2: at 17:07

## 2023-09-30 RX ADMIN — INSULIN GLARGINE 10 UNIT(S): 100 INJECTION, SOLUTION SUBCUTANEOUS at 21:11

## 2023-09-30 RX ADMIN — ENOXAPARIN SODIUM 130 MILLIGRAM(S): 100 INJECTION SUBCUTANEOUS at 06:05

## 2023-09-30 RX ADMIN — MIDODRINE HYDROCHLORIDE 5 MILLIGRAM(S): 2.5 TABLET ORAL at 12:10

## 2023-09-30 RX ADMIN — ATORVASTATIN CALCIUM 10 MILLIGRAM(S): 80 TABLET, FILM COATED ORAL at 21:12

## 2023-09-30 NOTE — PROGRESS NOTE ADULT - SUBJECTIVE AND OBJECTIVE BOX
ANIA CRISTOBAL  [id number]      Chief Complaint:  CHF/ AS/ CAD    Interval History:  Patient resting comfortably, lying flat, no c/o.    Tele:  AF, rate controlled        albuterol/ipratropium for Nebulization 3 milliLiter(s) Nebulizer every 6 hours PRN  aspirin  chewable 81 milliGRAM(s) Oral daily  atorvastatin 10 milliGRAM(s) Oral at bedtime  dextrose 5%. 1000 milliLiter(s) IV Continuous <Continuous>  dextrose 5%. 1000 milliLiter(s) IV Continuous <Continuous>  dextrose 50% Injectable 25 Gram(s) IV Push once  dextrose 50% Injectable 12.5 Gram(s) IV Push once  dextrose 50% Injectable 25 Gram(s) IV Push once  dextrose Oral Gel 15 Gram(s) Oral once PRN  diazepam    Tablet 2 milliGRAM(s) Oral three times a day PRN  doxazosin 2 milliGRAM(s) Oral daily  enoxaparin Injectable 130 milliGRAM(s) SubCutaneous every 12 hours  ergocalciferol 50715 Unit(s) Oral every week  finasteride 5 milliGRAM(s) Oral daily  glucagon  Injectable 1 milliGRAM(s) IntraMuscular once  influenza  Vaccine (HIGH DOSE) 0.7 milliLiter(s) IntraMuscular once  insulin glargine Injectable (LANTUS) 10 Unit(s) SubCutaneous at bedtime  insulin lispro (ADMELOG) corrective regimen sliding scale   SubCutaneous three times a day before meals  lactulose Syrup 20 Gram(s) Oral two times a day  metoprolol tartrate 12.5 milliGRAM(s) Oral two times a day  midodrine. 5 milliGRAM(s) Oral three times a day  nystatin Ointment 1 Application(s) Topical every 12 hours  pantoprazole    Tablet 40 milliGRAM(s) Oral before breakfast  polyethylene glycol 3350 17 Gram(s) Oral daily  senna 2 Tablet(s) Oral at bedtime  sucralfate 1 Gram(s) Oral two times a day  torsemide 20 milliGRAM(s) Oral daily          Physical Exam:  T(C): 37.2 (09-30-23 @ 08:30), Max: 37.2 (09-30-23 @ 08:30)  HR: 80 (09-30-23 @ 09:56) (78 - 92)  BP: 96/64 (09-30-23 @ 08:30) (96/64 - 124/79)  RR: 18 (09-30-23 @ 08:30) (18 - 18)  SpO2: 96% (09-30-23 @ 09:56) (93% - 97%)  General: Comfortable in NAD  Neck: No JVD  CVS: nl s1s2, no s3, irreg, +DORINDA  Pulm: CTA b/l  Abd: soft, non-tender  Ext: No c/c/e  Neuro A&O x3  Psych: Normal affect        Labs:   29 Sep 2023 05:28    140    |  102    |  24.4   ----------------------------<  120    4.1     |  29.0   |  0.74     Ca    8.6        29 Sep 2023 05:28  Phos  3.8       29 Sep 2023 05:28  Mg     2.0       29 Sep 2023 05:28                            12.3   7.00  )-----------( 216      ( 29 Sep 2023 05:28 )             39.3           Echo 6/20/23  Summary:   1. LV Ejection Fraction by Oleary's Method with a biplane EF of 50 %.   2. Normal global left ventricular systolic function.   3. Trace mitral valve regurgitation.   4. Mild aortic regurgitation.   5. Endocardial visualization was enhanced with intravenous echo contrast.   6. Compared to TTE dated 6/11/23 the LVEF has improved.    STRESS TEST:    CATHETERIZATION:  [facility] 1/2023:  Coronary Angiography   - Left dominant system   - Patent prox and mid LAD stents   - Moderate stenosis of the distal LCx   - The RCA is a small, non-dominat vessel.  The proximal segment is occluded with brisk right-to-right collaterals.  - 25mm LV-Ao gradient on pullback.  CIELO 1.4cm2 with moderate AS byecho.  - Moderately reduced LVEF by echo           Pharmacological stress test 9/27/23  * Myocardial Perfusion SPECT results are mildly abnormal. * There is a small, moderate defect in the distal inferior and inferoapical wall that is fixed, consistent with infarct. Basal inferior wall defect paradoxically improved during stress imaging. Prone imaging not available. No clear evidence of ischemia. No TID (ratio 1.23).  * Post-stress resting myocardial perfusion gated SPECT imaging was performed (LVEF = 48 %;LVEDV = 176 ml.), mild hypokinesis of the distal inferior wall.    TTE 9/27/23  Summary:   1. Left ventricular ejection fraction, by visual estimation, is 40 to   45%.   2. Mildly to moderately decreased global left ventricular systolic   function.   3. Mildly to moderately decreased segmental left ventricular systolic   function.   4. Basal and mid anterolateral wall and basal and mid inferolateral wall   are abnormal as described above.   5. Moderately increased LV wall thickness.   6. There is moderate concentric left ventricular hypertrophy.   7. Moderately enlarged left atrium.   8. Normal right atrial size.   9. Trivial pericardial effusion.  10. Mild thickening of the anterior and posterior mitral valve leaflets.  11. Moderate mitral annular calcification.  12. Trace mitral valve regurgitation.  13. Aortic valve is Poorly visualized.  14. Mild to moderate aortic regurgitation.  15. Severe aortic valve stenosis (Low flow-Low gradient severe aortic   stenosis).        Assessement   78 y/o male PMHx AF on Eliquis (s/p ablation on 6/2023), moderate-severe AS, Chronic Diastolic Heart Failure, CAD (Patent prox and mid LAD stents, Moderate stenosis of the distal LCx. The pRCA  with brisk right-to-right collaterals)., HTN, HLD, DM 2, Morbid Obesity, ALLIE, Thoracic Aortic Aneurysm, BPH s/p TURP, Left Renal Mass, On last admission patient underwent CardioMeMs placement, Pt now presents from home (recently moved out of assisted living to live in his son's basement apartment), with palpitations and SOB.  He was given IV lasix and admitted for acute on chronic diastolic heart failure. Cardiology consultation requested for evaluation of acute decompensation of heart failure.   Today with low BP, received midodrine, currently BP WNL  On exam patient looks euvolemic   tele with paroxysmal afib, with control rate.   Stress test with no clear evidence of ischemia.  Stable from cardiovascular perspective     Plan   1. Continue current CV medications with po Torsemide.  2. Continue with  lovenox.  Change to Eliquis on d/c.  3. TAVR per CTS, scheduled 10/6.

## 2023-09-30 NOTE — PROGRESS NOTE ADULT - PROBLEM SELECTOR PLAN 1
Severe AS on last admission. Now admitted with acute on chronic diastolic heart failure.  TAVR CTA completed in June on last admission.  Pt was lost to follow up in the office.  Pt was supposed to see dental but has not. CT max/facial with periapical lucencies per report.  CT scan reviewed by Dr. Morin at [facility] dental on 9/28 no need for transfer for evalution at this time, pt instructed to f/u as outpt for comprehensive dental exam.   NST 9/27 with sm-mod defect, no ishemia, reviewed with cardiology, no further work up needed (last C in January 2023 that showed 60% dcx disease and 100% pRCA with collateral circulation.  TTE  with Severe low flow low gradient AS.  No need for repeat LIBBY per Dr. Napier.  tentative TAVR Fri 10/6/23.   nystatin for b/l groins   Plan discussed with Dr. Napier.

## 2023-09-30 NOTE — PROGRESS NOTE ADULT - ASSESSMENT
77M, pmhx afib on Eliquis (s/p Cardioversion on 1/31/23 and again on last admission 6/19/23), Moderate AS, Chronic Diastolic Heart Failure, CAD s/p PCI, HTN, HLD, DM 2, Morbid Obesity, ALLIE, Thoracic Aortic Aneurysm, BPH s/p TURP, Left Renal Mass, and patient uses PRN home O2.  He was admitted in June for abdominal pain x 4 days and found to have UTI.  Echo at the time showed a significant drop in EF, severe AS.  Reduced EF attributed to afib and patient underwent DCCV with EP.  Cardiomems was placed.  Post procedure TTE with normal EF.  He had an abdominal CT that showed trace ascites and also underwent MRCP on that admission that showed cirrhosis.   CT surgery was consulted on that admission for TAVR evaluation.  TAVR CTA completed 6/22/23.  At that time, anticoagulation was not able to be interrupted per EP.  Plan was for outpatient follow up with Dr. Napier, but patient was lost to follow up.  He also has poor dentition and was asked to see a dentist as an outpatient for possible extractions.    Pt now presents 9/24 from home (recently moved out of assisted living to live in his son's basement apartment), with palpitations and SOB.  He was given IV lasix and admitted for acute on chronic diastolic heart failure.  Asked to consult regarding TAVR.    Of note, pt had LHC in January 2023 that showed 60% dcx disease and 100% pRCA with collateral circulation.  LIBBY in January with moderate to severe AS and mild AI.    independent/needs device

## 2023-09-30 NOTE — PROGRESS NOTE ADULT - SUBJECTIVE AND OBJECTIVE BOX
CHIEF COMPLAINT/INTERVAL HISTORY:    Patient is a 77y old  Male who presents with a chief complaint of sob (30 Sep 2023 16:19)    SUBJECTIVE & OBJECTIVE: Pt seen and examined at bedside. No overnight events. Denies any active complaints.     ROS: No chest pain, palpitations, SOB, light headedness, dizziness, headache, nausea/vomiting, fevers/chills, abdominal pain, dysuria.    ICU Vital Signs Last 24 Hrs  T(C): 36.9 (30 Sep 2023 16:45), Max: 37.2 (30 Sep 2023 08:30)  T(F): 98.4 (30 Sep 2023 16:45), Max: 99 (30 Sep 2023 08:30)  HR: 81 (30 Sep 2023 16:45) (78 - 92)  BP: 112/73 (30 Sep 2023 16:45) (96/64 - 124/79)  RR: 18 (30 Sep 2023 16:45) (18 - 18)  SpO2: 95% (30 Sep 2023 16:45) (93% - 97%)    O2 Parameters below as of 30 Sep 2023 09:56  Patient On (Oxygen Delivery Method): nasal cannula  O2 Flow (L/min): 2    MEDICATIONS  (STANDING):  aspirin  chewable 81 milliGRAM(s) Oral daily  atorvastatin 10 milliGRAM(s) Oral at bedtime  dextrose 5%. 1000 milliLiter(s) (100 mL/Hr) IV Continuous <Continuous>  dextrose 5%. 1000 milliLiter(s) (50 mL/Hr) IV Continuous <Continuous>  dextrose 50% Injectable 25 Gram(s) IV Push once  dextrose 50% Injectable 12.5 Gram(s) IV Push once  dextrose 50% Injectable 25 Gram(s) IV Push once  doxazosin 2 milliGRAM(s) Oral daily  enoxaparin Injectable 130 milliGRAM(s) SubCutaneous every 12 hours  ergocalciferol 78036 Unit(s) Oral every week  finasteride 5 milliGRAM(s) Oral daily  glucagon  Injectable 1 milliGRAM(s) IntraMuscular once  influenza  Vaccine (HIGH DOSE) 0.7 milliLiter(s) IntraMuscular once  insulin glargine Injectable (LANTUS) 10 Unit(s) SubCutaneous at bedtime  insulin lispro (ADMELOG) corrective regimen sliding scale   SubCutaneous three times a day before meals  lactulose Syrup 20 Gram(s) Oral two times a day  metoprolol tartrate 12.5 milliGRAM(s) Oral two times a day  midodrine. 5 milliGRAM(s) Oral three times a day  nystatin Ointment 1 Application(s) Topical every 12 hours  pantoprazole    Tablet 40 milliGRAM(s) Oral before breakfast  polyethylene glycol 3350 17 Gram(s) Oral daily  senna 2 Tablet(s) Oral at bedtime  sucralfate 1 Gram(s) Oral two times a day  torsemide 20 milliGRAM(s) Oral daily    MEDICATIONS  (PRN):  albuterol/ipratropium for Nebulization 3 milliLiter(s) Nebulizer every 6 hours PRN Shortness of Breath and/or Wheezing  dextrose Oral Gel 15 Gram(s) Oral once PRN Blood Glucose LESS THAN 70 milliGRAM(s)/deciliter  diazepam    Tablet 2 milliGRAM(s) Oral three times a day PRN anxiety      LABS:                        12.3   7.00  )-----------( 216      ( 29 Sep 2023 05:28 )             39.3     09-29    140  |  102  |  24.4<H>  ----------------------------<  120<H>  4.1   |  29.0  |  0.74    Ca    8.6      29 Sep 2023 05:28  Phos  3.8     09-29  Mg     2.0     09-29        Urinalysis Basic - ( 29 Sep 2023 05:28 )    Color: x / Appearance: x / SG: x / pH: x  Gluc: 120 mg/dL / Ketone: x  / Bili: x / Urobili: x   Blood: x / Protein: x / Nitrite: x   Leuk Esterase: x / RBC: x / WBC x   Sq Epi: x / Non Sq Epi: x / Bacteria: x        CAPILLARY BLOOD GLUCOSE      POCT Blood Glucose.: 187 mg/dL (30 Sep 2023 12:07)  POCT Blood Glucose.: 150 mg/dL (30 Sep 2023 08:16)  POCT Blood Glucose.: 140 mg/dL (29 Sep 2023 16:56)      PHYSICAL EXAM:    GENERAL: elderly male, laying in bed, NAD  HEAD:  Atraumatic, Normocephalic  EYES: EOMI, PERRLA, conjunctiva and sclera clear  ENMT: Moist mucous membranes  NECK: Supple, No JVD  NERVOUS SYSTEM:  Alert & Oriented X3, Motor Strength 5/5 B/L upper and lower extremities; DTRs 2+ intact and symmetric  CHEST/LUNG: coarse breath sounds  HEART: Regular rate and rhythm; + S1/S2  ABDOMEN: Soft, Nontender, Nondistended; Bowel sounds present  EXTREMITIES:  no pedal edema

## 2023-09-30 NOTE — PROGRESS NOTE ADULT - SUBJECTIVE AND OBJECTIVE BOX
Subjective: Patient seen and assessed for TAVR evaluation. Patient states "I dont know if they should be turning my oxygen down to 2, its been at 3 for a year." At time of exam, patient in NAD, Pt denies chest pain, palpitations, dizziness, headache, shortness of breath, abdominal pain or N/V/D/C. Reports rash to medial groin sites- healing.     Pertinent events of the past 24 hours: Uneventful    VITAL SIGNS  Vital Signs Last 24 Hrs  T(C): 37.2 (09-30-23 @ 08:30), Max: 37.2 (09-30-23 @ 08:30)  T(F): 99 (09-30-23 @ 08:30), Max: 99 (09-30-23 @ 08:30)  HR: 83 (09-30-23 @ 12:15) (78 - 92)  BP: 100/64 (09-30-23 @ 12:15) (96/64 - 124/79)  RR: 18 (09-30-23 @ 08:30) (18 - 18)  SpO2: 96% (09-30-23 @ 09:56) (93% - 97%)  on (O2)              MEDICATIONS  albuterol/ipratropium for Nebulization 3 milliLiter(s) Nebulizer every 6 hours PRN  aspirin  chewable 81 milliGRAM(s) Oral daily  atorvastatin 10 milliGRAM(s) Oral at bedtime  diazepam    Tablet 2 milliGRAM(s) Oral three times a day PRN  doxazosin 2 milliGRAM(s) Oral daily  enoxaparin Injectable 130 milliGRAM(s) SubCutaneous every 12 hours  ergocalciferol 63691 Unit(s) Oral every week  finasteride 5 milliGRAM(s) Oral daily  glucagon  Injectable 1 milliGRAM(s) IntraMuscular once  influenza  Vaccine (HIGH DOSE) 0.7 milliLiter(s) IntraMuscular once  insulin glargine Injectable (LANTUS) 10 Unit(s) SubCutaneous at bedtime  insulin lispro (ADMELOG) corrective regimen sliding scale   SubCutaneous three times a day before meals  lactulose Syrup 20 Gram(s) Oral two times a day  metoprolol tartrate 12.5 milliGRAM(s) Oral two times a day  midodrine. 5 milliGRAM(s) Oral three times a day  nystatin Ointment 1 Application(s) Topical every 12 hours  pantoprazole    Tablet 40 milliGRAM(s) Oral before breakfast  polyethylene glycol 3350 17 Gram(s) Oral daily  senna 2 Tablet(s) Oral at bedtime  sucralfate 1 Gram(s) Oral two times a day  torsemide 20 milliGRAM(s) Oral daily    PHYSICAL EXAM  Constitutional: NAD  Neuro: A+O x 3, non-focal, speech clear and intact  CV: +murmur   Pulm/chest: lung sounds CTA and equal bilaterally, no accessory muscle use noted  Abd: +BS, soft, NT, ND  Ext: GUERRA x 4, +1/2 LE edema   Skin: warm, well perfused  Psych: calm, appropriate affect    Intake and Output  09-29 @ 07:01  -  09-30 @ 07:00  --------------------------------------------------------  IN: 0 mL / OUT: 100 mL / NET: -100 mL    09-30 @ 07:01  -  09-30 @ 16:20  --------------------------------------------------------  IN: 0 mL / OUT: 600 mL / NET: -600 mL    Weights:  Daily     Daily   Admit Wt: Drug Dosing Weight  Height (cm): 188 (25 Sep 2023 13:12)  Weight (kg): 127.5 (25 Sep 2023 13:12)  BMI (kg/m2): 36.1 (25 Sep 2023 13:12)  BSA (m2): 2.51 (25 Sep 2023 13:12)    All laboratory results, radiology and medications reviewed.    LABS  09-29    140  |  102  |  24.4<H>  ----------------------------<  120<H>  4.1   |  29.0  |  0.74    Ca    8.6      29 Sep 2023 05:28  Phos  3.8     09-29  Mg     2.0     09-29                                   12.3   7.00  )-----------( 216      ( 29 Sep 2023 05:28 )             39.3              CAPILLARY BLOOD GLUCOSE  POCT Blood Glucose.: 187 mg/dL (30 Sep 2023 12:07)  POCT Blood Glucose.: 150 mg/dL (30 Sep 2023 08:16)  POCT Blood Glucose.: 140 mg/dL (29 Sep 2023 16:56)           < from: Xray Chest 1 View- PORTABLE-Urgent (Xray Chest 1 View- PORTABLE-Urgent .) (09.26.23 @ 11:40) >      INTERPRETATION:  An AP portable chest x-ray was performed for worsening   shortness of breath and CHF.    Comparison is made to 9/24/2023.    While the cardiac silhouette remains prominent with central pulmonary   venous engorgement, there is slightly improved aeration bilaterally   compared to the prior exam. No infiltrates are seen. There is no   pneumothorax. There areno pleural effusions. There is no new hilar or   mediastinal widening. Pulmonary venous engorgement is slightly diminished   at this time. There is a pressure monitoring device in the left   descending pulmonary artery, unchanged. The bony thorax remains stable.    IMPRESSION:  1. Prominent cardiac silhouette with mild central pulmonary venous   engorgement, slightly improved from the prior exam. No pulmonary edema.  2. Pressure monitoring device within the left descending pulmonary   artery, unchanged, without pneumothorax.    < end of copied text >      < from: TTE Echo Complete w/ Contrast w/ Doppler (09.27.23 @ 08:42) >    PHYSICIAN INTERPRETATION:  Left Ventricle: The left ventricular internal cavity size is normal. Left   ventricular wall thickness is moderately increased.  Global LV systolic function was mildly to moderately decreased. Left   ventricular ejection fraction, by visual estimation, is 40 to 45%. Mildly   to moderately decreased segmental left ventricular systolic function.      LV Wall Scoring:  The basal and mid anterolateral wall and basal and mid inferolateral wall   are  hypokinetic.    Right Ventricle: The right ventricular size is normal. RV systolic   function is normal.  Left Atrium: Moderately enlarged left atrium.  Right Atrium: Normal right atrial size.  Pericardium: Trivial pericardial effusion is present.  Mitral Valve: Mild thickening of the anterior and posterior mitral valve   leaflets. There is moderate mitral annular calcification. Trace mitral   valve regurgitation is seen.  Tricuspid Valve: Trivial tricuspid regurgitation is visualized.  Aortic Valve: The aortic valve is Poorly visualized. Severe aortic   stenosis is present. Mild to moderate aortic valve regurgitation is seen.   The Dimesionless Index value is 0.23.  Pulmonic Valve: Trace pulmonic valve regurgitation.  Aorta: There is dilatation of the aortic root and ascending aorta.      Summary:   1. Left ventricular ejection fraction, by visual estimation, is 40 to   45%.   2. Mildly to moderately decreased global left ventricular systolic   function.   3. Mildly to moderately decreased segmental left ventricular systolic   function.   4. Basal and mid anterolateral wall and basal and mid inferolateral wall   are abnormal as described above.   5. Moderately increased LV wall thickness.   6. There is moderate concentric left ventricular hypertrophy.   7. Moderately enlarged left atrium.   8. Normal right atrial size.   9. Trivial pericardial effusion.  10. Mild thickening of the anterior and posterior mitral valve leaflets.  11. Moderate mitral annular calcification.  12. Trace mitral valve regurgitation.  13. Aortic valve is Poorly visualized.  14. Mild to moderate aortic regurgitation.  15. Severe aortic valve stenosis (Low flow-Low gradient severe aortic   stenosis). Consider LIBBY for better characterization.  16. Moderate dilatation of the aortic root and ascending aorta.    < end of copied text >

## 2023-09-30 NOTE — PROGRESS NOTE ADULT - ASSESSMENT
Patient is a 77 year old male with PMH of afib on Eliquis (s/p Cardioversion abd ablation  1/31/23), Moderate AS, Chronic Diastolic Heart Failure, CAD s/p PCI, HTN, HLD, DM 2, Morbid Obesity, ALLIE, Thoracic Aortic Aneurysm, BPH s/p TURP, Left Renal Mass Presents with shortness of breath for last few days , more worse since last night , also noted with worsening lower ext swelling . moved out of Assisted living recently to live with his son basement apartment.  Patient does not eat at home and usually eat food from [employer] as he has no one to cook for him.  also felt some palpitations last night but now feels better, given lasix iv in ed.   has home o2 , uses it prn.     1- Acute on Chronic HFrEF   appears euvolemic today  s/p IV lasix  continue torsemide and metoprolol  TTE reviewed; EF 40-45%, severe AS and hypokinesis  Nuclear stress test - There is a small, moderate defect in the distal inferior and inferoapical wall that is fixed, consistent with infarct. Basal inferior wall defect paradoxically improved during stress imaging. Prone imaging not available. No clear evidence of ischemia.  strict I/os, daily weights  cardio recs appreciated    2- Atrial Fibrillation  BP soft, monitor on metoprolol and torsemide  wean off midodrine  continue therapeutic lovenox    3- Severe AS  seen by CT surgery team   no need to go to [facility] for dental evaluation   TTE reviewed; no indication for LIBBY  tentative plans for TAVR on 10/6  CT surgery recs appreciated    4- Constipation - resolved   continue bowel regimen  d/c miralax while on lactulose    5- DM 2  - hold metformin   - continue ISS  - continue lantus 10 units at bedtime  - ADA diet    6- Anxiety   valium prn     7- HLD  -continue statin    8- hx of Thoracic Aortic Aneurysm  - monitor as outpatient    9-BPH s/p TURP  -continue doxazosin and finasteride    10- Hypothyroidism  continue levothyroxine     11- GERD ? Gastric ulcer  -continue carafate and PPI    Dvt ppx - Lovenox BID    Dispo- Remains acute; tentative plans for TAVR on 10/6. Switch to CTS service on Monday.    Plan discussed with patient, Dr. Gotti, CTS PA, RN

## 2023-10-01 LAB
ANION GAP SERPL CALC-SCNC: 11 MMOL/L — SIGNIFICANT CHANGE UP (ref 5–17)
BASOPHILS # BLD AUTO: 0.06 K/UL — SIGNIFICANT CHANGE UP (ref 0–0.2)
BASOPHILS NFR BLD AUTO: 0.9 % — SIGNIFICANT CHANGE UP (ref 0–2)
BUN SERPL-MCNC: 23.4 MG/DL — HIGH (ref 8–20)
CALCIUM SERPL-MCNC: 8.3 MG/DL — LOW (ref 8.4–10.5)
CHLORIDE SERPL-SCNC: 102 MMOL/L — SIGNIFICANT CHANGE UP (ref 96–108)
CO2 SERPL-SCNC: 28 MMOL/L — SIGNIFICANT CHANGE UP (ref 22–29)
CREAT SERPL-MCNC: 0.81 MG/DL — SIGNIFICANT CHANGE UP (ref 0.5–1.3)
EGFR: 91 ML/MIN/1.73M2 — SIGNIFICANT CHANGE UP
EOSINOPHIL # BLD AUTO: 0.48 K/UL — SIGNIFICANT CHANGE UP (ref 0–0.5)
EOSINOPHIL NFR BLD AUTO: 6.8 % — HIGH (ref 0–6)
GLUCOSE BLDC GLUCOMTR-MCNC: 134 MG/DL — HIGH (ref 70–99)
GLUCOSE BLDC GLUCOMTR-MCNC: 150 MG/DL — HIGH (ref 70–99)
GLUCOSE BLDC GLUCOMTR-MCNC: 183 MG/DL — HIGH (ref 70–99)
GLUCOSE BLDC GLUCOMTR-MCNC: 188 MG/DL — HIGH (ref 70–99)
GLUCOSE SERPL-MCNC: 121 MG/DL — HIGH (ref 70–99)
HCT VFR BLD CALC: 36.3 % — LOW (ref 39–50)
HGB BLD-MCNC: 11.7 G/DL — LOW (ref 13–17)
IMM GRANULOCYTES NFR BLD AUTO: 0.4 % — SIGNIFICANT CHANGE UP (ref 0–0.9)
LYMPHOCYTES # BLD AUTO: 0.9 K/UL — LOW (ref 1–3.3)
LYMPHOCYTES # BLD AUTO: 12.8 % — LOW (ref 13–44)
MAGNESIUM SERPL-MCNC: 1.9 MG/DL — SIGNIFICANT CHANGE UP (ref 1.8–2.6)
MCHC RBC-ENTMCNC: 29.7 PG — SIGNIFICANT CHANGE UP (ref 27–34)
MCHC RBC-ENTMCNC: 32.2 GM/DL — SIGNIFICANT CHANGE UP (ref 32–36)
MCV RBC AUTO: 92.1 FL — SIGNIFICANT CHANGE UP (ref 80–100)
MONOCYTES # BLD AUTO: 0.81 K/UL — SIGNIFICANT CHANGE UP (ref 0–0.9)
MONOCYTES NFR BLD AUTO: 11.6 % — SIGNIFICANT CHANGE UP (ref 2–14)
NEUTROPHILS # BLD AUTO: 4.73 K/UL — SIGNIFICANT CHANGE UP (ref 1.8–7.4)
NEUTROPHILS NFR BLD AUTO: 67.5 % — SIGNIFICANT CHANGE UP (ref 43–77)
PHOSPHATE SERPL-MCNC: 3.7 MG/DL — SIGNIFICANT CHANGE UP (ref 2.4–4.7)
PLATELET # BLD AUTO: 240 K/UL — SIGNIFICANT CHANGE UP (ref 150–400)
POTASSIUM SERPL-MCNC: 3.2 MMOL/L — LOW (ref 3.5–5.3)
POTASSIUM SERPL-SCNC: 3.2 MMOL/L — LOW (ref 3.5–5.3)
RBC # BLD: 3.94 M/UL — LOW (ref 4.2–5.8)
RBC # FLD: 15.5 % — HIGH (ref 10.3–14.5)
SODIUM SERPL-SCNC: 141 MMOL/L — SIGNIFICANT CHANGE UP (ref 135–145)
WBC # BLD: 7.01 K/UL — SIGNIFICANT CHANGE UP (ref 3.8–10.5)
WBC # FLD AUTO: 7.01 K/UL — SIGNIFICANT CHANGE UP (ref 3.8–10.5)

## 2023-10-01 PROCEDURE — 99232 SBSQ HOSP IP/OBS MODERATE 35: CPT

## 2023-10-01 PROCEDURE — 99233 SBSQ HOSP IP/OBS HIGH 50: CPT

## 2023-10-01 RX ORDER — POTASSIUM CHLORIDE 20 MEQ
40 PACKET (EA) ORAL EVERY 4 HOURS
Refills: 0 | Status: COMPLETED | OUTPATIENT
Start: 2023-10-01 | End: 2023-10-01

## 2023-10-01 RX ADMIN — Medication 2: at 11:36

## 2023-10-01 RX ADMIN — ENOXAPARIN SODIUM 130 MILLIGRAM(S): 100 INJECTION SUBCUTANEOUS at 17:05

## 2023-10-01 RX ADMIN — PANTOPRAZOLE SODIUM 40 MILLIGRAM(S): 20 TABLET, DELAYED RELEASE ORAL at 06:18

## 2023-10-01 RX ADMIN — Medication 12.5 MILLIGRAM(S): at 17:06

## 2023-10-01 RX ADMIN — Medication 2 MILLIGRAM(S): at 10:19

## 2023-10-01 RX ADMIN — NYSTATIN CREAM 1 APPLICATION(S): 100000 CREAM TOPICAL at 06:00

## 2023-10-01 RX ADMIN — Medication 1 GRAM(S): at 17:05

## 2023-10-01 RX ADMIN — Medication 81 MILLIGRAM(S): at 11:37

## 2023-10-01 RX ADMIN — ATORVASTATIN CALCIUM 10 MILLIGRAM(S): 80 TABLET, FILM COATED ORAL at 21:32

## 2023-10-01 RX ADMIN — MIDODRINE HYDROCHLORIDE 5 MILLIGRAM(S): 2.5 TABLET ORAL at 06:17

## 2023-10-01 RX ADMIN — INSULIN GLARGINE 10 UNIT(S): 100 INJECTION, SOLUTION SUBCUTANEOUS at 21:31

## 2023-10-01 RX ADMIN — Medication 40 MILLIEQUIVALENT(S): at 17:05

## 2023-10-01 RX ADMIN — NYSTATIN CREAM 1 APPLICATION(S): 100000 CREAM TOPICAL at 17:07

## 2023-10-01 RX ADMIN — Medication 20 MILLIGRAM(S): at 06:17

## 2023-10-01 RX ADMIN — Medication 12.5 MILLIGRAM(S): at 06:18

## 2023-10-01 RX ADMIN — FINASTERIDE 5 MILLIGRAM(S): 5 TABLET, FILM COATED ORAL at 11:37

## 2023-10-01 RX ADMIN — ENOXAPARIN SODIUM 130 MILLIGRAM(S): 100 INJECTION SUBCUTANEOUS at 06:17

## 2023-10-01 RX ADMIN — Medication 40 MILLIEQUIVALENT(S): at 13:56

## 2023-10-01 RX ADMIN — Medication 2 MILLIGRAM(S): at 06:18

## 2023-10-01 RX ADMIN — MIDODRINE HYDROCHLORIDE 5 MILLIGRAM(S): 2.5 TABLET ORAL at 11:37

## 2023-10-01 RX ADMIN — MIDODRINE HYDROCHLORIDE 5 MILLIGRAM(S): 2.5 TABLET ORAL at 17:06

## 2023-10-01 RX ADMIN — Medication 1 GRAM(S): at 06:17

## 2023-10-01 NOTE — PROGRESS NOTE ADULT - SUBJECTIVE AND OBJECTIVE BOX
CHIEF COMPLAINT/INTERVAL HISTORY:    Patient is a 77y old  Male who presents with a chief complaint of sob (30 Sep 2023 16:51)    SUBJECTIVE & OBJECTIVE: Pt seen and examined at bedside. No overnight events. No new complaints. TAVR on Friday.    ROS: No chest pain, palpitations, SOB, light headedness, dizziness, headache, nausea/vomiting, fevers/chills, abdominal pain, dysuria or increased urinary frequency.    ICU Vital Signs Last 24 Hrs  T(C): 36.4 (01 Oct 2023 12:14), Max: 36.9 (30 Sep 2023 16:45)  T(F): 97.6 (01 Oct 2023 12:14), Max: 98.5 (01 Oct 2023 04:40)  HR: 84 (01 Oct 2023 12:14) (81 - 84)  BP: 102/68 (01 Oct 2023 12:14) (102/68 - 112/73)  RR: 19 (01 Oct 2023 12:14) (18 - 19)  SpO2: 96% (01 Oct 2023 12:14) (95% - 96%)    O2 Parameters below as of 01 Oct 2023 04:40  Patient On (Oxygen Delivery Method): nasal cannula  O2 Flow (L/min): 2    MEDICATIONS  (STANDING):  aspirin  chewable 81 milliGRAM(s) Oral daily  atorvastatin 10 milliGRAM(s) Oral at bedtime  dextrose 5%. 1000 milliLiter(s) (100 mL/Hr) IV Continuous <Continuous>  dextrose 5%. 1000 milliLiter(s) (50 mL/Hr) IV Continuous <Continuous>  dextrose 50% Injectable 25 Gram(s) IV Push once  dextrose 50% Injectable 12.5 Gram(s) IV Push once  dextrose 50% Injectable 25 Gram(s) IV Push once  doxazosin 2 milliGRAM(s) Oral daily  enoxaparin Injectable 130 milliGRAM(s) SubCutaneous every 12 hours  ergocalciferol 04093 Unit(s) Oral every week  finasteride 5 milliGRAM(s) Oral daily  glucagon  Injectable 1 milliGRAM(s) IntraMuscular once  influenza  Vaccine (HIGH DOSE) 0.7 milliLiter(s) IntraMuscular once  insulin glargine Injectable (LANTUS) 10 Unit(s) SubCutaneous at bedtime  insulin lispro (ADMELOG) corrective regimen sliding scale   SubCutaneous three times a day before meals  lactulose Syrup 20 Gram(s) Oral two times a day  metoprolol tartrate 12.5 milliGRAM(s) Oral two times a day  midodrine. 5 milliGRAM(s) Oral three times a day  nystatin Ointment 1 Application(s) Topical every 12 hours  pantoprazole    Tablet 40 milliGRAM(s) Oral before breakfast  potassium chloride    Tablet ER 40 milliEquivalent(s) Oral every 4 hours  senna 2 Tablet(s) Oral at bedtime  sucralfate 1 Gram(s) Oral two times a day  torsemide 20 milliGRAM(s) Oral daily    MEDICATIONS  (PRN):  albuterol/ipratropium for Nebulization 3 milliLiter(s) Nebulizer every 6 hours PRN Shortness of Breath and/or Wheezing  dextrose Oral Gel 15 Gram(s) Oral once PRN Blood Glucose LESS THAN 70 milliGRAM(s)/deciliter  diazepam    Tablet 2 milliGRAM(s) Oral three times a day PRN anxiety      LABS:                        11.7   7.01  )-----------( 240      ( 01 Oct 2023 05:35 )             36.3     10-01    141  |  102  |  23.4<H>  ----------------------------<  121<H>  3.2<L>   |  28.0  |  0.81    Ca    8.3<L>      01 Oct 2023 05:35  Phos  3.7     10-01  Mg     1.9     10-01        Urinalysis Basic - ( 01 Oct 2023 05:35 )    Color: x / Appearance: x / SG: x / pH: x  Gluc: 121 mg/dL / Ketone: x  / Bili: x / Urobili: x   Blood: x / Protein: x / Nitrite: x   Leuk Esterase: x / RBC: x / WBC x   Sq Epi: x / Non Sq Epi: x / Bacteria: x    CAPILLARY BLOOD GLUCOSE      POCT Blood Glucose.: 183 mg/dL (01 Oct 2023 11:35)  POCT Blood Glucose.: 134 mg/dL (01 Oct 2023 08:04)  POCT Blood Glucose.: 174 mg/dL (30 Sep 2023 21:17)  POCT Blood Glucose.: 152 mg/dL (30 Sep 2023 17:07)       PHYSICAL EXAM:    GENERAL: elderly male, laying in bed, NAD  HEAD:  Atraumatic, Normocephalic  EYES: EOMI, PERRLA, conjunctiva and sclera clear  ENMT: Moist mucous membranes  NECK: Supple   NERVOUS SYSTEM:  Alert & Oriented X3, Motor Strength 5/5 B/L upper and lower extremities   CHEST/LUNG: coarse breath sounds  HEART: Regular rate and rhythm; + S1/S2  ABDOMEN: Soft, Nontender, Nondistended; Bowel sounds present  EXTREMITIES:  no pedal edema

## 2023-10-01 NOTE — PROGRESS NOTE ADULT - SUBJECTIVE AND OBJECTIVE BOX
ANIA CRISTOBAL  [id number]      Chief Complaint:  CHF/ AS/ CAD    Interval History:  Patient anxious but without physical c/o.  Denies CP, SOB, palps.    Tele:  Mostly AF, rate controlled.  ?Some junctional.        albuterol/ipratropium for Nebulization 3 milliLiter(s) Nebulizer every 6 hours PRN  aspirin  chewable 81 milliGRAM(s) Oral daily  atorvastatin 10 milliGRAM(s) Oral at bedtime  dextrose 5%. 1000 milliLiter(s) IV Continuous <Continuous>  dextrose 5%. 1000 milliLiter(s) IV Continuous <Continuous>  dextrose 50% Injectable 25 Gram(s) IV Push once  dextrose 50% Injectable 12.5 Gram(s) IV Push once  dextrose 50% Injectable 25 Gram(s) IV Push once  dextrose Oral Gel 15 Gram(s) Oral once PRN  diazepam    Tablet 2 milliGRAM(s) Oral three times a day PRN  doxazosin 2 milliGRAM(s) Oral daily  enoxaparin Injectable 130 milliGRAM(s) SubCutaneous every 12 hours  ergocalciferol 85584 Unit(s) Oral every week  finasteride 5 milliGRAM(s) Oral daily  glucagon  Injectable 1 milliGRAM(s) IntraMuscular once  influenza  Vaccine (HIGH DOSE) 0.7 milliLiter(s) IntraMuscular once  insulin glargine Injectable (LANTUS) 10 Unit(s) SubCutaneous at bedtime  insulin lispro (ADMELOG) corrective regimen sliding scale   SubCutaneous three times a day before meals  lactulose Syrup 20 Gram(s) Oral two times a day  metoprolol tartrate 12.5 milliGRAM(s) Oral two times a day  midodrine. 5 milliGRAM(s) Oral three times a day  nystatin Ointment 1 Application(s) Topical every 12 hours  pantoprazole    Tablet 40 milliGRAM(s) Oral before breakfast  potassium chloride    Tablet ER 40 milliEquivalent(s) Oral every 4 hours  senna 2 Tablet(s) Oral at bedtime  sucralfate 1 Gram(s) Oral two times a day  torsemide 20 milliGRAM(s) Oral daily          Physical Exam:  T(C): 36.9 (10-01-23 @ 04:40), Max: 36.9 (09-30-23 @ 16:45)  HR: 82 (10-01-23 @ 04:40) (81 - 83)  BP: 110/75 (10-01-23 @ 04:40) (100/64 - 112/73)  RR: 18 (10-01-23 @ 04:40) (18 - 18)  SpO2: 96% (10-01-23 @ 04:40) (95% - 96%)  General: Comfortable in NAD  Neck: No JVD  CVS: nl s1s2, no s3, irreg, +DORINDA  Pulm: CTA b/l  Abd: soft, non-tender  Ext: No c/c/e  Neuro A&O x3  Psych: Normal affect        Labs:   01 Oct 2023 05:35    141    |  102    |  23.4   ----------------------------<  121    3.2     |  28.0   |  0.81     Ca    8.3        01 Oct 2023 05:35  Phos  3.7       01 Oct 2023 05:35  Mg     1.9       01 Oct 2023 05:35                            11.7   7.01  )-----------( 240      ( 01 Oct 2023 05:35 )             36.3                   Echo 6/20/23  Summary:   1. LV Ejection Fraction by Oleary's Method with a biplane EF of 50 %.   2. Normal global left ventricular systolic function.   3. Trace mitral valve regurgitation.   4. Mild aortic regurgitation.   5. Endocardial visualization was enhanced with intravenous echo contrast.   6. Compared to TTE dated 6/11/23 the LVEF has improved.    STRESS TEST:    CATHETERIZATION:  [facility] 1/2023:  Coronary Angiography   - Left dominant system   - Patent prox and mid LAD stents   - Moderate stenosis of the distal LCx   - The RCA is a small, non-dominat vessel.  The proximal segment is occluded with brisk right-to-right collaterals.  - 25mm LV-Ao gradient on pullback.  CIELO 1.4cm2 with moderate AS byecho.  - Moderately reduced LVEF by echo           Pharmacological stress test 9/27/23  * Myocardial Perfusion SPECT results are mildly abnormal. * There is a small, moderate defect in the distal inferior and inferoapical wall that is fixed, consistent with infarct. Basal inferior wall defect paradoxically improved during stress imaging. Prone imaging not available. No clear evidence of ischemia. No TID (ratio 1.23).  * Post-stress resting myocardial perfusion gated SPECT imaging was performed (LVEF = 48 %;LVEDV = 176 ml.), mild hypokinesis of the distal inferior wall.    TTE 9/27/23  Summary:   1. Left ventricular ejection fraction, by visual estimation, is 40 to   45%.   2. Mildly to moderately decreased global left ventricular systolic   function.   3. Mildly to moderately decreased segmental left ventricular systolic   function.   4. Basal and mid anterolateral wall and basal and mid inferolateral wall   are abnormal as described above.   5. Moderately increased LV wall thickness.   6. There is moderate concentric left ventricular hypertrophy.   7. Moderately enlarged left atrium.   8. Normal right atrial size.   9. Trivial pericardial effusion.  10. Mild thickening of the anterior and posterior mitral valve leaflets.  11. Moderate mitral annular calcification.  12. Trace mitral valve regurgitation.  13. Aortic valve is Poorly visualized.  14. Mild to moderate aortic regurgitation.  15. Severe aortic valve stenosis (Low flow-Low gradient severe aortic   stenosis).        Assessement   76 y/o male PMHx AF on Eliquis (s/p ablation on 6/2023), moderate-severe AS, Chronic Diastolic Heart Failure, CAD (Patent prox and mid LAD stents, Moderate stenosis of the distal LCx. The pRCA  with brisk right-to-right collaterals)., HTN, HLD, DM 2, Morbid Obesity, ALLIE, Thoracic Aortic Aneurysm, BPH s/p TURP, Left Renal Mass, On last admission patient underwent CardioMeMs placement, Pt now presents from home (recently moved out of assisted living to live in his son's basement apartment), with palpitations and SOB.  He was given IV lasix and admitted for acute on chronic diastolic heart failure. Cardiology consultation requested for evaluation of acute decompensation of heart failure.   Today with low BP, received midodrine, currently BP WNL  On exam patient looks euvolemic   tele with paroxysmal afib, with control rate.   Stress test with no clear evidence of ischemia.  Noted some ?junctional rhythm on tele with mostly AF.  Will need close monitoring post TAVR for development of CHB.    Plan   1. Continue current CV medications with po Torsemide.  2. Continue with  lovenox.  Change to Eliquis on d/c.  3. TAVR per CTS, scheduled 10/6.  4. Tele monitoring.  Will need post-TAVR monitoring for heart block.  5. OOB/ambulate.

## 2023-10-01 NOTE — PROGRESS NOTE ADULT - ASSESSMENT
Patient is a 77 year old male with PMH of afib on Eliquis (s/p Cardioversion abd ablation  1/31/23), Moderate AS, Chronic Diastolic Heart Failure, CAD s/p PCI, HTN, HLD, DM 2, Morbid Obesity, ALLIE, Thoracic Aortic Aneurysm, BPH s/p TURP, Left Renal Mass Presents with shortness of breath for last few days , more worse since last night , also noted with worsening lower ext swelling . moved out of Assisted living recently to live with his son basement apartment.  Patient does not eat at home and usually eat food from [employer] as he has no one to cook for him.  also felt some palpitations last night but now feels better, given lasix iv in ed.   has home o2 , uses it prn.     1- Acute on Chronic HFrEF   appears euvolemic    s/p IV lasix  continue torsemide and metoprolol  TTE reviewed; EF 40-45%, severe AS and hypokinesis  Nuclear stress test - There is a small, moderate defect in the distal inferior and inferoapical wall that is fixed, consistent with infarct. Basal inferior wall defect paradoxically improved during stress imaging. Prone imaging not available. No clear evidence of ischemia.  strict I/os, daily weights  cardio recs appreciated    2- Atrial Fibrillation  BP acceptable; monitor on metoprolol and torsemide  wean off midodrine  continue therapeutic lovenox    3- Severe AS  seen by CT surgery team   no need to go to [facility] for [facility] evaluation   TTE reviewed; no indication for LIBBY  tentative plans for TAVR on 10/6  CT surgery recs appreciated    4- Constipation - resolved   continue bowel regimen  d/c miralax while on lactulose    5- DM 2  - hold metformin   - continue ISS  - continue lantus 10 units at bedtime  - ADA diet    6- Anxiety   valium prn     7- HLD  -continue statin    8- hx of Thoracic Aortic Aneurysm  - monitor as outpatient    9-BPH s/p TURP  -continue doxazosin and finasteride    10- Hypothyroidism  continue levothyroxine     11- GERD ? Gastric ulcer  -continue carafate and PPI    Dvt ppx - Lovenox BID    Dispo- Remains acute; tentative plans for TAVR on 10/6. Switch to CTS service on Monday.    Plan discussed with patient, RN

## 2023-10-02 DIAGNOSIS — E11.9 TYPE 2 DIABETES MELLITUS WITHOUT COMPLICATIONS: ICD-10-CM

## 2023-10-02 LAB
ANION GAP SERPL CALC-SCNC: 12 MMOL/L — SIGNIFICANT CHANGE UP (ref 5–17)
BASOPHILS # BLD AUTO: 0.04 K/UL — SIGNIFICANT CHANGE UP (ref 0–0.2)
BASOPHILS NFR BLD AUTO: 0.6 % — SIGNIFICANT CHANGE UP (ref 0–2)
BUN SERPL-MCNC: 25.3 MG/DL — HIGH (ref 8–20)
CALCIUM SERPL-MCNC: 8.9 MG/DL — SIGNIFICANT CHANGE UP (ref 8.4–10.5)
CHLORIDE SERPL-SCNC: 101 MMOL/L — SIGNIFICANT CHANGE UP (ref 96–108)
CO2 SERPL-SCNC: 26 MMOL/L — SIGNIFICANT CHANGE UP (ref 22–29)
CREAT SERPL-MCNC: 0.69 MG/DL — SIGNIFICANT CHANGE UP (ref 0.5–1.3)
EGFR: 95 ML/MIN/1.73M2 — SIGNIFICANT CHANGE UP
EOSINOPHIL # BLD AUTO: 0.44 K/UL — SIGNIFICANT CHANGE UP (ref 0–0.5)
EOSINOPHIL NFR BLD AUTO: 6.5 % — HIGH (ref 0–6)
GLUCOSE BLDC GLUCOMTR-MCNC: 127 MG/DL — HIGH (ref 70–99)
GLUCOSE BLDC GLUCOMTR-MCNC: 144 MG/DL — HIGH (ref 70–99)
GLUCOSE BLDC GLUCOMTR-MCNC: 164 MG/DL — HIGH (ref 70–99)
GLUCOSE BLDC GLUCOMTR-MCNC: 180 MG/DL — HIGH (ref 70–99)
GLUCOSE SERPL-MCNC: 122 MG/DL — HIGH (ref 70–99)
HCT VFR BLD CALC: 37.8 % — LOW (ref 39–50)
HGB BLD-MCNC: 12.4 G/DL — LOW (ref 13–17)
IMM GRANULOCYTES NFR BLD AUTO: 0.3 % — SIGNIFICANT CHANGE UP (ref 0–0.9)
LYMPHOCYTES # BLD AUTO: 1.13 K/UL — SIGNIFICANT CHANGE UP (ref 1–3.3)
LYMPHOCYTES # BLD AUTO: 16.8 % — SIGNIFICANT CHANGE UP (ref 13–44)
MAGNESIUM SERPL-MCNC: 1.8 MG/DL — SIGNIFICANT CHANGE UP (ref 1.6–2.6)
MCHC RBC-ENTMCNC: 30.1 PG — SIGNIFICANT CHANGE UP (ref 27–34)
MCHC RBC-ENTMCNC: 32.8 GM/DL — SIGNIFICANT CHANGE UP (ref 32–36)
MCV RBC AUTO: 91.7 FL — SIGNIFICANT CHANGE UP (ref 80–100)
MONOCYTES # BLD AUTO: 0.66 K/UL — SIGNIFICANT CHANGE UP (ref 0–0.9)
MONOCYTES NFR BLD AUTO: 9.8 % — SIGNIFICANT CHANGE UP (ref 2–14)
NEUTROPHILS # BLD AUTO: 4.45 K/UL — SIGNIFICANT CHANGE UP (ref 1.8–7.4)
NEUTROPHILS NFR BLD AUTO: 66 % — SIGNIFICANT CHANGE UP (ref 43–77)
PHOSPHATE SERPL-MCNC: 3.6 MG/DL — SIGNIFICANT CHANGE UP (ref 2.4–4.7)
PLATELET # BLD AUTO: 238 K/UL — SIGNIFICANT CHANGE UP (ref 150–400)
POTASSIUM SERPL-MCNC: 3.8 MMOL/L — SIGNIFICANT CHANGE UP (ref 3.5–5.3)
POTASSIUM SERPL-SCNC: 3.8 MMOL/L — SIGNIFICANT CHANGE UP (ref 3.5–5.3)
RBC # BLD: 4.12 M/UL — LOW (ref 4.2–5.8)
RBC # FLD: 15.4 % — HIGH (ref 10.3–14.5)
SODIUM SERPL-SCNC: 139 MMOL/L — SIGNIFICANT CHANGE UP (ref 135–145)
WBC # BLD: 6.74 K/UL — SIGNIFICANT CHANGE UP (ref 3.8–10.5)
WBC # FLD AUTO: 6.74 K/UL — SIGNIFICANT CHANGE UP (ref 3.8–10.5)

## 2023-10-02 PROCEDURE — 99232 SBSQ HOSP IP/OBS MODERATE 35: CPT

## 2023-10-02 PROCEDURE — 99231 SBSQ HOSP IP/OBS SF/LOW 25: CPT

## 2023-10-02 PROCEDURE — 99233 SBSQ HOSP IP/OBS HIGH 50: CPT

## 2023-10-02 RX ORDER — DIAZEPAM 5 MG
2 TABLET ORAL ONCE
Refills: 0 | Status: DISCONTINUED | OUTPATIENT
Start: 2023-10-02 | End: 2023-10-02

## 2023-10-02 RX ADMIN — ENOXAPARIN SODIUM 130 MILLIGRAM(S): 100 INJECTION SUBCUTANEOUS at 05:24

## 2023-10-02 RX ADMIN — ENOXAPARIN SODIUM 130 MILLIGRAM(S): 100 INJECTION SUBCUTANEOUS at 17:26

## 2023-10-02 RX ADMIN — MIDODRINE HYDROCHLORIDE 5 MILLIGRAM(S): 2.5 TABLET ORAL at 11:57

## 2023-10-02 RX ADMIN — Medication 12.5 MILLIGRAM(S): at 17:27

## 2023-10-02 RX ADMIN — Medication 12.5 MILLIGRAM(S): at 05:22

## 2023-10-02 RX ADMIN — Medication 1 GRAM(S): at 05:23

## 2023-10-02 RX ADMIN — ATORVASTATIN CALCIUM 10 MILLIGRAM(S): 80 TABLET, FILM COATED ORAL at 21:14

## 2023-10-02 RX ADMIN — Medication 81 MILLIGRAM(S): at 11:56

## 2023-10-02 RX ADMIN — NYSTATIN CREAM 1 APPLICATION(S): 100000 CREAM TOPICAL at 17:29

## 2023-10-02 RX ADMIN — Medication 2 MILLIGRAM(S): at 21:13

## 2023-10-02 RX ADMIN — PANTOPRAZOLE SODIUM 40 MILLIGRAM(S): 20 TABLET, DELAYED RELEASE ORAL at 05:24

## 2023-10-02 RX ADMIN — MIDODRINE HYDROCHLORIDE 5 MILLIGRAM(S): 2.5 TABLET ORAL at 05:23

## 2023-10-02 RX ADMIN — Medication 2 MILLIGRAM(S): at 11:57

## 2023-10-02 RX ADMIN — FINASTERIDE 5 MILLIGRAM(S): 5 TABLET, FILM COATED ORAL at 11:57

## 2023-10-02 RX ADMIN — INSULIN GLARGINE 10 UNIT(S): 100 INJECTION, SOLUTION SUBCUTANEOUS at 21:21

## 2023-10-02 RX ADMIN — NYSTATIN CREAM 1 APPLICATION(S): 100000 CREAM TOPICAL at 05:27

## 2023-10-02 RX ADMIN — Medication 2 MILLIGRAM(S): at 05:24

## 2023-10-02 RX ADMIN — Medication 1 GRAM(S): at 17:28

## 2023-10-02 RX ADMIN — MIDODRINE HYDROCHLORIDE 5 MILLIGRAM(S): 2.5 TABLET ORAL at 17:32

## 2023-10-02 RX ADMIN — Medication 2: at 17:26

## 2023-10-02 RX ADMIN — Medication 20 MILLIGRAM(S): at 05:24

## 2023-10-02 NOTE — PROGRESS NOTE ADULT - SUBJECTIVE AND OBJECTIVE BOX
ANIA CRISTOBAL  [id number]      Chief Complaint:  CHF/ AS/ CAD    Interval History:  Patient anxious but without physical c/o.  Denies CP, SOB, palps.    Tele:  Mostly AF, rate controlled.        albuterol/ipratropium for Nebulization 3 milliLiter(s) Nebulizer every 6 hours PRN  aspirin  chewable 81 milliGRAM(s) Oral daily  atorvastatin 10 milliGRAM(s) Oral at bedtime  dextrose 5%. 1000 milliLiter(s) IV Continuous <Continuous>  dextrose 5%. 1000 milliLiter(s) IV Continuous <Continuous>  dextrose 50% Injectable 25 Gram(s) IV Push once  dextrose 50% Injectable 12.5 Gram(s) IV Push once  dextrose 50% Injectable 25 Gram(s) IV Push once  dextrose Oral Gel 15 Gram(s) Oral once PRN  diazepam    Tablet 2 milliGRAM(s) Oral three times a day PRN  doxazosin 2 milliGRAM(s) Oral daily  enoxaparin Injectable 130 milliGRAM(s) SubCutaneous every 12 hours  ergocalciferol 67933 Unit(s) Oral every week  finasteride 5 milliGRAM(s) Oral daily  glucagon  Injectable 1 milliGRAM(s) IntraMuscular once  influenza  Vaccine (HIGH DOSE) 0.7 milliLiter(s) IntraMuscular once  insulin glargine Injectable (LANTUS) 10 Unit(s) SubCutaneous at bedtime  insulin lispro (ADMELOG) corrective regimen sliding scale   SubCutaneous three times a day before meals  lactulose Syrup 20 Gram(s) Oral two times a day  metoprolol tartrate 12.5 milliGRAM(s) Oral two times a day  midodrine. 5 milliGRAM(s) Oral three times a day  nystatin Ointment 1 Application(s) Topical every 12 hours  pantoprazole    Tablet 40 milliGRAM(s) Oral before breakfast  senna 2 Tablet(s) Oral at bedtime  sucralfate 1 Gram(s) Oral two times a day  torsemide 20 milliGRAM(s) Oral daily          Physical Exam:  T(C): 36.8 (10-02-23 @ 04:37), Max: 36.9 (10-01-23 @ 19:55)  HR: 85 (10-02-23 @ 04:37) (69 - 85)  BP: 107/72 (10-02-23 @ 04:37) (102/68 - 107/72)  RR: 18 (10-02-23 @ 04:37) (18 - 19)  SpO2: 95% (10-02-23 @ 04:37) (95% - 96%)  Wt(kg): --  General: Comfortable in NAD  Neck: No JVD  CVS: nl s1s2, no s3  Pulm: CTA b/l  Abd: soft, non-tender  Ext: No c/c/e  Neuro A&O x3  Psych: Normal affect      Labs:   02 Oct 2023 05:38    139    |  101    |  25.3   ----------------------------<  122    3.8     |  26.0   |  0.69     Ca    8.9        02 Oct 2023 05:38  Phos  3.6       02 Oct 2023 05:38  Mg     1.8       02 Oct 2023 05:38                            12.4   6.74  )-----------( 238      ( 02 Oct 2023 05:38 )             37.8           Echo 6/20/23  Summary:   1. LV Ejection Fraction by Oleary's Method with a biplane EF of 50 %.   2. Normal global left ventricular systolic function.   3. Trace mitral valve regurgitation.   4. Mild aortic regurgitation.   5. Endocardial visualization was enhanced with intravenous echo contrast.   6. Compared to TTE dated 6/11/23 the LVEF has improved.    STRESS TEST:    CATHETERIZATION:  [facility] 1/2023:  Coronary Angiography   - Left dominant system   - Patent prox and mid LAD stents   - Moderate stenosis of the distal LCx   - The RCA is a small, non-dominat vessel.  The proximal segment is occluded with brisk right-to-right collaterals.  - 25mm LV-Ao gradient on pullback.  CIELO 1.4cm2 with moderate AS byecho.  - Moderately reduced LVEF by echo           Pharmacological stress test 9/27/23  * Myocardial Perfusion SPECT results are mildly abnormal. * There is a small, moderate defect in the distal inferior and inferoapical wall that is fixed, consistent with infarct. Basal inferior wall defect paradoxically improved during stress imaging. Prone imaging not available. No clear evidence of ischemia. No TID (ratio 1.23).  * Post-stress resting myocardial perfusion gated SPECT imaging was performed (LVEF = 48 %;LVEDV = 176 ml.), mild hypokinesis of the distal inferior wall.    TTE 9/27/23  Summary:   1. Left ventricular ejection fraction, by visual estimation, is 40 to   45%.   2. Mildly to moderately decreased global left ventricular systolic   function.   3. Mildly to moderately decreased segmental left ventricular systolic   function.   4. Basal and mid anterolateral wall and basal and mid inferolateral wall   are abnormal as described above.   5. Moderately increased LV wall thickness.   6. There is moderate concentric left ventricular hypertrophy.   7. Moderately enlarged left atrium.   8. Normal right atrial size.   9. Trivial pericardial effusion.  10. Mild thickening of the anterior and posterior mitral valve leaflets.  11. Moderate mitral annular calcification.  12. Trace mitral valve regurgitation.  13. Aortic valve is Poorly visualized.  14. Mild to moderate aortic regurgitation.  15. Severe aortic valve stenosis (Low flow-Low gradient severe aortic   stenosis).        Assessement   78 y/o male PMHx AF on Eliquis (s/p ablation on 6/2023), moderate-severe AS, Chronic Diastolic Heart Failure, CAD (Patent prox and mid LAD stents, Moderate stenosis of the distal LCx. The pRCA  with brisk right-to-right collaterals)., HTN, HLD, DM 2, Morbid Obesity, ALLIE, Thoracic Aortic Aneurysm, BPH s/p TURP, Left Renal Mass, On last admission patient underwent CardioMeMs placement, Pt now presents from home (recently moved out of assisted living to live in his son's basement apartment), with palpitations and SOB.  He was given IV lasix and admitted for acute on chronic diastolic heart failure. Cardiology consultation requested for evaluation of acute decompensation of heart failure.   Today with low BP, received midodrine, currently BP WNL  On exam patient looks euvolemic   tele with paroxysmal afib, with control rate.   Stress test with no clear evidence of ischemia.  Noted some ?junctional rhythm on tele with mostly AF.  Will need close monitoring post TAVR for development of CHB.    Plan   1. Continue current CV medications with po Torsemide.  2. Continue with  lovenox.  Change to Eliquis on d/c.  3. TAVR per CTS, scheduled 10/6.  4. Tele monitoring.  Will need post-TAVR monitoring for heart block.  5. EP eval requested, Dr Amato will evaluate tomorrow.,

## 2023-10-02 NOTE — PROGRESS NOTE ADULT - PROBLEM SELECTOR PLAN 1
Severe AS on last admission. Now admitted with acute on chronic diastolic heart failure.  TAVR CTA completed in June on last admission.  Pt was lost to follow up in the office.  Pt was supposed to see dental but has not. CT max/facial with periapical lucencies per report.  CT scan reviewed by Dr. Morin at [facility] dental on 9/28 no need for transfer for evalution at this time, pt instructed to f/u as outpt for comprehensive dental exam.   NST 9/27 with sm-mod defect, no ishemia, reviewed with cardiology, no further work up needed (last C in January 2023 that showed 60% dcx disease and 100% pRCA with collateral circulation.  TTE  with Severe low flow low gradient AS.    No need for repeat LIBBY per Dr. Napier.  tentative TAVR Fri 10/6/23.   nystatin for b/l groins   Plan for PT consult today  Plan discussed with Dr. Napier.

## 2023-10-02 NOTE — PROGRESS NOTE ADULT - ASSESSMENT
77M, pmhx afib on Eliquis (s/p Cardioversion on 1/31/23 and again on last admission 6/19/23), Moderate AS, Chronic Diastolic Heart Failure, CAD s/p PCI, HTN, HLD, DM 2, Morbid Obesity, ALLIE, Thoracic Aortic Aneurysm, BPH s/p TURP, Left Renal Mass, and patient uses PRN home O2.  He was admitted in June for abdominal pain x 4 days and found to have UTI.  Echo at the time showed a significant drop in EF, severe AS.  Reduced EF attributed to afib and patient underwent DCCV with EP.  Cardiomems was placed.  Post procedure TTE with normal EF.  He had an abdominal CT that showed trace ascites and also underwent MRCP on that admission that showed cirrhosis.   CT surgery was consulted on that admission for TAVR evaluation.  TAVR CTA completed 6/22/23.  At that time, anticoagulation was not able to be interrupted per EP.  Plan was for outpatient follow up with Dr. Napire, but patient was lost to follow up.  He also has poor dentition and was asked to see a dentist as an outpatient for possible extractions.    Pt now presents 9/24 from home (recently moved out of assisted living to live in his son's basement apartment), with palpitations and SOB.  He was given IV lasix and admitted for acute on chronic diastolic heart failure.  Asked to consult regarding TAVR.    Of note, pt had LHC in January 2023 that showed 60% dcx disease and 100% pRCA with collateral circulation.  LIBBY in January with moderate to severe AS and mild AI.

## 2023-10-02 NOTE — PROGRESS NOTE ADULT - SUBJECTIVE AND OBJECTIVE BOX
CHIEF COMPLAINT/INTERVAL HISTORY:    Patient is a 77y old  Male who presents with a chief complaint of sob (02 Oct 2023 10:28)    SUBJECTIVE & OBJECTIVE: Pt seen and examined at bedside. No overnight events. No new complaints. Transfer to [facility].    ROS: No chest pain, palpitations, SOB, light headedness, dizziness, headache, nausea/vomiting, fevers/chills, abdominal pain, dysuria or increased urinary frequency.    ICU Vital Signs Last 24 Hrs  T(C): 36.9 (02 Oct 2023 16:53), Max: 36.9 (01 Oct 2023 19:55)  T(F): 98.4 (02 Oct 2023 16:53), Max: 98.5 (01 Oct 2023 19:55)  HR: 86 (02 Oct 2023 16:53) (80 - 86)  BP: 97/61 (02 Oct 2023 16:53) (97/61 - 110/72)  RR: 18 (02 Oct 2023 16:53) (18 - 19)  SpO2: 93% (02 Oct 2023 16:53) (93% - 96%)    O2 Parameters below as of 02 Oct 2023 10:12  Patient On (Oxygen Delivery Method): nasal cannula  O2 Flow (L/min): 2    MEDICATIONS  (STANDING):  aspirin  chewable 81 milliGRAM(s) Oral daily  atorvastatin 10 milliGRAM(s) Oral at bedtime  dextrose 5%. 1000 milliLiter(s) (100 mL/Hr) IV Continuous <Continuous>  dextrose 5%. 1000 milliLiter(s) (50 mL/Hr) IV Continuous <Continuous>  dextrose 50% Injectable 25 Gram(s) IV Push once  dextrose 50% Injectable 12.5 Gram(s) IV Push once  dextrose 50% Injectable 25 Gram(s) IV Push once  doxazosin 2 milliGRAM(s) Oral daily  enoxaparin Injectable 130 milliGRAM(s) SubCutaneous every 12 hours  ergocalciferol 95395 Unit(s) Oral every week  finasteride 5 milliGRAM(s) Oral daily  glucagon  Injectable 1 milliGRAM(s) IntraMuscular once  influenza  Vaccine (HIGH DOSE) 0.7 milliLiter(s) IntraMuscular once  insulin glargine Injectable (LANTUS) 10 Unit(s) SubCutaneous at bedtime  insulin lispro (ADMELOG) corrective regimen sliding scale   SubCutaneous three times a day before meals  lactulose Syrup 20 Gram(s) Oral two times a day  metoprolol tartrate 12.5 milliGRAM(s) Oral two times a day  midodrine. 5 milliGRAM(s) Oral three times a day  nystatin Ointment 1 Application(s) Topical every 12 hours  pantoprazole    Tablet 40 milliGRAM(s) Oral before breakfast  senna 2 Tablet(s) Oral at bedtime  sucralfate 1 Gram(s) Oral two times a day  torsemide 20 milliGRAM(s) Oral daily    MEDICATIONS  (PRN):  albuterol/ipratropium for Nebulization 3 milliLiter(s) Nebulizer every 6 hours PRN Shortness of Breath and/or Wheezing  dextrose Oral Gel 15 Gram(s) Oral once PRN Blood Glucose LESS THAN 70 milliGRAM(s)/deciliter  diazepam    Tablet 2 milliGRAM(s) Oral three times a day PRN anxiety      LABS:                        12.4   6.74  )-----------( 238      ( 02 Oct 2023 05:38 )             37.8     10-02    139  |  101  |  25.3<H>  ----------------------------<  122<H>  3.8   |  26.0  |  0.69    Ca    8.9      02 Oct 2023 05:38  Phos  3.6     10-02  Mg     1.8     10-02        Urinalysis Basic - ( 02 Oct 2023 05:38 )    Color: x / Appearance: x / SG: x / pH: x  Gluc: 122 mg/dL / Ketone: x  / Bili: x / Urobili: x   Blood: x / Protein: x / Nitrite: x   Leuk Esterase: x / RBC: x / WBC x   Sq Epi: x / Non Sq Epi: x / Bacteria: x    CAPILLARY BLOOD GLUCOSE      POCT Blood Glucose.: 164 mg/dL (02 Oct 2023 17:06)  POCT Blood Glucose.: 144 mg/dL (02 Oct 2023 11:54)  POCT Blood Glucose.: 127 mg/dL (02 Oct 2023 07:59)  POCT Blood Glucose.: 188 mg/dL (01 Oct 2023 21:30)    PHYSICAL EXAM:    GENERAL: elderly male, laying in bed, NAD  HEAD:  Atraumatic, Normocephalic  EYES: EOMI, PERRLA, conjunctiva and sclera clear  ENMT: Moist mucous membranes  NECK: Supple   NERVOUS SYSTEM:  Alert & Oriented X3, Motor Strength 5/5 B/L upper and lower extremities   CHEST/LUNG: coarse breath sounds  HEART: Regular rate and rhythm; + S1/S2  ABDOMEN: Soft, Nontender, Nondistended; Bowel sounds present  EXTREMITIES:  no pedal edema

## 2023-10-02 NOTE — PROGRESS NOTE ADULT - ASSESSMENT
Patient is a 77 year old male with PMH of afib on Eliquis (s/p Cardioversion abd ablation  1/31/23), Moderate AS, Chronic Diastolic Heart Failure, CAD s/p PCI, HTN, HLD, DM 2, Morbid Obesity, ALLIE, Thoracic Aortic Aneurysm, BPH s/p TURP, Left Renal Mass Presents with shortness of breath for last few days , more worse since last night , also noted with worsening lower ext swelling . moved out of Assisted living recently to live with his son basement apartment.  Patient does not eat at home and usually eat food from [employer] as he has no one to cook for him.  also felt some palpitations last night but now feels better, given lasix iv in ed.   has home o2 , uses it prn.     1- Acute on Chronic HFrEF   appears euvolemic    s/p IV lasix  continue torsemide and metoprolol  TTE reviewed; EF 40-45%, severe AS and hypokinesis  Nuclear stress test - There is a small, moderate defect in the distal inferior and inferoapical wall that is fixed, consistent with infarct. Basal inferior wall defect paradoxically improved during stress imaging. Prone imaging not available. No clear evidence of ischemia.  strict I/os, daily weights  cardio recs appreciated    2- Atrial Fibrillation  BP acceptable; monitor on metoprolol and torsemide  wean off midodrine  continue therapeutic lovenox    3- Severe AS  seen by CT surgery team   no need to go to [facility] for dental evaluation   TTE reviewed; no indication for LIBBY  tentative plans for TAVR on 10/6  CT surgery recs appreciated; transfer to CTS service    4- Constipation - resolved   continue bowel regimen  d/c miralax while on lactulose    5- DM 2  - hold metformin   - continue ISS  - continue lantus 10 units at bedtime  - ADA diet    6- Anxiety   valium prn     7- HLD  -continue statin    8- hx of Thoracic Aortic Aneurysm  - monitor as outpatient    9-BPH s/p TURP  -continue doxazosin and finasteride    10- Hypothyroidism  continue levothyroxine     11- GERD ? Gastric ulcer  -continue carafate and PPI    Dvt ppx - Lovenox BID    Dispo- Remains acute; tentative plans for TAVR on 10/6. Switch to CTS service today.    Plan discussed with patient, CTS PA, RN

## 2023-10-02 NOTE — PROGRESS NOTE ADULT - SUBJECTIVE AND OBJECTIVE BOX
Subjective - patient seen and evaluated bedside. Sitting comfortably in bed. Denies CP, SOB, HA, dizziness, n/v/d    Review of Systems: negative x 10 systems except as noted above    Brief summary:  77yMale undergoing TAVR workup    Significant/Tuov35ti events: no acute events      PAST MEDICAL & SURGICAL HISTORY:  Anxiety      Hypertension      Hyperlipidemia      Prostate disease      Gastroesophageal reflux disease      Gallbladder stone without cholecystitis or obstruction      BPH (benign prostatic hyperplasia)      DM (diabetes mellitus)      DVT (deep venous thrombosis)  left leg      Afib      Anxiety      HLD (hyperlipidemia)      Malignant schwannoma      H/O arthroscopy of knee      S/P laminectomy      S/P coronary artery stent placement  x2            albuterol/ipratropium for Nebulization 3 milliLiter(s) Nebulizer every 6 hours PRN  aspirin  chewable 81 milliGRAM(s) Oral daily  atorvastatin 10 milliGRAM(s) Oral at bedtime  dextrose 5%. 1000 milliLiter(s) IV Continuous <Continuous>  dextrose 5%. 1000 milliLiter(s) IV Continuous <Continuous>  dextrose 50% Injectable 25 Gram(s) IV Push once  dextrose 50% Injectable 12.5 Gram(s) IV Push once  dextrose 50% Injectable 25 Gram(s) IV Push once  dextrose Oral Gel 15 Gram(s) Oral once PRN  diazepam    Tablet 2 milliGRAM(s) Oral three times a day PRN  doxazosin 2 milliGRAM(s) Oral daily  enoxaparin Injectable 130 milliGRAM(s) SubCutaneous every 12 hours  ergocalciferol 62916 Unit(s) Oral every week  finasteride 5 milliGRAM(s) Oral daily  glucagon  Injectable 1 milliGRAM(s) IntraMuscular once  influenza  Vaccine (HIGH DOSE) 0.7 milliLiter(s) IntraMuscular once  insulin glargine Injectable (LANTUS) 10 Unit(s) SubCutaneous at bedtime  insulin lispro (ADMELOG) corrective regimen sliding scale   SubCutaneous three times a day before meals  lactulose Syrup 20 Gram(s) Oral two times a day  metoprolol tartrate 12.5 milliGRAM(s) Oral two times a day  midodrine. 5 milliGRAM(s) Oral three times a day  nystatin Ointment 1 Application(s) Topical every 12 hours  pantoprazole    Tablet 40 milliGRAM(s) Oral before breakfast  senna 2 Tablet(s) Oral at bedtime  sucralfate 1 Gram(s) Oral two times a day  torsemide 20 milliGRAM(s) Oral daily  MEDICATIONS  (PRN):  albuterol/ipratropium for Nebulization 3 milliLiter(s) Nebulizer every 6 hours PRN Shortness of Breath and/or Wheezing  dextrose Oral Gel 15 Gram(s) Oral once PRN Blood Glucose LESS THAN 70 milliGRAM(s)/deciliter  diazepam    Tablet 2 milliGRAM(s) Oral three times a day PRN anxiety      Daily     Daily                               12.4   6.74  )-----------( 238      ( 02 Oct 2023 05:38 )             37.8   10-02    139  |  101  |  25.3<H>  ----------------------------<  122<H>  3.8   |  26.0  |  0.69    Ca    8.9      02 Oct 2023 05:38  Phos  3.6     10-02  Mg     1.8     10-02              Objective:  T(C): 36.8 (10-02-23 @ 04:37), Max: 36.9 (10-01-23 @ 19:55)  HR: 85 (10-02-23 @ 04:37) (69 - 85)  BP: 107/72 (10-02-23 @ 04:37) (102/68 - 107/72)  RR: 18 (10-02-23 @ 04:37) (18 - 19)  SpO2: 95% (10-02-23 @ 04:37) (95% - 96%)  Wt(kg): --CAPILLARY BLOOD GLUCOSE      POCT Blood Glucose.: 127 mg/dL (02 Oct 2023 07:59)  POCT Blood Glucose.: 188 mg/dL (01 Oct 2023 21:30)  POCT Blood Glucose.: 150 mg/dL (01 Oct 2023 16:48)  POCT Blood Glucose.: 183 mg/dL (01 Oct 2023 11:35)  I&O's Summary      Physical Exam  General: NAD  Neuro: A+O x 3, non-focal, speech clear and intact  Psych: Anxious affect  HEENT:  NCAT, No conjuctival edema or icterus, no thrush.  Pulm: CTA, equal bilaterally  CV: RRR, , +S1S2  Abd: soft, NT, ND, +BS  Ext: +DP Pulses b/l, no edema  Skin: Warm, dry, intact          Imaging:    < from: Xray Chest 1 View- PORTABLE-Urgent (Xray Chest 1 View- PORTABLE-Urgent .) (09.26.23 @ 11:40) >  IMPRESSION:  1. Prominent cardiac silhouette with mild central pulmonary venous   engorgement, slightly improved from the prior exam. No pulmonary edema.  2. Pressure monitoring device within the left descending pulmonary   artery, unchanged, without pneumothorax.    --- End of Report ---    < end of copied text >  < from: TTE Echo Complete w/ Contrast w/ Doppler (09.27.23 @ 08:42) >    Summary:   1. Left ventricular ejection fraction, by visual estimation, is 40 to   45%.   2. Mildly to moderately decreased global left ventricular systolic   function.   3. Mildly to moderately decreased segmental left ventricular systolic   function.   4. Basal and mid anterolateral wall and basal and mid inferolateral wall   are abnormal as described above.   5. Moderately increased LV wall thickness.   6. There is moderate concentric left ventricular hypertrophy.   7. Moderately enlarged left atrium.   8. Normal right atrial size.   9. Trivial pericardial effusion.  10. Mild thickening of the anterior and posterior mitral valve leaflets.  11. Moderate mitral annular calcification.  12. Trace mitral valve regurgitation.  13. Aortic valve is Poorly visualized.  14. Mild to moderate aortic regurgitation.  15. Severe aortic valve stenosis (Low flow-Low gradient severe aortic   stenosis). Consider LIBBY for better characterization.  16. Moderate dilatation of the aortic root and ascending aorta.    Nicki, Electronically signed on 9/27/2023 at 10:54:08 AM      < end of copied text >

## 2023-10-03 LAB
ANION GAP SERPL CALC-SCNC: 12 MMOL/L — SIGNIFICANT CHANGE UP (ref 5–17)
BUN SERPL-MCNC: 24.5 MG/DL — HIGH (ref 8–20)
CALCIUM SERPL-MCNC: 8.8 MG/DL — SIGNIFICANT CHANGE UP (ref 8.4–10.5)
CHLORIDE SERPL-SCNC: 101 MMOL/L — SIGNIFICANT CHANGE UP (ref 96–108)
CO2 SERPL-SCNC: 28 MMOL/L — SIGNIFICANT CHANGE UP (ref 22–29)
CREAT SERPL-MCNC: 0.58 MG/DL — SIGNIFICANT CHANGE UP (ref 0.5–1.3)
EGFR: 100 ML/MIN/1.73M2 — SIGNIFICANT CHANGE UP
GLUCOSE BLDC GLUCOMTR-MCNC: 112 MG/DL — HIGH (ref 70–99)
GLUCOSE BLDC GLUCOMTR-MCNC: 135 MG/DL — HIGH (ref 70–99)
GLUCOSE BLDC GLUCOMTR-MCNC: 164 MG/DL — HIGH (ref 70–99)
GLUCOSE BLDC GLUCOMTR-MCNC: 177 MG/DL — HIGH (ref 70–99)
GLUCOSE SERPL-MCNC: 115 MG/DL — HIGH (ref 70–99)
HCT VFR BLD CALC: 37.9 % — LOW (ref 39–50)
HGB BLD-MCNC: 12.2 G/DL — LOW (ref 13–17)
MAGNESIUM SERPL-MCNC: 1.9 MG/DL — SIGNIFICANT CHANGE UP (ref 1.6–2.6)
MCHC RBC-ENTMCNC: 29.7 PG — SIGNIFICANT CHANGE UP (ref 27–34)
MCHC RBC-ENTMCNC: 32.2 GM/DL — SIGNIFICANT CHANGE UP (ref 32–36)
MCV RBC AUTO: 92.2 FL — SIGNIFICANT CHANGE UP (ref 80–100)
PLATELET # BLD AUTO: 258 K/UL — SIGNIFICANT CHANGE UP (ref 150–400)
POTASSIUM SERPL-MCNC: 3.7 MMOL/L — SIGNIFICANT CHANGE UP (ref 3.5–5.3)
POTASSIUM SERPL-SCNC: 3.7 MMOL/L — SIGNIFICANT CHANGE UP (ref 3.5–5.3)
RBC # BLD: 4.11 M/UL — LOW (ref 4.2–5.8)
RBC # FLD: 15.2 % — HIGH (ref 10.3–14.5)
SODIUM SERPL-SCNC: 141 MMOL/L — SIGNIFICANT CHANGE UP (ref 135–145)
WBC # BLD: 6.75 K/UL — SIGNIFICANT CHANGE UP (ref 3.8–10.5)
WBC # FLD AUTO: 6.75 K/UL — SIGNIFICANT CHANGE UP (ref 3.8–10.5)

## 2023-10-03 PROCEDURE — 93010 ELECTROCARDIOGRAM REPORT: CPT

## 2023-10-03 PROCEDURE — 99233 SBSQ HOSP IP/OBS HIGH 50: CPT

## 2023-10-03 PROCEDURE — 99231 SBSQ HOSP IP/OBS SF/LOW 25: CPT

## 2023-10-03 RX ORDER — CHLORHEXIDINE GLUCONATE 213 G/1000ML
15 SOLUTION TOPICAL EVERY 12 HOURS
Refills: 0 | Status: DISCONTINUED | OUTPATIENT
Start: 2023-10-03 | End: 2023-10-06

## 2023-10-03 RX ORDER — POTASSIUM CHLORIDE 20 MEQ
40 PACKET (EA) ORAL ONCE
Refills: 0 | Status: COMPLETED | OUTPATIENT
Start: 2023-10-03 | End: 2023-10-03

## 2023-10-03 RX ORDER — ASPIRIN/CALCIUM CARB/MAGNESIUM 324 MG
81 TABLET ORAL DAILY
Refills: 0 | Status: DISCONTINUED | OUTPATIENT
Start: 2023-10-03 | End: 2023-10-06

## 2023-10-03 RX ORDER — CHLORHEXIDINE GLUCONATE 213 G/1000ML
1 SOLUTION TOPICAL
Refills: 0 | Status: DISCONTINUED | OUTPATIENT
Start: 2023-10-04 | End: 2023-10-08

## 2023-10-03 RX ORDER — MAGNESIUM OXIDE 400 MG ORAL TABLET 241.3 MG
400 TABLET ORAL ONCE
Refills: 0 | Status: COMPLETED | OUTPATIENT
Start: 2023-10-03 | End: 2023-10-03

## 2023-10-03 RX ADMIN — NYSTATIN CREAM 1 APPLICATION(S): 100000 CREAM TOPICAL at 18:41

## 2023-10-03 RX ADMIN — PANTOPRAZOLE SODIUM 40 MILLIGRAM(S): 20 TABLET, DELAYED RELEASE ORAL at 05:11

## 2023-10-03 RX ADMIN — Medication 2 MILLIGRAM(S): at 15:34

## 2023-10-03 RX ADMIN — ATORVASTATIN CALCIUM 10 MILLIGRAM(S): 80 TABLET, FILM COATED ORAL at 21:18

## 2023-10-03 RX ADMIN — NYSTATIN CREAM 1 APPLICATION(S): 100000 CREAM TOPICAL at 05:16

## 2023-10-03 RX ADMIN — Medication 81 MILLIGRAM(S): at 12:37

## 2023-10-03 RX ADMIN — ENOXAPARIN SODIUM 130 MILLIGRAM(S): 100 INJECTION SUBCUTANEOUS at 17:10

## 2023-10-03 RX ADMIN — Medication 40 MILLIEQUIVALENT(S): at 10:41

## 2023-10-03 RX ADMIN — FINASTERIDE 5 MILLIGRAM(S): 5 TABLET, FILM COATED ORAL at 12:36

## 2023-10-03 RX ADMIN — Medication 1 GRAM(S): at 05:11

## 2023-10-03 RX ADMIN — MIDODRINE HYDROCHLORIDE 5 MILLIGRAM(S): 2.5 TABLET ORAL at 17:11

## 2023-10-03 RX ADMIN — Medication 12.5 MILLIGRAM(S): at 05:11

## 2023-10-03 RX ADMIN — Medication 20 MILLIGRAM(S): at 05:11

## 2023-10-03 RX ADMIN — MIDODRINE HYDROCHLORIDE 5 MILLIGRAM(S): 2.5 TABLET ORAL at 05:12

## 2023-10-03 RX ADMIN — MAGNESIUM OXIDE 400 MG ORAL TABLET 400 MILLIGRAM(S): 241.3 TABLET ORAL at 10:41

## 2023-10-03 RX ADMIN — ENOXAPARIN SODIUM 130 MILLIGRAM(S): 100 INJECTION SUBCUTANEOUS at 05:12

## 2023-10-03 RX ADMIN — INSULIN GLARGINE 10 UNIT(S): 100 INJECTION, SOLUTION SUBCUTANEOUS at 21:17

## 2023-10-03 RX ADMIN — Medication 12.5 MILLIGRAM(S): at 17:11

## 2023-10-03 RX ADMIN — CHLORHEXIDINE GLUCONATE 15 MILLILITER(S): 213 SOLUTION TOPICAL at 18:41

## 2023-10-03 RX ADMIN — Medication 2 MILLIGRAM(S): at 05:11

## 2023-10-03 RX ADMIN — Medication 2 MILLIGRAM(S): at 21:18

## 2023-10-03 RX ADMIN — MIDODRINE HYDROCHLORIDE 5 MILLIGRAM(S): 2.5 TABLET ORAL at 12:37

## 2023-10-03 RX ADMIN — Medication 2: at 17:12

## 2023-10-03 RX ADMIN — Medication 1 GRAM(S): at 17:10

## 2023-10-03 NOTE — PROGRESS NOTE ADULT - SUBJECTIVE AND OBJECTIVE BOX
Subjective:    T(C): 36.4 (10-03-23 @ 04:15), Max: 36.9 (10-02-23 @ 16:53)  HR: 84 (10-03-23 @ 04:15) (80 - 86)  BP: 132/69 (10-03-23 @ 04:15) (97/61 - 132/69)  RR: 18 (10-03-23 @ 04:15) (18 - 18)  SpO2: 97% (10-03-23 @ 04:15) (93% - 97%)    10-02    139  |  101  |  25.3<H>  ----------------------------<  122<H>  3.8   |  26.0  |  0.69    Ca    8.9      02 Oct 2023 05:38  Phos  3.6     10-02  Mg     1.8     10-02                                 12.4   6.74  )-----------( 238      ( 02 Oct 2023 05:38 )             37.8                    CAPILLARY BLOOD GLUCOSE  POCT Blood Glucose.: 180 mg/dL (02 Oct 2023 21:20)  POCT Blood Glucose.: 164 mg/dL (02 Oct 2023 17:06)  POCT Blood Glucose.: 144 mg/dL (02 Oct 2023 11:54)  POCT Blood Glucose.: 127 mg/dL (02 Oct 2023 07:59)    I&O's Detail    Drug Dosing Weight  Height (cm): 188 (25 Sep 2023 13:12)  Weight (kg): 127.5 (25 Sep 2023 13:12)  BMI (kg/m2): 36.1 (25 Sep 2023 13:12)  BSA (m2): 2.51 (25 Sep 2023 13:12)    MEDICATIONS  (STANDING):  aspirin  chewable 81 milliGRAM(s) Oral daily  atorvastatin 10 milliGRAM(s) Oral at bedtime  dextrose 5%. 1000 milliLiter(s) (50 mL/Hr) IV Continuous <Continuous>  dextrose 5%. 1000 milliLiter(s) (100 mL/Hr) IV Continuous <Continuous>  dextrose 50% Injectable 25 Gram(s) IV Push once  dextrose 50% Injectable 12.5 Gram(s) IV Push once  dextrose 50% Injectable 25 Gram(s) IV Push once  doxazosin 2 milliGRAM(s) Oral daily  enoxaparin Injectable 130 milliGRAM(s) SubCutaneous every 12 hours  ergocalciferol 45905 Unit(s) Oral every week  finasteride 5 milliGRAM(s) Oral daily  glucagon  Injectable 1 milliGRAM(s) IntraMuscular once  influenza  Vaccine (HIGH DOSE) 0.7 milliLiter(s) IntraMuscular once  insulin glargine Injectable (LANTUS) 10 Unit(s) SubCutaneous at bedtime  insulin lispro (ADMELOG) corrective regimen sliding scale   SubCutaneous three times a day before meals  lactulose Syrup 20 Gram(s) Oral two times a day  metoprolol tartrate 12.5 milliGRAM(s) Oral two times a day  midodrine. 5 milliGRAM(s) Oral three times a day  nystatin Ointment 1 Application(s) Topical every 12 hours  pantoprazole    Tablet 40 milliGRAM(s) Oral before breakfast  senna 2 Tablet(s) Oral at bedtime  sucralfate 1 Gram(s) Oral two times a day  torsemide 20 milliGRAM(s) Oral daily    MEDICATIONS  (PRN):  albuterol/ipratropium for Nebulization 3 milliLiter(s) Nebulizer every 6 hours PRN Shortness of Breath and/or Wheezing  dextrose Oral Gel 15 Gram(s) Oral once PRN Blood Glucose LESS THAN 70 milliGRAM(s)/deciliter  diazepam    Tablet 2 milliGRAM(s) Oral three times a day PRN anxiety    Physical Exam  Gen:   Neuro:   Pulm:   CV:   Abd:   Extrem/MS:          Subjective: Patient seen and assessed for severe AS.  Patient states "so tell me what that story."  At time of exam, patient in NAD, Pt denies chest pain, palpitations, dizziness, headache, shortness of breath, abdominal pain or N/V/D/C.     T(C): 36.4 (10-03-23 @ 04:15), Max: 36.9 (10-02-23 @ 16:53)  HR: 84 (10-03-23 @ 04:15) (80 - 86)  BP: 132/69 (10-03-23 @ 04:15) (97/61 - 132/69)  RR: 18 (10-03-23 @ 04:15) (18 - 18)  SpO2: 97% (10-03-23 @ 04:15) (93% - 97%)    10-02    139  |  101  |  25.3<H>  ----------------------------<  122<H>  3.8   |  26.0  |  0.69    Ca    8.9      02 Oct 2023 05:38  Phos  3.6     10-02  Mg     1.8     10-02                                 12.4   6.74  )-----------( 238      ( 02 Oct 2023 05:38 )             37.8                    CAPILLARY BLOOD GLUCOSE  POCT Blood Glucose.: 180 mg/dL (02 Oct 2023 21:20)  POCT Blood Glucose.: 164 mg/dL (02 Oct 2023 17:06)  POCT Blood Glucose.: 144 mg/dL (02 Oct 2023 11:54)  POCT Blood Glucose.: 127 mg/dL (02 Oct 2023 07:59)    I&O's Detail    Drug Dosing Weight  Height (cm): 188 (25 Sep 2023 13:12)  Weight (kg): 127.5 (25 Sep 2023 13:12)  BMI (kg/m2): 36.1 (25 Sep 2023 13:12)  BSA (m2): 2.51 (25 Sep 2023 13:12)    MEDICATIONS  (STANDING):  aspirin  chewable 81 milliGRAM(s) Oral daily  atorvastatin 10 milliGRAM(s) Oral at bedtime  dextrose 5%. 1000 milliLiter(s) (50 mL/Hr) IV Continuous <Continuous>  dextrose 5%. 1000 milliLiter(s) (100 mL/Hr) IV Continuous <Continuous>  dextrose 50% Injectable 25 Gram(s) IV Push once  dextrose 50% Injectable 12.5 Gram(s) IV Push once  dextrose 50% Injectable 25 Gram(s) IV Push once  doxazosin 2 milliGRAM(s) Oral daily  enoxaparin Injectable 130 milliGRAM(s) SubCutaneous every 12 hours  ergocalciferol 22448 Unit(s) Oral every week  finasteride 5 milliGRAM(s) Oral daily  glucagon  Injectable 1 milliGRAM(s) IntraMuscular once  influenza  Vaccine (HIGH DOSE) 0.7 milliLiter(s) IntraMuscular once  insulin glargine Injectable (LANTUS) 10 Unit(s) SubCutaneous at bedtime  insulin lispro (ADMELOG) corrective regimen sliding scale   SubCutaneous three times a day before meals  lactulose Syrup 20 Gram(s) Oral two times a day  metoprolol tartrate 12.5 milliGRAM(s) Oral two times a day  midodrine. 5 milliGRAM(s) Oral three times a day  nystatin Ointment 1 Application(s) Topical every 12 hours  pantoprazole    Tablet 40 milliGRAM(s) Oral before breakfast  senna 2 Tablet(s) Oral at bedtime  sucralfate 1 Gram(s) Oral two times a day  torsemide 20 milliGRAM(s) Oral daily    MEDICATIONS  (PRN):  albuterol/ipratropium for Nebulization 3 milliLiter(s) Nebulizer every 6 hours PRN Shortness of Breath and/or Wheezing  dextrose Oral Gel 15 Gram(s) Oral once PRN Blood Glucose LESS THAN 70 milliGRAM(s)/deciliter  diazepam    Tablet 2 milliGRAM(s) Oral three times a day PRN anxiety    Physical Exam  Constitutional: NAD  Neuro: A+O x 3, non-focal, speech clear and intact  CV: +murmur   Pulm/chest: lung sounds CTA and equal bilaterally, no accessory muscle use noted  Abd: obese, +BS, soft, NT, ND  Ext: GUERRA x 4, +1/2 LE edema   Skin: chronic redness to b/l media groin, healing appropriately,  warm, well perfused  Psych: calm, appropriate affect

## 2023-10-03 NOTE — CONSULT NOTE ADULT - NS ATTEND AMEND GEN_ALL_CORE FT
Patient with known AF and LV dysfunction. Underwent AF ablation in 6/2023. Now sx of shortness of breath prior to admission. Likely went into atrial flutter and remains in it. Now planned for TAVR on Friday for severe AS. Has severe baseline conduction system disease (bifascicular block with long first degree) and high likelihood of developed complete heart block post-TAVR. Would be reasonable to implant a pacemaker pre-TAVR given this high risk. Would plan for cardioversion prior to discharge and will likely need antiarrhythmics which would again be risky with the severity of his conduction disease and post TAVR unless a PPM is implanted.

## 2023-10-03 NOTE — CONSULT NOTE ADULT - SUBJECTIVE AND OBJECTIVE BOX
CARDIAC ELECTROPHYSIOLOGY  [facility]   Office: [address] [place] [address]  Telephone: [phone] Fax:[phone]      78 y/o male with PMHx significant for HTN, hyperlipidemia, DM type 2, obesity BMI 38, thoracic aortic aneurysm, ALLIE, BPH s/p TURP, left renal mass, bifascicular block (RBBB + LAFB), HFrEF (LVEF 25% in persistent AF, improved to 45-50% in sinus rhythm), known moderate AS on TTE 6/11/23 (cath 1/2023 - 25mm LV-Ao gradient on pullback.  CIELO 1.4cm2), and persistent AF/AFL s/p CV 1/2023 and radiofrequency ablation of atrial fibrillation and atrial flutter (WACA/PVI, PWI, mitral line, and CTI line) 6/19/23, who presented [facility]/24[facility]23 with worsening shortness of breath for 2-3 days PTA as well as worsening lower ext edema and intermittent palpiation. He reports he recently moved out of assisted living to live in an apartment in his son's basement and has had difficulty with dietary compliance.     Echo on admission demonstrated LVEF 40-45%, and severe low-flow, low-gradient aortic stenosis. CT surgery was consulted for TAVR, which is now planned for 10/6.     ECG on admission demonstrated no discernible P waves, VR 84bpm, baseline RBBB & LAFB. Telemetry has continued to demonstrated HRs persistently mid 80s bpm w/ rare nocturnal slowing associated with some irregularity. No high risk pauses.     EP is consulted for evaluation of conduction disease and atrial arrhythmias.       PAST MEDICAL & SURGICAL HISTORY:  Anxiety  Hypertension  Hyperlipidemia  Prostate disease  Gastroesophageal reflux disease  Gallbladder stone without cholecystitis or obstruction  BPH (benign prostatic hyperplasia)  DM (diabetes mellitus)  DVT (deep venous thrombosis)  left leg  Afib  Anxiety  HLD (hyperlipidemia)  Malignant schwannoma  H/O arthroscopy of knee  S/P laminectomy  S/P coronary artery stent placement  x2      REVIEW OF SYSTEMS:  CONSTITUTIONAL: No fever, weight loss, or fatigue  EYES: No visual disturbances  NECK: No pain or stiffness  RESPIRATORY: +SOB. No cough, wheezing, chills or hemoptysis  CARDIOVASCULAR: see HPI  GASTROINTESTINAL: New diarrhea today. No abdominal or epigastric pain. No nausea, vomiting, or hematemesis.   NEUROLOGICAL: No headaches, memory loss, loss of strength, numbness, or tremors  SKIN: No itching, burning, rashes, or lesions   LYMPH NODES: No enlarged glands  ENDOCRINE: No heat or cold intolerance; No hair loss  PSYCHIATRIC: No depression, anxiety, mood swings, or difficulty sleeping  HEME/LYMPH: No easy bruising, or bleeding gums      MEDICATIONS  (STANDING):  aspirin  chewable 81 milliGRAM(s) Oral daily  atorvastatin 10 milliGRAM(s) Oral at bedtime  dextrose 5%. 1000 milliLiter(s) (50 mL/Hr) IV Continuous <Continuous>  dextrose 5%. 1000 milliLiter(s) (100 mL/Hr) IV Continuous <Continuous>  dextrose 50% Injectable 25 Gram(s) IV Push once  dextrose 50% Injectable 12.5 Gram(s) IV Push once  dextrose 50% Injectable 25 Gram(s) IV Push once  doxazosin 2 milliGRAM(s) Oral daily  enoxaparin Injectable 130 milliGRAM(s) SubCutaneous every 12 hours  ergocalciferol 39506 Unit(s) Oral every week  finasteride 5 milliGRAM(s) Oral daily  glucagon  Injectable 1 milliGRAM(s) IntraMuscular once  influenza  Vaccine (HIGH DOSE) 0.7 milliLiter(s) IntraMuscular once  insulin glargine Injectable (LANTUS) 10 Unit(s) SubCutaneous at bedtime  insulin lispro (ADMELOG) corrective regimen sliding scale   SubCutaneous three times a day before meals  lactulose Syrup 20 Gram(s) Oral two times a day  metoprolol tartrate 12.5 milliGRAM(s) Oral two times a day  midodrine. 5 milliGRAM(s) Oral three times a day  nystatin Ointment 1 Application(s) Topical every 12 hours  pantoprazole    Tablet 40 milliGRAM(s) Oral before breakfast  senna 2 Tablet(s) Oral at bedtime  sucralfate 1 Gram(s) Oral two times a day  torsemide 20 milliGRAM(s) Oral daily    MEDICATIONS  (PRN):  albuterol/ipratropium for Nebulization 3 milliLiter(s) Nebulizer every 6 hours PRN Shortness of Breath and/or Wheezing  dextrose Oral Gel 15 Gram(s) Oral once PRN Blood Glucose LESS THAN 70 milliGRAM(s)/deciliter  diazepam    Tablet 2 milliGRAM(s) Oral three times a day PRN anxiety      Allergies  Ceftin (Hives)  penicillin (Anaphylaxis)  Bactrim (Rash)  sulfa drugs (Unknown)      SOCIAL HISTORY:    FAMILY HISTORY:  FH: heart disease (Father)        Vital Signs Last 24 Hrs  T(C): 36.4 (03 Oct 2023 11:08), Max: 36.9 (02 Oct 2023 16:53)  T(F): 97.5 (03 Oct 2023 11:08), Max: 98.4 (02 Oct 2023 16:53)  HR: 83 (03 Oct 2023 11:08) (83 - 86)  BP: 113/75 (03 Oct 2023 11:08) (97/61 - 132/69)  BP(mean): --  RR: 18 (03 Oct 2023 11:08) (18 - 18)  SpO2: 95% (03 Oct 2023 11:08) (93% - 97%)    Parameters below as of 02 Oct 2023 21:15  Patient On (Oxygen Delivery Method): nasal cannula  O2 Flow (L/min): 2      Physical Exam:  Constitutional: AAOx3, NAD  Neck: supple, No JVD  Cardiovascular: +S1S2 RRR   Pulmonary: CTA b/l, unlabored  Abdomen: +BS, soft NTND  Extremities: 1+ edema b/l LEs, +distal pulses b/l  Neuro: non focal, speech clear, GUERRA x 4    LABS:                      12.2   6.75  )-----------( 258      ( 03 Oct 2023 05:48 )             37.9     141  |  101  |  24.5<H>  ----------------------------<  115<H>  3.7   |  28.0  |  0.58    Ca    8.8      03 Oct 2023 05:48  Phos  3.6     10-02  Mg     1.9     10-03      RADIOLOGY & ADDITIONAL STUDIES:  < from: TTE Echo Complete w/ Contrast w/ Doppler (09.27.23 @ 08:42) >  PHYSICIAN INTERPRETATION:  Left Ventricle: The left ventricular internal cavity size is normal. Left   ventricular wall thickness is moderately increased.  Global LV systolic function was mildly to moderately decreased. Left   ventricular ejection fraction, by visual estimation, is 40 to 45%. Mildly   to moderately decreased segmental left ventricular systolic function.      LV Wall Scoring:  The basal and mid anterolateral wall and basal and mid inferolateral wall   are  hypokinetic.    Right Ventricle: The right ventricular size is normal. RV systolic   function is normal.  Left Atrium: Moderately enlarged left atrium.  Right Atrium: Normal right atrial size.  Pericardium: Trivial pericardial effusion is present.  Mitral Valve: Mild thickening of the anterior and posterior mitral valve   leaflets. There is moderate mitral annular calcification. Trace mitral   valve regurgitation is seen.  Tricuspid Valve: Trivial tricuspid regurgitation is visualized.  Aortic Valve: The aortic valve is Poorly visualized. Severe aortic   stenosis is present. Mild to moderate aortic valve regurgitation is seen.   The Dimesionless Index value is 0.23.  Pulmonic Valve: Trace pulmonic valve regurgitation.  Aorta: There is dilatation of the aortic root and ascending aorta.      Summary:   1. Left ventricular ejection fraction, by visual estimation, is 40 to   45%.   2. Mildly to moderately decreased global left ventricular systolic   function.   3. Mildly to moderately decreased segmental left ventricular systolic   function.   4. Basal and mid anterolateral wall and basal and mid inferolateral wall   are abnormal as described above.   5. Moderately increased LV wall thickness.   6. There is moderate concentric left ventricular hypertrophy.   7. Moderately enlarged left atrium.   8. Normal right atrial size.   9. Trivial pericardial effusion.  10. Mild thickening of the anterior and posterior mitral valve leaflets.  11. Moderate mitral annular calcification.  12. Trace mitral valve regurgitation.  13. Aortic valve is Poorly visualized.  14. Mild to moderate aortic regurgitation.  15. Severe aortic valve stenosis (Low flow-Low gradient severe aortic   stenosis). Consider LIBBY for better characterization.  16. Moderate dilatation of the aortic root and ascending aorta.    Nicki, Electronically signed on 9/27/2023 at 10:54:08 AM  < end of copied text >      < from: LIBBY Echo Doppler (01.31.23 @ 11:21) >  PHYSICIAN INTERPRETATION:  Left Ventricle: The left ventricular internal cavity size is normal.  Global LV systolic function was mildly decreased. Left ventricular   ejection fraction, by visual estimation, is 40 to 45%.  Right Ventricle: Normal right ventricular size and function.  Left Atrium: Mildly enlarged left atrium. No left atrial appendage   thrombus is seen. Lipomatous hypertrophy of the intra-atrial septum.   Color flow doppler and intravenous injection of agitated saline   demonstrates the presence of an intact intra atrial septum.  RightAtrium: The right atrium is normal in size.  Pericardium: There is no evidence of pericardial effusion.  Mitral Valve: The mitral valve is normal in structure. Thickening of the   anterior and posterior mitral valve leaflets. There is mild mitral   annular calcification. Mild mitral valve regurgitation is seen.  Tricuspid Valve: The tricuspid valve is normal in structure. Mild   tricuspid regurgitation is visualized.  Aortic Valve: The aortic valve is trileaflet. Mild to moderate aortic   stenosis is present. Mild aortic valve regurgitation is seen. CIELO by   planimetry 1.2cm2.  Pulmonic Valve: The pulmonic valve is normal. Trace pulmonic valve   regurgitation.  Aorta: Aortic root measured at Sinus of Valsalva is dilated.  Pulmonary Artery: The pulmonary artery is not well seen.  Shunts: Agitated saline contrast was given intravenously to evaluate for   intracardiac shunting.  In comparison to the previous echocardiogram(s): Prior examinations are   available and were reviewed for comparison purposes.      Summary:   1. Left ventricular ejection fraction, by visual estimation, is 40 to   45%.   2. Mildly decreased global left ventricular systolic function.   3. Normal right ventricular size and function.   4. Mildly enlarged left atrium.   5. Mild mitral annular calcification.   6. Mild mitral valve regurgitation.   7. Thickening of the anterior and posterior mitral valve leaflets.   8. Mild tricuspid regurgitation.   9. Mild aortic regurgitation.  10. Mild to moderate aortic valvestenosis.  11. CIELO by planimetry 1.2cm2.  12. Aortic root measured at Sinus of Valsalva is dilated. 4.1cm  13. No left atrial appendage thrombus.  14. Color flow doppler and intravenous injection of agitated saline   demonstrates the presence of an intact intra atrial septum.    Nam Huynh MD Electronically signed on 1/31/2023 at 1:24:13 PM      < end of copied text >

## 2023-10-03 NOTE — CHART NOTE - NSCHARTNOTEFT_GEN_A_CORE
Source: Patient [ x]  Family [ ]   other [ x]    Current Diet:   Diet, DASH/TLC:   Sodium & Cholesterol Restricted  Consistent Carbohydrate {Evening Snack} (CSTCHOSN)  1500mL Fluid Restriction ([id number]) (09-26-23 @ 14:04)    Patient reports [ ] nausea  [ ] vomiting [ ] diarrhea [ ] constipation  [ ]chewing problems [ ] swallowing issues  [ ] other:     PO intake:  < 50% [ ]   50-75%  [ ]   %  [x ]  other :    Source for PO intake [ ] Patient [ ] family [x ] chart [x ] staff [ ] other    Current Weight:   (9/26)  288.8 lbs  (9/25)  281 lbs     % Weight Change: No recent weight documented     Pertinent Medications: MEDICATIONS  (STANDING):  aspirin  chewable 81 milliGRAM(s) Oral daily  atorvastatin 10 milliGRAM(s) Oral at bedtime  dextrose 5%. 1000 milliLiter(s) (100 mL/Hr) IV Continuous <Continuous>  dextrose 5%. 1000 milliLiter(s) (50 mL/Hr) IV Continuous <Continuous>  dextrose 50% Injectable 25 Gram(s) IV Push once  dextrose 50% Injectable 12.5 Gram(s) IV Push once  dextrose 50% Injectable 25 Gram(s) IV Push once  doxazosin 2 milliGRAM(s) Oral daily  enoxaparin Injectable 130 milliGRAM(s) SubCutaneous every 12 hours  ergocalciferol 36556 Unit(s) Oral every week  finasteride 5 milliGRAM(s) Oral daily  glucagon  Injectable 1 milliGRAM(s) IntraMuscular once  influenza  Vaccine (HIGH DOSE) 0.7 milliLiter(s) IntraMuscular once  insulin glargine Injectable (LANTUS) 10 Unit(s) SubCutaneous at bedtime  insulin lispro (ADMELOG) corrective regimen sliding scale   SubCutaneous three times a day before meals  lactulose Syrup 20 Gram(s) Oral two times a day  metoprolol tartrate 12.5 milliGRAM(s) Oral two times a day  midodrine. 5 milliGRAM(s) Oral three times a day  nystatin Ointment 1 Application(s) Topical every 12 hours  pantoprazole    Tablet 40 milliGRAM(s) Oral before breakfast  senna 2 Tablet(s) Oral at bedtime  sucralfate 1 Gram(s) Oral two times a day  torsemide 20 milliGRAM(s) Oral daily    MEDICATIONS  (PRN):  albuterol/ipratropium for Nebulization 3 milliLiter(s) Nebulizer every 6 hours PRN Shortness of Breath and/or Wheezing  dextrose Oral Gel 15 Gram(s) Oral once PRN Blood Glucose LESS THAN 70 milliGRAM(s)/deciliter  diazepam    Tablet 2 milliGRAM(s) Oral three times a day PRN anxiety    Pertinent Labs: CBC Full  -  ( 03 Oct 2023 05:48 )  WBC Count : 6.75 K/uL  RBC Count : 4.11 M/uL  Hemoglobin : 12.2 g/dL  Hematocrit : 37.9 %  Platelet Count - Automated : 258 K/uL  Mean Cell Volume : 92.2 fl  Mean Cell Hemoglobin : 29.7 pg  Mean Cell Hemoglobin Concentration : 32.2 gm/dL  10-03 Na141 mmol/L Glu 115 mg/dL<H> K+ 3.7 mmol/L Cr  0.58 mg/dL BUN 24.5 mg/dL<H> Phos n/a   Alb n/a   PAB n/a       Skin: 1+ R/L leg    Nutrition focused physical exam conducted - found signs of malnutrition [ x]absent [ ]present    Subcutaneous fat loss: [ ] Orbital fat pads region, [ ]Buccal fat region, [ ]Triceps region,  [ ]Ribs region    Muscle wasting: [ ]Temples region, [ ]Clavicle region, [ ]Shoulder region, [ ]Scapula region, [ ]Interosseous region,  [ ]thigh region, [ ]Calf region    Estimated Needs:   [ x] no change since previous assessment  [ ] recalculated:     Current Nutrition Diagnosis: Pt remains at nutrition risk secondary to overweight/obesity related to energy intake exceeds energy expenditure as evidenced by BMI 37.1. Pt continues with good PO intake, completing >75% of meals. Aware TAVR planned 10/6. Last documented BM 10/2. RD to remain available for additional education PRN.     Recommendations:   1) Continue current diet order as tolerated   2) Rx MVI daily   3) Monitor weights daily for trend/accuracy   4) Encourage HBV protein sources     Monitoring and Evaluation:   [x ] PO intake [ ] Tolerance to diet prescription [X] Weights  [X] Follow up per protocol [X] Labs:

## 2023-10-03 NOTE — PROGRESS NOTE ADULT - PROBLEM SELECTOR PLAN 1
Severe AS on last admission. Now admitted with acute on chronic diastolic heart failure.  TAVR CTA completed in June on last admission.  Pt was lost to follow up in the office.  Pt was supposed to see dental as outpt but was unable  CT max/facial with periapical lucencies per report.  CT scan reviewed by Dr. Morin at [facility] dental on 9/28 no need for transfer for evalution at this time, pt instructed to f/u as outpt for comprehensive dental exam.   NST 9/27 with sm-mod defect, no ishemia, reviewed with cardiology, no further work up needed (last C in January 2023 that showed 60% dcx disease and 100% pRCA with collateral circulation.  TTE  with Severe low flow low gradient AS.    No need for repeat LIBBY per Dr. Napier.  tentative TAVR Fri 10/6/23.   nystatin for b/l groins   pending repeat PT eavl   Plan discussed with Dr. Napier.

## 2023-10-03 NOTE — CONSULT NOTE ADULT - ASSESSMENT
78 y/o male with PMHx significant for HTN, hyperlipidemia, DM type 2, obesity BMI 38, thoracic aortic aneurysm, ALLIE, BPH s/p TURP, left renal mass, bifascicular block (RBBB + LAFB), newly diagnosed HFrEF (LVEF 25%, though previously had AF-mediated CM which improved after CV), known moderate AS on TTE 6/11/23 (cath 1/2023 - 25mm LV-Ao gradient on pullback.  CIELO 1.4cm2), and persistent AF/AFL s/p CV 1/2023 and radiofrequency ablation of atrial fibrillation and atrial flutter (WACA/PVI, PWI, mitral line, and CTI line) 6/19/23, who presented to [facility] 9/24/23 with worsening shortness of breath for 2-3 days PTA as well as worsening lower ext edema and intermittent palpiation. He reports he recently moved out of assisted living to live in an apartment in his son's basement and has had difficulty with dietary compliance.     Echo on admission demonstrated LVEF 40-45%, and severe low-flow, low-gradient aortic stenosis. CT surgery was consulted for TAVR, which is now planned for 10/6.     ECG on admission demonstrated no discernible P waves, VR 84bpm, baseline RBBB & LAFB. Telemetry has continued to demonstrated HRs persistently mid 80s bpm w/ rare nocturnal slowing associated with some irregularity. No high risk pauses.     EP is consulted for evaluation of conduction disease and atrial arrhythmias.     Recommendations:   1. Atrial fibrillation/atrial flutter  - Although no P waves are visible on tele or ECG (despite repeated attempts to maximize ECG signal), the rate has been very consistently at mid 80s bpm since admission, highly suspicious for underlying atrial flutter  - Given extensive ablation 6/2019, low voltage flutter waves would not be unexpected.   - Eliquis currently on hold in favor of Lovenox given planned TAVR.   - Consider LIBBY/cardioversion prior to d/c. Pt will need continuous a/c x minimum 30 days following cardioversion.     2. RBBB, LAFB  - Tolerating metoprolol 12.5mg BID.   - no high risk pauses or significant bradycardia  - Pt is high risk for CHB with TAVR & would need to consider pacemaker. Will d/w CTSx.     3. Severe AS  - Low flow, low gradient severe AS on TTE 9/27/23  - LIBBY 1/31/23 w/ moderate AS, CIELO 1.2cm2  - Will d/w CTSx if presence of atrial flutter could explain the worsening appearance of AS    D/W Dr. Amato; further recs to follow.

## 2023-10-03 NOTE — PROGRESS NOTE ADULT - PROBLEM SELECTOR PLAN 3
on eliquis, now d/c'd 9/27 and started on therapeutic lovenox on eliquis, now d/c'd 9/27 and started on therapeutic lovenox  Discussed with EP possible need for cardioversion, per Dr. Napier can be done after TAVR

## 2023-10-04 DIAGNOSIS — I45.2 BIFASCICULAR BLOCK: ICD-10-CM

## 2023-10-04 DIAGNOSIS — I44.0 ATRIOVENTRICULAR BLOCK, FIRST DEGREE: ICD-10-CM

## 2023-10-04 DIAGNOSIS — I35.0 NONRHEUMATIC AORTIC (VALVE) STENOSIS: ICD-10-CM

## 2023-10-04 LAB
ANION GAP SERPL CALC-SCNC: 11 MMOL/L — SIGNIFICANT CHANGE UP (ref 5–17)
APTT BLD: 42.2 SEC — HIGH (ref 24.5–35.6)
BLD GP AB SCN SERPL QL: SIGNIFICANT CHANGE UP
BUN SERPL-MCNC: 25.8 MG/DL — HIGH (ref 8–20)
CALCIUM SERPL-MCNC: 9.1 MG/DL — SIGNIFICANT CHANGE UP (ref 8.4–10.5)
CHLORIDE SERPL-SCNC: 101 MMOL/L — SIGNIFICANT CHANGE UP (ref 96–108)
CO2 SERPL-SCNC: 29 MMOL/L — SIGNIFICANT CHANGE UP (ref 22–29)
CREAT SERPL-MCNC: 0.69 MG/DL — SIGNIFICANT CHANGE UP (ref 0.5–1.3)
EGFR: 95 ML/MIN/1.73M2 — SIGNIFICANT CHANGE UP
GLUCOSE BLDC GLUCOMTR-MCNC: 133 MG/DL — HIGH (ref 70–99)
GLUCOSE BLDC GLUCOMTR-MCNC: 188 MG/DL — HIGH (ref 70–99)
GLUCOSE BLDC GLUCOMTR-MCNC: 192 MG/DL — HIGH (ref 70–99)
GLUCOSE BLDC GLUCOMTR-MCNC: 192 MG/DL — HIGH (ref 70–99)
GLUCOSE SERPL-MCNC: 122 MG/DL — HIGH (ref 70–99)
HCT VFR BLD CALC: 40.4 % — SIGNIFICANT CHANGE UP (ref 39–50)
HGB BLD-MCNC: 12.7 G/DL — LOW (ref 13–17)
INR BLD: 1.22 RATIO — HIGH (ref 0.85–1.18)
MAGNESIUM SERPL-MCNC: 1.8 MG/DL — SIGNIFICANT CHANGE UP (ref 1.6–2.6)
MCHC RBC-ENTMCNC: 29.3 PG — SIGNIFICANT CHANGE UP (ref 27–34)
MCHC RBC-ENTMCNC: 31.4 GM/DL — LOW (ref 32–36)
MCV RBC AUTO: 93.1 FL — SIGNIFICANT CHANGE UP (ref 80–100)
PLATELET # BLD AUTO: 282 K/UL — SIGNIFICANT CHANGE UP (ref 150–400)
POTASSIUM SERPL-MCNC: 3.7 MMOL/L — SIGNIFICANT CHANGE UP (ref 3.5–5.3)
POTASSIUM SERPL-SCNC: 3.7 MMOL/L — SIGNIFICANT CHANGE UP (ref 3.5–5.3)
PROTHROM AB SERPL-ACNC: 13.5 SEC — HIGH (ref 9.5–13)
RBC # BLD: 4.34 M/UL — SIGNIFICANT CHANGE UP (ref 4.2–5.8)
RBC # FLD: 15 % — HIGH (ref 10.3–14.5)
SODIUM SERPL-SCNC: 141 MMOL/L — SIGNIFICANT CHANGE UP (ref 135–145)
WBC # BLD: 6.79 K/UL — SIGNIFICANT CHANGE UP (ref 3.8–10.5)
WBC # FLD AUTO: 6.79 K/UL — SIGNIFICANT CHANGE UP (ref 3.8–10.5)

## 2023-10-04 PROCEDURE — 99233 SBSQ HOSP IP/OBS HIGH 50: CPT

## 2023-10-04 PROCEDURE — 99232 SBSQ HOSP IP/OBS MODERATE 35: CPT

## 2023-10-04 PROCEDURE — 71045 X-RAY EXAM CHEST 1 VIEW: CPT | Mod: 26

## 2023-10-04 RX ORDER — MAGNESIUM SULFATE 500 MG/ML
2 VIAL (ML) INJECTION ONCE
Refills: 0 | Status: COMPLETED | OUTPATIENT
Start: 2023-10-04 | End: 2023-10-04

## 2023-10-04 RX ORDER — ACETAMINOPHEN 500 MG
325 TABLET ORAL EVERY 4 HOURS
Refills: 0 | Status: DISCONTINUED | OUTPATIENT
Start: 2023-10-04 | End: 2023-10-07

## 2023-10-04 RX ORDER — INSULIN GLARGINE 100 [IU]/ML
5 INJECTION, SOLUTION SUBCUTANEOUS AT BEDTIME
Refills: 0 | Status: DISCONTINUED | OUTPATIENT
Start: 2023-10-04 | End: 2023-10-08

## 2023-10-04 RX ORDER — POTASSIUM CHLORIDE 20 MEQ
40 PACKET (EA) ORAL ONCE
Refills: 0 | Status: COMPLETED | OUTPATIENT
Start: 2023-10-04 | End: 2023-10-04

## 2023-10-04 RX ORDER — FLUTICASONE PROPIONATE 50 MCG
1 SPRAY, SUSPENSION NASAL
Refills: 0 | Status: DISCONTINUED | OUTPATIENT
Start: 2023-10-04 | End: 2023-10-09

## 2023-10-04 RX ORDER — FUROSEMIDE 40 MG
40 TABLET ORAL
Refills: 0 | Status: DISCONTINUED | OUTPATIENT
Start: 2023-10-04 | End: 2023-10-06

## 2023-10-04 RX ORDER — DIAZEPAM 5 MG
2 TABLET ORAL ONCE
Refills: 0 | Status: DISCONTINUED | OUTPATIENT
Start: 2023-10-04 | End: 2023-10-06

## 2023-10-04 RX ADMIN — Medication 2: at 17:09

## 2023-10-04 RX ADMIN — Medication 40 MILLIEQUIVALENT(S): at 10:16

## 2023-10-04 RX ADMIN — MIDODRINE HYDROCHLORIDE 5 MILLIGRAM(S): 2.5 TABLET ORAL at 11:27

## 2023-10-04 RX ADMIN — MIDODRINE HYDROCHLORIDE 5 MILLIGRAM(S): 2.5 TABLET ORAL at 05:15

## 2023-10-04 RX ADMIN — Medication 25 GRAM(S): at 10:18

## 2023-10-04 RX ADMIN — MIDODRINE HYDROCHLORIDE 5 MILLIGRAM(S): 2.5 TABLET ORAL at 17:06

## 2023-10-04 RX ADMIN — CHLORHEXIDINE GLUCONATE 15 MILLILITER(S): 213 SOLUTION TOPICAL at 17:07

## 2023-10-04 RX ADMIN — NYSTATIN CREAM 1 APPLICATION(S): 100000 CREAM TOPICAL at 17:08

## 2023-10-04 RX ADMIN — CHLORHEXIDINE GLUCONATE 15 MILLILITER(S): 213 SOLUTION TOPICAL at 05:18

## 2023-10-04 RX ADMIN — SENNA PLUS 2 TABLET(S): 8.6 TABLET ORAL at 21:41

## 2023-10-04 RX ADMIN — CHLORHEXIDINE GLUCONATE 1 APPLICATION(S): 213 SOLUTION TOPICAL at 05:20

## 2023-10-04 RX ADMIN — Medication 1 GRAM(S): at 17:08

## 2023-10-04 RX ADMIN — ATORVASTATIN CALCIUM 10 MILLIGRAM(S): 80 TABLET, FILM COATED ORAL at 21:41

## 2023-10-04 RX ADMIN — Medication 20 MILLIGRAM(S): at 05:14

## 2023-10-04 RX ADMIN — Medication 2 MILLIGRAM(S): at 05:15

## 2023-10-04 RX ADMIN — FINASTERIDE 5 MILLIGRAM(S): 5 TABLET, FILM COATED ORAL at 11:27

## 2023-10-04 RX ADMIN — PANTOPRAZOLE SODIUM 40 MILLIGRAM(S): 20 TABLET, DELAYED RELEASE ORAL at 05:15

## 2023-10-04 RX ADMIN — Medication 2: at 12:31

## 2023-10-04 RX ADMIN — Medication 40 MILLIGRAM(S): at 17:12

## 2023-10-04 RX ADMIN — Medication 81 MILLIGRAM(S): at 11:27

## 2023-10-04 RX ADMIN — Medication 1 GRAM(S): at 05:15

## 2023-10-04 RX ADMIN — Medication 40 MILLIGRAM(S): at 10:52

## 2023-10-04 RX ADMIN — Medication 12.5 MILLIGRAM(S): at 05:15

## 2023-10-04 RX ADMIN — NYSTATIN CREAM 1 APPLICATION(S): 100000 CREAM TOPICAL at 05:20

## 2023-10-04 RX ADMIN — Medication 12.5 MILLIGRAM(S): at 17:06

## 2023-10-04 RX ADMIN — INSULIN GLARGINE 5 UNIT(S): 100 INJECTION, SOLUTION SUBCUTANEOUS at 21:50

## 2023-10-04 RX ADMIN — ENOXAPARIN SODIUM 130 MILLIGRAM(S): 100 INJECTION SUBCUTANEOUS at 05:14

## 2023-10-04 NOTE — PROGRESS NOTE ADULT - ASSESSMENT
78 y/o male with PMHx significant for HTN, hyperlipidemia, DM type 2, obesity BMI 38, thoracic aortic aneurysm, ALLIE, BPH s/p TURP, left renal mass, bifascicular block (RBBB + LAFB), newly diagnosed HFrEF (LVEF 25%, though previously had AF-mediated CM which improved after CV), known moderate AS on TTE 6/11/23 (cath 1/2023 - 25mm LV-Ao gradient on pullback.  CIELO 1.4cm2), and persistent AF/AFL s/p CV 1/2023 and radiofrequency ablation of atrial fibrillation and atrial flutter (WACA/PVI, PWI, mitral line, and CTI line) 6/19/23, who presented to [facility] 9/24/23 with worsening shortness of breath for 2-3 days PTA as well as worsening lower ext edema and intermittent palpiation. He reports he recently moved out of assisted living to live in an apartment in his son's basement and has had difficulty with dietary compliance.     Echo on admission demonstrated LVEF 40-45%, and severe low-flow, low-gradient aortic stenosis. CT surgery was consulted for TAVR, which is now planned for 10/6.     ECG on admission demonstrated no discernible P waves, VR 84bpm, baseline RBBB & LAFB. Telemetry has continued to demonstrated HRs persistently mid 80s bpm w/ rare nocturnal slowing associated with some irregularity. No high risk pauses.     EP is consulted for evaluation of conduction disease and atrial arrhythmias.     Recommendations:   1. Severe AS  - case reviewed w/ CTSx & confirmed need for TAVR regardless of rhythm.   - Given baseline profound 1st deg AVB, RBBB & LAFB, pt is very high risk for CHB w/ TAVR.   - Will plan for PPM 10/5.   - TAVR 10/6.       2. Atrial fibrillation/atrial flutter  - Although no P waves are visible on tele or ECG (despite repeated attempts to maximize ECG signal)  - Given extensive ablation 6/2019, low voltage flutter waves would not be unexpected.   - Will definitively confirm atrial rhythm once pacemaker implanted.   - Eliquis currently on hold in favor of Lovenox given planned TAVR.   - If pt has AF or AFL, will consider cardioversion prior to d/c.     2. RBBB, LAFB  - Tolerating metoprolol 12.5mg BID.   - no high risk pauses or significant bradycardia  - Pt is very high risk for CHB with TAVR. Empiric pacemaker implant planned for 10/5.     D/w Dr. Rocha; further recs to follow.      76 y/o male with PMHx significant for HTN, hyperlipidemia, DM type 2, obesity BMI 38, thoracic aortic aneurysm, ALLIE, BPH s/p TURP, left renal mass, bifascicular block (RBBB + LAFB), newly diagnosed HFrEF (LVEF 25%, though previously had AF-mediated CM which improved after CV), known moderate AS on TTE 6/11/23 (cath 1/2023 - 25mm LV-Ao gradient on pullback.  CIELO 1.4cm2), and persistent AF/AFL s/p CV 1/2023 and radiofrequency ablation of atrial fibrillation and atrial flutter (WACA/PVI, PWI, mitral line, and CTI line) 6/19/23, who presented to [facility] 9/24/23 with worsening shortness of breath for 2-3 days PTA as well as worsening lower ext edema and intermittent palpiation. He reports he recently moved out of assisted living to live in an apartment in his son's basement and has had difficulty with dietary compliance.     Echo on admission demonstrated LVEF 40-45%, and severe low-flow, low-gradient aortic stenosis. CT surgery was consulted for TAVR, which is now planned for 10/6.     ECG on admission demonstrated no discernible P waves, VR 84bpm, baseline RBBB & LAFB. Telemetry has continued to demonstrated HRs persistently mid 80s bpm w/ rare nocturnal slowing associated with some irregularity. No high risk pauses.     EP is consulted for evaluation of conduction disease and atrial arrhythmias.     Recommendations:   1. Severe AS  - case reviewed w/ CTSx & confirmed need for TAVR regardless of rhythm.   - Given baseline profound 1st deg AVB, RBBB & LAFB, pt is very high risk for CHB w/ TAVR.   - Will plan for PPM 10/5.   - TAVR 10/6.       2. Atrial fibrillation/atrial flutter  - Although no P waves are visible on tele or ECG (despite repeated attempts to maximize ECG signal)  - Given extensive ablation 6/2019, low voltage flutter waves would not be unexpected.   - Will definitively confirm atrial rhythm once pacemaker implanted.   - Eliquis currently on hold in favor of Lovenox given planned TAVR.   - If pt has AF or AFL, will consider cardioversion prior to d/c.     2. RBBB, LAFB  - Tolerating metoprolol 12.5mg BID.   - no high risk pauses or significant bradycardia  - Pt is very high risk for CHB with TAVR.   - NPO after midnight tonight for empiric pacemaker implant 10/5.   - T&S up to date.     D/w Dr. Rocha; further recs to follow.      78 y/o male with PMHx significant for HTN, hyperlipidemia, DM type 2, obesity BMI 38, thoracic aortic aneurysm, ALLIE, BPH s/p TURP, left renal mass, bifascicular block (RBBB + LAFB), newly diagnosed HFrEF (LVEF 25%, though previously had AF-mediated CM which improved after CV), known moderate AS on TTE 6/11/23 (cath 1/2023 - 25mm LV-Ao gradient on pullback.  CIELO 1.4cm2), and persistent AF/AFL s/p CV 1/2023 and radiofrequency ablation of atrial fibrillation and atrial flutter (WACA/PVI, PWI, mitral line, and CTI line) 6/19/23, who presented to [facility] 9/24/23 with worsening shortness of breath for 2-3 days PTA as well as worsening lower ext edema and intermittent palpiation. He reports he recently moved out of assisted living to live in an apartment in his son's basement and has had difficulty with dietary compliance.     Echo on admission demonstrated LVEF 40-45%, and severe low-flow, low-gradient aortic stenosis. CT surgery was consulted for TAVR, which is now planned for 10/6.     ECG on admission demonstrated no discernible P waves, VR 84bpm, baseline RBBB & LAFB. Telemetry has continued to demonstrated HRs persistently mid 80s bpm w/ rare nocturnal slowing associated with some irregularity. No high risk pauses.     EP is consulted for evaluation of conduction disease and atrial arrhythmias.     Recommendations:   1. Severe AS  - case reviewed w/ CTSx & confirmed need for TAVR regardless of rhythm.   - Given baseline profound 1st deg AVB, RBBB & LAFB, pt is very high risk for CHB w/ TAVR.   - Will plan for PPM 10/5.   - TAVR 10/6.       2. Atrial fibrillation/atrial flutter  - Although no P waves are visible on tele or ECG (despite repeated attempts to maximize ECG signal)  - Given extensive ablation 6/2019, low voltage flutter waves would not be unexpected.   - Will definitively confirm atrial rhythm once pacemaker implanted.   - Eliquis currently on hold in favor of Lovenox given planned TAVR.   - If pt has AF or AFL, will consider cardioversion prior to d/c.     3. RBBB, LAFB  - Tolerating metoprolol 12.5mg BID.   - no high risk pauses or significant bradycardia  - Pt is very high risk for CHB with TAVR.   - NPO after midnight tonight for empiric pacemaker implant 10/5.   - T&S up to date.     D/w Dr. Rocha; further recs to follow.

## 2023-10-04 NOTE — PROGRESS NOTE ADULT - NS ATTEND AMEND GEN_ALL_CORE FT
.  .  Patient doing well. Plan for TAVR on Friday. His ECG may either be atypical flutter or NSR with very long first degree AVB and bifascicular block. He is at very high risk for complete heart block with TAVR implantation so it is reasonable to pursue pacemaker implantation before TAVR on Friday. He is currently tolerating low dose BB. No heparin products after midnight.    Jordan Rocha MD  Clinical Cardiac Electrophysiology

## 2023-10-04 NOTE — PROGRESS NOTE ADULT - PROBLEM SELECTOR PLAN 3
on eliquis, now d/c'd 9/27 and started on therapeutic lovenox  Discussed with EP possible need for cardioversion, per Dr. Napier can be done after TAVR

## 2023-10-04 NOTE — PROGRESS NOTE ADULT - SUBJECTIVE AND OBJECTIVE BOX
Pt stable overnight without event. Remains in unclear atrial rhythm, HRs 70s - 80s bpm.     MEDICATIONS  (STANDING):  aspirin enteric coated 81 milliGRAM(s) Oral daily  atorvastatin 10 milliGRAM(s) Oral at bedtime  chlorhexidine 0.12% Liquid 15 milliLiter(s) Swish and Spit every 12 hours  chlorhexidine 4% Liquid 1 Application(s) Topical two times a day  dextrose 5%. 1000 milliLiter(s) (100 mL/Hr) IV Continuous <Continuous>  dextrose 5%. 1000 milliLiter(s) (50 mL/Hr) IV Continuous <Continuous>  dextrose 50% Injectable 25 Gram(s) IV Push once  dextrose 50% Injectable 12.5 Gram(s) IV Push once  dextrose 50% Injectable 25 Gram(s) IV Push once  doxazosin 2 milliGRAM(s) Oral daily  ergocalciferol 58397 Unit(s) Oral every week  finasteride 5 milliGRAM(s) Oral daily  furosemide   Injectable 40 milliGRAM(s) IV Push <User Schedule>  glucagon  Injectable 1 milliGRAM(s) IntraMuscular once  insulin glargine Injectable (LANTUS) 10 Unit(s) SubCutaneous at bedtime  insulin lispro (ADMELOG) corrective regimen sliding scale   SubCutaneous three times a day before meals  metoprolol tartrate 12.5 milliGRAM(s) Oral two times a day  midodrine. 5 milliGRAM(s) Oral three times a day  nystatin Ointment 1 Application(s) Topical every 12 hours  pantoprazole    Tablet 40 milliGRAM(s) Oral before breakfast  senna 2 Tablet(s) Oral at bedtime  sucralfate 1 Gram(s) Oral two times a day    MEDICATIONS  (PRN):  acetaminophen     Tablet .. 325 milliGRAM(s) Oral every 4 hours PRN Mild Pain (1 - 3), Moderate Pain (4 - 6)  albuterol/ipratropium for Nebulization 3 milliLiter(s) Nebulizer every 6 hours PRN Shortness of Breath and/or Wheezing  dextrose Oral Gel 15 Gram(s) Oral once PRN Blood Glucose LESS THAN 70 milliGRAM(s)/deciliter  fluticasone propionate 50 MICROgram(s)/spray Nasal Spray 1 Spray(s) Both Nostrils two times a day PRN nasal congestion      Allergies  Ceftin (Hives)  penicillin (Anaphylaxis)  Bactrim (Rash)  sulfa drugs (Unknown)      Vital Signs Last 24 Hrs  T(C): 36.4 (04 Oct 2023 08:53), Max: 36.9 (03 Oct 2023 23:45)  T(F): 97.5 (04 Oct 2023 08:53), Max: 98.4 (03 Oct 2023 23:45)  HR: 78 (04 Oct 2023 10:15) (78 - 92)  BP: 104/66 (04 Oct 2023 10:15) (100/61 - 120/68)  BP(mean): --  RR: 16 (04 Oct 2023 10:15) (16 - 18)  SpO2: 97% (04 Oct 2023 10:15) (94% - 98%)    Parameters below as of 04 Oct 2023 10:15  Patient On (Oxygen Delivery Method): nasal cannula  O2 Flow (L/min): 2    Physical Exam:  Constitutional: NAD, AAOx3  Cardiovascular: +S1S2 RRR  Pulmonary: CTA b/l, unlabored  GI: soft NTND +BS  Extremities: 1+ edema b/l LEs, +distal pulses b/l  Neuro: non focal, GUERRA x4    LABS:                      12.7   6.79  )-----------( 282      ( 04 Oct 2023 06:25 )             40.4     141  |  101  |  25.8<H>  ----------------------------<  122<H>  3.7   |  29.0  |  0.69    Ca    9.1      04 Oct 2023 06:25  Mg     1.8     10-04    PT/INR - ( 04 Oct 2023 06:25 )   PT: 13.5 sec;   INR: 1.22 ratio       PTT - ( 04 Oct 2023 06:25 )  PTT:42.2 sec  Urinalysis Basic - ( 04 Oct 2023 06:25 )

## 2023-10-04 NOTE — PROGRESS NOTE ADULT - SUBJECTIVE AND OBJECTIVE BOX
ANIA CRISTOBAL  [id number]      Chief Complaint:  CHF/ AS/ CAD/AFib/bifascicular block    Interval History:  Patient without physical c/o.  Denies CP, SOB, palps.    Tele:  Likely mostly SR with long AV delay.  ?AFL.        acetaminophen     Tablet .. 325 milliGRAM(s) Oral every 4 hours PRN  albuterol/ipratropium for Nebulization 3 milliLiter(s) Nebulizer every 6 hours PRN  aspirin enteric coated 81 milliGRAM(s) Oral daily  atorvastatin 10 milliGRAM(s) Oral at bedtime  chlorhexidine 0.12% Liquid 15 milliLiter(s) Swish and Spit every 12 hours  chlorhexidine 4% Liquid 1 Application(s) Topical two times a day  dextrose 5%. 1000 milliLiter(s) IV Continuous <Continuous>  dextrose 5%. 1000 milliLiter(s) IV Continuous <Continuous>  dextrose 50% Injectable 25 Gram(s) IV Push once  dextrose 50% Injectable 12.5 Gram(s) IV Push once  dextrose 50% Injectable 25 Gram(s) IV Push once  dextrose Oral Gel 15 Gram(s) Oral once PRN  doxazosin 2 milliGRAM(s) Oral daily  ergocalciferol 49250 Unit(s) Oral every week  finasteride 5 milliGRAM(s) Oral daily  fluticasone propionate 50 MICROgram(s)/spray Nasal Spray 1 Spray(s) Both Nostrils two times a day PRN  furosemide   Injectable 40 milliGRAM(s) IV Push <User Schedule>  glucagon  Injectable 1 milliGRAM(s) IntraMuscular once  insulin glargine Injectable (LANTUS) 10 Unit(s) SubCutaneous at bedtime  insulin lispro (ADMELOG) corrective regimen sliding scale   SubCutaneous three times a day before meals  metoprolol tartrate 12.5 milliGRAM(s) Oral two times a day  midodrine. 5 milliGRAM(s) Oral three times a day  nystatin Ointment 1 Application(s) Topical every 12 hours  pantoprazole    Tablet 40 milliGRAM(s) Oral before breakfast  senna 2 Tablet(s) Oral at bedtime  sucralfate 1 Gram(s) Oral two times a day          Physical Exam:  T(C): 36.4 (10-04-23 @ 08:53), Max: 36.9 (10-03-23 @ 23:45)  HR: 78 (10-04-23 @ 10:15) (78 - 92)  BP: 104/66 (10-04-23 @ 10:15) (100/61 - 120/68)  RR: 16 (10-04-23 @ 10:15) (16 - 18)  SpO2: 97% (10-04-23 @ 10:15) (94% - 98%)  General: Comfortable in NAD  Neck: No JVD  CVS: nl s1s2, no s3, +DORINDA  Pulm: CTA b/l  Abd: soft, non-tender  Ext: No c/c/e  Neuro A&O x3  Psych: Normal affect      Labs:   04 Oct 2023 06:25    141    |  101    |  25.8   ----------------------------<  122    3.7     |  29.0   |  0.69     Ca    9.1        04 Oct 2023 06:25  Mg     1.8       04 Oct 2023 06:25                            12.7   6.79  )-----------( 282      ( 04 Oct 2023 06:25 )             40.4     PT/INR - ( 04 Oct 2023 06:25 )   PT: 13.5 sec;   INR: 1.22 ratio         PTT - ( 04 Oct 2023 06:25 )  PTT:42.2 sec            Echo 6/20/23  Summary:   1. LV Ejection Fraction by Oleary's Method with a biplane EF of 50 %.   2. Normal global left ventricular systolic function.   3. Trace mitral valve regurgitation.   4. Mild aortic regurgitation.   5. Endocardial visualization was enhanced with intravenous echo contrast.   6. Compared to TTE dated 6/11/23 the LVEF has improved.    STRESS TEST:    CATHETERIZATION:  [facility] 1/2023:  Coronary Angiography   - Left dominant system   - Patent prox and mid LAD stents   - Moderate stenosis of the distal LCx   - The RCA is a small, non-dominat vessel.  The proximal segment is occluded with brisk right-to-right collaterals.  - 25mm LV-Ao gradient on pullback.  CIELO 1.4cm2 with moderate AS byecho.  - Moderately reduced LVEF by echo           Pharmacological stress test 9/27/23  * Myocardial Perfusion SPECT results are mildly abnormal. * There is a small, moderate defect in the distal inferior and inferoapical wall that is fixed, consistent with infarct. Basal inferior wall defect paradoxically improved during stress imaging. Prone imaging not available. No clear evidence of ischemia. No TID (ratio 1.23).  * Post-stress resting myocardial perfusion gated SPECT imaging was performed (LVEF = 48 %;LVEDV = 176 ml.), mild hypokinesis of the distal inferior wall.    TTE 9/27/23  Summary:   1. Left ventricular ejection fraction, by visual estimation, is 40 to   45%.   2. Mildly to moderately decreased global left ventricular systolic   function.   3. Mildly to moderately decreased segmental left ventricular systolic   function.   4. Basal and mid anterolateral wall and basal and mid inferolateral wall   are abnormal as described above.   5. Moderately increased LV wall thickness.   6. There is moderate concentric left ventricular hypertrophy.   7. Moderately enlarged left atrium.   8. Normal right atrial size.   9. Trivial pericardial effusion.  10. Mild thickening of the anterior and posterior mitral valve leaflets.  11. Moderate mitral annular calcification.  12. Trace mitral valve regurgitation.  13. Aortic valve is Poorly visualized.  14. Mild to moderate aortic regurgitation.  15. Severe aortic valve stenosis (Low flow-Low gradient severe aortic   stenosis).        Assessement   78 y/o male PMHx AF on Eliquis (s/p ablation on 6/2023), moderate-severe AS, Chronic Diastolic Heart Failure, CAD (Patent prox and mid LAD stents, Moderate stenosis of the distal LCx. The pRCA  with brisk right-to-right collaterals)., HTN, HLD, DM 2, Morbid Obesity, ALLIE, Thoracic Aortic Aneurysm, BPH s/p TURP, Left Renal Mass, On last admission patient underwent CardioMeMs placement, Pt now presents from home (recently moved out of assisted living to live in his son's basement apartment), with palpitations and SOB.  He was given IV lasix and admitted for acute on chronic diastolic heart failure. Cardiology consultation requested for evaluation of acute decompensation of heart failure.   Today with low BP, received midodrine, currently BP WNL  -On exam patient looks euvolemic   -Tele with AFIB vs Flutter and now SR with long 1st degree HB, ?higher degree HB.  Plan per EP for PPM tomorrow ahead of TAVR given high likelihood of CHB post.  -Stress test with no clear evidence of ischemia.    Plan:  1. PPM tomorrow per EP.  2. Continue current CV medications.   3. Continue with  lovenox.  Change to Eliquis on d/c.  4. TAVR per CTS, scheduled 10/6.  5. Will eval rhythm with PPM once atrial lead in place and determine need for DCCV if AFL.    D/w RANULFO PA.

## 2023-10-04 NOTE — PROGRESS NOTE ADULT - PROBLEM SELECTOR PLAN 1
Severe AS on last admission. Now admitted with acute on chronic diastolic heart failure.  TAVR CTA completed in June on last admission.  Pt was lost to follow up in the office.  Pt was supposed to see dental as outpt but was unable  CT max/facial with periapical lucencies per report.  CT scan reviewed by Dr. Morin at [facility] on 9/28 no need for transfer for evalution at this time, pt instructed to f/u as outpt for comprehensive dental exam.   NST 9/27 with sm-mod defect, no ishemia, reviewed with cardiology, no further work up needed (last C in January 2023 that showed 60% dcx disease and 100% pRCA with collateral circulation.  TTE  with Severe low flow low gradient AS.    No need for repeat LIBBY per Dr. Napier.  tentative TAVR Fri 10/6/23.   nystatin for b/l groins   pending repeat PT eavl   Plan discussed with Dr. Napier.

## 2023-10-04 NOTE — PROGRESS NOTE ADULT - SUBJECTIVE AND OBJECTIVE BOX
Subjective - patient seen and evaluated bedside. Sitting comfortably in bed. Admits to fatigue Denies CP, SOB, HA, dizziness, n/v/d    Review of Systems: negative x 10 systems except as noted above    Brief summary:  77yMale w/ severe AS pending TAVR workup    Significant/[id number] events: no acute events      PAST MEDICAL & SURGICAL HISTORY:  Anxiety      Hypertension      Hyperlipidemia      Prostate disease      Gastroesophageal reflux disease      Gallbladder stone without cholecystitis or obstruction      BPH (benign prostatic hyperplasia)      DVT (deep venous thrombosis)  left leg      Afib      Anxiety      HLD (hyperlipidemia)      DM (diabetes mellitus)      Malignant schwannoma      H/O arthroscopy of knee      S/P laminectomy      S/P coronary artery stent placement  x2            albuterol/ipratropium for Nebulization 3 milliLiter(s) Nebulizer every 6 hours PRN  aspirin enteric coated 81 milliGRAM(s) Oral daily  atorvastatin 10 milliGRAM(s) Oral at bedtime  chlorhexidine 0.12% Liquid 15 milliLiter(s) Swish and Spit every 12 hours  chlorhexidine 4% Liquid 1 Application(s) Topical two times a day  dextrose 5%. 1000 milliLiter(s) IV Continuous <Continuous>  dextrose 5%. 1000 milliLiter(s) IV Continuous <Continuous>  dextrose 50% Injectable 25 Gram(s) IV Push once  dextrose 50% Injectable 12.5 Gram(s) IV Push once  dextrose 50% Injectable 25 Gram(s) IV Push once  dextrose Oral Gel 15 Gram(s) Oral once PRN  diazepam    Tablet 2 milliGRAM(s) Oral three times a day PRN  doxazosin 2 milliGRAM(s) Oral daily  enoxaparin Injectable 130 milliGRAM(s) SubCutaneous every 12 hours  ergocalciferol 07952 Unit(s) Oral every week  finasteride 5 milliGRAM(s) Oral daily  glucagon  Injectable 1 milliGRAM(s) IntraMuscular once  insulin glargine Injectable (LANTUS) 10 Unit(s) SubCutaneous at bedtime  insulin lispro (ADMELOG) corrective regimen sliding scale   SubCutaneous three times a day before meals  metoprolol tartrate 12.5 milliGRAM(s) Oral two times a day  midodrine. 5 milliGRAM(s) Oral three times a day  nystatin Ointment 1 Application(s) Topical every 12 hours  pantoprazole    Tablet 40 milliGRAM(s) Oral before breakfast  senna 2 Tablet(s) Oral at bedtime  sucralfate 1 Gram(s) Oral two times a day  torsemide 20 milliGRAM(s) Oral daily  MEDICATIONS  (PRN):  albuterol/ipratropium for Nebulization 3 milliLiter(s) Nebulizer every 6 hours PRN Shortness of Breath and/or Wheezing  dextrose Oral Gel 15 Gram(s) Oral once PRN Blood Glucose LESS THAN 70 milliGRAM(s)/deciliter  diazepam    Tablet 2 milliGRAM(s) Oral three times a day PRN anxiety      Daily     Daily                               12.2   6.75  )-----------( 258      ( 03 Oct 2023 05:48 )             37.9   10-03    141  |  101  |  24.5<H>  ----------------------------<  115<H>  3.7   |  28.0  |  0.58    Ca    8.8      03 Oct 2023 05:48  Phos  3.6     10-02  Mg     1.9     10-03              Objective:  T(C): 36.9 (10-03-23 @ 23:45), Max: 36.9 (10-03-23 @ 23:45)  HR: 86 (10-03-23 @ 23:45) (78 - 92)  BP: 104/76 (10-03-23 @ 23:45) (100/61 - 132/69)  RR: 18 (10-03-23 @ 23:45) (18 - 18)  SpO2: 94% (10-03-23 @ 23:45) (94% - 97%)  Wt(kg): --CAPILLARY BLOOD GLUCOSE      POCT Blood Glucose.: 177 mg/dL (03 Oct 2023 21:15)  POCT Blood Glucose.: 164 mg/dL (03 Oct 2023 16:55)  POCT Blood Glucose.: 135 mg/dL (03 Oct 2023 11:59)  POCT Blood Glucose.: 112 mg/dL (03 Oct 2023 08:11)  I&O's Summary      Physical Exam  General: NAD  Neuro: A+O x 3, non-focal, speech clear and intact  Psych: anxiousaffect  HEENT:  NCAT, No conjuctival edema or icterus, no thrush.  Pulm: CTA, equal bilaterally  CV: RRR,  +S1S2  Abd: soft, NT, ND, +BS  Ext: +DP Pulses b/l, no edema  Skin: Warm, dry, intact          Imaging:    < from: TTE Echo Complete w/ Contrast w/ Doppler (09.27.23 @ 08:42) >  Summary:   1. Left ventricular ejection fraction, by visual estimation, is 40 to   45%.   2. Mildly to moderately decreased global left ventricular systolic   function.   3. Mildly to moderately decreased segmental left ventricular systolic   function.   4. Basal and mid anterolateral wall and basal and mid inferolateral wall   are abnormal as described above.   5. Moderately increased LV wall thickness.   6. There is moderate concentric left ventricular hypertrophy.   7. Moderately enlarged left atrium.   8. Normal right atrial size.   9. Trivial pericardial effusion.  10. Mild thickening of the anterior and posterior mitral valve leaflets.  11. Moderate mitral annular calcification.  12. Trace mitral valve regurgitation.  13. Aortic valve is Poorly visualized.  14. Mild to moderate aortic regurgitation.  15. Severe aortic valve stenosis (Low flow-Low gradient severe aortic   stenosis). Consider LIBBY for better characterization.  16. Moderate dilatation of the aortic root and ascending aorta.    < end of copied text >

## 2023-10-05 ENCOUNTER — TRANSCRIPTION ENCOUNTER (OUTPATIENT)
Age: 78
End: 2023-10-05

## 2023-10-05 LAB
ANION GAP SERPL CALC-SCNC: 11 MMOL/L — SIGNIFICANT CHANGE UP (ref 5–17)
BUN SERPL-MCNC: 26.2 MG/DL — HIGH (ref 8–20)
CALCIUM SERPL-MCNC: 8.8 MG/DL — SIGNIFICANT CHANGE UP (ref 8.4–10.5)
CHLORIDE SERPL-SCNC: 101 MMOL/L — SIGNIFICANT CHANGE UP (ref 96–108)
CO2 SERPL-SCNC: 30 MMOL/L — HIGH (ref 22–29)
CREAT SERPL-MCNC: 0.68 MG/DL — SIGNIFICANT CHANGE UP (ref 0.5–1.3)
EGFR: 96 ML/MIN/1.73M2 — SIGNIFICANT CHANGE UP
GLUCOSE BLDC GLUCOMTR-MCNC: 118 MG/DL — HIGH (ref 70–99)
GLUCOSE BLDC GLUCOMTR-MCNC: 118 MG/DL — HIGH (ref 70–99)
GLUCOSE BLDC GLUCOMTR-MCNC: 140 MG/DL — HIGH (ref 70–99)
GLUCOSE BLDC GLUCOMTR-MCNC: 142 MG/DL — HIGH (ref 70–99)
GLUCOSE BLDC GLUCOMTR-MCNC: 251 MG/DL — HIGH (ref 70–99)
GLUCOSE SERPL-MCNC: 123 MG/DL — HIGH (ref 70–99)
HCT VFR BLD CALC: 37.6 % — LOW (ref 39–50)
HGB BLD-MCNC: 12 G/DL — LOW (ref 13–17)
MAGNESIUM SERPL-MCNC: 1.9 MG/DL — SIGNIFICANT CHANGE UP (ref 1.6–2.6)
MCHC RBC-ENTMCNC: 29.4 PG — SIGNIFICANT CHANGE UP (ref 27–34)
MCHC RBC-ENTMCNC: 31.9 GM/DL — LOW (ref 32–36)
MCV RBC AUTO: 92.2 FL — SIGNIFICANT CHANGE UP (ref 80–100)
PLATELET # BLD AUTO: 256 K/UL — SIGNIFICANT CHANGE UP (ref 150–400)
POTASSIUM SERPL-MCNC: 3.9 MMOL/L — SIGNIFICANT CHANGE UP (ref 3.5–5.3)
POTASSIUM SERPL-SCNC: 3.9 MMOL/L — SIGNIFICANT CHANGE UP (ref 3.5–5.3)
RBC # BLD: 4.08 M/UL — LOW (ref 4.2–5.8)
RBC # FLD: 15.1 % — HIGH (ref 10.3–14.5)
SODIUM SERPL-SCNC: 142 MMOL/L — SIGNIFICANT CHANGE UP (ref 135–145)
WBC # BLD: 6.16 K/UL — SIGNIFICANT CHANGE UP (ref 3.8–10.5)
WBC # FLD AUTO: 6.16 K/UL — SIGNIFICANT CHANGE UP (ref 3.8–10.5)

## 2023-10-05 PROCEDURE — 71045 X-RAY EXAM CHEST 1 VIEW: CPT | Mod: 26,77

## 2023-10-05 PROCEDURE — 93010 ELECTROCARDIOGRAM REPORT: CPT

## 2023-10-05 PROCEDURE — 71045 X-RAY EXAM CHEST 1 VIEW: CPT | Mod: 26

## 2023-10-05 PROCEDURE — 99232 SBSQ HOSP IP/OBS MODERATE 35: CPT

## 2023-10-05 PROCEDURE — 33208 INSRT HEART PM ATRIAL & VENT: CPT | Mod: KX

## 2023-10-05 PROCEDURE — 99233 SBSQ HOSP IP/OBS HIGH 50: CPT

## 2023-10-05 RX ORDER — VANCOMYCIN HCL 1 G
1750 VIAL (EA) INTRAVENOUS ONCE
Refills: 0 | Status: DISCONTINUED | OUTPATIENT
Start: 2023-10-06 | End: 2023-10-06

## 2023-10-05 RX ADMIN — ERGOCALCIFEROL 50000 UNIT(S): 1.25 CAPSULE ORAL at 11:34

## 2023-10-05 RX ADMIN — Medication 2 MILLIGRAM(S): at 05:36

## 2023-10-05 RX ADMIN — CHLORHEXIDINE GLUCONATE 15 MILLILITER(S): 213 SOLUTION TOPICAL at 05:35

## 2023-10-05 RX ADMIN — Medication 12.5 MILLIGRAM(S): at 22:42

## 2023-10-05 RX ADMIN — MIDODRINE HYDROCHLORIDE 5 MILLIGRAM(S): 2.5 TABLET ORAL at 22:41

## 2023-10-05 RX ADMIN — CHLORHEXIDINE GLUCONATE 1 APPLICATION(S): 213 SOLUTION TOPICAL at 22:36

## 2023-10-05 RX ADMIN — Medication 81 MILLIGRAM(S): at 11:34

## 2023-10-05 RX ADMIN — MIDODRINE HYDROCHLORIDE 5 MILLIGRAM(S): 2.5 TABLET ORAL at 05:37

## 2023-10-05 RX ADMIN — ATORVASTATIN CALCIUM 10 MILLIGRAM(S): 80 TABLET, FILM COATED ORAL at 22:42

## 2023-10-05 RX ADMIN — Medication 325 MILLIGRAM(S): at 22:43

## 2023-10-05 RX ADMIN — Medication 12.5 MILLIGRAM(S): at 05:35

## 2023-10-05 RX ADMIN — Medication 325 MILLIGRAM(S): at 23:43

## 2023-10-05 RX ADMIN — INSULIN GLARGINE 5 UNIT(S): 100 INJECTION, SOLUTION SUBCUTANEOUS at 22:34

## 2023-10-05 RX ADMIN — CHLORHEXIDINE GLUCONATE 1 APPLICATION(S): 213 SOLUTION TOPICAL at 05:46

## 2023-10-05 RX ADMIN — FINASTERIDE 5 MILLIGRAM(S): 5 TABLET, FILM COATED ORAL at 11:34

## 2023-10-05 RX ADMIN — CHLORHEXIDINE GLUCONATE 15 MILLILITER(S): 213 SOLUTION TOPICAL at 22:41

## 2023-10-05 RX ADMIN — NYSTATIN CREAM 1 APPLICATION(S): 100000 CREAM TOPICAL at 05:47

## 2023-10-05 RX ADMIN — Medication 40 MILLIGRAM(S): at 20:10

## 2023-10-05 RX ADMIN — Medication 40 MILLIGRAM(S): at 11:34

## 2023-10-05 RX ADMIN — MIDODRINE HYDROCHLORIDE 5 MILLIGRAM(S): 2.5 TABLET ORAL at 11:34

## 2023-10-05 RX ADMIN — Medication 1 GRAM(S): at 05:37

## 2023-10-05 RX ADMIN — PANTOPRAZOLE SODIUM 40 MILLIGRAM(S): 20 TABLET, DELAYED RELEASE ORAL at 05:37

## 2023-10-05 NOTE — PROGRESS NOTE ADULT - SUBJECTIVE AND OBJECTIVE BOX
Subjective - patient seen and evaluated bedside. Sitting comfortably in bed. Denies CP, SOB, HA, dizziness, n/v/d    Review of Systems: negative x 10 systems except as noted above    Brief summary:  77yMale w/ severe AS undergoing TAVR workup    Significant/[id number] events: no acute events      PAST MEDICAL & SURGICAL HISTORY:  Anxiety      Hypertension      Hyperlipidemia      Prostate disease      Gastroesophageal reflux disease      Gallbladder stone without cholecystitis or obstruction      BPH (benign prostatic hyperplasia)      DVT (deep venous thrombosis)  left leg      Afib      Anxiety      HLD (hyperlipidemia)      DM (diabetes mellitus)      Malignant schwannoma      H/O arthroscopy of knee      S/P laminectomy      S/P coronary artery stent placement  x2            acetaminophen     Tablet .. 325 milliGRAM(s) Oral every 4 hours PRN  albuterol/ipratropium for Nebulization 3 milliLiter(s) Nebulizer every 6 hours PRN  aspirin enteric coated 81 milliGRAM(s) Oral daily  atorvastatin 10 milliGRAM(s) Oral at bedtime  chlorhexidine 0.12% Liquid 15 milliLiter(s) Swish and Spit every 12 hours  chlorhexidine 4% Liquid 1 Application(s) Topical two times a day  dextrose 5%. 1000 milliLiter(s) IV Continuous <Continuous>  dextrose 5%. 1000 milliLiter(s) IV Continuous <Continuous>  dextrose 50% Injectable 25 Gram(s) IV Push once  dextrose 50% Injectable 12.5 Gram(s) IV Push once  dextrose 50% Injectable 25 Gram(s) IV Push once  dextrose Oral Gel 15 Gram(s) Oral once PRN  diazepam    Tablet 2 milliGRAM(s) Oral once  doxazosin 2 milliGRAM(s) Oral daily  ergocalciferol 03945 Unit(s) Oral every week  finasteride 5 milliGRAM(s) Oral daily  fluticasone propionate 50 MICROgram(s)/spray Nasal Spray 1 Spray(s) Both Nostrils two times a day PRN  furosemide   Injectable 40 milliGRAM(s) IV Push <User Schedule>  glucagon  Injectable 1 milliGRAM(s) IntraMuscular once  insulin glargine Injectable (LANTUS) 5 Unit(s) SubCutaneous at bedtime  insulin lispro (ADMELOG) corrective regimen sliding scale   SubCutaneous three times a day before meals  metoprolol tartrate 12.5 milliGRAM(s) Oral two times a day  midodrine. 5 milliGRAM(s) Oral three times a day  nystatin Ointment 1 Application(s) Topical every 12 hours  pantoprazole    Tablet 40 milliGRAM(s) Oral before breakfast  senna 2 Tablet(s) Oral at bedtime  sucralfate 1 Gram(s) Oral two times a day  MEDICATIONS  (PRN):  acetaminophen     Tablet .. 325 milliGRAM(s) Oral every 4 hours PRN Mild Pain (1 - 3), Moderate Pain (4 - 6)  albuterol/ipratropium for Nebulization 3 milliLiter(s) Nebulizer every 6 hours PRN Shortness of Breath and/or Wheezing  dextrose Oral Gel 15 Gram(s) Oral once PRN Blood Glucose LESS THAN 70 milliGRAM(s)/deciliter  fluticasone propionate 50 MICROgram(s)/spray Nasal Spray 1 Spray(s) Both Nostrils two times a day PRN nasal congestion      Daily     Daily                               12.7   6.79  )-----------( 282      ( 04 Oct 2023 06:25 )             40.4   10-04    141  |  101  |  25.8<H>  ----------------------------<  122<H>  3.7   |  29.0  |  0.69    Ca    9.1      04 Oct 2023 06:25  Mg     1.8     10-04        PT/INR - ( 04 Oct 2023 06:25 )   PT: 13.5 sec;   INR: 1.22 ratio         PTT - ( 04 Oct 2023 06:25 )  PTT:42.2 sec      Objective:  T(C): 36.9 (10-04-23 @ 20:31), Max: 36.9 (10-04-23 @ 20:31)  HR: 85 (10-04-23 @ 20:31) (39 - 85)  BP: 111/61 (10-04-23 @ 20:31) (94/62 - 114/74)  RR: 18 (10-04-23 @ 20:31) (16 - 18)  SpO2: 96% (10-04-23 @ 20:31) (96% - 98%)  Wt(kg): --CAPILLARY BLOOD GLUCOSE      POCT Blood Glucose.: 188 mg/dL (04 Oct 2023 21:10)  POCT Blood Glucose.: 192 mg/dL (04 Oct 2023 17:02)  POCT Blood Glucose.: 192 mg/dL (04 Oct 2023 12:18)  POCT Blood Glucose.: 133 mg/dL (04 Oct 2023 08:34)  I&O's Summary    03 Oct 2023 07:01  -  04 Oct 2023 07:00  --------------------------------------------------------  IN: 480 mL / OUT: 1300 mL / NET: -820 mL    04 Oct 2023 07:01  -  05 Oct 2023 00:14  --------------------------------------------------------  IN: 0 mL / OUT: 2750 mL / NET: -2750 mL        Physical Exam  General: NAD  Neuro: A+O x 3, non-focal, speech clear and intact  Psych: Appropriate affect  HEENT:  NCAT, No conjuctival edema or icterus, no thrush.  Pulm: CTA, equal bilaterally  CV: RRR,  +S1S2  Abd: soft, NT, ND, +BS  Ext: +DP Pulses b/l, no edema  Skin: Warm, dry, intact          Imaging:    < from: Xray Chest 1 View- PORTABLE-Urgent (Xray Chest 1 View- PORTABLE-Urgent .) (10.04.23 @ 11:38) >  INTERPRETATION:  AP chest on October4, 2023 at 4:52 AM. 2 images.   Patient has history of aortic stenosis and acute and chronic heart   failure.    Heart is quite enlarged. Prominent possibly aneurysmal aorta again noted.    There is an increasing left perihilar infiltrate likely congestive   compared to September 26.    Follow-up AP chest on October 4, 2023 at 10:21 AM.    Congestion in the left lung is now improved.    IMPRESSION: Initial increasing left perihilar CHF improved on the latest   study. Heart enlargement and prominent aorta again noted.    --- End of Report ---      < end of copied text >

## 2023-10-05 NOTE — PROGRESS NOTE ADULT - SUBJECTIVE AND OBJECTIVE BOX
ANIA CRISTOBAL  [id number]      Chief Complaint:  CHF/AS/ CAD/AFib/AFL/bifascicular block    Interval History:  Patient without physical c/o.  Denies CP, SOB, palps.    Tele:  Likely mostly SR with long AV delay.  Likely some AFL. rate controlled.          acetaminophen     Tablet .. 325 milliGRAM(s) Oral every 4 hours PRN  albuterol/ipratropium for Nebulization 3 milliLiter(s) Nebulizer every 6 hours PRN  aspirin enteric coated 81 milliGRAM(s) Oral daily  atorvastatin 10 milliGRAM(s) Oral at bedtime  chlorhexidine 0.12% Liquid 15 milliLiter(s) Swish and Spit every 12 hours  chlorhexidine 4% Liquid 1 Application(s) Topical two times a day  dextrose 5%. 1000 milliLiter(s) IV Continuous <Continuous>  dextrose 5%. 1000 milliLiter(s) IV Continuous <Continuous>  dextrose 50% Injectable 25 Gram(s) IV Push once  dextrose 50% Injectable 12.5 Gram(s) IV Push once  dextrose 50% Injectable 25 Gram(s) IV Push once  dextrose Oral Gel 15 Gram(s) Oral once PRN  diazepam    Tablet 2 milliGRAM(s) Oral once  doxazosin 2 milliGRAM(s) Oral daily  ergocalciferol 66516 Unit(s) Oral every week  finasteride 5 milliGRAM(s) Oral daily  fluticasone propionate 50 MICROgram(s)/spray Nasal Spray 1 Spray(s) Both Nostrils two times a day PRN  furosemide   Injectable 40 milliGRAM(s) IV Push <User Schedule>  glucagon  Injectable 1 milliGRAM(s) IntraMuscular once  insulin glargine Injectable (LANTUS) 5 Unit(s) SubCutaneous at bedtime  insulin lispro (ADMELOG) corrective regimen sliding scale   SubCutaneous three times a day before meals  metoprolol tartrate 12.5 milliGRAM(s) Oral two times a day  midodrine. 5 milliGRAM(s) Oral three times a day  nystatin Ointment 1 Application(s) Topical every 12 hours  pantoprazole    Tablet 40 milliGRAM(s) Oral before breakfast  senna 2 Tablet(s) Oral at bedtime  sucralfate 1 Gram(s) Oral two times a day          Physical Exam:  T(C): 36.4 (10-05-23 @ 08:20), Max: 36.9 (10-04-23 @ 20:31)  HR: 61 (10-05-23 @ 08:20) (39 - 86)  BP: 108/71 (10-05-23 @ 08:20) (94/62 - 116/74)  RR: 18 (10-05-23 @ 08:20) (17 - 18)  SpO2: 95% (10-05-23 @ 08:20) (95% - 98%)  General: Comfortable in NAD  Neck: No JVD  CVS: nl s1s2, no s3, +DORINDA  Pulm: CTA b/l  Abd: soft, non-tender  Ext: No c/c/e  Neuro A&O x3  Psych: Normal affect        Labs:   05 Oct 2023 06:08    142    |  101    |  26.2   ----------------------------<  123    3.9     |  30.0   |  0.68     Ca    8.8        05 Oct 2023 06:08  Mg     1.9       05 Oct 2023 06:08                            12.0   6.16  )-----------( 256      ( 05 Oct 2023 06:08 )             37.6     PT/INR - ( 04 Oct 2023 06:25 )   PT: 13.5 sec;   INR: 1.22 ratio         PTT - ( 04 Oct 2023 06:25 )  PTT:42.2 sec          Echo 6/20/23  Summary:   1. LV Ejection Fraction by Oleary's Method with a biplane EF of 50 %.   2. Normal global left ventricular systolic function.   3. Trace mitral valve regurgitation.   4. Mild aortic regurgitation.   5. Endocardial visualization was enhanced with intravenous echo contrast.   6. Compared to TTE dated 6/11/23 the LVEF has improved.    STRESS TEST:    CATHETERIZATION:  [facility] 1/2023:  Coronary Angiography   - Left dominant system   - Patent prox and mid LAD stents   - Moderate stenosis of the distal LCx   - The RCA is a small, non-dominat vessel.  The proximal segment is occluded with brisk right-to-right collaterals.  - 25mm LV-Ao gradient on pullback.  CIELO 1.4cm2 with moderate AS byecho.  - Moderately reduced LVEF by echo           Pharmacological stress test 9/27/23  * Myocardial Perfusion SPECT results are mildly abnormal. * There is a small, moderate defect in the distal inferior and inferoapical wall that is fixed, consistent with infarct. Basal inferior wall defect paradoxically improved during stress imaging. Prone imaging not available. No clear evidence of ischemia. No TID (ratio 1.23).  * Post-stress resting myocardial perfusion gated SPECT imaging was performed (LVEF = 48 %;LVEDV = 176 ml.), mild hypokinesis of the distal inferior wall.    TTE 9/27/23  Summary:   1. Left ventricular ejection fraction, by visual estimation, is 40 to   45%.   2. Mildly to moderately decreased global left ventricular systolic   function.   3. Mildly to moderately decreased segmental left ventricular systolic   function.   4. Basal and mid anterolateral wall and basal and mid inferolateral wall   are abnormal as described above.   5. Moderately increased LV wall thickness.   6. There is moderate concentric left ventricular hypertrophy.   7. Moderately enlarged left atrium.   8. Normal right atrial size.   9. Trivial pericardial effusion.  10. Mild thickening of the anterior and posterior mitral valve leaflets.  11. Moderate mitral annular calcification.  12. Trace mitral valve regurgitation.  13. Aortic valve is Poorly visualized.  14. Mild to moderate aortic regurgitation.  15. Severe aortic valve stenosis (Low flow-Low gradient severe aortic   stenosis).        Assessement   76 y/o male PMHx AF on Eliquis (s/p ablation on 6/2023), moderate-severe AS, Chronic Diastolic Heart Failure, CAD (Patent prox and mid LAD stents, Moderate stenosis of the distal LCx. The pRCA  with brisk right-to-right collaterals)., HTN, HLD, DM 2, Morbid Obesity, ALLIE, Thoracic Aortic Aneurysm, BPH s/p TURP, Left Renal Mass, On last admission patient underwent CardioMeMs placement, Pt now presents from home (recently moved out of assisted living to live in his son's basement apartment), with palpitations and SOB.  He was given IV lasix and admitted for acute on chronic diastolic heart failure. Cardiology consultation requested for evaluation of acute decompensation of heart failure.   Today with low BP, received midodrine, currently BP WNL  -On exam patient looks euvolemic   -Tele with AFIB vs Flutter and now SR with long 1st degree HB, ?higher degree HB.  Plan per EP for PPM tomorrow ahead of TAVR given high likelihood of CHB post.  -Stress test with no clear evidence of ischemia.    Plan:  1. PPM today per EP.  Patient concerned that he uses his arms to lift himself up and this could affect PPM.  He will d/w EP prior to procedure.  2. Continue current CV medications.   3. Lovenox held for PPM.  Change to Eliquis on d/c.  4. TAVR per CTS, scheduled 10/6.  5. Will eval rhythm with PPM once atrial lead in place and determine need for DCCV if AFL.

## 2023-10-05 NOTE — PROGRESS NOTE ADULT - PROBLEM SELECTOR PLAN 1
Severe AS on last admission. Now admitted with acute on chronic diastolic heart failure.  TAVR CTA completed in June on last admission.  Pt was lost to follow up in the office.  Pt was supposed to see dental as outpt but was unable  CT max/facial with periapical lucencies per report.  CT scan reviewed by Dr. Morin at [facility] dental on 9/28 no need for transfer for evalution at this time, pt instructed to f/u as outpt for comprehensive dental exam.   NST 9/27 with sm-mod defect, no ishemia, reviewed with cardiology, no further work up needed (last C in January 2023 that showed 60% dcx disease and 100% pRCA with collateral circulation.  TTE  with Severe low flow low gradient AS.    No need for repeat LIBBY per Dr. Napier.  Plan for pre-TAVR PPM today with EP team  tentative TAVR Fri 10/6/23.   nystatin for b/l groins   pending repeat PT eavl   Plan discussed with Dr. Napier.

## 2023-10-05 NOTE — PROGRESS NOTE ADULT - PROBLEM SELECTOR PLAN 2
Continue diuresis as tolerated.  Lasix uptitrated to 40 IV BID due to worsening pulmonary edema on CXR

## 2023-10-05 NOTE — PROGRESS NOTE ADULT - SUBJECTIVE AND OBJECTIVE BOX
PROCEDURE(S): Dual Chamber Pace maker Implant (MDT)    ELECTRPHYSIOLOGIST(S): Dr Lima MD    COMPLICATIONS:  none          DISPOSITION:  Observation Unit           CONDITION: Stable    EBL: <15mL    Pt doing well s/p Dual Chamber Pace maker Implant (MDT) with access obtained via Stick and hemostasis achieved with pressure dressing. Denies complaint    Exam:   T(C): 36.7 (10-05-23 @ 15:45), Max: 36.9 (10-04-23 @ 20:31)  HR: 87 (10-05-23 @ 15:45) (61 - 87)  BP: 136/83 (10-05-23 @ 15:45) (108/71 - 136/83)  RR: 20 (10-05-23 @ 15:45) (18 - 20)  SpO2: 94% (10-05-23 @ 15:45) (94% - 96%)    Post-op VS  HR 88  /58  SpO2 94% on RA    Neuro: A&O x 3, GUERRA, FC  Incision: Dressing C/D/I; no bleeding, hematoma, erythema or edema  Card: S1/S2, RRR, no m/g/r  Resp: lungs CTA b/l  Abd: S/NT/ND  Ext: no edema, distal pulses intact    EKG: Pending    Assessment:   78 y/o male with PMHx significant for HTN, hyperlipidemia, DM type 2, obesity BMI 38, thoracic aortic aneurysm, ALLIE, BPH s/p TURP, left renal mass, bifascicular block (RBBB + LAFB), newly diagnosed HFrEF (LVEF 25%, though previously had AF-mediated CM which improved after CV), known moderate AS on TTE 6/11/23 (cath 1/2023 - 25mm LV-Ao gradient on pullback.  CIELO 1.4cm2), and persistent AF/AFL s/p CV 1/2023 and radiofrequency ablation of atrial fibrillation and atrial flutter (WACA/PVI, PWI, mitral line, and CTI line) 6/19/23, who presented to [facility] 9/24/23 with worsening shortness of breath for 2-3 days PTA as well as worsening lower ext edema and intermittent palpiation.     Echo on admission demonstrated LVEF 40-45%, and severe low-flow, low-gradient aortic stenosis. CT surgery was consulted for TAVR, which is now planned for 10/6.     ECG on admission demonstrated no discernible P waves, VR 84bpm, baseline RBBB & LAFB. Telemetry has continued to demonstrated HRs persistently mid 80s bpm w/ rare nocturnal slowing associated with some irregularity.  Given baseline profound 1st deg AVB, RBBB & LAFB, pt has been deemed very high risk for CHB w/ TAVR.  Pt is now status post uncomplicated Dual Chamber Pace maker Implant (MDT) with access obtained via Stick and hemostasis achieved via pressure dressing.      Plan:   Port CXR now - r/o PTX or effusion, verify lead locations.   Bedrest x 4 hours post implant, then OOB w/ assist & progress as tolerated.    Continued observation on telemetry overnight.   Cont Vancomycin IV x 1 additional dose in 12 hours to complete 24 hour course.   Pain control with PO analgesia PRN.   NO HEPARIN OR LOVENOX, INCLUDING PROPHYLACTIC/SUBCUT DOSING, except for dose needed for TAVR  Keep Pressure dressing in place x 48 hours   Resume home medications.   PA/Lat CXR and device check in AM.   Outpt f/up in 1-2 weeks. PROCEDURE(S): Dual Chamber Pace maker Implant (MDT)    ELECTRPHYSIOLOGIST(S): Dr Lima MD    COMPLICATIONS:  none          DISPOSITION:  Observation Unit           CONDITION: Stable    EBL: <15mL    Pt doing well s/p Dual Chamber Pace maker Implant (MDT) with access obtained via Stick and hemostasis achieved with pressure dressing. Denies complaint    Exam:   T(C): 36.7 (10-05-23 @ 15:45), Max: 36.9 (10-04-23 @ 20:31)  HR: 87 (10-05-23 @ 15:45) (61 - 87)  BP: 136/83 (10-05-23 @ 15:45) (108/71 - 136/83)  RR: 20 (10-05-23 @ 15:45) (18 - 20)  SpO2: 94% (10-05-23 @ 15:45) (94% - 96%)    Post-op VS  HR 88  /58  SpO2 94% on RA    Neuro: A&O x 3, GUERRA, FC  Incision: Dressing C/D/I; no bleeding, hematoma, erythema or edema  Card: S1/S2, RRR, no m/g/r  Resp: lungs CTA b/l  Abd: S/NT/ND  Ext: no edema, distal pulses intact    EKG: A sensed V paced @ 85    Assessment:   78 y/o male with PMHx significant for HTN, hyperlipidemia, DM type 2, obesity BMI 38, thoracic aortic aneurysm, ALLIE, BPH s/p TURP, left renal mass, bifascicular block (RBBB + LAFB), newly diagnosed HFrEF (LVEF 25%, though previously had AF-mediated CM which improved after CV), known moderate AS on TTE 6/11/23 (cath 1/2023 - 25mm LV-Ao gradient on pullback.  CIELO 1.4cm2), and persistent AF/AFL s/p CV 1/2023 and radiofrequency ablation of atrial fibrillation and atrial flutter (WACA/PVI, PWI, mitral line, and CTI line) 6/19/23, who presented to [facility] 9/24/23 with worsening shortness of breath for 2-3 days PTA as well as worsening lower ext edema and intermittent palpiation.     Echo on admission demonstrated LVEF 40-45%, and severe low-flow, low-gradient aortic stenosis. CT surgery was consulted for TAVR, which is now planned for 10/6.     ECG on admission demonstrated no discernible P waves, VR 84bpm, baseline RBBB & LAFB. Telemetry has continued to demonstrated HRs persistently mid 80s bpm w/ rare nocturnal slowing associated with some irregularity.  Given baseline profound 1st deg AVB, RBBB & LAFB, pt has been deemed very high risk for CHB w/ TAVR.  Pt is now status post uncomplicated Dual Chamber Pace maker Implant (MDT) with access obtained via Stick and hemostasis achieved via pressure dressing.      Plan:   Port CXR now - r/o PTX or effusion, verify lead locations.   Bedrest x 4 hours post implant, then OOB w/ assist & progress as tolerated.    Continued observation on telemetry overnight.   Cont Vancomycin IV x 1 additional dose in 12 hours to complete 24 hour course.   Pain control with PO analgesia PRN.   NO HEPARIN OR LOVENOX, INCLUDING PROPHYLACTIC/SUBCUT DOSING, except for dose needed for TAVR  Keep Pressure dressing in place x 48 hours   Resume home medications.   PA/Lat CXR and device check in AM.   Outpt f/up in 1-2 weeks.

## 2023-10-05 NOTE — PROGRESS NOTE ADULT - PROBLEM SELECTOR PLAN 3
pAF/flutter with occasional bradycardia episodes (asymptomatic)  High risk for CHB post-TAVR  Plan for pre-TAVR PPM with EP today.   AC on hold for procedure

## 2023-10-06 ENCOUNTER — TRANSCRIPTION ENCOUNTER (OUTPATIENT)
Age: 78
End: 2023-10-06

## 2023-10-06 ENCOUNTER — APPOINTMENT (OUTPATIENT)
Dept: CARDIOTHORACIC SURGERY | Facility: HOSPITAL | Age: 78
End: 2023-10-06

## 2023-10-06 LAB
ALBUMIN SERPL ELPH-MCNC: 3.5 G/DL — SIGNIFICANT CHANGE UP (ref 3.3–5.2)
ALP SERPL-CCNC: 245 U/L — HIGH (ref 40–120)
ALT FLD-CCNC: 12 U/L — SIGNIFICANT CHANGE UP
ANION GAP SERPL CALC-SCNC: 11 MMOL/L — SIGNIFICANT CHANGE UP (ref 5–17)
ANION GAP SERPL CALC-SCNC: 13 MMOL/L — SIGNIFICANT CHANGE UP (ref 5–17)
ANION GAP SERPL CALC-SCNC: 9 MMOL/L — SIGNIFICANT CHANGE UP (ref 5–17)
APTT BLD: 34.1 SEC — SIGNIFICANT CHANGE UP (ref 24.5–35.6)
AST SERPL-CCNC: 12 U/L — SIGNIFICANT CHANGE UP
BASE EXCESS BLDA CALC-SCNC: 7.9 MMOL/L — HIGH (ref -2–3)
BASE EXCESS BLDA CALC-SCNC: 8.9 MMOL/L — HIGH (ref -2–3)
BASOPHILS # BLD AUTO: 0.05 K/UL — SIGNIFICANT CHANGE UP (ref 0–0.2)
BASOPHILS NFR BLD AUTO: 0.4 % — SIGNIFICANT CHANGE UP (ref 0–2)
BILIRUB SERPL-MCNC: 1 MG/DL — SIGNIFICANT CHANGE UP (ref 0.4–2)
BUN SERPL-MCNC: 20.2 MG/DL — HIGH (ref 8–20)
BUN SERPL-MCNC: 21.1 MG/DL — HIGH (ref 8–20)
BUN SERPL-MCNC: 22.3 MG/DL — HIGH (ref 8–20)
CA-I BLDA-SCNC: 1.13 MMOL/L — LOW (ref 1.15–1.33)
CA-I BLDA-SCNC: 1.15 MMOL/L — SIGNIFICANT CHANGE UP (ref 1.15–1.33)
CALCIUM SERPL-MCNC: 8.3 MG/DL — LOW (ref 8.4–10.5)
CALCIUM SERPL-MCNC: 8.4 MG/DL — SIGNIFICANT CHANGE UP (ref 8.4–10.5)
CALCIUM SERPL-MCNC: 8.7 MG/DL — SIGNIFICANT CHANGE UP (ref 8.4–10.5)
CHLORIDE BLDA-SCNC: 100 MMOL/L — SIGNIFICANT CHANGE UP (ref 96–108)
CHLORIDE BLDA-SCNC: 102 MMOL/L — SIGNIFICANT CHANGE UP (ref 96–108)
CHLORIDE SERPL-SCNC: 100 MMOL/L — SIGNIFICANT CHANGE UP (ref 96–108)
CHLORIDE SERPL-SCNC: 100 MMOL/L — SIGNIFICANT CHANGE UP (ref 96–108)
CHLORIDE SERPL-SCNC: 99 MMOL/L — SIGNIFICANT CHANGE UP (ref 96–108)
CK SERPL-CCNC: 45 U/L — SIGNIFICANT CHANGE UP (ref 30–200)
CO2 SERPL-SCNC: 26 MMOL/L — SIGNIFICANT CHANGE UP (ref 22–29)
CO2 SERPL-SCNC: 26 MMOL/L — SIGNIFICANT CHANGE UP (ref 22–29)
CO2 SERPL-SCNC: 32 MMOL/L — HIGH (ref 22–29)
COHGB MFR BLDA: 1.5 % — SIGNIFICANT CHANGE UP
COHGB MFR BLDA: 1.9 % — SIGNIFICANT CHANGE UP
CREAT SERPL-MCNC: 0.62 MG/DL — SIGNIFICANT CHANGE UP (ref 0.5–1.3)
CREAT SERPL-MCNC: 0.62 MG/DL — SIGNIFICANT CHANGE UP (ref 0.5–1.3)
CREAT SERPL-MCNC: 0.65 MG/DL — SIGNIFICANT CHANGE UP (ref 0.5–1.3)
EGFR: 97 ML/MIN/1.73M2 — SIGNIFICANT CHANGE UP
EGFR: 98 ML/MIN/1.73M2 — SIGNIFICANT CHANGE UP
EGFR: 98 ML/MIN/1.73M2 — SIGNIFICANT CHANGE UP
EOSINOPHIL # BLD AUTO: 0.29 K/UL — SIGNIFICANT CHANGE UP (ref 0–0.5)
EOSINOPHIL NFR BLD AUTO: 2.5 % — SIGNIFICANT CHANGE UP (ref 0–6)
GAS PNL BLDA: SIGNIFICANT CHANGE UP
GLUCOSE BLDA-MCNC: 127 MG/DL — HIGH (ref 70–99)
GLUCOSE BLDA-MCNC: 128 MG/DL — HIGH (ref 70–99)
GLUCOSE BLDC GLUCOMTR-MCNC: 133 MG/DL — HIGH (ref 70–99)
GLUCOSE BLDC GLUCOMTR-MCNC: 135 MG/DL — HIGH (ref 70–99)
GLUCOSE BLDC GLUCOMTR-MCNC: 140 MG/DL — HIGH (ref 70–99)
GLUCOSE BLDC GLUCOMTR-MCNC: 156 MG/DL — HIGH (ref 70–99)
GLUCOSE BLDC GLUCOMTR-MCNC: 178 MG/DL — HIGH (ref 70–99)
GLUCOSE BLDC GLUCOMTR-MCNC: 194 MG/DL — HIGH (ref 70–99)
GLUCOSE SERPL-MCNC: 150 MG/DL — HIGH (ref 70–99)
GLUCOSE SERPL-MCNC: 166 MG/DL — HIGH (ref 70–99)
GLUCOSE SERPL-MCNC: 247 MG/DL — HIGH (ref 70–99)
HCO3 BLDA-SCNC: 31 MMOL/L — HIGH (ref 21–28)
HCO3 BLDA-SCNC: 33 MMOL/L — HIGH (ref 21–28)
HCT VFR BLD CALC: 35.2 % — LOW (ref 39–50)
HCT VFR BLD CALC: 38.3 % — LOW (ref 39–50)
HCT VFR BLDA CALC: 35 % — SIGNIFICANT CHANGE UP
HCT VFR BLDA CALC: 37 % — SIGNIFICANT CHANGE UP
HGB BLD-MCNC: 11.2 G/DL — LOW (ref 13–17)
HGB BLD-MCNC: 12.1 G/DL — LOW (ref 13–17)
HGB BLDA-MCNC: 11.5 G/DL — LOW (ref 12.6–17.4)
HGB BLDA-MCNC: 12.2 G/DL — LOW (ref 12.6–17.4)
IMM GRANULOCYTES NFR BLD AUTO: 0.3 % — SIGNIFICANT CHANGE UP (ref 0–0.9)
INR BLD: 1.47 RATIO — HIGH (ref 0.85–1.18)
LACTATE BLDA-MCNC: 0.7 MMOL/L — SIGNIFICANT CHANGE UP (ref 0.5–2)
LACTATE BLDA-MCNC: 0.8 MMOL/L — SIGNIFICANT CHANGE UP (ref 0.5–2)
LYMPHOCYTES # BLD AUTO: 0.56 K/UL — LOW (ref 1–3.3)
LYMPHOCYTES # BLD AUTO: 4.8 % — LOW (ref 13–44)
MAGNESIUM SERPL-MCNC: 1.7 MG/DL — SIGNIFICANT CHANGE UP (ref 1.6–2.6)
MAGNESIUM SERPL-MCNC: 1.8 MG/DL — SIGNIFICANT CHANGE UP (ref 1.6–2.6)
MCHC RBC-ENTMCNC: 29.2 PG — SIGNIFICANT CHANGE UP (ref 27–34)
MCHC RBC-ENTMCNC: 29.3 PG — SIGNIFICANT CHANGE UP (ref 27–34)
MCHC RBC-ENTMCNC: 31.6 GM/DL — LOW (ref 32–36)
MCHC RBC-ENTMCNC: 31.8 GM/DL — LOW (ref 32–36)
MCV RBC AUTO: 91.7 FL — SIGNIFICANT CHANGE UP (ref 80–100)
MCV RBC AUTO: 92.7 FL — SIGNIFICANT CHANGE UP (ref 80–100)
METHGB MFR BLDA: 0.4 % — SIGNIFICANT CHANGE UP
METHGB MFR BLDA: 0.7 % — SIGNIFICANT CHANGE UP
MONOCYTES # BLD AUTO: 0.7 K/UL — SIGNIFICANT CHANGE UP (ref 0–0.9)
MONOCYTES NFR BLD AUTO: 6 % — SIGNIFICANT CHANGE UP (ref 2–14)
NEUTROPHILS # BLD AUTO: 10.09 K/UL — HIGH (ref 1.8–7.4)
NEUTROPHILS NFR BLD AUTO: 86 % — HIGH (ref 43–77)
OXYHGB MFR BLDA: 95 % — SIGNIFICANT CHANGE UP (ref 90–95)
OXYHGB MFR BLDA: 97 % — HIGH (ref 90–95)
PCO2 BLDA: 41 MMHG — SIGNIFICANT CHANGE UP (ref 35–48)
PCO2 BLDA: 50 MMHG — HIGH (ref 35–48)
PH BLDA: 7.43 — SIGNIFICANT CHANGE UP (ref 7.35–7.45)
PH BLDA: 7.49 — HIGH (ref 7.35–7.45)
PLATELET # BLD AUTO: 222 K/UL — SIGNIFICANT CHANGE UP (ref 150–400)
PLATELET # BLD AUTO: 263 K/UL — SIGNIFICANT CHANGE UP (ref 150–400)
PO2 BLDA: 148 MMHG — HIGH (ref 83–108)
PO2 BLDA: 77 MMHG — LOW (ref 83–108)
POTASSIUM BLDA-SCNC: 3.1 MMOL/L — LOW (ref 3.5–5.1)
POTASSIUM BLDA-SCNC: 3.1 MMOL/L — LOW (ref 3.5–5.1)
POTASSIUM SERPL-MCNC: 3 MMOL/L — LOW (ref 3.5–5.3)
POTASSIUM SERPL-MCNC: 3.6 MMOL/L — SIGNIFICANT CHANGE UP (ref 3.5–5.3)
POTASSIUM SERPL-MCNC: 3.8 MMOL/L — SIGNIFICANT CHANGE UP (ref 3.5–5.3)
POTASSIUM SERPL-SCNC: 3 MMOL/L — LOW (ref 3.5–5.3)
POTASSIUM SERPL-SCNC: 3.6 MMOL/L — SIGNIFICANT CHANGE UP (ref 3.5–5.3)
POTASSIUM SERPL-SCNC: 3.8 MMOL/L — SIGNIFICANT CHANGE UP (ref 3.5–5.3)
PROT SERPL-MCNC: 6.1 G/DL — LOW (ref 6.6–8.7)
PROTHROM AB SERPL-ACNC: 16.1 SEC — HIGH (ref 9.5–13)
RBC # BLD: 3.84 M/UL — LOW (ref 4.2–5.8)
RBC # BLD: 4.13 M/UL — LOW (ref 4.2–5.8)
RBC # FLD: 14.9 % — HIGH (ref 10.3–14.5)
RBC # FLD: 15.1 % — HIGH (ref 10.3–14.5)
SAO2 % BLDA: 97.3 % — SIGNIFICANT CHANGE UP (ref 94–98)
SAO2 % BLDA: 99.1 % — HIGH (ref 94–98)
SODIUM BLDA-SCNC: 135 MMOL/L — LOW (ref 136–145)
SODIUM BLDA-SCNC: 136 MMOL/L — SIGNIFICANT CHANGE UP (ref 136–145)
SODIUM SERPL-SCNC: 136 MMOL/L — SIGNIFICANT CHANGE UP (ref 135–145)
SODIUM SERPL-SCNC: 139 MMOL/L — SIGNIFICANT CHANGE UP (ref 135–145)
SODIUM SERPL-SCNC: 141 MMOL/L — SIGNIFICANT CHANGE UP (ref 135–145)
TROPONIN T SERPL-MCNC: 0.27 NG/ML — HIGH (ref 0–0.06)
WBC # BLD: 11.72 K/UL — HIGH (ref 3.8–10.5)
WBC # BLD: 7.85 K/UL — SIGNIFICANT CHANGE UP (ref 3.8–10.5)
WBC # FLD AUTO: 11.72 K/UL — HIGH (ref 3.8–10.5)
WBC # FLD AUTO: 7.85 K/UL — SIGNIFICANT CHANGE UP (ref 3.8–10.5)

## 2023-10-06 PROCEDURE — 99024 POSTOP FOLLOW-UP VISIT: CPT

## 2023-10-06 PROCEDURE — 99233 SBSQ HOSP IP/OBS HIGH 50: CPT

## 2023-10-06 PROCEDURE — 93306 TTE W/DOPPLER COMPLETE: CPT | Mod: 26

## 2023-10-06 PROCEDURE — 93010 ELECTROCARDIOGRAM REPORT: CPT

## 2023-10-06 PROCEDURE — 33361 REPLACE AORTIC VALVE PERQ: CPT | Mod: 62,Q0

## 2023-10-06 PROCEDURE — 71045 X-RAY EXAM CHEST 1 VIEW: CPT | Mod: 26

## 2023-10-06 DEVICE — FLOSEAL WITH RECOTHROM THROMBIN 5ML: Type: IMPLANTABLE DEVICE | Status: FUNCTIONAL

## 2023-10-06 DEVICE — VLV TRANS CATH W/COMM SYS SAPIEN 3 ULTRA 29MM: Type: IMPLANTABLE DEVICE | Status: FUNCTIONAL

## 2023-10-06 DEVICE — KIT A-LINE 1LUM 20G X 12CM SAFE KIT: Type: IMPLANTABLE DEVICE | Status: FUNCTIONAL

## 2023-10-06 RX ORDER — SODIUM CHLORIDE 9 MG/ML
1000 INJECTION INTRAMUSCULAR; INTRAVENOUS; SUBCUTANEOUS
Refills: 0 | Status: DISCONTINUED | OUTPATIENT
Start: 2023-10-06 | End: 2023-10-08

## 2023-10-06 RX ORDER — VANCOMYCIN HCL 1 G
1500 VIAL (EA) INTRAVENOUS EVERY 12 HOURS
Refills: 0 | Status: COMPLETED | OUTPATIENT
Start: 2023-10-07 | End: 2023-10-08

## 2023-10-06 RX ORDER — OXYCODONE HYDROCHLORIDE 5 MG/1
5 TABLET ORAL ONCE
Refills: 0 | Status: DISCONTINUED | OUTPATIENT
Start: 2023-10-06 | End: 2023-10-06

## 2023-10-06 RX ORDER — ACETAMINOPHEN 500 MG
1000 TABLET ORAL ONCE
Refills: 0 | Status: COMPLETED | OUTPATIENT
Start: 2023-10-06 | End: 2023-10-06

## 2023-10-06 RX ORDER — POTASSIUM CHLORIDE 20 MEQ
10 PACKET (EA) ORAL
Refills: 0 | Status: COMPLETED | OUTPATIENT
Start: 2023-10-06 | End: 2023-10-06

## 2023-10-06 RX ORDER — MAGNESIUM SULFATE 500 MG/ML
2 VIAL (ML) INJECTION ONCE
Refills: 0 | Status: COMPLETED | OUTPATIENT
Start: 2023-10-06 | End: 2023-10-06

## 2023-10-06 RX ORDER — ASPIRIN/CALCIUM CARB/MAGNESIUM 324 MG
81 TABLET ORAL DAILY
Refills: 0 | Status: DISCONTINUED | OUTPATIENT
Start: 2023-10-06 | End: 2023-10-09

## 2023-10-06 RX ORDER — PANTOPRAZOLE SODIUM 20 MG/1
40 TABLET, DELAYED RELEASE ORAL ONCE
Refills: 0 | Status: COMPLETED | OUTPATIENT
Start: 2023-10-06 | End: 2023-10-06

## 2023-10-06 RX ORDER — VANCOMYCIN HCL 1 G
1500 VIAL (EA) INTRAVENOUS ONCE
Refills: 0 | Status: DISCONTINUED | OUTPATIENT
Start: 2023-10-06 | End: 2023-10-06

## 2023-10-06 RX ADMIN — Medication 1000 MILLIGRAM(S): at 20:30

## 2023-10-06 RX ADMIN — Medication 25 GRAM(S): at 21:07

## 2023-10-06 RX ADMIN — CHLORHEXIDINE GLUCONATE 15 MILLILITER(S): 213 SOLUTION TOPICAL at 18:58

## 2023-10-06 RX ADMIN — Medication 100 MILLIEQUIVALENT(S): at 21:07

## 2023-10-06 RX ADMIN — NYSTATIN CREAM 1 APPLICATION(S): 100000 CREAM TOPICAL at 21:06

## 2023-10-06 RX ADMIN — Medication 400 MILLIGRAM(S): at 19:30

## 2023-10-06 RX ADMIN — ATORVASTATIN CALCIUM 10 MILLIGRAM(S): 80 TABLET, FILM COATED ORAL at 21:06

## 2023-10-06 RX ADMIN — SENNA PLUS 2 TABLET(S): 8.6 TABLET ORAL at 21:06

## 2023-10-06 RX ADMIN — INSULIN GLARGINE 5 UNIT(S): 100 INJECTION, SOLUTION SUBCUTANEOUS at 21:18

## 2023-10-06 RX ADMIN — MIDODRINE HYDROCHLORIDE 5 MILLIGRAM(S): 2.5 TABLET ORAL at 05:51

## 2023-10-06 RX ADMIN — CHLORHEXIDINE GLUCONATE 1 APPLICATION(S): 213 SOLUTION TOPICAL at 05:52

## 2023-10-06 RX ADMIN — Medication 1 GRAM(S): at 21:06

## 2023-10-06 RX ADMIN — Medication 2 MILLIGRAM(S): at 05:50

## 2023-10-06 RX ADMIN — Medication 100 MILLIEQUIVALENT(S): at 19:48

## 2023-10-06 RX ADMIN — PANTOPRAZOLE SODIUM 40 MILLIGRAM(S): 20 TABLET, DELAYED RELEASE ORAL at 18:57

## 2023-10-06 RX ADMIN — Medication 325 MILLIGRAM(S): at 05:55

## 2023-10-06 RX ADMIN — PANTOPRAZOLE SODIUM 40 MILLIGRAM(S): 20 TABLET, DELAYED RELEASE ORAL at 05:51

## 2023-10-06 RX ADMIN — Medication 81 MILLIGRAM(S): at 21:41

## 2023-10-06 RX ADMIN — Medication 2: at 09:40

## 2023-10-06 RX ADMIN — Medication 325 MILLIGRAM(S): at 06:55

## 2023-10-06 RX ADMIN — OXYCODONE HYDROCHLORIDE 5 MILLIGRAM(S): 5 TABLET ORAL at 21:06

## 2023-10-06 RX ADMIN — CHLORHEXIDINE GLUCONATE 15 MILLILITER(S): 213 SOLUTION TOPICAL at 05:52

## 2023-10-06 RX ADMIN — Medication 100 MILLIEQUIVALENT(S): at 22:30

## 2023-10-06 RX ADMIN — OXYCODONE HYDROCHLORIDE 5 MILLIGRAM(S): 5 TABLET ORAL at 22:06

## 2023-10-06 RX ADMIN — Medication 12.5 MILLIGRAM(S): at 05:50

## 2023-10-06 RX ADMIN — CHLORHEXIDINE GLUCONATE 1 APPLICATION(S): 213 SOLUTION TOPICAL at 19:01

## 2023-10-06 RX ADMIN — Medication 1 GRAM(S): at 05:51

## 2023-10-06 NOTE — CHART NOTE - NSCHARTNOTEFT_GEN_A_CORE
Commercial 29mm Childers Christiano Resilia Percutaneous Transfemoral TAVR via ? Common Femoral Artery.  NCT# [id number], STS/ACC TVT Registry Patient ID# [id number]. Commercial 29mm Childers Christiano Resilia Percutaneous Transfemoral TAVR via Right Common Femoral Artery.  NCT# [id number], STS/ACC TVT Registry Patient ID# [id number].

## 2023-10-06 NOTE — PROGRESS NOTE ADULT - SUBJECTIVE AND OBJECTIVE BOX
ANIA CRISTOBAL  MRN-[id number]    HPI:  78 y/o male with PMH of afib on Eliquis (s/p Cardioversion abd ablation  1/31/23), Moderate AS, Chronic Diastolic Heart Failure, CAD s/p PCI, HTN, HLD, DM 2, Morbid Obesity, ALLIE, Thoracic Aortic Aneurysm, BPH s/p TURP, Left Renal Mass Presents with shortness of breath for last few days , more worse since last night , also noted with worsening lower ext swelling . moved out of Assisted living recently to live with his son basement apartment.  Patient does not eat at home and usually eat food from [employer] as he has no one to cook for him.  also felt some palpitations last night but now feels better, given lasix iv in ed.   has home o2 , uses it prn.   (24 Sep 2023 16:43)      Surgery/Hospital Course:  ·  PRE-OP DIAGNOSIS:  Severe aortic stenosis   Acute on chronic diastolic CHF (congestive heart failure), NYHA class 3   ·  POST-OP DIAGNOSIS:  Severe aortic stenosis   Acute on chronic diastolic CHF (congestive heart failure), NYHA class 3   ·  PROCEDURES:  TAVR, percutaneous 06-Oct-2023    Percutaneous Transfemoral TAVR via ? Common Femoral Artery (29mm Christiano Resilia)   Today:  No acute events     ICU Vital Signs Last 24 Hrs  T(C): 36.4 (06 Oct 2023 09:07), Max: 37.1 (05 Oct 2023 22:39)  T(F): 97.5 (06 Oct 2023 09:07), Max: 98.8 (05 Oct 2023 22:39)  HR: 66 (06 Oct 2023 18:00) (59 - 96)  BP: 100/60 (06 Oct 2023 18:00) (97/49 - 136/90)  BP(mean): 74 (06 Oct 2023 18:00) (74 - 74)  ABP: 129/55 (06 Oct 2023 18:00) (129/55 - 129/55)  ABP(mean): 72 (06 Oct 2023 18:00) (72 - 72)  RR: 28 (06 Oct 2023 18:00) (15 - 28)  SpO2: 91% (06 Oct 2023 18:00) (91% - 96%)    O2 Parameters below as of 06 Oct 2023 18:00  Patient On (Oxygen Delivery Method): nasal cannula  O2 Flow (L/min): 4          Physical Exam:  Gen: A&O   CNS: non focal 	  Neck: no JVD  RES : clear , no wheezing              CVS: Regular  rhythm. Normal S1/S2  Abd: Soft, non-distended. Bowel sounds present.  Skin: No rash.  Ext:  no edema    ============================I/O===========================   I&O's Detail    05 Oct 2023 07:01  -  06 Oct 2023 07:00  --------------------------------------------------------  IN:    Oral Fluid: 360 mL  Total IN: 360 mL    OUT:    Voided (mL): 2020 mL  Total OUT: 2020 mL    Total NET: -1660 mL        ============================ LABS =========================                        12.1   7.85  )-----------( 263      ( 06 Oct 2023 06:44 )             38.3     10-06    141  |  100  |  21.1<H>  ----------------------------<  166<H>  3.8   |  32.0<H>  |  0.65    Ca    8.7      06 Oct 2023 06:44  Mg     1.8     10-06          ABG - ( 06 Oct 2023 16:34 )  pH, Arterial: 7.490 pH, Blood: x     /  pCO2: 41    /  pO2: 148   / HCO3: 31    / Base Excess: 7.9   /  SaO2: 99.1              Urinalysis Basic - ( 06 Oct 2023 06:44 )    Color: x / Appearance: x / SG: x / pH: x  Gluc: 166 mg/dL / Ketone: x  / Bili: x / Urobili: x   Blood: x / Protein: x / Nitrite: x   Leuk Esterase: x / RBC: x / WBC x   Sq Epi: x / Non Sq Epi: x / Bacteria: x      ======================Micro/Rad/Cardio=================  Culture: Reviewed   CXR: Reviewed  Echo:Reviewed  ======================================================  PAST MEDICAL & SURGICAL HISTORY:  Anxiety      Hypertension      Hyperlipidemia      Prostate disease      Gastroesophageal reflux disease      Gallbladder stone without cholecystitis or obstruction      BPH (benign prostatic hyperplasia)      DM (diabetes mellitus)      DVT (deep venous thrombosis)  left leg      Afib      Anxiety      HLD (hyperlipidemia)      Malignant schwannoma      H/O arthroscopy of knee      S/P laminectomy      S/P coronary artery stent placement  x2        ====================ASSESSMENT ==============  78 y/o male with PMH of afib on Eliquis (s/p Cardioversion abd ablation  1/31/23), Moderate AS, Chronic Diastolic Heart Failure, CAD s/p PCI, HTN, HLD, DM 2, Morbid Obesity, ALLIE, Thoracic Aortic Aneurysm, BPH s/p TURP, Left Renal Mass Presents with shortness of breath for last few days , more worse since last night , also noted with worsening lower ext swelling . moved out of Assisted living recently to live with his son basement apartment.  Patient does not eat at home and usually eat food from [employer] as he has no one to cook for him.  also felt some palpitations last night but now feels better, given lasix iv in ed.   has home o2 , uses it prn.   (24 Sep 2023 16:43)    ---Severe aortic stenosis   ---Acute on chronic diastolic CHF (congestive heart failure), NYHA class 3   ---S/p TAVR, percutaneous 06-Oct-2023               Percutaneous Transfemoral TAVR via ? Common Femoral Artery (29mm Christiano Resilia)              Commercial 29mm [employer] Christiano Resilia Percutaneous Transfemoral TAVR via Right Common Femoral Artery.  ---Post op Hypovolemia  ---Post op respiratory insufficiency       Plan:  ====================== NEUROLOGY=====================  acetaminophen     Tablet .. 325 milliGRAM(s) Oral every 4 hours PRN Mild Pain (1 - 3), Moderate Pain (4 - 6)    ==================== RESPIRATORY======================  Post op respiratory insufficiency  albuterol/ipratropium for Nebulization 3 milliLiter(s) Nebulizer every 6 hours PRN Shortness of Breath and/or Wheezing    ====================CARDIOVASCULAR==================  Post op Hypovolemia  doxazosin 2 milliGRAM(s) Oral daily    ===================HEMATOLOGIC/ONC ===================  Monitor H&H/Plts    aspirin enteric coated 81 milliGRAM(s) Oral daily    ===================== RENAL =========================  Continue monitoring urine output, I&OS, BUN/Cr     ==================== GASTROINTESTINAL===================  dextrose 5%. 1000 milliLiter(s) (100 mL/Hr) IV Continuous <Continuous>  dextrose 5%. 1000 milliLiter(s) (50 mL/Hr) IV Continuous <Continuous>  ergocalciferol 98155 Unit(s) Oral every week  pantoprazole    Tablet 40 milliGRAM(s) Oral before breakfast  pantoprazole  Injectable 40 milliGRAM(s) IV Push once  senna 2 Tablet(s) Oral at bedtime  sodium chloride 0.9%. 1000 milliLiter(s) (10 mL/Hr) IV Continuous <Continuous>  sodium chloride 0.9%. 1000 milliLiter(s) (5 mL/Hr) IV Continuous <Continuous>  sucralfate 1 Gram(s) Oral two times a day    =======================    ENDOCRINE  =====================  atorvastatin 10 milliGRAM(s) Oral at bedtime  dextrose 50% Injectable 25 Gram(s) IV Push once  dextrose 50% Injectable 12.5 Gram(s) IV Push once  dextrose 50% Injectable 25 Gram(s) IV Push once  dextrose Oral Gel 15 Gram(s) Oral once PRN Blood Glucose LESS THAN 70 milliGRAM(s)/deciliter  finasteride 5 milliGRAM(s) Oral daily  glucagon  Injectable 1 milliGRAM(s) IntraMuscular once  insulin glargine Injectable (LANTUS) 5 Unit(s) SubCutaneous at bedtime  insulin lispro (ADMELOG) corrective regimen sliding scale   SubCutaneous three times a day before meals    ========================INFECTIOUS DISEASE================  vancomycin  IVPB 1500 milliGRAM(s) IV Intermittent once      -Monitor Neurologic status ,   -Head of the bed should remain elevated to 45 degrees,  -Monitor for arrhythmias and monitor parameters for organ perfusion,  -Glycemic control is satisfactory,  -Nutritional goals will be met using po eventually , insure adequate caloric intake and monitor the same ,  -Electrolytes have been repleted as necessary , pain control has been achieved  and wound care has been carried out ,  -Stress ulcer and VTE prophylaxis will be achieved,  -Agressive PT and early mobility and ambulation goals will be met,    I have spent 35 minutes providing acute care for this critically ill patient     Patient requires continuous monitoring with bedside rhythm monitoring, pulse ox monitoring, and intermittent blood gas analysis. Care plan discussed with ICU care team. Patient remained critical and at risk for life threatening decompensation.

## 2023-10-06 NOTE — BRIEF OPERATIVE NOTE - NSICDXBRIEFPOSTOP_GEN_ALL_CORE_FT
POST-OP DIAGNOSIS:  Severe aortic stenosis 06-Oct-2023 16:45:55  Abraham Vee  Acute on chronic diastolic CHF (congestive heart failure), NYHA class 3 06-Oct-2023 16:46:08  Abraham Vee

## 2023-10-06 NOTE — BRIEF OPERATIVE NOTE - NSICDXBRIEFPREOP_GEN_ALL_CORE_FT
PRE-OP DIAGNOSIS:  Severe aortic stenosis 06-Oct-2023 16:45:23  Abraham Vee  Acute on chronic diastolic CHF (congestive heart failure), NYHA class 3 06-Oct-2023 16:45:46  Abraham Vee

## 2023-10-06 NOTE — BRIEF OPERATIVE NOTE - COMMENTS
[employer] Representative: Anita Blackmon (clinical support)  Invasive Lines: ?-Radial Arterial Line, ?-femoral Arterial & Venous Sheaths ?  IV Medication Infusions: ? [employer] Representative: Anita Blackmon (clinical support)  Invasive Lines: Right Radial Arterial Line  IV Medication Infusions: None

## 2023-10-06 NOTE — BRIEF OPERATIVE NOTE - NSICDXBRIEFPROCEDURE_GEN_ALL_CORE_FT
PROCEDURES:  TAVR, percutaneous 06-Oct-2023 16:46:17 Percutaneous Transfemoral TAVR via ? Common Femoral Artery (29mm Christiano Resilia) (NCT# [id number]) (STS/ACC TVT [facility] patient ID# [id number]) Abraham Vee   PROCEDURES:  TAVR, percutaneous 06-Oct-2023 16:46:17 Percutaneous Transfemoral TAVR via Right Common Femoral Artery (29mm Christiano Resilia) (NCT# [id number]) (STS/ACC TVT [facility]ust[facility] patient ID# [id number]) Abraham Vee

## 2023-10-07 ENCOUNTER — TRANSCRIPTION ENCOUNTER (OUTPATIENT)
Age: 78
End: 2023-10-07

## 2023-10-07 LAB
ALBUMIN SERPL ELPH-MCNC: 3.4 G/DL — SIGNIFICANT CHANGE UP (ref 3.3–5.2)
ALP SERPL-CCNC: 232 U/L — HIGH (ref 40–120)
ALT FLD-CCNC: 12 U/L — SIGNIFICANT CHANGE UP
ANION GAP SERPL CALC-SCNC: 11 MMOL/L — SIGNIFICANT CHANGE UP (ref 5–17)
APTT BLD: 35.5 SEC — SIGNIFICANT CHANGE UP (ref 24.5–35.6)
AST SERPL-CCNC: 10 U/L — SIGNIFICANT CHANGE UP
BILIRUB SERPL-MCNC: 0.6 MG/DL — SIGNIFICANT CHANGE UP (ref 0.4–2)
BUN SERPL-MCNC: 19.6 MG/DL — SIGNIFICANT CHANGE UP (ref 8–20)
CALCIUM SERPL-MCNC: 8.1 MG/DL — LOW (ref 8.4–10.5)
CHLORIDE SERPL-SCNC: 99 MMOL/L — SIGNIFICANT CHANGE UP (ref 96–108)
CK SERPL-CCNC: 37 U/L — SIGNIFICANT CHANGE UP (ref 30–200)
CO2 SERPL-SCNC: 27 MMOL/L — SIGNIFICANT CHANGE UP (ref 22–29)
CREAT SERPL-MCNC: 0.58 MG/DL — SIGNIFICANT CHANGE UP (ref 0.5–1.3)
EGFR: 100 ML/MIN/1.73M2 — SIGNIFICANT CHANGE UP
GLUCOSE BLDC GLUCOMTR-MCNC: 146 MG/DL — HIGH (ref 70–99)
GLUCOSE BLDC GLUCOMTR-MCNC: 171 MG/DL — HIGH (ref 70–99)
GLUCOSE BLDC GLUCOMTR-MCNC: 221 MG/DL — HIGH (ref 70–99)
GLUCOSE BLDC GLUCOMTR-MCNC: 264 MG/DL — HIGH (ref 70–99)
GLUCOSE SERPL-MCNC: 192 MG/DL — HIGH (ref 70–99)
HCT VFR BLD CALC: 34.6 % — LOW (ref 39–50)
HGB BLD-MCNC: 11 G/DL — LOW (ref 13–17)
INR BLD: 1.31 RATIO — HIGH (ref 0.85–1.18)
MAGNESIUM SERPL-MCNC: 2.1 MG/DL — SIGNIFICANT CHANGE UP (ref 1.6–2.6)
MCHC RBC-ENTMCNC: 29.3 PG — SIGNIFICANT CHANGE UP (ref 27–34)
MCHC RBC-ENTMCNC: 31.8 GM/DL — LOW (ref 32–36)
MCV RBC AUTO: 92.3 FL — SIGNIFICANT CHANGE UP (ref 80–100)
PLATELET # BLD AUTO: 199 K/UL — SIGNIFICANT CHANGE UP (ref 150–400)
POTASSIUM SERPL-MCNC: 3.7 MMOL/L — SIGNIFICANT CHANGE UP (ref 3.5–5.3)
POTASSIUM SERPL-SCNC: 3.7 MMOL/L — SIGNIFICANT CHANGE UP (ref 3.5–5.3)
PROT SERPL-MCNC: 6 G/DL — LOW (ref 6.6–8.7)
PROTHROM AB SERPL-ACNC: 14.4 SEC — HIGH (ref 9.5–13)
RBC # BLD: 3.75 M/UL — LOW (ref 4.2–5.8)
RBC # FLD: 14.6 % — HIGH (ref 10.3–14.5)
SODIUM SERPL-SCNC: 137 MMOL/L — SIGNIFICANT CHANGE UP (ref 135–145)
TROPONIN T SERPL-MCNC: 0.16 NG/ML — HIGH (ref 0–0.06)
WBC # BLD: 9.09 K/UL — SIGNIFICANT CHANGE UP (ref 3.8–10.5)
WBC # FLD AUTO: 9.09 K/UL — SIGNIFICANT CHANGE UP (ref 3.8–10.5)

## 2023-10-07 PROCEDURE — 93010 ELECTROCARDIOGRAM REPORT: CPT

## 2023-10-07 PROCEDURE — 99233 SBSQ HOSP IP/OBS HIGH 50: CPT

## 2023-10-07 PROCEDURE — 71045 X-RAY EXAM CHEST 1 VIEW: CPT | Mod: 26

## 2023-10-07 PROCEDURE — 99291 CRITICAL CARE FIRST HOUR: CPT

## 2023-10-07 RX ORDER — ALPRAZOLAM 0.25 MG
0.5 TABLET ORAL ONCE
Refills: 0 | Status: DISCONTINUED | OUTPATIENT
Start: 2023-10-07 | End: 2023-10-07

## 2023-10-07 RX ORDER — POTASSIUM CHLORIDE 20 MEQ
40 PACKET (EA) ORAL
Refills: 0 | Status: COMPLETED | OUTPATIENT
Start: 2023-10-07 | End: 2023-10-07

## 2023-10-07 RX ORDER — FUROSEMIDE 40 MG
20 TABLET ORAL ONCE
Refills: 0 | Status: COMPLETED | OUTPATIENT
Start: 2023-10-07 | End: 2023-10-07

## 2023-10-07 RX ORDER — ACETAMINOPHEN 500 MG
975 TABLET ORAL EVERY 6 HOURS
Refills: 0 | Status: DISCONTINUED | OUTPATIENT
Start: 2023-10-07 | End: 2023-10-09

## 2023-10-07 RX ADMIN — Medication 4: at 17:04

## 2023-10-07 RX ADMIN — FINASTERIDE 5 MILLIGRAM(S): 5 TABLET, FILM COATED ORAL at 17:24

## 2023-10-07 RX ADMIN — Medication 300 MILLIGRAM(S): at 17:04

## 2023-10-07 RX ADMIN — ATORVASTATIN CALCIUM 10 MILLIGRAM(S): 80 TABLET, FILM COATED ORAL at 21:22

## 2023-10-07 RX ADMIN — Medication 300 MILLIGRAM(S): at 06:03

## 2023-10-07 RX ADMIN — Medication 2 MILLIGRAM(S): at 06:04

## 2023-10-07 RX ADMIN — Medication 325 MILLIGRAM(S): at 07:04

## 2023-10-07 RX ADMIN — Medication 0: at 09:03

## 2023-10-07 RX ADMIN — Medication 6: at 12:08

## 2023-10-07 RX ADMIN — Medication 325 MILLIGRAM(S): at 13:00

## 2023-10-07 RX ADMIN — PANTOPRAZOLE SODIUM 40 MILLIGRAM(S): 20 TABLET, DELAYED RELEASE ORAL at 06:06

## 2023-10-07 RX ADMIN — Medication 20 MILLIGRAM(S): at 12:17

## 2023-10-07 RX ADMIN — Medication 975 MILLIGRAM(S): at 21:00

## 2023-10-07 RX ADMIN — Medication 0.5 MILLIGRAM(S): at 22:23

## 2023-10-07 RX ADMIN — Medication 40 MILLIEQUIVALENT(S): at 06:04

## 2023-10-07 RX ADMIN — Medication 975 MILLIGRAM(S): at 21:18

## 2023-10-07 RX ADMIN — Medication 1 GRAM(S): at 21:21

## 2023-10-07 RX ADMIN — NYSTATIN CREAM 1 APPLICATION(S): 100000 CREAM TOPICAL at 17:04

## 2023-10-07 RX ADMIN — Medication 325 MILLIGRAM(S): at 12:18

## 2023-10-07 RX ADMIN — SENNA PLUS 2 TABLET(S): 8.6 TABLET ORAL at 21:18

## 2023-10-07 RX ADMIN — INSULIN GLARGINE 5 UNIT(S): 100 INJECTION, SOLUTION SUBCUTANEOUS at 21:22

## 2023-10-07 RX ADMIN — Medication 20 MILLIEQUIVALENT(S): at 09:02

## 2023-10-07 RX ADMIN — Medication 325 MILLIGRAM(S): at 06:04

## 2023-10-07 RX ADMIN — NYSTATIN CREAM 1 APPLICATION(S): 100000 CREAM TOPICAL at 06:05

## 2023-10-07 RX ADMIN — Medication 1 GRAM(S): at 06:04

## 2023-10-07 RX ADMIN — Medication 81 MILLIGRAM(S): at 12:18

## 2023-10-07 NOTE — PROGRESS NOTE ADULT - SUBJECTIVE AND OBJECTIVE BOX
Pt doing well POD #2 s/p MDT 2ch pacemaker implant. Pressure dressing removed. Awaiting CXR PA/LAT   Telemetry shows V paced @ 90 bpm      Post-op device interrogation showed:     PAST MEDICAL & SURGICAL HISTORY:  Anxiety  Hypertension  Hyperlipidemia  Prostate disease  Gastroesophageal reflux disease  Gallbladder stone without cholecystitis or obstruction  BPH (benign prostatic hyperplasia)  DM (diabetes mellitus)  DVT (deep venous thrombosis) left leg  Afib  Anxiety  HLD (hyperlipidemia)  Malignant schwannoma  H/O arthroscopy of knee  S/P laminectomy  S/P coronary artery stent placement x2     MEDICATIONS  (STANDING):  aspirin enteric coated 81 milliGRAM(s) Oral daily  atorvastatin 10 milliGRAM(s) Oral at bedtime  chlorhexidine 4% Liquid 1 Application(s) Topical two times a day  dextrose 5%. 1000 milliLiter(s) (100 mL/Hr) IV Continuous <Continuous>  dextrose 5%. 1000 milliLiter(s) (50 mL/Hr) IV Continuous <Continuous>  dextrose 50% Injectable 25 Gram(s) IV Push once  dextrose 50% Injectable 12.5 Gram(s) IV Push once  dextrose 50% Injectable 25 Gram(s) IV Push once  doxazosin 2 milliGRAM(s) Oral daily  ergocalciferol 88351 Unit(s) Oral every week  finasteride 5 milliGRAM(s) Oral daily  glucagon  Injectable 1 milliGRAM(s) IntraMuscular once  insulin glargine Injectable (LANTUS) 5 Unit(s) SubCutaneous at bedtime  insulin lispro (ADMELOG) corrective regimen sliding scale   SubCutaneous three times a day before meals  nystatin Ointment 1 Application(s) Topical every 12 hours  pantoprazole    Tablet 40 milliGRAM(s) Oral before breakfast  senna 2 Tablet(s) Oral at bedtime  sodium chloride 0.9%. 1000 milliLiter(s) (10 mL/Hr) IV Continuous <Continuous>  sodium chloride 0.9%. 1000 milliLiter(s) (5 mL/Hr) IV Continuous <Continuous>  sucralfate 1 Gram(s) Oral two times a day  vancomycin  IVPB 1500 milliGRAM(s) IV Intermittent every 12 hours    MEDICATIONS  (PRN):  acetaminophen     Tablet .. 325 milliGRAM(s) Oral every 4 hours PRN Mild Pain (1 - 3), Moderate Pain (4 - 6)  albuterol/ipratropium for Nebulization 3 milliLiter(s) Nebulizer every 6 hours PRN Shortness of Breath and/or Wheezing  dextrose Oral Gel 15 Gram(s) Oral once PRN Blood Glucose LESS THAN 70 milliGRAM(s)/deciliter  fluticasone propionate 50 MICROgram(s)/spray Nasal Spray 1 Spray(s) Both Nostrils two times a day PRN nasal congestion    Allergies:  Ceftin (Hives)  penicillin (Anaphylaxis)  Bactrim (Rash)  sulfa drugs (Unknown)    Vital Signs Last 24 Hrs  T(C): 37.1 (07 Oct 2023 12:00), Max: 37.1 (07 Oct 2023 04:00)  T(F): 98.8 (07 Oct 2023 12:00), Max: 98.8 (07 Oct 2023 12:00)  HR: 94 (07 Oct 2023 13:00) (59 - 100)  BP: 116/56 (07 Oct 2023 13:00) (100/60 - 126/67)  BP(mean): 79 (07 Oct 2023 13:00) (74 - 92)  RR: 23 (07 Oct 2023 13:00) (12 - 32)  SpO2: 96% (07 Oct 2023 13:00) (90% - 98%)    Parameters below as of 07 Oct 2023 13:00  Patient On (Oxygen Delivery Method): nasal cannula  O2 Flow (L/min): 2    Physical Exam:  Constitutional: awake, alert, no obvious distress   Cardiovascular: +S1S2  + murmur   Chest: LACW site steristrips c/d/i; no hematoma, swelling, or bleeding; + ecchymosis   Pulmonary: CTA b/l, unlabored  Abd: soft NTND +BS  Extremities: no pedal edema, +distal pulses b/l  Neuro: non focal, GUERRA x4    LABS:                        11.0   9.09  )-----------( 199      ( 07 Oct 2023 01:57 )             34.6     10-07    137  |  99  |  19.6  ----------------------------<  192<H>  3.7   |  27.0  |  0.58    Ca    8.1<L>      07 Oct 2023 01:57  Mg     2.1     10-07    TPro  6.0<L>  /  Alb  3.4  /  TBili  0.6  /  DBili  x   /  AST  10  /  ALT  12  /  AlkPhos  232<H>  10-07    PT/INR - ( 07 Oct 2023 01:57 )   PT: 14.4 sec;   INR: 1.31 ratio      PTT - ( 07 Oct 2023 01:57 )  PTT:35.5 sec  Urinalysis Basic - ( 07 Oct 2023 01:57 )    Color: x / Appearance: x / SG: x / pH: x  Gluc: 192 mg/dL / Ketone: x  / Bili: x / Urobili: x   Blood: x / Protein: x / Nitrite: x   Leuk Esterase: x / RBC: x / WBC x   Sq Epi: x / Non Sq Epi: x / Bacteria: x    Echo: 10/6/23:   Summary:   1. Left ventricular ejection fraction, by visual estimation, is 50 to 55%.   2. Normal global left ventricular systolic function.   3. Abnormal septal motion consistent with RV pacemaker.   4. There is mild concentric left ventricular hypertrophy.   5. Normal right ventricular size and function, pacing wire present.   6. Estimated pulmonary artery systolic pressure is 49.6 mmHg assuming a right atrial pressure of 15 mmHg, which is consistent with mild pulmonary hypertension.   7. Moderate mitral annular calcification.   8. Trace mitral valve regurgitation. Transmitral mean gradient of 4 mmHg (at Hr of 79 bpm).   9. Mild tricuspid regurgitation.  10. Christiano TAVR in the aortic position with trace paravalvular regurgitation.  11. Lipomatous hypertrophy of the intra-atrial septum.  12. Peak aortic valve gradient is 13.0 mmHg and the mean gradient is 6.0 mmHg, which is probably normal in the setting of a prosthetic aortic valve.  13. Trivial pericardial effusion.  Ti Cates DO Electronically signed on 10/6/2023 at 11:03:31 PM    CXR: 10/5/23:   FINDINGS:  There is a left-sided dual-lead pacemaker noted with leads overlying the right atrium and right ventricle.  The heart appears enlarged, but unchanged.  There is mild pulmonary vascular congestion.  There is a trace left effusion.  There is no pneumothorax.  IMPRESSION:  Left-sided dual-lead pacemaker. No pneumothorax.  --- End of Report ---  RIKA COYLE MD; Attending Radiologist  This document has been electronically signed.

## 2023-10-07 NOTE — PROGRESS NOTE ADULT - ASSESSMENT
77M PMH PMH Sev AS, Chronic Diastolic CHF, CAD s/p PCI to p&mLAD (?), Thoracic Aortic Aneurysm, Paroxysmal Afib s/p Ablation (on Eliquis), LLE DVT, Home O2 (prn), ALLIE, BPH s/p TURP, Left Renal Mass, Morbid Obesity, DM (A1C 7.2), GERD, Cholelithiasis, Laminectomy, Anxiety, MRCP w/ cirrhosis, multiple CHF admissions in 2023, last 6/10-6/23/23 during which he was found to have HFrEF and severe AS suspected 2/2 AF then s/p DCCV with EP and Cardiomems insertion.  During that admission, CT surgery was consulted for TAVR evaluation however anticoagulation was not able to be interrupted per EP.  Patient did not follow up and now presents 9/24 admitted for acute on chronic diastolic heart failure. He was medically optimized and underwent percutaneous transfemoral TAVR via RCFA (Christiano) with Dr. Napier on 10/6/23.

## 2023-10-07 NOTE — PROGRESS NOTE ADULT - ASSESSMENT
Assessment:   77 year old male with HTN, hyperlipidemia, DM type 2, obesity BMI 38, thoracic aortic aneurysm, ALLIE, BPH s/p TURP, left renal mass, bifascicular block (RBBB + LAFB), newly diagnosed HFrEF (LVEF 25%, though previously had AF-mediated CM which improved after CV), known moderate AS on TTE 6/11/23 (cath 1/2023 - 25mm LV-Ao gradient on pullback.  CIELO 1.4cm2), and persistent AF/AFL s/p CV 1/2023 and radiofrequency ablation of atrial fibrillation and atrial flutter (WACA/PVI, PWI, mitral line, and CTI line) 6/19/23, who presented [facility]/24/23 with worsening shortness of breath, intermittent palpitation, and lower ext edema. Echo showed LVEF 40-45%, and severe low-flow, low gradient aortic stenosis. CTSx consulted and planned for TAVR on 10/6. Given his baseline conduction system disease (bifascicular block with long first degree) and high likelihood of developing complete heart block post-TAVR, he underwent MDT 2ch PPM implant on 10/5.  Now POD#2 s/p uncomplicated PPM implant with Dr. Amato, and POD#1 TAVR with Dr. Napier.     Plan:   - Needs CXR PA/LAT prior to discharge.   - If LACW incision site remains stable and w/o hematoma, can resume Eliquis 5mg Q 12 hrs this evening.   - Access site care and activity limitations reviewed w/ pt.     Discussed w/ Dr. Vargas (covering EP attending)

## 2023-10-07 NOTE — PROGRESS NOTE ADULT - SUBJECTIVE AND OBJECTIVE BOX
ANIA CRISTOBAL  MRN-[id number]    HPI:  78 y/o male with PMH of afib on Eliquis (s/p Cardioversion abd ablation  1/31/23), Moderate AS, Chronic Diastolic Heart Failure, CAD s/p PCI, HTN, HLD, DM 2, Morbid Obesity, ALLIE, Thoracic Aortic Aneurysm, BPH s/p TURP, Left Renal Mass Presents with shortness of breath for last few days , more worse since last night , also noted with worsening lower ext swelling . moved out of Assisted living recently to live with his son basement apartment.  Patient does not eat at home and usually eat food from [employer] as he has no one to cook for him.  also felt some palpitations last night but now feels better, given lasix iv in ed.   has home o2 , uses it prn.   (24 Sep 2023 16:43)      Surgery/Hospital Course:  ·  PRE-OP DIAGNOSIS:  Severe aortic stenosis   Acute on chronic diastolic CHF (congestive heart failure), NYHA class 3   ·  POST-OP DIAGNOSIS:  Severe aortic stenosis   Acute on chronic diastolic CHF (congestive heart failure), NYHA class 3   ·  PROCEDURES:  TAVR, percutaneous 06-Oct-2023    Percutaneous Transfemoral TAVR via ? Common Femoral Artery (29mm Christiano Resilia)   Today:  No acute events -    ICU Vital Signs Last 24 Hrs  T(C): 37.1 (07 Oct 2023 12:00), Max: 37.1 (07 Oct 2023 04:00)  T(F): 98.8 (07 Oct 2023 12:00), Max: 98.8 (07 Oct 2023 12:00)  HR: 94 (07 Oct 2023 13:00) (59 - 100)  BP: 116/56 (07 Oct 2023 13:00) (100/60 - 126/67)  BP(mean): 79 (07 Oct 2023 13:00) (74 - 92)  ABP: 121/57 (07 Oct 2023 04:00) (112/51 - 131/55)  ABP(mean): 74 (07 Oct 2023 04:00) (67 - 86)  RR: 23 (07 Oct 2023 13:00) (12 - 32)  SpO2: 96% (07 Oct 2023 13:00) (90% - 98%)    O2 Parameters below as of 07 Oct 2023 13:00  Patient On (Oxygen Delivery Method): nasal cannula  O2 Flow (L/min): 2          Physical Exam:  Gen: A&O   CNS: non focal 	  Neck: no JVD  RES : clear , no wheezing              CVS: Regular  rhythm. Normal S1/S2  Abd: Soft, non-distended. Bowel sounds present.  Skin: No rash.  Ext:  no edema    ============================I/O===========================   I&O's Detail    06 Oct 2023 07:01  -  07 Oct 2023 07:00  --------------------------------------------------------  IN:    IV PiggyBack: 50 mL    IV PiggyBack: 300 mL    IV PiggyBack: 500 mL    sodium chloride 0.9%: 90 mL    sodium chloride 0.9%: 45 mL  Total IN: 985 mL    OUT:    Voided (mL): 500 mL  Total OUT: 500 mL    Total NET: 485 mL      07 Oct 2023 07:01  -  07 Oct 2023 14:40  --------------------------------------------------------  IN:    Oral Fluid: 240 mL  Total IN: 240 mL    OUT:    Voided (mL): 500 mL  Total OUT: 500 mL    Total NET: -260 mL        ============================ LABS =========================                        11.0   9.09  )-----------( 199      ( 07 Oct 2023 01:57 )             34.6     10-07    137  |  99  |  19.6  ----------------------------<  192<H>  3.7   |  27.0  |  0.58    Ca    8.1<L>      07 Oct 2023 01:57  Mg     2.1     10-07    TPro  6.0<L>  /  Alb  3.4  /  TBili  0.6  /  DBili  x   /  AST  10  /  ALT  12  /  AlkPhos  232<H>  10-07    LIVER FUNCTIONS - ( 07 Oct 2023 01:57 )  Alb: 3.4 g/dL / Pro: 6.0 g/dL / ALK PHOS: 232 U/L / ALT: 12 U/L / AST: 10 U/L / GGT: x           PT/INR - ( 07 Oct 2023 01:57 )   PT: 14.4 sec;   INR: 1.31 ratio         PTT - ( 07 Oct 2023 01:57 )  PTT:35.5 sec  ABG - ( 06 Oct 2023 18:35 )  pH, Arterial: 7.390 pH, Blood: x     /  pCO2: 47    /  pO2: 68    / HCO3: 28    / Base Excess: 3.5   /  SaO2: 94.4              Urinalysis Basic - ( 07 Oct 2023 01:57 )    Color: x / Appearance: x / SG: x / pH: x  Gluc: 192 mg/dL / Ketone: x  / Bili: x / Urobili: x   Blood: x / Protein: x / Nitrite: x   Leuk Esterase: x / RBC: x / WBC x   Sq Epi: x / Non Sq Epi: x / Bacteria: x      ======================Micro/Rad/Cardio=================  Culture: Reviewed   CXR: Reviewed  Echo:Reviewed  ======================================================  PAST MEDICAL & SURGICAL HISTORY:  Anxiety      Hypertension      Hyperlipidemia      Prostate disease      Gastroesophageal reflux disease      Gallbladder stone without cholecystitis or obstruction      BPH (benign prostatic hyperplasia)      DM (diabetes mellitus)      DVT (deep venous thrombosis)  left leg      Afib      Anxiety      HLD (hyperlipidemia)      Malignant schwannoma      H/O arthroscopy of knee      S/P laminectomy      S/P coronary artery stent placement  x2        ====================ASSESSMENT ==============  78 y/o male with PMH of afib on Eliquis (s/p Cardioversion abd ablation  1/31/23), Moderate AS, Chronic Diastolic Heart Failure, CAD s/p PCI, HTN, HLD, DM 2, Morbid Obesity, ALLIE, Thoracic Aortic Aneurysm, BPH s/p TURP, Left Renal Mass Presents with shortness of breath for last few days , more worse since last night , also noted with worsening lower ext swelling . moved out of Assisted living recently to live with his son basement apartment.  Patient does not eat at home and usually eat food from [employer] as he has no one to cook for him.  also felt some palpitations last night but now feels better, given lasix iv in ed.   has home o2 , uses it prn.   (24 Sep 2023 16:43)    ---Severe aortic stenosis   ---Acute on chronic diastolic CHF (congestive heart failure), NYHA class 3   ---S/p TAVR, percutaneous 06-Oct-2023               Percutaneous Transfemoral TAVR via ? Common Femoral Artery (29mm Christiano Resilia)              Commercial 29mm [employer] Christiano Resilia Percutaneous Transfemoral TAVR via Right Common Femoral Artery.  ---Post op Hypovolemia  ---Post op respiratory insufficiency   ---s/p dual chamber Medtronic PPM 10/5/23 with Dr. Amato    Plan:-  -Continue diuresis as tolerated.  - Continue basal/bolus insulin regimen.-  ====================== NEUROLOGY=====================  acetaminophen     Tablet .. 325 milliGRAM(s) Oral every 4 hours PRN Mild Pain (1 - 3), Moderate Pain (4 - 6)    ==================== RESPIRATORY======================  Post op respiratory insufficiency  albuterol/ipratropium for Nebulization 3 milliLiter(s) Nebulizer every 6 hours PRN Shortness of Breath and/or Wheezing    ====================CARDIOVASCULAR==================  Post op Hypovolemia  doxazosin 2 milliGRAM(s) Oral daily    ===================HEMATOLOGIC/ONC ===================  Monitor H&H/Plts    aspirin enteric coated 81 milliGRAM(s) Oral daily    ===================== RENAL =========================  Continue monitoring urine output, I&OS, BUN/Cr     ==================== GASTROINTESTINAL===================  dextrose 5%. 1000 milliLiter(s) (50 mL/Hr) IV Continuous <Continuous>  dextrose 5%. 1000 milliLiter(s) (100 mL/Hr) IV Continuous <Continuous>  ergocalciferol 10837 Unit(s) Oral every week  pantoprazole    Tablet 40 milliGRAM(s) Oral before breakfast  senna 2 Tablet(s) Oral at bedtime  sodium chloride 0.9%. 1000 milliLiter(s) (10 mL/Hr) IV Continuous <Continuous>  sodium chloride 0.9%. 1000 milliLiter(s) (5 mL/Hr) IV Continuous <Continuous>  sucralfate 1 Gram(s) Oral two times a day    =======================    ENDOCRINE  =====================  atorvastatin 10 milliGRAM(s) Oral at bedtime  dextrose 50% Injectable 25 Gram(s) IV Push once  dextrose 50% Injectable 12.5 Gram(s) IV Push once  dextrose 50% Injectable 25 Gram(s) IV Push once  dextrose Oral Gel 15 Gram(s) Oral once PRN Blood Glucose LESS THAN 70 milliGRAM(s)/deciliter  finasteride 5 milliGRAM(s) Oral daily  glucagon  Injectable 1 milliGRAM(s) IntraMuscular once  insulin glargine Injectable (LANTUS) 5 Unit(s) SubCutaneous at bedtime  insulin lispro (ADMELOG) corrective regimen sliding scale   SubCutaneous three times a day before meals    ========================INFECTIOUS DISEASE================  vancomycin  IVPB 1500 milliGRAM(s) IV Intermittent every 12 hours    -Monitor Neurologic status ,   -Head of the bed should remain elevated to 45 degrees,  -Monitor for arrhythmias and monitor parameters for organ perfusion,  -Glycemic control is satisfactory,  -Nutritional goals will be met using po eventually , insure adequate caloric intake and monitor the same ,  -Electrolytes have been repleted as necessary , pain control has been achieved  and wound care has been carried out ,  -Stress ulcer and VTE prophylaxis will be achieved,  -Agressive PT and early mobility and ambulation goals will be met,    I have spent 35 minutes providing acute care for this critically ill patient     Patient requires continuous monitoring with bedside rhythm monitoring, pulse ox monitoring, and intermittent blood gas analysis. Care plan discussed with ICU care team. Patient remained critical and at risk for life threatening decompensation.

## 2023-10-07 NOTE — PROGRESS NOTE ADULT - PROBLEM SELECTOR PLAN 3
pAF/flutter with occasional bradycardia episodes (asymptomatic)  High risk for CHB post-TAVR  s/p dual chamber Medtronic PPM 10/5/23 with Dr. Amato  pressure dressing intact  discuss restarting Eliquis

## 2023-10-07 NOTE — PROGRESS NOTE ADULT - SUBJECTIVE AND OBJECTIVE BOX
Brief summary:  77M PMH Sev AS, Chronic Diastolic CHF, CAD s/p PCI to p&mLAD (?), Thoracic Aortic Aneurysm, Paroxysmal Afib s/p Ablation (on Eliquis), LLE DVT, Home O2 (prn), ALLIE, BPH s/p TURP, Left Renal Mass, Morbid Obesity, DM (A1C 7.2), GERD, Cholelithiasis, Laminectomy, Anxiety (multiple CHF admissions in 2023, last 6/10-6/23/23), underwent percutaneous transfemoral TAVR via FA ([facility]) with Dr. Napier on 10/6/23.    Overnight events:  Patient denies acute pain with radiating or aggravating factors.  He denies chest pain, shortness of breath, palpitations, headache, dizziness, nausea, or vomiting.     PAST MEDICAL & SURGICAL HISTORY:  Anxiety      Hypertension      Hyperlipidemia      Prostate disease      Gastroesophageal reflux disease      Gallbladder stone without cholecystitis or obstruction      BPH (benign prostatic hyperplasia)      DM (diabetes mellitus)      DVT (deep venous thrombosis)  left leg      Afib      Anxiety      HLD (hyperlipidemia)      Malignant schwannoma      H/O arthroscopy of knee      S/P laminectomy      S/P coronary artery stent placement  x2          Medications:  acetaminophen     Tablet .. 325 milliGRAM(s) Oral every 4 hours PRN  albuterol/ipratropium for Nebulization 3 milliLiter(s) Nebulizer every 6 hours PRN  aspirin enteric coated 81 milliGRAM(s) Oral daily  atorvastatin 10 milliGRAM(s) Oral at bedtime  chlorhexidine 4% Liquid 1 Application(s) Topical two times a day  dextrose 5%. 1000 milliLiter(s) IV Continuous <Continuous>  dextrose 5%. 1000 milliLiter(s) IV Continuous <Continuous>  dextrose 50% Injectable 25 Gram(s) IV Push once  dextrose 50% Injectable 12.5 Gram(s) IV Push once  dextrose 50% Injectable 25 Gram(s) IV Push once  dextrose Oral Gel 15 Gram(s) Oral once PRN  doxazosin 2 milliGRAM(s) Oral daily  ergocalciferol 48847 Unit(s) Oral every week  finasteride 5 milliGRAM(s) Oral daily  fluticasone propionate 50 MICROgram(s)/spray Nasal Spray 1 Spray(s) Both Nostrils two times a day PRN  glucagon  Injectable 1 milliGRAM(s) IntraMuscular once  insulin glargine Injectable (LANTUS) 5 Unit(s) SubCutaneous at bedtime  insulin lispro (ADMELOG) corrective regimen sliding scale   SubCutaneous three times a day before meals  nystatin Ointment 1 Application(s) Topical every 12 hours  pantoprazole    Tablet 40 milliGRAM(s) Oral before breakfast  senna 2 Tablet(s) Oral at bedtime  sodium chloride 0.9%. 1000 milliLiter(s) IV Continuous <Continuous>  sodium chloride 0.9%. 1000 milliLiter(s) IV Continuous <Continuous>  sucralfate 1 Gram(s) Oral two times a day  vancomycin  IVPB 1500 milliGRAM(s) IV Intermittent every 12 hours      MEDICATIONS  (PRN):  acetaminophen     Tablet .. 325 milliGRAM(s) Oral every 4 hours PRN Mild Pain (1 - 3), Moderate Pain (4 - 6)  albuterol/ipratropium for Nebulization 3 milliLiter(s) Nebulizer every 6 hours PRN Shortness of Breath and/or Wheezing  dextrose Oral Gel 15 Gram(s) Oral once PRN Blood Glucose LESS THAN 70 milliGRAM(s)/deciliter  fluticasone propionate 50 MICROgram(s)/spray Nasal Spray 1 Spray(s) Both Nostrils two times a day PRN nasal congestion      Daily Review:    Height (cm): 188 (10-06 @ 09:07)  Weight (kg): 127.5 (10-06 @ 09:07)  BMI (kg/m2): 36.1 (10-06 @ 09:07)  BSA (m2): 2.51 (10-06 @ 09:07)    ABG - ( 06 Oct 2023 18:35 )  pH, Arterial: 7.390 pH, Blood: x     /  pCO2: 47    /  pO2: 68    / HCO3: 28    / Base Excess: 3.5   /  SaO2: 94.4                              11.0   9.09  )-----------( 199      ( 07 Oct 2023 01:57 )             34.6   10-07    137  |  99  |  19.6  ----------------------------<  192<H>  3.7   |  27.0  |  0.58    Ca    8.1<L>      07 Oct 2023 01:57  Mg     2.1     10-07    TPro  6.0<L>  /  Alb  3.4  /  TBili  0.6  /  DBili  x   /  AST  10  /  ALT  12  /  AlkPhos  232<H>  10-07    CARDIAC MARKERS ( 07 Oct 2023 01:57 )  x     / 0.16 ng/mL / 37 U/L / x     / x      CARDIAC MARKERS ( 06 Oct 2023 18:40 )  x     / 0.27 ng/mL / 45 U/L / x     / x        PT/INR - ( 07 Oct 2023 01:57 )   PT: 14.4 sec;   INR: 1.31 ratio         PTT - ( 07 Oct 2023 01:57 )  PTT:35.5 sec    T(C): 36.7 (10-07-23 @ 00:00), Max: 36.7 (10-06-23 @ 04:44)  HR: 79 (10-07-23 @ 02:00) (59 - 88)  BP: 108/64 (10-07-23 @ 02:00) (100/60 - 125/49)  RR: 12 (10-07-23 @ 02:00) (12 - 32)  SpO2: 96% (10-07-23 @ 02:00) (91% - 98%)  Wt(kg): --    CAPILLARY BLOOD GLUCOSE      POCT Blood Glucose.: 133 mg/dL (06 Oct 2023 21:18)  POCT Blood Glucose.: 140 mg/dL (06 Oct 2023 18:59)  POCT Blood Glucose.: 135 mg/dL (06 Oct 2023 12:08)  POCT Blood Glucose.: 178 mg/dL (06 Oct 2023 09:22)  POCT Blood Glucose.: 194 mg/dL (06 Oct 2023 09:09)  POCT Blood Glucose.: 156 mg/dL (06 Oct 2023 08:12)      I&O's Summary    05 Oct 2023 07:01  -  06 Oct 2023 07:00  --------------------------------------------------------  IN: 360 mL / OUT: 2020 mL / NET: -1660 mL    06 Oct 2023 07:01  -  07 Oct 2023 03:17  --------------------------------------------------------  IN: 485 mL / OUT: 0 mL / NET: 485 mL        Physical Exam  Neuro: A+O x 3, non-focal, speech clear and intact  Pulm: coarse breath sounds bilaterally, no wheezing or rales  CV: RRR, systolic ejection murmur, +S1S2  Abd: soft, NT, ND  Ext: +DP Pulses b/l, +PT pulses, no edema  Inc: b/l groins soft without hematoma

## 2023-10-07 NOTE — PROGRESS NOTE ADULT - SUBJECTIVE AND OBJECTIVE BOX
ANIA PÉREZELSY  [id number]        Chief Complaint: follow up HFpEF/AS/PAF/CAD  s/p TAVR 10/6/2023 and PPM 10/5/2023      Subjective: no cp/sob/palps      24 hour Tele: SR         acetaminophen     Tablet .. 325 milliGRAM(s) Oral every 4 hours PRN  albuterol/ipratropium for Nebulization 3 milliLiter(s) Nebulizer every 6 hours PRN  aspirin enteric coated 81 milliGRAM(s) Oral daily  atorvastatin 10 milliGRAM(s) Oral at bedtime  chlorhexidine 4% Liquid 1 Application(s) Topical two times a day  dextrose 5%. 1000 milliLiter(s) IV Continuous <Continuous>  dextrose 5%. 1000 milliLiter(s) IV Continuous <Continuous>  dextrose 50% Injectable 25 Gram(s) IV Push once  dextrose 50% Injectable 12.5 Gram(s) IV Push once  dextrose 50% Injectable 25 Gram(s) IV Push once  dextrose Oral Gel 15 Gram(s) Oral once PRN  doxazosin 2 milliGRAM(s) Oral daily  ergocalciferol 94011 Unit(s) Oral every week  finasteride 5 milliGRAM(s) Oral daily  fluticasone propionate 50 MICROgram(s)/spray Nasal Spray 1 Spray(s) Both Nostrils two times a day PRN  glucagon  Injectable 1 milliGRAM(s) IntraMuscular once  insulin glargine Injectable (LANTUS) 5 Unit(s) SubCutaneous at bedtime  insulin lispro (ADMELOG) corrective regimen sliding scale   SubCutaneous three times a day before meals  nystatin Ointment 1 Application(s) Topical every 12 hours  pantoprazole    Tablet 40 milliGRAM(s) Oral before breakfast  senna 2 Tablet(s) Oral at bedtime  sodium chloride 0.9%. 1000 milliLiter(s) IV Continuous <Continuous>  sodium chloride 0.9%. 1000 milliLiter(s) IV Continuous <Continuous>  sucralfate 1 Gram(s) Oral two times a day  vancomycin  IVPB 1500 milliGRAM(s) IV Intermittent every 12 hours          Physical Exam:  T(C): 37.1 (10-07-23 @ 04:00), Max: 37.1 (10-07-23 @ 04:00)  HR: 79 (10-07-23 @ 07:00) (59 - 87)  BP: 112/66 (10-07-23 @ 07:00) (100/60 - 126/64)  RR: 18 (10-07-23 @ 07:00) (12 - 32)  SpO2: 97% (10-07-23 @ 07:00) (91% - 98%)  Wt(kg): --  General: Comfortable in NAD  HEENT: MMM, sclera anicteric  Resp: coarse bilaterally   CVS: nl s1s2, rrr, no s3/JVD  Abd: soft, NT, ND, BS+  Ext: No c/c/e  Neuro A&O x3  Psych: Normal affect    I&O's Summary    06 Oct 2023 07:01  -  07 Oct 2023 07:00  --------------------------------------------------------  IN: 985 mL / OUT: 500 mL / NET: 485 mL          Labs:   07 Oct 2023 01:57    137    |  99     |  19.6   ----------------------------<  192    3.7     |  27.0   |  0.58     Ca    8.1        07 Oct 2023 01:57  Mg     2.1       07 Oct 2023 01:57    TPro  6.0    /  Alb  3.4    /  TBili  0.6    /  DBili  x      /  AST  10     /  ALT  12     /  AlkPhos  232    07 Oct 2023 01:57                          11.0   9.09  )-----------( 199      ( 07 Oct 2023 01:57 )             34.6     PT/INR - ( 07 Oct 2023 01:57 )   PT: 14.4 sec;   INR: 1.31 ratio         PTT - ( 07 Oct 2023 01:57 )  PTT:35.5 sec  CARDIAC MARKERS ( 07 Oct 2023 01:57 )  x     / 0.16 ng/mL / 37 U/L / x     / x      CARDIAC MARKERS ( 06 Oct 2023 18:40 )  x     / 0.27 ng/mL / 45 U/L / x     / x            Echo 6/20/23  Summary:   1. LV Ejection Fraction by Oleary's Method with a biplane EF of 50 %.   2. Normal global left ventricular systolic function.   3. Trace mitral valve regurgitation.   4. Mild aortic regurgitation.   5. Endocardial visualization was enhanced with intravenous echo contrast.   6. Compared to TTE dated 6/11/23 the LVEF has improved.    STRESS TEST:    CATHETERIZATION:  [facility] 1/2023:  Coronary Angiography   - Left dominant system   - Patent prox and mid LAD stents   - Moderate stenosis of the distal LCx   - The RCA is a small, non-dominat vessel.  The proximal segment is occluded with brisk right-to-right collaterals.  - 25mm LV-Ao gradient on pullback.  CIELO 1.4cm2 with moderate AS byecho.  - Moderately reduced LVEF by echo           Pharmacological stress test 9/27/23  * Myocardial Perfusion SPECT results are mildly abnormal. * There is a small, moderate defect in the distal inferior and inferoapical wall that is fixed, consistent with infarct. Basal inferior wall defect paradoxically improved during stress imaging. Prone imaging not available. No clear evidence of ischemia. No TID (ratio 1.23).  * Post-stress resting myocardial perfusion gated SPECT imaging was performed (LVEF = 48 %;LVEDV = 176 ml.), mild hypokinesis of the distal inferior wall.    TTE 9/27/23  Summary:   1. Left ventricular ejection fraction, by visual estimation, is 40 to   45%.   2. Mildly to moderately decreased global left ventricular systolic   function.   3. Mildly to moderately decreased segmental left ventricular systolic   function.   4. Basal and mid anterolateral wall and basal and mid inferolateral wall   are abnormal as described above.   5. Moderately increased LV wall thickness.   6. There is moderate concentric left ventricular hypertrophy.   7. Moderately enlarged left atrium.   8. Normal right atrial size.   9. Trivial pericardial effusion.  10. Mild thickening of the anterior and posterior mitral valve leaflets.  11. Moderate mitral annular calcification.  12. Trace mitral valve regurgitation.  13. Aortic valve is Poorly visualized.  14. Mild to moderate aortic regurgitation.  15. Severe aortic valve stenosis (Low flow-Low gradient severe aortic   stenosis).        Assessement   76 y/o male PMHx AF on Eliquis (s/p ablation on 6/2023), moderate-severe AS, Chronic Diastolic Heart Failure, CAD (Patent prox and mid LAD stents, Moderate stenosis of the distal LCx. The pRCA  with brisk right-to-right collaterals)., HTN, HLD, DM 2, Morbid Obesity, ALLIE, Thoracic Aortic Aneurysm, BPH s/p TURP, Left Renal Mass, On last admission patient underwent CardioMeMs placement, Pt now presents from home (recently moved out of assisted living to live in his son's basement apartment), with palpitations and SOB.  He was given IV lasix and admitted for acute on chronic diastolic heart failure. Cardiology consultation requested for evaluation of acute decompensation of heart failure.    -Tele with AFIB vs Flutter and now SR with long 1st degree HB, ?higher degree HB.  Now s/p dual chamber PPM 10/5/2023 with Dr Amato  -Stress test with no clear evidence of ischemia 9/27/2023  -LHC 1/2023 with 60% LCx stenosis and 100% RCA occlusion with collateral flow   -S/p TAVR, percutaneous 06-Oct-2023  Percutaneous Transfemoral TAVR via  Common Femoral Artery (29mm Christiano Resilia) Commercial 29mm Childers Christiano Resilia Percutaneous Transfemoral TAVR via Right Common Femoral Artery.  -Had post op Hypovolemia and respiratory insufficiency now improved       Plan: (TO BE SEEN)   1.   2. Continue current CV medications.   3. Restart A/C once acceptable bleeding risk per CTS  4.         Chong Carranza MD ANIA CRISTOBAL  [id number]        Chief Complaint: follow up HFpEF/AS/PAF/CAD  s/p TAVR 10/6/2023 and PPM 10/5/2023      Subjective: no cp/sob/palps      24 hour Tele: v-paced        acetaminophen     Tablet .. 325 milliGRAM(s) Oral every 4 hours PRN  albuterol/ipratropium for Nebulization 3 milliLiter(s) Nebulizer every 6 hours PRN  aspirin enteric coated 81 milliGRAM(s) Oral daily  atorvastatin 10 milliGRAM(s) Oral at bedtime  chlorhexidine 4% Liquid 1 Application(s) Topical two times a day  dextrose 5%. 1000 milliLiter(s) IV Continuous <Continuous>  dextrose 5%. 1000 milliLiter(s) IV Continuous <Continuous>  dextrose 50% Injectable 25 Gram(s) IV Push once  dextrose 50% Injectable 12.5 Gram(s) IV Push once  dextrose 50% Injectable 25 Gram(s) IV Push once  dextrose Oral Gel 15 Gram(s) Oral once PRN  doxazosin 2 milliGRAM(s) Oral daily  ergocalciferol 99760 Unit(s) Oral every week  finasteride 5 milliGRAM(s) Oral daily  fluticasone propionate 50 MICROgram(s)/spray Nasal Spray 1 Spray(s) Both Nostrils two times a day PRN  glucagon  Injectable 1 milliGRAM(s) IntraMuscular once  insulin glargine Injectable (LANTUS) 5 Unit(s) SubCutaneous at bedtime  insulin lispro (ADMELOG) corrective regimen sliding scale   SubCutaneous three times a day before meals  nystatin Ointment 1 Application(s) Topical every 12 hours  pantoprazole    Tablet 40 milliGRAM(s) Oral before breakfast  senna 2 Tablet(s) Oral at bedtime  sodium chloride 0.9%. 1000 milliLiter(s) IV Continuous <Continuous>  sodium chloride 0.9%. 1000 milliLiter(s) IV Continuous <Continuous>  sucralfate 1 Gram(s) Oral two times a day  vancomycin  IVPB 1500 milliGRAM(s) IV Intermittent every 12 hours          Physical Exam:  T(C): 37.1 (10-07-23 @ 04:00), Max: 37.1 (10-07-23 @ 04:00)  HR: 79 (10-07-23 @ 07:00) (59 - 87)  BP: 112/66 (10-07-23 @ 07:00) (100/60 - 126/64)  RR: 18 (10-07-23 @ 07:00) (12 - 32)  SpO2: 97% (10-07-23 @ 07:00) (91% - 98%)  Wt(kg): --  General: Comfortable in NAD  HEENT: MMM, sclera anicteric  Resp: coarse bilaterally   CVS: nl s1s2, rrr, no s3  Abd: soft, NT, ND, BS+  Ext: 1+ edema at ankles  Neuro A&O x3  Psych: Normal affect    I&O's Summary    06 Oct 2023 07:01  -  07 Oct 2023 07:00  --------------------------------------------------------  IN: 985 mL / OUT: 500 mL / NET: 485 mL          Labs:   07 Oct 2023 01:57    137    |  99     |  19.6   ----------------------------<  192    3.7     |  27.0   |  0.58     Ca    8.1        07 Oct 2023 01:57  Mg     2.1       07 Oct 2023 01:57    TPro  6.0    /  Alb  3.4    /  TBili  0.6    /  DBili  x      /  AST  10     /  ALT  12     /  AlkPhos  232    07 Oct 2023 01:57                          11.0   9.09  )-----------( 199      ( 07 Oct 2023 01:57 )             34.6     PT/INR - ( 07 Oct 2023 01:57 )   PT: 14.4 sec;   INR: 1.31 ratio         PTT - ( 07 Oct 2023 01:57 )  PTT:35.5 sec  CARDIAC MARKERS ( 07 Oct 2023 01:57 )  x     / 0.16 ng/mL / 37 U/L / x     / x      CARDIAC MARKERS ( 06 Oct 2023 18:40 )  x     / 0.27 ng/mL / 45 U/L / x     / x            Echo 6/20/23  Summary:   1. LV Ejection Fraction by Oleary's Method with a biplane EF of 50 %.   2. Normal global left ventricular systolic function.   3. Trace mitral valve regurgitation.   4. Mild aortic regurgitation.   5. Endocardial visualization was enhanced with intravenous echo contrast.   6. Compared to TTE dated 6/11/23 the LVEF has improved.    STRESS TEST:    CATHETERIZATION:  [facility] 1/2023:  Coronary Angiography   - Left dominant system   - Patent prox and mid LAD stents   - Moderate stenosis of the distal LCx   - The RCA is a small, non-dominat vessel.  The proximal segment is occluded with brisk right-to-right collaterals.  - 25mm LV-Ao gradient on pullback.  CIELO 1.4cm2 with moderate AS byecho.  - Moderately reduced LVEF by echo           Pharmacological stress test 9/27/23  * Myocardial Perfusion SPECT results are mildly abnormal. * There is a small, moderate defect in the distal inferior and inferoapical wall that is fixed, consistent with infarct. Basal inferior wall defect paradoxically improved during stress imaging. Prone imaging not available. No clear evidence of ischemia. No TID (ratio 1.23).  * Post-stress resting myocardial perfusion gated SPECT imaging was performed (LVEF = 48 %;LVEDV = 176 ml.), mild hypokinesis of the distal inferior wall.    TTE 9/27/23  Summary:   1. Left ventricular ejection fraction, by visual estimation, is 40 to   45%.   2. Mildly to moderately decreased global left ventricular systolic   function.   3. Mildly to moderately decreased segmental left ventricular systolic   function.   4. Basal and mid anterolateral wall and basal and mid inferolateral wall   are abnormal as described above.   5. Moderately increased LV wall thickness.   6. There is moderate concentric left ventricular hypertrophy.   7. Moderately enlarged left atrium.   8. Normal right atrial size.   9. Trivial pericardial effusion.  10. Mild thickening of the anterior and posterior mitral valve leaflets.  11. Moderate mitral annular calcification.  12. Trace mitral valve regurgitation.  13. Aortic valve is Poorly visualized.  14. Mild to moderate aortic regurgitation.  15. Severe aortic valve stenosis (Low flow-Low gradient severe aortic   stenosis).        Assessement   76 y/o male PMHx AF on Eliquis (s/p ablation on 6/2023), moderate-severe AS, Chronic Diastolic Heart Failure, CAD (Patent prox and mid LAD stents, Moderate stenosis of the distal LCx. The pRCA  with brisk right-to-right collaterals)., HTN, HLD, DM 2, Morbid Obesity, ALLIE, Thoracic Aortic Aneurysm, BPH s/p TURP, Left Renal Mass, On last admission patient underwent CardioMeMs placement, Pt now presents from home (recently moved out of assisted living to live in his son's basement apartment), with palpitations and SOB.  He was given IV lasix and admitted for acute on chronic diastolic heart failure. Cardiology consultation requested for evaluation of acute decompensation of heart failure.    -Tele with AFIB vs Flutter and now SR with long 1st degree HB, ?higher degree HB.  Now s/p dual chamber PPM 10/5/2023 with Dr Amato  -Stress test with no clear evidence of ischemia 9/27/2023  -LHC 1/2023 with 60% LCx stenosis and 100% RCA occlusion with collateral flow   -S/p TAVR, percutaneous 06-Oct-2023  Percutaneous Transfemoral TAVR via  Common Femoral Artery (29mm Christiano Resilia) Commercial 29mm Childers Christiano Resilia Percutaneous Transfemoral TAVR via Right Common Femoral Artery.  -Had post op Hypovolemia and respiratory insufficiency now improved and appears mildly volume overloaded, will benefit fro diuresis      Plan:   1. Agree with IV lasix today, monitor renal function/lytes   2. Continue current CV medications.   3. Restart A/C once acceptable bleeding risk per CTS/EP  4. Ultimately restart his ARB as well         Chong Carranza MD

## 2023-10-07 NOTE — PROGRESS NOTE ADULT - PROBLEM SELECTOR PLAN 1
Severe AS on last admission. Now admitted with acute on chronic diastolic heart failure.  Preop work up: CT max/facial with periapical lucencies per report.  CT scan reviewed by Dr. Morin at [facility] dental on 9/28 no need for transfer for evalution at this time, pt instructed to f/u as outpt for comprehensive dental exam.   NST 9/27 with sm-mod defect, no ishemia, reviewed with cardiology, no further work up needed (last [facility] in January 2023 that showed 60% dcx disease and 100% pRCA with collateral circulation.  TTE  with Severe low flow low gradient AS.   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 Napier on 10/6/23.  Post procedure TTE completed pending read

## 2023-10-08 LAB
ANION GAP SERPL CALC-SCNC: 12 MMOL/L — SIGNIFICANT CHANGE UP (ref 5–17)
BUN SERPL-MCNC: 21.7 MG/DL — HIGH (ref 8–20)
CALCIUM SERPL-MCNC: 8.5 MG/DL — SIGNIFICANT CHANGE UP (ref 8.4–10.5)
CHLORIDE SERPL-SCNC: 101 MMOL/L — SIGNIFICANT CHANGE UP (ref 96–108)
CO2 SERPL-SCNC: 26 MMOL/L — SIGNIFICANT CHANGE UP (ref 22–29)
CREAT SERPL-MCNC: 0.67 MG/DL — SIGNIFICANT CHANGE UP (ref 0.5–1.3)
EGFR: 96 ML/MIN/1.73M2 — SIGNIFICANT CHANGE UP
GLUCOSE BLDC GLUCOMTR-MCNC: 142 MG/DL — HIGH (ref 70–99)
GLUCOSE BLDC GLUCOMTR-MCNC: 199 MG/DL — HIGH (ref 70–99)
GLUCOSE BLDC GLUCOMTR-MCNC: 205 MG/DL — HIGH (ref 70–99)
GLUCOSE SERPL-MCNC: 144 MG/DL — HIGH (ref 70–99)
HCT VFR BLD CALC: 34.5 % — LOW (ref 39–50)
HGB BLD-MCNC: 11 G/DL — LOW (ref 13–17)
MAGNESIUM SERPL-MCNC: 1.9 MG/DL — SIGNIFICANT CHANGE UP (ref 1.6–2.6)
MCHC RBC-ENTMCNC: 29.7 PG — SIGNIFICANT CHANGE UP (ref 27–34)
MCHC RBC-ENTMCNC: 31.9 GM/DL — LOW (ref 32–36)
MCV RBC AUTO: 93.2 FL — SIGNIFICANT CHANGE UP (ref 80–100)
PLATELET # BLD AUTO: 189 K/UL — SIGNIFICANT CHANGE UP (ref 150–400)
POTASSIUM SERPL-MCNC: 3.8 MMOL/L — SIGNIFICANT CHANGE UP (ref 3.5–5.3)
POTASSIUM SERPL-SCNC: 3.8 MMOL/L — SIGNIFICANT CHANGE UP (ref 3.5–5.3)
RBC # BLD: 3.7 M/UL — LOW (ref 4.2–5.8)
RBC # FLD: 14.7 % — HIGH (ref 10.3–14.5)
SODIUM SERPL-SCNC: 139 MMOL/L — SIGNIFICANT CHANGE UP (ref 135–145)
WBC # BLD: 7.41 K/UL — SIGNIFICANT CHANGE UP (ref 3.8–10.5)
WBC # FLD AUTO: 7.41 K/UL — SIGNIFICANT CHANGE UP (ref 3.8–10.5)

## 2023-10-08 PROCEDURE — 99233 SBSQ HOSP IP/OBS HIGH 50: CPT

## 2023-10-08 PROCEDURE — 71045 X-RAY EXAM CHEST 1 VIEW: CPT | Mod: 26

## 2023-10-08 PROCEDURE — 99232 SBSQ HOSP IP/OBS MODERATE 35: CPT

## 2023-10-08 RX ORDER — APIXABAN 2.5 MG/1
5 TABLET, FILM COATED ORAL EVERY 12 HOURS
Refills: 0 | Status: DISCONTINUED | OUTPATIENT
Start: 2023-10-09 | End: 2023-10-09

## 2023-10-08 RX ORDER — POTASSIUM CHLORIDE 20 MEQ
20 PACKET (EA) ORAL DAILY
Refills: 0 | Status: DISCONTINUED | OUTPATIENT
Start: 2023-10-09 | End: 2023-10-09

## 2023-10-08 RX ORDER — METOPROLOL TARTRATE 50 MG
50 TABLET ORAL
Refills: 0 | Status: DISCONTINUED | OUTPATIENT
Start: 2023-10-08 | End: 2023-10-09

## 2023-10-08 RX ORDER — MAGNESIUM OXIDE 400 MG ORAL TABLET 241.3 MG
400 TABLET ORAL ONCE
Refills: 0 | Status: COMPLETED | OUTPATIENT
Start: 2023-10-08 | End: 2023-10-09

## 2023-10-08 RX ORDER — ALPRAZOLAM 0.25 MG
0.25 TABLET ORAL ONCE
Refills: 0 | Status: DISCONTINUED | OUTPATIENT
Start: 2023-10-08 | End: 2023-10-08

## 2023-10-08 RX ORDER — FUROSEMIDE 40 MG
40 TABLET ORAL DAILY
Refills: 0 | Status: DISCONTINUED | OUTPATIENT
Start: 2023-10-08 | End: 2023-10-09

## 2023-10-08 RX ORDER — SODIUM CHLORIDE 9 MG/ML
3 INJECTION INTRAMUSCULAR; INTRAVENOUS; SUBCUTANEOUS EVERY 8 HOURS
Refills: 0 | Status: DISCONTINUED | OUTPATIENT
Start: 2023-10-08 | End: 2023-10-09

## 2023-10-08 RX ORDER — METFORMIN HYDROCHLORIDE 850 MG/1
500 TABLET ORAL THREE TIMES A DAY
Refills: 0 | Status: DISCONTINUED | OUTPATIENT
Start: 2023-10-08 | End: 2023-10-09

## 2023-10-08 RX ORDER — POTASSIUM CHLORIDE 20 MEQ
40 PACKET (EA) ORAL ONCE
Refills: 0 | Status: COMPLETED | OUTPATIENT
Start: 2023-10-08 | End: 2023-10-08

## 2023-10-08 RX ADMIN — FINASTERIDE 5 MILLIGRAM(S): 5 TABLET, FILM COATED ORAL at 11:49

## 2023-10-08 RX ADMIN — ATORVASTATIN CALCIUM 10 MILLIGRAM(S): 80 TABLET, FILM COATED ORAL at 22:07

## 2023-10-08 RX ADMIN — PANTOPRAZOLE SODIUM 40 MILLIGRAM(S): 20 TABLET, DELAYED RELEASE ORAL at 06:52

## 2023-10-08 RX ADMIN — SODIUM CHLORIDE 3 MILLILITER(S): 9 INJECTION INTRAMUSCULAR; INTRAVENOUS; SUBCUTANEOUS at 22:07

## 2023-10-08 RX ADMIN — METFORMIN HYDROCHLORIDE 500 MILLIGRAM(S): 850 TABLET ORAL at 05:44

## 2023-10-08 RX ADMIN — Medication 1 GRAM(S): at 22:07

## 2023-10-08 RX ADMIN — Medication 81 MILLIGRAM(S): at 11:49

## 2023-10-08 RX ADMIN — Medication 0.25 MILLIGRAM(S): at 23:28

## 2023-10-08 RX ADMIN — METFORMIN HYDROCHLORIDE 500 MILLIGRAM(S): 850 TABLET ORAL at 22:07

## 2023-10-08 RX ADMIN — METFORMIN HYDROCHLORIDE 500 MILLIGRAM(S): 850 TABLET ORAL at 13:11

## 2023-10-08 RX ADMIN — Medication 1 GRAM(S): at 05:44

## 2023-10-08 RX ADMIN — Medication 4: at 11:47

## 2023-10-08 RX ADMIN — Medication 0: at 08:26

## 2023-10-08 RX ADMIN — Medication 50 MILLIGRAM(S): at 17:47

## 2023-10-08 RX ADMIN — NYSTATIN CREAM 1 APPLICATION(S): 100000 CREAM TOPICAL at 05:44

## 2023-10-08 RX ADMIN — Medication 40 MILLIEQUIVALENT(S): at 06:52

## 2023-10-08 RX ADMIN — Medication 2 MILLIGRAM(S): at 11:48

## 2023-10-08 RX ADMIN — Medication 40 MILLIGRAM(S): at 05:44

## 2023-10-08 RX ADMIN — Medication 2: at 15:40

## 2023-10-08 RX ADMIN — Medication 300 MILLIGRAM(S): at 05:43

## 2023-10-08 RX ADMIN — Medication 50 MILLIGRAM(S): at 05:44

## 2023-10-08 RX ADMIN — NYSTATIN CREAM 1 APPLICATION(S): 100000 CREAM TOPICAL at 17:46

## 2023-10-08 NOTE — DISCHARGE NOTE PROVIDER - PROVIDER TOKENS
PROVIDER:[TOKEN:[[id number]],FOLLOWUP:[2 weeks],ESTABLISHEDPATIENT:[T]],PROVIDER:[TOKEN:[[id number]],FOLLOWUP:[2 weeks],ESTABLISHEDPATIENT:[T]],PROVIDER:[TOKEN:[[id number]],FOLLOWUP:[2 weeks],ESTABLISHEDPATIENT:[T]] PROVIDER:[TOKEN:[[id number]],FOLLOWUP:[2 weeks],ESTABLISHEDPATIENT:[T]],PROVIDER:[TOKEN:[[id number]],FOLLOWUP:[2 weeks],ESTABLISHEDPATIENT:[T]],PROVIDER:[TOKEN:[[id number]]]

## 2023-10-08 NOTE — PROGRESS NOTE ADULT - PROBLEM SELECTOR PLAN 3
pAF/flutter with occasional bradycardia episodes (asymptomatic)  High risk for CHB post-TAVR  s/p dual chamber [employer]onic PPM 10/5/23 with Dr. Amato  pressure dressing intact  Cleared to restart Eliquis from EP standpoint - restart Monday 10/9 per Dr. Napier

## 2023-10-08 NOTE — PROGRESS NOTE ADULT - SUBJECTIVE AND OBJECTIVE BOX
Brief summary:  77M PMH Sev AS, Chronic Diastolic CHF, CAD s/p PCI to p&mLAD (?), Thoracic Aortic Aneurysm, Paroxysmal Afib s/p Ablation (on Eliquis), LLE DVT, Home O2 (prn), ALLIE, BPH s/p TURP, Left Renal Mass, Morbid Obesity, DM (A1C 7.2), GERD, Cholelithiasis, Laminectomy, Anxiety (multiple CHF admissions in 2023, last 6/10-6/23/23), underwent percutaneous transfemoral TAVR via FA (hospitals) with Dr. Napier on 10/6/23.    Overnight events:  Patient denies acute pain with radiating or aggravating factors.  He denies chest pain, shortness of breath, palpitations, headache, dizziness, nausea, or vomiting.       PAST MEDICAL & SURGICAL HISTORY:  Anxiety      Hypertension      Hyperlipidemia      Prostate disease      Gastroesophageal reflux disease      Gallbladder stone without cholecystitis or obstruction      BPH (benign prostatic hyperplasia)      DM (diabetes mellitus)      DVT (deep venous thrombosis)  left leg      Afib      Anxiety      HLD (hyperlipidemia)      Malignant schwannoma      H/O arthroscopy of knee      S/P laminectomy      S/P coronary artery stent placement  x2          Medications:  acetaminophen     Tablet .. 975 milliGRAM(s) Oral every 6 hours PRN  albuterol/ipratropium for Nebulization 3 milliLiter(s) Nebulizer every 6 hours PRN  aspirin enteric coated 81 milliGRAM(s) Oral daily  atorvastatin 10 milliGRAM(s) Oral at bedtime  chlorhexidine 4% Liquid 1 Application(s) Topical two times a day  dextrose 5%. 1000 milliLiter(s) IV Continuous <Continuous>  dextrose 5%. 1000 milliLiter(s) IV Continuous <Continuous>  dextrose 50% Injectable 25 Gram(s) IV Push once  dextrose 50% Injectable 12.5 Gram(s) IV Push once  dextrose 50% Injectable 25 Gram(s) IV Push once  dextrose Oral Gel 15 Gram(s) Oral once PRN  doxazosin 2 milliGRAM(s) Oral daily  ergocalciferol 17884 Unit(s) Oral every week  finasteride 5 milliGRAM(s) Oral daily  fluticasone propionate 50 MICROgram(s)/spray Nasal Spray 1 Spray(s) Both Nostrils two times a day PRN  glucagon  Injectable 1 milliGRAM(s) IntraMuscular once  insulin glargine Injectable (LANTUS) 5 Unit(s) SubCutaneous at bedtime  insulin lispro (ADMELOG) corrective regimen sliding scale   SubCutaneous three times a day before meals  nystatin Ointment 1 Application(s) Topical every 12 hours  pantoprazole    Tablet 40 milliGRAM(s) Oral before breakfast  senna 2 Tablet(s) Oral at bedtime  sodium chloride 0.9%. 1000 milliLiter(s) IV Continuous <Continuous>  sodium chloride 0.9%. 1000 milliLiter(s) IV Continuous <Continuous>  sucralfate 1 Gram(s) Oral two times a day  vancomycin  IVPB 1500 milliGRAM(s) IV Intermittent every 12 hours      MEDICATIONS  (PRN):  acetaminophen     Tablet .. 975 milliGRAM(s) Oral every 6 hours PRN Temp greater or equal to 38C (100.4F), Moderate Pain (4 - 6), Severe Pain (7 - 10)  albuterol/ipratropium for Nebulization 3 milliLiter(s) Nebulizer every 6 hours PRN Shortness of Breath and/or Wheezing  dextrose Oral Gel 15 Gram(s) Oral once PRN Blood Glucose LESS THAN 70 milliGRAM(s)/deciliter  fluticasone propionate 50 MICROgram(s)/spray Nasal Spray 1 Spray(s) Both Nostrils two times a day PRN nasal congestion      Daily Review:        ABG - ( 06 Oct 2023 18:35 )  pH, Arterial: 7.390 pH, Blood: x     /  pCO2: 47    /  pO2: 68    / HCO3: 28    / Base Excess: 3.5   /  SaO2: 94.4                            11.0   9.09  )-----------( 199      ( 07 Oct 2023 01:57 )             34.6   10-07    137  |  99  |  19.6  ----------------------------<  192<H>  3.7   |  27.0  |  0.58    Ca    8.1<L>      07 Oct 2023 01:57  Mg     2.1     10-07    TPro  6.0<L>  /  Alb  3.4  /  TBili  0.6  /  DBili  x   /  AST  10  /  ALT  12  /  AlkPhos  232<H>  10-07    CARDIAC MARKERS ( 07 Oct 2023 01:57 )  x     / 0.16 ng/mL / 37 U/L / x     / x      CARDIAC MARKERS ( 06 Oct 2023 18:40 )  x     / 0.27 ng/mL / 45 U/L / x     / x        PT/INR - ( 07 Oct 2023 01:57 )   PT: 14.4 sec;   INR: 1.31 ratio         PTT - ( 07 Oct 2023 01:57 )  PTT:35.5 sec    T(C): 37.3 (10-08-23 @ 00:00), Max: 37.3 (10-08-23 @ 00:00)  HR: 95 (10-08-23 @ 00:00) (78 - 105)  BP: 105/58 (10-08-23 @ 00:00) (105/58 - 137/79)  RR: 28 (10-08-23 @ 00:00) (12 - 29)  SpO2: 92% (10-08-23 @ 00:00) (90% - 98%)  Wt(kg): --    CAPILLARY BLOOD GLUCOSE      POCT Blood Glucose.: 171 mg/dL (07 Oct 2023 21:20)  POCT Blood Glucose.: 221 mg/dL (07 Oct 2023 16:23)  POCT Blood Glucose.: 264 mg/dL (07 Oct 2023 11:22)  POCT Blood Glucose.: 146 mg/dL (07 Oct 2023 08:20)      I&O's Summary    06 Oct 2023 07:01  -  07 Oct 2023 07:00  --------------------------------------------------------  IN: 985 mL / OUT: 500 mL / NET: 485 mL    07 Oct 2023 07:01  -  08 Oct 2023 00:54  --------------------------------------------------------  IN: 480 mL / OUT: 1400 mL / NET: -920 mL        Physical Exam  Neuro: A+O x 3, non-focal, speech clear and intact  Pulm: coarse breath sounds bilaterally, no wheezing or rales  CV: RRR, systolic ejection murmur, +S1S2  Abd: soft, NT, ND  Ext: +DP Pulses b/l, +PT pulses, no edema  Inc: b/l groins soft without hematoma           132

## 2023-10-08 NOTE — DISCHARGE NOTE PROVIDER - NSDCFUSCHEDAPPT_GEN_ALL_CORE_FT
Juan Napier  [facility]  [facility] [address]  Scheduled Appointment: 10/20/2023

## 2023-10-08 NOTE — DISCHARGE NOTE PROVIDER - NSDCMRMEDTOKEN_GEN_ALL_CORE_FT
Albuterol (Eqv-ProAir HFA) 90 mcg/inh inhalation aerosol: 2 puff(s) inhaled every 6 hours as needed for  shortness of breath and/or wheezing  ALPRAZolam 0.25 mg oral tablet: 1 tab(s) orally 3 times a day As needed Anxiety  apixaban 5 mg oral tablet: 1 tab(s) orally every 12 hours  aspirin 81 mg oral tablet: 1 orally once a day  atorvastatin 10 mg oral tablet: 1 tab(s) orally once a day (at bedtime)  doxazosin 2 mg oral tablet: 1 tab(s) orally once a day  finasteride 5 mg oral tablet: 1 tab(s) orally once a day  furosemide 40 mg oral tablet: 1 tab(s) orally once a day  ipratropium-albuterol 0.5 mg-2.5 mg/3 mL inhalation solution: 3 milliliter(s) inhaled every 6 hours  Lasix 40 mg oral tablet: 1 tab(s) orally once a day  losartan 25 mg oral tablet: 1 tab(s) orally once a day  meclizine 25 mg oral tablet: 1 tab(s) orally 2 times a day  metFORMIN 500 mg oral tablet: 1 tab(s) orally 3 times a day  metoprolol tartrate 50 mg oral tablet: 1 tab(s) orally 2 times a day  omeprazole 40 mg oral delayed release capsule: 1 orally once a day  Potassium Chloride (Eqv-Klor-Con M20) 20 mEq oral tablet, extended release: 1 tab(s) orally once a day  senna leaf extract oral tablet: 2 tab(s) orally once a day (at bedtime)  sucralfate 1 g oral tablet: 1 tab(s) orally 2 times a day   acetaminophen 325 mg oral tablet: 3 tab(s) orally every 6 hours As needed Temp greater or equal to 38C (100.4F), Moderate Pain (4 - 6), Severe Pain (7 - 10)  Albuterol (Eqv-ProAir HFA) 90 mcg/inh inhalation aerosol: 2 puff(s) inhaled every 6 hours as needed for  shortness of breath and/or wheezing  ALPRAZolam 0.25 mg oral tablet: 1 tab(s) orally 3 times a day As needed Anxiety  apixaban 5 mg oral tablet: 1 tab(s) orally every 12 hours  aspirin 81 mg oral tablet: 1 tab(s) orally once a day  atorvastatin 10 mg oral tablet: 1 tab(s) orally once a day (at bedtime)  doxazosin 2 mg oral tablet: 1 tab(s) orally once a day  doxycycline monohydrate 50 mg oral capsule: 2 cap(s) orally every 12 hours  ergocalciferol 1.25 mg (50,000 intl units) oral capsule: 1 cap(s) orally once a week  finasteride 5 mg oral tablet: 1 tab(s) orally once a day  furosemide 40 mg oral tablet: 1 tab(s) orally once a day  ipratropium-albuterol 0.5 mg-2.5 mg/3 mL inhalation solution: 3 milliliter(s) inhaled every 6 hours  metFORMIN 500 mg oral tablet: 1 tab(s) orally 3 times a day  metoprolol tartrate 50 mg oral tablet: 1 tab(s) orally 2 times a day  omeprazole 40 mg oral delayed release capsule: 1 orally once a day  potassium chloride 20 mEq oral tablet, extended release: 1 tab(s) orally once a day  senna leaf extract oral tablet: 2 tab(s) orally once a day (at bedtime)  sucralfate 1 g oral tablet: 1 tab(s) orally 2 times a day

## 2023-10-08 NOTE — DISCHARGE NOTE PROVIDER - NSDCFUADDINST_GEN_ALL_CORE_FT
Please call the Cardiothoracic Surgery office at [phone] if you are experiencing any shortness of breath, chest pain, fevers or chills, drainage from the incisions, persistent nausea, vomiting or if you have any questions about your medications. If the symptoms are severe, call 911 and go to the nearest hospital.

## 2023-10-08 NOTE — DISCHARGE NOTE PROVIDER - CARE PROVIDER_API CALL
Karthikeyan Valverde, Juan Corrigan  Thoracic and Cardiac Surgery  [address] [place] [address]  Phone: [phone]  Fax: [phone]  Established Patient  Follow Up Time: 2 weeks    Adalberto Sy  Cardiology  [address] [place] [address]  Phone: [phone]  Fax: [phone]  Established Patient  Follow Up Time: 2 weeks    Angel Vargas  Cardiology  [address] [place] [id number]  Phone: [phone]  Fax: [phone]  Established Patient  Follow Up Time: 2 weeks   Karthikeyan Valverde, Juan Corrigan  Thoracic and Cardiac Surgery  [address] [place] [address]  Phone: [phone]  Fax: [phone]  Established Patient  Follow Up Time: 2 weeks    Adalberto Sy  Cardiology  [address] [facility] [address]  [place] [address]  Phone: [phone]  Fax: [phone]  Established Patient  Follow Up Time: 2 weeks    Demar Amato  Cardiac Electrophysiology  [address] [facility], [address]  [place] [address]  Phone: [phone]  Fax: [phone]  Follow Up Time:

## 2023-10-08 NOTE — DISCHARGE NOTE PROVIDER - NSDCFUADDAPPT_GEN_ALL_CORE_FT
Please follow up with Dr. Napier by calling the CT Surgery office at [phone] on the next open business day to make an appointment.   The cardiac surgery office is located on the first floor of [facility] at [address] [place], [place]. Please enter through the lobby. A [facility] employee will then direct you where to go.   --  Your Care Navigator Nurse Practitioner will be in touch to see you in your home within a few days from discharge. The Follow Your Heart program can help ensure you understand your medications, discharge instructions and answer any questions you may have at that time. They are also a great source to address concerns during the day and may be reached at [phone].   --  PCP- Appointment made with  Dr. Richardson,  at   [address] [facility]  on  10/19  at 10:00 If you are unable to attend your pre-scheduled appointment, please contact the office directly at [phone] to reschedule.  --  Cardio- Appointment made with Dr. Mckinney  [address] [place] [address] at 10/20  on  9:30. If you are unable to attend your pre-scheduled appointment, please contact the office directly at [phone]  to reschedule.         Follow up with Dr. Napier on Friday 10/20 @1PM.  The cardiac surgery office is located on the first floor of [facility] at [address] [facility], [place], [place]. Please enter through the lobby. A [facility] employee will then direct you where to go.   Please be ~ 30 mins early on the day of your appointment to have a chest xray (script is in your folder) taken on the first floor in radiology PRIOR to going to the CT surgery office.   --  Your Care Navigator Nurse Practitioner will be in touch to see you in your home within a few days from discharge. The Follow Your Heart program can help ensure you understand your medications, discharge instructions and answer any questions you may have at that time. They are also a great source to address concerns during the day and may be reached at [phone].   --  PCP- Appointment made with  Dr. Richardson,  at   [address] [facility]  on  10/19  at 10:00 If you are unable to attend your pre-scheduled appointment, please contact the office directly at [phone] to reschedule.  --  Cardio- Appointment made with Dr. Mckinney  [address] [facility]  [place] at 10/20  on  9:30. If you are unable to attend your pre-scheduled appointment, please contact the office directly at [phone]  to reschedule.         Follow up with Dr. Napier on Friday 10/20 @1PM.  The cardiac surgery office is located on the first floor of [facility] at [address] [facility], [place], [place]. Please enter through the lobby. A [facility] employee will then direct you where to go.   Please be ~ 30 mins early on the day of your appointment to have a chest xray (script is in your folder) taken on the first floor in radiology PRIOR to going to the CT surgery office.   --  Your Care Navigator Nurse Practitioner will be in touch to see you in your home within a few days from discharge. The Follow Your Heart program can help ensure you understand your medications, discharge instructions and answer any questions you may have at that time. They are also a great source to address concerns during the day and may be reached at [phone].   --  PCP- Appointment made with  Dr. Richardson,  at   [address] [facility]  on  10/19  at 10:00 If you are unable to attend your pre-scheduled appointment, please contact the office directly at [phone] to reschedule.  --  Cardio- Appointment made with Dr. Mckinney  [address] [facility] [place] at 10/20  on  9:30. If you are unable to attend your pre-scheduled appointment, please contact the office directly at [phone]  to reschedule.    follow up with Dr. Amato (electrophysiology) in 1-2 weeks. call for an appointment.

## 2023-10-08 NOTE — PROGRESS NOTE ADULT - SUBJECTIVE AND OBJECTIVE BOX
ANIA CRISTOBAL  [id number]        Chief Complaint: follow up HFpEF/AS/PAF/CAD  s/p TAVR 10/6/2023 and PPM 10/5/2023      Subjective: no cp/sob/palps      24 hour Tele: v-paced        acetaminophen     Tablet .. 975 milliGRAM(s) Oral every 6 hours PRN  albuterol/ipratropium for Nebulization 3 milliLiter(s) Nebulizer every 6 hours PRN  aspirin enteric coated 81 milliGRAM(s) Oral daily  atorvastatin 10 milliGRAM(s) Oral at bedtime  dextrose 50% Injectable 25 Gram(s) IV Push once  dextrose 50% Injectable 12.5 Gram(s) IV Push once  dextrose 50% Injectable 25 Gram(s) IV Push once  doxazosin 2 milliGRAM(s) Oral daily  ergocalciferol 28786 Unit(s) Oral every week  finasteride 5 milliGRAM(s) Oral daily  fluticasone propionate 50 MICROgram(s)/spray Nasal Spray 1 Spray(s) Both Nostrils two times a day PRN  furosemide    Tablet 40 milliGRAM(s) Oral daily  insulin lispro (ADMELOG) corrective regimen sliding scale   SubCutaneous three times a day before meals  metFORMIN 500 milliGRAM(s) Oral three times a day  metoprolol tartrate 50 milliGRAM(s) Oral two times a day  nystatin Ointment 1 Application(s) Topical every 12 hours  pantoprazole    Tablet 40 milliGRAM(s) Oral before breakfast  senna 2 Tablet(s) Oral at bedtime  sucralfate 1 Gram(s) Oral two times a day          Physical Exam:  T(C): 36.9 (10-08-23 @ 08:00), Max: 37.3 (10-08-23 @ 00:00)  HR: 99 (10-08-23 @ 07:00) (89 - 105)  BP: 129/74 (10-08-23 @ 07:00) (105/57 - 137/79)  RR: 25 (10-08-23 @ 07:00) (20 - 30)  SpO2: 95% (10-08-23 @ 07:00) (90% - 97%)  Wt(kg): --  General: Comfortable in NAD  HEENT: MMM, sclera anicteric  Resp: coarse bilaterally   CVS: nl s1s2, rrr, no s3  Abd: soft, NT, ND, BS+  Ext: 1+ edema at ankles  Neuro A&O x3  Psych: Normal affect      I&O's Summary    07 Oct 2023 07:01  -  08 Oct 2023 07:00  --------------------------------------------------------  IN: 480 mL / OUT: 1900 mL / NET: -1420 mL          Labs:   08 Oct 2023 04:50    139    |  101    |  21.7   ----------------------------<  144    3.8     |  26.0   |  0.67     Ca    8.5        08 Oct 2023 04:50  Mg     1.9       08 Oct 2023 04:50    TPro  6.0    /  Alb  3.4    /  TBili  0.6    /  DBili  x      /  AST  10     /  ALT  12     /  AlkPhos  232    07 Oct 2023 01:57                          11.0   7.41  )-----------( 189      ( 08 Oct 2023 04:50 )             34.5     PT/INR - ( 07 Oct 2023 01:57 )   PT: 14.4 sec;   INR: 1.31 ratio         PTT - ( 07 Oct 2023 01:57 )  PTT:35.5 sec  CARDIAC MARKERS ( 07 Oct 2023 01:57 )  x     / 0.16 ng/mL / 37 U/L / x     / x      CARDIAC MARKERS ( 06 Oct 2023 18:40 )  x     / 0.27 ng/mL / 45 U/L / x     / x            Echo 6/20/23  Summary:   1. LV Ejection Fraction by Oleary's Method with a biplane EF of 50 %.   2. Normal global left ventricular systolic function.   3. Trace mitral valve regurgitation.   4. Mild aortic regurgitation.   5. Endocardial visualization was enhanced with intravenous echo contrast.   6. Compared to TTE dated 6/11/23 the LVEF has improved.    STRESS TEST:    CATHETERIZATION:  [facility] 1/2023:  Coronary Angiography   - Left dominant system   - Patent prox and mid LAD stents   - Moderate stenosis of the distal LCx   - The RCA is a small, non-dominat vessel.  The proximal segment is occluded with brisk right-to-right collaterals.  - 25mm LV-Ao gradient on pullback.  CIELO 1.4cm2 with moderate AS byecho.  - Moderately reduced LVEF by echo           Pharmacological stress test 9/27/23  * Myocardial Perfusion SPECT results are mildly abnormal. * There is a small, moderate defect in the distal inferior and inferoapical wall that is fixed, consistent with infarct. Basal inferior wall defect paradoxically improved during stress imaging. Prone imaging not available. No clear evidence of ischemia. No TID (ratio 1.23).  * Post-stress resting myocardial perfusion gated SPECT imaging was performed (LVEF = 48 %;LVEDV = 176 ml.), mild hypokinesis of the distal inferior wall.    TTE 9/27/23  Summary:   1. Left ventricular ejection fraction, by visual estimation, is 40 to   45%.   2. Mildly to moderately decreased global left ventricular systolic   function.   3. Mildly to moderately decreased segmental left ventricular systolic   function.   4. Basal and mid anterolateral wall and basal and mid inferolateral wall   are abnormal as described above.   5. Moderately increased LV wall thickness.   6. There is moderate concentric left ventricular hypertrophy.   7. Moderately enlarged left atrium.   8. Normal right atrial size.   9. Trivial pericardial effusion.  10. Mild thickening of the anterior and posterior mitral valve leaflets.  11. Moderate mitral annular calcification.  12. Trace mitral valve regurgitation.  13. Aortic valve is Poorly visualized.  14. Mild to moderate aortic regurgitation.  15. Severe aortic valve stenosis (Low flow-Low gradient severe aortic   stenosis).    ECHO 10/6/2023   1. Left ventricular ejection fraction, by visual estimation, is 50 to 55%.   2. Normal global left ventricular systolic function.   3. Abnormal septal motion consistent with RV pacemaker.   4. There is mild concentric left ventricular hypertrophy.   5. Normal right ventricular size and function, pacing wire present.   6. Estimated pulmonary artery systolic pressure is 49.6 mmHg assuming a right atrial pressure of 15 mmHg, which is consistent with mild pulmonary hypertension.   7. Moderate mitral annular calcification.   8. Trace mitral valve regurgitation. Transmitral mean gradient of 4 mmHg (at Hr of 79 bpm).   9. Mild tricuspid regurgitation.  10. Christiano TAVR in the aortic position with trace paravalvular regurgitation.  11. Lipomatous hypertrophy of the intra-atrial septum.  12. Peak aortic valve gradient is 13.0 mmHg and the mean gradient is 6.0   mmHg, which is probably normal in the setting of a prosthetic aortic   valve.      Assessement   76 y/o male PMHx AF on Eliquis (s/p ablation on 6/2023), moderate-severe AS, Chronic Diastolic Heart Failure, CAD (Patent prox and mid LAD stents, Moderate stenosis of the distal LCx. The pRCA  with brisk right-to-right collaterals)., HTN, HLD, DM 2, Morbid Obesity, ALLIE, Thoracic Aortic Aneurysm, BPH s/p TURP, Left Renal Mass, On last admission patient underwent CardioMeMs placement, Pt now presents from home (recently moved out of assisted living to live in his son's basement apartment), with palpitations and SOB.  He was given IV lasix and admitted for acute on chronic diastolic heart failure. Cardiology consultation requested for evaluation of acute decompensation of heart failure.    -Tele with AFIB vs Flutter and now SR with long 1st degree HB, ?higher degree HB.  Now s/p dual chamber PPM 10/5/2023 with Dr Amato  -Stress test with no clear evidence of ischemia 9/27/2023  -LHC 1/2023 with 60% LCx stenosis and 100% RCA occlusion with collateral flow   -S/p TAVR, percutaneous 06-Oct-2023  Percutaneous Transfemoral TAVR via  Common Femoral Artery (29mm Christiano Resilia) Commercial 29mm Childers Christiano Resilia Percutaneous Transfemoral TAVR via Right Common Femoral Artery.  -Had post op Hypovolemia and respiratory insufficiency now improved and appears mildly volume overloaded, will benefit fro diuresis which was started 10/7/2023  -echo post TAVR shows valve well seated with trivial AI     Plan: (TO BE SEEN)   1. Continue po lasix , got IV yesterday  2. Continue current CV medications.   3. Restart A/C once acceptable bleeding risk per CTS/EP, to be started tomorrow  4. Ultimately restart his ARB as well   5.   6.       Chong Carranza MD ANIA CRISTOBAL  [id number]        Chief Complaint: follow up HFpEF/AS/PAF/CAD  s/p TAVR 10/6/2023 and PPM 10/5/2023      Subjective: no cp, still mildly SOB and dizzy      24 hour Tele: SR,  v-paced        acetaminophen     Tablet .. 975 milliGRAM(s) Oral every 6 hours PRN  albuterol/ipratropium for Nebulization 3 milliLiter(s) Nebulizer every 6 hours PRN  aspirin enteric coated 81 milliGRAM(s) Oral daily  atorvastatin 10 milliGRAM(s) Oral at bedtime  dextrose 50% Injectable 25 Gram(s) IV Push once  dextrose 50% Injectable 12.5 Gram(s) IV Push once  dextrose 50% Injectable 25 Gram(s) IV Push once  doxazosin 2 milliGRAM(s) Oral daily  ergocalciferol 96684 Unit(s) Oral every week  finasteride 5 milliGRAM(s) Oral daily  fluticasone propionate 50 MICROgram(s)/spray Nasal Spray 1 Spray(s) Both Nostrils two times a day PRN  furosemide    Tablet 40 milliGRAM(s) Oral daily  insulin lispro (ADMELOG) corrective regimen sliding scale   SubCutaneous three times a day before meals  metFORMIN 500 milliGRAM(s) Oral three times a day  metoprolol tartrate 50 milliGRAM(s) Oral two times a day  nystatin Ointment 1 Application(s) Topical every 12 hours  pantoprazole    Tablet 40 milliGRAM(s) Oral before breakfast  senna 2 Tablet(s) Oral at bedtime  sucralfate 1 Gram(s) Oral two times a day          Physical Exam:  T(C): 36.9 (10-08-23 @ 08:00), Max: 37.3 (10-08-23 @ 00:00)  HR: 99 (10-08-23 @ 07:00) (89 - 105)  BP: 129/74 (10-08-23 @ 07:00) (105/57 - 137/79)  RR: 25 (10-08-23 @ 07:00) (20 - 30)  SpO2: 95% (10-08-23 @ 07:00) (90% - 97%)  Wt(kg): --  General: Comfortable in NAD  HEENT: MMM, sclera anicteric  Resp: coarse bilaterally   CVS: nl s1s2, rrr, no s3  Abd: soft, NT, ND, BS+  Ext: trace to 1+ edema at ankles  Neuro A&O x3  Psych: Normal affect      I&O's Summary    07 Oct 2023 07:01  -  08 Oct 2023 07:00  --------------------------------------------------------  IN: 480 mL / OUT: 1900 mL / NET: -1420 mL          Labs:   08 Oct 2023 04:50    139    |  101    |  21.7   ----------------------------<  144    3.8     |  26.0   |  0.67     Ca    8.5        08 Oct 2023 04:50  Mg     1.9       08 Oct 2023 04:50    TPro  6.0    /  Alb  3.4    /  TBili  0.6    /  DBili  x      /  AST  10     /  ALT  12     /  AlkPhos  232    07 Oct 2023 01:57                          11.0   7.41  )-----------( 189      ( 08 Oct 2023 04:50 )             34.5     PT/INR - ( 07 Oct 2023 01:57 )   PT: 14.4 sec;   INR: 1.31 ratio         PTT - ( 07 Oct 2023 01:57 )  PTT:35.5 sec  CARDIAC MARKERS ( 07 Oct 2023 01:57 )  x     / 0.16 ng/mL / 37 U/L / x     / x      CARDIAC MARKERS ( 06 Oct 2023 18:40 )  x     / 0.27 ng/mL / 45 U/L / x     / x            Echo 6/20/23  Summary:   1. LV Ejection Fraction by Oleary's Method with a biplane EF of 50 %.   2. Normal global left ventricular systolic function.   3. Trace mitral valve regurgitation.   4. Mild aortic regurgitation.   5. Endocardial visualization was enhanced with intravenous echo contrast.   6. Compared to TTE dated 6/11/23 the LVEF has improved.    STRESS TEST:    CATHETERIZATION:  [facility] 1/2023:  Coronary Angiography   - Left dominant system   - Patent prox and mid LAD stents   - Moderate stenosis of the distal LCx   - The RCA is a small, non-dominat vessel.  The proximal segment is occluded with brisk right-to-right collaterals.  - 25mm LV-Ao gradient on pullback.  CIELO 1.4cm2 with moderate AS byecho.  - Moderately reduced LVEF by echo           Pharmacological stress test 9/27/23  * Myocardial Perfusion SPECT results are mildly abnormal. * There is a small, moderate defect in the distal inferior and inferoapical wall that is fixed, consistent with infarct. Basal inferior wall defect paradoxically improved during stress imaging. Prone imaging not available. No clear evidence of ischemia. No TID (ratio 1.23).  * Post-stress resting myocardial perfusion gated SPECT imaging was performed (LVEF = 48 %;LVEDV = 176 ml.), mild hypokinesis of the distal inferior wall.    TTE 9/27/23  Summary:   1. Left ventricular ejection fraction, by visual estimation, is 40 to   45%.   2. Mildly to moderately decreased global left ventricular systolic   function.   3. Mildly to moderately decreased segmental left ventricular systolic   function.   4. Basal and mid anterolateral wall and basal and mid inferolateral wall   are abnormal as described above.   5. Moderately increased LV wall thickness.   6. There is moderate concentric left ventricular hypertrophy.   7. Moderately enlarged left atrium.   8. Normal right atrial size.   9. Trivial pericardial effusion.  10. Mild thickening of the anterior and posterior mitral valve leaflets.  11. Moderate mitral annular calcification.  12. Trace mitral valve regurgitation.  13. Aortic valve is Poorly visualized.  14. Mild to moderate aortic regurgitation.  15. Severe aortic valve stenosis (Low flow-Low gradient severe aortic   stenosis).    ECHO 10/6/2023   1. Left ventricular ejection fraction, by visual estimation, is 50 to 55%.   2. Normal global left ventricular systolic function.   3. Abnormal septal motion consistent with RV pacemaker.   4. There is mild concentric left ventricular hypertrophy.   5. Normal right ventricular size and function, pacing wire present.   6. Estimated pulmonary artery systolic pressure is 49.6 mmHg assuming a right atrial pressure of 15 mmHg, which is consistent with mild pulmonary hypertension.   7. Moderate mitral annular calcification.   8. Trace mitral valve regurgitation. Transmitral mean gradient of 4 mmHg (at Hr of 79 bpm).   9. Mild tricuspid regurgitation.  10. Christiano TAVR in the aortic position with trace paravalvular regurgitation.  11. Lipomatous hypertrophy of the intra-atrial septum.  12. Peak aortic valve gradient is 13.0 mmHg and the mean gradient is 6.0   mmHg, which is probably normal in the setting of a prosthetic aortic   valve.      Assessement   76 y/o male PMHx AF on Eliquis (s/p ablation on 6/2023), moderate-severe AS, Chronic Diastolic Heart Failure, CAD (Patent prox and mid LAD stents, Moderate stenosis of the distal LCx. The pRCA  with brisk right-to-right collaterals)., HTN, HLD, DM 2, Morbid Obesity, ALLIE, Thoracic Aortic Aneurysm, BPH s/p TURP, Left Renal Mass, On last admission patient underwent CardioMeMs placement, Pt now presents from home (recently moved out of assisted living to live in his son's basement apartment), with palpitations and SOB.  He was given IV lasix and admitted for acute on chronic diastolic heart failure. Cardiology consultation requested for evaluation of acute decompensation of heart failure.    -Tele with AFIB vs Flutter and now SR with long 1st degree HB, ?higher degree HB.  Now s/p dual chamber PPM 10/5/2023 with Dr Amato  -Stress test with no clear evidence of ischemia 9/27/2023  -LHC 1/2023 with 60% LCx stenosis and 100% RCA occlusion with collateral flow   -S/p TAVR, percutaneous 06-Oct-2023  Percutaneous Transfemoral TAVR via  Common Femoral Artery (29mm Christiano Resilia) Commercial 29mm Childers Christiano Resilia Percutaneous Transfemoral TAVR via Right Common Femoral Artery.  -Had post op Hypovolemia and respiratory insufficiency now improved and appears mildly volume overloaded, will benefit fro diuresis which was started 10/7/2023  -echo post TAVR shows valve well seated with trivial AI     Plan:   1. Continue po lasix , got IV yesterday. Appears fairly euvolemic  2. Continue current CV medications.   3. Restart A/C once acceptable bleeding risk per CTS/EP, to be started tomorrow  4. Ultimately restart his ARB as well   5. Ambulate, appears deconditioned   6. DC planning possibly early this week       Chong Carranza MD

## 2023-10-08 NOTE — DISCHARGE NOTE PROVIDER - NSDCCPTREATMENT_GEN_ALL_CORE_FT
PRINCIPAL PROCEDURE  Procedure: TAVR, percutaneous  Findings and Treatment: Percutaneous Transfemoral TAVR via Right Common Femoral Artery (29mm Christiano Resilia) (NCT# [id number]) (STS/ACC TVT Regustry patient ID# [id number])

## 2023-10-08 NOTE — DISCHARGE NOTE PROVIDER - HOSPITAL COURSE
77M PMH PMH Sev AS, Chronic Diastolic CHF, CAD s/p PCI to p&mLAD (?), Thoracic Aortic Aneurysm, Paroxysmal Afib s/p Ablation (on Eliquis), LLE DVT, Home O2 (prn), ALLIE, BPH s/p TURP, Left Renal Mass, Morbid Obesity, DM (A1C 7.2), GERD, Cholelithiasis, Laminectomy, Anxiety, MRCP w/ cirrhosis, multiple CHF admissions in 2023, last 6/10-6/23/23 during which he was found to have HFrEF and severe AS suspected 2/2 AF then s/p DCCV with EP and Cardiomems insertion.  During that admission, CT surgery was consulted for TAVR evaluation however anticoagulation was not able to be interrupted per EP.  Patient did not follow up and now presents 9/24 admitted for acute on chronic diastolic heart failure. He was medically optimized and underwent percutaneous transfemoral TAVR via RCFA (Christiano) with Dr. Napier on 10/6/23. Postop course significant acute on chronic diastolic heart failure for which he received IV diuresis and was then resumed on home dose lasix.    Per Dr. Napier, patient is stable for discharge with follow up.     Post Procedure TTE:  < from: TTE Echo Complete w/ Contrast w/ Doppler (10.06.23 @ 20:44) >    Summary:   1. Left ventricular ejection fraction, by visual estimation, is 50 to   55%.   2. Normal global left ventricular systolic function.   3. Abnormal septal motion consistent with RV pacemaker.   4. There is mild concentric left ventricular hypertrophy.   5. Normal right ventricular size and function, pacing wire present.   6. Estimated pulmonary artery systolic pressure is 49.6 mmHg assuming a   right atrial pressure of 15 mmHg, which is consistent with mild pulmonary   hypertension.   7. Moderate mitral annular calcification.   8. Trace mitral valve regurgitation. Transmitral mean gradient of 4 mmHg   (at Hr of 79 bpm).   9. Mild tricuspid regurgitation.  10. Christiano TAVR in the aortic position with trace paravalvular   regurgitation.  11. Lipomatous hypertrophy of the intra-atrial septum.  12. Peak aortic valve gradient is 13.0 mmHg and the mean gradient is 6.0   mmHg, which is probably normal in the setting of a prosthetic aortic   valve.  13. Trivial pericardial effusion.    Ti Beckett DO Electronically signed on 10/6/2023 at 11:03:31 PM    < end of copied text >     77M PMH PMH Sev AS, Chronic Diastolic CHF, CAD s/p PCI to p&mLAD (?), Thoracic Aortic Aneurysm, Paroxysmal Afib s/p Ablation (on Eliquis), LLE DVT, Home O2 (prn), ALLIE, BPH s/p TURP, Left Renal Mass, Morbid Obesity, DM (A1C 7.2), GERD, Cholelithiasis, Laminectomy, Anxiety, MRCP w/ cirrhosis, multiple CHF admissions in 2023, last 6/10-6/23/23 during which he was found to have HFrEF and severe AS suspected secondary to AF then s/p DCCV with EP and Cardiomems insertion.  During that admission, CT surgery was consulted for TAVR evaluation however anticoagulation was not able to be interrupted per EP.  Patient did not follow up and now presents 9/24 admitted for acute on chronic diastolic heart failure. He was medically optimized but given baseline EKG, underwent elective DC [employer] PPM 10/5 and then underwent percutaneous transfemoral TAVR via RCFA (Christiano) with Dr. Napier on 10/6/23. Postop course significant acute on chronic diastolic heart failure for which he received IV diuresis and was then resumed on home dose lasix.    Per Dr. Napier, patient is stable for discharge with follow up.     Post Procedure TTE:  < from: TTE Echo Complete w/ Contrast w/ Doppler (10.06.23 @ 20:44) >    Summary:   1. Left ventricular ejection fraction, by visual estimation, is 50 to   55%.   2. Normal global left ventricular systolic function.   3. Abnormal septal motion consistent with RV pacemaker.   4. There is mild concentric left ventricular hypertrophy.   5. Normal right ventricular size and function, pacing wire present.   6. Estimated pulmonary artery systolic pressure is 49.6 mmHg assuming a   right atrial pressure of 15 mmHg, which is consistent with mild pulmonary   hypertension.   7. Moderate mitral annular calcification.   8. Trace mitral valve regurgitation. Transmitral mean gradient of 4 mmHg   (at Hr of 79 bpm).   9. Mild tricuspid regurgitation.  10. Christiano TAVR in the aortic position with trace paravalvular   regurgitation.  11. Lipomatous hypertrophy of the intra-atrial septum.  12. Peak aortic valve gradient is 13.0 mmHg and the mean gradient is 6.0   mmHg, which is probably normal in the setting of a prosthetic aortic   valve.  13. Trivial pericardial effusion.    DO Omar Joyaally signed on 10/6/2023 at 11:03:31 PM    < end of copied text >

## 2023-10-08 NOTE — DISCHARGE NOTE PROVIDER - CARE PROVIDERS DIRECT ADDRESSES
,DirectAddress_Unknown,DirectAddress_Unknown,[email or web address] ,DirectAddress_Unknown,DirectAddress_Unknown,DirectAddress_Unknown

## 2023-10-08 NOTE — DISCHARGE NOTE PROVIDER - NSDCCPGOAL_GEN_ALL_CORE_FT
** THE PULMONARY NODULE HAS NOT CHANGED IN SIZE SINCE THE LAST CT SCAN THEREFORE YOU DO NOT NEED TO DO ANY FURTHER WORKUP ON THE PULMONARY NODULE**
To get better and follow your care plan as instructed.

## 2023-10-08 NOTE — DISCHARGE NOTE PROVIDER - NSDCCPCAREPLAN_GEN_ALL_CORE_FT
PRINCIPAL DISCHARGE DIAGNOSIS  Diagnosis: Severe aortic stenosis  Assessment and Plan of Treatment: - Keep the surgical sites clean and dry.  You may shower and pat the site dry.  - Monitor the surgical sites for signs of redness or drainage, these should be reported to your doctor immediately.  - You will receive a wallet card about your new valve in the mail.  Please carry it with you to present to anyone who may ask if you have any medical implants.  - Be sure to inform your doctors including your dentist about your valve since you will need to take antibiotics to reduce the risk of infection before certain medical and dental procedures.  - You will be given an appointment to follow up with your doctor in approximately 2 weeks.  It is important to keep this appointment so that your new valve can be assessed.  - You may resume all your normal activities.      SECONDARY DISCHARGE DIAGNOSES  Diagnosis: Chronic diastolic heart failure  Assessment and Plan of Treatment: Continue your home medications.  Please follow up with your Cardiologist and Primary Care Physician 1-2 weeks from discharge.    Diagnosis: Diabetes mellitus  Assessment and Plan of Treatment: Continue your home medications.  Please follow up with your Primary Care Physician 1-2 weeks from discharge.    Diagnosis: Cardiac pacemaker  Assessment and Plan of Treatment: You had a pacemaker placed 10/5/23.  Monitor and look for swelling drainage fever redness. Do not raise left arm above shoulder level until your follow up. If swelling drainage or redness is noted please call your doctor immediately.

## 2023-10-09 VITALS
RESPIRATION RATE: 18 BRPM | DIASTOLIC BLOOD PRESSURE: 70 MMHG | HEART RATE: 80 BPM | OXYGEN SATURATION: 96 % | SYSTOLIC BLOOD PRESSURE: 114 MMHG | TEMPERATURE: 98 F

## 2023-10-09 LAB
ANION GAP SERPL CALC-SCNC: 10 MMOL/L — SIGNIFICANT CHANGE UP (ref 5–17)
BUN SERPL-MCNC: 24.4 MG/DL — HIGH (ref 8–20)
CALCIUM SERPL-MCNC: 8.4 MG/DL — SIGNIFICANT CHANGE UP (ref 8.4–10.5)
CHLORIDE SERPL-SCNC: 101 MMOL/L — SIGNIFICANT CHANGE UP (ref 96–108)
CO2 SERPL-SCNC: 27 MMOL/L — SIGNIFICANT CHANGE UP (ref 22–29)
CREAT SERPL-MCNC: 0.64 MG/DL — SIGNIFICANT CHANGE UP (ref 0.5–1.3)
EGFR: 98 ML/MIN/1.73M2 — SIGNIFICANT CHANGE UP
GLUCOSE BLDC GLUCOMTR-MCNC: 128 MG/DL — HIGH (ref 70–99)
GLUCOSE BLDC GLUCOMTR-MCNC: 167 MG/DL — HIGH (ref 70–99)
GLUCOSE BLDC GLUCOMTR-MCNC: 179 MG/DL — HIGH (ref 70–99)
GLUCOSE SERPL-MCNC: 120 MG/DL — HIGH (ref 70–99)
HCT VFR BLD CALC: 35.3 % — LOW (ref 39–50)
HGB BLD-MCNC: 11 G/DL — LOW (ref 13–17)
MAGNESIUM SERPL-MCNC: 1.9 MG/DL — SIGNIFICANT CHANGE UP (ref 1.6–2.6)
MCHC RBC-ENTMCNC: 29.2 PG — SIGNIFICANT CHANGE UP (ref 27–34)
MCHC RBC-ENTMCNC: 31.2 GM/DL — LOW (ref 32–36)
MCV RBC AUTO: 93.6 FL — SIGNIFICANT CHANGE UP (ref 80–100)
PLATELET # BLD AUTO: 189 K/UL — SIGNIFICANT CHANGE UP (ref 150–400)
POTASSIUM SERPL-MCNC: 4 MMOL/L — SIGNIFICANT CHANGE UP (ref 3.5–5.3)
POTASSIUM SERPL-SCNC: 4 MMOL/L — SIGNIFICANT CHANGE UP (ref 3.5–5.3)
RBC # BLD: 3.77 M/UL — LOW (ref 4.2–5.8)
RBC # FLD: 14.6 % — HIGH (ref 10.3–14.5)
SODIUM SERPL-SCNC: 138 MMOL/L — SIGNIFICANT CHANGE UP (ref 135–145)
WBC # BLD: 7.84 K/UL — SIGNIFICANT CHANGE UP (ref 3.8–10.5)
WBC # FLD AUTO: 7.84 K/UL — SIGNIFICANT CHANGE UP (ref 3.8–10.5)

## 2023-10-09 PROCEDURE — 71045 X-RAY EXAM CHEST 1 VIEW: CPT

## 2023-10-09 PROCEDURE — 86901 BLOOD TYPING SEROLOGIC RH(D): CPT

## 2023-10-09 PROCEDURE — 85730 THROMBOPLASTIN TIME PARTIAL: CPT

## 2023-10-09 PROCEDURE — 82306 VITAMIN D 25 HYDROXY: CPT

## 2023-10-09 PROCEDURE — 82330 ASSAY OF CALCIUM: CPT

## 2023-10-09 PROCEDURE — 84466 ASSAY OF TRANSFERRIN: CPT

## 2023-10-09 PROCEDURE — A9500: CPT

## 2023-10-09 PROCEDURE — 85014 HEMATOCRIT: CPT

## 2023-10-09 PROCEDURE — 83540 ASSAY OF IRON: CPT

## 2023-10-09 PROCEDURE — 86923 COMPATIBILITY TEST ELECTRIC: CPT

## 2023-10-09 PROCEDURE — 85018 HEMOGLOBIN: CPT

## 2023-10-09 PROCEDURE — 82607 VITAMIN B-12: CPT

## 2023-10-09 PROCEDURE — 70486 CT MAXILLOFACIAL W/O DYE: CPT

## 2023-10-09 PROCEDURE — 93312 ECHO TRANSESOPHAGEAL: CPT

## 2023-10-09 PROCEDURE — 71046 X-RAY EXAM CHEST 2 VIEWS: CPT | Mod: 26

## 2023-10-09 PROCEDURE — 71046 X-RAY EXAM CHEST 2 VIEWS: CPT

## 2023-10-09 PROCEDURE — 83880 ASSAY OF NATRIURETIC PEPTIDE: CPT

## 2023-10-09 PROCEDURE — 94640 AIRWAY INHALATION TREATMENT: CPT

## 2023-10-09 PROCEDURE — 84443 ASSAY THYROID STIM HORMONE: CPT

## 2023-10-09 PROCEDURE — C1889: CPT

## 2023-10-09 PROCEDURE — 82803 BLOOD GASES ANY COMBINATION: CPT

## 2023-10-09 PROCEDURE — 85610 PROTHROMBIN TIME: CPT

## 2023-10-09 PROCEDURE — 86850 RBC ANTIBODY SCREEN: CPT

## 2023-10-09 PROCEDURE — 93325 DOPPLER ECHO COLOR FLOW MAPG: CPT

## 2023-10-09 PROCEDURE — 83036 HEMOGLOBIN GLYCOSYLATED A1C: CPT

## 2023-10-09 PROCEDURE — 80048 BASIC METABOLIC PNL TOTAL CA: CPT

## 2023-10-09 PROCEDURE — C9399: CPT

## 2023-10-09 PROCEDURE — C1785: CPT

## 2023-10-09 PROCEDURE — 0225U NFCT DS DNA&RNA 21 SARSCOV2: CPT

## 2023-10-09 PROCEDURE — 71045 X-RAY EXAM CHEST 1 VIEW: CPT | Mod: 26,XE

## 2023-10-09 PROCEDURE — C1894: CPT

## 2023-10-09 PROCEDURE — 99232 SBSQ HOSP IP/OBS MODERATE 35: CPT

## 2023-10-09 PROCEDURE — 93320 DOPPLER ECHO COMPLETE: CPT

## 2023-10-09 PROCEDURE — 36415 COLL VENOUS BLD VENIPUNCTURE: CPT

## 2023-10-09 PROCEDURE — C8929: CPT

## 2023-10-09 PROCEDURE — 86900 BLOOD TYPING SEROLOGIC ABO: CPT

## 2023-10-09 PROCEDURE — 74176 CT ABD & PELVIS W/O CONTRAST: CPT

## 2023-10-09 PROCEDURE — 78452 HT MUSCLE IMAGE SPECT MULT: CPT

## 2023-10-09 PROCEDURE — 84295 ASSAY OF SERUM SODIUM: CPT

## 2023-10-09 PROCEDURE — C1892: CPT

## 2023-10-09 PROCEDURE — C1887: CPT

## 2023-10-09 PROCEDURE — 93017 CV STRESS TEST TRACING ONLY: CPT

## 2023-10-09 PROCEDURE — L8699: CPT

## 2023-10-09 PROCEDURE — 83605 ASSAY OF LACTIC ACID: CPT

## 2023-10-09 PROCEDURE — 84100 ASSAY OF PHOSPHORUS: CPT

## 2023-10-09 PROCEDURE — 33208 INSRT HEART PM ATRIAL & VENT: CPT

## 2023-10-09 PROCEDURE — 82947 ASSAY GLUCOSE BLOOD QUANT: CPT

## 2023-10-09 PROCEDURE — 84132 ASSAY OF SERUM POTASSIUM: CPT

## 2023-10-09 PROCEDURE — C1760: CPT

## 2023-10-09 PROCEDURE — C1769: CPT

## 2023-10-09 PROCEDURE — 99285 EMERGENCY DEPT VISIT HI MDM: CPT

## 2023-10-09 PROCEDURE — 76000 FLUOROSCOPY <1 HR PHYS/QHP: CPT

## 2023-10-09 PROCEDURE — 93005 ELECTROCARDIOGRAM TRACING: CPT

## 2023-10-09 PROCEDURE — 80076 HEPATIC FUNCTION PANEL: CPT

## 2023-10-09 PROCEDURE — 94760 N-INVAS EAR/PLS OXIMETRY 1: CPT

## 2023-10-09 PROCEDURE — 82962 GLUCOSE BLOOD TEST: CPT

## 2023-10-09 PROCEDURE — 74018 RADEX ABDOMEN 1 VIEW: CPT

## 2023-10-09 PROCEDURE — 97163 PT EVAL HIGH COMPLEX 45 MIN: CPT

## 2023-10-09 PROCEDURE — 96374 THER/PROPH/DIAG INJ IV PUSH: CPT

## 2023-10-09 PROCEDURE — 85027 COMPLETE CBC AUTOMATED: CPT

## 2023-10-09 PROCEDURE — 82435 ASSAY OF BLOOD CHLORIDE: CPT

## 2023-10-09 PROCEDURE — 93306 TTE W/DOPPLER COMPLETE: CPT

## 2023-10-09 PROCEDURE — 83735 ASSAY OF MAGNESIUM: CPT

## 2023-10-09 PROCEDURE — 85025 COMPLETE CBC W/AUTO DIFF WBC: CPT

## 2023-10-09 PROCEDURE — 82550 ASSAY OF CK (CPK): CPT

## 2023-10-09 PROCEDURE — 83550 IRON BINDING TEST: CPT

## 2023-10-09 PROCEDURE — 80053 COMPREHEN METABOLIC PANEL: CPT

## 2023-10-09 PROCEDURE — 84484 ASSAY OF TROPONIN QUANT: CPT

## 2023-10-09 PROCEDURE — C1898: CPT

## 2023-10-09 RX ORDER — ALPRAZOLAM 0.25 MG
0.25 TABLET ORAL ONCE
Refills: 0 | Status: DISCONTINUED | OUTPATIENT
Start: 2023-10-09 | End: 2023-10-09

## 2023-10-09 RX ORDER — ERGOCALCIFEROL 1.25 MG/1
1 CAPSULE ORAL
Qty: 4 | Refills: 1
Start: 2023-10-09 | End: 2023-12-07

## 2023-10-09 RX ORDER — ASPIRIN/CALCIUM CARB/MAGNESIUM 324 MG
1 TABLET ORAL
Qty: 0 | Refills: 0 | DISCHARGE

## 2023-10-09 RX ORDER — ASPIRIN/CALCIUM CARB/MAGNESIUM 324 MG
1 TABLET ORAL
Qty: 30 | Refills: 1
Start: 2023-10-09 | End: 2023-12-07

## 2023-10-09 RX ORDER — METFORMIN HYDROCHLORIDE 850 MG/1
1 TABLET ORAL
Refills: 0 | DISCHARGE

## 2023-10-09 RX ORDER — APIXABAN 2.5 MG/1
1 TABLET, FILM COATED ORAL
Qty: 60 | Refills: 1
Start: 2023-10-09 | End: 2023-12-07

## 2023-10-09 RX ORDER — FUROSEMIDE 40 MG
1 TABLET ORAL
Qty: 30 | Refills: 1
Start: 2023-10-09 | End: 2023-12-07

## 2023-10-09 RX ORDER — FUROSEMIDE 40 MG
1 TABLET ORAL
Refills: 0 | DISCHARGE

## 2023-10-09 RX ORDER — METFORMIN HYDROCHLORIDE 850 MG/1
1 TABLET ORAL
Qty: 90 | Refills: 1 | DISCHARGE
Start: 2023-10-09 | End: 2023-12-07

## 2023-10-09 RX ORDER — MECLIZINE HCL 12.5 MG
1 TABLET ORAL
Refills: 0 | DISCHARGE

## 2023-10-09 RX ORDER — POTASSIUM CHLORIDE 20 MEQ
1 PACKET (EA) ORAL
Qty: 30 | Refills: 1
Start: 2023-10-09 | End: 2023-12-07

## 2023-10-09 RX ORDER — METOPROLOL TARTRATE 50 MG
1 TABLET ORAL
Qty: 60 | Refills: 1
Start: 2023-10-09 | End: 2023-12-07

## 2023-10-09 RX ORDER — METFORMIN HYDROCHLORIDE 850 MG/1
1 TABLET ORAL
Qty: 0 | Refills: 0 | DISCHARGE
Start: 2023-10-09

## 2023-10-09 RX ORDER — ACETAMINOPHEN 500 MG
3 TABLET ORAL
Qty: 0 | Refills: 0 | DISCHARGE
Start: 2023-10-09

## 2023-10-09 RX ORDER — POTASSIUM CHLORIDE 20 MEQ
1 PACKET (EA) ORAL
Refills: 0 | DISCHARGE

## 2023-10-09 RX ORDER — METFORMIN HYDROCHLORIDE 850 MG/1
1 TABLET ORAL
Qty: 90 | Refills: 1
Start: 2023-10-09 | End: 2023-12-07

## 2023-10-09 RX ADMIN — Medication 975 MILLIGRAM(S): at 03:14

## 2023-10-09 RX ADMIN — METFORMIN HYDROCHLORIDE 500 MILLIGRAM(S): 850 TABLET ORAL at 08:48

## 2023-10-09 RX ADMIN — Medication 1 GRAM(S): at 17:11

## 2023-10-09 RX ADMIN — SODIUM CHLORIDE 3 MILLILITER(S): 9 INJECTION INTRAMUSCULAR; INTRAVENOUS; SUBCUTANEOUS at 05:22

## 2023-10-09 RX ADMIN — METFORMIN HYDROCHLORIDE 500 MILLIGRAM(S): 850 TABLET ORAL at 13:12

## 2023-10-09 RX ADMIN — Medication 975 MILLIGRAM(S): at 04:14

## 2023-10-09 RX ADMIN — Medication 100 MILLIGRAM(S): at 15:23

## 2023-10-09 RX ADMIN — Medication 81 MILLIGRAM(S): at 11:35

## 2023-10-09 RX ADMIN — SODIUM CHLORIDE 3 MILLILITER(S): 9 INJECTION INTRAMUSCULAR; INTRAVENOUS; SUBCUTANEOUS at 13:09

## 2023-10-09 RX ADMIN — MAGNESIUM OXIDE 400 MG ORAL TABLET 400 MILLIGRAM(S): 241.3 TABLET ORAL at 03:14

## 2023-10-09 RX ADMIN — Medication 0.25 MILLIGRAM(S): at 17:02

## 2023-10-09 RX ADMIN — Medication 20 MILLIEQUIVALENT(S): at 11:35

## 2023-10-09 RX ADMIN — FINASTERIDE 5 MILLIGRAM(S): 5 TABLET, FILM COATED ORAL at 11:35

## 2023-10-09 RX ADMIN — NYSTATIN CREAM 1 APPLICATION(S): 100000 CREAM TOPICAL at 06:09

## 2023-10-09 RX ADMIN — Medication 40 MILLIGRAM(S): at 11:34

## 2023-10-09 RX ADMIN — Medication 50 MILLIGRAM(S): at 17:11

## 2023-10-09 RX ADMIN — PANTOPRAZOLE SODIUM 40 MILLIGRAM(S): 20 TABLET, DELAYED RELEASE ORAL at 05:29

## 2023-10-09 RX ADMIN — Medication 2 MILLIGRAM(S): at 11:34

## 2023-10-09 RX ADMIN — Medication 50 MILLIGRAM(S): at 05:29

## 2023-10-09 RX ADMIN — APIXABAN 5 MILLIGRAM(S): 2.5 TABLET, FILM COATED ORAL at 05:29

## 2023-10-09 RX ADMIN — Medication 1 GRAM(S): at 05:29

## 2023-10-09 RX ADMIN — APIXABAN 5 MILLIGRAM(S): 2.5 TABLET, FILM COATED ORAL at 17:11

## 2023-10-09 NOTE — PHYSICAL THERAPY INITIAL EVALUATION ADULT - GAIT DISTANCE, PT EVAL
50 feet
with one standing rest break x 30 second - increased work of breathing and decreased activity tolerance noted./150 feet

## 2023-10-09 NOTE — PROGRESS NOTE ADULT - REASON FOR ADMISSION
sob
sob , severe AS
sob

## 2023-10-09 NOTE — PHYSICAL THERAPY INITIAL EVALUATION ADULT - DIAGNOSIS, PT EVAL
Decreased mobility/function;  generalized weakness, deconditioned, CHF
PT will not follow as pt. is modified independent with all functional skills.

## 2023-10-09 NOTE — PROGRESS NOTE ADULT - PROVIDER SPECIALTY LIST ADULT
Cardiology
Critical Care
Critical Care
Electrophysiology
CT Surgery
Cardiology
Hospitalist
Hospitalist
Cardiology
Electrophysiology
Hospitalist
Hospitalist
CT Surgery
Electrophysiology
Hospitalist
CT Surgery

## 2023-10-09 NOTE — PROGRESS NOTE ADULT - ASSESSMENT
77M PMH PMH Sev AS, Chronic Diastolic CHF, CAD s/p PCI to p&mLAD (?), Thoracic Aortic Aneurysm, Paroxysmal Afib s/p Ablation (on Eliquis), LLE DVT, Home O2 (prn), ALLIE, BPH s/p TURP, Left Renal Mass, Morbid Obesity, DM (A1C 7.2), GERD, Cholelithiasis, Laminectomy, Anxiety, MRCP w/ cirrhosis, multiple CHF admissions in 2023, last 6/10-6/23/23 during which he was found to have HFrEF and severe AS suspected 2/2 AF then s/p DCCV with EP and Cardiomems insertion.  During that admission, CT surgery was consulted for TAVR evaluation however anticoagulation was not able to be interrupted per EP.  Patient did not follow up and now presents 9/24 admitted for acute on chronic diastolic heart failure. He was medically optimized and underwent percutaneous transfemoral TAVR via RCFA (Christiano) with Dr. Napier on 10/6/23.    Post op with acute on chronic diastolic heart failure requiring IV lasix - since euvolemic / medically optimized   pending d/c - PT evaluation

## 2023-10-09 NOTE — PROGRESS NOTE ADULT - PROBLEM SELECTOR PROBLEM 2
Acute on chronic diastolic congestive heart failure

## 2023-10-09 NOTE — PHYSICAL THERAPY INITIAL EVALUATION ADULT - ADDITIONAL COMMENTS
Pt. states he [place]  [place]  ramp to enter.  Uses a rollator and has 2 canes, wheelchair, shower chair and raised toilet seat. No stairs inside. Use of O2 PRN.

## 2023-10-09 NOTE — PROGRESS NOTE ADULT - SUBJECTIVE AND OBJECTIVE BOX
SUBJECTIVE   "i am a little short of breath"   supportive counseling offered - IS coughing and eep breathing performed with improvement     INTERIM HISTORY SIGNIFICANT FOR   clinically improving   remains anxious about going home     Patient is a 77y old  Male who presents with a chief complaint of sob (08 Oct 2023 09:23)    HPI:  76 y/o male with PMH of afib on Eliquis (s/p Cardioversion abd ablation  1/31/23), Moderate AS, Chronic Diastolic Heart Failure, CAD s/p PCI, HTN, HLD, DM 2, Morbid Obesity, ALLIE, Thoracic Aortic Aneurysm, BPH s/p TURP, Left Renal Mass Presents with shortness of breath for last few days , more worse since last night , also noted with worsening lower ext swelling . moved out of Assisted living recently to live with his son basement apartment.  Patient does not eat at home and usually eat food from [employer] as he has no one to cook for him.  also felt some palpitations last night but now feels better, given lasix iv in ed.   has home o2 , uses it prn.        (24 Sep 2023 16:43)    OBJECTIVE  PAST MEDICAL & SURGICAL HISTORY:  Anxiety  Hypertension  Hyperlipidemia  Prostate disease  Gastoesophageal reflux disease  Gallbladder stone without cholecystitis or obstruction  BPH (benign prostatic hyperplasia)  DM (diabetes mellitus)  DVT (deep venous thrombosis)  left leg  Afib  Anxiety  HLD (hyperlipidemia)  Malignant schwannoma  H/O arthroscopy of knee  S/P laminectomy  S/P coronary artery stent placement  x2        Ceftin (Hives)  penicillin (Anaphylaxis)  Bactrim (Rash)  sulfa drugs (Unknown)    Home Medications:  Albuterol (Eqv-ProAir HFA) 90 mcg/inh inhalation aerosol: 2 puff(s) inhaled every 6 hours as needed for  shortness of breath and/or wheezing (29 Sep 2023 10:35)  ALPRAZolam 0.25 mg oral tablet: 1 tab(s) orally 3 times a day As needed Anxiety (29 Sep 2023 10:32)  apixaban 5 mg oral tablet: 1 tab(s) orally every 12 hours (29 Sep 2023 10:32)  aspirin 81 mg oral tablet: 1 orally once a day (29 Sep 2023 10:32)  atorvastatin 10 mg oral tablet: 1 tab(s) orally once a day (at bedtime) (29 Sep 2023 10:32)  doxazosin 2 mg oral tablet: 1 tab(s) orally once a day (29 Sep 2023 10:27)  finasteride 5 mg oral tablet: 1 tab(s) orally once a day (29 Sep 2023 10:32)  furosemide 40 mg oral tablet: 1 tab(s) orally once a day (29 Sep 2023 10:32)  ipratropium-albuterol 0.5 mg-2.5 mg/3 mL inhalation solution: 3 milliliter(s) inhaled every 6 hours (29 Sep 2023 10:32)  Lasix 40 mg oral tablet: 1 tab(s) orally once a day (24 Sep 2023 16:47)  meclizine 25 mg oral tablet: 1 tab(s) orally 2 times a day (29 Sep 2023 10:33)  metFORMIN 500 mg oral tablet: 1 tab(s) orally 3 times a day (29 Sep 2023 10:29)  omeprazole 40 mg oral delayed release capsule: 1 orally once a day (29 Sep 2023 10:32)  Potassium Chloride (Eqv-Klor-Con M20) 20 mEq oral tablet, extended release: 1 tab(s) orally once a day (29 Sep 2023 10:34)  senna leaf extract oral tablet: 2 tab(s) orally once a day (at bedtime) (29 Sep 2023 10:32)    VITALS  Currently in sinus rhythm with vitals as below  ICU Vital Signs Last 24 Hrs  T(C): 36.7 (09 Oct 2023 00:00), Max: 36.9 (08 Oct 2023 08:00)  T(F): 98.1 (09 Oct 2023 00:00), Max: 98.4 (08 Oct 2023 08:00)  HR: 79 (09 Oct 2023 00:00) (74 - 102)  BP: 119/72 (09 Oct 2023 00:00) (85/51 - 136/74)  BP(mean): 96 (08 Oct 2023 22:00) (63 - 98)  RR: 18 (09 Oct 2023 00:00) (18 - 30)  SpO2: 98% (09 Oct 2023 00:00) (89% - 98%)  O2 Parameters below as of 09 Oct 2023 00:00  Patient On (Oxygen Delivery Method): nasal cannula  O2 Flow (L/min): 2      LABS                        11.0   7.41  )-----------( 189      ( 08 Oct 2023 04:50 )             34.5   10-08    139  |  101  |  21.7<H>  ----------------------------<  144<H>  3.8   |  26.0  |  0.67    Ca    8.5      08 Oct 2023 04:50  Mg     1.9     10-08    CAPILLARY BLOOD GLUCOSE    POCT Blood Glucose.: 199 mg/dL (08 Oct 2023 15:25)        IN/OUT    10-07-23 @ 07:01  -  10-08-23 @ 07:00  --------------------------------------------------------  IN: 480 mL / OUT: 1900 mL / NET: -1420 mL    10-08-23 @ 07:01  -  10-09-23 @ 02:45  --------------------------------------------------------  IN: 960 mL / OUT: 1400 mL / NET: -440 mL      IMAGING  personally reviewed imaging     CURRENT MEDICATIONS  MEDICATIONS  (STANDING):  apixaban 5 milliGRAM(s) Oral every 12 hours  aspirin enteric coated 81 milliGRAM(s) Oral daily  atorvastatin 10 milliGRAM(s) Oral at bedtime  dextrose 50% Injectable 25 Gram(s) IV Push once  dextrose 50% Injectable 12.5 Gram(s) IV Push once  dextrose 50% Injectable 25 Gram(s) IV Push once  doxazosin 2 milliGRAM(s) Oral daily  ergocalciferol 75450 Unit(s) Oral every week  finasteride 5 milliGRAM(s) Oral daily  furosemide    Tablet 40 milliGRAM(s) Oral daily  insulin lispro (ADMELOG) corrective regimen sliding scale   SubCutaneous three times a day before meals  magnesium oxide 400 milliGRAM(s) Oral once  metFORMIN 500 milliGRAM(s) Oral three times a day  metoprolol tartrate 50 milliGRAM(s) Oral two times a day  nystatin Ointment 1 Application(s) Topical every 12 hours  pantoprazole    Tablet 40 milliGRAM(s) Oral before breakfast  potassium chloride    Tablet ER 20 milliEquivalent(s) Oral daily  senna 2 Tablet(s) Oral at bedtime  sodium chloride 0.9% lock flush 3 milliLiter(s) IV Push every 8 hours  sucralfate 1 Gram(s) Oral two times a day    MEDICATIONS  (PRN):  acetaminophen     Tablet .. 975 milliGRAM(s) Oral every 6 hours PRN Temp greater or equal to 38C (100.4F), Moderate Pain (4 - 6), Severe Pain (7 - 10)  albuterol/ipratropium for Nebulization 3 milliLiter(s) Nebulizer every 6 hours PRN Shortness of Breath and/or Wheezing  fluticasone propionate 50 MICROgram(s)/spray Nasal Spray 1 Spray(s) Both Nostrils two times a day PRN nasal congestion

## 2023-10-09 NOTE — PROGRESS NOTE ADULT - PROBLEM SELECTOR PLAN 2
Received 20 IVP lasix 10/7/23  Home dose Lasix 40 PO QD restarted 10/8/23  F/U AM CXR  overall resolved

## 2023-10-09 NOTE — PROGRESS NOTE ADULT - PROBLEM SELECTOR PLAN 4
Continue basal/bolus insulin regimen.

## 2023-10-09 NOTE — PHYSICAL THERAPY INITIAL EVALUATION ADULT - PERTINENT HX OF CURRENT PROBLEM, REHAB EVAL
CHF, AS, Afib.
77M PMH PMH Sev AS, Chronic Diastolic CHF, CAD s/p PCI to p&mLAD (?), Thoracic Aortic Aneurysm, Paroxysmal Afib s/p Ablation (on Eliquis), LLE DVT, Home O2 (prn), ALLIE, BPH s/p TURP, Left Renal Mass, Morbid Obesity, DM (A1C 7.2), GERD, Cholelithiasis, Laminectomy, Anxiety, MRCP w/ cirrhosis, multiple CHF admissions in 2023, last 6/10-6/23/23 during which he was found to have HFrEF and severe AS suspected 2/2 AF then s/p DCCV with EP and Cardiomems insertion.  During that admission, CT surgery was consulted for TAVR evaluation however anticoagulation was not able to be interrupted per EP.  Patient did not follow up and now presents 9/24 admitted for acute on chronic diastolic heart failure. He was medically optimized and underwent percutaneous transfemoral TAVR via RCFA (Christiano) with Dr. Napier on 10/6/23.

## 2023-10-09 NOTE — PROGRESS NOTE ADULT - SUBJECTIVE AND OBJECTIVE BOX
ANIA CRISTOBAL  [id number]      Chief Complaint:  follow up HFpEF/AS/PAF/CAD  s/p TAVR 10/6/2023 and PPM 10/5/2023    Subjective:   Patient reports no significant SOB at rest.  Some CRUZ.  No other physical c/o.  Denies CP, palps.      24 hour Tele:   SR, V-paced          acetaminophen     Tablet .. 975 milliGRAM(s) Oral every 6 hours PRN  albuterol/ipratropium for Nebulization 3 milliLiter(s) Nebulizer every 6 hours PRN  apixaban 5 milliGRAM(s) Oral every 12 hours  aspirin enteric coated 81 milliGRAM(s) Oral daily  atorvastatin 10 milliGRAM(s) Oral at bedtime  dextrose 50% Injectable 25 Gram(s) IV Push once  dextrose 50% Injectable 12.5 Gram(s) IV Push once  dextrose 50% Injectable 25 Gram(s) IV Push once  doxazosin 2 milliGRAM(s) Oral daily  ergocalciferol 15399 Unit(s) Oral every week  finasteride 5 milliGRAM(s) Oral daily  fluticasone propionate 50 MICROgram(s)/spray Nasal Spray 1 Spray(s) Both Nostrils two times a day PRN  furosemide    Tablet 40 milliGRAM(s) Oral daily  insulin lispro (ADMELOG) corrective regimen sliding scale   SubCutaneous three times a day before meals  metFORMIN 500 milliGRAM(s) Oral three times a day  metoprolol tartrate 50 milliGRAM(s) Oral two times a day  nystatin Ointment 1 Application(s) Topical every 12 hours  pantoprazole    Tablet 40 milliGRAM(s) Oral before breakfast  potassium chloride    Tablet ER 20 milliEquivalent(s) Oral daily  senna 2 Tablet(s) Oral at bedtime  sodium chloride 0.9% lock flush 3 milliLiter(s) IV Push every 8 hours  sucralfate 1 Gram(s) Oral two times a day          Physical Exam:  T(C): 36.8 (10-09-23 @ 07:47), Max: 36.9 (10-08-23 @ 12:00)  HR: 73 (10-09-23 @ 10:30) (66 - 94)  BP: 125/75 (10-09-23 @ 10:30) (85/51 - 127/63)  RR: 18 (10-09-23 @ 07:47) (18 - 25)  SpO2: 98% (10-09-23 @ 07:47) (92% - 98%)  Wt(kg): --  General: Comfortable in NAD  Neck: No JVD  CVS: nl s1s2, no s3, +DORINDA  Pulm: CTA b/l  Abd: soft, non-tender  Ext: trace to 1+ edema at ankles  Neuro A&O x3  Psych: Normal affect      Labs:   09 Oct 2023 05:33    138    |  101    |  24.4   ----------------------------<  120    4.0     |  27.0   |  0.64     Ca    8.4        09 Oct 2023 05:33  Mg     1.9       09 Oct 2023 05:33                            11.0   7.84  )-----------( 189      ( 09 Oct 2023 05:33 )             35.3           Echo 6/20/23  Summary:   1. LV Ejection Fraction by Oleary's Method with a biplane EF of 50 %.   2. Normal global left ventricular systolic function.   3. Trace mitral valve regurgitation.   4. Mild aortic regurgitation.   5. Endocardial visualization was enhanced with intravenous echo contrast.   6. Compared to TTE dated 6/11/23 the LVEF has improved.    STRESS TEST:    CATHETERIZATION:  [facility] 1/2023:  Coronary Angiography   - Left dominant system   - Patent prox and mid LAD stents   - Moderate stenosis of the distal LCx   - The RCA is a small, non-dominat vessel.  The proximal segment is occluded with brisk right-to-right collaterals.  - 25mm LV-Ao gradient on pullback.  CIELO 1.4cm2 with moderate AS byecho.  - Moderately reduced LVEF by echo           Pharmacological stress test 9/27/23  * Myocardial Perfusion SPECT results are mildly abnormal. * There is a small, moderate defect in the distal inferior and inferoapical wall that is fixed, consistent with infarct. Basal inferior wall defect paradoxically improved during stress imaging. Prone imaging not available. No clear evidence of ischemia. No TID (ratio 1.23).  * Post-stress resting myocardial perfusion gated SPECT imaging was performed (LVEF = 48 %;LVEDV = 176 ml.), mild hypokinesis of the distal inferior wall.    TTE 9/27/23  Summary:   1. Left ventricular ejection fraction, by visual estimation, is 40 to   45%.   2. Mildly to moderately decreased global left ventricular systolic   function.   3. Mildly to moderately decreased segmental left ventricular systolic   function.   4. Basal and mid anterolateral wall and basal and mid inferolateral wall   are abnormal as described above.   5. Moderately increased LV wall thickness.   6. There is moderate concentric left ventricular hypertrophy.   7. Moderately enlarged left atrium.   8. Normal right atrial size.   9. Trivial pericardial effusion.  10. Mild thickening of the anterior and posterior mitral valve leaflets.  11. Moderate mitral annular calcification.  12. Trace mitral valve regurgitation.  13. Aortic valve is Poorly visualized.  14. Mild to moderate aortic regurgitation.  15. Severe aortic valve stenosis (Low flow-Low gradient severe aortic   stenosis).    ECHO 10/6/2023   1. Left ventricular ejection fraction, by visual estimation, is 50 to 55%.   2. Normal global left ventricular systolic function.   3. Abnormal septal motion consistent with RV pacemaker.   4. There is mild concentric left ventricular hypertrophy.   5. Normal right ventricular size and function, pacing wire present.   6. Estimated pulmonary artery systolic pressure is 49.6 mmHg assuming a right atrial pressure of 15 mmHg, which is consistent with mild pulmonary hypertension.   7. Moderate mitral annular calcification.   8. Trace mitral valve regurgitation. Transmitral mean gradient of 4 mmHg (at Hr of 79 bpm).   9. Mild tricuspid regurgitation.  10. Christiano TAVR in the aortic position with trace paravalvular regurgitation.  11. Lipomatous hypertrophy of the intra-atrial septum.  12. Peak aortic valve gradient is 13.0 mmHg and the mean gradient is 6.0   mmHg, which is probably normal in the setting of a prosthetic aortic   valve.      Assessement   76 y/o male PMHx AF on Eliquis (s/p ablation on 6/2023), moderate-severe AS, Chronic Diastolic Heart Failure, CAD (Patent prox and mid LAD stents, Moderate stenosis of the distal LCx. The pRCA  with brisk right-to-right collaterals)., HTN, HLD, DM 2, Morbid Obesity, ALLIE, Thoracic Aortic Aneurysm, BPH s/p TURP, Left Renal Mass, On last admission patient underwent CardioMeMs placement, Pt now presents from home (recently moved out of assisted living to live in his son's basement apartment), with palpitations and SOB.  He was given IV lasix and admitted for acute on chronic diastolic heart failure. Cardiology consultation requested for evaluation of acute decompensation of heart failure.    -Tele with AFIB vs Flutter and now SR with long 1st degree HB, ?higher degree HB.  Now s/p dual chamber PPM 10/5/2023 with Dr Amato  -Stress test with no clear evidence of ischemia 9/27/2023  -[facility] 1/2023 with 60% LCx stenosis and 100% RCA occlusion with collateral flow   -S/p TAVR, percutaneous 06-Oct-2023  Percutaneous Transfemoral TAVR via  Common Femoral Artery (29mm Christiano Resilia) Commercial 29mm Childers Christiano Resilia Percutaneous Transfemoral TAVR via Right Common Femoral Artery.  -Had post op Hypovolemia and respiratory insufficiency now improved and appears mildly volume overloaded, will benefit fro diuresis which was started 10/7/2023  -Echo post TAVR shows valve well seated with trivial AI   -Patient with some CRUZ appears to be likely atelectasis and deconditioning now, no acute CHF    Plan:   1. Continue po Lasix, appears fairly euvolemic.  F/u CXR.  2. Eliquis restarted per CTS/EP.  3. Continue other current CV medications.   4. Ultimately restart his ARB as well.  Can be as OP.  5. Ambulate, appears deconditioned.  Incentive marium encouraged.  6. PT eval.  D/c planning, ?today depending on home vs. [facility].    D/w CTS team.

## 2023-10-09 NOTE — PROGRESS NOTE ADULT - PROBLEM SELECTOR PROBLEM 1
Aortic stenosis
Afib
Aortic stenosis

## 2023-10-09 NOTE — CHART NOTE - NSCHARTNOTEFT_GEN_A_CORE
CXR PA/LAT reviewed.   Device/leads appear stable and appropriate. Reviewed with Dr. Amato     - Stable for d/c home from EP standpoint.   - Continue Eliquis 5mg Q 12 hrs as tolerated.   - Keflex 500mg BID x 5 days for device prophylaxis.   - Discharge instructions reviewed with patient.   - EP will sign off. Please call as needed.     Cardiac Device Implant Post Operative Instructions:   - Do not touch the incision until it is completely healed.   - There is Dermabond and /or Steristrips (white strips of tape) on your incision, which will start to peel off on their own over the next 2-3 weeks. Do not pick at or peel off the Dermabond and/or Steristrips.   - Bruising around the implant site or over the chest, side or arm near the incision is normal, and will take a few weeks to resolve.  -Do not lift the affected arm higher than 90 degrees (shoulder height) in any direction for 6 weeks.   - Do not push, pull or lift anything heavier than 10 lbs (about a gallon of milk) with the affected arm for 6 weeks.     - Do not apply soaps, creams, lotions, ointments or powders to the incision until it is completely healed.  - You may take a shower in 24 hours, and allow the water to run over the incision. However, do not submerge the incision in water: do not swim or soak in bath tubs, hot tubs, swimming pools, etc.   You should call the doctor if:   - You notice redness, drainage, swelling, increased tenderness, hot sensation around the incision, bleeding or incision edges pulling apart.  - Your temperature is greater than 100 degrees F for more than 24 hours.  - You notice swelling or bulging at the incision or around the device that was not there when you left the hospital or is increasing in size.  - You experience increased difficulty breathing.  - You notice new/worsening swelling in your legs and ankles.  - You faint or have dizzy spells.  - You have any questions or concerns regarding your device or the procedure.

## 2023-10-09 NOTE — PHYSICAL THERAPY INITIAL EVALUATION ADULT - GENERAL OBSERVATIONS, REHAB EVAL
Pt. sitting at edge of bed,  +monitor and O2 nasal canula
Pt. greeted resting in bed + monitor, on RA, agreeable to PT

## 2023-10-09 NOTE — PROGRESS NOTE ADULT - PROBLEM SELECTOR PLAN 1
Severe AS on last admission. Now admitted with acute on chronic diastolic heart failure.  Preop work up: CT max/facial with periapical lucencies per report.  CT scan reviewed by Dr. Morin at [facility] on 9/28 no need for transfer for evaluation at this time, pt instructed to f/u as outpt for comprehensive dental exam.   NST 9/27 with sm-mod defect, no ischemia, reviewed with cardiology, no further work up needed (last [facility] in January 2023 that showed 60% dcx disease and 100% pRCA with collateral circulation.  TTE  with Severe low flow low gradient AS.   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 Napier on 10/6/23.  Post procedure TTE completed

## 2023-10-09 NOTE — PROGRESS NOTE ADULT - PROBLEM SELECTOR PROBLEM 4
DM (diabetes mellitus)
AV bloc first degree
DM (diabetes mellitus)

## 2023-10-09 NOTE — PROGRESS NOTE ADULT - RESPIRATORY
no respiratory distress/no use of accessory muscles/respirations non-labored/diminished breath sounds, L/diminished breath sounds, R

## 2023-10-10 ENCOUNTER — APPOINTMENT (OUTPATIENT)
Dept: CARE COORDINATION | Facility: HOME HEALTH | Age: 78
End: 2023-10-10

## 2023-10-10 VITALS
SYSTOLIC BLOOD PRESSURE: 122 MMHG | RESPIRATION RATE: 16 BRPM | HEIGHT: 74 IN | HEART RATE: 92 BPM | OXYGEN SATURATION: 94 % | DIASTOLIC BLOOD PRESSURE: 74 MMHG

## 2023-10-10 DIAGNOSIS — Z95.0 PRESENCE OF CARDIAC PACEMAKER: ICD-10-CM

## 2023-10-10 RX ORDER — ALBUTEROL 90 MCG
90 AEROSOL (GRAM) INHALATION
Refills: 0 | Status: ACTIVE | COMMUNITY

## 2023-10-10 RX ORDER — SUCRALFATE 1 G/1
1 TABLET ORAL
Refills: 0 | Status: ACTIVE | COMMUNITY

## 2023-10-10 RX ORDER — ATORVASTATIN CALCIUM 10 MG/1
10 TABLET, FILM COATED ORAL
Refills: 0 | Status: ACTIVE | COMMUNITY

## 2023-10-10 RX ORDER — METOPROLOL SUCCINATE 100 MG/1
100 TABLET, EXTENDED RELEASE ORAL
Qty: 90 | Refills: 1 | Status: DISCONTINUED | COMMUNITY
Start: 2021-09-24 | End: 2023-10-10

## 2023-10-10 RX ORDER — POTASSIUM CHLORIDE 1500 MG/1
20 TABLET, FILM COATED, EXTENDED RELEASE ORAL
Refills: 0 | Status: ACTIVE | COMMUNITY

## 2023-10-10 RX ORDER — DAPAGLIFLOZIN 5 MG/1
5 TABLET, FILM COATED ORAL DAILY
Refills: 0 | Status: DISCONTINUED | COMMUNITY
Start: 2021-09-24 | End: 2023-10-10

## 2023-10-10 RX ORDER — METOPROLOL TARTRATE 50 MG/1
50 TABLET, FILM COATED ORAL
Qty: 60 | Refills: 0 | Status: ACTIVE | COMMUNITY

## 2023-10-10 RX ORDER — DOXYCYCLINE HYCLATE 100 MG/1
100 CAPSULE ORAL
Refills: 0 | Status: ACTIVE | COMMUNITY

## 2023-10-10 RX ORDER — SENNOSIDES 8.6 MG TABLETS 8.6 MG/1
8.6 TABLET ORAL
Refills: 0 | Status: ACTIVE | COMMUNITY

## 2023-10-10 RX ORDER — OMEPRAZOLE 40 MG/1
40 CAPSULE, DELAYED RELEASE ORAL
Refills: 0 | Status: ACTIVE | COMMUNITY

## 2023-10-11 ENCOUNTER — TRANSCRIPTION ENCOUNTER (OUTPATIENT)
Age: 78
End: 2023-10-11

## 2023-10-13 ENCOUNTER — TRANSCRIPTION ENCOUNTER (OUTPATIENT)
Age: 78
End: 2023-10-13

## 2023-10-18 NOTE — ED PROVIDER NOTE - CROS ED ENMT ALL NEG
[facility] Marshall Sanchez APRN  ____________________________________________________________  [facility]  Department of Radiation Oncology  [address] [place] [id number]  Office:  [phone]  Fax: [phone]    DATE: 10/19/2023  PATIENT: Rosanna Chaparro  1940                         MEDICAL RECORD #:  [id number]                 No chief complaint on file.    Reason for Visit: Rosanna Chaparro is a very pleasant 83 y.o. female that completed radiation therapy to the right ear and returns to the clinic today for routine follow up exam. Reports ear discharge. Denies appetite change, activity change, fatigue, ear pain, cough, SOB, chest pain, nasuea/vomiting, diarrhea, light-headedness, weakness, and headaches. She continues to follow .     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 She completed 7000 cGy in 35 fractions to the right ear on 03/06/2023.     09/01/2022 - Appointment with Timmy Nunes APRN - Otolaryngology:  She complains of a lesionright ear present for 1 year(s)  Patient states she has had a lesion to her right ear for the past year that has progressively gotten larger. She states she had a blistered area a couple days ago that started draining.   Plan:  ct temporal bones prior to follow up   Follow up in 4 weeks and we will discuss excision of lesion   Follow up in about 4 weeks (around 9/29/2022).     09/23/2022 - CT Temporal Bones due to hearing loss:  Abnormal skin thickening and enhancement at the right acoustic meatus. Recommend clinical correlation/visual inspection in this area. The right external canal is near completely opacified with soft tissue debris. I do not see destructive bony change along the external canal.  Middle ear cavities and mastoids are clear.  Ossicular chains are intact.  Nearly dehiscent superior semicircular canals, bilateral.    09/27/2022 - Appointment with Timmy Nunes APRN - Otolaryngology:  Discussed surgical removal options vs medical management with patient. She would like to proceed with referral to Dr. Ha for treatment with Eriveage.  Dr. Cao examined and discussed care with patient and agrees with treatment plan.   Return in about 6 months (around 3/27/2023) for Recheck.    10/04/2022 - Documentation per Otolaryngology group:  Patient needs biospy of lesion per Dr. Mock before he sees patient and treats with [facility]    11/03/2022 - Punch Bx Right Mady per :  Basal cell carcinoma, surrounded by scar tissue  (Shave specimen of superficial skin)    11/28/2022 - Appointment with :  Assessment:  Basal cell carcinoma of the right ear acoustic meatus  High risk of recurrence (lesion on head and neck).  Not felt to be excisional candidate due to locally advanced lesion.  Up-to-date algorithm for nonsurgical candidates recommends primary radiation therapy (preferred) or hedgehog pathway inhibitor (preferred) or immune checkpoint inhibitor (cemiplimab).  Clinical follow-up for current every 6-12 months.  History of thrombocytopenia (ITP versus MDS)  --Previously followed by Dr. Beth Richards, [facility] for chronic thrombocytopenia.  Question ITP vs MDS.  Last seen in office by Dr. Richards, 07/08/2017.  At that time platelets have been stable for the past few years without active bleeding.  Platelet count was 23,000,.  To a baseline of 30,000.  No aggressive treatments were pursued.  Previous bone marrow biopsy, 11/05/2012 showed hypercellular marrow showing maturing trilineage hematopoiesis with mild dyserythropoiesis and mild megakaryocytic atypia.  No increase in blasts.    Diabetes mellitus.  Advanced age  Plan:  Apprised of the available diagnostic information.  Draw baseline CBC with  differential, iron, iron saturation, ferritin, B12, folate, CMP  Refer to Dr. Taylor Re: Assess for definitive radiation  Teaching sheets for Erivedge (vismodegib).  Potential toxicities discussed (to include but not limited to: Liver injury, azotemia, hyponatremia, hypokalemia, Andres-Gulshan syndrome, toxic epidermal necrolysis, muscle spasms, alopecia, dysgeusia, weight loss, fatigue, nausea, amenorrhea, diarrhea, decreased appetite, constipation, arthralgia, vomiting, loss of taste, elevated CK, azotemia.  Schedule CT head, soft tissues of the neck, chest, abdomen/pelvis with p.o. and IV contrast.    Continue management per PCP and other specialists  Return to office in 4 weeks.  Further management recommendations pending.    12/05/2022 - CT Soft Tissue Neck with contrast:  2.0 x 1.8 x 1.2 cm enhancing skin lesion involving the jamari of the right ear. Up to 1.1 cm depth of invasion with the deep margin essentially abutting the bony elements of the external auditory canal along the roof and floor of the external canal.   There is also a 7 mm depth of invasion inferiorly, with the lesion abutting the capsule of the superficial lobe of the right parotid gland.   There is a 7 mm hyperenhancing lymph node in the upper level 2B neck. This is not technically enlarged by size criteria but this does appear to be hyperenhancing relative to the other cervical nodes and I'm suspicious that this is a leyda metastasis.    12/05/2022 - CT Chest with contrast:  Rather benign-appearing subpleural pulmonary nodule in the right lower lobe which could be followed up on subsequent imaging. There is mild basilar atelectasis as well as linear scarring or atelectasis in the left upper lobe. No evidence of consolidative pneumonia.  Heavy coronary calcifications are present. There is mild cardiomegaly. The thoracic aorta and great vessels are ectatic.  No convincing evidence of metastatic disease to the thorax.  Hiatal hernia. There is  a cortical cyst of the upper pole of the left kidney.    12/05/2022 - CT Abdomen/Pelvis with contrast:  No radiographic evidence of metastatic disease to the abdomen or pelvis.  Multiple compression deformities in the thoracic and lumbar spine. The fracture at L3 is more acute to subacute in appearance and should be correlated clinically. There is an accentuated thoracic kyphosis with mild gibbus deformity with the thoracolumbar juncture.  Cortical cyst upper pole left kidney. No evidence of nephrolithiasis or obstructive uropathy.  Moderate hiatal hernia.  Heavy atheromatous calcification of the abdominal aorta and iliac arteries with no evidence of aneurysm.    12/05/2022 - CT Head with and without contrast:  Age-appropriate atrophy.    12/06/2022 -Documentation per :  Pls confirm that pt has a referral to dr bonilla or dr sr of RT    12/09/2022 - Consult with  (Covering for ):  Patient is considering treatment options that have been offered to her including surgical resection and systemic therapy with vismodegib.  She presents today for consultation regarding radiotherapy options.  We have discussed the indications and rationale of radiation therapy according to the NCCN Guidelines.  Given a prominent ipsilateral neck lymph node that measures 7 mm with hyperintensity on CT scan, I discussed possibility of obtaining a biopsy or proceeding with PET scan to rule out regional leyda spread.  I do believe definitive intent radiotherapy will be a good option for her but she is also considering surgical and systemic therapy options.  I have extensively reviewed the risks, benefits and alternatives of therapy and progression of disease in spite of therapy with either local or systemic failure.   I have seen, examined and reviewed her medication list, appropriate labs and imaging studies as well as other physician notes. We discussed the goals/plans of care and answered all questions.    In summary, 3 options were presented to her which included needle biopsy of a prominent ipsilateral neck lymph node, which of positive may lend itself to additional treatment recommendations, versus PET scan staging, versus proceeding with definitive intent radiotherapy to include primary lesion and suspicious ipsilateral lymphatic region.  I explained the intent, benefits, course, precautions, and side effects of treatment which include worsening skin reactions including erythema and skin desquamation, radiation-induced hearing changes, radiation-induced salivary gland dysfunction which can lead to xerostomia, patchy alopecia, fatigue and possibly ipsilateral oropharyngeal mucositis.  Their questions were answered to their satisfaction and at the end of our discussion patient and her granddaughter wished to discuss the totality of treatment options presented to them with her family and call back with a decision.  If patient wishes to proceed with definitive intent radiotherapy, anticipate treating to a dose of 7000 cGy to be given over 35 daily fractions (Monday through Friday).  We will await patient call back with a decision.  In the meantime, she was asked to follow-up with her other healthcare providers for routine medical care as per their scheduling.    12/14/2022 - Documentation per  (Covering for ):  Patient called back to schedule CT simulation wishing to move forward with definitive intent radiotherapy as previously discussed.  They declined to pursue additional work-up by way of PET scan or lymph node biopsy wanting to only pursue treatment at this time.  PLAN:  We will proceed with CT simulation today in anticipation of definitive intent radiotherapy to a dose of 7000 cGy to be given over 35 daily fractions and will make efforts to include the ipsilateral prominent lymph node previously discussed..    01/11/2023 - 03/06/2023 - Completed radiation course:  Received 7000 cGy in 35  fractions to the right ear.    01/13/2023 - PET scan:  2.2 x 1.1 cm hypermetabolic soft tissue thickening in the RIGHT external auditory canal, correlating with biopsy-proven basal cell carcinoma.  No evidence of hypermetabolic metastatic disease in the head, neck, chest, abdomen, pelvis, or bilateral lower extremities.  Moderate size hiatal hernia.    02/24/2023 - Appointment with :  Basal cell carcinoma of the right ear acoustic meatus  Tumor stage: cT1, cN0   Tumor burden/complications:  2 cm tumor present at the acoustic meatus causing decreased unilateral hearing with up to 1.1 cm depth of invasion with the deep margin essentially abutting the bony elements of the external auditory canal along the roof and floor of the external canaliorly, with the lesion abutting the capsule of the superficial lobe of the right parotid gland. There is a 7 mm hyperenhancing lymph node in the upper level 2B neck.  Per radiology this is not technically enlarged by size criteria but this does appear to be hyperenhancing relative to the other cervical nodes suspicious for leyda metastasis  High risk of recurrence (lesion on head and neck).  Not felt to be excisional candidate due to locally advanced lesion.  Up-to-date algorithm for nonsurgical candidates recommends primary radiation therapy (preferred) or hedgehog pathway inhibitor (preferred) or immune checkpoint inhibitor (cemiplimab).  Clinical follow-up for recurrence every 6-12 months.  --01/13/2023- PET scan:  1.  2.2 x 1.1 cm hypermetabolic soft tissue thickening in the RIGHT external auditory canal, correlating with biopsy-proven basal cell carcinoma. 2.  No evidence of hypermetabolic metastatic disease in the head, neck, chest, abdomen, pelvis, or bilateral lower extremities. 3.  Moderate size hiatal hernia  Definitive intent radiotherapy (anticipate 7000 cGy in 35 daily fractions - 1/11-present- 27/35 fx)  History of thrombocytopenia (ITP versus  "MDS)  --Previously followed by Dr. Beth Richards, [facility] for chronic thrombocytopenia.  Question ITP vs MDS.  Last seen in office by Dr. Richards, 07/08/2017.  At that time platelets have been stable for the past few years without active bleeding.  Platelet count was 23,000,.  To a baseline of 30,000.  No aggressive treatments were pursued.  Previous bone marrow biopsy, 11/05/2012 showed hypercellular marrow showing maturing trilineage hematopoiesis with mild dyserythropoiesis and mild megakaryocytic atypia.  No increase in blasts.    --11/28/2022- platelets 48,000 (ramon: 23,000 per records)  Diabetes mellitus.  Advanced age  Plan:  Apprised of the PET scan, 1/13/2023 (above).  No mets  Continue definitive intent radiotherapy (anticipate 7000 cGy in 35 daily fractions - 1/11-present- 27/35 fx)  Erivedge (vismodegib).  Potential toxicities again discussed (to include but not limited to: Liver injury, azotemia, hyponatremia, hypokalemia, Andres-Gulshan syndrome, toxic epidermal necrolysis, muscle spasms, alopecia, dysgeusia, weight loss, fatigue, nausea, amenorrhea, diarrhea, decreased appetite, constipation, arthralgia, vomiting, loss of taste, elevated CK, azotemia.    Discussed that if PET scan does not show metastatic disease and if disease can be encompassed by definitive RT, given patient's advanced age and comorbidities, will consider withholding systemic therapy until she develops more widespread \"measurable disease\".  On the other hand, if PET scan shows metastatic disease, will consider systemic therapy up-front    Continue management per PCP and other specialists  Schedule whole body PET scan in 23 weeks  Return to office in 24 weeks with preoffice CMP and CBC w diff.      04/20/2023 - Appointment with :  Rosanna Chaparro is status post completion of radiation therapy to right ear and presents to our clinic today for inital follow up exam. Diagnosed with locally advanced " high risk basal cell skin cancer of the right ear acoustic meatus obstructing the distal auditory canal. She completed 7000 cGy in 35 fractions to the right ear on 03/06/2023.   I have reviewed the chart and other physicians records. she denies untoward side effects from treatment and without evidence for recurrent or metastatic disease on exam today.   She will return in 3 months for follow up.   Continue ongoing management per primary care physician and other specialists.  Plan:  Return in 3 months     04/20/2023 - Appointment with :  Rosanna Chaparro is status post completion of radiation therapy to right ear and presents to our clinic today for inital follow up exam. 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 She completed 7000 cGy in 35 fractions to the right ear on 03/06/2023.   I have reviewed the chart and other physicians records. she denies untoward side effects from treatment and without evidence for recurrent or metastatic disease on exam today.   She will return in 3 months for follow up.   Continue ongoing management per primary care physician and other specialists.  Plan:  Return in 3 months     07/20/2023 - Appointment with :  Follow up in 3 months     08/04/2023 - PET scan:  Decreasing size and intensity of FDG uptake of the right external auditory canal supporting response to current therapy.   No evidence of regional lymphatic or distant metastasis.    08/24/2023 - Appointment with :  Assessment:  Basal cell carcinoma of the right ear acoustic meatus  Tumor stage: cT1, cN0   Tumor burden/complications:  2 cm tumor present at the acoustic meatus causing decreased unilateral hearing with up to 1.1 cm depth of invasion with the deep margin essentially abutting the bony elements of the external auditory canal along the roof and floor of the external canaliorly, with the  lesion abutting the capsule of the superficial lobe of the right parotid gland. There is a 7 mm hyperenhancing lymph node in the upper level 2B neck.  Per radiology this is not technically enlarged by size criteria but this does appear to be hyperenhancing relative to the other cervical nodes suspicious for leyda metastasis  High risk of recurrence (lesion on head and neck).  Not felt to be excisional candidate due to locally advanced lesion.  Up-to-date algorithm for nonsurgical candidates recommends primary radiation therapy (preferred) or hedgehog pathway inhibitor (preferred) or immune checkpoint inhibitor (cemiplimab).  Clinical follow-up for recurrence every 6-12 months.  --01/13/2023- PET scan:  1.  2.2 x 1.1 cm hypermetabolic soft tissue thickening in the RIGHT external auditory canal, correlating with biopsy-proven basal cell carcinoma. 2.  No evidence of hypermetabolic metastatic disease in the head, neck, chest, abdomen, pelvis, or bilateral lower extremities. 3.  Moderate size hiatal hernia  Definitive intent radiotherapy (7000 cGy in 35 daily fractions - 1/11/23- 3/6/23)  --8/4/23- PET scan- Decreasing size and intensity of FDG uptake of the right external auditory canal supporting response to current therapy. No evidence of regional lymphatic or distant metastasis.  History of thrombocytopenia (ITP versus MDS)  --Previously followed by Dr. Beth Richards, [facility] for chronic thrombocytopenia.  Question ITP vs MDS.  Last seen in office by Dr. Richards, 07/08/2017.  At that time platelets have been stable for the past few years without active bleeding.  Platelet count was 23,000,.  To a baseline of 30,000.  No aggressive treatments were pursued.  Previous bone marrow biopsy, 11/05/2012 showed hypercellular marrow showing maturing trilineage hematopoiesis with mild dyserythropoiesis and mild megakaryocytic atypia.  No increase in blasts.    --8/24/23- platelets 31,000 (ramon: 23,000 per  "records)  Mild normocytic anemia  --Stable.  Hgb 11.6, 8/24/23 (prior:  Hgb 11.9, 11/28/22)  Diabetes mellitus.  Advanced age  Plan:  Apprised of labs, 8/24/23- Hgb 11.6; MCV 91, WBC 5.7, platelets 31 (stable)  Add to labs:  Iron, fe sat, ferritin, B12, folate, anti-platelets antibodies and manual smear review by pathology  Apprised of the PET scan, 8/4/23 (above).  Decreasing size and intensity of FDG uptake of the right external auditory canal supporting response to current therapy. No evidence of regional lymphatic or distant metastasis.  Review progress note from PRATIBHA Dave with Dr. Taylor, 4/20/23 - A/P: Locally advanced high risk basal cell skin cancer of the right ear acoustic meatus obstructing the distal auditory canal. She completed 7000 cGy in 35 fractions to the right ear on 03/06/2023.  Without evidence for recurrent or metastatic disease on exam today. She will return in 3 months for follow up.   Erivedge (vismodegib).  Potential toxicities again discussed (to include but not limited to: Liver injury, azotemia, hyponatremia, hypokalemia, Andres-Gulshan syndrome, toxic epidermal necrolysis, muscle spasms, alopecia, dysgeusia, weight loss, fatigue, nausea, amenorrhea, diarrhea, decreased appetite, constipation, arthralgia, vomiting, loss of taste, elevated CK, azotemia.    Previously discussed that if PET scan does not show metastatic disease and if disease can be encompassed by definitive RT, given patient's advanced age and comorbidities, will consider withholding systemic therapy until she develops more widespread \"measurable disease\".  On the other hand, if PET scan shows metastatic disease, will consider systemic therapy up-front    Continue management per PCP and other specialists  Schedule whole body PET scan in 23 weeks  Return to office in 24 weeks with preoffice CMP, iron, fe sat, ferritin, b12, folate and CBC w diff.      09/19/2023 - Appointment with :  Follow up in 5 months "     History obtained from  PATIENT, FAMILY, and CHART    PAST MEDICAL HISTORY  Past Medical History:   Diagnosis Date    Basal cell carcinoma (BCC) of jamari of right ear     Idiopathic thrombocytopenic purpura (ITP)     Kidney stones       PAST SURGICAL HISTORY  Past Surgical History:   Procedure Laterality Date    APPENDECTOMY      CHOLECYSTECTOMY      SHOULDER SURGERY Left       FAMILY HISTORY  family history is not on file.     SOCIAL HISTORY  Social History     Tobacco Use    Smoking status: Never    Smokeless tobacco: Never   Vaping Use    Vaping Use: Never used   Substance Use Topics    Alcohol use: Never    Drug use: Never     ALLERGIES  Tramadol     MEDICATIONS    Current Outpatient Medications:     acetaminophen (TYLENOL) 500 MG tablet, Take 1 tablet by mouth Every 6 (Six) Hours As Needed for Mild Pain., Disp: , Rfl:     ascorbic acid (VITAMIN C) 1000 MG tablet, Take 1 tablet by mouth Daily., Disp: , Rfl:     CALCIUM PO, Take 1 tablet by mouth Daily., Disp: , Rfl:     CRANBERRY PO, Take 1 tablet by mouth Daily., Disp: , Rfl:     ibuprofen (ADVIL,MOTRIN) 200 MG tablet, Take 1 tablet by mouth Every 6 (Six) Hours As Needed for Mild Pain., Disp: , Rfl:     Multiple Vitamins-Minerals (EMERGEN-C IMMUNE PLUS PO), Take 1 tablet by mouth Daily., Disp: , Rfl:     ofloxacin (FLOXIN) 0.3 % otic solution, Administer 4 drops into ear(s) as directed by provider 2 (Two) Times a Day for 25 days., Disp: 10 mL, Rfl: 0    potassium chloride (K-DUR,KLOR-CON) 20 MEQ CR tablet, Take 1 tablet by mouth 2 (Two) Times a Day., Disp: , Rfl:     Vitamin D, Cholecalciferol, (CHOLECALCIFEROL) 10 MCG (400 UNIT) tablet, Take 1 tablet by mouth Daily., Disp: , Rfl:     Current outpatient and discharge medications have been reconciled for the patient.  Reviewed by: Clement Taylor III, MD    The following portions of the patient's history were reviewed and updated as appropriate: allergies, current medications, past family history, past  "medical history, past social history, past surgical history and problem list.    REVIEW OF SYSTEMS  Review of Systems   Constitutional: Negative.  Negative for activity change and fatigue.   HENT:  Positive for ear discharge (minimal bleeding, using ear drops). Negative for ear pain.    Respiratory: Negative.  Negative for cough and shortness of breath.    Cardiovascular: Negative.  Negative for chest pain.   Gastrointestinal: Negative.    Genitourinary: Negative.    Musculoskeletal: Negative.    Skin: Negative.    Neurological: Negative.  Negative for dizziness and weakness.   Hematological: Negative.  Negative for adenopathy.   Psychiatric/Behavioral: Negative.     All other systems reviewed and are negative.    I have reviewed and confirmed the accuracy of the ROS as documented by the MA/LPN/RN Clement Taylor III, MD    PHYSICAL EXAM  VITAL SIGNS:   Vitals:    10/19/23 1020   BP: 95/59   Pulse: 78   SpO2: 99%  Comment: room air   Weight: 68 kg (150 lb)   Height: 167.6 cm (66\")   PainSc: 0-No pain       Physical Exam  Vitals reviewed.   Constitutional:       Appearance: Normal appearance.   HENT:      Head: Normocephalic.      Right Ear: Hearing normal. Drainage present. No swelling.      Ears:      Comments: Right ear s/p radiation therapy; small amount of bloody drainage noted.  Cardiovascular:      Rate and Rhythm: Normal rate and regular rhythm.   Pulmonary:      Effort: Pulmonary effort is normal.      Breath sounds: Normal breath sounds.   Abdominal:      General: Bowel sounds are normal.   Musculoskeletal:         General: Normal range of motion.      Cervical back: Normal range of motion and neck supple.   Skin:     General: Skin is warm.      Capillary Refill: Capillary refill takes less than 2 seconds.   Neurological:      General: No focal deficit present.      Mental Status: She is alert and oriented to person, place, and time.   Psychiatric:         Mood and Affect: Mood normal.         Performance " Status: ECOG (1) Restricted in physically strenuous activity, ambulatory and able to do work of light nature    Clinical Quality Measures  -Pain Documented by Standardized Tool, FPS Rosanna Chaparro reports a pain score of 0. Given her pain assessment as noted, treatment options were discussed and the following options were decided upon as a follow-up plan to address the patient's pain:  No pain, no plan given .   Pain Medications               acetaminophen (TYLENOL) 500 MG tablet Take 1 tablet by mouth Every 6 (Six) Hours As Needed for Mild Pain.    ibuprofen (ADVIL,MOTRIN) 200 MG tablet Take 1 tablet by mouth Every 6 (Six) Hours As Needed for Mild Pain.          -Advanced Care Planning Advance Care Planning  ACP discussion was held with the patient during this visit. Patient does not have an advance directive, information provided.     -Body Mass Index Screening and Follow-Up Plan BMI is within normal parameters. No other follow-up for BMI required.    -Tobacco Use: Screening and Cessation Intervention Social History    Tobacco Use      Smoking status: Never      Smokeless tobacco: Never     ASSESSMENT AND PLAN  1. Basal cell carcinoma (BCC) of skin of ear, unspecified laterality    2. History of radiation therapy    3. Non-smoker      RECOMMENDATIONS:    Rosanna Chaparro is status post completion of radiation therapy to right ear and presents to our clinic today for routine follow up exam. 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 She completed 7000 cGy in 35 fractions to the right ear on 03/06/2023.     PET scan completed 08/04/2023 revealed a decreasing size and intensity of FDG uptake of the right external auditory canal supporting response to current therapy. No evidence of regional lymphatic or distant metastasis.    I have reviewed the chart and other physicians records. she denies untoward side  effects from treatment and without evidence for recurrent or metastatic disease on exam today. She will return in 2 months for follow up. Continue ongoing management per primary care physician and other specialists.    Patient Instructions   1) Return in 2 months    Clement Taylor III, MD  10/19/2023              negative...

## 2023-10-19 ENCOUNTER — TRANSCRIPTION ENCOUNTER (OUTPATIENT)
Age: 78
End: 2023-10-19

## 2023-10-20 ENCOUNTER — RESULT REVIEW (OUTPATIENT)
Age: 78
End: 2023-10-20

## 2023-10-20 ENCOUNTER — OUTPATIENT (OUTPATIENT)
Dept: OUTPATIENT SERVICES | Facility: HOSPITAL | Age: 78
LOS: 1 days | End: 2023-10-20
Payer: MEDICARE

## 2023-10-20 ENCOUNTER — APPOINTMENT (OUTPATIENT)
Dept: CARDIOTHORACIC SURGERY | Facility: CLINIC | Age: 78
End: 2023-10-20
Payer: MEDICARE

## 2023-10-20 VITALS
OXYGEN SATURATION: 95 % | SYSTOLIC BLOOD PRESSURE: 131 MMHG | RESPIRATION RATE: 20 BRPM | HEIGHT: 74 IN | TEMPERATURE: 98.1 F | HEART RATE: 80 BPM | WEIGHT: 300 LBS | BODY MASS INDEX: 38.5 KG/M2 | DIASTOLIC BLOOD PRESSURE: 70 MMHG

## 2023-10-20 DIAGNOSIS — J90 PLEURAL EFFUSION, NOT ELSEWHERE CLASSIFIED: ICD-10-CM

## 2023-10-20 DIAGNOSIS — Z98.89 OTHER SPECIFIED POSTPROCEDURAL STATES: Chronic | ICD-10-CM

## 2023-10-20 DIAGNOSIS — I35.0 NONRHEUMATIC AORTIC (VALVE) STENOSIS: ICD-10-CM

## 2023-10-20 DIAGNOSIS — Z95.2 PRESENCE OF PROSTHETIC HEART VALVE: ICD-10-CM

## 2023-10-20 DIAGNOSIS — C49.9 MALIGNANT NEOPLASM OF CONNECTIVE AND SOFT TISSUE, UNSPECIFIED: Chronic | ICD-10-CM

## 2023-10-20 DIAGNOSIS — Z95.5 PRESENCE OF CORONARY ANGIOPLASTY IMPLANT AND GRAFT: Chronic | ICD-10-CM

## 2023-10-20 PROCEDURE — 71046 X-RAY EXAM CHEST 2 VIEWS: CPT

## 2023-10-20 PROCEDURE — 71046 X-RAY EXAM CHEST 2 VIEWS: CPT | Mod: 26

## 2023-10-20 PROCEDURE — 99213 OFFICE O/P EST LOW 20 MIN: CPT

## 2023-10-20 RX ORDER — ACETAMINOPHEN 325 MG/1
325 TABLET ORAL
Refills: 0 | Status: ACTIVE | COMMUNITY

## 2023-10-20 RX ORDER — DOXAZOSIN 2 MG/1
2 TABLET ORAL
Refills: 0 | Status: ACTIVE | COMMUNITY

## 2023-10-24 ENCOUNTER — TRANSCRIPTION ENCOUNTER (OUTPATIENT)
Age: 78
End: 2023-10-24

## 2023-10-28 ENCOUNTER — INPATIENT (INPATIENT)
Facility: HOSPITAL | Age: 78
LOS: 5 days | Discharge: HOME | DRG: 291 | End: 2023-11-03
Attending: THORACIC SURGERY (CARDIOTHORACIC VASCULAR SURGERY) | Admitting: THORACIC SURGERY (CARDIOTHORACIC VASCULAR SURGERY)
Payer: MEDICARE

## 2023-10-28 VITALS
HEART RATE: 112 BPM | RESPIRATION RATE: 24 BRPM | DIASTOLIC BLOOD PRESSURE: 80 MMHG | HEIGHT: 74 IN | WEIGHT: 313.06 LBS | OXYGEN SATURATION: 94 % | TEMPERATURE: 99 F | SYSTOLIC BLOOD PRESSURE: 156 MMHG

## 2023-10-28 DIAGNOSIS — C49.9 MALIGNANT NEOPLASM OF CONNECTIVE AND SOFT TISSUE, UNSPECIFIED: Chronic | ICD-10-CM

## 2023-10-28 DIAGNOSIS — Z98.89 OTHER SPECIFIED POSTPROCEDURAL STATES: Chronic | ICD-10-CM

## 2023-10-28 DIAGNOSIS — Z95.5 PRESENCE OF CORONARY ANGIOPLASTY IMPLANT AND GRAFT: Chronic | ICD-10-CM

## 2023-10-28 DIAGNOSIS — R06.00 DYSPNEA, UNSPECIFIED: ICD-10-CM

## 2023-10-28 LAB
ALBUMIN SERPL ELPH-MCNC: 3.8 G/DL — SIGNIFICANT CHANGE UP (ref 3.3–5.2)
ALBUMIN SERPL ELPH-MCNC: 3.8 G/DL — SIGNIFICANT CHANGE UP (ref 3.3–5.2)
ALP SERPL-CCNC: 232 U/L — HIGH (ref 40–120)
ALP SERPL-CCNC: 232 U/L — HIGH (ref 40–120)
ALT FLD-CCNC: 14 U/L — SIGNIFICANT CHANGE UP
ALT FLD-CCNC: 14 U/L — SIGNIFICANT CHANGE UP
ANION GAP SERPL CALC-SCNC: 9 MMOL/L — SIGNIFICANT CHANGE UP (ref 5–17)
ANION GAP SERPL CALC-SCNC: 9 MMOL/L — SIGNIFICANT CHANGE UP (ref 5–17)
AST SERPL-CCNC: 13 U/L — SIGNIFICANT CHANGE UP
AST SERPL-CCNC: 13 U/L — SIGNIFICANT CHANGE UP
BASOPHILS # BLD AUTO: 0.05 K/UL — SIGNIFICANT CHANGE UP (ref 0–0.2)
BASOPHILS # BLD AUTO: 0.05 K/UL — SIGNIFICANT CHANGE UP (ref 0–0.2)
BASOPHILS NFR BLD AUTO: 0.8 % — SIGNIFICANT CHANGE UP (ref 0–2)
BASOPHILS NFR BLD AUTO: 0.8 % — SIGNIFICANT CHANGE UP (ref 0–2)
BILIRUB SERPL-MCNC: 0.7 MG/DL — SIGNIFICANT CHANGE UP (ref 0.4–2)
BILIRUB SERPL-MCNC: 0.7 MG/DL — SIGNIFICANT CHANGE UP (ref 0.4–2)
BUN SERPL-MCNC: 24.5 MG/DL — HIGH (ref 8–20)
BUN SERPL-MCNC: 24.5 MG/DL — HIGH (ref 8–20)
CALCIUM SERPL-MCNC: 8.8 MG/DL — SIGNIFICANT CHANGE UP (ref 8.4–10.5)
CALCIUM SERPL-MCNC: 8.8 MG/DL — SIGNIFICANT CHANGE UP (ref 8.4–10.5)
CHLORIDE SERPL-SCNC: 102 MMOL/L — SIGNIFICANT CHANGE UP (ref 96–108)
CHLORIDE SERPL-SCNC: 102 MMOL/L — SIGNIFICANT CHANGE UP (ref 96–108)
CO2 SERPL-SCNC: 34 MMOL/L — HIGH (ref 22–29)
CO2 SERPL-SCNC: 34 MMOL/L — HIGH (ref 22–29)
CREAT SERPL-MCNC: 0.6 MG/DL — SIGNIFICANT CHANGE UP (ref 0.5–1.3)
CREAT SERPL-MCNC: 0.6 MG/DL — SIGNIFICANT CHANGE UP (ref 0.5–1.3)
EGFR: 99 ML/MIN/1.73M2 — SIGNIFICANT CHANGE UP
EGFR: 99 ML/MIN/1.73M2 — SIGNIFICANT CHANGE UP
EOSINOPHIL # BLD AUTO: 0.45 K/UL — SIGNIFICANT CHANGE UP (ref 0–0.5)
EOSINOPHIL # BLD AUTO: 0.45 K/UL — SIGNIFICANT CHANGE UP (ref 0–0.5)
EOSINOPHIL NFR BLD AUTO: 7.5 % — HIGH (ref 0–6)
EOSINOPHIL NFR BLD AUTO: 7.5 % — HIGH (ref 0–6)
GLUCOSE SERPL-MCNC: 153 MG/DL — HIGH (ref 70–99)
GLUCOSE SERPL-MCNC: 153 MG/DL — HIGH (ref 70–99)
HCT VFR BLD CALC: 38.8 % — LOW (ref 39–50)
HCT VFR BLD CALC: 38.8 % — LOW (ref 39–50)
HGB BLD-MCNC: 12 G/DL — LOW (ref 13–17)
HGB BLD-MCNC: 12 G/DL — LOW (ref 13–17)
IMM GRANULOCYTES NFR BLD AUTO: 0.3 % — SIGNIFICANT CHANGE UP (ref 0–0.9)
IMM GRANULOCYTES NFR BLD AUTO: 0.3 % — SIGNIFICANT CHANGE UP (ref 0–0.9)
LYMPHOCYTES # BLD AUTO: 0.85 K/UL — LOW (ref 1–3.3)
LYMPHOCYTES # BLD AUTO: 0.85 K/UL — LOW (ref 1–3.3)
LYMPHOCYTES # BLD AUTO: 14.2 % — SIGNIFICANT CHANGE UP (ref 13–44)
LYMPHOCYTES # BLD AUTO: 14.2 % — SIGNIFICANT CHANGE UP (ref 13–44)
MCHC RBC-ENTMCNC: 29.1 PG — SIGNIFICANT CHANGE UP (ref 27–34)
MCHC RBC-ENTMCNC: 29.1 PG — SIGNIFICANT CHANGE UP (ref 27–34)
MCHC RBC-ENTMCNC: 30.9 GM/DL — LOW (ref 32–36)
MCHC RBC-ENTMCNC: 30.9 GM/DL — LOW (ref 32–36)
MCV RBC AUTO: 94.2 FL — SIGNIFICANT CHANGE UP (ref 80–100)
MCV RBC AUTO: 94.2 FL — SIGNIFICANT CHANGE UP (ref 80–100)
MONOCYTES # BLD AUTO: 0.65 K/UL — SIGNIFICANT CHANGE UP (ref 0–0.9)
MONOCYTES # BLD AUTO: 0.65 K/UL — SIGNIFICANT CHANGE UP (ref 0–0.9)
MONOCYTES NFR BLD AUTO: 10.8 % — SIGNIFICANT CHANGE UP (ref 2–14)
MONOCYTES NFR BLD AUTO: 10.8 % — SIGNIFICANT CHANGE UP (ref 2–14)
NEUTROPHILS # BLD AUTO: 3.98 K/UL — SIGNIFICANT CHANGE UP (ref 1.8–7.4)
NEUTROPHILS # BLD AUTO: 3.98 K/UL — SIGNIFICANT CHANGE UP (ref 1.8–7.4)
NEUTROPHILS NFR BLD AUTO: 66.4 % — SIGNIFICANT CHANGE UP (ref 43–77)
NEUTROPHILS NFR BLD AUTO: 66.4 % — SIGNIFICANT CHANGE UP (ref 43–77)
NT-PROBNP SERPL-SCNC: 2259 PG/ML — HIGH (ref 0–300)
NT-PROBNP SERPL-SCNC: 2259 PG/ML — HIGH (ref 0–300)
PLATELET # BLD AUTO: 163 K/UL — SIGNIFICANT CHANGE UP (ref 150–400)
PLATELET # BLD AUTO: 163 K/UL — SIGNIFICANT CHANGE UP (ref 150–400)
POTASSIUM SERPL-MCNC: 4.3 MMOL/L — SIGNIFICANT CHANGE UP (ref 3.5–5.3)
POTASSIUM SERPL-MCNC: 4.3 MMOL/L — SIGNIFICANT CHANGE UP (ref 3.5–5.3)
POTASSIUM SERPL-SCNC: 4.3 MMOL/L — SIGNIFICANT CHANGE UP (ref 3.5–5.3)
POTASSIUM SERPL-SCNC: 4.3 MMOL/L — SIGNIFICANT CHANGE UP (ref 3.5–5.3)
PROT SERPL-MCNC: 6.5 G/DL — LOW (ref 6.6–8.7)
PROT SERPL-MCNC: 6.5 G/DL — LOW (ref 6.6–8.7)
RBC # BLD: 4.12 M/UL — LOW (ref 4.2–5.8)
RBC # BLD: 4.12 M/UL — LOW (ref 4.2–5.8)
RBC # FLD: 14.2 % — SIGNIFICANT CHANGE UP (ref 10.3–14.5)
RBC # FLD: 14.2 % — SIGNIFICANT CHANGE UP (ref 10.3–14.5)
SODIUM SERPL-SCNC: 145 MMOL/L — SIGNIFICANT CHANGE UP (ref 135–145)
SODIUM SERPL-SCNC: 145 MMOL/L — SIGNIFICANT CHANGE UP (ref 135–145)
TROPONIN T SERPL-MCNC: 0.03 NG/ML — SIGNIFICANT CHANGE UP (ref 0–0.06)
TROPONIN T SERPL-MCNC: 0.03 NG/ML — SIGNIFICANT CHANGE UP (ref 0–0.06)
WBC # BLD: 6 K/UL — SIGNIFICANT CHANGE UP (ref 3.8–10.5)
WBC # BLD: 6 K/UL — SIGNIFICANT CHANGE UP (ref 3.8–10.5)
WBC # FLD AUTO: 6 K/UL — SIGNIFICANT CHANGE UP (ref 3.8–10.5)
WBC # FLD AUTO: 6 K/UL — SIGNIFICANT CHANGE UP (ref 3.8–10.5)

## 2023-10-28 PROCEDURE — 93010 ELECTROCARDIOGRAM REPORT: CPT | Mod: 76

## 2023-10-28 PROCEDURE — 71045 X-RAY EXAM CHEST 1 VIEW: CPT | Mod: 26

## 2023-10-28 PROCEDURE — 71275 CT ANGIOGRAPHY CHEST: CPT | Mod: 26

## 2023-10-28 PROCEDURE — 99285 EMERGENCY DEPT VISIT HI MDM: CPT

## 2023-10-28 PROCEDURE — 93306 TTE W/DOPPLER COMPLETE: CPT | Mod: 26

## 2023-10-28 RX ORDER — SUCRALFATE 1 G
1 TABLET ORAL
Refills: 0 | Status: DISCONTINUED | OUTPATIENT
Start: 2023-10-28 | End: 2023-11-03

## 2023-10-28 RX ORDER — IPRATROPIUM/ALBUTEROL SULFATE 18-103MCG
3 AEROSOL WITH ADAPTER (GRAM) INHALATION EVERY 6 HOURS
Refills: 0 | Status: DISCONTINUED | OUTPATIENT
Start: 2023-10-28 | End: 2023-11-03

## 2023-10-28 RX ORDER — FUROSEMIDE 40 MG
40 TABLET ORAL ONCE
Refills: 0 | Status: COMPLETED | OUTPATIENT
Start: 2023-10-28 | End: 2023-10-28

## 2023-10-28 RX ORDER — SODIUM CHLORIDE 9 MG/ML
3 INJECTION INTRAMUSCULAR; INTRAVENOUS; SUBCUTANEOUS EVERY 8 HOURS
Refills: 0 | Status: DISCONTINUED | OUTPATIENT
Start: 2023-10-28 | End: 2023-11-03

## 2023-10-28 RX ORDER — DEXTROSE 50 % IN WATER 50 %
12.5 SYRINGE (ML) INTRAVENOUS ONCE
Refills: 0 | Status: DISCONTINUED | OUTPATIENT
Start: 2023-10-28 | End: 2023-11-03

## 2023-10-28 RX ORDER — ASPIRIN/CALCIUM CARB/MAGNESIUM 324 MG
81 TABLET ORAL DAILY
Refills: 0 | Status: DISCONTINUED | OUTPATIENT
Start: 2023-10-28 | End: 2023-11-03

## 2023-10-28 RX ORDER — FUROSEMIDE 40 MG
40 TABLET ORAL ONCE
Refills: 0 | Status: DISCONTINUED | OUTPATIENT
Start: 2023-10-28 | End: 2023-10-28

## 2023-10-28 RX ORDER — METOPROLOL TARTRATE 50 MG
50 TABLET ORAL
Refills: 0 | Status: DISCONTINUED | OUTPATIENT
Start: 2023-10-28 | End: 2023-10-30

## 2023-10-28 RX ORDER — ALBUTEROL 90 UG/1
2 AEROSOL, METERED ORAL EVERY 6 HOURS
Refills: 0 | Status: DISCONTINUED | OUTPATIENT
Start: 2023-10-28 | End: 2023-11-03

## 2023-10-28 RX ORDER — SENNA PLUS 8.6 MG/1
2 TABLET ORAL AT BEDTIME
Refills: 0 | Status: DISCONTINUED | OUTPATIENT
Start: 2023-10-28 | End: 2023-11-03

## 2023-10-28 RX ORDER — GLUCAGON INJECTION, SOLUTION 0.5 MG/.1ML
1 INJECTION, SOLUTION SUBCUTANEOUS ONCE
Refills: 0 | Status: DISCONTINUED | OUTPATIENT
Start: 2023-10-28 | End: 2023-11-03

## 2023-10-28 RX ORDER — FUROSEMIDE 40 MG
40 TABLET ORAL
Refills: 0 | Status: DISCONTINUED | OUTPATIENT
Start: 2023-10-28 | End: 2023-11-02

## 2023-10-28 RX ORDER — FINASTERIDE 5 MG/1
5 TABLET, FILM COATED ORAL DAILY
Refills: 0 | Status: DISCONTINUED | OUTPATIENT
Start: 2023-10-28 | End: 2023-11-03

## 2023-10-28 RX ORDER — ATORVASTATIN CALCIUM 80 MG/1
10 TABLET, FILM COATED ORAL AT BEDTIME
Refills: 0 | Status: DISCONTINUED | OUTPATIENT
Start: 2023-10-28 | End: 2023-11-03

## 2023-10-28 RX ORDER — SODIUM CHLORIDE 9 MG/ML
1000 INJECTION, SOLUTION INTRAVENOUS
Refills: 0 | Status: DISCONTINUED | OUTPATIENT
Start: 2023-10-28 | End: 2023-11-03

## 2023-10-28 RX ORDER — DOXAZOSIN MESYLATE 4 MG
2 TABLET ORAL DAILY
Refills: 0 | Status: DISCONTINUED | OUTPATIENT
Start: 2023-10-28 | End: 2023-11-03

## 2023-10-28 RX ORDER — INSULIN LISPRO 100/ML
VIAL (ML) SUBCUTANEOUS
Refills: 0 | Status: DISCONTINUED | OUTPATIENT
Start: 2023-10-28 | End: 2023-11-03

## 2023-10-28 RX ORDER — POTASSIUM CHLORIDE 20 MEQ
20 PACKET (EA) ORAL DAILY
Refills: 0 | Status: DISCONTINUED | OUTPATIENT
Start: 2023-10-28 | End: 2023-11-03

## 2023-10-28 RX ORDER — PANTOPRAZOLE SODIUM 20 MG/1
40 TABLET, DELAYED RELEASE ORAL
Refills: 0 | Status: DISCONTINUED | OUTPATIENT
Start: 2023-10-28 | End: 2023-11-03

## 2023-10-28 RX ORDER — DEXTROSE 50 % IN WATER 50 %
25 SYRINGE (ML) INTRAVENOUS ONCE
Refills: 0 | Status: DISCONTINUED | OUTPATIENT
Start: 2023-10-28 | End: 2023-11-03

## 2023-10-28 RX ORDER — ACETAMINOPHEN 500 MG
650 TABLET ORAL EVERY 6 HOURS
Refills: 0 | Status: DISCONTINUED | OUTPATIENT
Start: 2023-10-28 | End: 2023-11-03

## 2023-10-28 RX ADMIN — ATORVASTATIN CALCIUM 10 MILLIGRAM(S): 80 TABLET, FILM COATED ORAL at 22:34

## 2023-10-28 RX ADMIN — Medication 40 MILLIGRAM(S): at 16:43

## 2023-10-28 RX ADMIN — Medication 40 MILLIGRAM(S): at 22:33

## 2023-10-28 RX ADMIN — SODIUM CHLORIDE 3 MILLILITER(S): 9 INJECTION INTRAMUSCULAR; INTRAVENOUS; SUBCUTANEOUS at 22:35

## 2023-10-28 NOTE — ED ADULT NURSE NOTE - NSFALLUNIVINTERV_ED_ALL_ED
HPI     Pt was referred by Dr. Burton for Fuchs dystrophy evaluation OU.     Pt states that she was dx with Fuchs about a year and half ago and noticed   that her vision is getting worse OS>OD. Pt states that she also has a lot   of pain and soreness that started about a month ago OS>OD. Pt admits to   occ floaters OS but denies any flashes at this time OU.     Pt confirms using  Kia 128% QHS OU  Ocuvite QD         Last edited by Lakia Owens on 1/8/2020  1:15 PM. (History)            Assessment /Plan     For exam results, see Encounter Report.    Corneal stromal dystrophy    Nuclear sclerosis, bilateral      Pt has central crocodile shagreen, which is a benign corneal stromal dystrophy not needing any treatment.  The endothelium is health with cell counts about 2500 c/mm2  Visual decline likely due to early cataract formation, monitor for now as still correctable to 20/20.                 
Bed/Stretcher in lowest position, wheels locked, appropriate side rails in place/Call bell, personal items and telephone in reach/Instruct patient to call for assistance before getting out of bed/chair/stretcher/Non-slip footwear applied when patient is off stretcher/[place] to call system/Physically safe environment - no spills, clutter or unnecessary equipment/Purposeful proactive rounding/Room/bathroom lighting operational, light cord in reach

## 2023-10-28 NOTE — CHART NOTE - NSCHARTNOTEFT_GEN_A_CORE
CTS    Briefly,  77 year old male with a PMH Sev AS, Chronic Diastolic CHF, CAD s/p PCI to p&mLAD (?), Thoracic Aortic Aneurysm, Paroxysmal Afib s/p Ablation (on Eliquis), LLE DVT, Home O2 (prn), ALLIE, BPH s/p TURP, Left Renal Mass, Morbid Obesity, DM (A1C 7.2), GERD, Cholelithiasis, Laminectomy, Anxiety, MRCP w/ cirrhosis, multiple CHF admissions in 2023, last 6/10-6/23/23 during which he was found to have HFrEF and severe AS suspected secondary to AF then s/p DCCV with EP and Cardiomems insertion.  Pt. s/p 10/5 percutaneous transfemoral TAVR via [facility] ([facility]) with Dr. Napier on 10/6/23. Pt. admitted to day for SOB x 3 days pt. states he did not take lasix dose today, chest xray obtained, will continue to diuresis, awaiting echo, if normal will D/C to home.  Discussed with Dr. Gonzalez.

## 2023-10-28 NOTE — ED CLERICAL - NS ED CLERK NOTE PRE-ARRIVAL INFORMATION; ADDITIONAL PRE-ARRIVAL INFORMATION
This patient is enrolled in the Follow Your Heart program and has undergone a cardiac surgery procedure and has active care navigation. This patient can be followed up by the care navigation team within 24 hours. To arrange close follow-up or to obtain additional clinical information about this patient, please call the contact number above. Please call the cardiac surgery team once patient is registered at [phone] for consultation PRIOR to disposition decision.  The patient recently underwent a cardiac surgery procedure and the team can assist in acute medical management.

## 2023-10-28 NOTE — ED ADULT NURSE NOTE - OBJECTIVE STATEMENT
Patient is alert and oriented but very had of hearing. Patient comes in with c/o sob worsening x 3 days.  Patient reports having valve replacement surgery, and pacemaker placement approx 2 weeks ago at [facility].  Denies cp.  Pt on 3LNC home O2.

## 2023-10-28 NOTE — ED ADULT TRIAGE NOTE - INTERNATIONAL TRAVEL
Spoke to patient regarding confusion with appointment and lab appointment not being scheduled in a timely manner so he can get them done for this appointment.   Appointment was cancelled due to this.  Responded to my chart message.  Management involved and aware.  Reviewed with Alisia, patient just saw PCP on 10/20 and had medication changes with Lisinopril and lasix so would like to see updated kidney functions (orders placed) to see after changes made.  Per Alisia, ok to see patient as video on Friday (her in person day) as patient lives in [place] and it would be difficult for patient to come to the [place]. Patient scheduled lab appointment for [place] tomorrow at 1145.  Patient appreciative of the call and help to reschedule.  Mary Ellen Díaz LPN  Nephrology  [phone]     No

## 2023-10-28 NOTE — ED PROVIDER NOTE - ATTENDING CONTRIBUTION TO CARE
77 yr male with PMH DVT and CHF, status post valve replacement and pacemaker two weeks ago, presents due to shortness of breath. Began 3 days ago, worsening, worse with sleeping and exertion. Denies Hemoptysis, cough and chest pain. On presentation was tachycardiac at 112, now 87. Physical exam reveals dec. right lower lobe lung sounds.  Pitting edema worse on left foot than right. Differentials include CHF exacerbation, PE, Pneumonia and ACS. Plan includes EKG, trop, CBC, CMP, CXR, give torsemide dose, doppler LE, CTA angio.

## 2023-10-28 NOTE — ED PROVIDER NOTE - OBJECTIVE STATEMENT
77 yr male with PMH of DVT, CHF s/p valve post valve replacement and pacemaker two weeks ago, present due to shortness of breath. Began 3 days ago, getting worse, worse with exertion laying down on left side and sleep. Better laying on his right side. Also complains of vertigo, lightheadedness and dizziness. Patient admits to nausea and headache. Denies vomiting, chest pain, cough and hemoptysis. Pt. is on Eliquis since procedure hasn't missed any doses and f/u with Dr. Mckinney was normal 1 week go. Patient skipped dose of Torsemide this morning. Reports taking all other meds this morning at 6:15 am including Eliquis.   PMH: CAD, DVT, HTN, T2DM  Sx: valve replacement and pacemaker 2 weeks ago, Ablation in June, laminectomy  Social: 30 yr smoking history amplying from 1pk per day to 3 pks perday quit in 2000. No -OH, no drugs

## 2023-10-28 NOTE — ED PROVIDER NOTE - CCCP TRG CHIEF CMPLNT
shortness of breath [Well Developed] : well developed [Well Nourished] : well nourished [No Acute Distress] : no acute distress [Normal Conjunctiva] : normal conjunctiva [Normal Venous Pressure] : normal venous pressure [No Carotid Bruit] : no carotid bruit [Normal S1, S2] : normal S1, S2 [No Murmur] : no murmur [No Rub] : no rub [No Gallop] : no gallop [Clear Lung Fields] : clear lung fields [Good Air Entry] : good air entry [No Respiratory Distress] : no respiratory distress  [Soft] : abdomen soft [Non Tender] : non-tender [No Masses/organomegaly] : no masses/organomegaly [Normal Bowel Sounds] : normal bowel sounds [Normal Gait] : normal gait [No Edema] : no edema [No Cyanosis] : no cyanosis [No Clubbing] : no clubbing [No Varicosities] : no varicosities [No Rash] : no rash [No Skin Lesions] : no skin lesions [Moves all extremities] : moves all extremities [No Focal Deficits] : no focal deficits [Normal Speech] : normal speech [Alert and Oriented] : alert and oriented [Normal memory] : normal memory

## 2023-10-28 NOTE — ED ADULT NURSE NOTE - CHIEF COMPLAINT QUOTE
Pt BIBA c/o sob worsening x 3 days.  Pt reports having valve replacement surgery, and pacemaker placement approx 2 weeks ago at [facility].  Denies cp.  Pt on 3LNC home O2.

## 2023-10-28 NOTE — ED ADULT TRIAGE NOTE - CHIEF COMPLAINT QUOTE
Pt BIBA c/o sob worsening x 3 days.  Pt reports having valve replacement surgery, and pacemaker placement approx 2 weeks ago at [facility].  Denies cp.  Pt on 3LNC home O2.

## 2023-10-29 ENCOUNTER — RESULT REVIEW (OUTPATIENT)
Age: 78
End: 2023-10-29

## 2023-10-29 DIAGNOSIS — I48.0 PAROXYSMAL ATRIAL FIBRILLATION: ICD-10-CM

## 2023-10-29 DIAGNOSIS — Z95.2 PRESENCE OF PROSTHETIC HEART VALVE: ICD-10-CM

## 2023-10-29 DIAGNOSIS — I50.43 ACUTE ON CHRONIC COMBINED SYSTOLIC (CONGESTIVE) AND DIASTOLIC (CONGESTIVE) HEART FAILURE: ICD-10-CM

## 2023-10-29 DIAGNOSIS — J90 PLEURAL EFFUSION, NOT ELSEWHERE CLASSIFIED: ICD-10-CM

## 2023-10-29 DIAGNOSIS — I50.33 ACUTE ON CHRONIC DIASTOLIC (CONGESTIVE) HEART FAILURE: ICD-10-CM

## 2023-10-29 DIAGNOSIS — Z29.9 ENCOUNTER FOR PROPHYLACTIC MEASURES, UNSPECIFIED: ICD-10-CM

## 2023-10-29 DIAGNOSIS — J96.01 ACUTE RESPIRATORY FAILURE WITH HYPOXIA: ICD-10-CM

## 2023-10-29 LAB
A1C WITH ESTIMATED AVERAGE GLUCOSE RESULT: 7.4 % — HIGH (ref 4–5.6)
A1C WITH ESTIMATED AVERAGE GLUCOSE RESULT: 7.4 % — HIGH (ref 4–5.6)
ALBUMIN FLD-MCNC: 1.4 G/DL — SIGNIFICANT CHANGE UP
ALBUMIN FLD-MCNC: 1.4 G/DL — SIGNIFICANT CHANGE UP
ALBUMIN SERPL ELPH-MCNC: 3.5 G/DL — SIGNIFICANT CHANGE UP (ref 3.3–5.2)
ALBUMIN SERPL ELPH-MCNC: 3.5 G/DL — SIGNIFICANT CHANGE UP (ref 3.3–5.2)
ALP SERPL-CCNC: 202 U/L — HIGH (ref 40–120)
ALP SERPL-CCNC: 202 U/L — HIGH (ref 40–120)
ALT FLD-CCNC: 12 U/L — SIGNIFICANT CHANGE UP
ALT FLD-CCNC: 12 U/L — SIGNIFICANT CHANGE UP
ANION GAP SERPL CALC-SCNC: 9 MMOL/L — SIGNIFICANT CHANGE UP (ref 5–17)
ANION GAP SERPL CALC-SCNC: 9 MMOL/L — SIGNIFICANT CHANGE UP (ref 5–17)
APPEARANCE UR: CLEAR — SIGNIFICANT CHANGE UP
APPEARANCE UR: CLEAR — SIGNIFICANT CHANGE UP
APTT BLD: 35.7 SEC — HIGH (ref 24.5–35.6)
APTT BLD: 35.7 SEC — HIGH (ref 24.5–35.6)
AST SERPL-CCNC: 12 U/L — SIGNIFICANT CHANGE UP
AST SERPL-CCNC: 12 U/L — SIGNIFICANT CHANGE UP
B PERT IGG+IGM PNL SER: CLEAR — SIGNIFICANT CHANGE UP
B PERT IGG+IGM PNL SER: CLEAR — SIGNIFICANT CHANGE UP
BASOPHILS # BLD AUTO: 0.05 K/UL — SIGNIFICANT CHANGE UP (ref 0–0.2)
BASOPHILS # BLD AUTO: 0.05 K/UL — SIGNIFICANT CHANGE UP (ref 0–0.2)
BASOPHILS NFR BLD AUTO: 0.8 % — SIGNIFICANT CHANGE UP (ref 0–2)
BASOPHILS NFR BLD AUTO: 0.8 % — SIGNIFICANT CHANGE UP (ref 0–2)
BILIRUB SERPL-MCNC: 0.6 MG/DL — SIGNIFICANT CHANGE UP (ref 0.4–2)
BILIRUB SERPL-MCNC: 0.6 MG/DL — SIGNIFICANT CHANGE UP (ref 0.4–2)
BILIRUB UR-MCNC: NEGATIVE — SIGNIFICANT CHANGE UP
BILIRUB UR-MCNC: NEGATIVE — SIGNIFICANT CHANGE UP
BLD GP AB SCN SERPL QL: SIGNIFICANT CHANGE UP
BLD GP AB SCN SERPL QL: SIGNIFICANT CHANGE UP
BUN SERPL-MCNC: 21.7 MG/DL — HIGH (ref 8–20)
BUN SERPL-MCNC: 21.7 MG/DL — HIGH (ref 8–20)
CALCIUM SERPL-MCNC: 8.5 MG/DL — SIGNIFICANT CHANGE UP (ref 8.4–10.5)
CALCIUM SERPL-MCNC: 8.5 MG/DL — SIGNIFICANT CHANGE UP (ref 8.4–10.5)
CHLORIDE SERPL-SCNC: 98 MMOL/L — SIGNIFICANT CHANGE UP (ref 96–108)
CHLORIDE SERPL-SCNC: 98 MMOL/L — SIGNIFICANT CHANGE UP (ref 96–108)
CO2 SERPL-SCNC: 35 MMOL/L — HIGH (ref 22–29)
CO2 SERPL-SCNC: 35 MMOL/L — HIGH (ref 22–29)
COLOR FLD: YELLOW
COLOR FLD: YELLOW
COLOR SPEC: YELLOW — SIGNIFICANT CHANGE UP
COLOR SPEC: YELLOW — SIGNIFICANT CHANGE UP
CREAT SERPL-MCNC: 0.66 MG/DL — SIGNIFICANT CHANGE UP (ref 0.5–1.3)
CREAT SERPL-MCNC: 0.66 MG/DL — SIGNIFICANT CHANGE UP (ref 0.5–1.3)
DIFF PNL FLD: NEGATIVE — SIGNIFICANT CHANGE UP
DIFF PNL FLD: NEGATIVE — SIGNIFICANT CHANGE UP
EGFR: 97 ML/MIN/1.73M2 — SIGNIFICANT CHANGE UP
EGFR: 97 ML/MIN/1.73M2 — SIGNIFICANT CHANGE UP
EOSINOPHIL # BLD AUTO: 0.52 K/UL — HIGH (ref 0–0.5)
EOSINOPHIL # BLD AUTO: 0.52 K/UL — HIGH (ref 0–0.5)
EOSINOPHIL NFR BLD AUTO: 8.1 % — HIGH (ref 0–6)
EOSINOPHIL NFR BLD AUTO: 8.1 % — HIGH (ref 0–6)
ESTIMATED AVERAGE GLUCOSE: 166 MG/DL — HIGH (ref 68–114)
ESTIMATED AVERAGE GLUCOSE: 166 MG/DL — HIGH (ref 68–114)
FLUID INTAKE SUBSTANCE CLASS: SIGNIFICANT CHANGE UP
FLUID INTAKE SUBSTANCE CLASS: SIGNIFICANT CHANGE UP
GLUCOSE BLDC GLUCOMTR-MCNC: 164 MG/DL — HIGH (ref 70–99)
GLUCOSE BLDC GLUCOMTR-MCNC: 164 MG/DL — HIGH (ref 70–99)
GLUCOSE BLDC GLUCOMTR-MCNC: 170 MG/DL — HIGH (ref 70–99)
GLUCOSE BLDC GLUCOMTR-MCNC: 170 MG/DL — HIGH (ref 70–99)
GLUCOSE BLDC GLUCOMTR-MCNC: 199 MG/DL — HIGH (ref 70–99)
GLUCOSE BLDC GLUCOMTR-MCNC: 199 MG/DL — HIGH (ref 70–99)
GLUCOSE BLDC GLUCOMTR-MCNC: 217 MG/DL — HIGH (ref 70–99)
GLUCOSE BLDC GLUCOMTR-MCNC: 217 MG/DL — HIGH (ref 70–99)
GLUCOSE FLD-MCNC: 198 MG/DL — SIGNIFICANT CHANGE UP
GLUCOSE FLD-MCNC: 198 MG/DL — SIGNIFICANT CHANGE UP
GLUCOSE SERPL-MCNC: 177 MG/DL — HIGH (ref 70–99)
GLUCOSE SERPL-MCNC: 177 MG/DL — HIGH (ref 70–99)
GLUCOSE UR QL: NEGATIVE MG/DL — SIGNIFICANT CHANGE UP
GLUCOSE UR QL: NEGATIVE MG/DL — SIGNIFICANT CHANGE UP
GRAM STN FLD: SIGNIFICANT CHANGE UP
GRAM STN FLD: SIGNIFICANT CHANGE UP
HCT VFR BLD CALC: 38.8 % — LOW (ref 39–50)
HCT VFR BLD CALC: 38.8 % — LOW (ref 39–50)
HGB BLD-MCNC: 11.6 G/DL — LOW (ref 13–17)
HGB BLD-MCNC: 11.6 G/DL — LOW (ref 13–17)
IMM GRANULOCYTES NFR BLD AUTO: 0.3 % — SIGNIFICANT CHANGE UP (ref 0–0.9)
IMM GRANULOCYTES NFR BLD AUTO: 0.3 % — SIGNIFICANT CHANGE UP (ref 0–0.9)
INR BLD: 1.39 RATIO — HIGH (ref 0.85–1.18)
INR BLD: 1.39 RATIO — HIGH (ref 0.85–1.18)
KETONES UR-MCNC: NEGATIVE — SIGNIFICANT CHANGE UP
KETONES UR-MCNC: NEGATIVE — SIGNIFICANT CHANGE UP
LDH SERPL L TO P-CCNC: 89 U/L — SIGNIFICANT CHANGE UP
LDH SERPL L TO P-CCNC: 89 U/L — SIGNIFICANT CHANGE UP
LEUKOCYTE ESTERASE UR-ACNC: NEGATIVE — SIGNIFICANT CHANGE UP
LEUKOCYTE ESTERASE UR-ACNC: NEGATIVE — SIGNIFICANT CHANGE UP
LYMPHOCYTES # BLD AUTO: 0.87 K/UL — LOW (ref 1–3.3)
LYMPHOCYTES # BLD AUTO: 0.87 K/UL — LOW (ref 1–3.3)
LYMPHOCYTES # BLD AUTO: 13.6 % — SIGNIFICANT CHANGE UP (ref 13–44)
LYMPHOCYTES # BLD AUTO: 13.6 % — SIGNIFICANT CHANGE UP (ref 13–44)
LYMPHOCYTES # FLD: 85 % — SIGNIFICANT CHANGE UP
LYMPHOCYTES # FLD: 85 % — SIGNIFICANT CHANGE UP
MAGNESIUM SERPL-MCNC: 2 MG/DL — SIGNIFICANT CHANGE UP (ref 1.6–2.6)
MAGNESIUM SERPL-MCNC: 2 MG/DL — SIGNIFICANT CHANGE UP (ref 1.6–2.6)
MCHC RBC-ENTMCNC: 28.5 PG — SIGNIFICANT CHANGE UP (ref 27–34)
MCHC RBC-ENTMCNC: 28.5 PG — SIGNIFICANT CHANGE UP (ref 27–34)
MCHC RBC-ENTMCNC: 29.9 GM/DL — LOW (ref 32–36)
MCHC RBC-ENTMCNC: 29.9 GM/DL — LOW (ref 32–36)
MCV RBC AUTO: 95.3 FL — SIGNIFICANT CHANGE UP (ref 80–100)
MCV RBC AUTO: 95.3 FL — SIGNIFICANT CHANGE UP (ref 80–100)
MESOTHL CELL # FLD: 2 % — SIGNIFICANT CHANGE UP
MESOTHL CELL # FLD: 2 % — SIGNIFICANT CHANGE UP
MONOCYTES # BLD AUTO: 0.67 K/UL — SIGNIFICANT CHANGE UP (ref 0–0.9)
MONOCYTES # BLD AUTO: 0.67 K/UL — SIGNIFICANT CHANGE UP (ref 0–0.9)
MONOCYTES NFR BLD AUTO: 10.5 % — SIGNIFICANT CHANGE UP (ref 2–14)
MONOCYTES NFR BLD AUTO: 10.5 % — SIGNIFICANT CHANGE UP (ref 2–14)
MONOS+MACROS # FLD: 3 % — SIGNIFICANT CHANGE UP
MONOS+MACROS # FLD: 3 % — SIGNIFICANT CHANGE UP
MRSA PCR RESULT.: DETECTED
MRSA PCR RESULT.: DETECTED
NEUTROPHILS # BLD AUTO: 4.26 K/UL — SIGNIFICANT CHANGE UP (ref 1.8–7.4)
NEUTROPHILS # BLD AUTO: 4.26 K/UL — SIGNIFICANT CHANGE UP (ref 1.8–7.4)
NEUTROPHILS NFR BLD AUTO: 66.7 % — SIGNIFICANT CHANGE UP (ref 43–77)
NEUTROPHILS NFR BLD AUTO: 66.7 % — SIGNIFICANT CHANGE UP (ref 43–77)
NEUTROPHILS-BODY FLUID: 10 % — SIGNIFICANT CHANGE UP
NEUTROPHILS-BODY FLUID: 10 % — SIGNIFICANT CHANGE UP
NITRITE UR-MCNC: NEGATIVE — SIGNIFICANT CHANGE UP
NITRITE UR-MCNC: NEGATIVE — SIGNIFICANT CHANGE UP
NT-PROBNP SERPL-SCNC: 2222 PG/ML — HIGH (ref 0–300)
NT-PROBNP SERPL-SCNC: 2222 PG/ML — HIGH (ref 0–300)
PH FLD: 8.5 — SIGNIFICANT CHANGE UP
PH FLD: 8.5 — SIGNIFICANT CHANGE UP
PH UR: 7 — SIGNIFICANT CHANGE UP (ref 5–8)
PH UR: 7 — SIGNIFICANT CHANGE UP (ref 5–8)
PLATELET # BLD AUTO: 167 K/UL — SIGNIFICANT CHANGE UP (ref 150–400)
PLATELET # BLD AUTO: 167 K/UL — SIGNIFICANT CHANGE UP (ref 150–400)
POTASSIUM SERPL-MCNC: 3.5 MMOL/L — SIGNIFICANT CHANGE UP (ref 3.5–5.3)
POTASSIUM SERPL-MCNC: 3.5 MMOL/L — SIGNIFICANT CHANGE UP (ref 3.5–5.3)
POTASSIUM SERPL-SCNC: 3.5 MMOL/L — SIGNIFICANT CHANGE UP (ref 3.5–5.3)
POTASSIUM SERPL-SCNC: 3.5 MMOL/L — SIGNIFICANT CHANGE UP (ref 3.5–5.3)
PREALB SERPL-MCNC: 17 MG/DL — LOW (ref 18–38)
PREALB SERPL-MCNC: 17 MG/DL — LOW (ref 18–38)
PROT FLD-MCNC: 2.3 G/DL — SIGNIFICANT CHANGE UP
PROT FLD-MCNC: 2.3 G/DL — SIGNIFICANT CHANGE UP
PROT SERPL-MCNC: 6.4 G/DL — LOW (ref 6.6–8.7)
PROT SERPL-MCNC: 6.4 G/DL — LOW (ref 6.6–8.7)
PROT UR-MCNC: NEGATIVE — SIGNIFICANT CHANGE UP
PROT UR-MCNC: NEGATIVE — SIGNIFICANT CHANGE UP
PROTHROM AB SERPL-ACNC: 15.3 SEC — HIGH (ref 9.5–13)
PROTHROM AB SERPL-ACNC: 15.3 SEC — HIGH (ref 9.5–13)
RBC # BLD: 4.07 M/UL — LOW (ref 4.2–5.8)
RBC # BLD: 4.07 M/UL — LOW (ref 4.2–5.8)
RBC # FLD: 14.1 % — SIGNIFICANT CHANGE UP (ref 10.3–14.5)
RBC # FLD: 14.1 % — SIGNIFICANT CHANGE UP (ref 10.3–14.5)
RCV VOL RI: 2000 /UL — HIGH (ref 0–0)
RCV VOL RI: 2000 /UL — HIGH (ref 0–0)
S AUREUS DNA NOSE QL NAA+PROBE: DETECTED
S AUREUS DNA NOSE QL NAA+PROBE: DETECTED
SODIUM SERPL-SCNC: 142 MMOL/L — SIGNIFICANT CHANGE UP (ref 135–145)
SODIUM SERPL-SCNC: 142 MMOL/L — SIGNIFICANT CHANGE UP (ref 135–145)
SP GR SPEC: 1 — LOW (ref 1.01–1.02)
SP GR SPEC: 1 — LOW (ref 1.01–1.02)
SPECIMEN SOURCE: SIGNIFICANT CHANGE UP
SPECIMEN SOURCE: SIGNIFICANT CHANGE UP
TOTAL NUCLEATED CELL COUNT, BODY FLUID: 572 /UL — SIGNIFICANT CHANGE UP
TOTAL NUCLEATED CELL COUNT, BODY FLUID: 572 /UL — SIGNIFICANT CHANGE UP
TSH SERPL-MCNC: 3.53 UIU/ML — SIGNIFICANT CHANGE UP (ref 0.27–4.2)
TSH SERPL-MCNC: 3.53 UIU/ML — SIGNIFICANT CHANGE UP (ref 0.27–4.2)
TUBE TYPE: SIGNIFICANT CHANGE UP
TUBE TYPE: SIGNIFICANT CHANGE UP
UROBILINOGEN FLD QL: NEGATIVE MG/DL — SIGNIFICANT CHANGE UP
UROBILINOGEN FLD QL: NEGATIVE MG/DL — SIGNIFICANT CHANGE UP
WBC # BLD: 6.39 K/UL — SIGNIFICANT CHANGE UP (ref 3.8–10.5)
WBC # BLD: 6.39 K/UL — SIGNIFICANT CHANGE UP (ref 3.8–10.5)
WBC # FLD AUTO: 6.39 K/UL — SIGNIFICANT CHANGE UP (ref 3.8–10.5)
WBC # FLD AUTO: 6.39 K/UL — SIGNIFICANT CHANGE UP (ref 3.8–10.5)

## 2023-10-29 PROCEDURE — 88305 TISSUE EXAM BY PATHOLOGIST: CPT | Mod: 26

## 2023-10-29 PROCEDURE — 88112 CYTOPATH CELL ENHANCE TECH: CPT | Mod: 26

## 2023-10-29 PROCEDURE — 71045 X-RAY EXAM CHEST 1 VIEW: CPT | Mod: 26

## 2023-10-29 PROCEDURE — 32557 INSERT CATH PLEURA W/ IMAGE: CPT | Mod: RT

## 2023-10-29 PROCEDURE — 99222 1ST HOSP IP/OBS MODERATE 55: CPT

## 2023-10-29 RX ORDER — POTASSIUM CHLORIDE 20 MEQ
40 PACKET (EA) ORAL ONCE
Refills: 0 | Status: COMPLETED | OUTPATIENT
Start: 2023-10-29 | End: 2023-10-29

## 2023-10-29 RX ADMIN — Medication 1 GRAM(S): at 16:45

## 2023-10-29 RX ADMIN — SODIUM CHLORIDE 3 MILLILITER(S): 9 INJECTION INTRAMUSCULAR; INTRAVENOUS; SUBCUTANEOUS at 12:30

## 2023-10-29 RX ADMIN — Medication 4: at 12:31

## 2023-10-29 RX ADMIN — PANTOPRAZOLE SODIUM 40 MILLIGRAM(S): 20 TABLET, DELAYED RELEASE ORAL at 06:18

## 2023-10-29 RX ADMIN — FINASTERIDE 5 MILLIGRAM(S): 5 TABLET, FILM COATED ORAL at 08:45

## 2023-10-29 RX ADMIN — SODIUM CHLORIDE 3 MILLILITER(S): 9 INJECTION INTRAMUSCULAR; INTRAVENOUS; SUBCUTANEOUS at 06:17

## 2023-10-29 RX ADMIN — Medication 50 MILLIGRAM(S): at 16:45

## 2023-10-29 RX ADMIN — Medication 2: at 08:45

## 2023-10-29 RX ADMIN — Medication 40 MILLIGRAM(S): at 06:18

## 2023-10-29 RX ADMIN — Medication 650 MILLIGRAM(S): at 06:18

## 2023-10-29 RX ADMIN — Medication 81 MILLIGRAM(S): at 08:45

## 2023-10-29 RX ADMIN — ATORVASTATIN CALCIUM 10 MILLIGRAM(S): 80 TABLET, FILM COATED ORAL at 22:08

## 2023-10-29 RX ADMIN — Medication 40 MILLIEQUIVALENT(S): at 10:39

## 2023-10-29 RX ADMIN — Medication 3 MILLILITER(S): at 14:57

## 2023-10-29 RX ADMIN — Medication 50 MILLIGRAM(S): at 06:17

## 2023-10-29 RX ADMIN — Medication 1 GRAM(S): at 06:17

## 2023-10-29 RX ADMIN — Medication 3 MILLILITER(S): at 08:52

## 2023-10-29 RX ADMIN — Medication 3 MILLILITER(S): at 20:47

## 2023-10-29 RX ADMIN — Medication 40 MILLIGRAM(S): at 12:40

## 2023-10-29 RX ADMIN — Medication 20 MILLIEQUIVALENT(S): at 08:45

## 2023-10-29 RX ADMIN — Medication 2 MILLIGRAM(S): at 06:17

## 2023-10-29 RX ADMIN — Medication 2: at 17:16

## 2023-10-29 RX ADMIN — SENNA PLUS 2 TABLET(S): 8.6 TABLET ORAL at 22:08

## 2023-10-29 RX ADMIN — SODIUM CHLORIDE 3 MILLILITER(S): 9 INJECTION INTRAMUSCULAR; INTRAVENOUS; SUBCUTANEOUS at 22:13

## 2023-10-29 NOTE — H&P ADULT - NSHPSOCIALHISTORY_GEN_ALL_CORE
Lives in ground floor apartment with his son. Was at assisted living for few weeks but moved out to live with his son.   Uses a rollaider walker for ambulation.   Former smoker. Quit 3 years ago.  Denies alcohol or illicit drug use.

## 2023-10-29 NOTE — H&P ADULT - NSHPPHYSICALEXAM_GEN_ALL_CORE
General: Sitting up in bed in NAD, dyspneic with minimal exertion  Neuro: A+O x 3, non-focal, speech clear and intact  HEENT:  NCAT, No conjuctival edema or icterus, no thrush.  Neck: Supple, trachea midline  Pulm: +Diminished BS at bases b/l with +crackles, no accessory muscle use noted  CV: regular rate, Paced, +murmur   Abd: obese, soft, NT, ND, + BS  Ext: GUERRA x 4, +1-2 pitting edema, no cyanosis, distal motor/neuro/circ intact  Skin: warm, dry, perfused

## 2023-10-29 NOTE — H&P ADULT - PROBLEM SELECTOR PLAN 3
POCUS performed with drainable pocket on the right.   Eliquis held since 10/28 AM.   Potential plan for pigtail catheter placement later today.

## 2023-10-29 NOTE — H&P ADULT - NEGATIVE NEUROLOGICAL SYMPTOMS
no generalized seizures/no focal seizures/no syncope/no headache/no loss of consciousness/no hemiparesis/no facial palsy

## 2023-10-29 NOTE — H&P ADULT - PROBLEM SELECTOR PLAN 5
Continue Lopressor as tolerated for HR control.   Eliquis held at this since with plan for pigtail catheter placement.   Plan to restart chemical anticoagulation later today.

## 2023-10-29 NOTE — H&P ADULT - PROBLEM SELECTOR PLAN 6
SCDs for DVT prophylaxis. Plan to restart chemical anticoagulation later today.  Protonix for GI prophylaxis.

## 2023-10-29 NOTE — PROCEDURE NOTE - NSPROCDETAILS_GEN_ALL_CORE
Seldinger technique/dressing applied/secured in place/percutaneous/ultrasound assessment of fluid (location)

## 2023-10-29 NOTE — H&P ADULT - NSHPLABSRESULTS_GEN_ALL_CORE
12.0   6.00  )-----------( 163      ( 28 Oct 2023 15:00 )             38.8     10-28    145  |  102  |  24.5<H>  ----------------------------<  153<H>  4.3   |  34.0<H>  |  0.60    Ca    8.8      28 Oct 2023 15:00    TPro  6.5<L>  /  Alb  3.8  /  TBili  0.7  /  DBili  x   /  AST  13  /  ALT  14  /  AlkPhos  232<H>  10-28      CTA Chest:  < from: CT Angio Chest PE Protocol w/ IV Cont (10.28.23 @ 21:11) >  IMPRESSION:  Suboptimal evaluation of the peripheral pulmonary arteries. No central pulmonary embolus or secondary signs of right heart strain.  Right > left pleural effusion. Nonspecific bilateral pulmonary groundglass opacities, which can be seen in noninfectious (pulmonary edema) cyst and infectious processes.  Ascending aortic aneurysm. Findings suggestive of pulmonary arterial hypertension.  Additional findings as described.  < end of copied text >    TTE:  < from: TTE Echo Complete w/o Contrast w/ Doppler (10.28.23 @ 19:13) >  Summary:   1. Left ventricular ejection fraction, by visual estimation, is 50 to 55%.   2. Normal global left ventricular systolic function.   3. Mildly increased left ventricular internal cavity size.   4. The mitralin-flow pattern reveals no discernable A-wave, therefore no comment on diastolic function can be made.   5. There is mild concentric left ventricular hypertrophy.   6. Normal right ventricular size and function.   7. There is no evidence of pericardial effusion.   8. Trace mitral valve regurgitation.   9. Moderate tricuspid regurgitation.  10. TAVR in the aortic position.  11. Estimated pulmonary artery systolic pressure is 48.9 mmHg assuming a right atrial pressure of 15 mmHg, which is consistent with mild pulmonary hypertension.  MD Priya Electronically signed on 10/28/2023 at 10:34:24 PM  < end of copied text >

## 2023-10-29 NOTE — H&P ADULT - ASSESSMENT
77 year old male patient with a medical history of Severe Aortic Stenosis s/p TAVR 10/6/23 with Dr. Napier, Chronic Diastolic CHF, CAD s/p PCI to p&mLAD, Thoracic Aortic Aneurysm, Paroxysmal Afib s/p Ablation (on Eliquis), and Cardiomems, LLE DVT, on Home O2 PRN, ALLIE, BPH s/p TURP, Left Renal Mass, Morbid Obesity, Laminectomy, Anxiety, Type 2 DM (A1C 7.2 on Metformin), GERD, Cholelithiasis, MRCP w/ cirrhosis, now readmitted for acute on chronic diastolic CHF, acute respiratory failure, and b/l pleural effusions (Right > Left). Patient requiring O2 supplementation with nasal cannula, IV diuresis, and Eliquis held with plan for potential drainage of his right pleural effusion later today.

## 2023-10-29 NOTE — H&P ADULT - PROBLEM SELECTOR PLAN 2
CTA chest with pulmonary vascular congestion and bilateral pleural effusion (Right>Left).   Continuous SpO2 monitoring. Titrate O2 requirements PRN to maintain SpO2 >92%.  Lasix 40IV BID ordered for diuresis.   Monitor strict I/Os.   Daily weights.

## 2023-10-29 NOTE — CONSULT NOTE ADULT - SUBJECTIVE AND OBJECTIVE BOX
Suzanna Kramer M.D., F.A.C.C.                                                                                            [facility]                                                                                                   [address] [place] [address]                                                                                                     [phone]      COVERING FOR LICMA    77 year old male patient with a medical history of Severe Aortic Stenosis s/p TAVR 10/6/23 with Dr. Napier, Chronic Diastolic CH/ mild LV sysfunction, F, CAD s/p PCI to p&mLAD, Thoracic Aortic Aneurysm, Paroxysmal Afib s/p Ablation (on Eliquis), and Cardiomems, LLE DVT, on Home O2 PRN, ALLIE, BPH s/p TURP, Left Renal Mass, Morbid Obesity, Laminectomy, Anxiety, Type 2 DM (A1C 7.2 on Metformin), GERD, Cholelithiasis, MRCP w/ cirrhosis, now readmitted for acute on chronic diastolic CHF, acute respiratory failure, and b/l pleural effusions (Right > Left).  Pt presented with SOB for 4 days  No chest pain   Had pigtail drainage of his right pleural effusion  today.       Allergies    Ceftin (Hives)  penicillin (Anaphylaxis)  sulfa drugs (Unknown)  Bactrim (Rash)    Intolerances      MEDICATIONS  (STANDING):  albuterol/ipratropium for Nebulization 3 milliLiter(s) Nebulizer every 6 hours  aspirin enteric coated 81 milliGRAM(s) Oral daily  atorvastatin 10 milliGRAM(s) Oral at bedtime  dextrose 5%. 1000 milliLiter(s) (50 mL/Hr) IV Continuous <Continuous>  dextrose 5%. 1000 milliLiter(s) (100 mL/Hr) IV Continuous <Continuous>  dextrose 50% Injectable 25 Gram(s) IV Push once  dextrose 50% Injectable 12.5 Gram(s) IV Push once  doxazosin 2 milliGRAM(s) Oral daily  finasteride 5 milliGRAM(s) Oral daily  furosemide   Injectable 40 milliGRAM(s) IV Push two times a day  glucagon  Injectable 1 milliGRAM(s) IntraMuscular once  insulin lispro (ADMELOG) corrective regimen sliding scale   SubCutaneous three times a day before meals  metoprolol tartrate 50 milliGRAM(s) Oral two times a day  pantoprazole    Tablet 40 milliGRAM(s) Oral before breakfast  potassium chloride    Tablet ER 20 milliEquivalent(s) Oral daily  senna 2 Tablet(s) Oral at bedtime  sodium chloride 0.9% lock flush 3 milliLiter(s) IV Push every 8 hours  sucralfate 1 Gram(s) Oral two times a day    MEDICATIONS  (PRN):  acetaminophen     Tablet .. 650 milliGRAM(s) Oral every 6 hours PRN Temp greater or equal to 38.5C (101.3F), Mild Pain (1 - 3)  albuterol    90 MICROgram(s) HFA Inhaler 2 Puff(s) Inhalation every 6 hours PRN for shortness of breath and/or wheezing  oxycodone    5 mG/acetaminophen 325 mG 2 Tablet(s) Oral every 4 hours PRN Moderate Pain (4 - 6)      Acute Infectious Diseases:  No history of rheumatic fever or TB.    PAST MEDICAL & SURGICAL HISTORY:  No significant past surgical history    Malignant schwannoma    H/O arthroscopy of knee    S/P laminectomy    S/P coronary artery stent placement  x2      PAST MEDICAL & SURGICAL HISTORY:  Anxiety    Hypertension    Hyperlipidemia    Prostate disease    Gastroesophageal reflux disease    Gallbladder stone without cholecystitis or obstruction    BPH (benign prostatic hyperplasia)    DM (diabetes mellitus)    DVT (deep venous thrombosis)  left leg    Afib    Anxiety    HLD (hyperlipidemia)        Habits:  [   ] Cigarettes:  [   ] Alcohol:  [   ] Drugs:    Social History:  [   ]    [   ]  Single  [   ]  /Separate  [   ]  Children  [   ]   Job    Family History of Heart Disease:    Vital Signs Last 24 Hrs  T(C): 36.9 (29 Oct 2023 10:40), Max: 37.4 (28 Oct 2023 12:10)  T(F): 98.4 (29 Oct 2023 10:40), Max: 99.4 (28 Oct 2023 12:10)  HR: 77 (29 Oct 2023 10:40) (77 - 112)  BP: 102/62 (29 Oct 2023 10:40) (99/64 - 156/80)  BP(mean): --  RR: 18 (29 Oct 2023 10:40) (18 - 24)  SpO2: 95% (29 Oct 2023 10:40) (92% - 95%)    Parameters below as of 29 Oct 2023 10:40  Patient On (Oxygen Delivery Method): nasal cannula  O2 Flow (L/min): 5      I&O's Detail    28 Oct 2023 07:01  -  29 Oct 2023 07:00  --------------------------------------------------------  IN:  Total IN: 0 mL    OUT:    Voided (mL): 500 mL  Total OUT: 500 mL    Total NET: -500 mL      29 Oct 2023 07:01  -  29 Oct 2023 11:26  --------------------------------------------------------  IN:  Total IN: 0 mL    OUT:    Voided (mL): 600 mL  Total OUT: 600 mL    Total NET: -600 mL          Constitutional:    NC/AT: Normal     HEENT: Normal     Neck:  No JVD, bruits or thyromegaly    Respiratory:  Clear with decreased BS bases    Cardiovascular:  IRR without murmur, rub or gallop.    Gastrointestinal: Soft without hepatosplenomegaly.    Extremities: without cyanosis, clubbing. At least 2-3 + edema . Legs discolored.     Neurological:  Oriented   x  3    . No gross sensory or motor defects.    Skin: Legs discolored     Psychiatric: Normal affect.     Labs and Results:    Labs, Radiology, Cardiology, and Other Results: 12.0   6.00  )-----------( 163      ( 28 Oct 2023 15:00 )             38.8     10-28    145  |  102  |  24.5<H>  ----------------------------<  153<H>  4.3   |  34.0<H>  |  0.60    Ca    8.8      28 Oct 2023 15:00    TPro  6.5<L>  /  Alb  3.8  /  TBili  0.7  /  DBili  x   /  AST  13  /  ALT  14  /  AlkPhos  232<H>  10-28      CTA Chest:  < from: CT Angio Chest PE Protocol w/ IV Cont (10.28.23 @ 21:11) >  IMPRESSION:  Suboptimal evaluation of the peripheral pulmonary arteries. No central pulmonary embolus or secondary signs of right heart strain.  Right > left pleural effusion. Nonspecific bilateral pulmonary groundglass opacities, which can be seen in noninfectious (pulmonary edema) cyst and infectious processes.  Ascending aortic aneurysm. Findings suggestive of pulmonary arterial hypertension.  Additional findings as described.  < end of copied text >    TTE:  < from: TTE Echo Complete w/o Contrast w/ Doppler (10.28.23 @ 19:13) >  Summary:   1. Left ventricular ejection fraction, by visual estimation, is 50 to 55%.   2. Normal global left ventricular systolic function.   3. Mildly increased left ventricular internal cavity size.   4. The mitralin-flow pattern reveals no discernable A-wave, therefore no comment on diastolic function can be made.   5. There is mild concentric left ventricular hypertrophy.   6. Normal right ventricular size and function.   7. There is no evidence of pericardial effusion.   8. Trace mitral valve regurgitation.   9. Moderate tricuspid regurgitation.  10. TAVR in the aortic position.  11. Estimated pulmonary artery systolic pressure is 48.9 mmHg assuming a right atrial pressure of 15 mmHg, which is consistent with mild pulmonary hypertension.  MD Priya Electronically signed on 10/28/2023 at 10:34:24 PM  < end of copied text >    EKG; V Paced    IMPRESSION:    SOB/Pleural effusion   CAD  AF  Isch CM  PM  TAVR    S/P pigtail catheter drainage for SOB  Restart Eliquis when able  Continue diuresis  LICMA will be following pt.

## 2023-10-29 NOTE — H&P ADULT - PROBLEM SELECTOR PLAN 1
S/p TAVR 10/6/23 with Dr. Napier.   Patient readmitted to CTS service for acute on chronic diastolic CHF exacerbation.  Follow up TTE with preserved EF 50-55%, persistent Moderate TR.   CTA chest with pulmonary vascular congestion and bilateral pleural effusion (Right>Left).   Continuous SpO2 monitoring. Titrate O2 requirements PRN to maintain SpO2 >92%.  Lasix 40IV BID ordered for diuresis.   Monitor strict I/Os. Daily weights.   Supplement electrolytes PRN.   Continue Lopressor as tolerated by HR and BP.   Continue supportive measures.   Plan discussed / reviewed with CT Surgery attending Dr. Gonzalez.

## 2023-10-30 ENCOUNTER — TRANSCRIPTION ENCOUNTER (OUTPATIENT)
Age: 78
End: 2023-10-30

## 2023-10-30 LAB
ANION GAP SERPL CALC-SCNC: 6 MMOL/L — SIGNIFICANT CHANGE UP (ref 5–17)
ANION GAP SERPL CALC-SCNC: 6 MMOL/L — SIGNIFICANT CHANGE UP (ref 5–17)
BUN SERPL-MCNC: 23.2 MG/DL — HIGH (ref 8–20)
BUN SERPL-MCNC: 23.2 MG/DL — HIGH (ref 8–20)
CALCIUM SERPL-MCNC: 8.6 MG/DL — SIGNIFICANT CHANGE UP (ref 8.4–10.5)
CALCIUM SERPL-MCNC: 8.6 MG/DL — SIGNIFICANT CHANGE UP (ref 8.4–10.5)
CHLORIDE SERPL-SCNC: 99 MMOL/L — SIGNIFICANT CHANGE UP (ref 96–108)
CHLORIDE SERPL-SCNC: 99 MMOL/L — SIGNIFICANT CHANGE UP (ref 96–108)
CO2 SERPL-SCNC: 36 MMOL/L — HIGH (ref 22–29)
CO2 SERPL-SCNC: 36 MMOL/L — HIGH (ref 22–29)
CREAT SERPL-MCNC: 0.65 MG/DL — SIGNIFICANT CHANGE UP (ref 0.5–1.3)
CREAT SERPL-MCNC: 0.65 MG/DL — SIGNIFICANT CHANGE UP (ref 0.5–1.3)
EGFR: 97 ML/MIN/1.73M2 — SIGNIFICANT CHANGE UP
EGFR: 97 ML/MIN/1.73M2 — SIGNIFICANT CHANGE UP
GLUCOSE BLDC GLUCOMTR-MCNC: 133 MG/DL — HIGH (ref 70–99)
GLUCOSE BLDC GLUCOMTR-MCNC: 133 MG/DL — HIGH (ref 70–99)
GLUCOSE BLDC GLUCOMTR-MCNC: 138 MG/DL — HIGH (ref 70–99)
GLUCOSE BLDC GLUCOMTR-MCNC: 138 MG/DL — HIGH (ref 70–99)
GLUCOSE BLDC GLUCOMTR-MCNC: 173 MG/DL — HIGH (ref 70–99)
GLUCOSE BLDC GLUCOMTR-MCNC: 173 MG/DL — HIGH (ref 70–99)
GLUCOSE BLDC GLUCOMTR-MCNC: 245 MG/DL — HIGH (ref 70–99)
GLUCOSE BLDC GLUCOMTR-MCNC: 245 MG/DL — HIGH (ref 70–99)
GLUCOSE SERPL-MCNC: 155 MG/DL — HIGH (ref 70–99)
GLUCOSE SERPL-MCNC: 155 MG/DL — HIGH (ref 70–99)
HCT VFR BLD CALC: 37.6 % — LOW (ref 39–50)
HCT VFR BLD CALC: 37.6 % — LOW (ref 39–50)
HGB BLD-MCNC: 11.4 G/DL — LOW (ref 13–17)
HGB BLD-MCNC: 11.4 G/DL — LOW (ref 13–17)
MAGNESIUM SERPL-MCNC: 2 MG/DL — SIGNIFICANT CHANGE UP (ref 1.6–2.6)
MAGNESIUM SERPL-MCNC: 2 MG/DL — SIGNIFICANT CHANGE UP (ref 1.6–2.6)
MCHC RBC-ENTMCNC: 28.7 PG — SIGNIFICANT CHANGE UP (ref 27–34)
MCHC RBC-ENTMCNC: 28.7 PG — SIGNIFICANT CHANGE UP (ref 27–34)
MCHC RBC-ENTMCNC: 30.3 GM/DL — LOW (ref 32–36)
MCHC RBC-ENTMCNC: 30.3 GM/DL — LOW (ref 32–36)
MCV RBC AUTO: 94.7 FL — SIGNIFICANT CHANGE UP (ref 80–100)
MCV RBC AUTO: 94.7 FL — SIGNIFICANT CHANGE UP (ref 80–100)
PLATELET # BLD AUTO: 145 K/UL — LOW (ref 150–400)
PLATELET # BLD AUTO: 145 K/UL — LOW (ref 150–400)
POTASSIUM SERPL-MCNC: 4 MMOL/L — SIGNIFICANT CHANGE UP (ref 3.5–5.3)
POTASSIUM SERPL-MCNC: 4 MMOL/L — SIGNIFICANT CHANGE UP (ref 3.5–5.3)
POTASSIUM SERPL-SCNC: 4 MMOL/L — SIGNIFICANT CHANGE UP (ref 3.5–5.3)
POTASSIUM SERPL-SCNC: 4 MMOL/L — SIGNIFICANT CHANGE UP (ref 3.5–5.3)
RBC # BLD: 3.97 M/UL — LOW (ref 4.2–5.8)
RBC # BLD: 3.97 M/UL — LOW (ref 4.2–5.8)
RBC # FLD: 14.1 % — SIGNIFICANT CHANGE UP (ref 10.3–14.5)
RBC # FLD: 14.1 % — SIGNIFICANT CHANGE UP (ref 10.3–14.5)
SODIUM SERPL-SCNC: 141 MMOL/L — SIGNIFICANT CHANGE UP (ref 135–145)
SODIUM SERPL-SCNC: 141 MMOL/L — SIGNIFICANT CHANGE UP (ref 135–145)
WBC # BLD: 5.84 K/UL — SIGNIFICANT CHANGE UP (ref 3.8–10.5)
WBC # BLD: 5.84 K/UL — SIGNIFICANT CHANGE UP (ref 3.8–10.5)
WBC # FLD AUTO: 5.84 K/UL — SIGNIFICANT CHANGE UP (ref 3.8–10.5)
WBC # FLD AUTO: 5.84 K/UL — SIGNIFICANT CHANGE UP (ref 3.8–10.5)

## 2023-10-30 PROCEDURE — 99231 SBSQ HOSP IP/OBS SF/LOW 25: CPT

## 2023-10-30 PROCEDURE — 71045 X-RAY EXAM CHEST 1 VIEW: CPT | Mod: 26

## 2023-10-30 PROCEDURE — 99232 SBSQ HOSP IP/OBS MODERATE 35: CPT

## 2023-10-30 RX ORDER — ALPRAZOLAM 0.25 MG
0.25 TABLET ORAL EVERY 8 HOURS
Refills: 0 | Status: DISCONTINUED | OUTPATIENT
Start: 2023-10-30 | End: 2023-11-03

## 2023-10-30 RX ORDER — FLUTICASONE PROPIONATE 50 MCG
1 SPRAY, SUSPENSION NASAL
Refills: 0 | Status: DISCONTINUED | OUTPATIENT
Start: 2023-10-30 | End: 2023-11-03

## 2023-10-30 RX ORDER — APIXABAN 2.5 MG/1
5 TABLET, FILM COATED ORAL EVERY 12 HOURS
Refills: 0 | Status: DISCONTINUED | OUTPATIENT
Start: 2023-10-30 | End: 2023-11-03

## 2023-10-30 RX ORDER — METOPROLOL TARTRATE 50 MG
25 TABLET ORAL
Refills: 0 | Status: DISCONTINUED | OUTPATIENT
Start: 2023-10-30 | End: 2023-11-03

## 2023-10-30 RX ORDER — ACETAZOLAMIDE 250 MG/1
500 TABLET ORAL EVERY 12 HOURS
Refills: 0 | Status: COMPLETED | OUTPATIENT
Start: 2023-10-30 | End: 2023-11-01

## 2023-10-30 RX ADMIN — APIXABAN 5 MILLIGRAM(S): 2.5 TABLET, FILM COATED ORAL at 08:05

## 2023-10-30 RX ADMIN — Medication 81 MILLIGRAM(S): at 08:04

## 2023-10-30 RX ADMIN — SODIUM CHLORIDE 3 MILLILITER(S): 9 INJECTION INTRAMUSCULAR; INTRAVENOUS; SUBCUTANEOUS at 05:58

## 2023-10-30 RX ADMIN — FINASTERIDE 5 MILLIGRAM(S): 5 TABLET, FILM COATED ORAL at 08:04

## 2023-10-30 RX ADMIN — PANTOPRAZOLE SODIUM 40 MILLIGRAM(S): 20 TABLET, DELAYED RELEASE ORAL at 05:55

## 2023-10-30 RX ADMIN — Medication 3 MILLILITER(S): at 08:51

## 2023-10-30 RX ADMIN — Medication 0.25 MILLIGRAM(S): at 21:20

## 2023-10-30 RX ADMIN — Medication 20 MILLIEQUIVALENT(S): at 08:05

## 2023-10-30 RX ADMIN — Medication 3 MILLILITER(S): at 15:32

## 2023-10-30 RX ADMIN — Medication 3 MILLILITER(S): at 20:45

## 2023-10-30 RX ADMIN — Medication 40 MILLIGRAM(S): at 08:11

## 2023-10-30 RX ADMIN — APIXABAN 5 MILLIGRAM(S): 2.5 TABLET, FILM COATED ORAL at 17:39

## 2023-10-30 RX ADMIN — Medication 1 SPRAY(S): at 16:41

## 2023-10-30 RX ADMIN — SODIUM CHLORIDE 3 MILLILITER(S): 9 INJECTION INTRAMUSCULAR; INTRAVENOUS; SUBCUTANEOUS at 22:00

## 2023-10-30 RX ADMIN — Medication 1 GRAM(S): at 05:55

## 2023-10-30 RX ADMIN — Medication 40 MILLIGRAM(S): at 17:39

## 2023-10-30 RX ADMIN — Medication 2: at 12:21

## 2023-10-30 RX ADMIN — SODIUM CHLORIDE 3 MILLILITER(S): 9 INJECTION INTRAMUSCULAR; INTRAVENOUS; SUBCUTANEOUS at 12:16

## 2023-10-30 RX ADMIN — Medication 25 MILLIGRAM(S): at 17:39

## 2023-10-30 RX ADMIN — Medication 1 GRAM(S): at 16:41

## 2023-10-30 RX ADMIN — Medication 2 MILLIGRAM(S): at 05:56

## 2023-10-30 RX ADMIN — ACETAZOLAMIDE 110 MILLIGRAM(S): 250 TABLET ORAL at 16:40

## 2023-10-30 RX ADMIN — Medication 0.25 MILLIGRAM(S): at 12:21

## 2023-10-30 RX ADMIN — ATORVASTATIN CALCIUM 10 MILLIGRAM(S): 80 TABLET, FILM COATED ORAL at 21:20

## 2023-10-30 NOTE — DISCHARGE NOTE NURSING/CASE MANAGEMENT/SOCIAL WORK - PATIENT PORTAL LINK FT
You can access the FollowMyHealth Patient Portal offered by [facility] by registering at the following website: [email or web address]. By joining [facility]’s FollowMyHealth portal, you will also be able to view your health information using other applications (apps) compatible with our system.

## 2023-10-30 NOTE — PROGRESS NOTE ADULT - PROBLEM SELECTOR PLAN 2
CTA chest with pulmonary vascular congestion and bilateral pleural effusion (Right>Left).   Continuous SpO2 monitoring. Titrate O2 requirements PRN to maintain SpO2 >92%.  Chronic home O2 use ~3L NC baseline  10/29 right PTC placement and removal ~1L drainage  Lasix 40IV BID ordered for diuresis.   Monitor strict I/Os.   Daily weights.

## 2023-10-30 NOTE — PROGRESS NOTE ADULT - SUBJECTIVE AND OBJECTIVE BOX
ANIA PÉREZELSY  [id number]      Chief Complaint:  Follow up, CHF/ pleural effusion/ TAVR     Interval History:  s/p right pigtail catheter placement and removal for 1L drainage      Tele:  Vpaced      acetaminophen     Tablet .. 650 milliGRAM(s) Oral every 6 hours PRN  albuterol    90 MICROgram(s) HFA Inhaler 2 Puff(s) Inhalation every 6 hours PRN  albuterol/ipratropium for Nebulization 3 milliLiter(s) Nebulizer every 6 hours  ALPRAZolam 0.25 milliGRAM(s) Oral every 8 hours PRN  apixaban 5 milliGRAM(s) Oral every 12 hours  aspirin enteric coated 81 milliGRAM(s) Oral daily  atorvastatin 10 milliGRAM(s) Oral at bedtime  dextrose 5%. 1000 milliLiter(s) IV Continuous <Continuous>  dextrose 5%. 1000 milliLiter(s) IV Continuous <Continuous>  dextrose 50% Injectable 25 Gram(s) IV Push once  dextrose 50% Injectable 12.5 Gram(s) IV Push once  doxazosin 2 milliGRAM(s) Oral daily  finasteride 5 milliGRAM(s) Oral daily  fluticasone propionate 50 MICROgram(s)/spray Nasal Spray 1 Spray(s) Both Nostrils two times a day  furosemide   Injectable 40 milliGRAM(s) IV Push two times a day  glucagon  Injectable 1 milliGRAM(s) IntraMuscular once  insulin lispro (ADMELOG) corrective regimen sliding scale   SubCutaneous three times a day before meals  metoprolol tartrate 25 milliGRAM(s) Oral two times a day  oxycodone    5 mG/acetaminophen 325 mG 2 Tablet(s) Oral every 4 hours PRN  pantoprazole    Tablet 40 milliGRAM(s) Oral before breakfast  potassium chloride    Tablet ER 20 milliEquivalent(s) Oral daily  senna 2 Tablet(s) Oral at bedtime  sodium chloride 0.9% lock flush 3 milliLiter(s) IV Push every 8 hours  sucralfate 1 Gram(s) Oral two times a day          Physical Exam:  T(C): 36.7 (10-30-23 @ 12:15), Max: 36.8 (10-29-23 @ 16:45)  HR: 82 (10-30-23 @ 12:15) (74 - 89)  BP: 117/74 (10-30-23 @ 12:15) (98/61 - 144/73)  RR: 18 (10-30-23 @ 12:15) (18 - 19)  SpO2: 95% (10-30-23 @ 12:15) (92% - 97%)  Wt(kg): --  General: Comfortable in NAD  Neck: No JVD  CVS: nl s1s2, no s3  Pulm: CTA b/l  Abd: soft, non-tender  Ext: No c/c/e  Neuro A&O x3  Psych: Normal affect      Labs:   30 Oct 2023 04:56    141    |  99     |  23.2   ----------------------------<  155    4.0     |  36.0   |  0.65     Ca    8.6        30 Oct 2023 04:56  Mg     2.0       30 Oct 2023 04:56    TPro  6.4    /  Alb  3.5    /  TBili  0.6    /  DBili  x      /  AST  12     /  ALT  12     /  AlkPhos  202    29 Oct 2023 05:54                          11.4   5.84  )-----------( 145      ( 30 Oct 2023 04:56 )             37.6     PT/INR - ( 29 Oct 2023 05:54 )   PT: 15.3 sec;   INR: 1.39 ratio         PTT - ( 29 Oct 2023 05:54 )  PTT:35.7 sec  CARDIAC MARKERS ( 28 Oct 2023 15:00 )  x     / 0.03 ng/mL / x     / x     / x              Assessment:  77 year old male patient with a medical history of Severe Aortic Stenosis s/p TAVR 10/6/23 with Dr. Napier, Chronic Diastolic CHF, CAD s/p PCI to p&mLAD, Thoracic Aortic Aneurysm, Paroxysmal Afib s/p Ablation (on Eliquis), and Cardiomems, LLE DVT, on Home O2 PRN, ALLIE, BPH s/p TURP, Left Renal Mass, Morbid Obesity, Laminectomy, Anxiety, Type 2 DM (A1C 7.2 on Metformin), GERD, Cholelithiasis, MRCP w/ cirrhosis, now readmitted for acute on chronic diastolic CHF, acute respiratory failure, and b/l pleural effusions (Right > Left). 10/29 s/p right pigtail catheter placement and removal for 1L drainage.  On interview patient reports dietary indiscretion and poor compliance with medications.   Vol up on exam     Plan:  1. Continue with IV diuretics   2. monitor renal function and electrolytes   3. Continue current CV meds   4.  Mems interrogation on thursday to gouge fluid status        Tumor Depth: Less than 6mm from granular layer and no invasion beyond the subcutaneous fat

## 2023-10-30 NOTE — PROGRESS NOTE ADULT - ASSESSMENT
77 year old male patient with a medical history of Severe Aortic Stenosis s/p TAVR 10/6/23 with Dr. Napier, Chronic Diastolic CHF, CAD s/p PCI to p&mLAD, Thoracic Aortic Aneurysm, Paroxysmal Afib s/p Ablation (on Eliquis), and Cardiomems, LLE DVT, on Home O2 PRN, ALLIE, BPH s/p TURP, Left Renal Mass, Morbid Obesity, Laminectomy, Anxiety, Type 2 DM (A1C 7.2 on Metformin), GERD, Cholelithiasis, MRCP w/ cirrhosis, now readmitted for acute on chronic diastolic CHF, acute respiratory failure, and b/l pleural effusions (Right > Left). Patient requiring O2 supplementation with nasal cannula, IV diuresis, and Eliquis held with plan for potential drainage of his right pleural effusion.   10/29 right PTC placement and removal ~1L drainage

## 2023-10-30 NOTE — PROGRESS NOTE ADULT - PROBLEM SELECTOR PLAN 3
POCUS performed with drainable pocket on the right.   Eliquis held since 10/28 AM- Resume Eliquis  10/29 right PTC placement and removal ~1L drainage

## 2023-10-30 NOTE — PROGRESS NOTE ADULT - PROBLEM SELECTOR PLAN 1
S/p TAVR 10/6/23 with Dr. Napier.   Patient readmitted to CTS service for acute on chronic diastolic CHF exacerbation.  Follow up TTE with preserved EF 50-55%, persistent Moderate TR.   CTA chest with pulmonary vascular congestion and bilateral pleural effusion (Right>Left).   10/29 right PTC placement and removal ~1L drainage  Continuous SpO2 monitoring. Titrate O2 requirements PRN to maintain SpO2 >92%.  Lasix 40IV BID ordered for diuresis.   Monitor strict I/Os. Daily weights.   Supplement electrolytes PRN.   Continue Lopressor as tolerated by HR and BP.   Continue supportive measures.   Plan discussed / reviewed with CT Surgery attending Dr. Gonzalez.

## 2023-10-30 NOTE — PROGRESS NOTE ADULT - SUBJECTIVE AND OBJECTIVE BOX
HPI: 77 year old male patient with a medical history of Severe Aortic Stenosis s/p TAVR 10/6/23 with Dr. Napier, Chronic Diastolic CHF, CAD s/p PCI to p&mLAD, Thoracic Aortic Aneurysm, Paroxysmal Afib s/p Ablation (on Eliquis), and Cardiomems, LLE DVT, on Home O2 PRN, ALLIE, BPH s/p TURP, Left Renal Mass, Morbid Obesity, Laminectomy, Anxiety, Type 2 DM (A1C 7.2 on Metformin), GERD, Cholelithiasis, MRCP w/ cirrhosis, now readmitted for acute on chronic diastolic CHF, acute respiratory failure, and b/l pleural effusions (Right > Left). Patient requiring O2 supplementation with nasal cannula, IV diuresis, and Eliquis held for procedure. 10/29 s/p right pigtail catheter placement and removal for 1L drainage.    SIGNIFICANT RECENT/PAST 24 HR EVENTS:  No acute events reported overnight. Remains on supplemental O2. s/p right pigtail catheter placement and removal for 1L drainage    SUBJECTIVE:  Patient seen and examined. Pt currently lying in bed in NAD. Denies fevers, chills, HA, dizziness, CP, SOB, abd pain, N/V/D, numbness/tingling in extremities, or any other acute complaints. States symptoms improving since IV diuretics and drainage of effusion.  +Tolerating diet  +Passing BMs  +Passing gas  ROS negative x 10 systems except as noted above.    PAST MEDICAL & SURGICAL HISTORY:  Anxiety  Hypertension  Hyperlipidemia  Prostate disease  Gastroesophageal reflux disease  Gallbladder stone without cholecystitis or obstruction  BPH (benign prostatic hyperplasia)  DM (diabetes mellitus)  DVT (deep venous thrombosis)  Afib  Anxiety  HLD (hyperlipidemia)  Malignant schwannoma  H/O arthroscopy of knee  S/P laminectomy  S/P coronary artery stent placement    DAILY REVIEW:  Telemetry: V-Paced  Vitals   T(C): 36.6 (29 Oct 2023 21:08), Max: 37.2 (29 Oct 2023 08:00)  T(F): 97.9 (29 Oct 2023 21:08), Max: 98.9 (29 Oct 2023 08:00)  HR: 74 (29 Oct 2023 21:08) (74 - 89)  BP: 144/73 (29 Oct 2023 21:08) (99/64 - 144/73)  RR: 18 (29 Oct 2023 21:08) (18 - 21)  SpO2: 92% (29 Oct 2023 21:08) (92% - 95%)    O2 Parameters below as of 29 Oct 2023 21:08  Patient On (Oxygen Delivery Method): nasal cannula  O2 Flow (L/min): 5    I&O's Detail    28 Oct 2023 07:01  -  29 Oct 2023 07:00  --------------------------------------------------------  IN:  Total IN: 0 mL    OUT:    Voided (mL): 500 mL  Total OUT: 500 mL    Total NET: -500 mL    29 Oct 2023 07:01  -  30 Oct 2023 00:51  --------------------------------------------------------  IN:  Total IN: 0 mL    OUT:    Voided (mL): 600 mL  Total OUT: 600 mL    Total NET: -600 mL    Admit Wt: Drug Dosing Weight  Height (cm): 188 (28 Oct 2023 12:10)  Weight (kg): 142 (28 Oct 2023 12:10)  BMI (kg/m2): 40.2 (28 Oct 2023 12:10)  BSA (m2): 2.63 (28 Oct 2023 12:10)    LABS:                        11.6   6.39  )-----------( 167      ( 29 Oct 2023 05:54 )             38.8     10-29    142  |  98  |  21.7<H>  ----------------------------<  177<H>  3.5   |  35.0<H>  |  0.66    Ca    8.5      29 Oct 2023 05:54  Mg     2.0     10-29    TPro  6.4<L>  /  Alb  3.5  /  TBili  0.6  /  DBili  x   /  AST  12  /  ALT  12  /  AlkPhos  202<H>  10-29    LIVER FUNCTIONS - ( 29 Oct 2023 05:54 )  Alb: 3.5 g/dL / Pro: 6.4 g/dL / ALK PHOS: 202 U/L / ALT: 12 U/L / AST: 12 U/L / GGT: x           PT/INR - ( 29 Oct 2023 05:54 )   PT: 15.3 sec;   INR: 1.39 ratio       PTT - ( 29 Oct 2023 05:54 )  PTT:35.7 sec    Urinalysis Basic - ( 29 Oct 2023 05:54 )    Color: x / Appearance: x / SG: x / pH: x  Gluc: 177 mg/dL / Ketone: x  / Bili: x / Urobili: x   Blood: x / Protein: x / Nitrite: x   Leuk Esterase: x / RBC: x / WBC x   Sq Epi: x / Non Sq Epi: x / Bacteria: x    Culture - Body Fluid with Gram Stain (collected 10-29-23 @ 12:55)  Source: Pleural Fl Pleural Fluid  Gram Stain (10-29-23 @ 23:27):    polymorphonuclear leukocytes seen    No organisms seen    by cytocentrifuge    MEDICATIONS  MEDICATIONS  (STANDING):  albuterol/ipratropium for Nebulization 3 milliLiter(s) Nebulizer every 6 hours  apixaban 5 milliGRAM(s) Oral every 12 hours  aspirin enteric coated 81 milliGRAM(s) Oral daily  atorvastatin 10 milliGRAM(s) Oral at bedtime  doxazosin 2 milliGRAM(s) Oral daily  finasteride 5 milliGRAM(s) Oral daily  furosemide   Injectable 40 milliGRAM(s) IV Push two times a day  glucagon  Injectable 1 milliGRAM(s) IntraMuscular once  insulin lispro (ADMELOG) corrective regimen sliding scale   SubCutaneous three times a day before meals  metoprolol tartrate 50 milliGRAM(s) Oral two times a day  pantoprazole    Tablet 40 milliGRAM(s) Oral before breakfast  potassium chloride    Tablet ER 20 milliEquivalent(s) Oral daily  senna 2 Tablet(s) Oral at bedtime  sodium chloride 0.9% lock flush 3 milliLiter(s) IV Push every 8 hours  sucralfate 1 Gram(s) Oral two times a day    MEDICATIONS  (PRN):  acetaminophen     Tablet .. 650 milliGRAM(s) Oral every 6 hours PRN Temp greater or equal to 38.5C (101.3F), Mild Pain (1 - 3)  albuterol    90 MICROgram(s) HFA Inhaler 2 Puff(s) Inhalation every 6 hours PRN for shortness of breath and/or wheezing  oxycodone    5 mG/acetaminophen 325 mG 2 Tablet(s) Oral every 4 hours PRN Moderate Pain (4 - 6)    ALLERGIES:  Ceftin (Hives)  penicillin (Anaphylaxis)  sulfa drugs (Unknown)  Bactrim (Rash)    DIAGNOSTICS:  All laboratory results, radiology and medications reviewed.  Xray Chest 1 View- PORTABLE-Urgent (Xray Chest 1 View- PORTABLE-Urgent .) (10.29.23 @ 17:27)  Patient had removal of right chest tube.    Heart enlargement and left-sided pacemaker again noted. Catheter right   chest tube present on study earlier in the day is been removed. No   pneumothorax.    There are persistent slight left base findings.    IMPRESSION: Uneventful removal of right chest tube. Persistent slight   left base findings.    Xray Chest 1 View- PORTABLE-Urgent (Xray Chest 1 View- PORTABLE-Urgent .) (10.29.23 @ 11:47)  INTERPRETATION:  AP chest on October 29, 2023 at 5:01 AM. Patient has   pulmonary congestion and is short of breath.    Gross heart enlargement and left-sided pacemaker again noted. TAVR aortic   valve noted.    On October 28 there is moderate to advanced CHF. This shows improvement   with moderate residual.    Follow-up AP chest on October 29, 2023 at 10:15 AM. Continued improvement   in CHF with mild residual. The catheter right chest tube is been inserted   and right base effusion seen on October 29 shows improvement. No   pneumothorax.    IMPRESSION: Diminished right effusion after catheter chest tube.   Improving CHF on the latest film shows mild residual.    CT Angio Chest PE Protocol w/ IV Cont (10.28.23 @ 21:11)  IMPRESSION:  Suboptimal evaluation of the peripheral pulmonary arteries. No central   pulmonary embolus or secondary signs of right heart strain.    Right > left pleural effusion. Nonspecific bilateral pulmonary   groundglass opacities, which can be seen in noninfectious (pulmonary   edema) cyst and infectious processes.    Ascending aortic aneurysm. Findings suggestive of pulmonary arterial   hypertension.    Additional findings as described.    12 Lead ECG (10.28.23 @ 21:47)  Ventricular Rate 90 BPM  Atrial Rate 86 BPM  QRS Duration 196 ms  Q-T Interval 462 ms  QTC Calculation(Bazett) 565 ms  P Axis 71 degrees  R Axis -68 degrees  T Axis 77 degrees  Diagnosis Line Ventricular-paced rhythm with frequent ,and consecutive Premature ventricular complexes  Abnormal ECG    TTE Echo Complete w/o Contrast w/ Doppler (10.28.23 @ 19:13)  Summary:   1. Left ventricular ejection fraction, by visual estimation, is 50 to   55%.   2. Normal global left ventricular systolic function.   3. Mildly increased left ventricular internal cavity size.   4. The mitralin-flow pattern reveals no discernable A-wave, therefore   no comment on diastolic function can be made.   5. There is mild concentric left ventricular hypertrophy.   6. Normal right ventricular size and function.   7. There is no evidence of pericardial effusion.   8. Trace mitral valve regurgitation.   9. Moderate tricuspid regurgitation.  10. TAVR in the aortic position.  11. Estimated pulmonary artery systolic pressure is 48.9 mmHg assuming a   right atrial pressure of 15 mmHg, which is consistent with mild pulmonary   hypertension.    PHYSICAL EXAM:  Constitutional: NAD  Neck: supple, trachea midline. No JVD   Respiratory: Breath sounds diminished b/l lower fields to auscultation, no accessory muscle use noted. No wheezing, rales, or rhonchi noted b/l   Cardiovascular: Regular rate, regular rhythm, normal S1, S2; no murmurs or rub   Gastrointestinal: Soft, obese, non-tender, non-distended, + bowel sounds   Extremities: GUERRA x 4, 2+ LE peripheral edema, no cyanosis, no clubbing, chronic venous discoloration BLE  Vascular: Equal and normal pulses: 2+ peripheral pulses throughout  Neurological: A+O x 3; speech clear and intact; no gross sensory/motor deficits  Psychiatric: calm, normal mood, normal affect   Skin: warm, dry, well perfused, no rashes

## 2023-10-30 NOTE — DISCHARGE NOTE NURSING/CASE MANAGEMENT/SOCIAL WORK - NSDCFUADDAPPT_GEN_ALL_CORE_FT
[facility] GIVEN  MEDS TO BED  TRANSITION CARE   HOME CARE [facility] AT HOME [phone]  FOLLOW UP APPOINTMENTS HAVE BEEN MADE FOR YOU:  PRIMARY CARE: BRAD APPIAH  NOVEMBER 16TH AT 09:45  [address] [facility]  IF YOU ARE UNABLE TO ATTEND THIS APPOINTMENT PLEASE CALL  TO [facility]  CARDIOLOGY DR ALFRED  NOVEMBER 2ND AT 12:30  [address] [facility]  IF YOU ARE UNABLE TO ATTEND THIS APPOINTMENT PLEASE CALL  TO RESCHEDULE  [address] [facility] GIVEN  MEDS TO BED  TRANSITION CARE   HOME CARE [facility] AT HOME [phone]  FOLLOW UP APPOINTMENTS HAVE BEEN MADE FOR YOU:  PRIMARY CARE: BRAD APPIAH  NOVEMBER 16TH AT 09:45  [address] [facility]  IF YOU ARE UNABLE TO ATTEND THIS APPOINTMENT PLEASE CALL  TO [facility]  CARDIOLOGY DR ALFRED  NOVEMBER 9TH  AT 1:15 PM  [facility]  IF YOU ARE UNABLE TO ATTEND THIS APPOINTMENT PLEASE CALL  TO [facility]ULE  [address] [place] [address]

## 2023-10-30 NOTE — DISCHARGE NOTE NURSING/CASE MANAGEMENT/SOCIAL WORK - NSDCPEFALRISK_GEN_ALL_CORE
For information on Fall & Injury Prevention, visit: [email or web address] OR  [email or web address] OR  https://www.cdc.gov/steadi/patient.html

## 2023-10-31 LAB
ANION GAP SERPL CALC-SCNC: 4 MMOL/L — LOW (ref 5–17)
ANION GAP SERPL CALC-SCNC: 4 MMOL/L — LOW (ref 5–17)
BUN SERPL-MCNC: 24.6 MG/DL — HIGH (ref 8–20)
BUN SERPL-MCNC: 24.6 MG/DL — HIGH (ref 8–20)
CALCIUM SERPL-MCNC: 8.2 MG/DL — LOW (ref 8.4–10.5)
CALCIUM SERPL-MCNC: 8.2 MG/DL — LOW (ref 8.4–10.5)
CHLORIDE SERPL-SCNC: 100 MMOL/L — SIGNIFICANT CHANGE UP (ref 96–108)
CHLORIDE SERPL-SCNC: 100 MMOL/L — SIGNIFICANT CHANGE UP (ref 96–108)
CO2 SERPL-SCNC: 35 MMOL/L — HIGH (ref 22–29)
CO2 SERPL-SCNC: 35 MMOL/L — HIGH (ref 22–29)
CREAT SERPL-MCNC: 0.62 MG/DL — SIGNIFICANT CHANGE UP (ref 0.5–1.3)
CREAT SERPL-MCNC: 0.62 MG/DL — SIGNIFICANT CHANGE UP (ref 0.5–1.3)
EGFR: 98 ML/MIN/1.73M2 — SIGNIFICANT CHANGE UP
EGFR: 98 ML/MIN/1.73M2 — SIGNIFICANT CHANGE UP
GLUCOSE BLDC GLUCOMTR-MCNC: 132 MG/DL — HIGH (ref 70–99)
GLUCOSE BLDC GLUCOMTR-MCNC: 132 MG/DL — HIGH (ref 70–99)
GLUCOSE BLDC GLUCOMTR-MCNC: 168 MG/DL — HIGH (ref 70–99)
GLUCOSE BLDC GLUCOMTR-MCNC: 168 MG/DL — HIGH (ref 70–99)
GLUCOSE BLDC GLUCOMTR-MCNC: 192 MG/DL — HIGH (ref 70–99)
GLUCOSE BLDC GLUCOMTR-MCNC: 192 MG/DL — HIGH (ref 70–99)
GLUCOSE BLDC GLUCOMTR-MCNC: 239 MG/DL — HIGH (ref 70–99)
GLUCOSE BLDC GLUCOMTR-MCNC: 239 MG/DL — HIGH (ref 70–99)
GLUCOSE SERPL-MCNC: 160 MG/DL — HIGH (ref 70–99)
GLUCOSE SERPL-MCNC: 160 MG/DL — HIGH (ref 70–99)
HCT VFR BLD CALC: 36 % — LOW (ref 39–50)
HCT VFR BLD CALC: 36 % — LOW (ref 39–50)
HGB BLD-MCNC: 10.9 G/DL — LOW (ref 13–17)
HGB BLD-MCNC: 10.9 G/DL — LOW (ref 13–17)
MAGNESIUM SERPL-MCNC: 2 MG/DL — SIGNIFICANT CHANGE UP (ref 1.6–2.6)
MAGNESIUM SERPL-MCNC: 2 MG/DL — SIGNIFICANT CHANGE UP (ref 1.6–2.6)
MCHC RBC-ENTMCNC: 28.6 PG — SIGNIFICANT CHANGE UP (ref 27–34)
MCHC RBC-ENTMCNC: 28.6 PG — SIGNIFICANT CHANGE UP (ref 27–34)
MCHC RBC-ENTMCNC: 30.3 GM/DL — LOW (ref 32–36)
MCHC RBC-ENTMCNC: 30.3 GM/DL — LOW (ref 32–36)
MCV RBC AUTO: 94.5 FL — SIGNIFICANT CHANGE UP (ref 80–100)
MCV RBC AUTO: 94.5 FL — SIGNIFICANT CHANGE UP (ref 80–100)
PLATELET # BLD AUTO: 146 K/UL — LOW (ref 150–400)
PLATELET # BLD AUTO: 146 K/UL — LOW (ref 150–400)
POTASSIUM SERPL-MCNC: 3.5 MMOL/L — SIGNIFICANT CHANGE UP (ref 3.5–5.3)
POTASSIUM SERPL-MCNC: 3.5 MMOL/L — SIGNIFICANT CHANGE UP (ref 3.5–5.3)
POTASSIUM SERPL-SCNC: 3.5 MMOL/L — SIGNIFICANT CHANGE UP (ref 3.5–5.3)
POTASSIUM SERPL-SCNC: 3.5 MMOL/L — SIGNIFICANT CHANGE UP (ref 3.5–5.3)
RBC # BLD: 3.81 M/UL — LOW (ref 4.2–5.8)
RBC # BLD: 3.81 M/UL — LOW (ref 4.2–5.8)
RBC # FLD: 13.8 % — SIGNIFICANT CHANGE UP (ref 10.3–14.5)
RBC # FLD: 13.8 % — SIGNIFICANT CHANGE UP (ref 10.3–14.5)
SODIUM SERPL-SCNC: 139 MMOL/L — SIGNIFICANT CHANGE UP (ref 135–145)
SODIUM SERPL-SCNC: 139 MMOL/L — SIGNIFICANT CHANGE UP (ref 135–145)
WBC # BLD: 5.97 K/UL — SIGNIFICANT CHANGE UP (ref 3.8–10.5)
WBC # BLD: 5.97 K/UL — SIGNIFICANT CHANGE UP (ref 3.8–10.5)
WBC # FLD AUTO: 5.97 K/UL — SIGNIFICANT CHANGE UP (ref 3.8–10.5)
WBC # FLD AUTO: 5.97 K/UL — SIGNIFICANT CHANGE UP (ref 3.8–10.5)

## 2023-10-31 PROCEDURE — 99232 SBSQ HOSP IP/OBS MODERATE 35: CPT

## 2023-10-31 PROCEDURE — 99232 SBSQ HOSP IP/OBS MODERATE 35: CPT | Mod: 24

## 2023-10-31 PROCEDURE — 71045 X-RAY EXAM CHEST 1 VIEW: CPT | Mod: 26

## 2023-10-31 RX ORDER — POTASSIUM CHLORIDE 20 MEQ
40 PACKET (EA) ORAL EVERY 4 HOURS
Refills: 0 | Status: COMPLETED | OUTPATIENT
Start: 2023-10-31 | End: 2023-10-31

## 2023-10-31 RX ADMIN — APIXABAN 5 MILLIGRAM(S): 2.5 TABLET, FILM COATED ORAL at 17:52

## 2023-10-31 RX ADMIN — Medication 3 MILLILITER(S): at 09:04

## 2023-10-31 RX ADMIN — Medication 40 MILLIGRAM(S): at 17:40

## 2023-10-31 RX ADMIN — Medication 2: at 17:38

## 2023-10-31 RX ADMIN — SODIUM CHLORIDE 3 MILLILITER(S): 9 INJECTION INTRAMUSCULAR; INTRAVENOUS; SUBCUTANEOUS at 13:30

## 2023-10-31 RX ADMIN — ATORVASTATIN CALCIUM 10 MILLIGRAM(S): 80 TABLET, FILM COATED ORAL at 21:02

## 2023-10-31 RX ADMIN — Medication 40 MILLIEQUIVALENT(S): at 11:17

## 2023-10-31 RX ADMIN — Medication 0.25 MILLIGRAM(S): at 09:31

## 2023-10-31 RX ADMIN — APIXABAN 5 MILLIGRAM(S): 2.5 TABLET, FILM COATED ORAL at 06:49

## 2023-10-31 RX ADMIN — Medication 650 MILLIGRAM(S): at 17:42

## 2023-10-31 RX ADMIN — Medication 2 MILLIGRAM(S): at 09:19

## 2023-10-31 RX ADMIN — Medication 25 MILLIGRAM(S): at 17:40

## 2023-10-31 RX ADMIN — Medication 2: at 13:06

## 2023-10-31 RX ADMIN — Medication 40 MILLIGRAM(S): at 09:26

## 2023-10-31 RX ADMIN — Medication 20 MILLIEQUIVALENT(S): at 13:08

## 2023-10-31 RX ADMIN — SODIUM CHLORIDE 3 MILLILITER(S): 9 INJECTION INTRAMUSCULAR; INTRAVENOUS; SUBCUTANEOUS at 07:42

## 2023-10-31 RX ADMIN — Medication 1 GRAM(S): at 06:50

## 2023-10-31 RX ADMIN — SODIUM CHLORIDE 3 MILLILITER(S): 9 INJECTION INTRAMUSCULAR; INTRAVENOUS; SUBCUTANEOUS at 21:02

## 2023-10-31 RX ADMIN — Medication 650 MILLIGRAM(S): at 18:42

## 2023-10-31 RX ADMIN — Medication 1 SPRAY(S): at 06:48

## 2023-10-31 RX ADMIN — Medication 1 SPRAY(S): at 17:39

## 2023-10-31 RX ADMIN — FINASTERIDE 5 MILLIGRAM(S): 5 TABLET, FILM COATED ORAL at 13:08

## 2023-10-31 RX ADMIN — ACETAZOLAMIDE 110 MILLIGRAM(S): 250 TABLET ORAL at 09:21

## 2023-10-31 RX ADMIN — Medication 3 MILLILITER(S): at 15:16

## 2023-10-31 RX ADMIN — ACETAZOLAMIDE 110 MILLIGRAM(S): 250 TABLET ORAL at 17:44

## 2023-10-31 RX ADMIN — PANTOPRAZOLE SODIUM 40 MILLIGRAM(S): 20 TABLET, DELAYED RELEASE ORAL at 06:49

## 2023-10-31 RX ADMIN — Medication 40 MILLIEQUIVALENT(S): at 13:09

## 2023-10-31 RX ADMIN — Medication 1 GRAM(S): at 17:39

## 2023-10-31 RX ADMIN — Medication 0.25 MILLIGRAM(S): at 17:52

## 2023-10-31 RX ADMIN — Medication 25 MILLIGRAM(S): at 09:26

## 2023-10-31 RX ADMIN — Medication 81 MILLIGRAM(S): at 13:07

## 2023-10-31 NOTE — PROGRESS NOTE ADULT - PROBLEM SELECTOR PLAN 1
S/p TAVR 10/6/23 with Dr. Napier.   Patient readmitted to CTS service for acute on chronic diastolic CHF exacerbation.  Follow up TTE with preserved EF 50-55%, persistent Moderate TR.   CTA chest with pulmonary vascular congestion and bilateral pleural effusion (Right>Left).   10/29 right PTC placement and removal ~1L drainage  Continuous SpO2 monitoring. Titrate O2 requirements PRN to maintain SpO2 >92%.  Lasix 40IV BID ordered for diuresis.   Diamox added for alkalosis on chemistry  Monitor strict I/Os. Daily weights.   Supplement electrolytes PRN.   Continue Lopressor as tolerated by HR and BP.   Continue supportive measures.   Has CardioMems - consider interrogation to gauge volume status  Cardiology recommendations appreciated  Plan discussed / reviewed with CT Surgery attending Dr. Gonzalez.

## 2023-10-31 NOTE — PROGRESS NOTE ADULT - SUBJECTIVE AND OBJECTIVE BOX
HPI: 77 year old male patient with a medical history of Severe Aortic Stenosis s/p TAVR 10/6/23 with Dr. Napier, Chronic Diastolic CHF, CAD s/p PCI to p&mLAD, Thoracic Aortic Aneurysm, Paroxysmal Afib s/p Ablation (on Eliquis), and Cardiomems, LLE DVT, on Home O2 PRN, ALLIE, BPH s/p TURP, Left Renal Mass, Morbid Obesity, Laminectomy, Anxiety, Type 2 DM (A1C 7.2 on Metformin), GERD, Cholelithiasis, MRCP w/ cirrhosis, now readmitted for acute on chronic diastolic CHF, acute respiratory failure, and b/l pleural effusions (Right > Left). Patient requiring O2 supplementation with nasal cannula, IV diuresis, and Eliquis held for procedure. 10/29 s/p right pigtail catheter placement and removal for 1L drainage.    SIGNIFICANT RECENT/PAST 24 HR EVENTS:  No acute events reported overnight. Currently on baseline home O2 requirements of 3L. c/o dizziness during day with neg. orthostatics    SUBJECTIVE:  Patient seen and examined. Pt currently lying in bed in NAD. Denies fevers, chills, HA, dizziness, CP, SOB, abd pain, N/V/D, numbness/tingling in extremities, or any other acute complaints. States had episode of dizziness during the day.  ROS negative x 10 systems except as noted above.    PAST MEDICAL & SURGICAL HISTORY:  Anxiety  Hypertension  Hyperlipidemia  Prostate disease  Gastroesophageal reflux disease  Gallbladder stone without cholecystitis or obstruction  BPH (benign prostatic hyperplasia)  DM (diabetes mellitus)  DVT (deep venous thrombosis)  Afib  Anxiety  HLD (hyperlipidemia)  Malignant schwannoma  H/O arthroscopy of knee  S/P laminectomy  S/P coronary artery stent placement    DAILY REVIEW:  Telemetry: V-Paced  Vital Signs Last 24 Hrs  T(C): 36.7 (31 Oct 2023 01:04), Max: 36.9 (30 Oct 2023 16:55)  T(F): 98.1 (31 Oct 2023 01:04), Max: 98.4 (30 Oct 2023 16:55)  HR: 64 (31 Oct 2023 01:04) (64 - 92)  BP: 116/55 (31 Oct 2023 01:04) (98/61 - 121/65)  BP(mean): --  RR: 17 (31 Oct 2023 01:04) (17 - 19)  SpO2: 95% (31 Oct 2023 01:04) (93% - 97%)    Parameters below as of 31 Oct 2023 01:04  Patient On (Oxygen Delivery Method): 3L nasal cannula    I&O's Detail    29 Oct 2023 07:01  -  30 Oct 2023 07:00  --------------------------------------------------------  IN:  Total IN: 0 mL    OUT:    Voided (mL): 1150 mL  Total OUT: 1150 mL    Total NET: -1150 mL    30 Oct 2023 07:01  -  31 Oct 2023 03:02  --------------------------------------------------------  IN:    Oral Fluid: 920 mL  Total IN: 920 mL    OUT:    Voided (mL): 1500 mL  Total OUT: 1500 mL    Total NET: -580 mL    Admit Wt: Drug Dosing Weight  Height (cm): 188 (28 Oct 2023 12:10)  Weight (kg): 142 (28 Oct 2023 12:10)  BMI (kg/m2): 40.2 (28 Oct 2023 12:10)  BSA (m2): 2.63 (28 Oct 2023 12:10)    LABS:                        11.4   5.84  )-----------( 145      ( 30 Oct 2023 04:56 )             37.6   10-30    141  |  99  |  23.2<H>  ----------------------------<  155<H>  4.0   |  36.0<H>  |  0.65    Ca    8.6      30 Oct 2023 04:56  Mg     2.0     10-30    TPro  6.4<L>  /  Alb  3.5  /  TBili  0.6  /  DBili  x   /  AST  12  /  ALT  12  /  AlkPhos  202<H>  10-29    LIVER FUNCTIONS - ( 29 Oct 2023 05:54 )  Alb: 3.5 g/dL / Pro: 6.4 g/dL / ALK PHOS: 202 U/L / ALT: 12 U/L / AST: 12 U/L / GGT: x           PT/INR - ( 29 Oct 2023 05:54 )   PT: 15.3 sec;   INR: 1.39 ratio       PTT - ( 29 Oct 2023 05:54 )  PTT:35.7 sec    Urinalysis Basic - ( 29 Oct 2023 05:54 )    Color: x / Appearance: x / SG: x / pH: x  Gluc: 177 mg/dL / Ketone: x  / Bili: x / Urobili: x   Blood: x / Protein: x / Nitrite: x   Leuk Esterase: x / RBC: x / WBC x   Sq Epi: x / Non Sq Epi: x / Bacteria: x    Culture - Body Fluid with Gram Stain (collected 10-29-23 @ 12:55)  Source: Pleural Fl Pleural Fluid  Gram Stain (10-29-23 @ 23:27):    polymorphonuclear leukocytes seen    No organisms seen    by cytocentrifuge    MEDICATIONS  MEDICATIONS  (STANDING):  acetaZOLAMIDE  IVPB 500 milliGRAM(s) IV Intermittent every 12 hours  albuterol/ipratropium for Nebulization 3 milliLiter(s) Nebulizer every 6 hours  apixaban 5 milliGRAM(s) Oral every 12 hours  aspirin enteric coated 81 milliGRAM(s) Oral daily  atorvastatin 10 milliGRAM(s) Oral at bedtime  doxazosin 2 milliGRAM(s) Oral daily  finasteride 5 milliGRAM(s) Oral daily  fluticasone propionate 50 MICROgram(s)/spray Nasal Spray 1 Spray(s) Both Nostrils two times a day  furosemide   Injectable 40 milliGRAM(s) IV Push two times a day  glucagon  Injectable 1 milliGRAM(s) IntraMuscular once  insulin lispro (ADMELOG) corrective regimen sliding scale   SubCutaneous three times a day before meals  metoprolol tartrate 25 milliGRAM(s) Oral two times a day  pantoprazole    Tablet 40 milliGRAM(s) Oral before breakfast  potassium chloride    Tablet ER 20 milliEquivalent(s) Oral daily  senna 2 Tablet(s) Oral at bedtime  sodium chloride 0.9% lock flush 3 milliLiter(s) IV Push every 8 hours  sucralfate 1 Gram(s) Oral two times a day    MEDICATIONS  (PRN):  acetaminophen     Tablet .. 650 milliGRAM(s) Oral every 6 hours PRN Temp greater or equal to 38.5C (101.3F), Mild Pain (1 - 3)  albuterol    90 MICROgram(s) HFA Inhaler 2 Puff(s) Inhalation every 6 hours PRN for shortness of breath and/or wheezing  ALPRAZolam 0.25 milliGRAM(s) Oral every 8 hours PRN anxiety  oxycodone    5 mG/acetaminophen 325 mG 2 Tablet(s) Oral every 4 hours PRN Moderate Pain (4 - 6)    ALLERGIES:  Ceftin (Hives)  penicillin (Anaphylaxis)  sulfa drugs (Unknown)  Bactrim (Rash)    DIAGNOSTICS:  All laboratory results, radiology and medications reviewed.  Xray Chest 1 View- PORTABLE-Routine (Xray Chest 1 View- PORTABLE-Routine in AM.) (10.30.23 @ 05:48)  IMPRESSION:  Enlarged cardiac silhouette.    Pulmonary vascular congestion/mild interstitial pulmonary edema,   predominantly worse on the right than on the prior image.    Xray Chest 1 View- PORTABLE-Urgent (Xray Chest 1 View- PORTABLE-Urgent .) (10.29.23 @ 11:47)  INTERPRETATION:  AP chest on October 29, 2023 at 5:01 AM. Patient has   pulmonary congestion and is short of breath.    Gross heart enlargement and left-sided pacemaker again noted. TAVR aortic   valve noted.    On October 28 there is moderate to advanced CHF. This shows improvement   with moderate residual.    Follow-up AP chest on October 29, 2023 at 10:15 AM. Continued improvement   in CHF with mild residual. The catheter right chest tube is been inserted   and right base effusion seen on October 29 shows improvement. No   pneumothorax.    IMPRESSION: Diminished right effusion after catheter chest tube.   Improving CHF on the latest film shows mild residual.    CT Angio Chest PE Protocol w/ IV Cont (10.28.23 @ 21:11)  IMPRESSION:  Suboptimal evaluation of the peripheral pulmonary arteries. No central   pulmonary embolus or secondary signs of right heart strain.    Right > left pleural effusion. Nonspecific bilateral pulmonary   groundglass opacities, which can be seen in noninfectious (pulmonary   edema) cyst and infectious processes.    Ascending aortic aneurysm. Findings suggestive of pulmonary arterial   hypertension.    Additional findings as described.    12 Lead ECG (10.28.23 @ 21:47)  Ventricular Rate 90 BPM  Atrial Rate 86 BPM  QRS Duration 196 ms  Q-T Interval 462 ms  QTC Calculation(Bazett) 565 ms  P Axis 71 degrees  R Axis -68 degrees  T Axis 77 degrees  Diagnosis Line Ventricular-paced rhythm with frequent ,and consecutive Premature ventricular complexes  Abnormal ECG    TTE Echo Complete w/o Contrast w/ Doppler (10.28.23 @ 19:13)  Summary:   1. Left ventricular ejection fraction, by visual estimation, is 50 to   55%.   2. Normal global left ventricular systolic function.   3. Mildly increased left ventricular internal cavity size.   4. The mitralin-flow pattern reveals no discernable A-wave, therefore   no comment on diastolic function can be made.   5. There is mild concentric left ventricular hypertrophy.   6. Normal right ventricular size and function.   7. There is no evidence of pericardial effusion.   8. Trace mitral valve regurgitation.   9. Moderate tricuspid regurgitation.  10. TAVR in the aortic position.  11. Estimated pulmonary artery systolic pressure is 48.9 mmHg assuming a   right atrial pressure of 15 mmHg, which is consistent with mild pulmonary   hypertension.    PHYSICAL EXAM:  Constitutional: NAD  Neck: supple, trachea midline. No JVD   Respiratory: Breath sounds diminished b/l lower fields to auscultation, no accessory muscle use noted. No wheezing, rales, or rhonchi noted b/l   Cardiovascular: Regular rate, regular rhythm, normal S1, S2; no murmurs or rub   Gastrointestinal: Soft, obese, non-tender, non-distended, + bowel sounds   Extremities: GUERRA x 4, 2+ LE peripheral edema, no cyanosis, no clubbing, chronic venous discoloration BLE  Vascular: Equal and normal pulses: 2+ peripheral pulses throughout  Neurological: A+O x 3; speech clear and intact; no gross sensory/motor deficits  Psychiatric: calm, normal mood, normal affect   Skin: warm, dry, well perfused, no rashes

## 2023-10-31 NOTE — PROGRESS NOTE ADULT - PROBLEM SELECTOR PLAN 3
POCUS performed with drainable pocket on the right.   Eliquis held since 10/28 AM- Resumed Eliquis  10/29 right PTC placement and removal ~1L drainage

## 2023-10-31 NOTE — PROGRESS NOTE ADULT - SUBJECTIVE AND OBJECTIVE BOX
ANIA PÉREZELSY  [id number]      Chief Complaint:  Follow up of CHF, pleural effusions, TAVR    Interval History:  noted to have contraction alkalosis on labs  otherwise no events overnight     Tele:  Vpaced      acetaminophen     Tablet .. 650 milliGRAM(s) Oral every 6 hours PRN  acetaZOLAMIDE  IVPB 500 milliGRAM(s) IV Intermittent every 12 hours  albuterol    90 MICROgram(s) HFA Inhaler 2 Puff(s) Inhalation every 6 hours PRN  albuterol/ipratropium for Nebulization 3 milliLiter(s) Nebulizer every 6 hours  ALPRAZolam 0.25 milliGRAM(s) Oral every 8 hours PRN  apixaban 5 milliGRAM(s) Oral every 12 hours  aspirin enteric coated 81 milliGRAM(s) Oral daily  atorvastatin 10 milliGRAM(s) Oral at bedtime  dextrose 5%. 1000 milliLiter(s) IV Continuous <Continuous>  dextrose 5%. 1000 milliLiter(s) IV Continuous <Continuous>  dextrose 50% Injectable 25 Gram(s) IV Push once  dextrose 50% Injectable 12.5 Gram(s) IV Push once  doxazosin 2 milliGRAM(s) Oral daily  finasteride 5 milliGRAM(s) Oral daily  fluticasone propionate 50 MICROgram(s)/spray Nasal Spray 1 Spray(s) Both Nostrils two times a day  furosemide   Injectable 40 milliGRAM(s) IV Push two times a day  glucagon  Injectable 1 milliGRAM(s) IntraMuscular once  insulin lispro (ADMELOG) corrective regimen sliding scale   SubCutaneous three times a day before meals  metoprolol tartrate 25 milliGRAM(s) Oral two times a day  oxycodone    5 mG/acetaminophen 325 mG 2 Tablet(s) Oral every 4 hours PRN  pantoprazole    Tablet 40 milliGRAM(s) Oral before breakfast  potassium chloride    Tablet ER 20 milliEquivalent(s) Oral daily  senna 2 Tablet(s) Oral at bedtime  sodium chloride 0.9% lock flush 3 milliLiter(s) IV Push every 8 hours  sucralfate 1 Gram(s) Oral two times a day          Physical Exam:  T(C): 36.7 (10-31-23 @ 12:13), Max: 36.9 (10-30-23 @ 16:55)  HR: 87 (10-31-23 @ 12:13) (64 - 96)  BP: 123/78 (10-31-23 @ 12:13) (104/64 - 123/78)  RR: 18 (10-31-23 @ 12:13) (17 - 18)  SpO2: 95% (10-31-23 @ 12:13) (90% - 97%)  Wt(kg): --  General: Comfortable in NAD  Neck: No JVD  CVS: nl s1s2, no s3  Pulm: CTA b/l  Abd: soft, non-tender  Ext: 2+ edema bilaterally   Neuro A&O x3  Psych: Normal affect      Labs:   31 Oct 2023 05:39    139    |  100    |  24.6   ----------------------------<  160    3.5     |  35.0   |  0.62     Ca    8.2        31 Oct 2023 05:39  Mg     2.0       31 Oct 2023 05:39                            10.9   5.97  )-----------( 146      ( 31 Oct 2023 05:39 )             36.0     Echo 10/2023  Summary:   1. Left ventricular ejection fraction, by visual estimation, is 50 to   55%.   2. Normal global left ventricular systolic function.   3. Mildly increased left ventricular internal cavity size.   4. The mitral in-flow pattern reveals no discernable A-wave, therefore   no comment on diastolic function can be made.   5. There is mild concentric left ventricular hypertrophy.   6. Normal right ventricular size and function.   7. There is no evidence of pericardial effusion.   8. Trace mitral valve regurgitation.   9. Moderate tricuspid regurgitation.  10. TAVR in the aortic position.  11. Estimated pulmonary artery systolic pressure is 48.9 mmHg assuming a   right atrial pressure of 15 mmHg, which is consistent with mild pulmonary   hypertension.        Assessment:  77 year old male patient with a medical history of Severe Aortic Stenosis s/p TAVR 10/6/23 with Dr. Napier, Chronic Diastolic CHF, CAD s/p PCI to p&mLAD, Thoracic Aortic Aneurysm, Paroxysmal Afib s/p Ablation (on Eliquis), and Cardiomems, LLE DVT, on Home O2 PRN, ALLIE, BPH s/p TURP, Left Renal Mass, Morbid Obesity, Laminectomy, Anxiety, Type 2 DM (A1C 7.2 on Metformin), GERD, Cholelithiasis, MRCP w/ cirrhosis, now readmitted for acute on chronic diastolic CHF, acute respiratory failure, and b/l pleural effusions (Right > Left). 10/29 s/p right pigtail catheter placement and removal for 1L drainage.  On interview patient reports dietary indiscretion and poor compliance with medications.   Still Vol up on exam     Plan:  1. Continue with IV diuretics, agree with diamox given contraction alkalosis   2. monitor renal function and electrolytes   3. Continue current CV meds   4.  Mems interrogation on thursday to gouge fluid status   5. Will continue to follow.

## 2023-11-01 LAB
ANION GAP SERPL CALC-SCNC: 9 MMOL/L — SIGNIFICANT CHANGE UP (ref 5–17)
ANION GAP SERPL CALC-SCNC: 9 MMOL/L — SIGNIFICANT CHANGE UP (ref 5–17)
BUN SERPL-MCNC: 26.2 MG/DL — HIGH (ref 8–20)
BUN SERPL-MCNC: 26.2 MG/DL — HIGH (ref 8–20)
CALCIUM SERPL-MCNC: 8.3 MG/DL — LOW (ref 8.4–10.5)
CALCIUM SERPL-MCNC: 8.3 MG/DL — LOW (ref 8.4–10.5)
CHLORIDE SERPL-SCNC: 102 MMOL/L — SIGNIFICANT CHANGE UP (ref 96–108)
CHLORIDE SERPL-SCNC: 102 MMOL/L — SIGNIFICANT CHANGE UP (ref 96–108)
CO2 SERPL-SCNC: 30 MMOL/L — HIGH (ref 22–29)
CO2 SERPL-SCNC: 30 MMOL/L — HIGH (ref 22–29)
CREAT SERPL-MCNC: 0.68 MG/DL — SIGNIFICANT CHANGE UP (ref 0.5–1.3)
CREAT SERPL-MCNC: 0.68 MG/DL — SIGNIFICANT CHANGE UP (ref 0.5–1.3)
EGFR: 96 ML/MIN/1.73M2 — SIGNIFICANT CHANGE UP
EGFR: 96 ML/MIN/1.73M2 — SIGNIFICANT CHANGE UP
GLUCOSE BLDC GLUCOMTR-MCNC: 170 MG/DL — HIGH (ref 70–99)
GLUCOSE BLDC GLUCOMTR-MCNC: 170 MG/DL — HIGH (ref 70–99)
GLUCOSE BLDC GLUCOMTR-MCNC: 172 MG/DL — HIGH (ref 70–99)
GLUCOSE BLDC GLUCOMTR-MCNC: 172 MG/DL — HIGH (ref 70–99)
GLUCOSE BLDC GLUCOMTR-MCNC: 178 MG/DL — HIGH (ref 70–99)
GLUCOSE BLDC GLUCOMTR-MCNC: 178 MG/DL — HIGH (ref 70–99)
GLUCOSE BLDC GLUCOMTR-MCNC: 269 MG/DL — HIGH (ref 70–99)
GLUCOSE BLDC GLUCOMTR-MCNC: 269 MG/DL — HIGH (ref 70–99)
GLUCOSE SERPL-MCNC: 186 MG/DL — HIGH (ref 70–99)
GLUCOSE SERPL-MCNC: 186 MG/DL — HIGH (ref 70–99)
HCT VFR BLD CALC: 36.9 % — LOW (ref 39–50)
HCT VFR BLD CALC: 36.9 % — LOW (ref 39–50)
HGB BLD-MCNC: 10.9 G/DL — LOW (ref 13–17)
HGB BLD-MCNC: 10.9 G/DL — LOW (ref 13–17)
MAGNESIUM SERPL-MCNC: 2.1 MG/DL — SIGNIFICANT CHANGE UP (ref 1.6–2.6)
MAGNESIUM SERPL-MCNC: 2.1 MG/DL — SIGNIFICANT CHANGE UP (ref 1.6–2.6)
MCHC RBC-ENTMCNC: 28.4 PG — SIGNIFICANT CHANGE UP (ref 27–34)
MCHC RBC-ENTMCNC: 28.4 PG — SIGNIFICANT CHANGE UP (ref 27–34)
MCHC RBC-ENTMCNC: 29.5 GM/DL — LOW (ref 32–36)
MCHC RBC-ENTMCNC: 29.5 GM/DL — LOW (ref 32–36)
MCV RBC AUTO: 96.1 FL — SIGNIFICANT CHANGE UP (ref 80–100)
MCV RBC AUTO: 96.1 FL — SIGNIFICANT CHANGE UP (ref 80–100)
PLATELET # BLD AUTO: 142 K/UL — LOW (ref 150–400)
PLATELET # BLD AUTO: 142 K/UL — LOW (ref 150–400)
POTASSIUM SERPL-MCNC: 3.6 MMOL/L — SIGNIFICANT CHANGE UP (ref 3.5–5.3)
POTASSIUM SERPL-MCNC: 3.6 MMOL/L — SIGNIFICANT CHANGE UP (ref 3.5–5.3)
POTASSIUM SERPL-SCNC: 3.6 MMOL/L — SIGNIFICANT CHANGE UP (ref 3.5–5.3)
POTASSIUM SERPL-SCNC: 3.6 MMOL/L — SIGNIFICANT CHANGE UP (ref 3.5–5.3)
RBC # BLD: 3.84 M/UL — LOW (ref 4.2–5.8)
RBC # BLD: 3.84 M/UL — LOW (ref 4.2–5.8)
RBC # FLD: 14 % — SIGNIFICANT CHANGE UP (ref 10.3–14.5)
RBC # FLD: 14 % — SIGNIFICANT CHANGE UP (ref 10.3–14.5)
SODIUM SERPL-SCNC: 141 MMOL/L — SIGNIFICANT CHANGE UP (ref 135–145)
SODIUM SERPL-SCNC: 141 MMOL/L — SIGNIFICANT CHANGE UP (ref 135–145)
WBC # BLD: 6.32 K/UL — SIGNIFICANT CHANGE UP (ref 3.8–10.5)
WBC # BLD: 6.32 K/UL — SIGNIFICANT CHANGE UP (ref 3.8–10.5)
WBC # FLD AUTO: 6.32 K/UL — SIGNIFICANT CHANGE UP (ref 3.8–10.5)
WBC # FLD AUTO: 6.32 K/UL — SIGNIFICANT CHANGE UP (ref 3.8–10.5)

## 2023-11-01 PROCEDURE — 99231 SBSQ HOSP IP/OBS SF/LOW 25: CPT | Mod: 24

## 2023-11-01 PROCEDURE — 71045 X-RAY EXAM CHEST 1 VIEW: CPT | Mod: 26

## 2023-11-01 PROCEDURE — 99233 SBSQ HOSP IP/OBS HIGH 50: CPT

## 2023-11-01 RX ADMIN — Medication 3 MILLILITER(S): at 08:52

## 2023-11-01 RX ADMIN — SODIUM CHLORIDE 3 MILLILITER(S): 9 INJECTION INTRAMUSCULAR; INTRAVENOUS; SUBCUTANEOUS at 21:39

## 2023-11-01 RX ADMIN — Medication 3 MILLILITER(S): at 21:06

## 2023-11-01 RX ADMIN — Medication 2: at 12:51

## 2023-11-01 RX ADMIN — Medication 3 MILLILITER(S): at 15:19

## 2023-11-01 RX ADMIN — Medication 1 SPRAY(S): at 05:23

## 2023-11-01 RX ADMIN — Medication 2: at 08:28

## 2023-11-01 RX ADMIN — Medication 20 MILLIEQUIVALENT(S): at 12:52

## 2023-11-01 RX ADMIN — Medication 81 MILLIGRAM(S): at 12:53

## 2023-11-01 RX ADMIN — Medication 1 GRAM(S): at 05:23

## 2023-11-01 RX ADMIN — APIXABAN 5 MILLIGRAM(S): 2.5 TABLET, FILM COATED ORAL at 16:02

## 2023-11-01 RX ADMIN — Medication 2 MILLIGRAM(S): at 05:22

## 2023-11-01 RX ADMIN — SODIUM CHLORIDE 3 MILLILITER(S): 9 INJECTION INTRAMUSCULAR; INTRAVENOUS; SUBCUTANEOUS at 12:54

## 2023-11-01 RX ADMIN — Medication 40 MILLIGRAM(S): at 05:23

## 2023-11-01 RX ADMIN — Medication 1 SPRAY(S): at 16:02

## 2023-11-01 RX ADMIN — PANTOPRAZOLE SODIUM 40 MILLIGRAM(S): 20 TABLET, DELAYED RELEASE ORAL at 05:23

## 2023-11-01 RX ADMIN — ATORVASTATIN CALCIUM 10 MILLIGRAM(S): 80 TABLET, FILM COATED ORAL at 21:39

## 2023-11-01 RX ADMIN — Medication 1 GRAM(S): at 16:03

## 2023-11-01 RX ADMIN — Medication 40 MILLIGRAM(S): at 12:52

## 2023-11-01 RX ADMIN — SODIUM CHLORIDE 3 MILLILITER(S): 9 INJECTION INTRAMUSCULAR; INTRAVENOUS; SUBCUTANEOUS at 05:35

## 2023-11-01 RX ADMIN — Medication 25 MILLIGRAM(S): at 05:23

## 2023-11-01 RX ADMIN — Medication 2: at 17:29

## 2023-11-01 RX ADMIN — FINASTERIDE 5 MILLIGRAM(S): 5 TABLET, FILM COATED ORAL at 12:53

## 2023-11-01 RX ADMIN — Medication 25 MILLIGRAM(S): at 16:02

## 2023-11-01 RX ADMIN — ACETAZOLAMIDE 110 MILLIGRAM(S): 250 TABLET ORAL at 05:26

## 2023-11-01 RX ADMIN — SENNA PLUS 2 TABLET(S): 8.6 TABLET ORAL at 21:39

## 2023-11-01 RX ADMIN — APIXABAN 5 MILLIGRAM(S): 2.5 TABLET, FILM COATED ORAL at 05:22

## 2023-11-01 NOTE — PROGRESS NOTE ADULT - PROBLEM SELECTOR PLAN 1
S/p TAVR 10/6/23 with Dr. Napier.   Patient readmitted to CTS service for acute on chronic diastolic CHF exacerbation.  Follow up TTE with preserved EF 50-55%, persistent Moderate TR.   CTA chest with pulmonary vascular congestion and bilateral pleural effusion (Right>Left).   10/29 right PTC placement and removal ~1L drainage  Fluid culture NGTD  Continuous SpO2 monitoring. Titrate O2 requirements PRN to maintain SpO2 >92%.  Lasix 40IV BID ordered for diuresis.   Diamox added for contraction alkalosis  Monitor serum chemistry  Monitor strict I/Os. Daily weights.   Supplement electrolytes PRN.   Continue Lopressor as tolerated by HR and BP.   Continue supportive measures.   Has CardioMems - consider interrogation to gauge volume status  Cardiology recommendations appreciated  Plan discussed / reviewed with CT Surgery attending Dr. Gonzalez.

## 2023-11-01 NOTE — PROGRESS NOTE ADULT - SUBJECTIVE AND OBJECTIVE BOX
ANIA CRISTOBAL  [id number]        Chief Complaint: follow up HFpEF/pleural effusion/recent TAVR      Subjective: no chest pain      24 hour Tele: v-paced        acetaminophen     Tablet .. 650 milliGRAM(s) Oral every 6 hours PRN  albuterol    90 MICROgram(s) HFA Inhaler 2 Puff(s) Inhalation every 6 hours PRN  albuterol/ipratropium for Nebulization 3 milliLiter(s) Nebulizer every 6 hours  ALPRAZolam 0.25 milliGRAM(s) Oral every 8 hours PRN  apixaban 5 milliGRAM(s) Oral every 12 hours  aspirin enteric coated 81 milliGRAM(s) Oral daily  atorvastatin 10 milliGRAM(s) Oral at bedtime  dextrose 5%. 1000 milliLiter(s) IV Continuous <Continuous>  dextrose 5%. 1000 milliLiter(s) IV Continuous <Continuous>  dextrose 50% Injectable 12.5 Gram(s) IV Push once  dextrose 50% Injectable 25 Gram(s) IV Push once  doxazosin 2 milliGRAM(s) Oral daily  finasteride 5 milliGRAM(s) Oral daily  fluticasone propionate 50 MICROgram(s)/spray Nasal Spray 1 Spray(s) Both Nostrils two times a day  furosemide   Injectable 40 milliGRAM(s) IV Push two times a day  glucagon  Injectable 1 milliGRAM(s) IntraMuscular once  insulin lispro (ADMELOG) corrective regimen sliding scale   SubCutaneous three times a day before meals  metoprolol tartrate 25 milliGRAM(s) Oral two times a day  oxycodone    5 mG/acetaminophen 325 mG 2 Tablet(s) Oral every 4 hours PRN  pantoprazole    Tablet 40 milliGRAM(s) Oral before breakfast  potassium chloride    Tablet ER 20 milliEquivalent(s) Oral daily  senna 2 Tablet(s) Oral at bedtime  sodium chloride 0.9% lock flush 3 milliLiter(s) IV Push every 8 hours  sucralfate 1 Gram(s) Oral two times a day          Physical Exam:  T(C): 36.4 (11-01-23 @ 08:05), Max: 36.8 (10-31-23 @ 16:42)  HR: 79 (11-01-23 @ 08:05) (79 - 89)  BP: 119/73 (11-01-23 @ 08:05) (107/64 - 127/70)  RR: 18 (11-01-23 @ 08:05) (18 - 18)  SpO2: 95% (11-01-23 @ 08:05) (94% - 96%)  Wt(kg): --  General: Comfortable in NAD  HEENT: MMM, sclera anicteric  Resp: CTA bilaterally  CVS: nl s1s2, rrr, no s3/JVD  Abd: soft, NT, ND, BS+  Ext: No c/c/e  Neuro A&O x3  Psych: Normal affect    I&O's Summary    31 Oct 2023 07:01  -  01 Nov 2023 07:00  --------------------------------------------------------  IN: 0 mL / OUT: 950 mL / NET: -950 mL          Labs:   01 Nov 2023 05:45    141    |  102    |  26.2   ----------------------------<  186    3.6     |  30.0   |  0.68     Ca    8.3        01 Nov 2023 05:45  Mg     2.1       01 Nov 2023 05:45                            10.9   6.32  )-----------( 142      ( 01 Nov 2023 05:45 )             36.9     CT Angio Chest PE Protocol w/ IV Cont (10.28.23 @ 21:11)  IMPRESSION:  Suboptimal evaluation of the peripheral pulmonary arteries. No central   pulmonary embolus or secondary signs of right heart strain.  Right > left pleural effusion. Nonspecific bilateral pulmonary   groundglass opacities, which can be seen in noninfectious (pulmonary   edema) cyst and infectious processes.  Ascending aortic aneurysm. Findings suggestive of pulmonary arterial   hypertension.    ECHO 10/2023:   1. Left ventricular ejection fraction, by visual estimation, is 50 to  55%.   2. Normal global left ventricular systolic function.   3. Mildly increased left ventricular internal cavity size.   4. The mitralin-flow pattern reveals no discernable A-wave, therefore   no comment on diastolic function can be made.   5. There is mild concentric left ventricular hypertrophy.   6. Normal right ventricular size and function.   7. There is no evidence of pericardial effusion.   8. Trace mitral valve regurgitation.   9. Moderate tricuspid regurgitation.  10. TAVR in the aortic position.  11. Estimated pulmonary artery systolic pressure is 48.9 mmHg assuming a right atrial pressure of 15 mmHg, which is consistent with mild pulmonary hypertension.        Assessment:  77 year old male patient with a medical history of Severe Aortic Stenosis s/p TAVR 10/6/23 with Dr. Napier, Chronic Diastolic CHF, CAD s/p PCI to p&mLAD, Thoracic Aortic Aneurysm, Paroxysmal Afib s/p Ablation (on Eliquis), and Cardiomems, LLE DVT, on Home O2 PRN, ALLIE, BPH s/p TURP, Left Renal Mass, Morbid Obesity, Laminectomy, Anxiety, Type 2 DM (A1C 7.2 on Metformin), GERD, Cholelithiasis, MRCP w/ cirrhosis, now readmitted for acute on chronic diastolic CHF, acute respiratory failure, and b/l pleural effusions (Right > Left). 10/29 s/p right pigtail catheter placement and removal for 1L drainage.  -On interview patient reports dietary indiscretion and poor compliance with medications.   -Still volume overloaded   -No active ischemia with negative trops, rhythm v paced    Plan: (TO BE SEEN)  1. Continue with IV diuretics   2. monitor renal function and electrolytes , so far renal function tolerating diuresis well   3. Continue current CV meds   4.  Mems interrogation on thursday to gouge fluid status   5. CT management per CTS, pigtail placed 10/29/2023 on the right and 1Liter drainage  6.       Chong Carranza MD ANIA CRISTOBAL  [id number]        Chief Complaint: follow up HFpEF/pleural effusion/recent TAVR      Subjective: no chest pain, still a bit dyspneic      24 hour Tele: v-paced, PVCs        acetaminophen     Tablet .. 650 milliGRAM(s) Oral every 6 hours PRN  albuterol    90 MICROgram(s) HFA Inhaler 2 Puff(s) Inhalation every 6 hours PRN  albuterol/ipratropium for Nebulization 3 milliLiter(s) Nebulizer every 6 hours  ALPRAZolam 0.25 milliGRAM(s) Oral every 8 hours PRN  apixaban 5 milliGRAM(s) Oral every 12 hours  aspirin enteric coated 81 milliGRAM(s) Oral daily  atorvastatin 10 milliGRAM(s) Oral at bedtime  dextrose 5%. 1000 milliLiter(s) IV Continuous <Continuous>  dextrose 5%. 1000 milliLiter(s) IV Continuous <Continuous>  dextrose 50% Injectable 12.5 Gram(s) IV Push once  dextrose 50% Injectable 25 Gram(s) IV Push once  doxazosin 2 milliGRAM(s) Oral daily  finasteride 5 milliGRAM(s) Oral daily  fluticasone propionate 50 MICROgram(s)/spray Nasal Spray 1 Spray(s) Both Nostrils two times a day  furosemide   Injectable 40 milliGRAM(s) IV Push two times a day  glucagon  Injectable 1 milliGRAM(s) IntraMuscular once  insulin lispro (ADMELOG) corrective regimen sliding scale   SubCutaneous three times a day before meals  metoprolol tartrate 25 milliGRAM(s) Oral two times a day  oxycodone    5 mG/acetaminophen 325 mG 2 Tablet(s) Oral every 4 hours PRN  pantoprazole    Tablet 40 milliGRAM(s) Oral before breakfast  potassium chloride    Tablet ER 20 milliEquivalent(s) Oral daily  senna 2 Tablet(s) Oral at bedtime  sodium chloride 0.9% lock flush 3 milliLiter(s) IV Push every 8 hours  sucralfate 1 Gram(s) Oral two times a day          Physical Exam:  T(C): 36.4 (11-01-23 @ 08:05), Max: 36.8 (10-31-23 @ 16:42)  HR: 79 (11-01-23 @ 08:05) (79 - 89)  BP: 119/73 (11-01-23 @ 08:05) (107/64 - 127/70)  RR: 18 (11-01-23 @ 08:05) (18 - 18)  SpO2: 95% (11-01-23 @ 08:05) (94% - 96%)  Wt(kg): --  General: Comfortable in NAD  HEENT: MMM, sclera anicteric  Resp: reduced at bases  CVS: nl s1s2, rrr, no s3/ ? JVD  Abd: soft, NT, ND, BS+  Ext: 2+ pitting edema  Neuro A&O x3  Psych: Normal affect    I&O's Summary    31 Oct 2023 07:01  -  01 Nov 2023 07:00  --------------------------------------------------------  IN: 0 mL / OUT: 950 mL / NET: -950 mL          Labs:   01 Nov 2023 05:45    141    |  102    |  26.2   ----------------------------<  186    3.6     |  30.0   |  0.68     Ca    8.3        01 Nov 2023 05:45  Mg     2.1       01 Nov 2023 05:45                            10.9   6.32  )-----------( 142      ( 01 Nov 2023 05:45 )             36.9     CT Angio Chest PE Protocol w/ IV Cont (10.28.23 @ 21:11)  IMPRESSION:  Suboptimal evaluation of the peripheral pulmonary arteries. No central   pulmonary embolus or secondary signs of right heart strain.  Right > left pleural effusion. Nonspecific bilateral pulmonary   groundglass opacities, which can be seen in noninfectious (pulmonary   edema) cyst and infectious processes.  Ascending aortic aneurysm. Findings suggestive of pulmonary arterial   hypertension.    ECHO 10/2023:   1. Left ventricular ejection fraction, by visual estimation, is 50 to  55%.   2. Normal global left ventricular systolic function.   3. Mildly increased left ventricular internal cavity size.   4. The mitralin-flow pattern reveals no discernable A-wave, therefore   no comment on diastolic function can be made.   5. There is mild concentric left ventricular hypertrophy.   6. Normal right ventricular size and function.   7. There is no evidence of pericardial effusion.   8. Trace mitral valve regurgitation.   9. Moderate tricuspid regurgitation.  10. TAVR in the aortic position.  11. Estimated pulmonary artery systolic pressure is 48.9 mmHg assuming a right atrial pressure of 15 mmHg, which is consistent with mild pulmonary hypertension.        Assessment:  77 year old male patient with a medical history of Severe Aortic Stenosis s/p TAVR 10/6/23 with Dr. Napier, Chronic Diastolic CHF, CAD s/p PCI to p&mLAD, Thoracic Aortic Aneurysm, Paroxysmal Afib s/p Ablation (on Eliquis), and Cardiomems, LLE DVT, on Home O2 PRN, ALLIE, BPH s/p TURP, Left Renal Mass, Morbid Obesity, Laminectomy, Anxiety, Type 2 DM (A1C 7.2 on Metformin), GERD, Cholelithiasis, MRCP w/ cirrhosis, now readmitted for acute on chronic diastolic CHF, acute respiratory failure, and b/l pleural effusions (Right > Left). 10/29 s/p right pigtail catheter placement and removal with 1L drainage.  -On interview patient reports dietary indiscretion and poor compliance with medications as cause of exacerbation   -Still volume overloaded , will help to have MEMS numbers but would diuresis another day IV, got diamox for mild contraction alkalosis but stable and no need to continue   -No active ischemia with negative trops, rhythm v paced    Plan:  1. Continue with IV diuretics one more day, will arrange for MEMS interrogation  2. Monitor renal function and electrolytes , so far renal function tolerating diuresis well still with only mild contraction alkalosis   3. Continue current CV meds   4. Needs to check MEMS at home as well and better dietary habits/adherence to medications. Will consider adding MRA/SGLT 2 inhibitor but hold off at this time        Chong Carranza MD ANIA CRISTOBAL  [id number]        Chief Complaint: follow up HFpEF/pleural effusion/recent TAVR      Subjective: no chest pain, still a bit dyspneic      24 hour Tele: v-paced, PVCs        acetaminophen     Tablet .. 650 milliGRAM(s) Oral every 6 hours PRN  albuterol    90 MICROgram(s) HFA Inhaler 2 Puff(s) Inhalation every 6 hours PRN  albuterol/ipratropium for Nebulization 3 milliLiter(s) Nebulizer every 6 hours  ALPRAZolam 0.25 milliGRAM(s) Oral every 8 hours PRN  apixaban 5 milliGRAM(s) Oral every 12 hours  aspirin enteric coated 81 milliGRAM(s) Oral daily  atorvastatin 10 milliGRAM(s) Oral at bedtime  dextrose 5%. 1000 milliLiter(s) IV Continuous <Continuous>  dextrose 5%. 1000 milliLiter(s) IV Continuous <Continuous>  dextrose 50% Injectable 12.5 Gram(s) IV Push once  dextrose 50% Injectable 25 Gram(s) IV Push once  doxazosin 2 milliGRAM(s) Oral daily  finasteride 5 milliGRAM(s) Oral daily  fluticasone propionate 50 MICROgram(s)/spray Nasal Spray 1 Spray(s) Both Nostrils two times a day  furosemide   Injectable 40 milliGRAM(s) IV Push two times a day  glucagon  Injectable 1 milliGRAM(s) IntraMuscular once  insulin lispro (ADMELOG) corrective regimen sliding scale   SubCutaneous three times a day before meals  metoprolol tartrate 25 milliGRAM(s) Oral two times a day  oxycodone    5 mG/acetaminophen 325 mG 2 Tablet(s) Oral every 4 hours PRN  pantoprazole    Tablet 40 milliGRAM(s) Oral before breakfast  potassium chloride    Tablet ER 20 milliEquivalent(s) Oral daily  senna 2 Tablet(s) Oral at bedtime  sodium chloride 0.9% lock flush 3 milliLiter(s) IV Push every 8 hours  sucralfate 1 Gram(s) Oral two times a day          Physical Exam:  T(C): 36.4 (11-01-23 @ 08:05), Max: 36.8 (10-31-23 @ 16:42)  HR: 79 (11-01-23 @ 08:05) (79 - 89)  BP: 119/73 (11-01-23 @ 08:05) (107/64 - 127/70)  RR: 18 (11-01-23 @ 08:05) (18 - 18)  SpO2: 95% (11-01-23 @ 08:05) (94% - 96%)  Wt(kg): --  General: Comfortable in NAD  HEENT: MMM, sclera anicteric  Resp: reduced at bases  CVS: nl s1s2, rrr, no s3/ ? JVD  Abd: soft, NT, ND, BS+  Ext: 2+ pitting edema  Neuro A&O x3  Psych: Normal affect    I&O's Summary    31 Oct 2023 07:01  -  01 Nov 2023 07:00  --------------------------------------------------------  IN: 0 mL / OUT: 950 mL / NET: -950 mL          Labs:   01 Nov 2023 05:45    141    |  102    |  26.2   ----------------------------<  186    3.6     |  30.0   |  0.68     Ca    8.3        01 Nov 2023 05:45  Mg     2.1       01 Nov 2023 05:45                            10.9   6.32  )-----------( 142      ( 01 Nov 2023 05:45 )             36.9     CT Angio Chest PE Protocol w/ IV Cont (10.28.23 @ 21:11)  IMPRESSION:  Suboptimal evaluation of the peripheral pulmonary arteries. No central   pulmonary embolus or secondary signs of right heart strain.  Right > left pleural effusion. Nonspecific bilateral pulmonary   groundglass opacities, which can be seen in noninfectious (pulmonary   edema) cyst and infectious processes.  Ascending aortic aneurysm. Findings suggestive of pulmonary arterial   hypertension.    ECHO 10/2023:   1. Left ventricular ejection fraction, by visual estimation, is 50 to  55%.   2. Normal global left ventricular systolic function.   3. Mildly increased left ventricular internal cavity size.   4. The mitralin-flow pattern reveals no discernable A-wave, therefore   no comment on diastolic function can be made.   5. There is mild concentric left ventricular hypertrophy.   6. Normal right ventricular size and function.   7. There is no evidence of pericardial effusion.   8. Trace mitral valve regurgitation.   9. Moderate tricuspid regurgitation.  10. TAVR in the aortic position.  11. Estimated pulmonary artery systolic pressure is 48.9 mmHg assuming a right atrial pressure of 15 mmHg, which is consistent with mild pulmonary hypertension.        Assessment:  77 year old male patient with a medical history of Severe Aortic Stenosis s/p TAVR 10/6/23 with Dr. Napier, Chronic Diastolic CHF, CAD s/p PCI to p&mLAD, Thoracic Aortic Aneurysm, Paroxysmal Afib s/p Ablation (on Eliquis), and Cardiomems, LLE DVT, on Home O2 PRN, ALLIE, BPH s/p TURP, Left Renal Mass, Morbid Obesity, Laminectomy, Anxiety, Type 2 DM (A1C 7.2 on Metformin), GERD, Cholelithiasis, MRCP w/ cirrhosis, now readmitted for acute on chronic diastolic CHF, acute respiratory failure, and b/l pleural effusions (Right > Left). 10/29 s/p right pigtail catheter placement and removal with 1L drainage.  -On interview patient reports dietary indiscretion and poor compliance with medications as cause of exacerbation   -Still volume overloaded , will help to have MEMS numbers but would diuresis another day IV, got diamox for mild contraction alkalosis but stable and no need to continue   -No active ischemia with negative trops, rhythm v paced    Plan:  1. Continue with IV diuretics one more day, will arrange for MEMS interrogation  2. Monitor renal function and electrolytes , so far renal function tolerating diuresis well still with only mild contraction alkalosis   3. Continue current CV meds   4. Needs to check MEMS at home as well and better dietary habits/adherence to medications. Will consider adding MRA/SGLT 2 inhibitor but hold off at this time due to concern for close monitoring of renal function/infection and lack of adherence        Chong Carranza MD

## 2023-11-01 NOTE — PROGRESS NOTE ADULT - SUBJECTIVE AND OBJECTIVE BOX
HPI: 77 year old male patient with a medical history of Severe Aortic Stenosis s/p TAVR 10/6/23 with Dr. Napier, Chronic Diastolic CHF, CAD s/p PCI to p&mLAD, Thoracic Aortic Aneurysm, Paroxysmal Afib s/p Ablation (on Eliquis), and Cardiomems, LLE DVT, on Home O2 PRN, ALLIE, BPH s/p TURP, Left Renal Mass, Morbid Obesity, Laminectomy, Anxiety, Type 2 DM (A1C 7.2 on Metformin), GERD, Cholelithiasis, MRCP w/ cirrhosis, now readmitted for acute on chronic diastolic CHF, acute respiratory failure, and b/l pleural effusions (Right > Left). Patient requiring O2 supplementation with nasal cannula, IV diuresis, and Eliquis held for procedure. 10/29 s/p right pigtail catheter placement and removal for 1L drainage.    SIGNIFICANT RECENT/PAST 24 HR EVENTS:  No acute events reported overnight. Currently on baseline home O2 requirements of 3L.     SUBJECTIVE:  Patient seen and examined. Pt currently lying in bed in NAD. Denies fevers, chills, HA, dizziness, CP, SOB, abd pain, N/V/D, numbness/tingling in extremities, or any other acute complaints.  ROS negative x 10 systems except as noted above.    PAST MEDICAL & SURGICAL HISTORY:  Anxiety  Hypertension  Hyperlipidemia  Prostate disease  Gastroesophageal reflux disease  Gallbladder stone without cholecystitis or obstruction  BPH (benign prostatic hyperplasia)  DM (diabetes mellitus)  DVT (deep venous thrombosis)  Afib  Anxiety  HLD (hyperlipidemia)  Malignant schwannoma  H/O arthroscopy of knee  S/P laminectomy  S/P coronary artery stent placement    DAILY REVIEW:  Telemetry: V-Paced  Vital Signs Last 24 Hrs  T(C): 36.4 (31 Oct 2023 20:55), Max: 36.8 (31 Oct 2023 16:42)  T(F): 97.5 (31 Oct 2023 20:55), Max: 98.3 (31 Oct 2023 16:42)  HR: 89 (31 Oct 2023 20:55) (82 - 96)  BP: 114/69 (31 Oct 2023 20:55) (104/64 - 123/78)  RR: 18 (31 Oct 2023 20:55) (18 - 18)  SpO2: 94% (31 Oct 2023 20:55) (90% - 96%)    Parameters below as of 31 Oct 2023 20:55  Patient On (Oxygen Delivery Method): nasal cannula  O2 Flow (L/min): 3    I&O's Summary    30 Oct 2023 07:01  -  31 Oct 2023 07:00  --------------------------------------------------------  IN: 1020 mL / OUT: 1900 mL / NET: -880 mL    31 Oct 2023 07:01  -  01 Nov 2023 03:18  --------------------------------------------------------  IN: 0 mL / OUT: 150 mL / NET: -150 mL    Admit Wt: Drug Dosing Weight  Height (cm): 188 (28 Oct 2023 12:10)  Weight (kg): 142 (28 Oct 2023 12:10)  BMI (kg/m2): 40.2 (28 Oct 2023 12:10)  BSA (m2): 2.63 (28 Oct 2023 12:10)    LABS:                        10.9   5.97  )-----------( 146      ( 31 Oct 2023 05:39 )             36.0   10-31    139  |  100  |  24.6<H>  ----------------------------<  160<H>  3.5   |  35.0<H>  |  0.62    Ca    8.2<L>      31 Oct 2023 05:39  Mg     2.0     10-31    Culture - Fungal, Body Fluid (collected 29 Oct 2023 12:55)  Source: Pleural Fl Pleural Fluid  Preliminary Report (30 Oct 2023 09:54):    Testing in progress    Culture - Body Fluid with Gram Stain (collected 29 Oct 2023 12:55)  Source: Pleural Fl Pleural Fluid  Gram Stain (29 Oct 2023 23:27):    polymorphonuclear leukocytes seen    No organisms seen    by cytocentrifuge  Preliminary Report (30 Oct 2023 12:12):    No growth    MEDICATIONS  MEDICATIONS  (STANDING):  acetaZOLAMIDE  IVPB 500 milliGRAM(s) IV Intermittent every 12 hours  albuterol/ipratropium for Nebulization 3 milliLiter(s) Nebulizer every 6 hours  apixaban 5 milliGRAM(s) Oral every 12 hours  aspirin enteric coated 81 milliGRAM(s) Oral daily  atorvastatin 10 milliGRAM(s) Oral at bedtime  dextrose 5%. 1000 milliLiter(s) (50 mL/Hr) IV Continuous <Continuous>  dextrose 5%. 1000 milliLiter(s) (100 mL/Hr) IV Continuous <Continuous>  dextrose 50% Injectable 12.5 Gram(s) IV Push once  dextrose 50% Injectable 25 Gram(s) IV Push once  doxazosin 2 milliGRAM(s) Oral daily  finasteride 5 milliGRAM(s) Oral daily  fluticasone propionate 50 MICROgram(s)/spray Nasal Spray 1 Spray(s) Both Nostrils two times a day  furosemide   Injectable 40 milliGRAM(s) IV Push two times a day  glucagon  Injectable 1 milliGRAM(s) IntraMuscular once  insulin lispro (ADMELOG) corrective regimen sliding scale   SubCutaneous three times a day before meals  metoprolol tartrate 25 milliGRAM(s) Oral two times a day  pantoprazole    Tablet 40 milliGRAM(s) Oral before breakfast  potassium chloride    Tablet ER 20 milliEquivalent(s) Oral daily  senna 2 Tablet(s) Oral at bedtime  sodium chloride 0.9% lock flush 3 milliLiter(s) IV Push every 8 hours  sucralfate 1 Gram(s) Oral two times a day    MEDICATIONS  (PRN):  acetaminophen     Tablet .. 650 milliGRAM(s) Oral every 6 hours PRN Temp greater or equal to 38.5C (101.3F), Mild Pain (1 - 3)  albuterol    90 MICROgram(s) HFA Inhaler 2 Puff(s) Inhalation every 6 hours PRN for shortness of breath and/or wheezing  ALPRAZolam 0.25 milliGRAM(s) Oral every 8 hours PRN anxiety  oxycodone    5 mG/acetaminophen 325 mG 2 Tablet(s) Oral every 4 hours PRN Moderate Pain (4 - 6)    ALLERGIES:  Ceftin (Hives)  penicillin (Anaphylaxis)  sulfa drugs (Unknown)  Bactrim (Rash)    DIAGNOSTICS:  All laboratory results, radiology and medications reviewed.  Xray Chest 1 View- PORTABLE-Routine (Xray Chest 1 View- PORTABLE-Routine in AM.) (10.31.23 @ 05:46)  IMPRESSION:  1. Shallow inspiratory effort however notable for cardiomegaly, AV   sequential pacemaker and central pulmonary venous engorgement but without   pulmonary edema.  2. The TAVR device is difficult to visualize. No pneumothorax.    Xray Chest 1 View- PORTABLE-Urgent (Xray Chest 1 View- PORTABLE-Urgent .) (10.29.23 @ 11:47)  INTERPRETATION:  AP chest on October 29, 2023 at 5:01 AM. Patient has   pulmonary congestion and is short of breath.  Gross heart enlargement and left-sided pacemaker again noted. TAVR aortic   valve noted.  On October 28 there is moderate to advanced CHF. This shows improvement   with moderate residual.  Follow-up AP chest on October 29, 2023 at 10:15 AM. Continued improvement   in CHF with mild residual. The catheter right chest tube is been inserted   and right base effusion seen on October 29 shows improvement. No   pneumothorax.  IMPRESSION: Diminished right effusion after catheter chest tube.   Improving CHF on the latest film shows mild residual.    CT Angio Chest PE Protocol w/ IV Cont (10.28.23 @ 21:11)  IMPRESSION:  Suboptimal evaluation of the peripheral pulmonary arteries. No central   pulmonary embolus or secondary signs of right heart strain.    Right > left pleural effusion. Nonspecific bilateral pulmonary   groundglass opacities, which can be seen in noninfectious (pulmonary   edema) cyst and infectious processes.    Ascending aortic aneurysm. Findings suggestive of pulmonary arterial   hypertension.    Additional findings as described.    12 Lead ECG (10.28.23 @ 21:47)  Ventricular Rate 90 BPM  Atrial Rate 86 BPM  QRS Duration 196 ms  Q-T Interval 462 ms  QTC Calculation(Bazett) 565 ms  P Axis 71 degrees  R Axis -68 degrees  T Axis 77 degrees  Diagnosis Line Ventricular-paced rhythm with frequent ,and consecutive Premature ventricular complexes  Abnormal ECG    TTE Echo Complete w/o Contrast w/ Doppler (10.28.23 @ 19:13)  Summary:   1. Left ventricular ejection fraction, by visual estimation, is 50 to   55%.   2. Normal global left ventricular systolic function.   3. Mildly increased left ventricular internal cavity size.   4. The mitralin-flow pattern reveals no discernable A-wave, therefore   no comment on diastolic function can be made.   5. There is mild concentric left ventricular hypertrophy.   6. Normal right ventricular size and function.   7. There is no evidence of pericardial effusion.   8. Trace mitral valve regurgitation.   9. Moderate tricuspid regurgitation.  10. TAVR in the aortic position.  11. Estimated pulmonary artery systolic pressure is 48.9 mmHg assuming a   right atrial pressure of 15 mmHg, which is consistent with mild pulmonary   hypertension.    PHYSICAL EXAM:  Constitutional: NAD  Neck: supple, trachea midline. No JVD   Respiratory: Breath sounds diminished b/l lower fields to auscultation, no accessory muscle use noted. No wheezing, rales, or rhonchi noted b/l   Cardiovascular: Regular rate, regular rhythm, normal S1, S2; no murmurs or rub   Gastrointestinal: Soft, obese, non-tender, non-distended, + bowel sounds   Extremities: GUERRA x 4, 2+ LE peripheral edema, no cyanosis, no clubbing, chronic venous discoloration BLE  Vascular: Equal and normal pulses: 2+ peripheral pulses throughout  Neurological: A+O x 3; speech clear and intact; no gross sensory/motor deficits  Psychiatric: calm, normal mood, normal affect   Skin: warm, dry, well perfused, no rashes

## 2023-11-01 NOTE — PROGRESS NOTE ADULT - PROBLEM SELECTOR PLAN 3
POCUS performed with drainable pocket on the right.   Eliquis held since 10/28 AM- Resumed Eliquis  10/29 right PTC placement and removal ~1L drainage  Fluid culture NGTD

## 2023-11-02 LAB
ANION GAP SERPL CALC-SCNC: 11 MMOL/L — SIGNIFICANT CHANGE UP (ref 5–17)
ANION GAP SERPL CALC-SCNC: 11 MMOL/L — SIGNIFICANT CHANGE UP (ref 5–17)
BUN SERPL-MCNC: 31.1 MG/DL — HIGH (ref 8–20)
BUN SERPL-MCNC: 31.1 MG/DL — HIGH (ref 8–20)
CALCIUM SERPL-MCNC: 8.7 MG/DL — SIGNIFICANT CHANGE UP (ref 8.4–10.5)
CALCIUM SERPL-MCNC: 8.7 MG/DL — SIGNIFICANT CHANGE UP (ref 8.4–10.5)
CHLORIDE SERPL-SCNC: 99 MMOL/L — SIGNIFICANT CHANGE UP (ref 96–108)
CHLORIDE SERPL-SCNC: 99 MMOL/L — SIGNIFICANT CHANGE UP (ref 96–108)
CO2 SERPL-SCNC: 29 MMOL/L — SIGNIFICANT CHANGE UP (ref 22–29)
CO2 SERPL-SCNC: 29 MMOL/L — SIGNIFICANT CHANGE UP (ref 22–29)
CREAT SERPL-MCNC: 0.87 MG/DL — SIGNIFICANT CHANGE UP (ref 0.5–1.3)
CREAT SERPL-MCNC: 0.87 MG/DL — SIGNIFICANT CHANGE UP (ref 0.5–1.3)
EGFR: 89 ML/MIN/1.73M2 — SIGNIFICANT CHANGE UP
EGFR: 89 ML/MIN/1.73M2 — SIGNIFICANT CHANGE UP
GLUCOSE BLDC GLUCOMTR-MCNC: 152 MG/DL — HIGH (ref 70–99)
GLUCOSE BLDC GLUCOMTR-MCNC: 152 MG/DL — HIGH (ref 70–99)
GLUCOSE BLDC GLUCOMTR-MCNC: 177 MG/DL — HIGH (ref 70–99)
GLUCOSE BLDC GLUCOMTR-MCNC: 177 MG/DL — HIGH (ref 70–99)
GLUCOSE BLDC GLUCOMTR-MCNC: 198 MG/DL — HIGH (ref 70–99)
GLUCOSE BLDC GLUCOMTR-MCNC: 198 MG/DL — HIGH (ref 70–99)
GLUCOSE SERPL-MCNC: 181 MG/DL — HIGH (ref 70–99)
GLUCOSE SERPL-MCNC: 181 MG/DL — HIGH (ref 70–99)
HCT VFR BLD CALC: 37.4 % — LOW (ref 39–50)
HCT VFR BLD CALC: 37.4 % — LOW (ref 39–50)
HGB BLD-MCNC: 11.5 G/DL — LOW (ref 13–17)
HGB BLD-MCNC: 11.5 G/DL — LOW (ref 13–17)
MAGNESIUM SERPL-MCNC: 2 MG/DL — SIGNIFICANT CHANGE UP (ref 1.6–2.6)
MAGNESIUM SERPL-MCNC: 2 MG/DL — SIGNIFICANT CHANGE UP (ref 1.6–2.6)
MCHC RBC-ENTMCNC: 29.1 PG — SIGNIFICANT CHANGE UP (ref 27–34)
MCHC RBC-ENTMCNC: 29.1 PG — SIGNIFICANT CHANGE UP (ref 27–34)
MCHC RBC-ENTMCNC: 30.7 GM/DL — LOW (ref 32–36)
MCHC RBC-ENTMCNC: 30.7 GM/DL — LOW (ref 32–36)
MCV RBC AUTO: 94.7 FL — SIGNIFICANT CHANGE UP (ref 80–100)
MCV RBC AUTO: 94.7 FL — SIGNIFICANT CHANGE UP (ref 80–100)
NON-GYNECOLOGICAL CYTOLOGY STUDY: SIGNIFICANT CHANGE UP
NON-GYNECOLOGICAL CYTOLOGY STUDY: SIGNIFICANT CHANGE UP
PLATELET # BLD AUTO: 137 K/UL — LOW (ref 150–400)
PLATELET # BLD AUTO: 137 K/UL — LOW (ref 150–400)
POTASSIUM SERPL-MCNC: 3.9 MMOL/L — SIGNIFICANT CHANGE UP (ref 3.5–5.3)
POTASSIUM SERPL-MCNC: 3.9 MMOL/L — SIGNIFICANT CHANGE UP (ref 3.5–5.3)
POTASSIUM SERPL-SCNC: 3.9 MMOL/L — SIGNIFICANT CHANGE UP (ref 3.5–5.3)
POTASSIUM SERPL-SCNC: 3.9 MMOL/L — SIGNIFICANT CHANGE UP (ref 3.5–5.3)
RBC # BLD: 3.95 M/UL — LOW (ref 4.2–5.8)
RBC # BLD: 3.95 M/UL — LOW (ref 4.2–5.8)
RBC # FLD: 13.9 % — SIGNIFICANT CHANGE UP (ref 10.3–14.5)
RBC # FLD: 13.9 % — SIGNIFICANT CHANGE UP (ref 10.3–14.5)
SODIUM SERPL-SCNC: 139 MMOL/L — SIGNIFICANT CHANGE UP (ref 135–145)
SODIUM SERPL-SCNC: 139 MMOL/L — SIGNIFICANT CHANGE UP (ref 135–145)
WBC # BLD: 5.76 K/UL — SIGNIFICANT CHANGE UP (ref 3.8–10.5)
WBC # BLD: 5.76 K/UL — SIGNIFICANT CHANGE UP (ref 3.8–10.5)
WBC # FLD AUTO: 5.76 K/UL — SIGNIFICANT CHANGE UP (ref 3.8–10.5)
WBC # FLD AUTO: 5.76 K/UL — SIGNIFICANT CHANGE UP (ref 3.8–10.5)

## 2023-11-02 PROCEDURE — 99231 SBSQ HOSP IP/OBS SF/LOW 25: CPT | Mod: 24

## 2023-11-02 PROCEDURE — 71045 X-RAY EXAM CHEST 1 VIEW: CPT | Mod: 26

## 2023-11-02 PROCEDURE — 99232 SBSQ HOSP IP/OBS MODERATE 35: CPT

## 2023-11-02 RX ORDER — POTASSIUM CHLORIDE 20 MEQ
40 PACKET (EA) ORAL ONCE
Refills: 0 | Status: COMPLETED | OUTPATIENT
Start: 2023-11-02 | End: 2023-11-02

## 2023-11-02 RX ADMIN — Medication 1 SPRAY(S): at 17:01

## 2023-11-02 RX ADMIN — Medication 20 MILLIGRAM(S): at 18:26

## 2023-11-02 RX ADMIN — Medication 1 GRAM(S): at 16:58

## 2023-11-02 RX ADMIN — APIXABAN 5 MILLIGRAM(S): 2.5 TABLET, FILM COATED ORAL at 16:58

## 2023-11-02 RX ADMIN — Medication 20 MILLIEQUIVALENT(S): at 12:32

## 2023-11-02 RX ADMIN — Medication 1 DROP(S): at 22:04

## 2023-11-02 RX ADMIN — SODIUM CHLORIDE 3 MILLILITER(S): 9 INJECTION INTRAMUSCULAR; INTRAVENOUS; SUBCUTANEOUS at 12:37

## 2023-11-02 RX ADMIN — Medication 2 MILLIGRAM(S): at 06:03

## 2023-11-02 RX ADMIN — Medication 25 MILLIGRAM(S): at 16:58

## 2023-11-02 RX ADMIN — SODIUM CHLORIDE 3 MILLILITER(S): 9 INJECTION INTRAMUSCULAR; INTRAVENOUS; SUBCUTANEOUS at 06:15

## 2023-11-02 RX ADMIN — Medication 25 MILLIGRAM(S): at 06:03

## 2023-11-02 RX ADMIN — ATORVASTATIN CALCIUM 10 MILLIGRAM(S): 80 TABLET, FILM COATED ORAL at 22:04

## 2023-11-02 RX ADMIN — Medication 1 SPRAY(S): at 06:04

## 2023-11-02 RX ADMIN — Medication 1 GRAM(S): at 06:03

## 2023-11-02 RX ADMIN — Medication 40 MILLIGRAM(S): at 12:33

## 2023-11-02 RX ADMIN — APIXABAN 5 MILLIGRAM(S): 2.5 TABLET, FILM COATED ORAL at 06:03

## 2023-11-02 RX ADMIN — Medication 3 MILLILITER(S): at 08:33

## 2023-11-02 RX ADMIN — Medication 2: at 08:45

## 2023-11-02 RX ADMIN — Medication 40 MILLIGRAM(S): at 06:04

## 2023-11-02 RX ADMIN — FINASTERIDE 5 MILLIGRAM(S): 5 TABLET, FILM COATED ORAL at 12:32

## 2023-11-02 RX ADMIN — Medication 2: at 17:36

## 2023-11-02 RX ADMIN — Medication 40 MILLIEQUIVALENT(S): at 18:25

## 2023-11-02 RX ADMIN — Medication 0.25 MILLIGRAM(S): at 17:37

## 2023-11-02 RX ADMIN — SODIUM CHLORIDE 3 MILLILITER(S): 9 INJECTION INTRAMUSCULAR; INTRAVENOUS; SUBCUTANEOUS at 22:01

## 2023-11-02 RX ADMIN — PANTOPRAZOLE SODIUM 40 MILLIGRAM(S): 20 TABLET, DELAYED RELEASE ORAL at 06:15

## 2023-11-02 RX ADMIN — Medication 3 MILLILITER(S): at 15:23

## 2023-11-02 RX ADMIN — Medication 81 MILLIGRAM(S): at 12:32

## 2023-11-02 RX ADMIN — Medication 2: at 12:32

## 2023-11-02 RX ADMIN — Medication 1 DROP(S): at 16:58

## 2023-11-02 NOTE — PROGRESS NOTE ADULT - PROBLEM SELECTOR PLAN 1
S/p TAVR 10/6/23 with Dr. Napier  Patient readmitted to CTS service for acute on chronic diastolic CHF exacerbation  Follow up TTE with preserved EF 50-55%, persistent Moderate TR  CTA chest with pulmonary vascular congestion and bilateral pleural effusion (Right>Left)  10/29 right PTC placement and removal ~1L drainage  Fluid culture NGTD  Continuous SpO2 monitoring. Titrate O2 requirements PRN to maintain SpO2 >92%  Continue Lasix 40IV BID for diuresis - tolerating diuresis, not optimized to transition to PO diuretics  Received Diamox for contraction alkalosis  Monitor serum chemistry  Monitor strict I/Os. Daily weights  Supplement electrolytes PRN  Continue Lopressor as tolerated by HR and BP  Continue supportive measures.   Has CardioMems - consider interrogation to gauge volume status  Cardiology recommendations appreciated  Plan discussed / reviewed with CT Surgery attending Dr. Gonzalez

## 2023-11-02 NOTE — PROGRESS NOTE ADULT - SUBJECTIVE AND OBJECTIVE BOX
ANIA CRISTOBAL  [id number]      Chief Complaint:   follow up HFpEF/pleural effusion/recent TAVR      Subjective:   Patient reports some dizziness.  SOB improved.  Denies CP, palps.      24 hour Tele:   V-paced, PVCs          acetaminophen     Tablet .. 650 milliGRAM(s) Oral every 6 hours PRN  albuterol    90 MICROgram(s) HFA Inhaler 2 Puff(s) Inhalation every 6 hours PRN  albuterol/ipratropium for Nebulization 3 milliLiter(s) Nebulizer every 6 hours  ALPRAZolam 0.25 milliGRAM(s) Oral every 8 hours PRN  apixaban 5 milliGRAM(s) Oral every 12 hours  aspirin enteric coated 81 milliGRAM(s) Oral daily  atorvastatin 10 milliGRAM(s) Oral at bedtime  dextrose 5%. 1000 milliLiter(s) IV Continuous <Continuous>  dextrose 5%. 1000 milliLiter(s) IV Continuous <Continuous>  dextrose 50% Injectable 25 Gram(s) IV Push once  dextrose 50% Injectable 12.5 Gram(s) IV Push once  doxazosin 2 milliGRAM(s) Oral daily  finasteride 5 milliGRAM(s) Oral daily  fluticasone propionate 50 MICROgram(s)/spray Nasal Spray 1 Spray(s) Both Nostrils two times a day  furosemide   Injectable 40 milliGRAM(s) IV Push two times a day  glucagon  Injectable 1 milliGRAM(s) IntraMuscular once  insulin lispro (ADMELOG) corrective regimen sliding scale   SubCutaneous three times a day before meals  metoprolol tartrate 25 milliGRAM(s) Oral two times a day  oxycodone    5 mG/acetaminophen 325 mG 2 Tablet(s) Oral every 4 hours PRN  pantoprazole    Tablet 40 milliGRAM(s) Oral before breakfast  potassium chloride    Tablet ER 20 milliEquivalent(s) Oral daily  potassium chloride   Powder 40 milliEquivalent(s) Oral once  senna 2 Tablet(s) Oral at bedtime  sodium chloride 0.9% lock flush 3 milliLiter(s) IV Push every 8 hours  sucralfate 1 Gram(s) Oral two times a day          Physical Exam:  T(C): 36.8 (11-02-23 @ 08:00), Max: 36.9 (11-02-23 @ 00:15)  HR: 81 (11-02-23 @ 08:33) (75 - 91)  BP: 106/65 (11-02-23 @ 08:00) (94/55 - 128/72)  RR: 18 (11-02-23 @ 08:00) (18 - 18)  SpO2: 97% (11-02-23 @ 08:33) (92% - 98%)  Wt(kg): --  General: Comfortable in NAD  Neck: No JVD  CVS: nl s1s2, no s3  Pulm: CTA b/l, diminished at bases b/l  Abd: soft, non-tender  Ext: 1+ b/l pretibial edema  Neuro A&O x3  Psych: Normal affect      Labs:   02 Nov 2023 05:00    139    |  99     |  31.1   ----------------------------<  181    3.9     |  29.0   |  0.87     Ca    8.7        02 Nov 2023 05:00  Mg     2.0       02 Nov 2023 05:00                            11.5   5.76  )-----------( 137      ( 02 Nov 2023 05:00 )             37.4         CT Angio Chest PE Protocol w/ IV Cont (10.28.23 @ 21:11)  IMPRESSION:  Suboptimal evaluation of the peripheral pulmonary arteries. No central   pulmonary embolus or secondary signs of right heart strain.  Right > left pleural effusion. Nonspecific bilateral pulmonary   groundglass opacities, which can be seen in noninfectious (pulmonary   edema) cyst and infectious processes.  Ascending aortic aneurysm. Findings suggestive of pulmonary arterial   hypertension.    ECHO 10/2023:   1. Left ventricular ejection fraction, by visual estimation, is 50 to  55%.   2. Normal global left ventricular systolic function.   3. Mildly increased left ventricular internal cavity size.   4. The mitralin-flow pattern reveals no discernable A-wave, therefore   no comment on diastolic function can be made.   5. There is mild concentric left ventricular hypertrophy.   6. Normal right ventricular size and function.   7. There is no evidence of pericardial effusion.   8. Trace mitral valve regurgitation.   9. Moderate tricuspid regurgitation.  10. TAVR in the aortic position.  11. Estimated pulmonary artery systolic pressure is 48.9 mmHg assuming a right atrial pressure of 15 mmHg, which is consistent with mild pulmonary hypertension.        Assessment:  77 year old male patient with a medical history of Severe Aortic Stenosis s/p TAVR 10/6/23 with Dr. Napier, Chronic Diastolic CHF, CAD s/p PCI to p&mLAD, Thoracic Aortic Aneurysm, Paroxysmal Afib s/p Ablation (on Eliquis), and Cardiomems, LLE DVT, on Home O2 PRN, ALLIE, BPH s/p TURP, Left Renal Mass, Morbid Obesity, Laminectomy, Anxiety, Type 2 DM (A1C 7.2 on Metformin), GERD, Cholelithiasis, MRCP w/ cirrhosis, now readmitted for acute on chronic diastolic CHF, acute respiratory failure, and b/l pleural effusions (Right > Left). 10/29 s/p right pigtail catheter placement and removal with 1L drainage.  -On interview patient reports dietary indiscretion and poor compliance with medications as cause of exacerbation   -Still volume overloaded , will help to have MEMS numbers but would diuresis another day IV, got diamox for mild contraction alkalosis but stable and no need to continue   -No active ischemia with negative trops, rhythm v-paced    Plan:  1. Will have MEMS interrogated today, rep aware.  Will base further diuretics on that reading.  Clinically improved.  2. Monitor renal function and electrolytes, so far renal function tolerating diuresis well still with only mild contraction alkalosis   3. Continue other current CV meds.  4. Needs to check MEMS at home as well and better dietary habits/adherence to medications. Consider adding MRA/SGLT 2 inhibitor but hold off at this time due to concern for close monitoring of renal function/infection and lack of adherence.  5. If MEMS number is in range d/c planning.  Close OP f/u with [facility].

## 2023-11-02 NOTE — PROGRESS NOTE ADULT - SUBJECTIVE AND OBJECTIVE BOX
HPI: 77 year old male patient with a medical history of Severe Aortic Stenosis s/p TAVR 10/6/23 with Dr. Napier, Chronic Diastolic CHF, CAD s/p PCI to p&mLAD, Thoracic Aortic Aneurysm, Paroxysmal Afib s/p Ablation (on Eliquis), and Cardiomems, LLE DVT, on Home O2 PRN, ALLIE, BPH s/p TURP, Left Renal Mass, Morbid Obesity, Laminectomy, Anxiety, Type 2 DM (A1C 7.2 on Metformin), GERD, Cholelithiasis, MRCP w/ cirrhosis, now readmitted for acute on chronic diastolic CHF, acute respiratory failure, and b/l pleural effusions (Right > Left). Patient requiring O2 supplementation with nasal cannula, IV diuresis, and Eliquis held for procedure. 10/29 s/p right pigtail catheter placement and removal for 1L drainage.    SIGNIFICANT RECENT/PAST 24 HR EVENTS:  No acute events reported overnight. Currently on baseline home O2 requirements of 3L. Ambulated in [place] during day.    SUBJECTIVE:  Patient seen and examined. Pt currently lying in bed in NAD. Denies fevers, chills, HA, dizziness, CP, SOB, abd pain, N/V/D, numbness/tingling in extremities, or any other acute complaints.  ROS negative x 10 systems except as noted above.    PAST MEDICAL & SURGICAL HISTORY:  Anxiety  Hypertension  Hyperlipidemia  Prostate disease  Gastroesophageal reflux disease  Gallbladder stone without cholecystitis or obstruction  BPH (benign prostatic hyperplasia)  DM (diabetes mellitus)  DVT (deep venous thrombosis)  Afib  Anxiety  HLD (hyperlipidemia)  Malignant schwannoma  H/O arthroscopy of knee  S/P laminectomy  S/P coronary artery stent placement    DAILY REVIEW:  Telemetry: V-Paced  Vital Signs Last 24 Hrs  T(C): 36.9 (02 Nov 2023 00:15), Max: 36.9 (02 Nov 2023 00:15)  T(F): 98.4 (02 Nov 2023 00:15), Max: 98.4 (02 Nov 2023 00:15)  HR: 81 (02 Nov 2023 00:15) (75 - 91)  BP: 116/96 (02 Nov 2023 00:15) (94/55 - 128/72)  BP(mean): 80 (01 Nov 2023 16:01) (80 - 80)  RR: 18 (02 Nov 2023 00:15) (18 - 18)  SpO2: 92% (02 Nov 2023 00:15) (92% - 98%)    Parameters below as of 01 Nov 2023 21:11  Patient On (Oxygen Delivery Method): nasal cannula    I&O's Summary    31 Oct 2023 07:01  -  01 Nov 2023 07:00  --------------------------------------------------------  IN: 0 mL / OUT: 950 mL / NET: -950 mL    01 Nov 2023 07:01  -  02 Nov 2023 02:07  --------------------------------------------------------  IN: 720 mL / OUT: 2250 mL / NET: -1530 mL    Admit Wt: Drug Dosing Weight  Height (cm): 188 (28 Oct 2023 12:10)  Weight (kg): 142 (28 Oct 2023 12:10)  BMI (kg/m2): 40.2 (28 Oct 2023 12:10)  BSA (m2): 2.63 (28 Oct 2023 12:10)    LABS:                        10.9   6.32  )-----------( 142      ( 01 Nov 2023 05:45 )             36.9   11-01    141  |  102  |  26.2<H>  ----------------------------<  186<H>  3.6   |  30.0<H>  |  0.68    Ca    8.3<L>      01 Nov 2023 05:45  Mg     2.1     11-01    Culture - Fungal, Body Fluid (collected 29 Oct 2023 12:55)  Source: Pleural Fl Pleural Fluid  Preliminary Report (30 Oct 2023 09:54):    Testing in progress    Culture - Body Fluid with Gram Stain (collected 29 Oct 2023 12:55)  Source: Pleural Fl Pleural Fluid  Gram Stain (29 Oct 2023 23:27):    polymorphonuclear leukocytes seen    No organisms seen    by cytocentrifuge  Preliminary Report (30 Oct 2023 12:12):    No growth    MEDICATIONS  MEDICATIONS  (STANDING):  albuterol/ipratropium for Nebulization 3 milliLiter(s) Nebulizer every 6 hours  apixaban 5 milliGRAM(s) Oral every 12 hours  aspirin enteric coated 81 milliGRAM(s) Oral daily  atorvastatin 10 milliGRAM(s) Oral at bedtime  dextrose 5%. 1000 milliLiter(s) (50 mL/Hr) IV Continuous <Continuous>  dextrose 5%. 1000 milliLiter(s) (100 mL/Hr) IV Continuous <Continuous>  dextrose 50% Injectable 25 Gram(s) IV Push once  dextrose 50% Injectable 12.5 Gram(s) IV Push once  doxazosin 2 milliGRAM(s) Oral daily  finasteride 5 milliGRAM(s) Oral daily  fluticasone propionate 50 MICROgram(s)/spray Nasal Spray 1 Spray(s) Both Nostrils two times a day  furosemide   Injectable 40 milliGRAM(s) IV Push two times a day  glucagon  Injectable 1 milliGRAM(s) IntraMuscular once  insulin lispro (ADMELOG) corrective regimen sliding scale   SubCutaneous three times a day before meals  metoprolol tartrate 25 milliGRAM(s) Oral two times a day  pantoprazole    Tablet 40 milliGRAM(s) Oral before breakfast  potassium chloride    Tablet ER 20 milliEquivalent(s) Oral daily  senna 2 Tablet(s) Oral at bedtime  sodium chloride 0.9% lock flush 3 milliLiter(s) IV Push every 8 hours  sucralfate 1 Gram(s) Oral two times a day    MEDICATIONS  (PRN):  acetaminophen     Tablet .. 650 milliGRAM(s) Oral every 6 hours PRN Temp greater or equal to 38.5C (101.3F), Mild Pain (1 - 3)  albuterol    90 MICROgram(s) HFA Inhaler 2 Puff(s) Inhalation every 6 hours PRN for shortness of breath and/or wheezing  ALPRAZolam 0.25 milliGRAM(s) Oral every 8 hours PRN anxiety  oxycodone    5 mG/acetaminophen 325 mG 2 Tablet(s) Oral every 4 hours PRN Moderate Pain (4 - 6)    ALLERGIES:  Ceftin (Hives)  penicillin (Anaphylaxis)  sulfa drugs (Unknown)  Bactrim (Rash)    DIAGNOSTICS:  All laboratory results, radiology and medications reviewed.  Xray Chest 1 View- PORTABLE-Routine (Xray Chest 1 View- PORTABLE-Routine in AM.) (10.31.23 @ 05:46)  IMPRESSION:  1. Shallow inspiratory effort however notable for cardiomegaly, AV   sequential pacemaker and central pulmonary venous engorgement but without   pulmonary edema.  2. The TAVR device is difficult to visualize. No pneumothorax.    Xray Chest 1 View- PORTABLE-Urgent (Xray Chest 1 View- PORTABLE-Urgent .) (10.29.23 @ 11:47)  INTERPRETATION:  AP chest on October 29, 2023 at 5:01 AM. Patient has   pulmonary congestion and is short of breath.  Gross heart enlargement and left-sided pacemaker again noted. TAVR aortic   valve noted.  On October 28 there is moderate to advanced CHF. This shows improvement   with moderate residual.  Follow-up AP chest on October 29, 2023 at 10:15 AM. Continued improvement   in CHF with mild residual. The catheter right chest tube is been inserted   and right base effusion seen on October 29 shows improvement. No   pneumothorax.  IMPRESSION: Diminished right effusion after catheter chest tube.   Improving CHF on the latest film shows mild residual.    CT Angio Chest PE Protocol w/ IV Cont (10.28.23 @ 21:11)  IMPRESSION:  Suboptimal evaluation of the peripheral pulmonary arteries. No central   pulmonary embolus or secondary signs of right heart strain.    Right > left pleural effusion. Nonspecific bilateral pulmonary   groundglass opacities, which can be seen in noninfectious (pulmonary   edema) cyst and infectious processes.    Ascending aortic aneurysm. Findings suggestive of pulmonary arterial   hypertension.    Additional findings as described.    12 Lead ECG (10.28.23 @ 21:47)  Ventricular Rate 90 BPM  Atrial Rate 86 BPM  QRS Duration 196 ms  Q-T Interval 462 ms  QTC Calculation(Bazett) 565 ms  P Axis 71 degrees  R Axis -68 degrees  T Axis 77 degrees  Diagnosis Line Ventricular-paced rhythm with frequent ,and consecutive Premature ventricular complexes  Abnormal ECG    TTE Echo Complete w/o Contrast w/ Doppler (10.28.23 @ 19:13)  Summary:   1. Left ventricular ejection fraction, by visual estimation, is 50 to   55%.   2. Normal global left ventricular systolic function.   3. Mildly increased left ventricular internal cavity size.   4. The mitralin-flow pattern reveals no discernable A-wave, therefore   no comment on diastolic function can be made.   5. There is mild concentric left ventricular hypertrophy.   6. Normal right ventricular size and function.   7. There is no evidence of pericardial effusion.   8. Trace mitral valve regurgitation.   9. Moderate tricuspid regurgitation.  10. TAVR in the aortic position.  11. Estimated pulmonary artery systolic pressure is 48.9 mmHg assuming a   right atrial pressure of 15 mmHg, which is consistent with mild pulmonary   hypertension.    PHYSICAL EXAM:  Constitutional: NAD  Neck: supple, trachea midline. No JVD   Respiratory: Breath sounds diminished b/l lower fields to auscultation, no accessory muscle use noted. No wheezing, rales, or rhonchi noted b/l   Cardiovascular: Regular rate, regular rhythm, normal S1, S2; no murmurs or rub   Gastrointestinal: Soft, obese, non-tender, non-distended, + bowel sounds   Extremities: GUERRA x 4, 2+ LE peripheral edema, no cyanosis, no clubbing, chronic venous discoloration BLE  Vascular: Equal and normal pulses: 2+ peripheral pulses throughout  Neurological: A+O x 3; speech clear and intact; no gross sensory/motor deficits  Psychiatric: calm, normal mood, normal affect   Skin: warm, dry, well perfused, no rashes

## 2023-11-02 NOTE — PROGRESS NOTE ADULT - PROBLEM SELECTOR PLAN 3
POCUS performed with drainable pocket on the right  Eliquis held since 10/28 AM- Resumed Eliquis  10/29 right PTC placement and removal ~1L drainage  Fluid culture NGTD

## 2023-11-02 NOTE — PROGRESS NOTE ADULT - PROBLEM SELECTOR PLAN 2
CTA chest with pulmonary vascular congestion and bilateral pleural effusion (Right>Left)  Continuous SpO2 monitoring. Titrate O2 requirements PRN to maintain SpO2 >92%  Chronic home O2 use ~3L NC baseline  10/29 right PTC placement and removal ~1L drainage  Lasix 40IV BID ordered for diuresis  Monitor strict I/Os  Daily weights

## 2023-11-03 ENCOUNTER — TRANSCRIPTION ENCOUNTER (OUTPATIENT)
Age: 78
End: 2023-11-03

## 2023-11-03 VITALS
SYSTOLIC BLOOD PRESSURE: 130 MMHG | DIASTOLIC BLOOD PRESSURE: 70 MMHG | HEART RATE: 80 BPM | TEMPERATURE: 98 F | RESPIRATION RATE: 20 BRPM | OXYGEN SATURATION: 95 %

## 2023-11-03 LAB
ANION GAP SERPL CALC-SCNC: 7 MMOL/L — SIGNIFICANT CHANGE UP (ref 5–17)
ANION GAP SERPL CALC-SCNC: 7 MMOL/L — SIGNIFICANT CHANGE UP (ref 5–17)
BUN SERPL-MCNC: 33.2 MG/DL — HIGH (ref 8–20)
BUN SERPL-MCNC: 33.2 MG/DL — HIGH (ref 8–20)
CALCIUM SERPL-MCNC: 8.1 MG/DL — LOW (ref 8.4–10.5)
CALCIUM SERPL-MCNC: 8.1 MG/DL — LOW (ref 8.4–10.5)
CHLORIDE SERPL-SCNC: 102 MMOL/L — SIGNIFICANT CHANGE UP (ref 96–108)
CHLORIDE SERPL-SCNC: 102 MMOL/L — SIGNIFICANT CHANGE UP (ref 96–108)
CO2 SERPL-SCNC: 33 MMOL/L — HIGH (ref 22–29)
CO2 SERPL-SCNC: 33 MMOL/L — HIGH (ref 22–29)
CREAT SERPL-MCNC: 0.76 MG/DL — SIGNIFICANT CHANGE UP (ref 0.5–1.3)
CREAT SERPL-MCNC: 0.76 MG/DL — SIGNIFICANT CHANGE UP (ref 0.5–1.3)
CULTURE RESULTS: SIGNIFICANT CHANGE UP
CULTURE RESULTS: SIGNIFICANT CHANGE UP
EGFR: 93 ML/MIN/1.73M2 — SIGNIFICANT CHANGE UP
EGFR: 93 ML/MIN/1.73M2 — SIGNIFICANT CHANGE UP
GLUCOSE BLDC GLUCOMTR-MCNC: 161 MG/DL — HIGH (ref 70–99)
GLUCOSE BLDC GLUCOMTR-MCNC: 161 MG/DL — HIGH (ref 70–99)
GLUCOSE SERPL-MCNC: 177 MG/DL — HIGH (ref 70–99)
GLUCOSE SERPL-MCNC: 177 MG/DL — HIGH (ref 70–99)
HCT VFR BLD CALC: 35.7 % — LOW (ref 39–50)
HCT VFR BLD CALC: 35.7 % — LOW (ref 39–50)
HGB BLD-MCNC: 10.9 G/DL — LOW (ref 13–17)
HGB BLD-MCNC: 10.9 G/DL — LOW (ref 13–17)
MAGNESIUM SERPL-MCNC: 2 MG/DL — SIGNIFICANT CHANGE UP (ref 1.6–2.6)
MAGNESIUM SERPL-MCNC: 2 MG/DL — SIGNIFICANT CHANGE UP (ref 1.6–2.6)
MCHC RBC-ENTMCNC: 28.9 PG — SIGNIFICANT CHANGE UP (ref 27–34)
MCHC RBC-ENTMCNC: 28.9 PG — SIGNIFICANT CHANGE UP (ref 27–34)
MCHC RBC-ENTMCNC: 30.5 GM/DL — LOW (ref 32–36)
MCHC RBC-ENTMCNC: 30.5 GM/DL — LOW (ref 32–36)
MCV RBC AUTO: 94.7 FL — SIGNIFICANT CHANGE UP (ref 80–100)
MCV RBC AUTO: 94.7 FL — SIGNIFICANT CHANGE UP (ref 80–100)
PLATELET # BLD AUTO: 131 K/UL — LOW (ref 150–400)
PLATELET # BLD AUTO: 131 K/UL — LOW (ref 150–400)
POTASSIUM SERPL-MCNC: 3.5 MMOL/L — SIGNIFICANT CHANGE UP (ref 3.5–5.3)
POTASSIUM SERPL-MCNC: 3.5 MMOL/L — SIGNIFICANT CHANGE UP (ref 3.5–5.3)
POTASSIUM SERPL-SCNC: 3.5 MMOL/L — SIGNIFICANT CHANGE UP (ref 3.5–5.3)
POTASSIUM SERPL-SCNC: 3.5 MMOL/L — SIGNIFICANT CHANGE UP (ref 3.5–5.3)
RBC # BLD: 3.77 M/UL — LOW (ref 4.2–5.8)
RBC # BLD: 3.77 M/UL — LOW (ref 4.2–5.8)
RBC # FLD: 13.9 % — SIGNIFICANT CHANGE UP (ref 10.3–14.5)
RBC # FLD: 13.9 % — SIGNIFICANT CHANGE UP (ref 10.3–14.5)
SODIUM SERPL-SCNC: 142 MMOL/L — SIGNIFICANT CHANGE UP (ref 135–145)
SODIUM SERPL-SCNC: 142 MMOL/L — SIGNIFICANT CHANGE UP (ref 135–145)
SPECIMEN SOURCE: SIGNIFICANT CHANGE UP
SPECIMEN SOURCE: SIGNIFICANT CHANGE UP
WBC # BLD: 5.24 K/UL — SIGNIFICANT CHANGE UP (ref 3.8–10.5)
WBC # BLD: 5.24 K/UL — SIGNIFICANT CHANGE UP (ref 3.8–10.5)
WBC # FLD AUTO: 5.24 K/UL — SIGNIFICANT CHANGE UP (ref 3.8–10.5)
WBC # FLD AUTO: 5.24 K/UL — SIGNIFICANT CHANGE UP (ref 3.8–10.5)

## 2023-11-03 PROCEDURE — 99232 SBSQ HOSP IP/OBS MODERATE 35: CPT

## 2023-11-03 PROCEDURE — 71045 X-RAY EXAM CHEST 1 VIEW: CPT | Mod: 26

## 2023-11-03 RX ORDER — INFLUENZA VIRUS VACCINE 15; 15; 15; 15 UG/.5ML; UG/.5ML; UG/.5ML; UG/.5ML
0.7 SUSPENSION INTRAMUSCULAR ONCE
Refills: 0 | Status: DISCONTINUED | OUTPATIENT
Start: 2023-11-03 | End: 2023-11-03

## 2023-11-03 RX ADMIN — FINASTERIDE 5 MILLIGRAM(S): 5 TABLET, FILM COATED ORAL at 13:07

## 2023-11-03 RX ADMIN — Medication 1 GRAM(S): at 05:46

## 2023-11-03 RX ADMIN — SODIUM CHLORIDE 3 MILLILITER(S): 9 INJECTION INTRAMUSCULAR; INTRAVENOUS; SUBCUTANEOUS at 05:47

## 2023-11-03 RX ADMIN — Medication 20 MILLIGRAM(S): at 05:47

## 2023-11-03 RX ADMIN — Medication 1 DROP(S): at 13:09

## 2023-11-03 RX ADMIN — Medication 1 SPRAY(S): at 05:46

## 2023-11-03 RX ADMIN — Medication 25 MILLIGRAM(S): at 05:46

## 2023-11-03 RX ADMIN — Medication 0.25 MILLIGRAM(S): at 06:39

## 2023-11-03 RX ADMIN — Medication 81 MILLIGRAM(S): at 13:07

## 2023-11-03 RX ADMIN — PANTOPRAZOLE SODIUM 40 MILLIGRAM(S): 20 TABLET, DELAYED RELEASE ORAL at 05:46

## 2023-11-03 RX ADMIN — APIXABAN 5 MILLIGRAM(S): 2.5 TABLET, FILM COATED ORAL at 05:45

## 2023-11-03 RX ADMIN — SODIUM CHLORIDE 3 MILLILITER(S): 9 INJECTION INTRAMUSCULAR; INTRAVENOUS; SUBCUTANEOUS at 13:15

## 2023-11-03 RX ADMIN — Medication 2: at 08:43

## 2023-11-03 RX ADMIN — Medication 20 MILLIEQUIVALENT(S): at 13:07

## 2023-11-03 RX ADMIN — Medication 1 DROP(S): at 05:47

## 2023-11-03 RX ADMIN — Medication 2 MILLIGRAM(S): at 05:45

## 2023-11-03 NOTE — PROGRESS NOTE ADULT - PROBLEM SELECTOR PLAN 3
POCUS performed with drainable pocket on the right  Eliquis held since 10/28 AM- Resumed Eliquis  10/29 right PTC placement and removal ~1L drainage  Fluid culture NGTD S/p Right pigtail catheter placed 10/29 with ~1L drainage, since removed.   Plan as above.

## 2023-11-03 NOTE — PROGRESS NOTE ADULT - PROBLEM SELECTOR PROBLEM 3
Bilateral pleural effusion

## 2023-11-03 NOTE — DISCHARGE NOTE PROVIDER - NSDCMRMEDTOKEN_GEN_ALL_CORE_FT
Albuterol (Eqv-ProAir HFA) 90 mcg/inh inhalation aerosol: 2 puff(s) inhaled every 6 hours as needed for  shortness of breath and/or wheezing  ALPRAZolam 0.25 mg oral tablet: 1 tab(s) orally 3 times a day As needed Anxiety  apixaban 5 mg oral tablet: 1 tab(s) orally every 12 hours  aspirin 81 mg oral tablet: 1 tab(s) orally once a day  atorvastatin 10 mg oral tablet: 1 tab(s) orally once a day (at bedtime)  doxazosin 2 mg oral tablet: 1 tab(s) orally once a day  doxycycline monohydrate 50 mg oral capsule: 2 cap(s) orally every 12 hours  ergocalciferol 1.25 mg (50,000 intl units) oral capsule: 1 cap(s) orally once a week  finasteride 5 mg oral tablet: 1 tab(s) orally once a day  ipratropium-albuterol 0.5 mg-2.5 mg/3 mL inhalation solution: 3 milliliter(s) inhaled every 6 hours  metFORMIN 500 mg oral tablet: 1 tab(s) orally 3 times a day  metoprolol tartrate 50 mg oral tablet: 1 tab(s) orally 2 times a day  omeprazole 40 mg oral delayed release capsule: 1 orally once a day  potassium chloride 20 mEq oral tablet, extended release: 1 tab(s) orally once a day  sucralfate 1 g oral tablet: 1 tab(s) orally 2 times a day  torsemide 20 mg oral tablet: 1 tab(s) orally 2 times a day MDD: 40 mg

## 2023-11-03 NOTE — PROGRESS NOTE ADULT - PROBLEM SELECTOR PLAN 2
CTA chest with pulmonary vascular congestion and bilateral pleural effusion (Right>Left)  Continuous SpO2 monitoring. Titrate O2 requirements PRN to maintain SpO2 >92%  Chronic home O2 use ~3L NC baseline  10/29 right PTC placement and removal ~1L drainage  Lasix 40IV BID ordered for diuresis  Monitor strict I/Os  Daily weights CTA chest with pulmonary vascular congestion and bilateral pleural effusion (Right>Left).  S/p Right pigtail catheter placed 10/29 with ~1L drainage, since removed.   Pleural Fluid culture remains NGTD.  Continuous SpO2 monitoring. Chronic Home O2 uses ~3L NC baseline. Titrate O2 requirements PRN to maintain SpO2 >92%.   Continue Torsemide 20mg PO BID for diuresis.   Monitor serum chemistry.  Monitor strict I/Os. Daily weights.  Supplement electrolytes PRN.

## 2023-11-03 NOTE — PROGRESS NOTE ADULT - ASSESSMENT
77 year old male patient with a medical history of Severe Aortic Stenosis s/p TAVR 10/6/23 with Dr. Napier, Chronic Diastolic CHF, CAD s/p PCI to p&mLAD, Thoracic Aortic Aneurysm, Paroxysmal Afib s/p Ablation (on Eliquis), and Cardiomems, LLE DVT, on Home O2 PRN, ALLIE, BPH s/p TURP, Left Renal Mass, Morbid Obesity, Laminectomy, Anxiety, Type 2 DM (A1C 7.2 on Metformin), GERD, Cholelithiasis, MRCP w/ cirrhosis, now readmitted for acute on chronic diastolic CHF, acute respiratory failure, and b/l pleural effusions (Right > Left). Patient requiring O2 supplementation with nasal cannula, IV diuresis, and Eliquis held with plan for potential drainage of his right pleural effusion.   10/29 right PTC placement and removal ~1L drainage 77 year old male patient with a medical history of Severe Aortic Stenosis s/p TAVR 10/6/23 with Dr. Napier, Chronic Diastolic CHF, CAD s/p PCI to p&mLAD, Thoracic Aortic Aneurysm, Paroxysmal Afib s/p Ablation (on Eliquis), and Cardiomems, LLE DVT, on Home O2 PRN, ALLIE, BPH s/p TURP, Left Renal Mass, Morbid Obesity, Laminectomy, Anxiety, Type 2 DM (A1C 7.2 on Metformin), GERD, Cholelithiasis, MRCP w/ cirrhosis, now readmitted for acute on chronic diastolic CHF, acute respiratory failure, and b/l pleural effusions (Right > Left). Patient requiring O2 supplementation with nasal cannula, IV diuresis, and s/p Right pigtail catheter placement for drainage of right pleural effusion.

## 2023-11-03 NOTE — DISCHARGE NOTE PROVIDER - NSDCHHASSISTDEVIC_GEN_ALL_CORE_FT
Has Congestive Heart Failure on Home oxygen for sleep apnea and uses an assistive rolling walker to ambulate.

## 2023-11-03 NOTE — PROGRESS NOTE ADULT - PROBLEM SELECTOR PLAN 1
S/p TAVR 10/6/23 with Dr. Napier  Patient readmitted to CTS service for acute on chronic diastolic CHF exacerbation  Follow up TTE with preserved EF 50-55%, persistent Moderate TR  CTA chest with pulmonary vascular congestion and bilateral pleural effusion (Right>Left)  10/29 right PTC placement and removal ~1L drainage  Fluid culture NGTD  Continuous SpO2 monitoring. Titrate O2 requirements PRN to maintain SpO2 >92%  Continue Lasix 40IV BID for diuresis - tolerating diuresis, not optimized to transition to PO diuretics  Received Diamox for contraction alkalosis  Monitor serum chemistry  Monitor strict I/Os. Daily weights  Supplement electrolytes PRN  Continue Lopressor as tolerated by HR and BP  Continue supportive measures.   Has CardioMems - consider interrogation to gauge volume status  Cardiology recommendations appreciated  Plan discussed / reviewed with CT Surgery attending Dr. Gonzalez S/p TAVR 10/6/23 with Dr. Napier.  Patient readmitted to CTS service for acute on chronic diastolic CHF exacerbation.  Follow up TTE with preserved EF 50-55%, persistent Moderate TR.  CTA chest with pulmonary vascular congestion and bilateral pleural effusion (Right>Left).  S/p Right pigtail catheter placed 10/29 with ~1L drainage, since removed.   Pleural Fluid culture remains NGTD.  Continuous SpO2 monitoring. Chronic Home O2 uses ~3L NC baseline. Titrate O2 requirements PRN to maintain SpO2 >92%.   Continue Torsemide 20mg PO BID for diuresis.   Monitor serum chemistry.  Monitor strict I/Os. Daily weights.  Supplement electrolytes PRN.  Continue Lopressor as tolerated by HR and BP  Continue supportive measures.   Has CardioMems with last interrogation 11/2/23.   Cardiology following.   Dispo: Home  Plan discussed / reviewed with CT Surgery attending Dr. Gonzalez.

## 2023-11-03 NOTE — DISCHARGE NOTE PROVIDER - NSDCCPCAREPLAN_GEN_ALL_CORE_FT
PRINCIPAL DISCHARGE DIAGNOSIS  Diagnosis: Acute on chronic systolic congestive heart failure  Assessment and Plan of Treatment: Bilateral Pleural Effusions

## 2023-11-03 NOTE — PATIENT PROFILE ADULT - FALL HARM RISK - HARM RISK INTERVENTIONS

## 2023-11-03 NOTE — PATIENT PROFILE ADULT - HAVE YOU RECENTLY LOST WEIGHT WITHOUT TRYING?
Upon RN assessment at [phone], tubing to atrium of chest tube was found to be disconnected. Tubing reconnected and reinforced with tape, vaseline gauze placed over chest tube site, gauze and medipore tape to cover. STAT chest xray ordered. Dr. Brooklyn Perez and Dr. Flory Wright notified. ABG's ordered STAT per Dr. Flory Wright.  Will continue to monitor. No (0)

## 2023-11-03 NOTE — DISCHARGE NOTE PROVIDER - HOSPITAL COURSE
77 year old male patient with a medical history of Severe Aortic Stenosis s/p TAVR 10/6/23 with Dr. Napier, Chronic Diastolic CHF, CAD s/p PCI to p&mLAD, Thoracic Aortic Aneurysm, Paroxysmal Afib s/p Ablation (on Eliquis), and Cardiomems, LLE DVT, on Home O2 PRN, ALLIE, BPH s/p TURP, Left Renal Mass, Morbid Obesity, Laminectomy, Anxiety, Type 2 DM (A1C 7.2 on Metformin), GERD, Cholelithiasis, MRCP w/ cirrhosis, now readmitted for acute on chronic diastolic CHF, acute respiratory failure, and b/l pleural effusions (Right > Left). Patient requiring O2 supplementation with nasal cannula, IV diuresis, and Eliquis was held and a pigtail catheter was placed on the left side which drained slightly over 1 liter and subsequently removed. After receiving adequate IV diueresis, patient was placed on oral Torsemide BID with plans to go home and follow up Cardiology to ensure euvolemia. Patient will be transported home via ambulance.

## 2023-11-03 NOTE — PROGRESS NOTE ADULT - PROBLEM SELECTOR PLAN 6
SCDs and Eliquis for DVT prophylaxis  Protonix for GI prophylaxis SCDs and Eliquis for DVT prophylaxis.  Protonix for GI prophylaxis.

## 2023-11-03 NOTE — PROGRESS NOTE ADULT - SUBJECTIVE AND OBJECTIVE BOX
S/p TAVR 10/6/23 with Dr. Napier  Readmitted with Acute on chronic diastolic CHF, acute respiratory failure, and b/l pleural effusions    HPI:  77 year old male patient with a medical history of Severe Aortic Stenosis s/p TAVR 10/6/23 with Dr. Napier, Chronic Diastolic CHF, CAD s/p PCI to p&mLAD, Thoracic Aortic Aneurysm, Paroxysmal Afib s/p Ablation (on Eliquis), and Cardiomems, LLE DVT, on Home O2 PRN, ALLIE, BPH s/p TURP, Left Renal Mass, Morbid Obesity, Laminectomy, Anxiety, Type 2 DM (A1C 7.2 on Metformin), GERD, Cholelithiasis, MRCP w/ cirrhosis, now readmitted for acute on chronic diastolic CHF, acute respiratory failure, and b/l pleural effusions (Right > Left). Patient requiring O2 supplementation with nasal cannula, IV diuresis, and Eliquis held with plan for potential drainage of his right pleural effusion later today.  (29 Oct 2023 01:08)    PAST MEDICAL & SURGICAL HISTORY:  Anxiety  Hypertension  Hyperlipidemia  Prostate disease  Gastroesophageal reflux disease  Gallbladder stone without cholecystitis or obstruction  BPH (benign prostatic hyperplasia)  DM (diabetes mellitus)  DVT (deep venous thrombosis) left leg  Afib  Anxiety  Malignant schwannoma  H/O arthroscopy of knee  S/P laminectomy  S/P coronary artery stent placement x 2    FAMILY HISTORY:  FH: heart disease (Father)    Brief Hospital Course:     Significant recent/past 24 hr events: No overnight events reported.    Subjective: Patient lying in bed in NAD. +Tolerating diet. +Passing gas. +Pain currently controlled. Denies fevers, chills, lightheadedness, dizziness, HA, CP, palpitations, SOB, cough, abdominal pain, N/V, diarrhea, numbness/tingling in extremities, or any other acute complaints. ROS negative x 10 systems except as noted above.    MEDICATIONS  (STANDING):  albuterol/ipratropium for Nebulization 3 milliLiter(s) Nebulizer every 6 hours  apixaban 5 milliGRAM(s) Oral every 12 hours  aspirin enteric coated 81 milliGRAM(s) Oral daily  atorvastatin 10 milliGRAM(s) Oral at bedtime  doxazosin 2 milliGRAM(s) Oral daily  finasteride 5 milliGRAM(s) Oral daily  fluticasone propionate 50 MICROgram(s)/spray Nasal Spray 1 Spray(s) Both Nostrils two times a day  insulin lispro (ADMELOG) corrective regimen sliding scale   SubCutaneous three times a day before meals  metoprolol tartrate 25 milliGRAM(s) Oral two times a day  pantoprazole    Tablet 40 milliGRAM(s) Oral before breakfast  potassium chloride    Tablet ER 20 milliEquivalent(s) Oral daily  senna 2 Tablet(s) Oral at bedtime  sodium chloride 0.9% lock flush 3 milliLiter(s) IV Push every 8 hours  sucralfate 1 Gram(s) Oral two times a day  torsemide 20 milliGRAM(s) Oral two times a day    MEDICATIONS  (PRN):  acetaminophen     Tablet .. 650 milliGRAM(s) Oral every 6 hours PRN Temp greater or equal to 38.5C (101.3F), Mild Pain (1 - 3)  albuterol    90 MICROgram(s) HFA Inhaler 2 Puff(s) Inhalation every 6 hours PRN for shortness of breath and/or wheezing  ALPRAZolam 0.25 milliGRAM(s) Oral every 8 hours PRN anxiety  artificial  tears Solution 1 Drop(s) Both EYES every 4 hours PRN Dry Eyes  oxycodone    5 mG/acetaminophen 325 mG 2 Tablet(s) Oral every 4 hours PRN Moderate Pain (4 - 6)    Allergies:  Ceftin (Hives)  penicillin (Anaphylaxis)  sulfa drugs (Unknown)  Bactrim (Rash)    Vitals   T(C): 36.8 (03 Nov 2023 00:00), Max: 36.8 (02 Nov 2023 05:46)  T(F): 98.3 (03 Nov 2023 00:00), Max: 98.3 (03 Nov 2023 00:00)  HR: 83 (03 Nov 2023 00:00) (78 - 91)  BP: 113/70 (03 Nov 2023 00:00) (105/62 - 125/72)  BP(mean): 83 (02 Nov 2023 16:09) (83 - 83)  RR: 20 (03 Nov 2023 00:00) (18 - 22)  SpO2: 90% (03 Nov 2023 00:00) (90% - 97%)  O2 Parameters below as of 03 Nov 2023 00:00  Patient On (Oxygen Delivery Method): nasal cannula  O2 Flow (L/min): 3    I&O's Detail    01 Nov 2023 07:01  -  02 Nov 2023 07:00  --------------------------------------------------------  IN:    Oral Fluid: 720 mL  Total IN: 720 mL    OUT:    Voided (mL): 2650 mL  Total OUT: 2650 mL    Total NET: -1930 mL      02 Nov 2023 07:01  -  03 Nov 2023 02:28  --------------------------------------------------------  IN:    Oral Fluid: 580 mL  Total IN: 580 mL    OUT:    Voided (mL): 850 mL  Total OUT: 850 mL    Total NET: -270 mL    Physical Exam  General: Sitting up in bed in NAD, +[place]  Neuro: A+O x 3, non-focal, speech clear and intact  HEENT:  NCAT, No conjuctival edema or icterus, no thrush.  Neck: Supple, trachea midline  Pulm: +Diminished BS at bases b/l with +crackles, no accessory muscle use noted  CV: regular rate, Paced, +murmur   Abd: obese, soft, NT, ND, + BS  Ext: GUERRA x 4, +1-2 pitting edema, no cyanosis, distal motor/neuro/circ intact  Skin: warm, dry, perfused  LABS                        11.5   5.76  )-----------( 137      ( 02 Nov 2023 05:00 )             37.4     11-02    139  |  99  |  31.1<H>  ----------------------------<  181<H>  3.9   |  29.0  |  0.87    Ca    8.7      02 Nov 2023 05:00  Mg     2.0     11-02    Urinalysis Basic - ( 02 Nov 2023 05:00 )  Color: x / Appearance: x / SG: x / pH: x  Gluc: 181 mg/dL / Ketone: x  / Bili: x / Urobili: x   Blood: x / Protein: x / Nitrite: x   Leuk Esterase: x / RBC: x / WBC x   Sq Epi: x / Non Sq Epi: x / Bacteria: x    POCT Blood Glucose.: 177 mg/dL (11-02-23 @ 17:28)  POCT Blood Glucose.: 198 mg/dL (11-02-23 @ 11:58)  POCT Blood Glucose.: 152 mg/dL (11-02-23 @ 08:16)    Last CXR:  < from: Xray Chest 1 View- PORTABLE-Routine (Xray Chest 1 View- PORTABLE-Routine in AM.) (11.02.23 @ 05:46) >  Frontal expiratory view of the chest shows the heart to be similarly enlarged in size. Left cardiac pacemaker is again noted.  The lungs show less pulmonary congestion and there is no evidence of pneumothorax nor definite pleural effusion.  IMPRESSION:  Less pulmonary congestion.  < end of copied text >   S/p TAVR 10/6/23 with Dr. Napier  Readmitted with Acute on chronic diastolic CHF, acute respiratory failure, and b/l pleural effusions    HPI:  77 year old male patient with a medical history of Severe Aortic Stenosis s/p TAVR 10/6/23 with Dr. Napier, Chronic Diastolic CHF, CAD s/p PCI to p&mLAD, Thoracic Aortic Aneurysm, Paroxysmal Afib s/p Ablation (on Eliquis), and Cardiomems, LLE DVT, on Home O2 PRN, ALLIE, BPH s/p TURP, Left Renal Mass, Morbid Obesity, Laminectomy, Anxiety, Type 2 DM (A1C 7.2 on Metformin), GERD, Cholelithiasis, MRCP w/ cirrhosis, now readmitted for acute on chronic diastolic CHF, acute respiratory failure, and b/l pleural effusions (Right > Left). Patient requiring O2 supplementation with nasal cannula, IV diuresis, and Eliquis held with plan for potential drainage of his right pleural effusion later today.  (29 Oct 2023 01:08)    PAST MEDICAL & SURGICAL HISTORY:  Anxiety  Hypertension  Hyperlipidemia  Prostate disease  Gastroesophageal reflux disease  Gallbladder stone without cholecystitis or obstruction  BPH (benign prostatic hyperplasia)  DM (diabetes mellitus)  DVT (deep venous thrombosis) left leg  Afib  Anxiety  Malignant schwannoma  H/O arthroscopy of knee  S/P laminectomy  S/P coronary artery stent placement x 2    FAMILY HISTORY:  FH: heart disease (Father)    Brief Hospital Course: 77 year old male patient with a medical history of Severe Aortic Stenosis s/p TAVR 10/6/23 with Dr. Napier, Chronic Diastolic CHF, CAD s/p PCI to p&mLAD, Thoracic Aortic Aneurysm, Paroxysmal Afib s/p Ablation (on Eliquis), and Cardiomems, LLE DVT, on Home O2 PRN, ALLIE, BPH s/p TURP, Left Renal Mass, Morbid Obesity, Laminectomy, Anxiety, Type 2 DM (A1C 7.2 on Metformin), GERD, Cholelithiasis, MRCP w/ cirrhosis, now readmitted for acute on chronic diastolic CHF, acute respiratory failure, and b/l pleural effusions (Right > Left). Patient requiring O2 supplementation with nasal cannula, IV diuresis, and s/p Right pigtail catheter placement for drainage of right pleural effusion.     Significant recent/past 24 hr events: No overnight events reported.    Subjective: Patient lying in bed in NAD. +Tolerating diet. +Passing gas. +Pain currently controlled. Denies fevers, chills, lightheadedness, dizziness, HA, CP, palpitations, SOB, cough, abdominal pain, N/V, diarrhea, numbness/tingling in extremities, or any other acute complaints. ROS negative x 10 systems except as noted above.    MEDICATIONS  (STANDING):  albuterol/ipratropium for Nebulization 3 milliLiter(s) Nebulizer every 6 hours  apixaban 5 milliGRAM(s) Oral every 12 hours  aspirin enteric coated 81 milliGRAM(s) Oral daily  atorvastatin 10 milliGRAM(s) Oral at bedtime  doxazosin 2 milliGRAM(s) Oral daily  finasteride 5 milliGRAM(s) Oral daily  fluticasone propionate 50 MICROgram(s)/spray Nasal Spray 1 Spray(s) Both Nostrils two times a day  insulin lispro (ADMELOG) corrective regimen sliding scale   SubCutaneous three times a day before meals  metoprolol tartrate 25 milliGRAM(s) Oral two times a day  pantoprazole    Tablet 40 milliGRAM(s) Oral before breakfast  potassium chloride    Tablet ER 20 milliEquivalent(s) Oral daily  senna 2 Tablet(s) Oral at bedtime  sodium chloride 0.9% lock flush 3 milliLiter(s) IV Push every 8 hours  sucralfate 1 Gram(s) Oral two times a day  torsemide 20 milliGRAM(s) Oral two times a day    MEDICATIONS  (PRN):  acetaminophen     Tablet .. 650 milliGRAM(s) Oral every 6 hours PRN Temp greater or equal to 38.5C (101.3F), Mild Pain (1 - 3)  albuterol    90 MICROgram(s) HFA Inhaler 2 Puff(s) Inhalation every 6 hours PRN for shortness of breath and/or wheezing  ALPRAZolam 0.25 milliGRAM(s) Oral every 8 hours PRN anxiety  artificial  tears Solution 1 Drop(s) Both EYES every 4 hours PRN Dry Eyes  oxycodone    5 mG/acetaminophen 325 mG 2 Tablet(s) Oral every 4 hours PRN Moderate Pain (4 - 6)    Allergies:  Ceftin (Hives)  penicillin (Anaphylaxis)  sulfa drugs (Unknown)  Bactrim (Rash)    Vitals   T(C): 36.8 (03 Nov 2023 00:00), Max: 36.8 (02 Nov 2023 05:46)  T(F): 98.3 (03 Nov 2023 00:00), Max: 98.3 (03 Nov 2023 00:00)  HR: 83 (03 Nov 2023 00:00) (78 - 91)  BP: 113/70 (03 Nov 2023 00:00) (105/62 - 125/72)  BP(mean): 83 (02 Nov 2023 16:09) (83 - 83)  RR: 20 (03 Nov 2023 00:00) (18 - 22)  SpO2: 90% (03 Nov 2023 00:00) (90% - 97%)  O2 Parameters below as of 03 Nov 2023 00:00  Patient On (Oxygen Delivery Method): nasal cannula  O2 Flow (L/min): 3    I&O's Detail    01 Nov 2023 07:01  -  02 Nov 2023 07:00  --------------------------------------------------------  IN:    Oral Fluid: 720 mL  Total IN: 720 mL    OUT:    Voided (mL): 2650 mL  Total OUT: 2650 mL    Total NET: -1930 mL      02 Nov 2023 07:01  -  03 Nov 2023 02:28  --------------------------------------------------------  IN:    Oral Fluid: 580 mL  Total IN: 580 mL    OUT:    Voided (mL): 850 mL  Total OUT: 850 mL    Total NET: -270 mL    Physical Exam  General: Sitting up in bed in NAD, +[place]  Neuro: A+O x 3, non-focal, speech clear and intact  HEENT:  NCAT, No conjuctival edema or icterus, no thrush.  Neck: Supple, trachea midline  Pulm: +Diminished BS at bases b/l with +crackles, no accessory muscle use noted  CV: regular rate, Paced, +murmur   Abd: obese, soft, NT, ND, + BS  Ext: GUERRA x 4, +1-2 pitting edema, no cyanosis, distal motor/neuro/circ intact  Skin: warm, dry, perfused    LABS                        11.5   5.76  )-----------( 137      ( 02 Nov 2023 05:00 )             37.4     11-02    139  |  99  |  31.1<H>  ----------------------------<  181<H>  3.9   |  29.0  |  0.87    Ca    8.7      02 Nov 2023 05:00  Mg     2.0     11-02    Urinalysis Basic - ( 02 Nov 2023 05:00 )  Color: x / Appearance: x / SG: x / pH: x  Gluc: 181 mg/dL / Ketone: x  / Bili: x / Urobili: x   Blood: x / Protein: x / Nitrite: x   Leuk Esterase: x / RBC: x / WBC x   Sq Epi: x / Non Sq Epi: x / Bacteria: x    POCT Blood Glucose.: 177 mg/dL (11-02-23 @ 17:28)  POCT Blood Glucose.: 198 mg/dL (11-02-23 @ 11:58)  POCT Blood Glucose.: 152 mg/dL (11-02-23 @ 08:16)    Last CXR:  < from: Xray Chest 1 View- PORTABLE-Routine (Xray Chest 1 View- PORTABLE-Routine in AM.) (11.02.23 @ 05:46) >  Frontal expiratory view of the chest shows the heart to be similarly enlarged in size. Left cardiac pacemaker is again noted.  The lungs show less pulmonary congestion and there is no evidence of pneumothorax nor definite pleural effusion.  IMPRESSION:  Less pulmonary congestion.  < end of copied text >

## 2023-11-03 NOTE — PROGRESS NOTE ADULT - REASON FOR ADMISSION
Acute on chronic diastolic CHF, acute respiratory failure, and b/l pleural effusions

## 2023-11-03 NOTE — PROGRESS NOTE ADULT - PROBLEM SELECTOR PLAN 5
Continue Lopressor as tolerated for HR control  Resumed Eliquis  Monitor telemetry Continue Lopressor as tolerated for HR control.  Continue Eliquis for chemical anticoagulation.   Monitor telemetry.

## 2023-11-03 NOTE — DISCHARGE NOTE PROVIDER - CARE PROVIDER_API CALL
Abraham Mckinney  Interventional Cardiology  [address], [address]  [place] [address]  Phone: [phone]  Fax: [phone]  Follow Up Time:

## 2023-11-03 NOTE — PROGRESS NOTE ADULT - PROBLEM SELECTOR PROBLEM 5
PAF (paroxysmal atrial fibrillation)

## 2023-11-03 NOTE — PATIENT PROFILE ADULT - NS PRO AD ANY ON CHART
LOS: 3 days   Patient Care Team:  Erich Aviles MD as PCP - General (Internal Medicine)  Erich Aviles MD as PCP - Claims Attributed    Chief Complaint:  Fever cough    Subjective: She reports she feels worse today it was a badthat he had terrible nightmares didn't get any sleep he is weak he is coughing is not getting any mucus up with cough    Says he doesn't feel like he is ready to go home.    Review of Systems:         Objective     Vital Signs  Temp:  [96.3 °F (35.7 °C)-97.9 °F (36.6 °C)] 97.1 °F (36.2 °C)  Heart Rate:  [72-93] 90  Resp:  [16-20] 20  BP: ()/(54-69) 99/54  Body mass index is 24.07 kg/(m^2).    Intake/Output Summary (Last 24 hours) at 06/20/17 1640  Last data filed at 06/20/17 0648   Gross per 24 hour   Intake             1755 ml   Output             1600 ml   Net              155 ml          Physical Exam:  General Appearance: Well-developed white male resting comfortably in bed does not appear in acute distress  Eyes: Conjunctiva are clear and anicteric  ENT: Mucous membranes are moist no erythema or exudates Mallampati 1 airway  Neck: Adenopathy or thyromegaly no jugular venous distention no hepatojugular reflux trachea midline  Lungs: Sounds lot better today I didn't hear any rhonchi or wheezing he did have some coughing with deep inspiration and his respirations are nonlabored and speaking in full sentences  Cardiac: Regular rate and rhythm no murmur  Abdomen: Soft nontender no palpable organomegaly or masses  : Not examined  Musc/Skel: Grossly normal  Skin: No jaundice no rashes no petechiae noted  Neuro: Alert oriented pleasant cooperative following commands grossly intact  Extremities/P Vascular: No clubbing cyanosis or edema palpable radial and dorsalis pedis pulses  MSE:  Pretty good spirits today        Labs:  WBC No results found for: WBCS   HGB No results found for: HGB   HCT No results found for: HCT   Platlets No results found for: LABPLAT     PT/INR:   No   No results found for: PROTIME/  No results found for: INR    Sodium No results found for: NA   Potassium No results found for: K   Chloride No results found for: CL   Bicarbonate No results found for: PLASMABICARB   BUN No results found for: BUN   Creatinine No results found for: CREATININE   Calcium No results found for: CALCIUM   Magnesium No results found for: MG         pH No results found for: PHART   pO2 No results found for: PO2ART   pCO2 No results found for: [id number]   HCO3 No results found for: [id number]       enoxaparin 40 mg Subcutaneous Daily   ipratropium-albuterol 3 mL Nebulization 4x Daily - RT   levoFLOXacin 750 mg Oral Q24H   methylphenidate 10 mg Oral Daily   pantoprazole 40 mg Oral QAM   rOPINIRole 1 mg Oral Nightly   sodium chloride 4 mL Nebulization 4x Daily - RT       sodium chloride 125 mL/hr Last Rate: 125 mL/hr (06/20/17 0950)       Diagnostics:  Imaging Results (last 24 hours)     ** No results found for the last 24 hours. **                  Assessment/Plan     Patient Active Problem List   Diagnosis Code   • Thrush, oral B37.0   • ADD (attention deficit disorder) F98.8   • Hemorrhoids K64.9   • Bronchiectasis with acute lower respiratory infection J47.0   • Diverticul disease small and large intestine, no perforati or abscess K57.50   • Benign non-nodular prostatic hyperplasia without lower urinary tract symptoms N40.0   • Gastroesophageal reflux disease K21.9   • Malaise and fatigue R53.81, R53.83   • Environmental allergies Z91.09   • Hemoptysis R04.2   • Dyslipidemia E78.5   • Primary osteoarthritis involving multiple joints M15.0   • Pneumonia of left upper lobe due to infectious organism J18.9   • Abnormal EKG R94.31   • Chronic respiratory failure with hypoxia J96.11   • COPD (chronic obstructive pulmonary disease) J44.9   • Pneumonia of left lower lobe due to infectious organism J18.9   • Mild malnutrition E44.1   • Acute respiratory failure with hypoxia J96.01   • Bronchitis J40    • COPD exacerbation J44.1       Impression #1 acute exacerbation bronchiectasis plus minus pneumonia.  Patient is improving with Levaquin he has had several recent exacerbations and I agree he may need to be considered for chronic therapy with azithromycin or we may need to consider giving him a vest type device to improve clearance.  or both  #2 COPD I agree I don't think there is much in way of acute exacerbation no wheezing I will would not push steroids at this point  #3 restless leg syndrome  #4 ADD      Plan for disposition: Hopefully home tomorrow patient did not feel he was strong enough to go today    Pablo Sanabria MD  06/20/17  4:40 PM    Time:

## 2023-11-03 NOTE — DISCHARGE NOTE PROVIDER - NSDCFUSCHEDAPPT_GEN_ALL_CORE_FT
[facility] Physician [facility] [address]  Scheduled Appointment: 11/07/2023

## 2023-11-03 NOTE — DISCHARGE NOTE PROVIDER - NSDCFUADDAPPT_GEN_ALL_CORE_FT
[facility] GIVEN  MEDS TO BED  TRANSITION CARE   HOME CARE [facility] [phone]  FOLLOW UP APPOINTMENTS HAVE BEEN MADE FOR YOU:  PRIMARY CARE: BRAD APPIAH  NOVEMBER 16TH AT 09:45  [address] [facility]  IF YOU ARE UNABLE TO ATTEND THIS APPOINTMENT PLEASE CALL  TO [facility]  CARDIOLOGY DR ALFRED  NOVEMBER 9TH  AT 1:15 PM  [facility]  IF YOU ARE UNABLE TO ATTEND THIS APPOINTMENT PLEASE CALL  TO [facility]  [address] [place] [address]

## 2023-11-03 NOTE — DISCHARGE NOTE PROVIDER - NSDCHHNEEDSERVICEOTHER_GEN_ALL_CORE_FT
Has Congestive Heart Failure on Home oxygen for sleep apnea and uses an assistive rolling walker to ambulate.  Uses rolling walker on home oxygen. Has no other transportation methods except Taxi.

## 2023-11-03 NOTE — PROGRESS NOTE ADULT - SUBJECTIVE AND OBJECTIVE BOX
ANIA CRISTOBAL  [id number]      Chief Complaint:   follow up HFpEF/pleural effusion/recent TAVR      Subjective:   Patient reports some dizziness.  SOB improved.  Denies CP, palps.      24 hour Tele:   V-paced, PVCs          acetaminophen     Tablet .. 650 milliGRAM(s) Oral every 6 hours PRN  albuterol    90 MICROgram(s) HFA Inhaler 2 Puff(s) Inhalation every 6 hours PRN  albuterol/ipratropium for Nebulization 3 milliLiter(s) Nebulizer every 6 hours  ALPRAZolam 0.25 milliGRAM(s) Oral every 8 hours PRN  apixaban 5 milliGRAM(s) Oral every 12 hours  artificial  tears Solution 1 Drop(s) Both EYES every 4 hours PRN  aspirin enteric coated 81 milliGRAM(s) Oral daily  atorvastatin 10 milliGRAM(s) Oral at bedtime  dextrose 5%. 1000 milliLiter(s) IV Continuous <Continuous>  dextrose 5%. 1000 milliLiter(s) IV Continuous <Continuous>  dextrose 50% Injectable 12.5 Gram(s) IV Push once  dextrose 50% Injectable 25 Gram(s) IV Push once  doxazosin 2 milliGRAM(s) Oral daily  finasteride 5 milliGRAM(s) Oral daily  fluticasone propionate 50 MICROgram(s)/spray Nasal Spray 1 Spray(s) Both Nostrils two times a day  glucagon  Injectable 1 milliGRAM(s) IntraMuscular once  influenza  Vaccine (HIGH DOSE) 0.7 milliLiter(s) IntraMuscular once  insulin lispro (ADMELOG) corrective regimen sliding scale   SubCutaneous three times a day before meals  metoprolol tartrate 25 milliGRAM(s) Oral two times a day  oxycodone    5 mG/acetaminophen 325 mG 2 Tablet(s) Oral every 4 hours PRN  pantoprazole    Tablet 40 milliGRAM(s) Oral before breakfast  potassium chloride    Tablet ER 20 milliEquivalent(s) Oral daily  senna 2 Tablet(s) Oral at bedtime  sodium chloride 0.9% lock flush 3 milliLiter(s) IV Push every 8 hours  sucralfate 1 Gram(s) Oral two times a day  torsemide 20 milliGRAM(s) Oral two times a day          Physical Exam:  T(C): 36.7 (11-03-23 @ 08:00), Max: 36.8 (11-02-23 @ 20:54)  HR: 81 (11-03-23 @ 08:00) (81 - 91)  BP: 108/63 (11-03-23 @ 08:00) (105/62 - 125/72)  RR: 20 (11-03-23 @ 08:00) (18 - 22)  SpO2: 96% (11-03-23 @ 08:00) (90% - 96%)  General: Comfortable in NAD  Neck: No JVD  CVS: nl s1s2, no s3  Pulm: CTA b/l, diminished at bases b/l  Abd: soft, non-tender  Ext: 1+ b/l pretibial edema  Neuro A&O x3  Psych: Normal affect      Labs:   03 Nov 2023 04:36    142    |  102    |  33.2   ----------------------------<  177    3.5     |  33.0   |  0.76     Ca    8.1        03 Nov 2023 04:36  Mg     2.0       03 Nov 2023 04:36                            10.9   5.24  )-----------( 131      ( 03 Nov 2023 04:36 )             35.7         CT Angio Chest PE Protocol w/ IV Cont (10.28.23 @ 21:11)  IMPRESSION:  Suboptimal evaluation of the peripheral pulmonary arteries. No central   pulmonary embolus or secondary signs of right heart strain.  Right > left pleural effusion. Nonspecific bilateral pulmonary   groundglass opacities, which can be seen in noninfectious (pulmonary   edema) cyst and infectious processes.  Ascending aortic aneurysm. Findings suggestive of pulmonary arterial   hypertension.    ECHO 10/2023:   1. Left ventricular ejection fraction, by visual estimation, is 50 to  55%.   2. Normal global left ventricular systolic function.   3. Mildly increased left ventricular internal cavity size.   4. The mitralin-flow pattern reveals no discernable A-wave, therefore   no comment on diastolic function can be made.   5. There is mild concentric left ventricular hypertrophy.   6. Normal right ventricular size and function.   7. There is no evidence of pericardial effusion.   8. Trace mitral valve regurgitation.   9. Moderate tricuspid regurgitation.  10. TAVR in the aortic position.  11. Estimated pulmonary artery systolic pressure is 48.9 mmHg assuming a right atrial pressure of 15 mmHg, which is consistent with mild pulmonary hypertension.        Assessment:  77 year old male patient with a medical history of Severe Aortic Stenosis s/p TAVR 10/6/23 with Dr. Napier, Chronic Diastolic CHF, CAD s/p PCI to p&mLAD, Thoracic Aortic Aneurysm, Paroxysmal Afib s/p Ablation (on Eliquis), and Cardiomems, LLE DVT, on Home O2 PRN, ALLIE, BPH s/p TURP, Left Renal Mass, Morbid Obesity, Laminectomy, Anxiety, Type 2 DM (A1C 7.2 on Metformin), GERD, Cholelithiasis, MRCP w/ cirrhosis, now readmitted for acute on chronic diastolic CHF, acute respiratory failure, and b/l pleural effusions (Right > Left). 10/29 s/p right pigtail catheter placement and removal with 1L drainage.  -On interview patient reports dietary indiscretion and poor compliance with medications as cause of exacerbation   -Still volume overloaded , will help to have MEMS numbers but would diuresis another day IV, got diamox for mild contraction alkalosis but stable and no need to continue   -No active ischemia with negative trops, rhythm V-paced  -MEMS interrogation with PAD 17, appears euvolemic and will place on po diuretics    Plan:  1. Lasix 40mg QD.  Importance of compliance with that and other meds enforced.  2. Continue other current CV meds.  3. Needs to check MEMS at home as well and better dietary habits/adherence to medications. Consider adding MRA/SGLT 2 inhibitor but hold off at this time due to concern for close monitoring of renal function/infection and lack of adherence.  4. No additional CV testing.  5. D/c planning.  Close OP f/u with .

## 2023-11-04 ENCOUNTER — TRANSCRIPTION ENCOUNTER (OUTPATIENT)
Age: 78
End: 2023-11-04

## 2023-11-05 ENCOUNTER — TRANSCRIPTION ENCOUNTER (OUTPATIENT)
Age: 78
End: 2023-11-05

## 2023-11-06 ENCOUNTER — TRANSCRIPTION ENCOUNTER (OUTPATIENT)
Age: 78
End: 2023-11-06

## 2023-11-06 LAB
GLUCOSE BLDC GLUCOMTR-MCNC: 149 MG/DL — HIGH (ref 70–99)
GLUCOSE BLDC GLUCOMTR-MCNC: 149 MG/DL — HIGH (ref 70–99)

## 2023-11-08 ENCOUNTER — TRANSCRIPTION ENCOUNTER (OUTPATIENT)
Age: 78
End: 2023-11-08

## 2023-11-13 PROCEDURE — 87205 SMEAR GRAM STAIN: CPT

## 2023-11-13 PROCEDURE — 84134 ASSAY OF PREALBUMIN: CPT

## 2023-11-13 PROCEDURE — 86850 RBC ANTIBODY SCREEN: CPT

## 2023-11-13 PROCEDURE — 83735 ASSAY OF MAGNESIUM: CPT

## 2023-11-13 PROCEDURE — 84484 ASSAY OF TROPONIN QUANT: CPT

## 2023-11-13 PROCEDURE — 83880 ASSAY OF NATRIURETIC PEPTIDE: CPT

## 2023-11-13 PROCEDURE — 84443 ASSAY THYROID STIM HORMONE: CPT

## 2023-11-13 PROCEDURE — 86900 BLOOD TYPING SEROLOGIC ABO: CPT

## 2023-11-13 PROCEDURE — 85610 PROTHROMBIN TIME: CPT

## 2023-11-13 PROCEDURE — 94640 AIRWAY INHALATION TREATMENT: CPT

## 2023-11-13 PROCEDURE — 83986 ASSAY PH BODY FLUID NOS: CPT

## 2023-11-13 PROCEDURE — 81003 URINALYSIS AUTO W/O SCOPE: CPT

## 2023-11-13 PROCEDURE — 87102 FUNGUS ISOLATION CULTURE: CPT

## 2023-11-13 PROCEDURE — 71045 X-RAY EXAM CHEST 1 VIEW: CPT

## 2023-11-13 PROCEDURE — 82042 OTHER SOURCE ALBUMIN QUAN EA: CPT

## 2023-11-13 PROCEDURE — 87640 STAPH A DNA AMP PROBE: CPT

## 2023-11-13 PROCEDURE — 89051 BODY FLUID CELL COUNT: CPT

## 2023-11-13 PROCEDURE — 87070 CULTURE OTHR SPECIMN AEROBIC: CPT

## 2023-11-13 PROCEDURE — 80048 BASIC METABOLIC PNL TOTAL CA: CPT

## 2023-11-13 PROCEDURE — 93005 ELECTROCARDIOGRAM TRACING: CPT

## 2023-11-13 PROCEDURE — 83615 LACTATE (LD) (LDH) ENZYME: CPT

## 2023-11-13 PROCEDURE — 82945 GLUCOSE OTHER FLUID: CPT

## 2023-11-13 PROCEDURE — 88112 CYTOPATH CELL ENHANCE TECH: CPT

## 2023-11-13 PROCEDURE — 36415 COLL VENOUS BLD VENIPUNCTURE: CPT

## 2023-11-13 PROCEDURE — 88305 TISSUE EXAM BY PATHOLOGIST: CPT

## 2023-11-13 PROCEDURE — 85730 THROMBOPLASTIN TIME PARTIAL: CPT

## 2023-11-13 PROCEDURE — 80053 COMPREHEN METABOLIC PANEL: CPT

## 2023-11-13 PROCEDURE — 99285 EMERGENCY DEPT VISIT HI MDM: CPT

## 2023-11-13 PROCEDURE — 87075 CULTR BACTERIA EXCEPT BLOOD: CPT

## 2023-11-13 PROCEDURE — G1004: CPT

## 2023-11-13 PROCEDURE — 96374 THER/PROPH/DIAG INJ IV PUSH: CPT

## 2023-11-13 PROCEDURE — 93306 TTE W/DOPPLER COMPLETE: CPT

## 2023-11-13 PROCEDURE — 85027 COMPLETE CBC AUTOMATED: CPT

## 2023-11-13 PROCEDURE — 87641 MR-STAPH DNA AMP PROBE: CPT

## 2023-11-13 PROCEDURE — 82962 GLUCOSE BLOOD TEST: CPT

## 2023-11-13 PROCEDURE — 85025 COMPLETE CBC W/AUTO DIFF WBC: CPT

## 2023-11-13 PROCEDURE — 83036 HEMOGLOBIN GLYCOSYLATED A1C: CPT

## 2023-11-13 PROCEDURE — 94760 N-INVAS EAR/PLS OXIMETRY 1: CPT

## 2023-11-13 PROCEDURE — 71275 CT ANGIOGRAPHY CHEST: CPT | Mod: MG

## 2023-11-13 PROCEDURE — 86901 BLOOD TYPING SEROLOGIC RH(D): CPT

## 2023-11-13 PROCEDURE — 84157 ASSAY OF PROTEIN OTHER: CPT

## 2023-11-29 LAB
CULTURE RESULTS: SIGNIFICANT CHANGE UP
CULTURE RESULTS: SIGNIFICANT CHANGE UP
SPECIMEN SOURCE: SIGNIFICANT CHANGE UP
SPECIMEN SOURCE: SIGNIFICANT CHANGE UP

## 2023-12-05 ENCOUNTER — NON-APPOINTMENT (OUTPATIENT)
Age: 78
End: 2023-12-05

## 2023-12-05 ENCOUNTER — APPOINTMENT (OUTPATIENT)
Dept: ELECTROPHYSIOLOGY | Facility: CLINIC | Age: 78
End: 2023-12-05
Payer: MEDICARE

## 2023-12-05 VITALS
SYSTOLIC BLOOD PRESSURE: 108 MMHG | DIASTOLIC BLOOD PRESSURE: 62 MMHG | HEART RATE: 80 BPM | HEIGHT: 74 IN | WEIGHT: 309 LBS | OXYGEN SATURATION: 91 % | BODY MASS INDEX: 39.66 KG/M2

## 2023-12-05 DIAGNOSIS — I48.91 UNSPECIFIED ATRIAL FIBRILLATION: ICD-10-CM

## 2023-12-05 DIAGNOSIS — Z98.890 OTHER SPECIFIED POSTPROCEDURAL STATES: ICD-10-CM

## 2023-12-05 DIAGNOSIS — Z86.79 OTHER SPECIFIED POSTPROCEDURAL STATES: ICD-10-CM

## 2023-12-05 DIAGNOSIS — I50.30 UNSPECIFIED DIASTOLIC (CONGESTIVE) HEART FAILURE: ICD-10-CM

## 2023-12-05 PROCEDURE — 93280 PM DEVICE PROGR EVAL DUAL: CPT

## 2023-12-05 PROCEDURE — 93000 ELECTROCARDIOGRAM COMPLETE: CPT | Mod: 59

## 2023-12-05 PROCEDURE — 99024 POSTOP FOLLOW-UP VISIT: CPT

## 2023-12-14 NOTE — DISCHARGE NOTE PROVIDER - NSDCCPCAREPLAN_GEN_ALL_CORE_FT
Encounter addended by: Johana Lewis RD on: 12/14/2023 12:09 PM   Actions taken: Clinical Note Signed PRINCIPAL DISCHARGE DIAGNOSIS  Diagnosis: Hematuria  Assessment and Plan of Treatment:       SECONDARY DISCHARGE DIAGNOSES  Diagnosis: Urinary retention  Assessment and Plan of Treatment:

## 2024-01-01 NOTE — PROGRESS NOTE ADULT - SUBJECTIVE AND OBJECTIVE BOX
Interval HPI:  More alert and interactive today as per nurse  Requesting that we lift thin liquid fluid restriction   Very hard of hearing    Exam:  awake and alert, nad  reg rhythm  bilat air entry  abd nontender  bilat edema    Radiology: cxr - multifocal airspace opacities, cardiomegaly     On Admission  03-22-23 (5d)  HPI:  76 yo male with CAD s/p PCI, AF on eliquis, moderate AS, morbid obesity, HTN, HLD, DM, thoracic aortic aneurysm, BPH s/p TURP, left renal mass, recent hMPV infection, [facility]  to the ED after a fall.  He was found on the floor in the bathroom, hypoxemic and obtunded.    In the ED patient was found to be in hypercapnic respiratory failure and placed on NIV.  He was also noted to be hypotensive and started on pressors after a fluid bolus.    (22 Mar 2023 11:15)    PAST MEDICAL & SURGICAL HISTORY:  Anxiety      Hypertension      Hyperlipidemia      Prostate disease      Gastroesophageal reflux disease      Gallbladder stone without cholecystitis or obstruction      BPH (benign prostatic hyperplasia)      DM (diabetes mellitus)      DVT (deep venous thrombosis)  left leg      Afib      Anxiety      HLD (hyperlipidemia)      Malignant schwannoma      H/O arthroscopy of knee      S/P laminectomy      S/P coronary artery stent placement  x2          Antimicrobial:  cefepime  Injectable. 2000 milliGRAM(s) IV Push every 8 hours    Cardiovascular:  aMIOdarone    Tablet 200 milliGRAM(s) Oral daily  cloNIDine Patch 0.2 mG/24Hr(s) 1 patch Transdermal every 7 days  doxazosin 2 milliGRAM(s) Oral at bedtime  metolazone 5 milliGRAM(s) Oral daily  metoprolol tartrate 12.5 milliGRAM(s) Oral every 12 hours    Pulmonary:  albuterol/ipratropium for Nebulization 3 milliLiter(s) Nebulizer every 6 hours    Hematalogic:  apixaban 5 milliGRAM(s) Enteral Tube every 12 hours  aspirin  chewable 81 milliGRAM(s) Oral daily    Other:  acetaminophen     Tablet .. 650 milliGRAM(s) Oral every 6 hours PRN  ALPRAZolam 0.25 milliGRAM(s) Oral three times a day  atorvastatin 10 milliGRAM(s) Oral at bedtime  chlorhexidine 2% Cloths 1 Application(s) Topical <User Schedule>  dextrose 5%. 1000 milliLiter(s) IV Continuous <Continuous>  finasteride 5 milliGRAM(s) Oral daily  insulin glargine Injectable (LANTUS) 20 Unit(s) SubCutaneous every morning  insulin lispro (ADMELOG) corrective regimen sliding scale   SubCutaneous Before meals and at bedtime  insulin lispro Injectable (ADMELOG) 6 Unit(s) SubCutaneous three times a day before meals  mupirocin 2% Ointment 1 Application(s) Both Nostrils two times a day  OLANZapine Injectable 10 milliGRAM(s) IntraMuscular every 4 hours PRN  potassium chloride    Tablet ER 40 milliEquivalent(s) Oral every 4 hours  predniSONE   Tablet   Oral       Drug Dosing Weight  Height (cm): 185.4 (22 Mar 2023 13:10)  Weight (kg): 157.7 (22 Mar 2023 13:10)  BMI (kg/m2): 45.9 (22 Mar 2023 13:10)  BSA (m2): 2.72 (22 Mar 2023 13:10)    T(C): 36.9 (03-27-23 @ 11:00), Max: 37.8 (03-26-23 @ 21:00)  HR: 95 (03-27-23 @ 10:27)  BP: 123/64 (03-27-23 @ 07:00)  BP(mean): 80 (03-27-23 @ 07:00)  ABP: 135/58 (03-27-23 @ 07:00)  ABP(mean): 99 (03-27-23 @ 07:00)  RR: 34 (03-27-23 @ 07:00)  SpO2: 98% (03-27-23 @ 10:27)    ABG - ( 26 Mar 2023 03:39 )  pH, Arterial: 7.480 pH, Blood: x     /  pCO2: 45    /  pO2: 79    / HCO3: 34    / Base Excess: 10.0  /  SaO2: 97.8                  03-26 @ 07:01  -  03-27 @ 07:00  --------------------------------------------------------  IN: 780 mL / OUT: 4105 mL / NET: -3325 mL              LABS:  CBC Full  -  ( 27 Mar 2023 03:10 )  WBC Count : 6.28 K/uL  RBC Count : 4.74 M/uL  Hemoglobin : 13.9 g/dL  Hematocrit : 45.7 %  Platelet Count - Automated : 218 K/uL  Mean Cell Volume : 96.4 fl  Mean Cell Hemoglobin : 29.3 pg  Mean Cell Hemoglobin Concentration : 30.4 gm/dL  Auto Neutrophil # : x  Auto Lymphocyte # : x  Auto Monocyte # : x  Auto Eosinophil # : x  Auto Basophil # : x  Auto Neutrophil % : x  Auto Lymphocyte % : x  Auto Monocyte % : x  Auto Eosinophil % : x  Auto Basophil % : x    03-27    159<H>  |  113<H>  |  37.2<H>  ----------------------------<  184<H>  3.7   |  35.0<H>  |  0.93    Ca    8.6      27 Mar 2023 03:10  Phos  3.0     03-27  Mg     2.2     03-27    TPro  6.2<L>  /  Alb  2.8<L>  /  TBili  0.4  /  DBili  x   /  AST  11  /  ALT  14  /  AlkPhos  153<H>  03-26          ____________________________________________________________________________________________________   Yes

## 2024-01-10 NOTE — ADDENDUM
[FreeTextEntry1] : Review of Echo from Dr. Sanchez's office performed 11/9/23 revealed EF improved to 50-55%. Repeat echo planned for 6 months per notes. Yasemin GRIGGSC

## 2024-01-10 NOTE — REVIEW OF SYSTEMS
[Feeling Fatigued] : not feeling fatigued [SOB] : no shortness of breath [Chest Discomfort] : no chest discomfort [Palpitations] : no palpitations [Syncope] : no syncope [Dizziness] : no dizziness

## 2024-01-10 NOTE — HISTORY OF PRESENT ILLNESS
[FreeTextEntry1] : The patient is a 78 year old male with a history of HTN, DM, bifascicular block (RBBB + LAFB) and first degree AV block, newly diagnosed HFrEF (LVEF 25%, though previously had AF-mediated CM which improved after CV), known moderate AS on TTE 6/11/23,, and persistent AF/AFL s/p CV 1/2023 and radiofrequency ablation of atrial fibrillation and atrial flutter (WACA/PVI, PWI, mitral line, and CTI line) 6/19/23, who presented to [facility]/24/23 with worsening shortness of breath, intermittent palpitation, and lower ext edema. Echo showed LVEF 40-45%, and severe low-flow, low gradient aortic stenosis. CTSx consulted and planned for TAVR on 10/6. Given his baseline conduction system disease (bifascicular block with long first degree > 400 msec), high likelihood of developing complete heart block post-TAVR and potential need for antiarrhythmic drug therapy, he was felt to be a reasonable candidate for implantation of a dual chamber pacemaker.  He underwent dual chamber PPM implantation with Dr. Savage 10/5/23. Subsequently underwent TAVR as planned 10/6/23.   Readmitted for acute on chronic diastolic CHF, acute respiratory failure, and b/l pleural effusions (Right > Left). 10/29 s/p right pigtail catheter placement and removal with 1L drainage.  Today, Mr. Rubio reports he has been doing well since discharge. Unfortunately, was not able to get his MEMS to work properly from home. Dual chamber PPM interrogation reveals normal function. A, RV with adequate sense and pace thresholds. AP 3 %<  93.7%. AF burden increased post TAVR now with reduced burden and only one 15 minute episode in November.   Left infraclavicular incision well healed without erythema, edema, or exudate.

## 2024-01-10 NOTE — DISCUSSION/SUMMARY
[FreeTextEntry1] : The patient is a 78 year old male with a history of HTN, DM, bifascicular block (RBBB + LAFB) and first degree AV block, newly diagnosed HFrEF (LVEF 25%, though previously had AF-mediated CM which improved after CV), known moderate AS on TTE 6/11/23,, and persistent AF/AFL s/p CV 1/2023 and radiofrequency ablation of atrial fibrillation and atrial flutter (WACA/PVI, PWI, mitral line, and CTI line) 6/19/23, who presented to [facility] 9/24/23 with worsening shortness of breath, intermittent palpitation, and lower ext edema. Echo showed LVEF 40-45%, and severe low-flow, low gradient aortic stenosis. CTSx consulted and planned for TAVR on 10/6. Given his baseline conduction system disease (bifascicular block with long first degree > 400 msec), high likelihood of developing complete heart block post-TAVR and potential need for antiarrhythmic drug therapy, he was felt to be a reasonable candidate for implantation of a dual chamber pacemaker.  He underwent dual chamber PPM implantation with Dr. Savage 10/5/23. Subsequently underwent TAVR as planned 10/6/23.   Readmitted for acute on chronic diastolic CHF, acute respiratory failure, and b/l pleural effusions (Right > Left). 10/29 s/p right pigtail catheter placement and removal with 1L drainage.  Today, Mr. Rubio reports he has been doing well since discharge. Unfortunately, was not able to get his MEMS to work properly from home. Dual chamber PPM interrogation reveals normal function. A, RV with adequate sense and pace thresholds. AP 3 %<  93.7%. AF burden increased post TAVR now with reduced burden and only one 15 minute episode in November.   Left infraclavicular incision well healed without erythema, edema, or exudate.   Recommendation:   Site care, remote monitoring, and pacemaker f/u discussed. PPM with normal function. High burden , to continue to screen for PICM. Reports had repeat echo post discharge with Dr. Sanchez at [facility] will request results.  -S/p AF ablation 6/19/23, increase in AF burden post TAVR with volume overload now with significantly reduced burden. To continue to monitor burden via PPM. Continue ELiquis for stroke prophylaxis. Remain off amoidarone. -Will request most recent echo to evaluate EF ( performed at [facility])  -EP follow up in 4 months or sooner PRN.  Clarisa HARVEY-C

## 2024-01-10 NOTE — END OF VISIT
[FreeTextEntry3] :   I, Dr. Amato, personally performed the evaluation and management (E/M) services for this new patient. That E/M includes conducting the clinically appropriate initial history &/or exam, assessing all conditions, and establishing the plan of care. Today, my assistant, Ivanna Peres NP, was here to observe my evaluation and management service for this patient & follow plan of care established by me going forward.

## 2024-01-16 ENCOUNTER — INPATIENT (INPATIENT)
Facility: HOSPITAL | Age: 79
LOS: 7 days | Discharge: NURSING, REHAB OR LONG-TERM CARE | DRG: 291 | End: 2024-01-24
Attending: HOSPITALIST | Admitting: INTERNAL MEDICINE
Payer: MEDICARE

## 2024-01-16 VITALS
RESPIRATION RATE: 16 BRPM | WEIGHT: 279.99 LBS | HEIGHT: 74 IN | SYSTOLIC BLOOD PRESSURE: 123 MMHG | TEMPERATURE: 98 F | OXYGEN SATURATION: 91 % | HEART RATE: 84 BPM | DIASTOLIC BLOOD PRESSURE: 71 MMHG

## 2024-01-16 DIAGNOSIS — Z95.5 PRESENCE OF CORONARY ANGIOPLASTY IMPLANT AND GRAFT: Chronic | ICD-10-CM

## 2024-01-16 DIAGNOSIS — Z98.89 OTHER SPECIFIED POSTPROCEDURAL STATES: Chronic | ICD-10-CM

## 2024-01-16 DIAGNOSIS — C49.9 MALIGNANT NEOPLASM OF CONNECTIVE AND SOFT TISSUE, UNSPECIFIED: Chronic | ICD-10-CM

## 2024-01-16 LAB
ALBUMIN SERPL ELPH-MCNC: 3.9 G/DL — SIGNIFICANT CHANGE UP (ref 3.3–5.2)
ALP SERPL-CCNC: 302 U/L — HIGH (ref 40–120)
ALT FLD-CCNC: 14 U/L — SIGNIFICANT CHANGE UP
ANION GAP SERPL CALC-SCNC: 8 MMOL/L — SIGNIFICANT CHANGE UP (ref 5–17)
AST SERPL-CCNC: 16 U/L — SIGNIFICANT CHANGE UP
BASOPHILS # BLD AUTO: 0.04 K/UL — SIGNIFICANT CHANGE UP (ref 0–0.2)
BASOPHILS NFR BLD AUTO: 0.8 % — SIGNIFICANT CHANGE UP (ref 0–2)
BILIRUB SERPL-MCNC: 0.6 MG/DL — SIGNIFICANT CHANGE UP (ref 0.4–2)
BUN SERPL-MCNC: 29.2 MG/DL — HIGH (ref 8–20)
CALCIUM SERPL-MCNC: 8.8 MG/DL — SIGNIFICANT CHANGE UP (ref 8.4–10.5)
CHLORIDE SERPL-SCNC: 97 MMOL/L — SIGNIFICANT CHANGE UP (ref 96–108)
CO2 SERPL-SCNC: 39 MMOL/L — HIGH (ref 22–29)
CREAT SERPL-MCNC: 0.82 MG/DL — SIGNIFICANT CHANGE UP (ref 0.5–1.3)
EGFR: 90 ML/MIN/1.73M2 — SIGNIFICANT CHANGE UP
EOSINOPHIL # BLD AUTO: 0.2 K/UL — SIGNIFICANT CHANGE UP (ref 0–0.5)
EOSINOPHIL NFR BLD AUTO: 3.8 % — SIGNIFICANT CHANGE UP (ref 0–6)
GLUCOSE SERPL-MCNC: 154 MG/DL — HIGH (ref 70–99)
HCT VFR BLD CALC: 40.6 % — SIGNIFICANT CHANGE UP (ref 39–50)
HGB BLD-MCNC: 12.4 G/DL — LOW (ref 13–17)
IMM GRANULOCYTES NFR BLD AUTO: 0.4 % — SIGNIFICANT CHANGE UP (ref 0–0.9)
LYMPHOCYTES # BLD AUTO: 0.57 K/UL — LOW (ref 1–3.3)
LYMPHOCYTES # BLD AUTO: 10.7 % — LOW (ref 13–44)
MCHC RBC-ENTMCNC: 28.8 PG — SIGNIFICANT CHANGE UP (ref 27–34)
MCHC RBC-ENTMCNC: 30.5 GM/DL — LOW (ref 32–36)
MCV RBC AUTO: 94.2 FL — SIGNIFICANT CHANGE UP (ref 80–100)
MONOCYTES # BLD AUTO: 0.61 K/UL — SIGNIFICANT CHANGE UP (ref 0–0.9)
MONOCYTES NFR BLD AUTO: 11.4 % — SIGNIFICANT CHANGE UP (ref 2–14)
NEUTROPHILS # BLD AUTO: 3.89 K/UL — SIGNIFICANT CHANGE UP (ref 1.8–7.4)
NEUTROPHILS NFR BLD AUTO: 72.9 % — SIGNIFICANT CHANGE UP (ref 43–77)
NT-PROBNP SERPL-SCNC: 1581 PG/ML — HIGH (ref 0–300)
PLATELET # BLD AUTO: 119 K/UL — LOW (ref 150–400)
POTASSIUM SERPL-MCNC: 4.3 MMOL/L — SIGNIFICANT CHANGE UP (ref 3.5–5.3)
POTASSIUM SERPL-SCNC: 4.3 MMOL/L — SIGNIFICANT CHANGE UP (ref 3.5–5.3)
PROT SERPL-MCNC: 6.9 G/DL — SIGNIFICANT CHANGE UP (ref 6.6–8.7)
RAPID RVP RESULT: SIGNIFICANT CHANGE UP
RBC # BLD: 4.31 M/UL — SIGNIFICANT CHANGE UP (ref 4.2–5.8)
RBC # FLD: 15.3 % — HIGH (ref 10.3–14.5)
SARS-COV-2 RNA SPEC QL NAA+PROBE: SIGNIFICANT CHANGE UP
SODIUM SERPL-SCNC: 144 MMOL/L — SIGNIFICANT CHANGE UP (ref 135–145)
TROPONIN T, HIGH SENSITIVITY RESULT: 40 NG/L — SIGNIFICANT CHANGE UP (ref 0–51)
TROPONIN T, HIGH SENSITIVITY RESULT: 41 NG/L — SIGNIFICANT CHANGE UP (ref 0–51)
WBC # BLD: 5.33 K/UL — SIGNIFICANT CHANGE UP (ref 3.8–10.5)
WBC # FLD AUTO: 5.33 K/UL — SIGNIFICANT CHANGE UP (ref 3.8–10.5)

## 2024-01-16 PROCEDURE — 73564 X-RAY EXAM KNEE 4 OR MORE: CPT | Mod: 26,50

## 2024-01-16 PROCEDURE — 70450 CT HEAD/BRAIN W/O DYE: CPT | Mod: 26,ME

## 2024-01-16 PROCEDURE — G1004: CPT

## 2024-01-16 PROCEDURE — 71045 X-RAY EXAM CHEST 1 VIEW: CPT | Mod: 26

## 2024-01-16 PROCEDURE — 99285 EMERGENCY DEPT VISIT HI MDM: CPT

## 2024-01-16 RX ORDER — FUROSEMIDE 40 MG
40 TABLET ORAL ONCE
Refills: 0 | Status: COMPLETED | OUTPATIENT
Start: 2024-01-16 | End: 2024-01-16

## 2024-01-16 RX ORDER — ACETAMINOPHEN 500 MG
1000 TABLET ORAL ONCE
Refills: 0 | Status: COMPLETED | OUTPATIENT
Start: 2024-01-16 | End: 2024-01-16

## 2024-01-16 RX ORDER — SODIUM CHLORIDE 9 MG/ML
1000 INJECTION INTRAMUSCULAR; INTRAVENOUS; SUBCUTANEOUS ONCE
Refills: 0 | Status: COMPLETED | OUTPATIENT
Start: 2024-01-16 | End: 2024-01-16

## 2024-01-16 RX ADMIN — SODIUM CHLORIDE 1000 MILLILITER(S): 9 INJECTION INTRAMUSCULAR; INTRAVENOUS; SUBCUTANEOUS at 18:35

## 2024-01-16 RX ADMIN — Medication 400 MILLIGRAM(S): at 18:30

## 2024-01-16 NOTE — ED ADULT NURSE NOTE - OBJECTIVE STATEMENT
Patient alert arrives to the ER and states "got up to go to the bathroom and I woke u on the floor." reports uses 4l o2 at home.

## 2024-01-16 NOTE — ED PROVIDER NOTE - CARE PLAN
1 Principal Discharge DX:	Shortness of breath  Secondary Diagnosis:	Syncope  Secondary Diagnosis:	Near syncope

## 2024-01-16 NOTE — ED ADULT TRIAGE NOTE - NS ED NURSE AMBULANCES
Formerly [facility], [facility]

## 2024-01-16 NOTE — ED ADULT NURSE NOTE - NSFALLRISKINTERV_ED_ALL_ED

## 2024-01-16 NOTE — ED PROVIDER NOTE - ENMT, MLM
Airway patent, Nasal mucosa clear. Dry mouth. Throat has no vesicles, no oropharyngeal exudates and uvula is midline.

## 2024-01-17 DIAGNOSIS — J90 PLEURAL EFFUSION, NOT ELSEWHERE CLASSIFIED: ICD-10-CM

## 2024-01-17 DIAGNOSIS — I50.9 HEART FAILURE, UNSPECIFIED: ICD-10-CM

## 2024-01-17 LAB
BASE EXCESS BLDA CALC-SCNC: 19.1 MMOL/L — HIGH (ref -2–3)
BLOOD GAS COMMENTS ARTERIAL: SIGNIFICANT CHANGE UP
GAS PNL BLDA: SIGNIFICANT CHANGE UP
GLUCOSE BLDC GLUCOMTR-MCNC: 203 MG/DL — HIGH (ref 70–99)
HCO3 BLDA-SCNC: 43 MMOL/L — HIGH (ref 21–28)
HOROWITZ INDEX BLDA+IHG-RTO: SIGNIFICANT CHANGE UP
PCO2 BLDA: 62 MMHG — HIGH (ref 35–48)
PH BLDA: 7.45 — SIGNIFICANT CHANGE UP (ref 7.35–7.45)
PO2 BLDA: 69 MMHG — LOW (ref 83–108)
SAO2 % BLDA: 94.9 % — SIGNIFICANT CHANGE UP (ref 94–98)
TROPONIN T, HIGH SENSITIVITY RESULT: 33 NG/L — SIGNIFICANT CHANGE UP (ref 0–51)

## 2024-01-17 PROCEDURE — 93010 ELECTROCARDIOGRAM REPORT: CPT

## 2024-01-17 PROCEDURE — 99223 1ST HOSP IP/OBS HIGH 75: CPT

## 2024-01-17 PROCEDURE — 93281 PM DEVICE PROGR EVAL MULTI: CPT | Mod: 26

## 2024-01-17 PROCEDURE — 93306 TTE W/DOPPLER COMPLETE: CPT | Mod: 26

## 2024-01-17 PROCEDURE — 73030 X-RAY EXAM OF SHOULDER: CPT | Mod: 26,RT

## 2024-01-17 PROCEDURE — 99221 1ST HOSP IP/OBS SF/LOW 40: CPT

## 2024-01-17 PROCEDURE — 70450 CT HEAD/BRAIN W/O DYE: CPT | Mod: 26

## 2024-01-17 PROCEDURE — 71275 CT ANGIOGRAPHY CHEST: CPT | Mod: 26,MA

## 2024-01-17 RX ORDER — METOPROLOL TARTRATE 50 MG
50 TABLET ORAL
Refills: 0 | Status: DISCONTINUED | OUTPATIENT
Start: 2024-01-17 | End: 2024-01-24

## 2024-01-17 RX ORDER — ASPIRIN/CALCIUM CARB/MAGNESIUM 324 MG
81 TABLET ORAL DAILY
Refills: 0 | Status: DISCONTINUED | OUTPATIENT
Start: 2024-01-17 | End: 2024-01-24

## 2024-01-17 RX ORDER — SODIUM CHLORIDE 9 MG/ML
1000 INJECTION, SOLUTION INTRAVENOUS
Refills: 0 | Status: DISCONTINUED | OUTPATIENT
Start: 2024-01-17 | End: 2024-01-24

## 2024-01-17 RX ORDER — DEXTROSE 50 % IN WATER 50 %
15 SYRINGE (ML) INTRAVENOUS ONCE
Refills: 0 | Status: DISCONTINUED | OUTPATIENT
Start: 2024-01-17 | End: 2024-01-24

## 2024-01-17 RX ORDER — APIXABAN 2.5 MG/1
5 TABLET, FILM COATED ORAL EVERY 12 HOURS
Refills: 0 | Status: DISCONTINUED | OUTPATIENT
Start: 2024-01-17 | End: 2024-01-17

## 2024-01-17 RX ORDER — ATORVASTATIN CALCIUM 80 MG/1
10 TABLET, FILM COATED ORAL AT BEDTIME
Refills: 0 | Status: DISCONTINUED | OUTPATIENT
Start: 2024-01-17 | End: 2024-01-24

## 2024-01-17 RX ORDER — POTASSIUM CHLORIDE 20 MEQ
20 PACKET (EA) ORAL DAILY
Refills: 0 | Status: DISCONTINUED | OUTPATIENT
Start: 2024-01-17 | End: 2024-01-24

## 2024-01-17 RX ORDER — DEXTROSE 50 % IN WATER 50 %
25 SYRINGE (ML) INTRAVENOUS ONCE
Refills: 0 | Status: DISCONTINUED | OUTPATIENT
Start: 2024-01-17 | End: 2024-01-24

## 2024-01-17 RX ORDER — FUROSEMIDE 40 MG
40 TABLET ORAL DAILY
Refills: 0 | Status: DISCONTINUED | OUTPATIENT
Start: 2024-01-18 | End: 2024-01-19

## 2024-01-17 RX ORDER — FINASTERIDE 5 MG/1
5 TABLET, FILM COATED ORAL DAILY
Refills: 0 | Status: DISCONTINUED | OUTPATIENT
Start: 2024-01-17 | End: 2024-01-24

## 2024-01-17 RX ORDER — LOSARTAN POTASSIUM 100 MG/1
25 TABLET, FILM COATED ORAL DAILY
Refills: 0 | Status: DISCONTINUED | OUTPATIENT
Start: 2024-01-17 | End: 2024-01-24

## 2024-01-17 RX ORDER — ALBUTEROL 90 UG/1
2 AEROSOL, METERED ORAL EVERY 6 HOURS
Refills: 0 | Status: DISCONTINUED | OUTPATIENT
Start: 2024-01-17 | End: 2024-01-24

## 2024-01-17 RX ORDER — DEXTROSE 50 % IN WATER 50 %
12.5 SYRINGE (ML) INTRAVENOUS ONCE
Refills: 0 | Status: DISCONTINUED | OUTPATIENT
Start: 2024-01-17 | End: 2024-01-24

## 2024-01-17 RX ORDER — ALPRAZOLAM 0.25 MG
0.5 TABLET ORAL ONCE
Refills: 0 | Status: DISCONTINUED | OUTPATIENT
Start: 2024-01-17 | End: 2024-01-17

## 2024-01-17 RX ORDER — SUCRALFATE 1 G
1 TABLET ORAL
Refills: 0 | Status: DISCONTINUED | OUTPATIENT
Start: 2024-01-17 | End: 2024-01-24

## 2024-01-17 RX ORDER — FUROSEMIDE 40 MG
40 TABLET ORAL
Refills: 0 | Status: DISCONTINUED | OUTPATIENT
Start: 2024-01-17 | End: 2024-01-17

## 2024-01-17 RX ORDER — GLUCAGON INJECTION, SOLUTION 0.5 MG/.1ML
1 INJECTION, SOLUTION SUBCUTANEOUS ONCE
Refills: 0 | Status: DISCONTINUED | OUTPATIENT
Start: 2024-01-17 | End: 2024-01-24

## 2024-01-17 RX ORDER — FAMOTIDINE 10 MG/ML
20 INJECTION INTRAVENOUS ONCE
Refills: 0 | Status: DISCONTINUED | OUTPATIENT
Start: 2024-01-17 | End: 2024-01-17

## 2024-01-17 RX ORDER — OMEPRAZOLE 10 MG/1
1 CAPSULE, DELAYED RELEASE ORAL
Qty: 0 | Refills: 0 | DISCHARGE

## 2024-01-17 RX ORDER — ALPRAZOLAM 0.25 MG
0.25 TABLET ORAL THREE TIMES A DAY
Refills: 0 | Status: DISCONTINUED | OUTPATIENT
Start: 2024-01-17 | End: 2024-01-17

## 2024-01-17 RX ORDER — INSULIN LISPRO 100/ML
VIAL (ML) SUBCUTANEOUS
Refills: 0 | Status: DISCONTINUED | OUTPATIENT
Start: 2024-01-17 | End: 2024-01-24

## 2024-01-17 RX ORDER — ALPRAZOLAM 0.25 MG
0.25 TABLET ORAL THREE TIMES A DAY
Refills: 0 | Status: DISCONTINUED | OUTPATIENT
Start: 2024-01-17 | End: 2024-01-19

## 2024-01-17 RX ORDER — PANTOPRAZOLE SODIUM 20 MG/1
40 TABLET, DELAYED RELEASE ORAL
Refills: 0 | Status: DISCONTINUED | OUTPATIENT
Start: 2024-01-17 | End: 2024-01-24

## 2024-01-17 RX ORDER — METFORMIN HYDROCHLORIDE 850 MG/1
500 TABLET ORAL
Refills: 0 | Status: DISCONTINUED | OUTPATIENT
Start: 2024-01-17 | End: 2024-01-17

## 2024-01-17 RX ORDER — DOXAZOSIN MESYLATE 4 MG
2 TABLET ORAL DAILY
Refills: 0 | Status: DISCONTINUED | OUTPATIENT
Start: 2024-01-17 | End: 2024-01-24

## 2024-01-17 RX ORDER — FUROSEMIDE 40 MG
40 TABLET ORAL ONCE
Refills: 0 | Status: COMPLETED | OUTPATIENT
Start: 2024-01-17 | End: 2024-01-17

## 2024-01-17 RX ORDER — ALBUTEROL 90 UG/1
2 AEROSOL, METERED ORAL
Refills: 0 | DISCHARGE

## 2024-01-17 RX ADMIN — METFORMIN HYDROCHLORIDE 500 MILLIGRAM(S): 850 TABLET ORAL at 06:39

## 2024-01-17 RX ADMIN — Medication 1 GRAM(S): at 18:22

## 2024-01-17 RX ADMIN — Medication 50 MILLIGRAM(S): at 06:39

## 2024-01-17 RX ADMIN — Medication 0.5 MILLIGRAM(S): at 09:12

## 2024-01-17 RX ADMIN — Medication 50 MILLIGRAM(S): at 18:22

## 2024-01-17 RX ADMIN — Medication 2 MILLIGRAM(S): at 14:31

## 2024-01-17 RX ADMIN — Medication 40 MILLIGRAM(S): at 06:42

## 2024-01-17 RX ADMIN — Medication 0.5 MILLIGRAM(S): at 04:58

## 2024-01-17 RX ADMIN — FINASTERIDE 5 MILLIGRAM(S): 5 TABLET, FILM COATED ORAL at 12:25

## 2024-01-17 RX ADMIN — PANTOPRAZOLE SODIUM 40 MILLIGRAM(S): 20 TABLET, DELAYED RELEASE ORAL at 06:38

## 2024-01-17 RX ADMIN — APIXABAN 5 MILLIGRAM(S): 2.5 TABLET, FILM COATED ORAL at 06:39

## 2024-01-17 RX ADMIN — Medication 40 MILLIGRAM(S): at 01:03

## 2024-01-17 RX ADMIN — Medication 40 MILLIGRAM(S): at 13:44

## 2024-01-17 RX ADMIN — Medication 1 GRAM(S): at 06:38

## 2024-01-17 RX ADMIN — Medication 81 MILLIGRAM(S): at 12:25

## 2024-01-17 RX ADMIN — ALBUTEROL 2 PUFF(S): 90 AEROSOL, METERED ORAL at 15:43

## 2024-01-17 RX ADMIN — ATORVASTATIN CALCIUM 10 MILLIGRAM(S): 80 TABLET, FILM COATED ORAL at 22:23

## 2024-01-17 NOTE — CONSULT NOTE ADULT - SUBJECTIVE AND OBJECTIVE BOX
ANIA CRISTOBAL  [id number]      HPI:  79 y/o male PMHx severe AS s/p TAVR 10/6/23 with Dr. Napier, chronic combined CHF with mild LV dysfunction s/p Cardiomems, CAD s/p PCI, TAA, PAF s/p ablation and PPM, LLE DVT, ALLIE, BPH s/p TURP, renal mass, obesity, Anxiety, DM, GERD, cirrhosis p/w episode of apparent syncope and SOB.  Patient reports he has been more SOB for "weeks".  He went to his cardiologist weeks ago and meds were adjusted and he said this initially helped for "weeks" but then he started being more SOB.  Patient was at his cardio 12/14 and per note was euvolemic and no meds changed.  He called cardio 1/2/24 and Torsemide was increased.  Unclear when he felt better and when he started to feel worse as patient is a poor historian.  Yesterday he was sitting on the bed and reports he landed on the floor but doesn't remember how.  He called for help and EMS brought him to the ER.  He now is still feeling SOB and nervous he could syncopize again.  He has had a lot of issues with dizziness for a long time.  Denies CP, palps, orthopnea.    ALLERGIES:  Ceftin (Hives)  penicillin (Anaphylaxis)  sulfa drugs (Unknown)  Bactrim (Rash)      PAST MEDICAL & SURGICAL HISTORY:  Prostate disease  Gastroesophageal reflux disease  Malignant schwannoma  H/O arthroscopy of knee  S/P laminectomy  Otherwise, as noted above      MEDICATIONS (HOME):  · 	torsemide 20 mg oral tablet: 1 tab(s) orally 2 times a day MDD: 40 mg  · 	ergocalciferol 1.25 mg (50,000 intl units) oral capsule: 1 cap(s) orally once a week  · 	metFORMIN 500 mg oral tablet: 1 tab(s) orally 3 times a day  · 	doxycycline monohydrate 50 mg oral capsule: 2 cap(s) orally every 12 hours  · 	aspirin 81 mg oral tablet: 1 tab(s) orally once a day  · 	potassium chloride 20 mEq oral tablet, extended release: 1 tab(s) orally once a day  · 	metoprolol tartrate 50 mg oral tablet: 1 tab(s) orally 2 times a day  · 	apixaban 5 mg oral tablet: 1 tab(s) orally every 12 hours  · 	sucralfate 1 g oral tablet: 1 tab(s) orally 2 times a day  · 	ipratropium-albuterol 0.5 mg-2.5 mg/3 mL inhalation solution: 3 milliliter(s) inhaled every 6 hours  · 	ALPRAZolam 0.25 mg oral tablet: 1 tab(s) orally 3 times a day As needed Anxiety  · 	atorvastatin 10 mg oral tablet: 1 tab(s) orally once a day (at bedtime)  · 	finasteride 5 mg oral tablet: 1 tab(s) orally once a day  · 	doxazosin 2 mg oral tablet: 1 tab(s) orally once a day  · 	Albuterol (Eqv-ProAir HFA) 90 mcg/inh inhalation aerosol: 2 puff(s) inhaled every 6 hours as needed for  shortness of breath and/or wheezing  · 	omeprazole 40 mg oral delayed release capsule: 1 orally once a day        SOCIAL HISTORY:  Patient denies alcohol, tobacco, drug use    FAMILY HISTORY:  FH: heart disease (Father)      ROS:  Patient denies cough, and other than noted above full ROS is unremarkable      PHYSICAL EXAM:  Vital Signs Last 24 Hrs  T(C): 36.9 (17 Jan 2024 08:07), Max: 36.9 (17 Jan 2024 08:07)  T(F): 98.5 (17 Jan 2024 08:07), Max: 98.5 (17 Jan 2024 08:07)  HR: 74 (17 Jan 2024 08:07) (74 - 84)  BP: 110/56 (17 Jan 2024 08:07) (110/56 - 130/71)  BP(mean): --  RR: 18 (17 Jan 2024 08:07) (16 - 19)  SpO2: 96% (17 Jan 2024 08:07) (91% - 96%)  General: Patient comfortable in NAD lying at 30 degrees  HEENT: NCAT, mmm, EOMI  Neck: ?JVD, no carotid bruits  CVS: nl s1, split s2, HS distant  Chest: CTA b/l anteriorly, diminished at bases  Abdomen: soft, nt/nd  Extremities: No c/c/e  Neuro: A&O x3  Psych: Normal affect      ECG:  V-paced    LABS:                        12.4   5.33  )-----------( 119      ( 16 Jan 2024 18:12 )             40.6     01-16    144  |  97  |  29.2<H>  ----------------------------<  154<H>  4.3   |  39.0<H>  |  0.82    Ca    8.8      16 Jan 2024 18:12    TPro  6.9  /  Alb  3.9  /  TBili  0.6  /  DBili  x   /  AST  16  /  ALT  14  /  AlkPhos  302<H>  01-16          RADIOLOGY:  CXR:  Heart enlargement, sternotomy, and left-sided pacemaker again noted.  Sensor device in a branch of the left pulmonary artery may be new since   November 3, 2023.  There are increasing mid and lower lung field congestive changes compared   to prior with a developing right base effusion.    CTH:  No interval change. No acute intracranial bleeding.  Changes from right suboccipital craniectomy with subjacent encephalomalacia and chronic right cerebellar infarction.      Echo (10/2023):   1. Left ventricular ejection fraction, by visual estimation, is 50 to 55%.   2. Normal global left ventricular systolic function.   3. Mildly increased left ventricular internal cavity size.   4. The mitral in-flow pattern reveals no discernable A-wave, therefore no comment on diastolic function can be made.   5. There is mild concentric left ventricular hypertrophy.   6. Normal right ventricular size and function.   7. There is no evidence of pericardial effusion.   8. Trace mitral valve regurgitation.   9. Moderate tricuspid regurgitation.  10. TAVR in the aortic position.  11. Estimated pulmonary artery systolic pressure is 48.9 mmHg assuming a right atrial pressure of 15 mmHg, which is consistent with mild pulmonary   hypertension.        Assessment:  79 y/o male PMHx severe AS s/p TAVR 10/6/23 with Dr. Napier, chronic combined CHF with mild LV dysfunction s/p Cardiomems, CAD s/p PCI, Thoracic Aortic Aneurysm, PAF s/p ablation and PPM, LLE DVT, ALLIE, BPH s/p TURP, renal mass, obesity, Anxiety, DM, GERD, cirrhosis p/w episode of apparent syncope and SOB.  Patient has been having more SOB for at least a week but time frame is overall unclear.  Appears to be in mild CHF based on CXR and symptoms.  Torsemide was increased but will likely need some IV diuresis.  Syncope is also unclear.  Will interrogate PPM.    Plan:  1. Tele  2. F/u echo.  3. Will have PPM interrogated.  4. Will have MEMS interroagted.  5. Lasix 40mg IV BID for a day or 2.  Monitor Cr/lytes.  6. Prelim echo with RVE and dysfxn which would be new.  Check CTPA.  7. Continue other current CV meds at current doses as PTA.  Compliance enforced.  8. Eliquis for CVA PPX and DVT.    D/w BIJAL Kaplan.  Will follow.  Thanks!

## 2024-01-17 NOTE — PROCEDURE NOTE - ADDITIONAL PROCEDURE DETAILS
DOI: 10/5/2023 Implanting physician: Demar Amato MD    Device: [place] XT  MRI [id number]  A lead: [id number] CapSureFix Novus   RV lead: [id number] CapSureFix Novus     Battery life remaining ~ 12.7 years     V pacing 97.7%   A pacing 1.0%     No episodes of AT/AF

## 2024-01-17 NOTE — ED CDU PROVIDER INITIAL DAY NOTE - PHYSICAL EXAMINATION
Gen: No acute distress, non toxic  HEENT: Mucous membranes moist, pink conjunctivae, EOMI  CV: RRR, nl s1/s2.  Resp: Slightly diminished at bases, normal rate and effort  GI: Abdomen soft, NT, ND. No rebound, no guarding  : No CVAT  Neuro: A&O x 3, moving all 4 extremities  MSK: +pedal edema b/l   Skin: No rashes. intact and perfused.

## 2024-01-17 NOTE — CONSULT NOTE ADULT - ASSESSMENT
79 y/o male PMHx severe AS s/p TAVR 10/6/23 with Dr. Napier, chronic combined CHF with mild LV dysfunction s/p Cardiomems, CAD s/p PCI, Thoracic Aortic Aneurysm, PAF s/p ablation and PPM, LLE DVT, ALLIE, BPH s/p TURP, renal mass, obesity, Anxiety, DM, GERD, cirrhosis p/w episode of apparent syncope and SOB.  Thoracic surgery consulted for pleural effusion

## 2024-01-17 NOTE — ED CDU PROVIDER INITIAL DAY NOTE - OBJECTIVE STATEMENT
78 year old male patient with a medical history of Severe Aortic Stenosis s/p TAVR 10/6/23 with Dr. Napier, PPM, Chronic Diastolic CHF, CAD s/p PCI to p&mLAD, Thoracic Aortic Aneurysm, Paroxysmal Afib s/p Ablation (on Eliquis), and Cardiomems, LLE DVT, on Home O2 PRN, ALLIE, BPH s/p TURP, Left Renal Mass, Morbid Obesity, Laminectomy, Anxiety, Type 2 DM presents to ER c/o syncope. Pt reports the last few days he has been feeling more short of breath with orthopnea and increased leg swelling. Today, was sitting on the edge of bed and next thing he knew he was on the ground and does not recall details surrounding any fall vs syncope. Denies chest pain.

## 2024-01-17 NOTE — H&P ADULT - ASSESSMENT
The patient is a 77 year old male with a past medical history of Severe Aortic Stenosis s/p TAVR 10/6/23 with Dr. Napier, Chronic Diastolic CHF on 4 liters Oxygen at baseline, CAD s/p PCI to p&mLAD, Thoracic Aortic Aneurysm, Paroxysmal Afib s/p Ablation (on Eliquis), and Cardiomems, LLE DVT, ALLIE, BPH s/p TURP, Left Renal Mass, Morbid Obesity, Laminectomy, Anxiety, Type 2 DM, GERD, Cholelithiasis who persented after a fall at home. States he was sitting up in bed and fell forward. In the ER, CT head was negative. Admitted to obs for syncope. Xrays of bilaeral knees and right shoulder were negative for fracture. PPM was interogatted with no events. Chest xray with pulmonary vascular congestion with elevated BNP. Admitted for syncope and acute on chronic diastolic CHF. TTE with moderately dilated RA; normal LVSF. CTA of the chest ruled out PE; showed moderate right pleural effusion. Thoracic surgery consulted    Assessment/Plan:    1. Syncope  - Unclear cause  - CT head was negative  - PPM interogatted with no events  - Xrays of bilateral knees and right shoulder were negative  - TTE with normal LSVF, moderately dilated RA- CTA ruled out PE     2. Acute on chronic diastolic CHF  - Moderate right pleural effusion; thoracic surgery consulted- Chest tube deferred due to eliquis. Plan for Chest tube placement in AM  - IV Lasix 40mg BID  - Cardiomems interrogation   - Strict I&os  - Daily weight    3. Chronic respiratory failure  - On 4 liters NC at baseline    4. History of AS status post TAVR    5. Paroxysmal Afib  - Hold Eliquis tonight for Chest tube placement in AM  - On Po Metoprolol    6. Thrombocytopenia  - Previouss CBC with Platelet count 140-130  - Monitor closely  The patient is a 77 year old male with a past medical history of Severe Aortic Stenosis s/p TAVR 10/6/23 with Dr. Napier, Chronic Diastolic CHF on 4 liters Oxygen at baseline, CAD s/p PCI to LAD, Thoracic Aortic Aneurysm, Paroxysmal Afib s/p Ablation (on Eliquis), and Cardiomems, LLE DVT, ALLIE, BPH s/p TURP, Left Renal Mass, Morbid Obesity, Laminectomy, Anxiety, Type 2 DM, GERD, Cholelithiasis who presented after a fall at home. States he was sitting up in bed and fell forward. In the ER, CT head was negative. Admitted to obs for syncope. Xrays of bilateral knees and right shoulder were negative for fracture. PPM was interrogated with no events. Chest xray with pulmonary vascular congestion with elevated BNP. Admitted for syncope and acute on chronic diastolic CHF. TTE with moderately dilated RA; normal LVSF. CTA of the chest ruled out PE; showed moderate right pleural effusion. Thoracic surgery consulted    Assessment/Plan:    1. Syncope  - Unclear cause  - CT head was negative  - PPM interrogated with no events  - Xrays of bilateral knees and right shoulder were negative  - TTE with normal LSVF, moderately dilated RA- CTA ruled out PE     2. Acute on chronic diastolic CHF with a history of CAD   - Moderate right pleural effusion; thoracic surgery consulted- Chest tube deferred due to eliquis. Plan for Chest tube placement in AM  - Discussed with Dr Gotti, Cardiomems pressure of 16- Decrease lasix to OD   - Cardiomems interrogation   - On BB, Asprin, Statin ARB   - Strict I&os  - Daily weight    3. Acute on Chronic respiratory failure  - On 4 liters NC at baseline noted to be desatting by RN placed on NRB in no distress [id number]%     4. History of AS status post TAVR    5. Paroxysmal Afib  - Hold Eliquis tonight for Chest tube placement in AM  - On Po Metoprolol    6. Thrombocytopenia  - Previouss CBC with Platelet count 140-130  - Monitor closely     7./ Anxiety  - Dr Nielsen reviewed, all medications reconciled  - REsume Xanax 0.25 mg PO TID for anxiety    VTE_ SCDS ; REsume Eliquis post thoracentesis in AM  The patient is a 77 year old male with a past medical history of Severe Aortic Stenosis s/p TAVR 10/6/23 with Dr. Napier, Chronic Diastolic CHF on 4 liters Oxygen at baseline, CAD s/p PCI to LAD, Thoracic Aortic Aneurysm, Paroxysmal Afib s/p Ablation (on Eliquis), and Cardiomems, LLE DVT, ALLIE, BPH s/p TURP, Left Renal Mass, Morbid Obesity, Laminectomy, Anxiety, Type 2 DM, GERD, Cholelithiasis who presented after a fall at home. States he was sitting up in bed and fell forward. In the ER, CT head was negative. Admitted to obs for syncope. Xrays of bilateral knees and right shoulder were negative for fracture. PPM was interrogated with no events. Chest xray with pulmonary vascular congestion with elevated BNP. Admitted for syncope and acute on chronic diastolic CHF. TTE with moderately dilated RA; normal LVSF. CTA of the chest ruled out PE; showed moderate right pleural effusion. Thoracic surgery consulted    Assessment/Plan:    1. Syncope  - Unclear cause  - CT head was negative  - PPM interrogated with no events  - Xrays of bilateral knees and right shoulder were negative  - TTE with normal LSVF, moderately dilated RA- CTA ruled out PE     2. Acute on chronic diastolic CHF with a history of CAD   - Moderate right pleural effusion; thoracic surgery consulted- Chest tube deferred due to eliquis. Plan for Chest tube placement in AM  - Discussed with Dr Gotti, Cardiomems pressure of 16- Decrease lasix to OD   - Cardiomems interrogation   - On BB, Asprin, Statin ARB   - Strict I&os  - Daily weight    3. Acute on Chronic respiratory failure  - On 4 liters NC at baseline noted to be desatting by RN placed on NRB in no distress [id number]%     4. History of AS status post TAVR    5. Paroxysmal Afib  - Hold Eliquis tonight for Chest tube placement in AM  - On Po Metoprolol    6. Thrombocytopenia  - Previouss CBC with Platelet count 140-130  - Monitor closely     7./ Anxiety  - Dr Nielsen reviewed, all medications reconciled  - REsume Xanax 0.25 mg PO TID for anxiety  - Noted to be increasingly resltess- Repeat CT head ordered for fall on eliquis.     VTE_ SCDS ; REsume Eliquis post thoracentesis in AM

## 2024-01-17 NOTE — ED CDU PROVIDER INITIAL DAY NOTE - CLINICAL SUMMARY MEDICAL DECISION MAKING FREE TEXT BOX
78 year old male with Hx of Severe Aortic Stenosis s/p TAVR 10/6/23 with Dr. Napier, PPM, Chronic Diastolic CHF, CAD s/p PCI to p&mLAD, Thoracic Aortic Aneurysm, Paroxysmal Afib s/p Ablation (on Eliquis), and Cardiomems, LLE DVT, on Home O2 PRN, DM presents to ER c/o syncope. Pt reports the last few days he has been feeling more short of breath with orthopnea and increased leg swelling. Today, was sitting on the edge of bed and next thing he knew he was on the ground and does not recall details surrounding any fall vs syncope.   Vitals stable in ED, troponin w/o acute changes, CT brain chronic findings, BNP mildly elevated but overall BNP/CXR improved from most recent admission.   PPM interrogated w/o acute events.  Pt placed on obs for IV diuresis, TTE, telemetry monitoring, cardiology consult

## 2024-01-17 NOTE — CONSULT NOTE ADULT - PROBLEM SELECTOR RECOMMENDATION 9
plan for chest tube in AM   was taking eliquis   currently on hold   daily cxr  aggressive diuresis as tolerated   will ultrasound tomorrow   consult apprecaited   rest of care per priry team    of note patient with acute on chronic heart failure - with cardiomems  questionable compliance? - who cares for patient at home

## 2024-01-17 NOTE — CONSULT NOTE ADULT - TIME BILLING
reviewing chart   reviewing imaging with right effusion   discussion with chiara   discussion with team

## 2024-01-17 NOTE — ED CDU PROVIDER DISPOSITION NOTE - ATTENDING CONTRIBUTION TO CARE
I, Yuval Zheng, performed the initial face to face bedside interview with this patient regarding history of present illness, review of symptoms and relevant past medical, social and family history.  I completed an independent physical examination.  I was the initial provider who evaluated this patient. I have signed out the follow up of any pending tests (i.e. labs, radiological studies) to the ACP.  I have communicated the patient’s plan of care and disposition with the ACP.

## 2024-01-17 NOTE — ED CDU PROVIDER DISPOSITION NOTE - NS ED ATTENDING STATEMENT MOD
This was a shared visit with the FERDINAND. I reviewed and verified the documentation. Attending with

## 2024-01-17 NOTE — ED CDU PROVIDER DISPOSITION NOTE - CLINICAL COURSE
78 year old male patient with a medical history of Severe Aortic Stenosis s/p TAVR 10/6/23 with Dr. Napier, PPM, Chronic Diastolic CHF, CAD s/p PCI to p&mLAD, Thoracic Aortic Aneurysm, Paroxysmal Afib s/p Ablation (on Eliquis), and Cardiomems, LLE DVT, on Home O2 PRN, ALLIE, BPH s/p TURP, Left Renal Mass, Morbid Obesity, Laminectomy, Anxiety, Type 2 DM presents to ER c/o syncope. Pt reports the last few days he has been feeling more short of breath with orthopnea and increased leg swelling. Today, was sitting on the edge of bed and next thing he knew he was on the ground and does not recall details surrounding any fall vs syncope. Denies chest pain. Pt seen by cardio, advised to diurese with lasix bid, but concerned due to Prelim echo with RVE and dysfxn which would be new. CTA chest was added to evaluate for PE, no PE noted but moderate right sided pleural effusion, Consulted CT surgery who advised due to eliquis will likely place pigtail tomorrow, Discussed need to admit with patient & discussed risk and benefits.  Patient agreed to admission.  Discussed case w/ admitting medicine doctor - agreed to admit to their service.

## 2024-01-17 NOTE — ED ADULT NURSE REASSESSMENT NOTE - NS ED NURSE REASSESS COMMENT FT1
Assumed care of pt at 1341 as stated in report from BESSY TM. Charting as noted. Patient A&O x4, Updated on the plan of care. Call bell within reach, bed locked in lowest position. IV site flushed w/ NS. No redness, swelling or pain noted to site. No signs of acute distress noted, safety maintained. Pt remains on CM in NSR. Pt complains of chest heaviness PA Polo notified EKG and trop ordered. pt started to desat to around 85% spo2 placed on non rebreather spo2 100% PA Polo notified. Pt states chest heaviness resolved.

## 2024-01-17 NOTE — CONSULT NOTE ADULT - SUBJECTIVE AND OBJECTIVE BOX
SUBJECTIVE       INTERIM HISTORY SIGNIFICANT FOR   admitted with shortness of breath   found to have pleural effusion     Patient is a 78y old  Male who presents with a chief complaint of   HPI:77 y/o male PMHx severe AS s/p TAVR 10/6/23 with Dr. Napier, chronic combined CHF with mild LV dysfunction s/p Cardiomems, CAD s/p PCI, TAA, PAF s/p ablation and PPM, LLE DVT, ALLIE, BPH s/p TURP, renal mass, obesity, Anxiety, DM, GERD, cirrhosis p/w episode of apparent syncope and SOB.  Patient reports he has been more SOB for "weeks".  He went to his cardiologist weeks ago and meds were adjusted and he said this initially helped for "weeks" but then he started being more SOB.  Patient was at his cardio 12/14 and per note was euvolemic and no meds changed.  He called cardio 1/2/24 and Torsemide was increased.  Unclear when he felt better and when he started to feel worse as patient is a poor historian.  Yesterday he was sitting on the bed and reports he landed on the floor but doesn't remember how.  He called for help and EMS brought him to the ER.  He now is still feeling SOB and nervous he could syncopize again.  He has had a lot of issues with dizziness for a long time.  Denies CP, palps, orthopnea.    OBJECTIVE  PAST MEDICAL & SURGICAL HISTORY:  Anxiety      Hypertension      Hyperlipidemia      Prostate disease      Gastroesophageal reflux disease      Gallbladder stone without cholecystitis or obstruction      BPH (benign prostatic hyperplasia)      DM (diabetes mellitus)      DVT (deep venous thrombosis)  left leg      Afib      Anxiety      HLD (hyperlipidemia)      Malignant schwannoma      H/O arthroscopy of knee      S/P laminectomy      S/P coronary artery stent placement  x2        Ceftin (Hives)  penicillin (Anaphylaxis)  sulfa drugs (Unknown)  Bactrim (Rash)    Home Medications:  Albuterol (Eqv-ProAir HFA) 90 mcg/inh inhalation aerosol: 2 puff(s) inhaled every 6 hours as needed for  shortness of breath and/or wheezing (29 Sep 2023 10:35)  ALPRAZolam 0.25 mg oral tablet: 1 tab(s) orally 3 times a day As needed Anxiety (29 Sep 2023 10:32)  atorvastatin 10 mg oral tablet: 1 tab(s) orally once a day (at bedtime) (29 Sep 2023 10:32)  doxazosin 2 mg oral tablet: 1 tab(s) orally once a day (29 Sep 2023 10:27)  finasteride 5 mg oral tablet: 1 tab(s) orally once a day (29 Sep 2023 10:32)  ipratropium-albuterol 0.5 mg-2.5 mg/3 mL inhalation solution: 3 milliliter(s) inhaled every 6 hours (29 Sep 2023 10:32)  metFORMIN 500 mg oral tablet: 1 tab(s) orally 2 times a day (17 Jan 2024 17:20)  omeprazole 40 mg oral delayed release capsule: 1 orally once a day (29 Sep 2023 10:32)    VITALS  Currently in sinus rhythm with vitals as below  ICU Vital Signs Last 24 Hrs  T(C): 36.9 (17 Jan 2024 08:07), Max: 36.9 (17 Jan 2024 08:07)  T(F): 98.5 (17 Jan 2024 08:07), Max: 98.5 (17 Jan 2024 08:07)  HR: 74 (17 Jan 2024 08:07) (74 - 79)  BP: 110/56 (17 Jan 2024 08:07) (110/56 - 130/71)  BP(mean): --  ABP: --  ABP(mean): --  RR: 18 (17 Jan 2024 08:07) (18 - 19)  SpO2: 96% (17 Jan 2024 08:07) (95% - 96%)    O2 Parameters below as of 17 Jan 2024 08:07  Patient On (Oxygen Delivery Method): room air  O2 Flow (L/min): 4        LABS                        12.4   5.33  )-----------( 119      ( 16 Jan 2024 18:12 )             40.6   01-16    144  |  97  |  29.2<H>  ----------------------------<  154<H>  4.3   |  39.0<H>  |  0.82    Ca    8.8      16 Jan 2024 18:12    TPro  6.9  /  Alb  3.9  /  TBili  0.6  /  DBili  x   /  AST  16  /  ALT  14  /  AlkPhos  302<H>  01-16  CAPILLARY BLOOD GLUCOSE          ABG - ( 17 Jan 2024 04:07 )  pH, Arterial: 7.450 pH, Blood: x     /  pCO2: 62    /  pO2: 69    / HCO3: 43    / Base Excess: 19.1  /  SaO2: 94.9                IN/OUT    IMAGING  personally reviewed imaging   Xray Chest 1 View-PORTABLE IMMEDIATE:   ACC: [id number] EXAM:  XR KNEE COMP 4+ VIEWS BI   ORDERED BY: LINDY BLAIR     ACC: [id number] EXAM:  XR SHOULDER COMP MIN 2V RT   ORDERED BY: ALDO EATON     ACC: [id number] EXAM:  XR CHEST PORTABLE IMMED 1V   ORDERED BY: KHOI PONCE     PROCEDURE DATE:  01/16/2024          INTERPRETATION:  Chest, right shoulder, and bilateral knees. Patient has   pain.    AP chest on January 16, 2024 at 6:54 PM.    Heart enlargement, sternotomy, and left-sided pacemaker again noted.    Sensor device in a branch of the left pulmonary artery may be new since   November 3, 2023.    There are increasing mid and lower lung field congestive changes compared   to prior with a developing right base effusion.    Right shoulder. 2 views. Mild degeneration. No fracture.    Bilateral knees. 3 views each.    Right knee. Mild effusion. Advanced degeneration. No fracture.    Left knee. Mild knee effusion. Advanced degeneration. No fracture.    IMPRESSION: Bilateral advanced knee degeneration with small bilateral   knee effusions. No fracture. Increasing CHF.    --- End of Report ---            DESTINEE ESTEVEZ MD; Attending Radiologist  This document has been electronically signed. Jan 17 2024  9:09AM (01-16-24 @ 19:07)    CURRENT MEDICATIONS  MEDICATIONS  (STANDING):  aspirin enteric coated 81 milliGRAM(s) Oral daily  atorvastatin 10 milliGRAM(s) Oral at bedtime  dextrose 5%. 1000 milliLiter(s) (100 mL/Hr) IV Continuous <Continuous>  dextrose 5%. 1000 milliLiter(s) (50 mL/Hr) IV Continuous <Continuous>  dextrose 50% Injectable 25 Gram(s) IV Push once  dextrose 50% Injectable 25 Gram(s) IV Push once  dextrose 50% Injectable 12.5 Gram(s) IV Push once  doxazosin 2 milliGRAM(s) Oral daily  finasteride 5 milliGRAM(s) Oral daily  furosemide   Injectable 40 milliGRAM(s) IV Push two times a day  glucagon  Injectable 1 milliGRAM(s) IntraMuscular once  insulin lispro (ADMELOG) corrective regimen sliding scale   SubCutaneous three times a day before meals  metoprolol tartrate 50 milliGRAM(s) Oral two times a day  pantoprazole    Tablet 40 milliGRAM(s) Oral before breakfast  sucralfate 1 Gram(s) Oral two times a day    MEDICATIONS  (PRN):  albuterol    90 MICROgram(s) HFA Inhaler 2 Puff(s) Inhalation every 6 hours PRN for shortness of breath and/or wheezing  dextrose Oral Gel 15 Gram(s) Oral once PRN Blood Glucose LESS THAN 70 milliGRAM(s)/deciliter     SUBJECTIVE   "i didnt think it was serious but maybe it is"   talking in circles at times   seems to have limited health literacy - unclear if he has help at home to assist in his care     INTERIM HISTORY SIGNIFICANT FOR   admitted with shortness of breath   found to have pleural effusion     Patient is a 78y old  Male who presents with a chief complaint of   HPI:79 y/o male PMHx severe AS s/p TAVR 10/6/23 with Dr. Napier, chronic combined CHF with mild LV dysfunction s/p Cardiomems, CAD s/p PCI, TAA, PAF s/p ablation and PPM, LLE DVT, ALLIE, BPH s/p TURP, renal mass, obesity, Anxiety, DM, GERD, cirrhosis p/w episode of apparent syncope and SOB.  Patient reports he has been more SOB for "weeks".  He went to his cardiologist weeks ago and meds were adjusted and he said this initially helped for "weeks" but then he started being more SOB.  Patient was at his cardio 12/14 and per note was euvolemic and no meds changed.  He called cardio 1/2/24 and Torsemide was increased.  Unclear when he felt better and when he started to feel worse as patient is a poor historian.  Yesterday he was sitting on the bed and reports he landed on the floor but doesn't remember how.  He called for help and EMS brought him to the ER.  He now is still feeling SOB and nervous he could syncopize again.  He has had a lot of issues with dizziness for a long time.  Denies CP, palps, orthopnea.    OBJECTIVE  PAST MEDICAL & SURGICAL HISTORY:  Anxiety      Hypertension      Hyperlipidemia      Prostate disease      Gastroesophageal reflux disease      Gallbladder stone without cholecystitis or obstruction      BPH (benign prostatic hyperplasia)      DM (diabetes mellitus)      DVT (deep venous thrombosis)  left leg      Afib      Anxiety      HLD (hyperlipidemia)      Malignant schwannoma      H/O arthroscopy of knee      S/P laminectomy      S/P coronary artery stent placement  x2        Ceftin (Hives)  penicillin (Anaphylaxis)  sulfa drugs (Unknown)  Bactrim (Rash)    Home Medications:  Albuterol (Eqv-ProAir HFA) 90 mcg/inh inhalation aerosol: 2 puff(s) inhaled every 6 hours as needed for  shortness of breath and/or wheezing (29 Sep 2023 10:35)  ALPRAZolam 0.25 mg oral tablet: 1 tab(s) orally 3 times a day As needed Anxiety (29 Sep 2023 10:32)  atorvastatin 10 mg oral tablet: 1 tab(s) orally once a day (at bedtime) (29 Sep 2023 10:32)  doxazosin 2 mg oral tablet: 1 tab(s) orally once a day (29 Sep 2023 10:27)  finasteride 5 mg oral tablet: 1 tab(s) orally once a day (29 Sep 2023 10:32)  ipratropium-albuterol 0.5 mg-2.5 mg/3 mL inhalation solution: 3 milliliter(s) inhaled every 6 hours (29 Sep 2023 10:32)  metFORMIN 500 mg oral tablet: 1 tab(s) orally 2 times a day (17 Jan 2024 17:20)  omeprazole 40 mg oral delayed release capsule: 1 orally once a day (29 Sep 2023 10:32)    VITALS  Currently in sinus rhythm with vitals as below  ICU Vital Signs Last 24 Hrs  T(C): 36.9 (17 Jan 2024 08:07), Max: 36.9 (17 Jan 2024 08:07)  T(F): 98.5 (17 Jan 2024 08:07), Max: 98.5 (17 Jan 2024 08:07)  HR: 74 (17 Jan 2024 08:07) (74 - 79)  BP: 110/56 (17 Jan 2024 08:07) (110/56 - 130/71)  BP(mean): --  ABP: --  ABP(mean): --  RR: 18 (17 Jan 2024 08:07) (18 - 19)  SpO2: 96% (17 Jan 2024 08:07) (95% - 96%)    O2 Parameters below as of 17 Jan 2024 08:07  Patient On (Oxygen Delivery Method): room air  O2 Flow (L/min): 4        LABS                        12.4   5.33  )-----------( 119      ( 16 Jan 2024 18:12 )             40.6   01-16    144  |  97  |  29.2<H>  ----------------------------<  154<H>  4.3   |  39.0<H>  |  0.82    Ca    8.8      16 Jan 2024 18:12    TPro  6.9  /  Alb  3.9  /  TBili  0.6  /  DBili  x   /  AST  16  /  ALT  14  /  AlkPhos  302<H>  01-16  CAPILLARY BLOOD GLUCOSE          ABG - ( 17 Jan 2024 04:07 )  pH, Arterial: 7.450 pH, Blood: x     /  pCO2: 62    /  pO2: 69    / HCO3: 43    / Base Excess: 19.1  /  SaO2: 94.9                IN/OUT    IMAGING  personally reviewed imaging   Xray Chest 1 View-PORTABLE IMMEDIATE:   ACC: [id number] EXAM:  XR KNEE COMP 4+ VIEWS BI   ORDERED BY: LINDY BLAIR     ACC: [id number] EXAM:  XR SHOULDER COMP MIN 2V RT   ORDERED BY: ALDO EATON     ACC: [id number] EXAM:  XR CHEST PORTABLE IMMED 1V   ORDERED BY: KHOI PONCE     PROCEDURE DATE:  01/16/2024          INTERPRETATION:  Chest, right shoulder, and bilateral knees. Patient has   pain.    AP chest on January 16, 2024 at 6:54 PM.    Heart enlargement, sternotomy, and left-sided pacemaker again noted.    Sensor device in a branch of the left pulmonary artery may be new since   November 3, 2023.    There are increasing mid and lower lung field congestive changes compared   to prior with a developing right base effusion.    Right shoulder. 2 views. Mild degeneration. No fracture.    Bilateral knees. 3 views each.    Right knee. Mild effusion. Advanced degeneration. No fracture.    Left knee. Mild knee effusion. Advanced degeneration. No fracture.    IMPRESSION: Bilateral advanced knee degeneration with small bilateral   knee effusions. No fracture. Increasing CHF.    --- End of Report ---            DESTINEE ESTEVEZ MD; Attending Radiologist  This document has been electronically signed. Jan 17 2024  9:09AM (01-16-24 @ 19:07)    CURRENT MEDICATIONS  MEDICATIONS  (STANDING):  aspirin enteric coated 81 milliGRAM(s) Oral daily  atorvastatin 10 milliGRAM(s) Oral at bedtime  dextrose 5%. 1000 milliLiter(s) (100 mL/Hr) IV Continuous <Continuous>  dextrose 5%. 1000 milliLiter(s) (50 mL/Hr) IV Continuous <Continuous>  dextrose 50% Injectable 25 Gram(s) IV Push once  dextrose 50% Injectable 25 Gram(s) IV Push once  dextrose 50% Injectable 12.5 Gram(s) IV Push once  doxazosin 2 milliGRAM(s) Oral daily  finasteride 5 milliGRAM(s) Oral daily  furosemide   Injectable 40 milliGRAM(s) IV Push two times a day  glucagon  Injectable 1 milliGRAM(s) IntraMuscular once  insulin lispro (ADMELOG) corrective regimen sliding scale   SubCutaneous three times a day before meals  metoprolol tartrate 50 milliGRAM(s) Oral two times a day  pantoprazole    Tablet 40 milliGRAM(s) Oral before breakfast  sucralfate 1 Gram(s) Oral two times a day    MEDICATIONS  (PRN):  albuterol    90 MICROgram(s) HFA Inhaler 2 Puff(s) Inhalation every 6 hours PRN for shortness of breath and/or wheezing  dextrose Oral Gel 15 Gram(s) Oral once PRN Blood Glucose LESS THAN 70 milliGRAM(s)/deciliter

## 2024-01-17 NOTE — H&P ADULT - HISTORY OF PRESENT ILLNESS
The patient is a 77 year old male with a past medical history of Severe Aortic Stenosis s/p TAVR 10/6/23 with Dr. Napier, Chronic Diastolic CHF on 4 liters Oxygen at baseline, CAD s/p PCI to p&mLAD, Thoracic Aortic Aneurysm, Paroxysmal Afib s/p Ablation (on Eliquis), and Cardiomems, LLE DVT, ALLIE, BPH s/p TURP, Left Renal Mass, Morbid Obesity, Laminectomy, Anxiety, Type 2 DM, GERD, Cholelithiasis who persented after a fall at home. States he was sitting up in bed and fell forward. In the ER, CT head was negative. Admitted to obs for syncope. Xrays of bilaeral knees and right shoulder were negative for fracture. PPM was interogatted with no events. Chest xray with pulmonary vascular congestion with elevated BNP. Admitted for syncope and acute on chronic diastolic CHF. TTE with moderately dilated RA; normal LVSF. CTA of the chest ruled out PE; showed moderate right pleural effusion. Thoracic surgery consulted. At the time of my examination on NRB SPo2 %. AOX 3 but restless taking off mask

## 2024-01-18 LAB
A1C WITH ESTIMATED AVERAGE GLUCOSE RESULT: 7.7 % — HIGH (ref 4–5.6)
ALBUMIN SERPL ELPH-MCNC: 3.6 G/DL — SIGNIFICANT CHANGE UP (ref 3.3–5.2)
ALP SERPL-CCNC: 264 U/L — HIGH (ref 40–120)
ALT FLD-CCNC: 11 U/L — SIGNIFICANT CHANGE UP
ANION GAP SERPL CALC-SCNC: 9 MMOL/L — SIGNIFICANT CHANGE UP (ref 5–17)
APTT BLD: 37.3 SEC — HIGH (ref 24.5–35.6)
AST SERPL-CCNC: 12 U/L — SIGNIFICANT CHANGE UP
B PERT IGG+IGM PNL SER: CLEAR — SIGNIFICANT CHANGE UP
BASOPHILS # BLD AUTO: 0.05 K/UL — SIGNIFICANT CHANGE UP (ref 0–0.2)
BASOPHILS NFR BLD AUTO: 0.8 % — SIGNIFICANT CHANGE UP (ref 0–2)
BILIRUB SERPL-MCNC: 0.8 MG/DL — SIGNIFICANT CHANGE UP (ref 0.4–2)
BUN SERPL-MCNC: 26 MG/DL — HIGH (ref 8–20)
CALCIUM SERPL-MCNC: 8.6 MG/DL — SIGNIFICANT CHANGE UP (ref 8.4–10.5)
CHLORIDE SERPL-SCNC: 98 MMOL/L — SIGNIFICANT CHANGE UP (ref 96–108)
CO2 SERPL-SCNC: 37 MMOL/L — HIGH (ref 22–29)
COLOR FLD: ABNORMAL
CREAT SERPL-MCNC: 0.69 MG/DL — SIGNIFICANT CHANGE UP (ref 0.5–1.3)
EGFR: 95 ML/MIN/1.73M2 — SIGNIFICANT CHANGE UP
EOSINOPHIL # BLD AUTO: 0.2 K/UL — SIGNIFICANT CHANGE UP (ref 0–0.5)
EOSINOPHIL NFR BLD AUTO: 3.3 % — SIGNIFICANT CHANGE UP (ref 0–6)
ESTIMATED AVERAGE GLUCOSE: 174 MG/DL — HIGH (ref 68–114)
FLUID INTAKE SUBSTANCE CLASS: SIGNIFICANT CHANGE UP
GAS PNL BLDA: SIGNIFICANT CHANGE UP
GLUCOSE BLDC GLUCOMTR-MCNC: 114 MG/DL — HIGH (ref 70–99)
GLUCOSE BLDC GLUCOMTR-MCNC: 135 MG/DL — HIGH (ref 70–99)
GLUCOSE BLDC GLUCOMTR-MCNC: 171 MG/DL — HIGH (ref 70–99)
GLUCOSE BLDC GLUCOMTR-MCNC: 179 MG/DL — HIGH (ref 70–99)
GLUCOSE BLDC GLUCOMTR-MCNC: 202 MG/DL — HIGH (ref 70–99)
GLUCOSE SERPL-MCNC: 144 MG/DL — HIGH (ref 70–99)
GRAM STN FLD: SIGNIFICANT CHANGE UP
HCT VFR BLD CALC: 39.5 % — SIGNIFICANT CHANGE UP (ref 39–50)
HGB BLD-MCNC: 11.5 G/DL — LOW (ref 13–17)
IMM GRANULOCYTES NFR BLD AUTO: 0.2 % — SIGNIFICANT CHANGE UP (ref 0–0.9)
INR BLD: 1.55 RATIO — HIGH (ref 0.85–1.18)
LYMPHOCYTES # BLD AUTO: 0.7 K/UL — LOW (ref 1–3.3)
LYMPHOCYTES # BLD AUTO: 11.4 % — LOW (ref 13–44)
LYMPHOCYTES # FLD: 66 % — SIGNIFICANT CHANGE UP
MAGNESIUM SERPL-MCNC: 2.1 MG/DL — SIGNIFICANT CHANGE UP (ref 1.6–2.6)
MCHC RBC-ENTMCNC: 27.3 PG — SIGNIFICANT CHANGE UP (ref 27–34)
MCHC RBC-ENTMCNC: 29.1 GM/DL — LOW (ref 32–36)
MCV RBC AUTO: 93.8 FL — SIGNIFICANT CHANGE UP (ref 80–100)
MESOTHL CELL # FLD: 3 % — SIGNIFICANT CHANGE UP
MONOCYTES # BLD AUTO: 0.86 K/UL — SIGNIFICANT CHANGE UP (ref 0–0.9)
MONOCYTES NFR BLD AUTO: 14.1 % — HIGH (ref 2–14)
MONOS+MACROS # FLD: 15 % — SIGNIFICANT CHANGE UP
NEUTROPHILS # BLD AUTO: 4.3 K/UL — SIGNIFICANT CHANGE UP (ref 1.8–7.4)
NEUTROPHILS NFR BLD AUTO: 70.2 % — SIGNIFICANT CHANGE UP (ref 43–77)
NEUTROPHILS-BODY FLUID: 16 % — SIGNIFICANT CHANGE UP
PH FLD: 8 — SIGNIFICANT CHANGE UP
PLATELET # BLD AUTO: 106 K/UL — LOW (ref 150–400)
POTASSIUM SERPL-MCNC: 4.1 MMOL/L — SIGNIFICANT CHANGE UP (ref 3.5–5.3)
POTASSIUM SERPL-SCNC: 4.1 MMOL/L — SIGNIFICANT CHANGE UP (ref 3.5–5.3)
PROT SERPL-MCNC: 6.5 G/DL — LOW (ref 6.6–8.7)
PROTHROM AB SERPL-ACNC: 17 SEC — HIGH (ref 9.5–13)
RBC # BLD: 4.21 M/UL — SIGNIFICANT CHANGE UP (ref 4.2–5.8)
RBC # FLD: 15.5 % — HIGH (ref 10.3–14.5)
RCV VOL RI: HIGH /UL (ref 0–0)
SODIUM SERPL-SCNC: 144 MMOL/L — SIGNIFICANT CHANGE UP (ref 135–145)
SPECIMEN SOURCE: SIGNIFICANT CHANGE UP
TOTAL NUCLEATED CELL COUNT, BODY FLUID: 330 /UL — SIGNIFICANT CHANGE UP
TUBE TYPE: SIGNIFICANT CHANGE UP
WBC # BLD: 6.12 K/UL — SIGNIFICANT CHANGE UP (ref 3.8–10.5)
WBC # FLD AUTO: 6.12 K/UL — SIGNIFICANT CHANGE UP (ref 3.8–10.5)

## 2024-01-18 PROCEDURE — 88112 CYTOPATH CELL ENHANCE TECH: CPT | Mod: 26

## 2024-01-18 PROCEDURE — 88305 TISSUE EXAM BY PATHOLOGIST: CPT | Mod: 26

## 2024-01-18 PROCEDURE — 71045 X-RAY EXAM CHEST 1 VIEW: CPT | Mod: 26

## 2024-01-18 PROCEDURE — 99232 SBSQ HOSP IP/OBS MODERATE 35: CPT

## 2024-01-18 RX ORDER — ACETAMINOPHEN 500 MG
650 TABLET ORAL ONCE
Refills: 0 | Status: COMPLETED | OUTPATIENT
Start: 2024-01-18 | End: 2024-01-18

## 2024-01-18 RX ORDER — LANOLIN ALCOHOL/MO/W.PET/CERES
3 CREAM (GRAM) TOPICAL AT BEDTIME
Refills: 0 | Status: DISCONTINUED | OUTPATIENT
Start: 2024-01-18 | End: 2024-01-21

## 2024-01-18 RX ORDER — SODIUM CHLORIDE 9 MG/ML
250 INJECTION INTRAMUSCULAR; INTRAVENOUS; SUBCUTANEOUS ONCE
Refills: 0 | Status: COMPLETED | OUTPATIENT
Start: 2024-01-18 | End: 2024-01-18

## 2024-01-18 RX ADMIN — Medication 1 GRAM(S): at 05:19

## 2024-01-18 RX ADMIN — Medication 40 MILLIGRAM(S): at 05:19

## 2024-01-18 RX ADMIN — Medication 0.25 MILLIGRAM(S): at 22:44

## 2024-01-18 RX ADMIN — Medication 50 MILLIGRAM(S): at 05:19

## 2024-01-18 RX ADMIN — SODIUM CHLORIDE 500 MILLILITER(S): 9 INJECTION INTRAMUSCULAR; INTRAVENOUS; SUBCUTANEOUS at 21:19

## 2024-01-18 RX ADMIN — Medication 50 MILLIGRAM(S): at 17:33

## 2024-01-18 RX ADMIN — Medication 0.25 MILLIGRAM(S): at 16:50

## 2024-01-18 RX ADMIN — Medication 4: at 18:35

## 2024-01-18 RX ADMIN — LOSARTAN POTASSIUM 25 MILLIGRAM(S): 100 TABLET, FILM COATED ORAL at 05:19

## 2024-01-18 RX ADMIN — Medication 20 MILLIEQUIVALENT(S): at 13:01

## 2024-01-18 RX ADMIN — PANTOPRAZOLE SODIUM 40 MILLIGRAM(S): 20 TABLET, DELAYED RELEASE ORAL at 05:19

## 2024-01-18 RX ADMIN — Medication 650 MILLIGRAM(S): at 21:00

## 2024-01-18 RX ADMIN — Medication 81 MILLIGRAM(S): at 13:01

## 2024-01-18 RX ADMIN — Medication 650 MILLIGRAM(S): at 20:09

## 2024-01-18 RX ADMIN — FINASTERIDE 5 MILLIGRAM(S): 5 TABLET, FILM COATED ORAL at 17:33

## 2024-01-18 RX ADMIN — Medication 3 MILLIGRAM(S): at 21:19

## 2024-01-18 RX ADMIN — Medication 1 GRAM(S): at 17:33

## 2024-01-18 RX ADMIN — ATORVASTATIN CALCIUM 10 MILLIGRAM(S): 80 TABLET, FILM COATED ORAL at 21:20

## 2024-01-18 NOTE — PROCEDURE NOTE - NSICDXPROCEDURE_GEN_ALL_CORE_FT
PROCEDURES:  Chest tube placement 18-Jan-2024 16:09:37  Kevin Zheng  
PROCEDURES:  Programming of dual lead pacemaker 17-Jan-2024 19:36:17  Chelita Bullock

## 2024-01-18 NOTE — PROGRESS NOTE ADULT - SUBJECTIVE AND OBJECTIVE BOX
SIGNIFICANT RECENT/PAST 24 HR EVENTS:  No acute events reported overnight. Hypercapnic Respiratory failure placed on NIPPV overnight. 1/18 Right PTC placed 1.3 L serosanguinous fluid    SUBJECTIVE:  Patient seen and examined on follow up consult for right pleural effusion. Pt currently sitting in bed in NAD on BPAP. Pt states "I am breathing fine I don't know why I have this mask on me." Pt does not remember the events that led up to the NIPPV.  Denies fevers, chills, HA, dizziness, CP, SOB, abd pain, N/V/D, numbness/tingling in extremities, or any other acute complaints.   ROS negative x 10 systems except as noted above.    PAST MEDICAL & SURGICAL HISTORY:  Anxiety  Hypertension  Hyperlipidemia  Prostate disease  Gastroesophageal reflux disease  Gallbladder stone without cholecystitis or obstruction  BPH (benign prostatic hyperplasia)  DM (diabetes mellitus)  DVT (deep venous thrombosis)  Afib  Anxiety  HLD (hyperlipidemia)    No significant past surgical history  Malignant schwannoma  H/O arthroscopy of knee  S/P laminectomy  S/P coronary artery stent placement    DAILY REVIEW:  Vitals   T(C): 36.7 (18 Jan 2024 15:23), Max: 36.9 (18 Jan 2024 00:42)  T(F): 98 (18 Jan 2024 15:23), Max: 98.5 (18 Jan 2024 00:42)  HR: 75 (18 Jan 2024 12:00) (65 - 87)  BP: 111/62 (18 Jan 2024 12:00) (100/61 - 122/74)  BP(mean): 74 (18 Jan 2024 12:00) (68 - 74)  RR: 14 (18 Jan 2024 12:00) (14 - 22)  SpO2: 94% (18 Jan 2024 12:30) (93% - 100%)    O2 Parameters below as of 18 Jan 2024 12:30  Patient On (Oxygen Delivery Method): nasal cannula  O2 Flow (L/min): 3    I&O's Detail    17 Jan 2024 07:01  -  18 Jan 2024 07:00  --------------------------------------------------------  IN:    Oral Fluid: 240 mL  Total IN: 240 mL    OUT:    Incontinent per Condom Catheter (mL): 600 mL  Total OUT: 600 mL    Total NET: -360 mL    18 Jan 2024 07:01  -  18 Jan 2024 16:11  --------------------------------------------------------  IN:  Total IN: 0 mL    OUT:    Incontinent per Condom Catheter (mL): 400 mL  Total OUT: 400 mL    Total NET: -400 mL    Daily     Daily   Admit Wt: Drug Dosing Weight  Height (cm): 188 (16 Jan 2024 15:51)  Weight (kg): 126.961 (16 Jan 2024 15:51)  BMI (kg/m2): 35.9 (16 Jan 2024 15:51)  BSA (m2): 2.51 (16 Jan 2024 15:51)    LABS:                        11.5   6.12  )-----------( 106      ( 18 Jan 2024 04:57 )             39.5     01-18    144  |  98  |  26.0<H>  ----------------------------<  144<H>  4.1   |  37.0<H>  |  0.69    Ca    8.6      18 Jan 2024 04:57  Mg     2.1     01-18    TPro  6.5<L>  /  Alb  3.6  /  TBili  0.8  /  DBili  x   /  AST  12  /  ALT  11  /  AlkPhos  264<H>  01-18    LIVER FUNCTIONS - ( 18 Jan 2024 04:57 )  Alb: 3.6 g/dL / Pro: 6.5 g/dL / ALK PHOS: 264 U/L / ALT: 11 U/L / AST: 12 U/L / GGT: x           PT/INR - ( 18 Jan 2024 08:41 )   PT: 17.0 sec;   INR: 1.55 ratio         PTT - ( 18 Jan 2024 08:41 )  PTT:37.3 sec    Urinalysis Basic - ( 18 Jan 2024 04:57 )    Color: x / Appearance: x / SG: x / pH: x  Gluc: 144 mg/dL / Ketone: x  / Bili: x / Urobili: x   Blood: x / Protein: x / Nitrite: x   Leuk Esterase: x / RBC: x / WBC x   Sq Epi: x / Non Sq Epi: x / Bacteria: x      POCT Blood Glucose.: 114 mg/dL (01-18-24 @ 13:03)  POCT Blood Glucose.: 135 mg/dL (01-18-24 @ 09:18)  POCT Blood Glucose.: 171 mg/dL (01-18-24 @ 00:52)  POCT Blood Glucose.: 203 mg/dL (01-17-24 @ 22:22)    MEDICATIONS  MEDICATIONS  (STANDING):  aspirin enteric coated 81 milliGRAM(s) Oral daily  atorvastatin 10 milliGRAM(s) Oral at bedtime  dextrose 5%. 1000 milliLiter(s) (100 mL/Hr) IV Continuous <Continuous>  dextrose 5%. 1000 milliLiter(s) (50 mL/Hr) IV Continuous <Continuous>  dextrose 50% Injectable 25 Gram(s) IV Push once  dextrose 50% Injectable 25 Gram(s) IV Push once  dextrose 50% Injectable 12.5 Gram(s) IV Push once  doxazosin 2 milliGRAM(s) Oral daily  finasteride 5 milliGRAM(s) Oral daily  furosemide   Injectable 40 milliGRAM(s) IV Push daily  glucagon  Injectable 1 milliGRAM(s) IntraMuscular once  insulin lispro (ADMELOG) corrective regimen sliding scale   SubCutaneous three times a day before meals  losartan 25 milliGRAM(s) Oral daily  metoprolol tartrate 50 milliGRAM(s) Oral two times a day  pantoprazole    Tablet 40 milliGRAM(s) Oral before breakfast  potassium chloride    Tablet ER 20 milliEquivalent(s) Oral daily  sucralfate 1 Gram(s) Oral two times a day    MEDICATIONS  (PRN):  albuterol    90 MICROgram(s) HFA Inhaler 2 Puff(s) Inhalation every 6 hours PRN for shortness of breath and/or wheezing  ALPRAZolam 0.25 milliGRAM(s) Oral three times a day PRN Anxiety  dextrose Oral Gel 15 Gram(s) Oral once PRN Blood Glucose LESS THAN 70 milliGRAM(s)/deciliter    ALLERGIES:  Ceftin (Hives)  penicillin (Anaphylaxis)  sulfa drugs (Unknown)  Bactrim (Rash)    DIAGNOSTICS:  All laboratory results, radiology and medications reviewed.  < from: CT Angio Chest PE Protocol w/ IV Cont (01.17.24 @ 16:12) >  IMPRESSION:    1.  No main pulmonary embolism. Evaluation lobar, segmental, and   subsegmental pulmonary arteries is limited secondary to transient   interruption of contrast bolus during respiratory inspiration.  2.  Moderate right pleural effusion with complete right lower lobe   compressive atelectasis. Trace left pleural effusion.    < end of copied text >  < from: Xray Chest 1 View-PORTABLE IMMEDIATE (Xray Chest 1 View-PORTABLE IMMEDIATE .) (01.16.24 @ 19:07) >  INTERPRETATION:  Chest, right shoulder, and bilateral knees. Patient has   pain.    AP chest on January 16, 2024 at 6:54 PM.    Heart enlargement, sternotomy, and left-sided pacemaker again noted.    Sensor device in a branch of the left pulmonary artery may be new since   November 3, 2023.    There are increasing mid and lower lung field congestive changes compared   to prior with a developing right base effusion.    Right shoulder. 2 views. Mild degeneration. No fracture.    Bilateral knees. 3 views each.    Right knee. Mild effusion. Advanced degeneration. No fracture.    Left knee. Mild knee effusion. Advanced degeneration. No fracture.    IMPRESSION: Bilateral advanced knee degeneration with small bilateral   knee effusions. No fracture. Increasing CHF.    < end of copied text >    PHYSICAL EXAM:  Constitutional: NAD  Neck: supple, trachea midline. No JVD   Respiratory: Breath sounds diminished R>L lower fields to auscultation, no accessory muscle use noted. No wheezing, rales, or rhonchi noted b/l   Cardiovascular: Normal S1, S2; no murmurs or rub   Gastrointestinal: Soft, non-tender, non-distended  Extremities: GUERRA x 4, 2+ LE peripheral edema, no cyanosis, no clubbing    Vascular: Equal and normal pulses: 2+ peripheral pulses throughout  Skin: warm, dry, well perfused, no rashes

## 2024-01-18 NOTE — PROGRESS NOTE ADULT - SUBJECTIVE AND OBJECTIVE BOX
ANIA CRISTOBAL  [id number]        Chief Complaint:      Subjective: Put on BIPAP overnight due to CO2 retention/lethargy       24 hour Tele:        albuterol    90 MICROgram(s) HFA Inhaler 2 Puff(s) Inhalation every 6 hours PRN  ALPRAZolam 0.25 milliGRAM(s) Oral three times a day PRN  aspirin enteric coated 81 milliGRAM(s) Oral daily  atorvastatin 10 milliGRAM(s) Oral at bedtime  dextrose 5%. 1000 milliLiter(s) IV Continuous <Continuous>  dextrose 5%. 1000 milliLiter(s) IV Continuous <Continuous>  dextrose 50% Injectable 12.5 Gram(s) IV Push once  dextrose 50% Injectable 25 Gram(s) IV Push once  dextrose 50% Injectable 25 Gram(s) IV Push once  dextrose Oral Gel 15 Gram(s) Oral once PRN  doxazosin 2 milliGRAM(s) Oral daily  finasteride 5 milliGRAM(s) Oral daily  furosemide   Injectable 40 milliGRAM(s) IV Push daily  glucagon  Injectable 1 milliGRAM(s) IntraMuscular once  insulin lispro (ADMELOG) corrective regimen sliding scale   SubCutaneous three times a day before meals  losartan 25 milliGRAM(s) Oral daily  metoprolol tartrate 50 milliGRAM(s) Oral two times a day  pantoprazole    Tablet 40 milliGRAM(s) Oral before breakfast  potassium chloride    Tablet ER 20 milliEquivalent(s) Oral daily  sucralfate 1 Gram(s) Oral two times a day          Physical Exam:  T(C): 36.8 (01-18-24 @ 07:15), Max: 36.9 (01-18-24 @ 00:42)  HR: 67 (01-18-24 @ 11:59) (65 - 87)  BP: 101/57 (01-18-24 @ 08:00) (100/61 - 122/74)  RR: 14 (01-18-24 @ 08:00) (14 - 22)  SpO2: 94% (01-18-24 @ 12:30) (93% - 100%)  Wt(kg): --  General: Comfortable in NAD  HEENT: MMM, sclera anicteric  Resp: CTA bilaterally  CVS: nl s1s2, rrr, no s3/JVD  Abd: soft, NT, ND, BS+  Ext: No c/c/e  Neuro A&O x3  Psych: Normal affect    I&O's Summary    17 Jan 2024 07:01  -  18 Jan 2024 07:00  --------------------------------------------------------  IN: 240 mL / OUT: 600 mL / NET: -360 mL    MEDICATIONS (HOME):  · 	torsemide 20 mg oral tablet: 1 tab(s) orally 2 times a day MDD: 40 mg  · 	ergocalciferol 1.25 mg (50,000 intl units) oral capsule: 1 cap(s) orally once a week  · 	metFORMIN 500 mg oral tablet: 1 tab(s) orally 3 times a day  · 	doxycycline monohydrate 50 mg oral capsule: 2 cap(s) orally every 12 hours  · 	aspirin 81 mg oral tablet: 1 tab(s) orally once a day  · 	potassium chloride 20 mEq oral tablet, extended release: 1 tab(s) orally once a day  · 	metoprolol tartrate 50 mg oral tablet: 1 tab(s) orally 2 times a day  · 	apixaban 5 mg oral tablet: 1 tab(s) orally every 12 hours  · 	sucralfate 1 g oral tablet: 1 tab(s) orally 2 times a day  · 	ipratropium-albuterol 0.5 mg-2.5 mg/3 mL inhalation solution: 3 milliliter(s) inhaled every 6 hours  · 	ALPRAZolam 0.25 mg oral tablet: 1 tab(s) orally 3 times a day As needed Anxiety  · 	atorvastatin 10 mg oral tablet: 1 tab(s) orally once a day (at bedtime)  · 	finasteride 5 mg oral tablet: 1 tab(s) orally once a day  · 	doxazosin 2 mg oral tablet: 1 tab(s) orally once a day  · 	Albuterol (Eqv-ProAir HFA) 90 mcg/inh inhalation aerosol: 2 puff(s) inhaled every 6 hours as needed for  shortness of breath and/or wheezing  · 	omeprazole 40 mg oral delayed release capsule: 1 orally once a day      Labs:   18 Jan 2024 04:57    144    |  98     |  26.0   ----------------------------<  144    4.1     |  37.0   |  0.69     Ca    8.6        18 Jan 2024 04:57  Mg     2.1       18 Jan 2024 04:57    TPro  6.5    /  Alb  3.6    /  TBili  0.8    /  DBili  x      /  AST  12     /  ALT  11     /  AlkPhos  264    18 Jan 2024 04:57                          11.5   6.12  )-----------( 106      ( 18 Jan 2024 04:57 )             39.5     PT/INR - ( 18 Jan 2024 08:41 )   PT: 17.0 sec;   INR: 1.55 ratio         PTT - ( 18 Jan 2024 08:41 )  PTT:37.3 sec    CT HEAD:  IMPRESSION:  Mild chronic microvascular changes without evidence of an   acute transcortical infarction or hemorrhage.    CT CHEST   1.  No main pulmonary embolism. Evaluation lobar, segmental, and   subsegmental pulmonary arteries is limited secondary to transient   interruption of contrast bolus during respiratory inspiration.  2.  Moderate right pleural effusion with complete right lower lobe   compressive atelectasis. Trace left pleural effusion.    Echo (10/2023):   1. Left ventricular ejection fraction, by visual estimation, is 50 to 55%.   2. Normal global left ventricular systolic function.   3. Mildly increased left ventricular internal cavity size.   4. The mitral in-flow pattern reveals no discernable A-wave, therefore no comment on diastolic function can be made.   5. There is mild concentric left ventricular hypertrophy.   6. Normal right ventricular size and function.   7. There is no evidence of pericardial effusion.   8. Trace mitral valve regurgitation.   9. Moderate tricuspid regurgitation.  10. TAVR in the aortic position.  11. Estimated pulmonary artery systolic pressure is 48.9 mmHg assuming a right atrial pressure of 15 mmHg, which is consistent with mild pulmonary   hypertension.    ECHO 1/2024:   1. Left ventricular systolic function is normal with an ejection fraction visually estimated at 55 to 60 %.   2. The left atrium is mildly dilated.   3. The right atrium is moderately dilated in size.   4. No pericardial effusion seen.   5. Estimated pulmonary artery systolic pressure is 49 mmHg.   6. Mild mitralvalve leaflet calcification.   7. Moderate left ventricular hypertrophy.  8. Severe RVE with moderate dysfunction    Assessment:  79 y/o male PMHx severe AS s/p TAVR 10/6/23 with Dr. Napier, chronic combined CHF with mild LV dysfunction s/p Cardiomems, CAD s/p PCI, Thoracic Aortic Aneurysm, PAF s/p ablation and PPM, LLE DVT, ALLIE, BPH s/p TURP, renal mass, obesity, Anxiety, DM, GERD, cirrhosis p/w episode of apparent syncope and SOB.  Patient has been having more SOB for at least a week but time frame is overall unclear.  Appears to be in mild CHF based on CXR and symptoms, PAD only 16 on admission by cardioMEMs   -CT with moderate right pleural effusion and planned for thoracentesis  -ECho with RVE/RV dysfunction, no PE on CTA. Maybe more from obesity and underlying pulm disease as well as his left heart HFpEF. Unclear why recent worsening of RV however. No MI, trops all negative  -PPM interrogated and no events with normal function, 97.7% V pacing  -NOted to have hypercarbic respiratory failure last night      Plan: (TO BE SEEN)  1.  2. Eliquis on hold for thoracentesis  3. Change to po torsemide   4. Pulm evaluation     Chong Carranza MD ANIA CRISTOBAL  [id number]        Chief Complaint: follow up syncope/SOB      Subjective: Put on BIPAP overnight due to CO2 retention/lethargy   now off BiPAP, feels thirsty    24 hour Tele: V-paced        albuterol    90 MICROgram(s) HFA Inhaler 2 Puff(s) Inhalation every 6 hours PRN  ALPRAZolam 0.25 milliGRAM(s) Oral three times a day PRN  aspirin enteric coated 81 milliGRAM(s) Oral daily  atorvastatin 10 milliGRAM(s) Oral at bedtime  dextrose 5%. 1000 milliLiter(s) IV Continuous <Continuous>  dextrose 5%. 1000 milliLiter(s) IV Continuous <Continuous>  dextrose 50% Injectable 12.5 Gram(s) IV Push once  dextrose 50% Injectable 25 Gram(s) IV Push once  dextrose 50% Injectable 25 Gram(s) IV Push once  dextrose Oral Gel 15 Gram(s) Oral once PRN  doxazosin 2 milliGRAM(s) Oral daily  finasteride 5 milliGRAM(s) Oral daily  furosemide   Injectable 40 milliGRAM(s) IV Push daily  glucagon  Injectable 1 milliGRAM(s) IntraMuscular once  insulin lispro (ADMELOG) corrective regimen sliding scale   SubCutaneous three times a day before meals  losartan 25 milliGRAM(s) Oral daily  metoprolol tartrate 50 milliGRAM(s) Oral two times a day  pantoprazole    Tablet 40 milliGRAM(s) Oral before breakfast  potassium chloride    Tablet ER 20 milliEquivalent(s) Oral daily  sucralfate 1 Gram(s) Oral two times a day          Physical Exam:  T(C): 36.8 (01-18-24 @ 07:15), Max: 36.9 (01-18-24 @ 00:42)  HR: 67 (01-18-24 @ 11:59) (65 - 87)  BP: 101/57 (01-18-24 @ 08:00) (100/61 - 122/74)  RR: 14 (01-18-24 @ 08:00) (14 - 22)  SpO2: 94% (01-18-24 @ 12:30) (93% - 100%)  Wt(kg): --  General: Comfortable in NAD  HEENT: MMM, sclera anicteric  Resp: reduced air movement  CVS: distant s1s2, rrr, JVD  Abd: soft, NT, ND, obese  Ext: 1+ pitting edema  Neuro A&O x3  Psych: Normal affect    I&O's Summary    17 Jan 2024 07:01  -  18 Jan 2024 07:00  --------------------------------------------------------  IN: 240 mL / OUT: 600 mL / NET: -360 mL    MEDICATIONS (HOME):  · 	torsemide 20 mg oral tablet: 1 tab(s) orally 2 times a day MDD: 40 mg  · 	ergocalciferol 1.25 mg (50,000 intl units) oral capsule: 1 cap(s) orally once a week  · 	metFORMIN 500 mg oral tablet: 1 tab(s) orally 3 times a day  · 	doxycycline monohydrate 50 mg oral capsule: 2 cap(s) orally every 12 hours  · 	aspirin 81 mg oral tablet: 1 tab(s) orally once a day  · 	potassium chloride 20 mEq oral tablet, extended release: 1 tab(s) orally once a day  · 	metoprolol tartrate 50 mg oral tablet: 1 tab(s) orally 2 times a day  · 	apixaban 5 mg oral tablet: 1 tab(s) orally every 12 hours  · 	sucralfate 1 g oral tablet: 1 tab(s) orally 2 times a day  · 	ipratropium-albuterol 0.5 mg-2.5 mg/3 mL inhalation solution: 3 milliliter(s) inhaled every 6 hours  · 	ALPRAZolam 0.25 mg oral tablet: 1 tab(s) orally 3 times a day As needed Anxiety  · 	atorvastatin 10 mg oral tablet: 1 tab(s) orally once a day (at bedtime)  · 	finasteride 5 mg oral tablet: 1 tab(s) orally once a day  · 	doxazosin 2 mg oral tablet: 1 tab(s) orally once a day  · 	Albuterol (Eqv-ProAir HFA) 90 mcg/inh inhalation aerosol: 2 puff(s) inhaled every 6 hours as needed for  shortness of breath and/or wheezing  · 	omeprazole 40 mg oral delayed release capsule: 1 orally once a day      Labs:   18 Jan 2024 04:57    144    |  98     |  26.0   ----------------------------<  144    4.1     |  37.0   |  0.69     Ca    8.6        18 Jan 2024 04:57  Mg     2.1       18 Jan 2024 04:57    TPro  6.5    /  Alb  3.6    /  TBili  0.8    /  DBili  x      /  AST  12     /  ALT  11     /  AlkPhos  264    18 Jan 2024 04:57                          11.5   6.12  )-----------( 106      ( 18 Jan 2024 04:57 )             39.5     PT/INR - ( 18 Jan 2024 08:41 )   PT: 17.0 sec;   INR: 1.55 ratio         PTT - ( 18 Jan 2024 08:41 )  PTT:37.3 sec    CT HEAD:  IMPRESSION:  Mild chronic microvascular changes without evidence of an   acute transcortical infarction or hemorrhage.    CT CHEST   1.  No main pulmonary embolism. Evaluation lobar, segmental, and   subsegmental pulmonary arteries is limited secondary to transient   interruption of contrast bolus during respiratory inspiration.  2.  Moderate right pleural effusion with complete right lower lobe   compressive atelectasis. Trace left pleural effusion.    Echo (10/2023):   1. Left ventricular ejection fraction, by visual estimation, is 50 to 55%.   2. Normal global left ventricular systolic function.   3. Mildly increased left ventricular internal cavity size.   4. The mitral in-flow pattern reveals no discernable A-wave, therefore no comment on diastolic function can be made.   5. There is mild concentric left ventricular hypertrophy.   6. Normal right ventricular size and function.   7. There is no evidence of pericardial effusion.   8. Trace mitral valve regurgitation.   9. Moderate tricuspid regurgitation.  10. TAVR in the aortic position.  11. Estimated pulmonary artery systolic pressure is 48.9 mmHg assuming a right atrial pressure of 15 mmHg, which is consistent with mild pulmonary   hypertension.    ECHO 1/2024:   1. Left ventricular systolic function is normal with an ejection fraction visually estimated at 55 to 60 %.   2. The left atrium is mildly dilated.   3. The right atrium is moderately dilated in size.   4. No pericardial effusion seen.   5. Estimated pulmonary artery systolic pressure is 49 mmHg.   6. Mild mitralvalve leaflet calcification.   7. Moderate left ventricular hypertrophy.  8. Severe RVE with moderate dysfunction    Assessment:  79 y/o male PMHx severe AS s/p TAVR 10/6/23 with Dr. Napier, chronic combined CHF with mild LV dysfunction s/p Cardiomems, CAD s/p PCI, Thoracic Aortic Aneurysm, PAF s/p ablation and PPM, LLE DVT, ALLIE, BPH s/p TURP, renal mass, obesity, Anxiety, DM, GERD, cirrhosis p/w episode of apparent syncope and SOB.  Patient has been having more SOB for at least a week but time frame is overall unclear.  Appears to be in mild CHF based on CXR and symptoms, PAD only 16 on admission by cardioMEMs   -CT with moderate right pleural effusion and planned for thoracentesis  -ECho with RVE/RV dysfunction, no PE on CTA. Maybe more from obesity and underlying pulm disease as well as his left heart HFpEF. Unclear why recent worsening of RV however. No MI, trops all negative  -PPM interrogated and no events with normal function, 97.7% V pacing  -NOted to have hypercarbic respiratory failure last night improved after BiPAP  -CHFpEF compensated at this time, rhythm paced      Plan:   1. Continue current CV meds  2. Eliquis on hold for thoracentesis, restart post  3. Change to po torsemide daily  4. Pulm evaluation       Chnog Carranza MD

## 2024-01-18 NOTE — PROGRESS NOTE ADULT - ASSESSMENT
77 y/o male PMHx severe AS s/p TAVR 10/6/23 with Dr. Napier, chronic combined CHF with mild LV dysfunction s/p Cardiomems, CAD s/p PCI, Thoracic Aortic Aneurysm, PAF s/p ablation and PPM, LLE DVT, ALLIE, BPH s/p TURP, renal mass, obesity, Anxiety, DM, GERD, cirrhosis p/w episode of apparent syncope and SOB.  Thoracic surgery consulted for pleural effusion.   1/18 1AM hypercapnic resp failure placed on NIPPV  1/18 Right PTC placed 1300 mL serosanguinous fluid

## 2024-01-18 NOTE — PROGRESS NOTE ADULT - ASSESSMENT
The patient is a 77 year old male with a past medical history of Severe Aortic Stenosis s/p TAVR 10/6/23 with Dr. Napier, Chronic Diastolic CHF on 4 liters Oxygen at baseline, CAD s/p PCI to LAD, Thoracic Aortic Aneurysm, Paroxysmal Afib s/p Ablation (on Eliquis), and Cardiomems, LLE DVT, ALLIE, BPH s/p TURP, Left Renal Mass, Morbid Obesity, Laminectomy, Anxiety, Type 2 DM, GERD, Cholelithiasis who presented after a fall at home. States he was sitting up in bed and fell forward. In the ER, CT head was negative. Admitted to obs for syncope. Xrays of bilateral knees and right shoulder were negative for fracture. PPM was interrogated with no events. Chest xray with pulmonary vascular congestion with elevated BNP. Admitted for syncope and acute on chronic diastolic CHF. TTE with moderately dilated RA; normal LVSF. CTA of the chest ruled out PE; showed moderate right pleural effusion. Thoracic surgery consulted    Acute on chronic diastolic heart failure  Right sided pleural effusion  History of CAD   Cardiomems in place  - for thoracentesis / chest tube placement by thoracic surgery today   - eliquis on hold   - TTE with normal LSVF, moderately dilated RA- CTA ruled out PE   - Dr. Gotti following, Cardiomems pressure of 16- Decrease lasix to OD   - On BB, Asprin, Statin ARB   - Strict I&os  - Daily weight  - on lasix 40 mg iv daily    Acute on Chronic hypoxic and hypercapneic respiratory failure  - placed on BIPAP overnight given hypercapneic resp failure     Paroxysmal Afib  History of AS status post TAVR  - Hold Eliquis for Chest tube placement today   - On Po Metoprolol    Thrombocytopenia  - Monitor closely     Anxiety  - Dr Nielsen reviewed, all medications reconciled  - REsume Xanax 0.25 mg PO TID for anxiety  - Noted to be increasingly resltess- Repeat CT head ordered for fall on eliquis.     VTE_ SCDS ; REsume Eliquis post thoracentesis today

## 2024-01-18 NOTE — PROCEDURE NOTE - NSICDXPROBLEMMLMBOX_GEN
IPSTART~^~1~^~[id number]~^~~^~IPEND  PKSTART~^~[id number]~^~PKEND  
IPSTART~^~1~^~[id number]~^~~^~IPEND  PKSTART~^~[id number]~^~PKEND

## 2024-01-18 NOTE — CHART NOTE - NSCHARTNOTEFT_GEN_A_CORE
Called for critical CO2 of 80.   Patient was placed on BiPAP overnight for lethargy and hypercarbia, repeat ABG with no improvement.   Will continue w/ continuous BiPAP. D/w RT, will adjust settings and eval for leak.   Repeat ABG at 11 am. Called for critical CO2 of 80.   Patient was placed on BiPAP overnight for lethargy and hypercarbia, repeat ABG with no improvement.   Seen at bedside, patient in NAD, responsive to verbal stimuli. A&Ox3 however intermittently confused and endorses fatigue.   Will continue w/ continuous BiPAP. D/w RT, will adjust settings and eval for leak.   Repeat ABG at 11 am.

## 2024-01-18 NOTE — PROGRESS NOTE ADULT - SUBJECTIVE AND OBJECTIVE BOX
Adalberto Perez MD  [facility]  Contact via Teams or text/call at [phone]    Patient is a 78y old  Male who presents with a chief complaint of Syncope (17 Jan 2024 17:50)    Patient with lethargy overnight, abg done and found to be in hypercarbic respiratory failure.    Patient seen and examined at bedside. No overnight events reported.     ALLERGIES:  Ceftin (Hives)  penicillin (Anaphylaxis)  sulfa drugs (Unknown)  Bactrim (Rash)    MEDICATIONS  (STANDING):  aspirin enteric coated 81 milliGRAM(s) Oral daily  atorvastatin 10 milliGRAM(s) Oral at bedtime  dextrose 5%. 1000 milliLiter(s) (100 mL/Hr) IV Continuous <Continuous>  dextrose 5%. 1000 milliLiter(s) (50 mL/Hr) IV Continuous <Continuous>  dextrose 50% Injectable 25 Gram(s) IV Push once  dextrose 50% Injectable 25 Gram(s) IV Push once  dextrose 50% Injectable 12.5 Gram(s) IV Push once  doxazosin 2 milliGRAM(s) Oral daily  finasteride 5 milliGRAM(s) Oral daily  furosemide   Injectable 40 milliGRAM(s) IV Push daily  glucagon  Injectable 1 milliGRAM(s) IntraMuscular once  insulin lispro (ADMELOG) corrective regimen sliding scale   SubCutaneous three times a day before meals  losartan 25 milliGRAM(s) Oral daily  metoprolol tartrate 50 milliGRAM(s) Oral two times a day  pantoprazole    Tablet 40 milliGRAM(s) Oral before breakfast  potassium chloride    Tablet ER 20 milliEquivalent(s) Oral daily  sucralfate 1 Gram(s) Oral two times a day    MEDICATIONS  (PRN):  albuterol    90 MICROgram(s) HFA Inhaler 2 Puff(s) Inhalation every 6 hours PRN for shortness of breath and/or wheezing  ALPRAZolam 0.25 milliGRAM(s) Oral three times a day PRN Anxiety  dextrose Oral Gel 15 Gram(s) Oral once PRN Blood Glucose LESS THAN 70 milliGRAM(s)/deciliter    Vital Signs Last 24 Hrs  T(F): 98.2 (18 Jan 2024 07:15), Max: 98.5 (18 Jan 2024 00:42)  HR: 78 (18 Jan 2024 07:30) (69 - 87)  BP: 110/60 (18 Jan 2024 07:15) (100/61 - 122/74)  RR: 18 (18 Jan 2024 07:15) (16 - 22)  SpO2: 98% (18 Jan 2024 07:30) (93% - 98%)  I&O's Summary    17 Jan 2024 07:01  -  18 Jan 2024 07:00  --------------------------------------------------------  IN: 240 mL / OUT: 600 mL / NET: -360 mL      PHYSICAL EXAM:  General: on bipap, appears tired   ENT: No gross hearing impairment, Moist mucous membranes, no thrush  Neck: Supple, No JVD  Lungs: diminished right side with crackles on right, mildly labored breathing on BIPAP  Cardio: RRR, S1/S2, No murmur  Abdomen: Soft, Nontender, Nondistended; Bowel sounds present  Extremities: No calf tenderness, No cyanosis, No pitting edema  Psych: Appropriate mood and affect    LABS:                        11.5   6.12  )-----------( 106      ( 18 Jan 2024 04:57 )             39.5     01-18    144  |  98  |  26.0  ----------------------------<  144  4.1   |  37.0  |  0.69    Ca    8.6      18 Jan 2024 04:57  Mg     2.1     01-18    TPro  6.5  /  Alb  3.6  /  TBili  0.8  /  DBili  x   /  AST  12  /  ALT  11  /  AlkPhos  264  01-18    ABG - ( 18 Jan 2024 06:28 )  pH, Arterial: 7.360 pH, Blood: x     /  pCO2: 80    /  pO2: 56    / HCO3: 45    / Base Excess: 19.8  /  SaO2: 87.8      POCT Blood Glucose.: 171 mg/dL (18 Jan 2024 00:52)  POCT Blood Glucose.: 203 mg/dL (17 Jan 2024 22:22)      Urinalysis Basic - ( 18 Jan 2024 04:57 )    Color: x / Appearance: x / SG: x / pH: x  Gluc: 144 mg/dL / Ketone: x  / Bili: x / Urobili: x   Blood: x / Protein: x / Nitrite: x   Leuk Esterase: x / RBC: x / WBC x   Sq Epi: x / Non Sq Epi: x / Bacteria: x    RADIOLOGY & ADDITIONAL TESTS:    Care Discussed with Consultants/Other Providers:

## 2024-01-18 NOTE — CHART NOTE - NSCHARTNOTEFT_GEN_A_CORE
RN/ANM expressing concern that she was told during report pt A&Ox4, however when received to floor patient only A&Ox2 (name and birthday)  RN/ANM also concerned pt lethargic.  Pt seen and examined at bedside.  Arouses to sternal rub, then closes his eyes and repeats "sleep"  Denies chest pain, SOB, n/v/d/c, abdominal pain, HA, dizziness, blurry vision.  All vital signs stable.   Resting comfortably in NAD.   Breathing easy, unlabored, on 3L NC  Neuro exam non focal, PERRL, EOMI, follows commands, equal strength b/l, no facial asymmetry noted  CT head performed at 22:00, prelim read with no acute finding  Blood glucose WNL  ABG pending RN/ANM expressing concern that she was told during report pt A&Ox3, however when received to floor patient only A&Ox2 (name and birthday)  RN/ANM also concerned pt lethargic.  Pt seen and examined at bedside.  Arouses to sternal rub, then closes his eyes and repeats "sleep"  Denies chest pain, SOB, n/v/d/c, abdominal pain, HA, dizziness, blurry vision.  All vital signs stable.   Resting comfortably in NAD.   Breathing easy, unlabored, on 3L NC  Neuro exam non focal, PERRL, EOMI, follows commands, equal strength b/l, no facial asymmetry noted  CT head performed at 22:00, prelim read with no acute finding  Blood glucose WNL  ABG pending RN/ANM expressing concern that she was told during report pt A&Ox3, however when received to floor patient only A&Ox2 (name and birthday)  RN/ANM also concerned pt lethargic.  Pt seen and examined at bedside.  Arouses to sternal rub, then closes his eyes and repeats "sleep"  Denies chest pain, SOB, n/v/d/c, abdominal pain, HA, dizziness, blurry vision.  All vital signs stable.   Resting comfortably in NAD.   Breathing easy, unlabored, on 3L NC  Neuro exam non focal, PERRL, EOMI, follows commands, equal strength b/l, no facial asymmetry noted  CT head performed at 22:00, prelim read with no acute finding  Blood glucose WNL  ABG ordered, pCO2 79, will place on nocturnal bipap, repeat ABG in am

## 2024-01-19 LAB
ALBUMIN FLD-MCNC: 1.6 G/DL — SIGNIFICANT CHANGE UP
ANION GAP SERPL CALC-SCNC: 7 MMOL/L — SIGNIFICANT CHANGE UP (ref 5–17)
BUN SERPL-MCNC: 33.5 MG/DL — HIGH (ref 8–20)
CALCIUM SERPL-MCNC: 8.3 MG/DL — LOW (ref 8.4–10.5)
CHLORIDE SERPL-SCNC: 96 MMOL/L — SIGNIFICANT CHANGE UP (ref 96–108)
CO2 SERPL-SCNC: 38 MMOL/L — HIGH (ref 22–29)
CREAT SERPL-MCNC: 1.07 MG/DL — SIGNIFICANT CHANGE UP (ref 0.5–1.3)
EGFR: 71 ML/MIN/1.73M2 — SIGNIFICANT CHANGE UP
GLUCOSE BLDC GLUCOMTR-MCNC: 140 MG/DL — HIGH (ref 70–99)
GLUCOSE BLDC GLUCOMTR-MCNC: 189 MG/DL — HIGH (ref 70–99)
GLUCOSE BLDC GLUCOMTR-MCNC: 211 MG/DL — HIGH (ref 70–99)
GLUCOSE BLDC GLUCOMTR-MCNC: 235 MG/DL — HIGH (ref 70–99)
GLUCOSE FLD-MCNC: 153 MG/DL — SIGNIFICANT CHANGE UP
GLUCOSE SERPL-MCNC: 146 MG/DL — HIGH (ref 70–99)
HCT VFR BLD CALC: 39.4 % — SIGNIFICANT CHANGE UP (ref 39–50)
HGB BLD-MCNC: 11.3 G/DL — LOW (ref 13–17)
LDH SERPL L TO P-CCNC: 180 U/L — SIGNIFICANT CHANGE UP (ref 98–192)
LDH SERPL L TO P-CCNC: 62 U/L — SIGNIFICANT CHANGE UP
MCHC RBC-ENTMCNC: 27.2 PG — SIGNIFICANT CHANGE UP (ref 27–34)
MCHC RBC-ENTMCNC: 28.7 GM/DL — LOW (ref 32–36)
MCV RBC AUTO: 94.9 FL — SIGNIFICANT CHANGE UP (ref 80–100)
PLATELET # BLD AUTO: 123 K/UL — LOW (ref 150–400)
POTASSIUM SERPL-MCNC: 4.3 MMOL/L — SIGNIFICANT CHANGE UP (ref 3.5–5.3)
POTASSIUM SERPL-SCNC: 4.3 MMOL/L — SIGNIFICANT CHANGE UP (ref 3.5–5.3)
PROT FLD-MCNC: 2.7 G/DL — SIGNIFICANT CHANGE UP
PROT SERPL-MCNC: 6.3 G/DL — LOW (ref 6.6–8.7)
RBC # BLD: 4.15 M/UL — LOW (ref 4.2–5.8)
RBC # FLD: 15.5 % — HIGH (ref 10.3–14.5)
SODIUM SERPL-SCNC: 140 MMOL/L — SIGNIFICANT CHANGE UP (ref 135–145)
WBC # BLD: 6.77 K/UL — SIGNIFICANT CHANGE UP (ref 3.8–10.5)
WBC # FLD AUTO: 6.77 K/UL — SIGNIFICANT CHANGE UP (ref 3.8–10.5)

## 2024-01-19 PROCEDURE — 99232 SBSQ HOSP IP/OBS MODERATE 35: CPT

## 2024-01-19 PROCEDURE — 99231 SBSQ HOSP IP/OBS SF/LOW 25: CPT

## 2024-01-19 PROCEDURE — 71045 X-RAY EXAM CHEST 1 VIEW: CPT | Mod: 26

## 2024-01-19 RX ORDER — APIXABAN 2.5 MG/1
5 TABLET, FILM COATED ORAL
Refills: 0 | Status: DISCONTINUED | OUTPATIENT
Start: 2024-01-19 | End: 2024-01-20

## 2024-01-19 RX ORDER — ALPRAZOLAM 0.25 MG
0.5 TABLET ORAL THREE TIMES A DAY
Refills: 0 | Status: DISCONTINUED | OUTPATIENT
Start: 2024-01-19 | End: 2024-01-20

## 2024-01-19 RX ADMIN — Medication 20 MILLIEQUIVALENT(S): at 12:06

## 2024-01-19 RX ADMIN — Medication 1 GRAM(S): at 05:09

## 2024-01-19 RX ADMIN — Medication 0.5 MILLIGRAM(S): at 21:52

## 2024-01-19 RX ADMIN — Medication 50 MILLIGRAM(S): at 17:36

## 2024-01-19 RX ADMIN — Medication 50 MILLIGRAM(S): at 05:07

## 2024-01-19 RX ADMIN — LOSARTAN POTASSIUM 25 MILLIGRAM(S): 100 TABLET, FILM COATED ORAL at 05:09

## 2024-01-19 RX ADMIN — Medication 3 MILLIGRAM(S): at 21:52

## 2024-01-19 RX ADMIN — ATORVASTATIN CALCIUM 10 MILLIGRAM(S): 80 TABLET, FILM COATED ORAL at 21:52

## 2024-01-19 RX ADMIN — FINASTERIDE 5 MILLIGRAM(S): 5 TABLET, FILM COATED ORAL at 12:09

## 2024-01-19 RX ADMIN — Medication 81 MILLIGRAM(S): at 12:06

## 2024-01-19 RX ADMIN — Medication 40 MILLIGRAM(S): at 05:09

## 2024-01-19 RX ADMIN — Medication 1 GRAM(S): at 17:36

## 2024-01-19 RX ADMIN — Medication 4: at 13:50

## 2024-01-19 RX ADMIN — Medication 2 MILLIGRAM(S): at 05:07

## 2024-01-19 RX ADMIN — PANTOPRAZOLE SODIUM 40 MILLIGRAM(S): 20 TABLET, DELAYED RELEASE ORAL at 05:09

## 2024-01-19 RX ADMIN — Medication 0.5 MILLIGRAM(S): at 17:36

## 2024-01-19 RX ADMIN — APIXABAN 5 MILLIGRAM(S): 2.5 TABLET, FILM COATED ORAL at 17:37

## 2024-01-19 RX ADMIN — Medication 0.5 MILLIGRAM(S): at 11:31

## 2024-01-19 RX ADMIN — Medication 2: at 18:15

## 2024-01-19 NOTE — PROGRESS NOTE ADULT - SUBJECTIVE AND OBJECTIVE BOX
ANIA CRISTOBAL  [id number]        Chief Complaint: follow up syncope/SOB      Subjective: Put on BIPAP overnight due to CO2 retention/lethargy   now off BiPAP, feels thirsty    24 hour Tele: V-paced        albuterol    90 MICROgram(s) HFA Inhaler 2 Puff(s) Inhalation every 6 hours PRN  ALPRAZolam 0.5 milliGRAM(s) Oral three times a day PRN  apixaban 5 milliGRAM(s) Oral two times a day  aspirin enteric coated 81 milliGRAM(s) Oral daily  atorvastatin 10 milliGRAM(s) Oral at bedtime  dextrose 5%. 1000 milliLiter(s) IV Continuous <Continuous>  dextrose 5%. 1000 milliLiter(s) IV Continuous <Continuous>  dextrose 50% Injectable 25 Gram(s) IV Push once  dextrose 50% Injectable 25 Gram(s) IV Push once  dextrose 50% Injectable 12.5 Gram(s) IV Push once  dextrose Oral Gel 15 Gram(s) Oral once PRN  doxazosin 2 milliGRAM(s) Oral daily  finasteride 5 milliGRAM(s) Oral daily  furosemide   Injectable 40 milliGRAM(s) IV Push daily  glucagon  Injectable 1 milliGRAM(s) IntraMuscular once  insulin lispro (ADMELOG) corrective regimen sliding scale   SubCutaneous three times a day before meals  losartan 25 milliGRAM(s) Oral daily  melatonin 3 milliGRAM(s) Oral at bedtime PRN  metoprolol tartrate 50 milliGRAM(s) Oral two times a day  pantoprazole    Tablet 40 milliGRAM(s) Oral before breakfast  potassium chloride    Tablet ER 20 milliEquivalent(s) Oral daily  sucralfate 1 Gram(s) Oral two times a day          Physical Exam:  T(C): 36.4 (01-19-24 @ 07:31), Max: 36.7 (01-18-24 @ 12:00)  HR: 69 (01-19-24 @ 08:00) (67 - 75)  BP: 102/52 (01-19-24 @ 08:00) (86/52 - 111/62)  RR: 18 (01-19-24 @ 08:00) (14 - 20)  SpO2: 96% (01-19-24 @ 08:00) (91% - 100%)  Wt(kg): --  General: Comfortable in NAD  HEENT: MMM, sclera anicteric  Resp: reduced air movement  CVS: distant s1s2, rrr, JVD  Abd: soft, NT, ND, obese  Ext: 1+ pitting edema  Neuro A&O x3  Psych: Normal affect      I&O's Summary    18 Jan 2024 07:01  -  19 Jan 2024 07:00  --------------------------------------------------------  IN: 525 mL / OUT: 1910 mL / NET: -1385 mL          Labs:   19 Jan 2024 05:16    140    |  96     |  33.5   ----------------------------<  146    4.3     |  38.0   |  1.07     Ca    8.3        19 Jan 2024 05:16  Mg     2.1       18 Jan 2024 04:57    TPro  6.3    /  Alb  x      /  TBili  x      /  DBili  x      /  AST  x      /  ALT  x      /  AlkPhos  x      19 Jan 2024 05:16                          11.3   6.77  )-----------( 123      ( 19 Jan 2024 05:16 )             39.4     PT/INR - ( 18 Jan 2024 08:41 )   PT: 17.0 sec;   INR: 1.55 ratio         PTT - ( 18 Jan 2024 08:41 )  PTT:37.3 sec  MEDICATIONS (HOME):  · 	torsemide 20 mg oral tablet: 1 tab(s) orally 2 times a day MDD: 40 mg  · 	ergocalciferol 1.25 mg (50,000 intl units) oral capsule: 1 cap(s) orally once a week  · 	metFORMIN 500 mg oral tablet: 1 tab(s) orally 3 times a day  · 	doxycycline monohydrate 50 mg oral capsule: 2 cap(s) orally every 12 hours  · 	aspirin 81 mg oral tablet: 1 tab(s) orally once a day  · 	potassium chloride 20 mEq oral tablet, extended release: 1 tab(s) orally once a day  · 	metoprolol tartrate 50 mg oral tablet: 1 tab(s) orally 2 times a day  · 	apixaban 5 mg oral tablet: 1 tab(s) orally every 12 hours  · 	sucralfate 1 g oral tablet: 1 tab(s) orally 2 times a day  · 	ipratropium-albuterol 0.5 mg-2.5 mg/3 mL inhalation solution: 3 milliliter(s) inhaled every 6 hours  · 	ALPRAZolam 0.25 mg oral tablet: 1 tab(s) orally 3 times a day As needed Anxiety  · 	atorvastatin 10 mg oral tablet: 1 tab(s) orally once a day (at bedtime)  · 	finasteride 5 mg oral tablet: 1 tab(s) orally once a day  · 	doxazosin 2 mg oral tablet: 1 tab(s) orally once a day  · 	Albuterol (Eqv-ProAir HFA) 90 mcg/inh inhalation aerosol: 2 puff(s) inhaled every 6 hours as needed for  shortness of breath and/or wheezing  · 	omeprazole 40 mg oral delayed release capsule: 1 orally once a day        CT HEAD:  IMPRESSION:  Mild chronic microvascular changes without evidence of an   acute transcortical infarction or hemorrhage.    CT CHEST   1.  No main pulmonary embolism. Evaluation lobar, segmental, and   subsegmental pulmonary arteries is limited secondary to transient   interruption of contrast bolus during respiratory inspiration.  2.  Moderate right pleural effusion with complete right lower lobe   compressive atelectasis. Trace left pleural effusion.    Echo (10/2023):   1. Left ventricular ejection fraction, by visual estimation, is 50 to 55%.   2. Normal global left ventricular systolic function.   3. Mildly increased left ventricular internal cavity size.   4. The mitral in-flow pattern reveals no discernable A-wave, therefore no comment on diastolic function can be made.   5. There is mild concentric left ventricular hypertrophy.   6. Normal right ventricular size and function.   7. There is no evidence of pericardial effusion.   8. Trace mitral valve regurgitation.   9. Moderate tricuspid regurgitation.  10. TAVR in the aortic position.  11. Estimated pulmonary artery systolic pressure is 48.9 mmHg assuming a right atrial pressure of 15 mmHg, which is consistent with mild pulmonary   hypertension.    ECHO 1/2024:   1. Left ventricular systolic function is normal with an ejection fraction visually estimated at 55 to 60 %.   2. The left atrium is mildly dilated.   3. The right atrium is moderately dilated in size.   4. No pericardial effusion seen.   5. Estimated pulmonary artery systolic pressure is 49 mmHg.   6. Mild mitralvalve leaflet calcification.   7. Moderate left ventricular hypertrophy.  8. Severe RVE with moderate dysfunction    Assessment:  77 y/o male PMHx severe AS s/p TAVR 10/6/23 with Dr. Napier, chronic combined CHF with mild LV dysfunction s/p Cardiomems, CAD s/p PCI, Thoracic Aortic Aneurysm, PAF s/p ablation and PPM, LLE DVT, ALLIE, BPH s/p TURP, renal mass, obesity, Anxiety, DM, GERD, cirrhosis p/w episode of apparent syncope and SOB.  Patient has been having more SOB for at least a week but time frame is overall unclear.  Appears to be in mild CHF based on CXR and symptoms, PAD only 16 on admission by cardioMEMs   -CT with moderate right pleural effusion and planned for thoracentesis  -ECho with RVE/RV dysfunction, no PE on CTA. Maybe more from obesity and underlying pulm disease as well as his left heart HFpEF. Unclear why recent worsening of RV however. No MI, trops all negative  -PPM interrogated and no events with normal function, 97.7% V pacing  -NOted to have hypercarbic respiratory failure last night improved after BiPAP  -CHFpEF compensated at this time, rhythm remains paced  -s/p chest tube for right pleural effusion, serosnaguinous fluid removed 1.3 L       Plan: (TO BE SEEN)   1. Continue current CV meds  2. Eliquis for AF  3. Change to po torsemide daily from IV lasix  4. Pulm evaluation   5.       Chong Carranza MD ANIA CRISTOBAL  [id number]        Chief Complaint: follow up syncope/SOB      Subjective:s/p chest tube, struggling with urination     24 hour Tele: V-paced        albuterol    90 MICROgram(s) HFA Inhaler 2 Puff(s) Inhalation every 6 hours PRN  ALPRAZolam 0.5 milliGRAM(s) Oral three times a day PRN  apixaban 5 milliGRAM(s) Oral two times a day  aspirin enteric coated 81 milliGRAM(s) Oral daily  atorvastatin 10 milliGRAM(s) Oral at bedtime  dextrose 5%. 1000 milliLiter(s) IV Continuous <Continuous>  dextrose 5%. 1000 milliLiter(s) IV Continuous <Continuous>  dextrose 50% Injectable 25 Gram(s) IV Push once  dextrose 50% Injectable 25 Gram(s) IV Push once  dextrose 50% Injectable 12.5 Gram(s) IV Push once  dextrose Oral Gel 15 Gram(s) Oral once PRN  doxazosin 2 milliGRAM(s) Oral daily  finasteride 5 milliGRAM(s) Oral daily  furosemide   Injectable 40 milliGRAM(s) IV Push daily  glucagon  Injectable 1 milliGRAM(s) IntraMuscular once  insulin lispro (ADMELOG) corrective regimen sliding scale   SubCutaneous three times a day before meals  losartan 25 milliGRAM(s) Oral daily  melatonin 3 milliGRAM(s) Oral at bedtime PRN  metoprolol tartrate 50 milliGRAM(s) Oral two times a day  pantoprazole    Tablet 40 milliGRAM(s) Oral before breakfast  potassium chloride    Tablet ER 20 milliEquivalent(s) Oral daily  sucralfate 1 Gram(s) Oral two times a day          Physical Exam:  T(C): 36.4 (01-19-24 @ 07:31), Max: 36.7 (01-18-24 @ 12:00)  HR: 69 (01-19-24 @ 08:00) (67 - 75)  BP: 102/52 (01-19-24 @ 08:00) (86/52 - 111/62)  RR: 18 (01-19-24 @ 08:00) (14 - 20)  SpO2: 96% (01-19-24 @ 08:00) (91% - 100%)  Wt(kg): --  General: Comfortable in NAD  HEENT: MMM, sclera anicteric  Resp: reduced air movement, right chest tube in place   CVS: distant s1s2, rrr, JVD  Abd: soft, NT, ND, obese  Ext: 1+ pitting edema  Neuro A&O x3  Psych: Normal affect      I&O's Summary    18 Jan 2024 07:01  -  19 Jan 2024 07:00  --------------------------------------------------------  IN: 525 mL / OUT: 1910 mL / NET: -1385 mL          Labs:   19 Jan 2024 05:16    140    |  96     |  33.5   ----------------------------<  146    4.3     |  38.0   |  1.07     Ca    8.3        19 Jan 2024 05:16  Mg     2.1       18 Jan 2024 04:57    TPro  6.3    /  Alb  x      /  TBili  x      /  DBili  x      /  AST  x      /  ALT  x      /  AlkPhos  x      19 Jan 2024 05:16                          11.3   6.77  )-----------( 123      ( 19 Jan 2024 05:16 )             39.4     PT/INR - ( 18 Jan 2024 08:41 )   PT: 17.0 sec;   INR: 1.55 ratio         PTT - ( 18 Jan 2024 08:41 )  PTT:37.3 sec  MEDICATIONS (HOME):  · 	torsemide 20 mg oral tablet: 1 tab(s) orally 2 times a day MDD: 40 mg  · 	ergocalciferol 1.25 mg (50,000 intl units) oral capsule: 1 cap(s) orally once a week  · 	metFORMIN 500 mg oral tablet: 1 tab(s) orally 3 times a day  · 	doxycycline monohydrate 50 mg oral capsule: 2 cap(s) orally every 12 hours  · 	aspirin 81 mg oral tablet: 1 tab(s) orally once a day  · 	potassium chloride 20 mEq oral tablet, extended release: 1 tab(s) orally once a day  · 	metoprolol tartrate 50 mg oral tablet: 1 tab(s) orally 2 times a day  · 	apixaban 5 mg oral tablet: 1 tab(s) orally every 12 hours  · 	sucralfate 1 g oral tablet: 1 tab(s) orally 2 times a day  · 	ipratropium-albuterol 0.5 mg-2.5 mg/3 mL inhalation solution: 3 milliliter(s) inhaled every 6 hours  · 	ALPRAZolam 0.25 mg oral tablet: 1 tab(s) orally 3 times a day As needed Anxiety  · 	atorvastatin 10 mg oral tablet: 1 tab(s) orally once a day (at bedtime)  · 	finasteride 5 mg oral tablet: 1 tab(s) orally once a day  · 	doxazosin 2 mg oral tablet: 1 tab(s) orally once a day  · 	Albuterol (Eqv-ProAir HFA) 90 mcg/inh inhalation aerosol: 2 puff(s) inhaled every 6 hours as needed for  shortness of breath and/or wheezing  · 	omeprazole 40 mg oral delayed release capsule: 1 orally once a day        CT HEAD:  IMPRESSION:  Mild chronic microvascular changes without evidence of an   acute transcortical infarction or hemorrhage.    CT CHEST   1.  No main pulmonary embolism. Evaluation lobar, segmental, and   subsegmental pulmonary arteries is limited secondary to transient   interruption of contrast bolus during respiratory inspiration.  2.  Moderate right pleural effusion with complete right lower lobe   compressive atelectasis. Trace left pleural effusion.    Echo (10/2023):   1. Left ventricular ejection fraction, by visual estimation, is 50 to 55%.   2. Normal global left ventricular systolic function.   3. Mildly increased left ventricular internal cavity size.   4. The mitral in-flow pattern reveals no discernable A-wave, therefore no comment on diastolic function can be made.   5. There is mild concentric left ventricular hypertrophy.   6. Normal right ventricular size and function.   7. There is no evidence of pericardial effusion.   8. Trace mitral valve regurgitation.   9. Moderate tricuspid regurgitation.  10. TAVR in the aortic position.  11. Estimated pulmonary artery systolic pressure is 48.9 mmHg assuming a right atrial pressure of 15 mmHg, which is consistent with mild pulmonary   hypertension.    ECHO 1/2024:   1. Left ventricular systolic function is normal with an ejection fraction visually estimated at 55 to 60 %.   2. The left atrium is mildly dilated.   3. The right atrium is moderately dilated in size.   4. No pericardial effusion seen.   5. Estimated pulmonary artery systolic pressure is 49 mmHg.   6. Mild mitralvalve leaflet calcification.   7. Moderate left ventricular hypertrophy.  8. Severe RVE with moderate dysfunction    Assessment:  79 y/o male PMHx severe AS s/p TAVR 10/6/23 with Dr. Napier, chronic combined CHF with mild LV dysfunction s/p Cardiomems, CAD s/p PCI, Thoracic Aortic Aneurysm, PAF s/p ablation and PPM, LLE DVT, ALLIE, BPH s/p TURP, renal mass, obesity, Anxiety, DM, GERD, cirrhosis p/w episode of apparent syncope and SOB.  Patient has been having more SOB for at least a week but time frame is overall unclear.  Appears to be in mild CHF based on CXR and symptoms, PAD only 16 on admission by cardioMEMs   -CT with moderate right pleural effusion and planned for thoracentesis  -ECho with RVE/RV dysfunction, no PE on CTA. Maybe more from obesity and underlying pulm disease as well as his left heart HFpEF. Unclear why recent worsening of RV however. No MI, trops all negative  -PPM interrogated and no events with normal function, 97.7% V pacing  -NOted to have hypercarbic respiratory failure last night improved after BiPAP  -CHFpEF compensated at this time, rhythm remains paced  -s/p chest tube for right pleural effusion, serosnaguinous fluid removed 1.3 L       Plan:   1. Continue current CV meds  2. Eliquis for AF  3. Change to po torsemide bid (home dosing)  4. Pulm evaluation   5. Follow up pleural fluid analysis  6. Work up urinary retention by primary team        Chong Carranza MD

## 2024-01-19 NOTE — PROGRESS NOTE ADULT - SUBJECTIVE AND OBJECTIVE BOX
Adalberto Perez MD  [facility]  Contact via Teams or text/call at [phone]    Patient is a 78y old  Male who presents with a chief complaint of Syncope (19 Jan 2024 08:23)    Still sob.  Feels anxious    Patient seen and examined at bedside. No overnight events reported.     ALLERGIES:  Ceftin (Hives)  penicillin (Anaphylaxis)  sulfa drugs (Unknown)  Bactrim (Rash)    MEDICATIONS  (STANDING):  aspirin enteric coated 81 milliGRAM(s) Oral daily  atorvastatin 10 milliGRAM(s) Oral at bedtime  dextrose 5%. 1000 milliLiter(s) (50 mL/Hr) IV Continuous <Continuous>  dextrose 5%. 1000 milliLiter(s) (100 mL/Hr) IV Continuous <Continuous>  dextrose 50% Injectable 25 Gram(s) IV Push once  dextrose 50% Injectable 25 Gram(s) IV Push once  dextrose 50% Injectable 12.5 Gram(s) IV Push once  doxazosin 2 milliGRAM(s) Oral daily  finasteride 5 milliGRAM(s) Oral daily  furosemide   Injectable 40 milliGRAM(s) IV Push daily  glucagon  Injectable 1 milliGRAM(s) IntraMuscular once  insulin lispro (ADMELOG) corrective regimen sliding scale   SubCutaneous three times a day before meals  losartan 25 milliGRAM(s) Oral daily  metoprolol tartrate 50 milliGRAM(s) Oral two times a day  pantoprazole    Tablet 40 milliGRAM(s) Oral before breakfast  potassium chloride    Tablet ER 20 milliEquivalent(s) Oral daily  sucralfate 1 Gram(s) Oral two times a day    MEDICATIONS  (PRN):  albuterol    90 MICROgram(s) HFA Inhaler 2 Puff(s) Inhalation every 6 hours PRN for shortness of breath and/or wheezing  ALPRAZolam 0.25 milliGRAM(s) Oral three times a day PRN Anxiety  dextrose Oral Gel 15 Gram(s) Oral once PRN Blood Glucose LESS THAN 70 milliGRAM(s)/deciliter  melatonin 3 milliGRAM(s) Oral at bedtime PRN Insomnia    Vital Signs Last 24 Hrs  T(F): 97.5 (19 Jan 2024 07:31), Max: 98 (18 Jan 2024 12:00)  HR: 72 (19 Jan 2024 06:00) (67 - 75)  BP: 86/52 (19 Jan 2024 06:00) (86/52 - 111/62)  RR: 18 (19 Jan 2024 06:00) (14 - 20)  SpO2: 91% (19 Jan 2024 06:00) (91% - 100%)  I&O's Summary    18 Jan 2024 07:01  -  19 Jan 2024 07:00  --------------------------------------------------------  IN: 525 mL / OUT: 1910 mL / NET: -1385 mL    PHYSICAL EXAM:  General: NAD, A/O x 3  ENT: No gross hearing impairment, Moist mucous membranes, no thrush  Neck: Supple, No JVD  Lungs: Clear to auscultation bilaterally, good air entry, non-labored breathing  Cardio: RRR, S1/S2, No murmur  Abdomen: Soft, Nontender, Nondistended; Bowel sounds present  Extremities: No calf tenderness, No cyanosis, No pitting edema  Psych: Appropriate mood and affect    LABS:                        11.3   6.77  )-----------( 123      ( 19 Jan 2024 05:16 )             39.4     01-19    140  |  96  |  33.5  ----------------------------<  146  4.3   |  38.0  |  1.07    Ca    8.3      19 Jan 2024 05:16  Mg     2.1     01-18    TPro  6.3  /  Alb  x   /  TBili  x   /  DBili  x   /  AST  x   /  ALT  x   /  AlkPhos  x   01-19    PT/INR - ( 18 Jan 2024 08:41 )   PT: 17.0 sec;   INR: 1.55 ratio       PTT - ( 18 Jan 2024 08:41 )  PTT:37.3 sec    ABG - ( 18 Jan 2024 11:51 )  pH, Arterial: 7.440 pH, Blood: x     /  pCO2: 66    /  pO2: 85    / HCO3: 45    / Base Excess: 20.6  /  SaO2: 98.2      POCT Blood Glucose.: 179 mg/dL (18 Jan 2024 21:22)  POCT Blood Glucose.: 202 mg/dL (18 Jan 2024 17:38)  POCT Blood Glucose.: 114 mg/dL (18 Jan 2024 13:03)  POCT Blood Glucose.: 135 mg/dL (18 Jan 2024 09:18)      Urinalysis Basic - ( 19 Jan 2024 05:16 )    Color: x / Appearance: x / SG: x / pH: x  Gluc: 146 mg/dL / Ketone: x  / Bili: x / Urobili: x   Blood: x / Protein: x / Nitrite: x   Leuk Esterase: x / RBC: x / WBC x   Sq Epi: x / Non Sq Epi: x / Bacteria: x        Culture - Fungal, Body Fluid (collected 18 Jan 2024 15:43)  Source: Pleural Fl Pleural Fluid  Preliminary Report (19 Jan 2024 08:07):    Testing in progress    Culture - Body Fluid with Gram Stain (collected 18 Jan 2024 15:42)  Source: Pleural Fl Pleural Fluid  Gram Stain (18 Jan 2024 23:10):    polymorphonuclear leukocytes seen    No organisms seen    by cytocentrifuge        RADIOLOGY & ADDITIONAL TESTS:    Care Discussed with Consultants/Other Providers:

## 2024-01-19 NOTE — PROGRESS NOTE ADULT - ASSESSMENT
78M, PMHx severe AS s/p TAVR 10/6/23 with Dr. Napier, chronic combined CHF with mild LV dysfunction s/p Cardiomems, CAD s/p PCI, Thoracic Aortic Aneurysm, PAF s/p ablation and PPM, LLE DVT, ALLIE, BPH s/p TURP, renal mass, obesity, Anxiety, DM, GERD, cirrhosis p/w episode of apparent syncope and SOB.  Thoracic surgery consulted for pleural effusion.   1/18 1AM hypercapnic resp failure placed on NIPPV  1/18 Right PTC placed 1300 mL serosanguinous fluid

## 2024-01-19 NOTE — PROGRESS NOTE ADULT - ASSESSMENT
The patient is a 77 year old male with a past medical history of Severe Aortic Stenosis s/p TAVR 10/6/23 with Dr. Napier, Chronic Diastolic CHF on 4 liters Oxygen at baseline, CAD s/p PCI to LAD, Thoracic Aortic Aneurysm, Paroxysmal Afib s/p Ablation (on Eliquis), and Cardiomems, LLE DVT, ALLIE, BPH s/p TURP, Left Renal Mass, Morbid Obesity, Laminectomy, Anxiety, Type 2 DM, GERD, Cholelithiasis who presented after a fall at home. States he was sitting up in bed and fell forward. In the ER, CT head was negative. Admitted to obs for syncope. Xrays of bilateral knees and right shoulder were negative for fracture. PPM was interrogated with no events. Chest xray with pulmonary vascular congestion with elevated BNP. Admitted for syncope and acute on chronic diastolic CHF. TTE with moderately dilated RA; normal LVSF. CTA of the chest ruled out PE; showed moderate right pleural effusion. Thoracic surgery consulted    Acute on chronic diastolic heart failure  Right sided pleural effusion  History of CAD   Cardiomems in place  - s/p chest tube placement 1/18/24  - restart eliquis   - TTE with normal LSVF, moderately dilated RA- CTA ruled out PE   - Dr. Gotti following, Cardiomems   - On BB, Asprin, Statin ARB   - Strict I&os  - Daily weight  - on lasix 40 mg iv daily    Acute on Chronic hypoxic and hypercapneic respiratory failure  - tolerated off BIPAP last night    Paroxysmal Afib  History of AS status post TAVR  - Hold Eliquis for Chest tube placement today   - On Po Metoprolol    Thrombocytopenia  - Monitor closely     Anxiety  - cont Xanax 0.25 mg PO TID for anxiety    VTE_ SCDS ; eliquis The patient is a 77 year old male with a past medical history of Severe Aortic Stenosis s/p TAVR 10/6/23 with Dr. Napier, Chronic Diastolic CHF on 4 liters Oxygen at baseline, CAD s/p PCI to LAD, Thoracic Aortic Aneurysm, Paroxysmal Afib s/p Ablation (on Eliquis), and Cardiomems, LLE DVT, ALLIE, BPH s/p TURP, Left Renal Mass, Morbid Obesity, Laminectomy, Anxiety, Type 2 DM, GERD, Cholelithiasis who presented after a fall at home. States he was sitting up in bed and fell forward. In the ER, CT head was negative. Admitted to obs for syncope. Xrays of bilateral knees and right shoulder were negative for fracture. PPM was interrogated with no events. Chest xray with pulmonary vascular congestion with elevated BNP. Admitted for syncope and acute on chronic diastolic CHF. TTE with moderately dilated RA; normal LVSF. CTA of the chest ruled out PE; showed moderate right pleural effusion. Thoracic surgery consulted    Acute on chronic diastolic heart failure  Acute on Chronic hypoxic and hypercapneic respiratory failure - improved on NC now  Right sided pleural effusion  History of CAD   Cardiomems in place  - s/p chest tube placement 1/18/24 - now on waterseal per thoracic  - restart eliquis   - TTE with normal LSVF, moderately dilated RA- CTA ruled out PE   - Dr. Gotti following, Cardiomems   - On BB, Asprin, Statin ARB   - Strict I&os  - Daily weight  - on lasix 40 mg iv daily    Paroxysmal Afib  History of AS status post TAVR  - eliquis   - On Po Metoprolol    Thrombocytopenia  - Monitor closely     Anxiety  - cont Xanax 0.25 mg PO TID for anxiety    VTE_ SCDS ; eliquis

## 2024-01-19 NOTE — PROGRESS NOTE ADULT - SUBJECTIVE AND OBJECTIVE BOX
Subjective:    T(C): 36.4 (01-19-24 @ 07:31), Max: 36.7 (01-18-24 @ 12:00)  HR: 72 (01-19-24 @ 06:00) (67 - 75)  BP: 86/52 (01-19-24 @ 06:00) (86/52 - 111/62)  RR: 18 (01-19-24 @ 06:00) (14 - 20)  SpO2: 91% (01-19-24 @ 06:00) (91% - 100%)    01-19    140  |  96  |  33.5<H>  ----------------------------<  146<H>  4.3   |  38.0<H>  |  1.07    Ca    8.3<L>      19 Jan 2024 05:16  Mg     2.1     01-18    TPro  6.3<L>  /  Alb  x   /  TBili  x   /  DBili  x   /  AST  x   /  ALT  x   /  AlkPhos  x   01-19                               11.3   6.77  )-----------( 123      ( 19 Jan 2024 05:16 )             39.4    PT/INR - ( 18 Jan 2024 08:41 )   PT: 17.0 sec;   INR: 1.55 ratio    PTT - ( 18 Jan 2024 08:41 )  PTT:37.3 sec  ABG - ( 18 Jan 2024 11:51 )  pH, Arterial: 7.440 pH, Blood: x     /  pCO2: 66    /  pO2: 85    / HCO3: 45    / Base Excess: 20.6  /  SaO2: 98.2                CAPILLARY BLOOD GLUCOSE  POCT Blood Glucose.: 179 mg/dL (18 Jan 2024 21:22)  POCT Blood Glucose.: 202 mg/dL (18 Jan 2024 17:38)  POCT Blood Glucose.: 114 mg/dL (18 Jan 2024 13:03)  POCT Blood Glucose.: 135 mg/dL (18 Jan 2024 09:18)    I&O's Detail    18 Jan 2024 07:01  -  19 Jan 2024 07:00  --------------------------------------------------------  IN:    Oral Fluid: 275 mL    Sodium Chloride 0.9% Bolus: 250 mL  Total IN: 525 mL    OUT:    Chest Tube (mL): 1510 mL    Incontinent per Condom Catheter (mL): 400 mL  Total OUT: 1910 mL  Total NET: -1385 mL    Drug Dosing Weight  Height (cm): 188 (16 Jan 2024 15:51)  Weight (kg): 126.961 (16 Jan 2024 15:51)  BMI (kg/m2): 35.9 (16 Jan 2024 15:51)  BSA (m2): 2.51 (16 Jan 2024 15:51)    MEDICATIONS  (STANDING):  aspirin enteric coated 81 milliGRAM(s) Oral daily  atorvastatin 10 milliGRAM(s) Oral at bedtime  dextrose 5%. 1000 milliLiter(s) (50 mL/Hr) IV Continuous <Continuous>  dextrose 5%. 1000 milliLiter(s) (100 mL/Hr) IV Continuous <Continuous>  dextrose 50% Injectable 12.5 Gram(s) IV Push once  dextrose 50% Injectable 25 Gram(s) IV Push once  dextrose 50% Injectable 25 Gram(s) IV Push once  doxazosin 2 milliGRAM(s) Oral daily  finasteride 5 milliGRAM(s) Oral daily  furosemide   Injectable 40 milliGRAM(s) IV Push daily  glucagon  Injectable 1 milliGRAM(s) IntraMuscular once  insulin lispro (ADMELOG) corrective regimen sliding scale   SubCutaneous three times a day before meals  losartan 25 milliGRAM(s) Oral daily  metoprolol tartrate 50 milliGRAM(s) Oral two times a day  pantoprazole    Tablet 40 milliGRAM(s) Oral before breakfast  potassium chloride    Tablet ER 20 milliEquivalent(s) Oral daily  sucralfate 1 Gram(s) Oral two times a day    MEDICATIONS  (PRN):  albuterol    90 MICROgram(s) HFA Inhaler 2 Puff(s) Inhalation every 6 hours PRN for shortness of breath and/or wheezing  ALPRAZolam 0.25 milliGRAM(s) Oral three times a day PRN Anxiety  dextrose Oral Gel 15 Gram(s) Oral once PRN Blood Glucose LESS THAN 70 milliGRAM(s)/deciliter  melatonin 3 milliGRAM(s) Oral at bedtime PRN Insomnia    Physical Exam  Gen:  Neuro:   Pulm:   CV:   Abd:   Extrem/MS:   R sherron to karan      Subjective: c/o pain at pigtail site denies CP, palpitations, SOB, cough, fever, chills, itchiness/rash, diaphoresis, vision changes, HA, dizziness/lightheadedness, numbness/tingling, abd pain, N/V     T(C): 36.4 (01-19-24 @ 07:31), Max: 36.7 (01-18-24 @ 12:00)  HR: 72 (01-19-24 @ 06:00) (67 - 75)  BP: 86/52 (01-19-24 @ 06:00) (86/52 - 111/62)  RR: 18 (01-19-24 @ 06:00) (14 - 20)  SpO2: 91% (01-19-24 @ 06:00) (91% - 100%)    01-19    140  |  96  |  33.5<H>  ----------------------------<  146<H>  4.3   |  38.0<H>  |  1.07    Ca    8.3<L>      19 Jan 2024 05:16  Mg     2.1     01-18    TPro  6.3<L>  /  Alb  x   /  TBili  x   /  DBili  x   /  AST  x   /  ALT  x   /  AlkPhos  x   01-19                               11.3   6.77  )-----------( 123      ( 19 Jan 2024 05:16 )             39.4    PT/INR - ( 18 Jan 2024 08:41 )   PT: 17.0 sec;   INR: 1.55 ratio    PTT - ( 18 Jan 2024 08:41 )  PTT:37.3 sec  ABG - ( 18 Jan 2024 11:51 )  pH, Arterial: 7.440 pH, Blood: x     /  pCO2: 66    /  pO2: 85    / HCO3: 45    / Base Excess: 20.6  /  SaO2: 98.2                CAPILLARY BLOOD GLUCOSE  POCT Blood Glucose.: 179 mg/dL (18 Jan 2024 21:22)  POCT Blood Glucose.: 202 mg/dL (18 Jan 2024 17:38)  POCT Blood Glucose.: 114 mg/dL (18 Jan 2024 13:03)  POCT Blood Glucose.: 135 mg/dL (18 Jan 2024 09:18)    I&O's Detail    18 Jan 2024 07:01  -  19 Jan 2024 07:00  --------------------------------------------------------  IN:    Oral Fluid: 275 mL    Sodium Chloride 0.9% Bolus: 250 mL  Total IN: 525 mL    OUT:    Chest Tube (mL): 1510 mL    Incontinent per Condom Catheter (mL): 400 mL  Total OUT: 1910 mL  Total NET: -1385 mL    Drug Dosing Weight  Height (cm): 188 (16 Jan 2024 15:51)  Weight (kg): 126.961 (16 Jan 2024 15:51)  BMI (kg/m2): 35.9 (16 Jan 2024 15:51)  BSA (m2): 2.51 (16 Jan 2024 15:51)    MEDICATIONS  (STANDING):  aspirin enteric coated 81 milliGRAM(s) Oral daily  atorvastatin 10 milliGRAM(s) Oral at bedtime  dextrose 5%. 1000 milliLiter(s) (50 mL/Hr) IV Continuous <Continuous>  dextrose 5%. 1000 milliLiter(s) (100 mL/Hr) IV Continuous <Continuous>  dextrose 50% Injectable 12.5 Gram(s) IV Push once  dextrose 50% Injectable 25 Gram(s) IV Push once  dextrose 50% Injectable 25 Gram(s) IV Push once  doxazosin 2 milliGRAM(s) Oral daily  finasteride 5 milliGRAM(s) Oral daily  furosemide   Injectable 40 milliGRAM(s) IV Push daily  glucagon  Injectable 1 milliGRAM(s) IntraMuscular once  insulin lispro (ADMELOG) corrective regimen sliding scale   SubCutaneous three times a day before meals  losartan 25 milliGRAM(s) Oral daily  metoprolol tartrate 50 milliGRAM(s) Oral two times a day  pantoprazole    Tablet 40 milliGRAM(s) Oral before breakfast  potassium chloride    Tablet ER 20 milliEquivalent(s) Oral daily  sucralfate 1 Gram(s) Oral two times a day    MEDICATIONS  (PRN):  albuterol    90 MICROgram(s) HFA Inhaler 2 Puff(s) Inhalation every 6 hours PRN for shortness of breath and/or wheezing  ALPRAZolam 0.25 milliGRAM(s) Oral three times a day PRN Anxiety  dextrose Oral Gel 15 Gram(s) Oral once PRN Blood Glucose LESS THAN 70 milliGRAM(s)/deciliter  melatonin 3 milliGRAM(s) Oral at bedtime PRN Insomnia    Physical Exam  Gen: NAD  Neuro: A&Ox3 non focal speech clear and intact   Pulm: crackles b/l no wheezing   CV: S1S2 RRR  Abd: +BS soft NT ND  Extrem/MS: mild LE edema, no cyanosis GUERRA  R pigtail to waterseal no air leak

## 2024-01-20 LAB
ANION GAP SERPL CALC-SCNC: 8 MMOL/L — SIGNIFICANT CHANGE UP (ref 5–17)
BASE EXCESS BLDA CALC-SCNC: 12.7 MMOL/L — HIGH (ref -2–3)
BASE EXCESS BLDA CALC-SCNC: 17.2 MMOL/L — HIGH (ref -2–3)
BLOOD GAS COMMENTS ARTERIAL: SIGNIFICANT CHANGE UP
BLOOD GAS COMMENTS ARTERIAL: SIGNIFICANT CHANGE UP
BUN SERPL-MCNC: 46.2 MG/DL — HIGH (ref 8–20)
CALCIUM SERPL-MCNC: 8.2 MG/DL — LOW (ref 8.4–10.5)
CHLORIDE SERPL-SCNC: 93 MMOL/L — LOW (ref 96–108)
CO2 SERPL-SCNC: 35 MMOL/L — HIGH (ref 22–29)
CREAT SERPL-MCNC: 1.12 MG/DL — SIGNIFICANT CHANGE UP (ref 0.5–1.3)
EGFR: 67 ML/MIN/1.73M2 — SIGNIFICANT CHANGE UP
GAS PNL BLDA: SIGNIFICANT CHANGE UP
GAS PNL BLDA: SIGNIFICANT CHANGE UP
GLUCOSE BLDC GLUCOMTR-MCNC: 131 MG/DL — HIGH (ref 70–99)
GLUCOSE BLDC GLUCOMTR-MCNC: 151 MG/DL — HIGH (ref 70–99)
GLUCOSE BLDC GLUCOMTR-MCNC: 188 MG/DL — HIGH (ref 70–99)
GLUCOSE SERPL-MCNC: 172 MG/DL — HIGH (ref 70–99)
HCO3 BLDA-SCNC: 40 MMOL/L — HIGH (ref 21–28)
HCO3 BLDA-SCNC: 43 MMOL/L — HIGH (ref 21–28)
HCT VFR BLD CALC: 40.4 % — SIGNIFICANT CHANGE UP (ref 39–50)
HGB BLD-MCNC: 12.3 G/DL — LOW (ref 13–17)
HOROWITZ INDEX BLDA+IHG-RTO: SIGNIFICANT CHANGE UP
HOROWITZ INDEX BLDA+IHG-RTO: SIGNIFICANT CHANGE UP
MCHC RBC-ENTMCNC: 28.7 PG — SIGNIFICANT CHANGE UP (ref 27–34)
MCHC RBC-ENTMCNC: 30.4 GM/DL — LOW (ref 32–36)
MCV RBC AUTO: 94.4 FL — SIGNIFICANT CHANGE UP (ref 80–100)
PCO2 BLDA: 84 MMHG — CRITICAL HIGH (ref 35–48)
PCO2 BLDA: 91 MMHG — CRITICAL HIGH (ref 35–48)
PH BLDA: 7.25 — LOW (ref 7.35–7.45)
PH BLDA: 7.32 — LOW (ref 7.35–7.45)
PLATELET # BLD AUTO: 137 K/UL — LOW (ref 150–400)
PO2 BLDA: 66 MMHG — LOW (ref 83–108)
PO2 BLDA: 75 MMHG — LOW (ref 83–108)
POTASSIUM SERPL-MCNC: 5 MMOL/L — SIGNIFICANT CHANGE UP (ref 3.5–5.3)
POTASSIUM SERPL-SCNC: 5 MMOL/L — SIGNIFICANT CHANGE UP (ref 3.5–5.3)
RBC # BLD: 4.28 M/UL — SIGNIFICANT CHANGE UP (ref 4.2–5.8)
RBC # FLD: 15.6 % — HIGH (ref 10.3–14.5)
SAO2 % BLDA: 91.1 % — LOW (ref 94–98)
SAO2 % BLDA: 94.4 % — SIGNIFICANT CHANGE UP (ref 94–98)
SODIUM SERPL-SCNC: 136 MMOL/L — SIGNIFICANT CHANGE UP (ref 135–145)
WBC # BLD: 6.64 K/UL — SIGNIFICANT CHANGE UP (ref 3.8–10.5)
WBC # FLD AUTO: 6.64 K/UL — SIGNIFICANT CHANGE UP (ref 3.8–10.5)

## 2024-01-20 PROCEDURE — 71045 X-RAY EXAM CHEST 1 VIEW: CPT | Mod: 26,76

## 2024-01-20 PROCEDURE — 99231 SBSQ HOSP IP/OBS SF/LOW 25: CPT

## 2024-01-20 PROCEDURE — 99233 SBSQ HOSP IP/OBS HIGH 50: CPT

## 2024-01-20 RX ORDER — SODIUM CHLORIDE 9 MG/ML
500 INJECTION INTRAMUSCULAR; INTRAVENOUS; SUBCUTANEOUS ONCE
Refills: 0 | Status: COMPLETED | OUTPATIENT
Start: 2024-01-20 | End: 2024-01-20

## 2024-01-20 RX ORDER — MIDODRINE HYDROCHLORIDE 2.5 MG/1
10 TABLET ORAL THREE TIMES A DAY
Refills: 0 | Status: DISCONTINUED | OUTPATIENT
Start: 2024-01-20 | End: 2024-01-20

## 2024-01-20 RX ORDER — ACETAMINOPHEN 500 MG
650 TABLET ORAL EVERY 6 HOURS
Refills: 0 | Status: DISCONTINUED | OUTPATIENT
Start: 2024-01-20 | End: 2024-01-24

## 2024-01-20 RX ORDER — ENOXAPARIN SODIUM 100 MG/ML
120 INJECTION SUBCUTANEOUS EVERY 12 HOURS
Refills: 0 | Status: DISCONTINUED | OUTPATIENT
Start: 2024-01-20 | End: 2024-01-23

## 2024-01-20 RX ADMIN — LOSARTAN POTASSIUM 25 MILLIGRAM(S): 100 TABLET, FILM COATED ORAL at 05:41

## 2024-01-20 RX ADMIN — Medication 81 MILLIGRAM(S): at 10:47

## 2024-01-20 RX ADMIN — Medication 20 MILLIGRAM(S): at 05:41

## 2024-01-20 RX ADMIN — Medication 2: at 10:38

## 2024-01-20 RX ADMIN — PANTOPRAZOLE SODIUM 40 MILLIGRAM(S): 20 TABLET, DELAYED RELEASE ORAL at 05:42

## 2024-01-20 RX ADMIN — Medication 2: at 12:53

## 2024-01-20 RX ADMIN — ENOXAPARIN SODIUM 120 MILLIGRAM(S): 100 INJECTION SUBCUTANEOUS at 19:03

## 2024-01-20 RX ADMIN — SODIUM CHLORIDE 3000 MILLILITER(S): 9 INJECTION INTRAMUSCULAR; INTRAVENOUS; SUBCUTANEOUS at 12:49

## 2024-01-20 RX ADMIN — Medication 20 MILLIEQUIVALENT(S): at 10:47

## 2024-01-20 RX ADMIN — Medication 2 MILLIGRAM(S): at 05:42

## 2024-01-20 RX ADMIN — APIXABAN 5 MILLIGRAM(S): 2.5 TABLET, FILM COATED ORAL at 05:41

## 2024-01-20 RX ADMIN — Medication 50 MILLIGRAM(S): at 05:41

## 2024-01-20 RX ADMIN — Medication 1 GRAM(S): at 05:41

## 2024-01-20 RX ADMIN — FINASTERIDE 5 MILLIGRAM(S): 5 TABLET, FILM COATED ORAL at 10:47

## 2024-01-20 RX ADMIN — MIDODRINE HYDROCHLORIDE 10 MILLIGRAM(S): 2.5 TABLET ORAL at 12:53

## 2024-01-20 NOTE — PROGRESS NOTE ADULT - PROBLEM SELECTOR PLAN 1
CT shows Moderate right pleural compressive atelectasis of the right lower lobe  POCUS done at the bedside; Large pocket visualized prior to placement  1/18 Right PTC placed serosanguinous drainage  Keep chest tube to WS  Pending Micro and Cyto  Post procedure xray completed; PTC well positioned  Daily  AM cxr while tube is in     Thoracic to continue to follow  Case discussed with Dr. Fountain
D/C Right chest tube, post removal chest xray ordered  Continue torsemide  Continue care as per primary team  Thoracic surgery to sign off
CT shows Moderate right pleural compressive atelectasis of the right lower lobe  POCUS done at the bedside; Large pocket visualized prior to placement  1/18 Right PTC placed serosanguinous drainage  maintain pigtail to waterseal today, possible removal tomorrow if output remains minimal  CXR with R opacity, will strip tube  cont lasix   daily CXR while tube in place    fluid transudative  cx NGTD  ctyo pending  thoracic surgery to follow  d/w Dr. Li in Am rounds

## 2024-01-20 NOTE — PROGRESS NOTE ADULT - ASSESSMENT
The patient is a 78 year old male with a past medical history of Severe Aortic Stenosis s/p TAVR 10/6/23 with Dr. Napier, Chronic Diastolic CHF on 4 liters Oxygen at baseline, CAD s/p PCI to LAD, Thoracic Aortic Aneurysm, Paroxysmal Afib s/p Ablation (on Eliquis), and Cardiomems, LLE DVT, ALLIE, BPH s/p TURP, Left Renal Mass, Morbid Obesity, Laminectomy, Anxiety, Type 2 DM, GERD, Cholelithiasis who presented after a fall at home. States he was sitting up in bed and fell forward. In the ER, CT head was negative. Admitted to obs for syncope. Xrays of bilateral knees and right shoulder were negative for fracture. PPM was interrogated with no events. Chest xray with pulmonary vascular congestion with elevated BNP. Admitted for syncope and acute on chronic diastolic CHF. TTE with moderately dilated RA; normal LVSF. CTA of the chest ruled out PE; showed moderate right pleural effusion. Thoracic surgery consulted    Acute on chronic diastolic heart failure  Acute on Chronic hypoxic and hypercapneic respiratory failure -  - abg done  - as per nurse, has been refusing bipap  - stat bipap, will order repeat abg at 5pm  Right sided pleural effusion  History of CAD   Cardiomems in place  - s/p chest tube placement 1/18/24 - removed today   - eliquis   - TTE with normal LSVF, moderately dilated RA- CTA ruled out PE   - Dr. Gotti following, Cardiomems   - On BB, Asprin, Statin ARB   - torsemide    Paroxysmal Afib  History of AS status post TAVR  - eliquis   - On Po Metoprolol    Thrombocytopenia  - Monitor closely     Anxiety  - HOLD xanax  - elevated qtc - difficult to given zyprexa  monitor for now     VTE_ SCDS ; eliquis

## 2024-01-20 NOTE — PROGRESS NOTE ADULT - SUBJECTIVE AND OBJECTIVE BOX
ANIA CRISTOBAL    [id number]    78y      Male    INTERVAL HPI/OVERNIGHT EVENTS:  patient being seen for hypercarbia and pleural effuisons. Patient seen at bedside and is lethargic.     patient had chest tube removed today       REVIEW OF SYSTEMS:    unable to obtain secondary to mental status     Vital Signs Last 24 Hrs  T(C): 36.9 (20 Jan 2024 09:21), Max: 37.2 (19 Jan 2024 20:00)  T(F): 98.5 (20 Jan 2024 09:21), Max: 98.9 (19 Jan 2024 20:00)  HR: 75 (20 Jan 2024 13:06) (66 - 82)  BP: 92/50 (20 Jan 2024 13:00) (88/49 - 120/64)  BP(mean): 60 (20 Jan 2024 13:00) (58 - 79)  RR: 25 (20 Jan 2024 13:06) (12 - 26)  SpO2: 94% (20 Jan 2024 13:06) (89% - 98%)    Parameters below as of 20 Jan 2024 13:06  Patient On (Oxygen Delivery Method): mask, Venturi  O2 Flow (L/min): 15  O2 Concentration (%): 50    PHYSICAL EXAM:    General: NAD, asleep  ENT: No gross hearing impairment, Moist mucous membranes, no thrush  Neck: Supple, No JVD  Lungs: no wheezing,   Cardio: RRR, S1/S2, No murmur  Abdomen: Soft, Nontender, Nondistended; Bowel sounds present  Extremities: No calf tenderness, No cyanosis, No pitting edema  Psych: Appropriate mood and affect      LABS:                        12.3   6.64  )-----------( 137      ( 20 Jan 2024 07:40 )             40.4     01-20    136  |  93<L>  |  46.2<H>  ----------------------------<  172<H>  5.0   |  35.0<H>  |  1.12    Ca    8.2<L>      20 Jan 2024 07:40    TPro  6.3<L>  /  Alb  x   /  TBili  x   /  DBili  x   /  AST  x   /  ALT  x   /  AlkPhos  x   01-19      Urinalysis Basic - ( 20 Jan 2024 07:40 )    Color: x / Appearance: x / SG: x / pH: x  Gluc: 172 mg/dL / Ketone: x  / Bili: x / Urobili: x   Blood: x / Protein: x / Nitrite: x   Leuk Esterase: x / RBC: x / WBC x   Sq Epi: x / Non Sq Epi: x / Bacteria: x          MEDICATIONS  (STANDING):  apixaban 5 milliGRAM(s) Oral two times a day  aspirin enteric coated 81 milliGRAM(s) Oral daily  atorvastatin 10 milliGRAM(s) Oral at bedtime  dextrose 5%. 1000 milliLiter(s) (50 mL/Hr) IV Continuous <Continuous>  dextrose 5%. 1000 milliLiter(s) (100 mL/Hr) IV Continuous <Continuous>  dextrose 50% Injectable 12.5 Gram(s) IV Push once  dextrose 50% Injectable 25 Gram(s) IV Push once  dextrose 50% Injectable 25 Gram(s) IV Push once  doxazosin 2 milliGRAM(s) Oral daily  finasteride 5 milliGRAM(s) Oral daily  glucagon  Injectable 1 milliGRAM(s) IntraMuscular once  insulin lispro (ADMELOG) corrective regimen sliding scale   SubCutaneous three times a day before meals  losartan 25 milliGRAM(s) Oral daily  metoprolol tartrate 50 milliGRAM(s) Oral two times a day  midodrine. 10 milliGRAM(s) Oral three times a day  pantoprazole    Tablet 40 milliGRAM(s) Oral before breakfast  potassium chloride    Tablet ER 20 milliEquivalent(s) Oral daily  sucralfate 1 Gram(s) Oral two times a day  torsemide 20 milliGRAM(s) Oral two times a day    MEDICATIONS  (PRN):  acetaminophen     Tablet .. 650 milliGRAM(s) Oral every 6 hours PRN Temp greater or equal to 38C (100.4F), Mild Pain (1 - 3)  albuterol    90 MICROgram(s) HFA Inhaler 2 Puff(s) Inhalation every 6 hours PRN for shortness of breath and/or wheezing  dextrose Oral Gel 15 Gram(s) Oral once PRN Blood Glucose LESS THAN 70 milliGRAM(s)/deciliter  melatonin 3 milliGRAM(s) Oral at bedtime PRN Insomnia      RADIOLOGY & ADDITIONAL TESTS:    abg appreciated

## 2024-01-20 NOTE — PROGRESS NOTE ADULT - SUBJECTIVE AND OBJECTIVE BOX
Subjective:    VITAL SIGNS  Vital Signs Last 24 Hrs  T(C): 36.9 (01-20-24 @ 09:21), Max: 37.2 (01-19-24 @ 20:00)  T(F): 98.5 (01-20-24 @ 09:21), Max: 98.9 (01-19-24 @ 20:00)  HR: 66 (01-20-24 @ 08:00) (66 - 82)  BP: 112/67 (01-20-24 @ 08:00) (96/56 - 120/64)  RR: 12 (01-20-24 @ 08:00) (12 - 24)  SpO2: 92% (01-20-24 @ 08:00) (92% - 98%)  on (O2)              Telemetry:    LVEF:     MEDICATIONS  acetaminophen     Tablet .. 650 milliGRAM(s) Oral every 6 hours PRN  albuterol    90 MICROgram(s) HFA Inhaler 2 Puff(s) Inhalation every 6 hours PRN  ALPRAZolam 0.5 milliGRAM(s) Oral three times a day PRN  apixaban 5 milliGRAM(s) Oral two times a day  aspirin enteric coated 81 milliGRAM(s) Oral daily  atorvastatin 10 milliGRAM(s) Oral at bedtime  dextrose 5%. 1000 milliLiter(s) IV Continuous <Continuous>  dextrose 5%. 1000 milliLiter(s) IV Continuous <Continuous>  dextrose 50% Injectable 25 Gram(s) IV Push once  dextrose 50% Injectable 25 Gram(s) IV Push once  dextrose 50% Injectable 12.5 Gram(s) IV Push once  dextrose Oral Gel 15 Gram(s) Oral once PRN  doxazosin 2 milliGRAM(s) Oral daily  finasteride 5 milliGRAM(s) Oral daily  glucagon  Injectable 1 milliGRAM(s) IntraMuscular once  insulin lispro (ADMELOG) corrective regimen sliding scale   SubCutaneous three times a day before meals  losartan 25 milliGRAM(s) Oral daily  melatonin 3 milliGRAM(s) Oral at bedtime PRN  metoprolol tartrate 50 milliGRAM(s) Oral two times a day  pantoprazole    Tablet 40 milliGRAM(s) Oral before breakfast  potassium chloride    Tablet ER 20 milliEquivalent(s) Oral daily  sucralfate 1 Gram(s) Oral two times a day  torsemide 20 milliGRAM(s) Oral two times a day      PHYSICAL EXAM  General: well nourished, well developed, no acute distress  Neurology: alert and oriented x 3, nonfocal, no gross deficits  Respiratory: clear to auscultation bilaterally  CV: regular rate and rhythm, normal S1, S2  Abdomen: soft, nontender, nondistended, positive bowel sounds, last bowel movement   Extremities: warm, well perfused. no edema. + DP pulses  Incisions: midline sternal incision, + mepilex, c/d/i. sternum stable.  Chest tubes:   Epicardial Wires:    > EPM (settings) / isolated    I&O's Detail    19 Jan 2024 07:01  -  20 Jan 2024 07:00  --------------------------------------------------------  IN:    Oral Fluid: 300 mL  Total IN: 300 mL    OUT:    Chest Tube (mL): 40 mL    Voided (mL): 850 mL  Total OUT: 890 mL    Total NET: -590 mL          Weights:  Daily     Daily   Admit Wt: Drug Dosing Weight  Height (cm): 188 (16 Jan 2024 15:51)  Weight (kg): 126.961 (16 Jan 2024 15:51)  BMI (kg/m2): 35.9 (16 Jan 2024 15:51)  BSA (m2): 2.51 (16 Jan 2024 15:51)    LABS  01-20    136  |  93<L>  |  46.2<H>  ----------------------------<  172<H>  5.0   |  35.0<H>  |  1.12    Ca    8.2<L>      20 Jan 2024 07:40    TPro  6.3<L>  /  Alb  x   /  TBili  x   /  DBili  x   /  AST  x   /  ALT  x   /  AlkPhos  x   01-19                                 12.3   6.64  )-----------( 137      ( 20 Jan 2024 07:40 )             40.4                  CAPILLARY BLOOD GLUCOSE      POCT Blood Glucose.: 151 mg/dL (20 Jan 2024 09:47)  POCT Blood Glucose.: 235 mg/dL (19 Jan 2024 21:45)  POCT Blood Glucose.: 189 mg/dL (19 Jan 2024 17:26)  POCT Blood Glucose.: 211 mg/dL (19 Jan 2024 13:29)           Today's CXR:    Today's EKG:    PAST MEDICAL & SURGICAL HISTORY:  Anxiety      Hypertension      Hyperlipidemia      Prostate disease      Gastroesophageal reflux disease      Gallbladder stone without cholecystitis or obstruction      BPH (benign prostatic hyperplasia)      DM (diabetes mellitus)      DVT (deep venous thrombosis)  left leg      Afib      Anxiety      HLD (hyperlipidemia)      Malignant schwannoma      H/O arthroscopy of knee      S/P laminectomy      S/P coronary artery stent placement  x2            Subjective: Pt. seen & examined, lying in bed.  Denies any SOB    VITAL SIGNS  Vital Signs Last 24 Hrs  T(C): 36.9 (01-20-24 @ 09:21), Max: 37.2 (01-19-24 @ 20:00)  T(F): 98.5 (01-20-24 @ 09:21), Max: 98.9 (01-19-24 @ 20:00)  HR: 66 (01-20-24 @ 08:00) (66 - 82)  BP: 112/67 (01-20-24 @ 08:00) (96/56 - 120/64)  RR: 12 (01-20-24 @ 08:00) (12 - 24)  SpO2: 92% (01-20-24 @ 08:00) (92% - 98%)  on (O2)              Telemetry:  Paced    MEDICATIONS  acetaminophen     Tablet .. 650 milliGRAM(s) Oral every 6 hours PRN  albuterol    90 MICROgram(s) HFA Inhaler 2 Puff(s) Inhalation every 6 hours PRN  ALPRAZolam 0.5 milliGRAM(s) Oral three times a day PRN  apixaban 5 milliGRAM(s) Oral two times a day  aspirin enteric coated 81 milliGRAM(s) Oral daily  atorvastatin 10 milliGRAM(s) Oral at bedtime  dextrose 5%. 1000 milliLiter(s) IV Continuous <Continuous>  dextrose 5%. 1000 milliLiter(s) IV Continuous <Continuous>  dextrose 50% Injectable 25 Gram(s) IV Push once  dextrose 50% Injectable 25 Gram(s) IV Push once  dextrose 50% Injectable 12.5 Gram(s) IV Push once  dextrose Oral Gel 15 Gram(s) Oral once PRN  doxazosin 2 milliGRAM(s) Oral daily  finasteride 5 milliGRAM(s) Oral daily  glucagon  Injectable 1 milliGRAM(s) IntraMuscular once  insulin lispro (ADMELOG) corrective regimen sliding scale   SubCutaneous three times a day before meals  losartan 25 milliGRAM(s) Oral daily  melatonin 3 milliGRAM(s) Oral at bedtime PRN  metoprolol tartrate 50 milliGRAM(s) Oral two times a day  pantoprazole    Tablet 40 milliGRAM(s) Oral before breakfast  potassium chloride    Tablet ER 20 milliEquivalent(s) Oral daily  sucralfate 1 Gram(s) Oral two times a day  torsemide 20 milliGRAM(s) Oral two times a day      PHYSICAL EXAM  General:  no acute distress  Neurology: alert and oriented x 3, nonfocal, no gross deficits  Respiratory: Diminished at the bases   CV: regular rate and rhythm, normal S1, S2  Abdomen: soft, obese, nontender, nondistended, positive bowel sounds  Extremities: warm, well perfused. Trace edema x2BLE. + DP pulses  Chest tubes: right chest tube to water seal, no air leak detected        I&O's Detail    19 Jan 2024 07:01  -  20 Jan 2024 07:00  --------------------------------------------------------  IN:    Oral Fluid: 300 mL  Total IN: 300 mL    OUT:    Chest Tube (mL): 40 mL    Voided (mL): 850 mL  Total OUT: 890 mL    Total NET: -590 mL          Weights:  Daily     Daily   Admit Wt: Drug Dosing Weight  Height (cm): 188 (16 Jan 2024 15:51)  Weight (kg): 126.961 (16 Jan 2024 15:51)  BMI (kg/m2): 35.9 (16 Jan 2024 15:51)  BSA (m2): 2.51 (16 Jan 2024 15:51)    LABS  01-20    136  |  93<L>  |  46.2<H>  ----------------------------<  172<H>  5.0   |  35.0<H>  |  1.12    Ca    8.2<L>      20 Jan 2024 07:40    TPro  6.3<L>  /  Alb  x   /  TBili  x   /  DBili  x   /  AST  x   /  ALT  x   /  AlkPhos  x   01-19                                 12.3   6.64  )-----------( 137      ( 20 Jan 2024 07:40 )             40.4                  CAPILLARY BLOOD GLUCOSE      POCT Blood Glucose.: 151 mg/dL (20 Jan 2024 09:47)  POCT Blood Glucose.: 235 mg/dL (19 Jan 2024 21:45)  POCT Blood Glucose.: 189 mg/dL (19 Jan 2024 17:26)  POCT Blood Glucose.: 211 mg/dL (19 Jan 2024 13:29)           < from: Xray Chest 1 View- PORTABLE-Routine (Xray Chest 1 View- PORTABLE-Routine in AM.) (01.19.24 @ 05:18) >  PROCEDURE DATE:  01/18/2024          INTERPRETATION:  INDICATION: Status post right chest tube    Portable chest 3:39 PM    COMPARISON: 1/16/2024    FINDINGS:  Heart/Vascular: Cardiomegaly. Status post TAVR. Left chest wall dual lead   pacemaker. Prominent aorta and mediastinum may be projectional.  Pulmonary: Midline trachea. There is mild pulmonary venous congestion.   Placement of right pigtail chest tube without pneumothorax. Significant   decreased right effusion. Small residual right effusion and atelectasis.  Bones: There is no fracture.      Portable chest 1/19/2023 at 4:37 AM    FINDINGS: Right chest tube unchanged in position without evidence of   pneumothorax. Increasing opacity in the right lower lobe could represent   reaccumulating effusion and/or consolidation.Increased now moderate   pulmonary venous congestion. Increased left effusion. Cardiomegaly.    Impression:    Right pigtail chest tube placed status post thoracentesis with initial   decreased size right effusion and on this morning's study there is   increasing opacity in the right lower lobe may represent reaccumulating   effusion or consolidation.    Increased now moderate pulmonary venous congestion and cardiomegaly.   Bilateral effusions.    --- End of Report ---            TRENTON OCAMPO DO; Attending Radiologist  This document has been electronically signed. Jan 19 2024  1:45PM    < end of copied text >        PAST MEDICAL & SURGICAL HISTORY:  Anxiety      Hypertension      Hyperlipidemia      Prostate disease      Gastroesophageal reflux disease      Gallbladder stone without cholecystitis or obstruction      BPH (benign prostatic hyperplasia)      DM (diabetes mellitus)      DVT (deep venous thrombosis)  left leg      Afib      Anxiety      HLD (hyperlipidemia)      Malignant schwannoma      H/O arthroscopy of knee      S/P laminectomy      S/P coronary artery stent placement  x2

## 2024-01-20 NOTE — PROGRESS NOTE ADULT - SUBJECTIVE AND OBJECTIVE BOX
ANIA CRISTOBAL  [id number]      Chief Complaint: follow up syncope/SOB      Subjective: On Bipap due to significant increased on CO2     24 hour Tele: V-paced        acetaminophen     Tablet .. 650 milliGRAM(s) Oral every 6 hours PRN  albuterol    90 MICROgram(s) HFA Inhaler 2 Puff(s) Inhalation every 6 hours PRN  apixaban 5 milliGRAM(s) Oral two times a day  aspirin enteric coated 81 milliGRAM(s) Oral daily  atorvastatin 10 milliGRAM(s) Oral at bedtime  dextrose 5%. 1000 milliLiter(s) IV Continuous <Continuous>  dextrose 5%. 1000 milliLiter(s) IV Continuous <Continuous>  dextrose 50% Injectable 25 Gram(s) IV Push once  dextrose 50% Injectable 25 Gram(s) IV Push once  dextrose 50% Injectable 12.5 Gram(s) IV Push once  dextrose Oral Gel 15 Gram(s) Oral once PRN  doxazosin 2 milliGRAM(s) Oral daily  finasteride 5 milliGRAM(s) Oral daily  glucagon  Injectable 1 milliGRAM(s) IntraMuscular once  insulin lispro (ADMELOG) corrective regimen sliding scale   SubCutaneous three times a day before meals  losartan 25 milliGRAM(s) Oral daily  melatonin 3 milliGRAM(s) Oral at bedtime PRN  metoprolol tartrate 50 milliGRAM(s) Oral two times a day  pantoprazole    Tablet 40 milliGRAM(s) Oral before breakfast  potassium chloride    Tablet ER 20 milliEquivalent(s) Oral daily  sucralfate 1 Gram(s) Oral two times a day  torsemide 20 milliGRAM(s) Oral two times a day          Physical Exam:  T(C): 36.5 (01-20-24 @ 15:38), Max: 37.2 (01-19-24 @ 20:00)  HR: 76 (01-20-24 @ 16:32) (66 - 80)  BP: 91/54 (01-20-24 @ 14:00) (88/49 - 119/63)  RR: 24 (01-20-24 @ 14:00) (12 - 26)  SpO2: 96% (01-20-24 @ 16:32) (89% - 97%)  Wt(kg): --  General: Comfortable in NAD, on bipap   Neck: No JVD  CVS: nl s1s2, no s3  Pulm: CTA b/l  Abd: soft, non-tender  Ext: No c/c/e  Neuro A&O x3  Psych: Normal affect      Labs:   20 Jan 2024 07:40    136    |  93     |  46.2   ----------------------------<  172    5.0     |  35.0   |  1.12     Ca    8.2        20 Jan 2024 07:40    TPro  6.3    /  Alb  x      /  TBili  x      /  DBili  x      /  AST  x      /  ALT  x      /  AlkPhos  x      19 Jan 2024 05:16                          12.3   6.64  )-----------( 137      ( 20 Jan 2024 07:40 )             40.4           CT HEAD:  IMPRESSION:  Mild chronic microvascular changes without evidence of an   acute transcortical infarction or hemorrhage.    CT CHEST   1.  No main pulmonary embolism. Evaluation lobar, segmental, and   subsegmental pulmonary arteries is limited secondary to transient   interruption of contrast bolus during respiratory inspiration.  2.  Moderate right pleural effusion with complete right lower lobe   compressive atelectasis. Trace left pleural effusion.    Echo (10/2023):   1. Left ventricular ejection fraction, by visual estimation, is 50 to 55%.   2. Normal global left ventricular systolic function.   3. Mildly increased left ventricular internal cavity size.   4. The mitral in-flow pattern reveals no discernable A-wave, therefore no comment on diastolic function can be made.   5. There is mild concentric left ventricular hypertrophy.   6. Normal right ventricular size and function.   7. There is no evidence of pericardial effusion.   8. Trace mitral valve regurgitation.   9. Moderate tricuspid regurgitation.  10. TAVR in the aortic position.  11. Estimated pulmonary artery systolic pressure is 48.9 mmHg assuming a right atrial pressure of 15 mmHg, which is consistent with mild pulmonary   hypertension.    ECHO 1/2024:   1. Left ventricular systolic function is normal with an ejection fraction visually estimated at 55 to 60 %.   2. The left atrium is mildly dilated.   3. The right atrium is moderately dilated in size.   4. No pericardial effusion seen.   5. Estimated pulmonary artery systolic pressure is 49 mmHg.   6. Mild mitralvalve leaflet calcification.   7. Moderate left ventricular hypertrophy.  8. Severe RVE with moderate dysfunction    Assessment:  77 y/o male PMHx severe AS s/p TAVR 10/6/23 with Dr. Napier, chronic combined CHF with mild LV dysfunction s/p Cardiomems, CAD s/p PCI, Thoracic Aortic Aneurysm, PAF s/p ablation and PPM, LLE DVT, ALLIE, BPH s/p TURP, renal mass, obesity, Anxiety, DM, GERD, cirrhosis p/w episode of apparent syncope and SOB.  Patient has been having more SOB for at least a week but time frame is overall unclear.  Appears to be in mild CHF based on CXR and symptoms, PAD only 16 on admission by cardioMEMs   -CT with moderate right pleural effusion and planned for thoracentesis  -ECho with RVE/RV dysfunction, no PE on CTA. Maybe more from obesity and underlying pulm disease as well as his left heart HFpEF. Unclear why recent worsening of RV however. No MI, trops all negative  -PPM interrogated and no events with normal function, 97.7% V pacing  -NOted to have hypercarbic respiratory failure last night improved after BiPAP, persistent elevated   -CHFpEF compensated at this time, rhythm remains paced  -s/p chest tube for right pleural effusion, serosnaguinous fluid removed 1.3 L, removed today   -given midodrine for low BP       Plan:   1. Continue current CV meds  2. Eliquis for AF  3. Continue torsemide   4. Pulm evaluation   5. Midodrine only if necessary.

## 2024-01-21 LAB
ALBUMIN SERPL ELPH-MCNC: 3.2 G/DL — LOW (ref 3.3–5.2)
ALP SERPL-CCNC: 362 U/L — HIGH (ref 40–120)
ALT FLD-CCNC: 14 U/L — SIGNIFICANT CHANGE UP
ANION GAP SERPL CALC-SCNC: 9 MMOL/L — SIGNIFICANT CHANGE UP (ref 5–17)
AST SERPL-CCNC: 15 U/L — SIGNIFICANT CHANGE UP
BASE EXCESS BLDA CALC-SCNC: 17.9 MMOL/L — HIGH (ref -2–3)
BILIRUB SERPL-MCNC: 0.7 MG/DL — SIGNIFICANT CHANGE UP (ref 0.4–2)
BLOOD GAS COMMENTS ARTERIAL: SIGNIFICANT CHANGE UP
BUN SERPL-MCNC: 50.6 MG/DL — HIGH (ref 8–20)
CALCIUM SERPL-MCNC: 8.5 MG/DL — SIGNIFICANT CHANGE UP (ref 8.4–10.5)
CHLORIDE SERPL-SCNC: 98 MMOL/L — SIGNIFICANT CHANGE UP (ref 96–108)
CO2 SERPL-SCNC: 35 MMOL/L — HIGH (ref 22–29)
CREAT SERPL-MCNC: 1.04 MG/DL — SIGNIFICANT CHANGE UP (ref 0.5–1.3)
EGFR: 74 ML/MIN/1.73M2 — SIGNIFICANT CHANGE UP
GAS PNL BLDA: SIGNIFICANT CHANGE UP
GLUCOSE BLDC GLUCOMTR-MCNC: 105 MG/DL — HIGH (ref 70–99)
GLUCOSE BLDC GLUCOMTR-MCNC: 111 MG/DL — HIGH (ref 70–99)
GLUCOSE BLDC GLUCOMTR-MCNC: 122 MG/DL — HIGH (ref 70–99)
GLUCOSE BLDC GLUCOMTR-MCNC: 135 MG/DL — HIGH (ref 70–99)
GLUCOSE BLDC GLUCOMTR-MCNC: 137 MG/DL — HIGH (ref 70–99)
GLUCOSE BLDC GLUCOMTR-MCNC: 142 MG/DL — HIGH (ref 70–99)
GLUCOSE SERPL-MCNC: 95 MG/DL — SIGNIFICANT CHANGE UP (ref 70–99)
HCO3 BLDA-SCNC: 42 MMOL/L — HIGH (ref 21–28)
HOROWITZ INDEX BLDA+IHG-RTO: SIGNIFICANT CHANGE UP
MAGNESIUM SERPL-MCNC: 2.3 MG/DL — SIGNIFICANT CHANGE UP (ref 1.6–2.6)
PCO2 BLDA: 67 MMHG — HIGH (ref 35–48)
PH BLDA: 7.41 — SIGNIFICANT CHANGE UP (ref 7.35–7.45)
PHOSPHATE SERPL-MCNC: 3.6 MG/DL — SIGNIFICANT CHANGE UP (ref 2.4–4.7)
PO2 BLDA: 106 MMHG — SIGNIFICANT CHANGE UP (ref 83–108)
POTASSIUM SERPL-MCNC: 4.7 MMOL/L — SIGNIFICANT CHANGE UP (ref 3.5–5.3)
POTASSIUM SERPL-SCNC: 4.7 MMOL/L — SIGNIFICANT CHANGE UP (ref 3.5–5.3)
PROT SERPL-MCNC: 6.3 G/DL — LOW (ref 6.6–8.7)
SODIUM SERPL-SCNC: 142 MMOL/L — SIGNIFICANT CHANGE UP (ref 135–145)

## 2024-01-21 PROCEDURE — 99232 SBSQ HOSP IP/OBS MODERATE 35: CPT

## 2024-01-21 RX ORDER — OXYCODONE HYDROCHLORIDE 5 MG/1
5 TABLET ORAL EVERY 6 HOURS
Refills: 0 | Status: DISCONTINUED | OUTPATIENT
Start: 2024-01-21 | End: 2024-01-23

## 2024-01-21 RX ORDER — LANOLIN ALCOHOL/MO/W.PET/CERES
3 CREAM (GRAM) TOPICAL AT BEDTIME
Refills: 0 | Status: DISCONTINUED | OUTPATIENT
Start: 2024-01-21 | End: 2024-01-24

## 2024-01-21 RX ORDER — ALPRAZOLAM 0.25 MG
0.5 TABLET ORAL ONCE
Refills: 0 | Status: DISCONTINUED | OUTPATIENT
Start: 2024-01-21 | End: 2024-01-21

## 2024-01-21 RX ORDER — INFLUENZA VIRUS VACCINE 15; 15; 15; 15 UG/.5ML; UG/.5ML; UG/.5ML; UG/.5ML
0.7 SUSPENSION INTRAMUSCULAR ONCE
Refills: 0 | Status: DISCONTINUED | OUTPATIENT
Start: 2024-01-21 | End: 2024-01-24

## 2024-01-21 RX ADMIN — FINASTERIDE 5 MILLIGRAM(S): 5 TABLET, FILM COATED ORAL at 11:34

## 2024-01-21 RX ADMIN — ENOXAPARIN SODIUM 120 MILLIGRAM(S): 100 INJECTION SUBCUTANEOUS at 17:21

## 2024-01-21 RX ADMIN — Medication 1 GRAM(S): at 11:34

## 2024-01-21 RX ADMIN — Medication 20 MILLIEQUIVALENT(S): at 11:34

## 2024-01-21 RX ADMIN — OXYCODONE HYDROCHLORIDE 5 MILLIGRAM(S): 5 TABLET ORAL at 16:20

## 2024-01-21 RX ADMIN — OXYCODONE HYDROCHLORIDE 5 MILLIGRAM(S): 5 TABLET ORAL at 15:45

## 2024-01-21 RX ADMIN — ENOXAPARIN SODIUM 120 MILLIGRAM(S): 100 INJECTION SUBCUTANEOUS at 06:41

## 2024-01-21 RX ADMIN — LOSARTAN POTASSIUM 25 MILLIGRAM(S): 100 TABLET, FILM COATED ORAL at 11:33

## 2024-01-21 RX ADMIN — PANTOPRAZOLE SODIUM 40 MILLIGRAM(S): 20 TABLET, DELAYED RELEASE ORAL at 11:34

## 2024-01-21 RX ADMIN — Medication 2 MILLIGRAM(S): at 11:33

## 2024-01-21 RX ADMIN — Medication 3 MILLIGRAM(S): at 22:38

## 2024-01-21 RX ADMIN — ATORVASTATIN CALCIUM 10 MILLIGRAM(S): 80 TABLET, FILM COATED ORAL at 22:38

## 2024-01-21 RX ADMIN — Medication 0.5 MILLIGRAM(S): at 18:19

## 2024-01-21 RX ADMIN — Medication 1 GRAM(S): at 17:21

## 2024-01-21 RX ADMIN — Medication 50 MILLIGRAM(S): at 11:34

## 2024-01-21 RX ADMIN — Medication 20 MILLIGRAM(S): at 17:21

## 2024-01-21 RX ADMIN — Medication 20 MILLIGRAM(S): at 11:34

## 2024-01-21 RX ADMIN — Medication 50 MILLIGRAM(S): at 17:21

## 2024-01-21 RX ADMIN — Medication 81 MILLIGRAM(S): at 11:34

## 2024-01-21 NOTE — PROGRESS NOTE ADULT - SUBJECTIVE AND OBJECTIVE BOX
ANIA CRISTOBAL    [id number]    78y      Male    INTERVAL HPI/OVERNIGHT EVENTS:  patient being seen for resp failure.     last 24 hours patient had hypercarbia requiring contnous bipap. Patient is now more awake and alert.     REVIEW OF SYSTEMS:    CONSTITUTIONAL: No fever, weight loss, or fatigue  RESPIRATORY: No cough, wheezing, hemoptysis; No shortness of breath  CARDIOVASCULAR: No chest pain, palpitations  GASTROINTESTINAL: No abdominal or epigastric pain. No nausea, vomiting  NEUROLOGICAL: No headaches, memory loss, loss of strength.  MISCELLANEOUS:      Vital Signs Last 24 Hrs  T(C): 36.4 (21 Jan 2024 07:20), Max: 37 (21 Jan 2024 04:14)  T(F): 97.6 (21 Jan 2024 07:20), Max: 98.6 (21 Jan 2024 04:14)  HR: 86 (21 Jan 2024 12:00) (70 - 96)  BP: 118/79 (21 Jan 2024 12:00) (91/54 - 133/69)  BP(mean): 87 (21 Jan 2024 12:00) (61 - 87)  RR: 18 (21 Jan 2024 12:00) (17 - 25)  SpO2: 95% (21 Jan 2024 12:00) (94% - 100%)    Parameters below as of 21 Jan 2024 12:00  Patient On (Oxygen Delivery Method): nasal cannula  O2 Flow (L/min): 4      PHYSICAL EXAM:    General: NAD, speaking in full sentences  ENT: No gross hearing impairment, Moist mucous membranes, no thrush  Neck: Supple, No JVD  Lungs: no wheezing,   Cardio: RRR, S1/S2, No murmur  Abdomen: Soft, Nontender, Nondistended; Bowel sounds present  Extremities: No calf tenderness, No cyanosis, No pitting edema  Psych: Appropriate mood and affect      LABS:                        12.3   6.64  )-----------( 137      ( 20 Jan 2024 07:40 )             40.4     01-21    142  |  98  |  50.6<H>  ----------------------------<  95  4.7   |  35.0<H>  |  1.04    Ca    8.5      21 Jan 2024 08:50  Phos  3.6     01-21  Mg     2.3     01-21    TPro  6.3<L>  /  Alb  3.2<L>  /  TBili  0.7  /  DBili  x   /  AST  15  /  ALT  14  /  AlkPhos  362<H>  01-21      Urinalysis Basic - ( 21 Jan 2024 08:50 )    Color: x / Appearance: x / SG: x / pH: x  Gluc: 95 mg/dL / Ketone: x  / Bili: x / Urobili: x   Blood: x / Protein: x / Nitrite: x   Leuk Esterase: x / RBC: x / WBC x   Sq Epi: x / Non Sq Epi: x / Bacteria: x          MEDICATIONS  (STANDING):  aspirin enteric coated 81 milliGRAM(s) Oral daily  atorvastatin 10 milliGRAM(s) Oral at bedtime  dextrose 5%. 1000 milliLiter(s) (50 mL/Hr) IV Continuous <Continuous>  dextrose 5%. 1000 milliLiter(s) (100 mL/Hr) IV Continuous <Continuous>  dextrose 50% Injectable 25 Gram(s) IV Push once  dextrose 50% Injectable 25 Gram(s) IV Push once  dextrose 50% Injectable 12.5 Gram(s) IV Push once  doxazosin 2 milliGRAM(s) Oral daily  enoxaparin Injectable 120 milliGRAM(s) SubCutaneous every 12 hours  finasteride 5 milliGRAM(s) Oral daily  glucagon  Injectable 1 milliGRAM(s) IntraMuscular once  insulin lispro (ADMELOG) corrective regimen sliding scale   SubCutaneous three times a day before meals  losartan 25 milliGRAM(s) Oral daily  melatonin 3 milliGRAM(s) Oral at bedtime  metoprolol tartrate 50 milliGRAM(s) Oral two times a day  pantoprazole    Tablet 40 milliGRAM(s) Oral before breakfast  potassium chloride    Tablet ER 20 milliEquivalent(s) Oral daily  sucralfate 1 Gram(s) Oral two times a day  torsemide 20 milliGRAM(s) Oral two times a day    MEDICATIONS  (PRN):  acetaminophen     Tablet .. 650 milliGRAM(s) Oral every 6 hours PRN Temp greater or equal to 38C (100.4F), Mild Pain (1 - 3)  albuterol    90 MICROgram(s) HFA Inhaler 2 Puff(s) Inhalation every 6 hours PRN for shortness of breath and/or wheezing  dextrose Oral Gel 15 Gram(s) Oral once PRN Blood Glucose LESS THAN 70 milliGRAM(s)/deciliter      RADIOLOGY & ADDITIONAL TESTS:

## 2024-01-21 NOTE — PROGRESS NOTE ADULT - SUBJECTIVE AND OBJECTIVE BOX
ANIA CRISTOBAL  [id number]      Chief Complaint: follow up syncope/SOB      Subjective: more alert    24 hour Tele: V-paced        acetaminophen     Tablet .. 650 milliGRAM(s) Oral every 6 hours PRN  albuterol    90 MICROgram(s) HFA Inhaler 2 Puff(s) Inhalation every 6 hours PRN  aspirin enteric coated 81 milliGRAM(s) Oral daily  atorvastatin 10 milliGRAM(s) Oral at bedtime  dextrose 5%. 1000 milliLiter(s) IV Continuous <Continuous>  dextrose 5%. 1000 milliLiter(s) IV Continuous <Continuous>  dextrose 50% Injectable 12.5 Gram(s) IV Push once  dextrose 50% Injectable 25 Gram(s) IV Push once  dextrose 50% Injectable 25 Gram(s) IV Push once  dextrose Oral Gel 15 Gram(s) Oral once PRN  doxazosin 2 milliGRAM(s) Oral daily  enoxaparin Injectable 120 milliGRAM(s) SubCutaneous every 12 hours  finasteride 5 milliGRAM(s) Oral daily  glucagon  Injectable 1 milliGRAM(s) IntraMuscular once  insulin lispro (ADMELOG) corrective regimen sliding scale   SubCutaneous three times a day before meals  losartan 25 milliGRAM(s) Oral daily  melatonin 3 milliGRAM(s) Oral at bedtime  metoprolol tartrate 50 milliGRAM(s) Oral two times a day  pantoprazole    Tablet 40 milliGRAM(s) Oral before breakfast  potassium chloride    Tablet ER 20 milliEquivalent(s) Oral daily  sucralfate 1 Gram(s) Oral two times a day  torsemide 20 milliGRAM(s) Oral two times a day          Physical Exam:  T(C): 36.4 (01-21-24 @ 07:20), Max: 37 (01-21-24 @ 04:14)  HR: 86 (01-21-24 @ 12:00) (70 - 96)  BP: 118/79 (01-21-24 @ 12:00) (91/54 - 133/69)  RR: 18 (01-21-24 @ 12:00) (17 - 24)  SpO2: 95% (01-21-24 @ 12:00) (95% - 100%)  Wt(kg): --  General: Comfortable in NAD  Neck: No JVD  CVS: nl s1s2, no s3  Pulm: CTA b/l  Abd: soft, non-tender  Ext: No c/c/e  Neuro A&O x3  Psych: Normal affect      Labs:   21 Jan 2024 08:50    142    |  98     |  50.6   ----------------------------<  95     4.7     |  35.0   |  1.04     Ca    8.5        21 Jan 2024 08:50  Phos  3.6       21 Jan 2024 08:50  Mg     2.3       21 Jan 2024 08:50    TPro  6.3    /  Alb  3.2    /  TBili  0.7    /  DBili  x      /  AST  15     /  ALT  14     /  AlkPhos  362    21 Jan 2024 08:50                          12.3   6.64  )-----------( 137      ( 20 Jan 2024 07:40 )             40.4             CT HEAD:  IMPRESSION:  Mild chronic microvascular changes without evidence of an   acute transcortical infarction or hemorrhage.    CT CHEST   1.  No main pulmonary embolism. Evaluation lobar, segmental, and   subsegmental pulmonary arteries is limited secondary to transient   interruption of contrast bolus during respiratory inspiration.  2.  Moderate right pleural effusion with complete right lower lobe   compressive atelectasis. Trace left pleural effusion.    Echo (10/2023):   1. Left ventricular ejection fraction, by visual estimation, is 50 to 55%.   2. Normal global left ventricular systolic function.   3. Mildly increased left ventricular internal cavity size.   4. The mitral in-flow pattern reveals no discernable A-wave, therefore no comment on diastolic function can be made.   5. There is mild concentric left ventricular hypertrophy.   6. Normal right ventricular size and function.   7. There is no evidence of pericardial effusion.   8. Trace mitral valve regurgitation.   9. Moderate tricuspid regurgitation.  10. TAVR in the aortic position.  11. Estimated pulmonary artery systolic pressure is 48.9 mmHg assuming a right atrial pressure of 15 mmHg, which is consistent with mild pulmonary   hypertension.    ECHO 1/2024:   1. Left ventricular systolic function is normal with an ejection fraction visually estimated at 55 to 60 %.   2. The left atrium is mildly dilated.   3. The right atrium is moderately dilated in size.   4. No pericardial effusion seen.   5. Estimated pulmonary artery systolic pressure is 49 mmHg.   6. Mild mitralvalve leaflet calcification.   7. Moderate left ventricular hypertrophy.  8. Severe RVE with moderate dysfunction    Assessment:  77 y/o male PMHx severe AS s/p TAVR 10/6/23 with Dr. Napier, chronic combined CHF with mild LV dysfunction s/p Cardiomems, CAD s/p PCI, Thoracic Aortic Aneurysm, PAF s/p ablation and PPM, LLE DVT, ALLIE, BPH s/p TURP, renal mass, obesity, Anxiety, DM, GERD, cirrhosis p/w episode of apparent syncope and SOB.  Patient has been having more SOB for at least a week but time frame is overall unclear.  Appears to be in mild CHF based on CXR and symptoms, PAD only 16 on admission by cardioMEMs   -CT with moderate right pleural effusion and planned for thoracentesis  -ECho with RVE/RV dysfunction, no PE on CTA. Maybe more from obesity and underlying pulm disease as well as his left heart HFpEF. Unclear why recent worsening of RV however. No MI, trops all negative  -PPM interrogated and no events with normal function, 97.7% V pacing  -NOted to have hypercarbic respiratory failure last night improved after BiPAP, persistent elevated   -CHFpEF compensated at this time, rhythm remains paced  -s/p chest tube for right pleural effusion, serosnaguinous fluid removed 1.3 L, removed today   -feeling much better with improvement of respiratory failure  -stable from cardiovascular perspective.       Plan:   1. Continue current CV meds  2. Eliquis for AF  3. Continue torsemide   4. No additional cardiac testing indicated at present time, please call us back with questions or concerns.

## 2024-01-21 NOTE — PROGRESS NOTE ADULT - ASSESSMENT
The patient is a 78 year old male with a past medical history of Severe Aortic Stenosis s/p TAVR 10/6/23 with Dr. Napier, Chronic Diastolic CHF on 4 liters Oxygen at baseline, CAD s/p PCI to LAD, Thoracic Aortic Aneurysm, Paroxysmal Afib s/p Ablation (on Eliquis), and Cardiomems, LLE DVT, ALLIE, BPH s/p TURP, Left Renal Mass, Morbid Obesity, Laminectomy, Anxiety, Type 2 DM, GERD, Cholelithiasis who presented after a fall at home. States he was sitting up in bed and fell forward. In the ER, CT head was negative. Admitted to obs for syncope. Xrays of bilateral knees and right shoulder were negative for fracture. PPM was interrogated with no events. Chest xray with pulmonary vascular congestion with elevated BNP. Admitted for syncope and acute on chronic diastolic CHF. TTE with moderately dilated RA; normal LVSF. CTA of the chest ruled out PE; showed moderate right pleural effusion. Thoracic surgery consulted    Acute on chronic diastolic heart failure  Acute on Chronic hypoxic and hypercapneic respiratory failure -  - as per nurse, has been refusing bipap  - bipap qhs  Right sided pleural effusion  History of CAD   Cardiomems in place  - s/p chest tube placement 1/18/24 - removed on 1/20  - TTE with normal LSVF, moderately dilated RA- CTA ruled out PE   - cardio following, Cardiomems   - On BB, Asprin, Statin ARB   - torsemide    Paroxysmal Afib  History of AS status post TAVR  - full dose lovenox   - On Po Metoprolol    Thrombocytopenia  - Monitor closely     Anxiety  - HOLD xanax  monitor for now     VTE_ SCDS ; eliquis    spoke to sonkavitha needs real in 1-2 days if remains stable

## 2024-01-22 ENCOUNTER — TRANSCRIPTION ENCOUNTER (OUTPATIENT)
Age: 79
End: 2024-01-22

## 2024-01-22 LAB
ALBUMIN SERPL ELPH-MCNC: 3.3 G/DL — SIGNIFICANT CHANGE UP (ref 3.3–5.2)
ALP SERPL-CCNC: 380 U/L — HIGH (ref 40–120)
ALT FLD-CCNC: 11 U/L — SIGNIFICANT CHANGE UP
ANION GAP SERPL CALC-SCNC: 7 MMOL/L — SIGNIFICANT CHANGE UP (ref 5–17)
AST SERPL-CCNC: 13 U/L — SIGNIFICANT CHANGE UP
BILIRUB SERPL-MCNC: 0.7 MG/DL — SIGNIFICANT CHANGE UP (ref 0.4–2)
BUN SERPL-MCNC: 45.1 MG/DL — HIGH (ref 8–20)
CALCIUM SERPL-MCNC: 8.6 MG/DL — SIGNIFICANT CHANGE UP (ref 8.4–10.5)
CHLORIDE SERPL-SCNC: 100 MMOL/L — SIGNIFICANT CHANGE UP (ref 96–108)
CO2 SERPL-SCNC: 37 MMOL/L — HIGH (ref 22–29)
CREAT SERPL-MCNC: 0.84 MG/DL — SIGNIFICANT CHANGE UP (ref 0.5–1.3)
EGFR: 89 ML/MIN/1.73M2 — SIGNIFICANT CHANGE UP
GLUCOSE BLDC GLUCOMTR-MCNC: 136 MG/DL — HIGH (ref 70–99)
GLUCOSE BLDC GLUCOMTR-MCNC: 148 MG/DL — HIGH (ref 70–99)
GLUCOSE BLDC GLUCOMTR-MCNC: 98 MG/DL — SIGNIFICANT CHANGE UP (ref 70–99)
GLUCOSE SERPL-MCNC: 102 MG/DL — HIGH (ref 70–99)
MAGNESIUM SERPL-MCNC: 2.3 MG/DL — SIGNIFICANT CHANGE UP (ref 1.8–2.6)
NON-GYNECOLOGICAL CYTOLOGY STUDY: SIGNIFICANT CHANGE UP
POTASSIUM SERPL-MCNC: 4.8 MMOL/L — SIGNIFICANT CHANGE UP (ref 3.5–5.3)
POTASSIUM SERPL-SCNC: 4.8 MMOL/L — SIGNIFICANT CHANGE UP (ref 3.5–5.3)
PROT SERPL-MCNC: 6.2 G/DL — LOW (ref 6.6–8.7)
SODIUM SERPL-SCNC: 144 MMOL/L — SIGNIFICANT CHANGE UP (ref 135–145)

## 2024-01-22 PROCEDURE — 99232 SBSQ HOSP IP/OBS MODERATE 35: CPT

## 2024-01-22 RX ORDER — ALPRAZOLAM 0.25 MG
0.25 TABLET ORAL ONCE
Refills: 0 | Status: DISCONTINUED | OUTPATIENT
Start: 2024-01-22 | End: 2024-01-22

## 2024-01-22 RX ADMIN — FINASTERIDE 5 MILLIGRAM(S): 5 TABLET, FILM COATED ORAL at 10:25

## 2024-01-22 RX ADMIN — Medication 1 GRAM(S): at 16:54

## 2024-01-22 RX ADMIN — Medication 0.25 MILLIGRAM(S): at 21:49

## 2024-01-22 RX ADMIN — ENOXAPARIN SODIUM 120 MILLIGRAM(S): 100 INJECTION SUBCUTANEOUS at 06:26

## 2024-01-22 RX ADMIN — Medication 20 MILLIGRAM(S): at 06:25

## 2024-01-22 RX ADMIN — Medication 2 MILLIGRAM(S): at 06:26

## 2024-01-22 RX ADMIN — Medication 20 MILLIGRAM(S): at 13:33

## 2024-01-22 RX ADMIN — Medication 3 MILLIGRAM(S): at 21:49

## 2024-01-22 RX ADMIN — PANTOPRAZOLE SODIUM 40 MILLIGRAM(S): 20 TABLET, DELAYED RELEASE ORAL at 06:25

## 2024-01-22 RX ADMIN — Medication 81 MILLIGRAM(S): at 10:26

## 2024-01-22 RX ADMIN — ATORVASTATIN CALCIUM 10 MILLIGRAM(S): 80 TABLET, FILM COATED ORAL at 21:49

## 2024-01-22 RX ADMIN — Medication 20 MILLIEQUIVALENT(S): at 10:26

## 2024-01-22 RX ADMIN — ENOXAPARIN SODIUM 120 MILLIGRAM(S): 100 INJECTION SUBCUTANEOUS at 16:51

## 2024-01-22 RX ADMIN — Medication 50 MILLIGRAM(S): at 06:25

## 2024-01-22 RX ADMIN — Medication 50 MILLIGRAM(S): at 16:52

## 2024-01-22 RX ADMIN — LOSARTAN POTASSIUM 25 MILLIGRAM(S): 100 TABLET, FILM COATED ORAL at 06:25

## 2024-01-22 RX ADMIN — Medication 1 GRAM(S): at 06:25

## 2024-01-22 NOTE — DISCHARGE NOTE NURSING/CASE MANAGEMENT/SOCIAL WORK - NSDCFUADDAPPT_GEN_ALL_CORE_FT
CARDIOLOGY FOLLOW UP APPOINTMENT HAS BEEN MADE FOR YOU  DR  [facility] [address]  FEBRUARY 5TH AT 10:30 AM   IF YOU ARE UNABLE TO ATTEND THIS APPOINTMENT PLEASE CALL TO RESCHEDULE  [facility] GIVEN

## 2024-01-22 NOTE — CHART NOTE - NSCHARTNOTEFT_GEN_A_CORE
Called by RN after pt is requesting percocet in place of ordered prn Oxycodone for pain.  Pt's last dose of oxycodone was @15:00; hasn't received tylenol.  Ordered Percocet 1 tab stat.  Monitor and reassess prn.

## 2024-01-22 NOTE — PHYSICAL THERAPY INITIAL EVALUATION ADULT - IMPAIRMENTS FOUND, PT EVAL
aerobic capacity/endurance/decreased midline orientation/gait, locomotion, and balance/muscle strength/ROM

## 2024-01-22 NOTE — DISCHARGE NOTE NURSING/CASE MANAGEMENT/SOCIAL WORK - PATIENT PORTAL LINK FT
You can access the FollowMyHealth Patient Portal offered by [facility] by registering at the following website: [email or web address]. By joining [email or web address]’s FollowMyHealth portal, you will also be able to view your health information using other applications (apps) compatible with our system.

## 2024-01-22 NOTE — PHYSICAL THERAPY INITIAL EVALUATION ADULT - ADDITIONAL COMMENTS
as per pt: resides on the  ground floor of a high ranch, ramp to enter, (+) rollator, cane, manual wheelchair, shower chair and raised toilet seat.  Use of O2 PRN.

## 2024-01-22 NOTE — PHYSICAL THERAPY INITIAL EVALUATION ADULT - DIAGNOSIS, PT EVAL
Impaired Functional Mobility due to generalized weakness, (+) Acute on chronic diastolic heart failure.

## 2024-01-22 NOTE — PROGRESS NOTE ADULT - ASSESSMENT
The patient is a 78 year old male with a past medical history of Severe Aortic Stenosis s/p TAVR 10/6/23 with Dr. Napier, Chronic Diastolic CHF on 4 liters Oxygen at baseline, CAD s/p PCI to LAD, Thoracic Aortic Aneurysm, Paroxysmal Afib s/p Ablation (on Eliquis), and Cardiomems, LLE DVT, ALLIE, BPH s/p TURP, Left Renal Mass, Morbid Obesity, Laminectomy, Anxiety, Type 2 DM, GERD, Cholelithiasis who presented after a fall at home. States he was sitting up in bed and fell forward. In the ER, CT head was negative. Admitted to obs for syncope. Xrays of bilateral knees and right shoulder were negative for fracture. PPM was interrogated with no events. Chest xray with pulmonary vascular congestion with elevated BNP. Admitted for syncope and acute on chronic diastolic CHF. TTE with moderately dilated RA; normal LVSF. CTA of the chest ruled out PE; showed moderate right pleural effusion. Thoracic surgery consulted    Acute on chronic diastolic heart failure  Acute on Chronic hypoxic and hypercapneic respiratory failure -  - as per nurse, has been refusing bipap  - bipap qhs  Right sided pleural effusion  History of CAD   Cardiomems in place  - s/p chest tube placement 1/18/24 - removed on 1/20  - TTE with normal LSVF, moderately dilated RA- CTA ruled out PE   - cardio following, Cardiomems   - On BB, Asprin, Statin ARB   - torsemide    Paroxysmal Afib  History of AS status post TAVR  - full dose lovenox   - On Po Metoprolol    Thrombocytopenia  - Monitor closely     Anxiety  - HOLD xanax  monitor for now     VTE_ SCDS ; lovenox sub q    spoke to son  requesting lucille oliveira if real is needed

## 2024-01-22 NOTE — PROGRESS NOTE ADULT - SUBJECTIVE AND OBJECTIVE BOX
ANIA CRISTOBAL    [id number]    78y      Male    INTERVAL HPI/OVERNIGHT EVENTS:  patient being seen for respiratory failure. patient seen at bedside and is more awake and alert but does get agitated.       REVIEW OF SYSTEMS:    CONSTITUTIONAL: No fever, weight loss, or fatigue  RESPIRATORY: No cough, wheezing, hemoptysis; No shortness of breath  CARDIOVASCULAR: No chest pain, palpitations  GASTROINTESTINAL: No abdominal or epigastric pain. No nausea, vomiting  NEUROLOGICAL: No headaches, memory loss, loss of strength.  MISCELLANEOUS:      Vital Signs Last 24 Hrs  T(C): 36.8 (22 Jan 2024 13:30), Max: 37 (21 Jan 2024 15:27)  T(F): 98.2 (22 Jan 2024 13:30), Max: 98.6 (21 Jan 2024 15:27)  HR: 83 (22 Jan 2024 13:30) (68 - 88)  BP: 132/73 (22 Jan 2024 13:30) (100/61 - 132/73)  BP(mean): 63 (21 Jan 2024 20:00) (63 - 105)  RR: 18 (22 Jan 2024 13:30) (18 - 18)  SpO2: 94% (22 Jan 2024 13:30) (90% - 99%)    Parameters below as of 22 Jan 2024 13:30  Patient On (Oxygen Delivery Method): nasal cannula  O2 Flow (L/min): 4      PE  General: NAD, speaking in full sentences, anxious  ENT: No gross hearing impairment, Moist mucous membranes, no thrush  Neck: Supple, No JVD  Lungs: no wheezing,   Cardio: RRR, S1/S2, No murmur  Abdomen: Soft, Nontender, Nondistended; Bowel sounds present  Extremities: No calf tenderness, No cyanosis, No pitting edema  Psych: Appropriate mood and affect      LABS:    01-22    144  |  100  |  45.1<H>  ----------------------------<  102<H>  4.8   |  37.0<H>  |  0.84    Ca    8.6      22 Jan 2024 07:05  Phos  3.6     01-21  Mg     2.3     01-22    TPro  6.2<L>  /  Alb  3.3  /  TBili  0.7  /  DBili  x   /  AST  13  /  ALT  11  /  AlkPhos  380<H>  01-22      Urinalysis Basic - ( 22 Jan 2024 07:05 )    Color: x / Appearance: x / SG: x / pH: x  Gluc: 102 mg/dL / Ketone: x  / Bili: x / Urobili: x   Blood: x / Protein: x / Nitrite: x   Leuk Esterase: x / RBC: x / WBC x   Sq Epi: x / Non Sq Epi: x / Bacteria: x    MEDICATIONS  (STANDING):  aspirin enteric coated 81 milliGRAM(s) Oral daily  atorvastatin 10 milliGRAM(s) Oral at bedtime  dextrose 5%. 1000 milliLiter(s) (50 mL/Hr) IV Continuous <Continuous>  dextrose 5%. 1000 milliLiter(s) (100 mL/Hr) IV Continuous <Continuous>  dextrose 50% Injectable 12.5 Gram(s) IV Push once  dextrose 50% Injectable 25 Gram(s) IV Push once  dextrose 50% Injectable 25 Gram(s) IV Push once  doxazosin 2 milliGRAM(s) Oral daily  enoxaparin Injectable 120 milliGRAM(s) SubCutaneous every 12 hours  finasteride 5 milliGRAM(s) Oral daily  glucagon  Injectable 1 milliGRAM(s) IntraMuscular once  influenza  Vaccine (HIGH DOSE) 0.7 milliLiter(s) IntraMuscular once  insulin lispro (ADMELOG) corrective regimen sliding scale   SubCutaneous three times a day before meals  losartan 25 milliGRAM(s) Oral daily  melatonin 3 milliGRAM(s) Oral at bedtime  metoprolol tartrate 50 milliGRAM(s) Oral two times a day  pantoprazole    Tablet 40 milliGRAM(s) Oral before breakfast  potassium chloride    Tablet ER 20 milliEquivalent(s) Oral daily  sucralfate 1 Gram(s) Oral two times a day  torsemide 20 milliGRAM(s) Oral two times a day    MEDICATIONS  (PRN):  acetaminophen     Tablet .. 650 milliGRAM(s) Oral every 6 hours PRN Temp greater or equal to 38C (100.4F), Mild Pain (1 - 3)  albuterol    90 MICROgram(s) HFA Inhaler 2 Puff(s) Inhalation every 6 hours PRN for shortness of breath and/or wheezing  dextrose Oral Gel 15 Gram(s) Oral once PRN Blood Glucose LESS THAN 70 milliGRAM(s)/deciliter  oxyCODONE    IR 5 milliGRAM(s) Oral every 6 hours PRN Moderate Pain (4 - 6)      RADIOLOGY & ADDITIONAL TESTS:

## 2024-01-22 NOTE — PHYSICAL THERAPY INITIAL EVALUATION ADULT - PERTINENT HX OF CURRENT PROBLEM, REHAB EVAL
as per medical chart: presented after a fall at home,  he was sitting up in bed and fell forward,  admitted to observation  for syncope.

## 2024-01-22 NOTE — PHYSICAL THERAPY INITIAL EVALUATION ADULT - ACTIVE RANGE OF MOTION EXAMINATION, REHAB EVAL
yuly LE at least 2-/5 assessed during functional mobility, resistance not applied/no Active ROM deficits were identified/bilateral upper extremity Active ROM was WFL (within functional limits)

## 2024-01-23 ENCOUNTER — TRANSCRIPTION ENCOUNTER (OUTPATIENT)
Age: 79
End: 2024-01-23

## 2024-01-23 LAB
ALBUMIN SERPL ELPH-MCNC: 3.2 G/DL — LOW (ref 3.3–5.2)
ALP SERPL-CCNC: 398 U/L — HIGH (ref 40–120)
ALT FLD-CCNC: 11 U/L — SIGNIFICANT CHANGE UP
ANION GAP SERPL CALC-SCNC: 9 MMOL/L — SIGNIFICANT CHANGE UP (ref 5–17)
AST SERPL-CCNC: 15 U/L — SIGNIFICANT CHANGE UP
BILIRUB SERPL-MCNC: 0.7 MG/DL — SIGNIFICANT CHANGE UP (ref 0.4–2)
BUN SERPL-MCNC: 38.6 MG/DL — HIGH (ref 8–20)
CALCIUM SERPL-MCNC: 8.6 MG/DL — SIGNIFICANT CHANGE UP (ref 8.4–10.5)
CHLORIDE SERPL-SCNC: 95 MMOL/L — LOW (ref 96–108)
CO2 SERPL-SCNC: 35 MMOL/L — HIGH (ref 22–29)
CREAT SERPL-MCNC: 0.83 MG/DL — SIGNIFICANT CHANGE UP (ref 0.5–1.3)
CULTURE RESULTS: SIGNIFICANT CHANGE UP
EGFR: 90 ML/MIN/1.73M2 — SIGNIFICANT CHANGE UP
GLUCOSE BLDC GLUCOMTR-MCNC: 119 MG/DL — HIGH (ref 70–99)
GLUCOSE BLDC GLUCOMTR-MCNC: 150 MG/DL — HIGH (ref 70–99)
GLUCOSE BLDC GLUCOMTR-MCNC: 182 MG/DL — HIGH (ref 70–99)
GLUCOSE BLDC GLUCOMTR-MCNC: 200 MG/DL — HIGH (ref 70–99)
GLUCOSE SERPL-MCNC: 126 MG/DL — HIGH (ref 70–99)
MAGNESIUM SERPL-MCNC: 2.2 MG/DL — SIGNIFICANT CHANGE UP (ref 1.6–2.6)
POTASSIUM SERPL-MCNC: 4.3 MMOL/L — SIGNIFICANT CHANGE UP (ref 3.5–5.3)
POTASSIUM SERPL-SCNC: 4.3 MMOL/L — SIGNIFICANT CHANGE UP (ref 3.5–5.3)
PROT SERPL-MCNC: 6.3 G/DL — LOW (ref 6.6–8.7)
SODIUM SERPL-SCNC: 139 MMOL/L — SIGNIFICANT CHANGE UP (ref 135–145)
SPECIMEN SOURCE: SIGNIFICANT CHANGE UP

## 2024-01-23 PROCEDURE — 99232 SBSQ HOSP IP/OBS MODERATE 35: CPT

## 2024-01-23 RX ORDER — ALPRAZOLAM 0.25 MG
0.25 TABLET ORAL ONCE
Refills: 0 | Status: DISCONTINUED | OUTPATIENT
Start: 2024-01-23 | End: 2024-01-23

## 2024-01-23 RX ORDER — APIXABAN 2.5 MG/1
5 TABLET, FILM COATED ORAL EVERY 12 HOURS
Refills: 0 | Status: DISCONTINUED | OUTPATIENT
Start: 2024-01-23 | End: 2024-01-24

## 2024-01-23 RX ORDER — METOPROLOL TARTRATE 50 MG
1 TABLET ORAL
Qty: 0 | Refills: 0 | DISCHARGE
Start: 2024-01-23

## 2024-01-23 RX ORDER — LOSARTAN POTASSIUM 100 MG/1
1 TABLET, FILM COATED ORAL
Qty: 0 | Refills: 0 | DISCHARGE
Start: 2024-01-23

## 2024-01-23 RX ORDER — LOSARTAN POTASSIUM 100 MG/1
1 TABLET, FILM COATED ORAL
Refills: 0 | DISCHARGE

## 2024-01-23 RX ORDER — ASPIRIN/CALCIUM CARB/MAGNESIUM 324 MG
1 TABLET ORAL
Qty: 0 | Refills: 0 | DISCHARGE
Start: 2024-01-23

## 2024-01-23 RX ORDER — DOXAZOSIN MESYLATE 4 MG
1 TABLET ORAL
Qty: 0 | Refills: 0 | DISCHARGE
Start: 2024-01-23

## 2024-01-23 RX ORDER — ALPRAZOLAM 0.25 MG
1 TABLET ORAL
Refills: 0 | DISCHARGE

## 2024-01-23 RX ORDER — DOXAZOSIN MESYLATE 4 MG
1 TABLET ORAL
Refills: 0 | DISCHARGE

## 2024-01-23 RX ADMIN — PANTOPRAZOLE SODIUM 40 MILLIGRAM(S): 20 TABLET, DELAYED RELEASE ORAL at 06:27

## 2024-01-23 RX ADMIN — Medication 20 MILLIGRAM(S): at 06:27

## 2024-01-23 RX ADMIN — Medication 2 MILLIGRAM(S): at 06:27

## 2024-01-23 RX ADMIN — Medication 3 MILLIGRAM(S): at 22:01

## 2024-01-23 RX ADMIN — Medication 50 MILLIGRAM(S): at 06:27

## 2024-01-23 RX ADMIN — ATORVASTATIN CALCIUM 10 MILLIGRAM(S): 80 TABLET, FILM COATED ORAL at 22:00

## 2024-01-23 RX ADMIN — Medication 81 MILLIGRAM(S): at 12:12

## 2024-01-23 RX ADMIN — LOSARTAN POTASSIUM 25 MILLIGRAM(S): 100 TABLET, FILM COATED ORAL at 06:27

## 2024-01-23 RX ADMIN — Medication 50 MILLIGRAM(S): at 17:19

## 2024-01-23 RX ADMIN — Medication 1 GRAM(S): at 06:27

## 2024-01-23 RX ADMIN — Medication 1 GRAM(S): at 17:19

## 2024-01-23 RX ADMIN — Medication 2: at 12:12

## 2024-01-23 RX ADMIN — APIXABAN 5 MILLIGRAM(S): 2.5 TABLET, FILM COATED ORAL at 17:19

## 2024-01-23 RX ADMIN — ENOXAPARIN SODIUM 120 MILLIGRAM(S): 100 INJECTION SUBCUTANEOUS at 06:28

## 2024-01-23 RX ADMIN — Medication 20 MILLIEQUIVALENT(S): at 12:12

## 2024-01-23 RX ADMIN — FINASTERIDE 5 MILLIGRAM(S): 5 TABLET, FILM COATED ORAL at 12:12

## 2024-01-23 RX ADMIN — Medication 20 MILLIGRAM(S): at 17:19

## 2024-01-23 RX ADMIN — Medication 0.25 MILLIGRAM(S): at 22:00

## 2024-01-23 NOTE — CHART NOTE - NSCHARTNOTEFT_GEN_A_CORE
Nutrition Note: RD consulted for low sodium diet education for CHF. Pt currently in [facility] program. Reviewed low sodium diet education with pt. Pt also noted difficulty preparing meals, so reviewed meal delivery services; referral made to [employer]. RD to remain available.

## 2024-01-23 NOTE — PROGRESS NOTE ADULT - PROVIDER SPECIALTY LIST ADULT
Cardiology
Hospitalist
Internal Medicine
Thoracic Surgery
Cardiology
Cardiology
Internal Medicine
Cardiology
Hospitalist
Thoracic Surgery
Thoracic Surgery

## 2024-01-23 NOTE — DISCHARGE NOTE PROVIDER - PROVIDER TOKENS
PROVIDER:[TOKEN:[[id number]]],FREE:[LAST:[primary care],PHONE:[(   )    -],FAX:[(   )    -],ADDRESS:[pcp]]

## 2024-01-23 NOTE — DISCHARGE NOTE PROVIDER - ATTENDING DISCHARGE PHYSICAL EXAMINATION:
General: NAD, speaking in full sentences, anxious  ENT: No gross hearing impairment, Moist mucous membranes, no thrush  Neck: Supple, No JVD  Lungs: no wheezing,   Cardio: RRR, S1/S2, No murmur  Abdomen: Soft, Nontender, Nondistended; Bowel sounds present  Extremities: No calf tenderness, No cyanosis, No pitting edema  Psych: Appropriate mood and affect

## 2024-01-23 NOTE — PROGRESS NOTE ADULT - SUBJECTIVE AND OBJECTIVE BOX
ANIA CRISTOBAL    [id number]    78y      Male    INTERVAL HPI/OVERNIGHT EVENTS:  patient being seen for resp failure. patient seen at bedside and is anxious but dneies sob    REVIEW OF SYSTEMS:    CONSTITUTIONAL: No fever, weight loss, or fatigue  RESPIRATORY: No cough, wheezing, hemoptysis; No shortness of breath  CARDIOVASCULAR: No chest pain, palpitations  GASTROINTESTINAL: No abdominal or epigastric pain. No nausea, vomiting  NEUROLOGICAL: No headaches, memory loss, loss of strength.  MISCELLANEOUS:      Vital Signs Last 24 Hrs  T(C): 36.9 (24 Jan 2024 05:03), Max: 36.9 (24 Jan 2024 00:06)  T(F): 98.5 (24 Jan 2024 05:03), Max: 98.5 (24 Jan 2024 00:06)  HR: 72 (24 Jan 2024 05:03) (70 - 78)  BP: 123/71 (24 Jan 2024 05:03) (108/64 - 126/82)  BP(mean): 79 (23 Jan 2024 16:24) (79 - 79)  RR: 18 (24 Jan 2024 05:03) (16 - 18)  SpO2: 95% (24 Jan 2024 05:03) (93% - 95%)    Parameters below as of 24 Jan 2024 05:03  Patient On (Oxygen Delivery Method): nasal cannula  O2 Flow (L/min): 4      PHYSICAL EXAM:    General: NAD, speaking in full sentences, anxious  ENT: No gross hearing impairment, Moist mucous membranes, no thrush  Neck: Supple, No JVD  Lungs: no wheezing,   Cardio: RRR, S1/S2, No murmur  Abdomen: Soft, Nontender, Nondistended; Bowel sounds present  Extremities: No calf tenderness, No cyanosis, No pitting edema  Psych: Appropriate mood and affect    LABS:    01-23    139  |  95<L>  |  38.6<H>  ----------------------------<  126<H>  4.3   |  35.0<H>  |  0.83    Ca    8.6      23 Jan 2024 04:50  Mg     2.2     01-23    TPro  6.3<L>  /  Alb  3.2<L>  /  TBili  0.7  /  DBili  x   /  AST  15  /  ALT  11  /  AlkPhos  398<H>  01-23      Urinalysis Basic - ( 23 Jan 2024 04:50 )    Color: x / Appearance: x / SG: x / pH: x  Gluc: 126 mg/dL / Ketone: x  / Bili: x / Urobili: x   Blood: x / Protein: x / Nitrite: x   Leuk Esterase: x / RBC: x / WBC x   Sq Epi: x / Non Sq Epi: x / Bacteria: x          MEDICATIONS  (STANDING):  apixaban 5 milliGRAM(s) Oral every 12 hours  aspirin enteric coated 81 milliGRAM(s) Oral daily  atorvastatin 10 milliGRAM(s) Oral at bedtime  dextrose 5%. 1000 milliLiter(s) (100 mL/Hr) IV Continuous <Continuous>  dextrose 5%. 1000 milliLiter(s) (50 mL/Hr) IV Continuous <Continuous>  dextrose 50% Injectable 25 Gram(s) IV Push once  dextrose 50% Injectable 25 Gram(s) IV Push once  dextrose 50% Injectable 12.5 Gram(s) IV Push once  doxazosin 2 milliGRAM(s) Oral daily  finasteride 5 milliGRAM(s) Oral daily  glucagon  Injectable 1 milliGRAM(s) IntraMuscular once  influenza  Vaccine (HIGH DOSE) 0.7 milliLiter(s) IntraMuscular once  insulin lispro (ADMELOG) corrective regimen sliding scale   SubCutaneous three times a day before meals  losartan 25 milliGRAM(s) Oral daily  melatonin 3 milliGRAM(s) Oral at bedtime  metoprolol tartrate 50 milliGRAM(s) Oral two times a day  pantoprazole    Tablet 40 milliGRAM(s) Oral before breakfast  potassium chloride    Tablet ER 20 milliEquivalent(s) Oral daily  sucralfate 1 Gram(s) Oral two times a day  torsemide 20 milliGRAM(s) Oral two times a day    MEDICATIONS  (PRN):  acetaminophen     Tablet .. 650 milliGRAM(s) Oral every 6 hours PRN Temp greater or equal to 38C (100.4F), Mild Pain (1 - 3)  albuterol    90 MICROgram(s) HFA Inhaler 2 Puff(s) Inhalation every 6 hours PRN for shortness of breath and/or wheezing  dextrose Oral Gel 15 Gram(s) Oral once PRN Blood Glucose LESS THAN 70 milliGRAM(s)/deciliter  oxycodone    5 mG/acetaminophen 325 mG 1 Tablet(s) Oral every 4 hours PRN Moderate Pain (4 - 6)      RADIOLOGY & ADDITIONAL TESTS:

## 2024-01-23 NOTE — PROGRESS NOTE ADULT - ASSESSMENT
The patient is a 78 year old male with a past medical history of Severe Aortic Stenosis s/p TAVR 10/6/23 with Dr. Napier, Chronic Diastolic CHF on 4 liters Oxygen at baseline, CAD s/p PCI to LAD, Thoracic Aortic Aneurysm, Paroxysmal Afib s/p Ablation (on Eliquis), and Cardiomems, LLE DVT, ALLIE, BPH s/p TURP, Left Renal Mass, Morbid Obesity, Laminectomy, Anxiety, Type 2 DM, GERD, Cholelithiasis who presented after a fall at home. States he was sitting up in bed and fell forward. In the ER, CT head was negative. Admitted to obs for syncope. Xrays of bilateral knees and right shoulder were negative for fracture. PPM was interrogated with no events. Chest xray with pulmonary vascular congestion with elevated BNP. Admitted for syncope and acute on chronic diastolic CHF. TTE with moderately dilated RA; normal LVSF. CTA of the chest ruled out PE; showed moderate right pleural effusion. Thoracic surgery consulted    Acute on chronic diastolic heart failure  Acute on Chronic hypoxic and hypercapneic respiratory failure -  - as per nurse, has been refusing bipap  - bipap qhs  Right sided pleural effusion  History of CAD   Cardiomems in place  - s/p chest tube placement 1/18/24 - removed on 1/20  - TTE with normal LSVF, moderately dilated RA- CTA ruled out PE   - cardio following, Cardiomems   - On BB, Asprin, Statin ARB   - torsemide    Paroxysmal Afib  History of AS status post TAVR  - eliquis  - On Po Metoprolol    Thrombocytopenia  - Monitor closely     Anxiety/depression  - HOLD xanax  monitor for now   consult psych     eliquis  dc to real

## 2024-01-23 NOTE — DISCHARGE NOTE PROVIDER - NSDCCPCAREPLAN_GEN_ALL_CORE_FT
PRINCIPAL DISCHARGE DIAGNOSIS  Diagnosis: Acute exacerbation of CHF (congestive heart failure)  Assessment and Plan of Treatment:       SECONDARY DISCHARGE DIAGNOSES  Diagnosis: Near syncope  Assessment and Plan of Treatment:     Diagnosis: Syncope  Assessment and Plan of Treatment:     Diagnosis: Acute respiratory failure with hypercapnia  Assessment and Plan of Treatment:     Diagnosis: Chronic atrial fibrillation  Assessment and Plan of Treatment:     Diagnosis: Morbid obesity  Assessment and Plan of Treatment:

## 2024-01-23 NOTE — DISCHARGE NOTE PROVIDER - CARE PROVIDER_API CALL
Nam Huynh  Cardiovascular Disease  [address] [place] [address]  Phone: [phone]  Fax: [phone]  Follow Up Time:     primary care,   pcp  Phone: (   )    -  Fax: (   )    -  Follow Up Time:

## 2024-01-23 NOTE — DISCHARGE NOTE PROVIDER - NSDCFUADDAPPT_GEN_ALL_CORE_FT
CARDIOLOGY FOLLOW UP APPOINTMENT HAS BEEN MADE FOR YOU  DR  [facility] [address]  FEBRUARY 5TH AT 10:30 AM   IF YOU ARE UNABLE TO ATTEND THIS APPOINTMENT PLEASE CALL TO RESCHEDULE  STAR BOOKLET GIVEN

## 2024-01-23 NOTE — DISCHARGE NOTE PROVIDER - NSDCMRMEDTOKEN_GEN_ALL_CORE_FT
Albuterol (Eqv-ProAir HFA) 90 mcg/inh inhalation aerosol: 2 puff(s) inhaled every 6 hours as needed for  shortness of breath and/or wheezing  apixaban 5 mg oral tablet: 1 tab(s) orally every 12 hours  aspirin 81 mg oral delayed release tablet: 1 tab(s) orally once a day  atorvastatin 10 mg oral tablet: 1 tab(s) orally once a day (at bedtime)  doxazosin 2 mg oral tablet: 1 tab(s) orally once a day  ergocalciferol 1.25 mg (50,000 intl units) oral capsule: 1 cap(s) orally once a week  finasteride 5 mg oral tablet: 1 tab(s) orally once a day  ipratropium-albuterol 0.5 mg-2.5 mg/3 mL inhalation solution: 3 milliliter(s) inhaled every 6 hours  losartan 25 mg oral tablet: 1 tab(s) orally once a day  metFORMIN 500 mg oral tablet: 1 tab(s) orally 2 times a day  metoprolol tartrate 50 mg oral tablet: 1 tab(s) orally 2 times a day  omeprazole 40 mg oral delayed release capsule: 1 orally once a day  potassium chloride 20 mEq oral tablet, extended release: 1 tab(s) orally once a day  sucralfate 1 g oral tablet: 1 tab(s) orally 2 times a day  torsemide 20 mg oral tablet: 1 tab(s) orally 2 times a day MDD: 40 mg   Albuterol (Eqv-ProAir HFA) 90 mcg/inh inhalation aerosol: 2 puff(s) inhaled every 6 hours as needed for  shortness of breath and/or wheezing  apixaban 5 mg oral tablet: 1 tab(s) orally every 12 hours  aspirin 81 mg oral delayed release tablet: 1 tab(s) orally once a day  atorvastatin 10 mg oral tablet: 1 tab(s) orally once a day (at bedtime)  doxazosin 2 mg oral tablet: 1 tab(s) orally once a day  ergocalciferol 1.25 mg (50,000 intl units) oral capsule: 1 cap(s) orally once a week  finasteride 5 mg oral tablet: 1 tab(s) orally once a day  ipratropium-albuterol 0.5 mg-2.5 mg/3 mL inhalation solution: 3 milliliter(s) inhaled every 6 hours  losartan 25 mg oral tablet: 1 tab(s) orally once a day  metFORMIN 500 mg oral tablet: 1 tab(s) orally 2 times a day  metoprolol tartrate 50 mg oral tablet: 1 tab(s) orally 2 times a day  omeprazole 40 mg oral delayed release capsule: 1 orally once a day  oxycodone-acetaminophen 5 mg-325 mg oral tablet: 1 tab(s) orally every 4 hours As needed Moderate Pain (4 - 6)  potassium chloride 20 mEq oral tablet, extended release: 1 tab(s) orally once a day  sucralfate 1 g oral tablet: 1 tab(s) orally 2 times a day  torsemide 20 mg oral tablet: 1 tab(s) orally 2 times a day MDD: 40 mg

## 2024-01-23 NOTE — DISCHARGE NOTE PROVIDER - NSDCFUSCHEDAPPT_GEN_ALL_CORE_FT
[facility]  E[address]ROPH [address]  Scheduled Appointment: 02/06/2024    [facility]  ELECTROPH [address]  Scheduled Appointment: 04/03/2024

## 2024-01-23 NOTE — DISCHARGE NOTE PROVIDER - HOSPITAL COURSE
The patient is a 78 year old male with a past medical history of Severe Aortic Stenosis s/p TAVR 10/6/23 with Dr. Napier, Chronic Diastolic CHF on 4 liters Oxygen at baseline, CAD s/p PCI to LAD, Thoracic Aortic Aneurysm, Paroxysmal Afib s/p Ablation (on Eliquis), and Cardiomems, LLE DVT, ALLIE, BPH s/p TURP, Left Renal Mass, Morbid Obesity, Laminectomy, Anxiety, Type 2 DM, GERD, Cholelithiasis who presented after a fall at home. States he was sitting up in bed and fell forward. In the ER, CT head was negative. Admitted to obs for syncope. Xrays of bilateral knees and right shoulder were negative for fracture. PPM was interrogated with no events. Chest xray with pulmonary vascular congestion with elevated BNP. Admitted for syncope and acute on chronic diastolic CHF. TTE with moderately dilated RA; normal LVSF. CTA of the chest ruled out PE; showed moderate right pleural effusion. Thoracic surgery consulted and placed chest tube    Acute on chronic diastolic heart failure  Acute on Chronic hypoxic and hypercapneic respiratory failure -  - bipap qhs needed on dc  Right sided pleural effusion  History of CAD   Cardiomems in place  - s/p chest tube placement 1/18/24 - removed on 1/20  - TTE with normal LSVF, moderately dilated RA- CTA ruled out PE   - cardio following, Cardiomems   - On BB, Asprin, Statin ARB   - torsemide    Paroxysmal Afib  History of AS status post TAVR  - On Po Metoprolol    Thrombocytopenia  - Monitor closely     Anxiety  - HOLD xanax    dc to real

## 2024-01-24 VITALS
TEMPERATURE: 98 F | DIASTOLIC BLOOD PRESSURE: 67 MMHG | RESPIRATION RATE: 16 BRPM | HEART RATE: 85 BPM | SYSTOLIC BLOOD PRESSURE: 114 MMHG | OXYGEN SATURATION: 99 %

## 2024-01-24 LAB
ALBUMIN SERPL ELPH-MCNC: 3.3 G/DL — SIGNIFICANT CHANGE UP (ref 3.3–5.2)
ALP SERPL-CCNC: 402 U/L — HIGH (ref 40–120)
ALT FLD-CCNC: 11 U/L — SIGNIFICANT CHANGE UP
ANION GAP SERPL CALC-SCNC: 6 MMOL/L — SIGNIFICANT CHANGE UP (ref 5–17)
AST SERPL-CCNC: 15 U/L — SIGNIFICANT CHANGE UP
BILIRUB SERPL-MCNC: 0.7 MG/DL — SIGNIFICANT CHANGE UP (ref 0.4–2)
BUN SERPL-MCNC: 29.3 MG/DL — HIGH (ref 8–20)
CALCIUM SERPL-MCNC: 8.5 MG/DL — SIGNIFICANT CHANGE UP (ref 8.4–10.5)
CHLORIDE SERPL-SCNC: 97 MMOL/L — SIGNIFICANT CHANGE UP (ref 96–108)
CO2 SERPL-SCNC: 39 MMOL/L — HIGH (ref 22–29)
CREAT SERPL-MCNC: 0.74 MG/DL — SIGNIFICANT CHANGE UP (ref 0.5–1.3)
EGFR: 93 ML/MIN/1.73M2 — SIGNIFICANT CHANGE UP
GLUCOSE BLDC GLUCOMTR-MCNC: 117 MG/DL — HIGH (ref 70–99)
GLUCOSE BLDC GLUCOMTR-MCNC: 161 MG/DL — HIGH (ref 70–99)
GLUCOSE SERPL-MCNC: 116 MG/DL — HIGH (ref 70–99)
MAGNESIUM SERPL-MCNC: 1.9 MG/DL — SIGNIFICANT CHANGE UP (ref 1.6–2.6)
POTASSIUM SERPL-MCNC: 4 MMOL/L — SIGNIFICANT CHANGE UP (ref 3.5–5.3)
POTASSIUM SERPL-SCNC: 4 MMOL/L — SIGNIFICANT CHANGE UP (ref 3.5–5.3)
PROT SERPL-MCNC: 6.4 G/DL — LOW (ref 6.6–8.7)
SODIUM SERPL-SCNC: 142 MMOL/L — SIGNIFICANT CHANGE UP (ref 135–145)

## 2024-01-24 PROCEDURE — 88305 TISSUE EXAM BY PATHOLOGIST: CPT

## 2024-01-24 PROCEDURE — 73564 X-RAY EXAM KNEE 4 OR MORE: CPT

## 2024-01-24 PROCEDURE — 87102 FUNGUS ISOLATION CULTURE: CPT

## 2024-01-24 PROCEDURE — 80053 COMPREHEN METABOLIC PANEL: CPT

## 2024-01-24 PROCEDURE — G1004: CPT

## 2024-01-24 PROCEDURE — 70450 CT HEAD/BRAIN W/O DYE: CPT

## 2024-01-24 PROCEDURE — 85018 HEMOGLOBIN: CPT

## 2024-01-24 PROCEDURE — 99239 HOSP IP/OBS DSCHRG MGMT >30: CPT

## 2024-01-24 PROCEDURE — 84155 ASSAY OF PROTEIN SERUM: CPT

## 2024-01-24 PROCEDURE — 83605 ASSAY OF LACTIC ACID: CPT

## 2024-01-24 PROCEDURE — 84100 ASSAY OF PHOSPHORUS: CPT

## 2024-01-24 PROCEDURE — 83880 ASSAY OF NATRIURETIC PEPTIDE: CPT

## 2024-01-24 PROCEDURE — 85730 THROMBOPLASTIN TIME PARTIAL: CPT

## 2024-01-24 PROCEDURE — 82435 ASSAY OF BLOOD CHLORIDE: CPT

## 2024-01-24 PROCEDURE — 87070 CULTURE OTHR SPECIMN AEROBIC: CPT

## 2024-01-24 PROCEDURE — 89051 BODY FLUID CELL COUNT: CPT

## 2024-01-24 PROCEDURE — 36415 COLL VENOUS BLD VENIPUNCTURE: CPT

## 2024-01-24 PROCEDURE — 94660 CPAP INITIATION&MGMT: CPT

## 2024-01-24 PROCEDURE — 93306 TTE W/DOPPLER COMPLETE: CPT

## 2024-01-24 PROCEDURE — 0225U NFCT DS DNA&RNA 21 SARSCOV2: CPT

## 2024-01-24 PROCEDURE — 84295 ASSAY OF SERUM SODIUM: CPT

## 2024-01-24 PROCEDURE — 82803 BLOOD GASES ANY COMBINATION: CPT

## 2024-01-24 PROCEDURE — 83036 HEMOGLOBIN GLYCOSYLATED A1C: CPT

## 2024-01-24 PROCEDURE — G0378: CPT

## 2024-01-24 PROCEDURE — 99285 EMERGENCY DEPT VISIT HI MDM: CPT | Mod: 25

## 2024-01-24 PROCEDURE — 88112 CYTOPATH CELL ENHANCE TECH: CPT

## 2024-01-24 PROCEDURE — 94640 AIRWAY INHALATION TREATMENT: CPT

## 2024-01-24 PROCEDURE — 87205 SMEAR GRAM STAIN: CPT

## 2024-01-24 PROCEDURE — 84484 ASSAY OF TROPONIN QUANT: CPT

## 2024-01-24 PROCEDURE — 82945 GLUCOSE OTHER FLUID: CPT

## 2024-01-24 PROCEDURE — 85610 PROTHROMBIN TIME: CPT

## 2024-01-24 PROCEDURE — 83615 LACTATE (LD) (LDH) ENZYME: CPT

## 2024-01-24 PROCEDURE — 87075 CULTR BACTERIA EXCEPT BLOOD: CPT

## 2024-01-24 PROCEDURE — 82962 GLUCOSE BLOOD TEST: CPT

## 2024-01-24 PROCEDURE — 71275 CT ANGIOGRAPHY CHEST: CPT | Mod: MA

## 2024-01-24 PROCEDURE — 84132 ASSAY OF SERUM POTASSIUM: CPT

## 2024-01-24 PROCEDURE — 82042 OTHER SOURCE ALBUMIN QUAN EA: CPT

## 2024-01-24 PROCEDURE — 83735 ASSAY OF MAGNESIUM: CPT

## 2024-01-24 PROCEDURE — 83986 ASSAY PH BODY FLUID NOS: CPT

## 2024-01-24 PROCEDURE — 82947 ASSAY GLUCOSE BLOOD QUANT: CPT

## 2024-01-24 PROCEDURE — 85027 COMPLETE CBC AUTOMATED: CPT

## 2024-01-24 PROCEDURE — 80048 BASIC METABOLIC PNL TOTAL CA: CPT

## 2024-01-24 PROCEDURE — 82330 ASSAY OF CALCIUM: CPT

## 2024-01-24 PROCEDURE — 85014 HEMATOCRIT: CPT

## 2024-01-24 PROCEDURE — 93005 ELECTROCARDIOGRAM TRACING: CPT

## 2024-01-24 PROCEDURE — 84157 ASSAY OF PROTEIN OTHER: CPT

## 2024-01-24 PROCEDURE — 85025 COMPLETE CBC W/AUTO DIFF WBC: CPT

## 2024-01-24 PROCEDURE — 71045 X-RAY EXAM CHEST 1 VIEW: CPT

## 2024-01-24 PROCEDURE — 96375 TX/PRO/DX INJ NEW DRUG ADDON: CPT

## 2024-01-24 PROCEDURE — 73030 X-RAY EXAM OF SHOULDER: CPT

## 2024-01-24 PROCEDURE — 36600 WITHDRAWAL OF ARTERIAL BLOOD: CPT

## 2024-01-24 PROCEDURE — 96374 THER/PROPH/DIAG INJ IV PUSH: CPT

## 2024-01-24 RX ORDER — OXYCODONE AND ACETAMINOPHEN 5; 325 MG/1; MG/1
1 TABLET ORAL
Qty: 0 | Refills: 0 | DISCHARGE
Start: 2024-01-24

## 2024-01-24 RX ADMIN — Medication 20 MILLIGRAM(S): at 06:05

## 2024-01-24 RX ADMIN — FINASTERIDE 5 MILLIGRAM(S): 5 TABLET, FILM COATED ORAL at 10:55

## 2024-01-24 RX ADMIN — APIXABAN 5 MILLIGRAM(S): 2.5 TABLET, FILM COATED ORAL at 06:05

## 2024-01-24 RX ADMIN — Medication 1 GRAM(S): at 06:06

## 2024-01-24 RX ADMIN — Medication 50 MILLIGRAM(S): at 06:06

## 2024-01-24 RX ADMIN — Medication 81 MILLIGRAM(S): at 10:54

## 2024-01-24 RX ADMIN — Medication 20 MILLIEQUIVALENT(S): at 10:55

## 2024-01-24 RX ADMIN — LOSARTAN POTASSIUM 25 MILLIGRAM(S): 100 TABLET, FILM COATED ORAL at 06:07

## 2024-01-24 RX ADMIN — PANTOPRAZOLE SODIUM 40 MILLIGRAM(S): 20 TABLET, DELAYED RELEASE ORAL at 06:11

## 2024-01-24 RX ADMIN — Medication 2: at 12:16

## 2024-01-24 RX ADMIN — Medication 2 MILLIGRAM(S): at 06:07

## 2024-01-26 ENCOUNTER — TRANSCRIPTION ENCOUNTER (OUTPATIENT)
Age: 79
End: 2024-01-26

## 2024-02-02 ENCOUNTER — TRANSCRIPTION ENCOUNTER (OUTPATIENT)
Age: 79
End: 2024-02-02

## 2024-02-09 ENCOUNTER — TRANSCRIPTION ENCOUNTER (OUTPATIENT)
Age: 79
End: 2024-02-09

## 2024-02-15 LAB — SAO2 % BLDA: SIGNIFICANT CHANGE UP % (ref 94–98)

## 2024-02-15 NOTE — PATIENT PROFILE ADULT - FUNCTIONAL ASSESSMENT - BASIC MOBILITY 2.
Per Dalia Morrow, 2 = A lot of assistance

## 2024-02-16 ENCOUNTER — TRANSCRIPTION ENCOUNTER (OUTPATIENT)
Age: 79
End: 2024-02-16

## 2024-02-17 LAB
CULTURE RESULTS: SIGNIFICANT CHANGE UP
SPECIMEN SOURCE: SIGNIFICANT CHANGE UP

## 2024-03-04 ENCOUNTER — NON-APPOINTMENT (OUTPATIENT)
Age: 79
End: 2024-03-04

## 2024-03-05 ENCOUNTER — APPOINTMENT (OUTPATIENT)
Dept: ELECTROPHYSIOLOGY | Facility: CLINIC | Age: 79
End: 2024-03-05
Payer: MEDICARE

## 2024-03-05 PROCEDURE — 93294 REM INTERROG EVL PM/LDLS PM: CPT

## 2024-03-05 PROCEDURE — 93296 REM INTERROG EVL PM/IDS: CPT

## 2024-03-06 RX ORDER — AMIODARONE HYDROCHLORIDE 200 MG/1
200 TABLET ORAL TWICE DAILY
Refills: 0 | Status: DISCONTINUED | COMMUNITY

## 2024-04-03 ENCOUNTER — APPOINTMENT (OUTPATIENT)
Dept: ELECTROPHYSIOLOGY | Facility: CLINIC | Age: 79
End: 2024-04-03
Payer: MEDICARE

## 2024-04-03 PROCEDURE — 99442: CPT | Mod: 93

## 2024-04-03 NOTE — HISTORY OF PRESENT ILLNESS
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 9/24/23 with worsening shortness of breath, intermittent palpitation, and lower ext edema. Echo showed LVEF 40-45%, and severe low-flow, low gradient aortic stenosis. CTSx consulted and planned for TAVR on 10/6. Given his baseline conduction system disease (bifascicular block with long first degree > 400 msec), high likelihood of developing complete heart block post-TAVR and potential need for antiarrhythmic drug therapy, he was felt to be a reasonable candidate for implantation of a dual chamber pacemaker.  He underwent dual chamber PPM implantation with Dr. Savage 10/5/23. Subsequently underwent TAVR as planned 10/6/23.  Readmitted for acute on chronic diastolic CHF, acute respiratory failure, and b/l pleural effusions (Right > Left). 10/29 s/p right pigtail catheter placement and removal with 1L drainage.  AF burden increased post TAVR then with reduced burden and only one 15 minute episode in November during last follow up.    Review of Echo from Dr. Sanchez's office performed 11/9/23 revealed EF improved to 50-55%.  Remote PPM transmission today reveals normal PPM function. A, RV with adequate sense and pace thresholds. Low burden of PAT in arrhythmia log 0.1%. AP- 3.4%,  95.7%. One brief NSVT. Maintained on BB therapy.   Today's visit was performed over phone per patient request. It is very difficult for him to ambulate into office. Denies CP, CRUZ, SOB, weight gain. Tolerating Eliquis without c/o easy bruising, bleeding, or falls.

## 2024-04-03 NOTE — DISCUSSION/SUMMARY
[FreeTextEntry1] : The patient is a 78 year old male with a history of HTN, DM, bifascicular block (RBBB + LAFB) and first degree AV block, newly diagnosed HFrEF (LVEF 25%, though previously had AF-mediated CM which improved after CV), known moderate AS on TTE 6/11/23,, and persistent AF/AFL s/p CV 1/2023 and radiofrequency ablation of atrial fibrillation and atrial flutter (WACA/PVI, PWI, mitral line, and CTI line) 6/19/23, who presented to [facility] 9/24/23 with worsening shortness of breath, intermittent palpitation, and lower ext edema. Echo showed LVEF 40-45%, and severe low-flow, low gradient aortic stenosis. CTSx consulted and planned for TAVR on 10/6. Given his baseline conduction system disease (bifascicular block with long first degree > 400 msec), high likelihood of developing complete heart block post-TAVR and potential need for antiarrhythmic drug therapy, he was felt to be a reasonable candidate for implantation of a dual chamber pacemaker.  He underwent dual chamber PPM implantation with Dr. Savage 10/5/23. Subsequently underwent TAVR as planned 10/6/23.  Readmitted for acute on chronic diastolic CHF, acute respiratory failure, and b/l pleural effusions (Right > Left). 10/29 s/p right pigtail catheter placement and removal with 1L drainage.  AF burden increased post TAVR then with reduced burden and only one 15 minute episode in November during last follow up.    Review of Echo from Dr. Sanchez's office performed 11/9/23 revealed EF improved to 50-55%.  Remote PPM transmission today reveals normal PPM function. A, RV with adequate sense and pace thresholds. Low burden of PAT in arrhythmia log 0.1%. AP- 3.4%,  95.7%. One brief NSVT. Maintained on BB therapy.   Today's visit was performed over phone per patient request. It is very difficult for him to ambulate into office. Denies CP, CRUZ, SOB, weight gain. Tolerating Eliquis without c/o easy bruising, bleeding, or falls.   Recommendation:   PPM with normal function. High burden , to continue to screen for PICM. Most recent echo with EF 50-55%. Reports may be difficult to get to Cardio office as well but if symptomatic will notify office and consider ER for repeat echo. Currently feeling well and denies symptoms of CRUZ, weight gain.  -S/p AF ablation 6/19/23, increase in AF burden post TAVR with volume overload now with significantly reduced burden. To continue to monitor burden via PPM. Continue ELiquis for stroke prophylaxis.  -Remote PPM monitoring in place. TO f/u via phone  in 1 year or sooner PRN.  Clarisa Peres ANP-C

## 2024-04-09 NOTE — PATIENT PROFILE ADULT - FALL HARM RISK - FALLEN IN PAST
Physical Therapy Evaluation    Visit Type: Initial Evaluation  Visit: 1  Referring Provider: Leonel Treviño MD  Medical Diagnosis (from order): M25.571, G89.29 - Chronic pain of right ankle  M76.71 - Peroneal tendinitis of right lower extremity  M76.72 - Peroneal tendinitis of left lower extremity  M76.821 - Posterior tibial tendinitis, right  M76.61 - Achilles tendinitis, right leg  M25.572, G89.29 - Chronic pain of left ankle   Treatment Diagnosis: left ankle, left foot, right ankle, right foot - increased pain/symptoms, impaired range of motion, impaired mobility, impaired motor function/performance/coordination, impaired strength, impaired activity tolerance, impaired joint play/mobility, impaired balance, increased risk for falls, impaired gait and impaired coordination.  Onset  - Date of onset: several years  Chart reviewed at time of initial evaluation (relevant co-morbidities, allergies, tests and medications listed):   - Diagnostic tests reviewed: X-Ray  Past Medical History: Hypertension, obesity, hyperlipidemia, fibromyalgia, Tyle II DM, hypothyroidism      Medications: hydroxyzine, metformin, albuterol, losartan, hydrochlorothiazide, rosuvastatin, sertaline, levothyroxine    EXAMINATION: XR ANKLE INCLUDING STANDING 3 VIEWS RIGHT, XR FOOT STANDING 3  OR MORE VIEWS LEFT     IMPRESSION:     1. Healing fifth proximal phalangeal fracture  2. Bone resorption/early healing of fourth proximal phalangeal fracture  which remains distracted  3. No ankle fracture  4. Periarticular osteopenia may relate to disuse.        SUBJECTIVE                                                                                                               Patient reports she has had foot and ankle pain for years. It hurts over her 4th and 5th toes on the left and the 2nd toe on the right. Pain also extends up into her lower legs. Does have fractures of the 4th and 5th metatarsals that are healing. Reports she falls about once a  No month. Patient endorses sensation being intact in bilateral feet, has some hypersensitivity from her fibromyalgia. Sometimes feels like she is walking on the bones on her heels when the pain is bad. Reports she needs some supportive shoes, thought her MD had mentioned getting orthotics or a diabetic shoe.     Pain / Symptoms  - Pain/symptom is: constant  - Location: Bilateral feet and ankles   - Quality / Description: ache, tight, sore, stiff, cold, sharp  - Alleviating Factors: change in position, over-the-counter medication, prescription medications, rest, avoiding movement in involved area  - Progression since onset: no change    Function:   Limitations / Exacerbation Factors:   - Patient reports pain, difficulty and increased time with function reported below.  - bed mobility, lower body dressing, sleep disturbed, grooming/hygiene/self-care activities, meal/food prep, grocery shopping, house/yard work, driving/riding in a vehicle, standing tasks, work - limited or modified, computer tasks, sitting tasks, bending/squatting/lifting, kneeling, sitting, walking, squatting/lifting, lifting/carrying, standing and pushing/pulling, all types of transfers, community distances, household distances, now needs to use assistive device, stairs, walking quickly as required to cross a street/exit a building rapidly  Prior Level of Function: pain free ADLs and IADLs. no limitation in involved extremity,    Patient Goals: decreased edema, decreased pain, improved balance, increased strength, increased motion and independence with ADLs/IADLs.    Prior treatment  - no therapies  - Discharged from hospital, home health, or skilled nursing facility in last 30 days: no  Home Environment   - Patient lives with: significant other  - Type of home: single level home  - Assistance available: as needed  - Reported 2 or more falls or an unexplained fall with injury in the last year.  - patient currently being seen in PT  - Feel safe at home  / work / school: yes      OBJECTIVE                                                                                                                     Range of Motion (ROM)   (degrees unless noted; active unless noted; norms in ( ); negative=lacking to 0, positive=beyond 0)  Ankle:   - Dorsiflexion (20):       Left:  7        Right:  5     - Plantar Flexion (45-50):       Left:  45        Right:  30    - Inversion (30-35):      Left: 35      Right: 25   - Eversion (15-20):      Left: 12      Right: 10    Strength  (out of 5 unless noted, standard test position unless noted)   Ankle:    - Dorsiflexion:         Left: 5         Right: 5    - Plantar Flexion:         Left: 5         Right: 5    - Inversion:         Left: 4          Right: 4    - Eversion:         Left: 4-          Right: 4-         Palpation  Left  - Peroneus Longus: tenderness  - Peroneus Brevis: tenderness  - Posterior Tibialis: tenderness  Right  - Peroneus Longus: tenderness  - Peroneus Brevis: tenderness  - Posterior Tibialis: tenderness  Ankle: [place]/Tendon/Bone  - Peroneal Tendon: - Left: tenderness - Right: tenderness  - Plantar Fascia: - Left: tenderness - Right: tenderness  - Head 1st Metatarsal: - Left: tenderness - Right: tenderness  - Head 2nd Metatarsal: - Left: tenderness - Right: tenderness  - Head 3rd Metatarsal: - Left: tenderness - Right: tenderness  - Head 4th Metatarsal: - Left: tenderness - Right: tenderness  - Head 5th Metatarsal: - Left: tenderness - Right: tenderness  - Base 5th Metatarsal: - Left: tenderness - Right: tenderness     Special Tests  Ankle:  - Metatarsal Squeeze:  Left: positive Right: negative            Outcome/Assessments  Outcome Measures:   Lower Extremity Functional Scale: LEFS Calculated Total: 21 (0=extreme difficulty; 80=no difficulty) see flowsheet for additional documentation        Treatment     Therapeutic Exercise  Ankle dorsiflexion yellow theraband x 10  Ankle plantarflexion yellow theraband x  10  Ankle eversion yellow theraband x 10  Ankle inversion yellow theraband x 10   Ankle alphabets x 2      Patient educated on anatomy and pathophysiology of condition, results of evaluation findings, expectations of the patient, and role of the physical therapist. All patient's questions were answered.      Skilled input: verbal instruction/cues and as detailed above    Writer verbally educated and received verbal consent for hand placement, positioning of patient, and techniques to be performed today from patient for hand placement and palpation for techniques, clothing adjustments for techniques and therapist position for techniques as described above and how they are pertinent to the patient's plan of care.  Home Exercise Program  Access Code: [email or web address]  URL: [email or web address]  Date: 04/11/2024  Prepared by: Abi Marie    Exercises  - Long Sitting Ankle Eversion with Resistance  - 1 x daily - 7 x weekly - 3 sets - 10 reps  - Long Sitting Ankle Plantar Flexion with Resistance  - 1 x daily - 7 x weekly - 3 sets - 10 reps  - Long Sitting Ankle Inversion with Resistance  - 1 x daily - 7 x weekly - 3 sets - 10 reps  - Long Sitting Ankle Dorsiflexion with Anchored Resistance  - 1 x daily - 7 x weekly - 3 sets - 10 reps  - Seated Ankle Alphabet  - 1 x daily - 7 x weekly - 3 sets - 10 reps      ASSESSMENT                                                                                                          67 year old patient has reported functional limitations listed above impacted by signs and symptoms consistent with treatment diagnosis below.  Treatment Diagnosis:   - Involved: left ankle, left foot, right ankle, right foot.  - Symptoms/impairments: increased pain/symptoms, impaired range of motion, impaired mobility, impaired motor function/performance/coordination, impaired strength, impaired activity tolerance, impaired joint play/mobility, impaired balance, increased risk for  falls, impaired gait and impaired coordination.    Patient presents to physical therapy with complaints of bilateral foot and ankle pain. She demonstrates signs and symptoms consistent with bilateral peroneal tendinopathy and healing of metatarsal fractures of left foot. Patient demonstrates decreased ROM and strength, pain with functional mobility. Patient would benefit from skilled PT services in order to address these aforementioned limitations and return to prior level of functional activities.       Prognosis: Patient will benefit from skilled therapy.  Rehabilitative potential is: good.  Predicted patient presentation: Moderate (evolving) - Patient comorbidities and complexities, as defined above, may have varying impact on steady progress for prescribed plan of care.  Education:   - Present and ready to learn: patient  - Results of above outlined education: Verbalizes understanding and Demonstrates understanding    PLAN                                                                                                                         The following skilled interventions to be implemented to achieve goals listed below:  Electrical Stimulation Unattended (87009 or )  Neuromuscular Re-Education (32000)  Therapeutic Exercise (47995)  Manual Therapy (58230)  Therapeutic Activity (34160)  Heat/Cold (50031)  Ultrasound/Phonophoresis (17209)  Activities of Daily Living/Self Care (74311)  Mechanical Traction-89692  Gait Training (97537)    Frequency / Duration  2 times per week tapering as patient progresses for 6 weeks for an estimated total of 12 visits    Patient involved in and agreed to plan of care and goals.  Attendance policy reviewed with patient.    Suggestions for next session as indicated: Progress per plan of care, ankle mobility and strengthening    Goals  Decrease pain/symptoms to 1/10  Improve involved strength to Equal to uninvolved extremity  Improve involved ROM to Equal to uninvolved  extremity  The above improvements in impairments to assist in obtaining goals listed below  Long Term Goals: to be met by end of plan of care  1. Patient will ambulate with reported manageable/tolerable pain no assistive device.   2. Patient will stand for 30 minutes for completion of household tasks such as washing dishes.  3. Patient will ascend and descend 1 flight of steps for completion of household tasks such as laundry.   4. Patient will complete single leg stance for 10 seconds without reports of discomfort for safe lower body dressing.   5. Lower Extremity Functional Scale: Patient will complete form to reflect an improved raw score to greater than or equal to 31/80 to indicate patient reported improvement in function/disability/impairment (minimal detectable change: 9 points).  6. Patient will be independent with progressed and modified home exercise program.      Therapy procedure time and total treatment time can be found documented on the Time Entry flowsheet

## 2024-04-25 NOTE — ED CDU PROVIDER INITIAL DAY NOTE - CLINICAL SUMMARY MEDICAL DECISION MAKING FREE TEXT BOX
Pre-Op H&P  Yanira Bernal  [id number]  1957      Chief complaint: Back pain      Subjective:  Patient is a 66 y.o.female presents for scheduled surgery by Dr. Wellington. She anticipates a LUMBAR LAMINECTOMY DISCECTOMY DECOMPRESSION POSTERIOR L4-5  today. She reports having back problems since 1979. She states the pain radiates down BLE. She denies saddle anesthesia.  She gets relief by sitting, lying down, or leaning forward at the waist.  She has undergone 2 epidural injections and facet blocks that did not help. She tried PT without benefit as well.      Review of Systems:  Constitutional-- No fever, chills or sweats. + fatigue.  CV-- No chest pain, palpitation or syncope. +HTN, HLD  Resp-- No SOB, cough, hemoptysis. +LESLEY on cpap   Skin--No rashes or lesions      Allergies:   Allergies   Allergen Reactions    Amlodipine Swelling     Swelling in feet    Dyazide [Triamterene-Hctz] Other (See Comments)     Hypercalcemia--pt was in hospital for 5 days     Levaquin [Levofloxacin] Other (See Comments)     Muscle/tendon pain    Adhesive Tape Rash     Does okay with paper tape      Dulaglutide Nausea Only         Home Meds:  Medications Prior to Admission   Medication Sig Dispense Refill Last Dose    Advair -21 MCG/ACT inhaler Inhale 2 puffs 2 (Two) Times a Day.   4/25/2024 at 0800    chlorhexidine (HIBICLENS) 4 % external liquid Shower each day with solution for 5 days beginning 5 days before surgery. 120 mL 0 4/24/2024 at 1900    dilTIAZem CD (CARDIZEM CD) 240 MG 24 hr capsule Take 1 capsule by mouth Daily.   4/25/2024 at 0900    fluticasone (FLONASE) 50 MCG/ACT nasal spray 1 spray into the nostril(s) as directed by provider 2 (Two) Times a Day.   4/25/2024 at 0800    gabapentin (NEURONTIN) 600 MG tablet Take 1 tablet by mouth 2 (Two) Times a Day.   4/24/2024 at 2100    losartan (COZAAR) 100 MG tablet Take 1 tablet by mouth Daily.   4/24/2024 at 0900    MAGNESIUM GLYCINATE PO Take 200 mg by mouth  "Every Night.   4/24/2024 at 2100    metFORMIN ER (GLUCOPHAGE-XR) 500 MG 24 hr tablet Take 3 tablets by mouth Daily.   4/24/2024 at 2100    montelukast (SINGULAIR) 10 MG tablet Take 1 tablet by mouth Every Night.   4/24/2024 at 2100    mupirocin (BACTROBAN) 2 % nasal ointment Apply to the inside of each nostril with a cotton swab two times daily, morning and evening, for 5 days before surgery. 10 each 0 4/24/2024    omeprazole (priLOSEC) 40 MG capsule Take 1 capsule by mouth Daily.   4/25/2024 at 0900    pravastatin (PRAVACHOL) 40 MG tablet Take 1 tablet by mouth Daily.   4/24/2024    Tiotropium Bromide Monohydrate (SPIRIVA RESPIMAT) 1.25 MCG/ACT aerosol solution inhaler Inhale 2 puffs Daily.   4/25/2024 at 1100    VITAMIN E 400 UNIT capsule Take 2 capsules by mouth Daily.   4/24/2024 at 2100    albuterol sulfate HFA (ProAir HFA) 108 (90 Base) MCG/ACT inhaler Inhale 2 puffs Every 4 (Four) Hours As Needed for Wheezing or Shortness of Air.   More than a month    clonazePAM (KlonoPIN) 1 MG tablet Take 1 tablet by mouth At Night As Needed (insomnia).   4/23/2024 at 0900    Dupixent 300 MG/2ML solution pen-injector Every 14 (Fourteen) Days.   4/21/2024 at 1500    EPINEPHrine (EPIPEN) 0.3 MG/0.3ML solution auto-injector injection        estradiol (ESTRACE) 0.1 MG/GM vaginal cream Insert  into the vagina 2 (Two) Times a Week. Mondays and Fridays 4/22/2024 at 2130    ferrous sulfate 325 (65 FE) MG EC tablet Take 1 tablet by mouth 3 (Three) Times a Week. M,W,F   4/22/2024         PMH:   Past Medical History:   Diagnosis Date    Abnormal ECG 2012    R side bundle branch block    Anesthesia complication 1989    \"felt like I couldn't breathe\"    Ankle sprain 1999    Arthritis of back     Asthma 2012    Cancer 2005    Squamous cell skin cancer x3, basal cell x2    Diabetes mellitus 2020    GERD (gastroesophageal reflux disease)     Hyperlipidemia 2010    Hypertension 2002    Knee swelling     bilateral    Liver disease 2020    " "QUIROS    Lumbosacral disc disease     MRSA (methicillin resistant Staphylococcus aureus) 2018    Peripheral neuropathy     Rotator cuff syndrome     Sleep apnea     uses CPAP    Tear of meniscus of knee 2018    Left     PSH:    Past Surgical History:   Procedure Laterality Date    APPENDECTOMY      COLONOSCOPY      DIAGNOSTIC LAPAROSCOPY      x2    ENDOSCOPY      EPIDURAL BLOCK  [id number]    Childbirth    EYE SURGERY Left     strabismus    FOOT SURGERY  R baby toe- Hammer toe    KNEE SURGERY  R knee &. L knee 2018    meniscus tear, arthroscopy    LAPAROSCOPIC CHOLECYSTECTOMY      LAPAROTOMY OVARIAN CYSTECTOMY  1982    ROTATOR CUFF REPAIR Right     + 1 screw    SHOULDER SURGERY  &&2017    Left x2, Right x1    SINUS SURGERY      x2    SMALL INTESTINE SURGERY      \"just untwisted it, not a resection\"    THYROIDECTOMY, PARTIAL      TRIGGER POINT INJECTION Left 2024    L middle finger       Immunization History:  Influenza: UTD  Pneumococcal: UTD  Tetanus: UTD  Covid : x2    Social History:   Tobacco:   Social History     Tobacco Use   Smoking Status Former    Current packs/day: 0.00    Average packs/day: 1.5 packs/day for 10.0 years (15.0 ttl pk-yrs)    Types: Cigarettes    Start date: 1973    Quit date: 1983    Years since quittin.2   Smokeless Tobacco Never      Alcohol:     Social History     Substance and Sexual Activity   Alcohol Use Yes    Alcohol/week: 8.0 standard drinks of alcohol    Types: 3 Glasses of wine, 5 Drinks containing 0.5 oz of alcohol per week    Comment: social         Physical Exam:BP (P) 148/80 (BP Location: Right arm, Patient Position: Lying)   Pulse (P) 77   Temp (P) 98.8 °F (37.1 °C) (Temporal)   Resp (P) 18   Ht (P) 172.7 cm (68\")   Wt (P) 104 kg (229 lb)   SpO2 (P) 94%   BMI (P) 34.82 kg/m²       General Appearance:    Alert, cooperative, no distress, appears stated age   Head:    Normocephalic, without obvious abnormality, atraumatic "   Lungs:     Clear to auscultation bilaterally, respirations unlabored    Heart:   Regular rate and rhythm, S1 and S2 normal    Abdomen:    Soft without tenderness   Extremities:   Extremities normal, atraumatic, no cyanosis or edema   Skin:   Skin color, texture, turgor normal, no rashes or lesions   Neurologic:   Grossly intact     Results Review:     LABS:  Lab Results   Component Value Date    WBC 7.11 04/18/2024    HGB 13.6 04/18/2024    HCT 41.4 04/18/2024    MCV 93.7 04/18/2024     04/18/2024    NEUTROABS 3.61 12/15/2023    BUN 14 07/08/2021    CREATININE 0.86 07/08/2021    EGFRIFNONA >60 07/08/2021    EGFRIFAFRI >60 07/08/2021     07/08/2021    K 4.2 04/18/2024    CL 99 07/08/2021    CO2 27 07/08/2021    MG 1.7 (L) 03/16/2023    CALCIUM 9.6 07/08/2021    ALBUMIN 4.5 07/08/2021    AST 29 07/08/2021    ALT 44 (H) 07/08/2021    BILITOT 0.7 07/08/2021       RADIOLOGY:  Study Result    Narrative & Impression   XR SPINE LUMBAR FLEX AND EXT     Date of Exam: 3/15/2024 11:43 AM EDT     Indication: r/o spondylolisthesis; surgical planning     Comparison: Outside lumbar spine MRI 2/15/2024.     Technique: Lateral flexion and extension radiographs of the lumbar spine were obtained.     Findings:  No evidence of spondylolisthesis on flexion or extension views. Multilevel degenerative disc disease and facet arthropathy, better characterized on prior/outside MRI. No vertebral body height loss. Vascular calcifications.     IMPRESSION:  Impression:  No evidence of lumbar instability.       I reviewed the patient's new clinical results.    Cancer Staging (if applicable)  Cancer Patient: __ yes __no __unknown; If yes, clinical stage T:__ N:__M:__, stage group or __N/A      Impression: Lumbar stenosis with neurogenic claudication      Plan: LUMBAR LAMINECTOMY DISCECTOMY DECOMPRESSION POSTERIOR L4-5       JUDD Adan   4/25/2024   12:39 EDT   78 yo male history of CAD s/p PCI (patent cath 2021), AF, moderate AS, morbid obesity, OA, HTN, HLD, DM, TAA (4.4cm), BPH s/p TURP c/b Chronic Franz x9 mos (now removed) presents s/p fall in bathroom this am. Patient states that he was trying to transfer from the bathtub to the toilet when he slipped and fell. Pt with right knee pain, left foot pain - xrays preformed/ct will place in observation for PT/SW

## 2024-06-24 NOTE — PROGRESS NOTE ADULT - SUBJECTIVE AND OBJECTIVE BOX
Depression After Childbirth: Care Instructions  It's common to lose sleep, feel irritable, and cry easily during the first few days after childbirth. Hormone changes and the demands of a new baby can cause these \"baby blues.\" If these mood changes last more than 2 weeks, you may have postpartum depression. This is a medical condition that requires treatment.  If you have any of these signs, you may be depressed. See your doctor right away.    You feel very sad or hopeless and lose interest in daily activities.       You sleep too much or not enough.       You feel tired or as if you have no energy.       You eat too much or too little.       You write or talk about death.     Tips to help with postpartum depression        What you can do   Try to go to all of your counseling sessions.  Take medicines as directed.  Eat healthy foods.  Get daily exercise, such as walks.  Try to get some sunlight every day.  Avoid using alcohol or other substances.  Get as much rest as possible.  Connect with friends, and join a support group for new parents.  When should you call for help?   Call 121 if:    You feel you cannot stop from hurting yourself, your baby, or someone else.   Where to get help 24 hours a day, 7 days a week   If you or someone you know talks about suicide, self-harm, a mental health crisis, a substance use crisis, or any other kind of emotional distress, get help right away. You can:    Call the Suicide and Crisis Lifeline at 707.     Call 7-106-549-TALK (1-493.611.4714).     Text HOME to 603188 to access the Crisis Text Line.   Consider saving these numbers in your phone.  Go to [email or web address] for more information or to chat online.  Call your doctor now or seek immediate medical care if:    You are having trouble caring for yourself or your baby.     You hear voices.   Contact your doctor if:    You have problems with your medicines.     You do not get better as expected.   Follow-up care is a key part  ANIA CRISTOBAL  [id number]      Chief Complaint:  CHF/ AS/ CAD, AFib, bifascicular block    Interval History:  Patient anxious but without physical c/o.  Denies CP, SOB, palps.    Tele:  Mostly AF/AFL, rate controlled.        albuterol/ipratropium for Nebulization 3 milliLiter(s) Nebulizer every 6 hours PRN  aspirin  chewable 81 milliGRAM(s) Oral daily  atorvastatin 10 milliGRAM(s) Oral at bedtime  dextrose 5%. 1000 milliLiter(s) IV Continuous <Continuous>  dextrose 5%. 1000 milliLiter(s) IV Continuous <Continuous>  dextrose 50% Injectable 25 Gram(s) IV Push once  dextrose 50% Injectable 12.5 Gram(s) IV Push once  dextrose 50% Injectable 25 Gram(s) IV Push once  dextrose Oral Gel 15 Gram(s) Oral once PRN  diazepam    Tablet 2 milliGRAM(s) Oral three times a day PRN  doxazosin 2 milliGRAM(s) Oral daily  enoxaparin Injectable 130 milliGRAM(s) SubCutaneous every 12 hours  ergocalciferol [id number] Unit(s) Oral every week  finasteride 5 milliGRAM(s) Oral daily  glucagon  Injectable 1 milliGRAM(s) IntraMuscular once  influenza  Vaccine (HIGH DOSE) 0.7 milliLiter(s) IntraMuscular once  insulin glargine Injectable (LANTUS) 10 Unit(s) SubCutaneous at bedtime  insulin lispro (ADMELOG) corrective regimen sliding scale   SubCutaneous three times a day before meals  lactulose Syrup 20 Gram(s) Oral two times a day  metoprolol tartrate 12.5 milliGRAM(s) Oral two times a day  midodrine. 5 milliGRAM(s) Oral three times a day  nystatin Ointment 1 Application(s) Topical every 12 hours  pantoprazole    Tablet 40 milliGRAM(s) Oral before breakfast  senna 2 Tablet(s) Oral at bedtime  sucralfate 1 Gram(s) Oral two times a day  torsemide 20 milliGRAM(s) Oral daily          Physical Exam:  T(C): 36.4 (10-03-23 @ 11:08), Max: 36.9 (10-02-23 @ 16:53)  HR: 83 (10-03-23 @ 11:08) (83 - 86)  BP: 113/75 (10-03-23 @ 11:08) (97/61 - 132/69)  RR: 18 (10-03-23 @ 11:08) (18 - 18)  SpO2: 95% (10-03-23 @ 11:08) (93% - 97%)  Wt(kg): --  General: Comfortable in NAD  Neck: No JVD  CVS: nl s1s2, no s3  Pulm: CTA b/l  Abd: soft, non-tender  Ext: No c/c/e  Neuro A&O x3  Psych: Normal affect      Labs:   03 Oct 2023 05:48    141    |  101    |  24.5   ----------------------------<  115    3.7     |  28.0   |  0.58     Ca    8.8        03 Oct 2023 05:48  Phos  3.6       02 Oct 2023 05:38  Mg     1.9       03 Oct 2023 05:48                            12.2   6.75  )-----------( 258      ( 03 Oct 2023 05:48 )             37.9             Echo 6/20/23  Summary:   1. LV Ejection Fraction by Oleary's Method with a biplane EF of 50 %.   2. Normal global left ventricular systolic function.   3. Trace mitral valve regurgitation.   4. Mild aortic regurgitation.   5. Endocardial visualization was enhanced with intravenous echo contrast.   6. Compared to TTE dated 6/11/23 the LVEF has improved.    STRESS TEST:    CATHETERIZATION:  [facility] 1/2023:  Coronary Angiography   - Left dominant system   - Patent prox and mid LAD stents   - Moderate stenosis of the distal LCx   - The RCA is a small, non-dominat vessel.  The proximal segment is occluded with brisk right-to-right collaterals.  - 25mm LV-Ao gradient on pullback.  CIELO 1.4cm2 with moderate AS byecho.  - Moderately reduced LVEF by echo           Pharmacological stress test 9/27/23  * Myocardial Perfusion SPECT results are mildly abnormal. * There is a small, moderate defect in the distal inferior and inferoapical wall that is fixed, consistent with infarct. Basal inferior wall defect paradoxically improved during stress imaging. Prone imaging not available. No clear evidence of ischemia. No TID (ratio 1.23).  * Post-stress resting myocardial perfusion gated SPECT imaging was performed (LVEF = 48 %;LVEDV = 176 ml.), mild hypokinesis of the distal inferior wall.    TTE 9/27/23  Summary:   1. Left ventricular ejection fraction, by visual estimation, is 40 to   45%.   2. Mildly to moderately decreased global left ventricular systolic   function.   3. Mildly to moderately decreased segmental left ventricular systolic   function.   4. Basal and mid anterolateral wall and basal and mid inferolateral wall   are abnormal as described above.   5. Moderately increased LV wall thickness.   6. There is moderate concentric left ventricular hypertrophy.   7. Moderately enlarged left atrium.   8. Normal right atrial size.   9. Trivial pericardial effusion.  10. Mild thickening of the anterior and posterior mitral valve leaflets.  11. Moderate mitral annular calcification.  12. Trace mitral valve regurgitation.  13. Aortic valve is Poorly visualized.  14. Mild to moderate aortic regurgitation.  15. Severe aortic valve stenosis (Low flow-Low gradient severe aortic   stenosis).        Assessement   76 y/o male PMHx AF on Eliquis (s/p ablation on 6/2023), moderate-severe AS, Chronic Diastolic Heart Failure, CAD (Patent prox and mid LAD stents, Moderate stenosis of the distal LCx. The pRCA  with brisk right-to-right collaterals)., HTN, HLD, DM 2, Morbid Obesity, ALLIE, Thoracic Aortic Aneurysm, BPH s/p TURP, Left Renal Mass, On last admission patient underwent CardioMeMs placement, Pt now presents from home (recently moved out of assisted living to live in his son's basement apartment), with palpitations and SOB.  He was given IV lasix and admitted for acute on chronic diastolic heart failure. Cardiology consultation requested for evaluation of acute decompensation of heart failure.   Today with low BP, received midodrine, currently BP WNL  On exam patient looks euvolemic   tele with AFIB vs Flutter.   Stress test with no clear evidence of ischemia.   Will need close monitoring post TAVR for development of CHB.    Plan   1. Continue current CV medications.   2. Continue with  lovenox.  Change to Eliquis on d/c.  3. TAVR per CTS, scheduled 10/6.  4. Tele monitoring.  Will need post-TAVR monitoring for heart block.   5. EP eval appreciated, patient may need PPM implant.

## 2024-06-26 NOTE — ED ADULT TRIAGE NOTE - BP NONINVASIVE DIASTOLIC (MM HG)
63 Alert-The patient is alert, awake and responds to voice. The patient is oriented to time, place, and person. The triage nurse is able to obtain subjective information.

## 2024-07-03 ENCOUNTER — NON-APPOINTMENT (OUTPATIENT)
Age: 79
End: 2024-07-03

## 2024-07-03 ENCOUNTER — APPOINTMENT (OUTPATIENT)
Dept: ELECTROPHYSIOLOGY | Facility: CLINIC | Age: 79
End: 2024-07-03
Payer: MEDICARE

## 2024-07-03 PROCEDURE — 93294 REM INTERROG EVL PM/LDLS PM: CPT

## 2024-07-03 PROCEDURE — 93296 REM INTERROG EVL PM/IDS: CPT

## 2024-07-22 NOTE — PHARMACOTHERAPY INTERVENTION NOTE - COMMENTS
qtc 520
Respiratory Distress  Rapid Sequence Intubation   Post-RSI Sedation   Dose recommendations accepted 
Reviewed medical record from facility to obtain medication list, [facility]  Outpatient Medication Review updated.    HOME MEDICATIONS:  acetaminophen 325 mg oral tablet: 2 tab(s) orally every 6 hours (22 Mar 2023 11:18)  acetylcysteine 20% inhalation solution: 3 milliliter(s) inhaled 3 times a day (22 Mar 2023 11:18)  Admelog 100 units/mL injectable solution: sliding scale injectable 2 times a day (22 Mar 2023 11:18)  alfuzosin 10 mg oral tablet, extended release: 1 tab(s) orally once a day (22 Mar 2023 11:18)  amiodarone 200 mg oral tablet: 1 tab(s) orally 2 times a day (22 Mar 2023 11:18)  aspirin 81 mg oral tablet, chewable: 1 tab(s) orally once a day (22 Mar 2023 11:18)  atorvastatin 10 mg oral tablet: 1 tab(s) orally once a day (22 Mar 2023 11:18)  Bengay Ultra Strength: Apply topically to affected area once a day on B/L knee (22 Mar 2023 11:18)  Biotene Oral Balance oral solution: 15 milliliter(s) orally every 8 hours (22 Mar 2023 11:18)  bisacodyl 10 mg rectal suppository: 1 suppository(ies) rectal once a day (22 Mar 2023 11:18)  DermaPhor topical ointment: Apply topically to affected area 2 times a day on BLLE (22 Mar 2023 11:18)  Eliquis 5 mg oral tablet: 1 tab(s) orally 2 times a day (22 Mar 2023 11:18)  finasteride 5 mg oral tablet: 1 tab(s) orally once a day (22 Mar 2023 11:18)  fluticasone 50 mcg/inh nasal spray: 1 spray(s) nasal once a day (22 Mar 2023 11:18)  furosemide 40 mg oral tablet: 1 tab(s) orally once a day (22 Mar 2023 11:18)  levalbuterol 45 mcg/inh inhalation aerosol: 1 puff(s) inhaled every 4 hours, As Needed (22 Mar 2023 11:18)  losartan 25 mg oral tablet: 1 tab(s) orally once a day (22 Mar 2023 11:18)  metFORMIN 500 mg oral tablet: 1 tab(s) orally 3 times a day (22 Mar 2023 11:18)  metoprolol succinate 200 mg oral tablet, extended release: 1 tab(s) orally once a day (22 Mar 2023 11:18)  Milk of Magnesia 8% oral suspension: 30 milliliter(s) orally once a day, As Needed (22 Mar 2023 11:18)  omeprazole 40 mg oral delayed release capsule: 1 cap(s) orally once a day (22 Mar 2023 11:18)  oxyCODONE 5 mg oral tablet: 1 tab(s) orally every 4 hours, As Needed (22 Mar 2023 11:18)  Saline Mist 0.65% nasal spray (obsolete): 1 spray(s) nasal 3 times a day (22 Mar 2023 11:18)  Steglatro 5 mg oral tablet: 1 tab(s) orally once a day (in the morning) (22 Mar 2023 11:18)  Vitamin D3 25 mcg (1000 intl units) oral tablet: 1 tab(s) orally once a day (22 Mar 2023 11:18)  Xanax 0.25 mg oral tablet: 1 tab(s) orally 3 times a day, As Needed (22 Mar 2023 11:18)  Xopenex Concentrate 1.25 mg/0.5 mL inhalation solution: 0.5 milliliter(s) inhaled every 6 hours (22 Mar 2023 11:18)  
ekg
Female

## 2024-08-16 ENCOUNTER — EMERGENCY (EMERGENCY)
Facility: HOSPITAL | Age: 79
LOS: 1 days | Discharge: HOME | End: 2024-08-16
Attending: STUDENT IN AN ORGANIZED HEALTH CARE EDUCATION/TRAINING PROGRAM
Payer: MEDICARE

## 2024-08-16 VITALS
DIASTOLIC BLOOD PRESSURE: 73 MMHG | OXYGEN SATURATION: 91 % | WEIGHT: 271.83 LBS | HEART RATE: 70 BPM | HEIGHT: 74 IN | SYSTOLIC BLOOD PRESSURE: 118 MMHG | TEMPERATURE: 98 F | RESPIRATION RATE: 20 BRPM

## 2024-08-16 DIAGNOSIS — Z98.89 OTHER SPECIFIED POSTPROCEDURAL STATES: Chronic | ICD-10-CM

## 2024-08-16 DIAGNOSIS — Z95.5 PRESENCE OF CORONARY ANGIOPLASTY IMPLANT AND GRAFT: Chronic | ICD-10-CM

## 2024-08-16 DIAGNOSIS — C49.9 MALIGNANT NEOPLASM OF CONNECTIVE AND SOFT TISSUE, UNSPECIFIED: Chronic | ICD-10-CM

## 2024-08-16 LAB
ALBUMIN SERPL ELPH-MCNC: 4 G/DL — SIGNIFICANT CHANGE UP (ref 3.3–5.2)
ALP SERPL-CCNC: 273 U/L — HIGH (ref 40–120)
ALT FLD-CCNC: 35 U/L — SIGNIFICANT CHANGE UP
ANION GAP SERPL CALC-SCNC: 12 MMOL/L — SIGNIFICANT CHANGE UP (ref 5–17)
APTT BLD: 34.6 SEC — SIGNIFICANT CHANGE UP (ref 24.5–35.6)
AST SERPL-CCNC: 32 U/L — SIGNIFICANT CHANGE UP
BASOPHILS # BLD AUTO: 0.07 K/UL — SIGNIFICANT CHANGE UP (ref 0–0.2)
BASOPHILS NFR BLD AUTO: 1.1 % — SIGNIFICANT CHANGE UP (ref 0–2)
BILIRUB SERPL-MCNC: 0.5 MG/DL — SIGNIFICANT CHANGE UP (ref 0.4–2)
BUN SERPL-MCNC: 26.8 MG/DL — HIGH (ref 8–20)
CALCIUM SERPL-MCNC: 9.1 MG/DL — SIGNIFICANT CHANGE UP (ref 8.4–10.5)
CHLORIDE SERPL-SCNC: 97 MMOL/L — SIGNIFICANT CHANGE UP (ref 96–108)
CO2 SERPL-SCNC: 30 MMOL/L — HIGH (ref 22–29)
CREAT SERPL-MCNC: 0.64 MG/DL — SIGNIFICANT CHANGE UP (ref 0.5–1.3)
EGFR: 97 ML/MIN/1.73M2 — SIGNIFICANT CHANGE UP
EOSINOPHIL # BLD AUTO: 0.4 K/UL — SIGNIFICANT CHANGE UP (ref 0–0.5)
EOSINOPHIL NFR BLD AUTO: 6.2 % — HIGH (ref 0–6)
GLUCOSE SERPL-MCNC: 195 MG/DL — HIGH (ref 70–99)
HCT VFR BLD CALC: 42.6 % — SIGNIFICANT CHANGE UP (ref 39–50)
HGB BLD-MCNC: 13.8 G/DL — SIGNIFICANT CHANGE UP (ref 13–17)
IMM GRANULOCYTES NFR BLD AUTO: 0.3 % — SIGNIFICANT CHANGE UP (ref 0–0.9)
INR BLD: 1.25 RATIO — HIGH (ref 0.85–1.18)
LYMPHOCYTES # BLD AUTO: 1.26 K/UL — SIGNIFICANT CHANGE UP (ref 1–3.3)
LYMPHOCYTES # BLD AUTO: 19.5 % — SIGNIFICANT CHANGE UP (ref 13–44)
MCHC RBC-ENTMCNC: 29.2 PG — SIGNIFICANT CHANGE UP (ref 27–34)
MCHC RBC-ENTMCNC: 32.4 GM/DL — SIGNIFICANT CHANGE UP (ref 32–36)
MCV RBC AUTO: 90.3 FL — SIGNIFICANT CHANGE UP (ref 80–100)
MONOCYTES # BLD AUTO: 0.71 K/UL — SIGNIFICANT CHANGE UP (ref 0–0.9)
MONOCYTES NFR BLD AUTO: 11 % — SIGNIFICANT CHANGE UP (ref 2–14)
NEUTROPHILS # BLD AUTO: 4.01 K/UL — SIGNIFICANT CHANGE UP (ref 1.8–7.4)
NEUTROPHILS NFR BLD AUTO: 61.9 % — SIGNIFICANT CHANGE UP (ref 43–77)
PLATELET # BLD AUTO: 175 K/UL — SIGNIFICANT CHANGE UP (ref 150–400)
POTASSIUM SERPL-MCNC: 4.3 MMOL/L — SIGNIFICANT CHANGE UP (ref 3.5–5.3)
POTASSIUM SERPL-SCNC: 4.3 MMOL/L — SIGNIFICANT CHANGE UP (ref 3.5–5.3)
PROT SERPL-MCNC: 7.4 G/DL — SIGNIFICANT CHANGE UP (ref 6.6–8.7)
PROTHROM AB SERPL-ACNC: 13.8 SEC — HIGH (ref 9.5–13)
RBC # BLD: 4.72 M/UL — SIGNIFICANT CHANGE UP (ref 4.2–5.8)
RBC # FLD: 14.2 % — SIGNIFICANT CHANGE UP (ref 10.3–14.5)
SODIUM SERPL-SCNC: 138 MMOL/L — SIGNIFICANT CHANGE UP (ref 135–145)
WBC # BLD: 6.47 K/UL — SIGNIFICANT CHANGE UP (ref 3.8–10.5)
WBC # FLD AUTO: 6.47 K/UL — SIGNIFICANT CHANGE UP (ref 3.8–10.5)

## 2024-08-16 PROCEDURE — 83880 ASSAY OF NATRIURETIC PEPTIDE: CPT

## 2024-08-16 PROCEDURE — 70450 CT HEAD/BRAIN W/O DYE: CPT | Mod: 26,MC

## 2024-08-16 PROCEDURE — 99285 EMERGENCY DEPT VISIT HI MDM: CPT | Mod: 25

## 2024-08-16 PROCEDURE — 73562 X-RAY EXAM OF KNEE 3: CPT

## 2024-08-16 PROCEDURE — 85610 PROTHROMBIN TIME: CPT

## 2024-08-16 PROCEDURE — 70450 CT HEAD/BRAIN W/O DYE: CPT | Mod: MC

## 2024-08-16 PROCEDURE — 84484 ASSAY OF TROPONIN QUANT: CPT

## 2024-08-16 PROCEDURE — 71045 X-RAY EXAM CHEST 1 VIEW: CPT | Mod: 26

## 2024-08-16 PROCEDURE — 93010 ELECTROCARDIOGRAM REPORT: CPT

## 2024-08-16 PROCEDURE — 99285 EMERGENCY DEPT VISIT HI MDM: CPT | Mod: GC

## 2024-08-16 PROCEDURE — 85730 THROMBOPLASTIN TIME PARTIAL: CPT

## 2024-08-16 PROCEDURE — 72125 CT NECK SPINE W/O DYE: CPT | Mod: 26,MC

## 2024-08-16 PROCEDURE — 72125 CT NECK SPINE W/O DYE: CPT | Mod: MC

## 2024-08-16 PROCEDURE — 71045 X-RAY EXAM CHEST 1 VIEW: CPT

## 2024-08-16 PROCEDURE — 85025 COMPLETE CBC W/AUTO DIFF WBC: CPT

## 2024-08-16 PROCEDURE — 93005 ELECTROCARDIOGRAM TRACING: CPT

## 2024-08-16 PROCEDURE — 80053 COMPREHEN METABOLIC PANEL: CPT

## 2024-08-16 PROCEDURE — 73562 X-RAY EXAM OF KNEE 3: CPT | Mod: 26,50

## 2024-08-16 PROCEDURE — 36415 COLL VENOUS BLD VENIPUNCTURE: CPT

## 2024-08-16 RX ORDER — DIAZEPAM 5 MG/ML
2 VIAL (ML) INJECTION ONCE
Refills: 0 | Status: DISCONTINUED | OUTPATIENT
Start: 2024-08-16 | End: 2024-08-16

## 2024-08-16 RX ADMIN — Medication 2 MILLIGRAM(S): at 11:34

## 2024-08-16 NOTE — ED PROVIDER NOTE - ATTENDING CONTRIBUTION TO CARE
Dr. Cueva: I personally saw the patient with the resident and performed a substantive portion of the visit. I performed a face to face bedside interview with patient regarding history of present illness, review of symptoms and past medical history. I completed an independent physical exam and all aspects of medical decision making. I have discussed patient's plan of care with resident. I agree with note as stated above, having amended the EMR as needed to reflect my findings. This includes HISTORY OF PRESENT ILLNESS, HIV, PAST MEDICAL/SURGICAL/FAMILY/SOCIAL HISTORY, ALLERGIES AND HOME MEDICATIONS, ROS, PHYSICAL EXAM, MEDICAL DECISION MAKING and any PROGRESS NOTES during the time I functioned as the attending physician for this patient.  SEE MDM

## 2024-08-16 NOTE — ED PROVIDER NOTE - CLINICAL SUMMARY MEDICAL DECISION MAKING FREE TEXT BOX
Will check cbc, cmp, coags, trop T, cxr, xrays for knee. VSS. Will check ECG given hx of Afib. Symptom management and reassess. Will check cbc, cmp, coags, trop T, cxr, xrays for knee. VSS. Will check ECG given hx of Afib. Symptom management and reassess.  ---------  Miesha Cueva MD, Attending  A 79 yo male with pmh of Severe Aortic Stenosis s/p TAVR, CHF, CAD s/p PCI, Thoracic Aortic Aneurysm, Paroxysmal Afib s/p Ablation (on Eliquis), and Cardiomems, LLE DVT, ALLIE, BPH s/p TURP, Left Renal Mass, Morbid Obesity, Lumber laminectomy, DM2, GERD, presents to the ED for mechanically fall this morning at 5AM. Pt denies head strike, or prodromal pain, dizziness, sob, lightheadness. In the ED, pt is ambulatory, but complains of dizziness "that has never been this bad". Pt appears anxious, able to stand up from bed and ambulate with walker without assistance.      Gen: NAD   Head: NC/AT, PERRL 3mm, no nystagmus  Neck: trachea midline, no midline C spine ttp.   Resp:  No distress  Ext: no deformities, b/l knee non tender with fROM  Neuro:  A&O appears non focal, normal gait  Skin:  Warm and dry as visualized  Psych:  Normal affect and mood    ddx includes, but is not limited to the following: acute traumatic injury such as fracture, sprain, strain, lower suspicion for ich or fracture.   plan: xrays knees and CT head (pt age, on AC, sheering stress from falling forwards could lead to shearing of bridging veins), screening labs, medication for dizziness. reassess for need for trauma or nsgy consult.   update: see progress notes

## 2024-08-16 NOTE — ED PROVIDER NOTE - OBJECTIVE STATEMENT
A 77 yo male with pmh of Severe Aortic Stenosis s/p TAVR, CHF, CAD s/p PCI, Thoracic Aortic Aneurysm, Paroxysmal Afib s/p Ablation (on Eliquis), and Cardiomems, LLE DVT, ALLIE, BPH s/p TURP, Left Renal Mass, Morbid Obesity, Lumber laminectomy, DM2, GERD, presents to the ED for mechanically fall this morning. Pt reports Pt woke up 5 am this morning, took regular meds, then was going to use bathroom when Pt lost balance. Denies LOC, head strike, or any predrome including HA, CP, SOB, palpitation, abd pain, dizziness. Reports Pt hit bilateral knee onto the floor. Currently having mild bilateral knee pain, otherwise asymptomatic, able to walk with a walker as usual.

## 2024-08-16 NOTE — ED ADULT NURSE REASSESSMENT NOTE - NS ED NURSE REASSESS COMMENT FT1
Assumed pt care at 1515. Pt is resting in stretcher comfortably at this time. NAD. VSS. Respirations even and unlabored. Pt safety maintained. Pt denies any complaints at this time. Plan of care on going.

## 2024-08-16 NOTE — ED ADULT TRIAGE NOTE - CHIEF COMPLAINT QUOTE
" I woke up this morning doing my regular routine and I lost my footing falling on the floor" pt co pain to lower back, denies LOC, hitting his head, on Eliquis.

## 2024-08-16 NOTE — ED ADULT NURSE NOTE - OBJECTIVE STATEMENT
Pt awake, NAD. Pt presents to the ED with complaints of back pain s/p trip and fall. On eliquis. Pt states hit his head, -LOC. Breathing even and unlabored.

## 2024-08-16 NOTE — PROCEDURE NOTE - ADDITIONAL PROCEDURE DETAILS
Device implanted Oct 5, 2023  Battery life ~ 12.2 years  Atrial lead MDT 4076 CapsureFix Novus  RV lead MDT 4076 CapsureFix Novus    Since last interrogation 1/17/24  Multiple episodes of AT/AF ~0/3% of time  Average time in AT/AF < 0.1 hour a day  No other events noted      94.1%; AP 4.1%    This device is conditionally approved for use with MRI. MRI department will contact the appropriate vendor for reprogramming during scan. Please recall EP if you have any further questions.

## 2024-08-16 NOTE — ED ADULT NURSE NOTE - DISCHARGE DATE/TIME
[6-10] : 6-10 [4 or more  times a week (4 pts)] : 4 or more  times a week (4 points) [Never (0 pts)] : Never (0 points) [Yes] : In the past 12 months have you used drugs other than those required for medical reasons? Yes [No falls in past year] : Patient reported no falls in the past year [0] : 2) Feeling down, depressed, or hopeless: Not at all (0) [] : No [de-identified] : socially  [YearQuit] : 1992 [de-identified] : good [de-identified] : marijuana [de-identified] : peleton 3-4 x week  [[id number]] : 0 16-Aug-2024 16:34

## 2024-08-16 NOTE — ED ADULT NURSE NOTE - NSFALLRISKINTERV_ED_ALL_ED

## 2024-08-16 NOTE — ED PROVIDER NOTE - PATIENT PORTAL LINK FT
You can access the FollowMyHealth Patient Portal offered by [facility] by registering at the following website: [email or web address]. By joining [facility]’s FollowMyHealth portal, you will also be able to view your health information using other applications (apps) compatible with our system.

## 2024-10-01 ENCOUNTER — NON-APPOINTMENT (OUTPATIENT)
Age: 79
End: 2024-10-01

## 2024-10-02 ENCOUNTER — APPOINTMENT (OUTPATIENT)
Dept: ELECTROPHYSIOLOGY | Facility: CLINIC | Age: 79
End: 2024-10-02
Payer: MEDICARE

## 2024-10-02 PROCEDURE — 93294 REM INTERROG EVL PM/LDLS PM: CPT

## 2024-10-02 PROCEDURE — 93296 REM INTERROG EVL PM/IDS: CPT

## 2024-10-19 NOTE — PROGRESS NOTE ADULT - PROBLEM SELECTOR PLAN 1
Severe AS on last admission. Now admitted with acute on chronic diastolic heart failure.  Preop work up: CT max/facial with periapical lucencies per report.  CT scan reviewed by Dr. Morin at [facility] dental on 9/28 no need for transfer for evalution at this time, pt instructed to f/u as outpt for comprehensive dental exam.   NST 9/27 with sm-mod defect, no ishemia, reviewed with cardiology, no further work up needed (last [facility] in January 2023 that showed 60% dcx disease and 100% pRCA with collateral circulation.  TTE  with Severe low flow low gradient AS.   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 Napier on 10/6/23.  Post procedure TTE completed Previously Declined (within the last year)

## 2024-12-02 ENCOUNTER — INPATIENT (INPATIENT)
Facility: HOSPITAL | Age: 79
LOS: 2 days | Discharge: HOME | DRG: 392 | End: 2024-12-05
Attending: HOSPITALIST | Admitting: STUDENT IN AN ORGANIZED HEALTH CARE EDUCATION/TRAINING PROGRAM
Payer: MEDICARE

## 2024-12-02 VITALS
HEART RATE: 88 BPM | OXYGEN SATURATION: 94 % | WEIGHT: 274.92 LBS | HEIGHT: 70 IN | SYSTOLIC BLOOD PRESSURE: 75 MMHG | DIASTOLIC BLOOD PRESSURE: 40 MMHG | TEMPERATURE: 99 F | RESPIRATION RATE: 18 BRPM

## 2024-12-02 DIAGNOSIS — R19.7 DIARRHEA, UNSPECIFIED: ICD-10-CM

## 2024-12-02 DIAGNOSIS — C49.9 MALIGNANT NEOPLASM OF CONNECTIVE AND SOFT TISSUE, UNSPECIFIED: Chronic | ICD-10-CM

## 2024-12-02 DIAGNOSIS — Z95.5 PRESENCE OF CORONARY ANGIOPLASTY IMPLANT AND GRAFT: Chronic | ICD-10-CM

## 2024-12-02 DIAGNOSIS — Z98.89 OTHER SPECIFIED POSTPROCEDURAL STATES: Chronic | ICD-10-CM

## 2024-12-02 LAB
ALBUMIN SERPL ELPH-MCNC: 3.4 G/DL — SIGNIFICANT CHANGE UP (ref 3.3–5.2)
ALP SERPL-CCNC: 147 U/L — HIGH (ref 40–120)
ALT FLD-CCNC: 26 U/L — SIGNIFICANT CHANGE UP
ANION GAP SERPL CALC-SCNC: 15 MMOL/L — SIGNIFICANT CHANGE UP (ref 5–17)
APPEARANCE UR: CLEAR — SIGNIFICANT CHANGE UP
APTT BLD: 34.1 SEC — SIGNIFICANT CHANGE UP (ref 24.5–35.6)
AST SERPL-CCNC: 23 U/L — SIGNIFICANT CHANGE UP
BACTERIA # UR AUTO: ABNORMAL /HPF
BASOPHILS # BLD AUTO: 0 K/UL — SIGNIFICANT CHANGE UP (ref 0–0.2)
BASOPHILS NFR BLD AUTO: 0 % — SIGNIFICANT CHANGE UP (ref 0–2)
BILIRUB SERPL-MCNC: 0.7 MG/DL — SIGNIFICANT CHANGE UP (ref 0.4–2)
BILIRUB UR-MCNC: NEGATIVE — SIGNIFICANT CHANGE UP
BUN SERPL-MCNC: 47.7 MG/DL — HIGH (ref 8–20)
CALCIUM SERPL-MCNC: 7.7 MG/DL — LOW (ref 8.4–10.5)
CHLORIDE SERPL-SCNC: 95 MMOL/L — LOW (ref 96–108)
CO2 SERPL-SCNC: 25 MMOL/L — SIGNIFICANT CHANGE UP (ref 22–29)
COLOR SPEC: YELLOW — SIGNIFICANT CHANGE UP
CREAT SERPL-MCNC: 0.95 MG/DL — SIGNIFICANT CHANGE UP (ref 0.5–1.3)
DIFF PNL FLD: NEGATIVE — SIGNIFICANT CHANGE UP
EGFR: 81 ML/MIN/1.73M2 — SIGNIFICANT CHANGE UP
EOSINOPHIL # BLD AUTO: 0.12 K/UL — SIGNIFICANT CHANGE UP (ref 0–0.5)
EOSINOPHIL NFR BLD AUTO: 1.7 % — SIGNIFICANT CHANGE UP (ref 0–6)
FLUAV AG NPH QL: SIGNIFICANT CHANGE UP
FLUBV AG NPH QL: SIGNIFICANT CHANGE UP
GAS PNL BLDV: SIGNIFICANT CHANGE UP
GIANT PLATELETS BLD QL SMEAR: PRESENT — SIGNIFICANT CHANGE UP
GLUCOSE SERPL-MCNC: 180 MG/DL — HIGH (ref 70–99)
GLUCOSE UR QL: >=1000 MG/DL
HCT VFR BLD CALC: 43.3 % — SIGNIFICANT CHANGE UP (ref 39–50)
HGB BLD-MCNC: 14.6 G/DL — SIGNIFICANT CHANGE UP (ref 13–17)
INR BLD: 1.69 RATIO — HIGH (ref 0.85–1.16)
KETONES UR-MCNC: NEGATIVE MG/DL — SIGNIFICANT CHANGE UP
LACTATE BLDV-MCNC: 2.3 MMOL/L — HIGH (ref 0.5–2)
LEUKOCYTE ESTERASE UR-ACNC: ABNORMAL
LYMPHOCYTES # BLD AUTO: 0.31 K/UL — LOW (ref 1–3.3)
LYMPHOCYTES # BLD AUTO: 4.3 % — LOW (ref 13–44)
MANUAL SMEAR VERIFICATION: SIGNIFICANT CHANGE UP
MCHC RBC-ENTMCNC: 29.3 PG — SIGNIFICANT CHANGE UP (ref 27–34)
MCHC RBC-ENTMCNC: 33.7 G/DL — SIGNIFICANT CHANGE UP (ref 32–36)
MCV RBC AUTO: 86.8 FL — SIGNIFICANT CHANGE UP (ref 80–100)
MONOCYTES # BLD AUTO: 0.51 K/UL — SIGNIFICANT CHANGE UP (ref 0–0.9)
MONOCYTES NFR BLD AUTO: 6.9 % — SIGNIFICANT CHANGE UP (ref 2–14)
NEUTROPHILS # BLD AUTO: 6.31 K/UL — SIGNIFICANT CHANGE UP (ref 1.8–7.4)
NEUTROPHILS NFR BLD AUTO: 86.2 % — HIGH (ref 43–77)
NITRITE UR-MCNC: NEGATIVE — SIGNIFICANT CHANGE UP
NT-PROBNP SERPL-SCNC: 2181 PG/ML — HIGH (ref 0–300)
PH UR: 5.5 — SIGNIFICANT CHANGE UP (ref 5–8)
PLAT MORPH BLD: NORMAL — SIGNIFICANT CHANGE UP
PLATELET # BLD AUTO: 144 K/UL — LOW (ref 150–400)
POTASSIUM SERPL-MCNC: 4.3 MMOL/L — SIGNIFICANT CHANGE UP (ref 3.5–5.3)
POTASSIUM SERPL-SCNC: 4.3 MMOL/L — SIGNIFICANT CHANGE UP (ref 3.5–5.3)
PROT SERPL-MCNC: 6.2 G/DL — LOW (ref 6.6–8.7)
PROT UR-MCNC: NEGATIVE MG/DL — SIGNIFICANT CHANGE UP
PROTHROM AB SERPL-ACNC: 19 SEC — HIGH (ref 9.9–13.4)
RBC # BLD: 4.99 M/UL — SIGNIFICANT CHANGE UP (ref 4.2–5.8)
RBC # FLD: 13.8 % — SIGNIFICANT CHANGE UP (ref 10.3–14.5)
RBC BLD AUTO: NORMAL — SIGNIFICANT CHANGE UP
RBC CASTS # UR COMP ASSIST: 2 /HPF — SIGNIFICANT CHANGE UP (ref 0–4)
RSV RNA NPH QL NAA+NON-PROBE: SIGNIFICANT CHANGE UP
SARS-COV-2 RNA SPEC QL NAA+PROBE: SIGNIFICANT CHANGE UP
SODIUM SERPL-SCNC: 135 MMOL/L — SIGNIFICANT CHANGE UP (ref 135–145)
SP GR SPEC: 1.02 — SIGNIFICANT CHANGE UP (ref 1–1.03)
UROBILINOGEN FLD QL: 0.2 MG/DL — SIGNIFICANT CHANGE UP (ref 0.2–1)
VARIANT LYMPHS # BLD: 0.9 % — SIGNIFICANT CHANGE UP (ref 0–6)
WBC # BLD: 7.32 K/UL — SIGNIFICANT CHANGE UP (ref 3.8–10.5)
WBC # FLD AUTO: 7.32 K/UL — SIGNIFICANT CHANGE UP (ref 3.8–10.5)
WBC UR QL: 1 /HPF — SIGNIFICANT CHANGE UP (ref 0–5)

## 2024-12-02 PROCEDURE — 74177 CT ABD & PELVIS W/CONTRAST: CPT | Mod: 26,MC

## 2024-12-02 PROCEDURE — 71045 X-RAY EXAM CHEST 1 VIEW: CPT | Mod: 26

## 2024-12-02 PROCEDURE — 71275 CT ANGIOGRAPHY CHEST: CPT | Mod: 26,MC

## 2024-12-02 PROCEDURE — 93010 ELECTROCARDIOGRAM REPORT: CPT

## 2024-12-02 PROCEDURE — 99285 EMERGENCY DEPT VISIT HI MDM: CPT | Mod: GC

## 2024-12-02 PROCEDURE — 99223 1ST HOSP IP/OBS HIGH 75: CPT

## 2024-12-02 RX ORDER — SODIUM CHLORIDE 9 MG/ML
1000 INJECTION, SOLUTION INTRAMUSCULAR; INTRAVENOUS; SUBCUTANEOUS ONCE
Refills: 0 | Status: COMPLETED | OUTPATIENT
Start: 2024-12-02 | End: 2024-12-02

## 2024-12-02 RX ORDER — POTASSIUM CHLORIDE 600 MG/1
1 TABLET, EXTENDED RELEASE ORAL
Refills: 0 | DISCHARGE

## 2024-12-02 RX ORDER — SODIUM CHLORIDE 9 MG/ML
500 INJECTION, SOLUTION INTRAMUSCULAR; INTRAVENOUS; SUBCUTANEOUS ONCE
Refills: 0 | Status: COMPLETED | OUTPATIENT
Start: 2024-12-02 | End: 2024-12-02

## 2024-12-02 RX ORDER — HYDROMORPHONE HYDROCHLORIDE 2 MG/1
0.5 TABLET ORAL ONCE
Refills: 0 | Status: DISCONTINUED | OUTPATIENT
Start: 2024-12-02 | End: 2024-12-02

## 2024-12-02 RX ORDER — TORSEMIDE 10 MG
2 TABLET ORAL
Refills: 0 | DISCHARGE

## 2024-12-02 RX ORDER — APIXABAN 2.5 MG/1
5 TABLET, FILM COATED ORAL ONCE
Refills: 0 | Status: COMPLETED | OUTPATIENT
Start: 2024-12-02 | End: 2024-12-02

## 2024-12-02 RX ORDER — ALBUTEROL 90 MCG
2 AEROSOL (GRAM) INHALATION
Refills: 0 | DISCHARGE

## 2024-12-02 RX ORDER — MIDODRINE HYDROCHLORIDE 5 MG/1
10 TABLET ORAL ONCE
Refills: 0 | Status: COMPLETED | OUTPATIENT
Start: 2024-12-02 | End: 2024-12-02

## 2024-12-02 RX ORDER — DAPAGLIFLOZIN 10 MG/1
1 TABLET, FILM COATED ORAL
Refills: 0 | DISCHARGE

## 2024-12-02 RX ORDER — ACETAMINOPHEN 500MG 500 MG/1
1000 TABLET, COATED ORAL ONCE
Refills: 0 | Status: COMPLETED | OUTPATIENT
Start: 2024-12-02 | End: 2024-12-02

## 2024-12-02 RX ORDER — IPRATROPIUM BROMIDE AND ALBUTEROL SULFATE 2.5; .5 MG/3ML; MG/3ML
3 SOLUTION RESPIRATORY (INHALATION) ONCE
Refills: 0 | Status: COMPLETED | OUTPATIENT
Start: 2024-12-02 | End: 2024-12-02

## 2024-12-02 RX ORDER — OXYCODONE AND ACETAMINOPHEN 5; 325 MG/1; MG/1
1 TABLET ORAL
Refills: 0 | DISCHARGE

## 2024-12-02 RX ADMIN — Medication 10 MILLIGRAM(S): at 19:03

## 2024-12-02 RX ADMIN — APIXABAN 5 MILLIGRAM(S): 2.5 TABLET, FILM COATED ORAL at 19:03

## 2024-12-02 RX ADMIN — ACETAMINOPHEN 500MG 400 MILLIGRAM(S): 500 TABLET, COATED ORAL at 18:04

## 2024-12-02 RX ADMIN — SODIUM CHLORIDE 500 MILLILITER(S): 9 INJECTION, SOLUTION INTRAMUSCULAR; INTRAVENOUS; SUBCUTANEOUS at 17:24

## 2024-12-02 RX ADMIN — SODIUM CHLORIDE 1000 MILLILITER(S): 9 INJECTION, SOLUTION INTRAMUSCULAR; INTRAVENOUS; SUBCUTANEOUS at 15:44

## 2024-12-02 RX ADMIN — HYDROMORPHONE HYDROCHLORIDE 0.5 MILLIGRAM(S): 2 TABLET ORAL at 18:48

## 2024-12-02 RX ADMIN — IPRATROPIUM BROMIDE AND ALBUTEROL SULFATE 3 MILLILITER(S): 2.5; .5 SOLUTION RESPIRATORY (INHALATION) at 16:48

## 2024-12-02 RX ADMIN — SODIUM CHLORIDE 500 MILLILITER(S): 9 INJECTION, SOLUTION INTRAMUSCULAR; INTRAVENOUS; SUBCUTANEOUS at 16:48

## 2024-12-02 RX ADMIN — ACETAMINOPHEN 500MG 1000 MILLIGRAM(S): 500 TABLET, COATED ORAL at 18:48

## 2024-12-02 RX ADMIN — HYDROMORPHONE HYDROCHLORIDE 0.5 MILLIGRAM(S): 2 TABLET ORAL at 18:04

## 2024-12-02 RX ADMIN — MIDODRINE HYDROCHLORIDE 10 MILLIGRAM(S): 5 TABLET ORAL at 19:03

## 2024-12-02 RX ADMIN — Medication 500 MILLIGRAM(S): at 19:03

## 2024-12-02 NOTE — ED ADULT NURSE REASSESSMENT NOTE - NS ED NURSE REASSESS COMMENT FT1
Assumed care of pt at 19:15 as stated in report from RN SLIM. Charting as noted. Patient A&O x4, pt denies any pain/discomfort, denies CP/SOB. Call bell within reach, bed locked in lowest position. IV site flushed w/ NS. No redness, swelling or pain noted to site. No signs of acute distress noted, safety maintained. Pt remains on CM. Pt pending CT. Pt made aware of plan of care and verbalized understanding.

## 2024-12-02 NOTE — H&P ADULT - NSHPLABSRESULTS_GEN_ALL_CORE
CT Chest/Abd/Pelvis IVC IMPRESSION:   No pulmonary embolus.  Circumscribed fat in the right upper quadrant of uncertain etiology,   possibly focal fat necrosis.

## 2024-12-02 NOTE — ED PROVIDER NOTE - OBJECTIVE STATEMENT
Patient is 80 y/o male w/ PMHx CHF, TAVR, s/p 2 stents, s/p ablation BIBA who presents with hypotension, 78/50, this morning. Pt is asymptomatic, but checks his BP regularly and this is abnormal for him. Pt states he has had profuse non bloody diarrhea with nausea x 12 hrs. Pt states this has not occurred before. Pt has had multiple episodes overnight with abdominal cramping. Pt states he woke up in a sweat, likely 2/2 fever. Pt states he took 2 torsemide tablets this am and urination volume was less than usual. Pt denies chest pain, SOB, vomiting, dizziness, HA, and recent antibiotic use. Patient is 78 y/o male w/ PMHx CHF, Aortic stenosis s/p TAVR, CAD s/p 2 stents, Paroxysmal Afib s/p ablation (on eliquis), Thoracic Aortic Aneurysm, Cardiomems, pacemaker, hx LLE DVT, ALLIE, BPH s/p TURP, left renal mass, lumbar laminectomy, T2DM, GERD, BIBA who presents with hypotension, 78/50, this morning. Pt is asymptomatic, but checks his BP regularly and this is abnormal for him. Pt states he has had profuse non bloody diarrhea with nausea x 12 hrs. Pt states this has not occurred before. Pt has had multiple episodes overnight with abdominal cramping. Pt states he woke up in a sweat, likely 2/2 fever. Pt states he took 2 torsemide tablets this am and urination volume was less than usual. Pt denies chest pain, SOB, vomiting, dizziness, HA, and recent antibiotic use.

## 2024-12-02 NOTE — ED PROVIDER NOTE - CLINICAL SUMMARY MEDICAL DECISION MAKING FREE TEXT BOX
Patient is 78 y/o male w/ PMHx CHF, TAVR, s/p 2 stents, s/p ablation BIBA who presents with hypotension, 78/50, this morning. Pt is asymptomatic, alert and awake x 4, cardiac, pulm, and abd exams are normal. Pt has had multiple episodes of profuse diarrhea x 12 hours with associated nausea. Pt woke up in a sweat overnight likely 2/2 fever. Pt denies similar sxs in past. Current rectal temp is ...... Plan for IV fluids, sepsis protocol and CT abd/pelvis. Patient is 80 y/o male w/ PMHx CHF, Aortic stenosis s/p TAVR, CAD s/p 2 stents, Paroxysmal Afib s/p ablation (on eliquis), Thoracic Aortic Aneurysm, Cardiomems, pacemaker, hx LLE DVT, ALILE, BPH s/p TURP, left renal mass, lumbar laminectomy, T2DM, GERD,  BIBA who presents with hypotension, 78/50, this morning. Pt is asymptomatic, alert and awake x 4, cardiac, pulm, and abd exams are normal. Pt has had multiple episodes of profuse diarrhea x 12 hours with associated nausea. Pt woke up in a sweat overnight likely 2/2 fever. Pt denies similar sxs in past. Current BP is 75/40 mmHg and rectal temp is 100 F. Plan for IV fluids, sepsis protocol, CT abd/pelvis, influenza panel. Patient is 80 y/o male w/ PMHx CHF, Aortic stenosis s/p TAVR, CAD s/p 2 stents, Paroxysmal Afib s/p ablation (on eliquis), Thoracic Aortic Aneurysm, Cardiomems, pacemaker, hx LLE DVT, ALLIE, BPH s/p TURP, left renal mass, lumbar laminectomy, T2DM, GERD,  BIBA who presents with hypotension, 78/50, this morning. Pt is asymptomatic, alert and awake x 4, cardiac, pulm, and abd exams are normal. Pt has had multiple episodes of profuse diarrhea x 12 hours with associated nausea. Pt woke up in a sweat overnight likely 2/2 fever. Pt denies similar sxs in past. Current BP is 75/40 mmHg and rectal temp is 100 F. Plan for IV fluids, sepsis protocol, CT abd/pelvis, influenza panel.     pt's bp improved but fluctuating. pt has had 2 episodes of diarrhea in the ED. CT results are WNL. diarrhea may be viral in origin. admitted to [facility] for BP monitoring and hydration for diarrhea, being careful not to volume overload in consideration of pt's extensive cardiac hx and CHF. stool sample PCR sent.

## 2024-12-02 NOTE — ED PROVIDER NOTE - ATTENDING CONTRIBUTION TO CARE
79 M with PMHX CAD s/p PCI x2, AFIB s/p Ablation on Eliquis, AS s/p TAVR, CHF s/p CardioMEMS, Aortic Aneurysm, LLE DVT, ALLIE, BPH s/p TURP, Hx L Renal Mass, DM2, GERD presents to [facility] ER c/o low blood pressure and mulitple episodes of nb diarrhea. Pt arrived hypotensive improved with fluids but continued to require fluids, mentating well, ct ordered to eval for intraabd pathology, signed out pending results

## 2024-12-02 NOTE — ED PROVIDER NOTE - PROGRESS NOTE DETAILS
Pt has map 73 on repeat bp after 1LNS. POCUS done showing collapsible IVC during inspiration. cxr shows no signs of pulm edema or pleural effusion. POCUS lungs show no b lines or pleural effusion. given total 1.5L NS, will repeat lactate. first lactate was elevated. Pt has map 73 on repeat bp after 1LNS. POCUS done showing collapsible IVC during inspiration. cxr shows no signs of pulm edema or pleural effusion. POCUS lungs show no b lines or pleural effusion. given total 1.5L NS, will repeat lactate. first lactate was elevated. mild wheezing heard on reassessment, given one duoneb. pt's bp improved. pt has had 2 episodes of diarrhea in the ED. CT results are WNL. diarrhea may be viral in origin. admitted to tele. pt's bp improved. pt has had 2 episodes of diarrhea in the ED. CT results are WNL. diarrhea may be viral in origin. admitted to tele for BP monitoring and hydration for diarrhea, being careful not to volume overload in consideration of pt's extensive cardiac hx and CHF.

## 2024-12-02 NOTE — H&P ADULT - ASSESSMENT
ASSESSMENT:  79M with PMHX CAD s/p PCI x2, AFIB s/p Ablation on Eliquis, AS s/p TAVR, CHF s/p CardioMEMS, Aortic Aneurysm, LLE DVT, ALLIE, BPH s/p TURP, Hx L Renal Mass, DM2, GERD presents to [facility] ER c/o low blood pressure admitted for Diarrhea c/b Hypotension and Lactic Acidosis r/o Sepsis v Polypharmacy.    PLAN:  Diarrhea c/b Hypotension r/o Sepsis v Polypharmacy  -Admit to Tele  -VS q4  -UA negative. UCX pending  -BCX pending  -GI PCR pending  -RSV/COVID/FLU negative  -CT Chest/Abd/Pelvis no acute findings apart from RUQ focal fat necrosis?  -1.5L IVFB given in ED  -Repeat BP 75/40 -> 105/66  -Also given Midodrine 10mg PO x1 in ED  -Lactate 2.8 -> Lactate 2.3 -> []  -No SIRS criteria, fevers/ leukocytosis, or active source of infection found on imaging  -Hold ABX pending source identification  -Hold further IVF and Midodrine for now  -Hypovolemia may be overdiuresis from Torsemide 40mg q24 and/or polypharmacy from multiple BP lowering medications    BPH s/p TURP  -Doxazosin and Finasteride    DM2  -Hold Metformin 500mg TID with IV contrast dye load and lactic acidosis  -ISS/Accuchecks/A1C/ADA Diet    CAD s/p PCI x2, pAFIB on Eliquis, CHF s/p CardioMEMS, AS s/p TAVR, LLE DVT  -ASA 81mg q24  -Eliquis 5mg BID  -Atorvastatin 10mg qHS  -Farxiga 10mg q24  -Metoprolol Tartrate 50mg BID   -Torsemide 40mg PO q24 -> Hold with IVF and Hypotension  -Cardio consult for hypotension possibly overdiuresis    GERD  -PPI and Sucralfate BID    MDD, Chronic Pain Syndrome  -Alprazolam 0.25mg TID PRN  -Oxycodone 5/325mg PO BID   -Patient states he has been using Alprazolam 3-4 times/day and Oxycodone 3-4 times/day question of polypharmacy contributing to hypotension    VTE PPX: SCDs/Eliquis  DISPO: Acute. Admit to inpatient pending sepsis/hypotension workup. Cardio consult to evaluate CardioMEMS and possible diuresis. Plan for dispo home when BP improves pending workup.

## 2024-12-02 NOTE — H&P ADULT - HISTORY OF PRESENT ILLNESS
Pt seen/examined prior to midnight on 12/2/24.    79M with PMHX CAD s/p PCI x2, AFIB s/p Ablation on Eliquis, AS s/p TAVR, CHF s/p CardioMEMS, Aortic Aneurysm, LLE DVT, ALLIE, BPH s/p TURP, Hx L Renal Mass, DM2, GERD presents to [facility] ER c/o low blood pressure on routine home screening. Pt asymptoamtic. BP 75/40 on arrival for which he was given 1.5L IVFB and Midodrine 10mg PO x1 in ED with repeat /66. CT Chest/Abd/Pelvis imaging negative for infection. UA negative. Pt asymptomatic. Pt does admit to diarrhea and abd cramping today PTA. GI PCR pending.    ROS negative unless mentioned.

## 2024-12-02 NOTE — H&P ADULT - NSHPPHYSICALEXAM_GEN_ALL_CORE
Vital Signs Last 24 Hrs  T(C): 37.2 (02 Dec 2024 18:32), Max: 37.4 (02 Dec 2024 14:51)  T(F): 99 (02 Dec 2024 18:32), Max: 99.3 (02 Dec 2024 14:51)  HR: 89 (02 Dec 2024 19:25) (88 - 94)  BP: 105/66 (02 Dec 2024 19:25) (75/40 - 123/65)  BP(mean): --  RR: 20 (02 Dec 2024 19:25) (18 - 21)  SpO2: 97% (02 Dec 2024 19:25) (94% - 100%)    Parameters below as of 02 Dec 2024 19:25  Patient On (Oxygen Delivery Method): room air    Constitutional: NAD, VSS  Head: NC/AT  Eyes: PERRL, EOMI, anicteric sclera, conjunctiva WNL  ENT: Normal Pharynx, MMM, No tonsillar exudate/erythema  Neck: Supple, Non-tender  Chest: Non-tender, no rashes  Cardio: RRR, s1/s2, no appreciable murmurs/rubs/gallops  Resp: BS CTA bilaterally, no wheezing/rhonchi/rales  Abd: Soft, Non-tender, Non-distended, no rebound/guarding/rigidity  : not examined  Rectal: not examined  MSK: moving all extremities, no motor weakness, full ROM x4  Ext: palpable distal pulses, good capillary refill  Psych: appropriate, cooperative  Neuro: CN II-XII grossly intact, no focal deficits  Skin: Warm/Dry. No rashes.
Yes

## 2024-12-02 NOTE — ED ADULT NURSE NOTE - CHIEF COMPLAINT QUOTE
pt BIBA from home AOX3 c.o diarrhea with abd pain and cramping, arrives hypotensive. pt in no distress, even and unlabored resps, no SOB or chest pain. pt is [place].

## 2024-12-02 NOTE — PHARMACOTHERAPY INTERVENTION NOTE - COMMENTS
Spoke to patient to obtain medication list. On Eliquis 5 mG BID, last dose 5am. On alprazolam 0.25 mG 3 to 4 times daily and oxycodone/apap 5 mG/325 mG 3 to 4 times daily, differs from outpatient prescription. Outpatient Medication Review updated.    HOME MEDICATIONS:  acetaminophen 500 mg oral tablet: 2 tab(s) orally every 6 hours as needed for  mild pain (02 Dec 2024 18:44)  ALPRAZolam 0.25 mg oral tablet: 1 tab(s) orally 3 times a day (02 Dec 2024 18:44)  apixaban 5 mg oral tablet: 1 tab(s) orally every 12 hours (02 Dec 2024 18:31)  aspirin 81 mg oral delayed release tablet: 1 tab(s) orally once a day (02 Dec 2024 18:31)  atorvastatin 10 mg oral tablet: 1 tab(s) orally once a day (at bedtime) (02 Dec 2024 18:31)  doxazosin 2 mg oral tablet: 1 tab(s) orally once a day (02 Dec 2024 18:31)  Farxiga 10 mg oral tablet: 1 tab(s) orally once a day (02 Dec 2024 18:44)  finasteride 5 mg oral tablet: 1 tab(s) orally once a day (02 Dec 2024 18:31)  metFORMIN 500 mg oral tablet: 1 tab(s) orally 3 times a day (02 Dec 2024 18:44)  metoprolol tartrate 50 mg oral tablet: 1 tab(s) orally 2 times a day (02 Dec 2024 18:31)  omeprazole 40 mg oral delayed release capsule: 1 orally once a day (02 Dec 2024 18:31)  oxycodone-acetaminophen 5 mg-325 mg oral tablet: 1 tab(s) orally every 12 hours as needed for  moderate pain (02 Dec 2024 18:44)  Potassium Chloride (Eqv-Klor-Con 10) 10 mEq oral tablet, extended release: 1 tab(s) orally once a day (02 Dec 2024 18:44)  ProAir RespiClick 90 mcg/inh inhalation powder: 2 inhaler(s) inhaled every 4 hours as needed for  shortness of breath and/or wheezing (02 Dec 2024 18:44)  sucralfate 1 g oral tablet: 1 tab(s) orally 2 times a day (02 Dec 2024 18:31)  torsemide 20 mg oral tablet: 2 tab(s) orally once a day (02 Dec 2024 18:44)  
Recommended administration of home medications as appropriate to prevent missed doses.

## 2024-12-02 NOTE — ED ADULT TRIAGE NOTE - CHIEF COMPLAINT QUOTE
pt BIBA from home AOX3 c.o diarrhea with abd pain and cramping, arrives hypotensive. pt in no distress, even and unlabored resps, no SOB or chest pain. pt is [place].

## 2024-12-02 NOTE — ED ADULT NURSE NOTE - OBJECTIVE STATEMENT
Pt  received in critical care c/o hypotension.  Pt states he took BP at home and it was very low.  Hypotensive on arrival.  Pt reports being constipated yesterday and using an enema.  Since pt has been experiencing diarrhea.   Reports accompanying dizziness.  Pt has hx of stents, ablation, pacemaker.

## 2024-12-03 ENCOUNTER — RESULT REVIEW (OUTPATIENT)
Age: 79
End: 2024-12-03

## 2024-12-03 LAB
A1C WITH ESTIMATED AVERAGE GLUCOSE RESULT: 8.1 % — HIGH (ref 4–5.6)
ALBUMIN SERPL ELPH-MCNC: 3.5 G/DL — SIGNIFICANT CHANGE UP (ref 3.3–5.2)
ALP SERPL-CCNC: 146 U/L — HIGH (ref 40–120)
ALT FLD-CCNC: 22 U/L — SIGNIFICANT CHANGE UP
ANION GAP SERPL CALC-SCNC: 12 MMOL/L — SIGNIFICANT CHANGE UP (ref 5–17)
AST SERPL-CCNC: 19 U/L — SIGNIFICANT CHANGE UP
BASOPHILS # BLD AUTO: 0.03 K/UL — SIGNIFICANT CHANGE UP (ref 0–0.2)
BASOPHILS NFR BLD AUTO: 0.5 % — SIGNIFICANT CHANGE UP (ref 0–2)
BILIRUB SERPL-MCNC: 0.6 MG/DL — SIGNIFICANT CHANGE UP (ref 0.4–2)
BUN SERPL-MCNC: 45 MG/DL — HIGH (ref 8–20)
CALCIUM SERPL-MCNC: 8.2 MG/DL — LOW (ref 8.4–10.5)
CHLORIDE SERPL-SCNC: 98 MMOL/L — SIGNIFICANT CHANGE UP (ref 96–108)
CO2 SERPL-SCNC: 27 MMOL/L — SIGNIFICANT CHANGE UP (ref 22–29)
CREAT SERPL-MCNC: 0.77 MG/DL — SIGNIFICANT CHANGE UP (ref 0.5–1.3)
CULTURE RESULTS: SIGNIFICANT CHANGE UP
EGFR: 91 ML/MIN/1.73M2 — SIGNIFICANT CHANGE UP
EOSINOPHIL # BLD AUTO: 0.4 K/UL — SIGNIFICANT CHANGE UP (ref 0–0.5)
EOSINOPHIL NFR BLD AUTO: 6.8 % — HIGH (ref 0–6)
ESTIMATED AVERAGE GLUCOSE: 186 MG/DL — HIGH (ref 68–114)
GLUCOSE BLDC GLUCOMTR-MCNC: 119 MG/DL — HIGH (ref 70–99)
GLUCOSE BLDC GLUCOMTR-MCNC: 128 MG/DL — HIGH (ref 70–99)
GLUCOSE BLDC GLUCOMTR-MCNC: 134 MG/DL — HIGH (ref 70–99)
GLUCOSE BLDC GLUCOMTR-MCNC: 174 MG/DL — HIGH (ref 70–99)
GLUCOSE SERPL-MCNC: 111 MG/DL — HIGH (ref 70–99)
HCT VFR BLD CALC: 42 % — SIGNIFICANT CHANGE UP (ref 39–50)
HGB BLD-MCNC: 14 G/DL — SIGNIFICANT CHANGE UP (ref 13–17)
IMM GRANULOCYTES NFR BLD AUTO: 0.3 % — SIGNIFICANT CHANGE UP (ref 0–0.9)
LYMPHOCYTES # BLD AUTO: 0.66 K/UL — LOW (ref 1–3.3)
LYMPHOCYTES # BLD AUTO: 11.2 % — LOW (ref 13–44)
MAGNESIUM SERPL-MCNC: 1.9 MG/DL — SIGNIFICANT CHANGE UP (ref 1.6–2.6)
MCHC RBC-ENTMCNC: 29.5 PG — SIGNIFICANT CHANGE UP (ref 27–34)
MCHC RBC-ENTMCNC: 33.3 G/DL — SIGNIFICANT CHANGE UP (ref 32–36)
MCV RBC AUTO: 88.4 FL — SIGNIFICANT CHANGE UP (ref 80–100)
MONOCYTES # BLD AUTO: 0.59 K/UL — SIGNIFICANT CHANGE UP (ref 0–0.9)
MONOCYTES NFR BLD AUTO: 10 % — SIGNIFICANT CHANGE UP (ref 2–14)
NEUTROPHILS # BLD AUTO: 4.18 K/UL — SIGNIFICANT CHANGE UP (ref 1.8–7.4)
NEUTROPHILS NFR BLD AUTO: 71.2 % — SIGNIFICANT CHANGE UP (ref 43–77)
PHOSPHATE SERPL-MCNC: 2.8 MG/DL — SIGNIFICANT CHANGE UP (ref 2.4–4.7)
PLATELET # BLD AUTO: 138 K/UL — LOW (ref 150–400)
POTASSIUM SERPL-MCNC: 3.7 MMOL/L — SIGNIFICANT CHANGE UP (ref 3.5–5.3)
POTASSIUM SERPL-SCNC: 3.7 MMOL/L — SIGNIFICANT CHANGE UP (ref 3.5–5.3)
PROT SERPL-MCNC: 6.3 G/DL — LOW (ref 6.6–8.7)
RBC # BLD: 4.75 M/UL — SIGNIFICANT CHANGE UP (ref 4.2–5.8)
RBC # FLD: 13.9 % — SIGNIFICANT CHANGE UP (ref 10.3–14.5)
SODIUM SERPL-SCNC: 136 MMOL/L — SIGNIFICANT CHANGE UP (ref 135–145)
SPECIMEN SOURCE: SIGNIFICANT CHANGE UP
WBC # BLD: 5.88 K/UL — SIGNIFICANT CHANGE UP (ref 3.8–10.5)
WBC # FLD AUTO: 5.88 K/UL — SIGNIFICANT CHANGE UP (ref 3.8–10.5)

## 2024-12-03 PROCEDURE — 99222 1ST HOSP IP/OBS MODERATE 55: CPT

## 2024-12-03 PROCEDURE — 93306 TTE W/DOPPLER COMPLETE: CPT | Mod: 26

## 2024-12-03 PROCEDURE — 99233 SBSQ HOSP IP/OBS HIGH 50: CPT

## 2024-12-03 RX ORDER — PANTOPRAZOLE SODIUM 40 MG/1
40 TABLET, DELAYED RELEASE ORAL
Refills: 0 | Status: DISCONTINUED | OUTPATIENT
Start: 2024-12-03 | End: 2024-12-05

## 2024-12-03 RX ORDER — 0.9 % SODIUM CHLORIDE 0.9 %
1000 INTRAVENOUS SOLUTION INTRAVENOUS
Refills: 0 | Status: DISCONTINUED | OUTPATIENT
Start: 2024-12-03 | End: 2024-12-05

## 2024-12-03 RX ORDER — INFLUENZA VIRUS VACCINE 15; 15; 15; 15 UG/.5ML; UG/.5ML; UG/.5ML; UG/.5ML
0.5 SUSPENSION INTRAMUSCULAR ONCE
Refills: 0 | Status: DISCONTINUED | OUTPATIENT
Start: 2024-12-03 | End: 2024-12-05

## 2024-12-03 RX ORDER — SUCRALFATE 1 G/1
1 TABLET ORAL
Refills: 0 | Status: DISCONTINUED | OUTPATIENT
Start: 2024-12-03 | End: 2024-12-05

## 2024-12-03 RX ORDER — ALPRAZOLAM 0.5 MG
1 TABLET ORAL
Refills: 0 | DISCHARGE

## 2024-12-03 RX ORDER — APIXABAN 2.5 MG/1
5 TABLET, FILM COATED ORAL EVERY 12 HOURS
Refills: 0 | Status: DISCONTINUED | OUTPATIENT
Start: 2024-12-03 | End: 2024-12-05

## 2024-12-03 RX ORDER — FLUTICASONE PROPIONATE 50 MCG
1 SPRAY, SUSPENSION NASAL
Refills: 0 | Status: DISCONTINUED | OUTPATIENT
Start: 2024-12-03 | End: 2024-12-05

## 2024-12-03 RX ORDER — ACETAMINOPHEN, DIPHENHYDRAMINE HCL, PHENYLEPHRINE HCL 325; 25; 5 MG/1; MG/1; MG/1
3 TABLET ORAL AT BEDTIME
Refills: 0 | Status: DISCONTINUED | OUTPATIENT
Start: 2024-12-03 | End: 2024-12-05

## 2024-12-03 RX ORDER — GLUCAGON INJECTION, SOLUTION 0.5 MG/.1ML
1 INJECTION, SOLUTION SUBCUTANEOUS ONCE
Refills: 0 | Status: DISCONTINUED | OUTPATIENT
Start: 2024-12-03 | End: 2024-12-05

## 2024-12-03 RX ORDER — ACETAMINOPHEN 500MG 500 MG/1
650 TABLET, COATED ORAL EVERY 6 HOURS
Refills: 0 | Status: DISCONTINUED | OUTPATIENT
Start: 2024-12-03 | End: 2024-12-05

## 2024-12-03 RX ORDER — METOPROLOL TARTRATE 100 MG/1
50 TABLET, FILM COATED ORAL
Refills: 0 | Status: DISCONTINUED | OUTPATIENT
Start: 2024-12-03 | End: 2024-12-05

## 2024-12-03 RX ORDER — DOXAZOSIN MESYLATE 1 MG
2 TABLET ORAL AT BEDTIME
Refills: 0 | Status: DISCONTINUED | OUTPATIENT
Start: 2024-12-03 | End: 2024-12-05

## 2024-12-03 RX ORDER — DAPAGLIFLOZIN 10 MG/1
10 TABLET, FILM COATED ORAL DAILY
Refills: 0 | Status: DISCONTINUED | OUTPATIENT
Start: 2024-12-03 | End: 2024-12-05

## 2024-12-03 RX ORDER — MAGNESIUM, ALUMINUM HYDROXIDE 200-225/5
30 SUSPENSION, ORAL (FINAL DOSE FORM) ORAL EVERY 4 HOURS
Refills: 0 | Status: DISCONTINUED | OUTPATIENT
Start: 2024-12-03 | End: 2024-12-05

## 2024-12-03 RX ORDER — ONDANSETRON HYDROCHLORIDE 4 MG/1
4 TABLET, FILM COATED ORAL EVERY 8 HOURS
Refills: 0 | Status: DISCONTINUED | OUTPATIENT
Start: 2024-12-03 | End: 2024-12-05

## 2024-12-03 RX ORDER — ALPRAZOLAM 0.5 MG
0.25 TABLET ORAL THREE TIMES A DAY
Refills: 0 | Status: DISCONTINUED | OUTPATIENT
Start: 2024-12-03 | End: 2024-12-05

## 2024-12-03 RX ORDER — HYDROMORPHONE HYDROCHLORIDE 2 MG/1
0.5 TABLET ORAL ONCE
Refills: 0 | Status: DISCONTINUED | OUTPATIENT
Start: 2024-12-03 | End: 2024-12-05

## 2024-12-03 RX ORDER — ALBUTEROL 90 MCG
2 AEROSOL (GRAM) INHALATION EVERY 6 HOURS
Refills: 0 | Status: DISCONTINUED | OUTPATIENT
Start: 2024-12-03 | End: 2024-12-05

## 2024-12-03 RX ADMIN — Medication 0.25 MILLIGRAM(S): at 17:33

## 2024-12-03 RX ADMIN — APIXABAN 5 MILLIGRAM(S): 2.5 TABLET, FILM COATED ORAL at 17:33

## 2024-12-03 RX ADMIN — Medication 5 MILLIGRAM(S): at 11:53

## 2024-12-03 RX ADMIN — Medication 10 MILLIGRAM(S): at 21:12

## 2024-12-03 RX ADMIN — APIXABAN 5 MILLIGRAM(S): 2.5 TABLET, FILM COATED ORAL at 05:41

## 2024-12-03 RX ADMIN — SUCRALFATE 1 GRAM(S): 1 TABLET ORAL at 17:33

## 2024-12-03 RX ADMIN — METOPROLOL TARTRATE 50 MILLIGRAM(S): 100 TABLET, FILM COATED ORAL at 05:41

## 2024-12-03 RX ADMIN — Medication 1 SPRAY(S): at 18:31

## 2024-12-03 RX ADMIN — SUCRALFATE 1 GRAM(S): 1 TABLET ORAL at 05:41

## 2024-12-03 RX ADMIN — METOPROLOL TARTRATE 50 MILLIGRAM(S): 100 TABLET, FILM COATED ORAL at 17:33

## 2024-12-03 RX ADMIN — Medication 81 MILLIGRAM(S): at 11:53

## 2024-12-03 RX ADMIN — Medication 2 MILLIGRAM(S): at 21:12

## 2024-12-03 RX ADMIN — DAPAGLIFLOZIN 10 MILLIGRAM(S): 10 TABLET, FILM COATED ORAL at 11:54

## 2024-12-03 RX ADMIN — PANTOPRAZOLE SODIUM 40 MILLIGRAM(S): 40 TABLET, DELAYED RELEASE ORAL at 05:41

## 2024-12-03 NOTE — CONSULT NOTE ADULT - ASSESSMENT
79 y/o male PMHx severe AS s/p TAVR 10/6/23 with Dr. Napier, chronic combined CHF with mild LV dysfunction s/p Cardiomems, CAD prior PCI, Thoracic Aortic Aneurysm, PAF s/p ablation and PPM, LLE DVT, ALLIE, BPH s/p TURP, renal mass, obesity, Anxiety, DM, GERD, cirrhosis, presents to [facility] ER c/o low blood pressure on routine home screening. Pt asymptoamtic. BP 75/40 on arrival for which he was given 1.5L IVFB and Midodrine 10mg PO x1 in ED with repeat /66. CT Chest/Abd/Pelvis imaging negative for infection. UA negative. Pt asymptomatic. Pt does admit to diarrhea and abd cramping today PTA.  Cardiology consultation requested for evaluation of hypotension and readjustment of diuretics.  77 y/o male PMHx severe AS s/p TAVR 10/6/23 with Dr. Napier, chronic combined CHF with mild LV dysfunction s/p Cardiomems, CAD prior PCI, Thoracic Aortic Aneurysm, PAF s/p ablation and PPM, LLE DVT, ALLIE, BPH s/p TURP, renal mass, obesity, Anxiety, DM, GERD, cirrhosis, presents to [facility] ER c/o low blood pressure on routine home screening. Pt asymptoamtic. BP 75/40 on arrival for which he was given 1.5L IVFB and Midodrine 10mg PO x1 in ED with repeat /66. CT Chest/Abd/Pelvis imaging negative for infection. UA negative. Pt asymptomatic. Pt does admit to diarrhea and abd cramping today PTA.  Cardiology consultation requested for evaluation of hypotension and readjustment of diuretics.  Patient denies CV symptoms at present time.       hypotension in the setting of hypovolemia from diarrhea    Plan   withhold oral diuretics for now, resume upon dc   continue supportive care for Diarrhea and hypotension  continue CV med as tolerated and feasible   no additional cardiac testing   outpatient follow up with [facility]   please call us back with questions or concerns.

## 2024-12-03 NOTE — PROGRESS NOTE ADULT - ASSESSMENT
79M with PMHX CAD s/p PCI x2, AFIB s/p Ablation on Eliquis, AS s/p TAVR, CHF s/p CardioMEMS, Aortic Aneurysm, LLE DVT, ALLIE, BPH s/p TURP, Hx L Renal Mass, DM2, GERD presents to [facility] ER c/o low blood pressure admitted for Diarrhea c/b Hypotension and Lactic Acidosis r/o Sepsis v Polypharmacy.    PLAN:  Diarrhea c/b Hypotension r/o Sepsis v Polypharmacy  -Admit to Tele  -VS q4  -UA negative. UCX pending  -BCX pending  -GI PCR pending  -RSV/COVID/FLU negative  -CT Chest/Abd/Pelvis no acute findings apart from RUQ focal fat necrosis?  -1.5L IVFB given in ED  -Repeat BP 75/40 -> 105/66  -Also given Midodrine 10mg PO x1 in ED  -Lactate 2.8 -> Lactate 2.3 -> []  -No SIRS criteria, fevers/ leukocytosis, or active source of infection found on imaging  -Hold ABX pending source identification  -Hold further IVF and Midodrine for now  -Hypovolemia may be overdiuresis from Torsemide 40mg q24 and/or polypharmacy from multiple BP lowering medications    BPH s/p TURP  -Doxazosin and Finasteride    DM2  -Hold Metformin 500mg TID with IV contrast dye load and lactic acidosis  -ISS/Accuchecks/A1C/ADA Diet    CAD s/p PCI x2, pAFIB on Eliquis, CHF s/p CardioMEMS, AS s/p TAVR, LLE DVT  -ASA 81mg q24  -Eliquis 5mg BID  -Atorvastatin 10mg qHS  -Farxiga 10mg q24  -Metoprolol Tartrate 50mg BID   -Torsemide 40mg PO q24 -> Hold with IVF and Hypotension  -Cardio consult for hypotension possibly overdiuresis    GERD  -PPI and Sucralfate BID    MDD, Chronic Pain Syndrome  -Alprazolam 0.25mg TID PRN  -Oxycodone 5/325mg PO BID   -Patient states he has been using Alprazolam 3-4 times/day and Oxycodone 3-4 times/day question of polypharmacy contributing to hypotension    VTE PPX: SCDs/Eliquis  DISPO: Acute. Admit to inpatient pending sepsis/hypotension workup. Cardio consult to evaluate CardioMEMS and possible diuresis. Plan for dispo home when BP improves pending workup. 79 M with PMHX CAD s/p PCI x2, AFIB s/p Ablation on Eliquis, AS s/p TAVR, CHF s/p CardioMEMS, Aortic Aneurysm, LLE DVT, ALLIE, BPH s/p TURP, Hx L Renal Mass, DM2, GERD presents to [facility] ER c/o low blood pressure admitted for Diarrhea c/b Hypotension and Lactic Acidosis r/o Sepsis v Polypharmacy.      # Hypotension due to Diarrhea and Polypharmacy.  Doubtful sepsis; Lactic acidosis from Fat necrosis RUQ on CT scan  UA (-)  RSV/COVID/FLU (-)  1.5 L IVF given in Emergency Department with improvement in blood pressure  - Monitor  - Hydration, Hold diuretic, adjust antihypertensive  - No antibiotics; follow cultures  - GI PCR pending (patient no longer with diarrhea)  - Confer with radiology regarding fat necrosis     # BPH s/p TURP  -Doxazosin and Finasteride    # Diabetes  -Hold Metformin 500mg TID with IV contrast dye load and lactic acidosis  -ISS/Accuchecks/A1C/ADA Diet    # CAD s/p PCI x2,   # pAFIB on Eliquis,   # CHF s/p CardioMEMS,   # AS s/p TAVR,   # LLE DVT  -ASA 81mg q24  -Eliquis 5mg BID  -Atorvastatin 10mg qHS  -Farxiga 10mg q24  -Metoprolol Tartrate 50mg BID   -Torsemide 40mg PO q24 -> Hold with IVF and Hypotension  -Cardio consult for hypotension possibly overdiuresis    # GERD  -PPI and Sucralfate BID    # MDD,   # Chronic Pain Syndrome  -Alprazolam 0.25mg TID PRN  -Oxycodone 5/325mg PO BID   -Patient states he has been using Alprazolam 3-4 times/day and Oxycodone 3-4 times/day question of polypharmacy contributing to hypotension    VTE PPX: SCDs/Eliquis  DISPO: Acute. Admit to inpatient pending sepsis/hypotension workup. Cardio consult to evaluate CardioMEMS and possible diuresis. Plan for dispo home when BP improves pending workup.

## 2024-12-03 NOTE — CONSULT NOTE ADULT - SUBJECTIVE AND OBJECTIVE BOX
CHIEF COMPLAINT:    HPI: 77 y/o male PMHx severe AS s/p TAVR 10/6/23 with Dr. Napier, chronic combined CHF with mild LV dysfunction s/p Cardiomems, CAD prior PCI, Thoracic Aortic Aneurysm, PAF s/p ablation and PPM, LLE DVT, ALLIE, BPH s/p TURP, renal mass, obesity, Anxiety, DM, GERD, cirrhosis, presents to [facility] ER c/o low blood pressure on routine home screening. Pt asymptoamtic. BP 75/40 on arrival for which he was given 1.5L IVFB and Midodrine 10mg PO x1 in ED with repeat /66. CT Chest/Abd/Pelvis imaging negative for infection. UA negative. Pt asymptomatic. Pt does admit to diarrhea and abd cramping today PTA.  Cardiology consultation requested for evaluation of hypotension and readjustment of diuretics.     PAST MEDICAL & SURGICAL HISTORY:  Anxiety      Hypertension      Hyperlipidemia      Prostate disease      Gastroesophageal reflux disease      Gallbladder stone without cholecystitis or obstruction      BPH (benign prostatic hyperplasia)      DM (diabetes mellitus)      DVT (deep venous thrombosis)  left leg      Afib      Anxiety      HLD (hyperlipidemia)      Malignant schwannoma      H/O arthroscopy of knee      S/P laminectomy      S/P coronary artery stent placement  x2          Allergies    Ceftin (Hives)  penicillin (Anaphylaxis)  Bactrim (Rash)  sulfa drugs (Unknown)    Intolerances        MEDICATIONS  (STANDING):  apixaban 5 milliGRAM(s) Oral every 12 hours  aspirin enteric coated 81 milliGRAM(s) Oral daily  atorvastatin 10 milliGRAM(s) Oral at bedtime  dapagliflozin 10 milliGRAM(s) Oral daily  dextrose 5%. 1000 milliLiter(s) (100 mL/Hr) IV Continuous <Continuous>  dextrose 5%. 1000 milliLiter(s) (50 mL/Hr) IV Continuous <Continuous>  dextrose 50% Injectable 25 Gram(s) IV Push once  dextrose 50% Injectable 12.5 Gram(s) IV Push once  dextrose 50% Injectable 25 Gram(s) IV Push once  doxazosin 2 milliGRAM(s) Oral at bedtime  finasteride 5 milliGRAM(s) Oral daily  glucagon  Injectable 1 milliGRAM(s) IntraMuscular once  HYDROmorphone  Injectable 0.5 milliGRAM(s) IV Push once  influenza  Vaccine (HIGH DOSE) 0.5 milliLiter(s) IntraMuscular once  insulin lispro (ADMELOG) corrective regimen sliding scale   SubCutaneous Before meals and at bedtime  metoprolol tartrate 50 milliGRAM(s) Oral two times a day  pantoprazole    Tablet 40 milliGRAM(s) Oral before breakfast  sucralfate 1 Gram(s) Oral two times a day    MEDICATIONS  (PRN):  acetaminophen     Tablet .. 650 milliGRAM(s) Oral every 6 hours PRN Temp greater or equal to 38C (100.4F), Mild Pain (1 - 3)  albuterol    90 MICROgram(s) HFA Inhaler 2 Puff(s) Inhalation every 6 hours PRN Shortness of Breath and/or Wheezing  ALPRAZolam 0.25 milliGRAM(s) Oral three times a day PRN for anxiety  aluminum hydroxide/magnesium hydroxide/simethicone Suspension 30 milliLiter(s) Oral every 4 hours PRN Dyspepsia  dextrose Oral Gel 15 Gram(s) Oral once PRN Blood Glucose LESS THAN 70 milliGRAM(s)/deciliter  melatonin 3 milliGRAM(s) Oral at bedtime PRN Insomnia  ondansetron Injectable 4 milliGRAM(s) IV Push every 8 hours PRN Nausea and/or Vomiting  oxycodone    5 mG/acetaminophen 325 mG 1 Tablet(s) Oral every 12 hours PRN for moderate pain      FAMILY HISTORY:  FH: heart disease (Father)        ***No family history of premature coronary artery disease or sudden cardiac death    SOCIAL HISTORY:  Smoking-  Alcohol-  Illicit Drug use-    REVIEW OF SYSTEMS:  Constitutional: [ ] fever, [ ]weight loss,  [ ]fatigue  Eyes: [ ] visual changes  Respiratory: [ ]shortness of breath;  [ ] cough, [ ]wheezing, [ ]chills, [ ]hemoptysis  Cardiovascular: [ ] chest pain, [ ]palpitations, [ ]dizziness,  [ ]leg swelling [ ]syncope  Gastrointestinal: [ ] abdominal pain, [ ]nausea, [ ]vomiting,  [ ]diarrhea   Genitourinary: [ ] dysuria, [ ] hematuria  Neurologic: [ ] headaches [ ] tremors  [ ] weakness [ ] lightheadedness  Skin: [ ] itching, [ ]burning, [ ] rashes  Endocrine: [ ] heat or cold intolerance  Musculoskeletal: [ ] joint pain or swelling; [ ] muscle, back, or extremity pain  Psychiatric: [ ] depression, [ ]anxiety, [ ]mood swings, or [ ]difficulty sleeping  Hematologic: [ ] easy bruising, [ ] bleeding gums     [ x] All others negative	  [ ] Unable to obtain    Vital Signs Last 24 Hrs  T(C): 36.8 (03 Dec 2024 11:52), Max: 37.4 (02 Dec 2024 14:51)  T(F): 98.3 (03 Dec 2024 11:52), Max: 99.3 (02 Dec 2024 14:51)  HR: 85 (03 Dec 2024 11:52) (85 - 108)  BP: 108/69 (03 Dec 2024 11:52) (75/40 - 123/65)  BP(mean): --  RR: 18 (03 Dec 2024 11:52) (18 - 21)  SpO2: 93% (03 Dec 2024 11:52) (92% - 100%)    Parameters below as of 03 Dec 2024 11:52  Patient On (Oxygen Delivery Method): room air      I&O's Summary    03 Dec 2024 07:01  -  03 Dec 2024 13:41  --------------------------------------------------------  IN: 0 mL / OUT: 900 mL / NET: -900 mL        PHYSICAL EXAM:  General: No acute distress  HEENT: EOMI  Neck:  No JVD  Lungs: Clear to auscultation bilaterally; No rales or wheezing  Heart: Regular rate and rhythm; No murmurs, rubs, or gallops  Abdomen: soft, non tender, non distended   Extremities: warm, no edema   Nervous system:  Alert & Oriented X3  Psychiatric: Normal affect  Skin: No rashes or lesions    LABS:  12-03    136  |  98  |  45.0[H]  ----------------------------<  111[H]  3.7   |  27.0  |  0.77    Ca    8.2[L]      03 Dec 2024 04:29  Phos  2.8     12-03  Mg     1.9     12-03    TPro  6.3[L]  /  Alb  3.5  /  TBili  0.6  /  DBili  x   /  AST  19  /  ALT  22  /  AlkPhos  146[H]  12-03    Creatinine Trend: 0.77<--, 0.95<--                        14.0   5.88  )-----------( 138      ( 03 Dec 2024 04:29 )             42.0     PT/INR - ( 02 Dec 2024 15:25 )   PT: 19.0 sec;   INR: 1.69 ratio         PTT - ( 02 Dec 2024 15:25 )  PTT:34.1 sec    Lipid Panel:   Cardiac Enzymes:           RADIOLOGY:  < from: CT Angio Chest PE Protocol w/ IV Cont (12.02.24 @ 20:32) >  No pulmonary embolus.    Circumscribed fat in the right upper quadrant of uncertain etiology,   possibly focal fat necrosis.      < from: 12 Lead ECG (12.02.24 @ 15:44) >  Ventricular-paced rhythm      TELEMETRY:    Echo (10/2023):   1. Left ventricular ejection fraction, by visual estimation, is 50 to 55%.   2. Normal global left ventricular systolic function.   3. Mildly increased left ventricular internal cavity size.   4. The mitral in-flow pattern reveals no discernable A-wave, therefore no comment on diastolic function can be made.   5. There is mild concentric left ventricular hypertrophy.   6. Normal right ventricular size and function.   7. There is no evidence of pericardial effusion.   8. Trace mitral valve regurgitation.   9. Moderate tricuspid regurgitation.  10. TAVR in the aortic position.  11. Estimated pulmonary artery systolic pressure is 48.9 mmHg assuming a right atrial pressure of 15 mmHg, which is consistent with mild pulmonary   hypertension.    ECHO 1/2024:   1. Left ventricular systolic function is normal with an ejection fraction visually estimated at 55 to 60 %.   2. The left atrium is mildly dilated.   3. The right atrium is moderately dilated in size.   4. No pericardial effusion seen.   5. Estimated pulmonary artery systolic pressure is 49 mmHg.   6. Mild mitralvalve leaflet calcification.   7. Moderate left ventricular hypertrophy.  8. Severe RVE with moderate dysfunction    STRESS TEST:    CATHETERIZATION: CHIEF COMPLAINT:    HPI: 79 y/o male PMHx severe AS s/p TAVR 10/6/23 with Dr. Napier, chronic combined CHF with mild LV dysfunction s/p Cardiomems, CAD prior PCI, Thoracic Aortic Aneurysm, PAF s/p ablation and PPM, LLE DVT, ALLIE, BPH s/p TURP, renal mass, obesity, Anxiety, DM, GERD, cirrhosis, presents to [facility] ER c/o low blood pressure on routine home screening. Pt asymptoamtic. BP 75/40 on arrival for which he was given 1.5L IVFB and Midodrine 10mg PO x1 in ED with repeat /66. CT Chest/Abd/Pelvis imaging negative for infection. UA negative. Pt asymptomatic. Pt does admit to diarrhea and abd cramping today PTA.  Cardiology consultation requested for evaluation of hypotension and readjustment of diuretics.  Patient denies CV symptoms at present time.     PAST MEDICAL & SURGICAL HISTORY:  Anxiety      Hypertension      Hyperlipidemia      Prostate disease      Gastroesophageal reflux disease      Gallbladder stone without cholecystitis or obstruction      BPH (benign prostatic hyperplasia)      DM (diabetes mellitus)      DVT (deep venous thrombosis)  left leg      Afib      Anxiety      HLD (hyperlipidemia)      Malignant schwannoma      H/O arthroscopy of knee      S/P laminectomy      S/P coronary artery stent placement  x2          Allergies    Ceftin (Hives)  penicillin (Anaphylaxis)  Bactrim (Rash)  sulfa drugs (Unknown)    Intolerances        MEDICATIONS  (STANDING):  apixaban 5 milliGRAM(s) Oral every 12 hours  aspirin enteric coated 81 milliGRAM(s) Oral daily  atorvastatin 10 milliGRAM(s) Oral at bedtime  dapagliflozin 10 milliGRAM(s) Oral daily  dextrose 5%. 1000 milliLiter(s) (100 mL/Hr) IV Continuous <Continuous>  dextrose 5%. 1000 milliLiter(s) (50 mL/Hr) IV Continuous <Continuous>  dextrose 50% Injectable 25 Gram(s) IV Push once  dextrose 50% Injectable 12.5 Gram(s) IV Push once  dextrose 50% Injectable 25 Gram(s) IV Push once  doxazosin 2 milliGRAM(s) Oral at bedtime  finasteride 5 milliGRAM(s) Oral daily  glucagon  Injectable 1 milliGRAM(s) IntraMuscular once  HYDROmorphone  Injectable 0.5 milliGRAM(s) IV Push once  influenza  Vaccine (HIGH DOSE) 0.5 milliLiter(s) IntraMuscular once  insulin lispro (ADMELOG) corrective regimen sliding scale   SubCutaneous Before meals and at bedtime  metoprolol tartrate 50 milliGRAM(s) Oral two times a day  pantoprazole    Tablet 40 milliGRAM(s) Oral before breakfast  sucralfate 1 Gram(s) Oral two times a day    MEDICATIONS  (PRN):  acetaminophen     Tablet .. 650 milliGRAM(s) Oral every 6 hours PRN Temp greater or equal to 38C (100.4F), Mild Pain (1 - 3)  albuterol    90 MICROgram(s) HFA Inhaler 2 Puff(s) Inhalation every 6 hours PRN Shortness of Breath and/or Wheezing  ALPRAZolam 0.25 milliGRAM(s) Oral three times a day PRN for anxiety  aluminum hydroxide/magnesium hydroxide/simethicone Suspension 30 milliLiter(s) Oral every 4 hours PRN Dyspepsia  dextrose Oral Gel 15 Gram(s) Oral once PRN Blood Glucose LESS THAN 70 milliGRAM(s)/deciliter  melatonin 3 milliGRAM(s) Oral at bedtime PRN Insomnia  ondansetron Injectable 4 milliGRAM(s) IV Push every 8 hours PRN Nausea and/or Vomiting  oxycodone    5 mG/acetaminophen 325 mG 1 Tablet(s) Oral every 12 hours PRN for moderate pain      FAMILY HISTORY:  FH: heart disease (Father)        ***No family history of premature coronary artery disease or sudden cardiac death    SOCIAL HISTORY:  Smoking-  Alcohol-  Illicit Drug use-    REVIEW OF SYSTEMS:  Constitutional: [ ] fever, [ ]weight loss,  [ ]fatigue  Eyes: [ ] visual changes  Respiratory: [ ]shortness of breath;  [ ] cough, [ ]wheezing, [ ]chills, [ ]hemoptysis  Cardiovascular: [ ] chest pain, [ ]palpitations, [ ]dizziness,  [ ]leg swelling [ ]syncope  Gastrointestinal: [ ] abdominal pain, [ ]nausea, [ ]vomiting,  [ ]diarrhea   Genitourinary: [ ] dysuria, [ ] hematuria  Neurologic: [ ] headaches [ ] tremors  [ ] weakness [ ] lightheadedness  Skin: [ ] itching, [ ]burning, [ ] rashes  Endocrine: [ ] heat or cold intolerance  Musculoskeletal: [ ] joint pain or swelling; [ ] muscle, back, or extremity pain  Psychiatric: [ ] depression, [ ]anxiety, [ ]mood swings, or [ ]difficulty sleeping  Hematologic: [ ] easy bruising, [ ] bleeding gums     [ x] All others negative	  [ ] Unable to obtain    Vital Signs Last 24 Hrs  T(C): 36.8 (03 Dec 2024 11:52), Max: 37.4 (02 Dec 2024 14:51)  T(F): 98.3 (03 Dec 2024 11:52), Max: 99.3 (02 Dec 2024 14:51)  HR: 85 (03 Dec 2024 11:52) (85 - 108)  BP: 108/69 (03 Dec 2024 11:52) (75/40 - 123/65)  BP(mean): --  RR: 18 (03 Dec 2024 11:52) (18 - 21)  SpO2: 93% (03 Dec 2024 11:52) (92% - 100%)    Parameters below as of 03 Dec 2024 11:52  Patient On (Oxygen Delivery Method): room air      I&O's Summary    03 Dec 2024 07:01  -  03 Dec 2024 13:41  --------------------------------------------------------  IN: 0 mL / OUT: 900 mL / NET: -900 mL        PHYSICAL EXAM:  General: No acute distress  HEENT: EOMI  Neck:  No JVD  Lungs: Clear to auscultation bilaterally; No rales or wheezing  Heart: Regular rate and rhythm; No murmurs, rubs, or gallops  Abdomen: soft, non tender, non distended   Extremities: warm, no edema   Nervous system:  Alert & Oriented X3  Psychiatric: Normal affect  Skin: No rashes or lesions    LABS:  12-03    136  |  98  |  45.0[H]  ----------------------------<  111[H]  3.7   |  27.0  |  0.77    Ca    8.2[L]      03 Dec 2024 04:29  Phos  2.8     12-03  Mg     1.9     12-03    TPro  6.3[L]  /  Alb  3.5  /  TBili  0.6  /  DBili  x   /  AST  19  /  ALT  22  /  AlkPhos  146[H]  12-03    Creatinine Trend: 0.77<--, 0.95<--                        14.0   5.88  )-----------( 138      ( 03 Dec 2024 04:29 )             42.0     PT/INR - ( 02 Dec 2024 15:25 )   PT: 19.0 sec;   INR: 1.69 ratio         PTT - ( 02 Dec 2024 15:25 )  PTT:34.1 sec    Lipid Panel:   Cardiac Enzymes:           RADIOLOGY:  < from: CT Angio Chest PE Protocol w/ IV Cont (12.02.24 @ 20:32) >  No pulmonary embolus.    Circumscribed fat in the right upper quadrant of uncertain etiology,   possibly focal fat necrosis.      < from: 12 Lead ECG (12.02.24 @ 15:44) >  Ventricular-paced rhythm      TELEMETRY:    Echo (10/2023):   1. Left ventricular ejection fraction, by visual estimation, is 50 to 55%.   2. Normal global left ventricular systolic function.   3. Mildly increased left ventricular internal cavity size.   4. The mitral in-flow pattern reveals no discernable A-wave, therefore no comment on diastolic function can be made.   5. There is mild concentric left ventricular hypertrophy.   6. Normal right ventricular size and function.   7. There is no evidence of pericardial effusion.   8. Trace mitral valve regurgitation.   9. Moderate tricuspid regurgitation.  10. TAVR in the aortic position.  11. Estimated pulmonary artery systolic pressure is 48.9 mmHg assuming a right atrial pressure of 15 mmHg, which is consistent with mild pulmonary   hypertension.    ECHO 1/2024:   1. Left ventricular systolic function is normal with an ejection fraction visually estimated at 55 to 60 %.   2. The left atrium is mildly dilated.   3. The right atrium is moderately dilated in size.   4. No pericardial effusion seen.   5. Estimated pulmonary artery systolic pressure is 49 mmHg.   6. Mild mitralvalve leaflet calcification.   7. Moderate left ventricular hypertrophy.  8. Severe RVE with moderate dysfunction    STRESS TEST:    CATHETERIZATION:

## 2024-12-04 LAB
ANION GAP SERPL CALC-SCNC: 10 MMOL/L — SIGNIFICANT CHANGE UP (ref 5–17)
BUN SERPL-MCNC: 24.8 MG/DL — HIGH (ref 8–20)
CALCIUM SERPL-MCNC: 8 MG/DL — LOW (ref 8.4–10.5)
CHLORIDE SERPL-SCNC: 103 MMOL/L — SIGNIFICANT CHANGE UP (ref 96–108)
CO2 SERPL-SCNC: 28 MMOL/L — SIGNIFICANT CHANGE UP (ref 22–29)
CREAT SERPL-MCNC: 0.58 MG/DL — SIGNIFICANT CHANGE UP (ref 0.5–1.3)
EGFR: 99 ML/MIN/1.73M2 — SIGNIFICANT CHANGE UP
GLUCOSE BLDC GLUCOMTR-MCNC: 104 MG/DL — HIGH (ref 70–99)
GLUCOSE BLDC GLUCOMTR-MCNC: 145 MG/DL — HIGH (ref 70–99)
GLUCOSE BLDC GLUCOMTR-MCNC: 149 MG/DL — HIGH (ref 70–99)
GLUCOSE BLDC GLUCOMTR-MCNC: 166 MG/DL — HIGH (ref 70–99)
GLUCOSE SERPL-MCNC: 110 MG/DL — HIGH (ref 70–99)
POTASSIUM SERPL-MCNC: 3.8 MMOL/L — SIGNIFICANT CHANGE UP (ref 3.5–5.3)
POTASSIUM SERPL-SCNC: 3.8 MMOL/L — SIGNIFICANT CHANGE UP (ref 3.5–5.3)
SODIUM SERPL-SCNC: 141 MMOL/L — SIGNIFICANT CHANGE UP (ref 135–145)

## 2024-12-04 PROCEDURE — 99232 SBSQ HOSP IP/OBS MODERATE 35: CPT

## 2024-12-04 PROCEDURE — 76705 ECHO EXAM OF ABDOMEN: CPT | Mod: 26

## 2024-12-04 RX ADMIN — Medication 1: at 16:52

## 2024-12-04 RX ADMIN — Medication 0.25 MILLIGRAM(S): at 08:16

## 2024-12-04 RX ADMIN — APIXABAN 5 MILLIGRAM(S): 2.5 TABLET, FILM COATED ORAL at 05:16

## 2024-12-04 RX ADMIN — SUCRALFATE 1 GRAM(S): 1 TABLET ORAL at 05:16

## 2024-12-04 RX ADMIN — Medication 0.25 MILLIGRAM(S): at 22:27

## 2024-12-04 RX ADMIN — Medication 81 MILLIGRAM(S): at 08:16

## 2024-12-04 RX ADMIN — DAPAGLIFLOZIN 10 MILLIGRAM(S): 10 TABLET, FILM COATED ORAL at 08:17

## 2024-12-04 RX ADMIN — APIXABAN 5 MILLIGRAM(S): 2.5 TABLET, FILM COATED ORAL at 16:52

## 2024-12-04 RX ADMIN — Medication 2 MILLIGRAM(S): at 21:10

## 2024-12-04 RX ADMIN — Medication 1 SPRAY(S): at 16:52

## 2024-12-04 RX ADMIN — Medication 1 SPRAY(S): at 08:17

## 2024-12-04 RX ADMIN — Medication 5 MILLIGRAM(S): at 08:16

## 2024-12-04 RX ADMIN — PANTOPRAZOLE SODIUM 40 MILLIGRAM(S): 40 TABLET, DELAYED RELEASE ORAL at 05:16

## 2024-12-04 RX ADMIN — METOPROLOL TARTRATE 50 MILLIGRAM(S): 100 TABLET, FILM COATED ORAL at 05:16

## 2024-12-04 RX ADMIN — Medication 10 MILLIGRAM(S): at 21:11

## 2024-12-04 RX ADMIN — METOPROLOL TARTRATE 50 MILLIGRAM(S): 100 TABLET, FILM COATED ORAL at 16:52

## 2024-12-04 NOTE — PROGRESS NOTE ADULT - SUBJECTIVE AND OBJECTIVE BOX
HOSPITALIST PROGRESS NOTE    ANIA CRISTOBAL  [id number]  79yMale    Patient is a 79y old  Male who presents with a chief complaint of Hypotension (03 Dec 2024 13:40)      SUBJECTIVE:   Chart reviewed since admission.   Patient seen and examined at bedside for Hypotension, Dehydration, Diarrhea, Thrombocytopenia  Right upper abdominal pain - feels like ulcer (extensive history of ulcers since age 9)  No longer having diarrhea (used liquid suppository for constipation the day of diarrhea)  No nausea, vomiting, fever, chills  Denies any dyspnea, chest pain, palpitations  Urine dark      OBJECTIVE:  Vital Signs Last 24 Hrs  T(C): 36.8 (03 Dec 2024 11:52), Max: 37.4 (02 Dec 2024 14:51)  T(F): 98.3 (03 Dec 2024 11:52), Max: 99.3 (02 Dec 2024 14:51)  HR: 85 (03 Dec 2024 11:52) (85 - 108)  BP: 108/69 (03 Dec 2024 11:52) (75/40 - 123/65)   RR: 18 (03 Dec 2024 11:52) (18 - 21)  SpO2: 93% (03 Dec 2024 11:52) (92% - 100%)    Parameters below as of 03 Dec 2024 11:52  Patient On (Oxygen Delivery Method): room air        PHYSICAL EXAMINATION  BMI 39  General: Morbidly obese male lying on stretcher, comfortable  HEENT:  Anicteric sclera  NECK:  Supple  CVS: regular rate and rhythm S1 S2 Left chest PPM(+)  RESP:  Clear to auscultation bilaterally  GI:  Soft flabby with RUQ tenderness BS+  : No suprapubic tenderness  MSK:  Moves all extremities  CNS:  Awake, alert, oriented with fluent speech. No gross focal or global deficit appreciated  INTEG:  warm dry skin  PSYCH:  Fair mood    MONITOR:  CAPILLARY BLOOD GLUCOSE      POCT Blood Glucose.: 119 mg/dL (03 Dec 2024 11:53)  POCT Blood Glucose.: 128 mg/dL (03 Dec 2024 08:22)      A1C with Estimated Average Glucose Result: 8.1 % (12.03.24 @ 04:29)   I&O's Summary    03 Dec 2024 07:01  -  03 Dec 2024 14:50  --------------------------------------------------------  IN: 0 mL / OUT: 900 mL / NET: -900 mL                            14.0   5.88  )-----------( 138      ( 03 Dec 2024 04:29 )             42.0     PT/INR - ( 02 Dec 2024 15:25 )   PT: 19.0 sec;   INR: 1.69 ratio         PTT - ( 02 Dec 2024 15:25 )  PTT:34.1 sec  12-03    136  |  98  |  45.0[H]  ----------------------------<  111[H]  3.7   |  27.0  |  0.77    Ca    8.2[L]      03 Dec 2024 04:29  Phos  2.8     12-03  Mg     1.9     12-03    TPro  6.3[L]  /  Alb  3.5  /  TBili  0.6  /  DBili  x   /  AST  19  /  ALT  22  /  AlkPhos  146[H]  12-03        Urinalysis Basic - ( 03 Dec 2024 04:29 )    Color: x / Appearance: x / SG: x / pH: x  Gluc: 111 mg/dL / Ketone: x  / Bili: x / Urobili: x   Blood: x / Protein: x / Nitrite: x   Leuk Esterase: x / RBC: x / WBC x   Sq Epi: x / Non Sq Epi: x / Bacteria: x            TTE:    RADIOLOGY    < from: Xray Chest 1 View- PORTABLE-Urgent (12.02.24 @ 15:42) >  IMPRESSION: Left chest wall cardiac device reidentified in position.   Stable heart mediastinum. The cardio mems device reidentified. Vascular   congestion and interstitial edema without focal infiltrate or significant   pleural effusion.    < end of copied text >    < from: CT Abdomen and Pelvis w/ IV Cont (12.02.24 @ 20:32) >    IMPRESSION: No pulmonary embolus.    Circumscribed fat in the right upper quadrant of uncertain etiology,   possibly focal fat necrosis.    < end of copied text >        MEDICATIONS  (STANDING):  apixaban 5 milliGRAM(s) Oral every 12 hours  aspirin enteric coated 81 milliGRAM(s) Oral daily  atorvastatin 10 milliGRAM(s) Oral at bedtime  dapagliflozin 10 milliGRAM(s) Oral daily  dextrose 5%. 1000 milliLiter(s) (100 mL/Hr) IV Continuous <Continuous>  dextrose 5%. 1000 milliLiter(s) (50 mL/Hr) IV Continuous <Continuous>  dextrose 50% Injectable 25 Gram(s) IV Push once  dextrose 50% Injectable 12.5 Gram(s) IV Push once  dextrose 50% Injectable 25 Gram(s) IV Push once  doxazosin 2 milliGRAM(s) Oral at bedtime  finasteride 5 milliGRAM(s) Oral daily  glucagon  Injectable 1 milliGRAM(s) IntraMuscular once  HYDROmorphone  Injectable 0.5 milliGRAM(s) IV Push once  influenza  Vaccine (HIGH DOSE) 0.5 milliLiter(s) IntraMuscular once  insulin lispro (ADMELOG) corrective regimen sliding scale   SubCutaneous Before meals and at bedtime  metoprolol tartrate 50 milliGRAM(s) Oral two times a day  pantoprazole    Tablet 40 milliGRAM(s) Oral before breakfast  sucralfate 1 Gram(s) Oral two times a day      MEDICATIONS  (PRN):  acetaminophen     Tablet .. 650 milliGRAM(s) Oral every 6 hours PRN Temp greater or equal to 38C (100.4F), Mild Pain (1 - 3)  albuterol    90 MICROgram(s) HFA Inhaler 2 Puff(s) Inhalation every 6 hours PRN Shortness of Breath and/or Wheezing  ALPRAZolam 0.25 milliGRAM(s) Oral three times a day PRN for anxiety  aluminum hydroxide/magnesium hydroxide/simethicone Suspension 30 milliLiter(s) Oral every 4 hours PRN Dyspepsia  dextrose Oral Gel 15 Gram(s) Oral once PRN Blood Glucose LESS THAN 70 milliGRAM(s)/deciliter  melatonin 3 milliGRAM(s) Oral at bedtime PRN Insomnia  ondansetron Injectable 4 milliGRAM(s) IV Push every 8 hours PRN Nausea and/or Vomiting  oxycodone    5 mG/acetaminophen 325 mG 1 Tablet(s) Oral every 12 hours PRN for moderate pain  
  HOSPITALIST PROGRESS NOTE    ANIA CRISTOBAL  [id number]  79yMale    Patient is a 79y old  Male who presents with a chief complaint of Hypotension (03 Dec 2024 14:39)      SUBJECTIVE:   Chart reviewed since last visit.   Patient seen and examined at bedside for diarrhea, hypotension.  Feels better  Denies any abdominal pain, nausea, vomiting  Urine is still dark.  Unsure about dizziness as hasn't gotten out of bed yet      OBJECTIVE:  Vital Signs Last 24 Hrs  T(C): 36.9 (04 Dec 2024 10:58), Max: 36.9 (04 Dec 2024 08:26)  T(F): 98.4 (04 Dec 2024 10:58), Max: 98.4 (04 Dec 2024 08:26)  HR: 65 (04 Dec 2024 10:58) (63 - 85)  BP: 115/67 (04 Dec 2024 10:58) (102/52 - 116/72)   RR: 20 (04 Dec 2024 10:58) (18 - 20)  SpO2: 95% (04 Dec 2024 10:58) (92% - 96%)    Parameters below as of 04 Dec 2024 10:58  Patient On (Oxygen Delivery Method): nasal cannula  O2 Flow (L/min): 2      PHYSICAL EXAMINATION  BMI 39  General: Morbidly obese male lying on stretcher, comfortable  HEENT:  Anicteric sclera  NECK:  Supple  CVS: regular rate and rhythm S1 S2 Left chest PPM(+)  RESP:  Clear to auscultation bilaterally  GI:  Soft flabby with interval resolution of RUQ tenderness BS+  : No suprapubic tenderness  MSK:  Moves all extremities  CNS:  Awake, alert, oriented with fluent speech. No gross focal or global deficit appreciated  INTEG:  warm dry skin  PSYCH:  Fair mood      MONITOR:  CAPILLARY BLOOD GLUCOSE      POCT Blood Glucose.: 149 mg/dL (04 Dec 2024 11:39)  POCT Blood Glucose.: 104 mg/dL (04 Dec 2024 07:53)  POCT Blood Glucose.: 174 mg/dL (03 Dec 2024 21:05)  POCT Blood Glucose.: 134 mg/dL (03 Dec 2024 16:50)  POCT Blood Glucose.: 119 mg/dL (03 Dec 2024 11:53)        I&O's Summary    03 Dec 2024 07:01  -  04 Dec 2024 07:00  --------------------------------------------------------  IN: 0 mL / OUT: 1460 mL / NET: -1460 mL                            14.0   5.88  )-----------( 138      ( 03 Dec 2024 04:29 )             42.0     PT/INR - ( 02 Dec 2024 15:25 )   PT: 19.0 sec;   INR: 1.69 ratio         PTT - ( 02 Dec 2024 15:25 )  PTT:34.1 sec  12-04    141  |  103  |  24.8[H]  ----------------------------<  110[H]  3.8   |  28.0  |  0.58    Ca    8.0[L]      04 Dec 2024 07:27  Phos  2.8     12-03  Mg     1.9     12-03    TPro  6.3[L]  /  Alb  3.5  /  TBili  0.6  /  DBili  x   /  AST  19  /  ALT  22  /  AlkPhos  146[H]  12-03        Urinalysis Basic - ( 04 Dec 2024 07:27 )    Color: x / Appearance: x / SG: x / pH: x  Gluc: 110 mg/dL / Ketone: x  / Bili: x / Urobili: x   Blood: x / Protein: x / Nitrite: x   Leuk Esterase: x / RBC: x / WBC x   Sq Epi: x / Non Sq Epi: x / Bacteria: x            TTE:    RADIOLOGY        MEDICATIONS  (STANDING):  apixaban 5 milliGRAM(s) Oral every 12 hours  aspirin enteric coated 81 milliGRAM(s) Oral daily  atorvastatin 10 milliGRAM(s) Oral at bedtime  dapagliflozin 10 milliGRAM(s) Oral daily  dextrose 5%. 1000 milliLiter(s) (100 mL/Hr) IV Continuous <Continuous>  dextrose 5%. 1000 milliLiter(s) (50 mL/Hr) IV Continuous <Continuous>  dextrose 50% Injectable 25 Gram(s) IV Push once  dextrose 50% Injectable 12.5 Gram(s) IV Push once  dextrose 50% Injectable 25 Gram(s) IV Push once  doxazosin 2 milliGRAM(s) Oral at bedtime  finasteride 5 milliGRAM(s) Oral daily  fluticasone propionate 50 MICROgram(s)/spray Nasal Spray 1 Spray(s) Both Nostrils two times a day  glucagon  Injectable 1 milliGRAM(s) IntraMuscular once  HYDROmorphone  Injectable 0.5 milliGRAM(s) IV Push once  influenza  Vaccine (HIGH DOSE) 0.5 milliLiter(s) IntraMuscular once  insulin lispro (ADMELOG) corrective regimen sliding scale   SubCutaneous Before meals and at bedtime  metoprolol tartrate 50 milliGRAM(s) Oral two times a day  pantoprazole    Tablet 40 milliGRAM(s) Oral before breakfast  sucralfate 1 Gram(s) Oral two times a day      MEDICATIONS  (PRN):  acetaminophen     Tablet .. 650 milliGRAM(s) Oral every 6 hours PRN Temp greater or equal to 38C (100.4F), Mild Pain (1 - 3)  albuterol    90 MICROgram(s) HFA Inhaler 2 Puff(s) Inhalation every 6 hours PRN Shortness of Breath and/or Wheezing  ALPRAZolam 0.25 milliGRAM(s) Oral three times a day PRN for anxiety  aluminum hydroxide/magnesium hydroxide/simethicone Suspension 30 milliLiter(s) Oral every 4 hours PRN Dyspepsia  dextrose Oral Gel 15 Gram(s) Oral once PRN Blood Glucose LESS THAN 70 milliGRAM(s)/deciliter  melatonin 3 milliGRAM(s) Oral at bedtime PRN Insomnia  ondansetron Injectable 4 milliGRAM(s) IV Push every 8 hours PRN Nausea and/or Vomiting  oxycodone    5 mG/acetaminophen 325 mG 1 Tablet(s) Oral every 12 hours PRN for moderate pain

## 2024-12-04 NOTE — PHYSICAL THERAPY INITIAL EVALUATION ADULT - ADDITIONAL COMMENTS
Pt lives in an apartment in son's home, ramp but no steps to enter. Modified Independent with all with RW PTA.

## 2024-12-04 NOTE — PROGRESS NOTE ADULT - ASSESSMENT
79 M with PMHX CAD s/p PCI x2, AFIB s/p Ablation on Eliquis, AS s/p TAVR, CHF s/p CardioMEMS, Aortic Aneurysm, LLE DVT, ALLIE, BPH s/p TURP, Hx L Renal Mass, DM2, GERD presents to [facility] ER c/o low blood pressure admitted for Diarrhea c/b Hypotension and Lactic Acidosis r/o Sepsis v Polypharmacy.      # Hypotension due to Diarrhea and Polypharmacy.  Doubtful sepsis; Lactic acidosis from Fat necrosis RUQ on CT scan  UA (-)  RSV/COVID/FLU (-)  1.5 L IVF given in Emergency Department with improvement in blood pressure  - Monitor  - Hydration, Hold diuretic, adjust antihypertensive  - No antibiotics; follow cultures  - GI PCR pending (patient no longer with diarrhea)  - Discussed with radiology regarding fat necrosis - recommended US KUB     # BPH s/p TURP  Voiding freelya  - Doxazosin and Finasteride    # Diabetes  A1c 8.1  -Hold Metformin 500mg TID with IV contrast dye load and lactic acidosis  - Blood glucose monitoring with sliding scale Lispro    # CAD s/p PCI x2,   # pAFIB on Eliquis,   # CHF s/p CardioMEMS,   # AS s/p TAVR,   # LLE DVT  -ASA 81mg q24  -Eliquis 5mg BID  -Atorvastatin 10mg qHS  -Farxiga 10mg q24  -Metoprolol Tartrate 50mg BID   -Torsemide 40mg PO q24 -> Hold with IVF and Hypotension  -Cardio consult for hypotension possibly overdiuresis    # GERD  -PPI and Sucralfate BID    # MDD,   # Chronic Pain Syndrome  -Alprazolam 0.25mg TID PRN  -Oxycodone 5/325mg PO BID   -Patient states he has been using Alprazolam 3-4 times/day and Oxycodone 3-4 times/day question of polypharmacy contributing to hypotension    VTE PPX: SCDs/Eliquis  DISPO: Pending improvement in Hypotension, dehydration; likelly home in next 24 hours pending PT

## 2024-12-05 ENCOUNTER — TRANSCRIPTION ENCOUNTER (OUTPATIENT)
Age: 79
End: 2024-12-05

## 2024-12-05 VITALS
TEMPERATURE: 98 F | OXYGEN SATURATION: 93 % | RESPIRATION RATE: 18 BRPM | HEART RATE: 69 BPM | DIASTOLIC BLOOD PRESSURE: 67 MMHG | SYSTOLIC BLOOD PRESSURE: 143 MMHG

## 2024-12-05 LAB
ANION GAP SERPL CALC-SCNC: 11 MMOL/L — SIGNIFICANT CHANGE UP (ref 5–17)
BUN SERPL-MCNC: 19.2 MG/DL — SIGNIFICANT CHANGE UP (ref 8–20)
CALCIUM SERPL-MCNC: 8.4 MG/DL — SIGNIFICANT CHANGE UP (ref 8.4–10.5)
CHLORIDE SERPL-SCNC: 105 MMOL/L — SIGNIFICANT CHANGE UP (ref 96–108)
CO2 SERPL-SCNC: 27 MMOL/L — SIGNIFICANT CHANGE UP (ref 22–29)
CREAT SERPL-MCNC: 0.65 MG/DL — SIGNIFICANT CHANGE UP (ref 0.5–1.3)
EGFR: 96 ML/MIN/1.73M2 — SIGNIFICANT CHANGE UP
GLUCOSE BLDC GLUCOMTR-MCNC: 93 MG/DL — SIGNIFICANT CHANGE UP (ref 70–99)
GLUCOSE SERPL-MCNC: 89 MG/DL — SIGNIFICANT CHANGE UP (ref 70–99)
POTASSIUM SERPL-MCNC: 3.8 MMOL/L — SIGNIFICANT CHANGE UP (ref 3.5–5.3)
POTASSIUM SERPL-SCNC: 3.8 MMOL/L — SIGNIFICANT CHANGE UP (ref 3.5–5.3)
SODIUM SERPL-SCNC: 142 MMOL/L — SIGNIFICANT CHANGE UP (ref 135–145)

## 2024-12-05 PROCEDURE — 99285 EMERGENCY DEPT VISIT HI MDM: CPT | Mod: 25

## 2024-12-05 PROCEDURE — 83036 HEMOGLOBIN GLYCOSYLATED A1C: CPT

## 2024-12-05 PROCEDURE — 74177 CT ABD & PELVIS W/CONTRAST: CPT | Mod: MC

## 2024-12-05 PROCEDURE — 84100 ASSAY OF PHOSPHORUS: CPT

## 2024-12-05 PROCEDURE — 83735 ASSAY OF MAGNESIUM: CPT

## 2024-12-05 PROCEDURE — 96374 THER/PROPH/DIAG INJ IV PUSH: CPT

## 2024-12-05 PROCEDURE — 85018 HEMOGLOBIN: CPT

## 2024-12-05 PROCEDURE — 84295 ASSAY OF SERUM SODIUM: CPT

## 2024-12-05 PROCEDURE — 82947 ASSAY GLUCOSE BLOOD QUANT: CPT

## 2024-12-05 PROCEDURE — 76705 ECHO EXAM OF ABDOMEN: CPT

## 2024-12-05 PROCEDURE — 0241U: CPT

## 2024-12-05 PROCEDURE — 82330 ASSAY OF CALCIUM: CPT

## 2024-12-05 PROCEDURE — 71275 CT ANGIOGRAPHY CHEST: CPT | Mod: MC

## 2024-12-05 PROCEDURE — 36415 COLL VENOUS BLD VENIPUNCTURE: CPT

## 2024-12-05 PROCEDURE — 87086 URINE CULTURE/COLONY COUNT: CPT

## 2024-12-05 PROCEDURE — 93306 TTE W/DOPPLER COMPLETE: CPT

## 2024-12-05 PROCEDURE — 96375 TX/PRO/DX INJ NEW DRUG ADDON: CPT

## 2024-12-05 PROCEDURE — 83880 ASSAY OF NATRIURETIC PEPTIDE: CPT

## 2024-12-05 PROCEDURE — 85730 THROMBOPLASTIN TIME PARTIAL: CPT

## 2024-12-05 PROCEDURE — 99239 HOSP IP/OBS DSCHRG MGMT >30: CPT

## 2024-12-05 PROCEDURE — 82803 BLOOD GASES ANY COMBINATION: CPT

## 2024-12-05 PROCEDURE — 94640 AIRWAY INHALATION TREATMENT: CPT

## 2024-12-05 PROCEDURE — 81001 URINALYSIS AUTO W/SCOPE: CPT

## 2024-12-05 PROCEDURE — 82435 ASSAY OF BLOOD CHLORIDE: CPT

## 2024-12-05 PROCEDURE — 85610 PROTHROMBIN TIME: CPT

## 2024-12-05 PROCEDURE — 84132 ASSAY OF SERUM POTASSIUM: CPT

## 2024-12-05 PROCEDURE — 87040 BLOOD CULTURE FOR BACTERIA: CPT

## 2024-12-05 PROCEDURE — 82962 GLUCOSE BLOOD TEST: CPT

## 2024-12-05 PROCEDURE — 93005 ELECTROCARDIOGRAM TRACING: CPT

## 2024-12-05 PROCEDURE — 85025 COMPLETE CBC W/AUTO DIFF WBC: CPT

## 2024-12-05 PROCEDURE — 85014 HEMATOCRIT: CPT

## 2024-12-05 PROCEDURE — 83605 ASSAY OF LACTIC ACID: CPT

## 2024-12-05 PROCEDURE — 80053 COMPREHEN METABOLIC PANEL: CPT

## 2024-12-05 PROCEDURE — 87637 SARSCOV2&INF A&B&RSV AMP PRB: CPT

## 2024-12-05 PROCEDURE — 71045 X-RAY EXAM CHEST 1 VIEW: CPT

## 2024-12-05 PROCEDURE — 97163 PT EVAL HIGH COMPLEX 45 MIN: CPT

## 2024-12-05 PROCEDURE — 80048 BASIC METABOLIC PNL TOTAL CA: CPT

## 2024-12-05 RX ORDER — ACETAMINOPHEN 500MG 500 MG/1
2 TABLET, COATED ORAL
Refills: 0 | DISCHARGE

## 2024-12-05 RX ADMIN — METOPROLOL TARTRATE 50 MILLIGRAM(S): 100 TABLET, FILM COATED ORAL at 05:20

## 2024-12-05 RX ADMIN — PANTOPRAZOLE SODIUM 40 MILLIGRAM(S): 40 TABLET, DELAYED RELEASE ORAL at 05:20

## 2024-12-05 RX ADMIN — DAPAGLIFLOZIN 10 MILLIGRAM(S): 10 TABLET, FILM COATED ORAL at 09:48

## 2024-12-05 RX ADMIN — SUCRALFATE 1 GRAM(S): 1 TABLET ORAL at 05:20

## 2024-12-05 RX ADMIN — Medication 81 MILLIGRAM(S): at 09:48

## 2024-12-05 RX ADMIN — Medication 1 SPRAY(S): at 05:20

## 2024-12-05 RX ADMIN — Medication 0.25 MILLIGRAM(S): at 09:47

## 2024-12-05 RX ADMIN — Medication 5 MILLIGRAM(S): at 09:48

## 2024-12-05 RX ADMIN — APIXABAN 5 MILLIGRAM(S): 2.5 TABLET, FILM COATED ORAL at 05:20

## 2024-12-05 NOTE — DISCHARGE NOTE PROVIDER - NSDCMRMEDTOKEN_GEN_ALL_CORE_FT
ALPRAZolam 0.25 mg oral tablet: 1 tab(s) orally 3 times a day as needed for  anxiety  apixaban 5 mg oral tablet: 1 tab(s) orally every 12 hours  aspirin 81 mg oral delayed release tablet: 1 tab(s) orally once a day  atorvastatin 10 mg oral tablet: 1 tab(s) orally once a day (at bedtime)  doxazosin 2 mg oral tablet: 1 tab(s) orally once a day  Farxiga 10 mg oral tablet: 1 tab(s) orally once a day  finasteride 5 mg oral tablet: 1 tab(s) orally once a day  metFORMIN 500 mg oral tablet: 1 tab(s) orally 3 times a day  metoprolol tartrate 50 mg oral tablet: 1 tab(s) orally 2 times a day  omeprazole 40 mg oral delayed release capsule: 1 orally once a day  oxycodone-acetaminophen 5 mg-325 mg oral tablet: 1 tab(s) orally every 12 hours as needed for  moderate pain  Potassium Chloride (Eqv-Klor-Con 10) 10 mEq oral tablet, extended release: 1 tab(s) orally once a day  ProAir RespiClick 90 mcg/inh inhalation powder: 2 inhaler(s) inhaled every 4 hours as needed for  shortness of breath and/or wheezing  sucralfate 1 g oral tablet: 1 tab(s) orally 2 times a day  torsemide 20 mg oral tablet: 2 tab(s) orally once a day

## 2024-12-05 NOTE — DISCHARGE NOTE PROVIDER - NSDCCPCAREPLAN_GEN_ALL_CORE_FT
PRINCIPAL DISCHARGE DIAGNOSIS  Diagnosis: Acute dehydration  Assessment and Plan of Treatment: Resolved  May resume medications  Follow up with PMD in 2-3 days      SECONDARY DISCHARGE DIAGNOSES  Diagnosis: Diarrhea  Assessment and Plan of Treatment: Resolved      Diagnosis: Chronic systolic congestive heart failure  Assessment and Plan of Treatment: Resume home medications    Diagnosis: Type 2 diabetes mellitus  Assessment and Plan of Treatment: Resume home medications    Diagnosis: CAD (coronary artery disease)  Assessment and Plan of Treatment: Resume home medications    Diagnosis: BPH without urinary obstruction  Assessment and Plan of Treatment: Resume home medications    Diagnosis: Atrial fibrillation  Assessment and Plan of Treatment: Resume home medications

## 2024-12-05 NOTE — DISCHARGE NOTE NURSING/CASE MANAGEMENT/SOCIAL WORK - PATIENT PORTAL LINK FT
You can access the FollowMyHealth Patient Portal offered by [facility] by registering at the following website: [email or web address]. By joining [facility]’s FollowMyHealth portal, you will also be able to view your health information using other applications (apps) compatible with our system.

## 2024-12-05 NOTE — DISCHARGE NOTE PROVIDER - HOSPITAL COURSE
79 M with PMHX CAD s/p PCI x2, AFIB s/p Ablation on Eliquis, AS s/p TAVR, CHF s/p CardioMEMS, Aortic Aneurysm, LLE DVT, ALLIE, BPH s/p TURP, Hx L Renal Mass, DM2, GERD presents to [facility] ER c/o low blood pressure admitted for Diarrhea complicated by Hypotension     He was admitted for further management and his antihypertensive were held. He received IVF in ER with improvement. Diarrhea resolved. Incidental finding of possible fat necrosis on CT scan - patient abdominal pain resolved and declined further work up. Blood and urine culture negative to date; no antibiotics given. Hypotension resolved. Evaluated by PT with recommendations for discharge home

## 2024-12-05 NOTE — DISCHARGE NOTE NURSING/CASE MANAGEMENT/SOCIAL WORK - FINANCIAL ASSISTANCE
[facility] provides services at a reduced cost to those who are determined to be eligible through [facility]’s financial assistance program. Information regarding [facility]’s financial assistance program can be found by going to [email or web address] or by calling [phone].

## 2024-12-05 NOTE — DISCHARGE NOTE PROVIDER - NSDCFUSCHEDAPPT_GEN_ALL_CORE_FT
[facility] Physician [facility] [address]  Scheduled Appointment: 01/02/2025

## 2024-12-05 NOTE — DISCHARGE NOTE PROVIDER - ATTENDING DISCHARGE PHYSICAL EXAMINATION:
OBJECTIVE:  Vital Signs Last 24 Hrs  T(C): 36.7 (05 Dec 2024 07:51), Max: 37.2 (04 Dec 2024 15:11)  T(F): 98.1 (05 Dec 2024 07:51), Max: 98.9 (04 Dec 2024 15:11)  HR: 70 (05 Dec 2024 07:51) (65 - 72)  BP: 142/74 (05 Dec 2024 07:51) (114/65 - 142/74)  BP(mean): --  RR: 18 (05 Dec 2024 07:51) (18 - 20)  SpO2: 93% (05 Dec 2024 07:51) (93% - 95%)    Parameters below as of 05 Dec 2024 07:51  Patient On (Oxygen Delivery Method): nasal cannula    PHYSICAL EXAMINATION  BMI 39  General: Morbidly obese male lying on stretcher, comfortable  HEENT:  Anicteric sclera  NECK:  Supple  CVS: regular rate and rhythm S1 S2 Left chest PPM(+)  RESP:  Clear to auscultation bilaterally  GI:  Soft flabby with interval resolution of RUQ tenderness BS+  : No suprapubic tenderness  MSK:  Moves all extremities  CNS:  Awake, alert, oriented with fluent speech. No gross focal or global deficit appreciated  INTEG:  warm dry skin  PSYCH:  Fair mood

## 2024-12-06 NOTE — PATIENT PROFILE ADULT - FUNCTIONAL ASSESSMENT - BASIC MOBILITY 6.
none/nausea/vomiting
 2-calculated by average/Not able to assess (calculate score using [facility] averaging method)

## 2024-12-08 LAB
CULTURE RESULTS: SIGNIFICANT CHANGE UP
SPECIMEN SOURCE: SIGNIFICANT CHANGE UP

## 2024-12-20 NOTE — ED PROVIDER NOTE - NS_ATTENDINGSCRIBE_ED_ALL_ED
Unknown I personally performed the service described in the documentation recorded by the scribe in my presence, and it accurately and completely records my words and actions.

## 2024-12-30 NOTE — ED PROVIDER NOTE - CROS ED CONS ALL NEG
Patient states she was calling by someone in the office wanted to return the call to let nurse know she wants to wait to schedule her ultrasound and she will call back to schedule her follow up once she scheduled her ultrasound.     negative...

## 2025-01-02 ENCOUNTER — NON-APPOINTMENT (OUTPATIENT)
Age: 80
End: 2025-01-02

## 2025-01-02 ENCOUNTER — APPOINTMENT (OUTPATIENT)
Dept: ELECTROPHYSIOLOGY | Facility: CLINIC | Age: 80
End: 2025-01-02
Payer: MEDICARE

## 2025-01-02 PROCEDURE — 93296 REM INTERROG EVL PM/IDS: CPT

## 2025-01-02 PROCEDURE — 93294 REM INTERROG EVL PM/LDLS PM: CPT

## 2025-01-08 NOTE — PROGRESS NOTE ADULT - SUBJECTIVE AND OBJECTIVE BOX
ANIA CRISTOBAL  ----------------------------------------  The patient was seen at bedside. Patient with heart failure and atrial fibrillation. Offered no complaints. Denied chest pain.    Vital Signs Last 24 Hrs  T(C): 36.9 (18 Jun 2023 08:00), Max: 37.1 (17 Jun 2023 16:10)  T(F): 98.4 (18 Jun 2023 08:00), Max: 98.7 (17 Jun 2023 16:10)  HR: 77 (18 Jun 2023 11:30) (71 - 131)  BP: 111/66 (18 Jun 2023 11:30) (85/50 - 129/79)  BP(mean): --  RR: 18 (18 Jun 2023 08:00) (18 - 18)  SpO2: 97% (18 Jun 2023 09:44) (94% - 97%)    Parameters below as of 18 Jun 2023 09:44  Patient On (Oxygen Delivery Method): nasal cannula, 3 lpm    CAPILLARY BLOOD GLUCOSE  POCT Blood Glucose.: 163 mg/dL (18 Jun 2023 12:29)  POCT Blood Glucose.: 145 mg/dL (18 Jun 2023 08:22)  POCT Blood Glucose.: 221 mg/dL (17 Jun 2023 21:46)  POCT Blood Glucose.: 152 mg/dL (17 Jun 2023 17:32)    PHYSICAL EXAMINATION:  ----------------------------------------  General appearance: No acute distress, Awake, Alert  HEENT: Normocephalic, Atraumatic, Conjunctiva clear, EOMI  Neck: Supple, No JVD, No tenderness  Lungs: Breath sound equal bilaterally, No wheezes, No rales  Cardiovascular: S1S2, Irregular rhythm  Abdomen: Soft, Nontender, Nondistended, No guarding/rebound, Positive bowel sounds  Extremities: No clubbing, No cyanosis, No edema, No calf tenderness  Neuro: Strength equal bilaterally, No tremors  Psychiatric: Normal affect    LABORATORY STUDIES:  ----------------------------------------             14.0   6.71  )-----------( 261      ( 17 Jun 2023 05:52 )             45.3     06-18    139  |  99  |  23.2<H>  ----------------------------<  168<H>  3.6   |  32.0<H>  |  0.63    Ca    8.2<L>      18 Jun 2023 05:08  Mg     1.8     06-17    TPro  6.0<L>  /  Alb  2.7<L>  /  TBili  0.4  /  DBili  x   /  AST  14  /  ALT  12  /  AlkPhos  198<H>  06-17    LIVER FUNCTIONS - ( 17 Jun 2023 05:52 )  Alb: 2.7 g/dL / Pro: 6.0 g/dL / ALK PHOS: 198 U/L / ALT: 12 U/L / AST: 14 U/L / GGT: x           06-17-23 @ 07:01  -  06-18-23 @ 07:00  --------------------------------------------------------  IN: 0 mL / OUT: 1300 mL / NET: -1300 mL    06-18-23 @ 07:01  -  06-18-23 @ 14:37  --------------------------------------------------------  IN: 0 mL / OUT: 1450 mL / NET: -1450 mL    MEDICATIONS  (STANDING):  albuterol    0.083% 2.5 milliGRAM(s) Nebulizer every 6 hours  apixaban 5 milliGRAM(s) Oral every 12 hours  aspirin  chewable 81 milliGRAM(s) Oral daily  atorvastatin 20 milliGRAM(s) Oral at bedtime  chlorhexidine 2% Cloths 1 Application(s) Topical <User Schedule>  dextrose 5%. 1000 milliLiter(s) (100 mL/Hr) IV Continuous <Continuous>  dextrose 5%. 1000 milliLiter(s) (50 mL/Hr) IV Continuous <Continuous>  dextrose 50% Injectable 25 Gram(s) IV Push once  dextrose 50% Injectable 12.5 Gram(s) IV Push once  dextrose 50% Injectable 25 Gram(s) IV Push once  digoxin     Tablet 125 MICROGram(s) Oral daily  doxazosin 2 milliGRAM(s) Oral at bedtime  finasteride 5 milliGRAM(s) Oral daily  fluticasone propionate 50 MICROgram(s)/spray Nasal Spray 1 Spray(s) Both Nostrils two times a day  furosemide    Tablet 40 milliGRAM(s) Oral daily  glucagon  Injectable 1 milliGRAM(s) IntraMuscular once  insulin lispro (ADMELOG) corrective regimen sliding scale   SubCutaneous three times a day before meals  insulin lispro (ADMELOG) corrective regimen sliding scale   SubCutaneous at bedtime  metoprolol tartrate 25 milliGRAM(s) Oral every 6 hours  pantoprazole    Tablet 40 milliGRAM(s) Oral before breakfast  polyethylene glycol 3350 17 Gram(s) Oral daily  potassium chloride   Powder 20 milliEquivalent(s) Oral daily  senna 2 Tablet(s) Oral at bedtime    MEDICATIONS  (PRN):  ALPRAZolam 0.5 milliGRAM(s) Oral three times a day PRN anxiety  aluminum hydroxide/magnesium hydroxide/simethicone Suspension 30 milliLiter(s) Oral every 4 hours PRN Dyspepsia  bisacodyl 5 milliGRAM(s) Oral every 12 hours PRN Constipation  dextrose Oral Gel 15 Gram(s) Oral once PRN Blood Glucose LESS THAN 70 milliGRAM(s)/deciliter  diphenhydrAMINE Injectable 50 milliGRAM(s) IV Push once PRN Allergic Reaction  meclizine 12.5 milliGRAM(s) Oral three times a day PRN Dizziness  melatonin 3 milliGRAM(s) Oral at bedtime PRN Insomnia  ondansetron Injectable 4 milliGRAM(s) IV Push every 8 hours PRN Nausea and/or Vomiting  simethicone 80 milliGRAM(s) Chew every 8 hours PRN Gas      ASSESSMENT / PLAN:  ----------------------------------------  76 y/o male with PMH of afib on Eliquis (s/p Cardioversion on 1/31/23), Moderate AS, Chronic Diastolic Heart Failure, CAD s/p PCI, HTN, HLD, DM 2, Morbid Obesity, ALLIE, Thoracic Aortic Aneurysm, BPH s/p TURP, Left Renal Mass was sent [facility] the ED from [facility] for abdominal pain x 4 days. Patient reported pain on the right side of the abdomen (upper and lower), 10/10, non-radiating. Patient reported feeling congested, noted cough but not productive.   CT C/A/P: No PE; Trace bilateral pleural effusions with adjacent patchy bibasilar opacities, likely represent dependent atelectasis vs infection. Hepatomegaly. Liver surface nodularity. Hepatitis or cirrhosis considered in differential. Trace ascites. Mild bladder wall thickening with suggestion of left posterolateral diverticulum.  Patient had a tte showing reduced ef and is npo for rhc and cardiomems today    Acute systolic heart failure - Continue on metoprolol. Previously transitioned to oral furosemide. Monitoring urine output. Status post right heart catheterization and CardioMEMS.    Atrial fibrillation - On apixaban for anticoagulation. On digoxin and metoprolol. Planned for ablation on 6/19/23.    Anxiety - On alprazolam as needed.    Benign prostatic hyperplasia - On finasteride and doxazosin.    Diabetes - Insulin coverage, close monitoring of blood glucose levels.    Hypertension / Coronary artery disease - Close blood pressure monitoring. On aspirin and atorvastatin.    Hepatomegaly - Sludge noted on ultrasound. Official MRI results to be reviewed when available. Diet as tolerated.    Obstructive sleep apnea - Not on nocturnal CPAP at home. Monitoring oxygen levels. If your child received fluoride varnish today, here are some general guidelines for the rest of the day.    Your child can eat and drink right away after varnish is applied but should AVOID hot liquids or sticky/crunchy foods for 24 hours.    Don't brush or floss your teeth for the next 4-6 hours and resume regular brushing, flossing and dental checkups after this initial time period.    Patient Education    [employer] HANDOUT- PARENT  4 YEAR VISIT  Here are some suggestions from [employer] experts that may be of value to your family.     HOW YOUR FAMILY IS DOING  Stay involved in your community. Join activities when you can.  If you are worried about your living or food situation, talk with us. Community agencies and programs such as [employer] and [employer] can also provide information and assistance.  Don t smoke or use e-cigarettes. Keep your home and car smoke-free. Tobacco-free spaces keep children healthy.  Don t use alcohol or drugs.  If you feel unsafe in your home or have been hurt by someone, let us know. Hotlines and community agencies can also provide confidential help.  Teach your child about how to be safe in the community.  Use correct terms for all body parts as your child becomes interested in how boys and girls differ.  No adult should ask a child to keep secrets from parents.  No adult should ask to see a child s private parts.  No adult should ask a child for help with the adult s own private parts.    GETTING READY FOR SCHOOL  Give your child plenty of time to finish sentences.  Read books together each day and ask your child questions about the stories.  Take your child to the library and let him choose books.  Listen to and treat your child with respect. Insist that others do so as well.  Model saying you re sorry and help your child to do so if he hurts someone s feelings.  Praise your child for being kind to others.  Help your child express his feelings.  Give your child the chance to play with  others often.  Visit your child s  or  program. Get involved.  Ask your child to tell you about his day, friends, and activities.    HEALTHY HABITS  Give your child 16 to 24 oz of milk every day.  Limit juice. It is not necessary. If you choose to serve juice, give no more than 4 oz a day of 100%juice and always serve it with a meal.  Let your child have cool water when she is thirsty.  Offer a variety of healthy foods and snacks, especially vegetables, fruits, and lean protein.  Let your child decide how much to eat.  Have relaxed family meals without TV.  Create a calm bedtime routine.  Have your child brush her teeth twice each day. Use a pea-sized amount of toothpaste with fluoride.    TV AND MEDIA  Be active together as a family often.  Limit TV, tablet, or smartphone use to no more than 1 hour of high-quality programs each day.  Discuss the programs you watch together as a family.  Consider making a family media plan.It helps you make rules for media use and balance screen time with other activities, including exercise.  Don t put a TV, computer, tablet, or smartphone in your child s bedroom.  Create opportunities for daily play.  Praise your child for being active.    SAFETY  Use a forward-facing car safety seat or switch to a belt-positioning booster seat when your child reaches the weight or height limit for her car safety seat, her shoulders are above the top harness slots, or her ears come to the top of the car safety seat.  The back seat is the safest place for children to ride until they are 13 years old.  Make sure your child learns to swim and always wears a life jacket. Be sure swimming pools are fenced.  When you go out, put a hat on your child, have her wear sun protection clothing, and apply sunscreen with SPF of 15 or higher on her exposed skin. Limit time outside when the sun is strongest (11:00 am-3:00 pm).  If it is necessary to keep a gun in your home, store it unloaded and  locked with the ammunition locked separately.  Ask if there are guns in homes where your child plays. If so, make sure they are stored safely.  Ask if there are guns in homes where your child plays. If so, make sure they are stored safely.    WHAT TO EXPECT AT YOUR CHILD S 5 AND 6 YEAR VISIT  We will talk about  Taking care of your child, your family, and yourself  Creating family routines and dealing with anger and feelings  Preparing for school  Keeping your child s teeth healthy, eating healthy foods, and staying active  Keeping your child safe at home, outside, and in the car        Helpful Resources: National Domestic Violence Hotline: 869.142.4493  Family Media Use Plan: www.healthychildren.org/MediaUsePlan  Smoking Quit Line: 611.549.9622   Information About Car Safety Seats: www.safercar.gov/parents  Toll-free Auto Safety Hotline: 597.984.6379  Consistent with Bright Futures: Guidelines for Health Supervision of Infants, Children, and Adolescents, 4th Edition  For more information, go to https://brightfutures.aap.org.

## 2025-04-03 ENCOUNTER — APPOINTMENT (OUTPATIENT)
Dept: ELECTROPHYSIOLOGY | Facility: CLINIC | Age: 80
End: 2025-04-03

## 2025-04-03 PROCEDURE — 93294 REM INTERROG EVL PM/LDLS PM: CPT

## 2025-04-03 PROCEDURE — 93296 REM INTERROG EVL PM/IDS: CPT

## 2025-04-04 NOTE — PATIENT PROFILE ADULT - PRO INTERPRETER NEED 2
In an effort to ensure that our patients LiveWell, a Team Member has reviewed your chart and identified an opportunity to provide the best care possible. An attempt was made to discuss or schedule due or overdue Preventive or Chronic Condition care.Care Gaps identified: Wellness Visits.    The Outcome was Contact was not made, left message. We are attempting to schedule a yearly wellness visit with labs prior. If you have any questions or need help with scheduling, contact our Health Outreach Team at [phone].   Type of Appointment needed: Comprehensive Annual Visit     English

## 2025-05-06 NOTE — ED CDU PROVIDER INITIAL DAY NOTE - OBJECTIVE STATEMENT
show 76 yo male history of CAD s/p PCI (patent cath 2021), AF, moderate AS, morbid obesity, OA, HTN, HLD, DM, TAA (4.4cm), BPH s/p TURP c/b Chronic Franz x9 mos (now removed) presents s/p fall in bathroom this am. Patient states that he was trying to transfer from the bathtub to the toilet when he slipped and fell. He states that he did strike his head and neck. Additionally, patient is reporting right knee and left foot pain. He pulled his body back to his bed from the bathroom before his daughter called EMS. Patient denies other recent trauma, other recent illness.

## 2025-06-26 NOTE — AIRWAY PLACEMENT NOTE ADULT - AIRWAY COMMENTS:
Medication:    Disp Refills Start End    topiramate (TOPAMAX) 50 MG tablet 90 tablet 1 3/28/2024 --    Sig - Route: Take 1 tablet by mouth daily. Along with 100mg tablets      Medication refill denied. Refills are on file at pharmacy.  
Performed by Dr. Carter

## 2025-07-07 ENCOUNTER — NON-APPOINTMENT (OUTPATIENT)
Age: 80
End: 2025-07-07

## 2025-07-08 ENCOUNTER — NON-APPOINTMENT (OUTPATIENT)
Age: 80
End: 2025-07-08

## 2025-07-08 NOTE — HISTORY OF PRESENT ILLNESS
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 Echo showed LVEF 40-45%, and severe low-flow, low gradient aortic stenosis. CTSx consulted and planned for TAVR on 10/6. Given his baseline conduction system disease (bifascicular block with long first degree > 400 msec), high likelihood of developing complete heart block post-TAVR and potential need for antiarrhythmic drug therapy, he was felt to be a reasonable candidate for implantation of a dual chamber pacemaker.  He underwent dual chamber PPM implantation with Dr. Cast 10/5/23. Subsequently underwent TAVR as planned 10/6/23.  Readmitted for acute on chronic diastolic CHF, acute respiratory failure, and b/l pleural effusions (Right > Left). 10/29 s/p right pigtail catheter placement and removal with 1L drainage.  AF burden increased post TAVR then with reduced burden and only one 15 minute episode in November during last follow up.    Review of Echo from Dr. Medeiros's office performed 11/9/23 revealed EF improved to 50-55%.  Remote PPM transmission today reveals normal PPM function. A, RV with adequate sense and pace thresholds. Low burden of PAT in arrhythmia log 0.1%. AP- 3.4%,  95.7%. One brief NSVT. Maintained on BB therapy.   . It is very difficult for him to ambulate into office. Denies CP, RESENDIZ, SOB, weight gain. Tolerating Eliquis without c/o easy bruising, bleeding, or falls.

## 2025-07-09 ENCOUNTER — EMERGENCY (EMERGENCY)
Facility: HOSPITAL | Age: 80
LOS: 1 days | End: 2025-07-09
Attending: STUDENT IN AN ORGANIZED HEALTH CARE EDUCATION/TRAINING PROGRAM
Payer: MEDICARE

## 2025-07-09 ENCOUNTER — APPOINTMENT (OUTPATIENT)
Dept: ELECTROPHYSIOLOGY | Facility: CLINIC | Age: 80
End: 2025-07-09

## 2025-07-09 VITALS
HEART RATE: 98 BPM | SYSTOLIC BLOOD PRESSURE: 112 MMHG | TEMPERATURE: 98 F | WEIGHT: 285.94 LBS | OXYGEN SATURATION: 98 % | DIASTOLIC BLOOD PRESSURE: 70 MMHG | RESPIRATION RATE: 20 BRPM

## 2025-07-09 DIAGNOSIS — Z98.89 OTHER SPECIFIED POSTPROCEDURAL STATES: Chronic | ICD-10-CM

## 2025-07-09 DIAGNOSIS — Z95.5 PRESENCE OF CORONARY ANGIOPLASTY IMPLANT AND GRAFT: Chronic | ICD-10-CM

## 2025-07-09 DIAGNOSIS — C49.9 MALIGNANT NEOPLASM OF CONNECTIVE AND SOFT TISSUE, UNSPECIFIED: Chronic | ICD-10-CM

## 2025-07-09 LAB
ALBUMIN SERPL ELPH-MCNC: 3.2 G/DL — LOW (ref 3.3–5.2)
ALP SERPL-CCNC: 410 U/L — HIGH (ref 40–120)
ALT FLD-CCNC: 194 U/L — HIGH
AMORPH CRY # UR COMP ASSIST: PRESENT
ANION GAP SERPL CALC-SCNC: 16 MMOL/L — SIGNIFICANT CHANGE UP (ref 5–17)
APPEARANCE UR: ABNORMAL
APTT BLD: 37.4 SEC — HIGH (ref 26.1–36.8)
AST SERPL-CCNC: 42 U/L — HIGH
BACTERIA # UR AUTO: ABNORMAL /HPF
BASOPHILS # BLD AUTO: 0.03 K/UL — SIGNIFICANT CHANGE UP (ref 0–0.2)
BASOPHILS NFR BLD AUTO: 0.5 % — SIGNIFICANT CHANGE UP (ref 0–2)
BILIRUB SERPL-MCNC: 1.5 MG/DL — SIGNIFICANT CHANGE UP (ref 0.4–2)
BILIRUB UR-MCNC: NEGATIVE — SIGNIFICANT CHANGE UP
BUN SERPL-MCNC: 20.1 MG/DL — HIGH (ref 8–20)
CALCIUM SERPL-MCNC: 8.6 MG/DL — SIGNIFICANT CHANGE UP (ref 8.4–10.5)
CHLORIDE SERPL-SCNC: 98 MMOL/L — SIGNIFICANT CHANGE UP (ref 96–108)
CO2 SERPL-SCNC: 23 MMOL/L — SIGNIFICANT CHANGE UP (ref 22–29)
COLOR SPEC: YELLOW — SIGNIFICANT CHANGE UP
CREAT SERPL-MCNC: 0.61 MG/DL — SIGNIFICANT CHANGE UP (ref 0.5–1.3)
DIFF PNL FLD: NEGATIVE — SIGNIFICANT CHANGE UP
EGFR: 98 ML/MIN/1.73M2 — SIGNIFICANT CHANGE UP
EGFR: 98 ML/MIN/1.73M2 — SIGNIFICANT CHANGE UP
EOSINOPHIL # BLD AUTO: 0.18 K/UL — SIGNIFICANT CHANGE UP (ref 0–0.5)
EOSINOPHIL NFR BLD AUTO: 3.2 % — SIGNIFICANT CHANGE UP (ref 0–6)
FLUAV AG NPH QL: SIGNIFICANT CHANGE UP
FLUBV AG NPH QL: SIGNIFICANT CHANGE UP
GLUCOSE SERPL-MCNC: 194 MG/DL — HIGH (ref 70–99)
GLUCOSE UR QL: >=1000 MG/DL
HCT VFR BLD CALC: 44 % — SIGNIFICANT CHANGE UP (ref 39–50)
HGB BLD-MCNC: 14.2 G/DL — SIGNIFICANT CHANGE UP (ref 13–17)
IMM GRANULOCYTES # BLD AUTO: 0.03 K/UL — SIGNIFICANT CHANGE UP (ref 0–0.07)
IMM GRANULOCYTES NFR BLD AUTO: 0.5 % — SIGNIFICANT CHANGE UP (ref 0–0.9)
INR BLD: 1.43 RATIO — HIGH (ref 0.85–1.16)
KETONES UR QL: NEGATIVE MG/DL — SIGNIFICANT CHANGE UP
LACTATE SERPL-SCNC: 1.8 MMOL/L — SIGNIFICANT CHANGE UP (ref 0.5–2)
LEUKOCYTE ESTERASE UR-ACNC: NEGATIVE — SIGNIFICANT CHANGE UP
LYMPHOCYTES # BLD AUTO: 0.62 K/UL — LOW (ref 1–3.3)
LYMPHOCYTES NFR BLD AUTO: 10.9 % — LOW (ref 13–44)
MCHC RBC-ENTMCNC: 28.8 PG — SIGNIFICANT CHANGE UP (ref 27–34)
MCHC RBC-ENTMCNC: 32.3 G/DL — SIGNIFICANT CHANGE UP (ref 32–36)
MCV RBC AUTO: 89.2 FL — SIGNIFICANT CHANGE UP (ref 80–100)
MONOCYTES # BLD AUTO: 0.6 K/UL — SIGNIFICANT CHANGE UP (ref 0–0.9)
MONOCYTES NFR BLD AUTO: 10.6 % — SIGNIFICANT CHANGE UP (ref 2–14)
NEUTROPHILS # BLD AUTO: 4.22 K/UL — SIGNIFICANT CHANGE UP (ref 1.8–7.4)
NEUTROPHILS NFR BLD AUTO: 74.3 % — SIGNIFICANT CHANGE UP (ref 43–77)
NITRITE UR-MCNC: NEGATIVE — SIGNIFICANT CHANGE UP
NRBC # BLD AUTO: 0 K/UL — SIGNIFICANT CHANGE UP (ref 0–0)
NRBC # FLD: 0 K/UL — SIGNIFICANT CHANGE UP (ref 0–0)
NRBC BLD AUTO-RTO: 0 /100 WBCS — SIGNIFICANT CHANGE UP (ref 0–0)
PH UR: 7.5 — SIGNIFICANT CHANGE UP (ref 5–8)
PLATELET # BLD AUTO: 152 K/UL — SIGNIFICANT CHANGE UP (ref 150–400)
PMV BLD: 8.9 FL — SIGNIFICANT CHANGE UP (ref 7–13)
POTASSIUM SERPL-MCNC: 3.6 MMOL/L — SIGNIFICANT CHANGE UP (ref 3.5–5.3)
POTASSIUM SERPL-SCNC: 3.6 MMOL/L — SIGNIFICANT CHANGE UP (ref 3.5–5.3)
PROT SERPL-MCNC: 7 G/DL — SIGNIFICANT CHANGE UP (ref 6.6–8.7)
PROT UR-MCNC: SIGNIFICANT CHANGE UP MG/DL
PROTHROM AB SERPL-ACNC: 16.5 SEC — HIGH (ref 9.9–13.4)
RBC # BLD: 4.93 M/UL — SIGNIFICANT CHANGE UP (ref 4.2–5.8)
RBC # FLD: 13.9 % — SIGNIFICANT CHANGE UP (ref 10.3–14.5)
RBC CASTS # UR COMP ASSIST: 2 /HPF — SIGNIFICANT CHANGE UP (ref 0–4)
RSV RNA NPH QL NAA+NON-PROBE: SIGNIFICANT CHANGE UP
SARS-COV-2 RNA SPEC QL NAA+PROBE: SIGNIFICANT CHANGE UP
SODIUM SERPL-SCNC: 136 MMOL/L — SIGNIFICANT CHANGE UP (ref 135–145)
SOURCE RESPIRATORY: SIGNIFICANT CHANGE UP
SP GR SPEC: 1.03 — SIGNIFICANT CHANGE UP (ref 1–1.03)
SQUAMOUS # UR AUTO: 1 /HPF — SIGNIFICANT CHANGE UP (ref 0–5)
UROBILINOGEN FLD QL: 1 MG/DL — SIGNIFICANT CHANGE UP (ref 0.2–1)
WBC # BLD: 5.68 K/UL — SIGNIFICANT CHANGE UP (ref 3.8–10.5)
WBC # FLD AUTO: 5.68 K/UL — SIGNIFICANT CHANGE UP (ref 3.8–10.5)
WBC UR QL: 3 /HPF — SIGNIFICANT CHANGE UP (ref 0–5)

## 2025-07-09 PROCEDURE — 87637 SARSCOV2&INF A&B&RSV AMP PRB: CPT

## 2025-07-09 PROCEDURE — 85610 PROTHROMBIN TIME: CPT

## 2025-07-09 PROCEDURE — 0241U: CPT

## 2025-07-09 PROCEDURE — 36415 COLL VENOUS BLD VENIPUNCTURE: CPT

## 2025-07-09 PROCEDURE — 76705 ECHO EXAM OF ABDOMEN: CPT | Mod: 26

## 2025-07-09 PROCEDURE — 99283 EMERGENCY DEPT VISIT LOW MDM: CPT

## 2025-07-09 PROCEDURE — 99285 EMERGENCY DEPT VISIT HI MDM: CPT

## 2025-07-09 PROCEDURE — 99285 EMERGENCY DEPT VISIT HI MDM: CPT | Mod: 25

## 2025-07-09 PROCEDURE — 71045 X-RAY EXAM CHEST 1 VIEW: CPT | Mod: 26

## 2025-07-09 PROCEDURE — 76705 ECHO EXAM OF ABDOMEN: CPT

## 2025-07-09 PROCEDURE — 85730 THROMBOPLASTIN TIME PARTIAL: CPT

## 2025-07-09 PROCEDURE — 71045 X-RAY EXAM CHEST 1 VIEW: CPT

## 2025-07-09 PROCEDURE — 74177 CT ABD & PELVIS W/CONTRAST: CPT

## 2025-07-09 PROCEDURE — 93010 ELECTROCARDIOGRAM REPORT: CPT

## 2025-07-09 PROCEDURE — 83605 ASSAY OF LACTIC ACID: CPT

## 2025-07-09 PROCEDURE — 81001 URINALYSIS AUTO W/SCOPE: CPT

## 2025-07-09 PROCEDURE — 85025 COMPLETE CBC W/AUTO DIFF WBC: CPT

## 2025-07-09 PROCEDURE — 80053 COMPREHEN METABOLIC PANEL: CPT

## 2025-07-09 PROCEDURE — 74177 CT ABD & PELVIS W/CONTRAST: CPT | Mod: 26

## 2025-07-09 PROCEDURE — 93005 ELECTROCARDIOGRAM TRACING: CPT

## 2025-07-09 RX ORDER — SUCRALFATE 1 G
1 TABLET ORAL ONCE
Refills: 0 | Status: COMPLETED | OUTPATIENT
Start: 2025-07-09 | End: 2025-07-09

## 2025-07-09 RX ORDER — OXYCODONE HYDROCHLORIDE AND ACETAMINOPHEN 10; 325 MG/1; MG/1
1 TABLET ORAL ONCE
Refills: 0 | Status: DISCONTINUED | OUTPATIENT
Start: 2025-07-09 | End: 2025-07-09

## 2025-07-09 RX ORDER — METOPROLOL SUCCINATE 50 MG/1
50 TABLET, EXTENDED RELEASE ORAL ONCE
Refills: 0 | Status: COMPLETED | OUTPATIENT
Start: 2025-07-09 | End: 2025-07-09

## 2025-07-09 RX ORDER — ATORVASTATIN CALCIUM 80 MG/1
10 TABLET, FILM COATED ORAL AT BEDTIME
Refills: 0 | Status: DISCONTINUED | OUTPATIENT
Start: 2025-07-09 | End: 2025-07-16

## 2025-07-09 RX ORDER — APIXABAN 2.5 MG/1
5 TABLET, FILM COATED ORAL ONCE
Refills: 0 | Status: COMPLETED | OUTPATIENT
Start: 2025-07-09 | End: 2025-07-09

## 2025-07-09 RX ADMIN — Medication 1 GRAM(S): at 21:21

## 2025-07-09 RX ADMIN — Medication 1000 MILLILITER(S): at 11:37

## 2025-07-09 RX ADMIN — ATORVASTATIN CALCIUM 10 MILLIGRAM(S): 80 TABLET, FILM COATED ORAL at 21:21

## 2025-07-09 RX ADMIN — APIXABAN 5 MILLIGRAM(S): 2.5 TABLET, FILM COATED ORAL at 21:21

## 2025-07-09 RX ADMIN — METOPROLOL SUCCINATE 50 MILLIGRAM(S): 50 TABLET, EXTENDED RELEASE ORAL at 21:21

## 2025-07-09 NOTE — CONSULT NOTE ADULT - SUBJECTIVE AND OBJECTIVE BOX
HPI:    A 79 M with extensive PMH including CAD s/p PCI, Afib s/p ablation on Eliquis, AS s/p TAVR, CHF s/p MEMS, aortic aneurysm, LLE DVT, ALLIE, BPO s/p TURP, DM2, and GERD who presents with one week of dark, cloudy urine and pale colored stools. Incidentally patient notes he had an episode of RUQ abdominal pain last week, which self resolved, as well. He notes the episode came on without any known trigger, was not associated with any fever or cough. Prior colonoscopy done about 6 years ago which was negative. Surgery consulted to rule out possible acute cholecystitis.     MEDICATIONS  (STANDING):    MEDICATIONS  (PRN):      Vital Signs Last 24 Hrs  T(C): 36.7 (2025 15:30), Max: 37.8 (2025 11:54)  T(F): 98 (2025 15:30), Max: 100 (2025 11:54)  HR: 73 (2025 15:30) (73 - 98)  BP: 118/69 (2025 15:30) (112/70 - 118/69)  BP(mean): --  RR: 18 (2025 15:30) (18 - 20)  SpO2: 97% (2025 15:30) (97% - 98%)    Parameters below as of 2025 15:30  Patient On (Oxygen Delivery Method): room air        Constitutional: NAD  Respiratory: Normal work of breathing, no accessory muscle use  Gastrointestinal: soft, non tender, distended abdomen; negative Escobar's sign  Extremities: No peripheral edema, No cyanosis, clubbing   Psychiatric: Normal mood, normal affect      I&O's Detail      LABS:                        14.2   5.68  )-----------( 152      ( 2025 10:26 )             44.0     07-    136  |  98  |  20.1[H]  ----------------------------<  194[H]  3.6   |  23.0  |  0.61    Ca    8.6      2025 10:26    TPro  7.0  /  Alb  3.2[L]  /  TBili  1.5  /  DBili  x   /  AST  42[H]  /  ALT  194[H]  /  AlkPhos  410[H]  07-09    PT/INR - ( 2025 10:26 )   PT: 16.5 sec;   INR: 1.43 ratio         PTT - ( 2025 10:26 )  PTT:37.4 sec  Urinalysis Basic - ( 2025 13:30 )    Color: Yellow / Appearance: Cloudy / S.030 / pH: x  Gluc: x / Ketone: x  / Bili: Negative / Urobili: 1.0 mg/dL   Blood: x / Protein: Trace mg/dL / Nitrite: Negative   Leuk Esterase: Negative / RBC: 2 /HPF / WBC 3 /HPF   Sq Epi: x / Non Sq Epi: 1 /HPF / Bacteria: Occasional /HPF        RADIOLOGY & ADDITIONAL STUDIES:    CT: GALLBLADDER: Within normal limits.  SPLEEN: Punctate calcified granuloma.  PANCREAS: Fatty atrophy.  ADRENALS: Within normal limits.  KIDNEYS/URETERS: Bilateral renal cysts and lesions too small to characterize. Symmetric nephrograms. No hydronephrosis.    BLADDER: Within normal limits.  REPRODUCTIVE ORGANS: Enlarged prostate.    BOWEL: Periampullary duodenal diverticulum. No bowel obstruction. Appendix is normal. Diverticulosis, without evidence of acute diverticulitis.    US: Liver: Echogenic parenchyma compatible with steatosis.  Bile ducts: Normal caliber. Common bile duct measures 7 mm, within normal limits for age.  Gallbladder: No gallstones . Dependent sludge. No pericholecystic fluid. Borderline to mild gallbladder wall thickening is nonspecific. Negative ultrasonographic Escobar's sign per technologist..  Pancreas: Not adequately visualized.  Right kidney: 14.9 cm. No hydronephrosis.  Ascites: None.Liver: Echogenic parenchyma compatible with steatosis.  Bile ducts: Normal caliber. Common bile duct measures 7 mm, within normal limits for age.  Gallbladder: No gallstones . Dependent sludge. No pericholecystic fluid. Borderline to mild gallbladder wall thickening is nonspecific. Negative ultrasonographic Escobar's sign per technologist..  Pancreas: Not adequately visualized.  Right kidney: 14.9 cm. No hydronephrosis.  Ascites: None.

## 2025-07-09 NOTE — CONSULT NOTE ADULT - ASSESSMENT
A 79 M with extensive PMH including CAD s/p PCI, Afib s/p ablation on Eliquis, AS s/p TAVR, CHF s/p MEMS, aortic aneurysm, LLE DVT, ALLIE, BPO s/p TURP, DM2, L inguinal hernia repair, and GERD who presents with one week of dark, cloudy urine and pale colored stools in the context of an episode of RUQ abdominal pain last week as well; exam notable for benign abdominal exam, labs notable for mild transaminitis, and imaging notable for GB sludge with borderline wall thickening and no pericholecystic fluid; low concern for acute cholecystitis.    Plan:  - given relatively unremarkable labs, borderline imaging, and benign exam, low suspicion for active cholecystitis  -     *** NOTE INCOMPLETE  A 79 M with extensive PMH including CAD s/p PCI, Afib s/p ablation on Eliquis, AS s/p TAVR, CHF s/p MEMS, aortic aneurysm, LLE DVT, ALLIE, BPO s/p TURP, DM2, L inguinal hernia repair, and GERD who presents with one week of dark, cloudy urine and pale colored stools in the context of an episode of RUQ abdominal pain last week as well; exam notable for benign abdominal exam, labs notable for mild transaminitis, and imaging notable for GB sludge with borderline wall thickening and no pericholecystic fluid; low concern for acute cholecystitis.    Plan:  - given relatively unremarkable labs, borderline imaging, and benign exam, low suspicion for active cholecystitis  - no acute surgical interventions    Discussed with Dr. Galdamez

## 2025-07-09 NOTE — CONSULT NOTE ADULT - ATTENDING COMMENTS
Patient seen and examined with surgical house staff in ED  Very low suspicion for acute cholecystitis  Further evaluation as per EM team

## 2025-07-09 NOTE — ED PROVIDER NOTE - CLINICAL SUMMARY MEDICAL DECISION MAKING FREE TEXT BOX
---------  Miesha Cueva MD, Attending  80 yo M hx of CAD sp PCI, Afib sp ablation on eliquis, AS sp TAVR, CHF sp MEMs, aortic aneurysm, ALLIE, DM, pw 1 week of dark urine, brett colored stools. Pt complains of suprapubic and b/l LE cramping today, also RUQ cramping. No vomiting, no CP, no SOB, no diarrhea.     Gen: Well appearing in NAD   Head: NC/AT  Neck: trachea midline  Resp:  No distress, on baseline 4L NC  cards: Afib  abd: obese, + RUQ and RLQ and LLQ ttp.   Ext: no deformities  Neuro:  A&O appears non focal  Skin:  Warm and dry as visualized  Psych: very anxious affect.     ddx includes, but is not limited to the following: acute marilia, uti, electrolyte derangement, pyelo, viral illness.   plan: screening labs, flu xwab, CT abdomen pelvis, cxr, reassess  update: surgery consulted for gallbladder sludge, no acute intervention at this time. Pt reassessed, tolerating PO, pain improved. Agreeable to going home.

## 2025-07-09 NOTE — ED ADULT NURSE NOTE - OBJECTIVE STATEMENT
C/O lower abd pain for the past week associated with dark  colored urine and fever as high as 100.8. Pt was prescribed levofloxacin yesterday by PCP for UTI. C/O lower abd pain for the past week associated with dark  colored urine, light colored stools and fever as high as 100.8. Pt was prescribed levofloxacin yesterday by PCP for UTI.

## 2025-07-09 NOTE — ED ADULT TRIAGE NOTE - CHIEF COMPLAINT QUOTE
C/O lower abd pain for the past week associated with dark  colored urine and fever as high as 100.8. Pt was prescribed levofloxacin yesterday by PCP for UTI.

## 2025-07-09 NOTE — CHART NOTE - NSCHARTNOTEFT_GEN_A_CORE
CM spoke to pt about ambulance home. Pt reports to live alone in an apartment attached to family house and pt's son, daughter in law and small children will be home to receive pt. Pt has the otto. CM called pt's son Desmond @ [phone] who is out of town and request CM called other son Jhony @ [phone] who lives with pt. CM left message for Jhoyn to call back. Pt confirmed son Jhony is home sleeping and will be home to receive pt. Pt states he has working oxygen at home. Transportation letter explained and given to pt. Regina uploaded into ACM and copy given to [place] clerk. CM set up with transportation with [facility] ambulance for next available time, 60-90 minutes ETA.

## 2025-07-09 NOTE — ED PROVIDER NOTE - PATIENT PORTAL LINK FT
You can access the FollowMyHealth Patient Portal offered by [facility] by registering at the following website: [email or web address]. By joining [employer]’s FollowMyHealth portal, you will also be able to view your health information using other applications (apps) compatible with our system.

## 2025-07-09 NOTE — ED ADULT NURSE REASSESSMENT NOTE - NS ED NURSE REASSESS COMMENT FT1
PT axox4 in no aucte distress. tp reports feeling SOB and chest congestion that is new. Dr. Cueva made aware.

## 2025-07-09 NOTE — ED ADULT NURSE NOTE - NSFALLRISKINTERV_ED_ALL_ED

## 2025-07-09 NOTE — ED PROVIDER NOTE - NSFOLLOWUPINSTRUCTIONS_ED_ALL_ED_FT
** Your gallbladder contained sludge. You were seen by surgery in the ED and cleared for outpatient follow up with your primary care doctor.     ** Follow up with your primary care doctor in the next 72 hours.     ** Go to the nearest Emergency Department if you experience any new or concerning symptoms, such as:   - worsening pain  - chest pain  - difficulty breathing  - passing out  - unable to eat or drink  - unable to move or feel part of your body  - fever, chills

## 2025-07-10 VITALS
DIASTOLIC BLOOD PRESSURE: 78 MMHG | RESPIRATION RATE: 18 BRPM | SYSTOLIC BLOOD PRESSURE: 144 MMHG | OXYGEN SATURATION: 95 % | TEMPERATURE: 98 F | HEART RATE: 94 BPM

## 2025-07-10 NOTE — ED ADULT NURSE NOTE - NS ED NURSE RECORD ANOTHER HT AND WT
Anesthesiologist present for case.  See Anesthesia Record for details regarding sedation/anesthesia, medications, and vital signs. Yes

## 2025-08-06 ENCOUNTER — APPOINTMENT (OUTPATIENT)
Dept: ELECTROPHYSIOLOGY | Facility: CLINIC | Age: 80
End: 2025-08-06
Payer: MEDICARE

## 2025-08-06 VITALS
HEIGHT: 74 IN | SYSTOLIC BLOOD PRESSURE: 99 MMHG | BODY MASS INDEX: 39.66 KG/M2 | WEIGHT: 309 LBS | HEART RATE: 71 BPM | OXYGEN SATURATION: 93 % | DIASTOLIC BLOOD PRESSURE: 60 MMHG

## 2025-08-06 DIAGNOSIS — I48.91 UNSPECIFIED ATRIAL FIBRILLATION: ICD-10-CM

## 2025-08-06 DIAGNOSIS — Z95.0 PRESENCE OF CARDIAC PACEMAKER: ICD-10-CM

## 2025-08-06 PROCEDURE — 99214 OFFICE O/P EST MOD 30 MIN: CPT

## 2025-08-06 PROCEDURE — 93000 ELECTROCARDIOGRAM COMPLETE: CPT | Mod: 59

## 2025-08-06 PROCEDURE — 93288 INTERROG EVL PM/LDLS PM IP: CPT

## 2025-08-06 RX ORDER — ORAL SEMAGLUTIDE 14 MG/1
14 TABLET ORAL DAILY
Refills: 0 | Status: ACTIVE | COMMUNITY

## 2025-08-06 RX ORDER — TORSEMIDE 20 MG/1
20 TABLET ORAL DAILY
Refills: 0 | Status: ACTIVE | COMMUNITY

## 2025-08-06 RX ORDER — ASPIRIN 81 MG
81 TABLET, DELAYED RELEASE (ENTERIC COATED) ORAL DAILY
Refills: 0 | Status: ACTIVE | COMMUNITY

## 2025-08-06 RX ORDER — DAPAGLIFLOZIN 10 MG/1
10 TABLET, FILM COATED ORAL
Refills: 0 | Status: ACTIVE | COMMUNITY

## 2025-08-06 RX ORDER — OXYCODONE AND ACETAMINOPHEN 5; 325 MG/1; MG/1
5-325 TABLET ORAL
Refills: 0 | Status: ACTIVE | COMMUNITY

## 2025-08-24 ENCOUNTER — INPATIENT (INPATIENT)
Facility: HOSPITAL | Age: 80
LOS: 3 days | Discharge: HOME | DRG: 204 | End: 2025-08-28
Attending: STUDENT IN AN ORGANIZED HEALTH CARE EDUCATION/TRAINING PROGRAM | Admitting: INTERNAL MEDICINE
Payer: MEDICARE

## 2025-08-24 VITALS
WEIGHT: 283.96 LBS | SYSTOLIC BLOOD PRESSURE: 136 MMHG | DIASTOLIC BLOOD PRESSURE: 72 MMHG | HEIGHT: 74 IN | HEART RATE: 94 BPM | OXYGEN SATURATION: 94 % | RESPIRATION RATE: 22 BRPM | TEMPERATURE: 98 F

## 2025-08-24 DIAGNOSIS — Z95.5 PRESENCE OF CORONARY ANGIOPLASTY IMPLANT AND GRAFT: Chronic | ICD-10-CM

## 2025-08-24 DIAGNOSIS — C49.9 MALIGNANT NEOPLASM OF CONNECTIVE AND SOFT TISSUE, UNSPECIFIED: Chronic | ICD-10-CM

## 2025-08-24 DIAGNOSIS — Z98.89 OTHER SPECIFIED POSTPROCEDURAL STATES: Chronic | ICD-10-CM

## 2025-08-24 LAB
ALBUMIN SERPL ELPH-MCNC: 3.9 G/DL — SIGNIFICANT CHANGE UP (ref 3.3–5.2)
ALP SERPL-CCNC: 168 U/L — HIGH (ref 40–120)
ALT FLD-CCNC: 29 U/L — SIGNIFICANT CHANGE UP
ANION GAP SERPL CALC-SCNC: 11 MMOL/L — SIGNIFICANT CHANGE UP (ref 5–17)
APTT BLD: 33 SEC — SIGNIFICANT CHANGE UP (ref 26.1–36.8)
AST SERPL-CCNC: 41 U/L — HIGH
BASOPHILS # BLD AUTO: 0.04 K/UL — SIGNIFICANT CHANGE UP (ref 0–0.2)
BASOPHILS NFR BLD AUTO: 0.6 % — SIGNIFICANT CHANGE UP (ref 0–2)
BILIRUB SERPL-MCNC: 0.6 MG/DL — SIGNIFICANT CHANGE UP (ref 0.4–2)
BLD GP AB SCN SERPL QL: SIGNIFICANT CHANGE UP
BUN SERPL-MCNC: 22.8 MG/DL — HIGH (ref 8–20)
CALCIUM SERPL-MCNC: 8.7 MG/DL — SIGNIFICANT CHANGE UP (ref 8.4–10.5)
CHLORIDE SERPL-SCNC: 98 MMOL/L — SIGNIFICANT CHANGE UP (ref 96–108)
CO2 SERPL-SCNC: 29 MMOL/L — SIGNIFICANT CHANGE UP (ref 22–29)
CREAT SERPL-MCNC: 0.58 MG/DL — SIGNIFICANT CHANGE UP (ref 0.5–1.3)
EGFR: 99 ML/MIN/1.73M2 — SIGNIFICANT CHANGE UP
EGFR: 99 ML/MIN/1.73M2 — SIGNIFICANT CHANGE UP
EOSINOPHIL # BLD AUTO: 0.26 K/UL — SIGNIFICANT CHANGE UP (ref 0–0.5)
EOSINOPHIL NFR BLD AUTO: 4.2 % — SIGNIFICANT CHANGE UP (ref 0–6)
FLUAV AG NPH QL: SIGNIFICANT CHANGE UP
FLUBV AG NPH QL: SIGNIFICANT CHANGE UP
GLUCOSE SERPL-MCNC: 158 MG/DL — HIGH (ref 70–99)
HCT VFR BLD CALC: 43.6 % — SIGNIFICANT CHANGE UP (ref 39–50)
HGB BLD-MCNC: 14.3 G/DL — SIGNIFICANT CHANGE UP (ref 13–17)
IMM GRANULOCYTES # BLD AUTO: 0.02 K/UL — SIGNIFICANT CHANGE UP (ref 0–0.07)
IMM GRANULOCYTES NFR BLD AUTO: 0.3 % — SIGNIFICANT CHANGE UP (ref 0–0.9)
INR BLD: 1.14 RATIO — SIGNIFICANT CHANGE UP (ref 0.85–1.16)
LYMPHOCYTES # BLD AUTO: 1.07 K/UL — SIGNIFICANT CHANGE UP (ref 1–3.3)
LYMPHOCYTES NFR BLD AUTO: 17.3 % — SIGNIFICANT CHANGE UP (ref 13–44)
MCHC RBC-ENTMCNC: 29.1 PG — SIGNIFICANT CHANGE UP (ref 27–34)
MCHC RBC-ENTMCNC: 32.8 G/DL — SIGNIFICANT CHANGE UP (ref 32–36)
MCV RBC AUTO: 88.8 FL — SIGNIFICANT CHANGE UP (ref 80–100)
MONOCYTES # BLD AUTO: 0.59 K/UL — SIGNIFICANT CHANGE UP (ref 0–0.9)
MONOCYTES NFR BLD AUTO: 9.5 % — SIGNIFICANT CHANGE UP (ref 2–14)
NEUTROPHILS # BLD AUTO: 4.22 K/UL — SIGNIFICANT CHANGE UP (ref 1.8–7.4)
NEUTROPHILS NFR BLD AUTO: 68.1 % — SIGNIFICANT CHANGE UP (ref 43–77)
NRBC # BLD AUTO: 0 K/UL — SIGNIFICANT CHANGE UP (ref 0–0)
NRBC # FLD: 0 K/UL — SIGNIFICANT CHANGE UP (ref 0–0)
NRBC BLD AUTO-RTO: 0 /100 WBCS — SIGNIFICANT CHANGE UP (ref 0–0)
NT-PROBNP SERPL-SCNC: 510 PG/ML — HIGH (ref 0–300)
PLATELET # BLD AUTO: 168 K/UL — SIGNIFICANT CHANGE UP (ref 150–400)
PMV BLD: 9.2 FL — SIGNIFICANT CHANGE UP (ref 7–13)
POTASSIUM SERPL-MCNC: 5.2 MMOL/L — SIGNIFICANT CHANGE UP (ref 3.5–5.3)
POTASSIUM SERPL-SCNC: 5.2 MMOL/L — SIGNIFICANT CHANGE UP (ref 3.5–5.3)
PROT SERPL-MCNC: 7.5 G/DL — SIGNIFICANT CHANGE UP (ref 6.6–8.7)
PROTHROM AB SERPL-ACNC: 13.2 SEC — SIGNIFICANT CHANGE UP (ref 9.9–13.4)
RBC # BLD: 4.91 M/UL — SIGNIFICANT CHANGE UP (ref 4.2–5.8)
RBC # FLD: 14.4 % — SIGNIFICANT CHANGE UP (ref 10.3–14.5)
RSV RNA NPH QL NAA+NON-PROBE: SIGNIFICANT CHANGE UP
SARS-COV-2 RNA SPEC QL NAA+PROBE: SIGNIFICANT CHANGE UP
SODIUM SERPL-SCNC: 138 MMOL/L — SIGNIFICANT CHANGE UP (ref 135–145)
SOURCE RESPIRATORY: SIGNIFICANT CHANGE UP
TROPONIN T, HIGH SENSITIVITY RESULT: 29 NG/L — HIGH
TROPONIN T, HIGH SENSITIVITY RESULT: 32 NG/L — HIGH
WBC # BLD: 6.2 K/UL — SIGNIFICANT CHANGE UP (ref 3.8–10.5)
WBC # FLD AUTO: 6.2 K/UL — SIGNIFICANT CHANGE UP (ref 3.8–10.5)

## 2025-08-24 PROCEDURE — 99223 1ST HOSP IP/OBS HIGH 75: CPT | Mod: FS

## 2025-08-24 PROCEDURE — 71045 X-RAY EXAM CHEST 1 VIEW: CPT | Mod: 26

## 2025-08-24 RX ORDER — INSULIN LISPRO 100 U/ML
INJECTION, SOLUTION INTRAVENOUS; SUBCUTANEOUS
Refills: 0 | Status: DISCONTINUED | OUTPATIENT
Start: 2025-08-24 | End: 2025-08-26

## 2025-08-24 RX ORDER — DEXTROSE 50 % IN WATER 50 %
15 SYRINGE (ML) INTRAVENOUS ONCE
Refills: 0 | Status: DISCONTINUED | OUTPATIENT
Start: 2025-08-24 | End: 2025-08-28

## 2025-08-24 RX ORDER — DEXTROSE 50 % IN WATER 50 %
25 SYRINGE (ML) INTRAVENOUS ONCE
Refills: 0 | Status: DISCONTINUED | OUTPATIENT
Start: 2025-08-24 | End: 2025-08-28

## 2025-08-24 RX ORDER — GLUCAGON 3 MG/1
1 POWDER NASAL ONCE
Refills: 0 | Status: DISCONTINUED | OUTPATIENT
Start: 2025-08-24 | End: 2025-08-28

## 2025-08-24 RX ORDER — OXYCODONE HYDROCHLORIDE AND ACETAMINOPHEN 10; 325 MG/1; MG/1
1 TABLET ORAL ONCE
Refills: 0 | Status: DISCONTINUED | OUTPATIENT
Start: 2025-08-24 | End: 2025-08-24

## 2025-08-24 RX ORDER — SODIUM CHLORIDE 9 G/1000ML
1000 INJECTION, SOLUTION INTRAVENOUS
Refills: 0 | Status: DISCONTINUED | OUTPATIENT
Start: 2025-08-24 | End: 2025-08-28

## 2025-08-24 RX ORDER — DEXTROSE 50 % IN WATER 50 %
12.5 SYRINGE (ML) INTRAVENOUS ONCE
Refills: 0 | Status: DISCONTINUED | OUTPATIENT
Start: 2025-08-24 | End: 2025-08-28

## 2025-08-24 RX ADMIN — OXYCODONE HYDROCHLORIDE AND ACETAMINOPHEN 1 TABLET(S): 10; 325 TABLET ORAL at 16:48

## 2025-08-24 RX ADMIN — OXYCODONE HYDROCHLORIDE AND ACETAMINOPHEN 1 TABLET(S): 10; 325 TABLET ORAL at 17:50

## 2025-08-25 ENCOUNTER — RESULT REVIEW (OUTPATIENT)
Age: 80
End: 2025-08-25

## 2025-08-25 LAB
GLUCOSE BLDC GLUCOMTR-MCNC: 131 MG/DL — HIGH (ref 70–99)
GLUCOSE BLDC GLUCOMTR-MCNC: 147 MG/DL — HIGH (ref 70–99)
GLUCOSE BLDC GLUCOMTR-MCNC: 204 MG/DL — HIGH (ref 70–99)

## 2025-08-25 PROCEDURE — 71275 CT ANGIOGRAPHY CHEST: CPT | Mod: 26

## 2025-08-25 PROCEDURE — 99233 SBSQ HOSP IP/OBS HIGH 50: CPT | Mod: FS

## 2025-08-25 RX ORDER — METOPROLOL SUCCINATE 50 MG/1
50 TABLET, EXTENDED RELEASE ORAL
Refills: 0 | Status: DISCONTINUED | OUTPATIENT
Start: 2025-08-25 | End: 2025-08-28

## 2025-08-25 RX ORDER — APIXABAN 2.5 MG/1
5 TABLET, FILM COATED ORAL
Refills: 0 | Status: DISCONTINUED | OUTPATIENT
Start: 2025-08-24 | End: 2025-08-28

## 2025-08-25 RX ORDER — ATORVASTATIN CALCIUM 80 MG/1
10 TABLET, FILM COATED ORAL AT BEDTIME
Refills: 0 | Status: DISCONTINUED | OUTPATIENT
Start: 2025-08-25 | End: 2025-08-28

## 2025-08-25 RX ORDER — METOPROLOL SUCCINATE 50 MG/1
50 TABLET, EXTENDED RELEASE ORAL ONCE
Refills: 0 | Status: COMPLETED | OUTPATIENT
Start: 2025-08-25 | End: 2025-08-25

## 2025-08-25 RX ORDER — SUCRALFATE 1 G
1 TABLET ORAL ONCE
Refills: 0 | Status: COMPLETED | OUTPATIENT
Start: 2025-08-25 | End: 2025-08-25

## 2025-08-25 RX ORDER — DOXAZOSIN MESYLATE 8 MG/1
2 TABLET ORAL DAILY
Refills: 0 | Status: DISCONTINUED | OUTPATIENT
Start: 2025-08-25 | End: 2025-08-26

## 2025-08-25 RX ORDER — ATORVASTATIN CALCIUM 80 MG/1
10 TABLET, FILM COATED ORAL ONCE
Refills: 0 | Status: COMPLETED | OUTPATIENT
Start: 2025-08-25 | End: 2025-08-25

## 2025-08-25 RX ORDER — FLUTICASONE PROPIONATE 50 UG/1
1 SPRAY, METERED NASAL
Refills: 0 | Status: DISCONTINUED | OUTPATIENT
Start: 2025-08-25 | End: 2025-08-28

## 2025-08-25 RX ORDER — SUCRALFATE 1 G
1 TABLET ORAL
Refills: 0 | Status: DISCONTINUED | OUTPATIENT
Start: 2025-08-25 | End: 2025-08-28

## 2025-08-25 RX ORDER — FINASTERIDE 1 MG/1
5 TABLET, FILM COATED ORAL DAILY
Refills: 0 | Status: DISCONTINUED | OUTPATIENT
Start: 2025-08-25 | End: 2025-08-28

## 2025-08-25 RX ORDER — ALPRAZOLAM 0.5 MG
0.25 TABLET, EXTENDED RELEASE 24 HR ORAL THREE TIMES A DAY
Refills: 0 | Status: DISCONTINUED | OUTPATIENT
Start: 2025-08-25 | End: 2025-08-28

## 2025-08-25 RX ORDER — TORSEMIDE 20 MG/1
40 TABLET ORAL DAILY
Refills: 0 | Status: DISCONTINUED | OUTPATIENT
Start: 2025-08-25 | End: 2025-08-28

## 2025-08-25 RX ORDER — ASPIRIN 325 MG
81 TABLET ORAL DAILY
Refills: 0 | Status: DISCONTINUED | OUTPATIENT
Start: 2025-08-25 | End: 2025-08-28

## 2025-08-25 RX ORDER — ALBUTEROL SULFATE 2.5 MG/3ML
2 VIAL, NEBULIZER (ML) INHALATION EVERY 4 HOURS
Refills: 0 | Status: DISCONTINUED | OUTPATIENT
Start: 2025-08-25 | End: 2025-08-28

## 2025-08-25 RX ADMIN — Medication 81 MILLIGRAM(S): at 12:08

## 2025-08-25 RX ADMIN — Medication 40 MILLIGRAM(S): at 05:57

## 2025-08-25 RX ADMIN — FINASTERIDE 5 MILLIGRAM(S): 1 TABLET, FILM COATED ORAL at 12:08

## 2025-08-25 RX ADMIN — ATORVASTATIN CALCIUM 10 MILLIGRAM(S): 80 TABLET, FILM COATED ORAL at 21:07

## 2025-08-25 RX ADMIN — METOPROLOL SUCCINATE 50 MILLIGRAM(S): 50 TABLET, EXTENDED RELEASE ORAL at 01:05

## 2025-08-25 RX ADMIN — APIXABAN 5 MILLIGRAM(S): 2.5 TABLET, FILM COATED ORAL at 01:04

## 2025-08-25 RX ADMIN — Medication 1 GRAM(S): at 05:57

## 2025-08-25 RX ADMIN — Medication 2 PUFF(S): at 19:37

## 2025-08-25 RX ADMIN — Medication 10 MILLIEQUIVALENT(S): at 12:08

## 2025-08-25 RX ADMIN — DOXAZOSIN MESYLATE 2 MILLIGRAM(S): 8 TABLET ORAL at 05:56

## 2025-08-25 RX ADMIN — INSULIN LISPRO 4: 100 INJECTION, SOLUTION INTRAVENOUS; SUBCUTANEOUS at 13:59

## 2025-08-25 RX ADMIN — ATORVASTATIN CALCIUM 10 MILLIGRAM(S): 80 TABLET, FILM COATED ORAL at 01:05

## 2025-08-25 RX ADMIN — Medication 2 PUFF(S): at 23:41

## 2025-08-25 RX ADMIN — APIXABAN 5 MILLIGRAM(S): 2.5 TABLET, FILM COATED ORAL at 05:56

## 2025-08-25 RX ADMIN — Medication 2 PUFF(S): at 12:08

## 2025-08-25 RX ADMIN — APIXABAN 5 MILLIGRAM(S): 2.5 TABLET, FILM COATED ORAL at 17:23

## 2025-08-25 RX ADMIN — METOPROLOL SUCCINATE 50 MILLIGRAM(S): 50 TABLET, EXTENDED RELEASE ORAL at 17:23

## 2025-08-25 RX ADMIN — FLUTICASONE PROPIONATE 1 SPRAY(S): 50 SPRAY, METERED NASAL at 17:15

## 2025-08-25 RX ADMIN — Medication 2 PUFF(S): at 17:23

## 2025-08-25 RX ADMIN — Medication 0.25 MILLIGRAM(S): at 17:22

## 2025-08-25 RX ADMIN — Medication 0.25 MILLIGRAM(S): at 06:27

## 2025-08-25 RX ADMIN — METOPROLOL SUCCINATE 50 MILLIGRAM(S): 50 TABLET, EXTENDED RELEASE ORAL at 05:56

## 2025-08-25 RX ADMIN — Medication 1 GRAM(S): at 01:05

## 2025-08-25 RX ADMIN — Medication 1 GRAM(S): at 18:42

## 2025-08-26 DIAGNOSIS — R06.00 DYSPNEA, UNSPECIFIED: ICD-10-CM

## 2025-08-26 LAB
ALBUMIN SERPL ELPH-MCNC: 3.5 G/DL — SIGNIFICANT CHANGE UP (ref 3.3–5.2)
ALP SERPL-CCNC: 170 U/L — HIGH (ref 40–120)
ALT FLD-CCNC: 18 U/L — SIGNIFICANT CHANGE UP
ANION GAP SERPL CALC-SCNC: 9 MMOL/L — SIGNIFICANT CHANGE UP (ref 5–17)
APPEARANCE UR: CLEAR — SIGNIFICANT CHANGE UP
AST SERPL-CCNC: 16 U/L — SIGNIFICANT CHANGE UP
BILIRUB SERPL-MCNC: 0.7 MG/DL — SIGNIFICANT CHANGE UP (ref 0.4–2)
BILIRUB UR-MCNC: NEGATIVE — SIGNIFICANT CHANGE UP
BUN SERPL-MCNC: 19.6 MG/DL — SIGNIFICANT CHANGE UP (ref 8–20)
CALCIUM SERPL-MCNC: 7.8 MG/DL — LOW (ref 8.4–10.5)
CHLORIDE SERPL-SCNC: 104 MMOL/L — SIGNIFICANT CHANGE UP (ref 96–108)
CO2 SERPL-SCNC: 27 MMOL/L — SIGNIFICANT CHANGE UP (ref 22–29)
COLOR SPEC: YELLOW — SIGNIFICANT CHANGE UP
CREAT SERPL-MCNC: 0.53 MG/DL — SIGNIFICANT CHANGE UP (ref 0.5–1.3)
DIFF PNL FLD: NEGATIVE — SIGNIFICANT CHANGE UP
EGFR: 102 ML/MIN/1.73M2 — SIGNIFICANT CHANGE UP
EGFR: 102 ML/MIN/1.73M2 — SIGNIFICANT CHANGE UP
GLUCOSE BLDC GLUCOMTR-MCNC: 161 MG/DL — HIGH (ref 70–99)
GLUCOSE BLDC GLUCOMTR-MCNC: 162 MG/DL — HIGH (ref 70–99)
GLUCOSE BLDC GLUCOMTR-MCNC: 184 MG/DL — HIGH (ref 70–99)
GLUCOSE SERPL-MCNC: 152 MG/DL — HIGH (ref 70–99)
GLUCOSE UR QL: >=1000 MG/DL
HCT VFR BLD CALC: 41.2 % — SIGNIFICANT CHANGE UP (ref 39–50)
HGB BLD-MCNC: 13.5 G/DL — SIGNIFICANT CHANGE UP (ref 13–17)
KETONES UR QL: ABNORMAL MG/DL
LEUKOCYTE ESTERASE UR-ACNC: NEGATIVE — SIGNIFICANT CHANGE UP
MCHC RBC-ENTMCNC: 29.2 PG — SIGNIFICANT CHANGE UP (ref 27–34)
MCHC RBC-ENTMCNC: 32.8 G/DL — SIGNIFICANT CHANGE UP (ref 32–36)
MCV RBC AUTO: 89.2 FL — SIGNIFICANT CHANGE UP (ref 80–100)
NITRITE UR-MCNC: NEGATIVE — SIGNIFICANT CHANGE UP
NRBC # BLD AUTO: 0 K/UL — SIGNIFICANT CHANGE UP (ref 0–0)
NRBC # FLD: 0 K/UL — SIGNIFICANT CHANGE UP (ref 0–0)
NRBC BLD AUTO-RTO: 0 /100 WBCS — SIGNIFICANT CHANGE UP (ref 0–0)
PH UR: 6 — SIGNIFICANT CHANGE UP (ref 5–8)
PLATELET # BLD AUTO: 142 K/UL — LOW (ref 150–400)
PMV BLD: 8.9 FL — SIGNIFICANT CHANGE UP (ref 7–13)
POTASSIUM SERPL-MCNC: 3.2 MMOL/L — LOW (ref 3.5–5.3)
POTASSIUM SERPL-SCNC: 3.2 MMOL/L — LOW (ref 3.5–5.3)
PROT SERPL-MCNC: 6.2 G/DL — LOW (ref 6.6–8.7)
PROT UR-MCNC: SIGNIFICANT CHANGE UP MG/DL
RBC # BLD: 4.62 M/UL — SIGNIFICANT CHANGE UP (ref 4.2–5.8)
RBC # FLD: 14.1 % — SIGNIFICANT CHANGE UP (ref 10.3–14.5)
SODIUM SERPL-SCNC: 140 MMOL/L — SIGNIFICANT CHANGE UP (ref 135–145)
SP GR SPEC: >1.03 — HIGH (ref 1–1.03)
UROBILINOGEN FLD QL: 1 MG/DL — SIGNIFICANT CHANGE UP (ref 0.2–1)
WBC # BLD: 7.43 K/UL — SIGNIFICANT CHANGE UP (ref 3.8–10.5)
WBC # FLD AUTO: 7.43 K/UL — SIGNIFICANT CHANGE UP (ref 3.8–10.5)

## 2025-08-26 PROCEDURE — 71275 CT ANGIOGRAPHY CHEST: CPT

## 2025-08-26 PROCEDURE — 84484 ASSAY OF TROPONIN QUANT: CPT

## 2025-08-26 PROCEDURE — 86850 RBC ANTIBODY SCREEN: CPT

## 2025-08-26 PROCEDURE — 70450 CT HEAD/BRAIN W/O DYE: CPT | Mod: 26

## 2025-08-26 PROCEDURE — 71045 X-RAY EXAM CHEST 1 VIEW: CPT

## 2025-08-26 PROCEDURE — 94640 AIRWAY INHALATION TREATMENT: CPT

## 2025-08-26 PROCEDURE — 99233 SBSQ HOSP IP/OBS HIGH 50: CPT | Mod: FS

## 2025-08-26 PROCEDURE — 99223 1ST HOSP IP/OBS HIGH 75: CPT

## 2025-08-26 PROCEDURE — 70450 CT HEAD/BRAIN W/O DYE: CPT

## 2025-08-26 PROCEDURE — 93306 TTE W/DOPPLER COMPLETE: CPT

## 2025-08-26 PROCEDURE — 93005 ELECTROCARDIOGRAM TRACING: CPT

## 2025-08-26 PROCEDURE — 82962 GLUCOSE BLOOD TEST: CPT

## 2025-08-26 PROCEDURE — 36415 COLL VENOUS BLD VENIPUNCTURE: CPT

## 2025-08-26 PROCEDURE — 85610 PROTHROMBIN TIME: CPT

## 2025-08-26 PROCEDURE — 86900 BLOOD TYPING SEROLOGIC ABO: CPT

## 2025-08-26 PROCEDURE — 87637 SARSCOV2&INF A&B&RSV AMP PRB: CPT

## 2025-08-26 PROCEDURE — 86901 BLOOD TYPING SEROLOGIC RH(D): CPT

## 2025-08-26 PROCEDURE — 97163 PT EVAL HIGH COMPLEX 45 MIN: CPT

## 2025-08-26 PROCEDURE — 83880 ASSAY OF NATRIURETIC PEPTIDE: CPT

## 2025-08-26 PROCEDURE — 80053 COMPREHEN METABOLIC PANEL: CPT

## 2025-08-26 PROCEDURE — 85025 COMPLETE CBC W/AUTO DIFF WBC: CPT

## 2025-08-26 PROCEDURE — 85027 COMPLETE CBC AUTOMATED: CPT

## 2025-08-26 PROCEDURE — 85730 THROMBOPLASTIN TIME PARTIAL: CPT

## 2025-08-26 PROCEDURE — 81003 URINALYSIS AUTO W/O SCOPE: CPT

## 2025-08-26 RX ORDER — KETOROLAC TROMETHAMINE 30 MG/ML
15 INJECTION, SOLUTION INTRAMUSCULAR; INTRAVENOUS ONCE
Refills: 0 | Status: DISCONTINUED | OUTPATIENT
Start: 2025-08-26 | End: 2025-08-26

## 2025-08-26 RX ORDER — DOXAZOSIN MESYLATE 8 MG/1
2 TABLET ORAL AT BEDTIME
Refills: 0 | Status: DISCONTINUED | OUTPATIENT
Start: 2025-08-27 | End: 2025-08-28

## 2025-08-26 RX ORDER — INSULIN LISPRO 100 U/ML
INJECTION, SOLUTION INTRAVENOUS; SUBCUTANEOUS
Refills: 0 | Status: DISCONTINUED | OUTPATIENT
Start: 2025-08-26 | End: 2025-08-28

## 2025-08-26 RX ORDER — INSULIN LISPRO 100 U/ML
INJECTION, SOLUTION INTRAVENOUS; SUBCUTANEOUS AT BEDTIME
Refills: 0 | Status: DISCONTINUED | OUTPATIENT
Start: 2025-08-26 | End: 2025-08-28

## 2025-08-26 RX ADMIN — METOPROLOL SUCCINATE 50 MILLIGRAM(S): 50 TABLET, EXTENDED RELEASE ORAL at 06:13

## 2025-08-26 RX ADMIN — APIXABAN 5 MILLIGRAM(S): 2.5 TABLET, FILM COATED ORAL at 06:14

## 2025-08-26 RX ADMIN — Medication 2 PUFF(S): at 23:42

## 2025-08-26 RX ADMIN — FINASTERIDE 5 MILLIGRAM(S): 1 TABLET, FILM COATED ORAL at 13:08

## 2025-08-26 RX ADMIN — Medication 81 MILLIGRAM(S): at 13:08

## 2025-08-26 RX ADMIN — Medication 10 MILLIEQUIVALENT(S): at 13:08

## 2025-08-26 RX ADMIN — APIXABAN 5 MILLIGRAM(S): 2.5 TABLET, FILM COATED ORAL at 18:45

## 2025-08-26 RX ADMIN — Medication 0.25 MILLIGRAM(S): at 06:36

## 2025-08-26 RX ADMIN — ATORVASTATIN CALCIUM 10 MILLIGRAM(S): 80 TABLET, FILM COATED ORAL at 23:39

## 2025-08-26 RX ADMIN — INSULIN LISPRO 1: 100 INJECTION, SOLUTION INTRAVENOUS; SUBCUTANEOUS at 18:44

## 2025-08-26 RX ADMIN — Medication 2 PUFF(S): at 04:34

## 2025-08-26 RX ADMIN — FLUTICASONE PROPIONATE 1 SPRAY(S): 50 SPRAY, METERED NASAL at 06:06

## 2025-08-26 RX ADMIN — Medication 40 MILLIGRAM(S): at 06:13

## 2025-08-26 RX ADMIN — Medication 2 PUFF(S): at 17:30

## 2025-08-26 RX ADMIN — Medication 1 GRAM(S): at 06:13

## 2025-08-26 RX ADMIN — METOPROLOL SUCCINATE 50 MILLIGRAM(S): 50 TABLET, EXTENDED RELEASE ORAL at 18:45

## 2025-08-26 RX ADMIN — KETOROLAC TROMETHAMINE 15 MILLIGRAM(S): 30 INJECTION, SOLUTION INTRAMUSCULAR; INTRAVENOUS at 06:36

## 2025-08-26 RX ADMIN — Medication 2 PUFF(S): at 13:09

## 2025-08-26 RX ADMIN — FLUTICASONE PROPIONATE 1 SPRAY(S): 50 SPRAY, METERED NASAL at 18:45

## 2025-08-26 RX ADMIN — DOXAZOSIN MESYLATE 2 MILLIGRAM(S): 8 TABLET ORAL at 06:13

## 2025-08-26 RX ADMIN — Medication 1 GRAM(S): at 18:45

## 2025-08-26 RX ADMIN — Medication 2 PUFF(S): at 08:16

## 2025-08-26 RX ADMIN — TORSEMIDE 40 MILLIGRAM(S): 20 TABLET ORAL at 06:13

## 2025-08-26 RX ADMIN — INSULIN LISPRO 2: 100 INJECTION, SOLUTION INTRAVENOUS; SUBCUTANEOUS at 08:44

## 2025-08-27 LAB
A1C WITH ESTIMATED AVERAGE GLUCOSE RESULT: 7.9 % — HIGH (ref 4–5.6)
ANION GAP SERPL CALC-SCNC: 12 MMOL/L — SIGNIFICANT CHANGE UP (ref 5–17)
BUN SERPL-MCNC: 23.9 MG/DL — HIGH (ref 8–20)
CALCIUM SERPL-MCNC: 8.2 MG/DL — LOW (ref 8.4–10.5)
CHLORIDE SERPL-SCNC: 103 MMOL/L — SIGNIFICANT CHANGE UP (ref 96–108)
CO2 SERPL-SCNC: 26 MMOL/L — SIGNIFICANT CHANGE UP (ref 22–29)
CREAT SERPL-MCNC: 0.63 MG/DL — SIGNIFICANT CHANGE UP (ref 0.5–1.3)
EGFR: 97 ML/MIN/1.73M2 — SIGNIFICANT CHANGE UP
EGFR: 97 ML/MIN/1.73M2 — SIGNIFICANT CHANGE UP
ESTIMATED AVERAGE GLUCOSE: 180 MG/DL — HIGH (ref 68–114)
GLUCOSE BLDC GLUCOMTR-MCNC: 153 MG/DL — HIGH (ref 70–99)
GLUCOSE BLDC GLUCOMTR-MCNC: 161 MG/DL — HIGH (ref 70–99)
GLUCOSE BLDC GLUCOMTR-MCNC: 187 MG/DL — HIGH (ref 70–99)
GLUCOSE BLDC GLUCOMTR-MCNC: 204 MG/DL — HIGH (ref 70–99)
GLUCOSE SERPL-MCNC: 180 MG/DL — HIGH (ref 70–99)
HCT VFR BLD CALC: 41.5 % — SIGNIFICANT CHANGE UP (ref 39–50)
HGB BLD-MCNC: 13.4 G/DL — SIGNIFICANT CHANGE UP (ref 13–17)
MAGNESIUM SERPL-MCNC: 1.9 MG/DL — SIGNIFICANT CHANGE UP (ref 1.6–2.6)
MCHC RBC-ENTMCNC: 28.8 PG — SIGNIFICANT CHANGE UP (ref 27–34)
MCHC RBC-ENTMCNC: 32.3 G/DL — SIGNIFICANT CHANGE UP (ref 32–36)
MCV RBC AUTO: 89.2 FL — SIGNIFICANT CHANGE UP (ref 80–100)
NRBC # BLD AUTO: 0 K/UL — SIGNIFICANT CHANGE UP (ref 0–0)
NRBC # FLD: 0 K/UL — SIGNIFICANT CHANGE UP (ref 0–0)
NRBC BLD AUTO-RTO: 0 /100 WBCS — SIGNIFICANT CHANGE UP (ref 0–0)
PHOSPHATE SERPL-MCNC: 3.2 MG/DL — SIGNIFICANT CHANGE UP (ref 2.4–4.7)
PLATELET # BLD AUTO: 144 K/UL — LOW (ref 150–400)
PMV BLD: 9 FL — SIGNIFICANT CHANGE UP (ref 7–13)
POTASSIUM SERPL-MCNC: 3.3 MMOL/L — LOW (ref 3.5–5.3)
POTASSIUM SERPL-SCNC: 3.3 MMOL/L — LOW (ref 3.5–5.3)
RBC # BLD: 4.65 M/UL — SIGNIFICANT CHANGE UP (ref 4.2–5.8)
RBC # FLD: 14.3 % — SIGNIFICANT CHANGE UP (ref 10.3–14.5)
SODIUM SERPL-SCNC: 141 MMOL/L — SIGNIFICANT CHANGE UP (ref 135–145)
WBC # BLD: 6.15 K/UL — SIGNIFICANT CHANGE UP (ref 3.8–10.5)
WBC # FLD AUTO: 6.15 K/UL — SIGNIFICANT CHANGE UP (ref 3.8–10.5)

## 2025-08-27 PROCEDURE — 85610 PROTHROMBIN TIME: CPT

## 2025-08-27 PROCEDURE — 70551 MRI BRAIN STEM W/O DYE: CPT

## 2025-08-27 PROCEDURE — 93005 ELECTROCARDIOGRAM TRACING: CPT

## 2025-08-27 PROCEDURE — 36415 COLL VENOUS BLD VENIPUNCTURE: CPT

## 2025-08-27 PROCEDURE — 70544 MR ANGIOGRAPHY HEAD W/O DYE: CPT | Mod: 26,XU

## 2025-08-27 PROCEDURE — 71045 X-RAY EXAM CHEST 1 VIEW: CPT

## 2025-08-27 PROCEDURE — 85027 COMPLETE CBC AUTOMATED: CPT

## 2025-08-27 PROCEDURE — 99233 SBSQ HOSP IP/OBS HIGH 50: CPT | Mod: GC

## 2025-08-27 PROCEDURE — 83735 ASSAY OF MAGNESIUM: CPT

## 2025-08-27 PROCEDURE — 70544 MR ANGIOGRAPHY HEAD W/O DYE: CPT

## 2025-08-27 PROCEDURE — 81003 URINALYSIS AUTO W/O SCOPE: CPT

## 2025-08-27 PROCEDURE — 86900 BLOOD TYPING SEROLOGIC ABO: CPT

## 2025-08-27 PROCEDURE — 70551 MRI BRAIN STEM W/O DYE: CPT | Mod: 26

## 2025-08-27 PROCEDURE — 83880 ASSAY OF NATRIURETIC PEPTIDE: CPT

## 2025-08-27 PROCEDURE — 94640 AIRWAY INHALATION TREATMENT: CPT

## 2025-08-27 PROCEDURE — 86850 RBC ANTIBODY SCREEN: CPT

## 2025-08-27 PROCEDURE — 70547 MR ANGIOGRAPHY NECK W/O DYE: CPT

## 2025-08-27 PROCEDURE — 93306 TTE W/DOPPLER COMPLETE: CPT

## 2025-08-27 PROCEDURE — 84100 ASSAY OF PHOSPHORUS: CPT

## 2025-08-27 PROCEDURE — 97163 PT EVAL HIGH COMPLEX 45 MIN: CPT

## 2025-08-27 PROCEDURE — 87637 SARSCOV2&INF A&B&RSV AMP PRB: CPT

## 2025-08-27 PROCEDURE — 85025 COMPLETE CBC W/AUTO DIFF WBC: CPT

## 2025-08-27 PROCEDURE — 83036 HEMOGLOBIN GLYCOSYLATED A1C: CPT

## 2025-08-27 PROCEDURE — 82962 GLUCOSE BLOOD TEST: CPT

## 2025-08-27 PROCEDURE — 84484 ASSAY OF TROPONIN QUANT: CPT

## 2025-08-27 PROCEDURE — 85730 THROMBOPLASTIN TIME PARTIAL: CPT

## 2025-08-27 PROCEDURE — 71275 CT ANGIOGRAPHY CHEST: CPT

## 2025-08-27 PROCEDURE — 80048 BASIC METABOLIC PNL TOTAL CA: CPT

## 2025-08-27 PROCEDURE — 86901 BLOOD TYPING SEROLOGIC RH(D): CPT

## 2025-08-27 PROCEDURE — 80053 COMPREHEN METABOLIC PANEL: CPT

## 2025-08-27 PROCEDURE — 70547 MR ANGIOGRAPHY NECK W/O DYE: CPT | Mod: 26

## 2025-08-27 PROCEDURE — 70450 CT HEAD/BRAIN W/O DYE: CPT

## 2025-08-27 RX ORDER — NYSTATIN 100000 [USP'U]/G
1 CREAM TOPICAL
Refills: 0 | Status: DISCONTINUED | OUTPATIENT
Start: 2025-08-27 | End: 2025-08-28

## 2025-08-27 RX ORDER — ACETAMINOPHEN 500 MG/5ML
650 LIQUID (ML) ORAL EVERY 6 HOURS
Refills: 0 | Status: DISCONTINUED | OUTPATIENT
Start: 2025-08-27 | End: 2025-08-28

## 2025-08-27 RX ORDER — POLYETHYLENE GLYCOL 3350 17 G/17G
17 POWDER, FOR SOLUTION ORAL DAILY
Refills: 0 | Status: DISCONTINUED | OUTPATIENT
Start: 2025-08-27 | End: 2025-08-28

## 2025-08-27 RX ADMIN — Medication 2 PUFF(S): at 12:09

## 2025-08-27 RX ADMIN — POLYETHYLENE GLYCOL 3350 17 GRAM(S): 17 POWDER, FOR SOLUTION ORAL at 12:10

## 2025-08-27 RX ADMIN — Medication 1 GRAM(S): at 05:20

## 2025-08-27 RX ADMIN — NYSTATIN 1 APPLICATION(S): 100000 CREAM TOPICAL at 17:57

## 2025-08-27 RX ADMIN — Medication 650 MILLIGRAM(S): at 18:33

## 2025-08-27 RX ADMIN — INSULIN LISPRO 1: 100 INJECTION, SOLUTION INTRAVENOUS; SUBCUTANEOUS at 08:56

## 2025-08-27 RX ADMIN — ATORVASTATIN CALCIUM 10 MILLIGRAM(S): 80 TABLET, FILM COATED ORAL at 23:02

## 2025-08-27 RX ADMIN — INSULIN LISPRO 1: 100 INJECTION, SOLUTION INTRAVENOUS; SUBCUTANEOUS at 17:57

## 2025-08-27 RX ADMIN — Medication 0.25 MILLIGRAM(S): at 05:19

## 2025-08-27 RX ADMIN — DOXAZOSIN MESYLATE 2 MILLIGRAM(S): 8 TABLET ORAL at 23:02

## 2025-08-27 RX ADMIN — Medication 10 MILLIEQUIVALENT(S): at 12:09

## 2025-08-27 RX ADMIN — Medication 650 MILLIGRAM(S): at 19:03

## 2025-08-27 RX ADMIN — FLUTICASONE PROPIONATE 1 SPRAY(S): 50 SPRAY, METERED NASAL at 17:56

## 2025-08-27 RX ADMIN — METOPROLOL SUCCINATE 50 MILLIGRAM(S): 50 TABLET, EXTENDED RELEASE ORAL at 05:21

## 2025-08-27 RX ADMIN — Medication 2 PUFF(S): at 22:59

## 2025-08-27 RX ADMIN — INSULIN LISPRO 2: 100 INJECTION, SOLUTION INTRAVENOUS; SUBCUTANEOUS at 12:11

## 2025-08-27 RX ADMIN — APIXABAN 5 MILLIGRAM(S): 2.5 TABLET, FILM COATED ORAL at 17:55

## 2025-08-27 RX ADMIN — FLUTICASONE PROPIONATE 1 SPRAY(S): 50 SPRAY, METERED NASAL at 05:20

## 2025-08-27 RX ADMIN — Medication 81 MILLIGRAM(S): at 12:09

## 2025-08-27 RX ADMIN — Medication 2 PUFF(S): at 17:56

## 2025-08-27 RX ADMIN — Medication 2 PUFF(S): at 08:56

## 2025-08-27 RX ADMIN — FINASTERIDE 5 MILLIGRAM(S): 1 TABLET, FILM COATED ORAL at 12:10

## 2025-08-27 RX ADMIN — Medication 1 GRAM(S): at 17:55

## 2025-08-27 RX ADMIN — APIXABAN 5 MILLIGRAM(S): 2.5 TABLET, FILM COATED ORAL at 05:19

## 2025-08-27 RX ADMIN — Medication 40 MILLIGRAM(S): at 05:19

## 2025-08-27 RX ADMIN — TORSEMIDE 40 MILLIGRAM(S): 20 TABLET ORAL at 05:21

## 2025-08-27 RX ADMIN — Medication 2 PUFF(S): at 05:20

## 2025-08-27 RX ADMIN — METOPROLOL SUCCINATE 50 MILLIGRAM(S): 50 TABLET, EXTENDED RELEASE ORAL at 17:55

## 2025-08-27 RX ADMIN — Medication 40 MILLIEQUIVALENT(S): at 08:55

## 2025-08-28 ENCOUNTER — TRANSCRIPTION ENCOUNTER (OUTPATIENT)
Age: 80
End: 2025-08-28

## 2025-08-28 VITALS
HEART RATE: 68 BPM | RESPIRATION RATE: 18 BRPM | OXYGEN SATURATION: 94 % | TEMPERATURE: 98 F | SYSTOLIC BLOOD PRESSURE: 107 MMHG | DIASTOLIC BLOOD PRESSURE: 62 MMHG

## 2025-08-28 LAB
ALBUMIN SERPL ELPH-MCNC: 3.5 G/DL — SIGNIFICANT CHANGE UP (ref 3.3–5.2)
ALP SERPL-CCNC: 213 U/L — HIGH (ref 40–120)
ALT FLD-CCNC: 24 U/L — SIGNIFICANT CHANGE UP
ANION GAP SERPL CALC-SCNC: 12 MMOL/L — SIGNIFICANT CHANGE UP (ref 5–17)
AST SERPL-CCNC: 23 U/L — SIGNIFICANT CHANGE UP
BILIRUB SERPL-MCNC: 0.7 MG/DL — SIGNIFICANT CHANGE UP (ref 0.4–2)
BUN SERPL-MCNC: 23.3 MG/DL — HIGH (ref 8–20)
CALCIUM SERPL-MCNC: 8.5 MG/DL — SIGNIFICANT CHANGE UP (ref 8.4–10.5)
CHLORIDE SERPL-SCNC: 102 MMOL/L — SIGNIFICANT CHANGE UP (ref 96–108)
CO2 SERPL-SCNC: 27 MMOL/L — SIGNIFICANT CHANGE UP (ref 22–29)
CREAT SERPL-MCNC: 0.65 MG/DL — SIGNIFICANT CHANGE UP (ref 0.5–1.3)
EGFR: 96 ML/MIN/1.73M2 — SIGNIFICANT CHANGE UP
EGFR: 96 ML/MIN/1.73M2 — SIGNIFICANT CHANGE UP
GLUCOSE BLDC GLUCOMTR-MCNC: 148 MG/DL — HIGH (ref 70–99)
GLUCOSE BLDC GLUCOMTR-MCNC: 264 MG/DL — HIGH (ref 70–99)
GLUCOSE SERPL-MCNC: 145 MG/DL — HIGH (ref 70–99)
POTASSIUM SERPL-MCNC: 3.9 MMOL/L — SIGNIFICANT CHANGE UP (ref 3.5–5.3)
POTASSIUM SERPL-SCNC: 3.9 MMOL/L — SIGNIFICANT CHANGE UP (ref 3.5–5.3)
PROT SERPL-MCNC: 6.6 G/DL — SIGNIFICANT CHANGE UP (ref 6.6–8.7)
SODIUM SERPL-SCNC: 141 MMOL/L — SIGNIFICANT CHANGE UP (ref 135–145)

## 2025-08-28 PROCEDURE — 70450 CT HEAD/BRAIN W/O DYE: CPT

## 2025-08-28 PROCEDURE — 96372 THER/PROPH/DIAG INJ SC/IM: CPT | Mod: XU

## 2025-08-28 PROCEDURE — 70547 MR ANGIOGRAPHY NECK W/O DYE: CPT

## 2025-08-28 PROCEDURE — 93005 ELECTROCARDIOGRAM TRACING: CPT

## 2025-08-28 PROCEDURE — 81003 URINALYSIS AUTO W/O SCOPE: CPT

## 2025-08-28 PROCEDURE — 85027 COMPLETE CBC AUTOMATED: CPT

## 2025-08-28 PROCEDURE — 86900 BLOOD TYPING SEROLOGIC ABO: CPT

## 2025-08-28 PROCEDURE — 86850 RBC ANTIBODY SCREEN: CPT

## 2025-08-28 PROCEDURE — 86901 BLOOD TYPING SEROLOGIC RH(D): CPT

## 2025-08-28 PROCEDURE — 83036 HEMOGLOBIN GLYCOSYLATED A1C: CPT

## 2025-08-28 PROCEDURE — 80048 BASIC METABOLIC PNL TOTAL CA: CPT

## 2025-08-28 PROCEDURE — 70544 MR ANGIOGRAPHY HEAD W/O DYE: CPT

## 2025-08-28 PROCEDURE — 85730 THROMBOPLASTIN TIME PARTIAL: CPT

## 2025-08-28 PROCEDURE — 84484 ASSAY OF TROPONIN QUANT: CPT

## 2025-08-28 PROCEDURE — 36415 COLL VENOUS BLD VENIPUNCTURE: CPT

## 2025-08-28 PROCEDURE — 87637 SARSCOV2&INF A&B&RSV AMP PRB: CPT

## 2025-08-28 PROCEDURE — 94640 AIRWAY INHALATION TREATMENT: CPT

## 2025-08-28 PROCEDURE — 70551 MRI BRAIN STEM W/O DYE: CPT

## 2025-08-28 PROCEDURE — 97163 PT EVAL HIGH COMPLEX 45 MIN: CPT

## 2025-08-28 PROCEDURE — 71275 CT ANGIOGRAPHY CHEST: CPT

## 2025-08-28 PROCEDURE — 93306 TTE W/DOPPLER COMPLETE: CPT

## 2025-08-28 PROCEDURE — G0378: CPT

## 2025-08-28 PROCEDURE — 99239 HOSP IP/OBS DSCHRG MGMT >30: CPT

## 2025-08-28 PROCEDURE — 85025 COMPLETE CBC W/AUTO DIFF WBC: CPT

## 2025-08-28 PROCEDURE — 82962 GLUCOSE BLOOD TEST: CPT

## 2025-08-28 PROCEDURE — 96374 THER/PROPH/DIAG INJ IV PUSH: CPT

## 2025-08-28 PROCEDURE — 99285 EMERGENCY DEPT VISIT HI MDM: CPT | Mod: 25

## 2025-08-28 PROCEDURE — 83880 ASSAY OF NATRIURETIC PEPTIDE: CPT

## 2025-08-28 PROCEDURE — 71045 X-RAY EXAM CHEST 1 VIEW: CPT

## 2025-08-28 PROCEDURE — 80053 COMPREHEN METABOLIC PANEL: CPT

## 2025-08-28 PROCEDURE — 83735 ASSAY OF MAGNESIUM: CPT

## 2025-08-28 PROCEDURE — 84100 ASSAY OF PHOSPHORUS: CPT

## 2025-08-28 PROCEDURE — 85610 PROTHROMBIN TIME: CPT

## 2025-08-28 RX ORDER — DOXAZOSIN MESYLATE 8 MG/1
1 TABLET ORAL
Qty: 0 | Refills: 0 | DISCHARGE
Start: 2025-08-28

## 2025-08-28 RX ORDER — FINASTERIDE 1 MG/1
1 TABLET, FILM COATED ORAL
Qty: 0 | Refills: 0 | DISCHARGE
Start: 2025-08-28

## 2025-08-28 RX ORDER — SUCRALFATE 1 G
1 TABLET ORAL
Qty: 0 | Refills: 0 | DISCHARGE
Start: 2025-08-28

## 2025-08-28 RX ADMIN — Medication 10 MILLIEQUIVALENT(S): at 08:51

## 2025-08-28 RX ADMIN — APIXABAN 5 MILLIGRAM(S): 2.5 TABLET, FILM COATED ORAL at 05:55

## 2025-08-28 RX ADMIN — FINASTERIDE 5 MILLIGRAM(S): 1 TABLET, FILM COATED ORAL at 08:52

## 2025-08-28 RX ADMIN — Medication 0.25 MILLIGRAM(S): at 02:18

## 2025-08-28 RX ADMIN — TORSEMIDE 40 MILLIGRAM(S): 20 TABLET ORAL at 05:56

## 2025-08-28 RX ADMIN — Medication 40 MILLIGRAM(S): at 06:38

## 2025-08-28 RX ADMIN — Medication 81 MILLIGRAM(S): at 08:52

## 2025-08-28 RX ADMIN — INSULIN LISPRO 3: 100 INJECTION, SOLUTION INTRAVENOUS; SUBCUTANEOUS at 12:31

## 2025-08-28 RX ADMIN — METOPROLOL SUCCINATE 50 MILLIGRAM(S): 50 TABLET, EXTENDED RELEASE ORAL at 05:56

## 2025-08-28 RX ADMIN — Medication 2 PUFF(S): at 05:57

## 2025-08-28 RX ADMIN — NYSTATIN 1 APPLICATION(S): 100000 CREAM TOPICAL at 05:56

## 2025-08-28 RX ADMIN — FLUTICASONE PROPIONATE 1 SPRAY(S): 50 SPRAY, METERED NASAL at 05:56

## 2025-08-28 RX ADMIN — Medication 1 GRAM(S): at 05:56

## 2025-08-28 RX ADMIN — POLYETHYLENE GLYCOL 3350 17 GRAM(S): 17 POWDER, FOR SOLUTION ORAL at 08:53

## 2025-08-29 ENCOUNTER — TRANSCRIPTION ENCOUNTER (OUTPATIENT)
Age: 80
End: 2025-08-29

## 2025-09-02 ENCOUNTER — APPOINTMENT (OUTPATIENT)
Dept: CARE COORDINATION | Facility: HOME HEALTH | Age: 80
End: 2025-09-02
Payer: MEDICARE

## 2025-09-02 DIAGNOSIS — M48.062 SPINAL STENOSIS, LUMBAR REGION WITH NEUROGENIC CLAUDICATION: ICD-10-CM

## 2025-09-02 DIAGNOSIS — I50.30 UNSPECIFIED DIASTOLIC (CONGESTIVE) HEART FAILURE: ICD-10-CM

## 2025-09-02 PROCEDURE — 99496 TRANSJ CARE MGMT HIGH F2F 7D: CPT | Mod: 2W

## 2025-09-04 ENCOUNTER — TRANSCRIPTION ENCOUNTER (OUTPATIENT)
Age: 80
End: 2025-09-04

## 2025-09-10 ENCOUNTER — APPOINTMENT (OUTPATIENT)
Dept: HOME HEALTH SERVICES | Facility: HOME HEALTH | Age: 80
End: 2025-09-10
Payer: MEDICARE

## 2025-09-10 ENCOUNTER — TRANSCRIPTION ENCOUNTER (OUTPATIENT)
Age: 80
End: 2025-09-10

## 2025-09-10 VITALS
RESPIRATION RATE: 16 BRPM | TEMPERATURE: 96.98 F | SYSTOLIC BLOOD PRESSURE: 136 MMHG | DIASTOLIC BLOOD PRESSURE: 78 MMHG | HEART RATE: 67 BPM | OXYGEN SATURATION: 100 %

## 2025-09-10 DIAGNOSIS — F41.9 ANXIETY DISORDER, UNSPECIFIED: ICD-10-CM

## 2025-09-10 DIAGNOSIS — I50.20 UNSPECIFIED SYSTOLIC (CONGESTIVE) HEART FAILURE: ICD-10-CM

## 2025-09-10 DIAGNOSIS — M17.0 BILATERAL PRIMARY OSTEOARTHRITIS OF KNEE: ICD-10-CM

## 2025-09-10 DIAGNOSIS — Z86.79 OTHER SPECIFIED POSTPROCEDURAL STATES: ICD-10-CM

## 2025-09-10 DIAGNOSIS — E11.9 TYPE 2 DIABETES MELLITUS W/OUT COMPLICATIONS: ICD-10-CM

## 2025-09-10 DIAGNOSIS — Z71.89 OTHER SPECIFIED COUNSELING: ICD-10-CM

## 2025-09-10 DIAGNOSIS — E78.5 HYPERLIPIDEMIA, UNSPECIFIED: ICD-10-CM

## 2025-09-10 DIAGNOSIS — I25.10 ATHEROSCLEROTIC HEART DISEASE OF NATIVE CORONARY ARTERY W/OUT ANGINA PECTORIS: ICD-10-CM

## 2025-09-10 DIAGNOSIS — I10 ESSENTIAL (PRIMARY) HYPERTENSION: ICD-10-CM

## 2025-09-10 DIAGNOSIS — Z95.2 PRESENCE OF PROSTHETIC HEART VALVE: ICD-10-CM

## 2025-09-10 DIAGNOSIS — Z95.0 PRESENCE OF CARDIAC PACEMAKER: ICD-10-CM

## 2025-09-10 DIAGNOSIS — Z98.890 OTHER SPECIFIED POSTPROCEDURAL STATES: ICD-10-CM

## 2025-09-10 DIAGNOSIS — I48.91 UNSPECIFIED ATRIAL FIBRILLATION: ICD-10-CM

## 2025-09-10 DIAGNOSIS — F32.A ANXIETY DISORDER, UNSPECIFIED: ICD-10-CM

## 2025-09-10 PROCEDURE — G0506: CPT | Mod: 2W

## 2025-09-10 PROCEDURE — 99344 HOME/RES VST NEW MOD MDM 60: CPT | Mod: 25,2W

## 2025-09-11 ENCOUNTER — TRANSCRIPTION ENCOUNTER (OUTPATIENT)
Age: 80
End: 2025-09-11

## 2025-09-16 ENCOUNTER — TRANSCRIPTION ENCOUNTER (OUTPATIENT)
Age: 80
End: 2025-09-16

## 2025-09-16 ENCOUNTER — NON-APPOINTMENT (OUTPATIENT)
Age: 80
End: 2025-09-16

## 2025-09-16 ENCOUNTER — APPOINTMENT (OUTPATIENT)
Dept: CARE COORDINATION | Facility: HOME HEALTH | Age: 80
End: 2025-09-16

## (undated) DEVICE — SOL INJ NS 0.9% 1000ML

## (undated) DEVICE — DRAPE DOME BAG 22"

## (undated) DEVICE — DRSG TEGADERM 4X4.75"

## (undated) DEVICE — GLV 7.5 PROTEXIS (WHITE)

## (undated) DEVICE — SUT SILK 0 30" SH

## (undated) DEVICE — DRSG MEPILEX 10 X 30CM (4 X 12") WHITE

## (undated) DEVICE — PACK BASIC GOWN

## (undated) DEVICE — DRAPE TOWEL BLUE 17" X 24"

## (undated) DEVICE — SOL IRR POUR NS 0.9% 1000ML

## (undated) DEVICE — WOUND IRR IRRISEPT W 0.5 CHG

## (undated) DEVICE — VISITEC 4X4

## (undated) DEVICE — PACK MINOR WITH LAP

## (undated) DEVICE — DRAPE 3/4 SHEET 52X76"

## (undated) DEVICE — POSITIONER FOAM EGG CRATE ULNAR 2PCS (PINK)

## (undated) DEVICE — PREP SCRUB BRUSH W CHG 4%

## (undated) DEVICE — DRAPE C ARM UNIVERSAL

## (undated) DEVICE — SUT MONOCRYL 4-0 27" PS-2 UNDYED

## (undated) DEVICE — WARMING BLANKET FULL UNDERBODY

## (undated) DEVICE — DRAPE CV 106" X 135"

## (undated) DEVICE — ELCTR MULTIFUNCTION DEFIBRILLATION ELECTRODE EDGE SYSTEM ADULT

## (undated) DEVICE — SYR LUER LOK 20CC

## (undated) DEVICE — PREP CHLORAPREP HI-LITE ORANGE 26ML

## (undated) DEVICE — GOWN TRIMAX LG

## (undated) DEVICE — DRSG MASTISOL